# Patient Record
Sex: MALE | Race: WHITE | NOT HISPANIC OR LATINO | Employment: OTHER | URBAN - METROPOLITAN AREA
[De-identification: names, ages, dates, MRNs, and addresses within clinical notes are randomized per-mention and may not be internally consistent; named-entity substitution may affect disease eponyms.]

---

## 2017-07-12 ENCOUNTER — TELEPHONE (OUTPATIENT)
Dept: INTERNAL MEDICINE | Facility: CLINIC | Age: 57
End: 2017-07-12

## 2017-07-12 NOTE — TELEPHONE ENCOUNTER
Pt calling that today he is having a hard time waking up. Last week had a cough and not feeling well. No fever. Able to walk but tires easily. Not SOB but feels he is not getting his breath. Has many medical issues. Does construction for a living. Pt does have high blood. Pt to ER for evaluation.  Teresita Huizar RN 8:48 AM on 7/12/2017.

## 2017-10-22 ENCOUNTER — HOSPITAL ENCOUNTER (INPATIENT)
Facility: CLINIC | Age: 57
LOS: 3 days | Discharge: HOME | DRG: 369 | End: 2017-10-25
Attending: EMERGENCY MEDICINE | Admitting: SURGERY
Payer: COMMERCIAL

## 2017-10-22 ENCOUNTER — NURSE TRIAGE (OUTPATIENT)
Dept: NURSING | Facility: CLINIC | Age: 57
End: 2017-10-22

## 2017-10-22 ENCOUNTER — APPOINTMENT (OUTPATIENT)
Dept: CT IMAGING | Facility: CLINIC | Age: 57
DRG: 369 | End: 2017-10-22
Attending: EMERGENCY MEDICINE
Payer: COMMERCIAL

## 2017-10-22 DIAGNOSIS — F10.21 ALCOHOLISM IN REMISSION (H): Primary | ICD-10-CM

## 2017-10-22 DIAGNOSIS — I10 ESSENTIAL HYPERTENSION: ICD-10-CM

## 2017-10-22 DIAGNOSIS — K92.2 UPPER GI BLEED: ICD-10-CM

## 2017-10-22 DIAGNOSIS — K70.30 ALCOHOLIC CIRRHOSIS OF LIVER WITHOUT ASCITES (H): ICD-10-CM

## 2017-10-22 DIAGNOSIS — R05.9 COUGH: ICD-10-CM

## 2017-10-22 DIAGNOSIS — R09.82 POST-NASAL DRIP: ICD-10-CM

## 2017-10-22 LAB
ALBUMIN SERPL-MCNC: 2.5 G/DL (ref 3.4–5)
ALP SERPL-CCNC: 71 U/L (ref 40–150)
ALT SERPL W P-5'-P-CCNC: 127 U/L (ref 0–70)
AMMONIA PLAS-SCNC: 29 UMOL/L (ref 10–50)
ANION GAP SERPL CALCULATED.3IONS-SCNC: 9 MMOL/L (ref 3–14)
AST SERPL W P-5'-P-CCNC: 110 U/L (ref 0–45)
BASOPHILS # BLD AUTO: 0 10E9/L (ref 0–0.2)
BASOPHILS NFR BLD AUTO: 0 %
BILIRUB SERPL-MCNC: 0.7 MG/DL (ref 0.2–1.3)
BLD PROD TYP BPU: NORMAL
BLD UNIT ID BPU: 0
BLOOD PRODUCT CODE: NORMAL
BPU ID: NORMAL
BUN SERPL-MCNC: 28 MG/DL (ref 7–30)
CALCIUM SERPL-MCNC: 7.8 MG/DL (ref 8.5–10.1)
CHLORIDE SERPL-SCNC: 104 MMOL/L (ref 94–109)
CO2 SERPL-SCNC: 20 MMOL/L (ref 20–32)
CREAT SERPL-MCNC: 0.94 MG/DL (ref 0.66–1.25)
DIFFERENTIAL METHOD BLD: ABNORMAL
EOSINOPHIL # BLD AUTO: 0 10E9/L (ref 0–0.7)
EOSINOPHIL NFR BLD AUTO: 0 %
ERYTHROCYTE [DISTWIDTH] IN BLOOD BY AUTOMATED COUNT: 15.4 % (ref 10–15)
GFR SERPL CREATININE-BSD FRML MDRD: 82 ML/MIN/1.7M2
GLUCOSE SERPL-MCNC: 108 MG/DL (ref 70–99)
HCT VFR BLD AUTO: 18.6 % (ref 40–53)
HGB BLD-MCNC: 6.1 G/DL (ref 13.3–17.7)
INR PPP: 1.28 (ref 0.86–1.14)
LACTATE BLD-SCNC: 1.9 MMOL/L (ref 0.7–2)
LYMPHOCYTES # BLD AUTO: 3.8 10E9/L (ref 0.8–5.3)
LYMPHOCYTES NFR BLD AUTO: 17.4 %
MCH RBC QN AUTO: 33.9 PG (ref 26.5–33)
MCHC RBC AUTO-ENTMCNC: 32.8 G/DL (ref 31.5–36.5)
MCV RBC AUTO: 103 FL (ref 78–100)
MONOCYTES # BLD AUTO: 1.3 10E9/L (ref 0–1.3)
MONOCYTES NFR BLD AUTO: 6.1 %
NEUTROPHILS # BLD AUTO: 16.6 10E9/L (ref 1.6–8.3)
NEUTROPHILS NFR BLD AUTO: 76.5 %
PLATELET # BLD AUTO: 121 10E9/L (ref 150–450)
PLATELET # BLD EST: ABNORMAL 10*3/UL
POTASSIUM SERPL-SCNC: 4.2 MMOL/L (ref 3.4–5.3)
PROT SERPL-MCNC: 5.2 G/DL (ref 6.8–8.8)
RBC # BLD AUTO: 1.8 10E12/L (ref 4.4–5.9)
SODIUM SERPL-SCNC: 133 MMOL/L (ref 133–144)
TRANSFUSION STATUS PATIENT QL: NORMAL
TRANSFUSION STATUS PATIENT QL: NORMAL
WBC # BLD AUTO: 21.7 10E9/L (ref 4–11)

## 2017-10-22 PROCEDURE — P9016 RBC LEUKOCYTES REDUCED: HCPCS | Performed by: EMERGENCY MEDICINE

## 2017-10-22 PROCEDURE — 86901 BLOOD TYPING SEROLOGIC RH(D): CPT | Performed by: EMERGENCY MEDICINE

## 2017-10-22 PROCEDURE — 85610 PROTHROMBIN TIME: CPT | Performed by: EMERGENCY MEDICINE

## 2017-10-22 PROCEDURE — 99291 CRITICAL CARE FIRST HOUR: CPT | Mod: Z6 | Performed by: EMERGENCY MEDICINE

## 2017-10-22 PROCEDURE — 93005 ELECTROCARDIOGRAM TRACING: CPT | Performed by: EMERGENCY MEDICINE

## 2017-10-22 PROCEDURE — 96375 TX/PRO/DX INJ NEW DRUG ADDON: CPT | Performed by: EMERGENCY MEDICINE

## 2017-10-22 PROCEDURE — 70450 CT HEAD/BRAIN W/O DYE: CPT

## 2017-10-22 PROCEDURE — 82140 ASSAY OF AMMONIA: CPT | Performed by: EMERGENCY MEDICINE

## 2017-10-22 PROCEDURE — 85025 COMPLETE CBC W/AUTO DIFF WBC: CPT | Performed by: EMERGENCY MEDICINE

## 2017-10-22 PROCEDURE — 96368 THER/DIAG CONCURRENT INF: CPT | Performed by: EMERGENCY MEDICINE

## 2017-10-22 PROCEDURE — S0164 INJECTION PANTROPRAZOLE: HCPCS | Performed by: EMERGENCY MEDICINE

## 2017-10-22 PROCEDURE — 25000128 H RX IP 250 OP 636: Performed by: EMERGENCY MEDICINE

## 2017-10-22 PROCEDURE — 86850 RBC ANTIBODY SCREEN: CPT | Performed by: EMERGENCY MEDICINE

## 2017-10-22 PROCEDURE — 80053 COMPREHEN METABOLIC PANEL: CPT | Performed by: EMERGENCY MEDICINE

## 2017-10-22 PROCEDURE — 99285 EMERGENCY DEPT VISIT HI MDM: CPT | Mod: 25 | Performed by: EMERGENCY MEDICINE

## 2017-10-22 PROCEDURE — 96365 THER/PROPH/DIAG IV INF INIT: CPT | Performed by: EMERGENCY MEDICINE

## 2017-10-22 PROCEDURE — 96376 TX/PRO/DX INJ SAME DRUG ADON: CPT | Performed by: EMERGENCY MEDICINE

## 2017-10-22 PROCEDURE — 25000131 ZZH RX MED GY IP 250 OP 636 PS 637: Performed by: EMERGENCY MEDICINE

## 2017-10-22 PROCEDURE — 83605 ASSAY OF LACTIC ACID: CPT | Performed by: EMERGENCY MEDICINE

## 2017-10-22 PROCEDURE — 36430 TRANSFUSION BLD/BLD COMPNT: CPT | Performed by: EMERGENCY MEDICINE

## 2017-10-22 PROCEDURE — 25000125 ZZHC RX 250: Performed by: EMERGENCY MEDICINE

## 2017-10-22 PROCEDURE — 86900 BLOOD TYPING SEROLOGIC ABO: CPT | Performed by: EMERGENCY MEDICINE

## 2017-10-22 PROCEDURE — 86923 COMPATIBILITY TEST ELECTRIC: CPT | Performed by: EMERGENCY MEDICINE

## 2017-10-22 PROCEDURE — 12000008 ZZH R&B INTERMEDIATE UMMC

## 2017-10-22 RX ORDER — OCTREOTIDE ACETATE 50 UG/ML
50 INJECTION, SOLUTION INTRAVENOUS; SUBCUTANEOUS ONCE
Status: COMPLETED | OUTPATIENT
Start: 2017-10-22 | End: 2017-10-22

## 2017-10-22 RX ORDER — ONDANSETRON 2 MG/ML
4 INJECTION INTRAMUSCULAR; INTRAVENOUS EVERY 30 MIN PRN
Status: DISCONTINUED | OUTPATIENT
Start: 2017-10-22 | End: 2017-10-24

## 2017-10-22 RX ADMIN — ONDANSETRON 4 MG: 2 INJECTION INTRAMUSCULAR; INTRAVENOUS at 23:41

## 2017-10-22 RX ADMIN — PANTOPRAZOLE SODIUM 80 MG: 40 INJECTION, POWDER, FOR SOLUTION INTRAVENOUS at 22:45

## 2017-10-22 RX ADMIN — OCTREOTIDE ACETATE 50 MCG/HR: 200 INJECTION, SOLUTION INTRAVENOUS; SUBCUTANEOUS at 23:21

## 2017-10-22 RX ADMIN — SODIUM CHLORIDE 8 MG/HR: 9 INJECTION, SOLUTION INTRAVENOUS at 23:21

## 2017-10-22 RX ADMIN — OCTREOTIDE ACETATE 50 MCG: 50 INJECTION, SOLUTION INTRAVENOUS; SUBCUTANEOUS at 22:59

## 2017-10-22 ASSESSMENT — ENCOUNTER SYMPTOMS
BLOOD IN STOOL: 1
VOMITING: 1
MYALGIAS: 1
SHORTNESS OF BREATH: 1

## 2017-10-22 NOTE — IP AVS SNAPSHOT
[address] [address]    Phone:  [phone]                                       After Visit Summary   10/22/2017    Ten Johnson    MRN: [id number]           After Visit Summary Signature Page     I have received my discharge instructions, and my questions have been answered. I have discussed any challenges I see with this plan with the nurse or doctor.    ..........................................................................................................................................  Patient/Patient Representative Signature      ..........................................................................................................................................  Patient Representative Print Name and Relationship to Patient    ..................................................               ................................................  Date                                            Time    ..........................................................................................................................................  Reviewed by Signature/Title    ...................................................              ..............................................  Date                                                            Time

## 2017-10-22 NOTE — IP AVS SNAPSHOT
MRN:[id number]                      After Visit Summary   10/22/2017    Ten Johnson    MRN: [id number]           Thank you!     Thank you for choosing [facility] for your care. Our goal is always to provide you with excellent care. Hearing back from our patients is one way we can continue to improve our services. Please take a few minutes to complete the written survey that you may receive in the mail after you visit with us. Thank you!        Patient Information     Date Of Birth          1960        Designated Caregiver       Most Recent Value    Caregiver    Will someone help with your care after discharge? no      About your hospital stay     You were admitted on:  October 22, 2017 You last received care in the:  [address] [facility]    You were discharged on:  October 25, 2017        Reason for your hospital stay       Admitted with acute GI bleed due to Sarahi Robert tear in the esophagus.                  Who to Call     For medical emergencies, please call 911.  For non-urgent questions about your medical care, please call your primary care provider or clinic, [phone]  For questions related to your surgery, please call your surgery clinic        Attending Provider     Provider Specialty    Elvia Shaikh MD Emergency Medicine    [place], Capo Sorenson MD General Surgery    Michael Travis MD Internal Medicine    Jack Shepard MD Internal Medicine    Jak, MD Tamara Internal Medicine       Primary Care Provider Office Phone # Fax #    Cuca Celeste [phone]       When to contact your care team       Call your primary doctor if you have any of the following:  increased shortness of breath, increased swelling . Please go to the ED for further symptoms of recurrent GI bleed.                  After Care Instructions     Activity       Your activity upon discharge: activity as tolerated            Diet       Follow this diet upon discharge:  "Regular Diet Adult, 2g Sodium Restriction                  Follow-up Appointments     Adult [facility] Follow-up and recommended labs and tests       Follow up with primary care provider, Cuca Celeste, within 7 days for hospital follow- up.  The following labs/tests are recommended: CMP, CBC.      Follow up with Hepatology in 3 months.    Follow up with Cardiology as directed.     Appointments on [place] and/or [facility] (with [facility] or [facility] provider or service). Call [phone] if you haven't heard regarding these appointments within 7 days of discharge.                  Pending Results     Date and Time Order Name Status Description    10/24/2017 0005 MR Abdomen w/o & w Contrast In process     10/23/2017 0237 Blood culture Preliminary     10/23/2017 0237 Blood culture Preliminary     10/22/2017 2330 POC US ABDOMEN LIMITED In process             Statement of Approval     Ordered          10/25/17 4447  I have reviewed and agree with all the recommendations and orders detailed in this document.  EFFECTIVE NOW     Approved and electronically signed by:  Pérez Barrios PA-C             Admission Information     Date & Time Provider Department Dept. Phone    10/22/2017 Tamara Benedict MD [address] [facility] [place] [phone]      Your Vitals Were     Blood Pressure Pulse Temperature Respirations Height Weight    148/86 (BP Location: Left arm) 73 95.8  F (35.4  C) (Oral) 16 1.727 m (5' 7.99\") 78 kg (171 lb 15.3 oz)    Pulse Oximetry BMI (Body Mass Index)                94% 26.15 kg/m2          [employer] Information     [employer] lets you send messages to your doctor, view your test results, renew your prescriptions, schedule appointments and more. To sign up, go to [email or web address] . Click on \"Log in\" on the left side of the screen, which will take you to the Welcome page. Then click on \"Sign up Now\" on the right side of the page.     You will be asked to enter the access code listed below, as well as " some personal information. Please follow the directions to create your username and password.     Your access code is: -[email or web address]  Expires: 2018  5:05 PM     Your access code will  in 90 days. If you need help or a new code, please call your [facility] clinic or [phone].        Care EveryWhere ID     This is your Care EveryWhere ID. This could be used by other organizations to access your [facility] medical records  [id number]        Equal Access to Services     [facility] : Hadii adrienne garcia hadasho Soomaali, waaxda luqadaha, qaybta kaalmada adeegyada, jazmin crespo . So [facility] [phone].    ATENCIÓN: Si habla español, tiene a ha disposición servicios gratuitos de asistencia lingüística. Llame al [phone].    We comply with applicable federal civil rights laws and [place] laws. We do not discriminate on the basis of race, color, national origin, age, disability, sex, sexual orientation, or gender identity.               Review of your medicines      START taking        Dose / Directions    ciprofloxacin 500 MG tablet   Commonly known as:  CIPRO   Used for:  Alcoholic cirrhosis of liver without ascites (H)        Dose:  500 mg   Take 1 tablet (500 mg) by mouth 2 times daily   Quantity:  10 tablet   Refills:  0       pantoprazole 40 MG EC tablet   Commonly known as:  PROTONIX        Dose:  40 mg   Take 1 tablet (40 mg) by mouth 2 times daily (before meals)   Quantity:  120 tablet   Refills:  0         CONTINUE these medicines which have NOT CHANGED        Dose / Directions    fluticasone 50 MCG/ACT spray   Commonly known as:  FLONASE   Used for:  Cough, Post-nasal drip        Dose:  2 spray   Spray 2 sprays into both nostrils daily   Quantity:  16 g   Refills:  11       lisinopril 10 MG tablet   Commonly known as:  PRINIVIL/ZESTRIL   Used for:  Essential hypertension        Dose:  10 mg   Take 1 tablet (10 mg) by mouth daily   Quantity:  90 tablet   Refills:  3        loratadine 10 MG tablet   Commonly known as:  CLARITIN   Used for:  Post-nasal drip, Cough        Dose:  10 mg   Take 1 tablet (10 mg) by mouth daily   Quantity:  30 tablet   Refills:  11       MULTIVITAMINS PO        Dose:  1 tablet   Take 1 tablet by mouth daily.   Refills:  0            Where to get your medicines      These medications were sent to [facility] - [place], [place] - [address]  [address], [facility] [address]     Phone:  [phone]     ciprofloxacin 500 MG tablet    pantoprazole 40 MG EC tablet               ANTIBIOTIC INSTRUCTION     You've Been Prescribed an Antibiotic - Now What?  Your healthcare team thinks that you or your loved one might have an infection. Some infections can be treated with antibiotics, which are powerful, life-saving drugs. Like all medications, antibiotics have side effects and should only be used when necessary. There are some important things you should know about your antibiotic treatment.      Your healthcare team may run tests before you start taking an antibiotic.    Your team may take samples (e.g., from your blood, urine or other areas) to run tests to look for bacteria. These test can be important to determine if you need an antibiotic at all and, if you do, which antibiotic will work best.      Within a few days, your healthcare team might change or even stop your antibiotic.    Your team may start you on an antibiotic while they are working to find out what is making you sick.    Your team might change your antibiotic because test results show that a different antibiotic would be better to treat your infection.    In some cases, once your team has more information, they learn that you do not need an antibiotic at all. They may find out that you don't have an infection, or that the antibiotic you're taking won't work against your infection. For example, an infection caused by a virus can't be treated with antibiotics. Staying  on an antibiotic when you don't need it is more likely to be harmful than helpful.      You may experience side effects from your antibiotic.    Like all medications, antibiotics have side effects. Some of these can be serious.    Let you healthcare team know if you have any known allergies when you are admitted to the hospital.    One significant side effect of nearly all antibiotics is the risk of severe and sometimes deadly diarrhea caused by Clostridium difficile (C. Difficile). This occurs when a person takes antibiotics because some good germs are destroyed. Antibiotic use allows C. diificile to take over, putting patients at high risk for this serious infection.    As a patient or caregiver, it is important to understand your or your loved one's antibiotic treatment. It is especially important for caregivers to speak up when patients can't speak for themselves. Here are some important questions to ask your healthcare team.    What infection is this antibiotic treating and how do you know I have that infection?    What side effects might occur from this antibiotic?    How long will I need to take this antibiotic?    Is it safe to take this antibiotic with other medications or supplements (e.g., vitamins) that I am taking?     Are there any special directions I need to know about taking this antibiotic? For example, should I take it with food?    How will I be monitored to know whether my infection is responding to the antibiotic?    What tests may help to make sure the right antibiotic is prescribed for me?      Information provided by:  www.cdc.gov/getsmart  U.S. Department of Health and Human Services  Centers for disease Control and Prevention  National Center for Emerging and Zoonotic Infectious Diseases  Division of Healthcare Quality Promotion         Protect others around you: Learn how to safely use, store and throw away your medicines at www.disposemymeds.org.             Medication List: This is a  list of all your medications and when to take them. Check marks below indicate your daily home schedule. Keep this list as a reference.      Medications           Morning Afternoon Evening Bedtime As Needed    ciprofloxacin 500 MG tablet   Commonly known as:  CIPRO   Take 1 tablet (500 mg) by mouth 2 times daily                                fluticasone 50 MCG/ACT spray   Commonly known as:  FLONASE   Spray 2 sprays into both nostrils daily                                lisinopril 10 MG tablet   Commonly known as:  PRINIVIL/ZESTRIL   Take 1 tablet (10 mg) by mouth daily                                loratadine 10 MG tablet   Commonly known as:  CLARITIN   Take 1 tablet (10 mg) by mouth daily                                MULTIVITAMINS PO   Take 1 tablet by mouth daily.                                pantoprazole 40 MG EC tablet   Commonly known as:  PROTONIX   Take 1 tablet (40 mg) by mouth 2 times daily (before meals)   Last time this was given:  40 mg on 10/25/2017  3:57 PM

## 2017-10-23 ENCOUNTER — APPOINTMENT (OUTPATIENT)
Dept: ULTRASOUND IMAGING | Facility: CLINIC | Age: 57
DRG: 369 | End: 2017-10-23
Attending: SURGERY
Payer: COMMERCIAL

## 2017-10-23 ENCOUNTER — APPOINTMENT (OUTPATIENT)
Dept: GENERAL RADIOLOGY | Facility: CLINIC | Age: 57
DRG: 369 | End: 2017-10-23
Attending: SURGERY
Payer: COMMERCIAL

## 2017-10-23 ENCOUNTER — RESULTS ONLY (OUTPATIENT)
Dept: GASTROENTEROLOGY | Facility: CLINIC | Age: 57
End: 2017-10-23

## 2017-10-23 PROBLEM — K92.0 HEMATEMESIS: Status: ACTIVE | Noted: 2017-10-23

## 2017-10-23 PROBLEM — K92.2 GI BLEED: Status: ACTIVE | Noted: 2017-10-23

## 2017-10-23 LAB
ABO + RH BLD: NORMAL
ABO + RH BLD: NORMAL
ALBUMIN SERPL-MCNC: 2.3 G/DL (ref 3.4–5)
ALBUMIN UR-MCNC: NEGATIVE MG/DL
ALP SERPL-CCNC: 65 U/L (ref 40–150)
ALT SERPL W P-5'-P-CCNC: 117 U/L (ref 0–70)
ANION GAP SERPL CALCULATED.3IONS-SCNC: 8 MMOL/L (ref 3–14)
APPEARANCE UR: CLEAR
AST SERPL W P-5'-P-CCNC: 99 U/L (ref 0–45)
BILIRUB SERPL-MCNC: 0.8 MG/DL (ref 0.2–1.3)
BILIRUB UR QL STRIP: NEGATIVE
BLD GP AB SCN SERPL QL: NORMAL
BLD PROD TYP BPU: NORMAL
BLD UNIT ID BPU: 0
BLD UNIT ID BPU: 0
BLOOD BANK CMNT PATIENT-IMP: NORMAL
BLOOD PRODUCT CODE: NORMAL
BLOOD PRODUCT CODE: NORMAL
BPU ID: NORMAL
BPU ID: NORMAL
BUN SERPL-MCNC: 28 MG/DL (ref 7–30)
CALCIUM SERPL-MCNC: 7.2 MG/DL (ref 8.5–10.1)
CHLORIDE SERPL-SCNC: 108 MMOL/L (ref 94–109)
CO2 SERPL-SCNC: 23 MMOL/L (ref 20–32)
COLOR UR AUTO: YELLOW
CREAT SERPL-MCNC: 1.11 MG/DL (ref 0.66–1.25)
ERYTHROCYTE [DISTWIDTH] IN BLOOD BY AUTOMATED COUNT: 16.2 % (ref 10–15)
GFR SERPL CREATININE-BSD FRML MDRD: 68 ML/MIN/1.7M2
GLUCOSE SERPL-MCNC: 119 MG/DL (ref 70–99)
GLUCOSE UR STRIP-MCNC: NEGATIVE MG/DL
HCT VFR BLD AUTO: 21.4 % (ref 40–53)
HGB BLD-MCNC: 6.8 G/DL (ref 13.3–17.7)
HGB BLD-MCNC: 7.1 G/DL (ref 13.3–17.7)
HGB BLD-MCNC: 8.6 G/DL (ref 13.3–17.7)
HGB UR QL STRIP: NEGATIVE
HYALINE CASTS #/AREA URNS LPF: 1 /LPF (ref 0–2)
INR PPP: 1.33 (ref 0.86–1.14)
INTERPRETATION ECG - MUSE: NORMAL
KETONES UR STRIP-MCNC: NEGATIVE MG/DL
LEUKOCYTE ESTERASE UR QL STRIP: NEGATIVE
MAGNESIUM SERPL-MCNC: 1.8 MG/DL (ref 1.6–2.3)
MAGNESIUM SERPL-MCNC: 2.8 MG/DL (ref 1.6–2.3)
MCH RBC QN AUTO: 32.3 PG (ref 26.5–33)
MCHC RBC AUTO-ENTMCNC: 33.2 G/DL (ref 31.5–36.5)
MCV RBC AUTO: 97 FL (ref 78–100)
MRSA DNA SPEC QL NAA+PROBE: NEGATIVE
MUCOUS THREADS #/AREA URNS LPF: PRESENT /LPF
NITRATE UR QL: NEGATIVE
NUM BPU REQUESTED: 3
PH UR STRIP: 6.5 PH (ref 5–7)
PHOSPHATE SERPL-MCNC: 3.7 MG/DL (ref 2.5–4.5)
PLATELET # BLD AUTO: 95 10E9/L (ref 150–450)
POTASSIUM SERPL-SCNC: 4.2 MMOL/L (ref 3.4–5.3)
PROT SERPL-MCNC: 4.7 G/DL (ref 6.8–8.8)
RBC # BLD AUTO: 2.2 10E12/L (ref 4.4–5.9)
RBC #/AREA URNS AUTO: 0 /HPF (ref 0–2)
SODIUM SERPL-SCNC: 139 MMOL/L (ref 133–144)
SOURCE: ABNORMAL
SP GR UR STRIP: 1.02 (ref 1–1.03)
SPECIMEN EXP DATE BLD: NORMAL
SPECIMEN SOURCE: NORMAL
TRANSFUSION STATUS PATIENT QL: NORMAL
UPPER GI ENDOSCOPY: NORMAL
UROBILINOGEN UR STRIP-MCNC: NORMAL MG/DL (ref 0–2)
WBC # BLD AUTO: 14.8 10E9/L (ref 4–11)
WBC #/AREA URNS AUTO: 2 /HPF (ref 0–2)

## 2017-10-23 PROCEDURE — 25000128 H RX IP 250 OP 636: Performed by: EMERGENCY MEDICINE

## 2017-10-23 PROCEDURE — 84100 ASSAY OF PHOSPHORUS: CPT | Performed by: SURGERY

## 2017-10-23 PROCEDURE — 85018 HEMOGLOBIN: CPT | Performed by: PHYSICIAN ASSISTANT

## 2017-10-23 PROCEDURE — 25000125 ZZHC RX 250: Performed by: EMERGENCY MEDICINE

## 2017-10-23 PROCEDURE — 93005 ELECTROCARDIOGRAM TRACING: CPT

## 2017-10-23 PROCEDURE — 25000128 H RX IP 250 OP 636: Performed by: STUDENT IN AN ORGANIZED HEALTH CARE EDUCATION/TRAINING PROGRAM

## 2017-10-23 PROCEDURE — 40000556 ZZH STATISTIC PERIPHERAL IV START W US GUIDANCE

## 2017-10-23 PROCEDURE — 93010 ELECTROCARDIOGRAM REPORT: CPT | Performed by: INTERNAL MEDICINE

## 2017-10-23 PROCEDURE — P9016 RBC LEUKOCYTES REDUCED: HCPCS | Performed by: EMERGENCY MEDICINE

## 2017-10-23 PROCEDURE — 36415 COLL VENOUS BLD VENIPUNCTURE: CPT | Performed by: STUDENT IN AN ORGANIZED HEALTH CARE EDUCATION/TRAINING PROGRAM

## 2017-10-23 PROCEDURE — 12000008 ZZH R&B INTERMEDIATE UMMC

## 2017-10-23 PROCEDURE — 36415 COLL VENOUS BLD VENIPUNCTURE: CPT | Performed by: SURGERY

## 2017-10-23 PROCEDURE — 80053 COMPREHEN METABOLIC PANEL: CPT | Performed by: SURGERY

## 2017-10-23 PROCEDURE — 36415 COLL VENOUS BLD VENIPUNCTURE: CPT | Performed by: HOSPITALIST

## 2017-10-23 PROCEDURE — 87040 BLOOD CULTURE FOR BACTERIA: CPT | Performed by: SURGERY

## 2017-10-23 PROCEDURE — 25000125 ZZHC RX 250: Performed by: INTERNAL MEDICINE

## 2017-10-23 PROCEDURE — 25000132 ZZH RX MED GY IP 250 OP 250 PS 637: Performed by: PHYSICIAN ASSISTANT

## 2017-10-23 PROCEDURE — 87640 STAPH A DNA AMP PROBE: CPT | Performed by: SURGERY

## 2017-10-23 PROCEDURE — 87641 MR-STAPH DNA AMP PROBE: CPT | Performed by: SURGERY

## 2017-10-23 PROCEDURE — 85018 HEMOGLOBIN: CPT | Performed by: HOSPITALIST

## 2017-10-23 PROCEDURE — 83735 ASSAY OF MAGNESIUM: CPT | Performed by: SURGERY

## 2017-10-23 PROCEDURE — 25000128 H RX IP 250 OP 636

## 2017-10-23 PROCEDURE — 99232 SBSQ HOSP IP/OBS MODERATE 35: CPT | Performed by: PHYSICIAN ASSISTANT

## 2017-10-23 PROCEDURE — 83735 ASSAY OF MAGNESIUM: CPT | Performed by: STUDENT IN AN ORGANIZED HEALTH CARE EDUCATION/TRAINING PROGRAM

## 2017-10-23 PROCEDURE — 85027 COMPLETE CBC AUTOMATED: CPT | Performed by: SURGERY

## 2017-10-23 PROCEDURE — 25000125 ZZHC RX 250: Performed by: STUDENT IN AN ORGANIZED HEALTH CARE EDUCATION/TRAINING PROGRAM

## 2017-10-23 PROCEDURE — 71010 XR CHEST PORT 1 VW: CPT

## 2017-10-23 PROCEDURE — 43235 EGD DIAGNOSTIC BRUSH WASH: CPT | Performed by: INTERNAL MEDICINE

## 2017-10-23 PROCEDURE — 99291 CRITICAL CARE FIRST HOUR: CPT | Mod: GC | Performed by: SURGERY

## 2017-10-23 PROCEDURE — 81001 URINALYSIS AUTO W/SCOPE: CPT | Performed by: SURGERY

## 2017-10-23 PROCEDURE — 76700 US EXAM ABDOM COMPLETE: CPT | Mod: XS

## 2017-10-23 PROCEDURE — 0DJ08ZZ INSPECTION OF UPPER INTESTINAL TRACT, VIA NATURAL OR ARTIFICIAL OPENING ENDOSCOPIC: ICD-10-PCS | Performed by: INTERNAL MEDICINE

## 2017-10-23 PROCEDURE — 84100 ASSAY OF PHOSPHORUS: CPT | Performed by: INTERNAL MEDICINE

## 2017-10-23 PROCEDURE — S0164 INJECTION PANTROPRAZOLE: HCPCS | Performed by: EMERGENCY MEDICINE

## 2017-10-23 PROCEDURE — 25000128 H RX IP 250 OP 636: Performed by: INTERNAL MEDICINE

## 2017-10-23 PROCEDURE — 25000132 ZZH RX MED GY IP 250 OP 250 PS 637: Performed by: STUDENT IN AN ORGANIZED HEALTH CARE EDUCATION/TRAINING PROGRAM

## 2017-10-23 PROCEDURE — 85610 PROTHROMBIN TIME: CPT | Performed by: SURGERY

## 2017-10-23 RX ORDER — FENTANYL CITRATE 50 UG/ML
INJECTION, SOLUTION INTRAMUSCULAR; INTRAVENOUS
Status: COMPLETED
Start: 2017-10-23 | End: 2017-10-23

## 2017-10-23 RX ORDER — PANTOPRAZOLE SODIUM 40 MG/1
40 TABLET, DELAYED RELEASE ORAL EVERY MORNING
Status: DISCONTINUED | OUTPATIENT
Start: 2017-10-23 | End: 2017-10-23

## 2017-10-23 RX ORDER — POTASSIUM CL/LIDO/0.9 % NACL 10MEQ/0.1L
10 INTRAVENOUS SOLUTION, PIGGYBACK (ML) INTRAVENOUS
Status: DISCONTINUED | OUTPATIENT
Start: 2017-10-23 | End: 2017-10-23

## 2017-10-23 RX ORDER — CEFTRIAXONE 1 G/1
1 INJECTION, POWDER, FOR SOLUTION INTRAMUSCULAR; INTRAVENOUS EVERY 24 HOURS
Status: DISCONTINUED | OUTPATIENT
Start: 2017-10-24 | End: 2017-10-24

## 2017-10-23 RX ORDER — LANOLIN ALCOHOL/MO/W.PET/CERES
100 CREAM (GRAM) TOPICAL DAILY
Status: DISCONTINUED | OUTPATIENT
Start: 2017-10-24 | End: 2017-10-25 | Stop reason: HOSPADM

## 2017-10-23 RX ORDER — PANTOPRAZOLE SODIUM 40 MG/1
40 TABLET, DELAYED RELEASE ORAL
Status: DISCONTINUED | OUTPATIENT
Start: 2017-10-23 | End: 2017-10-25 | Stop reason: HOSPADM

## 2017-10-23 RX ORDER — PHYTONADIONE 5 MG/1
5 TABLET ORAL DAILY
Status: COMPLETED | OUTPATIENT
Start: 2017-10-24 | End: 2017-10-25

## 2017-10-23 RX ORDER — CEFTRIAXONE 1 G/1
1 INJECTION, POWDER, FOR SOLUTION INTRAMUSCULAR; INTRAVENOUS EVERY 24 HOURS
Status: DISCONTINUED | OUTPATIENT
Start: 2017-10-24 | End: 2017-10-25 | Stop reason: HOSPADM

## 2017-10-23 RX ORDER — POTASSIUM CHLORIDE 14.9 MG/ML
20 INJECTION INTRAVENOUS
Status: DISCONTINUED | OUTPATIENT
Start: 2017-10-23 | End: 2017-10-23

## 2017-10-23 RX ORDER — MAGNESIUM SULFATE HEPTAHYDRATE 40 MG/ML
4 INJECTION, SOLUTION INTRAVENOUS EVERY 4 HOURS PRN
Status: DISCONTINUED | OUTPATIENT
Start: 2017-10-23 | End: 2017-10-25 | Stop reason: HOSPADM

## 2017-10-23 RX ORDER — PHYTONADIONE 1 MG/.5ML
2 INJECTION, EMULSION INTRAMUSCULAR; INTRAVENOUS; SUBCUTANEOUS ONCE
Status: DISCONTINUED | OUTPATIENT
Start: 2017-10-23 | End: 2017-10-23 | Stop reason: CLARIF

## 2017-10-23 RX ORDER — NALOXONE HYDROCHLORIDE 0.4 MG/ML
.1-.4 INJECTION, SOLUTION INTRAMUSCULAR; INTRAVENOUS; SUBCUTANEOUS
Status: DISCONTINUED | OUTPATIENT
Start: 2017-10-23 | End: 2017-10-25 | Stop reason: HOSPADM

## 2017-10-23 RX ORDER — POTASSIUM CHLORIDE 750 MG/1
20-40 TABLET, EXTENDED RELEASE ORAL
Status: DISCONTINUED | OUTPATIENT
Start: 2017-10-23 | End: 2017-10-23

## 2017-10-23 RX ORDER — POTASSIUM CHLORIDE 1.5 G/1.58G
20-40 POWDER, FOR SOLUTION ORAL
Status: DISCONTINUED | OUTPATIENT
Start: 2017-10-23 | End: 2017-10-23

## 2017-10-23 RX ORDER — FOLIC ACID 1 MG/1
1 TABLET ORAL DAILY
Status: DISCONTINUED | OUTPATIENT
Start: 2017-10-23 | End: 2017-10-25 | Stop reason: HOSPADM

## 2017-10-23 RX ORDER — POTASSIUM CHLORIDE 7.45 MG/ML
10 INJECTION INTRAVENOUS
Status: DISCONTINUED | OUTPATIENT
Start: 2017-10-23 | End: 2017-10-23

## 2017-10-23 RX ORDER — DIPHENHYDRAMINE HYDROCHLORIDE 50 MG/ML
25 INJECTION INTRAMUSCULAR; INTRAVENOUS ONCE
Status: COMPLETED | OUTPATIENT
Start: 2017-10-23 | End: 2017-10-23

## 2017-10-23 RX ORDER — CEFTRIAXONE 1 G/1
1 INJECTION, POWDER, FOR SOLUTION INTRAMUSCULAR; INTRAVENOUS ONCE
Status: DISCONTINUED | OUTPATIENT
Start: 2017-10-23 | End: 2017-10-25 | Stop reason: HOSPADM

## 2017-10-23 RX ORDER — NALOXONE HYDROCHLORIDE 0.4 MG/ML
.1-.4 INJECTION, SOLUTION INTRAMUSCULAR; INTRAVENOUS; SUBCUTANEOUS
Status: DISCONTINUED | OUTPATIENT
Start: 2017-10-23 | End: 2017-10-24

## 2017-10-23 RX ORDER — DIPHENHYDRAMINE HYDROCHLORIDE 50 MG/ML
INJECTION INTRAMUSCULAR; INTRAVENOUS
Status: COMPLETED
Start: 2017-10-23 | End: 2017-10-23

## 2017-10-23 RX ORDER — CEFTRIAXONE 1 G/1
1 INJECTION, POWDER, FOR SOLUTION INTRAMUSCULAR; INTRAVENOUS EVERY 24 HOURS
Status: DISCONTINUED | OUTPATIENT
Start: 2017-10-24 | End: 2017-10-23

## 2017-10-23 RX ORDER — SODIUM CHLORIDE, SODIUM LACTATE, POTASSIUM CHLORIDE, CALCIUM CHLORIDE 600; 310; 30; 20 MG/100ML; MG/100ML; MG/100ML; MG/100ML
INJECTION, SOLUTION INTRAVENOUS CONTINUOUS
Status: DISCONTINUED | OUTPATIENT
Start: 2017-10-23 | End: 2017-10-23

## 2017-10-23 RX ADMIN — SODIUM CHLORIDE 8 MG/HR: 9 INJECTION, SOLUTION INTRAVENOUS at 09:41

## 2017-10-23 RX ADMIN — DIPHENHYDRAMINE HYDROCHLORIDE 25 MG: 50 INJECTION INTRAMUSCULAR; INTRAVENOUS at 09:41

## 2017-10-23 RX ADMIN — THIAMINE HCL (VITAMIN B1) 50 MG TABLET 50 MG: at 13:45

## 2017-10-23 RX ADMIN — MIDAZOLAM 3 MG: 1 INJECTION INTRAMUSCULAR; INTRAVENOUS at 09:23

## 2017-10-23 RX ADMIN — SODIUM CHLORIDE, POTASSIUM CHLORIDE, SODIUM LACTATE AND CALCIUM CHLORIDE 500 ML: 600; 310; 30; 20 INJECTION, SOLUTION INTRAVENOUS at 01:33

## 2017-10-23 RX ADMIN — ONDANSETRON 4 MG: 2 INJECTION INTRAMUSCULAR; INTRAVENOUS at 19:03

## 2017-10-23 RX ADMIN — FOLIC ACID 1 MG: 1 TABLET ORAL at 13:46

## 2017-10-23 RX ADMIN — SODIUM CHLORIDE, POTASSIUM CHLORIDE, SODIUM LACTATE AND CALCIUM CHLORIDE: 600; 310; 30; 20 INJECTION, SOLUTION INTRAVENOUS at 01:33

## 2017-10-23 RX ADMIN — PANTOPRAZOLE SODIUM 40 MG: 40 INJECTION, POWDER, FOR SOLUTION INTRAVENOUS at 11:07

## 2017-10-23 RX ADMIN — PANTOPRAZOLE SODIUM 40 MG: 40 TABLET, DELAYED RELEASE ORAL at 18:51

## 2017-10-23 RX ADMIN — FENTANYL CITRATE 150 MCG: 50 INJECTION, SOLUTION INTRAMUSCULAR; INTRAVENOUS at 09:23

## 2017-10-23 RX ADMIN — PHYTONADIONE 10 MG: 10 INJECTION, EMULSION INTRAMUSCULAR; INTRAVENOUS; SUBCUTANEOUS at 03:18

## 2017-10-23 RX ADMIN — Medication 2 G: at 09:01

## 2017-10-23 ASSESSMENT — ACTIVITIES OF DAILY LIVING (ADL)
RETIRED_EATING: 0-->INDEPENDENT
TOILETING: 0-->INDEPENDENT
RETIRED_COMMUNICATION: 0-->UNDERSTANDS/COMMUNICATES WITHOUT DIFFICULTY
DRESS: 0-->INDEPENDENT
SWALLOWING: 0-->SWALLOWS FOODS/LIQUIDS WITHOUT DIFFICULTY
NUMBER_OF_TIMES_PATIENT_HAS_FALLEN_WITHIN_LAST_SIX_MONTHS: 6
TRANSFERRING: 0-->INDEPENDENT
COGNITION: 0 - NO COGNITION ISSUES REPORTED
FALL_HISTORY_WITHIN_LAST_SIX_MONTHS: YES
BATHING: 0-->INDEPENDENT
AMBULATION: 0-->INDEPENDENT

## 2017-10-23 NOTE — CONSULTS
Referring provider: Dr. Cooper  Date of service: 10/23/17    Note for resolving consult. Please seen Consult note from 10/23/2017 at 0122 from Dr. Ramsey/Dr. Sandoval for further information.     SYDNIE Osborn, CNP

## 2017-10-23 NOTE — ED NOTES
On Thursday patient had three episodes of vomiting blood. Since has been weak, has several episodes blacking out.

## 2017-10-23 NOTE — CONSULTS
"[facility]    Hepatology New Patient Consultation     Referring provider:  No ref. provider found    Chief complaint:  Vomiting blood, black stool     HPI: Mr. Johnson is a 57 year old male with a history of previously well compensated cirrhosis secondary to alcohol and hepatitis C, followed by Raeann Valdes most recently in 2015, moderate aortic stenosis, hypertension now presents with hematemesis and melenic stool.      The patient is a poor historian.  He was in his usual state of health until 3 weeks ago when he noted onset of hematemesis, vomiting several cups of blood, upon waking from sleep.  No prior retching.  Also with melenic stool x 2 episodes per day for a few days. Called the nurse triage line who recommended the patient come in, but he did not due to \"other commitments.\"  Bleeding got better until a week ago when he again noted hematemesis, vomiting cups of blood.  Last episode of hematemesis was yesterday and he says only a few tablespoons.  Melenic stool x 1-2 episodes daily as well.  Syncope or \"blacking out\" at home multiple times.  Diffuse body cramping for which he has been taking naproxen several times per day, home dose is once daily.  No blood thinners.  Dizziness all the time.  +Fatigue.  Did not come in sooner as about to start a new job.  Patient denies jaundice, abdominal distension, lower extremity edema.  +Consusion.  + 20 lb unintentional weight loss.      Medical hx Surgical hx   Past Medical History:   Diagnosis Date     alcoholism      Allergic rhinitis      Aortic stenosis, moderate      Blood transfusion      Cancer (H)      Cirrhosis (H)      Hepatitis C      Hypertension      Marijuana abuse      OA (osteoarthritis) of knee 4/1/2013     Pain, elbow      Rotator cuff tear, right      Staphylococcus infection in conditions classified elsewhere and of unspecified site      Thrombocytopenia (H)       Past Surgical History:   Procedure Laterality Date     " ARTHROSCOPY SHOULDER ROTATOR CUFF REPAIR  7/31/2012    Procedure: ARTHROSCOPY SHOULDER ROTATOR CUFF REPAIR;  Right Shoulder Arthroscopic Rotator Cuff Repair, BicepsTenodesis,  Subacromial Decompression ;  Surgeon: Joi Castillo MD;  [facility]: [facility]     FACIAL RECONSTRUCTION SURGERY  1971     IRRIGATION AND DEBRIDEMENT UPPER EXTREMITY, COMBINED  1/3/2012    Procedure:COMBINED IRRIGATION AND DEBRIDEMENT UPPER EXTREMITY; Irrigation & Debridement Left Elbow; Surgeon:CRISTHIAN ZHOU; Location:[facility]     REPAIR TENDON TRICEPS UPPER EXTREMITY  11/8/2011    Procedure:REPAIR TENDON TRICEPS UPPER EXTREMITY; Surgeon:CRISTHIAN ZHOU; Location:[facility]     SHOULDER SURGERY  2003    left, injury, torn tendons, hematoma     TRANSPOSITION ULNAR NERVE (ELBOW)  11/8/2011    Procedure:TRANSPOSITION ULNAR NERVE (ELBOW); Final Procedure Done: Left Elbow Lateral Ulnar Collateral Repair And  Left Elbow Triceps Repair     No prior abdominal surgery       Medications  Prior to Admission medications    Medication Sig Start Date End Date Taking? Authorizing Provider   lisinopril (PRINIVIL,ZESTRIL) 10 MG tablet Take 1 tablet (10 mg) by mouth daily 5/9/16   Mili Capps MD   fluticasone (FLONASE) 50 MCG/ACT nasal spray Spray 2 sprays into both nostrils daily 5/9/16   Mili Capps MD   loratadine (CLARITIN) 10 MG tablet Take 1 tablet (10 mg) by mouth daily 5/9/16   Mili Capps MD   Multiple Vitamin (MULTIVITAMINS PO) Take 1 tablet by mouth daily.    Reported, Patient   Naproxen    Allergies  Allergies   Allergen Reactions     Zolpidem Other (See Comments)     Alcoholic.  Had reaction 3/17/13 while intoxicated which included black out, loss of awareness, paranoia.  Do not prescribe.  Dr. Celeste     Cats Other (See Comments)     rhinitis     Dogs Other (See Comments)     rhinitis     Pollen Extract Other (See Comments)     rhinits.       Family hx Social hx   Family History   Problem  "Relation Age of Onset     CANCER Mother      lymph     No history of liver disease or GI malignancy Social History   Substance Use Topics     Smoking status: Never Smoker     Smokeless tobacco: Never Used     Alcohol use Yes   Ongoing alcohol use but unable to describe how much daily use; \"some days I drink a lot, but I dont count drinks\"  No illicits  No smoking  Works as an   Single, no children       Review of systems  A 10-point review of systems was negative.    Examination  /66  Temp 98.5  F (36.9  C) (Oral)  Resp 23  SpO2 98%  There is no height or weight on file to calculate BMI.    Gen- well, NAD, A+Ox3, normal color  Eye- EOMI  ENT- dry MM  Lym- no palpable lymphadenopathy  CVS- RRR  RS- EWOB  Abd- soft, NT, ND, +BS, no r/r/g  Extr- pulses good, no NADIA  MS- hands normal- no clubbing  Neuro- A+Ox3, no asterixis  Skin- no rash or jaundice  Psych- normal mood  Rectal- brown stool in the rectal vault, with rim of dried blood around the anus    Laboratory  Lab Results   Component Value Date     10/22/2017    POTASSIUM 4.2 10/22/2017    CHLORIDE 104 10/22/2017    CO2 20 10/22/2017    BUN 28 10/22/2017    CR 0.94 10/22/2017       Lab Results   Component Value Date    BILITOTAL 0.7 10/22/2017     10/22/2017     10/22/2017    ALKPHOS 71 10/22/2017       Lab Results   Component Value Date    ALBUMIN 2.5 10/22/2017    PROTTOTAL 5.2 10/22/2017        Lab Results   Component Value Date    WBC 21.7 10/22/2017    HGB 6.1 10/22/2017     10/22/2017     10/22/2017       Lab Results   Component Value Date    INR 1.28 10/22/2017     Baseline hemoglobin from 6/2015 ~ 16    MELD-Na score: 9 at 10/22/2017 10:42 PM  MELD score: 9 at 10/22/2017 10:42 PM  Calculated from:  Serum Creatinine: 0.94 mg/dL (Rounded to 1) at 10/22/2017 10:42 PM  Serum Sodium: 133 mmol/L at 10/22/2017 10:42 PM  Total Bilirubin: 0.7 mg/dL (Rounded to 1) at 10/22/2017 10:42 PM  INR(ratio): 1.28 at " 10/22/2017 10:42 PM  Age: 57 years    Radiology  CT head:  No intracranial hemorrhage, mass effect, or midline shift. The  ventricles are proportionate to the cerebral sulci. The gray to white  matter differentiation of the cerebral hemispheres is preserved. The  basal cisterns are patent.     The visualized paranasal sinuses are clear. The mastoid air cells are  clear.    Assessment  Mr. Johnson is a 57 year old male with a history of previously well compensated cirrhosis secondary to alcohol and hepatitis C, ongoing alcohol abuse, and moderate aortic stenosis who now presents with hematemesis (last episode yesterday), melenic stool and syncope x weeks.  Mild tachycardia on presentation which improved with resuscitation and brown stool in the rectal vault.  Additionally with 10 gram hemoglobin drop from apparent baseline in 2015.  Presentation is concerning for variceal bleeding, however non-portal hypertensive cause of bleeding including PUD would also be considered given NSAID use.      Will plan to resuscitate overnight in the ICU and transfuse blood products, with tentative plan for scope in AM.  If ongoing HD significant blood loss - would certainly perform the procedure sooner as needed.      Plan  -NPO  -PPI drip + bolus  -Octreotide drip  -10 IV vitamin K  -Ceftriaxone   -Complete infectious workup including blood culture x 2, UA/UCx, Chest XRAY, diagnostic paracentesis as able  -RUQ US with doppler  -tentative plan for upper endoscopy in AM  -please page for ongoing HD significant GI blood loss  -hold further NSAIDS  -patient will need routine health screening including colonoscopy and HCC surveillance given weight loss  -inpatient chem dep counseling     Case discussed with Dr. Sandoval who is in agreement with plan of care.      Teresita Ramsey  Hepatology  [facility]

## 2017-10-23 NOTE — TELEPHONE ENCOUNTER
"Patient calling to report feeling ill the past few days. He reports multiple episodes of vomiting red blood. Last episode was Friday night. He has been having body aches/cramps and feeling weaker and weaker every day.   Reason for Disposition    Weak, dizzy or lightheaded    Additional Information    Negative: Shock suspected (e.g., cold/pale/clammy skin, too weak to stand, low BP, rapid pulse)    Negative: Difficult to awaken or acting confused  (e.g., disoriented, slurred speech)    Negative: Unable to walk, or can only walk with assistance (e.g., requires support)    Negative: Fainted    Negative: [1] Vomited blood AND [2] large amount (example: \"a cup of blood\")    Negative: Rectal bleeding (i.e., bloody stool, blood in stool)    Negative: Sounds like a life-threatening emergency to the triager    Negative: Coughing up blood  (i.e., from lungs)    Protocols used: VOMITING BLOOD-ADULT-    "

## 2017-10-23 NOTE — H&P
"                               [facility] Admission  History &Physical  Ten Johnson MRN: [id number]  Age: 57 year old, : 1960  Date of Admission:10/22/2017  Primary care provider: Cuca Celeste          Chief Complaint  Hematemesis, melena, syncope         History of Present Illness  Ten Johnson is a 57 year old male PMH untreated Hepatitis C with cirrhosis, alcohol abuse, HTN, and aortic stenosis, who presents to the ED after 4 days of hematemesis and melena and multiple syncopal episodes. Upon further interviewing, he did have episodes of the same about 3 weeks ago.      Several weeks ago the patient vomited first thing in the morning. He called a triage nurse who instructed him to come to the ED but \"something happened and I didn't make it.\" He had 1-2 black BM per day for several days. Then last week, he began to experience \"weird cramps\" in the arches of his feet and hands and forearm, where he has had reconstructive surgery complicated by MRSA infection.      Thursday- several cups of blood and Friday several tablespoons. Last emesis was yesterday. He has been very somnolent and reports only being awake 2 hours over the last 72 hours. He \"blacked out\" whenever he woke up and stood up. Didn't come to hospital earlier because he was hoping to improve and was going to start a job tomorrow.      Take 1 naroxen per day, no other NSAIDs or blood thinners.      8pm last night- last black stool     Fevers and chills, subjective.      Some abdominal pain. Feel confused. Never had bleeding secondary to liver disease. No previous confusion.     He has lost about 20 lbs of weight in the last year or so and he quit his last job as an  due to fatigue.              Past Medical History  Past Medical History:   Diagnosis Date     alcoholism      Allergic rhinitis      Aortic stenosis, moderate      Blood transfusion      Cancer (H)      Cirrhosis (H)      Hepatitis C      Hypertension     essential     " Marijuana abuse      OA (osteoarthritis) of knee 4/1/2013     Pain, elbow     LEFT     Rotator cuff tear, right      Staphylococcus infection in conditions classified elsewhere and of unspecified site      Thrombocytopenia (H)              Past Surgical History  Past Surgical History:   Procedure Laterality Date     ARTHROSCOPY SHOULDER ROTATOR CUFF REPAIR  7/31/2012    Procedure: ARTHROSCOPY SHOULDER ROTATOR CUFF REPAIR;  Right Shoulder Arthroscopic Rotator Cuff Repair, BicepsTenodesis,  Subacromial Decompression ;  Surgeon: Joi Castillo MD;  [facility]     FACIAL RECONSTRUCTION SURGERY  1971     IRRIGATION AND DEBRIDEMENT UPPER EXTREMITY, COMBINED  1/3/2012    Procedure:COMBINED IRRIGATION AND DEBRIDEMENT UPPER EXTREMITY; Irrigation & Debridement Left Elbow; Surgeon:CRISTHIAN ZHOU; [facility]     REPAIR TENDON TRICEPS UPPER EXTREMITY  11/8/2011    Procedure:REPAIR TENDON TRICEPS UPPER EXTREMITY; Surgeon:CRISTHIAN ZHOU; Location[facility]     SHOULDER SURGERY  2003    left, injury, torn tendons, hematoma     TRANSPOSITION ULNAR NERVE (ELBOW)  11/8/2011    Procedure:TRANSPOSITION ULNAR NERVE (ELBOW); Final Procedure Done: Left Elbow Lateral Ulnar Collateral Repair And  Left Elbow Triceps Repair                Social History  Social History   Substance Use Topics     Smoking status: Never Smoker     Smokeless tobacco: Never Used     Alcohol use Yes             Family History  Family History   Problem Relation Age of Onset     CANCER Mother      lymph     Family history reviewed and updated in EPIC          Allergies  Allergies   Allergen Reactions     Zolpidem Other (See Comments)     Alcoholic.  Had reaction 3/17/13 while intoxicated which included black out, loss of awareness, paranoia.  Do not prescribe.  Dr. Celeste     Cats Other (See Comments)     rhinitis     Dogs Other (See Comments)     rhinitis     Pollen Extract Other (See Comments)     rhinits.              Medications  Prescriptions Prior to Admission   Medication Sig Dispense Refill Last Dose     lisinopril (PRINIVIL,ZESTRIL) 10 MG tablet Take 1 tablet (10 mg) by mouth daily 90 tablet 3 Past Week at Unknown time     fluticasone (FLONASE) 50 MCG/ACT nasal spray Spray 2 sprays into both nostrils daily 16 g 11 Past Month at Unknown time     loratadine (CLARITIN) 10 MG tablet Take 1 tablet (10 mg) by mouth daily 30 tablet 11 10/22/2017 at Unknown     Multiple Vitamin (MULTIVITAMINS PO) Take 1 tablet by mouth daily.   10/22/2017 at Unknown             Vitals  Temp:  [97.8  F (36.6  C)-98.5  F (36.9  C)] 98.2  F (36.8  C)  Heart Rate:  [] 84  Resp:  [8-27] 16  BP: (122-155)/() 137/84  SpO2:  [95 %-100 %] 99 %        Ventilator  Resp: 16       Intake/Output            Physical Exam  General: pleasant male fatigued appearing but in no apparent distress  Head: ecchymosis of the left eye  CVR: RRR, S1,2, no m/r/g. Extremities wwp, cap refill <2 seconds.  Lungs: Good effort and air movement, no crackles, rhonchi, wheezing.  Abdominal: BS +, no tenderness, guarding or rebound with palpation.   Skin: Warm and dry, no rashes or lesions.  Extremities: No peripheral edema.  Neuro: AOx3. CN II-XII in tact. Face symmetric.   Rectal done by GI      ROUTINE ICU LABS (Last four results)    CMP  Recent Labs  Lab 10/22/17  2242      POTASSIUM 4.2   CHLORIDE 104   CO2 20   ANIONGAP 9   *   BUN 28   CR 0.94   GFRESTIMATED 82   GFRESTBLACK >90   JOSEPHINE 7.8*   PROTTOTAL 5.2*   ALBUMIN 2.5*   BILITOTAL 0.7   ALKPHOS 71   *   *       CBC  Recent Labs  Lab 10/22/17  2242   WBC 21.7*   RBC 1.80*   HGB 6.1*   HCT 18.6*   *   MCH 33.9*   MCHC 32.8   RDW 15.4*   *       INR  Recent Labs  Lab 10/22/17  2242   INR 1.28*       Arterial Blood GasNo lab results found in last 7 days.     Microbiology  Blood and urine pending           Imaging    CT head - nml           Assessment and Plan  57 year old  male with untreated hep c, EtOH abuse, cirrhosis, moderate AS, h/o MRSA infected elbow presents with 3 weeks of intermittent hematemesis and melena with a significant drop in hemoglobin.     Neurologic  # Syncope. Due to blood loss. Head CT neg on admission.     # EtOH abuse. Frequently goes prolonged periods without drinking. Less concerned about withdrawal but will monitor.     Cardiovascular  # Moderate Aortic stenosis. ECHO Feb 2015.   - Monitor resp status with fluid administration    Respiratory  No active issues    Gastrointestinal  # Upper GI bleed, likely variceal  # Hep C, untreated  # EtOH abuse  # Cirrhosis. MELD 9  - GI hepatology consult, appreciate recs  - q4hr hgb, likely can decrease frequency in AM  - PPI and octreotide gtt (s/p boluses in ED)  - IV vit K 10 x 3  - Ceftriaxone  - RUQ US with dopplers (last HCC screening in 2015)  - Chem dep pending pt stability  - Daily MELD labs  - Stop NSAIDs    Infectious Disease  No localizing source of infection but with leukocytosis will empirically treat while working up possible infectious triggers.     Antibiotics:  Ceftriaxone 10/22 - present    Cultures:  Urinalysis, blood cultures pending    Hematology  # Acute blood loss. Baseline hgb around 16. 6.1 on admission.   # Macrocytosis. Suspect due to vit deficiency  # Leukocytosis. Stress response versus infection.   # Thrombocytopenia. Chronic due to liver disease.    Endocrine  No active issues    Renal/Electolytes/FEN    FILTER. Baseline Cr: uncertain, possibly some mild JENNIFER  VOLUME STATUS Nearing euvolemia    Skin Care  No active issues    PPX:   - VTE: none  - GI: PPI gtt  FEN: NPO for procedure in AM  Consults: GI - hepatology  Tubes/Lines/Drains:  PIV x 2  CODE: Full code for procedure, would like to be DNR after  Family: Patient updated  Dispo: EGD in AM    Patient discussed with staff attending, Dr. Hilario.  Please feel free to page with questions.    Radha Cooper MD    Internal Medicine  PGY-3  Pager: [phone]        Update Day of 10/23:    S  Mr Johnson has done well since admission with no acute events. He was started on PPi gtt, Octreotide gtt, given Ceftriaxone as well as 2 total u PRBCs for Hgb 6.1 to 7.1. He's been hemodynamically stable with MAPs >65, HR in 80s, afebrile and satting well on RA. Endoscopy performed by GI this AM.    O  GEN: well appearing in no acute distress lying in bed  NEURO: AO4, conversational, no focal neuro deficits  HEENT: NCAT, PERRL, no scleral icteris  RESP: CTAB, No W/R/R  CV: RRR, significant systolic murmur consistent with aortic stenosis  ABD: Soft, NT, ND, BS+  EXT: Warm, dry, equal pulses  SKIN: No erythema    A  57 year old male with untreated hep c, EtOH abuse, cirrhosis, moderate AS, h/o MRSA infected elbow presents with 3 weeks of intermittent hematemesis and melena with a significant drop in hemoglobin in setting of likely UGI bleed    P  Neuro: Syncope. Due to blood loss. Head CT neg on admission.   Cardiac: Moderate Aortic stenosis. Consider repeat ECHO  Pulm: No active issues  GI: Upper GI bleed, likely variceal bleed;  Hep C, untreated and EtOH abuse with cirrhosis. His MELD is 9.  GI hepatology consult: PPI and octreotide drip started at admission; vit K 10 mg IV times 3, and ceftriaxone.  *Endoscopy with small Sarahi Hoffman tear, small esophageal varix, no active bleed. DC'd ppi gtt to IV BID and octreotide gtt. On ceftriaxone.  :  mild JENNIFER, trend Cr  HEME: GI bleed with acute blood loss with thrombocytopenia due to liver disease. 2U RBC's total  ENDO: No active issues.  ID: Ceftriaxone.    Hemodynamically stable on RA and stable for transfer out of unit    Sai Manuel MD  Internal Medicine, PGY1  Pager: [phone]

## 2017-10-23 NOTE — PROGRESS NOTES
[facility]    Hepatology Follow-up    CC: hematemesis    Dx: Alcohol and Hep C cirrhosis   Alcohol abuse       24 hour events: hematemesis    Subjective:  No further hematemesis since consult. No BM's, no abdominal pain, no lightheadedness. Last drink 5 days prior.  Last NSAID one day prior to admission.          Patient denies fevers, sweats or chills.    Medications  Current Facility-Administered Medications   Medication     cefTRIAXone (ROCEPHIN) 1 g vial to attach to  mL bag for ADULTS or NS 50 mL bag for PEDS     phytonadione (AQUA-MEPHYTON) 2 mg in NaCl 0.9 % 50 mL intermittent infusion     lactated ringers infusion     [START ON 10/24/2017] influenza quadrivalent (PF) vacc age 3 yrs and older (FLUZONE or Flulaval) injection 0.5 mL     [START ON 10/24/2017] cefTRIAXone (ROCEPHIN) 1 g vial to attach to  mL bag for ADULTS or NS 50 mL bag for PEDS     phytonadione (AQUA-MEPHYTON) 10 mg in NaCl 0.9 % 50 mL intermittent infusion     naloxone (NARCAN) injection 0.1-0.4 mg     Contraindications to both pharmacological and mechanical prophylaxis (must document contraindications for both in this order)     potassium chloride SA (K-DUR/KLOR-CON M) CR tablet 20-40 mEq     potassium chloride (KLOR-CON) Packet 20-40 mEq     potassium chloride 10 mEq in 100 mL sterile water intermittent infusion (premix)     potassium chloride 10 mEq in 100 mL intermittent infusion with 10 mg lidocaine     magnesium sulfate 2 g in NS intermittent infusion (PharMEDium or FV Cmpd)     magnesium sulfate 4 g in 100 mL sterile water (premade)     potassium phosphate 10 mmol in D5W 250 mL intermittent infusion     potassium phosphate 15 mmol in D5W 250 mL intermittent infusion     potassium phosphate 20 mmol in D5W 500 mL intermittent infusion     potassium phosphate 20 mmol in D5W 250 mL intermittent infusion     potassium phosphate 25 mmol in D5W 500 mL intermittent infusion     midazolam (VERSED) 1 MG/ML  injection     fentaNYL (PF) (SUBLIMAZE) 100 MCG/2ML injection     pantoprazole (PROTONIX) 40 mg IV push injection     Benzocaine (HURRICAINE/TOPEX) 20 % spray     ondansetron (ZOFRAN) injection 4 mg       Review of systems  A 10-point review of systems was negative.    Examination  /65  Temp 98.3  F (36.8  C) (Oral)  Resp 16  Wt 78 kg (171 lb 15.3 oz)  SpO2 96%  BMI 26.15 kg/m2    Intake/Output Summary (Last 24 hours) at 10/23/17 1028  Last data filed at 10/23/17 0900   Gross per 24 hour   Intake          2145.66 ml   Output              255 ml   Net          1890.66 ml       Gen- well, NAD, normal color  CVS- RRR  RS- CTA  Abd- soft, BS+, non-tender to palpation, no rebound, negative liang's, brown stool on rectal  Extr-WWP  Neuro-A+Ox3, no asterixis  Skin- no rash  Psych- normal mood  Lym- no LAD    Laboratory  Lab Results   Component Value Date     10/23/2017    POTASSIUM 4.2 10/23/2017    CHLORIDE 108 10/23/2017    CO2 23 10/23/2017    BUN 28 10/23/2017    CR 1.11 10/23/2017       Lab Results   Component Value Date    BILITOTAL 0.8 10/23/2017     10/23/2017    AST 99 10/23/2017    ALKPHOS 65 10/23/2017       Lab Results   Component Value Date    WBC 14.8 10/23/2017    HGB 7.1 10/23/2017    MCV 97 10/23/2017    PLT 95 10/23/2017       Lab Results   Component Value Date    INR 1.33 10/23/2017         Radiology  Reviewed  RUQ U/S pending.    Assessment  57 year old man with alcohol/Hep C genotype 1a cirrhosis, alcohol abuse who presented on 10/23/2017 with hematemesis.  EGD on 10/23/2017 showed inderjit evans tear as the etiology.  Small non-bleeding esophageal varix and mild portal hypertensive gastropathy were noted.  Mild antral erosions noted as well.  Patient is stable post procedure.  Recommend the below.  Call if questions.    Recommendations  - Use a proton pump inhibitor PO BID for 2 months.   - Discontinue octreotide  - Continue ceftriaxone for 7 day course  - ADAT to low sodium  diet  - trend hgb daily, transfuse < 7  - okay to transfer to the floor from GI perspective  - f/u RUQ U/S w/ doppler  - Paracentesis (diagnostic if ascites)  - hold NSAIDS  - patient will need routine health screening including colonoscopy and HCC surveillance given weight loss  - inpatient chem dep counseling   - f/u BC    Luis Mitchell MD  Hepatology  #[phone]

## 2017-10-23 NOTE — PROGRESS NOTES
Gastroenterology Endoscopy Suite Brief Operative Note    Procedure:  Upper endoscopy   Post-operative diagnosis:  inderjit evans tear   Staff Physician:  Dr. Gentry San   Fellow/Assistant(s):  Luis Mitchell MD     Specimens:  Please see final procedure note for further details.   Findings:  small inderjit evans tear, small esophageal varix, mild portalhypertensive gastropathy, mild antral erosions,   Complications:  None.   Condition:  Stable   Recommendations  Diet:  ADAT to low sodium diet  PPI:  PPI po BID  Anti-coagulants/platelets:  N/A  Octreotide:  Stop octreotide drip  Discharge Planning:   Okay to d/c to floor from GI perspective.

## 2017-10-23 NOTE — PLAN OF CARE
Problem: Patient Care Overview  Goal: Plan of Care/Patient Progress Review  Outcome: Improving  D/I: Patient admitted overnight from ED with several days of hematemesis, melena, and syncopal episodes. No episodes of of bleeding since 2000 on 10/22. All vital signs were stable throughout shift and no stool or nausea experienced. Upper GI scope performed at bedside which revealed a small MW tear, but no active bleeding. PPI and octreotide discontinued per GI. Clear liquid diet tolerated for two meals, diet advanced to 2g Na. MIV fluid d/c'd. Voided 425cc klaus urine spontaneously. Hemoglobin check at 1600 with hgb of 6.8.  A: Patient remains stable with no signs of bleeding. Will administer 1 u prbc for decreased hemoglobin.   P: Patient to transfer to . Check hemoglobin after PRBCs.

## 2017-10-23 NOTE — ED PROVIDER NOTES
History     Chief Complaint   Patient presents with     Hematemesis     HPI  Ten Johnson is a 57 year old male who with a history of hepatitis C, cirrhosis, aortic stenosis, thrombocytopenia, and HTN who presents to the Emergency Department for evaluation of hematemesis and blood in the stool. Patient reports that he has been vomiting bright red blood for the past 4 days as well as having black stools that turn red when they hit the water. He last vomited yesterday and has had 2 black/bloody stools today. He may have had bleeding as far back as 2 weeks ago. He also notes 6-8 syncopal episodes over the past 4 days. He did hit his head several times. He adds that he has had shortness of breath with ambulation as well. He has been having body aches and cramping and is using naproxen for pain. He is not on anticoagulant medications.     Past Medical History:   Diagnosis Date     alcoholism      Allergic rhinitis      Aortic stenosis, moderate      Blood transfusion      Cancer (H)      Cirrhosis (H)      Hepatitis C      Hypertension     essential     Marijuana abuse      OA (osteoarthritis) of knee 4/1/2013     Pain, elbow     LEFT     Rotator cuff tear, right      Staphylococcus infection in conditions classified elsewhere and of unspecified site      Thrombocytopenia (H)        Past Surgical History:   Procedure Laterality Date     ARTHROSCOPY SHOULDER ROTATOR CUFF REPAIR  7/31/2012    Procedure: ARTHROSCOPY SHOULDER ROTATOR CUFF REPAIR;  Right Shoulder Arthroscopic Rotator Cuff Repair, BicepsTenodesis,  Subacromial Decompression ;  Surgeon: Joi Castillo MD;  Location:  OR     FACIAL RECONSTRUCTION SURGERY  1971     IRRIGATION AND DEBRIDEMENT UPPER EXTREMITY, COMBINED  1/3/2012    Procedure:COMBINED IRRIGATION AND DEBRIDEMENT UPPER EXTREMITY; Irrigation & Debridement Left Elbow; Surgeon:CRISTHIAN ZHOU; Location: OR     REPAIR TENDON TRICEPS UPPER EXTREMITY  11/8/2011    Procedure:REPAIR  TENDON TRICEPS UPPER EXTREMITY; Surgeon:CRISTHIAN ZHOU; Location[facility]     SHOULDER SURGERY  2003    left, injury, torn tendons, hematoma     TRANSPOSITION ULNAR NERVE (ELBOW)  11/8/2011    Procedure:TRANSPOSITION ULNAR NERVE (ELBOW); Final Procedure Done: Left Elbow Lateral Ulnar Collateral Repair And  Left Elbow Triceps Repair         Family History   Problem Relation Age of Onset     CANCER Mother      lymph       Social History   Substance Use Topics     Smoking status: Never Smoker     Smokeless tobacco: Never Used     Alcohol use Yes       Current Facility-Administered Medications   Medication     cefTRIAXone (ROCEPHIN) 1 g vial to attach to  mL bag for ADULTS or NS 50 mL bag for PEDS     phytonadione (AQUA-MEPHYTON) 2 mg in NaCl 0.9 % 50 mL intermittent infusion     pantoprazole (PROTONIX) 80 mg in NaCl 0.9 % 100 mL infusion     octreotide (sandoSTATIN) 1,250 mcg in NaCl 0.9 % 250 mL     ondansetron (ZOFRAN) injection 4 mg     Current Outpatient Prescriptions   Medication     lisinopril (PRINIVIL,ZESTRIL) 10 MG tablet     fluticasone (FLONASE) 50 MCG/ACT nasal spray     loratadine (CLARITIN) 10 MG tablet     Multiple Vitamin (MULTIVITAMINS PO)        Allergies   Allergen Reactions     Zolpidem Other (See Comments)     Alcoholic.  Had reaction 3/17/13 while intoxicated which included black out, loss of awareness, paranoia.  Do not prescribe.  Dr. Celeste     Cats Other (See Comments)     rhinitis     Dogs Other (See Comments)     rhinitis     Pollen Extract Other (See Comments)     rhinits.     I have reviewed the Medications, Allergies, Past Medical and Surgical History, and Social History in the Epic system.    Review of Systems   Respiratory: Positive for shortness of breath.    Gastrointestinal: Positive for blood in stool (black) and vomiting (bloody).   Musculoskeletal: Positive for myalgias.   Skin: Positive for pallor.   Neurological: Positive for syncope.       Physical Exam   BP:  155/86  Heart Rate: 97  Temp: 97.8  F (36.6  C)  Resp: 16  SpO2: 100 %      Physical Exam  Gen:A&Ox3, no acute distress  HEENT:PERRL, ecchymosis to left supraorbital area, oropharynx clear, mucous membranes moist, TMs clear bilaterally  Neck:no bony tenderness, no JVD, trachea midline, n  Back: no CVA tenderness, no midline bony tenderness  CV:RRR without murmurs  PULM:Clear to auscultation bilaterally  Abd:soft, nontender, nondistended. Bowel sounds present and normal. No fluid wave. Reducible umbilical hernia that is non-tender.   UE:No traumatic injuries, skin normal  LE:no traumatic injuries, skin normal, no LE edema  Neuro:CN II-XII intact, strength 5/5 throughout, sensation intact, orthostatic with attempts to stand  Skin: pale, no petechiae       ED Course   10:16 PM  The patient was seen and examined by Elvia Shaikh MD in Room 18.  ED Course     Procedures   POCUS of the abdomen was done. No ascites visible.          EKG Interpretation:      Interpreted by Elvia Shaikh  Time reviewed: 22:18  Symptoms at time of EKG: syncope and GI bleeding   Rhythm: normal sinus   Rate: 86  Axis: normal  Ectopy: none  Conduction: normal  ST Segments/ T Waves: T wave inversions I, II, V4-V6  Q Waves: none  Comparison to prior: No old EKG available    Clinical Impression: abnormal EKG    Critical Care time:  was 50 minutes for this patient excluding procedures.           Labs Ordered and Resulted from Time of ED Arrival Up to the Time of Departure from the ED   CBC WITH PLATELETS DIFFERENTIAL - Abnormal; Notable for the following:        Result Value    WBC 21.7 (*)     RBC Count 1.80 (*)     Hemoglobin 6.1 (*)     Hematocrit 18.6 (*)      (*)     MCH 33.9 (*)     RDW 15.4 (*)     Platelet Count 121 (*)     Absolute Neutrophil 16.6 (*)     All other components within normal limits   COMPREHENSIVE METABOLIC PANEL - Abnormal; Notable for the following:     Glucose 108 (*)     Calcium 7.8 (*)     Albumin 2.5 (*)      Protein Total 5.2 (*)      (*)      (*)     All other components within normal limits   INR - Abnormal; Notable for the following:     INR 1.28 (*)     All other components within normal limits   AMMONIA   LACTIC ACID WHOLE BLOOD   PERIPHERAL IV CATHETER   CARDIAC CONTINUOUS MONITORING   IV ACCESS   NONE OF THE ABOVE CORE MEASURE DIAGNOSES   ABO/RH TYPE AND SCREEN   RED BLOOD CELL PREPARE ORDER UNIT   BLOOD COMPONENT   BLOOD COMPONENT            Assessments & Plan (with Medical Decision Making)   57-year-old male with a history of hepatitis C presenting with hematemesis and melena.  He arrives to the ED hemodynamically stable, but is significantly orthostatic with lightheadedness with attempts to stand, and he is unable to walk from triage to the ED.  IV access was obtained with two peripheral IVs. Laboratory testing was initiated, CBC notable for a WBC of 21.7, hemoglobin low at 6.1 from a baseline of 16 back in 2015, and platelets 121, slightly improved.  Comprehensive metabolic panel shows a glucose of 108, normal electrolytes and creatinine and signs of liver disease with , , bilirubin 0.7, alk phos 71, INR elevated at 1.28. Blood type and screen was sent. Ammonia was 29, and lactic acid 1.9.  Given the marked anemia he was started on blood transfusion with 2 units of PRBCs. Given 2 mg of IM vitamin K for an elevated INR. The case was discussed with the GI fellow on-call.  They suggested the patient be admitted to the intensive care unit as they would want to perform upper endoscopy tonight in the event of decompensation. Started on IV Protonix and octreotide, and given a dose of ceftriaxone.  Discussed with Dr. Hilario in the ICU.    This part of the document was transcribed by Morenita Dee Scribeleonora.      CT head performed due to report of multiple episodes of syncope or near syncope. Negative for skull fracture or signs of intracerebral hemorrhage.     I have reviewed the  nursing notes.    I have reviewed the findings, diagnosis, plan and need for follow up with the patient.    New Prescriptions    No medications on file       Final diagnoses:   Upper GI bleed   I, Saida Hartmann, am serving as a trained medical scribe to document services personally performed by Elvia Shaikh MD, based on the provider's statements to me.      IElvia MD, was physically present and have reviewed and verified the accuracy of this note documented by Saida Hartmann.       10/22/2017   [facility], [place], EMERGENCY DEPARTMENT    MD ROBB Garcia Katrina Anne, MD  10/23/17 0133

## 2017-10-23 NOTE — OR NURSING
EGD without interventions done at patient bedside. Floor RN administered sedation medications, monitored pt, and monitored pt after procedure.

## 2017-10-23 NOTE — PROGRESS NOTES
"CLINICAL NUTRITION SERVICES - ASSESSMENT NOTE     Nutrition Prescription    RECOMMENDATIONS FOR MDs/PROVIDERS TO ORDER:  Advance to Regular diet, as able.    Malnutrition Status:    Non-severe malnutrition in the context of chronic illness.    Recommendations already ordered by Registered Dietitian (RD):  Ensure Clear ordered for pt BID to help optimize oral intake while on restrictive diet.         REASON FOR ASSESSMENT  Ten Johnson is a 57 year old male assessed by the dietitian for Admission Nutrition Risk Screen for unintentional loss of 10# or more in the past two months    NUTRITION HISTORY  Pt endorses a 20 lbs weight loss, does not know when this started. Pt was \"passing out off and on\" at home so was not eating a lot. Pt drinks EtOH daily, \"does not count drinks.\" Pt had EGD this AM and is now drinking broth and eating a large popsicle. EtOH abuse certainly puts pt at risk for deficiencies in both macro- and micronutrients.     CURRENT NUTRITION ORDERS  Diet: Clear Liquid  Intake/Tolerance: tolerating clears so far.    LABS  Labs reviewed    MEDICATIONS  Medications reviewed- thiamin and folate ordered    ANTHROPOMETRICS  Height:   Ht Readings from Last 2 Encounters:   06/26/15 1.727 m (5' 8\")   03/02/15 1.727 m (5' 8\")     Most Recent Weight: 78 kg (171 lb 15.3 oz)    IBW: 70 kg  BMI: 26.2 kg/m^2 - Overweight BMI 25-29.9  Weight History:   Wt Readings from Last 10 Encounters:   10/23/17 78 kg (171 lb 15.3 oz)   05/09/16 89.8 kg (198 lb)   06/26/15 90.3 kg (199 lb)   03/02/15 90.5 kg (199 lb 8 oz)   02/19/15 90.7 kg (200 lb)   02/11/15 92.4 kg (203 lb 12.8 oz)   02/09/15 92.5 kg (203 lb 14.4 oz)   08/25/14 90.7 kg (200 lb)   06/10/14 91.4 kg (201 lb 6.4 oz)   04/01/13 89.8 kg (198 lb)       Weight is down 11.8 kg over the past ~1.5 years. This is a 13% weight decline in 17 months.  Dosing Weight: 78 kg (adm wt)    ASSESSED NUTRITION NEEDS  Estimated Energy Needs: 6972-2657 kcals/day (25 - 30 " kcals/kg)  Justification: Maintenance  Estimated Protein Needs: 75-95 g/day (1 - 1.2 g/kg)  Justification: Maintenance  Estimated Fluid Needs: Per provider pending fluid status    PHYSICAL FINDINGS  See malnutrition section below.  Pallor  Sarcopenia     MALNUTRITION  % Intake: </= 50% for >/= 5 days   % Weight Loss: Up to 20% in 1 year  Subcutaneous Fat Loss: None observed  Muscle Loss: Upper leg (quadricep, hamstring): moderate, Patellar region: mild and Posterior calf: moderate  Fluid Accumulation/Edema: None noted  Malnutrition Diagnosis: Non-severe malnutrition in the context of chronic illness.     NUTRITION DIAGNOSIS  Inadequate protein-energy intake related to anorexia, EtOH abuse and restrictive diet order as evidenced by pt with 13% weight decline over the past 17 months, muscle wasting noted in LE and pt is on a clear liquid diet.      INTERVENTIONS  Implementation  Nutrition Education: See education flowsheet   Collaboration with other providers- MICU rounds  Medical food supplement therapy - see above    Goals  Patient to consume % of nutritionally adequate meal trays TID, or the equivalent with supplements/snacks.     Monitoring/Evaluation  Progress toward goals will be monitored and evaluated per protocol.    Yulissa Castaneda, RD, LD  ([facility] diet[facility], pgr- [phone])

## 2017-10-23 NOTE — PROGRESS NOTES
[facility], [place]    Internal Medicine Transfer Acceptance Note - Gold Service      Assessment & Plan   eTn Johnson is a 57 year old man with a history of untreated hepatitis C w/ cirrhosis, alcohol abuse, HTN, moderate aortic stenosis, and multiple orthopedic surgeries w/ prior MRSA infection of elbow who was admitted to MICU w/ 4 days of hematemesis and melena w/ resulting syncopal episodes. Found to have new anemia - received 2 units PRBCs and underwent EGD showing Sarahi Hoffman tear as the etiology. Now transferring to floor.     #Acute Upper GI Bleed 2/2 Sarahi Hoffman Tear, Acute Blood Loss Anemia. Hgb 16.7 in 6/2015 --> 6.1 on admit --> 7.1 this AM s/p 2 units PRBCs. Recheck this evening is 6.8. EGD today w/ M-W tear as etiology along w/ small non bleeding esophageal varix and mild PHG. No further hematemesis and no BM here.   - GI following.   - Will give 1 unit PRBCs this evening and recheck hgb again after. If stable, next check can be in AM unless recurrent s/s bleeding overnight.   - D/c octreotide gtt and start PO PPI BID x 2 mo.   - Continue ceftriaxone for prophylaxis day #1/7.   - Continue vit K day #1/3 (will switch to PO).   - ADAT.   - No NSAIDs (had used PTA).     #Leukocytosis. WBC 21.7 on admit --> 14.8 this AM. Afebrile and no other SIRS criteria. CXR and UA unremarkable. Blood cx x 2 neg to date. Only small amount of ascites adjacent to liver and in RLQ; unlikely enough to perform bedside diagnostic paracentesis. Suspect leukocytosis is d/t GIB/stress response and/or alcoholic hepatitis but needs ongoing monitoring for other infectious etiologies. On prophylactic ceftriaxone w/ GIB as above.   - Trend WBC count in AM.   - Follow blood cx results.   - Reeval for possible paracentesis if evidence of increasing ascites, persistent leukocytosis, new fevers or abdominal pain.     #Cirrhosis 2/2 Hepatitis C (Untreated) & ETOH Abuse. RUQ US w/ fatty infiltration of  liver, thickened GB wall/ascites likely reactive, patent vasculature, and a focal hypoechoic lesion within the right lobe of the liver not previously seen and deemed indeterminate. Reports 20 lb unintentional weight loss over past year. Ongoing ETOH use. Chronic AST/ALT elevations - currently  AST 99 (not necessarily expected pattern w/ ETOH). Baseline INR 1-1.1; currently 1.33. AFP elevated (12-15) in the past. MELD-Na score 10.   - Liver MRI to further assess lesion.   - Check AFP.   - Chem dep consult.   - Will go prolonged periods w/o ETOH so not on MSSA protocol but low threshold to initiate. Denies daily use in days leading up to admission.   - Continue folate and thiamine (macrocytosis). Nutrition following w/ unintentional weight loss.     #HTN. Low normal BPs. On lisinopril 10 mg daily at home - continuing to hold here.     #Suspected JENNIFER. Cr 1.11 today from 0.6-0.7 baseline last year. UA bland. Likely some prerenal insult w/ GIB and poor PO intake at home. Has received ~1.5L IVF since admit and is taking PO. Appears near euvolemic.   - Trend BMP in AM.   - Continue to encourage PO fluids.   - Continue to hold ACE-I as above.     #Aortic Stenosis. Moderate by 2015 echo. Worsening fatigue PTA but no CP or dyspnea. Syncopal events at home were likely d/t GIB. O2 sats stable on RA.   - Echo to reassess aortic valve.     #Chronic Thrombocytopenia. In setting of cirrhosis. Stable.     Consulting Services: GI, Nutrition     Diet: advance to 2 g Na diet as tolerated  Fluids: N/A  DVT Prophylaxis: mechanical (pharmacologic contraindicated w/ GIB)  Code Status: full (confrmed w/ patient despite H&P stating that he would want to be DNR after the EGD - wishes to remain full code but not have heroic measures done to keep him alive if not deemed to have a favorable outcome as quality of life is most important to him)     Disposition Plan   Expected discharge: 1-2 days; recommended to prior living arrangement once  "hgb stable, tolerating diet, imaging completed (although could have prolonged course if develops ETOH w/d; will have chem dep consult)    Demetrice Cheek PA-C  Hospitalist Service  [facility] cross cover pager [phone] overnight.   Will be reassigned to a gold day team at 0800 tomorrow.     Interval History   Patient reports feeling well. He is hungry and ordering a meal tray. Denies abdominal pain or N/V. No BM since arrival. Reports leading a very busy life with  work and traveling - is active on his bike but feeling much more fatigued than usual and has lost about 20 lbs unintentionally.     A 4 point ROS was performed (including CV, Resp, GI, ) and negative unless otherwise noted in HPI.     Physical Exam   /66  Temp 98.2  F (36.8  C) (Oral)  Resp 16  Ht 1.727 m (5' 7.99\")  Wt 78 kg (171 lb 15.3 oz)  SpO2 99%  BMI 26.15 kg/m2     GENERAL: Alert and oriented x 3. Sitting up in bed, appears comfortable. Conversant.   HEENT: Anicteric sclera. Mucous membranes moist.   CV: RRR. S1, S2. III/VI CLARITZA.   RESPIRATORY: Effort normal on room air. Lungs CTAB with no wheezing, rales, rhonchi.   GI: Abdomen soft and non distended, bowel sounds present. No tenderness, rebound, guarding.   NEUROLOGICAL: No focal deficits. Moves all extremities.    EXTREMITIES: No peripheral edema. Intact bilateral pedal pulses.   SKIN: No jaundice. No rashes.     Lines/Tubes/Drains  PIVs     Data reviewed today: I reviewed all medications, new labs and imaging results over the last 24 hours.                     "

## 2017-10-23 NOTE — PROGRESS NOTES
Pt transferred to MICU from ED at 0100 this morning. Hx of cirrhosis, ETOH , HTN, and aortic stenosis. Went to ED with hematemesis and melena x4 days and syncopal episodes.     Pt is A&Ox4, moves all extremities, PERRLA. Head CT and EKG done in ED. Afebrile. 3 PIVs placed. 500mL LR bolus given. Hemodynamically stable. SBPs 120-140s, HR 70-90s. 2 units PRBCs given, HGB went from 6.1 to 7.1, MD aware. Hgb goal >7. NPO. No nausea. Void x1 via urinal, UA sent. No Bm. Skin intact except bruising around left eye from fall previous to admission. Octreotide gtt 50mcg/hr, Protonix gtt at 8mg/hr, LR at 100mL/hr. Given Rocephin and phytonadione. Blood cultures sent.      ST noted to appear more depressed at 0600, MD alerted, STAT EKG ordered. Continue to monitor labs and for s/sx bleeding, notify MICU team of any changes.

## 2017-10-24 ENCOUNTER — APPOINTMENT (OUTPATIENT)
Dept: CARDIOLOGY | Facility: CLINIC | Age: 57
DRG: 369 | End: 2017-10-24
Attending: PHYSICIAN ASSISTANT
Payer: COMMERCIAL

## 2017-10-24 LAB
AFP SERPL-MCNC: 10.6 UG/L (ref 0–8)
ALBUMIN SERPL-MCNC: 2.2 G/DL (ref 3.4–5)
ALP SERPL-CCNC: 70 U/L (ref 40–150)
ALT SERPL W P-5'-P-CCNC: 158 U/L (ref 0–70)
ANION GAP SERPL CALCULATED.3IONS-SCNC: 7 MMOL/L (ref 3–14)
AST SERPL W P-5'-P-CCNC: 116 U/L (ref 0–45)
BILIRUB SERPL-MCNC: 0.7 MG/DL (ref 0.2–1.3)
BUN SERPL-MCNC: 24 MG/DL (ref 7–30)
CALCIUM SERPL-MCNC: 7.2 MG/DL (ref 8.5–10.1)
CHLORIDE SERPL-SCNC: 108 MMOL/L (ref 94–109)
CO2 SERPL-SCNC: 23 MMOL/L (ref 20–32)
CREAT SERPL-MCNC: 1.05 MG/DL (ref 0.66–1.25)
ERYTHROCYTE [DISTWIDTH] IN BLOOD BY AUTOMATED COUNT: 17.4 % (ref 10–15)
GFR SERPL CREATININE-BSD FRML MDRD: 72 ML/MIN/1.7M2
GLUCOSE SERPL-MCNC: 87 MG/DL (ref 70–99)
HCT VFR BLD AUTO: 26 % (ref 40–53)
HGB BLD-MCNC: 8.6 G/DL (ref 13.3–17.7)
INR PPP: 1.25 (ref 0.86–1.14)
INTERPRETATION ECG - MUSE: NORMAL
MCH RBC QN AUTO: 30.7 PG (ref 26.5–33)
MCHC RBC AUTO-ENTMCNC: 33.1 G/DL (ref 31.5–36.5)
MCV RBC AUTO: 93 FL (ref 78–100)
PLATELET # BLD AUTO: 75 10E9/L (ref 150–450)
POTASSIUM SERPL-SCNC: 3.7 MMOL/L (ref 3.4–5.3)
PROT SERPL-MCNC: 4.8 G/DL (ref 6.8–8.8)
RBC # BLD AUTO: 2.8 10E12/L (ref 4.4–5.9)
SODIUM SERPL-SCNC: 138 MMOL/L (ref 133–144)
WBC # BLD AUTO: 11.1 10E9/L (ref 4–11)

## 2017-10-24 PROCEDURE — 12000001 ZZH R&B MED SURG/OB UMMC

## 2017-10-24 PROCEDURE — 80053 COMPREHEN METABOLIC PANEL: CPT | Performed by: PHYSICIAN ASSISTANT

## 2017-10-24 PROCEDURE — 93306 TTE W/DOPPLER COMPLETE: CPT | Mod: 26 | Performed by: INTERNAL MEDICINE

## 2017-10-24 PROCEDURE — 85027 COMPLETE CBC AUTOMATED: CPT | Performed by: PHYSICIAN ASSISTANT

## 2017-10-24 PROCEDURE — 25000128 H RX IP 250 OP 636: Performed by: PHYSICIAN ASSISTANT

## 2017-10-24 PROCEDURE — 99207 ZZC APP CREDIT; MD BILLING SHARED VISIT: CPT | Performed by: PHYSICIAN ASSISTANT

## 2017-10-24 PROCEDURE — 99233 SBSQ HOSP IP/OBS HIGH 50: CPT | Performed by: INTERNAL MEDICINE

## 2017-10-24 PROCEDURE — 90686 IIV4 VACC NO PRSV 0.5 ML IM: CPT | Performed by: INTERNAL MEDICINE

## 2017-10-24 PROCEDURE — 36415 COLL VENOUS BLD VENIPUNCTURE: CPT | Performed by: PHYSICIAN ASSISTANT

## 2017-10-24 PROCEDURE — 25000132 ZZH RX MED GY IP 250 OP 250 PS 637: Performed by: PHYSICIAN ASSISTANT

## 2017-10-24 PROCEDURE — 82105 ALPHA-FETOPROTEIN SERUM: CPT | Performed by: PHYSICIAN ASSISTANT

## 2017-10-24 PROCEDURE — 93306 TTE W/DOPPLER COMPLETE: CPT

## 2017-10-24 PROCEDURE — 25000132 ZZH RX MED GY IP 250 OP 250 PS 637: Performed by: STUDENT IN AN ORGANIZED HEALTH CARE EDUCATION/TRAINING PROGRAM

## 2017-10-24 PROCEDURE — HZ2ZZZZ DETOXIFICATION SERVICES FOR SUBSTANCE ABUSE TREATMENT: ICD-10-PCS | Performed by: INTERNAL MEDICINE

## 2017-10-24 PROCEDURE — 85610 PROTHROMBIN TIME: CPT | Performed by: PHYSICIAN ASSISTANT

## 2017-10-24 PROCEDURE — 25000128 H RX IP 250 OP 636: Performed by: INTERNAL MEDICINE

## 2017-10-24 PROCEDURE — 99222 1ST HOSP IP/OBS MODERATE 55: CPT | Performed by: PSYCHIATRY & NEUROLOGY

## 2017-10-24 PROCEDURE — 25000128 H RX IP 250 OP 636

## 2017-10-24 RX ORDER — LORAZEPAM 0.5 MG/1
0.5 TABLET ORAL
Status: COMPLETED | OUTPATIENT
Start: 2017-10-24 | End: 2017-10-24

## 2017-10-24 RX ORDER — LIDOCAINE 50 MG/G
OINTMENT TOPICAL EVERY 4 HOURS PRN
Status: DISCONTINUED | OUTPATIENT
Start: 2017-10-24 | End: 2017-10-25 | Stop reason: HOSPADM

## 2017-10-24 RX ORDER — SODIUM CHLORIDE 9 MG/ML
INJECTION, SOLUTION INTRAVENOUS CONTINUOUS
Status: DISCONTINUED | OUTPATIENT
Start: 2017-10-24 | End: 2017-10-25 | Stop reason: HOSPADM

## 2017-10-24 RX ORDER — LORAZEPAM 0.5 MG/1
0.5 TABLET ORAL
Status: COMPLETED | OUTPATIENT
Start: 2017-10-24 | End: 2017-10-25

## 2017-10-24 RX ORDER — SODIUM CHLORIDE 9 MG/ML
INJECTION, SOLUTION INTRAVENOUS
Status: COMPLETED
Start: 2017-10-24 | End: 2017-10-24

## 2017-10-24 RX ADMIN — SODIUM CHLORIDE 500 ML: 9 INJECTION, SOLUTION INTRAVENOUS at 17:19

## 2017-10-24 RX ADMIN — PHYTONADIONE 5 MG: 5 TABLET ORAL at 07:57

## 2017-10-24 RX ADMIN — FOLIC ACID 1 MG: 1 TABLET ORAL at 07:57

## 2017-10-24 RX ADMIN — PANTOPRAZOLE SODIUM 40 MG: 40 TABLET, DELAYED RELEASE ORAL at 07:57

## 2017-10-24 RX ADMIN — Medication 100 MG: at 07:57

## 2017-10-24 RX ADMIN — LORAZEPAM 0.5 MG: 0.5 TABLET ORAL at 22:20

## 2017-10-24 RX ADMIN — PANTOPRAZOLE SODIUM 40 MG: 40 TABLET, DELAYED RELEASE ORAL at 17:15

## 2017-10-24 RX ADMIN — INFLUENZA A VIRUS A/MICHIGAN/45/2015 X-275 (H1N1) ANTIGEN (FORMALDEHYDE INACTIVATED), INFLUENZA A VIRUS A/HONG KONG/4801/2014 X-263B (H3N2) ANTIGEN (FORMALDEHYDE INACTIVATED), INFLUENZA B VIRUS B/PHUKET/3073/2013 ANTIGEN (FORMALDEHYDE INACTIVATED), AND INFLUENZA B VIRUS B/BRISBANE/60/2008 ANTIGEN (FORMALDEHYDE INACTIVATED) 0.5 ML: 15; 15; 15; 15 INJECTION, SUSPENSION INTRAMUSCULAR at 11:07

## 2017-10-24 RX ADMIN — CEFTRIAXONE 1 G: 1 INJECTION, POWDER, FOR SOLUTION INTRAMUSCULAR; INTRAVENOUS at 02:51

## 2017-10-24 NOTE — PROGRESS NOTES
Neuro: A&Ox4.   Cardiac: VSS.   Respiratory: RA   GI/: Voiding spontaneously. No BM this shift.   Diet/appetite: Tolerating NPO diet. Denies nausea   Activity: Up with SBA- bed alarm on for safety from previous falls     Pain: . Denies   Skin: Intact, no new deficits noted.  Lines: PIV saline locked       Abd MRI and ECHO planned for today   Chem dep consult placed  Pt has been resting comfortably throughout night, will continue to monitor and follow plan of care.

## 2017-10-24 NOTE — PROGRESS NOTES
Transferred to  at 1945.  A&O x4.  VSS, PRBC infusing via left PIV. Oriented to room/unit routines.  Call light in reach.

## 2017-10-24 NOTE — PLAN OF CARE
Problem: Patient Care Overview  Goal: Plan of Care/Patient Progress Review  Outcome: Improving  VSS, afebrile.  A&O x4, following commands, using call light appropriately.  Denies abdominal pain, denies nausea.  Taking water well.  Tolerated 1u PRBC for Hgb 6.8 without s/s of transfusion reaction.  Follow-up Hgb 8.6.  PIV x2 saline locked.  Infiltrated PIV in right antecubital with hematoma present. Warm pack for comfort.  Contact precautions initiated for hx MRSA.  Continue to monitor for signs of GI bleeding.    NPO after midnight for abdominal MRI tomorrow.  Checklist completed, sent to MRI

## 2017-10-24 NOTE — CONSULTS
"PSYCHIATRIC CONSULTATION       TODAY'S DATE:  10/24/2017       IDENTIFICATION:  Mr. Ten Johnson is a 57-year-old white male who is currently hospitalized with vomiting blood and black, tarry stools.  He has a history of cirrhosis, alcohol abuse, hepatitis C, hypertension and aortic stenosis.  We are asked to evaluate his chemical dependency by Demetrice Cheek PA-C.       CHIEF COMPLAINT:  Not feeling well.      HISTORY OF PRESENT ILLNESS:  Mr. Johnson reports that he had some blood in his vomit and some black tarry stools.  He tends to minimize these symptoms.  He tells me that he does not have a Sarahi-Hoffman tear, simply a small tear in his esophagus which he thinks may have something to do with his allergies.  He does admit to drinking, starting at age 9 and he does frequently drink a beer in the morning.  He will not quantify how much he drinks.  He reports he simply does not count.  He tells me that he has no interest in stopping drinking.  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  He does not appear to believe that any of his problems are secondary to alcohol.  Unfortunately, he also had several fainting spells along with his vomiting and his hemoglobin on 10/23 was only 6.8.  He also has significant thrombocytopenia and an elevated INR.        The patient has had a 20-pound weight loss in the last few months.  I am told that his alpha fetoprotein is elevated and he did have a mass on his liver during ultrasound so he will be worked up for potential carcinoma.  On my interview, he had no interest in chemical dependency treatment and in fact no interest in cutting back.  When my treatment team went to interview him before I interviewed him, he suggested that most of their questions regarding alcohol, quantity of drinking, drinking in the morning, etc. were \"bullshit questions\" or \"stupid questions\" and he certainly is not forthcoming " "about his alcohol consumption.      I did discuss this case with his treatment team and it turns out that his current PA-C spent a good deal of time discussing alcohol use and alcohol negative sequelae with him.  Despite multiple people discussing his use of alcohol, he tends to minimize any potential symptoms and suggests that his problems are caused by other issues.  He reports that he is \"leaking histamine\" and in the morning he coughs up about a cup of histamine.  I do believe he has allergies.  The chart suggests he has allergies to zolpidem, cats, dogs and pollen extract.  He was on antihistamines prior to this hospitalization.  I am not sure what exactly he means by histamine leak.      PAST MEDICAL HISTORY:  Includes alcoholism, aortic stenosis, allergic rhinitis, blood transfusions.  There is a vague history of cancer which has not been confirmed, cirrhosis, hepatitis C, which is untreated hypertension, marijuana use, osteoarthritis, elbow pain, rotator cuff tear, staphylococcus infection and thrombocytopenia.      PAST SURGICAL HISTORY:  Includes arthroscopic shoulder repair, facial reconstruction, I&D's, triceps tendon repair, transposition of the ulnar nerve of the elbow.      FAMILY HISTORY:  The patient reports that both his parents were drinkers but \"it was a different time.\"      SOCIAL HISTORY:  The patient does not smoke cigarettes.  He has a history of marijuana use and chronic alcohol use.  He is working as an .  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      REVIEW OF SYSTEMS:  On my interview, he did report malaise and intermittent headache.  He denied problems with vision or hearing, chest pain, shortness of breath, abdominal pain, diarrhea, constipation, genitourinary symptoms or problems with muscles, skin or joints, though he did have recent hematemesis.      MENTAL STATUS EXAMINATION:  On my interview, the patient was generally pleasant.  He was " not cooperative with questions that regard quantifying his alcohol use.  Otherwise, he was generally cooperative.  His mood was good.  His affect was full and appropriate.  His speech was coherent and goal oriented.  His associations were tight.  His thought process is logical and linear.  Content of thought was without psychosis or suicidal ideation.  Recent and remote memory, concentration, fund of knowledge and use of language were within normal limits.  He is alert and oriented x3.  Insight and judgment are quite guarded.  Muscle strength and tone appear to be at baseline.  Recent vital signs include a temperature of 97.3, heart rate is 77, respiration rate of 18 with 100% oxygen saturation and a blood pressure of 148/85.      ASSESSMENT:  Alcohol use disorder.      RECOMMENDATIONS:  I do believe the patient might benefit from chemical dependency treatment but he is definitely not interested.  He should certainly receive a workup for his liver lesion.         PAT ALVARES MD             D: 10/24/2017 12:43   T: 10/24/2017 13:54   MT: [email or web address]      Name:     PHYLLSI CORREA   MRN:      [id number]        Account:       [id number]   :      1960           Consult Date:  10/24/2017      Document: [id number]       cc: Demetrice BONILLA

## 2017-10-24 NOTE — PROGRESS NOTES
[facility], [place]  Internal Medicine Progress Note      Assessment & Plan   Ten Johnson is a 57 year old man with a history of alcohol abuse, untreated Hep C cirrhosis, HTN, and aortic stenosis who was admitted to the ICU with acute anemia and upper GI bleed after presenting with hematemesis, melena, and syncopal episodes on 10/22, found to have Sarahi Hoffman tear on EGD.  He was transferred to [facility] on 10/23.    Upper GI bleed secondary to Sarahi Hoffman Tear  Patient presented with 4 days of hematemesis, melena, and several episodes of syncope. Hgb 6.1 at admission, now up to 8.6 s/p 3 units PRBCs.  EGD performed 10/23 showed Sarahi Ohffman tear as etiology for bleeding, as well as small non bleeding esophageal varix and mild portal hypertensive gastropathy. No further episodes of hematemesis or melena since admission.  Vitals and Hgb stable. GI following.  -Continue protonix 40mg BID  -Continue IV Ceftriaxone for antimicrobial prophylaxis until 10/29.  -Continue vitamin K thru 10/25.  -INR and CBC daily.  -Advance diet as tolerated.  -Avoid anticoagulation or blood thinning medications.    Acute Blood Loss Anemia  Hx: Hgb 6.1 on admission, down from baseline of 16.7 in 2015.  Likely secondary to acute blood loss as well as alcoholism.  Macrocytic with MCV of 103. Hbg 8.6 today.  -Continue to monitor CBC daily  -Transfuse if <7.  -Check B12 and folate in AM    Cirrhosis secondary to alcoholism and untreated Hep C.  Hx: Untreated Hep C, ongoing alcohol abuse, no hx or evidence of withdrawal.  Abdominal US 10/23 showing fatty infiltration of liver, thickened GB wall/ascites likely reactive, patent vasculature, and a focal hypoechoic lesion within the right lobe of the liver not previously seen.  Patient reported 20 lb unintentional weight loss over past year.    , and INR 1.25 consistent with baseline.  Albumin 2.2, and decreased from prior baseline of 3.8 in  2016.  MELD score of 11.  AFP of 10.6, with history of prior elevation. In general patient has poor insight re: ESLD. No hx HE, portal htn or ascites. Small varices noted on EGD as above; intervention not required. Overall poor insight re: liver disease.  -Liver MRI today to further evaluate for HCC.  -Hepatology consulted, awaiting recommendations  - consult for chemical dependency - patient deferred  -Monitor for signs of alcohol w/drawal, although no prior hx. Will monitor clinically  -Cont folate and thiamine supplements    Leukocytosis  Resolving, with WBC of 11.1 on 10/23, down from 21.7 on admission.  Unknown etiology, stress vs infection vs inflammation vs liver mass.  No other signs of infection as patient afebrile, no tachycardia, tachypnea and unremarkable CXR, UA, and blood cultures thus far.  No history of ascites, although a small amount noted on US. Currently on IV Ceftriaxone for SBP ppx in setting of GIB.  -Daily CBC's.  -Continue to monitor Blood cultures for growth  -If increase in ascites, or if not continuing to decrease, consider paracentesis.    Essential Hypertension  BP well controlled so far during hospitalization although most recent 148/85.  On Lisinopril 10 mg PO daily at home, but currently on hold because of suspicion for pre-renal JENNIFER.  -Continue to monitor.  If BP consistently >140/90 consider initiation of antihypertensive.    Thrombocytopenia  Platelet count 75 today, down from 121 on admission but overall within baseline range.  Likely secondary to cirrhosis. Has not received any heparin products, therefore HIT less likely.  -Continue to monitor CBC.    Moderate Aortic Stenosis  Echo in 2015 showing moderate aortic stenosis.  Patient reports fatigue and syncope, but likely secondary to anemia, rather than AS.  No chest pain or SOB.  -Repeat TTE to reassess.    Possible JENNIFER  Hx: Likely prerenal and secondary to hypovolemia and anemia.  Cr of 1.11 and BUN of 28 on admission  "increased from 0.6-0.7 in 2016.  Patient now appears euvolemic on exam s/p 3 units PRBCs and IV fluids.  He is drinking fluids and urinating. Creatinine slightly improved at 1.05 today, with BUN 24.  -Continue to monitor CMP  -Continue to encourage oral intake.  -Continue to hold Lisinopril.    Diet: NPO for Medical/Clinical Reasons Except for: Meds  Snacks/Supplements Adult: Ensure Clear; With Meals  Fluids: PO  DVT Prophylaxis: Ambulation, compression stockings.  Code Status: Full Code    Disposition Plan   Expected discharge: 2 - 3 days; recommended to prior living arrangement once Imaging and consults completed.  Labs stable.    The patient's care was discussed with the Attending Physician, Dr. Tamara SANCHEZ.    Mo Barrios PA-C  Internal Medicine [facility] Hospitalist  [facility]  Pager [phone]    _________________________________________________    Interval History   Ten Johnson is a 57 year old man with a history of alcohol abuse, untreated Hep C, cirrhosis, HTN, and aortic stenosis who was admitted to the ICU with 3-4 days of hematemesis, melena, and syncopal episodes on 10/22, found to be secondary to Sarahi Hoffman tear on EGD with anemia.  He has received 3 units PRBC so far with most recent Hgb 8.6.  He was transferred to the floor on 10/23.  RUQ ultrasound showed mass and he is scheduled for MRI today to further evaluate and Echo to re-evaluate aortic stenosis.    Patient states that he is feeling generally \"like crap\", but better than when he came in.  He denies additional episodes of hematemesis or melena.  Denies cough, urinary symptoms, headache, chills, or new rashes.  Has been drinking fluids and urinating regularly.  Patient is hungry and would like to eat, but understands that he must wait until after MRI.    A 4 point ROS was performed (including CV, Resp, GI, ) and negative unless otherwise noted in HPI.     Data reviewed today: I reviewed all medications, new labs and imaging " "results over the last 24 hours.     Physical Exam   /85 (BP Location: Left arm)  Pulse 75  Temp 97.3  F (36.3  C) (Oral)  Resp 18  Ht 1.727 m (5' 7.99\")  Wt 78 kg (171 lb 15.3 oz)  SpO2 100%  BMI 26.15 kg/m2   General Appearance: Awake, alert, NAD.  Eyes: Anicteric.  HEENT: Oropharynx moist without lesions, No lymphadenopathy, Mild JVD  Respiratory: Good respiratory effort. CTAB with no wheezes, rhonchi or rales  Cardiovascular: Regular rate with Grade 3-4/6 systolic murmur with radiation to carotids.  GI: Soft, non-distended and non-tender to palpation. No significant ascites.  No palpable masses or HSM.  : No suprapubic or flank tenderness.  Skin: Pale appearing.  No jaundice or petechiae, bruises noted sporadically which patient says are secondary to various falls.  Neurologic: A&Ox3, normal tone, moves all extremities equally with no focal deficits noted    Lines/Tubes/Drains  None      Labs    ROUTINE IP LABS   Recent Labs  Lab 10/24/17  0820 10/23/17  1027 10/23/17  0413 10/22/17  2242     --  139 133   POTASSIUM 3.7  --  4.2 4.2   CHLORIDE 108  --  108 104   CO2 23  --  23 20   ANIONGAP 7  --  8 9   GLC 87  --  119* 108*   BUN 24  --  28 28   CR 1.05  --  1.11 0.94   JOSEPHINE 7.2*  --  7.2* 7.8*   MAG  --  2.8* 1.8  --    PHOS  --   --  3.7  --    PROTTOTAL 4.8*  --  4.7* 5.2*   ALBUMIN 2.2*  --  2.3* 2.5*   BILITOTAL 0.7  --  0.8 0.7   ALKPHOS 70  --  65 71   *  --  99* 110*   *  --  117* 127*       Recent Labs  Lab 10/24/17  0820 10/23/17  2150 10/23/17  1648 10/23/17  0413 10/22/17  2242   WBC 11.1*  --   --  14.8* 21.7*   RBC 2.80*  --   --  2.20* 1.80*   HGB 8.6* 8.6* 6.8* 7.1* 6.1*   HCT 26.0*  --   --  21.4* 18.6*   MCV 93  --   --  97 103*   MCH 30.7  --   --  32.3 33.9*   MCHC 33.1  --   --  33.2 32.8   RDW 17.4*  --   --  16.2* 15.4*   PLT 75*  --   --  95* 121*       Recent Labs  Lab 10/24/17  0820 10/23/17  0413 10/22/17  2242   INR 1.25* 1.33* 1.28*       MELD-Na " score: 10 at 10/24/2017  8:20 AM  MELD score: 10 at 10/24/2017  8:20 AM  Calculated from:  Serum Creatinine: 1.05 mg/dL at 10/24/2017  8:20 AM  Serum Sodium: 138 mmol/L (Rounded to 137) at 10/24/2017  8:20 AM  Total Bilirubin: 0.7 mg/dL (Rounded to 1) at 10/24/2017  8:20 AM  INR(ratio): 1.25 at 10/24/2017  8:20 AM  Age: 57 years

## 2017-10-24 NOTE — PROGRESS NOTES
"[facility]    Hepatology Follow-up    CC: Hematemesis    Dx: Alcoholic hepatitis C cirrhosis   Moderate     24 hour events: none    Subjective: Patient tolerated diet last night. No Nausea, vomiting, abdominal pain, no BM since procedure.          Patient denies fevers, sweats or chills.    Medications  Current Facility-Administered Medications   Medication     cefTRIAXone (ROCEPHIN) 1 g vial to attach to  mL bag for ADULTS or NS 50 mL bag for PEDS     naloxone (NARCAN) injection 0.1-0.4 mg     magnesium sulfate 4 g in 100 mL sterile water (premade)     potassium phosphate 20 mmol in D5W 500 mL intermittent infusion     folic acid (FOLVITE) tablet 1 mg     cefTRIAXone (ROCEPHIN) 1 g vial to attach to  mL bag for ADULTS or NS 50 mL bag for PEDS     pantoprazole (PROTONIX) EC tablet 40 mg     thiamine tablet 100 mg     phytonadione (MEPHYTON) tablet 5 mg       Review of systems  A 10-point review of systems was negative.    Examination  /85 (BP Location: Left arm)  Pulse 75  Temp 97.3  F (36.3  C) (Oral)  Resp 18  Ht 1.727 m (5' 7.99\")  Wt 78 kg (171 lb 15.3 oz)  SpO2 100%  BMI 26.15 kg/m2    Intake/Output Summary (Last 24 hours) at 10/24/17 1117  Last data filed at 10/24/17 0650   Gross per 24 hour   Intake             1700 ml   Output             1700 ml   Net                0 ml       Gen- well, NAD,normal color  CVS- RRR, RUSB murmur  RS- CTA  Abd- soft, BS+, non-tender to palpation, no rebound  Extr- WWP  Neuro- A+Ox3, no asterixis  Skin- no rash  Psych- normal mood  Lym- no LAD    Laboratory  Lab Results   Component Value Date     10/24/2017    POTASSIUM 3.7 10/24/2017    CHLORIDE 108 10/24/2017    CO2 23 10/24/2017    BUN 24 10/24/2017    CR 1.05 10/24/2017       Lab Results   Component Value Date    BILITOTAL 0.7 10/24/2017     10/24/2017     10/24/2017    ALKPHOS 70 10/24/2017       Lab Results   Component Value Date    WBC 11.1 " 10/24/2017    HGB 8.6 10/24/2017    MCV 93 10/24/2017    PLT 75 10/24/2017       Lab Results   Component Value Date    INR 1.25 10/24/2017     MELD-Na score: 10 at 10/24/2017  8:20 AM  MELD score: 10 at 10/24/2017  8:20 AM    AFP 10.6       Radiology    RUQ U/S  Impression:   1.  A focal hypoechoic lesion within the right lobe of the liver, not  previously seen, indeterminant. Recommend MRI liver for further  evaluation.  2.  Fatty infiltration of the liver.  3.  Thickened gallbladder wall and ascites, likely reactive.  4. Patent antegrade hepatic vasculature.    Assessment  57 year old man with alcohol / hep C genotype 1a cirrhosis, alcohol abuse, who presented on 10/23/17 with hematemesis.  EGD showed inderjit evans tear.  Patient stable post procedure. Imaging notable for indeterminate liver lesion with close to normal AFP.  Follow up scheduled MRI (could be done outpatient).        Recommendations  -continue ceftriaxone 7 day course for mortality prophylaxis (can transition to ciprofloxacin therapeutic dosing on discharge)  -PPI BID for two months  -low sodium diet  -f/u MRI liver  -f/u echo  -outpatient colonoscopy (screening)  -f/u with hepatology in around 3 months or next available (likely around then)          Luis Mitchell MD  Hepatology  #[phone]

## 2017-10-24 NOTE — PLAN OF CARE
Problem: Gastrointestinal Bleeding (Adult)  Intervention: Monitor/Manage Fluid Electrolyte Balance  VSS. Pt up with SBA. Denies pain. Denies nausea. NPO for MRI this afternoon. Echocardiogram currently in progress. Flu shot given. Voids spont, not saving. No BM. PIV saline locked. Cont. POC.

## 2017-10-25 ENCOUNTER — APPOINTMENT (OUTPATIENT)
Dept: MRI IMAGING | Facility: CLINIC | Age: 57
DRG: 369 | End: 2017-10-25
Attending: PHYSICIAN ASSISTANT
Payer: COMMERCIAL

## 2017-10-25 VITALS
BODY MASS INDEX: 26.06 KG/M2 | TEMPERATURE: 95.8 F | DIASTOLIC BLOOD PRESSURE: 86 MMHG | OXYGEN SATURATION: 94 % | HEART RATE: 73 BPM | SYSTOLIC BLOOD PRESSURE: 148 MMHG | RESPIRATION RATE: 16 BRPM | WEIGHT: 171.96 LBS | HEIGHT: 68 IN

## 2017-10-25 LAB
ALBUMIN SERPL-MCNC: 2.3 G/DL (ref 3.4–5)
ALP SERPL-CCNC: 77 U/L (ref 40–150)
ALT SERPL W P-5'-P-CCNC: 153 U/L (ref 0–70)
ANION GAP SERPL CALCULATED.3IONS-SCNC: 7 MMOL/L (ref 3–14)
AST SERPL W P-5'-P-CCNC: 106 U/L (ref 0–45)
BILIRUB SERPL-MCNC: 0.9 MG/DL (ref 0.2–1.3)
BUN SERPL-MCNC: 17 MG/DL (ref 7–30)
CALCIUM SERPL-MCNC: 7.2 MG/DL (ref 8.5–10.1)
CHLORIDE SERPL-SCNC: 108 MMOL/L (ref 94–109)
CO2 SERPL-SCNC: 24 MMOL/L (ref 20–32)
CREAT SERPL-MCNC: 1.08 MG/DL (ref 0.66–1.25)
ERYTHROCYTE [DISTWIDTH] IN BLOOD BY AUTOMATED COUNT: 16.3 % (ref 10–15)
FOLATE SERPL-MCNC: 25.1 NG/ML
GFR SERPL CREATININE-BSD FRML MDRD: 70 ML/MIN/1.7M2
GLUCOSE SERPL-MCNC: 90 MG/DL (ref 70–99)
HCT VFR BLD AUTO: 26 % (ref 40–53)
HGB BLD-MCNC: 8.6 G/DL (ref 13.3–17.7)
MCH RBC QN AUTO: 30.6 PG (ref 26.5–33)
MCHC RBC AUTO-ENTMCNC: 33.1 G/DL (ref 31.5–36.5)
MCV RBC AUTO: 93 FL (ref 78–100)
PLATELET # BLD AUTO: 75 10E9/L (ref 150–450)
POTASSIUM SERPL-SCNC: 3.9 MMOL/L (ref 3.4–5.3)
PROT SERPL-MCNC: 5 G/DL (ref 6.8–8.8)
RBC # BLD AUTO: 2.81 10E12/L (ref 4.4–5.9)
SODIUM SERPL-SCNC: 139 MMOL/L (ref 133–144)
VIT B12 SERPL-MCNC: 2289 PG/ML (ref 193–986)
WBC # BLD AUTO: 8 10E9/L (ref 4–11)

## 2017-10-25 PROCEDURE — 25000128 H RX IP 250 OP 636: Performed by: PHYSICIAN ASSISTANT

## 2017-10-25 PROCEDURE — 99207 ZZC APP CREDIT; MD BILLING SHARED VISIT: CPT | Performed by: PHYSICIAN ASSISTANT

## 2017-10-25 PROCEDURE — 80053 COMPREHEN METABOLIC PANEL: CPT | Performed by: PHYSICIAN ASSISTANT

## 2017-10-25 PROCEDURE — 82746 ASSAY OF FOLIC ACID SERUM: CPT | Performed by: PHYSICIAN ASSISTANT

## 2017-10-25 PROCEDURE — 99239 HOSP IP/OBS DSCHRG MGMT >30: CPT | Performed by: INTERNAL MEDICINE

## 2017-10-25 PROCEDURE — 25000128 H RX IP 250 OP 636: Performed by: INTERNAL MEDICINE

## 2017-10-25 PROCEDURE — 25000132 ZZH RX MED GY IP 250 OP 250 PS 637: Performed by: PHYSICIAN ASSISTANT

## 2017-10-25 PROCEDURE — 82607 VITAMIN B-12: CPT | Performed by: PHYSICIAN ASSISTANT

## 2017-10-25 PROCEDURE — 85027 COMPLETE CBC AUTOMATED: CPT | Performed by: PHYSICIAN ASSISTANT

## 2017-10-25 PROCEDURE — 99232 SBSQ HOSP IP/OBS MODERATE 35: CPT | Performed by: NURSE PRACTITIONER

## 2017-10-25 PROCEDURE — 25000132 ZZH RX MED GY IP 250 OP 250 PS 637: Performed by: INTERNAL MEDICINE

## 2017-10-25 PROCEDURE — 25000132 ZZH RX MED GY IP 250 OP 250 PS 637: Performed by: STUDENT IN AN ORGANIZED HEALTH CARE EDUCATION/TRAINING PROGRAM

## 2017-10-25 PROCEDURE — 74183 MRI ABD W/O CNTR FLWD CNTR: CPT

## 2017-10-25 PROCEDURE — A9585 GADOBUTROL INJECTION: HCPCS | Performed by: INTERNAL MEDICINE

## 2017-10-25 PROCEDURE — 36415 COLL VENOUS BLD VENIPUNCTURE: CPT | Performed by: PHYSICIAN ASSISTANT

## 2017-10-25 RX ORDER — PANTOPRAZOLE SODIUM 40 MG/1
40 TABLET, DELAYED RELEASE ORAL
Qty: 120 TABLET | Refills: 0 | Status: SHIPPED | OUTPATIENT
Start: 2017-10-25 | End: 2017-12-04

## 2017-10-25 RX ORDER — FLUTICASONE PROPIONATE 50 MCG
2 SPRAY, SUSPENSION (ML) NASAL DAILY
Qty: 16 G | Refills: 11 | Status: SHIPPED | OUTPATIENT
Start: 2017-10-25 | End: 2018-11-08

## 2017-10-25 RX ORDER — GADOBUTROL 604.72 MG/ML
10 INJECTION INTRAVENOUS ONCE
Status: COMPLETED | OUTPATIENT
Start: 2017-10-25 | End: 2017-10-25

## 2017-10-25 RX ORDER — CIPROFLOXACIN 500 MG/1
500 TABLET, FILM COATED ORAL 2 TIMES DAILY
Qty: 10 TABLET | Refills: 0 | Status: SHIPPED | OUTPATIENT
Start: 2017-10-25 | End: 2017-11-04

## 2017-10-25 RX ORDER — LISINOPRIL 10 MG/1
10 TABLET ORAL DAILY
Qty: 90 TABLET | Refills: 3 | Status: SHIPPED | OUTPATIENT
Start: 2017-10-25 | End: 2018-09-28

## 2017-10-25 RX ADMIN — PANTOPRAZOLE SODIUM 40 MG: 40 TABLET, DELAYED RELEASE ORAL at 15:57

## 2017-10-25 RX ADMIN — CEFTRIAXONE 1 G: 1 INJECTION, POWDER, FOR SOLUTION INTRAMUSCULAR; INTRAVENOUS at 02:37

## 2017-10-25 RX ADMIN — PANTOPRAZOLE SODIUM 40 MG: 40 TABLET, DELAYED RELEASE ORAL at 07:52

## 2017-10-25 RX ADMIN — Medication 100 MG: at 07:52

## 2017-10-25 RX ADMIN — GADOBUTROL 10 ML: 604.72 INJECTION INTRAVENOUS at 15:24

## 2017-10-25 RX ADMIN — SODIUM CHLORIDE 40 ML: 9 INJECTION, SOLUTION INTRAVENOUS at 15:25

## 2017-10-25 RX ADMIN — OXYCODONE HYDROCHLORIDE 5 MG: 5 TABLET ORAL at 02:52

## 2017-10-25 RX ADMIN — LORAZEPAM 0.5 MG: 0.5 TABLET ORAL at 13:11

## 2017-10-25 RX ADMIN — OXYCODONE HYDROCHLORIDE 5 MG: 5 TABLET ORAL at 15:57

## 2017-10-25 RX ADMIN — FOLIC ACID 1 MG: 1 TABLET ORAL at 07:52

## 2017-10-25 RX ADMIN — PHYTONADIONE 5 MG: 5 TABLET ORAL at 07:52

## 2017-10-25 RX ADMIN — OXYCODONE HYDROCHLORIDE 5 MG: 5 TABLET ORAL at 09:09

## 2017-10-25 ASSESSMENT — PAIN DESCRIPTION - DESCRIPTORS
DESCRIPTORS: ACHING;CONSTANT
DESCRIPTORS: ACHING
DESCRIPTORS: ACHING
DESCRIPTORS: ACHING;SORE
DESCRIPTORS: ACHING

## 2017-10-25 NOTE — PLAN OF CARE
Problem: Patient Care Overview  Goal: Plan of Care/Patient Progress Review  Outcome: Adequate for Discharge Date Met:  10/25/17  Pt arrived back from MRI at 1545. AVSS, on room air. Right arm/elbow pain managed well with heating pad and PRN Oxycodone x1. Large area of bruising noted on right arm. Mild bilat UE edema. 2 PIVs SL. Regular diet, ate dinner and tolerated it well. Voiding spont, not saving. Passing flatus, last BM yesterday. UAL, steady on feet. Cardiology and hepatic teams came to speak with Pt after his MRI to discuss plans for follow-up.      Orders for discharge placed. PIVs x2 removed. Discharge instructions discussed with Pt, Pt verbalizes understanding and denies further questions. This RN escorted Pt on foot to discharge pharmacy, Pt picked up his prescriptions. Pt ambulated independently to [facility], declined escort/wheelchair transportation. Pt will take a cab home. Pt left in stable condition and with all belongings.

## 2017-10-25 NOTE — PLAN OF CARE
Problem: Patient Care Overview  Goal: Plan of Care/Patient Progress Review  Outcome: Improving  Pt alert and orientated. All vitals stable. In pleasant mood. Able to sleep comfortably between cares. Arm pain managed with newly ordered PRN oxycodone and heat pack with desired effect. IVF started @ 75 mL/hr into PIV. Voiding adequate amount of urine in urinal. No BM.Pt to be NPO at 0600 today. PRN ativan ordered to give to patient prior to MRI this morning. Continue with plan of care.

## 2017-10-25 NOTE — PLAN OF CARE
Problem: Gastrointestinal Bleeding (Adult)  Intervention: Monitor/Manage Fluid Electrolyte Balance  VSS. Pt up ad tierra. Voids spont not saving. Pain controlled with oxycodone X1. MIV stopped due to upper extremity edema. PIV saline locked. Ativan given before MRI, currently waiting for scan. Tolerated regular diet before NPO status. No BM. Possible D/C this afternoon after MRI results. Cont. POC.

## 2017-10-25 NOTE — DISCHARGE SUMMARY
[facility], [place]  Internal Medicine Discharge Summary- Gold Service    Date of Admission:  10/22/2017  Date of Discharge:  10/25/2017  Discharging Provider: Pérez Barrios PA-C  Discharge Team: Gold 3    Discharge Diagnoses   Primary Diagnoses:  1. Acute blood loss anemia d/t upper GIB from Sarahi-Hoffman tear.  2. Severe aortic stenosis.    3. Syncope, related to #1 and #2 above.  4. Diastolic dysfunction.     Secondary Diagnoses:  1. Compensated Hep C/ETOH cirrhosis.  2. Hypertension.  3. Chronic thrombocytopenia d/t ESLD.      Follow-ups Needed After Discharge   - Repeat CBC and BMP at PCP follow up in 1 week  - Cardiology to schedule follow up in TAVR clinic with repeat echo  - MRI Liver pending at time of discharge, will need follow up for results and next steps  - Hepatology follow up in 3 months; will also need screening colonoscopy    Hospital Course   Ten Johnson is a 57 year old male with history of untreated Hep C, alcohol abuse, cirrhosis, moderate AS, and HTN who was admitted on 10/22/2017 for acute upper GI bleed. The patient presented with several weeks of intermittent hematemesis and melena. In the week prior to admission he also had multiple syncopal episodes at home. He was noted to have a hgb 6.1 in the ED. Vitals stable on arrival. The following problems were addressed during his hospitalization:    # Acute blood loss anemia d/t upper GIB from Sarahi Hoffman tear: Presented with 2-3 weeks of intermittent hematemesis and melena. His Hgb was 6.1 on arrival. There was initial concern for variceal bleed given hx cirrhosis. He was admitted to ICU and started on protonix and octreotide drips. GI was consulted. Emergent EGD 10/23 showed small varices (<5 mm) without evidence of recent bleed, small Sarahi-Hoffman tear with stigmata of recent bleed, mild portal hypertensive gastropathy and non-bleeding erosions in the gastric antrum. He was transfused 3 units of PRBC in  total. His Hgb improved to 8.6 and remained stable at the time of discharge. He did not have any further evidence of active bleeding. He will continue twice daily PPI for 2 months, and will follow up with GI in the hepatology clinic in 3 months. Recommend repeat CBC at PCP follow up in 1 week.    # Syncope:  Patient reported multiple episodes of passing out in the week prior to admission. These episodes were not witnessed. Vitals were stable on arrival. EKG showed NSR. No clinical signs or symptoms of ACS. Given his GIB, syncope was initially felt to be d/t hypovolemia. Repeat echo revealed severe aortic stenosis, however it is unclear if this was substantially contributing to these episodes. He had no further episodes after fluid/blood product resuscitation.     # Severe AoS:  Previous echo in 2015 revealed moderate AoS with mean gradient of 28 mmHg, peak velocity of 3.35 m/sec, and valve area of 1.2 cm2. Repeat echo on 10/24 showed severe aortic stenosis with mean gradient of 52 mmHg, peak velocity of 4.47 m/sec, and valve area of 0.8 cm2. It is possible this could have contributed to syncopal episodes, however he does not describe any cardiac symptoms prior to his episodes of bleeding. Cardiology was consulted and will schedule follow up with patient in the TAVR clinic.     # Hep C/ETOH Cirrhosis:  Hx untreated Hep C with last HCV RNA quant [phone] in 2/2015. Patient has failed to follow up with hepatology d/t work conflicts. He also continues to drink, sometimes heavily, but not on a daily basis. He denies any issues with alcohol abuse, stating that he weighs the impact of alcohol on his liver prior to imbibing. Psych was consulted for CD consult, however patient was not interested in treatment. Overall he does not feel that his liver disease is significant as he does not have many of the classic symptoms of cirrhosis. Abd US 10/23 showed patent hepatic vasculature with antegrade flow. He does have evidence of  small varices on EGD. No hx ascites or HE. No evidence of acute decompensation during this admission. We did encourage cutting back on alcohol and compliance with follow up appointments. He was started on a 7 day course of ppx ceftriaxone d/t GIB. Hepatology was consulted. US had showed a focal liver lesion not previously seen. His AFP is mildly elevated at 10.6. MRI of the liver was recommended to further eval HCC. The MRI was obtained on 10/25 but results were pending at the time of discharge. The patient will need to follow up with PCP or Hepatology for results.     Consultations This Hospital Stay   VASCULAR ACCESS CARE ADULT IP CONSULT  VASCULAR ACCESS CARE ADULT IP CONSULT  VASCULAR ACCESS CARE ADULT IP CONSULT  GI HEPATOLOGY ADULT IP CONSULT  CHEMICAL DEPENDENCY IP CONSULT  CARDIOLOGY GENERAL ADULT IP CONSULT     Code Status   Full Code    Time Spent on this Encounter   IPérez, personally saw the patient today and spent greater than 30 minutes discharging this patient.       Pérez BONILLA-MARKELL  Internal Medicine Staff Hospitalist Service  [facility]  Pager: [phone]  ______________________________________________________________________    Physical Exam   Vital Signs: Temp: 95.8  F (35.4  C) Temp src: Oral BP: 148/86 Pulse: 73 Heart Rate: 69 Resp: 16 SpO2: 94 % O2 Device: None (Room air)    Weight: 171 lbs 15.34 oz    GENERAL:  Awake. Alert. Oriented x 3. NAD. On room air.  HEENT: No scleral icterus. Mucous membranes moist.   CV: RRR. S1, S2. No murmurs appreciated.   RESPIRATORY: Lungs CTAB with no wheezing, rales, rhonchi.   GI: Abdomen soft and non distended. Active bowel sounds. No tenderness, rebound, or guarding. No mass or HSM.    NEUROLOGICAL: No focal deficits. Moves all extremities.    EXTREMITIES: No peripheral edema. Intact bilateral pedal pulses.   SKIN: No jaundice. No rashes.      Significant Results and Procedures   ROUTINE IP LABS   Recent Labs  Lab  10/25/17  0639 10/24/17  0820 10/23/17  1027 10/23/17  0413 10/22/17  2242    138  --  139 133   POTASSIUM 3.9 3.7  --  4.2 4.2   CHLORIDE 108 108  --  108 104   CO2 24 23  --  23 20   ANIONGAP 7 7  --  8 9   GLC 90 87  --  119* 108*   BUN 17 24  --  28 28   CR 1.08 1.05  --  1.11 0.94   JOSEPHINE 7.2* 7.2*  --  7.2* 7.8*   MAG  --   --  2.8* 1.8  --    PHOS  --   --   --  3.7  --    PROTTOTAL 5.0* 4.8*  --  4.7* 5.2*   ALBUMIN 2.3* 2.2*  --  2.3* 2.5*   BILITOTAL 0.9 0.7  --  0.8 0.7   ALKPHOS 77 70  --  65 71   * 116*  --  99* 110*   * 158*  --  117* 127*       Recent Labs  Lab 10/25/17  0639 10/24/17  0820 10/23/17  2150 10/23/17  1648 10/23/17  0413 10/22/17  2242   WBC 8.0 11.1*  --   --  14.8* 21.7*   RBC 2.81* 2.80*  --   --  2.20* 1.80*   HGB 8.6* 8.6* 8.6* 6.8* 7.1* 6.1*   HCT 26.0* 26.0*  --   --  21.4* 18.6*   MCV 93 93  --   --  97 103*   MCH 30.6 30.7  --   --  32.3 33.9*   MCHC 33.1 33.1  --   --  33.2 32.8   RDW 16.3* 17.4*  --   --  16.2* 15.4*   PLT 75* 75*  --   --  95* 121*       Recent Labs  Lab 10/24/17  0820 10/23/17  0413 10/22/17  2242   INR 1.25* 1.33* 1.28*         Pending Results   These results will be followed up by PCP  Unresulted Labs Ordered in the Past 30 Days of this Admission     Date and Time Order Name Status Description    10/23/2017 0237 Blood culture Preliminary     10/23/2017 0237 Blood culture Preliminary         MRI Liver       Primary Care Physician   Cuca Celeste    Discharge Disposition   Discharged to home  Condition at discharge: Stable    Discharge Orders     Reason for your hospital stay   Admitted with acute GI bleed due to Sarahi Robert tear in the esophagus.     [facility] Follow-up and recommended labs and tests   Follow up with primary care provider, Cuca Celeste, within 7 days for hospital follow- up.  The following labs/tests are recommended: CMP, CBC.      Follow up with Hepatology in 3 months.    Follow up with Cardiology as  directed.     Appointments on [place] and/or [facility] (with [facility] or [facility] provider or service). Call [phone] if you haven't heard regarding these appointments within 7 days of discharge.     Activity   Your activity upon discharge: activity as tolerated     When to contact your care team   Call your primary doctor if you have any of the following:  increased shortness of breath, increased swelling . Please go to the ED for further symptoms of recurrent GI bleed.     Full Code     Diet   Follow this diet upon discharge: Regular Diet Adult, 2g Sodium Restriction       Discharge Medications   Current Discharge Medication List      START taking these medications    Details   pantoprazole (PROTONIX) 40 MG EC tablet Take 1 tablet (40 mg) by mouth 2 times daily (before meals)  Qty: 120 tablet, Refills: 0    Associated Diagnoses: Upper GI bleed      ciprofloxacin (CIPRO) 500 MG tablet Take 1 tablet (500 mg) by mouth 2 times daily  Qty: 10 tablet, Refills: 0    Associated Diagnoses: Alcoholic cirrhosis of liver without ascites (H); Upper GI bleed         CONTINUE these medications which have NOT CHANGED    Details   lisinopril (PRINIVIL,ZESTRIL) 10 MG tablet Take 1 tablet (10 mg) by mouth daily  Qty: 90 tablet, Refills: 3    Associated Diagnoses: Essential hypertension      fluticasone (FLONASE) 50 MCG/ACT nasal spray Spray 2 sprays into both nostrils daily  Qty: 16 g, Refills: 11    Associated Diagnoses: Cough; Post-nasal drip      loratadine (CLARITIN) 10 MG tablet Take 1 tablet (10 mg) by mouth daily  Qty: 30 tablet, Refills: 11    Associated Diagnoses: Post-nasal drip; Cough      Multiple Vitamin (MULTIVITAMINS PO) Take 1 tablet by mouth daily.           Allergies   Allergies   Allergen Reactions     Zolpidem Other (See Comments)     Alcoholic.  Had reaction 3/17/13 while intoxicated which included black out, loss of awareness, paranoia.  Do not prescribe.  Dr. Celeste     Cats Other (See Comments)      rhinitis     Dogs Other (See Comments)     rhinitis     Pollen Extract Other (See Comments)     rhinits.

## 2017-10-25 NOTE — PLAN OF CARE
VSS. Up ad tierra. Pt frustrated about NPO status and that he was still waiting for MRI to be done this evening. IVF started @ 75 mL/hr into PIV. Around 1930 pt stated he wanted to leave AMA - see note from Demetrice Cheek PA-C. Pt decided to stay, now that diet has been changed to regular. NPO at 0600 tomorrow AM. PRN dose of Ativan ordered to give prior to MRI. Voiding. No BM reported. Continue with POC.

## 2017-10-25 NOTE — PROGRESS NOTES
Brief Medicine Note    Contacted by nursing regarding patient's desire to leave AMA. Met w/ patient at bedside. He is frustrated about long duration of NPO status and lack of IV hydration today in anticipation of MRI that has been pushed back multiple times. IV fluids were ordered per his request earlier this evening but the MRI still hasn't been done. Will let patient eat and he prefers to keep the low rate IV fluids in addition (counseled on monitoring for cardiopulmonary sx w/ underlying aortic stenosis and diastolic dysfunction). He agrees to stay and have MRI done tomorrow. Only needs to be NPO 4 hours prior so will enter order for 0600 tomorrow (can hopefully eat some breakfast first), and this can be adjusted as needed.     Addendum: Contacted again by nursing that patient is having arm pain over areas of blood draws. Cannot take Tylenol d/t liver disease and NSAIDs d/t GIB. Offered heat pack and/or lidocaine cream but he declines. It seems that he is becoming more agitated and would be reasonable to trial a dose of PRN Ativan. Low threshold to initiate formal MSSA protocol for ETOH w/d.     Demetrice Cheek PA-C  Hospitalist Service  Pager [phone]

## 2017-10-25 NOTE — CONSULTS
Cardiology Inpatient Consultation  October 25, 2017    Reason for Consult:  A cardiology consult was requested by Pérez Barrios PA-C from the Internal Medicine service to provide clinical guidance regarding severe aortic stenosis.    HPI:   Ten Johnson is a 57 year old male with a past medical history significant for alcohol abuse, Hepatitis C, cirrhosis, hypertension and aortic stenosis who presented to the hospital on 10/22 with a 5 day history of hematemesis, melena, and syncopal episodes. In the ED he was found to have a Hgb of 6.1 down from a baseline of 16.7 in 2015. He was subsequently transfused with 3 units of blood and underwent EGD which showed a small Sarahi Hoffman tear, also notable for small non-bleeding esophageal varix, mild portal hypertensive gastropathy and mild antral erosions. Decision made to treat with PPI therapy. He has had no further episodes of hematemesis or melena and Hgb has stabilized at 8.6. On 10/24 an echocardiogram was obtained to assess his aortic valve. The echo demonstrated normal LV systolic function with an EF of 60-65%, grade II diastolic dysfunction and severe aortic stenosis with mean PG of 52 mmHg and Vmax of 4.47 m/s. When compared to prior echo from 2015 his aortic stenosis has progressed from moderate to severe. There is concern that his syncope could be related to severe aortic stenosis. Cardiology consulted to further evaluate.     Mr. Johnson is very active at baseline. He put 3,000 miles on his bike last year and participated in the  bike ride this past summer without difficulty. He reports mild fatigue towards the end of the summer which he attributed to warm weather, but denies recent chest pain, shortness of breath, lightheadedness or palpitations. No recent orthopnea, PND or weight gain. Prior to last weekend he had never experienced lightheadedness or syncope.     Review of Systems:    Complete review of systems was performed and negative except per  HPI.    PMH:  Past Medical History:   Diagnosis Date     alcoholism      Allergic rhinitis      Aortic stenosis, moderate      Blood transfusion      Cirrhosis (H)      Hepatitis C      Hypertension     essential     Marijuana abuse      OA (osteoarthritis) of knee 4/1/2013     Pain, elbow     LEFT     Rotator cuff tear, right      Staphylococcus infection in conditions classified elsewhere and of unspecified site      Thrombocytopenia (H)      Active Problems:  Patient Active Problem List    Diagnosis Date Noted     GI bleed 10/23/2017     Priority: Medium     Hematemesis 10/23/2017     Priority: Medium     Upper GI bleed 10/22/2017     Priority: Medium     Chronic hepatitis C without hepatic coma (H) 05/10/2016     Priority: Medium     Alcoholic cirrhosis of liver without ascites (H) 05/10/2016     Priority: Medium     Aortic stenosis, moderate      Priority: Medium     Alcoholic cirrhosis (H) 02/10/2015     Priority: Medium     OA (osteoarthritis) of knee 04/01/2013     Priority: Medium     Rotator cuff tear, right 07/30/2012     Priority: Medium     alcoholism      Priority: Medium     Pain, elbow      Priority: Medium     LEFT       Thrombocytopenia (H)      Priority: Medium     Pain in joint, shoulder region 07/23/2012     Priority: Medium     Essential hypertension 11/01/2011     Priority: Medium     Problem list name updated by automated process. Provider to review       Presbyopia 11/01/2011     Priority: Medium     Social History:  Social History   Substance Use Topics     Smoking status: Never Smoker     Smokeless tobacco: Never Used     Alcohol use Yes     Family History:  Family History   Problem Relation Age of Onset     CANCER Mother      lymph       Medications:    LORazepam  0.5 mg Oral Pre-Op/Pre-procedure x 1 dose     cefTRIAXone  1 g Intravenous Once     folic acid  1 mg Oral Daily     cefTRIAXone  1 g Intravenous Q24H     pantoprazole  40 mg Oral BID AC     vitamin  B-1  100 mg Oral Daily          NaCl 500 mL (10/24/17 1719)       Physical Exam:  Temp:  [95.6  F (35.3  C)-98  F (36.7  C)] 95.8  F (35.4  C)  Pulse:  [73-97] 73  Heart Rate:  [69] 69  Resp:  [16] 16  BP: (126-165)/(77-91) 148/86  SpO2:  [94 %-100 %] 94 %    Intake/Output Summary (Last 24 hours) at 10/25/17 1045  Last data filed at 10/25/17 0717   Gross per 24 hour   Intake              850 ml   Output             1000 ml   Net             -150 ml     GEN: pleasant, no acute distress  HEENT: no icterus  CV: RRR, normal s1/s2, 3/6 CLARITZA, no heave. JVP normal.   CHEST: clear to ausculation bilaterally, no rales or wheezing  ABD: soft, non-tender, normal active bowel sounds  EXTR: pulses intact. No clubbing, cyanosis or edema.   NEURO: alert oriented, speech fluent/appropriate, motor grossly nonfocal    Diagnostics:  All labs and imaging were reviewed, of note:  Lab Results   Component Value Date    WBC 8.0 10/25/2017     Lab Results   Component Value Date    RBC 2.81 10/25/2017     Lab Results   Component Value Date    HGB 8.6 10/25/2017     Lab Results   Component Value Date    HCT 26.0 10/25/2017     No components found for: MCT  Lab Results   Component Value Date    MCV 93 10/25/2017     Lab Results   Component Value Date    MCH 30.6 10/25/2017     Lab Results   Component Value Date    MCHC 33.1 10/25/2017     Lab Results   Component Value Date    RDW 16.3 10/25/2017     Lab Results   Component Value Date    PLT 75 10/25/2017     EKG:    Transthoracic echocardiogram:   Interpretation Summary  The mean gradient across the aortic valve is 52 mmHg.  The peak aortic velocity is 4.47 m/sec. Severe aortic stenosis is present.     Left ventricular function, chamber size, wall motion, and wall thickness are  normal.The EF is 60-65%. Grade II or moderate diastolic dysfunction. No  regional wall motion abnormalities are seen.  Right ventricular function, chamber size, wall motion, and thickness are  normal.  The inferior vena cava was normal in size  with preserved respiratory  variability.  No pericardial effusion is present.  Ascending aorta 4.0 cm.     Compared to prior study on 2/15/2015, aortic stenosis has progressed to severe  _____________________________________________________________________________  __        Left Ventricle  Left ventricular function, chamber size, wall motion, and wall thickness are  normal.The EF is 60-65%. Grade II or moderate diastolic dysfunction. No  regional wall motion abnormalities are seen.     Right Ventricle  Right ventricular function, chamber size, wall motion, and thickness are  normal.     Atria  Both atria appear normal.        Mitral Valve  Moderate mitral annular calcification is present. Trace to mild mitral  insufficiency is present.     Aortic Valve  The aortic valve is tricuspid. No aortic regurgitation is present. Severe  aortic stenosis is present. The mean gradient across the aortic valve is52  mmHg. The peak aortic velocity is 4.47 m/sec.     Tricuspid Valve  The tricuspid valve is normal. Trace tricuspid insufficiency is present. The  right ventricular systolic pressure is approximated at 41.7 mmHg plus the  right atrial pressure.     Pulmonic Valve  The pulmonic valve is normal.     Vessels  The inferior vena cava was normal in size with preserved respiratory  variability. Ascending aorta 4.0 cm.     Pericardium  No pericardial effusion is present.        Compared to Previous Study  Compared to prior study on 2/15/2015, aortic stenosis has progressed to  severe.    Echocardiogram 2/2015:  Interpretation Summary  Global and regional left ventricular function is normal with an EF of 55-60%.   Global right ventricular function is normal. Moderate aortic stenosis is   present. The mean gradient across the aortic valve is 28 mmHg. The peak   aortic velocity is 3.35 m/sec. The aortic valve area is 1.2 cm2 by the   continuity equation and the LESLYE index is 0.6 cm2/m2. Pulmonary artery   systolic pressure is  normal. The inferior vena cava  is normal. No   pericardial effusion is present.  PatientHeight: 68 in  PatientWeight: 200 lbs  SystolicPressure: 129 mmHg  DiastolicPressure: 80 mmHg  BSA 2.0 m^2        Procedure  Echocardiogram with two-dimensional, color and spectral Doppler performed.     Left Ventricle  Global and regional left ventricular function is normal with an EF of 55-60%.  Left ventricular size is normal.  Left ventricular wall thickness is normal.  No regional wall motion abnormalities are seen.     Right Ventricle  The right ventricle is normal size.  Global right ventricular function is normal.     Atria  The right atria appears normal.  Mild left atrial enlargement is present.     Mitral Valve  Mild to moderate mitral annular calcification is present.  The posterior aspect of the MV annulus is predominantly involved.  Trace mitral insufficiency is present.     Aortic Valve  Moderate aortic stenosis is present.  The mean gradient across the aortic valve is28 mmHg.  The peak aortic velocity is 3.35 m/sec.  The aortic valve area is 1.2 cm^2, by the continuity equation.  The LESLYE index is 0.6 cm2/m2.     Tricuspid Valve  The tricuspid valve is normal.  Trace to mild tricuspid insufficiency is present.  Pulmonary artery systolic pressure is normal.  The TR peak pressure gradient is 21 mmHg .     Pulmonic Valve  The pulmonic valve is normal.     Vessels  The aorta root is normal.  The inferior vena cava  is normal.     Pericardium  No pericardial effusion is present.     Compared to Previous Study  Previous study not available for comparison.    Assessment and Recommendation:  Ten Johnson is a 57 year old male with a past medical history significant for alcohol abuse, Hepatitis C, cirrhosis, hypertension and aortic stenosis who presented to the hospital on 10/22 with a 5 day history of hematemesis, melena, and syncopal episodes, found to have a Hgb of 6.1 down from a baseline of 16.7 in 2015. He was  transfused with 3 units of blood and underwent EGD which showed a small Sarahi Hoffman tear, decision made to treat with PPI therapy. Recent echo shows progression of aortic stenosis from moderate to severe. He is very active at baseline without cardiac symptoms. Syncope more likely related to significant anemia, as he denies syncope is the past. Recommend outpatient cardiology follow-up in TAVR clinic. Will arrange appointment.     I have discussed the above with Dr. Watt.     Thank you for consulting the cardiovascular services at the [facility]. Please do not hesitate to call us with any questions.     SYDNIE Foss, CNP  [facility] Cardiology Consult Team  Pager [phone] or [phone]

## 2017-10-25 NOTE — PROGRESS NOTES
Care Coordinator- Assessment Note     Admission Date/Time:  10/22/2017  Attending MD:  Tamara Benedict MD     Data  Chart reviewed, discussed with interdisciplinary team.   Patient was admitted for:   1. alcoholism    2. Upper GI bleed         RNCC Assessment  Concerns with insurance coverage for discharge needs: None and unable to meet basic needs.  Current Living Situation: Patient lives alone.  Support System: Supportive  Services Involved: none identified  Transportation: Family or Friend will provide  Barriers to Discharge: none identified    Assessment: Per Chart review: the patient doesn't appear to have any complex needs during admission or at discharge.  Patient is independent in mobility.  Plan is for the patient to return to previous situation. RNCC will continue to follow and assess if needs arise.       Plan  Anticipated Discharge Date:  10/25/17    Anticipated Discharge Plan:  Home     CTS Handoff completed: yes  [place], RN, MSN, PHN  RNCCPH: [phone]  Pager: [phone]  Covering for : Erika Vargas, Medicine RNCC

## 2017-10-26 ENCOUNTER — CARE COORDINATION (OUTPATIENT)
Dept: CARE COORDINATION | Facility: CLINIC | Age: 57
End: 2017-10-26

## 2017-10-26 ENCOUNTER — DOCUMENTATION ONLY (OUTPATIENT)
Dept: GASTROENTEROLOGY | Facility: CLINIC | Age: 57
End: 2017-10-26

## 2017-10-26 ENCOUNTER — CARE COORDINATION (OUTPATIENT)
Dept: CARDIOLOGY | Facility: CLINIC | Age: 57
End: 2017-10-26

## 2017-10-26 NOTE — PROGRESS NOTES
Please schedule patient for hepatology follow up in about 3 months time following hospitalization.  Patient is an alcoholic cirrhotic who presented with hematemesis and found to have inderjit evans tear.  Has indeterminate Li-RADS 3 lesion that needs to be followed.  Could be with any provider, saw Ms. Vitale in 2015.  Dr. Sandoval saw during the inpatient setting.      Thank you,  Luis Mitchell MD

## 2017-10-26 NOTE — PROGRESS NOTES
Patient was LM by an RN for post DC follow up so no duplicate post DC follow up call will be made            Emma Mendez        Received Ten's name as a referral to the Valve clinic for severe aortic stenosis.     Telephone call made, and message left along with my contact information.  Wanting to schedule him in the clinic, along with necessary imaging studies.      Will continue to follow up.

## 2017-10-26 NOTE — PROGRESS NOTES
Received Ten's name as a referral to the Valve clinic for severe aortic stenosis.    Telephone call made, and message left along with my contact information.  Wanting to schedule him in the clinic, along with necessary imaging studies.     Will continue to follow up.

## 2017-10-27 ENCOUNTER — CARE COORDINATION (OUTPATIENT)
Dept: CARDIOLOGY | Facility: CLINIC | Age: 57
End: 2017-10-27

## 2017-10-27 ENCOUNTER — TELEPHONE (OUTPATIENT)
Dept: CARDIOLOGY | Facility: CLINIC | Age: 57
End: 2017-10-27

## 2017-10-27 DIAGNOSIS — I25.10 CORONARY ARTERY DISEASE INVOLVING NATIVE CORONARY ARTERY, ANGINA PRESENCE UNSPECIFIED, UNSPECIFIED WHETHER NATIVE OR TRANSPLANTED HEART: ICD-10-CM

## 2017-10-27 DIAGNOSIS — I35.0 SEVERE AORTIC STENOSIS: ICD-10-CM

## 2017-10-27 DIAGNOSIS — R09.89 CAROTID BRUIT, UNSPECIFIED LATERALITY: Primary | ICD-10-CM

## 2017-10-27 RX ORDER — SODIUM CHLORIDE 9 MG/ML
INJECTION, SOLUTION INTRAVENOUS CONTINUOUS
Status: CANCELLED | OUTPATIENT
Start: 2017-10-27

## 2017-10-27 RX ORDER — LIDOCAINE 40 MG/G
CREAM TOPICAL
Status: CANCELLED | OUTPATIENT
Start: 2017-10-27

## 2017-10-27 NOTE — PROGRESS NOTES
Date: 10/27/2017    Time of Call: 11:06 AM     Diagnosis:  Severe aortic stenosis     [ TORB ] Ordering provider: Ubaldo Villavicencio MD  Order:   1.  Carotid US  2.  CMP and CBC  3.  TAVR CT  4.  Angiogram and RHC  5.  EKG     Order received by: Emma Mendez RN     Follow-up/additional notes:   Received Ten's name as a referral to the Valve clinic for evaluation for severe aortic stenosis.  Ten was called and the program was explained to him.  Answered questions that he had.  Ten requested that he be scheduled for Valve clinic on Dec. 7 due to work/travel plans.  Imaging will be ordered, appointments scheduled and new patient packet will be mailed.

## 2017-10-29 LAB
BACTERIA SPEC CULT: NO GROWTH
BACTERIA SPEC CULT: NO GROWTH
SPECIMEN SOURCE: NORMAL
SPECIMEN SOURCE: NORMAL

## 2017-11-04 ENCOUNTER — OFFICE VISIT (OUTPATIENT)
Dept: INTERNAL MEDICINE | Facility: CLINIC | Age: 57
End: 2017-11-04

## 2017-11-04 VITALS
WEIGHT: 196.2 LBS | BODY MASS INDEX: 29.84 KG/M2 | HEART RATE: 93 BPM | RESPIRATION RATE: 20 BRPM | DIASTOLIC BLOOD PRESSURE: 81 MMHG | SYSTOLIC BLOOD PRESSURE: 121 MMHG | OXYGEN SATURATION: 97 %

## 2017-11-04 DIAGNOSIS — Z01.818 PRE-OP EXAM: ICD-10-CM

## 2017-11-04 DIAGNOSIS — Z12.11 SCREEN FOR COLON CANCER: ICD-10-CM

## 2017-11-04 DIAGNOSIS — D69.6 THROMBOCYTOPENIA (H): ICD-10-CM

## 2017-11-04 DIAGNOSIS — I35.0 AORTIC STENOSIS, MODERATE: ICD-10-CM

## 2017-11-04 DIAGNOSIS — D62 ANEMIA DUE TO BLOOD LOSS, ACUTE: ICD-10-CM

## 2017-11-04 DIAGNOSIS — K92.2 UPPER GI BLEED: Primary | ICD-10-CM

## 2017-11-04 DIAGNOSIS — B18.2 CHRONIC HEPATITIS C WITHOUT HEPATIC COMA (H): ICD-10-CM

## 2017-11-04 DIAGNOSIS — I10 ESSENTIAL HYPERTENSION: ICD-10-CM

## 2017-11-04 DIAGNOSIS — K70.30 ALCOHOLIC CIRRHOSIS OF LIVER WITHOUT ASCITES (H): ICD-10-CM

## 2017-11-04 DIAGNOSIS — K92.2 UPPER GI BLEED: ICD-10-CM

## 2017-11-04 DIAGNOSIS — I35.0 SEVERE AORTIC STENOSIS: ICD-10-CM

## 2017-11-04 LAB
ALBUMIN SERPL-MCNC: 2.8 G/DL (ref 3.4–5)
ALP SERPL-CCNC: 86 U/L (ref 40–150)
ALT SERPL W P-5'-P-CCNC: 76 U/L (ref 0–70)
ANION GAP SERPL CALCULATED.3IONS-SCNC: 7 MMOL/L (ref 3–14)
AST SERPL W P-5'-P-CCNC: 63 U/L (ref 0–45)
BILIRUB SERPL-MCNC: 0.7 MG/DL (ref 0.2–1.3)
BUN SERPL-MCNC: 15 MG/DL (ref 7–30)
CALCIUM SERPL-MCNC: 8 MG/DL (ref 8.5–10.1)
CHLORIDE SERPL-SCNC: 108 MMOL/L (ref 94–109)
CO2 SERPL-SCNC: 26 MMOL/L (ref 20–32)
CREAT SERPL-MCNC: 1 MG/DL (ref 0.66–1.25)
ERYTHROCYTE [DISTWIDTH] IN BLOOD BY AUTOMATED COUNT: 16.9 % (ref 10–15)
GFR SERPL CREATININE-BSD FRML MDRD: 76 ML/MIN/1.7M2
GLUCOSE SERPL-MCNC: 87 MG/DL (ref 70–99)
HCT VFR BLD AUTO: 28.3 % (ref 40–53)
HGB BLD-MCNC: 8.6 G/DL (ref 13.3–17.7)
MCH RBC QN AUTO: 27.4 PG (ref 26.5–33)
MCHC RBC AUTO-ENTMCNC: 30.4 G/DL (ref 31.5–36.5)
MCV RBC AUTO: 90 FL (ref 78–100)
PLATELET # BLD AUTO: 94 10E9/L (ref 150–450)
POTASSIUM SERPL-SCNC: 3.7 MMOL/L (ref 3.4–5.3)
PROT SERPL-MCNC: 6.4 G/DL (ref 6.8–8.8)
RBC # BLD AUTO: 3.14 10E12/L (ref 4.4–5.9)
SODIUM SERPL-SCNC: 142 MMOL/L (ref 133–144)
WBC # BLD AUTO: 9.3 10E9/L (ref 4–11)

## 2017-11-04 ASSESSMENT — PAIN SCALES - GENERAL: PAINLEVEL: WORST PAIN (10)

## 2017-11-04 NOTE — PATIENT INSTRUCTIONS
[facility]: [phone]     [facility] Medication Refill Request Information:  * Please contact your pharmacy regarding ANY request for medication refills.  ** [facility] Prescription Fax = [phone]  * Please allow 3 business days for routine medication refills.  * Please allow 5 business days for controlled substance medication refills.     [facility] Test Result notification information:  *You will be notified with in 7-10 days of your appointment day regarding the results of your test.  If you are on MyChart you will be notified as soon as the provider has reviewed the results and signed off on them.

## 2017-11-04 NOTE — MR AVS SNAPSHOT
After Visit Summary   11/4/2017    Ten Johnson    MRN: [id number]           Patient Information     Date Of Birth          1960        Visit Information        Provider Department      11/4/2017 9:20 AM Cuca Celeste MD [facility]        Today's Diagnoses     Screen for colon cancer    -  1    Pre-op exam        Aortic stenosis, moderate        Alcoholic cirrhosis of liver without ascites (H)        Upper GI bleed        Essential hypertension        Chronic hepatitis C without hepatic coma (H)          Care Instructions    Primary Care Center: [phone]     Primary Care Center Medication Refill Request Information:  * Please contact your pharmacy regarding ANY request for medication refills.  ** [facility] Prescription Fax = [phone]  * Please allow 3 business days for routine medication refills.  * Please allow 5 business days for controlled substance medication refills.     Primary Care Center Test Result notification information:  *You will be notified with in 7-10 days of your appointment day regarding the results of your test.  If you are on MyChart you will be notified as soon as the provider has reviewed the results and signed off on them.          Follow-ups after your visit        Additional Services     GASTROENTEROLOGY ADULT REFERRAL       Needs preop evaluation between now and one week before 12/7/17  Patient is an alcoholic cirrhotic who presented with hematemesis 10/27/17 and found to have inderjit evans tear.   Has indeterminate Li-RADS 3 lesion that needs to be followed.    Could be with any provider, saw Ms. Vitale in 2015.  Dr. Sandoval saw during the inpatient setting.    Preferred Location: [facility], [facility] [phone]      Please be aware that coverage of these services is subject to the terms and limitations of your health insurance plan.  Call member services at your health plan with any benefit or coverage questions.  Any procedures must be performed  at a [facility] OR coordinated by your clinic's referral office.    Please bring the following with you to your appointment:    (1) Any X-Rays, CTs or MRIs which have been performed.  Contact the facility where they were done to arrange for  prior to your scheduled appointment.    (2) List of current medications   (3) This referral request   (4) Any documents/labs given to you for this referral                  Your next 10 appointments already scheduled     Nov 04, 2017 10:00 AM CDT   LAB with [facility] ([facility])    [address] [place] [id number] [phone]           Please do not eat 10-12 hours before your appointment if you are coming in fasting for labs on lipids, cholesterol, or glucose (sugar). This does not apply to pregnant women. Water, hot tea and black coffee (with nothing added) are okay. Do not drink other fluids, diet soda or chew gum.            Dec 07, 2017  9:00 AM CST   Lab with  LAB   [facility] ([facility])    [address] [place] [id number]   [phone]            Dec 07, 2017 10:00 AM CST   CT ANGIO TAVR with UUCT4   [facility] [place], [place] ([facility] [place])    [address] [id number] [phone]            Dec 07, 2017 11:30 AM CST   US CAROTID BILATERAL with [id number] ([facility])    [address] [place] [id number] [phone]           Please bring a list of your medicines (including vitamins, minerals and over-the-counter drugs). Also, tell your doctor about any allergies you may have. Wear comfortable clothes and leave your valuables at home.  You do not need to do anything special to prepare for your exam.  Please call the Imaging Department at your exam site with any questions.            Dec 07, 2017  12:30 PM CST   (Arrive by 12:15 PM)   New Patient Visit with  [facility]   [facility] ([facility])    [address] [facility] [id number] [phone]            Dec 07, 2017 12:30 PM CST   (Arrive by 12:15 PM)   New Patient Visit with Troy Hendricks MD   [facility] ([facility])    [address] [facility] [id number] [phone]              Future tests that were ordered for you today     Open Future Orders        Priority Expected Expires Ordered    GASTROENTEROLOGY ADULT REFERRAL ASAP  2018    CBC with platelets Routine 2017    Basic metabolic panel Routine 2017            Who to contact     Please call your clinic at [phone] to:    Ask questions about your health    Make or cancel appointments    Discuss your medicines    Learn about your test results    Speak to your doctor   If you have compliments or concerns about an experience at your clinic, or if you wish to file a complaint, please contact [facility] Patient Relations at [phone] or email us at [email or web address]         Additional Information About Your Visit        [employer] Information     [employer] is an electronic gateway that provides easy, online access to your medical records. With [employer], you can request a clinic appointment, read your test results, renew a prescription or communicate with your care team.     To sign up for [employer] visit the website at [email or web address]   You will be asked to enter the access code listed below, as well as some personal information. Please follow the directions to create your username and password.     Your access code is: -[email or web address]  Expires: 2018  5:05 PM     Your access code will  in 90 days. If you need help or a new code, please contact your [facility] or call [phone] for assistance.        Care EveryWhere ID     This is your Care EveryWhere ID. This could be used by other organizations to access your [facility] medical records  [id number]        Your Vitals Were     Pulse Respirations Pulse Oximetry BMI (Body Mass Index)          93 20 97% 29.84 kg/m2         Blood Pressure from Last 3 Encounters:   11/04/17 121/81   10/25/17 148/86   05/09/16 (!) 168/101    Weight from Last 3 Encounters:   11/04/17 89 kg (196 lb 3.2 oz)   10/23/17 78 kg (171 lb 15.3 oz)   05/09/16 89.8 kg (198 lb)               Primary Care Provider Office Phone # Fax #    Cuca Celeste [phone]       [address] [place] [address] [address]  [employer] [address]        Equal Access to Services     [facility]: Hadii aad ku hadasho Soomaali, waaxda luqadaha, qaybta kaalmada adeegyada, waxay yungin hayaan konrad crespo . So [facility] [phone].    ATENCIÓN: Si habla español, tiene a ha disposición servicios gratuitos de asistencia lingüística. Llame al [phone].    We comply with applicable federal civil rights laws and [place] laws. We do not discriminate on the basis of race, color, national origin, age, disability, sex, sexual orientation, or gender identity.            Thank you!     Thank you for choosing [facility]  for your care. Our goal is always to provide you with excellent care. Hearing back from our patients is one way we can continue to improve our services. Please take a few minutes to complete the written survey that you may receive in the mail after your visit with us. Thank you!             Your Updated Medication List - Protect others around you: Learn how to safely use, store and throw away your medicines at www.disposemymeds.org.          This list is accurate as of: 11/4/17  9:51 AM.  Always use your most recent med list.                   Brand Name Dispense Instructions for use Diagnosis     fluticasone 50 MCG/ACT spray    FLONASE    16 g    Spray 2 sprays into both nostrils daily    Cough, Post-nasal drip       lisinopril 10 MG tablet    PRINIVIL/ZESTRIL    90 tablet    Take 1 tablet (10 mg) by mouth daily    Essential hypertension       loratadine 10 MG tablet    CLARITIN    30 tablet    Take 1 tablet (10 mg) by mouth daily    Post-nasal drip, Cough       MULTIVITAMINS PO      Take 1 tablet by mouth daily.        pantoprazole 40 MG EC tablet    PROTONIX    120 tablet    Take 1 tablet (40 mg) by mouth 2 times daily (before meals)    Upper GI bleed

## 2017-11-04 NOTE — NURSING NOTE
Chief Complaint   Patient presents with     Hospital F/U     hospital follow up (10/22/17-10/25/17) anemia/acute blood loss   Zaida Ceja LPN 9:11 AM on 11/4/2017

## 2017-11-04 NOTE — PROGRESS NOTES
Ten is here for hospital follow up.  He was admitted 10/22-10/25/17 to [facility]  C/o a several week hx of intermittent hematemesis and melena.  Had several syncopal episodes at home for the week prior to admission.  Hgb was 6 in the ED.  His active diagnoses during his hospital stay included: acute blood loss anemia secondary to upper GI bleed and Sarahi-Hoffman tear, severe aortic stenosis, syncope, and diastolic dysfunction.      GI bleed was managed with IV PPI and octrotide.  EGD 10/23 showed small varices and a small MW tear with stigmata of recent bleed, mild portal hypertensive gastropathy and non bleeding erosions in the gastric antrum.  Received 3 units of PRBC's.  Hgb stabilized in 8 range.  D/C'd with BID PPI. F/U requested in GI but no appointment was scheduled.    Regarding cirrhosis, patient non compliance with GI appointments noted. MRI abdomen results discussed with patient today.    Ten continues to drink alcohol excessively.  Patient declined CD counselling which was recommended by Psych consult during hospital stay.      I'm not sure why he was given Cipro at discharge.    Regarding aortic stenosis, echo 10/24/17 showed AS with mean grad of 52, peak velocity of 4.47 and minnie area of 0.8 Cm2.    F/U arranged in TAVR clinic 12/7/17.    His last OV with me was 2/2105.    Today I reviewed details of the hospital stay with Ten.  He notes swelling present but improved in calves, forearms, ankles, wrists, abd lower back swelling.  We discussed the potential etiologies for the swelling including cirrhosis, low albumin, hx AS/HF.    He gets winded more easily, but is not overtly SOB, no orthopnea, chest pain, palpitations, fevers, abdominal pain, syncope. Is fatigued.  O/W ROS negative.    Patient Active Problem List   Diagnosis     Essential hypertension     Presbyopia     Pain in joint, shoulder region     alcoholism     Pain, elbow     Rotator cuff tear, right     Thrombocytopenia (H)     OA  (osteoarthritis) of knee     Alcoholic cirrhosis (H)     Aortic stenosis, moderate     Chronic hepatitis C without hepatic coma (H)     Alcoholic cirrhosis of liver without ascites (H)     Upper GI bleed     GI bleed     Hematemesis     Past Medical History:   Diagnosis Date     alcoholism      Allergic rhinitis      Aortic stenosis, moderate      Blood transfusion      Cirrhosis (H)      Hepatitis C      Hypertension     essential     Marijuana abuse      OA (osteoarthritis) of knee 4/1/2013     Pain, elbow     LEFT     Rotator cuff tear, right      Staphylococcus infection in conditions classified elsewhere and of unspecified site      Thrombocytopenia (H)      Past Surgical History:   Procedure Laterality Date     ARTHROSCOPY SHOULDER ROTATOR CUFF REPAIR  7/31/2012    Procedure: ARTHROSCOPY SHOULDER ROTATOR CUFF REPAIR;  Right Shoulder Arthroscopic Rotator Cuff Repair, BicepsTenodesis,  Subacromial Decompression ;  Surgeon: Joi Castillo MD;  Location[facility]     ESOPHAGOSCOPY, GASTROSCOPY, DUODENOSCOPY (EGD), COMBINED N/A 10/23/2017    Procedure: COMBINED ESOPHAGOSCOPY, GASTROSCOPY, DUODENOSCOPY (EGD);;  Surgeon: Gentry Salas MD;  [facility]     FACIAL RECONSTRUCTION SURGERY  1971     IRRIGATION AND DEBRIDEMENT UPPER EXTREMITY, COMBINED  1/3/2012    Procedure:COMBINED IRRIGATION AND DEBRIDEMENT UPPER EXTREMITY; Irrigation & Debridement Left Elbow; Surgeon:CRISTHIAN ZHOU; Location:[facility]     REPAIR TENDON TRICEPS UPPER EXTREMITY  11/8/2011    Procedure:REPAIR TENDON TRICEPS UPPER EXTREMITY; Surgeon:CRISTHIAN ZHOU; Location:[facility] OR     SHOULDER SURGERY  2003    left, injury, torn tendons, hematoma     TRANSPOSITION ULNAR NERVE (ELBOW)  11/8/2011    Procedure:TRANSPOSITION ULNAR NERVE (ELBOW); Final Procedure Done: Left Elbow Lateral Ulnar Collateral Repair And  Left Elbow Triceps Repair       Current Outpatient Prescriptions   Medication Sig Dispense Refill     pantoprazole  (PROTONIX) 40 MG EC tablet Take 1 tablet (40 mg) by mouth 2 times daily (before meals) 120 tablet 0     lisinopril (PRINIVIL/ZESTRIL) 10 MG tablet Take 1 tablet (10 mg) by mouth daily 90 tablet 3     fluticasone (FLONASE) 50 MCG/ACT spray Spray 2 sprays into both nostrils daily 16 g 11     loratadine (CLARITIN) 10 MG tablet Take 1 tablet (10 mg) by mouth daily 30 tablet 11     Multiple Vitamin (MULTIVITAMINS PO) Take 1 tablet by mouth daily.       Allergies   Allergen Reactions     Zolpidem Other (See Comments)     Alcoholic.  Had reaction 3/17/13 while intoxicated which included black out, loss of awareness, paranoia.  Do not prescribe.  Dr. Celeste     Cats Other (See Comments)     rhinitis     Dogs Other (See Comments)     rhinitis     Pollen Extract Other (See Comments)     rhinits.     /81 (BP Location: Right arm, Patient Position: Chair, Cuff Size: Adult Large)  Pulse 93  Resp 20  Wt 89 kg (196 lb 3.2 oz)  SpO2 97%  BMI 29.84 kg/m2  Physical Examination:  General:  Pleasant, alert, no acute distress, is Pale.  Eyes:  Pupils 2-3 mm, sclera white, EOM's full.    Nose:  Nasal passages clear, turbinates mildly swollen  Throat/Mouth:  No pharyngeal erythema or exudate, oral mucosa and tongue moist, no suspicious oral lesions  Neck:  Full AROM, supple, thyroid smooth, symmetric, not enlarged, no nodules  Lungs:  Clear to auscultation throughout, no wheezes, rhonchi or rales.  C/V:  Regular rate and rhythm, grade 2-3/6 systolic murmur noted, radiates towards carotids, no rubs or gallops.  No carotid bruits.  2+ bilateral LE peripheral edema around ankles/feet. No hand/arm edema, no sacral edema.  Radial and DP pulses 2+, equal and regular.  Abdomen:  Soft, distended.  Bowel sounds active.  No tenderness, I could not determine the size of liver/spleen.  No CVA tenderness or masses.  Lymph:  No cervical lymph nodes.  Neuro:  Alert and oriented, face symmetric. No tremor.    M/S:   No joint deformities  noted.  No joint swelling.  Skin:   Pale. No rashes or jaundice.  Normal moisture, good skin turgor.  Psych:  Broad range affect.  Not psychomotor slowed.  No signs of anxiety or agitation.    Component      Latest Ref Rng & Units 10/25/2017 11/4/2017   Sodium      133 - 144 mmol/L 139 142   Potassium      3.4 - 5.3 mmol/L 3.9 3.7   Chloride      94 - 109 mmol/L 108 108   Carbon Dioxide      20 - 32 mmol/L 24 26   Anion Gap      3 - 14 mmol/L 7 7   Glucose      70 - 99 mg/dL 90 87   Urea Nitrogen      7 - 30 mg/dL 17 15   Creatinine      0.66 - 1.25 mg/dL 1.08 1.00   GFR Estimate      >60 mL/min/1.7m2 70 76   GFR Estimate If Black      >60 mL/min/1.7m2 85 >90   Calcium      8.5 - 10.1 mg/dL 7.2 (L) 8.0 (L)   Bilirubin Total      0.2 - 1.3 mg/dL 0.9 0.7   Albumin      3.4 - 5.0 g/dL 2.3 (L) 2.8 (L)   Protein Total      6.8 - 8.8 g/dL 5.0 (L) 6.4 (L)   Alkaline Phosphatase      40 - 150 U/L 77 86   ALT      0 - 70 U/L 153 (H) 76 (H)   AST      0 - 45 U/L 106 (H) 63 (H)   WBC      4.0 - 11.0 10e9/L 8.0 9.3   RBC Count      4.4 - 5.9 10e12/L 2.81 (L) 3.14 (L)   Hemoglobin      13.3 - 17.7 g/dL 8.6 (L) 8.6 (L)   Hematocrit      40.0 - 53.0 % 26.0 (L) 28.3 (L)   MCV      78 - 100 fl 93 90   MCH      26.5 - 33.0 pg 30.6 27.4   MCHC      31.5 - 36.5 g/dL 33.1 30.4 (L)   RDW      10.0 - 15.0 % 16.3 (H) 16.9 (H)   Platelet Count      150 - 450 10e9/L 75 (L) 94 (L)       Ten was seen today for hospital f/u.    Diagnoses and all orders for this visit:      Upper GI bleed  -     CBC with platelets; Future    Aortic stenosis, moderate  Consult with cardiology pending 12/7/17  Patient will need to consult with GI and hematology (thrombocytopenia, anemia) preop if AVR proposed    Alcoholic cirrhosis of liver without ascites (H), chronic hep c, GI bleed  -     GASTROENTEROLOGY ADULT REFERRAL; Future    Essential hypertension  -     Basic metabolic panel; Future    Screen for colon cancer  -     GASTROENTEROLOGY ADULT REFERRAL;  Future    Thrombocytopenia, anemia.    Hematology consultation for preop management of AVR surgery.    Follow up with me after consultations.    Total time spent 40 minutes.  More than 50% of the time spent with Mr. Johnson on counseling / coordinating his care          Cuca Celeste M.D.  Internal Medicine  Primary Care Center   pager [phone]

## 2017-11-07 DIAGNOSIS — K70.30 ALCOHOLIC CIRRHOSIS OF LIVER WITHOUT ASCITES (H): ICD-10-CM

## 2017-11-07 DIAGNOSIS — B18.2 CHRONIC HEPATITIS C WITHOUT HEPATIC COMA (H): Primary | ICD-10-CM

## 2017-11-16 ENCOUNTER — PRE VISIT (OUTPATIENT)
Dept: GASTROENTEROLOGY | Facility: CLINIC | Age: 57
End: 2017-11-16

## 2017-11-17 NOTE — TELEPHONE ENCOUNTER
Was the patient contacted by phone and reminded of the upcoming visit? No    Was the patient instructed to bring a current list of all medications to the appointment or instructed to bring in all medication bottles? Yes     Was the patient instructed to arrive prior to the appointment time to have ordered labs drawn? Yes     Were the needed lab orders placed? Yes    Keke Ambriz CMA  11/16/2017 6:34 PM

## 2017-11-28 ENCOUNTER — TELEPHONE (OUTPATIENT)
Dept: GASTROENTEROLOGY | Facility: CLINIC | Age: 57
End: 2017-11-28

## 2017-11-28 NOTE — TELEPHONE ENCOUNTER
Pt says has cold, asking if should come in for 11/29 pre-op exam. Note: appt is at 0800. Please advise at [phone]. Sent to [employer]

## 2017-11-30 ENCOUNTER — TELEPHONE (OUTPATIENT)
Dept: CARDIOLOGY | Facility: CLINIC | Age: 57
End: 2017-11-30

## 2017-11-30 ENCOUNTER — CARE COORDINATION (OUTPATIENT)
Dept: CARDIOLOGY | Facility: CLINIC | Age: 57
End: 2017-11-30

## 2017-11-30 NOTE — TELEPHONE ENCOUNTER
Patient calling with questions about his upcoming appointment with Dr. Villavicencio.  Please call to discuss.  [phone].

## 2017-11-30 NOTE — PROGRESS NOTES
Returned Ten's phone call:  And left a message.  I will try calling him back tomorrow, Friday 12/01/17.

## 2017-12-01 ENCOUNTER — CARE COORDINATION (OUTPATIENT)
Dept: CARDIOLOGY | Facility: CLINIC | Age: 57
End: 2017-12-01

## 2017-12-01 NOTE — PROGRESS NOTES
Attempted a second telephone call, and was able to reach Ten.  He had questions regarding disability, and would he be on disability.  I told him that he would meet the MD's and they would have a better idea at that time about his medical condition.

## 2017-12-04 ENCOUNTER — OFFICE VISIT (OUTPATIENT)
Dept: GASTROENTEROLOGY | Facility: CLINIC | Age: 57
End: 2017-12-04
Attending: INTERNAL MEDICINE
Payer: COMMERCIAL

## 2017-12-04 VITALS
DIASTOLIC BLOOD PRESSURE: 95 MMHG | TEMPERATURE: 98 F | HEART RATE: 74 BPM | WEIGHT: 174.6 LBS | SYSTOLIC BLOOD PRESSURE: 141 MMHG | BODY MASS INDEX: 26.46 KG/M2 | HEIGHT: 68 IN | OXYGEN SATURATION: 100 %

## 2017-12-04 DIAGNOSIS — B18.2 CHRONIC HEPATITIS C WITHOUT HEPATIC COMA (H): ICD-10-CM

## 2017-12-04 DIAGNOSIS — K70.30 ALCOHOLIC CIRRHOSIS OF LIVER WITHOUT ASCITES (H): ICD-10-CM

## 2017-12-04 DIAGNOSIS — B18.2 CHRONIC HEPATITIS C WITHOUT HEPATIC COMA (H): Primary | ICD-10-CM

## 2017-12-04 PROBLEM — K92.2 UPPER GI BLEED: Status: RESOLVED | Noted: 2017-10-22 | Resolved: 2017-12-04

## 2017-12-04 PROBLEM — K92.2 GI BLEED: Status: RESOLVED | Noted: 2017-10-23 | Resolved: 2017-12-04

## 2017-12-04 PROBLEM — K92.0 HEMATEMESIS: Status: RESOLVED | Noted: 2017-10-23 | Resolved: 2017-12-04

## 2017-12-04 LAB
ALBUMIN SERPL-MCNC: 3.3 G/DL (ref 3.4–5)
ALP SERPL-CCNC: 93 U/L (ref 40–150)
ALT SERPL W P-5'-P-CCNC: 99 U/L (ref 0–70)
ANION GAP SERPL CALCULATED.3IONS-SCNC: 11 MMOL/L (ref 3–14)
AST SERPL W P-5'-P-CCNC: 115 U/L (ref 0–45)
BASOPHILS # BLD AUTO: 0.1 10E9/L (ref 0–0.2)
BASOPHILS NFR BLD AUTO: 1.2 %
BILIRUB DIRECT SERPL-MCNC: 0.6 MG/DL (ref 0–0.2)
BILIRUB SERPL-MCNC: 1.3 MG/DL (ref 0.2–1.3)
BUN SERPL-MCNC: 11 MG/DL (ref 7–30)
CALCIUM SERPL-MCNC: 9 MG/DL (ref 8.5–10.1)
CHLORIDE SERPL-SCNC: 102 MMOL/L (ref 94–109)
CO2 SERPL-SCNC: 23 MMOL/L (ref 20–32)
CREAT SERPL-MCNC: 0.96 MG/DL (ref 0.66–1.25)
DIFFERENTIAL METHOD BLD: ABNORMAL
EOSINOPHIL # BLD AUTO: 0.2 10E9/L (ref 0–0.7)
EOSINOPHIL NFR BLD AUTO: 3.7 %
ERYTHROCYTE [DISTWIDTH] IN BLOOD BY AUTOMATED COUNT: 17.9 % (ref 10–15)
GFR SERPL CREATININE-BSD FRML MDRD: 80 ML/MIN/1.7M2
GLUCOSE SERPL-MCNC: 167 MG/DL (ref 70–99)
HCT VFR BLD AUTO: 31.4 % (ref 40–53)
HGB BLD-MCNC: 9.4 G/DL (ref 13.3–17.7)
IMM GRANULOCYTES # BLD: 0 10E9/L (ref 0–0.4)
IMM GRANULOCYTES NFR BLD: 0.2 %
INR PPP: 1.27 (ref 0.86–1.14)
LYMPHOCYTES # BLD AUTO: 1.1 10E9/L (ref 0.8–5.3)
LYMPHOCYTES NFR BLD AUTO: 21.2 %
MCH RBC QN AUTO: 22.7 PG (ref 26.5–33)
MCHC RBC AUTO-ENTMCNC: 29.9 G/DL (ref 31.5–36.5)
MCV RBC AUTO: 76 FL (ref 78–100)
MONOCYTES # BLD AUTO: 1 10E9/L (ref 0–1.3)
MONOCYTES NFR BLD AUTO: 19.3 %
NEUTROPHILS # BLD AUTO: 2.8 10E9/L (ref 1.6–8.3)
NEUTROPHILS NFR BLD AUTO: 54.4 %
NRBC # BLD AUTO: 0 10*3/UL
NRBC BLD AUTO-RTO: 0 /100
PLATELET # BLD AUTO: 70 10E9/L (ref 150–450)
POTASSIUM SERPL-SCNC: 3.4 MMOL/L (ref 3.4–5.3)
PROT SERPL-MCNC: 7.4 G/DL (ref 6.8–8.8)
RBC # BLD AUTO: 4.15 10E12/L (ref 4.4–5.9)
SODIUM SERPL-SCNC: 136 MMOL/L (ref 133–144)
WBC # BLD AUTO: 5.2 10E9/L (ref 4–11)

## 2017-12-04 PROCEDURE — 85025 COMPLETE CBC W/AUTO DIFF WBC: CPT | Performed by: INTERNAL MEDICINE

## 2017-12-04 PROCEDURE — 80048 BASIC METABOLIC PNL TOTAL CA: CPT | Performed by: INTERNAL MEDICINE

## 2017-12-04 PROCEDURE — 99212 OFFICE O/P EST SF 10 MIN: CPT | Mod: ZF

## 2017-12-04 PROCEDURE — 80076 HEPATIC FUNCTION PANEL: CPT | Performed by: INTERNAL MEDICINE

## 2017-12-04 PROCEDURE — 85610 PROTHROMBIN TIME: CPT | Performed by: INTERNAL MEDICINE

## 2017-12-04 PROCEDURE — 87522 HEPATITIS C REVRS TRNSCRPJ: CPT | Performed by: INTERNAL MEDICINE

## 2017-12-04 PROCEDURE — 36415 COLL VENOUS BLD VENIPUNCTURE: CPT | Performed by: INTERNAL MEDICINE

## 2017-12-04 ASSESSMENT — PAIN SCALES - GENERAL: PAINLEVEL: NO PAIN (0)

## 2017-12-04 NOTE — MR AVS SNAPSHOT
After Visit Summary   12/4/2017    Ten Johnson    MRN: [id number]           Patient Information     Date Of Birth          1960        Visit Information        Provider Department      12/4/2017 8:20 AM Gentry Salas MD [facility] Hepatology        Today's Diagnoses     Chronic hepatitis C without hepatic coma (H)    -  1    Alcoholic cirrhosis of liver without ascites (H)           Follow-ups after your visit        Follow-up notes from your care team     Return in about 6 months (around 6/4/2018).      Your next 10 appointments already scheduled     Dec 07, 2017  9:00 AM CST   Lab with  LAB   [facility] Lab [facility])    [address] [place] [id number]   [phone]            Dec 07, 2017 10:00 AM CST   [facility] [place] [place] [facility]    [address] [facility] [address] [phone]            Dec 07, 2017 11:30 AM CST   US CAROTID BILATERAL with [email or web address]   [facility] US ([facility])    [address] [place] [id number] [phone]           Please bring a list of your medicines (including vitamins, minerals and over-the-counter drugs). Also, tell your doctor about any allergies you may have. Wear comfortable clothes and leave your valuables at home.  You do not need to do anything special to prepare for your exam.  Please call the Imaging Department at your exam site with any questions.            Dec 07, 2017 12:30 PM CST   (Arrive by 12:15 PM)   New Patient Visit with  CVC VALVE CLINIC   [facility] Heart Care ([facility])    [address] [place] [id number]   [phone]            Jun 04, 2018  8:30 AM CDT   Lab with  LAB   [facility])    [address] [place] [address]  "[place] [id number]   [phone]            2018  9:20 AM CDT   (Arrive by 9:05 AM)   Return General Liver with Gentry San MD   [facility] Hepatology ([facility])    [address] [place] [id number] [phone]              Who to contact     If you have questions or need follow up information about today's clinic visit or your schedule please contact [facility] HEPATOLOGY directly at [phone].  Normal or non-critical lab and imaging results will be communicated to you by Alt12 Appshart, letter or phone within 4 business days after the clinic has received the results. If you do not hear from us within 7 days, please contact the clinic through Alt12 Appshart or phone. If you have a critical or abnormal lab result, we will notify you by phone as soon as possible.  Submit refill requests through Helpr or call your pharmacy and they will forward the refill request to us. Please allow 3 business days for your refill to be completed.          Additional Information About Your Visit        Alt12 Appshart Information     Helpr lets you send messages to your doctor, view your test results, renew your prescriptions, schedule appointments and more. To sign up, go to [email or web address] . Click on \"Log in\" on the left side of the screen, which will take you to the Welcome page. Then click on \"Sign up Now\" on the right side of the page.     You will be asked to enter the access code listed below, as well as some personal information. Please follow the directions to create your username and password.     Your access code is: [email or web address]  Expires: 2018  4:05 PM     Your access code will  in 90 days. If you need help or a new code, please call your [place] clinic or [phone].        Care EveryWhere ID     This is your Care EveryWhere ID. This could be used by other organizations to access your [place] medical records  [id number]        Your Vitals Were     " "Pulse Temperature Height Pulse Oximetry BMI (Body Mass Index)       74 98  F (36.7  C) (Oral) 1.727 m (5' 8\") 100% 26.55 kg/m2        Blood Pressure from Last 3 Encounters:   12/04/17 (!) 141/95   11/04/17 121/81   10/25/17 148/86    Weight from Last 3 Encounters:   12/04/17 79.2 kg (174 lb 9.6 oz)   11/04/17 89 kg (196 lb 3.2 oz)   10/23/17 78 kg (171 lb 15.3 oz)              Today, you had the following     No orders found for display       Primary Care Provider Office Phone # Fax #    Cuca Celeste [phone]       [address] [place] [address]        Equal Access to Services     [facility]: Erma cuevas Sodani, waaxda luqadaha, qaybta kaalmada jacquie, jazmin crespo . [facility] [phone].    ATENCIÓN: Si habla español, tiene a ha disposición servicios gratuitos de asistencia lingüística. [facility] [phone].    We comply with applicable federal civil rights laws and [place] laws. We do not discriminate on the basis of race, color, national origin, age, disability, sex, sexual orientation, or gender identity.            Thank you!     Thank you for choosing [facility] HEPATOLOGY  for your care. Our goal is always to provide you with excellent care. Hearing back from our patients is one way we can continue to improve our services. Please take a few minutes to complete the written survey that you may receive in the mail after your visit with us. Thank you!             Your Updated Medication List - Protect others around you: Learn how to safely use, store and throw away your medicines at www.disposemymeds.org.          This list is accurate as of: 12/4/17  9:13 AM.  Always use your most recent med list.                   Brand Name Dispense Instructions for use Diagnosis    fluticasone 50 MCG/ACT spray    FLONASE    16 g    Spray 2 sprays into both nostrils daily    Cough, Post-nasal drip       lisinopril 10 MG tablet    " PRINIVIL/ZESTRIL    90 tablet    Take 1 tablet (10 mg) by mouth daily    Essential hypertension       loratadine 10 MG tablet    CLARITIN    30 tablet    Take 1 tablet (10 mg) by mouth daily    Post-nasal drip, Cough       MULTIVITAMINS PO      Take 1 tablet by mouth daily.

## 2017-12-04 NOTE — PROGRESS NOTES
[facility]    Hepatology New Patient Visit    Referring provider:  Cuca VALERI Roula      57 year old male    Chief complaint:  Hospital follow-up, cirrhosis    HPI:  Cirrhosis   - dx Jan 2012  - ETOH/HCV  - no hx HE, ascites or variceal bleed  - last EGD Oct 2017- MW tear, small EV, mild portal hypertensive gastropathy  - HCC screening- MRI liver Oct 2017- LIRADS-3 lesion    HCV  - dx?  - hx CHYNA  - GT-1a or 1b  - liver bx 2012- stage IV fibrosis, steatosis  - no prior rx    Patient comes to clinic this AM to establish care for cirrhosis and after a recent hospital admission for a GI bleed secondary to Sarahi-Hoffman tear.  Small esophageal varices and mild portal hypertensive gastropathy were also seen on EGD.  He also has a history of hepatitis C which he likely acquired several years ago via intranasal drug use.  He has never been treated for hep C.    Patient is doing fine.  He denies any signs or symptoms specific to liver disease.    No history of melena, hematemesis or hematochezia since discharge from hospital.  Patient stopped taking PPI soon after discharge from hospital as it didn't make him feel right.    Patient denies jaundice, abdominal distension, lower extremity edema, lethargy or confusion.    Patient denies fevers, sweats or chills.  Weight increased ~20lbs since discharge from hospital but has since returned to baseline.    Patient continues to drink alcohol.  He last drank alcohol at [facility] (a few cocktails).  He does not know how much alcohol he drank or how frequently he drank before admission to hospital.  He has several DUIs, last in 1997.  He has been to rehab several times, last in 2003.    Patient does not smoke.  He denies intravenous drug use.    Medical hx Surgical hx   Past Medical History:   Diagnosis Date     Alcoholic cirrhosis (H)      Allergic rhinitis      Aortic stenosis, moderate      Hepatitis C      Hypertension      Marijuana abuse      OA  (osteoarthritis) of knee 4/1/2013     Rotator cuff tear, right       Past Surgical History:   Procedure Laterality Date     ARTHROSCOPY SHOULDER ROTATOR CUFF REPAIR  7/31/2012    Procedure: ARTHROSCOPY SHOULDER ROTATOR CUFF REPAIR;  Right Shoulder Arthroscopic Rotator Cuff Repair, BicepsTenodesis,  Subacromial Decompression ;  Surgeon: Joi Castillo MD;  [facility]     ESOPHAGOSCOPY, GASTROSCOPY, DUODENOSCOPY (EGD), COMBINED N/A 10/23/2017    Procedure: COMBINED ESOPHAGOSCOPY, GASTROSCOPY, DUODENOSCOPY (EGD);;  Surgeon: Gentry Salas MD;  [facility]     FACIAL RECONSTRUCTION SURGERY  1971     IRRIGATION AND DEBRIDEMENT UPPER EXTREMITY, COMBINED  1/3/2012    Procedure:COMBINED IRRIGATION AND DEBRIDEMENT UPPER EXTREMITY; Irrigation & Debridement Left Elbow; Surgeon:CRISTHIAN ZHOU; [facility]     REPAIR TENDON TRICEPS UPPER EXTREMITY  11/8/2011    Procedure:REPAIR TENDON TRICEPS UPPER EXTREMITY; Surgeon:CRISTHIAN ZHOU; [facility]     SHOULDER SURGERY  2003    left, injury, torn tendons, hematoma     TRANSPOSITION ULNAR NERVE (ELBOW)  11/8/2011    Procedure:TRANSPOSITION ULNAR NERVE (ELBOW); Final Procedure Done: Left Elbow Lateral Ulnar Collateral Repair And  Left Elbow Triceps Repair            Medications  Prior to Admission medications    Medication Sig Start Date End Date Taking? Authorizing Provider   lisinopril (PRINIVIL/ZESTRIL) 10 MG tablet Take 1 tablet (10 mg) by mouth daily 10/25/17  Yes Tamara Benedict MD   fluticasone (FLONASE) 50 MCG/ACT spray Spray 2 sprays into both nostrils daily 10/25/17  Yes Tamara Benedict MD   loratadine (CLARITIN) 10 MG tablet Take 1 tablet (10 mg) by mouth daily 5/9/16  Yes Mili Capps MD   Multiple Vitamin (MULTIVITAMINS PO) Take 1 tablet by mouth daily.   Yes Reported, Patient       Allergies  Allergies   Allergen Reactions     Zolpidem Other (See Comments)     Alcoholic.  Had reaction 3/17/13 while intoxicated which  "included black out, loss of awareness, paranoia.  Do not prescribe.  Dr. Celeste     Cats Other (See Comments)     rhinitis     Dogs Other (See Comments)     rhinitis     Pollen Extract Other (See Comments)     rhinits.       Family hx Social hx   Family History   Problem Relation Age of Onset     CANCER Mother 62     Alcoholism Paternal Uncle      Cirrhosis No family hx of       Social History   Substance Use Topics     Smoking status: Never Smoker     Smokeless tobacco: Never Used     Alcohol use Yes     Lives alone in [place].  Not . No children.  Works as .     Review of systems  A 10-point review of systems was negative.    Examination  BP (!) 141/95  Pulse 74  Temp 98  F (36.7  C) (Oral)  Ht 1.727 m (5' 8\")  Wt 79.2 kg (174 lb 9.6 oz)  SpO2 100%  BMI 26.55 kg/m2  Body mass index is 26.55 kg/(m^2).    Gen- well, NAD, A+Ox3, normal color  Eye- EOMI  ENT- MMM, normal oropharynx, bony hypertrophy of lower teeth  Lym- no palpable lymphadenopathy  CVS- S1, S2 normal, end-systolic murmur loudest in aortic area- no radiation, no added sounds, RRR  RS- CTA  Abd- soft, non-tender, palpable liver edge and left lobe, no ascites or splenomegaly on palpation or percussion, BS+  Extr- pulses good, no NADIA  MS- bilateral Simran's nodes, mild thickening of palmar aponeurosis bilaterally  Neuro- A+Ox3, no asterixis  Skin- vitiligo of hands and neck, occasional telangiectasia, no jaundice  Psych- normal mood    Laboratory  Lab Results   Component Value Date     12/04/2017    POTASSIUM 3.4 12/04/2017    CHLORIDE 102 12/04/2017    CO2 23 12/04/2017    BUN 11 12/04/2017    CR 0.96 12/04/2017       Lab Results   Component Value Date    BILITOTAL 1.3 12/04/2017    ALT 99 12/04/2017     12/04/2017    ALKPHOS 93 12/04/2017       Lab Results   Component Value Date    ALBUMIN 3.3 12/04/2017    PROTTOTAL 7.4 12/04/2017        Lab Results   Component Value Date    WBC 5.2 12/04/2017    HGB 9.4 " 12/04/2017    MCV 76 12/04/2017    PLT 70 12/04/2017       Lab Results   Component Value Date    INR 1.27 12/04/2017         Radiology  MRI liver Oct 2017 reviewed    Assessment  57 year old male who presents to establish care for compensated HCV/alcoholic cirrhosis following recent hospital admission for GI bleed secondary to Sarahi-Hoffman tear.  MELD-Na= 10.  Last ETOH= 11/23/17.  No evidence of recurrent GI bleeding.  No evidence of hepatic encephalopathy or ascites.  Liver function is probably good enough to undergo percutaneous intervention if indicated.  Will not treat treatment-naive, GT-1 HCV until patient is at least 6 months sober from alcohol.  Up to date with HCC screening- will need LIRADS-3 lesion monitored.  Up to date with surveillance of esophageal varices.      Plan  1. Complete abstinence from alcohol  2.  Low salt diet  3.  Check HCV RNA, high-res HCV genotype at next visit  4.  Repeat MRI liver for surveillance of LIRADS-3 lesion in 6 months  5.  Follow-up in 6 months    Gentry Sandoval MD  Hepatology  [facility]

## 2017-12-04 NOTE — LETTER
12/4/2017      RE: Ten Johnson  [address]  [place] [address]       [facility]    Hepatology New Patient Visit    Referring provider:  Cuca Celeste      57 year old male    Chief complaint:  Hospital follow-up, cirrhosis    HPI:  Cirrhosis   - dx Jan 2012  - ETOH/HCV  - no hx HE, ascites or variceal bleed  - last EGD Oct 2017- MW tear, small EV, mild portal hypertensive gastropathy  - HCC screening- MRI liver Oct 2017- LIRADS-3 lesion    HCV  - dx?  - hx CHYNA  - GT-1a or 1b  - liver bx 2012- stage IV fibrosis, steatosis  - no prior rx    Patient comes to clinic this AM to establish care for cirrhosis and after a recent hospital admission for a GI bleed secondary to Sarahi-Hoffman tear.  Small esophageal varices and mild portal hypertensive gastropathy were also seen on EGD.  He also has a history of hepatitis C which he likely acquired several years ago via intranasal drug use.  He has never been treated for hep C.    Patient is doing fine.  He denies any signs or symptoms specific to liver disease.    No history of melena, hematemesis or hematochezia since discharge from hospital.  Patient stopped taking PPI soon after discharge from hospital as it didn't make him feel right.    Patient denies jaundice, abdominal distension, lower extremity edema, lethargy or confusion.    Patient denies fevers, sweats or chills.  Weight increased ~20lbs since discharge from hospital but has since returned to baseline.    Patient continues to drink alcohol.  He last drank alcohol at [facility] (a few cocktails).  He does not know how much alcohol he drank or how frequently he drank before admission to hospital.  He has several DUIs, last in 1997.  He has been to rehab several times, last in 2003.    Patient does not smoke.  He denies intravenous drug use.    Medical hx Surgical hx   Past Medical History:   Diagnosis Date     Alcoholic cirrhosis (H)      Allergic rhinitis      Aortic  stenosis, moderate      Hepatitis C      Hypertension      Marijuana abuse      OA (osteoarthritis) of knee 4/1/2013     Rotator cuff tear, right       Past Surgical History:   Procedure Laterality Date     ARTHROSCOPY SHOULDER ROTATOR CUFF REPAIR  7/31/2012    Procedure: ARTHROSCOPY SHOULDER ROTATOR CUFF REPAIR;  Right Shoulder Arthroscopic Rotator Cuff Repair, BicepsTenodesis,  Subacromial Decompression ;  Surgeon: Joi Castillo MD;  [facility]     ESOPHAGOSCOPY, GASTROSCOPY, DUODENOSCOPY (EGD), COMBINED N/A 10/23/2017    Procedure: COMBINED ESOPHAGOSCOPY, GASTROSCOPY, DUODENOSCOPY (EGD);;  Surgeon: Gentry Salas MD;  [facility]     FACIAL RECONSTRUCTION SURGERY  1971     IRRIGATION AND DEBRIDEMENT UPPER EXTREMITY, COMBINED  1/3/2012    Procedure:COMBINED IRRIGATION AND DEBRIDEMENT UPPER EXTREMITY; Irrigation & Debridement Left Elbow; Surgeon:CRISTHIAN ZHOU; [facility]     REPAIR TENDON TRICEPS UPPER EXTREMITY  11/8/2011    Procedure:REPAIR TENDON TRICEPS UPPER EXTREMITY; Surgeon:CRISTHIAN ZHOU; [facility]     SHOULDER SURGERY  2003    left, injury, torn tendons, hematoma     TRANSPOSITION ULNAR NERVE (ELBOW)  11/8/2011    Procedure:TRANSPOSITION ULNAR NERVE (ELBOW); Final Procedure Done: Left Elbow Lateral Ulnar Collateral Repair And  Left Elbow Triceps Repair            Medications  Prior to Admission medications    Medication Sig Start Date End Date Taking? Authorizing Provider   lisinopril (PRINIVIL/ZESTRIL) 10 MG tablet Take 1 tablet (10 mg) by mouth daily 10/25/17  Yes Tamara Benedict MD   fluticasone (FLONASE) 50 MCG/ACT spray Spray 2 sprays into both nostrils daily 10/25/17  Yes Tamara Benedict MD   loratadine (CLARITIN) 10 MG tablet Take 1 tablet (10 mg) by mouth daily 5/9/16  Yes Mili Capps MD   Multiple Vitamin (MULTIVITAMINS PO) Take 1 tablet by mouth daily.   Yes Reported, Patient       Allergies  Allergies   Allergen Reactions     Zolpidem  "Other (See Comments)     Alcoholic.  Had reaction 3/17/13 while intoxicated which included black out, loss of awareness, paranoia.  Do not prescribe.  Dr. Celeste     Cats Other (See Comments)     rhinitis     Dogs Other (See Comments)     rhinitis     Pollen Extract Other (See Comments)     rhinits.       Family hx Social hx   Family History   Problem Relation Age of Onset     CANCER Mother 62     Alcoholism Paternal Uncle      Cirrhosis No family hx of       Social History   Substance Use Topics     Smoking status: Never Smoker     Smokeless tobacco: Never Used     Alcohol use Yes     Lives alone in [place].  Not . No children.  Works as .     Review of systems  A 10-point review of systems was negative.    Examination  BP (!) 141/95  Pulse 74  Temp 98  F (36.7  C) (Oral)  Ht 1.727 m (5' 8\")  Wt 79.2 kg (174 lb 9.6 oz)  SpO2 100%  BMI 26.55 kg/m2  Body mass index is 26.55 kg/(m^2).    Gen- well, NAD, A+Ox3, normal color  Eye- EOMI  ENT- MMM, normal oropharynx, bony hypertrophy of lower teeth  Lym- no palpable lymphadenopathy  CVS- S1, S2 normal, end-systolic murmur loudest in aortic area- no radiation, no added sounds, RRR  RS- CTA  Abd- soft, non-tender, palpable liver edge and left lobe, no ascites or splenomegaly on palpation or percussion, BS+  Extr- pulses good, no NADIA  MS- bilateral Simran's nodes, mild thickening of palmar aponeurosis bilaterally  Neuro- A+Ox3, no asterixis  Skin- vitiligo of hands and neck, occasional telangiectasia, no jaundice  Psych- normal mood    Laboratory  Lab Results   Component Value Date     12/04/2017    POTASSIUM 3.4 12/04/2017    CHLORIDE 102 12/04/2017    CO2 23 12/04/2017    BUN 11 12/04/2017    CR 0.96 12/04/2017       Lab Results   Component Value Date    BILITOTAL 1.3 12/04/2017    ALT 99 12/04/2017     12/04/2017    ALKPHOS 93 12/04/2017       Lab Results   Component Value Date    ALBUMIN 3.3 12/04/2017    PROTTOTAL 7.4 " 12/04/2017        Lab Results   Component Value Date    WBC 5.2 12/04/2017    HGB 9.4 12/04/2017    MCV 76 12/04/2017    PLT 70 12/04/2017       Lab Results   Component Value Date    INR 1.27 12/04/2017         Radiology  MRI liver Oct 2017 reviewed    Assessment  57 year old male who presents to establish care for compensated HCV/alcoholic cirrhosis following recent hospital admission for GI bleed secondary to Sarahi-Hoffman tear.  MELD-Na= 10.  Last ETOH= 11/23/17.  No evidence of recurrent GI bleeding.  No evidence of hepatic encephalopathy or ascites.  Liver function is probably good enough to undergo percutaneous intervention if indicated.  Will not treat treatment-naive, GT-1 HCV until patient is at least 6 months sober from alcohol.  Up to date with HCC screening- will need LIRADS-3 lesion monitored.  Up to date with surveillance of esophageal varices.      Plan  1. Complete abstinence from alcohol  2.  Low salt diet  3.  Check HCV RNA, high-res HCV genotype at next visit  4.  Repeat MRI liver for surveillance of LIRADS-3 lesion in 6 months  5.  Follow-up in 6 months    Gentry Sandoval MD  Hepatology  [facility]        Gentry Sandoval MD

## 2017-12-04 NOTE — NURSING NOTE
Chief Complaint   Patient presents with     RECHECK     Cirrhosis of Liver without ascites   Pt roomed, vitals, meds, and allergies reviewed with pt. Pt ready for provider.  Manny Metcalf, CMA

## 2017-12-05 LAB
HCV RNA SERPL NAA+PROBE-ACNC: ABNORMAL [IU]/ML
HCV RNA SERPL NAA+PROBE-LOG IU: 6.2 LOG IU/ML

## 2017-12-06 ENCOUNTER — PRE VISIT (OUTPATIENT)
Dept: CARDIOLOGY | Facility: CLINIC | Age: 57
End: 2017-12-06

## 2017-12-06 NOTE — TELEPHONE ENCOUNTER
Pre visit nursing summary    HPI: Ten Johnson is a 57 year male who in clinic for evaluation for possible TAVR procedure. STS score 0.6%/8%.    ECHO: 10/24/2017  Interpretation Summary  The mean gradient across the aortic valve is 52 mmHg.  The peak aortic velocity is 4.47 m/sec. Severe aortic stenosis is present.     Left ventricular function, chamber size, wall motion, and wall thickness are  normal.The EF is 60-65%. Grade II or moderate diastolic dysfunction. No  regional wall motion abnormalities are seen.  Right ventricular function, chamber size, wall motion, and thickness are  normal.  The inferior vena cava was normal in size with preserved respiratory  variability.  No pericardial effusion is present.  Ascending aorta 4.0 cm.     Compared to prior study on 2/15/2015, aortic stenosis has progressed to severe  _____________________________________________________________________________  __        Left Ventricle  Left ventricular function, chamber size, wall motion, and wall thickness are  normal.The EF is 60-65%. Grade II or moderate diastolic dysfunction. No  regional wall motion abnormalities are seen.     Right Ventricle  Right ventricular function, chamber size, wall motion, and thickness are  normal.     Atria  Both atria appear normal.        Mitral Valve  Moderate mitral annular calcification is present. Trace to mild mitral  insufficiency is present.     Aortic Valve  The aortic valve is tricuspid. No aortic regurgitation is present. Severe  aortic stenosis is present. The mean gradient across the aortic valve is52  mmHg. The peak aortic velocity is 4.47 m/sec.     Tricuspid Valve  The tricuspid valve is normal. Trace tricuspid insufficiency is present. The  right ventricular systolic pressure is approximated at 41.7 mmHg plus the  right atrial pressure.     Pulmonic Valve  The pulmonic valve is normal.     Vessels  The inferior vena cava was normal in size with preserved respiratory  variability.  Ascending aorta 4.0 cm.     Pericardium  No pericardial effusion is present.        Compared to Previous Study  Compared to prior study on 2/15/2015, aortic stenosis has progressed to  severe.  _____________________________________________________________________________  __     MMode/2D Measurements & Calculations  IVSd: 1.0 cm  LVIDd: 4.6 cm  LVIDs: 3.0 cm  LVPWd: 1.2 cm  FS: 35.9 %  EDV(Teich): 98.5 ml  ESV(Teich): 33.9 ml  LV mass(C)d: 187.8 grams  LV mass(C)dI: 99.3 grams/m2  Ao root diam: 3.5 cm  asc Aorta Diam: 4.0 cm  LVOT diam: 2.3 cm  LVOT area: 4.0 cm2  LA Volume (BP): 70.8 ml  RWT: 0.52  TAPSE: 2.6 cm        Doppler Measurements & Calculations  MV E max joseluis: 105.3 cm/sec  MV A max joseluis: 82.4 cm/sec  MV E/A: 1.3  MV dec slope: 534.7 cm/sec2  MV dec time: 0.20 sec  Ao V2 max: 446.8 cm/sec  Ao max P.8 mmHg  Ao V2 mean: 343.2 cm/sec  Ao mean P.0 mmHg  Ao V2 VTI: 105.8 cm  LESLYE(I,D): 0.84 cm2  LESLYE(V,D): 0.89 cm2     LV V1 max PG: 3.9 mmHg  LV V1 max: 98.8 cm/sec  LV V1 VTI: 22.2 cm  SV(LVOT): 88.8 ml  SI(LVOT): 46.9 ml/m2  TV max P.7 mmHg  TR max joseluis: 323.0 cm/sec  TR max P.7 mmHg  LESLYE Index (cm2/m2): 0.44  E/E' av.3  Lateral E/e': 11.8  Med E to E': 12.8  Medial E/e': 12.8

## 2017-12-12 DIAGNOSIS — I35.0 SEVERE AORTIC STENOSIS: Primary | ICD-10-CM

## 2017-12-13 ENCOUNTER — HOSPITAL ENCOUNTER (OUTPATIENT)
Dept: ULTRASOUND IMAGING | Facility: CLINIC | Age: 57
Discharge: HOME | End: 2017-12-13
Attending: INTERNAL MEDICINE | Admitting: INTERNAL MEDICINE
Payer: COMMERCIAL

## 2017-12-13 ENCOUNTER — HOSPITAL ENCOUNTER (OUTPATIENT)
Dept: CT IMAGING | Facility: CLINIC | Age: 57
End: 2017-12-13
Attending: INTERNAL MEDICINE
Payer: COMMERCIAL

## 2017-12-13 DIAGNOSIS — R05.9 COUGH: ICD-10-CM

## 2017-12-13 DIAGNOSIS — R09.89 CAROTID BRUIT, UNSPECIFIED LATERALITY: ICD-10-CM

## 2017-12-13 DIAGNOSIS — R09.82 POST-NASAL DRIP: ICD-10-CM

## 2017-12-13 DIAGNOSIS — I35.0 SEVERE AORTIC STENOSIS: ICD-10-CM

## 2017-12-13 LAB
ALBUMIN SERPL-MCNC: 3.3 G/DL (ref 3.4–5)
ALP SERPL-CCNC: 88 U/L (ref 40–150)
ALT SERPL W P-5'-P-CCNC: 113 U/L (ref 0–70)
ANION GAP SERPL CALCULATED.3IONS-SCNC: 10 MMOL/L (ref 3–14)
AST SERPL W P-5'-P-CCNC: 115 U/L (ref 0–45)
BILIRUB SERPL-MCNC: 0.8 MG/DL (ref 0.2–1.3)
BUN SERPL-MCNC: 13 MG/DL (ref 7–30)
CALCIUM SERPL-MCNC: 8.6 MG/DL (ref 8.5–10.1)
CHLORIDE SERPL-SCNC: 106 MMOL/L (ref 94–109)
CO2 SERPL-SCNC: 21 MMOL/L (ref 20–32)
CREAT SERPL-MCNC: 0.89 MG/DL (ref 0.66–1.25)
ERYTHROCYTE [DISTWIDTH] IN BLOOD BY AUTOMATED COUNT: 19.9 % (ref 10–15)
GFR SERPL CREATININE-BSD FRML MDRD: 87 ML/MIN/1.7M2
GLUCOSE SERPL-MCNC: 86 MG/DL (ref 70–99)
HCT VFR BLD AUTO: 30.2 % (ref 40–53)
HGB BLD-MCNC: 9.1 G/DL (ref 13.3–17.7)
MCH RBC QN AUTO: 22.9 PG (ref 26.5–33)
MCHC RBC AUTO-ENTMCNC: 30.1 G/DL (ref 31.5–36.5)
MCV RBC AUTO: 76 FL (ref 78–100)
PLATELET # BLD AUTO: 55 10E9/L (ref 150–450)
POTASSIUM SERPL-SCNC: 4 MMOL/L (ref 3.4–5.3)
PROT SERPL-MCNC: 7.6 G/DL (ref 6.8–8.8)
RBC # BLD AUTO: 3.98 10E12/L (ref 4.4–5.9)
SODIUM SERPL-SCNC: 137 MMOL/L (ref 133–144)
WBC # BLD AUTO: 4.9 10E9/L (ref 4–11)

## 2017-12-13 PROCEDURE — 71275 CT ANGIOGRAPHY CHEST: CPT | Mod: 26 | Performed by: INTERNAL MEDICINE

## 2017-12-13 PROCEDURE — 74174 CTA ABD&PLVS W/CONTRAST: CPT | Mod: 26 | Performed by: INTERNAL MEDICINE

## 2017-12-13 PROCEDURE — 25000128 H RX IP 250 OP 636: Performed by: INTERNAL MEDICINE

## 2017-12-13 PROCEDURE — 74174 CTA ABD&PLVS W/CONTRAST: CPT

## 2017-12-13 PROCEDURE — 93880 EXTRACRANIAL BILAT STUDY: CPT

## 2017-12-13 RX ORDER — IOPAMIDOL 755 MG/ML
140 INJECTION, SOLUTION INTRAVASCULAR ONCE
Status: COMPLETED | OUTPATIENT
Start: 2017-12-13 | End: 2017-12-13

## 2017-12-13 RX ORDER — LORATADINE 10 MG/1
10 TABLET ORAL DAILY
Qty: 90 TABLET | Refills: 3 | Status: SHIPPED | OUTPATIENT
Start: 2017-12-13 | End: 2019-01-17

## 2017-12-13 RX ADMIN — IOPAMIDOL 140 ML: 755 INJECTION, SOLUTION INTRAVENOUS at 12:42

## 2017-12-13 NOTE — TELEPHONE ENCOUNTER
Last Written Prescription Date:  5/9/16  Last Fill Quantity: 30,   # refills: 11  Last Office Visit : 11/4/17  Future Office visit:  NONE

## 2017-12-18 ENCOUNTER — PRE VISIT (OUTPATIENT)
Dept: CARDIOLOGY | Facility: CLINIC | Age: 57
End: 2017-12-18

## 2017-12-18 ENCOUNTER — OFFICE VISIT (OUTPATIENT)
Dept: INTERNAL MEDICINE | Facility: CLINIC | Age: 57
End: 2017-12-18
Payer: COMMERCIAL

## 2017-12-18 VITALS
OXYGEN SATURATION: 99 % | WEIGHT: 177.8 LBS | HEART RATE: 85 BPM | DIASTOLIC BLOOD PRESSURE: 92 MMHG | BODY MASS INDEX: 27.03 KG/M2 | TEMPERATURE: 98 F | SYSTOLIC BLOOD PRESSURE: 135 MMHG | RESPIRATION RATE: 20 BRPM

## 2017-12-18 DIAGNOSIS — J31.0 OTHER CHRONIC RHINITIS: ICD-10-CM

## 2017-12-18 DIAGNOSIS — D64.9 ANEMIA, UNSPECIFIED TYPE: ICD-10-CM

## 2017-12-18 DIAGNOSIS — B18.2 CHRONIC HEPATITIS C WITHOUT HEPATIC COMA (H): ICD-10-CM

## 2017-12-18 DIAGNOSIS — I35.0 AORTIC VALVE STENOSIS, ETIOLOGY OF CARDIAC VALVE DISEASE UNSPECIFIED: ICD-10-CM

## 2017-12-18 DIAGNOSIS — R79.1 ELEVATED INR: ICD-10-CM

## 2017-12-18 DIAGNOSIS — R05.9 COUGH: Primary | ICD-10-CM

## 2017-12-18 DIAGNOSIS — D69.6 THROMBOCYTOPENIA (H): ICD-10-CM

## 2017-12-18 DIAGNOSIS — K70.30 ALCOHOLIC CIRRHOSIS OF LIVER WITHOUT ASCITES (H): ICD-10-CM

## 2017-12-18 RX ORDER — MONTELUKAST SODIUM 10 MG/1
10 TABLET ORAL AT BEDTIME
Qty: 90 TABLET | Refills: 1 | Status: SHIPPED | OUTPATIENT
Start: 2017-12-18 | End: 2018-09-28 | Stop reason: ALTCHOICE

## 2017-12-18 ASSESSMENT — PAIN SCALES - GENERAL: PAINLEVEL: MODERATE PAIN (5)

## 2017-12-18 NOTE — PATIENT INSTRUCTIONS
[facility]: [phone]     [facility] Medication Refill Request Information:  * Please contact your pharmacy regarding ANY request for medication refills.  ** [facility] Prescription Fax = [phone]  * Please allow 3 business days for routine medication refills.  * Please allow 5 business days for controlled substance medication refills.     [facility] Test Result notification information:  *You will be notified with in 7-10 days of your appointment day regarding the results of your test.  If you are on MyChart you will be notified as soon as the provider has reviewed the results and signed off on them.

## 2017-12-18 NOTE — TELEPHONE ENCOUNTER
TAVR PRE-VISIT    HPI:  Ten is a 57 year old male who has a past medical history of Alcoholic cirrhosis, Allergic rhinitis; Aortic stenosis, Hepatitis C; Hypertension; Marijuana abuse.    Referred to the Valve clinic by Ema Pierson for evaluation for severe aortic stenosis        STS Risk Score:  0.6%      ECHO:  10/24/2017  Ao V2 max: 446.8 cm/sec  Ao max P.8 mmHg  Ao V2 mean: 343.2 cm/sec  Ao mean P.0 mmHg  Ao V2 VTI: 105.8 cm  LESLYE(I,D): 0.84 cm2  LESLYE(V,D): 0.89 cm2    TAVR CT:   2017  1.  Severely calcified aortic valve. Aortic valve calcium score is  5332. The LVOT is not calcified. The ascending aorta is minimally  calcified, aortic arch is  minimally calcified, the descending  thoracic aorta is minimally calcified. There is no mitral annular  calcification.  2.  There is moderate LVOT calcification.   3.  The arch vessel branching pattern is normal.   4.  The suprarenal abdominal aorta is mildly calcified; infrarenal  abdominal aorta is mildly calcified  5.  Widely patent origins of celiac trunk, superior mesenteric artery  and inferior mesenteric artery.  Single bilateral renal arteries with  widely patent origins.   6.  There is no acute aortic pathology, such as dissection, intramural  hematoma, or contained rupture.        Heart cath:    Pending      Carotid U/S:  2017  Impression:   1. Right side:   Degree of stenosis: Normal. No internal carotid artery stenosis.     2. Left side:   Degree of stenosis: Normal. No internal carotid artery stenosis..      PFT's:    Pending

## 2017-12-18 NOTE — MR AVS SNAPSHOT
After Visit Summary   12/18/2017    Ten Johnson    MRN: [id number]           Patient Information     Date Of Birth          1960        Visit Information        Provider Department      12/18/2017 2:20 PM Cuca Celeste MD [facility]         Follow-ups after your visit        Your next 10 appointments already scheduled     Dec 18, 2017  2:20 PM CST   (Arrive by 2:05 PM)   Return Visit with Cuca Celeste MD   [facility] ([facility])    [address] [place] [address]  [address] [facility] [address]   [phone]            Dec 21, 2017  3:15 PM CST   (Arrive by 3:00 PM)   New Patient Visit with  [facility]   [facility] ([facility])    [address] [place] [address]  [address] [facility] [address]   [phone]            Dec 21, 2017  3:15 PM CST   (Arrive by 3:00 PM)   New Patient Visit with Kolby Peters MD   [facility] ([facility])    [address] [place] [address]  [address] [facility] [address]   [phone]            Jun 04, 2018  8:30 AM CDT   Lab with  LAB   [facility] ([facility])    [address] [place] [id number] [phone]            Jun 04, 2018  9:20 AM CDT   (Arrive by 9:05 AM)   Return General Liver with Gentry San MD   [facility] ([facility])    [address] [place] [id number] [phone]              Who to contact     Please call your clinic at [phone] to:    Ask questions about your health    Make or cancel appointments    Discuss your medicines    Learn about your test results    Speak to your doctor   If you have compliments or concerns about an experience at your clinic, or if you wish to file a complaint, please contact [facility] Patient Relations at [phone] or  email us at [email or web address]         Additional Information About Your Visit        Sirihart Information     Sirihart is an electronic gateway that provides easy, online access to your medical records. With [email or web address], you can request a clinic appointment, read your test results, renew a prescription or communicate with your care team.     To sign up for [email or web address] visit the website at [email or web address]   You will be asked to enter the access code listed below, as well as some personal information. Please follow the directions to create your username and password.     Your access code is: [email or web address]  Expires: 3/8/2018  6:30 AM     Your access code will  in 90 days. If you need help or a new code, please contact your [facility] or call [phone] for assistance.        Care EveryWhere ID     This is your Care EveryWhere ID. This could be used by other organizations to access your [facility] medical records  [id number]         Blood Pressure from Last 3 Encounters:   17 (!) 141/95   17 121/81   10/25/17 148/86    Weight from Last 3 Encounters:   17 79.2 kg (174 lb 9.6 oz)   17 89 kg (196 lb 3.2 oz)   10/23/17 78 kg (171 lb 15.3 oz)              Today, you had the following     No orders found for display       Primary Care Provider Office Phone # Fax #    Cuca Celeste [phone]       [address] [place] [address] [address] [place] [address]        Equal Access to Services     [facility]: Hadii aad ku hadasho Soomaali, waaxda luqadaha, qaybta kaalmada adeegyuki, jazmin crespo . So [facility] [phone].    ATENCIÓN: Si habla español, tiene a ha disposición servicios gratuitos de asistencia lingüística. Llame al [phone].    We comply with applicable federal civil rights laws and [place] laws. We do not discriminate on the basis of race, color, national origin, age, disability, sex, sexual  orientation, or gender identity.            Thank you!     Thank you for choosing [facility]  for your care. Our goal is always to provide you with excellent care. Hearing back from our patients is one way we can continue to improve our services. Please take a few minutes to complete the written survey that you may receive in the mail after your visit with us. Thank you!             Your Updated Medication List - Protect others around you: Learn how to safely use, store and throw away your medicines at www.disposemymeds.org.          This list is accurate as of: 12/16/17  9:06 AM.  Always use your most recent med list.                   Brand Name Dispense Instructions for use Diagnosis    fluticasone 50 MCG/ACT spray    FLONASE    16 g    Spray 2 sprays into both nostrils daily    Cough, Post-nasal drip       lisinopril 10 MG tablet    PRINIVIL/ZESTRIL    90 tablet    Take 1 tablet (10 mg) by mouth daily    Essential hypertension       loratadine 10 MG tablet    CLARITIN    90 tablet    Take 1 tablet (10 mg) by mouth daily    Post-nasal drip, Cough       MULTIVITAMINS PO      Take 1 tablet by mouth daily.

## 2017-12-18 NOTE — PROGRESS NOTES
CC:  Cough, paperwork    Cough x 2 weeks, got better, then resumed and escalating x 5 days, cough productive, fevers, chills, ribs hurt with cough, myalgias, tired/in bed, took a couple of Aleve's for relief (should not be using)    States he is scheduled for TAVR later this month.  Reviewed chart. Not entirely clear in cardiology records.  I let him know that I am concerned about his GI status, especially in light of his most recent hospitalization.  I will try to coordinate care along these lines.  States last etoh was around Thanksgiving.  No recent hematemesis, black stools, BRBPR, abdominal pain, nausea, vomiting, syncfope    He needs paperwork completed for work-related disability.  We completed these together.    Patient Active Problem List   Diagnosis     Essential hypertension     Presbyopia     alcoholism     Rotator cuff tear, right     OA (osteoarthritis) of knee     Aortic stenosis, moderate     Chronic hepatitis C without hepatic coma (H)     Alcoholic cirrhosis of liver without ascites (H)     Past Surgical History:   Procedure Laterality Date     ARTHROSCOPY SHOULDER ROTATOR CUFF REPAIR  7/31/2012    Procedure: ARTHROSCOPY SHOULDER ROTATOR CUFF REPAIR;  Right Shoulder Arthroscopic Rotator Cuff Repair, BicepsTenodesis,  Subacromial Decompression ;  Surgeon: Joi Castillo MD;  [facility]     ESOPHAGOSCOPY, GASTROSCOPY, DUODENOSCOPY (EGD), COMBINED N/A 10/23/2017    Procedure: COMBINED ESOPHAGOSCOPY, GASTROSCOPY, DUODENOSCOPY (EGD);;  Surgeon: Gentry Salas MD;  Location:  GI     FACIAL RECONSTRUCTION SURGERY  1971     IRRIGATION AND DEBRIDEMENT UPPER EXTREMITY, COMBINED  1/3/2012    Procedure:COMBINED IRRIGATION AND DEBRIDEMENT UPPER EXTREMITY; Irrigation & Debridement Left Elbow; Surgeon:CRISTHIAN ZHOU; Location:[facility]     REPAIR TENDON TRICEPS UPPER EXTREMITY  11/8/2011    Procedure:REPAIR TENDON TRICEPS UPPER EXTREMITY; Surgeon:CRISTHIAN ZHOU; Location:[facility]  OR     SHOULDER SURGERY  2003    left, injury, torn tendons, hematoma     TRANSPOSITION ULNAR NERVE (ELBOW)  11/8/2011    Procedure:TRANSPOSITION ULNAR NERVE (ELBOW); Final Procedure Done: Left Elbow Lateral Ulnar Collateral Repair And  Left Elbow Triceps Repair       Current Outpatient Prescriptions   Medication Sig Dispense Refill     amoxicillin-clavulanate (AUGMENTIN) 875-125 MG per tablet Take 1 tablet by mouth 2 times daily for 10 days 20 tablet 0     montelukast (SINGULAIR) 10 MG tablet Take 1 tablet (10 mg) by mouth At Bedtime 90 tablet 1     loratadine (CLARITIN) 10 MG tablet Take 1 tablet (10 mg) by mouth daily 90 tablet 3     lisinopril (PRINIVIL/ZESTRIL) 10 MG tablet Take 1 tablet (10 mg) by mouth daily 90 tablet 3     fluticasone (FLONASE) 50 MCG/ACT spray Spray 2 sprays into both nostrils daily 16 g 11     Multiple Vitamin (MULTIVITAMINS PO) Take 1 tablet by mouth daily.       Allergies   Allergen Reactions     Zolpidem Other (See Comments)     Alcoholic.  Had reaction 3/17/13 while intoxicated which included black out, loss of awareness, paranoia.  Do not prescribe.  Dr. Celeste     Cats Other (See Comments)     rhinitis     Dogs Other (See Comments)     rhinitis     Pollen Extract Other (See Comments)     rhinits.       BP (!) 135/92 (BP Location: Right arm, Patient Position: Sitting, Cuff Size: Adult Regular)  Pulse 85  Temp 98  F (36.7  C) (Oral)  Resp 20  Wt 80.6 kg (177 lb 12.8 oz)  SpO2 99%  BMI 27.03 kg/m2  Physical Examination:  General:  Pleasant, alert, no acute distress, pale  Eyes:  Pupils 2-3 mm, sclera white, EOM's full.    Ears:  TM's normal, EAC's patent, clear of cerumen  Nose:  Nasal passages clear, turbinates mildly swollen, right more than left  Throat/Mouth:  No pharyngeal erythema or exudate, oral mucosa and tongue moist, no suspicious oral lesions  Neck:  Full AROM, supple, thyroid smooth, symmetric, not enlarged, no nodules  Lungs:  Clear to auscultation throughout, no  wheezes, rhonchi or rales.  C/V:  Regular rate and rhythm, AS murmur grade 3, no JVD, trace pitting edema ankles  Abdomen:  Not distended.  Bowel sounds active.  No tenderness, liver edge approl 5 cm below costal margin..  No CVA tenderness or masses.  Lymph:  No cervical lymph nodes.  Neuro:  Alert and oriented, face symmetric. Mildly tremulous, positive tongue tremors   M/S:   No joint deformities noted.  No joint swelling.  Skin:   No rashes or jaundice.  Normal moisture, good skin turgor. Positive stigmata of CLD  Psych:  Broad range affect.  Not psychomotor slowed.  No signs of anxiety or agitation.    Component      Latest Ref Rng & Units 12/4/2017 12/13/2017   WBC      4.0 - 11.0 10e9/L 5.2 4.9   RBC Count      4.4 - 5.9 10e12/L 4.15 (L) 3.98 (L)   Hemoglobin      13.3 - 17.7 g/dL 9.4 (L) 9.1 (L)   Hematocrit      40.0 - 53.0 % 31.4 (L) 30.2 (L)   MCV      78 - 100 fl 76 (L) 76 (L)   MCH      26.5 - 33.0 pg 22.7 (L) 22.9 (L)   MCHC      31.5 - 36.5 g/dL 29.9 (L) 30.1 (L)   RDW      10.0 - 15.0 % 17.9 (H) 19.9 (H)   Platelet Count      150 - 450 10e9/L 70 (L) 55 (L)   Diff Method       Automated Method    % Neutrophils      % 54.4    % Lymphocytes      % 21.2    % Monocytes      % 19.3    % Eosinophils      % 3.7    % Basophils      % 1.2    % Immature Granulocytes      % 0.2    Nucleated RBCs      0 /100 0    Absolute Neutrophil      1.6 - 8.3 10e9/L 2.8    Absolute Lymphocytes      0.8 - 5.3 10e9/L 1.1    Absolute Monocytes      0.0 - 1.3 10e9/L 1.0    Absolute Eosinophils      0.0 - 0.7 10e9/L 0.2    Absolute Basophils      0.0 - 0.2 10e9/L 0.1    Abs Immature Granulocytes      0 - 0.4 10e9/L 0.0    Absolute Nucleated RBC       0.0    Sodium      133 - 144 mmol/L 136 137   Potassium      3.4 - 5.3 mmol/L 3.4 4.0   Chloride      94 - 109 mmol/L 102 106   Carbon Dioxide      20 - 32 mmol/L 23 21   Anion Gap      3 - 14 mmol/L 11 10   Glucose      70 - 99 mg/dL 167 (H) 86   Urea Nitrogen      7 - 30 mg/dL 11  13   Creatinine      0.66 - 1.25 mg/dL 0.96 0.89   GFR Estimate      >60 mL/min/1.7m2 80 87   GFR Estimate If Black      >60 mL/min/1.7m2 >90 >90   Calcium      8.5 - 10.1 mg/dL 9.0 8.6   Bilirubin Total      0.2 - 1.3 mg/dL 1.3 0.8   Albumin      3.4 - 5.0 g/dL 3.3 (L) 3.3 (L)   Protein Total      6.8 - 8.8 g/dL 7.4 7.6   Alkaline Phosphatase      40 - 150 U/L 93 88   ALT      0 - 70 U/L 99 (H) 113 (H)   AST      0 - 45 U/L 115 (H) 115 (H)   Bilirubin Direct      0.0 - 0.2 mg/dL 0.6 (H)    HCV RNA Quant IU/ml      HCVND:HCV RNA Not Detected [IU]/mL [id number] (A)    Log of HCV RNA Qt      <1.2 Log IU/mL 6.2 (H)    INR      0.86 - 1.14 1.27 (H)        Ten was seen today for forms, heart problem and uri.    Diagnoses and all orders for this visit:    Cough  -     amoxicillin-clavulanate (AUGMENTIN) 875-125 MG per tablet; Take 1 tablet by mouth 2 times daily for 10 days    Other chronic rhinitis  -     Refill montelukast (SINGULAIR) 10 MG tablet; Take 1 tablet (10 mg) by mouth At Bedtime    Anemia, unspecified type    Chronic hepatitis C without hepatic coma (H)    Alcoholic cirrhosis of liver without ascites (H)    Elevated INR    Thrombocytopenia (H)    Aortic valve stenosis    Will coordinate care with GI and CV surgery    Total time spent 40 minutes.  More than 50% of the time spent with Mr. Johnson on counseling / coordinating his care    Cuca Celeste M.D.  Internal Medicine  Primary Care Center   pager [phone]

## 2017-12-18 NOTE — NURSING NOTE
"Chief Complaint   Patient presents with     Forms     forms to fill out     Heart Problem     will be having aortic valve \" replaced\" in the future     URI     cold for about 2 weeks, coughing and coughing up yellow sputum   Zaida Ceja LPN 2:42 PM on 12/18/2017  Rooming Note  Health Maintenance   Health Maintenance Due   Topic Date Due     COLON CANCER SCREEN (SYSTEM ASSIGNED)  05/29/2008     ADVANCE DIRECTIVE PLANNING Q5 YRS  05/29/2013    All health maintenance items discussed and pended.  Zaida Ceja LPN 2:47 PM on 12/18/2017    "

## 2017-12-21 ENCOUNTER — OFFICE VISIT (OUTPATIENT)
Dept: CARDIOLOGY | Facility: CLINIC | Age: 57
End: 2017-12-21
Attending: THORACIC SURGERY (CARDIOTHORACIC VASCULAR SURGERY)
Payer: COMMERCIAL

## 2017-12-21 ENCOUNTER — OFFICE VISIT (OUTPATIENT)
Dept: CARDIOLOGY | Facility: CLINIC | Age: 57
End: 2017-12-21
Attending: INTERNAL MEDICINE
Payer: COMMERCIAL

## 2017-12-21 VITALS
OXYGEN SATURATION: 97 % | HEIGHT: 68 IN | SYSTOLIC BLOOD PRESSURE: 143 MMHG | DIASTOLIC BLOOD PRESSURE: 90 MMHG | HEART RATE: 78 BPM | WEIGHT: 170 LBS | BODY MASS INDEX: 25.76 KG/M2

## 2017-12-21 VITALS
DIASTOLIC BLOOD PRESSURE: 90 MMHG | HEART RATE: 78 BPM | WEIGHT: 170 LBS | BODY MASS INDEX: 25.76 KG/M2 | OXYGEN SATURATION: 97 % | SYSTOLIC BLOOD PRESSURE: 143 MMHG | HEIGHT: 68 IN

## 2017-12-21 DIAGNOSIS — D69.6 THROMBOCYTOPENIA (H): Primary | ICD-10-CM

## 2017-12-21 DIAGNOSIS — I35.0 NONRHEUMATIC AORTIC VALVE STENOSIS: Primary | ICD-10-CM

## 2017-12-21 DIAGNOSIS — I35.0 SEVERE AORTIC STENOSIS: ICD-10-CM

## 2017-12-21 DIAGNOSIS — I25.10 CORONARY ARTERY DISEASE INVOLVING NATIVE CORONARY ARTERY, ANGINA PRESENCE UNSPECIFIED, UNSPECIFIED WHETHER NATIVE OR TRANSPLANTED HEART: ICD-10-CM

## 2017-12-21 PROCEDURE — 93010 ELECTROCARDIOGRAM REPORT: CPT | Mod: ZP | Performed by: INTERNAL MEDICINE

## 2017-12-21 PROCEDURE — 99215 OFFICE O/P EST HI 40 MIN: CPT

## 2017-12-21 PROCEDURE — 99204 OFFICE O/P NEW MOD 45 MIN: CPT | Mod: 25 | Performed by: INTERNAL MEDICINE

## 2017-12-21 PROCEDURE — 93005 ELECTROCARDIOGRAM TRACING: CPT | Mod: ZF

## 2017-12-21 PROCEDURE — 99213 OFFICE O/P EST LOW 20 MIN: CPT | Mod: ZF

## 2017-12-21 PROCEDURE — 99214 OFFICE O/P EST MOD 30 MIN: CPT | Mod: 25,ZF

## 2017-12-21 RX ORDER — LIDOCAINE 40 MG/G
CREAM TOPICAL
Status: CANCELLED | OUTPATIENT
Start: 2017-12-21

## 2017-12-21 RX ORDER — SODIUM CHLORIDE 9 MG/ML
INJECTION, SOLUTION INTRAVENOUS CONTINUOUS
Status: CANCELLED | OUTPATIENT
Start: 2017-12-21

## 2017-12-21 RX ORDER — ASPIRIN 81 MG/1
81 TABLET ORAL DAILY
Status: CANCELLED | OUTPATIENT
Start: 2017-12-21

## 2017-12-21 ASSESSMENT — PAIN SCALES - GENERAL: PAINLEVEL: NO PAIN (0)

## 2017-12-21 NOTE — LETTER
12/21/2017      RE: Ten Johnson  [address]  [facility] [address]       Dear Colleague,    Thank you for the opportunity to participate in the care of your patient, Ten Johnson, at the [facility]. Please see a copy of my visit note below.    ASKED BY REFERRING PHYSICIAN: Dr. Celeste to see patient in consultation of surgical treatment of severe AS    CHIEF COMPLAINT: angina and dizziness    HPI: Ten is a 57 year old male who presents with Ten Johnson is a pleasant 57 year old male with severe aortic valvular stenosis referred to our clinic for evaluation and consideration for potential transcatheter aortic valve replacement (TAVR).    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  He has been feeling dizzy and had chest pain for couple of months. He was having lot of cough from his allergies in summer and as per patient it made him have nausea and vomiting. He was having hematemesis and blood in stools. He finally went to hospital in late 10/2017 and was found to have Sarahi Robert tear of Oesophagus with small varices without any active bleeding. He was transfused with 4 units of blood. He still gets dizzy daily. He says when he gets up he gets dizzy. His chest pain has got better since he was treated for anemia.     He has stopped working since his dizziness got worsened couple of months back.   He still drinks alcohol on a regular basis. However, he is reducing it. He is going through a program to help.  He has seen GI and they are following him closely.  He wants to be treated for his valve before he can go to his work. He used to work as  in construction.       PAST MEDICAL HISTORY:  Past Medical History:   Diagnosis Date     Alcoholic cirrhosis (H)      Allergic rhinitis      Aortic stenosis, moderate      Hepatitis C      Hypertension      essential     Marijuana abuse      OA (osteoarthritis) of knee 4/1/2013     Rotator cuff tear, right        PAST SURGICAL HISTORY:  Past Surgical History:   Procedure Laterality Date     ARTHROSCOPY SHOULDER ROTATOR CUFF REPAIR  7/31/2012    Procedure: ARTHROSCOPY SHOULDER ROTATOR CUFF REPAIR;  Right Shoulder Arthroscopic Rotator Cuff Repair, BicepsTenodesis,  Subacromial Decompression ;  Surgeon: Joi Castillo MD;  [facility]: [facility]     ESOPHAGOSCOPY, GASTROSCOPY, DUODENOSCOPY (EGD), COMBINED N/A 10/23/2017    Procedure: COMBINED ESOPHAGOSCOPY, GASTROSCOPY, DUODENOSCOPY (EGD);;  Surgeon: Gentry Salas MD;  Location:  GI     FACIAL RECONSTRUCTION SURGERY  1971     IRRIGATION AND DEBRIDEMENT UPPER EXTREMITY, COMBINED  1/3/2012    Procedure:COMBINED IRRIGATION AND DEBRIDEMENT UPPER EXTREMITY; Irrigation & Debridement Left Elbow; Surgeon:CRISTHIAN ZHOU; Location:[facility]     REPAIR TENDON TRICEPS UPPER EXTREMITY  11/8/2011    Procedure:REPAIR TENDON TRICEPS UPPER EXTREMITY; Surgeon:CRISTHIAN ZHOU; Location:[facility] OR     SHOULDER SURGERY  2003    left, injury, torn tendons, hematoma     TRANSPOSITION ULNAR NERVE (ELBOW)  11/8/2011    Procedure:TRANSPOSITION ULNAR NERVE (ELBOW); Final Procedure Done: Left Elbow Lateral Ulnar Collateral Repair And  Left Elbow Triceps Repair         FAMILY HISTORY:   Family History   Problem Relation Age of Onset     CANCER Mother 62     Alcoholism Paternal Uncle      Cirrhosis No family hx of        SOCIAL HISTORY:  Social History     Social History     Marital status: Single     Spouse name: N/A     Number of children: N/A     Years of education: N/A     Occupational History     Not on file.     Social History Main Topics     Smoking status: Never Smoker     Smokeless tobacco: Never Used     Alcohol use Yes     Drug use: No      Comment: occ marijuana     Sexual activity: Not Currently     Partners: Female     Other Topics Concern     Not on file  "    Social History Narrative    .  Bicycles a lot.  Excessive alcohol use.  Smokes cigars.  Occasional marijuana use.        ALLERGIES:   Allergies   Allergen Reactions     Zolpidem Other (See Comments)     Alcoholic.  Had reaction 3/17/13 while intoxicated which included black out, loss of awareness, paranoia.  Do not prescribe.  Dr. Celeste     Cats Other (See Comments)     rhinitis     Dogs Other (See Comments)     rhinitis     Pollen Extract Other (See Comments)     rhinits.       CURRENT MEDICATIONS:   Prescription Medications as of 1/22/2018             montelukast (SINGULAIR) 10 MG tablet Take 1 tablet (10 mg) by mouth At Bedtime    loratadine (CLARITIN) 10 MG tablet Take 1 tablet (10 mg) by mouth daily    lisinopril (PRINIVIL/ZESTRIL) 10 MG tablet Take 1 tablet (10 mg) by mouth daily    fluticasone (FLONASE) 50 MCG/ACT spray Spray 2 sprays into both nostrils daily    Multiple Vitamin (MULTIVITAMINS PO) Take 1 tablet by mouth daily.          REVIEW OF SYSTEMS:   Gen: denies frequent headaches, double/blurry vision, insomnia, fatigue, unexplained weight loss/gain. No previous anesthesia reactions.  CV: denies chest pain, shortness of breath, palpitations, peripheral edema.   Pulm: denies shortness of breath, asthma or wheezing  GI/: denies liver or kidney problems, blood in stool or BRBPR, difficulty urinating  Endo: denies thyroid problems or Diabetes  Heme/Onc: denies bleeding or clotting disorders, no family problems with bleeding/clotting diorders  MS: no weakness, tremors or gait instability  Neuro: denies depression, memory problems, no dysesthesias, no previous strokes, no migraines, no dysphagia  Skin: No petechiae, purpura or rash.    PHYSICAL EXAMINATION:   /90 (BP Location: Right arm, Cuff Size: Adult Regular)  Pulse 78  Ht 1.727 m (5' 8\")  Wt 77.1 kg (170 lb)  SpO2 97%  BMI 25.85 kg/m2  General: alert and oriented x 3, pleasant, no acute distress  CV: S1 S2, " there is systolic murmur; no rubs or gallops, regular rate and rhythm, no peripheral edema, no carotid or abdomenal bruits, pulses in upper and lower extremities palpable  Pulm: bilateral breath sounds, clear to auscultation, easy work of breathing  GI: (+) bowel sounds, soft non-tender and non-distended  : voiding without problems  MS: moves all extremities x 4,  5+/5+ equal strength bilaterally  Neuro: pupils equal round and reactive to light, cranial nerves, II-XII grossly intact, no gross neurologic deficits noted    LABS:  BMP RESULTS:  Lab Results   Component Value Date     12/13/2017    POTASSIUM 4.0 12/13/2017    CHLORIDE 106 12/13/2017    CO2 21 12/13/2017    ANIONGAP 10 12/13/2017    GLC 86 12/13/2017    BUN 13 12/13/2017    CR 0.89 12/13/2017    GFRESTIMATED 87 12/13/2017    GFRESTBLACK >90 12/13/2017    JOSEPHINE 8.6 12/13/2017        CBC RESULTS:  Lab Results   Component Value Date    WBC 4.9 12/13/2017    RBC 3.98 (L) 12/13/2017    HGB 9.1 (L) 12/13/2017    HCT 30.2 (L) 12/13/2017    MCV 76 (L) 12/13/2017    MCH 22.9 (L) 12/13/2017    MCHC 30.1 (L) 12/13/2017    RDW 19.9 (H) 12/13/2017    PLT 55 (L) 12/13/2017       Lab Results   Component Value Date    INR 1.27 (H) 12/04/2017     Lab Results   Component Value Date    PTT 29 02/05/2010     No results found for: UA  No results found for: A1C    PROCEDURES/IMAGING:  ECG: Sinus rhythm with LVH and nonspecific ST changes     Echocardiogram:   Aortic area of 0.84 cm2, mean gradient 52 mmHg and peak velocity of 4.47 m/sec with LVEF 60% by echocardiogram on 10/24/2017.      STS RISK SCORE: 0.6% without including his alcoholic liver cirrhosis  NYHA CLASS: II    ASSESSMENT/PLAN:     Ten is a 57 year old male who presents with symptomatic severe aortic stenosis who is a not a good surgical AVR candidate due to alcoholic liver cirrhosis and thrombocytopenia.  He is probably a reasonable candidate for transcatheter aortic valve replacement provided his  platelets and coagulopathy is under reasonable control.      His thrombocytopenia could be from his alcohol intake with bone marrow suppression. However, will need to get hematology input.    Also, extensive discussion with patient on alcohol intake and withdrawal complications were also dicussed with patient.     The pre-procedural TAVR evaluation currently requires coronary angiogram and hematology work up prior to presentation to the Heart team for discussion during our multi-disciplinary conference for consensus decision and procedural planning.     Plan Summary:  1) Severe Aortic Stenosis:  -Coronary angiography via radial approach to evaluate coronaries.  -Consult hematology for input on his platelets and coagulopathy. Also recommendations on improving his platelets and his aspirin intake for TAVR.    CC  Patient Care Team:  Cuca Celeste MD as PCP - General (Internal Medicine)  Mili Capps MD as MD (Internal Medicine)  Kieran Ulloa MD as MD (Family Practice)  Emma Mendez as Nurse Coordinator (Cardiology)  IGNACIO LEOS    Please do not hesitate to contact me if you have any questions/concerns.     Sincerely,     Kolby Peters MD

## 2017-12-21 NOTE — NURSING NOTE
Chief Complaint   Patient presents with     New Patient     EKG: dx AS; 56 yo M, w/ hx of AS; here for evaluation for possible TAVR procedure.     Vitals were taken and medications were reconciled. EKG was performed.    BHAVIK Pleitez  3:37 PM

## 2017-12-21 NOTE — NURSING NOTE
Chief Complaint   Patient presents with     New Patient     EKG: dx AS; 56 yo M, w/ hx of AS; here for evaluation for possible TAVR procedure.     Vitals were taken and medications were reconciled. EKG was performed    Brielle Acosta MA  3:34 PM

## 2017-12-21 NOTE — MR AVS SNAPSHOT
After Visit Summary   12/21/2017    Ten Johnson    MRN: [id number]           Patient Information     Date Of Birth          1960        Visit Information        Provider Department      12/21/2017 3:15 PM Kolby Peters MD [facility]        Today's Diagnoses     Nonrheumatic aortic valve stenosis    -  1       Follow-ups after your visit        Your next 10 appointments already scheduled     Feb 01, 2018  8:00 AM CST   (Arrive by 7:45 AM)   New Patient Visit with Meli Garcia MD   [facility] ([facility])    [address]  [address]  [facility] [address]   [phone]            Feb 01, 2018  9:00 AM CST   [facility] Lab Draw with [facility] DRAW   [facility] ([facility])    [address] [place] [address]  [address]  [facility] [address]   [phone]            Feb 02, 2018  9:30 AM CST   [facility] ([facility])    [address] [address]              Please do not eat 10-12 hours before your appointment if you are coming in fasting for labs on lipids, cholesterol, or glucose (sugar). This does not apply to pregnant women. Water, hot tea and black coffee (with nothing added) are okay. Do not drink other fluids, diet soda or chew gum.            Feb 02, 2018 10:00 AM CST   Procedure 1.5 hr with U2A ROOM 10   Unit 2A [facility] ([facility])    [address] [address]               Feb 02, 2018 11:30 AM CST   Cath 90 Minute with [address],  Heart Cath Lab ([facility])    [address] [address]   [phone]            Jun 04, 2018  8:30 AM CDT   Lab with  LAB   [facility] ([facility])    [address] [place] [address]  "Floor  [facility] [id number] [phone]            2018  9:20 AM CDT   (Arrive by 9:05 AM)   Return General Liver with Gentry San MD   [facility] ([facility])    [address]  [address]  [facility] [id number] [phone]              Who to contact     If you have questions or need follow up information about today's clinic visit or your schedule please contact [facility] directly at [phone].  Normal or non-critical lab and imaging results will be communicated to you by [employer] [facility], letter or phone within 4 business days after the clinic has received the results. If you do not hear from us within 7 days, please contact the clinic through [employer] or phone. If you have a critical or abnormal lab result, we will notify you by phone as soon as possible.  Submit refill requests through [employer] or call your pharmacy and they will forward the refill request to us. Please allow 3 business days for your refill to be completed.          Additional Information About Your Visit        [employer] Information     [employer] lets you send messages to your doctor, view your test results, renew your prescriptions, schedule appointments and more. To sign up, go to [email or web address] . Click on \"Log in\" on the left side of the screen, which will take you to the Welcome page. Then click on \"Sign up Now\" on the right side of the page.     You will be asked to enter the access code listed below, as well as some personal information. Please follow the directions to create your username and password.     Your access code is: [email or web address]  Expires: 3/8/2018  6:30 AM     Your access code will  in 90 days. If you need help or a new code, please call your [place] clinic or [phone].        Care EveryWhere ID     This is your Care EveryWhere ID. This could be used by other organizations to access your [place] medical records  [id number]      " "  Your Vitals Were     Pulse Height Pulse Oximetry BMI (Body Mass Index)          78 1.727 m (5' 8\") 97% 25.85 kg/m2         Blood Pressure from Last 3 Encounters:   12/21/17 143/90   12/21/17 143/90   12/18/17 (!) 135/92    Weight from Last 3 Encounters:   12/21/17 77.1 kg (170 lb)   12/21/17 77.1 kg (170 lb)   12/18/17 80.6 kg (177 lb 12.8 oz)              Today, you had the following     No orders found for display       Primary Care Provider Office Phone # Fax #    Cuca Celeste [phone]       [address] [place] [address] [address] [place] [id number]        Equal Access to Services     MANE HERRERA : Erma cuevas Sodani, waaxda luqadaha, qaybta kaalmada adeegyada, jazmin crespo . So [facility] [phone].    ATENCIÓN: Si habla español, tiene a ha disposición servicios gratuitos de asistencia lingüística. [facility] [phone].    We comply with applicable federal civil rights laws and [place] laws. We do not discriminate on the basis of race, color, national origin, age, disability, sex, sexual orientation, or gender identity.            Thank you!     Thank you for choosing [facility]  for your care. Our goal is always to provide you with excellent care. Hearing back from our patients is one way we can continue to improve our services. Please take a few minutes to complete the written survey that you may receive in the mail after your visit with us. Thank you!             Your Updated Medication List - Protect others around you: Learn how to safely use, store and throw away your medicines at www.disposemymeds.org.          This list is accurate as of: 12/21/17 11:59 PM.  Always use your most recent med list.                   Brand Name Dispense Instructions for use Diagnosis    amoxicillin-clavulanate 875-125 MG per tablet    AUGMENTIN    20 tablet    Take 1 tablet by mouth 2 times daily for 10 days    Cough       fluticasone 50 MCG/ACT spray    FLONASE "    16 g    Spray 2 sprays into both nostrils daily    Cough, Post-nasal drip       lisinopril 10 MG tablet    PRINIVIL/ZESTRIL    90 tablet    Take 1 tablet (10 mg) by mouth daily    Essential hypertension       loratadine 10 MG tablet    CLARITIN    90 tablet    Take 1 tablet (10 mg) by mouth daily    Post-nasal drip, Cough       montelukast 10 MG tablet    SINGULAIR    90 tablet    Take 1 tablet (10 mg) by mouth At Bedtime    Other chronic rhinitis       MULTIVITAMINS PO      Take 1 tablet by mouth daily.

## 2017-12-21 NOTE — PATIENT INSTRUCTIONS
You were seen today in the Cardiovascular Clinic at the [facility].      Cardiology Providers you saw during your visit:  Dr. Mcdonough and Dr. Peters    Recommendations:  TAVR    Follow-up:      1.  Hematology Referral/Consult    2.  Coronary Angiogram  You will be scheduled for a Coronary Angiogram at the [facility], [address] [place] [address], [place] [address]. You are to check in at the [facility].     Pre procedure instructions:  1. Please make arrangements to have a  as you will not be allowed to drive following the procedure.  2. Do not eat or drink after midnight the day of your procedure.  3. You may take your usual morning medications with a sip of water the morning of your procedure.  4. Take a 81 mg aspirin the day before and the day of your procedure.  5. Make arrangements to have someone stay with you the night after your procedure.      Please call me if you have questions regarding these instructions or your scheduled procedure.        Nursing questions: [phone] option 1 then chose option 3 for the triage nurse, and then ask to speak with Ira.  For emergencies call 911.    It was a pleasure to see you in clinic.  If you have any questions at all, please feel free to give me a call.      Ira Mendez RN  Structural Heart Care Coordinator   TAVR and MitraClip Programs  [facility]  Office: [phone]    Clinics and Surgery Center  [address] [place] [address]  Cardiology Clinic [address]  [place] [address]

## 2017-12-21 NOTE — LETTER
12/21/2017      RE: Ten Johnson  [address]  [facility] [address]       Dear Colleague,    Thank you for the opportunity to participate in the care of your patient, Ten Johnson, at the [facility] HEART [facility] at [facility]. Please see a copy of my visit note below.      [facility] HEART CARE  CARDIOVASCULAR DIVISION    VALVE CLINIC INITIAL CONSULTATION    CONSULTANT CARDIOLOGIST: Dr. Sharonda TSE      PERTINENT CLINICAL HISTORY:     Ten Johnson is a pleasant 57 year old male with severe aortic valvular stenosis referred to our clinic for evaluation and consideration for potential transcatheter aortic valve replacement (TAVR).    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  He has been feeling dizzy and had chest pain for couple of months. He was having lot of cough from his allergies in summer and as per patient it made him have nausea and vomiting. He was having hematemesis and blood in stools. He finally went to hospital in late 10/2017 and was found to have Sarahi Robert tear of Oesophagus with small varices without any active bleeding. He was transfused with 4 units of blood. He still gets dizzy daily. He says when he gets up he gets dizzy. His chest pain has got better since he was treated for anemia.   He has stopped working since his dizziness got worsened couple of months back.   He still drinks alcohol on a regular basis. However, he is reducing it. He is going through a program to help.  He has seen GI and they are following him closely.  He wants to be treated for his valve before he can go to his work. He used to work as  in construction.   He has lost 20 lbs of weight in the past couple of months.       PAST MEDICAL HISTORY:     Past Medical History:   Diagnosis Date     Alcoholic cirrhosis (H)      Allergic rhinitis      Aortic stenosis, moderate       Hepatitis C      Hypertension     essential     Marijuana abuse      OA (osteoarthritis) of knee 4/1/2013     Rotator cuff tear, right         PAST SURGICAL HISTORY:     Past Surgical History:   Procedure Laterality Date     ARTHROSCOPY SHOULDER ROTATOR CUFF REPAIR  7/31/2012    Procedure: ARTHROSCOPY SHOULDER ROTATOR CUFF REPAIR;  Right Shoulder Arthroscopic Rotator Cuff Repair, BicepsTenodesis,  Subacromial Decompression ;  Surgeon: Joi Castillo MD;  Location: [facility]     ESOPHAGOSCOPY, GASTROSCOPY, DUODENOSCOPY (EGD), COMBINED N/A 10/23/2017    Procedure: COMBINED ESOPHAGOSCOPY, GASTROSCOPY, DUODENOSCOPY (EGD);;  Surgeon: Gentry Salas MD;  Location: [facility]     FACIAL RECONSTRUCTION SURGERY  1971     IRRIGATION AND DEBRIDEMENT UPPER EXTREMITY, COMBINED  1/3/2012    Procedure:COMBINED IRRIGATION AND DEBRIDEMENT UPPER EXTREMITY; Irrigation & Debridement Left Elbow; Surgeon:CRISTHIAN ZHOU; Location:[facility]     REPAIR TENDON TRICEPS UPPER EXTREMITY  11/8/2011    Procedure:REPAIR TENDON TRICEPS UPPER EXTREMITY; Surgeon:CRISTHIAN ZHOU; Location:[facility]     SHOULDER SURGERY  2003    left, injury, torn tendons, hematoma     TRANSPOSITION ULNAR NERVE (ELBOW)  11/8/2011    Procedure:TRANSPOSITION ULNAR NERVE (ELBOW); Final Procedure Done: Left Elbow Lateral Ulnar Collateral Repair And  Left Elbow Triceps Repair          CURRENT MEDICATIONS:     Current Outpatient Prescriptions   Medication Sig Dispense Refill     amoxicillin-clavulanate (AUGMENTIN) 875-125 MG per tablet Take 1 tablet by mouth 2 times daily for 10 days 20 tablet 0     montelukast (SINGULAIR) 10 MG tablet Take 1 tablet (10 mg) by mouth At Bedtime 90 tablet 1     loratadine (CLARITIN) 10 MG tablet Take 1 tablet (10 mg) by mouth daily (Patient not taking: Reported on 12/21/2017) 90 tablet 3     lisinopril (PRINIVIL/ZESTRIL) 10 MG tablet Take 1 tablet (10 mg) by mouth daily 90 tablet 3     fluticasone (FLONASE) 50 MCG/ACT spray  Spray 2 sprays into both nostrils daily 16 g 11     Multiple Vitamin (MULTIVITAMINS PO) Take 1 tablet by mouth daily.          ALLERGIES:     Allergies   Allergen Reactions     Zolpidem Other (See Comments)     Alcoholic.  Had reaction 3/17/13 while intoxicated which included black out, loss of awareness, paranoia.  Do not prescribe.  Dr. Celeste     Cats Other (See Comments)     rhinitis     Dogs Other (See Comments)     rhinitis     Pollen Extract Other (See Comments)     rhinits.        FAMILY HISTORY:     Family History   Problem Relation Age of Onset     CANCER Mother 62     Alcoholism Paternal Uncle      Cirrhosis No family hx of         SOCIAL HISTORY:     Social History     Social History     Marital status: Single     Spouse name: N/A     Number of children: N/A     Years of education: N/A     Social History Main Topics     Smoking status: Never Smoker     Smokeless tobacco: Never Used     Alcohol use Yes     Drug use: No      Comment: occ marijuana     Sexual activity: Not Currently     Partners: Female     Other Topics Concern     Not on file     Social History Narrative    .  Bicycles a lot.  Excessive alcohol use.  Smokes cigars.  Occasional marijuana use.        REVIEW OF SYSTEMS:     Constitutional: No fevers or chills  Skin: No new rash or itching  Eyes: No acute change in vision  Ears/Nose/Throat: No purulent rhinorrhea, new hearing loss, or new vertigo  Respiratory: as above  Cardiovascular: See HPI  Gastrointestinal: as above  Genitourinary: No dysuria or hematuria  Musculoskeletal: No new back pain, neck pain or muscle pain  Neurologic: No new headaches, focal weakness or behavior changes  Psychiatric: No hallucinations, excessive alcohol consumption or illegal drug usage  Hematologic/Lymphatic/Immunologic: as above  Endocrine: No new cold intolerance, heat intolerance, polyphagia, polydipsia or polyuria      PHYSICAL EXAMINATION:     /90 (BP Location: Right arm,  "Patient Position: Chair, Cuff Size: Adult Regular)  Pulse 78  Ht 1.727 m (5' 8\")  Wt 77.1 kg (170 lb)  SpO2 97%  BMI 25.85 kg/m2    GENERAL: No acute distress.  HEENT: EOMI. Sclerae white, not injected. Nares clear. Pharynx without erythema or exudate.   Neck: No adenopathy. No thyromegaly. Symmetrical.   Heart: Regular rate and rhythm. Harsh crescendo decrescendo late peaking systolic murmur with radiation to carotids. Delayed carotid pulses.   Lungs: Clear to auscultation. No ronchi, wheezes, rales.   Abdomen: Soft, nontender, nondistended. Bowel sounds present.  Extremities: No clubbing, cyanosis, or edema.   Neurologic: Alert and oriented to person/place/time, normal speech and affect. No focal deficits.  Skin: No petechiae, purpura or rash.     LABORATORY DATA: reviewed     LIPID RESULTS:  Lab Results   Component Value Date    CHOL 146 05/09/2016    HDL 64 05/09/2016    LDL 61 05/09/2016    TRIG 106 05/09/2016       LIVER ENZYME RESULTS:  Lab Results   Component Value Date     (H) 12/13/2017     (H) 12/13/2017       CBC RESULTS:  Lab Results   Component Value Date    WBC 4.9 12/13/2017    RBC 3.98 (L) 12/13/2017    HGB 9.1 (L) 12/13/2017    HCT 30.2 (L) 12/13/2017    MCV 76 (L) 12/13/2017    MCH 22.9 (L) 12/13/2017    MCHC 30.1 (L) 12/13/2017    RDW 19.9 (H) 12/13/2017    PLT 55 (L) 12/13/2017       BMP RESULTS:  Lab Results   Component Value Date     12/13/2017    POTASSIUM 4.0 12/13/2017    CHLORIDE 106 12/13/2017    CO2 21 12/13/2017    ANIONGAP 10 12/13/2017    GLC 86 12/13/2017    BUN 13 12/13/2017    CR 0.89 12/13/2017    GFRESTIMATED 87 12/13/2017    GFRESTBLACK >90 12/13/2017    JOSEPHINE 8.6 12/13/2017        A1C RESULTS:  No results found for: A1C    INR RESULTS:  Lab Results   Component Value Date    INR 1.27 (H) 12/04/2017    INR 1.25 (H) 10/24/2017          PROCEDURES & FURTHER ASSESSMENTS:     ECG: Sinus rhythm with LVH and nonspecific ST changes    Echocardiogram:   Aortic area " of 0.84 cm2, mean gradient 52 mmHg and peak velocity of 4.47 m/sec with LVEF 60% by echocardiogram on 10/24/2017.      CT TAVR:  FINDINGS:     1.  Severely calcified aortic valve. Aortic valve calcium score is  5332. The LVOT is not calcified. The ascending aorta is minimally  calcified, aortic arch is  minimally calcified, the descending  thoracic aorta is minimally calcified. There is no mitral annular  calcification.  2.  There is moderate LVOT calcification.   3.  The arch vessel branching pattern is normal.   4.  The suprarenal abdominal aorta is mildly calcified; infrarenal  abdominal aorta is mildly calcified  5.  Widely patent origins of celiac trunk, superior mesenteric artery  and inferior mesenteric artery.  Single bilateral renal arteries with  widely patent origins.   6.  There is no acute aortic pathology, such as dissection, intramural  hematoma, or contained rupture.      MEASUREMENTS:   Representative dimensions of the thoracoabdominal aorta are as  follows:     1. AORTIC ANNULUS MEASUREMENTS:     1.  The average aortic annulus diameter is 25.0 mm  2.  Aortic annulus area is 4.94 cm2  3.  Aortic annulus perimeter is 81.4 mm  4.  Suggested 3-cusp coaxial angle for aortic valve is (VINITA 0 , CAU 7)  and alternate A-P coaxial angle is (LAO5 , CAU0 ), these  angles will  vary depending upon the patient's body position  5.  Aortic root angle is 46.2  6.  Left main - Annulus distance: 12.6 mm, RCA - Annulus distance:  13.2 mm     2. LVOT MEASUREMENTS:     1.  The average LVOT diameter (measured 4 mm below annular plane) is  24.0 mm  2.  LVOT area is 4.54 cm2  3.  LVOT perimeter is 82.5 mm     3. AORTA MEASUREMENTS     1.  The aortic root at the sinuses of Valsalva (left cusp, right cusp,  non cusp): 35.5 mm x  31.7 mm x 32.0 mm  2.  The sinotubular junction:  32.0 mm x 30.5 mm  3.  Sinotubular junction height: 18.2 mm x 16.3 mm  4.  Proximal ascending aorta: 39.6 mm x 37.9 mm  5.  Distal abdominal aorta  proximal to the bifurcation: 17.0 mm x 16.5  mm  6.  Innominate artery 3 cm from ostium: 11.4 mm x 11.2 mm  7.  Left common carotid artery 3 cm from ostium: 6.44 mm x 5.83 mm     4. ILIOFEMORAL MEASUREMENTS:     RIGHT:  1.  Right common iliac artery: 12.7 mm x 11.6 mm   2.  Right external iliac artery: 10.9 mm x 10.7 mm   3.  Right common femoral artery: 9.42 mm x 9.08 mm   4.  Tortuosity: Moderate   5.  Calcification: mild      LEFT:  1.  Left common iliac artery: 8.22 mm x  8.22 mm  2.  Left external iliac artery: 11.1 mm x 10.6 mm  3.  Left common femoral artery: 11.1 mm x 10.9 mm  4.  Tortuosity: mild   5.  Calcification: mild      5. SUBCLAVIAN MEASUREMENTS:     RIGHT:  1. Minimal luminal diameter: 9.98 mm x  9.59 mm  2. Tortuosity: minimal   3. Calcification: minimal      LEFT:  1. Minimal luminal diameter: 11.4 mm x  10.9 mm  2. Tortuosity: mild   3. Calcification: mild        Coronary Angiogram and Right Heart Catheterization: pending    PFTs: reviewed    Carotid Ultrasound: no significant stenosis      STS RISK SCORE: 0.6% without including his alcoholic liver cirrhosis  NYHA CLASS: II     CLINICAL IMPRESSION:     Ten Johnson is a 57 year old male with symptomatic severe aortic stenosis who is a probable good candidate for transcatheter aortic valve replacement provided his platelets and coagulopathy is under control.     His thrombocytopenia could be from his alcohol intake with bone marrow suppression. However, will need to get hematology input.  Also, extensive discussion with patient on alcohol intake and withdrawal complications were also dicussed with patient. The pre-procedural TAVR evaluation currently requires coronary angiogram and hematology work up prior to presentation to the Heart team for discussion during our multi-disciplinary conference for consensus decision and procedural planning.    Plan Summary:  1) Severe Aortic Stenosis:  -Coronary angiography via radial approach to evaluate  coronaries.  -Consult hematology for input on his platelets and coagulopathy. Also recommendations on improving his platelets and his aspirin intake for TAVR.    -Presentation of data to the Heart team to assess the optimal treatment of his severe aortic stenosis    Patient was evaluated in clinic with Dr. Ernesto Mcdonough of interventional cardiology and Dr. Peters of cardiothoracic surgery.      Chavez Graham MD  Interventional Cardiology Fellow  [phone]    CC  Patient Care Team:  Cuca Celeste MD as PCP - General (Internal Medicine)  Mili Capps MD as MD (Internal Medicine)  Kieran Ulloa MD as MD (Family Practice)  Emma Mendez as Nurse Coordinator (Cardiology)  IGNACIO LEOS    I have seen and examined the patient with the TAVR   team. I agree with the assessment and plan of the note above.I have reviewed pertinent labs.     Ernesto Mcdonough MD  Interventional Cardiology  Pager: [phone]

## 2017-12-21 NOTE — NURSING NOTE
Chief Complaint   Patient presents with     New Patient     EKG: dx AS; 58 yo M, w/ hx of AS; here for evaluation for possible TAVR procedure.

## 2017-12-21 NOTE — PROGRESS NOTES
[facility] HEART [facility]  CARDIOVASCULAR DIVISION    VALVE CLINIC INITIAL CONSULTATION    CONSULTANT CARDIOLOGIST: Dr. Sharonda TSE      PERTINENT CLINICAL HISTORY:     Ten Johnson is a pleasant 57 year old male with severe aortic valvular stenosis referred to our clinic for evaluation and consideration for potential transcatheter aortic valve replacement (TAVR).    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  He has been feeling dizzy and had chest pain for couple of months. He was having lot of cough from his allergies in summer and as per patient it made him have nausea and vomiting. He was having hematemesis and blood in stools. He finally went to hospital in late 10/2017 and was found to have Sarahi Robert tear of Oesophagus with small varices without any active bleeding. He was transfused with 4 units of blood. He still gets dizzy daily. He says when he gets up he gets dizzy. His chest pain has got better since he was treated for anemia.   He has stopped working since his dizziness got worsened couple of months back.   He still drinks alcohol on a regular basis. However, he is reducing it. He is going through a program to help.  He has seen GI and they are following him closely.  He wants to be treated for his valve before he can go to his work. He used to work as  in construction.   He has lost 20 lbs of weight in the past couple of months.       PAST MEDICAL HISTORY:     Past Medical History:   Diagnosis Date     Alcoholic cirrhosis (H)      Allergic rhinitis      Aortic stenosis, moderate      Hepatitis C      Hypertension     essential     Marijuana abuse      OA (osteoarthritis) of knee 4/1/2013     Rotator cuff tear, right         PAST SURGICAL HISTORY:     Past Surgical History:   Procedure Laterality Date     ARTHROSCOPY SHOULDER ROTATOR CUFF REPAIR  7/31/2012    Procedure: ARTHROSCOPY SHOULDER  ROTATOR CUFF REPAIR;  Right Shoulder Arthroscopic Rotator Cuff Repair, BicepsTenodesis,  Subacromial Decompression ;  Surgeon: Joi Castillo MD;  [facility]     ESOPHAGOSCOPY, GASTROSCOPY, DUODENOSCOPY (EGD), COMBINED N/A 10/23/2017    Procedure: COMBINED ESOPHAGOSCOPY, GASTROSCOPY, DUODENOSCOPY (EGD);;  Surgeon: Gentry Salas MD;  [facility]     FACIAL RECONSTRUCTION SURGERY  1971     IRRIGATION AND DEBRIDEMENT UPPER EXTREMITY, COMBINED  1/3/2012    Procedure:COMBINED IRRIGATION AND DEBRIDEMENT UPPER EXTREMITY; Irrigation & Debridement Left Elbow; Surgeon:CRISTHIAN ZHOU; Location:[facility]     REPAIR TENDON TRICEPS UPPER EXTREMITY  11/8/2011    Procedure:REPAIR TENDON TRICEPS UPPER EXTREMITY; Surgeon:CRISTHIAN ZHOU; Location:[facility]     SHOULDER SURGERY  2003    left, injury, torn tendons, hematoma     TRANSPOSITION ULNAR NERVE (ELBOW)  11/8/2011    Procedure:TRANSPOSITION ULNAR NERVE (ELBOW); Final Procedure Done: Left Elbow Lateral Ulnar Collateral Repair And  Left Elbow Triceps Repair          CURRENT MEDICATIONS:     Current Outpatient Prescriptions   Medication Sig Dispense Refill     amoxicillin-clavulanate (AUGMENTIN) 875-125 MG per tablet Take 1 tablet by mouth 2 times daily for 10 days 20 tablet 0     montelukast (SINGULAIR) 10 MG tablet Take 1 tablet (10 mg) by mouth At Bedtime 90 tablet 1     loratadine (CLARITIN) 10 MG tablet Take 1 tablet (10 mg) by mouth daily (Patient not taking: Reported on 12/21/2017) 90 tablet 3     lisinopril (PRINIVIL/ZESTRIL) 10 MG tablet Take 1 tablet (10 mg) by mouth daily 90 tablet 3     fluticasone (FLONASE) 50 MCG/ACT spray Spray 2 sprays into both nostrils daily 16 g 11     Multiple Vitamin (MULTIVITAMINS PO) Take 1 tablet by mouth daily.          ALLERGIES:     Allergies   Allergen Reactions     Zolpidem Other (See Comments)     Alcoholic.  Had reaction 3/17/13 while intoxicated which included black out, loss of awareness,  "paranoia.  Do not prescribe.  Dr. Celeste     Cats Other (See Comments)     rhinitis     Dogs Other (See Comments)     rhinitis     Pollen Extract Other (See Comments)     rhinits.        FAMILY HISTORY:     Family History   Problem Relation Age of Onset     CANCER Mother 62     Alcoholism Paternal Uncle      Cirrhosis No family hx of         SOCIAL HISTORY:     Social History     Social History     Marital status: Single     Spouse name: N/A     Number of children: N/A     Years of education: N/A     Social History Main Topics     Smoking status: Never Smoker     Smokeless tobacco: Never Used     Alcohol use Yes     Drug use: No      Comment: occ marijuana     Sexual activity: Not Currently     Partners: Female     Other Topics Concern     Not on file     Social History Narrative    .  Bicycles a lot.  Excessive alcohol use.  Smokes cigars.  Occasional marijuana use.        REVIEW OF SYSTEMS:     Constitutional: No fevers or chills  Skin: No new rash or itching  Eyes: No acute change in vision  Ears/Nose/Throat: No purulent rhinorrhea, new hearing loss, or new vertigo  Respiratory: as above  Cardiovascular: See HPI  Gastrointestinal: as above  Genitourinary: No dysuria or hematuria  Musculoskeletal: No new back pain, neck pain or muscle pain  Neurologic: No new headaches, focal weakness or behavior changes  Psychiatric: No hallucinations, excessive alcohol consumption or illegal drug usage  Hematologic/Lymphatic/Immunologic: as above  Endocrine: No new cold intolerance, heat intolerance, polyphagia, polydipsia or polyuria      PHYSICAL EXAMINATION:     /90 (BP Location: Right arm, Patient Position: Chair, Cuff Size: Adult Regular)  Pulse 78  Ht 1.727 m (5' 8\")  Wt 77.1 kg (170 lb)  SpO2 97%  BMI 25.85 kg/m2    GENERAL: No acute distress.  HEENT: EOMI. Sclerae white, not injected. Nares clear. Pharynx without erythema or exudate.   Neck: No adenopathy. No thyromegaly. Symmetrical. "   Heart: Regular rate and rhythm. Harsh crescendo decrescendo late peaking systolic murmur with radiation to carotids. Delayed carotid pulses.   Lungs: Clear to auscultation. No ronchi, wheezes, rales.   Abdomen: Soft, nontender, nondistended. Bowel sounds present.  Extremities: No clubbing, cyanosis, or edema.   Neurologic: Alert and oriented to person/place/time, normal speech and affect. No focal deficits.  Skin: No petechiae, purpura or rash.     LABORATORY DATA: reviewed     LIPID RESULTS:  Lab Results   Component Value Date    CHOL 146 05/09/2016    HDL 64 05/09/2016    LDL 61 05/09/2016    TRIG 106 05/09/2016       LIVER ENZYME RESULTS:  Lab Results   Component Value Date     (H) 12/13/2017     (H) 12/13/2017       CBC RESULTS:  Lab Results   Component Value Date    WBC 4.9 12/13/2017    RBC 3.98 (L) 12/13/2017    HGB 9.1 (L) 12/13/2017    HCT 30.2 (L) 12/13/2017    MCV 76 (L) 12/13/2017    MCH 22.9 (L) 12/13/2017    MCHC 30.1 (L) 12/13/2017    RDW 19.9 (H) 12/13/2017    PLT 55 (L) 12/13/2017       BMP RESULTS:  Lab Results   Component Value Date     12/13/2017    POTASSIUM 4.0 12/13/2017    CHLORIDE 106 12/13/2017    CO2 21 12/13/2017    ANIONGAP 10 12/13/2017    GLC 86 12/13/2017    BUN 13 12/13/2017    CR 0.89 12/13/2017    GFRESTIMATED 87 12/13/2017    GFRESTBLACK >90 12/13/2017    JOSEPHINE 8.6 12/13/2017        A1C RESULTS:  No results found for: A1C    INR RESULTS:  Lab Results   Component Value Date    INR 1.27 (H) 12/04/2017    INR 1.25 (H) 10/24/2017          PROCEDURES & FURTHER ASSESSMENTS:     ECG: Sinus rhythm with LVH and nonspecific ST changes    Echocardiogram:   Aortic area of 0.84 cm2, mean gradient 52 mmHg and peak velocity of 4.47 m/sec with LVEF 60% by echocardiogram on 10/24/2017.      CT TAVR:  FINDINGS:     1.  Severely calcified aortic valve. Aortic valve calcium score is  5332. The LVOT is not calcified. The ascending aorta is minimally  calcified, aortic arch is   minimally calcified, the descending  thoracic aorta is minimally calcified. There is no mitral annular  calcification.  2.  There is moderate LVOT calcification.   3.  The arch vessel branching pattern is normal.   4.  The suprarenal abdominal aorta is mildly calcified; infrarenal  abdominal aorta is mildly calcified  5.  Widely patent origins of celiac trunk, superior mesenteric artery  and inferior mesenteric artery.  Single bilateral renal arteries with  widely patent origins.   6.  There is no acute aortic pathology, such as dissection, intramural  hematoma, or contained rupture.      MEASUREMENTS:   Representative dimensions of the thoracoabdominal aorta are as  follows:     1. AORTIC ANNULUS MEASUREMENTS:     1.  The average aortic annulus diameter is 25.0 mm  2.  Aortic annulus area is 4.94 cm2  3.  Aortic annulus perimeter is 81.4 mm  4.  Suggested 3-cusp coaxial angle for aortic valve is (VINITA 0 , CAU 7)  and alternate A-P coaxial angle is (LAO5 , CAU0 ), these  angles will  vary depending upon the patient's body position  5.  Aortic root angle is 46.2  6.  Left main - Annulus distance: 12.6 mm, RCA - Annulus distance:  13.2 mm     2. LVOT MEASUREMENTS:     1.  The average LVOT diameter (measured 4 mm below annular plane) is  24.0 mm  2.  LVOT area is 4.54 cm2  3.  LVOT perimeter is 82.5 mm     3. AORTA MEASUREMENTS     1.  The aortic root at the sinuses of Valsalva (left cusp, right cusp,  non cusp): 35.5 mm x  31.7 mm x 32.0 mm  2.  The sinotubular junction:  32.0 mm x 30.5 mm  3.  Sinotubular junction height: 18.2 mm x 16.3 mm  4.  Proximal ascending aorta: 39.6 mm x 37.9 mm  5.  Distal abdominal aorta proximal to the bifurcation: 17.0 mm x 16.5  mm  6.  Innominate artery 3 cm from ostium: 11.4 mm x 11.2 mm  7.  Left common carotid artery 3 cm from ostium: 6.44 mm x 5.83 mm     4. ILIOFEMORAL MEASUREMENTS:     RIGHT:  1.  Right common iliac artery: 12.7 mm x 11.6 mm   2.  Right external iliac artery: 10.9  mm x 10.7 mm   3.  Right common femoral artery: 9.42 mm x 9.08 mm   4.  Tortuosity: Moderate   5.  Calcification: mild      LEFT:  1.  Left common iliac artery: 8.22 mm x  8.22 mm  2.  Left external iliac artery: 11.1 mm x 10.6 mm  3.  Left common femoral artery: 11.1 mm x 10.9 mm  4.  Tortuosity: mild   5.  Calcification: mild      5. SUBCLAVIAN MEASUREMENTS:     RIGHT:  1. Minimal luminal diameter: 9.98 mm x  9.59 mm  2. Tortuosity: minimal   3. Calcification: minimal      LEFT:  1. Minimal luminal diameter: 11.4 mm x  10.9 mm  2. Tortuosity: mild   3. Calcification: mild        Coronary Angiogram and Right Heart Catheterization: pending    PFTs: reviewed    Carotid Ultrasound: no significant stenosis      STS RISK SCORE: 0.6% without including his alcoholic liver cirrhosis  NYHA CLASS: II     CLINICAL IMPRESSION:     Ten Johnson is a 57 year old male with symptomatic severe aortic stenosis who is a probable good candidate for transcatheter aortic valve replacement provided his platelets and coagulopathy is under control.     His thrombocytopenia could be from his alcohol intake with bone marrow suppression. However, will need to get hematology input.  Also, extensive discussion with patient on alcohol intake and withdrawal complications were also dicussed with patient. The pre-procedural TAVR evaluation currently requires coronary angiogram and hematology work up prior to presentation to the Heart team for discussion during our multi-disciplinary conference for consensus decision and procedural planning.    Plan Summary:  1) Severe Aortic Stenosis:  -Coronary angiography via radial approach to evaluate coronaries.  -Consult hematology for input on his platelets and coagulopathy. Also recommendations on improving his platelets and his aspirin intake for TAVR.    -Presentation of data to the Heart team to assess the optimal treatment of his severe aortic stenosis    Patient was evaluated in clinic with   Ernesto Mcdonough of interventional cardiology and Dr. Peters of cardiothoracic surgery.      Chavez Graham MD  Interventional Cardiology Fellow  [phone]    CC  Patient Care Team:  Cuca Celeste MD as PCP - General (Internal Medicine)  Mili Capps MD as MD (Internal Medicine)  Kieran Ulloa MD as MD (Family Practice)  Emma Mendez as Nurse Coordinator (Cardiology)  IGNACIO LEOS    I have seen and examined the patient with the TAVR   team. I agree with the assessment and plan of the note above.I have reviewed pertinent labs.     Ernesto Mcdonough MD  Interventional Cardiology  Pager: [phone]

## 2017-12-21 NOTE — MR AVS SNAPSHOT
After Visit Summary   12/21/2017    Ten Johnson    MRN: [id number]           Patient Information     Date Of Birth          1960        Visit Information        Provider Department      12/21/2017 3:15 PM  CV VALVE CLINIC [facility]        Today's Diagnoses     Thrombocytopenia (H)    -  1    Severe aortic stenosis        Coronary artery disease involving native coronary artery, angina presence unspecified, unspecified whether native or transplanted heart          Care Instructions    You were seen today in the Cardiovascular Clinic at the [facility].      Cardiology Providers you saw during your visit:  Dr. Mcdonough and Dr. Peters    Recommendations:  TAVR    Follow-up:      1.  Hematology Referral/Consult    2.  Coronary Angiogram  You will be scheduled for a Coronary Angiogram at the [facility], [address] [place] [address]. You are to check in at the [facility] Waiting Area.     Pre procedure instructions:  1. Please make arrangements to have a  as you will not be allowed to drive following the procedure.  2. Do not eat or drink after midnight the day of your procedure.  3. You may take your usual morning medications with a sip of water the morning of your procedure.  4. Take a 81 mg aspirin the day before and the day of your procedure.  5. Make arrangements to have someone stay with you the night after your procedure.      Please call me if you have questions regarding these instructions or your scheduled procedure.        Nursing questions: [phone] option 1 then chose option 3 for the triage nurse, and then ask to speak with Ira.  For emergencies call 911.    It was a pleasure to see you in clinic.  If you have any questions at all, please feel free to give me a call.      Ira Mendez RN  Structural Heart Care Coordinator   TAVR and MitraClip Programs  [facility]  Office:  [phone]    [facility]  [address] [place] [address]  Cardiology Clinic [address]  [place] [address]              Follow-ups after your visit        Additional Services     ONC/HEME ADULT REFERRAL       Your provider has referred you to: [facility] [place] [phone]   [email or web address]    Please be aware that coverage of these services is subject to the terms and limitations of your health insurance plan.  Call member services at your health plan with any benefit or coverage questions.      Please bring the following with you to your appointment:    (1) Any X-Rays, CTs or MRIs which have been performed.  Contact the facility where they were done to arrange for  prior to your scheduled appointment.   (2) List of current medications  (3) This referral request   (4) Any documents/labs given to you for this referral                  Your next 10 appointments already scheduled     Jun 04, 2018  8:30 AM CDT   Lab with  LAB   [facility] ([facility])    [address] [place] [id number] [phone]            Jun 04, 2018  9:20 AM CDT   (Arrive by 9:05 AM)   Return General Liver with Gentry San MD   [facility] Hepatology ([facility])    [address] [place] [id number] [phone]              Future tests that were ordered for you today     Open Future Orders        Priority Expected Expires Ordered    Cardiac Cath: Coronary Angiography Adult Order Routine 12/28/2017 3/28/2018 12/21/2017            Who to contact     If you have questions or need follow up information about today's clinic visit or your schedule please contact [facility] HEART [facility] directly at [phone].  Normal or non-critical lab and imaging results will be communicated to you by MyChart, letter or phone  "within 4 business days after the clinic has received the results. If you do not hear from us within 7 days, please contact the clinic through [employer] or phone. If you have a critical or abnormal lab result, we will notify you by phone as soon as possible.  Submit refill requests through [employer] or call your pharmacy and they will forward the refill request to us. Please allow 3 business days for your refill to be completed.          Additional Information About Your Visit        [employer] Information     [employer] lets you send messages to your doctor, view your test results, renew your prescriptions, schedule appointments and more. To sign up, go to [email or web address] . Click on \"Log in\" on the left side of the screen, which will take you to the Welcome page. Then click on \"Sign up Now\" on the right side of the page.     You will be asked to enter the access code listed below, as well as some personal information. Please follow the directions to create your username and password.     Your access code is: [email or web address]  Expires: 3/8/2018  6:30 AM     Your access code will  in 90 days. If you need help or a new code, please call your [place] clinic or [phone].        Care EveryWhere ID     This is your Care EveryWhere ID. This could be used by other organizations to access your [place] medical records  [id number]        Your Vitals Were     Pulse Height Pulse Oximetry BMI (Body Mass Index)          78 1.727 m (5' 8\") 97% 25.85 kg/m2         Blood Pressure from Last 3 Encounters:   17 143/90   17 143/90   17 (!) 135/92    Weight from Last 3 Encounters:   17 77.1 kg (170 lb)   17 77.1 kg (170 lb)   17 80.6 kg (177 lb 12.8 oz)              We Performed the Following     EKG 12-lead, tracing only (Future)     ONC/HEME ADULT REFERRAL        Primary Care Provider Office Phone # Fax #    Cuca Celeste [phone]       [address] [place] [address] " [phone]  [facility] [address]        Equal Access to Services     [facility] : Hadii adrienne gracia faith Reyes, waaxda luqadaha, qaybta kaalmacarline leosrellcarline, jazmin tolbertsanjusherri thompson. So [facility] [phone].    ATENCIÓN: Si habla español, tiene a ha disposición servicios gratuitos de asistencia lingüística. Reneame al [phone].    We comply with applicable federal civil rights laws and [place] laws. We do not discriminate on the basis of race, color, national origin, age, disability, sex, sexual orientation, or gender identity.            Thank you!     Thank you for choosing [facility]  for your care. Our goal is always to provide you with excellent care. Hearing back from our patients is one way we can continue to improve our services. Please take a few minutes to complete the written survey that you may receive in the mail after your visit with us. Thank you!             Your Updated Medication List - Protect others around you: Learn how to safely use, store and throw away your medicines at www.disposemymeds.org.          This list is accurate as of: 12/21/17  4:33 PM.  Always use your most recent med list.                   Brand Name Dispense Instructions for use Diagnosis    amoxicillin-clavulanate 875-125 MG per tablet    AUGMENTIN    20 tablet    Take 1 tablet by mouth 2 times daily for 10 days    Cough       fluticasone 50 MCG/ACT spray    FLONASE    16 g    Spray 2 sprays into both nostrils daily    Cough, Post-nasal drip       lisinopril 10 MG tablet    PRINIVIL/ZESTRIL    90 tablet    Take 1 tablet (10 mg) by mouth daily    Essential hypertension       loratadine 10 MG tablet    CLARITIN    90 tablet    Take 1 tablet (10 mg) by mouth daily    Post-nasal drip, Cough       montelukast 10 MG tablet    SINGULAIR    90 tablet    Take 1 tablet (10 mg) by mouth At Bedtime    Other chronic rhinitis       MULTIVITAMINS PO      Take 1 tablet by mouth daily.

## 2017-12-21 NOTE — NURSING NOTE
Left Heart Catheterization: Patient given Coronary Angiogram and Angioplasty: A Patient's Guide booklet. Patient was instructed regarding left heart catheterization, including discussion of the indication, procedure, preparation, intra-procedural steps, and recovery at home. Patient demonstrated understanding of this information and agreed to call with further questions or concerns.  Med Reconcile: Reviewed and verified all current medications with the patient. The updated medication list was printed and given to the patient.  Return Appointment: Patient given instructions regarding scheduling next clinic visit. Patient demonstrated understanding of this information and agreed to call with further questions or concerns.  Patient stated he understood all health information given and agreed to call with further questions or concerns.

## 2017-12-21 NOTE — LETTER
12/21/2017      RE: Ten Johnson  [address]  [facility] [place] [address]       Dear Colleague,    Thank you for the opportunity to participate in the care of your patient, Ten Johnson, at the [facility]. Please see a copy of my visit note below.    ASKED BY REFERRING PHYSICIAN: Dr. Celeste to see patient in consultation of surgical treatment of severe AS    CHIEF COMPLAINT: angina and dizziness    HPI: Ten is a 57 year old male who presents with Ten Johnson is a pleasant 57 year old male with severe aortic valvular stenosis referred to our clinic for evaluation and consideration for potential transcatheter aortic valve replacement (TAVR).    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  He has been feeling dizzy and had chest pain for couple of months. He was having lot of cough from his allergies in summer and as per patient it made him have nausea and vomiting. He was having hematemesis and blood in stools. He finally went to hospital in late 10/2017 and was found to have Sarahi Robert tear of Oesophagus with small varices without any active bleeding. He was transfused with 4 units of blood. He still gets dizzy daily. He says when he gets up he gets dizzy. His chest pain has got better since he was treated for anemia.     He has stopped working since his dizziness got worsened couple of months back.   He still drinks alcohol on a regular basis. However, he is reducing it. He is going through a program to help.  He has seen GI and they are following him closely.  He wants to be treated for his valve before he can go to his work. He used to work as  in construction.       PAST MEDICAL HISTORY:  Past Medical History:   Diagnosis Date     Alcoholic cirrhosis (H)      Allergic rhinitis      Aortic stenosis, moderate      Hepatitis C      Hypertension      essential     Marijuana abuse      OA (osteoarthritis) of knee 4/1/2013     Rotator cuff tear, right        PAST SURGICAL HISTORY:  Past Surgical History:   Procedure Laterality Date     ARTHROSCOPY SHOULDER ROTATOR CUFF REPAIR  7/31/2012    Procedure: ARTHROSCOPY SHOULDER ROTATOR CUFF REPAIR;  Right Shoulder Arthroscopic Rotator Cuff Repair, BicepsTenodesis,  Subacromial Decompression ;  Surgeon: Joi Castillo MD;  [facility]     ESOPHAGOSCOPY, GASTROSCOPY, DUODENOSCOPY (EGD), COMBINED N/A 10/23/2017    Procedure: COMBINED ESOPHAGOSCOPY, GASTROSCOPY, DUODENOSCOPY (EGD);;  Surgeon: Gentry Salas MD;  Location:  GI     FACIAL RECONSTRUCTION SURGERY  1971     IRRIGATION AND DEBRIDEMENT UPPER EXTREMITY, COMBINED  1/3/2012    Procedure:COMBINED IRRIGATION AND DEBRIDEMENT UPPER EXTREMITY; Irrigation & Debridement Left Elbow; Surgeon:CRISTHIAN ZHOU; [facility]     REPAIR TENDON TRICEPS UPPER EXTREMITY  11/8/2011    Procedure:REPAIR TENDON TRICEPS UPPER EXTREMITY; Surgeon:CRISTHIAN ZHOU; [facility]     SHOULDER SURGERY  2003    left, injury, torn tendons, hematoma     TRANSPOSITION ULNAR NERVE (ELBOW)  11/8/2011    Procedure:TRANSPOSITION ULNAR NERVE (ELBOW); Final Procedure Done: Left Elbow Lateral Ulnar Collateral Repair And  Left Elbow Triceps Repair         FAMILY HISTORY:   Family History   Problem Relation Age of Onset     CANCER Mother 62     Alcoholism Paternal Uncle      Cirrhosis No family hx of        SOCIAL HISTORY:  Social History     Social History     Marital status: Single     Spouse name: N/A     Number of children: N/A     Years of education: N/A     Occupational History     Not on file.     Social History Main Topics     Smoking status: Never Smoker     Smokeless tobacco: Never Used     Alcohol use Yes     Drug use: No      Comment: occ marijuana     Sexual activity: Not Currently     Partners: Female     Other Topics Concern     Not on file  "    Social History Narrative    .  Bicycles a lot.  Excessive alcohol use.  Smokes cigars.  Occasional marijuana use.        ALLERGIES:   Allergies   Allergen Reactions     Zolpidem Other (See Comments)     Alcoholic.  Had reaction 3/17/13 while intoxicated which included black out, loss of awareness, paranoia.  Do not prescribe.  Dr. Celeste     Cats Other (See Comments)     rhinitis     Dogs Other (See Comments)     rhinitis     Pollen Extract Other (See Comments)     rhinits.       CURRENT MEDICATIONS:   Prescription Medications as of 1/22/2018             montelukast (SINGULAIR) 10 MG tablet Take 1 tablet (10 mg) by mouth At Bedtime    loratadine (CLARITIN) 10 MG tablet Take 1 tablet (10 mg) by mouth daily    lisinopril (PRINIVIL/ZESTRIL) 10 MG tablet Take 1 tablet (10 mg) by mouth daily    fluticasone (FLONASE) 50 MCG/ACT spray Spray 2 sprays into both nostrils daily    Multiple Vitamin (MULTIVITAMINS PO) Take 1 tablet by mouth daily.          REVIEW OF SYSTEMS:   Gen: denies frequent headaches, double/blurry vision, insomnia, fatigue, unexplained weight loss/gain. No previous anesthesia reactions.  CV: denies chest pain, shortness of breath, palpitations, peripheral edema.   Pulm: denies shortness of breath, asthma or wheezing  GI/: denies liver or kidney problems, blood in stool or BRBPR, difficulty urinating  Endo: denies thyroid problems or Diabetes  Heme/Onc: denies bleeding or clotting disorders, no family problems with bleeding/clotting diorders  MS: no weakness, tremors or gait instability  Neuro: denies depression, memory problems, no dysesthesias, no previous strokes, no migraines, no dysphagia  Skin: No petechiae, purpura or rash.    PHYSICAL EXAMINATION:   /90 (BP Location: Right arm, Cuff Size: Adult Regular)  Pulse 78  Ht 1.727 m (5' 8\")  Wt 77.1 kg (170 lb)  SpO2 97%  BMI 25.85 kg/m2  General: alert and oriented x 3, pleasant, no acute distress  CV: S1 S2, " there is systolic murmur; no rubs or gallops, regular rate and rhythm, no peripheral edema, no carotid or abdomenal bruits, pulses in upper and lower extremities palpable  Pulm: bilateral breath sounds, clear to auscultation, easy work of breathing  GI: (+) bowel sounds, soft non-tender and non-distended  : voiding without problems  MS: moves all extremities x 4,  5+/5+ equal strength bilaterally  Neuro: pupils equal round and reactive to light, cranial nerves, II-XII grossly intact, no gross neurologic deficits noted    LABS:  BMP RESULTS:  Lab Results   Component Value Date     12/13/2017    POTASSIUM 4.0 12/13/2017    CHLORIDE 106 12/13/2017    CO2 21 12/13/2017    ANIONGAP 10 12/13/2017    GLC 86 12/13/2017    BUN 13 12/13/2017    CR 0.89 12/13/2017    GFRESTIMATED 87 12/13/2017    GFRESTBLACK >90 12/13/2017    JOSEPHINE 8.6 12/13/2017        CBC RESULTS:  Lab Results   Component Value Date    WBC 4.9 12/13/2017    RBC 3.98 (L) 12/13/2017    HGB 9.1 (L) 12/13/2017    HCT 30.2 (L) 12/13/2017    MCV 76 (L) 12/13/2017    MCH 22.9 (L) 12/13/2017    MCHC 30.1 (L) 12/13/2017    RDW 19.9 (H) 12/13/2017    PLT 55 (L) 12/13/2017       Lab Results   Component Value Date    INR 1.27 (H) 12/04/2017     Lab Results   Component Value Date    PTT 29 02/05/2010     No results found for: UA  No results found for: A1C    PROCEDURES/IMAGING:  ECG: Sinus rhythm with LVH and nonspecific ST changes     Echocardiogram:   Aortic area of 0.84 cm2, mean gradient 52 mmHg and peak velocity of 4.47 m/sec with LVEF 60% by echocardiogram on 10/24/2017.      STS RISK SCORE: 0.6% without including his alcoholic liver cirrhosis  NYHA CLASS: II    ASSESSMENT/PLAN:     Ten is a 57 year old male who presents with symptomatic severe aortic stenosis who is a not a good surgical AVR candidate due to alcoholic liver cirrhosis and thrombocytopenia.  He is probably a reasonable candidate for transcatheter aortic valve replacement provided his  platelets and coagulopathy is under reasonable control.      His thrombocytopenia could be from his alcohol intake with bone marrow suppression. However, will need to get hematology input.    Also, extensive discussion with patient on alcohol intake and withdrawal complications were also dicussed with patient.     The pre-procedural TAVR evaluation currently requires coronary angiogram and hematology work up prior to presentation to the Heart team for discussion during our multi-disciplinary conference for consensus decision and procedural planning.     Plan Summary:  1) Severe Aortic Stenosis:  -Coronary angiography via radial approach to evaluate coronaries.  -Consult hematology for input on his platelets and coagulopathy. Also recommendations on improving his platelets and his aspirin intake for TAVR.    CC  Patient Care Team:  Cuca Celeste MD as PCP - General (Internal Medicine)  Mili Capps MD as MD (Internal Medicine)  Kieran Ulloa MD as MD (Family Practice)  Emma Mendez as Nurse Coordinator (Cardiology)  IGNACIO LEOS

## 2017-12-22 LAB — INTERPRETATION ECG - MUSE: NORMAL

## 2018-01-17 DIAGNOSIS — I35.0 SEVERE AORTIC STENOSIS: Primary | ICD-10-CM

## 2018-01-22 NOTE — PROGRESS NOTES
ASKED BY REFERRING PHYSICIAN: Dr. Celeste to see patient in consultation of surgical treatment of severe AS    CHIEF COMPLAINT: angina and dizziness    HPI: Ten is a 57 year old male who presents with Ten Johnson is a pleasant 57 year old male with severe aortic valvular stenosis referred to our clinic for evaluation and consideration for potential transcatheter aortic valve replacement (TAVR).    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  He has been feeling dizzy and had chest pain for couple of months. He was having lot of cough from his allergies in summer and as per patient it made him have nausea and vomiting. He was having hematemesis and blood in stools. He finally went to hospital in late 10/2017 and was found to have Sarahi Robert tear of Oesophagus with small varices without any active bleeding. He was transfused with 4 units of blood. He still gets dizzy daily. He says when he gets up he gets dizzy. His chest pain has got better since he was treated for anemia.     He has stopped working since his dizziness got worsened couple of months back.   He still drinks alcohol on a regular basis. However, he is reducing it. He is going through a program to help.  He has seen GI and they are following him closely.  He wants to be treated for his valve before he can go to his work. He used to work as  in construction.       PAST MEDICAL HISTORY:  Past Medical History:   Diagnosis Date     Alcoholic cirrhosis (H)      Allergic rhinitis      Aortic stenosis, moderate      Hepatitis C      Hypertension     essential     Marijuana abuse      OA (osteoarthritis) of knee 4/1/2013     Rotator cuff tear, right        PAST SURGICAL HISTORY:  Past Surgical History:   Procedure Laterality Date     ARTHROSCOPY SHOULDER ROTATOR CUFF REPAIR  7/31/2012    Procedure: ARTHROSCOPY SHOULDER ROTATOR CUFF REPAIR;  Right  Shoulder Arthroscopic Rotator Cuff Repair, BicepsTenodesis,  Subacromial Decompression ;  Surgeon: Joi Castillo MD;  [facility]     ESOPHAGOSCOPY, GASTROSCOPY, DUODENOSCOPY (EGD), COMBINED N/A 10/23/2017    Procedure: COMBINED ESOPHAGOSCOPY, GASTROSCOPY, DUODENOSCOPY (EGD);;  Surgeon: Gentry Salas MD;  [facility]     FACIAL RECONSTRUCTION SURGERY  1971     IRRIGATION AND DEBRIDEMENT UPPER EXTREMITY, COMBINED  1/3/2012    Procedure:COMBINED IRRIGATION AND DEBRIDEMENT UPPER EXTREMITY; Irrigation & Debridement Left Elbow; Surgeon:CRISTHIAN ZHOU; [facility]     REPAIR TENDON TRICEPS UPPER EXTREMITY  11/8/2011    Procedure:REPAIR TENDON TRICEPS UPPER EXTREMITY; Surgeon:CRISTHIAN ZHOU; [facility]     SHOULDER SURGERY  2003    left, injury, torn tendons, hematoma     TRANSPOSITION ULNAR NERVE (ELBOW)  11/8/2011    Procedure:TRANSPOSITION ULNAR NERVE (ELBOW); Final Procedure Done: Left Elbow Lateral Ulnar Collateral Repair And  Left Elbow Triceps Repair         FAMILY HISTORY:   Family History   Problem Relation Age of Onset     CANCER Mother 62     Alcoholism Paternal Uncle      Cirrhosis No family hx of        SOCIAL HISTORY:  Social History     Social History     Marital status: Single     Spouse name: N/A     Number of children: N/A     Years of education: N/A     Occupational History     Not on file.     Social History Main Topics     Smoking status: Never Smoker     Smokeless tobacco: Never Used     Alcohol use Yes     Drug use: No      Comment: occ marijuana     Sexual activity: Not Currently     Partners: Female     Other Topics Concern     Not on file     Social History Narrative    .  Bicycles a lot.  Excessive alcohol use.  Smokes cigars.  Occasional marijuana use.        ALLERGIES:   Allergies   Allergen Reactions     Zolpidem Other (See Comments)     Alcoholic.  Had reaction 3/17/13 while intoxicated which included black out, loss  "of awareness, paranoia.  Do not prescribe.  Dr. Celeste     Cats Other (See Comments)     rhinitis     Dogs Other (See Comments)     rhinitis     Pollen Extract Other (See Comments)     rhinits.       CURRENT MEDICATIONS:   Prescription Medications as of 1/22/2018             montelukast (SINGULAIR) 10 MG tablet Take 1 tablet (10 mg) by mouth At Bedtime    loratadine (CLARITIN) 10 MG tablet Take 1 tablet (10 mg) by mouth daily    lisinopril (PRINIVIL/ZESTRIL) 10 MG tablet Take 1 tablet (10 mg) by mouth daily    fluticasone (FLONASE) 50 MCG/ACT spray Spray 2 sprays into both nostrils daily    Multiple Vitamin (MULTIVITAMINS PO) Take 1 tablet by mouth daily.          REVIEW OF SYSTEMS:   Gen: denies frequent headaches, double/blurry vision, insomnia, fatigue, unexplained weight loss/gain. No previous anesthesia reactions.  CV: denies chest pain, shortness of breath, palpitations, peripheral edema.   Pulm: denies shortness of breath, asthma or wheezing  GI/: denies liver or kidney problems, blood in stool or BRBPR, difficulty urinating  Endo: denies thyroid problems or Diabetes  Heme/Onc: denies bleeding or clotting disorders, no family problems with bleeding/clotting diorders  MS: no weakness, tremors or gait instability  Neuro: denies depression, memory problems, no dysesthesias, no previous strokes, no migraines, no dysphagia  Skin: No petechiae, purpura or rash.    PHYSICAL EXAMINATION:   /90 (BP Location: Right arm, Cuff Size: Adult Regular)  Pulse 78  Ht 1.727 m (5' 8\")  Wt 77.1 kg (170 lb)  SpO2 97%  BMI 25.85 kg/m2  General: alert and oriented x 3, pleasant, no acute distress  CV: S1 S2, there is systolic murmur; no rubs or gallops, regular rate and rhythm, no peripheral edema, no carotid or abdomenal bruits, pulses in upper and lower extremities palpable  Pulm: bilateral breath sounds, clear to auscultation, easy work of breathing  GI: (+) bowel sounds, soft non-tender and non-distended  : " voiding without problems  MS: moves all extremities x 4,  5+/5+ equal strength bilaterally  Neuro: pupils equal round and reactive to light, cranial nerves, II-XII grossly intact, no gross neurologic deficits noted    LABS:  BMP RESULTS:  Lab Results   Component Value Date     12/13/2017    POTASSIUM 4.0 12/13/2017    CHLORIDE 106 12/13/2017    CO2 21 12/13/2017    ANIONGAP 10 12/13/2017    GLC 86 12/13/2017    BUN 13 12/13/2017    CR 0.89 12/13/2017    GFRESTIMATED 87 12/13/2017    GFRESTBLACK >90 12/13/2017    JOSEPHINE 8.6 12/13/2017        CBC RESULTS:  Lab Results   Component Value Date    WBC 4.9 12/13/2017    RBC 3.98 (L) 12/13/2017    HGB 9.1 (L) 12/13/2017    HCT 30.2 (L) 12/13/2017    MCV 76 (L) 12/13/2017    MCH 22.9 (L) 12/13/2017    MCHC 30.1 (L) 12/13/2017    RDW 19.9 (H) 12/13/2017    PLT 55 (L) 12/13/2017       Lab Results   Component Value Date    INR 1.27 (H) 12/04/2017     Lab Results   Component Value Date    PTT 29 02/05/2010     No results found for: UA  No results found for: A1C    PROCEDURES/IMAGING:  ECG: Sinus rhythm with LVH and nonspecific ST changes     Echocardiogram:   Aortic area of 0.84 cm2, mean gradient 52 mmHg and peak velocity of 4.47 m/sec with LVEF 60% by echocardiogram on 10/24/2017.      STS RISK SCORE: 0.6% without including his alcoholic liver cirrhosis  NYHA CLASS: II    ASSESSMENT/PLAN:     Ten is a 57 year old male who presents with symptomatic severe aortic stenosis who is a not a good surgical AVR candidate due to alcoholic liver cirrhosis and thrombocytopenia.  He is probably a reasonable candidate for transcatheter aortic valve replacement provided his platelets and coagulopathy is under reasonable control.      His thrombocytopenia could be from his alcohol intake with bone marrow suppression. However, will need to get hematology input.    Also, extensive discussion with patient on alcohol intake and withdrawal complications were also dicussed with patient.      The pre-procedural TAVR evaluation currently requires coronary angiogram and hematology work up prior to presentation to the Heart team for discussion during our multi-disciplinary conference for consensus decision and procedural planning.     Plan Summary:  1) Severe Aortic Stenosis:  -Coronary angiography via radial approach to evaluate coronaries.  -Consult hematology for input on his platelets and coagulopathy. Also recommendations on improving his platelets and his aspirin intake for TAVR.    CC  Patient Care Team:  Cuca Celeste MD as PCP - General (Internal Medicine)  Mili Capps MD as MD (Internal Medicine)  Kieran Ulloa MD as MD (Family Practice)  Emma Mendez as Nurse Coordinator (Cardiology)  IGNACIO LEOS

## 2018-01-31 NOTE — PROGRESS NOTES
Contacted pt for reminder regarding coronary angiogram and right heart cath scheduled for Friday, February 2nd. Reviewed pre-procedure instructions including need for transportation post-procedure. Pt denied further questions.

## 2018-02-01 ENCOUNTER — ONCOLOGY VISIT (OUTPATIENT)
Dept: ONCOLOGY | Facility: CLINIC | Age: 58
End: 2018-02-01
Attending: INTERNAL MEDICINE
Payer: COMMERCIAL

## 2018-02-01 VITALS
WEIGHT: 181.1 LBS | OXYGEN SATURATION: 98 % | HEART RATE: 72 BPM | BODY MASS INDEX: 27.45 KG/M2 | HEIGHT: 68 IN | RESPIRATION RATE: 16 BRPM | TEMPERATURE: 97 F | DIASTOLIC BLOOD PRESSURE: 87 MMHG | SYSTOLIC BLOOD PRESSURE: 159 MMHG

## 2018-02-01 DIAGNOSIS — I35.0 SEVERE AORTIC STENOSIS: ICD-10-CM

## 2018-02-01 DIAGNOSIS — D69.6 THROMBOCYTOPENIA (H): ICD-10-CM

## 2018-02-01 DIAGNOSIS — D64.9 ANEMIA, UNSPECIFIED TYPE: Primary | ICD-10-CM

## 2018-02-01 LAB
ANION GAP SERPL CALCULATED.3IONS-SCNC: 8 MMOL/L (ref 3–14)
BASOPHILS # BLD AUTO: 0 10E9/L (ref 0–0.2)
BASOPHILS NFR BLD AUTO: 0.6 %
BUN SERPL-MCNC: 14 MG/DL (ref 7–30)
CALCIUM SERPL-MCNC: 8.6 MG/DL (ref 8.5–10.1)
CHLORIDE SERPL-SCNC: 106 MMOL/L (ref 94–109)
CO2 SERPL-SCNC: 26 MMOL/L (ref 20–32)
CREAT SERPL-MCNC: 0.96 MG/DL (ref 0.66–1.25)
DIFFERENTIAL METHOD BLD: NORMAL
EOSINOPHIL # BLD AUTO: 0.1 10E9/L (ref 0–0.7)
EOSINOPHIL NFR BLD AUTO: 1.6 %
ERYTHROCYTE [DISTWIDTH] IN BLOOD BY AUTOMATED COUNT: 21.3 % (ref 10–15)
FERRITIN SERPL-MCNC: 48 NG/ML (ref 26–388)
GFR SERPL CREATININE-BSD FRML MDRD: 80 ML/MIN/1.7M2
GLUCOSE SERPL-MCNC: 143 MG/DL (ref 70–99)
HCT VFR BLD AUTO: 32.6 % (ref 40–53)
HGB BLD-MCNC: 10 G/DL (ref 13.3–17.7)
IMM GRANULOCYTES # BLD: 0 10E9/L (ref 0–0.4)
IMM GRANULOCYTES NFR BLD: 0.2 %
INR PPP: 1.2 (ref 0.86–1.14)
LYMPHOCYTES # BLD AUTO: 1.7 10E9/L (ref 0.8–5.3)
LYMPHOCYTES NFR BLD AUTO: 33.9 %
MCH RBC QN AUTO: 22.9 PG (ref 26.5–33)
MCHC RBC AUTO-ENTMCNC: 30.7 G/DL (ref 31.5–36.5)
MCV RBC AUTO: 75 FL (ref 78–100)
MONOCYTES # BLD AUTO: 0.7 10E9/L (ref 0–1.3)
MONOCYTES NFR BLD AUTO: 13.3 %
NEUTROPHILS # BLD AUTO: 2.6 10E9/L (ref 1.6–8.3)
NEUTROPHILS NFR BLD AUTO: 50.4 %
NRBC # BLD AUTO: 0 10*3/UL
NRBC BLD AUTO-RTO: 0 /100
PLATELET # BLD AUTO: 97 10E9/L (ref 150–450)
POTASSIUM SERPL-SCNC: 4 MMOL/L (ref 3.4–5.3)
RBC # BLD AUTO: 4.36 10E12/L (ref 4.4–5.9)
RETICS # AUTO: 71 10E9/L (ref 25–95)
RETICS/RBC NFR AUTO: 1.6 % (ref 0.5–2)
SODIUM SERPL-SCNC: 140 MMOL/L (ref 133–144)
WBC # BLD AUTO: 5.1 10E9/L (ref 4–11)

## 2018-02-01 PROCEDURE — 85048 AUTOMATED LEUKOCYTE COUNT: CPT | Performed by: INTERNAL MEDICINE

## 2018-02-01 PROCEDURE — G0463 HOSPITAL OUTPT CLINIC VISIT: HCPCS | Mod: ZF

## 2018-02-01 PROCEDURE — 99204 OFFICE O/P NEW MOD 45 MIN: CPT | Mod: ZP | Performed by: INTERNAL MEDICINE

## 2018-02-01 RX ORDER — FERROUS SULFATE 325(65) MG
325 TABLET ORAL
Qty: 100 TABLET | Refills: 0 | Status: SHIPPED | OUTPATIENT
Start: 2018-02-01 | End: 2018-06-15

## 2018-02-01 ASSESSMENT — PAIN SCALES - GENERAL: PAINLEVEL: NO PAIN (0)

## 2018-02-01 NOTE — LETTER
2/1/2018       RE: Ten Johnson  [address] [address]     Dear Colleague,    Thank you for referring your patient, Ten Johnson, to the [facility]. Please see a copy of my visit note below.    Hematology consult note    Reason for consult: Thrombocytopenia, referred by Dr. Awad    History of present illness: Mr. Elizabeth Be is a 57-year-old man with a history of hep C and cirrhosis who is referred for thrombocytopenia.  He has aortic stenosis and needs to undergo some sort of replacement procedure, probably a TA VR.  He was diagnosed with hepatitis C a few years ago.  He says he has not been treated yet because he was waiting for the new medications and insurance coverage and is hoping to start treatment in June of this year.  He also has been drinking about 6 beers a day which is contributed to the cirrhosis and he is trying to cut back.  He had serious GI bleed in October 2017 although was probably a bleeding some before that.  This required multiple transfusions.  She has not been on any iron tablets.  Throughout all of this his platelet count is waxed and wane between 50 and 120.  Since his hospitalization in October he denies any significant bleeding problems.  No epistaxis, gum bleeding, hematochezia, melena, hematuria, or easy bruising.  He says he used to have some problems with epistaxis or prolonged bleeding if he cut himself but that has improved lately.    Past medical history:  -Hepatitis C with cirrhosis  -Alcohol abuse  -Aortic stenosis, now severe  -Hypertension  -History of GI bleed with Sarahi-Hoffman tear October 2017 requiring transfusion  -Status post shoulder surgery due to trauma 2003  -Status post transposition left ulnar nerve 2011  -Seasonal allergies    Medications:  Montelukast cast 10 mg nightly  Loratadine 10 mg daily  Lisinopril 10 mg daily  Fluticasone nasal spray 2 sprays into each nostril daily as needed  Multivitamin 1 daily    Family  "history: Mother  age 62 of metastatic cancer, sounds like he was unknown primary.  Maternal grandmother had diabetes.  No other significant medical problems in the family    Social: He works as an  here at the [employer] and is very physically active.  He rides bicycle routinely.  He does not smoke and was never smoker.  Has a history of drinking beer about a sixpack a night but is trying to cut back.  He is not completely stopped.    Review of Systems:  As in history above.  He has some fatigue but it is much better than around the time he presented with a GI bleed in 2017.  He has occasional lightheadedness with exertion.  He reports that the hypopigmentation on his hands is due to an unusual injury rather than being vitiligo.  The rest of the >10 point ROS is negative.      PHYSICAL EXAMINATION:  /87 (BP Location: Left arm, Patient Position: Chair, Cuff Size: Adult Regular)  Pulse 72  Temp 97  F (36.1  C) (Tympanic)  Resp 16  Ht 1.727 m (5' 7.99\")  Wt 82.1 kg (181 lb 1.6 oz)  SpO2 98%  BMI 27.54 kg/m2    General appearance:  Patient is 56 yo man in no acute distress.     HEENT:  No pallor, icterus, or mucositis.     Lymph nodes:  No cervical, supraclavicular, axillary, or inguinal lymphadenopathy.   Lungs:  Clear to auscultation bilaterally.   Heart:  Regular rate and rhythm; no S3 S4 2/6 systolic murmer.     Abdomen:  Positive bowel sounds, soft and nontender, nondistended.  No hepatomegaly. No splenomegaly appreciated.    Extremities:  No joint swelling or tenderness.  No ankle edema.     Skin:  Irregular hypopigmentation on hands. No rash, no petechiae or ecchymoses.    Labs:  Results for PHYLLIS CORREA (MRN [id number]) as of 2018 11:57   Ref. Range 2018 07:55   Sodium Latest Ref Range: 133 - 144 mmol/L 140   Potassium Latest Ref Range: 3.4 - 5.3 mmol/L 4.0   Chloride Latest Ref Range: 94 - 109 mmol/L 106   Carbon Dioxide Latest Ref Range: 20 - 32 mmol/L 26   Urea " Nitrogen Latest Ref Range: 7 - 30 mg/dL 14   Creatinine Latest Ref Range: 0.66 - 1.25 mg/dL 0.96   GFR Estimate Latest Ref Range: >60 mL/min/1.7m2 80   GFR Estimate If Black Latest Ref Range: >60 mL/min/1.7m2 >90   Calcium Latest Ref Range: 8.5 - 10.1 mg/dL 8.6   Anion Gap Latest Ref Range: 3 - 14 mmol/L 8   Ferritin Latest Ref Range: 26 - 388 ng/mL 48   Glucose Latest Ref Range: 70 - 99 mg/dL 143 (H)   WBC Latest Ref Range: 4.0 - 11.0 10e9/L 5.1   Hemoglobin Latest Ref Range: 13.3 - 17.7 g/dL 10.0 (L)   Hematocrit Latest Ref Range: 40.0 - 53.0 % 32.6 (L)   Platelet Count Latest Ref Range: 150 - 450 10e9/L 97 (L)   RBC Count Latest Ref Range: 4.4 - 5.9 10e12/L 4.36 (L)   MCV Latest Ref Range: 78 - 100 fl 75 (L)   MCH Latest Ref Range: 26.5 - 33.0 pg 22.9 (L)   MCHC Latest Ref Range: 31.5 - 36.5 g/dL 30.7 (L)   RDW Latest Ref Range: 10.0 - 15.0 % 21.3 (H)   Diff Method Unknown Automated Method   % Neutrophils Latest Units: % 50.4   % Lymphocytes Latest Units: % 33.9   % Monocytes Latest Units: % 13.3   % Eosinophils Latest Units: % 1.6   % Basophils Latest Units: % 0.6   % Immature Granulocytes Latest Units: % 0.2   Nucleated RBCs Latest Ref Range: 0 /100 0   Absolute Neutrophil Latest Ref Range: 1.6 - 8.3 10e9/L 2.6   Absolute Lymphocytes Latest Ref Range: 0.8 - 5.3 10e9/L 1.7   Absolute Monocytes Latest Ref Range: 0.0 - 1.3 10e9/L 0.7   Absolute Eosinophils Latest Ref Range: 0.0 - 0.7 10e9/L 0.1   Absolute Basophils Latest Ref Range: 0.0 - 0.2 10e9/L 0.0   Abs Immature Granulocytes Latest Ref Range: 0 - 0.4 10e9/L 0.0   Absolute Nucleated RBC Unknown 0.0   % Retic Latest Ref Range: 0.5 - 2.0 % 1.6   Absolute Retic Latest Ref Range: 25 - 95 10e9/L 71.0   INR Latest Ref Range: 0.86 - 1.14  1.20 (H)     Abd US 10/23/17:Spleen 11 cm.    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  He has aortic stenosis and now needs a valve  replacement.  I agreed that because of his cirrhosis doing mechanical valve replacement would be problematic.  Some other procedure that does not require long-term anticoagulation would be preferred.  1.  Thrombocytopenia- the platelet count has recovered up to 90 7K which is adequate for any sort of procedure.  I suspect that the platelets were low due to low thrombopoietin since that is made by the liver and also previously suppression from alcohol.  He does not have an enlarged spleen so hypersplenism is not playing a major role.  -If needed for the procedure, can give 1 unit of platelets for platelet count less than 90k immediately prior to procedure.  -Okay to give aspirin if indicated as long as platelets are greater than 50k  -I stressed to him the importance of not drinking alcohol.    2.  Anemia- the patient had significant GI bleed in the fall and has a low mean cell volume, which previously was normal before his illness.  Even though the ferritin is in the normal range I suspect he has iron deficiency anemia.  This is unlikely to be due to something like a thalassemia since he had a normal hemoglobin and MCV in 2012.  -Ferrous sulfate 325 mg 1 tablet daily with food #100 tablets.  I suspect he will need any refills of this.  Warned him of possible stomach upset so take with food and possible constipation so may need some stool softener.    3.  Mildly elevated INR 1.2- this is related to his liver disease.  This elevation is quite mild and is not having any bleeding symptoms.  There is nothing specific that needs to be done for this and he should be okay to go ahead with procedure assuming there is not major deterioration of his liver function in between now and the procedure.    He does not need routine follow-up in hematology clinic but can be referred back as needed.  Meli Garcia MD  Hematology

## 2018-02-01 NOTE — PROGRESS NOTES
Hematology consult note    Reason for consult: Thrombocytopenia, referred by Dr. Awad    History of present illness: Mr. Elizabeth Be is a 57-year-old man with a history of hep C and cirrhosis who is referred for thrombocytopenia.  He has aortic stenosis and needs to undergo some sort of replacement procedure, probably a TA VR.  He was diagnosed with hepatitis C a few years ago.  He says he has not been treated yet because he was waiting for the new medications and insurance coverage and is hoping to start treatment in  of this year.  He also has been drinking about 6 beers a day which is contributed to the cirrhosis and he is trying to cut back.  He had serious GI bleed in 2017 although was probably a bleeding some before that.  This required multiple transfusions.  She has not been on any iron tablets.  Throughout all of this his platelet count is waxed and wane between 50 and 120.  Since his hospitalization in October he denies any significant bleeding problems.  No epistaxis, gum bleeding, hematochezia, melena, hematuria, or easy bruising.  He says he used to have some problems with epistaxis or prolonged bleeding if he cut himself but that has improved lately.    Past medical history:  -Hepatitis C with cirrhosis  -Alcohol abuse  -Aortic stenosis, now severe  -Hypertension  -History of GI bleed with Sarahi-Hoffman tear 2017 requiring transfusion  -Status post shoulder surgery due to trauma   -Status post transposition left ulnar nerve   -Seasonal allergies    Medications:  Montelukast cast 10 mg nightly  Loratadine 10 mg daily  Lisinopril 10 mg daily  Fluticasone nasal spray 2 sprays into each nostril daily as needed  Multivitamin 1 daily    Family history: Mother  age 62 of metastatic cancer, sounds like he was unknown primary.  Maternal grandmother had diabetes.  No other significant medical problems in the family    Social: He works as an  here at the [employer]  "and is very physically active.  He rides bicycle routinely.  He does not smoke and was never smoker.  Has a history of drinking beer about a sixpack a night but is trying to cut back.  He is not completely stopped.    Review of Systems:  As in history above.  He has some fatigue but it is much better than around the time he presented with a GI bleed in October 2017.  He has occasional lightheadedness with exertion.  He reports that the hypopigmentation on his hands is due to an unusual injury rather than being vitiligo.  The rest of the >10 point ROS is negative.      PHYSICAL EXAMINATION:  /87 (BP Location: Left arm, Patient Position: Chair, Cuff Size: Adult Regular)  Pulse 72  Temp 97  F (36.1  C) (Tympanic)  Resp 16  Ht 1.727 m (5' 7.99\")  Wt 82.1 kg (181 lb 1.6 oz)  SpO2 98%  BMI 27.54 kg/m2    General appearance:  Patient is 56 yo man in no acute distress.     HEENT:  No pallor, icterus, or mucositis.     Lymph nodes:  No cervical, supraclavicular, axillary, or inguinal lymphadenopathy.   Lungs:  Clear to auscultation bilaterally.   Heart:  Regular rate and rhythm; no S3 S4 2/6 systolic murmer.     Abdomen:  Positive bowel sounds, soft and nontender, nondistended.  No hepatomegaly. No splenomegaly appreciated.    Extremities:  No joint swelling or tenderness.  No ankle edema.     Skin:  Irregular hypopigmentation on hands. No rash, no petechiae or ecchymoses.    Labs:  Results for PHYLLIS CORREA (MRN [id number]) as of 2/1/2018 11:57   Ref. Range 2/1/2018 07:55   Sodium Latest Ref Range: 133 - 144 mmol/L 140   Potassium Latest Ref Range: 3.4 - 5.3 mmol/L 4.0   Chloride Latest Ref Range: 94 - 109 mmol/L 106   Carbon Dioxide Latest Ref Range: 20 - 32 mmol/L 26   Urea Nitrogen Latest Ref Range: 7 - 30 mg/dL 14   Creatinine Latest Ref Range: 0.66 - 1.25 mg/dL 0.96   GFR Estimate Latest Ref Range: >60 mL/min/1.7m2 80   GFR Estimate If Black Latest Ref Range: >60 mL/min/1.7m2 >90   Calcium Latest Ref " Range: 8.5 - 10.1 mg/dL 8.6   Anion Gap Latest Ref Range: 3 - 14 mmol/L 8   Ferritin Latest Ref Range: 26 - 388 ng/mL 48   Glucose Latest Ref Range: 70 - 99 mg/dL 143 (H)   WBC Latest Ref Range: 4.0 - 11.0 10e9/L 5.1   Hemoglobin Latest Ref Range: 13.3 - 17.7 g/dL 10.0 (L)   Hematocrit Latest Ref Range: 40.0 - 53.0 % 32.6 (L)   Platelet Count Latest Ref Range: 150 - 450 10e9/L 97 (L)   RBC Count Latest Ref Range: 4.4 - 5.9 10e12/L 4.36 (L)   MCV Latest Ref Range: 78 - 100 fl 75 (L)   MCH Latest Ref Range: 26.5 - 33.0 pg 22.9 (L)   MCHC Latest Ref Range: 31.5 - 36.5 g/dL 30.7 (L)   RDW Latest Ref Range: 10.0 - 15.0 % 21.3 (H)   Diff Method Unknown Automated Method   % Neutrophils Latest Units: % 50.4   % Lymphocytes Latest Units: % 33.9   % Monocytes Latest Units: % 13.3   % Eosinophils Latest Units: % 1.6   % Basophils Latest Units: % 0.6   % Immature Granulocytes Latest Units: % 0.2   Nucleated RBCs Latest Ref Range: 0 /100 0   Absolute Neutrophil Latest Ref Range: 1.6 - 8.3 10e9/L 2.6   Absolute Lymphocytes Latest Ref Range: 0.8 - 5.3 10e9/L 1.7   Absolute Monocytes Latest Ref Range: 0.0 - 1.3 10e9/L 0.7   Absolute Eosinophils Latest Ref Range: 0.0 - 0.7 10e9/L 0.1   Absolute Basophils Latest Ref Range: 0.0 - 0.2 10e9/L 0.0   Abs Immature Granulocytes Latest Ref Range: 0 - 0.4 10e9/L 0.0   Absolute Nucleated RBC Unknown 0.0   % Retic Latest Ref Range: 0.5 - 2.0 % 1.6   Absolute Retic Latest Ref Range: 25 - 95 10e9/L 71.0   INR Latest Ref Range: 0.86 - 1.14  1.20 (H)     Abd US 10/23/17:Spleen 11 cm.    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  He has aortic stenosis and now needs a valve replacement.  I agreed that because of his cirrhosis doing mechanical valve replacement would be problematic.  Some other procedure that does not require long-term anticoagulation would be preferred.  1.  Thrombocytopenia- the platelet count  has recovered up to 90 7K which is adequate for any sort of procedure.  I suspect that the platelets were low due to low thrombopoietin since that is made by the liver and also previously suppression from alcohol.  He does not have an enlarged spleen so hypersplenism is not playing a major role.  -If needed for the procedure, can give 1 unit of platelets for platelet count less than 90k immediately prior to procedure.  -Okay to give aspirin if indicated as long as platelets are greater than 50k  -I stressed to him the importance of not drinking alcohol.    2.  Anemia- the patient had significant GI bleed in the fall and has a low mean cell volume, which previously was normal before his illness.  Even though the ferritin is in the normal range I suspect he has iron deficiency anemia.  This is unlikely to be due to something like a thalassemia since he had a normal hemoglobin and MCV in 2012.  -Ferrous sulfate 325 mg 1 tablet daily with food #100 tablets.  I suspect he will need any refills of this.  Warned him of possible stomach upset so take with food and possible constipation so may need some stool softener.    3.  Mildly elevated INR 1.2- this is related to his liver disease.  This elevation is quite mild and is not having any bleeding symptoms.  There is nothing specific that needs to be done for this and he should be okay to go ahead with procedure assuming there is not major deterioration of his liver function in between now and the procedure.    He does not need routine follow-up in hematology clinic but can be referred back as needed.  Meli Garcia MD  Hematology

## 2018-02-01 NOTE — NURSING NOTE
"Oncology Rooming Note    February 1, 2018 8:10 AM   Ten Johnson is a 57 year old male who presents for:    Chief Complaint   Patient presents with     Oncology Clinic Visit     Thrombocytopenia      Initial Vitals: /87 (BP Location: Left arm, Patient Position: Chair, Cuff Size: Adult Regular)  Pulse 72  Temp 97  F (36.1  C) (Tympanic)  Resp 16  Ht 1.727 m (5' 7.99\")  Wt 82.1 kg (181 lb 1.6 oz)  SpO2 98%  BMI 27.54 kg/m2 Estimated body mass index is 27.54 kg/(m^2) as calculated from the following:    Height as of this encounter: 1.727 m (5' 7.99\").    Weight as of this encounter: 82.1 kg (181 lb 1.6 oz). Body surface area is 1.98 meters squared.  No Pain (0) Comment: Data Unavailable   No LMP for male patient.  Allergies reviewed: YES  Medications reviewed: YES    Medications: Medication refills not needed today.  Pharmacy name entered into [employer] [place]: [facility] [place], [place] - [phone]    Clinical concerns: no new concerns.  Pt received flu shot elsewhere. See Immunizations   Pt is unsure when he got his colonoscopy sometime in the last ten years. I did not update health maintanence     6 minutes for nursing intake (face to face time)     Geraldine Aparicio CMA                "

## 2018-02-01 NOTE — MR AVS SNAPSHOT
After Visit Summary   2/1/2018    Ten Johnson    MRN: [id number]           Patient Information     Date Of Birth          1960        Visit Information        Provider Department      2/1/2018 8:00 AM Meli Garcia MD [facility]        Today's Diagnoses     Anemia, unspecified type    -  1    Thrombocytopenia (H)           Follow-ups after your visit        Your next 10 appointments already scheduled     Feb 02, 2018  9:30 AM CST   LAB with ACUTE CARE LAB UU   [facility] [place], Lab ([facility])    [address] [address]              Please do not eat 10-12 hours before your appointment if you are coming in fasting for labs on lipids, cholesterol, or glucose (sugar). This does not apply to pregnant women. Water, hot tea and black coffee (with nothing added) are okay. Do not drink other fluids, diet soda or chew gum.            Feb 02, 2018 10:00 AM CST   Procedure 1.5 hr with U2A ROOM 10   [address] [facility] ([facility])    [address] [place] [address]               Feb 02, 2018 11:30 AM CST   Cath 90 Minute with UCVR3   [facility]    [address] [place] [address]   [phone]            Jun 04, 2018  8:30 AM CDT   Lab with UC LAB    [facility] ([facility])    [address]  1st [address]  [place] [address] [phone]            Jun 04, 2018  9:20 AM CDT   (Arrive by 9:05 AM)   Return General Liver with Gentry San MD   [facility] ([facility])    [address]  [address]  [place] [address] [phone]              Who to contact     If you have questions or need follow up information about today's clinic visit or your schedule please contact [facility] directly at [phone].  Normal or non-critical lab and imaging results will be communicated to you by MyChart, letter or phone within 4 business days after the clinic has received the results. If you do not hear from us within 7 days, please contact the clinic through [employer] or phone. If you have a critical or abnormal lab result, we will notify you by phone as soon as possible.  Submit refill requests through [employer] or call your pharmacy and they will forward the refill request to us. Please allow 3 business days for your refill to be completed.          Additional Information About Your Visit        [employer] Information     [employer] lets you send messages to your doctor, view your test results, renew your prescriptions, schedule appointments and more. To sign up, go to [email or web address] . Click on \"Log in\" on the left side of the screen, which will take you to the Welcome page. Then click on \"Sign up Now\" on the right side of the page.     You will be asked to enter the access code listed below, as well as some personal information. Please follow the directions to create your username and password.     Your access code is: [email or web address]  Expires: 3/8/2018  6:30 AM     Your access code will  in 90 days. If you need help or a new code, please call your [place] clinic or [phone].        Care EveryWhere ID     This is your Care EveryWhere ID. This could be used by other organizations to access your [place] medical records  [id number]        Your Vitals Were     Pulse Temperature Respirations Height Pulse Oximetry BMI (Body Mass Index)    72 97  F (36.1  C) (Tympanic) 16 1.727 m (5' 7.99\") 98% 27.54 kg/m2       Blood Pressure from Last 3 Encounters:   18 159/87   17 143/90   17 143/90    Weight from Last 3 Encounters:   18 82.1 kg (181 lb 1.6 oz)   17 77.1 kg (170 lb)   17 77.1 kg (170 lb)              Today, you had the following     No orders " found for display         Today's Medication Changes          These changes are accurate as of 2/1/18 12:12 PM.  If you have any questions, ask your nurse or doctor.               Start taking these medicines.        Dose/Directions    ferrous sulfate 325 (65 FE) MG tablet   Commonly known as:  IRON   Used for:  Anemia, unspecified type   Started by:  Meli Garcia MD        Dose:  325 mg   Take 1 tablet (325 mg) by mouth daily (with breakfast)   Quantity:  100 tablet   Refills:  0            Where to get your medicines      These medications were sent to [place], [place] - [address]  [address], [facility] [address]    Hours:  TRANSPLANT PHONE NUMBER [phone] Phone:  [phone]     ferrous sulfate 325 (65 FE) MG tablet                Primary Care Provider Office Phone # Fax #    Cuca Celeste [phone]       [address] [place] [address] [address]  [facility] [address]        Equal Access to Services     MANE HERRERA AH: Hadii adrienne ku hadasho Soomaali, waaxda luqadaha, qaybta kaalmada adeegyada, waxay idiin haydeepali crespo . So [facility] [phone].    ATENCIÓN: Si habla español, tiene a ha disposición servicios gratuitos de asistencia lingüística. Llame al [phone].    We comply with applicable federal civil rights laws and [place] laws. We do not discriminate on the basis of race, color, national origin, age, disability, sex, sexual orientation, or gender identity.            Thank you!     Thank you for choosing [facility]  for your care. Our goal is always to provide you with excellent care. Hearing back from our patients is one way we can continue to improve our services. Please take a few minutes to complete the written survey that you may receive in the mail after your visit with us. Thank you!             Your Updated Medication List - Protect others around you: Learn how to safely use,  store and throw away your medicines at www.disposemymeds.org.          This list is accurate as of 2/1/18 12:12 PM.  Always use your most recent med list.                   Brand Name Dispense Instructions for use Diagnosis    ferrous sulfate 325 (65 FE) MG tablet    IRON    100 tablet    Take 1 tablet (325 mg) by mouth daily (with breakfast)    Anemia, unspecified type       fluticasone 50 MCG/ACT spray    FLONASE    16 g    Spray 2 sprays into both nostrils daily    Cough, Post-nasal drip       lisinopril 10 MG tablet    PRINIVIL/ZESTRIL    90 tablet    Take 1 tablet (10 mg) by mouth daily    Essential hypertension       loratadine 10 MG tablet    CLARITIN    90 tablet    Take 1 tablet (10 mg) by mouth daily    Post-nasal drip, Cough       montelukast 10 MG tablet    SINGULAIR    90 tablet    Take 1 tablet (10 mg) by mouth At Bedtime    Other chronic rhinitis       MULTIVITAMINS PO      Take 1 tablet by mouth daily.

## 2018-02-02 ENCOUNTER — APPOINTMENT (OUTPATIENT)
Dept: MEDSURG UNIT | Facility: CLINIC | Age: 58
End: 2018-02-02
Attending: INTERNAL MEDICINE
Payer: COMMERCIAL

## 2018-02-02 ENCOUNTER — HOSPITAL ENCOUNTER (OUTPATIENT)
Facility: CLINIC | Age: 58
Discharge: HOME | End: 2018-02-02
Attending: INTERNAL MEDICINE | Admitting: INTERNAL MEDICINE
Payer: COMMERCIAL

## 2018-02-02 ENCOUNTER — APPOINTMENT (OUTPATIENT)
Dept: CARDIOLOGY | Facility: CLINIC | Age: 58
End: 2018-02-02
Attending: INTERNAL MEDICINE
Payer: COMMERCIAL

## 2018-02-02 VITALS
HEART RATE: 74 BPM | OXYGEN SATURATION: 99 % | RESPIRATION RATE: 16 BRPM | TEMPERATURE: 98 F | SYSTOLIC BLOOD PRESSURE: 143 MMHG | DIASTOLIC BLOOD PRESSURE: 97 MMHG

## 2018-02-02 DIAGNOSIS — I25.10 CORONARY ARTERY DISEASE INVOLVING NATIVE CORONARY ARTERY, ANGINA PRESENCE UNSPECIFIED, UNSPECIFIED WHETHER NATIVE OR TRANSPLANTED HEART: ICD-10-CM

## 2018-02-02 DIAGNOSIS — I35.0 SEVERE AORTIC STENOSIS: ICD-10-CM

## 2018-02-02 PROCEDURE — 25000128 H RX IP 250 OP 636: Performed by: INTERNAL MEDICINE

## 2018-02-02 PROCEDURE — 27211089 ZZH KIT ACIST INJECTOR CR3

## 2018-02-02 PROCEDURE — 27210893 ZZH CATH CR5

## 2018-02-02 PROCEDURE — 99152 MOD SED SAME PHYS/QHP 5/>YRS: CPT

## 2018-02-02 PROCEDURE — 93010 ELECTROCARDIOGRAM REPORT: CPT | Performed by: INTERNAL MEDICINE

## 2018-02-02 PROCEDURE — 27211181 ZZH BALLOON TIP PRESSURE CR5

## 2018-02-02 PROCEDURE — 25000125 ZZHC RX 250: Performed by: INTERNAL MEDICINE

## 2018-02-02 PROCEDURE — 93005 ELECTROCARDIOGRAM TRACING: CPT

## 2018-02-02 PROCEDURE — 93456 R HRT CORONARY ARTERY ANGIO: CPT | Mod: 26 | Performed by: INTERNAL MEDICINE

## 2018-02-02 PROCEDURE — 93456 R HRT CORONARY ARTERY ANGIO: CPT

## 2018-02-02 PROCEDURE — 27210843 ZZH DEVICE COMPRESSION CR12

## 2018-02-02 PROCEDURE — 27210787 ZZH MANIFOLD CR2

## 2018-02-02 PROCEDURE — 40000172 ZZH STATISTIC PROCEDURE PREP ONLY

## 2018-02-02 PROCEDURE — 27210762 ZZH DEVICE SUTURELESS SECUREMENT EA CR2

## 2018-02-02 PROCEDURE — C1894 INTRO/SHEATH, NON-LASER: HCPCS

## 2018-02-02 PROCEDURE — 27210946 ZZH KIT HC TOTES DISP CR8

## 2018-02-02 RX ORDER — POTASSIUM CHLORIDE 7.45 MG/ML
10 INJECTION INTRAVENOUS
Status: DISCONTINUED | OUTPATIENT
Start: 2018-02-02 | End: 2018-02-02 | Stop reason: HOSPADM

## 2018-02-02 RX ORDER — NITROGLYCERIN 0.4 MG/1
0.4 TABLET SUBLINGUAL EVERY 5 MIN PRN
Status: DISCONTINUED | OUTPATIENT
Start: 2018-02-02 | End: 2018-02-02 | Stop reason: HOSPADM

## 2018-02-02 RX ORDER — FLUMAZENIL 0.1 MG/ML
0.2 INJECTION, SOLUTION INTRAVENOUS
Status: DISCONTINUED | OUTPATIENT
Start: 2018-02-02 | End: 2018-02-02 | Stop reason: HOSPADM

## 2018-02-02 RX ORDER — DIPHENHYDRAMINE HYDROCHLORIDE 50 MG/ML
25-50 INJECTION INTRAMUSCULAR; INTRAVENOUS
Status: DISCONTINUED | OUTPATIENT
Start: 2018-02-02 | End: 2018-02-02 | Stop reason: HOSPADM

## 2018-02-02 RX ORDER — ATROPINE SULFATE 0.1 MG/ML
.5-1 INJECTION INTRAVENOUS
Status: DISCONTINUED | OUTPATIENT
Start: 2018-02-02 | End: 2018-02-02 | Stop reason: HOSPADM

## 2018-02-02 RX ORDER — VERAPAMIL HYDROCHLORIDE 2.5 MG/ML
1-5 INJECTION, SOLUTION INTRAVENOUS
Status: DISCONTINUED | OUTPATIENT
Start: 2018-02-02 | End: 2018-02-02 | Stop reason: HOSPADM

## 2018-02-02 RX ORDER — METHYLPREDNISOLONE SODIUM SUCCINATE 125 MG/2ML
125 INJECTION, POWDER, LYOPHILIZED, FOR SOLUTION INTRAMUSCULAR; INTRAVENOUS
Status: DISCONTINUED | OUTPATIENT
Start: 2018-02-02 | End: 2018-02-02 | Stop reason: HOSPADM

## 2018-02-02 RX ORDER — ASPIRIN 81 MG/1
81-324 TABLET, CHEWABLE ORAL
Status: DISCONTINUED | OUTPATIENT
Start: 2018-02-02 | End: 2018-02-02 | Stop reason: HOSPADM

## 2018-02-02 RX ORDER — CLOPIDOGREL BISULFATE 75 MG/1
75 TABLET ORAL
Status: DISCONTINUED | OUTPATIENT
Start: 2018-02-02 | End: 2018-02-02 | Stop reason: HOSPADM

## 2018-02-02 RX ORDER — ARGATROBAN 1 MG/ML
150 INJECTION, SOLUTION INTRAVENOUS
Status: DISCONTINUED | OUTPATIENT
Start: 2018-02-02 | End: 2018-02-02 | Stop reason: HOSPADM

## 2018-02-02 RX ORDER — PROTAMINE SULFATE 10 MG/ML
25-100 INJECTION, SOLUTION INTRAVENOUS EVERY 5 MIN PRN
Status: DISCONTINUED | OUTPATIENT
Start: 2018-02-02 | End: 2018-02-02 | Stop reason: HOSPADM

## 2018-02-02 RX ORDER — POTASSIUM CHLORIDE 29.8 MG/ML
20 INJECTION INTRAVENOUS
Status: DISCONTINUED | OUTPATIENT
Start: 2018-02-02 | End: 2018-02-02 | Stop reason: HOSPADM

## 2018-02-02 RX ORDER — NITROGLYCERIN 20 MG/100ML
.07-2 INJECTION INTRAVENOUS CONTINUOUS PRN
Status: DISCONTINUED | OUTPATIENT
Start: 2018-02-02 | End: 2018-02-02 | Stop reason: HOSPADM

## 2018-02-02 RX ORDER — NIFEDIPINE 10 MG/1
10 CAPSULE ORAL
Status: DISCONTINUED | OUTPATIENT
Start: 2018-02-02 | End: 2018-02-02 | Stop reason: HOSPADM

## 2018-02-02 RX ORDER — NICARDIPINE HYDROCHLORIDE 2.5 MG/ML
100 INJECTION INTRAVENOUS
Status: DISCONTINUED | OUTPATIENT
Start: 2018-02-02 | End: 2018-02-02 | Stop reason: HOSPADM

## 2018-02-02 RX ORDER — ASPIRIN 81 MG/1
81 TABLET ORAL DAILY
Status: DISCONTINUED | OUTPATIENT
Start: 2018-02-03 | End: 2018-02-02 | Stop reason: HOSPADM

## 2018-02-02 RX ORDER — MAGNESIUM HYDROXIDE 1200 MG/15ML
1000 LIQUID ORAL CONTINUOUS PRN
Status: DISCONTINUED | OUTPATIENT
Start: 2018-02-02 | End: 2018-02-02 | Stop reason: HOSPADM

## 2018-02-02 RX ORDER — NITROGLYCERIN 5 MG/ML
100-200 VIAL (ML) INTRAVENOUS
Status: DISCONTINUED | OUTPATIENT
Start: 2018-02-02 | End: 2018-02-02 | Stop reason: HOSPADM

## 2018-02-02 RX ORDER — DOBUTAMINE HYDROCHLORIDE 200 MG/100ML
2-20 INJECTION INTRAVENOUS CONTINUOUS PRN
Status: DISCONTINUED | OUTPATIENT
Start: 2018-02-02 | End: 2018-02-02 | Stop reason: HOSPADM

## 2018-02-02 RX ORDER — ADENOSINE 3 MG/ML
12-12000 INJECTION, SOLUTION INTRAVENOUS
Status: DISCONTINUED | OUTPATIENT
Start: 2018-02-02 | End: 2018-02-02 | Stop reason: HOSPADM

## 2018-02-02 RX ORDER — FENTANYL CITRATE 50 UG/ML
25-50 INJECTION, SOLUTION INTRAMUSCULAR; INTRAVENOUS
Status: DISCONTINUED | OUTPATIENT
Start: 2018-02-02 | End: 2018-02-02 | Stop reason: HOSPADM

## 2018-02-02 RX ORDER — PHENYLEPHRINE HCL IN 0.9% NACL 1 MG/10 ML
20-100 SYRINGE (ML) INTRAVENOUS
Status: DISCONTINUED | OUTPATIENT
Start: 2018-02-02 | End: 2018-02-02 | Stop reason: HOSPADM

## 2018-02-02 RX ORDER — PROMETHAZINE HYDROCHLORIDE 25 MG/ML
6.25-25 INJECTION, SOLUTION INTRAMUSCULAR; INTRAVENOUS EVERY 4 HOURS PRN
Status: DISCONTINUED | OUTPATIENT
Start: 2018-02-02 | End: 2018-02-02 | Stop reason: HOSPADM

## 2018-02-02 RX ORDER — NALOXONE HYDROCHLORIDE 0.4 MG/ML
0.4 INJECTION, SOLUTION INTRAMUSCULAR; INTRAVENOUS; SUBCUTANEOUS EVERY 5 MIN PRN
Status: DISCONTINUED | OUTPATIENT
Start: 2018-02-02 | End: 2018-02-02 | Stop reason: HOSPADM

## 2018-02-02 RX ORDER — ONDANSETRON 2 MG/ML
4 INJECTION INTRAMUSCULAR; INTRAVENOUS EVERY 4 HOURS PRN
Status: DISCONTINUED | OUTPATIENT
Start: 2018-02-02 | End: 2018-02-02 | Stop reason: HOSPADM

## 2018-02-02 RX ORDER — ASPIRIN 81 MG/1
81 TABLET ORAL DAILY
Status: DISCONTINUED | OUTPATIENT
Start: 2018-02-02 | End: 2018-02-02 | Stop reason: HOSPADM

## 2018-02-02 RX ORDER — DEXTROSE MONOHYDRATE 25 G/50ML
12.5-5 INJECTION, SOLUTION INTRAVENOUS EVERY 30 MIN PRN
Status: DISCONTINUED | OUTPATIENT
Start: 2018-02-02 | End: 2018-02-02 | Stop reason: HOSPADM

## 2018-02-02 RX ORDER — ARGATROBAN 1 MG/ML
350 INJECTION, SOLUTION INTRAVENOUS
Status: DISCONTINUED | OUTPATIENT
Start: 2018-02-02 | End: 2018-02-02 | Stop reason: HOSPADM

## 2018-02-02 RX ORDER — NITROGLYCERIN 5 MG/ML
100-500 VIAL (ML) INTRAVENOUS
Status: COMPLETED | OUTPATIENT
Start: 2018-02-02 | End: 2018-02-02

## 2018-02-02 RX ORDER — HYDRALAZINE HYDROCHLORIDE 20 MG/ML
10-20 INJECTION INTRAMUSCULAR; INTRAVENOUS
Status: DISCONTINUED | OUTPATIENT
Start: 2018-02-02 | End: 2018-02-02 | Stop reason: HOSPADM

## 2018-02-02 RX ORDER — SODIUM CHLORIDE 9 MG/ML
INJECTION, SOLUTION INTRAVENOUS CONTINUOUS
Status: DISCONTINUED | OUTPATIENT
Start: 2018-02-02 | End: 2018-02-02 | Stop reason: HOSPADM

## 2018-02-02 RX ORDER — LIDOCAINE 40 MG/G
CREAM TOPICAL
Status: DISCONTINUED | OUTPATIENT
Start: 2018-02-02 | End: 2018-02-02 | Stop reason: HOSPADM

## 2018-02-02 RX ORDER — LORAZEPAM 2 MG/ML
.5-2 INJECTION INTRAMUSCULAR EVERY 4 HOURS PRN
Status: DISCONTINUED | OUTPATIENT
Start: 2018-02-02 | End: 2018-02-02 | Stop reason: HOSPADM

## 2018-02-02 RX ORDER — PRASUGREL 10 MG/1
10-60 TABLET, FILM COATED ORAL
Status: DISCONTINUED | OUTPATIENT
Start: 2018-02-02 | End: 2018-02-02 | Stop reason: HOSPADM

## 2018-02-02 RX ORDER — SODIUM NITROPRUSSIDE 25 MG/ML
100-200 INJECTION INTRAVENOUS
Status: DISCONTINUED | OUTPATIENT
Start: 2018-02-02 | End: 2018-02-02 | Stop reason: HOSPADM

## 2018-02-02 RX ORDER — NALOXONE HYDROCHLORIDE 0.4 MG/ML
.1-.4 INJECTION, SOLUTION INTRAMUSCULAR; INTRAVENOUS; SUBCUTANEOUS
Status: DISCONTINUED | OUTPATIENT
Start: 2018-02-02 | End: 2018-02-02 | Stop reason: HOSPADM

## 2018-02-02 RX ORDER — METOPROLOL TARTRATE 1 MG/ML
5 INJECTION, SOLUTION INTRAVENOUS EVERY 5 MIN PRN
Status: DISCONTINUED | OUTPATIENT
Start: 2018-02-02 | End: 2018-02-02 | Stop reason: HOSPADM

## 2018-02-02 RX ORDER — LIDOCAINE HYDROCHLORIDE 10 MG/ML
30 INJECTION, SOLUTION EPIDURAL; INFILTRATION; INTRACAUDAL; PERINEURAL
Status: DISCONTINUED | OUTPATIENT
Start: 2018-02-02 | End: 2018-02-02 | Stop reason: HOSPADM

## 2018-02-02 RX ORDER — ATROPINE SULFATE 0.1 MG/ML
0.5 INJECTION INTRAVENOUS EVERY 5 MIN PRN
Status: DISCONTINUED | OUTPATIENT
Start: 2018-02-02 | End: 2018-02-02 | Stop reason: HOSPADM

## 2018-02-02 RX ORDER — NALOXONE HYDROCHLORIDE 0.4 MG/ML
.2-.4 INJECTION, SOLUTION INTRAMUSCULAR; INTRAVENOUS; SUBCUTANEOUS
Status: DISCONTINUED | OUTPATIENT
Start: 2018-02-02 | End: 2018-02-02 | Stop reason: HOSPADM

## 2018-02-02 RX ORDER — PROTAMINE SULFATE 10 MG/ML
1-5 INJECTION, SOLUTION INTRAVENOUS
Status: DISCONTINUED | OUTPATIENT
Start: 2018-02-02 | End: 2018-02-02 | Stop reason: HOSPADM

## 2018-02-02 RX ORDER — HEPARIN SODIUM 1000 [USP'U]/ML
1000-10000 INJECTION, SOLUTION INTRAVENOUS; SUBCUTANEOUS EVERY 5 MIN PRN
Status: DISCONTINUED | OUTPATIENT
Start: 2018-02-02 | End: 2018-02-02 | Stop reason: HOSPADM

## 2018-02-02 RX ORDER — IOPAMIDOL 755 MG/ML
70 INJECTION, SOLUTION INTRAVASCULAR ONCE
Status: COMPLETED | OUTPATIENT
Start: 2018-02-02 | End: 2018-02-02

## 2018-02-02 RX ORDER — EPINEPHRINE 1 MG/ML
0.3 INJECTION, SOLUTION, CONCENTRATE INTRAVENOUS
Status: DISCONTINUED | OUTPATIENT
Start: 2018-02-02 | End: 2018-02-02 | Stop reason: HOSPADM

## 2018-02-02 RX ORDER — DOPAMINE HYDROCHLORIDE 160 MG/100ML
2-20 INJECTION, SOLUTION INTRAVENOUS CONTINUOUS PRN
Status: DISCONTINUED | OUTPATIENT
Start: 2018-02-02 | End: 2018-02-02 | Stop reason: HOSPADM

## 2018-02-02 RX ORDER — FUROSEMIDE 10 MG/ML
20-100 INJECTION INTRAMUSCULAR; INTRAVENOUS
Status: DISCONTINUED | OUTPATIENT
Start: 2018-02-02 | End: 2018-02-02 | Stop reason: HOSPADM

## 2018-02-02 RX ORDER — CLOPIDOGREL BISULFATE 75 MG/1
300-600 TABLET ORAL
Status: DISCONTINUED | OUTPATIENT
Start: 2018-02-02 | End: 2018-02-02 | Stop reason: HOSPADM

## 2018-02-02 RX ORDER — ENALAPRILAT 1.25 MG/ML
1.25-2.5 INJECTION INTRAVENOUS
Status: DISCONTINUED | OUTPATIENT
Start: 2018-02-02 | End: 2018-02-02 | Stop reason: HOSPADM

## 2018-02-02 RX ORDER — ASPIRIN 325 MG
325 TABLET ORAL
Status: DISCONTINUED | OUTPATIENT
Start: 2018-02-02 | End: 2018-02-02 | Stop reason: HOSPADM

## 2018-02-02 RX ADMIN — HEPARIN SODIUM 1500 UNITS: 1000 INJECTION, SOLUTION INTRAVENOUS; SUBCUTANEOUS at 12:34

## 2018-02-02 RX ADMIN — IOPAMIDOL 70 ML: 755 INJECTION, SOLUTION INTRAVASCULAR at 13:15

## 2018-02-02 RX ADMIN — FENTANYL CITRATE 50 MCG: 50 INJECTION, SOLUTION INTRAMUSCULAR; INTRAVENOUS at 12:30

## 2018-02-02 RX ADMIN — MIDAZOLAM 1 MG: 1 INJECTION INTRAMUSCULAR; INTRAVENOUS at 12:30

## 2018-02-02 RX ADMIN — NICARDIPINE HYDROCHLORIDE 200 MCG: 2.5 INJECTION INTRAVENOUS at 12:41

## 2018-02-02 RX ADMIN — HEPARIN SODIUM 500 UNITS: 1000 INJECTION, SOLUTION INTRAVENOUS; SUBCUTANEOUS at 12:35

## 2018-02-02 RX ADMIN — NITROGLYCERIN 200 MCG: 5 INJECTION, SOLUTION INTRAVENOUS at 12:42

## 2018-02-02 RX ADMIN — HEPARIN SODIUM 5000 UNITS: 1000 INJECTION, SOLUTION INTRAVENOUS; SUBCUTANEOUS at 12:41

## 2018-02-02 RX ADMIN — SODIUM CHLORIDE: 9 INJECTION, SOLUTION INTRAVENOUS at 10:43

## 2018-02-02 NOTE — PROGRESS NOTES
TR band removed at 1555 by this writer. Right radial site is clean, dry, intact; no bleeding or hematoma noted. Radial pulse is 2+. No numbness or tingling. PIV removed. Patient is tolerating a regular diet. Patient has voided spontaneously x 2. Patient is ambulating independently.

## 2018-02-02 NOTE — IP AVS SNAPSHOT
Unit [address] Observation [address] [address]    Phone:  [phone]    Fax:  [phone]                                       After Visit Summary   2/2/2018    Ten Johnson    MRN: [id number]           After Visit Summary Signature Page     I have received my discharge instructions, and my questions have been answered. I have discussed any challenges I see with this plan with the nurse or doctor.    ..........................................................................................................................................  Patient/Patient Representative Signature      ..........................................................................................................................................  Patient Representative Print Name and Relationship to Patient    ..................................................               ................................................  Date                                            Time    ..........................................................................................................................................  Reviewed by Signature/Title    ...................................................              ..............................................  Date                                                            Time

## 2018-02-02 NOTE — PROGRESS NOTES
Pt admitted to 2A for a radial approached CORS.  PIV placed.  No labs needed.  Consent and h and p up to date.  Pt will call family friend for a ride when done with procedure.

## 2018-02-02 NOTE — DISCHARGE INSTRUCTIONS
Going Home after an Angioplasty or Stent Placement (Cardiac)  ______________________________________________    Patient Name: Ten Johnson  Date of Procedure: February 2, 2018      After you go home:    Have an adult stay with you for 24 hours.    Drink plenty of fluids.    You may eat your normal diet, unless your doctor tells you otherwise.    For 24 hours:    Relax and take it easy.    Do NOT smoke.    Do NOT make any important or legal decisions.    Do NOT drive or operate machines at home or at work.    Do NOT drink alcohol.    Remove the Band-Aid after 24 hours. If there is minor oozing, apply another Band-aid and remove it after 12 hours.    For 2 days, do NOT have sex or do any heavy exercise.    Do NOT take a bath, or use a hot tub or pool for at least 3 days. You may shower.    Care of wrist or arm site  It is normal to have soreness at the puncture site and mild tingling in your hand for up to 3 days.    For 2 days, do not use your hand or arm to support your weight (such as rising from a chair) or bend your wrist (such as lifting a garage door).    For 2 days, do not lift more than 5 pounds or exercise your arm (tennis, golf or bowling).    If you start bleeding from the site in your arm:    Sit down and press firmly on the site with your fingers for 10 minutes. Call your doctor as soon as you can.    If the bleeding stops, sit still and keep your wrist straight for 2 hours.    Medicines    If you have started taking Plavix or Effient, do not stop taking it until you talk to your heart doctor (cardiologist).    If you are on metformin (Glucophage), do not restart it until you have blood tests (within 2 to 3 days after discharge). When your doctor tells you it is safe, you may restart the metformin.    If you have stopped any other medicines, check with your nurse or provider about when to restart them.    Call 911 right away if you have bleeding that is heavy or does not stop.    Call your doctor  if:    You have a large or growing hard lump around the site.    The site is red, swollen, hot or tender.    Blood or fluid is draining from the site.    You have chills or a fever greater than 101 F (38 C).    Your leg or arm feels numb or cool.    You have hives, a rash or unusual itching.      [facility] at [place]:  [phone] (7 days a week)

## 2018-02-02 NOTE — IP AVS SNAPSHOT
MRN:[id number]                      After Visit Summary   2/2/2018    Ten Johnson    MRN: [id number]           Thank you!     Thank you for choosing [facility] for your care. Our goal is always to provide you with excellent care. Hearing back from our patients is one way we can continue to improve our services. Please take a few minutes to complete the written survey that you may receive in the mail after you visit with us. Thank you!        Patient Information     Date Of Birth          1960        About your hospital stay     You were admitted on:  February 2, 2018 You last received care in the:  Unit 6D Observation [facility]    You were discharged on:  February 2, 2018       Who to Call     For medical emergencies, please call 911.  For non-urgent questions about your medical care, please call your primary care provider or clinic, [phone]          Attending Provider     Provider Specialty    Ernesto Mcdonough MD Cardiology       Primary Care Provider Office Phone # Fax #    Cuca Celeste [phone]      Your next 10 appointments already scheduled     Jun 04, 2018  8:30 AM CDT   Lab with  LAB   [facility] ([facility])    [address]  1st Floor  [place] [address] [phone]            Jun 04, 2018  9:20 AM CDT   (Arrive by 9:05 AM)   Return General Liver with Gentry San MD   [facility] Hepatology ([facility])    [address]  [address]  [place] [address] [phone]              Further instructions from your care team         Going Home after an Angioplasty or Stent Placement (Cardiac)  ______________________________________________    Patient Name: Ten Johnson  Date of Procedure: February 2, 2018      After you go home:    Have an adult stay with you for 24 hours.    Drink plenty of fluids.    You may eat your normal diet, unless your doctor tells  you otherwise.    For 24 hours:    Relax and take it easy.    Do NOT smoke.    Do NOT make any important or legal decisions.    Do NOT drive or operate machines at home or at work.    Do NOT drink alcohol.    Remove the Band-Aid after 24 hours. If there is minor oozing, apply another Band-aid and remove it after 12 hours.    For 2 days, do NOT have sex or do any heavy exercise.    Do NOT take a bath, or use a hot tub or pool for at least 3 days. You may shower.    Care of wrist or arm site  It is normal to have soreness at the puncture site and mild tingling in your hand for up to 3 days.    For 2 days, do not use your hand or arm to support your weight (such as rising from a chair) or bend your wrist (such as lifting a garage door).    For 2 days, do not lift more than 5 pounds or exercise your arm (tennis, golf or bowling).    If you start bleeding from the site in your arm:    Sit down and press firmly on the site with your fingers for 10 minutes. Call your doctor as soon as you can.    If the bleeding stops, sit still and keep your wrist straight for 2 hours.    Medicines    If you have started taking Plavix or Effient, do not stop taking it until you talk to your heart doctor (cardiologist).    If you are on metformin (Glucophage), do not restart it until you have blood tests (within 2 to 3 days after discharge). When your doctor tells you it is safe, you may restart the metformin.    If you have stopped any other medicines, check with your nurse or provider about when to restart them.    Call 911 right away if you have bleeding that is heavy or does not stop.    Call your doctor if:    You have a large or growing hard lump around the site.    The site is red, swollen, hot or tender.    Blood or fluid is draining from the site.    You have chills or a fever greater than 101 F (38 C).    Your leg or arm feels numb or cool.    You have hives, a rash or unusual itching.      [facility]  "Heart at [place]:  [phone] (7 days a week)        Pending Results     Date and Time Order Name Status Description    2018 1008 EKG 12-lead, tracing only Preliminary             Admission Information     Date & Time Provider Department Dept. Phone    2018 Ernesto Mcdonough MD Unit 6D Observation [facility] [phone]      Your Vitals Were     Blood Pressure Pulse Temperature Respirations Pulse Oximetry       143/97 74 98  F (36.7  C) (Oral) 16 99%       MyChart Information     "[employer] lets you send messages to your doctor, view your test results, renew your prescriptions, schedule appointments and more. To sign up, go to [email or web address] . Click on \"Log in\" on the left side of the screen, which will take you to the Welcome page. Then click on \"Sign up Now\" on the right side of the page.     You will be asked to enter the access code listed below, as well as some personal information. Please follow the directions to create your username and password.     Your access code is: [email or web address]  Expires: 3/8/2018  6:30 AM     Your access code will  in 90 days. If you need help or a new code, please call your [place] clinic or [phone].        Care EveryWhere ID     This is your Care EveryWhere ID. This could be used by other organizations to access your [place] medical records  [id number]        Equal Access to Services     [facility] AH: Hadii aad ku hadasho Soomaali, waaxda luqadaha, qaybta kaalmada adeegyada, jazmin crespo . So [facility] [phone].    ATENCIÓN: Si habla español, tiene a ha disposición servicios gratuitos de asistencia lingüística. Llame al [phone].    We comply with applicable federal civil rights laws and [place] laws. We do not discriminate on the basis of race, color, national origin, age, disability, sex, sexual orientation, or gender identity.               Review of your medicines      UNREVIEWED medicines. Ask your doctor " about these medicines        Dose / Directions    ferrous sulfate 325 (65 FE) MG tablet   Commonly known as:  IRON   Used for:  Anemia, unspecified type        Dose:  325 mg   Take 1 tablet (325 mg) by mouth daily (with breakfast)   Quantity:  100 tablet   Refills:  0       fluticasone 50 MCG/ACT spray   Commonly known as:  FLONASE   Used for:  Cough, Post-nasal drip        Dose:  2 spray   Spray 2 sprays into both nostrils daily   Quantity:  16 g   Refills:  11       lisinopril 10 MG tablet   Commonly known as:  PRINIVIL/ZESTRIL   Used for:  Essential hypertension        Dose:  10 mg   Take 1 tablet (10 mg) by mouth daily   Quantity:  90 tablet   Refills:  3       loratadine 10 MG tablet   Commonly known as:  CLARITIN   Used for:  Post-nasal drip, Cough        Dose:  10 mg   Take 1 tablet (10 mg) by mouth daily   Quantity:  90 tablet   Refills:  3       montelukast 10 MG tablet   Commonly known as:  SINGULAIR   Used for:  Other chronic rhinitis        Dose:  10 mg   Take 1 tablet (10 mg) by mouth At Bedtime   Quantity:  90 tablet   Refills:  1       MULTIVITAMINS PO        Dose:  1 tablet   Take 1 tablet by mouth daily.   Refills:  0       PROTONIX PO        Dose:  40 mg   Take 40 mg by mouth 2 times daily (before meals)   Refills:  0                Protect others around you: Learn how to safely use, store and throw away your medicines at www.disposemymeds.org.             Medication List: This is a list of all your medications and when to take them. Check marks below indicate your daily home schedule. Keep this list as a reference.      Medications           Morning Afternoon Evening Bedtime As Needed    ferrous sulfate 325 (65 FE) MG tablet   Commonly known as:  IRON   Take 1 tablet (325 mg) by mouth daily (with breakfast)                                fluticasone 50 MCG/ACT spray   Commonly known as:  FLONASE   Spray 2 sprays into both nostrils daily                                lisinopril 10 MG tablet    Commonly known as:  PRINIVIL/ZESTRIL   Take 1 tablet (10 mg) by mouth daily                                loratadine 10 MG tablet   Commonly known as:  CLARITIN   Take 1 tablet (10 mg) by mouth daily                                montelukast 10 MG tablet   Commonly known as:  SINGULAIR   Take 1 tablet (10 mg) by mouth At Bedtime                                MULTIVITAMINS PO   Take 1 tablet by mouth daily.                                PROTONIX PO   Take 40 mg by mouth 2 times daily (before meals)

## 2018-02-02 NOTE — PROCEDURES
PRELIMINARY CARDIAC CATH REPORT:     PROCEDURES PERFORMED:   Right Heart Catheterization  Coronary Angiography    PHYSICIANS:  Attending Physician: Ubaldo Villavicencio MD  Interventional Cardiology Fellow: Chavez Graham MD    INDICATION:  Ten Johnson is a 57-year-old man with a history of severe aortic stenosis, hep C and cirrhosis ( non-surgical candidate based on CT surgery), thrombocytopenia who presents on an elective basis for evaluation of the coronaries and hemodynamic measurements as part of valve  Work up.    DESCRIPTION:  1. Consent obtained with discussion of risks.  All questions were answered.  2. Sterile prep and procedure.  3. Location with Sheaths:   RT Radial Arterial  6 Fr  10 cm [short]  Rt IJ  7 Fr 10 cm [short]  4. Access: Local anesthetic with lidocaine.  A standard 18 guage needle with ultrasound guidance was used to establish vascular access using a modified Seldinger technique for the IJ and 21 G needle for the right radial access.  5. Diagnostic Catheters:   5 Fr  Charlotte Amy and Grant  6. Guiding Catheters:  None  6. Estimated blood loss: < 25 ml    MEDICATIONS:  The procedure was performed under conscious sedation for 19 minutes from 12:30 to 12:49.  The patient was assessed immediately before the first sedation medication was administered.  Midazolam 1 mg and Fentanyl 50 mcg were administered.  Heart rate, BP, respiration, oxygen saturation and patient responses were monitored throughout the procedure with the assistance of the RN under my supervision.  >> IA Nicardipine and IA NTG were administered to prophylax against radial artery spasm.  >> IV UFH   U was administered to achieve anticoagulation.  >> Antiplatelet Therapy: ASA 81 mg     Procedures:    HEMODYNAMICS:  BSA  1.98 m2  1. HR 87 bpm  2. /70/86 mmHg  3. RA 13/9/8   4. RV 40/10  5. PA 40/18/25   6. PCW 18/25/20   7. PA sat 62.4%   8. PCW sat 90%  9. Hgb 9.2 g/dL   10. Indy CO 7.3 L/min   11. Indy CI 3.7 L/min   12. TD  CO 5.2 L/min   13. TD CI 2.7 L/min   14. PVR 0.7 wood units        CORONARY ANGIOGRAM:   1. Both coronary arteries arise from their respective cusps.  2. Dominance: Right  3. The LM is large caliber but very short vessel and is without angiographic evidence of disease.   4. LAD: Type 3 [LAD supplies the entire apex].. The LAD gives rise to septal perforators, medium size D1 and D2.  The LAD and its branches are without angiographic evidence of disease..    5. LCX gives rise to OM1 and OM2 vessels.  The circumflex and its branches are without angiographic evidence of disease..    6. RCA gives rise to PL branches and supplies PDA. The RCA and its branches are without angiographic evidence of disease.      Sheath Removal:  The right radial artery and the IJ  sheaths were manually removed in the cardiac catheterization laboratory.    Contrast: Isovue, 70 ml     Fluoroscopy Time: 4.1 min    COMPLICATIONS:  1. None    SUMMARY:   >> Normal right sided filling pressures.  >> High left sided filling pressures with mean wedge of 20 mmhg  >> Borderline pulmonary artery hypertension with mean of 25 mm Hg  >> Normal cardiac output, 5.2 L/min with index 2.7 L/min/m2  Normal coronary arteries    PLAN:   >> Bedrest per protocol.  >> Continued medical management and lifestyle modification for cardiovascular risk factor optimization.   >>. Discharge today per protocol    The attending interventional cardiologist was present and supervised all critical aspects the procedure.      See CVIS report for final draft.    Chavez Graham MD  Interventional Cardiology Fellow    Ubaldo Villavicencio MD   Cardiology Staff

## 2018-02-05 LAB — INTERPRETATION ECG - MUSE: NORMAL

## 2018-02-09 ENCOUNTER — CARE COORDINATION (OUTPATIENT)
Dept: CARDIOLOGY | Facility: CLINIC | Age: 58
End: 2018-02-09

## 2018-02-09 DIAGNOSIS — I35.0 SEVERE AORTIC STENOSIS: Primary | ICD-10-CM

## 2018-02-09 NOTE — PROGRESS NOTES
Ten's case was discussed at TAVR conference.    The plan will be for Ten to proceed with TAVR   Quiroz Valve  Size 26  Approach: TF    Additional testings/orders/information discussed:   It was discussed that is was not clear how much relief Ten will feel from his sxs of SOB and fatigue due to his cirrhosis, and other co-morbidites.  He will be scheduled with a 2nd surgeon and PAC visit.       Date: 2/9/2018    Time of Call: 11:25 AM     Diagnosis:  Severe aortic stenosis     [ TORB ] Ordering provider: Ubaldo Villavicencio MD  Order:   1.  PAC referral  2.  Pre-TAVR procedure scheduling order ([id number])  Needs 2nd surgeon evaluation (NOT Dr. Peters)     Order received by: Emma Mendez RN         TAVR DATE:  February 21, 1st case  Check in to the hospital 3C at 0900.    Nothing to eat after midnight; water, apple juice or 7up/Sprite is OK up to two hours prior to your scheduled procedure.  You may take your medications in the morning with a sip of water.    Take a shower, with antibacterial soap, the night before the procedure, and the morning of the procedure.      Diagnosis: Severe aortic stenosis  Plan:  1. TAVR procedure scheduled for February 21, 2018.  The hospital will call patient to tell them time of surgery.  2. Medications Instructions:  --Take an ASA 81 mg, by mouth, the night before and morning of the procedure.  --All other medication instructions are at the discretion of the PAC.      If any questions please contact:  Ira Mendez RN  Structural Heart Care Coordinator  [facility]  Office: [phone]  Pager: [phone]

## 2018-02-12 ENCOUNTER — ANESTHESIA EVENT (OUTPATIENT)
Dept: SURGERY | Facility: CLINIC | Age: 58
DRG: 267 | End: 2018-02-12
Payer: COMMERCIAL

## 2018-02-15 ENCOUNTER — APPOINTMENT (OUTPATIENT)
Dept: SURGERY | Facility: CLINIC | Age: 58
End: 2018-02-15
Payer: COMMERCIAL

## 2018-02-15 ENCOUNTER — OFFICE VISIT (OUTPATIENT)
Dept: CARDIOLOGY | Facility: CLINIC | Age: 58
End: 2018-02-15
Attending: THORACIC SURGERY (CARDIOTHORACIC VASCULAR SURGERY)
Payer: COMMERCIAL

## 2018-02-15 ENCOUNTER — ALLIED HEALTH/NURSE VISIT (OUTPATIENT)
Dept: SURGERY | Facility: CLINIC | Age: 58
End: 2018-02-15
Payer: COMMERCIAL

## 2018-02-15 ENCOUNTER — CARE COORDINATION (OUTPATIENT)
Dept: CARDIOLOGY | Facility: CLINIC | Age: 58
End: 2018-02-15

## 2018-02-15 ENCOUNTER — OFFICE VISIT (OUTPATIENT)
Dept: SURGERY | Facility: CLINIC | Age: 58
End: 2018-02-15
Payer: COMMERCIAL

## 2018-02-15 VITALS
HEART RATE: 83 BPM | BODY MASS INDEX: 26.55 KG/M2 | OXYGEN SATURATION: 99 % | SYSTOLIC BLOOD PRESSURE: 137 MMHG | DIASTOLIC BLOOD PRESSURE: 84 MMHG | WEIGHT: 175.2 LBS | HEIGHT: 68 IN

## 2018-02-15 VITALS
OXYGEN SATURATION: 99 % | BODY MASS INDEX: 26.55 KG/M2 | WEIGHT: 175.2 LBS | TEMPERATURE: 97.7 F | SYSTOLIC BLOOD PRESSURE: 149 MMHG | HEART RATE: 83 BPM | DIASTOLIC BLOOD PRESSURE: 88 MMHG | HEIGHT: 68 IN | RESPIRATION RATE: 18 BRPM

## 2018-02-15 DIAGNOSIS — I35.0 SEVERE AORTIC STENOSIS: Primary | ICD-10-CM

## 2018-02-15 DIAGNOSIS — I35.0 SEVERE AORTIC STENOSIS: ICD-10-CM

## 2018-02-15 DIAGNOSIS — I35.0 NONRHEUMATIC AORTIC VALVE STENOSIS: Primary | ICD-10-CM

## 2018-02-15 DIAGNOSIS — Z01.818 PREOP EXAMINATION: Primary | ICD-10-CM

## 2018-02-15 PROCEDURE — G0463 HOSPITAL OUTPT CLINIC VISIT: HCPCS | Mod: ZF

## 2018-02-15 PROCEDURE — 40000809 ZZH STATISTIC NO DOCUMENTATION TO SUPPORT CHARGE

## 2018-02-15 RX ORDER — LIDOCAINE 40 MG/G
CREAM TOPICAL
Status: CANCELLED | OUTPATIENT
Start: 2018-02-15

## 2018-02-15 RX ORDER — SODIUM CHLORIDE 9 MG/ML
INJECTION, SOLUTION INTRAVENOUS CONTINUOUS
Status: CANCELLED | OUTPATIENT
Start: 2018-02-15

## 2018-02-15 RX ORDER — ASPIRIN 81 MG/1
81 TABLET ORAL DAILY
Status: CANCELLED | OUTPATIENT
Start: 2018-02-15

## 2018-02-15 NOTE — NURSING NOTE
Chief Complaint   Patient presents with     New Patient     2nd TAVR     Delmar Acosta, RMA  1:25 PM

## 2018-02-15 NOTE — MR AVS SNAPSHOT
After Visit Summary   2/15/2018    Ten Johnson    MRN: [id number]           Patient Information     Date Of Birth          1960        Visit Information        Provider Department      2/15/2018 12:30 PM Rn, [facility]        Care Instructions    Preparing for Your Surgery      Name:  Ten Johnson   MRN:  [id number]   :  1960   Today's Date:  2/15/2018     Arriving for surgery:  Surgery date:  2018  Arrival time:     05:30 am.  Please come to:       [facility] [address]  [address] [place]  [address]       parking is available in front of the hospital from 5:15 am to 8:00 pm    -  Stop at the Information Desk in the lobby    -   Inform the information person that you are here for surgery. An escort to 3c will be provided. If you would not like an escort, please proceed to 3C on the 3rd floor. [phone]     What can I eat or drink?  -  You may have solid food or milk products until 8 hours prior to your surgery or 11:30 pm.  -  You may have water, apple juice or 7up/Sprite until 2 hours prior to your surgery or 05:30 am.    Which medicines can I take?  Please avoid aspirin, ibuprofen, vitamins, and supplements one week before your surgery.  -  Do NOT take these medications in the morning, the day of surgery:   Does not apply.    -  Please take these medications the day of surgery:    Pantoprazole, Claritin, Singulair as needed.    How do I prepare myself?  -  Take two showers: one the night before surgery; and one the morning of surgery.         Use Scrubcare or Hibiclens to wash from neck down.  You may use your own shampoo and conditioner. No other hair products.   -  Do NOT use lotion, powder, deodorant, or antiperspirant the day of your surgery.  -  Do NOT wear any makeup, fingernail polish or jewelry.  -  Do not bring your own medications to the hospital, except for inhalers, eye drops,  flonase.  -  Bring your ID and insurance card.    Questions or Concerns:  If you have questions or concerns, please call the  Preoperative Assessment Center, Monday-Friday 7AM-7PM:  [phone]          AFTER YOUR SURGERY  Breathing exercises   Breathing exercises help you recover faster. Take deep breaths and let the air out slowly. This will:     Help you wake up after surgery.    Help prevent complications like pneumonia.  Preventing complications will help you go home sooner.   We may give you a breathing device (incentive spirometer) to encourage you to breathe deeply.   Nausea and vomiting   You may feel sick to your stomach after surgery; if so, let your nurse know.    Pain control:  After surgery, you may have pain. Our goal is to help you manage your pain. Pain medicine will help you feel comfortable enough to do activities that will help you heal.  These activities may include breathing exercises, walking and physical therapy.   To help your health care team treat your pain we will ask: 1) If you have pain  2) where it is located 3) describe your pain in your words  Methods of pain control include medications given by mouth, vein or by nerve block for some surgeries.  We may give you a pain control pump that will:  1) Deliver the medicine through a tube placed in your vein  2) Control the amount of medicine you receive  3) Allow you to push a button to deliver a dose of pain medicine  Sequential Compression Device (SCD) or Pneumo Boots:  You may need to wear SCD S on your legs or feet. These are wraps connected to a machine that pumps in air and releases it. The repeated pumping helps prevent blood clots from forming.           Follow-ups after your visit        Your next 10 appointments already scheduled     Feb 15, 2018 12:30 PM CST   (Arrive by 12:15 PM)   PAC RN ASSESSMENT with  Pac Rn   [facility] ([facility])    [address] [place] [address]  Floor  [facility] [address]   [phone]            Feb 15, 2018 12:50 PM CST   (Arrive by 12:35 PM)   PAC Anesthesia Consult with  Pac Anesthesiologist   [facility] Pre[facility] ([facility])    [address] [place] [address]  4th Floor  [facility] [address]   [phone]            Feb 15, 2018  1:00 PM CST   LAB with  LAB   [facility] Lab ([facility])    [address] [place] [address]  [address] [facility] [address] [phone]           Please do not eat 10-12 hours before your appointment if you are coming in fasting for labs on lipids, cholesterol, or glucose (sugar). This does not apply to pregnant women. Water, hot tea and black coffee (with nothing added) are okay. Do not drink other fluids, diet soda or chew gum.            Feb 15, 2018  2:00 PM CST   (Arrive by 1:45 PM)   New Patient Visit with Mervat Merino MD   [facility] Heart Care ([facility])    [address] [place] [address]  [address]  [facility] [address] [phone]            Feb 21, 2018   Procedure with GENERIC ANESTHESIA PROVIDER   [facility] Same Day Surgery (--)    [address] [facility] [address]   [phone]            Jun 04, 2018  8:30 AM CDT   Lab with  LAB   [facility] Lab ([facility])    [address] [place] [address]  [address] [facility] [address] [phone]            Jun 04, 2018  9:20 AM CDT   (Arrive by 9:05 AM)   Return General Liver with Gentry San MD   [facility] Hepatology ([facility])    [address] [place] [address]  [address]  [facility] [id number] [phone]              Who to contact     Please call your clinic at [phone] to:    Ask questions about your health    Make or cancel appointments    Discuss your medicines    Learn about your test results    Speak to your doctor            Additional Information About Your Visit        MyChart Information      [employer] is an electronic gateway that provides easy, online access to your medical records. With [employer], you can request a clinic appointment, read your test results, renew a prescription or communicate with your care team.     To sign up for [employer] visit the website at [email or web address]   You will be asked to enter the access code listed below, as well as some personal information. Please follow the directions to create your username and password.     Your access code is: [email or web address]  Expires: 3/8/2018  6:30 AM     Your access code will  in 90 days. If you need help or a new code, please contact your [facility] or call [phone] for assistance.        Care EveryWhere ID     This is your Care EveryWhere ID. This could be used by other organizations to access your [facility] medical records  [id number]         Blood Pressure from Last 3 Encounters:   02/15/18 149/88   18 (!) 143/97   18 159/87    Weight from Last 3 Encounters:   02/15/18 79.5 kg (175 lb 3.2 oz)   18 82.1 kg (181 lb 1.6 oz)   17 77.1 kg (170 lb)              Today, you had the following     No orders found for display         Today's Medication Changes          These changes are accurate as of 2/15/18 12:11 PM.  If you have any questions, ask your nurse or doctor.               These medicines have changed or have updated prescriptions.        Dose/Directions    ferrous sulfate 325 (65 FE) MG tablet   Commonly known as:  IRON   This may have changed:  when to take this   Used for:  Anemia, unspecified type        Dose:  325 mg   Take 1 tablet (325 mg) by mouth daily (with breakfast)   Quantity:  100 tablet   Refills:  0       lisinopril 10 MG tablet   Commonly known as:  PRINIVIL/ZESTRIL   This may have changed:  when to take this   Used for:  Essential hypertension        Dose:  10 mg   Take 1 tablet (10 mg) by mouth daily   Quantity:  90 tablet   Refills:  3                 Primary Care Provider Office Phone # Fax #    Cuca Celeste [phone]       [address] [place] [address] [address]  [facility] [address]        Equal Access to Services     MANE HERRERA : Hadwade adrienne garcia faith Reyes, waaxda luqadaha, qaybta kaalmada jacquie, jazmin moffett laMiradeepali thompson. So [facility] [phone].    ATENCIÓN: Si habla español, tiene a ha disposición servicios gratuitos de asistencia lingüística. Llame al [phone].    We comply with applicable federal civil rights laws and [place] laws. We do not discriminate on the basis of race, color, national origin, age, disability, sex, sexual orientation, or gender identity.            Thank you!     Thank you for choosing [facility]  for your care. Our goal is always to provide you with excellent care. Hearing back from our patients is one way we can continue to improve our services. Please take a few minutes to complete the written survey that you may receive in the mail after your visit with us. Thank you!             Your Updated Medication List - Protect others around you: Learn how to safely use, store and throw away your medicines at www.disposemymeds.org.          This list is accurate as of 2/15/18 12:11 PM.  Always use your most recent med list.                   Brand Name Dispense Instructions for use Diagnosis    ferrous sulfate 325 (65 FE) MG tablet    IRON    100 tablet    Take 1 tablet (325 mg) by mouth daily (with breakfast)    Anemia, unspecified type       fluticasone 50 MCG/ACT spray    FLONASE    16 g    Spray 2 sprays into both nostrils daily    Cough, Post-nasal drip       lisinopril 10 MG tablet    PRINIVIL/ZESTRIL    90 tablet    Take 1 tablet (10 mg) by mouth daily    Essential hypertension       loratadine 10 MG tablet    CLARITIN    90 tablet    Take 1 tablet (10 mg) by mouth daily    Post-nasal drip, Cough       montelukast 10 MG tablet    SINGULAIR    90 tablet    Take 1  tablet (10 mg) by mouth At Bedtime    Other chronic rhinitis       MULTIVITAMINS PO      Take 1 tablet by mouth At Bedtime        PROTONIX PO      Take 40 mg by mouth 2 times daily as needed

## 2018-02-15 NOTE — LETTER
2/15/2018      RE: Ten Johnson  [address] [facility] [address]       Dear Colleague,    Thank you for the opportunity to participate in the care of your patient, Ten Johnson, at the [facility]. Please see a copy of my visit note below.    2ND SURGICAL CONSULTATION FOR TVAR SURGERY    Asked by Ema Pierson to see this patient in consultation for surgical treatment.    HPI: Patient is a 57 year old male who presents to clinic today for a 2nd surgical consultation for TVAR valve replacement    PAST MEDICAL HISTORY:  Past Medical History:   Diagnosis Date     Alcohol use disorder (H)      Alcoholic cirrhosis (H)      Ascites      Chronic hepatitis C without hepatic coma (H) 05/10/2016    Untreated as of 2/2018     Erosive gastropathy      Esophageal varices in alcoholic cirrhosis (H)      H/O upper gastrointestinal hemorrhage 09/2017     History of blood transfusion      Hypertension     essential     JANELLE (iron deficiency anemia)      Sarahi-Hoffman tear     History     Marijuana abuse      MRSA (methicillin resistant Staphylococcus aureus)      Severe aortic stenosis      Thrombocytopenia (H)        CURRENT MEDICATIONS:  Current Outpatient Prescriptions   Medication Sig Dispense Refill     Pantoprazole Sodium (PROTONIX PO) Take 40 mg by mouth 2 times daily as needed        ferrous sulfate (IRON) 325 (65 FE) MG tablet Take 1 tablet (325 mg) by mouth daily (with breakfast) (Patient taking differently: Take 325 mg by mouth At Bedtime ) 100 tablet 0     montelukast (SINGULAIR) 10 MG tablet Take 1 tablet (10 mg) by mouth At Bedtime 90 tablet 1     loratadine (CLARITIN) 10 MG tablet Take 1 tablet (10 mg) by mouth daily 90 tablet 3     lisinopril (PRINIVIL/ZESTRIL) 10 MG tablet Take 1 tablet (10 mg) by mouth daily (Patient taking differently: Take 10 mg by mouth At Bedtime ) 90 tablet 3     fluticasone (FLONASE) 50 MCG/ACT spray Spray 2 sprays into  both nostrils daily 16 g 11     Multiple Vitamin (MULTIVITAMINS PO) Take 1 tablet by mouth At Bedtime          PAST SURGICAL HISTORY:  Past Surgical History:   Procedure Laterality Date     ARTHROSCOPY SHOULDER ROTATOR CUFF REPAIR  7/31/2012    Procedure: ARTHROSCOPY SHOULDER ROTATOR CUFF REPAIR;  Right Shoulder Arthroscopic Rotator Cuff Repair, BicepsTenodesis,  Subacromial Decompression ;  Surgeon: Joi Castillo MD;  [facility]     ESOPHAGOSCOPY, GASTROSCOPY, DUODENOSCOPY (EGD), COMBINED N/A 10/23/2017    Procedure: COMBINED ESOPHAGOSCOPY, GASTROSCOPY, DUODENOSCOPY (EGD);;  Surgeon: Gentry Salas MD;  Location:  GI     FACIAL RECONSTRUCTION SURGERY  1971     IRRIGATION AND DEBRIDEMENT UPPER EXTREMITY, COMBINED  1/3/2012    Procedure:COMBINED IRRIGATION AND DEBRIDEMENT UPPER EXTREMITY; Irrigation & Debridement Left Elbow; Surgeon:CRISTHIAN ZHOU; [facility]     REPAIR TENDON TRICEPS UPPER EXTREMITY  11/8/2011    Procedure:REPAIR TENDON TRICEPS UPPER EXTREMITY; Surgeon:CRISTHIAN ZHOU; [facility]     SHOULDER SURGERY  2003    left, injury, torn tendons, hematoma     TRANSPOSITION ULNAR NERVE (ELBOW)  11/8/2011    Procedure:TRANSPOSITION ULNAR NERVE (ELBOW); Final Procedure Done: Left Elbow Lateral Ulnar Collateral Repair And  Left Elbow Triceps Repair         ALLERGIES     Allergies   Allergen Reactions     Zolpidem Other (See Comments)     Alcoholic.  Had reaction 3/17/13 while intoxicated which included black out, loss of awareness, paranoia.  Do not prescribe.  Dr. Celeste     Cats Other (See Comments)     rhinitis     Dogs Other (See Comments)     rhinitis     Pollen Extract Other (See Comments)     rhinits.       FAMILY HISTORY:  Family History   Problem Relation Age of Onset     CANCER Mother 62     Alcoholism Paternal Uncle      Cirrhosis No family hx of        SOCIAL HISTORY:  Social History     Social History     Marital status: Single     Spouse name: N/A  "    Number of children: N/A     Years of education: N/A     Social History Main Topics     Smoking status: Never Smoker     Smokeless tobacco: Never Used     Alcohol use Yes      Comment: Alcohol use disorder, still actively drinking     Drug use: Yes     Special: Marijuana      Comment: occ marijuana     Sexual activity: Not Currently     Partners: Female     Other Topics Concern     Not on file     Social History Narrative    .  Bicycles a lot.  Excessive alcohol use.  Smokes cigars.  Occasional marijuana use.       ROS:  Constitutional: No fever, chills, or sweats. No weight gain/loss.   HEENT: No visual disturbance, ear ache, epistaxis, sore throat.   Allergies/Immunologic: Negative.   Respiratory: No cough, hemoptysis.   Cardiovascular: As per HPI.   GI: No nausea, vomiting, hematemesis, melena, or hematochezia.   : No urinary frequency, dysuria, or hematuria.   Integument: No rash.   Psychiatric: No anxiety / depression.   Neuro: No speech disturbance, focal sensory or motor deficit.   Endocrinology: No polyuria / polyphagia.   Musculoskeletal: No myalgia.        /84  Pulse 83  Ht 1.727 m (5' 8\")  Wt 79.5 kg (175 lb 3.2 oz)  SpO2 99%  BMI 26.64 kg/m2  In general, the patient is a pleasant male in no apparent distress.    Heart: RRR. Normal S1, S2 splits physiologically. No murmur, rub, click, or gallop. The PMI is in the 5th ICS in the midclavicular line. There is no heave.    HEENT: NC/AT.  PERRLA.  EOMI.  Sclerae white, not injected.  Nares clear.  Pharynx without erythema or exudate.  Dentition intact.    Neck: No adenopathy.  No thyromegaly. Carotids +4/4 bilaterally without bruits.  No jugular venous distension.   Lungs: CTA.  No ronchi, wheezes, rales.  No dullness to percussion.   Abdomen: Soft, nontender, nondistended. No organomegaly.  No bruits.   Extremities: No clubbing, cyanosis, or edema.  The pulses are +4/4 at the radial, brachial, femoral, popliteal, DP, and " PT sites bilaterally.  No bruits are noted.  Neurologic: Alert and oriented to person/place/time, normal speech, gait and affect  Skin: No petechiae, purpura or rash.    Labs:  LIPID RESULTS:  Lab Results   Component Value Date    CHOL 146 05/09/2016    HDL 64 05/09/2016    LDL 61 05/09/2016    TRIG 106 05/09/2016       LIVER ENZYME RESULTS:  Lab Results   Component Value Date     (H) 12/13/2017     (H) 12/13/2017       CBC RESULTS:  Lab Results   Component Value Date    WBC 5.1 02/01/2018    RBC 4.36 (L) 02/01/2018    HGB 10.0 (L) 02/01/2018    HCT 32.6 (L) 02/01/2018    MCV 75 (L) 02/01/2018    MCH 22.9 (L) 02/01/2018    MCHC 30.7 (L) 02/01/2018    RDW 21.3 (H) 02/01/2018    PLT 97 (L) 02/01/2018       BMP RESULTS:  Lab Results   Component Value Date     02/01/2018    POTASSIUM 4.0 02/01/2018    CHLORIDE 106 02/01/2018    CO2 26 02/01/2018    ANIONGAP 8 02/01/2018     (H) 02/01/2018    BUN 14 02/01/2018    CR 0.96 02/01/2018    GFRESTIMATED 80 02/01/2018    GFRESTBLACK >90 02/01/2018    JOSEPHINE 8.6 02/01/2018        RESULTS:  Lab Results   Component Value Date    INR 1.20 (H) 02/01/2018    INR 1.27 (H) 12/04/2017     No results found for: A1C  Lab Results   Component Value Date    PTT 29 02/05/2010     No results found for: UA    Procedures:      Assessment and Plan:   Ten is a 57 year old male who presents with     Patient is a good candidate for TVAR valve replacement due to     Approximately 50 minutes spent with this case including review of the clinical history and data; discussion with the patient and his family and coordination of the care    Thank you for including me in the care of this kind patient. Do not hesitate to contact me with any questions.    Dr Mervat Merino  Cardiothoracic Surgery    CC  Patient Care Team:  Cuca Celeste MD as PCP - General (Internal Medicine)  Mili Capps MD as MD (Internal Medicine)  Kieran Ulloa MD as MD (Family  Practice)  Emma Mendez as Nurse Coordinator (Cardiology)  IGNACIO LEOS.      ASKED BY REFERRING PHYSICIAN: Dr. Sahara Leos to evaluate this patient for Aortic valve replacement.    CHIEF COMPLAINT: New Patient (2nd TAVR)    HPI: Ten is a 57 year old male who presents with severe symptomatic aortic stenosis.  His main symptom has been dyspnea on exertion. He is staying active but at a much lower level than his usual activity level.    PAST MEDICAL HISTORY:  Past Medical History:   Diagnosis Date     Alcohol use disorder (H)      Alcoholic cirrhosis (H)      Ascites      Chronic hepatitis C without hepatic coma (H) 05/10/2016    Untreated as of 2/2018     Erosive gastropathy      Esophageal varices in alcoholic cirrhosis (H)      H/O upper gastrointestinal hemorrhage 09/2017     History of blood transfusion      Hypertension     essential     JANELLE (iron deficiency anemia)      Sarahi-Hoffman tear     History     Marijuana abuse      MRSA (methicillin resistant Staphylococcus aureus)      Severe aortic stenosis      Thrombocytopenia (H)        PAST SURGICAL HISTORY:  Past Surgical History:   Procedure Laterality Date     ARTHROSCOPY SHOULDER ROTATOR CUFF REPAIR  7/31/2012    Procedure: ARTHROSCOPY SHOULDER ROTATOR CUFF REPAIR;  Right Shoulder Arthroscopic Rotator Cuff Repair, BicepsTenodesis,  Subacromial Decompression ;  Surgeon: Joi Castillo MD;  [facility]     ESOPHAGOSCOPY, GASTROSCOPY, DUODENOSCOPY (EGD), COMBINED N/A 10/23/2017    Procedure: COMBINED ESOPHAGOSCOPY, GASTROSCOPY, DUODENOSCOPY (EGD);;  Surgeon: Gentry Salas MD;  [facility]     FACIAL RECONSTRUCTION SURGERY  1971     IRRIGATION AND DEBRIDEMENT UPPER EXTREMITY, COMBINED  1/3/2012    Procedure:COMBINED IRRIGATION AND DEBRIDEMENT UPPER EXTREMITY; Irrigation & Debridement Left Elbow; Surgeon:CRISTHIAN ZHOU; Location: OR     REPAIR TENDON TRICEPS UPPER EXTREMITY  11/8/2011    Procedure:REPAIR  TENDON TRICEPS UPPER EXTREMITY; Surgeon:CRISTHIAN ZHOU; [facility]     SHOULDER SURGERY  2003    left, injury, torn tendons, hematoma     TRANSPOSITION ULNAR NERVE (ELBOW)  11/8/2011    Procedure:TRANSPOSITION ULNAR NERVE (ELBOW); Final Procedure Done: Left Elbow Lateral Ulnar Collateral Repair And  Left Elbow Triceps Repair         FAMILY HISTORY:   Family History   Problem Relation Age of Onset     CANCER Mother 62     Alcoholism Paternal Uncle      Cirrhosis No family hx of        SOCIAL HISTORY:  Social History     Social History     Marital status: Single     Spouse name: N/A     Number of children: N/A     Years of education: N/A     Occupational History     Not on file.     Social History Main Topics     Smoking status: Never Smoker     Smokeless tobacco: Never Used     Alcohol use Yes      Comment: Alcohol use disorder, still actively drinking     Drug use: Yes     Special: Marijuana      Comment: occ marijuana     Sexual activity: Not Currently     Partners: Female     Other Topics Concern     Not on file     Social History Narrative    .  Bicycles a lot.  Excessive alcohol use.  Smokes cigars.  Occasional marijuana use.        ALLERGIES:   Allergies   Allergen Reactions     Zolpidem Other (See Comments)     Alcoholic.  Had reaction 3/17/13 while intoxicated which included black out, loss of awareness, paranoia.  Do not prescribe.  Dr. Celeste     Cats Other (See Comments)     rhinitis     Dogs Other (See Comments)     rhinitis     Pollen Extract Other (See Comments)     rhinits.       CURRENT MEDICATIONS:   Prescription Medications as of 2/20/2018             Pantoprazole Sodium (PROTONIX PO) Take 40 mg by mouth 2 times daily as needed     ferrous sulfate (IRON) 325 (65 FE) MG tablet Take 1 tablet (325 mg) by mouth daily (with breakfast)    montelukast (SINGULAIR) 10 MG tablet Take 1 tablet (10 mg) by mouth At Bedtime    loratadine (CLARITIN) 10 MG tablet Take 1 tablet  "(10 mg) by mouth daily    lisinopril (PRINIVIL/ZESTRIL) 10 MG tablet Take 1 tablet (10 mg) by mouth daily    fluticasone (FLONASE) 50 MCG/ACT spray Spray 2 sprays into both nostrils daily    Multiple Vitamin (MULTIVITAMINS PO) Take 1 tablet by mouth At Bedtime           REVIEW OF SYSTEMS:   Gen: denies frequent headaches, double/blurry vision, insomnia, fatigue, unexplained weight loss/gain. No previous anesthesia reactions.  CV: denies chest pain, shortness of breath, palpitations, peripheral edema.   Pulm: denies shortness of breath, asthma or wheezing  GI/: liver cirrhosis, history of GI bleeding  Endo: denies thyroid problems or Diabetes  Heme/Onc: denies bleeding or clotting disorders, no family problems with bleeding/clotting diorders  MS: no weakness, tremors or gait instability  Neuro: denies depression, memory problems, no dysesthesias, no previous strokes, no migraines, no dysphagia  Skin: No petechiae, purpura or rash.    PHYSICAL EXAMINATION:   /84  Pulse 83  Ht 1.727 m (5' 8\")  Wt 79.5 kg (175 lb 3.2 oz)  SpO2 99%  BMI 26.64 kg/m2  General: alert and oriented x 3, pleasant, no acute distress  CV: S1 S2, Grade III/VI murmur of AS but no rubs or gallops, regular rate and rhythm, no peripheral edema, no carotid or abdominal bruits, pulses in upper and lower extremities palpable  Pulm: bilateral breath sounds, clear to auscultation, easy work of breathing  GI: (+) bowel sounds, soft non-tender and non-distended  : voiding without problems  MS: moves all extremities x 4,  5+/5+ equal strength bilaterally  Neuro: pupils equal round and reactive to light, cranial nerves, II-XII grossly intact, no gross neurologic deficits noted    LABS:  BMP RESULTS:  Lab Results   Component Value Date     02/01/2018    POTASSIUM 4.0 02/01/2018    CHLORIDE 106 02/01/2018    CO2 26 02/01/2018    ANIONGAP 8 02/01/2018     (H) 02/01/2018    BUN 14 02/01/2018    CR 0.96 02/01/2018    GFRESTIMATED 80 " 02/01/2018    GFRESTBLACK >90 02/01/2018    JOSEPHINE 8.6 02/01/2018        CBC RESULTS:  Lab Results   Component Value Date    WBC 5.1 02/01/2018    RBC 4.36 (L) 02/01/2018    HGB 10.0 (L) 02/01/2018    HCT 32.6 (L) 02/01/2018    MCV 75 (L) 02/01/2018    MCH 22.9 (L) 02/01/2018    MCHC 30.7 (L) 02/01/2018    RDW 21.3 (H) 02/01/2018    PLT 97 (L) 02/01/2018       Lab Results   Component Value Date    INR 1.20 (H) 02/01/2018     Lab Results   Component Value Date    PTT 29 02/05/2010     No results found for: UA  No results found for: A1C    PROCEDURES/IMAGING:  Chest X-Ray: Lungs are clear.  Angio: No significant CAD  Echo: Severe aortic stenosis  CT: Access via femoral arteries  MRI: Pending  Carotid US: No significant carotid disease     ASSESSMENT/PLAN:   Ten is a 57 year old gentleman with severe symptomatic aortic stenosis.  I believe he would benefit from aortic valve replacement.  Given his cirrhosis, he is not an appropriate candidate for open surgical aortic valve replacement.  This is especially true given his thrombocytopenia.  I recommend transcatheter aortic valve replacement.  He understands that the risks for this procedure include: bleeding, infection, stroke, cardiac perforation, aortic root rupture, aortic dissection, and an operative mortality of around 2 percent.  He accepts these risks and is ready to undergo TAVR next week.    1. Complete preoperative work-up  2. TAVR next week    Approximately 35 minutes spent with the patient in clinic at this visit.    CC  Patient Care Team:  Cuca Celeste MD as PCP - General (Internal Medicine)  Mili Capps MD as MD (Internal Medicine)  Kieran Ulloa MD as MD (Family Practice)  Emma Mendez as Nurse Coordinator (Cardiology)  IGNACIO LEOS

## 2018-02-15 NOTE — PATIENT INSTRUCTIONS
Preparing for Your Surgery      Name:  Ten Johnson   MRN:  [id number]   :  1960   Today's Date:  2/15/2018     Arriving for surgery:  Surgery date:  2018  Arrival time:     05:30 am.  Please come to:       [facility] [address]  [address] [place]  [address]    -   parking is available in front of the hospital from 5:15 am to 8:00 pm    -  Stop at the Information Desk in the lobby    -   Inform the information person that you are here for surgery. An escort to 3c will be provided. If you would not like an escort, please proceed to 3C on the 3rd floor. [phone]     What can I eat or drink?  -  You may have solid food or milk products until 8 hours prior to your surgery or 11:30 pm.  -  You may have water, apple juice or 7up/Sprite until 2 hours prior to your surgery or 05:30 am.    Which medicines can I take?  Please avoid aspirin, ibuprofen, vitamins, and supplements one week before your surgery.  -  Do NOT take these medications in the morning, the day of surgery:   Does not apply.    -  Please take these medications the day of surgery:    Pantoprazole, Claritin, Singulair as needed.    How do I prepare myself?  -  Take two showers: one the night before surgery; and one the morning of surgery.         Use Scrubcare or Hibiclens to wash from neck down.  You may use your own shampoo and conditioner. No other hair products.   -  Do NOT use lotion, powder, deodorant, or antiperspirant the day of your surgery.  -  Do NOT wear any makeup, fingernail polish or jewelry.  -  Do not bring your own medications to the hospital, except for inhalers, eye drops, flonase.  -  Bring your ID and insurance card.    Questions or Concerns:  If you have questions or concerns, please call the  Preoperative Assessment Center, Monday-Friday 7AM-7PM:  [phone]          AFTER YOUR SURGERY  Breathing exercises   Breathing exercises help you recover faster. Take deep  breaths and let the air out slowly. This will:     Help you wake up after surgery.    Help prevent complications like pneumonia.  Preventing complications will help you go home sooner.   We may give you a breathing device (incentive spirometer) to encourage you to breathe deeply.   Nausea and vomiting   You may feel sick to your stomach after surgery; if so, let your nurse know.    Pain control:  After surgery, you may have pain. Our goal is to help you manage your pain. Pain medicine will help you feel comfortable enough to do activities that will help you heal.  These activities may include breathing exercises, walking and physical therapy.   To help your health care team treat your pain we will ask: 1) If you have pain  2) where it is located 3) describe your pain in your words  Methods of pain control include medications given by mouth, vein or by nerve block for some surgeries.  We may give you a pain control pump that will:  1) Deliver the medicine through a tube placed in your vein  2) Control the amount of medicine you receive  3) Allow you to push a button to deliver a dose of pain medicine  Sequential Compression Device (SCD) or Pneumo Boots:  You may need to wear SCD S on your legs or feet. These are wraps connected to a machine that pumps in air and releases it. The repeated pumping helps prevent blood clots from forming.

## 2018-02-15 NOTE — PROGRESS NOTES
Date: 2/15/2018    Time of Call: 12:00 PM     Diagnosis:  Severe aortic stenosis     [ TORB ] Ordering provider: Ubaldo Villavicencio MD  Order:   1.  Pre-TAVR procedure orders  2.  Blood components  3.  TTE (for TAVR procedure)     Order received by: Emma Mendez RN     Follow-up/additional notes:   Ordered for upcoming TAVR, scheduled for 2/21/2018.

## 2018-02-15 NOTE — H&P
Pre-Operative H & P     CC:  Preoperative exam to assess for increased cardiopulmonary risk while undergoing surgery and anesthesia.    Date of Encounter: February 15, 2018   Primary Care Physician:  Cuca Celeste   Reason for Visit/Surgery:  No primary diagnosis found.      LAYLA Johnson is a 57 year old male who presents for pre-operative H & P in preparation for a Transfemoral (Quiroz) Aortic Valve Implant, Possible Cardiopulmonary Bypass Standby on 2/21/18 with Dr. Peters and TAVR team for severe aortic stenosis at the [facility].     Ten Johnson   has a past medical history of Alcohol use disorder (H); Alcoholic cirrhosis (H); Ascites; Chronic hepatitis C without hepatic coma (H) (05/10/2016); Erosive gastropathy; Esophageal varices in alcoholic cirrhosis (H); H/O upper gastrointestinal hemorrhage (09/2017); History of blood transfusion; Hypertension; JANELLE (iron deficiency anemia); Sarahi-Hoffman tear; Marijuana abuse; MRSA (methicillin resistant Staphylococcus aureus); Severe aortic stenosis; and Thrombocytopenia (H).      History is obtained from the patient and medical record including Care Everywhere.        Past Surgical History  Past Surgical History:   Procedure Laterality Date     ARTHROSCOPY SHOULDER ROTATOR CUFF REPAIR  7/31/2012    Procedure: ARTHROSCOPY SHOULDER ROTATOR CUFF REPAIR;  Right Shoulder Arthroscopic Rotator Cuff Repair, BicepsTenodesis,  Subacromial Decompression ;  Surgeon: Joi Castillo MD;  [facility]: [facility]     ESOPHAGOSCOPY, GASTROSCOPY, DUODENOSCOPY (EGD), COMBINED N/A 10/23/2017    Procedure: COMBINED ESOPHAGOSCOPY, GASTROSCOPY, DUODENOSCOPY (EGD);;  Surgeon: Gentry Salas MD;  Location:  GI     FACIAL RECONSTRUCTION SURGERY  1971     IRRIGATION AND DEBRIDEMENT UPPER EXTREMITY, COMBINED  1/3/2012    Procedure:COMBINED IRRIGATION AND DEBRIDEMENT UPPER EXTREMITY; Irrigation & Debridement Left Elbow;  Surgeon:CRISTHIAN ZHOU; Location:[facility]     REPAIR TENDON TRICEPS UPPER EXTREMITY  11/8/2011    Procedure:REPAIR TENDON TRICEPS UPPER EXTREMITY; Surgeon:CRISTHIAN ZHOU; Location:[facility] OR     SHOULDER SURGERY  2003    left, injury, torn tendons, hematoma     TRANSPOSITION ULNAR NERVE (ELBOW)  11/8/2011    Procedure:TRANSPOSITION ULNAR NERVE (ELBOW); Final Procedure Done: Left Elbow Lateral Ulnar Collateral Repair And  Left Elbow Triceps Repair         Hx of Blood transfusions/reactions: No reaction     Personal or FH with difficulty with Anesthesia:  None    Prior to Admission Medications  Current Outpatient Prescriptions   Medication Sig Dispense Refill     Pantoprazole Sodium (PROTONIX PO) Take 40 mg by mouth 2 times daily as needed        ferrous sulfate (IRON) 325 (65 FE) MG tablet Take 1 tablet (325 mg) by mouth daily (with breakfast) (Patient taking differently: Take 325 mg by mouth At Bedtime ) 100 tablet 0     montelukast (SINGULAIR) 10 MG tablet Take 1 tablet (10 mg) by mouth At Bedtime 90 tablet 1     loratadine (CLARITIN) 10 MG tablet Take 1 tablet (10 mg) by mouth daily 90 tablet 3     lisinopril (PRINIVIL/ZESTRIL) 10 MG tablet Take 1 tablet (10 mg) by mouth daily (Patient taking differently: Take 10 mg by mouth At Bedtime ) 90 tablet 3     fluticasone (FLONASE) 50 MCG/ACT spray Spray 2 sprays into both nostrils daily 16 g 11     Multiple Vitamin (MULTIVITAMINS PO) Take 1 tablet by mouth At Bedtime            Allergies  Zolpidem; Cats; Dogs; and Pollen extract     Social History  Social History     Social History     Marital status: Single     Spouse name: N/A     Number of children: N/A     Years of education: N/A     Occupational History     Not on file.     Social History Main Topics     Smoking status: Never Smoker     Smokeless tobacco: Never Used     Alcohol use Yes      Comment: Alcohol use disorder, still actively drinking     Drug use: Yes     Special: Marijuana      Comment: occ  "marijuana     Sexual activity: Not Currently     Partners: Female     Other Topics Concern     Not on file     Social History Narrative    .  Bicycles a lot.  Excessive alcohol use.  Smokes cigars.  Occasional marijuana use.          Family History  Family History   Problem Relation Age of Onset     CANCER Mother 62     Alcoholism Paternal Uncle      Cirrhosis No family hx of         ROS   The complete review of systems is negative other than noted in the HPI or here.   Constitutional: Denies  fevers/chills.    EENT: Denies difficulty opening mouth or swallowing.  Cardiovascular: Has intermittent CP and LYN;   Denies orthopnea, palpitations or syncope.  Respiratory: SOB, no  significant cough.    GI: Denies frequent heartburn, nausea/vomiting     : Denies dysuria   Musculoskeletal: Chronic right knee pain, no joint swelling.    Skin: Denies rashes, infection or wounds.    Hematologic: Anemia; Denies  blood clot history  Neurologic: Denies history of stroke, TIA, migraines, seizures, dizziness, numbness/tingling, short term memory loss  Psychiatric: Denies changes in mood or affect.      Cardiology Tests: (personally reviewed):   Review Results Below in A/P    Labs: (personally reviewed):  Lab Results   Component Value Date    WBC 5.1 02/01/2018    HGB 10.0 (L) 02/01/2018    HCT 32.6 (L) 02/01/2018    PLT 97 (L) 02/01/2018    INR 1.20 (H) 02/01/2018    PTT 29 02/05/2010     02/01/2018    POTASSIUM 4.0 02/01/2018    JOSEPHINE 8.6 02/01/2018     (H) 02/01/2018    CR 0.96 02/01/2018    BUN 14 02/01/2018    CO2 26 02/01/2018     (H) 12/13/2017     (H) 12/13/2017    BILITOTAL 0.8 12/13/2017    ALKPHOS 88 12/13/2017           Physical Exam:  No LMP for male patient.   Vital signs:  /88  Pulse 83  Temp 97.7  F (36.5  C) (Oral)  Resp 18  Ht 1.727 m (5' 8\")  Wt 79.5 kg (175 lb 3.2 oz)  SpO2 99%  BMI 26.64 kg/m2    Constitutional: Awake, alert, cooperative, no apparent " distress, and appears stated age.  Eyes: Pupils equal, round and reactive to light, sclera clear, conjunctiva normal.  HENT: Normocephalic, oral pharynx with moist mucus membranes. No goiter appreciated.   Respiratory: Clear to auscultation bilaterally, no crackles or wheezing.  Cardiovascular: Overt murmur; Regular rate and rhythm. No carotid bruits auscultated. No edema. Palpable pulses to radial  DP and PT arteries.   GI: Normal bowel sounds, soft, non-distended, non-tender, no masses palpated  Skin: Warm and dry.  No rashes at anticipated surgical site.   Musculoskeletal: Full ROM of neck. There is no redness, warmth, or swelling of the exposed joints. Gross motor strength is normal.    Neurologic: Awake, alert, oriented to name, place and time.  Gait is normal.   Neuropsychiatric: Calm, cooperative. Normal affect.     Assessment/Plan  Ten Johnson is a 57 year old male who presents for pre-operative H & P in preparation for a Transfemoral (Quiroz) Aortic Valve Implant, Possible Cardiopulmonary Bypass Standby on 2/21/18 with Dr. Peters and TAVR team for severe aortic stenosis at the [facility].    PAC referral for risk assessment and optimization for anesthesia with comorbid conditions of:    Pre-operative considerations:  1.  Cardiac:  Functional status METS >4    Risk of Major Adverse Cardiac event: 0.9%  -Severe AS, moderate diastolic dysfunction    *ECHO 10/24/17:  The mean gradient across the aortic valve is 52 mmHg.  The peak aortic velocity is 4.47 m/sec. Severe aortic stenosis is present. Left ventricular function, chamber size, wall motion, and wall thickness are normal.The EF is 60-65%. Grade II or moderate diastolic dysfunction. No regional wall motion abnormalities are seen. Right ventricular function, chamber size, wall motion, and thickness are normal.  The inferior vena cava was normal in size with preserved respiratory variability. No pericardial  effusion is present.  Ascending aorta 4.0 cm.    *CATH 2/2/18:  Normal coronary arteries.  Normal cardiac output, 5.2 L/min with index 2.7 L/min/m2.  Normal right sided filling pressures.  Borderline pulmonary artery HTN with mean 25 mm Hg.  High left sided filling pressures with mean wedge of 20 mmHG   *EKG 2/2/18:  Sinus rhythm, Left ventricular hypertrophy with repolarization abnormality   *Carotid US 12/13/17:  No bilateral internal carotid artery stenosis  -HTN controlled with Lisinopril (hold DOS)  2.  Pulm:   VIC risk:  Intermediate  3.  Heme:  -JANELLE, Hgb 10 (2/1/18), on ferrous sulfate 325 mg  -thrombocytopenia, PLT 97 (2/1/18)  3.  GI:  Risk of PONV score =2 .  If > 2, anti-emetic intervention recommended.  -Cirrhosis due to Alcohol and Hep C cirrhosis with history of esophageal varices and ascites   *MELD-NA = 9  4.  Meds:  -Antiplts/Anticoags: Per TAVR team:  take asa 81 mg the night before and morning of surgery  5.  Alcohol Use Disorder  -Reports having 10 alcoholic beverages last night  6.  ID:  MRSA treated 2012    Patient is optimized and is an acceptable candidate for the proposed procedure.  No further diagnostic evaluation is needed.      AVS given to patient regarding medication instructions,  surgery time/arrival time and NPO status.  Raeann BONILLA-C   Preoperative Assessment Center  [facility]  Phone: [phone]  Fax: [phone]

## 2018-02-15 NOTE — MR AVS SNAPSHOT
After Visit Summary   2/15/2018    Ten Johnson    MRN: [id number]           Patient Information     Date Of Birth          1960        Visit Information        Provider Department      2/15/2018 2:00 PM Mervat Merino MD [facility]        Today's Diagnoses     Nonrheumatic aortic valve stenosis    -  1       Follow-ups after your visit        Your next 10 appointments already scheduled     Feb 21, 2018   Procedure with GENERIC ANESTHESIA PROVIDER   [facility]    [address] [address] [phone]            Feb 21, 2018  7:30 AM CST   Transcath Aortic-Valve Replace with [id number]   [facility] ([facility])    [address] [address] [phone]            Jun 04, 2018  8:30 AM CDT   Lab with  LAB   [facility] ([facility])    [address]  1st Floor  [place] [id number] [phone]            Jun 04, 2018  9:20 AM CDT   (Arrive by 9:05 AM)   Return General Liver with Gentry Sna MD   [facility] Hepatology ([facility])    [address]  [address]  [place] [id number] [phone]              Future tests that were ordered for you today     Open Future Orders        Priority Expected Expires Ordered    Echocardiogram Complete Routine 2/21/2018 2/19/2019 2/19/2018            Who to contact     If you have questions or need follow up information about today's clinic visit or your schedule please contact [facility] directly at [phone].  Normal or non-critical lab and imaging results will be communicated to you by MyChart, letter or phone within 4 business days after the clinic has received the results. If you do not hear from us within 7 days, please contact the clinic through MyChart or phone. If you have a critical or abnormal lab result, we will notify you  "by phone as soon as possible.  Submit refill requests through [employer] or call your pharmacy and they will forward the refill request to us. Please allow 3 business days for your refill to be completed.          Additional Information About Your Visit        [email or web address] Information     [employer] lets you send messages to your doctor, view your test results, renew your prescriptions, schedule appointments and more. To sign up, go to [email or web address] . Click on \"Log in\" on the left side of the screen, which will take you to the Welcome page. Then click on \"Sign up Now\" on the right side of the page.     You will be asked to enter the access code listed below, as well as some personal information. Please follow the directions to create your username and password.     Your access code is: [email or web address]  Expires: 3/8/2018  6:30 AM     Your access code will  in 90 days. If you need help or a new code, please call your [place] clinic or [phone].        Care EveryWhere ID     This is your Care EveryWhere ID. This could be used by other organizations to access your [place] medical records  [id number]        Your Vitals Were     Pulse Height Pulse Oximetry BMI (Body Mass Index)          83 1.727 m (5' 8\") 99% 26.64 kg/m2         Blood Pressure from Last 3 Encounters:   02/15/18 137/84   02/15/18 149/88   18 (!) 143/97    Weight from Last 3 Encounters:   02/15/18 79.5 kg (175 lb 3.2 oz)   02/15/18 79.5 kg (175 lb 3.2 oz)   18 82.1 kg (181 lb 1.6 oz)              Today, you had the following     No orders found for display         Today's Medication Changes          These changes are accurate as of 2/15/18 11:59 PM.  If you have any questions, ask your nurse or doctor.               These medicines have changed or have updated prescriptions.        Dose/Directions    ferrous sulfate 325 (65 FE) MG tablet   Commonly known as:  IRON   This may have changed:  when to take this   Used for:  Anemia, " unspecified type        Dose:  325 mg   Take 1 tablet (325 mg) by mouth daily (with breakfast)   Quantity:  100 tablet   Refills:  0       lisinopril 10 MG tablet   Commonly known as:  PRINIVIL/ZESTRIL   This may have changed:  when to take this   Used for:  Essential hypertension        Dose:  10 mg   Take 1 tablet (10 mg) by mouth daily   Quantity:  90 tablet   Refills:  3                Primary Care Provider Office Phone # Fax #    Cuca Celeste [phone]       [address] [place] [address] [address] [place] [id number]        Equal Access to Services     [facility]: Hadii adrienne garcia hadasho Sodani, waaxda luqadaha, qaybta kaalmada jacquie, jazmin crespo . So [facility] [phone].    ATENCIÓN: Si habla español, tiene a ha disposición servicios gratuitos de asistencia lingüística. [facility] [phone].    We comply with applicable federal civil rights laws and [place] laws. We do not discriminate on the basis of race, color, national origin, age, disability, sex, sexual orientation, or gender identity.            Thank you!     Thank you for choosing [facility]  for your care. Our goal is always to provide you with excellent care. Hearing back from our patients is one way we can continue to improve our services. Please take a few minutes to complete the written survey that you may receive in the mail after your visit with us. Thank you!             Your Updated Medication List - Protect others around you: Learn how to safely use, store and throw away your medicines at www.disposemymeds.org.          This list is accurate as of 2/15/18 11:59 PM.  Always use your most recent med list.                   Brand Name Dispense Instructions for use Diagnosis    ferrous sulfate 325 (65 FE) MG tablet    IRON    100 tablet    Take 1 tablet (325 mg) by mouth daily (with breakfast)    Anemia, unspecified type       fluticasone 50 MCG/ACT spray    FLONASE    16 g    Spray 2 sprays  into both nostrils daily    Cough, Post-nasal drip       lisinopril 10 MG tablet    PRINIVIL/ZESTRIL    90 tablet    Take 1 tablet (10 mg) by mouth daily    Essential hypertension       loratadine 10 MG tablet    CLARITIN    90 tablet    Take 1 tablet (10 mg) by mouth daily    Post-nasal drip, Cough       montelukast 10 MG tablet    SINGULAIR    90 tablet    Take 1 tablet (10 mg) by mouth At Bedtime    Other chronic rhinitis       MULTIVITAMINS PO      Take 1 tablet by mouth At Bedtime        PROTONIX PO      Take 40 mg by mouth 2 times daily as needed

## 2018-02-15 NOTE — ANESTHESIA PREPROCEDURE EVALUATION
Anesthesia Evaluation     . Pt has had prior anesthetic. Type: General and MAC           ROS/MED HX    ENT/Pulmonary:     (+), recent URI resolved . .    Neurologic:  - neg neurologic ROS     Cardiovascular:     (+) hypertension----. : . . LYN, . :. valvular problems/murmurs type: AS . Previous cardiac testing Echodate:10/24/17results:date: results:ECG reviewed date:2/2/18 results:Cath date: 2/2/18 results:          METS/Exercise Tolerance:  >4 METS   Hematologic: Comments: JANELLE, Hgb 10 (2/1/18)    (+) Anemia, History of Transfusion no previous transfusion reaction -      Musculoskeletal:   (+) arthritis, , , other musculoskeletal- Chronic right knee pain      GI/Hepatic: Comment: Cirrhosis due to alcohol and Hep C  MELD-Na = 9    (+) hepatitis type C and Alcoholic, liver disease,       Renal/Genitourinary:  - ROS Renal section negative       Endo:  - neg endo ROS       Psychiatric:     (+) psychiatric history other (comment) (Alcohol use disorder, still actively drinking heavily)      Infectious Disease: Comment: MRSA treated in 2012  (+) MRSA,       Malignancy:      - no malignancy   Other:    (+) H/O Chronic Pain,                   Physical Exam  Normal systems: pulmonary    Airway   Mallampati: I  TM distance: >3 FB  Neck ROM: full    Dental   (+) caps and chipped    Cardiovascular   Rhythm and rate: regular and normal  (+) murmur       Pulmonary    breath sounds clear to auscultation               PAC Discussion and Assessment    ASA Classification: 3  Case is suitable for: [place]  Anesthetic techniques and relevant risks discussed: GA  Invasive monitoring and risk discussed:   Types:   Possibility and Risk of blood transfusion discussed:   NPO instructions given:   Additional anesthetic preparation and risks discussed:   Needs early admission to pre-op area:   Other:     PAC Resident/NP Anesthesia Assessment:  Ten Johnson is a 57 year old male who presents for pre-operative H & P in preparation for a  Transfemoral (Quiroz) Aortic Valve Implant, Possible Cardiopulmonary Bypass Standby on 2/21/18 with Dr. Peters and TAVR team for severe aortic stenosis at the [facility].    PAC referral for risk assessment and optimization for anesthesia with comorbid conditions of:    Pre-operative considerations:  1.  Cardiac:  Functional status METS >4    Risk of Major Adverse Cardiac event: 0.9%  -Severe AS, moderate diastolic dysfunction    *ECHO 10/24/17:  The mean gradient across the aortic valve is 52 mmHg.  The peak aortic velocity is 4.47 m/sec. Severe aortic stenosis is present. Left ventricular function, chamber size, wall motion, and wall thickness are normal.The EF is 60-65%. Grade II or moderate diastolic dysfunction. No regional wall motion abnormalities are seen. Right ventricular function, chamber size, wall motion, and thickness are normal.  The inferior vena cava was normal in size with preserved respiratory variability. No pericardial effusion is present.  Ascending aorta 4.0 cm.    *CATH 2/2/18:  Normal coronary arteries.  Normal cardiac output, 5.2 L/min with index 2.7 L/min/m2.  Normal right sided filling pressures.  Borderline pulmonary artery HTN with mean 25 mm Hg.  High left sided filling pressures with mean wedge of 20 mmHG   *EKG 2/2/18:  Sinus rhythm, Left ventricular hypertrophy with repolarization abnormality   *Carotid US 12/13/17:  No bilateral internal carotid artery stenosis  -HTN controlled with Lisinopril (hold DOS)  2.  Pulm:   VIC risk:  Intermediate  3.  Heme:  -JANELLE, Hgb 10 (2/1/18), on ferrous sulfate 325 mg  -thrombocytopenia, PLT 97 (2/1/18)  3.  GI:  Risk of PONV score =2 .  If > 2, anti-emetic intervention recommended.  -Cirrhosis due to Alcohol and Hep C cirrhosis with history of esophageal varices and ascites   *MELD-NA = 9  4.  Meds:  -Antiplts/Anticoags: Per TAVR team:  take asa 81 mg the night before and morning of surgery  5.  Alcohol  Use Disorder  -Reports having 10 alcoholic beverages last night  6.  ID:  MRSA treated 2012    Patient is optimized and is an acceptable candidate for the proposed procedure.  No further diagnostic evaluation is needed.    Patient discussed with Dr. Gayle Valdes MS, PA-C  02/15/18 12:42 PM        Mid-Level Provider/Resident:   Date:   Time:     Attending Anesthesiologist Anesthesia Assessment:  57 year old for TAVR in management of severe AS, with gradient of 52 mmHg, velocity of 4.47. Normal LV and RV function. Patient also has significant cirrhosis, with significant drinking history and he continues to be a daily drinker. Thrombocytopenia with platelets at 90,000, INR stable at 1.2.     Patient/case discussed with ZOFIA. No need to see patient. Patient is appropriate for the planned procedure without further work-up or medical management.      Reviewed and Signed by PAC Anesthesiologist  Anesthesiologist: lani  Date: 2/15/2018  Time:   Pass/Fail: Pass  Disposition:     PAC Pharmacist Assessment:        Pharmacist:   Date:   Time:      Anesthesia Plan      History & Physical Review  History and physical reviewed and following examination; no interval change.    ASA Status:  4 .    NPO Status:  > 8 hours    Plan for General and ETT with Intravenous induction. Maintenance will be Balanced.    PONV prophylaxis:  Ondansetron (or other 5HT-3) and Dexamethasone or Solumedrol  Additional equipment: 2nd IV and Arterial Line      Postoperative Care  Postoperative pain management:  IV analgesics and Multi-modal analgesia.      Consents  Anesthetic plan, risks, benefits and alternatives discussed with:  Patient.  Use of blood products discussed: Yes.   Use of blood products discussed with Patient.  Consented to blood products.  .          57M with severe AS, HTN, h/o EtOH abuse and HCV+ with cirrhosis and h/o Sarahi Hoffman tears.  Here for TAVR.  ASA 4.  Plan: GETA, PIVx2, clearsight vs art line for BP  monitoring.  Patient offered MAC, but refused.    Risks of anesthesia discussed, including sore throat, PONV and risk of dental or lip trauma.    Joselin Mitchell MD  Staff Anesthesiologist  Pager [phone]

## 2018-02-19 ENCOUNTER — CARE COORDINATION (OUTPATIENT)
Dept: CARDIOLOGY | Facility: CLINIC | Age: 58
End: 2018-02-19

## 2018-02-19 DIAGNOSIS — I35.0 SEVERE AORTIC STENOSIS: Primary | ICD-10-CM

## 2018-02-19 RX ORDER — LIDOCAINE 40 MG/G
CREAM TOPICAL
Status: CANCELLED | OUTPATIENT
Start: 2018-02-19

## 2018-02-19 RX ORDER — SODIUM CHLORIDE 9 MG/ML
INJECTION, SOLUTION INTRAVENOUS CONTINUOUS
Status: CANCELLED | OUTPATIENT
Start: 2018-02-19

## 2018-02-19 RX ORDER — ASPIRIN 81 MG/1
81 TABLET ORAL DAILY
Status: CANCELLED | OUTPATIENT
Start: 2018-02-19

## 2018-02-19 NOTE — PROGRESS NOTES
2ND SURGICAL CONSULTATION FOR TVAR SURGERY    Asked by Ema Pierson to see this patient in consultation for surgical treatment.    HPI: Patient is a 57 year old male who presents to clinic today for a 2nd surgical consultation for TVAR valve replacement    PAST MEDICAL HISTORY:  Past Medical History:   Diagnosis Date     Alcohol use disorder (H)      Alcoholic cirrhosis (H)      Ascites      Chronic hepatitis C without hepatic coma (H) 05/10/2016    Untreated as of 2/2018     Erosive gastropathy      Esophageal varices in alcoholic cirrhosis (H)      H/O upper gastrointestinal hemorrhage 09/2017     History of blood transfusion      Hypertension     essential     JANELLE (iron deficiency anemia)      Sarahi-Hoffman tear     History     Marijuana abuse      MRSA (methicillin resistant Staphylococcus aureus)      Severe aortic stenosis      Thrombocytopenia (H)        CURRENT MEDICATIONS:  Current Outpatient Prescriptions   Medication Sig Dispense Refill     Pantoprazole Sodium (PROTONIX PO) Take 40 mg by mouth 2 times daily as needed        ferrous sulfate (IRON) 325 (65 FE) MG tablet Take 1 tablet (325 mg) by mouth daily (with breakfast) (Patient taking differently: Take 325 mg by mouth At Bedtime ) 100 tablet 0     montelukast (SINGULAIR) 10 MG tablet Take 1 tablet (10 mg) by mouth At Bedtime 90 tablet 1     loratadine (CLARITIN) 10 MG tablet Take 1 tablet (10 mg) by mouth daily 90 tablet 3     lisinopril (PRINIVIL/ZESTRIL) 10 MG tablet Take 1 tablet (10 mg) by mouth daily (Patient taking differently: Take 10 mg by mouth At Bedtime ) 90 tablet 3     fluticasone (FLONASE) 50 MCG/ACT spray Spray 2 sprays into both nostrils daily 16 g 11     Multiple Vitamin (MULTIVITAMINS PO) Take 1 tablet by mouth At Bedtime          PAST SURGICAL HISTORY:  Past Surgical History:   Procedure Laterality Date     ARTHROSCOPY SHOULDER ROTATOR CUFF REPAIR  7/31/2012    Procedure: ARTHROSCOPY SHOULDER ROTATOR CUFF REPAIR;  Right  Shoulder Arthroscopic Rotator Cuff Repair, BicepsTenodesis,  Subacromial Decompression ;  Surgeon: Joi Castillo MD;  [facility]     ESOPHAGOSCOPY, GASTROSCOPY, DUODENOSCOPY (EGD), COMBINED N/A 10/23/2017    Procedure: COMBINED ESOPHAGOSCOPY, GASTROSCOPY, DUODENOSCOPY (EGD);;  Surgeon: Gentry Salas MD;  Location: [facility]     FACIAL RECONSTRUCTION SURGERY  1971     IRRIGATION AND DEBRIDEMENT UPPER EXTREMITY, COMBINED  1/3/2012    Procedure:COMBINED IRRIGATION AND DEBRIDEMENT UPPER EXTREMITY; Irrigation & Debridement Left Elbow; Surgeon:CRISTHIAN ZHOU; [facility]     REPAIR TENDON TRICEPS UPPER EXTREMITY  11/8/2011    Procedure:REPAIR TENDON TRICEPS UPPER EXTREMITY; Surgeon:CRISTHIAN ZHOU; Location:[facility]     SHOULDER SURGERY  2003    left, injury, torn tendons, hematoma     TRANSPOSITION ULNAR NERVE (ELBOW)  11/8/2011    Procedure:TRANSPOSITION ULNAR NERVE (ELBOW); Final Procedure Done: Left Elbow Lateral Ulnar Collateral Repair And  Left Elbow Triceps Repair         ALLERGIES     Allergies   Allergen Reactions     Zolpidem Other (See Comments)     Alcoholic.  Had reaction 3/17/13 while intoxicated which included black out, loss of awareness, paranoia.  Do not prescribe.  Dr. Celeste     Cats Other (See Comments)     rhinitis     Dogs Other (See Comments)     rhinitis     Pollen Extract Other (See Comments)     rhinits.       FAMILY HISTORY:  Family History   Problem Relation Age of Onset     CANCER Mother 62     Alcoholism Paternal Uncle      Cirrhosis No family hx of        SOCIAL HISTORY:  Social History     Social History     Marital status: Single     Spouse name: N/A     Number of children: N/A     Years of education: N/A     Social History Main Topics     Smoking status: Never Smoker     Smokeless tobacco: Never Used     Alcohol use Yes      Comment: Alcohol use disorder, still actively drinking     Drug use: Yes     Special: Marijuana      Comment: occ marijuana  "    Sexual activity: Not Currently     Partners: Female     Other Topics Concern     Not on file     Social History Narrative    .  Bicycles a lot.  Excessive alcohol use.  Smokes cigars.  Occasional marijuana use.       ROS:  Constitutional: No fever, chills, or sweats. No weight gain/loss.   HEENT: No visual disturbance, ear ache, epistaxis, sore throat.   Allergies/Immunologic: Negative.   Respiratory: No cough, hemoptysis.   Cardiovascular: As per HPI.   GI: No nausea, vomiting, hematemesis, melena, or hematochezia.   : No urinary frequency, dysuria, or hematuria.   Integument: No rash.   Psychiatric: No anxiety / depression.   Neuro: No speech disturbance, focal sensory or motor deficit.   Endocrinology: No polyuria / polyphagia.   Musculoskeletal: No myalgia.        /84  Pulse 83  Ht 1.727 m (5' 8\")  Wt 79.5 kg (175 lb 3.2 oz)  SpO2 99%  BMI 26.64 kg/m2  In general, the patient is a pleasant male in no apparent distress.    Heart: RRR. Normal S1, S2 splits physiologically. No murmur, rub, click, or gallop. The PMI is in the 5th ICS in the midclavicular line. There is no heave.    HEENT: NC/AT.  PERRLA.  EOMI.  Sclerae white, not injected.  Nares clear.  Pharynx without erythema or exudate.  Dentition intact.    Neck: No adenopathy.  No thyromegaly. Carotids +4/4 bilaterally without bruits.  No jugular venous distension.   Lungs: CTA.  No ronchi, wheezes, rales.  No dullness to percussion.   Abdomen: Soft, nontender, nondistended. No organomegaly.  No bruits.   Extremities: No clubbing, cyanosis, or edema.  The pulses are +4/4 at the radial, brachial, femoral, popliteal, DP, and PT sites bilaterally.  No bruits are noted.  Neurologic: Alert and oriented to person/place/time, normal speech, gait and affect  Skin: No petechiae, purpura or rash.    Labs:  LIPID RESULTS:  Lab Results   Component Value Date    CHOL 146 05/09/2016    HDL 64 05/09/2016    LDL 61 05/09/2016    TRIG " 106 05/09/2016       LIVER ENZYME RESULTS:  Lab Results   Component Value Date     (H) 12/13/2017     (H) 12/13/2017       CBC RESULTS:  Lab Results   Component Value Date    WBC 5.1 02/01/2018    RBC 4.36 (L) 02/01/2018    HGB 10.0 (L) 02/01/2018    HCT 32.6 (L) 02/01/2018    MCV 75 (L) 02/01/2018    MCH 22.9 (L) 02/01/2018    MCHC 30.7 (L) 02/01/2018    RDW 21.3 (H) 02/01/2018    PLT 97 (L) 02/01/2018       BMP RESULTS:  Lab Results   Component Value Date     02/01/2018    POTASSIUM 4.0 02/01/2018    CHLORIDE 106 02/01/2018    CO2 26 02/01/2018    ANIONGAP 8 02/01/2018     (H) 02/01/2018    BUN 14 02/01/2018    CR 0.96 02/01/2018    GFRESTIMATED 80 02/01/2018    GFRESTBLACK >90 02/01/2018    JOSEPHINE 8.6 02/01/2018        RESULTS:  Lab Results   Component Value Date    INR 1.20 (H) 02/01/2018    INR 1.27 (H) 12/04/2017     No results found for: A1C  Lab Results   Component Value Date    PTT 29 02/05/2010     No results found for: UA    Procedures:      Assessment and Plan:   Ten is a 57 year old male who presents with     Patient is a good candidate for TVAR valve replacement due to     Approximately 50 minutes spent with this case including review of the clinical history and data; discussion with the patient and his family and coordination of the care    Thank you for including me in the care of this kind patient. Do not hesitate to contact me with any questions.    Dr Mervat Merino  Cardiothoracic Surgery        CC  Patient Care Team:  Cuca Celeste MD as PCP - General (Internal Medicine)  Mili Capps MD as MD (Internal Medicine)  Kieran Ulloa MD as MD (Family Practice)  Emma Mendez as Nurse Coordinator (Cardiology)  IGNACIO LEOS.

## 2018-02-19 NOTE — PROGRESS NOTES
Date: 2/19/2018    Time of Call: 1:03 PM     Diagnosis:  Severe aortic stenosis     [ TORB ] Ordering provider: Ubaldo Villavicencio MD  Order:   1.  Pre-TAVR procedure orders  2.  Blood components  3.  TTE, for TAVR procedure     Order received by: Emma Mendez RN     Follow-up/additional notes:     Follow up telephone call made to Ten prior to his TAVR procedure on Wednesday, Feb. 21.  Ten stated that he was familiar with the shower prep, as well the NPO and medication instructions.

## 2018-02-20 ENCOUNTER — CARE COORDINATION (OUTPATIENT)
Dept: CARDIOLOGY | Facility: CLINIC | Age: 58
End: 2018-02-20

## 2018-02-20 PROBLEM — I35.0 NONRHEUMATIC AORTIC VALVE STENOSIS: Status: ACTIVE | Noted: 2018-02-20

## 2018-02-20 NOTE — PROGRESS NOTES
ASKED BY REFERRING PHYSICIAN: Dr. Sahara Pierson to evaluate this patient for Aortic valve replacement.    CHIEF COMPLAINT: New Patient (2nd TAVR)    HPI: Ten is a 57 year old male who presents with severe symptomatic aortic stenosis.  His main symptom has been dyspnea on exertion. He is staying active but at a much lower level than his usual activity level.    PAST MEDICAL HISTORY:  Past Medical History:   Diagnosis Date     Alcohol use disorder (H)      Alcoholic cirrhosis (H)      Ascites      Chronic hepatitis C without hepatic coma (H) 05/10/2016    Untreated as of 2/2018     Erosive gastropathy      Esophageal varices in alcoholic cirrhosis (H)      H/O upper gastrointestinal hemorrhage 09/2017     History of blood transfusion      Hypertension     essential     JANELLE (iron deficiency anemia)      Sarahi-Hoffman tear     History     Marijuana abuse      MRSA (methicillin resistant Staphylococcus aureus)      Severe aortic stenosis      Thrombocytopenia (H)        PAST SURGICAL HISTORY:  Past Surgical History:   Procedure Laterality Date     ARTHROSCOPY SHOULDER ROTATOR CUFF REPAIR  7/31/2012    Procedure: ARTHROSCOPY SHOULDER ROTATOR CUFF REPAIR;  Right Shoulder Arthroscopic Rotator Cuff Repair, BicepsTenodesis,  Subacromial Decompression ;  Surgeon: Joi Castillo MD;  [facility]     ESOPHAGOSCOPY, GASTROSCOPY, DUODENOSCOPY (EGD), COMBINED N/A 10/23/2017    Procedure: COMBINED ESOPHAGOSCOPY, GASTROSCOPY, DUODENOSCOPY (EGD);;  Surgeon: Gentry Salas MD;  [facility]     FACIAL RECONSTRUCTION SURGERY  1971     IRRIGATION AND DEBRIDEMENT UPPER EXTREMITY, COMBINED  1/3/2012    Procedure:COMBINED IRRIGATION AND DEBRIDEMENT UPPER EXTREMITY; Irrigation & Debridement Left Elbow; Surgeon:CRISTHIAN ZHOU; [facility]     REPAIR TENDON TRICEPS UPPER EXTREMITY  11/8/2011    Procedure:REPAIR TENDON TRICEPS UPPER EXTREMITY; Surgeon:CRISTHIAN ZHOU; [facility]     SHOULDER  SURGERY  2003    left, injury, torn tendons, hematoma     TRANSPOSITION ULNAR NERVE (ELBOW)  11/8/2011    Procedure:TRANSPOSITION ULNAR NERVE (ELBOW); Final Procedure Done: Left Elbow Lateral Ulnar Collateral Repair And  Left Elbow Triceps Repair         FAMILY HISTORY:   Family History   Problem Relation Age of Onset     CANCER Mother 62     Alcoholism Paternal Uncle      Cirrhosis No family hx of        SOCIAL HISTORY:  Social History     Social History     Marital status: Single     Spouse name: N/A     Number of children: N/A     Years of education: N/A     Occupational History     Not on file.     Social History Main Topics     Smoking status: Never Smoker     Smokeless tobacco: Never Used     Alcohol use Yes      Comment: Alcohol use disorder, still actively drinking     Drug use: Yes     Special: Marijuana      Comment: occ marijuana     Sexual activity: Not Currently     Partners: Female     Other Topics Concern     Not on file     Social History Narrative    .  Bicycles a lot.  Excessive alcohol use.  Smokes cigars.  Occasional marijuana use.        ALLERGIES:   Allergies   Allergen Reactions     Zolpidem Other (See Comments)     Alcoholic.  Had reaction 3/17/13 while intoxicated which included black out, loss of awareness, paranoia.  Do not prescribe.  Dr. Celeste     Cats Other (See Comments)     rhinitis     Dogs Other (See Comments)     rhinitis     Pollen Extract Other (See Comments)     rhinits.       CURRENT MEDICATIONS:   Prescription Medications as of 2/20/2018             Pantoprazole Sodium (PROTONIX PO) Take 40 mg by mouth 2 times daily as needed     ferrous sulfate (IRON) 325 (65 FE) MG tablet Take 1 tablet (325 mg) by mouth daily (with breakfast)    montelukast (SINGULAIR) 10 MG tablet Take 1 tablet (10 mg) by mouth At Bedtime    loratadine (CLARITIN) 10 MG tablet Take 1 tablet (10 mg) by mouth daily    lisinopril (PRINIVIL/ZESTRIL) 10 MG tablet Take 1 tablet (10 mg) by  "mouth daily    fluticasone (FLONASE) 50 MCG/ACT spray Spray 2 sprays into both nostrils daily    Multiple Vitamin (MULTIVITAMINS PO) Take 1 tablet by mouth At Bedtime           REVIEW OF SYSTEMS:   Gen: denies frequent headaches, double/blurry vision, insomnia, fatigue, unexplained weight loss/gain. No previous anesthesia reactions.  CV: denies chest pain, shortness of breath, palpitations, peripheral edema.   Pulm: denies shortness of breath, asthma or wheezing  GI/: liver cirrhosis, history of GI bleeding  Endo: denies thyroid problems or Diabetes  Heme/Onc: denies bleeding or clotting disorders, no family problems with bleeding/clotting diorders  MS: no weakness, tremors or gait instability  Neuro: denies depression, memory problems, no dysesthesias, no previous strokes, no migraines, no dysphagia  Skin: No petechiae, purpura or rash.    PHYSICAL EXAMINATION:   /84  Pulse 83  Ht 1.727 m (5' 8\")  Wt 79.5 kg (175 lb 3.2 oz)  SpO2 99%  BMI 26.64 kg/m2  General: alert and oriented x 3, pleasant, no acute distress  CV: S1 S2, Grade III/VI murmur of AS but no rubs or gallops, regular rate and rhythm, no peripheral edema, no carotid or abdominal bruits, pulses in upper and lower extremities palpable  Pulm: bilateral breath sounds, clear to auscultation, easy work of breathing  GI: (+) bowel sounds, soft non-tender and non-distended  : voiding without problems  MS: moves all extremities x 4,  5+/5+ equal strength bilaterally  Neuro: pupils equal round and reactive to light, cranial nerves, II-XII grossly intact, no gross neurologic deficits noted    LABS:  BMP RESULTS:  Lab Results   Component Value Date     02/01/2018    POTASSIUM 4.0 02/01/2018    CHLORIDE 106 02/01/2018    CO2 26 02/01/2018    ANIONGAP 8 02/01/2018     (H) 02/01/2018    BUN 14 02/01/2018    CR 0.96 02/01/2018    GFRESTIMATED 80 02/01/2018    GFRESTBLACK >90 02/01/2018    JOSEPHINE 8.6 02/01/2018        CBC RESULTS:  Lab Results "   Component Value Date    WBC 5.1 02/01/2018    RBC 4.36 (L) 02/01/2018    HGB 10.0 (L) 02/01/2018    HCT 32.6 (L) 02/01/2018    MCV 75 (L) 02/01/2018    MCH 22.9 (L) 02/01/2018    MCHC 30.7 (L) 02/01/2018    RDW 21.3 (H) 02/01/2018    PLT 97 (L) 02/01/2018       Lab Results   Component Value Date    INR 1.20 (H) 02/01/2018     Lab Results   Component Value Date    PTT 29 02/05/2010     No results found for: UA  No results found for: A1C    PROCEDURES/IMAGING:  Chest X-Ray: Lungs are clear.  Angio: No significant CAD  Echo: Severe aortic stenosis  CT: Access via femoral arteries  MRI: Pending  Carotid US: No significant carotid disease     ASSESSMENT/PLAN:   Ten is a 57 year old gentleman with severe symptomatic aortic stenosis.  I believe he would benefit from aortic valve replacement.  Given his cirrhosis, he is not an appropriate candidate for open surgical aortic valve replacement.  This is especially true given his thrombocytopenia.  I recommend transcatheter aortic valve replacement.  He understands that the risks for this procedure include: bleeding, infection, stroke, cardiac perforation, aortic root rupture, aortic dissection, and an operative mortality of around 2 percent.  He accepts these risks and is ready to undergo TAVR next week.    1. Complete preoperative work-up  2. TAVR next week    Approximately 35 minutes spent with the patient in clinic at this visit.    CC  Patient Care Team:  Cuca Celeste MD as PCP - General (Internal Medicine)  Mili Capps MD as MD (Internal Medicine)  Kieran Ulloa MD as MD (Family Practice)  Emma Mendez as Nurse Coordinator (Cardiology)  IGNACIO LEOS

## 2018-02-21 ENCOUNTER — HOSPITAL ENCOUNTER (INPATIENT)
Facility: CLINIC | Age: 58
LOS: 1 days | Discharge: HOME | DRG: 267 | End: 2018-02-22
Attending: INTERNAL MEDICINE | Admitting: INTERNAL MEDICINE
Payer: COMMERCIAL

## 2018-02-21 ENCOUNTER — HOSPITAL ENCOUNTER (OUTPATIENT)
Dept: CARDIOLOGY | Facility: CLINIC | Age: 58
DRG: 267 | End: 2018-02-21
Attending: INTERNAL MEDICINE | Admitting: INTERNAL MEDICINE
Payer: COMMERCIAL

## 2018-02-21 ENCOUNTER — APPOINTMENT (OUTPATIENT)
Dept: CARDIOLOGY | Facility: CLINIC | Age: 58
DRG: 267 | End: 2018-02-21
Attending: INTERNAL MEDICINE
Payer: COMMERCIAL

## 2018-02-21 ENCOUNTER — ANESTHESIA (OUTPATIENT)
Dept: SURGERY | Facility: CLINIC | Age: 58
DRG: 267 | End: 2018-02-21
Payer: COMMERCIAL

## 2018-02-21 ENCOUNTER — APPOINTMENT (OUTPATIENT)
Dept: GENERAL RADIOLOGY | Facility: CLINIC | Age: 58
DRG: 267 | End: 2018-02-21
Attending: INTERNAL MEDICINE
Payer: COMMERCIAL

## 2018-02-21 DIAGNOSIS — Z95.2 S/P TAVR (TRANSCATHETER AORTIC VALVE REPLACEMENT): Primary | ICD-10-CM

## 2018-02-21 DIAGNOSIS — I35.0 SEVERE AORTIC STENOSIS: ICD-10-CM

## 2018-02-21 LAB
ABO + RH BLD: NORMAL
ABO + RH BLD: NORMAL
ALBUMIN SERPL-MCNC: 3.2 G/DL (ref 3.4–5)
ALBUMIN SERPL-MCNC: 3.3 G/DL (ref 3.4–5)
ALP SERPL-CCNC: 104 U/L (ref 40–150)
ALP SERPL-CCNC: 81 U/L (ref 40–150)
ALT SERPL W P-5'-P-CCNC: 76 U/L (ref 0–70)
ALT SERPL W P-5'-P-CCNC: 93 U/L (ref 0–70)
ANION GAP SERPL CALCULATED.3IONS-SCNC: 7 MMOL/L (ref 3–14)
ANION GAP SERPL CALCULATED.3IONS-SCNC: 7 MMOL/L (ref 3–14)
AST SERPL W P-5'-P-CCNC: 75 U/L (ref 0–45)
AST SERPL W P-5'-P-CCNC: 88 U/L (ref 0–45)
BASE EXCESS BLDA CALC-SCNC: 2.6 MMOL/L
BILIRUB SERPL-MCNC: 0.6 MG/DL (ref 0.2–1.3)
BILIRUB SERPL-MCNC: 0.9 MG/DL (ref 0.2–1.3)
BLD GP AB SCN SERPL QL: NORMAL
BLD PROD TYP BPU: NORMAL
BLD UNIT ID BPU: 0
BLD UNIT ID BPU: 0
BLOOD BANK CMNT PATIENT-IMP: NORMAL
BLOOD BANK CMNT PATIENT-IMP: NORMAL
BLOOD PRODUCT CODE: NORMAL
BLOOD PRODUCT CODE: NORMAL
BPU ID: NORMAL
BPU ID: NORMAL
BUN SERPL-MCNC: 19 MG/DL (ref 7–30)
BUN SERPL-MCNC: 22 MG/DL (ref 7–30)
CA-I BLD-MCNC: 4.4 MG/DL (ref 4.4–5.2)
CALCIUM SERPL-MCNC: 8.1 MG/DL (ref 8.5–10.1)
CALCIUM SERPL-MCNC: 8.9 MG/DL (ref 8.5–10.1)
CHLORIDE SERPL-SCNC: 101 MMOL/L (ref 94–109)
CHLORIDE SERPL-SCNC: 103 MMOL/L (ref 94–109)
CO2 SERPL-SCNC: 26 MMOL/L (ref 20–32)
CO2 SERPL-SCNC: 28 MMOL/L (ref 20–32)
CREAT SERPL-MCNC: 0.87 MG/DL (ref 0.66–1.25)
CREAT SERPL-MCNC: 0.87 MG/DL (ref 0.66–1.25)
ERYTHROCYTE [DISTWIDTH] IN BLOOD BY AUTOMATED COUNT: 24.2 % (ref 10–15)
ERYTHROCYTE [DISTWIDTH] IN BLOOD BY AUTOMATED COUNT: 24.4 % (ref 10–15)
GFR SERPL CREATININE-BSD FRML MDRD: 89 ML/MIN/1.7M2
GFR SERPL CREATININE-BSD FRML MDRD: 90 ML/MIN/1.7M2
GLUCOSE BLD-MCNC: 177 MG/DL (ref 70–99)
GLUCOSE BLDC GLUCOMTR-MCNC: 135 MG/DL (ref 70–99)
GLUCOSE SERPL-MCNC: 100 MG/DL (ref 70–99)
GLUCOSE SERPL-MCNC: 159 MG/DL (ref 70–99)
HCO3 BLD-SCNC: 27 MMOL/L (ref 21–28)
HCT VFR BLD AUTO: 31.2 % (ref 40–53)
HCT VFR BLD AUTO: 35.3 % (ref 40–53)
HGB BLD-MCNC: 11.2 G/DL (ref 13.3–17.7)
HGB BLD-MCNC: 9.2 G/DL (ref 13.3–17.7)
HGB BLD-MCNC: 9.6 G/DL (ref 13.3–17.7)
INR PPP: 1.22 (ref 0.86–1.14)
LACTATE BLD-SCNC: 1.1 MMOL/L (ref 0.7–2)
MAGNESIUM SERPL-MCNC: 1.6 MG/DL (ref 1.6–2.3)
MCH RBC QN AUTO: 24.6 PG (ref 26.5–33)
MCH RBC QN AUTO: 24.8 PG (ref 26.5–33)
MCHC RBC AUTO-ENTMCNC: 30.8 G/DL (ref 31.5–36.5)
MCHC RBC AUTO-ENTMCNC: 31.7 G/DL (ref 31.5–36.5)
MCV RBC AUTO: 78 FL (ref 78–100)
MCV RBC AUTO: 80 FL (ref 78–100)
NUM BPU REQUESTED: 2
O2/TOTAL GAS SETTING VFR VENT: 43 %
PCO2 BLD: 41 MM HG (ref 35–45)
PH BLD: 7.43 PH (ref 7.35–7.45)
PHOSPHATE SERPL-MCNC: 3.2 MG/DL (ref 2.5–4.5)
PLATELET # BLD AUTO: 58 10E9/L (ref 150–450)
PLATELET # BLD AUTO: 82 10E9/L (ref 150–450)
PO2 BLD: 169 MM HG (ref 80–105)
POTASSIUM BLD-SCNC: 3.9 MMOL/L (ref 3.4–5.3)
POTASSIUM SERPL-SCNC: 3.4 MMOL/L (ref 3.4–5.3)
POTASSIUM SERPL-SCNC: 4.4 MMOL/L (ref 3.4–5.3)
PROT SERPL-MCNC: 6.8 G/DL (ref 6.8–8.8)
PROT SERPL-MCNC: 7.6 G/DL (ref 6.8–8.8)
RBC # BLD AUTO: 3.9 10E12/L (ref 4.4–5.9)
RBC # BLD AUTO: 4.52 10E12/L (ref 4.4–5.9)
SODIUM BLD-SCNC: 138 MMOL/L (ref 133–144)
SODIUM SERPL-SCNC: 136 MMOL/L (ref 133–144)
SODIUM SERPL-SCNC: 136 MMOL/L (ref 133–144)
SPECIMEN EXP DATE BLD: NORMAL
TRANSFUSION STATUS PATIENT QL: NORMAL
WBC # BLD AUTO: 5.7 10E9/L (ref 4–11)
WBC # BLD AUTO: 5.9 10E9/L (ref 4–11)

## 2018-02-21 PROCEDURE — 80053 COMPREHEN METABOLIC PANEL: CPT | Performed by: ANESTHESIOLOGY

## 2018-02-21 PROCEDURE — 93306 TTE W/DOPPLER COMPLETE: CPT | Mod: 26 | Performed by: INTERNAL MEDICINE

## 2018-02-21 PROCEDURE — 84295 ASSAY OF SERUM SODIUM: CPT | Performed by: ANESTHESIOLOGY

## 2018-02-21 PROCEDURE — 25000128 H RX IP 250 OP 636: Performed by: INTERNAL MEDICINE

## 2018-02-21 PROCEDURE — 40000986 XR CHEST PORT 1 VW

## 2018-02-21 PROCEDURE — 25000128 H RX IP 250 OP 636: Performed by: NURSE ANESTHETIST, CERTIFIED REGISTERED

## 2018-02-21 PROCEDURE — C1730 CATH, EP, 19 OR FEW ELECT: HCPCS

## 2018-02-21 PROCEDURE — 36000088 ZZH SURGERY LEVEL 8 EA 15 ADDTL MIN - UMMC

## 2018-02-21 PROCEDURE — C9399 UNCLASSIFIED DRUGS OR BIOLOG: HCPCS | Performed by: NURSE ANESTHETIST, CERTIFIED REGISTERED

## 2018-02-21 PROCEDURE — 85610 PROTHROMBIN TIME: CPT | Performed by: INTERNAL MEDICINE

## 2018-02-21 PROCEDURE — 37000008 ZZH ANESTHESIA TECHNICAL FEE, 1ST 30 MIN

## 2018-02-21 PROCEDURE — 82947 ASSAY GLUCOSE BLOOD QUANT: CPT | Performed by: ANESTHESIOLOGY

## 2018-02-21 PROCEDURE — 27210794 ZZH OR GENERAL SUPPLY STERILE

## 2018-02-21 PROCEDURE — 71000016 ZZH RECOVERY PHASE 1 LEVEL 3 FIRST HR

## 2018-02-21 PROCEDURE — 36000086 ZZH SURGERY LEVEL 8 1ST 30 MIN UMMC

## 2018-02-21 PROCEDURE — 25000125 ZZHC RX 250: Performed by: NURSE ANESTHETIST, CERTIFIED REGISTERED

## 2018-02-21 PROCEDURE — 85027 COMPLETE CBC AUTOMATED: CPT | Performed by: ANESTHESIOLOGY

## 2018-02-21 PROCEDURE — 93010 ELECTROCARDIOGRAM REPORT: CPT | Mod: 76 | Performed by: INTERNAL MEDICINE

## 2018-02-21 PROCEDURE — 84100 ASSAY OF PHOSPHORUS: CPT | Performed by: ANESTHESIOLOGY

## 2018-02-21 PROCEDURE — 83735 ASSAY OF MAGNESIUM: CPT | Performed by: ANESTHESIOLOGY

## 2018-02-21 PROCEDURE — 36415 COLL VENOUS BLD VENIPUNCTURE: CPT | Performed by: INTERNAL MEDICINE

## 2018-02-21 PROCEDURE — 27810169 ZZH OR IMPLANT GENERAL

## 2018-02-21 PROCEDURE — 82330 ASSAY OF CALCIUM: CPT | Performed by: ANESTHESIOLOGY

## 2018-02-21 PROCEDURE — X2A5312 CEREBRAL EMBOLIC FILTRATION, DUAL FILTER IN INNOMINATE ARTERY AND LEFT COMMON CAROTID ARTERY, PERCUTANEOUS APPROACH, NEW TECHNOLOGY GROUP 2: ICD-10-PCS | Performed by: INTERNAL MEDICINE

## 2018-02-21 PROCEDURE — C1894 INTRO/SHEATH, NON-LASER: HCPCS

## 2018-02-21 PROCEDURE — 84132 ASSAY OF SERUM POTASSIUM: CPT | Performed by: ANESTHESIOLOGY

## 2018-02-21 PROCEDURE — 85027 COMPLETE CBC AUTOMATED: CPT | Performed by: INTERNAL MEDICINE

## 2018-02-21 PROCEDURE — 40000196 ZZH STATISTIC RAPCV CVP MONITORING

## 2018-02-21 PROCEDURE — 40000170 ZZH STATISTIC PRE-PROCEDURE ASSESSMENT II

## 2018-02-21 PROCEDURE — C1769 GUIDE WIRE: HCPCS

## 2018-02-21 PROCEDURE — 93306 TTE W/DOPPLER COMPLETE: CPT

## 2018-02-21 PROCEDURE — 83605 ASSAY OF LACTIC ACID: CPT | Performed by: ANESTHESIOLOGY

## 2018-02-21 PROCEDURE — 80053 COMPREHEN METABOLIC PANEL: CPT | Performed by: INTERNAL MEDICINE

## 2018-02-21 PROCEDURE — 41000018 ZZH PER-PERFUSION 1ST 30 MIN

## 2018-02-21 PROCEDURE — 82803 BLOOD GASES ANY COMBINATION: CPT | Performed by: ANESTHESIOLOGY

## 2018-02-21 PROCEDURE — 36415 COLL VENOUS BLD VENIPUNCTURE: CPT | Performed by: ANESTHESIOLOGY

## 2018-02-21 PROCEDURE — 25000566 ZZH SEVOFLURANE, EA 15 MIN

## 2018-02-21 PROCEDURE — 40000275 ZZH STATISTIC RCP TIME EA 10 MIN

## 2018-02-21 PROCEDURE — P9016 RBC LEUKOCYTES REDUCED: HCPCS | Performed by: PHYSICIAN ASSISTANT

## 2018-02-21 PROCEDURE — 02RF38Z REPLACEMENT OF AORTIC VALVE WITH ZOOPLASTIC TISSUE, PERCUTANEOUS APPROACH: ICD-10-PCS | Performed by: INTERNAL MEDICINE

## 2018-02-21 PROCEDURE — C1760 CLOSURE DEV, VASC: HCPCS

## 2018-02-21 PROCEDURE — 37000009 ZZH ANESTHESIA TECHNICAL FEE, EACH ADDTL 15 MIN

## 2018-02-21 PROCEDURE — 33361 REPLACE AORTIC VALVE PERQ: CPT | Mod: 62 | Performed by: INTERNAL MEDICINE

## 2018-02-21 PROCEDURE — 93005 ELECTROCARDIOGRAM TRACING: CPT

## 2018-02-21 PROCEDURE — P9041 ALBUMIN (HUMAN),5%, 50ML: HCPCS | Performed by: NURSE ANESTHETIST, CERTIFIED REGISTERED

## 2018-02-21 PROCEDURE — 25000132 ZZH RX MED GY IP 250 OP 250 PS 637: Performed by: INTERNAL MEDICINE

## 2018-02-21 PROCEDURE — 00000146 ZZHCL STATISTIC GLUCOSE BY METER IP

## 2018-02-21 PROCEDURE — 20000004 ZZH R&B ICU UMMC

## 2018-02-21 DEVICE — IMPLANTABLE DEVICE: Type: IMPLANTABLE DEVICE | Site: HEART | Status: FUNCTIONAL

## 2018-02-21 RX ORDER — HYDRALAZINE HYDROCHLORIDE 20 MG/ML
2.5-5 INJECTION INTRAMUSCULAR; INTRAVENOUS EVERY 10 MIN PRN
Status: DISCONTINUED | OUTPATIENT
Start: 2018-02-21 | End: 2018-02-21 | Stop reason: HOSPADM

## 2018-02-21 RX ORDER — CEFAZOLIN SODIUM 2 G/100ML
2 INJECTION, SOLUTION INTRAVENOUS
Status: DISCONTINUED | OUTPATIENT
Start: 2018-02-21 | End: 2018-02-21 | Stop reason: HOSPADM

## 2018-02-21 RX ORDER — NALOXONE HYDROCHLORIDE 0.4 MG/ML
.1-.4 INJECTION, SOLUTION INTRAMUSCULAR; INTRAVENOUS; SUBCUTANEOUS
Status: DISCONTINUED | OUTPATIENT
Start: 2018-02-21 | End: 2018-02-21 | Stop reason: HOSPADM

## 2018-02-21 RX ORDER — ASPIRIN 81 MG/1
81 TABLET ORAL DAILY
Status: DISCONTINUED | OUTPATIENT
Start: 2018-02-21 | End: 2018-02-21 | Stop reason: HOSPADM

## 2018-02-21 RX ORDER — NITROGLYCERIN 0.4 MG/1
0.4 TABLET SUBLINGUAL EVERY 5 MIN PRN
Status: DISCONTINUED | OUTPATIENT
Start: 2018-02-21 | End: 2018-02-22 | Stop reason: HOSPADM

## 2018-02-21 RX ORDER — PROTAMINE SULFATE 10 MG/ML
INJECTION, SOLUTION INTRAVENOUS PRN
Status: DISCONTINUED | OUTPATIENT
Start: 2018-02-21 | End: 2018-02-21

## 2018-02-21 RX ORDER — LIDOCAINE HYDROCHLORIDE 20 MG/ML
INJECTION, SOLUTION INFILTRATION; PERINEURAL PRN
Status: DISCONTINUED | OUTPATIENT
Start: 2018-02-21 | End: 2018-02-21

## 2018-02-21 RX ORDER — ONDANSETRON 2 MG/ML
4 INJECTION INTRAMUSCULAR; INTRAVENOUS EVERY 6 HOURS PRN
Status: DISCONTINUED | OUTPATIENT
Start: 2018-02-21 | End: 2018-02-22 | Stop reason: HOSPADM

## 2018-02-21 RX ORDER — IOPAMIDOL 755 MG/ML
INJECTION, SOLUTION INTRAVASCULAR PRN
Status: DISCONTINUED | OUTPATIENT
Start: 2018-02-21 | End: 2018-02-22 | Stop reason: HOSPADM

## 2018-02-21 RX ORDER — HEPARIN SODIUM 1000 [USP'U]/ML
INJECTION, SOLUTION INTRAVENOUS; SUBCUTANEOUS PRN
Status: DISCONTINUED | OUTPATIENT
Start: 2018-02-21 | End: 2018-02-21

## 2018-02-21 RX ORDER — OXYCODONE HYDROCHLORIDE 5 MG/1
5 TABLET ORAL ONCE
Status: COMPLETED | OUTPATIENT
Start: 2018-02-21 | End: 2018-02-21

## 2018-02-21 RX ORDER — NALOXONE HYDROCHLORIDE 0.4 MG/ML
.1-.4 INJECTION, SOLUTION INTRAMUSCULAR; INTRAVENOUS; SUBCUTANEOUS
Status: DISCONTINUED | OUTPATIENT
Start: 2018-02-21 | End: 2018-02-21

## 2018-02-21 RX ORDER — LIDOCAINE 40 MG/G
CREAM TOPICAL
Status: DISCONTINUED | OUTPATIENT
Start: 2018-02-21 | End: 2018-02-21 | Stop reason: HOSPADM

## 2018-02-21 RX ORDER — FENTANYL CITRATE 50 UG/ML
25-50 INJECTION, SOLUTION INTRAMUSCULAR; INTRAVENOUS
Status: DISCONTINUED | OUTPATIENT
Start: 2018-02-21 | End: 2018-02-21 | Stop reason: HOSPADM

## 2018-02-21 RX ORDER — ONDANSETRON 4 MG/1
4 TABLET, ORALLY DISINTEGRATING ORAL EVERY 30 MIN PRN
Status: DISCONTINUED | OUTPATIENT
Start: 2018-02-21 | End: 2018-02-21 | Stop reason: HOSPADM

## 2018-02-21 RX ORDER — PROPOFOL 10 MG/ML
INJECTION, EMULSION INTRAVENOUS PRN
Status: DISCONTINUED | OUTPATIENT
Start: 2018-02-21 | End: 2018-02-21

## 2018-02-21 RX ORDER — SODIUM CHLORIDE, SODIUM LACTATE, POTASSIUM CHLORIDE, CALCIUM CHLORIDE 600; 310; 30; 20 MG/100ML; MG/100ML; MG/100ML; MG/100ML
INJECTION, SOLUTION INTRAVENOUS CONTINUOUS PRN
Status: DISCONTINUED | OUTPATIENT
Start: 2018-02-21 | End: 2018-02-21

## 2018-02-21 RX ORDER — ASPIRIN 300 MG/1
300 SUPPOSITORY RECTAL EVERY 6 HOURS PRN
Status: ON HOLD | COMMUNITY
End: 2018-02-22

## 2018-02-21 RX ORDER — EPHEDRINE SULFATE 50 MG/ML
INJECTION, SOLUTION INTRAMUSCULAR; INTRAVENOUS; SUBCUTANEOUS PRN
Status: DISCONTINUED | OUTPATIENT
Start: 2018-02-21 | End: 2018-02-21

## 2018-02-21 RX ORDER — ACETAMINOPHEN 325 MG/1
325-650 TABLET ORAL EVERY 4 HOURS PRN
Status: DISCONTINUED | OUTPATIENT
Start: 2018-02-21 | End: 2018-02-22 | Stop reason: HOSPADM

## 2018-02-21 RX ORDER — SODIUM CHLORIDE, SODIUM LACTATE, POTASSIUM CHLORIDE, CALCIUM CHLORIDE 600; 310; 30; 20 MG/100ML; MG/100ML; MG/100ML; MG/100ML
INJECTION, SOLUTION INTRAVENOUS CONTINUOUS
Status: DISCONTINUED | OUTPATIENT
Start: 2018-02-21 | End: 2018-02-21 | Stop reason: HOSPADM

## 2018-02-21 RX ORDER — ALBUMIN, HUMAN INJ 5% 5 %
SOLUTION INTRAVENOUS CONTINUOUS PRN
Status: DISCONTINUED | OUTPATIENT
Start: 2018-02-21 | End: 2018-02-21

## 2018-02-21 RX ORDER — FENTANYL CITRATE 50 UG/ML
INJECTION, SOLUTION INTRAMUSCULAR; INTRAVENOUS PRN
Status: DISCONTINUED | OUTPATIENT
Start: 2018-02-21 | End: 2018-02-21

## 2018-02-21 RX ORDER — ONDANSETRON 2 MG/ML
INJECTION INTRAMUSCULAR; INTRAVENOUS PRN
Status: DISCONTINUED | OUTPATIENT
Start: 2018-02-21 | End: 2018-02-21

## 2018-02-21 RX ORDER — CLOPIDOGREL BISULFATE 75 MG/1
300 TABLET ORAL ONCE
Status: COMPLETED | OUTPATIENT
Start: 2018-02-21 | End: 2018-02-21

## 2018-02-21 RX ORDER — CLOPIDOGREL BISULFATE 75 MG/1
75 TABLET ORAL DAILY
Status: DISCONTINUED | OUTPATIENT
Start: 2018-02-22 | End: 2018-02-22 | Stop reason: HOSPADM

## 2018-02-21 RX ORDER — SODIUM CHLORIDE 9 MG/ML
INJECTION, SOLUTION INTRAVENOUS CONTINUOUS
Status: DISCONTINUED | OUTPATIENT
Start: 2018-02-21 | End: 2018-02-21 | Stop reason: HOSPADM

## 2018-02-21 RX ORDER — ONDANSETRON 2 MG/ML
4 INJECTION INTRAMUSCULAR; INTRAVENOUS EVERY 30 MIN PRN
Status: DISCONTINUED | OUTPATIENT
Start: 2018-02-21 | End: 2018-02-21 | Stop reason: HOSPADM

## 2018-02-21 RX ORDER — ONDANSETRON 4 MG/1
4 TABLET, ORALLY DISINTEGRATING ORAL EVERY 6 HOURS PRN
Status: DISCONTINUED | OUTPATIENT
Start: 2018-02-21 | End: 2018-02-22 | Stop reason: HOSPADM

## 2018-02-21 RX ORDER — NALOXONE HYDROCHLORIDE 0.4 MG/ML
.1-.4 INJECTION, SOLUTION INTRAMUSCULAR; INTRAVENOUS; SUBCUTANEOUS
Status: DISCONTINUED | OUTPATIENT
Start: 2018-02-21 | End: 2018-02-22 | Stop reason: HOSPADM

## 2018-02-21 RX ORDER — CEFAZOLIN SODIUM 2 G/100ML
2 INJECTION, SOLUTION INTRAVENOUS
Status: COMPLETED | OUTPATIENT
Start: 2018-02-21 | End: 2018-02-21

## 2018-02-21 RX ORDER — FUROSEMIDE 10 MG/ML
INJECTION INTRAMUSCULAR; INTRAVENOUS PRN
Status: DISCONTINUED | OUTPATIENT
Start: 2018-02-21 | End: 2018-02-21

## 2018-02-21 RX ORDER — LIDOCAINE 40 MG/G
CREAM TOPICAL
Status: DISCONTINUED | OUTPATIENT
Start: 2018-02-21 | End: 2018-02-22 | Stop reason: HOSPADM

## 2018-02-21 RX ADMIN — NOREPINEPHRINE BITARTRATE 6.4 MCG: 1 INJECTION INTRAVENOUS at 08:01

## 2018-02-21 RX ADMIN — VASOPRESSIN 1 UNITS: 20 INJECTION, SOLUTION INTRAMUSCULAR; SUBCUTANEOUS at 08:40

## 2018-02-21 RX ADMIN — VASOPRESSIN 1 UNITS: 20 INJECTION, SOLUTION INTRAMUSCULAR; SUBCUTANEOUS at 08:13

## 2018-02-21 RX ADMIN — PHENYLEPHRINE HYDROCHLORIDE 100 MCG: 10 INJECTION, SOLUTION INTRAMUSCULAR; INTRAVENOUS; SUBCUTANEOUS at 09:30

## 2018-02-21 RX ADMIN — NOREPINEPHRINE BITARTRATE 6.4 MCG: 1 INJECTION INTRAVENOUS at 08:03

## 2018-02-21 RX ADMIN — VASOPRESSIN 1 UNITS: 20 INJECTION, SOLUTION INTRAMUSCULAR; SUBCUTANEOUS at 08:44

## 2018-02-21 RX ADMIN — ROCURONIUM BROMIDE 30 MG: 10 INJECTION INTRAVENOUS at 08:07

## 2018-02-21 RX ADMIN — PROTAMINE SULFATE 10 MG: 10 INJECTION, SOLUTION INTRAVENOUS at 10:11

## 2018-02-21 RX ADMIN — SODIUM CHLORIDE, POTASSIUM CHLORIDE, SODIUM LACTATE AND CALCIUM CHLORIDE: 600; 310; 30; 20 INJECTION, SOLUTION INTRAVENOUS at 09:10

## 2018-02-21 RX ADMIN — PHENYLEPHRINE HYDROCHLORIDE 50 MCG: 10 INJECTION, SOLUTION INTRAMUSCULAR; INTRAVENOUS; SUBCUTANEOUS at 09:25

## 2018-02-21 RX ADMIN — FUROSEMIDE 80 MG: 10 INJECTION, SOLUTION INTRAVENOUS at 10:11

## 2018-02-21 RX ADMIN — OXYCODONE HYDROCHLORIDE 5 MG: 5 TABLET ORAL at 14:00

## 2018-02-21 RX ADMIN — MIDAZOLAM 2 MG: 1 INJECTION INTRAMUSCULAR; INTRAVENOUS at 07:44

## 2018-02-21 RX ADMIN — NOREPINEPHRINE BITARTRATE 6.4 MCG: 1 INJECTION INTRAVENOUS at 09:56

## 2018-02-21 RX ADMIN — Medication 10 MG: at 09:03

## 2018-02-21 RX ADMIN — FENTANYL CITRATE 100 MCG: 50 INJECTION, SOLUTION INTRAMUSCULAR; INTRAVENOUS at 07:59

## 2018-02-21 RX ADMIN — VASOPRESSIN 1 UNITS: 20 INJECTION, SOLUTION INTRAMUSCULAR; SUBCUTANEOUS at 08:18

## 2018-02-21 RX ADMIN — SODIUM CHLORIDE: 9 INJECTION, SOLUTION INTRAVENOUS at 08:15

## 2018-02-21 RX ADMIN — ROCURONIUM BROMIDE 10 MG: 10 INJECTION INTRAVENOUS at 09:52

## 2018-02-21 RX ADMIN — VASOPRESSIN 1 UNITS/HR: 20 INJECTION, SOLUTION INTRAMUSCULAR; SUBCUTANEOUS at 08:15

## 2018-02-21 RX ADMIN — ONDANSETRON 4 MG: 2 INJECTION INTRAMUSCULAR; INTRAVENOUS at 10:18

## 2018-02-21 RX ADMIN — Medication 5 MG: at 08:57

## 2018-02-21 RX ADMIN — ROCURONIUM BROMIDE 10 MG: 10 INJECTION INTRAVENOUS at 09:08

## 2018-02-21 RX ADMIN — NOREPINEPHRINE BITARTRATE 6.4 MCG: 1 INJECTION INTRAVENOUS at 08:05

## 2018-02-21 RX ADMIN — ROCURONIUM BROMIDE 10 MG: 10 INJECTION INTRAVENOUS at 09:11

## 2018-02-21 RX ADMIN — PROPOFOL 30 MG: 10 INJECTION, EMULSION INTRAVENOUS at 08:02

## 2018-02-21 RX ADMIN — SUGAMMADEX 150 MG: 100 INJECTION, SOLUTION INTRAVENOUS at 10:36

## 2018-02-21 RX ADMIN — ALBUMIN HUMAN: 0.05 INJECTION, SOLUTION INTRAVENOUS at 08:30

## 2018-02-21 RX ADMIN — VASOPRESSIN 1 UNITS: 20 INJECTION, SOLUTION INTRAMUSCULAR; SUBCUTANEOUS at 08:51

## 2018-02-21 RX ADMIN — FENTANYL CITRATE 50 MCG: 50 INJECTION, SOLUTION INTRAMUSCULAR; INTRAVENOUS at 10:18

## 2018-02-21 RX ADMIN — PHENYLEPHRINE HYDROCHLORIDE 50 MCG: 10 INJECTION, SOLUTION INTRAMUSCULAR; INTRAVENOUS; SUBCUTANEOUS at 09:37

## 2018-02-21 RX ADMIN — PROTAMINE SULFATE 90 MG: 10 INJECTION, SOLUTION INTRAVENOUS at 10:14

## 2018-02-21 RX ADMIN — SODIUM CHLORIDE, POTASSIUM CHLORIDE, SODIUM LACTATE AND CALCIUM CHLORIDE: 600; 310; 30; 20 INJECTION, SOLUTION INTRAVENOUS at 07:44

## 2018-02-21 RX ADMIN — Medication 10 MG: at 09:42

## 2018-02-21 RX ADMIN — PROPOFOL 50 MG: 10 INJECTION, EMULSION INTRAVENOUS at 08:00

## 2018-02-21 RX ADMIN — LIDOCAINE HYDROCHLORIDE 100 MG: 20 INJECTION, SOLUTION INFILTRATION; PERINEURAL at 08:00

## 2018-02-21 RX ADMIN — HEPARIN SODIUM 12000 UNITS: 1000 INJECTION, SOLUTION INTRAVENOUS; SUBCUTANEOUS at 09:24

## 2018-02-21 RX ADMIN — Medication 5 MG: at 08:55

## 2018-02-21 RX ADMIN — ALBUMIN HUMAN: 0.05 INJECTION, SOLUTION INTRAVENOUS at 08:15

## 2018-02-21 RX ADMIN — VASOPRESSIN 0.5 UNITS: 20 INJECTION, SOLUTION INTRAMUSCULAR; SUBCUTANEOUS at 08:09

## 2018-02-21 RX ADMIN — VASOPRESSIN 1 UNITS: 20 INJECTION, SOLUTION INTRAMUSCULAR; SUBCUTANEOUS at 08:15

## 2018-02-21 RX ADMIN — HEPARIN SODIUM 2000 UNITS: 1000 INJECTION, SOLUTION INTRAVENOUS; SUBCUTANEOUS at 09:42

## 2018-02-21 RX ADMIN — ROCURONIUM BROMIDE 50 MG: 10 INJECTION INTRAVENOUS at 08:01

## 2018-02-21 RX ADMIN — VASOPRESSIN 1 UNITS: 20 INJECTION, SOLUTION INTRAMUSCULAR; SUBCUTANEOUS at 08:20

## 2018-02-21 RX ADMIN — VASOPRESSIN 1 UNITS: 20 INJECTION, SOLUTION INTRAMUSCULAR; SUBCUTANEOUS at 08:28

## 2018-02-21 RX ADMIN — FENTANYL CITRATE 50 MCG: 50 INJECTION, SOLUTION INTRAMUSCULAR; INTRAVENOUS at 10:43

## 2018-02-21 RX ADMIN — VASOPRESSIN 0.5 UNITS: 20 INJECTION, SOLUTION INTRAMUSCULAR; SUBCUTANEOUS at 08:10

## 2018-02-21 RX ADMIN — CEFAZOLIN SODIUM 2 G: 2 INJECTION, SOLUTION INTRAVENOUS at 08:40

## 2018-02-21 RX ADMIN — NOREPINEPHRINE BITARTRATE 6.4 MCG: 1 INJECTION INTRAVENOUS at 08:07

## 2018-02-21 RX ADMIN — PHENYLEPHRINE HYDROCHLORIDE 50 MCG: 10 INJECTION, SOLUTION INTRAMUSCULAR; INTRAVENOUS; SUBCUTANEOUS at 09:39

## 2018-02-21 RX ADMIN — CLOPIDOGREL 300 MG: 75 TABLET, FILM COATED ORAL at 14:01

## 2018-02-21 RX ADMIN — Medication 5 MG: at 08:59

## 2018-02-21 RX ADMIN — PHENYLEPHRINE HYDROCHLORIDE 100 MCG: 10 INJECTION, SOLUTION INTRAMUSCULAR; INTRAVENOUS; SUBCUTANEOUS at 09:35

## 2018-02-21 ASSESSMENT — ACTIVITIES OF DAILY LIVING (ADL)
COGNITION: 0 - NO COGNITION ISSUES REPORTED
FALL_HISTORY_WITHIN_LAST_SIX_MONTHS: NO
RETIRED_EATING: 0-->INDEPENDENT
SWALLOWING: 0-->SWALLOWS FOODS/LIQUIDS WITHOUT DIFFICULTY
RETIRED_COMMUNICATION: 0-->UNDERSTANDS/COMMUNICATES WITHOUT DIFFICULTY
AMBULATION: 0-->INDEPENDENT
DRESS: 0-->INDEPENDENT
TOILETING: 0-->INDEPENDENT
TRANSFERRING: 0-->INDEPENDENT
BATHING: 0-->INDEPENDENT

## 2018-02-21 ASSESSMENT — PAIN DESCRIPTION - DESCRIPTORS
DESCRIPTORS: SORE
DESCRIPTORS: SORE

## 2018-02-21 NOTE — OP NOTE
OPERATIVE DATE: 2/21/2018    PRE-OPERATIVE DIAGNOSIS:  1) Severe aortic stenosis  2) Hepatitis C  3) Alcoholic cirrhosis  4) Ascites  5) Erosive gastropathy  6) Esophageal varices  7) H/O upper GI bleed  8) Hypertension  9) Iron deficiency anemia  10) Sarahi-Hoffman tear  11) Marijuana abuse  12) H/O MRSA  13) Thrombocytopenia  14) Presbyopia  15) Right rotator cuff tear  16) Osteoarthritis of knee  17) Allergic rhinitis    POST-OPERATIVE DIAGNOSIS:  1) Severe aortic stenosis  2) Hepatitis C  3) Alcoholic cirrhosis  4) Ascites  5) Erosive gastropathy  6) Esophageal varices  7) H/O upper GI bleed  8) Hypertension  9) Iron deficiency anemia  10) Sarahi-Hoffman tear  11) Marijuana abuse  12) H/O MRSA  13) Thrombocytopenia  14) Presbyopia  15) Right rotator cuff tear  16) Osteoarthritis of knee  17) Allergic rhinitis    PROCEDURE:  1) Temporary pacemaker insertion  2) Iliofemoral artery angiography  3) Ascending aorta angiography  4) Left heart catheterization  5) Aortic valve balloon valvuloplasty with 24mm balloon  6) Successful transfemoral transcatheter aortic valve replacement with Quiroz Sapien3 26mm TAVR valve  7) Arteriotomy closure    SURGEON: Luis Baird MD    CARDIOLOGIST: Everett Dan MD; Ubaldo Villavicencio MD    ANESTHESIA: GETA    ESTIMATED BLOOD LOSS: 50 cc    INDICATIONS:  Mr. Ten Johnson is a 57 year old year old male admitted with severe aortic stenosis and multiple medical comorbidities including hepatitis C, alcoholic cirrhosis, and ascites.  We were asked to evaluate for transcatheter aortic valve replacement.  Risks and benefits of the operation were explained to the patient and their family including, but not limited to, bleeding, infection, stroke and even death.  They understood these risks and agreed to proceed electively.    OPERATIVE REPORT:  The patient was transferred to the operating room and positioned supine on the OR table.  General anesthesia was initiated by the  anesthesia team.  Endotracheal intubation and central venous access was performed by anesthesia.  The patients abdomen and bilateral lower extremities were clipped, prepped and draped in sterile fashion.  A pre-procedure time-out was performed confirming the correct patient, correct site and correct procedure.    Trans-esophageal echocardiography showed good function of the LV with EF 60%.  Intravenous heparin was administered.      Arterial access of the right and left femoral arteries and left common femoral vein was performed by interventional cardiology.  Two Perclose Proglide devices were deployed on the side used for valve deployment.    A temporary transvenous pacemaker was placed by the cardiology team.    A Lunderquist wire was advanced to support the Quiroz sheath insertion.  A pigtail catheter was advanced to the aortic root and angiogram performed to confirm optimal implant angle.  The pigtail was placed in the non-coronary cusp.    The LV was accessed using an AL-1 catheter over a straight wire.  The pigtail catheter was advanced into the LV.  The pigtail catheter was used to advance a Lunderquist Safari wire into the LV and the pigtail catheter was removed.    Aortic valve balloon valvuloplasty was performed with 24mm balloon using rapid pacing and ventilatory pause.    The 26mm Quiroz Sapein3 bioprosthetic valve was positioned across the native aortic valve and deployed using rapid pacing and ventilatory pause.    Transesophageal and transthoracic echocardiography showed trace paravalvular insufficiency.    The deployment system and wires were removed.  The femoral access was made hemostatic.    A final iliofemoral angiogram showed no extravasation or stenosis.    The patient was then transferred from the operating bed to an ICU bed and transferred to the ICU in critical, but stable, condition.    All needle, sponge and instrument counts were correct at the end of the case.    A total of 120 minutes  was spent in stand-by for this case.    Luis Baird  Cardiothoracic Surgery  Pager: [phone]

## 2018-02-21 NOTE — H&P
CARDIOLOGY-[facility] HISTORY AND PHYSICAL NOTE  Ten Johnson MRN:[id number] YOB: 1960  Date of Admission:2/21/2018    ASSESSMENT AND PLAN:   57 year old with history of severe AS who is status post TAVR performed under general anesthesia without immediate complication. He is doing quite well and is without concerning complaint of chest pain, shortness of breath, weakness/numbness, headache.     #S/P TAVR - (Quiroz Sapien3 26mm) POD #0, no immediate complication post operatively, uneventful surgical course performed under general anesthesia. LV EDP was notably 33 mmHg in the OR and patient was given 80 mg Lasix IV. He denies chest pain, SOB, pain at his groin access sites, light headedness, palpitation. NSR with normal VS at time of interview.    --CXR pending  --TTE tomorrow morning  --Bedrest per protocol   --Groin site monitoring per protocol   --Neuro checks per protocol  --Monotherapy with plavix given history of thrombocytopenia and GI bleed/erosive gastropathy in October 2017  --Plavix 300 mg loading then 75 mg qd  --Prophylactic antibiotics for all dental procedures in the future  --Pending course, potential discharge tomorrow   --Cardiac rehab    #History of Grade II Diastolic Dysfunction - LV EDP was notably 33 mmHg in the OR, given 80 mg Lasix IV. No shortness of breath, orthopnea, edema.    --I/O   --Will monitor    #History of erosive gastropathy and GI bleed - Admission 10/22-10/25/17 with hematemesis and melena and syncope with hemoglobin raf 6; was found to have inderjit-evans tear. Hemoglobin has been stable on recent bloodwork (9.1 > 10.0 > 11.2 > 9.2).     --Monitor Hgb  --Monotherapy with Plavix     #Alcohol abuse - Continues to drink, last drink was 2-3 days ago per patient.     --[facility] protocol if needed  --Monitoring for s/s withdrawal        HISTORY OF PRESENT ILLNESS:  Ten Johnson is a 57 year old male with history of severe AS and HTN who is admitted for a Transfemoral Aortic  Valve Implant (Quiroz Sapien3 26 mm). In short, Mr. Johnson has a history of AS which progressed from moderate to severe when characterized on TTE 10/24/17 with mean gradient across the AV 52 mmHg. Additionally, he had complaints of increasing LYN and decreased ability to perform exercise. As part of TAVR workup, patient had a coronary angiogram and RHC which revealed normal coronary arteries, high left sided pressures, otherwise unremarkable.     His also has history remarkable for alcohol abuse complicated by alcoholic cirrhosis, chronic hep C, erosive gastropathy and esophageal varices, admission for upper GI bleed in October 2017 with hemoglobin raf of 6, and thrombocytopenia.           PAST MEDICAL HISTORY:  Reviewed with patient on 02/21/2018   Past Medical History:   Diagnosis Date     Alcohol use disorder (H)      Alcoholic cirrhosis (H)      Ascites      Chronic hepatitis C without hepatic coma (H) 05/10/2016    Untreated as of 2/2018     Erosive gastropathy      Esophageal varices in alcoholic cirrhosis (H)      H/O upper gastrointestinal hemorrhage 09/2017     History of blood transfusion      Hypertension     essential     JANELLE (iron deficiency anemia)      Sarahi-Hoffman tear     History     Marijuana abuse      MRSA (methicillin resistant Staphylococcus aureus)      Severe aortic stenosis      Thrombocytopenia (H)        Past Surgical History:   Procedure Laterality Date     ARTHROSCOPY SHOULDER ROTATOR CUFF REPAIR  7/31/2012    Procedure: ARTHROSCOPY SHOULDER ROTATOR CUFF REPAIR;  Right Shoulder Arthroscopic Rotator Cuff Repair, BicepsTenodesis,  Subacromial Decompression ;  Surgeon: Joi Castillo MD;  Location:  OR     ESOPHAGOSCOPY, GASTROSCOPY, DUODENOSCOPY (EGD), COMBINED N/A 10/23/2017    Procedure: COMBINED ESOPHAGOSCOPY, GASTROSCOPY, DUODENOSCOPY (EGD);;  Surgeon: Gentry Salas MD;  Location:  GI     FACIAL RECONSTRUCTION SURGERY  [facility]     IRRIGATION AND DEBRIDEMENT UPPER  EXTREMITY, COMBINED  1/3/2012    Procedure:COMBINED IRRIGATION AND DEBRIDEMENT UPPER EXTREMITY; Irrigation & Debridement Left Elbow; Surgeon:CRISTHIAN ZHOU; Location:[facility] OR     REPAIR TENDON TRICEPS UPPER EXTREMITY  11/8/2011    Procedure:REPAIR TENDON TRICEPS UPPER EXTREMITY; Surgeon:CRISTHIAN ZHOU; Location:[facility] OR     SHOULDER SURGERY  2003    left, injury, torn tendons, hematoma     TRANSPOSITION ULNAR NERVE (ELBOW)  11/8/2011    Procedure:TRANSPOSITION ULNAR NERVE (ELBOW); Final Procedure Done: Left Elbow Lateral Ulnar Collateral Repair And  Left Elbow Triceps Repair          MEDICATIONS:  PTA Meds  Prior to Admission medications    Medication Sig Last Dose Taking? Auth Provider   aspirin 300 MG Suppository Place 300 mg rectally every 6 hours as needed for fever  Yes Reported, Patient   ASPIRIN PO Take 81 mg by mouth two times daily 2 doses, one yesterday one today at 0400 2/21/2018 at 0400 Yes Reported, Patient   Pantoprazole Sodium (PROTONIX PO) Take 40 mg by mouth 2 times daily as needed  2/20/2018 at 2100 Yes Reported, Patient   ferrous sulfate (IRON) 325 (65 FE) MG tablet Take 1 tablet (325 mg) by mouth daily (with breakfast)  Patient taking differently: Take 325 mg by mouth At Bedtime  2/20/2018 at 0700 Yes Meli Garcia MD   montelukast (SINGULAIR) 10 MG tablet Take 1 tablet (10 mg) by mouth At Bedtime 2/20/2018 at Unknown time Yes Cuca Celeste MD   lisinopril (PRINIVIL/ZESTRIL) 10 MG tablet Take 1 tablet (10 mg) by mouth daily  Patient taking differently: Take 10 mg by mouth At Bedtime  2/20/2018 at 1200 Yes Tamara Benedict MD   Multiple Vitamin (MULTIVITAMINS PO) Take 1 tablet by mouth At Bedtime  2/20/2018 at 0800 Yes Reported, Patient   loratadine (CLARITIN) 10 MG tablet Take 1 tablet (10 mg) by mouth daily More than a month at Unknown time  Cuca Celeste MD   fluticasone (FLONASE) 50 MCG/ACT spray Spray 2 sprays into both nostrils daily 2/20/2018  Tamara Benedict MD       Current Meds    sodium chloride (PF)  3 mL Intracatheter Q8H     [START ON 2018] clopidogrel  75 mg Oral Daily     clopidogrel  300 mg Oral Once     oxyCODONE IR  5 mg Oral Once     Infusion Meds    sodium chloride       sodium chloride       norepinephrine       remifentanil (ULTIVA) infusion       dexmedetomidine (PRECEDEX) 200mcg in 0.9% sodium chloride 50mL bag       vasopressin (PITRESSIN) infusion ADULT (40 mL) 1 Units/hr (18 0924)       ALLERGIES:    Allergies   Allergen Reactions     Zolpidem Other (See Comments)     Alcoholic.  Had reaction 3/17/13 while intoxicated which included black out, loss of awareness, paranoia.  Do not prescribe.  Dr. Celeste     Cats Other (See Comments)     rhinitis     Dogs Other (See Comments)     rhinitis     Pollen Extract Other (See Comments)     rhinits.       REVIEW OF SYSTEMS:  A 10 point review of systems was negative except as noted above.    SOCIAL HISTORY:   Social History     Social History     Marital status: Single     Spouse name: N/A     Number of children: N/A     Years of education: N/A     Occupational History     Not on file.     Social History Main Topics     Smoking status: Never Smoker     Smokeless tobacco: Never Used     Alcohol use Yes      Comment: Alcohol use disorder, still actively drinking     Drug use: Yes     Special: Marijuana      Comment: occ marijuana     Sexual activity: Not Currently     Partners: Female     Other Topics Concern     Not on file     Social History Narrative    .  Bicycles a lot.  Excessive alcohol use.  Smokes cigars.  Occasional marijuana use.         FAMILY MEDICAL HISTORY:   Family History   Problem Relation Age of Onset     CANCER Mother 62     Alcoholism Paternal Uncle      Cirrhosis No family hx of          PHYSICAL EXAM:   Temp  Av.7  F (36.5  C)  Min: 97.7  F (36.5  C)  Max: 97.7  F (36.5  C)      No Data Recorded Resp  Av  Min: 18  Max: 18  SpO2  Av %  Min: 97 %   "Max: 97 %       BP (!) 122/97  Temp 97.7  F (36.5  C) (Oral)  Resp 18  Ht 1.75 m (5' 8.9\")  Wt 82.2 kg (181 lb 3.5 oz)  SpO2 97%  BMI 26.84 kg/m2   Date 02/21/18 0700 - 02/22/18 0659   Shift 8914-5577 1688-1479 8406-5053 24 Hour Total   I  N  T  A  K  E   Colloid 250   250    Shift Total  (mL/kg) 250  (3.04)   250  (3.04)   O  U  T  P  U  T   Shift Total  (mL/kg)       Weight (kg) 82.2 82.2 82.2 82.2        GENERAL APPEARANCE: Alert and NAD  Head: NC/AT  EYES: PERRL, pupils equal  HENT: Mouth without ulcers or lesions  NECK: Supple, no goiter  Pulmonary: Lungs clear to auscultation with equal breath sounds bilaterally, no clubbing or cyanosis  CV: Regular rhythm, normal rate, no rub. 1/6 CLARITZA. Do not appreciate JVD.   GI: Soft, nontender, normal bowel sounds, no HSM   MS: No evidence of inflammation in joints, no muscle tenderness  SKIN: No rash, warm, dry  NEURO: Mentation intact and speech normal.     LABS:   CMP  Recent Labs  Lab 02/21/18  0625      POTASSIUM 4.4   CHLORIDE 103   CO2 26   ANIONGAP 7   *   BUN 22   CR 0.87   GFRESTIMATED 89   GFRESTBLACK >90   JOSEPHINE 8.9   PROTTOTAL 7.6   ALBUMIN 3.3*   BILITOTAL 0.6   ALKPHOS 104   AST 88*   ALT 93*     CBC  Recent Labs  Lab 02/21/18  0625   HGB 11.2*   WBC 5.9   RBC 4.52   HCT 35.3*   MCV 78   MCH 24.8*   MCHC 31.7   RDW 24.4*   PLT 82*     INR  Recent Labs  Lab 02/21/18  0625   INR 1.22*     ABGNo lab results found in last 7 days.     IMAGING:    CXR     Coronary Angiogram 2/2/18  Normal right sided filling pressures, high left sided filling pressures with mean wedge 20 mmHg, borderline pulmonary artery hypertension with mean of 25mmHg, normal CO 5.2 L/min, Index 2.7 L/min/m2.    Normal Coronary arteries.      CT angiogram TAVR 12/13/17  1.  Severely calcified aortic valve. Aortic valve calcium score is  5332. The LVOT is not calcified. The ascending aorta is minimally  calcified, aortic arch is  minimally calcified, the descending  thoracic " aorta is minimally calcified. There is no mitral annular  calcification.  2.  There is moderate LVOT calcification.   3.  The arch vessel branching pattern is normal.   4.  The suprarenal abdominal aorta is mildly calcified; infrarenal  abdominal aorta is mildly calcified  5.  Widely patent origins of celiac trunk, superior mesenteric artery  and inferior mesenteric artery.  Single bilateral renal arteries with  widely patent origins.   6.  There is no acute aortic pathology, such as dissection, intramural  hematoma, or contained rupture.      MEASUREMENTS:   Representative dimensions of the thoracoabdominal aorta are as  follows:     1. AORTIC ANNULUS MEASUREMENTS:     1.  The average aortic annulus diameter is 25.0 mm  2.  Aortic annulus area is 4.94 cm2  3.  Aortic annulus perimeter is 81.4 mm  4.  Suggested 3-cusp coaxial angle for aortic valve is (Cymro 0 , CAU 7)  and alternate A-P coaxial angle is (LAO5 , CAU0 ), these  angles will  vary depending upon the patient's body position  5.  Aortic root angle is 46.2  6.  Left main - Annulus distance: 12.6 mm, RCA - Annulus distance:  13.2 mm     2. LVOT MEASUREMENTS:     1.  The average LVOT diameter (measured 4 mm below annular plane) is  24.0 mm  2.  LVOT area is 4.54 cm2  3.  LVOT perimeter is 82.5 mm     3. AORTA MEASUREMENTS     1.  The aortic root at the sinuses of Valsalva (left cusp, right cusp,  non cusp): 35.5 mm x  31.7 mm x 32.0 mm  2.  The sinotubular junction:  32.0 mm x 30.5 mm  3.  Sinotubular junction height: 18.2 mm x 16.3 mm  4.  Proximal ascending aorta: 39.6 mm x 37.9 mm  5.  Distal abdominal aorta proximal to the bifurcation: 17.0 mm x 16.5  mm  6.  Innominate artery 3 cm from ostium: 11.4 mm x 11.2 mm  7.  Left common carotid artery 3 cm from ostium: 6.44 mm x 5.83 mm     4. ILIOFEMORAL MEASUREMENTS:     RIGHT:  1.  Right common iliac artery: 12.7 mm x 11.6 mm   2.  Right external iliac artery: 10.9 mm x 10.7 mm   3.  Right common femoral  artery: 9.42 mm x 9.08 mm   4.  Tortuosity: Moderate   5.  Calcification: mild      LEFT:  1.  Left common iliac artery: 8.22 mm x  8.22 mm  2.  Left external iliac artery: 11.1 mm x 10.6 mm  3.  Left common femoral artery: 11.1 mm x 10.9 mm  4.  Tortuosity: mild   5.  Calcification: mild      5. SUBCLAVIAN MEASUREMENTS:     RIGHT:  1. Minimal luminal diameter: 9.98 mm x  9.59 mm  2. Tortuosity: minimal   3. Calcification: minimal      LEFT:  1. Minimal luminal diameter: 11.4 mm x  10.9 mm  2. Tortuosity: mild   3. Calcification: mild     TTE 10/24/17  The mean gradient across the aortic valve is 52 mmHg.  The peak aortic velocity is 4.47 m/sec. Severe aortic stenosis is present.     Left ventricular function, chamber size, wall motion, and wall thickness are  normal.The EF is 60-65%. Grade II or moderate diastolic dysfunction. No  regional wall motion abnormalities are seen.  Right ventricular function, chamber size, wall motion, and thickness are  normal.  The inferior vena cava was normal in size with preserved respiratory  variability.  No pericardial effusion is present.  Ascending aorta 4.0 cm.     Compared to prior study on 2/15/2015, aortic stenosis has progressed to severe    Brian Montalvo PA-C  [facility] Cardiology   Pager [phone] or Ascom [phone]      ATTENDING NOTE:  Patient has been seen and evaluated by me on 02/22/2018. I have reviewed the H&P.  Please refer to it for additional details.  I have reviewed today's vital signs, medications, labs, and imaging results.  I have reviewed and edited, as necessary, the history, review of systems, physical examination, and assessment and plan.  I have discussed my assessment and plan with the nurse practitioner.  Ten Johnson is a 57 year old male with risk factor profile (+) HTN, (-) DM, (-) hypercholesterolemia, (-) tobacco use, (-) fam Hx premature CAD, Hx chronic alcohol use complicated by Sarahi Hoffman tear previously, severe AS (mean gradient 52 mm Hg,  LESLYE 0.8 cm2), POD#1 from 26 mm ES3.  No procedural complications.  ECG this morning with QRS 0.086.  TWI V4-V5.  He had normal coronary angiogram pre-procedurally (2/2/18).  Exam documented above. No hematoma.  Hgb 9.8.  Cr 0.86. Awaiting ECHO today.  He diuresed with IV Lasix overnight (LVEDP 33 mm Hg).  Plan on discharge today if no issues on ECHO.  Discharge on Plavix monotherapy given Hx of GI bleeding.  Follow up TAVR clinic in one month.      Koffi Scott MD     Cardiovascular Division

## 2018-02-21 NOTE — PROCEDURES
TAVR PROCEDURE REPORT:     PROCEDURES PERFORMED:   1. Temporary pacemaker insertion.  2. Iliofemoral artery angiography.  3. Ascending aorta angiography.  4. Left heart catheterization.  5. Aortic valve balloon valvuloplasty with a 20mm balloon.  6. Cerebral embolic protection with [place] device.  7. Successful transfemoral transcatheter aortic valve replacement with a 26mm Quiroz Victor M 3 valve.  8. Arteriotomy closure.    PHYSICIANS:  1. Attending Interventional Cardiology Staff: Ubaldo Villavicencio MD and Everett Dan MD  2. Attending Cardiovascular Surgery Staff: Luis Nicole MD  3. Interventional Cardiology Fellow: Maximiliano Chapin MD     INDICATION:  Mr Santoro is a 57 year old male with hx hepatitis C, recent GI bleed 2/2 Sarahi Hoffman tear, thrombocytopenia, cirrhosis and  severe symptomatic aortic stenosis who presents on an elective outpatient basis for transfemoral transcatheter aortic valve replacement with plan for an Quiroz Victor M 3 valve.    DESCRIPTION:  1. Consent obtained with discussion of risks.  All questions were answered.  2. Sterile prep and procedure per OR protocol.  3. Location: right and left common femoral arteries and left common femoral vein.  Right radial artery.  4. Access: Local anesthetic with lidocaine.  A standard (18 g) needle with ultrasound guidance was used to establish vascular access using a modified Seldinger technique. 21 g needle for radial access.  5. Sheath:  14Fr Quiroz eSheath in the RCFA, 7Fr long sheath in the LCFA,  7Fr long sheath in the LCFV  6. Catheters: 6Fr angled pigtail and 6Fr AL-1.  7. Estimated blood loss of 20 mL.  8. See below for procedure details.    MEDICATIONS:  1. Sedation per anesthesia.  2. Heparin administered to achieve a goal ACT > 300 sec per anesthesia.   3. Protamine IV.    SUMMARY:  1. Access was obtained in the right and left common femoral arteries and the left common femoral vein by the interventional cardiology team.  The  arterial site used for valve delivery was pre-closed with two Perclose Proglide devices.   2. A 5Fr PACEL temporary pacemaker was positioned in the right ventricle and tested for adequate capture.    3. Iliofemoral angiography was used to guide FA access for insertion of the valve sheath.  A [employer] wire was used to support the Quiroz eSheath insertion.  Heparin was administered for goal ACT ~300  4. The 6Fr pigtail catheter was positioned in the right-coronary cusp and angiography was used to confirm the optimal implant angle.  The pigtail was then moved to the non-coronary cusp.     5. Right radial access was obtained and short 6Fr slender sheath was placed. The [place] cerebral embolic protection device (EPD) was placed through RRA sheath and advanced over a 300cc GrandsINFUSD wire into the ascending aorta.  The proximal filter was then deployed in the innominate artery.  The device was clocked and pointed up toward the left common carotid artery and the wire was advanced up the L CCA.  The device was advanced over the wire and the distal device was deployed in the L CCA.  The pigtail was then advanced back and forth the aortic arch to make sure it was not making contact with the EPD.   6. Access into the LV was obtained using an AL-1 catheter and a straight wire.  The AL-1 catheter was used to exchange the straight wire for a J-wire and a pigtail catheter was then positioned in the left ventricle.  The LVEDP was measured with a pressure of 35 mmHg.  The pigtail catheter was used to advance an Safari wire into in the LV and the pigtail was removed.  7. Aortic valve balloon valvuloplasty was successfully completed using an Quiroz 20mm balloon during rapid pacing and resulted in no hemodynamic compromise.  The 26mm Quiroz Victor M 3 prosthetic valve was then positioned across the native aortic valve. Under rapid pacing at 160 bpm and with fluoroscopic guidance, the valve was successfully deployed with balloon  expansion using final balloon volume of nominal  at a depth of 90/10 position.   Post-deployment LVEDP was 26mmHg.  8. Subsequent transthoracic echocardiography and an ascending aortogram showed trace paravalvular insufficiency.   9. Protamine was administered.  The EPD and filters were removed from the body.  The valve sheath access arteriotomy was successfully closed with the double suture closure devices and manual pressure was applied for successful hemostasis.    10. A final iliofemoral angiogram showed no evidence of extravasation or stenosis.   11. The LCFA 7Fr arteriotomy was successfully closed with a 8Fr AngioSeal closure device.  12. The temporary pacemaker was then removed and venous sheath was removed with manual pressure applied.      NOTABLE EVENTS:  1. None    COMPLICATIONS:  1. None    SUMMARY:    1. Lifestyle-limiting severe aortic stenosis.  2. Successful transfemoral transcatheter aortic valve replacement with a 26mm Quiroz Victor M 3 valve.  3. Temporary pacemaker insertion.  4. Aortic balloon valvuloplasty with a 20mm balloon.  5. Left heart catheterization with LVEDP of 35 mmHg.  6. [place] cerebral embolic protection device.   7. Right and left common femoral arteriotomies successfully closed with closure devices.    PLAN:    1. Plavix 300 mg po today then Plavix 75 mg po daily - no 2nd antiplatelet due to hx cirrhosis and recent GI bleed.  2. Bedrest per protocol (6 hours).  3. Admit to the primary inpatient CSI team for further evaluation and management including his volume overload- did receive 80mg IV lasix x1 in OR.  4. Echocardiogram tomorrow.   5. Lifelong antibiotic prophylaxis prior to all dental procedures.      The attending interventional cardiologists, Addy Villavicencio and Sy, were present and participated in the entire procedure.    Maximiliano Chapin  Cardiology fellow  Pager [phone]      I was present for the entire procedure.     Ubaldo Villavicencio M.D.  Interventional  Cardiology  [facility]  Pager: [phone]

## 2018-02-21 NOTE — OR NURSING
Dr. Chapin paged and notified pt reporting numbness in R hand at 11:45; pulse 2+, cap refill <3; can feel me touch, warm and not discolored.     Dr. Chapin verbalized to remove 2cc air at this time and continue to monitor.

## 2018-02-21 NOTE — IP AVS SNAPSHOT
[address] [address]    Phone:  [phone]                                       After Visit Summary   2/21/2018    Ten Johnson    MRN: [id number]           After Visit Summary Signature Page     I have received my discharge instructions, and my questions have been answered. I have discussed any challenges I see with this plan with the nurse or doctor.    ..........................................................................................................................................  Patient/Patient Representative Signature      ..........................................................................................................................................  Patient Representative Print Name and Relationship to Patient    ..................................................               ................................................  Date                                            Time    ..........................................................................................................................................  Reviewed by Signature/Title    ...................................................              ..............................................  Date                                                            Time

## 2018-02-21 NOTE — OR NURSING
Dr. Chapin at bedside in PACU, okay to send pt to floor at this time.  Start to release 1cc of air every 10 minutes.

## 2018-02-21 NOTE — ANESTHESIA POSTPROCEDURE EVALUATION
Patient: Ten Johnson    Procedure(s):  Transfemoral (Quiroz) Aortic Valve Implant 26mm MARTHA 3, with Cardiopulmonary Bypass Standby  - Wound Class: I-Clean   - Wound Class: I-Clean    Diagnosis:Aortic Stenosis Severe   Diagnosis Additional Information: No value filed.    Anesthesia Type:  General, ETT    Note:  Anesthesia Post Evaluation    Patient location during evaluation: PACU  Patient participation: Able to fully participate in evaluation  Level of consciousness: awake and alert  Pain management: adequate  Airway patency: patent  Cardiovascular status: acceptable  Respiratory status: acceptable  Hydration status: acceptable  PONV: none     Anesthetic complications: None          Last vitals:  Vitals:    02/21/18 1215 02/21/18 1225 02/21/18 1300   BP: 120/67  127/64   Resp: 18     Temp: 36  C (96.8  F)  36.4  C (97.6  F)   SpO2: 100% 100% 100%         Electronically Signed By: Joselin Mitchell MD  February 21, 2018  1:13 PM

## 2018-02-21 NOTE — PROGRESS NOTES
Date: 2/21/2018    Time of Call: 10:33 AM     Diagnosis:  S/p TAVR     [ TORB ] Ordering provider: Ubaldo Villavciencio MD  Order:   Echo  Labs  EKG     Order received by: Emma Mendez RN     Follow-up/additional notes:   Ordered for 30 day s/p TAVR follow up visit

## 2018-02-21 NOTE — OR NURSING
TAVR JAMES Roth at bedside for report at 1125:   Verbalized there is 13cc in R radial TR band and to leave in place for 2 hours (13:00); then release 1cc every 5 minutes; if there is flash, return 1cc and page MD Chapin at that time.

## 2018-02-21 NOTE — PLAN OF CARE
Problem: Cardiac Disease Comorbidity  Goal: Cardiac Disease  Patient comorbidity will be monitored for signs and symptoms of Cardiac Disease.  Problems will be absent, minimized or managed by discharge/transition of care.  Outcome: Improving  Pt HR and BP stable, TAVR performed today.    Problem: Cardiac: Heart Failure (Adult)  Goal: Signs and Symptoms of Listed Potential Problems Will be Absent, Minimized or Managed (Cardiac: Heart Failure)  Signs and symptoms of listed potential problems will be absent, minimized or managed by discharge/transition of care (reference Cardiac: Heart Failure (Adult) CPG).  Outcome: Improving  Pt went to OR and had TAVR performed. Will continue to monitor.

## 2018-02-21 NOTE — IP AVS SNAPSHOT
MRN:[id number]                      After Visit Summary   2/21/2018    Ten Johnson    MRN: [id number]           Thank you!     Thank you for choosing [facility] for your care. Our goal is always to provide you with excellent care. Hearing back from our patients is one way we can continue to improve our services. Please take a few minutes to complete the written survey that you may receive in the mail after you visit with us. Thank you!        Patient Information     Date Of Birth          1960        About your hospital stay     You were admitted on:  February 21, 2018 You last received care in the:  [address] [place] [address]    You were discharged on:  February 22, 2018        Reason for your hospital stay       You had a procedure called a transcatheter aortic valve procedure, for your tight valve. You have done quite well post procedure.                  Who to Call     For medical emergencies, please call 911.  For non-urgent questions about your medical care, please call your primary care provider or clinic, [phone]  For questions related to your surgery, please call your surgery clinic        Attending Provider     Provider Specialty    Ubaldo Villavicencio MD Cardiology    [place] MD Internal Medicine - Interventional Cardiology       Primary Care Provider Office Phone # Fax #    Cuca Celeste [phone]      After Care Instructions     Activity       Your activity upon discharge: activity as tolerated            Diet       Follow this diet upon discharge: Orders Placed This Encounter      Regular Diet Adult                  Follow-up Appointments     Adult [facility] Follow-up and recommended labs and tests       Follow up with primary care provider, Cuca Celeste, within 7 days for hospital follow- up.     Please follow up with valve clinic in one month, as arranged and with cardiac rehab as discussed    Appointments on [place] and/or [place] (with [facility] or [facility] provider or service). Call [phone] if you haven't heard regarding these appointments within 7 days of discharge.                  Your next 10 appointments already scheduled     Mar 06, 2018  9:30 AM CST   Cardiac Evaluation with  Cardiac Rehab 1   [facility], [place], Cardiac Rehabilitation ([facility])    [address] 1st Floor [address]  [facility] [address] [phone]            Mar 15, 2018  2:30 PM CDT   Lab with  LAB    Health Lab ([facility])    [address]  1st Floor  [facility] [id number] [phone]            Mar 15, 2018  3:00 PM CDT   Ech Complete with [id number]    Health Echo ([facility])    [address] [place] [address]  3rd Floor  [facility] [id number] [phone]           1. Please bring or wear a comfortable two-piece outfit. 2. You may eat, drink and take your normal medicines. 3. For any questions that cannot be answered, please contact the ordering physician            Mar 15, 2018  4:00 PM CDT   (Arrive by 3:45 PM)   Return Visit with SYDNIE Hong CNP   [facility] ([facility])    [address]  [address]  [facility] [id number] [phone]            Jun 04, 2018  8:30 AM CDT   Lab with  LAB    Health Lab ([facility])    [address]  1st [facility] [id number] [phone]            Jun 04, 2018  9:20 AM CDT   (Arrive by 9:05 AM)   Return General Liver with Gentry San MD   [facility]at[facility] ([facility])    [address] [place] [address]  [address]  [facility] [id number] [phone]              Additional Services     CARDIAC REHAB REFERRAL       *This therapy referral will be filtered to a centralized scheduling office at [facility] and the patient will receive a call  "to schedule an appointment at a [place] location most convenient for them.*     If you have not heard from the scheduling office within 2 business days, please call [phone] for all locations.    Please be aware that coverage of these services is subject to the terms and limitations of your health insurance plan.  Call member services at your health plan with any benefit or coverage questions.      **Note to Provider:  If you are referring outside of [place] for the therapy appointment, please list the name of the location in the \"special instructions\" above, print the referral and give to the patient to schedule the appointment.                         Pending Results     No orders found for last 3 day(s).            Statement of Approval     Ordered          02/22/18 1410  I have reviewed and agree with all the recommendations and orders detailed in this document.  EFFECTIVE NOW     Approved and electronically signed by:  Josy Nieto APRN CNP             Admission Information     Date & Time Department Dept. Phone    2/21/2018 [address] [facility] [phone]      Your Vitals Were     Blood Pressure Temperature Respirations Height Weight Pulse Oximetry    127/75 (BP Location: Left arm) 98  F (36.7  C) (Axillary) 16 1.75 m (5' 8.9\") 82.2 kg (181 lb 3.5 oz) 97%    BMI (Body Mass Index)                   26.84 kg/m2           [employer] Information     [employer]ity lets you send messages to your doctor, view your test results, renew your prescriptions, schedule appointments and more. To sign up, go to [email or web address] . Click on \"Log in\" on the left side of the screen, which will take you to the Welcome page. Then click on \"Sign up Now\" on the right side of the page.     You will be asked to enter the access code listed below, as well as some personal information. Please follow the directions to create your username and password.     Your access code is: [email or web address]  Expires: 3/8/2018  6:30 AM     Your " access code will  in 90 days. If you need help or a new code, please call your [place] clinic or [phone].        Care EveryWhere ID     This is your Care EveryWhere ID. This could be used by other organizations to access your [place] medical records  [id number]        Equal Access to Services     MANE HERRERA AH: Hadii aad ku hadasho Soomaali, waaxda luqadaha, qaybta kaalmada adeegyada, waxay idiin haymeshaarun silvestre viviansherri thompson. So [facility] [phone].    ATENCIÓN: Si habla español, tiene a ha disposición servicios gratuitos de asistencia lingüística. Llame al [phone].    We comply with applicable federal civil rights laws and [place] laws. We do not discriminate on the basis of race, color, national origin, age, disability, sex, sexual orientation, or gender identity.               Review of your medicines      START taking        Dose / Directions    clopidogrel 75 MG tablet   Commonly known as:  PLAVIX   Used for:  S/P TAVR (transcatheter aortic valve replacement)        Dose:  75 mg   Start taking on:  2018   Take 1 tablet (75 mg) by mouth daily   Quantity:  30 tablet   Refills:  3         CONTINUE these medicines which may have CHANGED, or have new prescriptions. If we are uncertain of the size of tablets/capsules you have at home, strength may be listed as something that might have changed.        Dose / Directions    ferrous sulfate 325 (65 FE) MG tablet   Commonly known as:  IRON   This may have changed:  when to take this   Used for:  Anemia, unspecified type        Dose:  325 mg   Take 1 tablet (325 mg) by mouth daily (with breakfast)   Quantity:  100 tablet   Refills:  0       lisinopril 10 MG tablet   Commonly known as:  PRINIVIL/ZESTRIL   This may have changed:  when to take this   Used for:  Essential hypertension        Dose:  10 mg   Take 1 tablet (10 mg) by mouth daily   Quantity:  90 tablet   Refills:  3         CONTINUE these medicines which have NOT CHANGED        Dose /  Directions    fluticasone 50 MCG/ACT spray   Commonly known as:  FLONASE   Used for:  Cough, Post-nasal drip        Dose:  2 spray   Spray 2 sprays into both nostrils daily   Quantity:  16 g   Refills:  11       loratadine 10 MG tablet   Commonly known as:  CLARITIN   Used for:  Post-nasal drip, Cough        Dose:  10 mg   Take 1 tablet (10 mg) by mouth daily   Quantity:  90 tablet   Refills:  3       montelukast 10 MG tablet   Commonly known as:  SINGULAIR   Used for:  Other chronic rhinitis        Dose:  10 mg   Take 1 tablet (10 mg) by mouth At Bedtime   Quantity:  90 tablet   Refills:  1       MULTIVITAMINS PO        Dose:  1 tablet   Take 1 tablet by mouth At Bedtime   Refills:  0       PROTONIX PO        Dose:  40 mg   Take 40 mg by mouth 2 times daily as needed   Refills:  0         STOP taking     aspirin 300 MG Suppository           ASPIRIN PO                Where to get your medicines      These medications were sent to [facility] [place], [place] - [address] [address]     Phone:  [phone]     clopidogrel 75 MG tablet                Protect others around you: Learn how to safely use, store and throw away your medicines at www.disposemymeds.org.             Medication List: This is a list of all your medications and when to take them. Check marks below indicate your daily home schedule. Keep this list as a reference.      Medications           Morning Afternoon Evening Bedtime As Needed    clopidogrel 75 MG tablet   Commonly known as:  PLAVIX   Take 1 tablet (75 mg) by mouth daily   Start taking on:  2/23/2018   Last time this was given:  75 mg on 2/22/2018  9:00 AM                                ferrous sulfate 325 (65 FE) MG tablet   Commonly known as:  IRON   Take 1 tablet (325 mg) by mouth daily (with breakfast)                                fluticasone 50 MCG/ACT spray   Commonly known as:  FLONASE   Spray 2 sprays into both nostrils  daily                                lisinopril 10 MG tablet   Commonly known as:  PRINIVIL/ZESTRIL   Take 1 tablet (10 mg) by mouth daily   Last time this was given:  2.5 mg on 2/22/2018  9:00 AM                                loratadine 10 MG tablet   Commonly known as:  CLARITIN   Take 1 tablet (10 mg) by mouth daily                                montelukast 10 MG tablet   Commonly known as:  SINGULAIR   Take 1 tablet (10 mg) by mouth At Bedtime                                MULTIVITAMINS PO   Take 1 tablet by mouth At Bedtime                                PROTONIX PO   Take 40 mg by mouth 2 times daily as needed   Last time this was given:  40 mg on 2/22/2018  9:00 AM

## 2018-02-21 NOTE — ANESTHESIA CARE TRANSFER NOTE
Patient: Ten Johnson    Procedure(s):  Transfemoral (Quiroz) Aortic Valve Implant 26mm MARTHA 3, with Cardiopulmonary Bypass Standby  - Wound Class: I-Clean   - Wound Class: I-Clean    Diagnosis: Aortic Stenosis Severe   Diagnosis Additional Information: No value filed.    Anesthesia Type:   General, ETT     Note:  Airway :Nasal Cannula  Patient transferred to:PACU  Comments: Spont resp, VSS, report to RNHandoff Report: Identifed the Patient, Identified the Reponsible Provider, Reviewed the pertinent medical history, Discussed the surgical course, Reviewed Intra-OP anesthesia mangement and issues during anesthesia, Set expectations for post-procedure period and Allowed opportunity for questions and acknowledgement of understanding      Vitals: (Last set prior to Anesthesia Care Transfer)    CRNA VITALS  2/21/2018 1023 - 2/21/2018 1100      2/21/2018             Resp Rate (observed): (!)  1    Resp Rate (set): 10                Electronically Signed By: SYDNIE Gerardo CRNA  February 21, 2018  11:00 AM

## 2018-02-22 ENCOUNTER — APPOINTMENT (OUTPATIENT)
Dept: OCCUPATIONAL THERAPY | Facility: CLINIC | Age: 58
DRG: 267 | End: 2018-02-22
Attending: INTERNAL MEDICINE
Payer: COMMERCIAL

## 2018-02-22 ENCOUNTER — APPOINTMENT (OUTPATIENT)
Dept: CARDIOLOGY | Facility: CLINIC | Age: 58
DRG: 267 | End: 2018-02-22
Attending: INTERNAL MEDICINE
Payer: COMMERCIAL

## 2018-02-22 VITALS
HEIGHT: 69 IN | OXYGEN SATURATION: 97 % | SYSTOLIC BLOOD PRESSURE: 127 MMHG | RESPIRATION RATE: 16 BRPM | TEMPERATURE: 98 F | BODY MASS INDEX: 26.84 KG/M2 | DIASTOLIC BLOOD PRESSURE: 75 MMHG | WEIGHT: 181.22 LBS

## 2018-02-22 LAB
ALBUMIN SERPL-MCNC: 3.2 G/DL (ref 3.4–5)
ALP SERPL-CCNC: 87 U/L (ref 40–150)
ALT SERPL W P-5'-P-CCNC: 70 U/L (ref 0–70)
ANION GAP SERPL CALCULATED.3IONS-SCNC: 10 MMOL/L (ref 3–14)
AST SERPL W P-5'-P-CCNC: 71 U/L (ref 0–45)
BASOPHILS # BLD AUTO: 0 10E9/L (ref 0–0.2)
BASOPHILS NFR BLD AUTO: 0.3 %
BILIRUB SERPL-MCNC: 1.4 MG/DL (ref 0.2–1.3)
BUN SERPL-MCNC: 16 MG/DL (ref 7–30)
CALCIUM SERPL-MCNC: 7.9 MG/DL (ref 8.5–10.1)
CHLORIDE SERPL-SCNC: 100 MMOL/L (ref 94–109)
CO2 SERPL-SCNC: 27 MMOL/L (ref 20–32)
CREAT SERPL-MCNC: 0.81 MG/DL (ref 0.66–1.25)
DIFFERENTIAL METHOD BLD: ABNORMAL
EOSINOPHIL # BLD AUTO: 0.1 10E9/L (ref 0–0.7)
EOSINOPHIL NFR BLD AUTO: 1 %
ERYTHROCYTE [DISTWIDTH] IN BLOOD BY AUTOMATED COUNT: 24.3 % (ref 10–15)
GFR SERPL CREATININE-BSD FRML MDRD: >90 ML/MIN/1.7M2
GLUCOSE SERPL-MCNC: 111 MG/DL (ref 70–99)
HCT VFR BLD AUTO: 31.6 % (ref 40–53)
HGB BLD-MCNC: 9.8 G/DL (ref 13.3–17.7)
IMM GRANULOCYTES # BLD: 0 10E9/L (ref 0–0.4)
IMM GRANULOCYTES NFR BLD: 0.1 %
INTERPRETATION ECG - MUSE: NORMAL
LYMPHOCYTES # BLD AUTO: 1.3 10E9/L (ref 0.8–5.3)
LYMPHOCYTES NFR BLD AUTO: 19.4 %
MAGNESIUM SERPL-MCNC: 1.7 MG/DL (ref 1.6–2.3)
MCH RBC QN AUTO: 24.9 PG (ref 26.5–33)
MCHC RBC AUTO-ENTMCNC: 31 G/DL (ref 31.5–36.5)
MCV RBC AUTO: 80 FL (ref 78–100)
MONOCYTES # BLD AUTO: 1.2 10E9/L (ref 0–1.3)
MONOCYTES NFR BLD AUTO: 17.3 %
NEUTROPHILS # BLD AUTO: 4.3 10E9/L (ref 1.6–8.3)
NEUTROPHILS NFR BLD AUTO: 61.9 %
NRBC # BLD AUTO: 0 10*3/UL
NRBC BLD AUTO-RTO: 0 /100
PHOSPHATE SERPL-MCNC: 3.5 MG/DL (ref 2.5–4.5)
PLATELET # BLD AUTO: 53 10E9/L (ref 150–450)
POTASSIUM SERPL-SCNC: 3.7 MMOL/L (ref 3.4–5.3)
PROT SERPL-MCNC: 6.8 G/DL (ref 6.8–8.8)
RBC # BLD AUTO: 3.94 10E12/L (ref 4.4–5.9)
SODIUM SERPL-SCNC: 137 MMOL/L (ref 133–144)
WBC # BLD AUTO: 6.9 10E9/L (ref 4–11)

## 2018-02-22 PROCEDURE — 40000133 ZZH STATISTIC OT WARD VISIT

## 2018-02-22 PROCEDURE — HZ2ZZZZ DETOXIFICATION SERVICES FOR SUBSTANCE ABUSE TREATMENT: ICD-10-PCS | Performed by: INTERNAL MEDICINE

## 2018-02-22 PROCEDURE — 25000132 ZZH RX MED GY IP 250 OP 250 PS 637: Performed by: INTERNAL MEDICINE

## 2018-02-22 PROCEDURE — 93005 ELECTROCARDIOGRAM TRACING: CPT

## 2018-02-22 PROCEDURE — 93306 TTE W/DOPPLER COMPLETE: CPT

## 2018-02-22 PROCEDURE — 93010 ELECTROCARDIOGRAM REPORT: CPT | Performed by: INTERNAL MEDICINE

## 2018-02-22 PROCEDURE — 99223 1ST HOSP IP/OBS HIGH 75: CPT | Mod: 25 | Performed by: INTERNAL MEDICINE

## 2018-02-22 PROCEDURE — 97530 THERAPEUTIC ACTIVITIES: CPT | Mod: GO

## 2018-02-22 PROCEDURE — 83735 ASSAY OF MAGNESIUM: CPT | Performed by: INTERNAL MEDICINE

## 2018-02-22 PROCEDURE — 25000132 ZZH RX MED GY IP 250 OP 250 PS 637: Performed by: MARRIAGE & FAMILY THERAPIST

## 2018-02-22 PROCEDURE — 25000132 ZZH RX MED GY IP 250 OP 250 PS 637: Performed by: NURSE PRACTITIONER

## 2018-02-22 PROCEDURE — 97165 OT EVAL LOW COMPLEX 30 MIN: CPT | Mod: GO

## 2018-02-22 PROCEDURE — 36415 COLL VENOUS BLD VENIPUNCTURE: CPT | Performed by: INTERNAL MEDICINE

## 2018-02-22 PROCEDURE — 80053 COMPREHEN METABOLIC PANEL: CPT | Performed by: INTERNAL MEDICINE

## 2018-02-22 PROCEDURE — 85025 COMPLETE CBC W/AUTO DIFF WBC: CPT | Performed by: INTERNAL MEDICINE

## 2018-02-22 PROCEDURE — 97535 SELF CARE MNGMENT TRAINING: CPT | Mod: GO

## 2018-02-22 PROCEDURE — 93306 TTE W/DOPPLER COMPLETE: CPT | Mod: 26 | Performed by: INTERNAL MEDICINE

## 2018-02-22 PROCEDURE — 84100 ASSAY OF PHOSPHORUS: CPT | Performed by: INTERNAL MEDICINE

## 2018-02-22 RX ORDER — PANTOPRAZOLE SODIUM 40 MG/1
40 TABLET, DELAYED RELEASE ORAL EVERY MORNING
Status: DISCONTINUED | OUTPATIENT
Start: 2018-02-22 | End: 2018-02-22 | Stop reason: HOSPADM

## 2018-02-22 RX ORDER — CLOPIDOGREL BISULFATE 75 MG/1
75 TABLET ORAL DAILY
Qty: 30 TABLET | Refills: 3 | Status: SHIPPED | OUTPATIENT
Start: 2018-02-23 | End: 2019-01-15

## 2018-02-22 RX ORDER — LORAZEPAM 2 MG/ML
.5-4 INJECTION INTRAMUSCULAR SEE ADMIN INSTRUCTIONS
Status: DISCONTINUED | OUTPATIENT
Start: 2018-02-22 | End: 2018-02-22 | Stop reason: HOSPADM

## 2018-02-22 RX ORDER — LISINOPRIL 2.5 MG/1
5 TABLET ORAL DAILY
Status: DISCONTINUED | OUTPATIENT
Start: 2018-02-22 | End: 2018-02-22

## 2018-02-22 RX ORDER — LORAZEPAM 0.5 MG/1
.5-4 TABLET ORAL SEE ADMIN INSTRUCTIONS
Status: DISCONTINUED | OUTPATIENT
Start: 2018-02-22 | End: 2018-02-22 | Stop reason: HOSPADM

## 2018-02-22 RX ORDER — LISINOPRIL 2.5 MG/1
2.5 TABLET ORAL DAILY
Status: DISCONTINUED | OUTPATIENT
Start: 2018-02-22 | End: 2018-02-22 | Stop reason: HOSPADM

## 2018-02-22 RX ORDER — OXYCODONE HYDROCHLORIDE 5 MG/1
5 TABLET ORAL ONCE
Status: COMPLETED | OUTPATIENT
Start: 2018-02-22 | End: 2018-02-22

## 2018-02-22 RX ADMIN — LORAZEPAM 0.5 MG: 0.5 TABLET ORAL at 04:25

## 2018-02-22 RX ADMIN — LISINOPRIL 2.5 MG: 2.5 TABLET ORAL at 09:00

## 2018-02-22 RX ADMIN — PANTOPRAZOLE SODIUM 40 MG: 40 TABLET, DELAYED RELEASE ORAL at 09:00

## 2018-02-22 RX ADMIN — LORAZEPAM 0.5 MG: 0.5 TABLET ORAL at 09:01

## 2018-02-22 RX ADMIN — OXYCODONE HYDROCHLORIDE 5 MG: 5 TABLET ORAL at 03:11

## 2018-02-22 RX ADMIN — CLOPIDOGREL 75 MG: 75 TABLET, FILM COATED ORAL at 09:00

## 2018-02-22 ASSESSMENT — PAIN DESCRIPTION - DESCRIPTORS: DESCRIPTORS: JABBING

## 2018-02-22 NOTE — PLAN OF CARE
Problem: Patient Care Overview  Goal: Plan of Care/Patient Progress Review  Outcome: Improving  Pt arrived from PACU around 1300. Alert and oriented x4. Vital signs stable. Satting 98-99% on room air. 1x PRN dose of Oxycodone given.     Off bedrest at 1700, up ad tierra. Both groin sites intact. Removed a-line pressure dressing, site intact and covered with gauze. Mosher removed at 1730, no urine output yet. Regular diet as tolerated.     Plan to transfer or d/c tomorrow.

## 2018-02-22 NOTE — DISCHARGE SUMMARY
[address] [address]  p: [phone]    Discharge Summary: Cardiology Service    Ten Johnson MRN# [id number]   YOB: 1960 Age: 57 year old       Admission Date: 2/21/2018  Discharge Date: 02/22/18      Discharge Diagnoses:  #Severe AS, S/P TAVR  #History of Grade II Diastolic Dysfunction  #History of erosive gastropathy and GI bleed  #Alcohol abuse    Pertinent Procedures:  1. TAVR  2. Echocardiogram  3. CXR    Consults:  Nutrition, cardiac rehab    Imaging with results:  Echocardiogram   Interpretation Summary  Global and regional left ventricular function is normal with an EF of 60-65%.  Right ventricular function, chamber size, wall motion, and thickness are  normal.  S/p TAVR with a 26 mm Quiroz [employer] Victor M 3 bioprosthesis. The  bioprosthesis is well positioned. The peak velocity across the prosthesis is  1.7 m/sec. The mean gradient is 14 mmHg. The effective orifice area, effective  orifice area indexed to body surface area, and Doppler velocity index are 2.7  cm2, 1.3 cm2/m2, and 0.54, respectively (all normal values per VARC-2  criteria.). There is no patient/prosthesis mismatch. There is no perivalvular  Regurgitation.    TAVR Procedure:  SUMMARY:  1. Access was obtained in the right and left common femoral arteries and the left common femoral vein by the interventional cardiology team.  The arterial site used for valve delivery was pre-closed with two Perclose Proglide devices.   2. A 5Fr PACEL temporary pacemaker was positioned in the right ventricle and tested for adequate capture.    3. Iliofemoral angiography was used to guide [facility] access for insertion of the valve sheath.  A Lunderquist wire was used to support the Quiroz eSheath insertion.  Heparin was administered for goal ACT ~300  4. The 6Fr pigtail catheter was positioned in the right-coronary cusp and angiography was used to confirm the optimal  implant angle.  The pigtail was then moved to the non-coronary cusp.     5. Right radial access was obtained and short 6Fr slender sheath was placed. The [place] cerebral embolic protection device (EPD) was placed through RRA sheath and advanced over a 300cc Grandslam wire into the ascending aorta.  The proximal filter was then deployed in the innominate artery.  The device was clocked and pointed up toward the left common carotid artery and the wire was advanced up the L CCA.  The device was advanced over the wire and the distal device was deployed in the L CCA.  The pigtail was then advanced back and forth the aortic arch to make sure it was not making contact with the EPD.   6. Access into the LV was obtained using an AL-1 catheter and a straight wire.  The AL-1 catheter was used to exchange the straight wire for a J-wire and a pigtail catheter was then positioned in the left ventricle.  The LVEDP was measured with a pressure of 35 mmHg.  The pigtail catheter was used to advance an Safari wire into in the LV and the pigtail was removed.  7. Aortic valve balloon valvuloplasty was successfully completed using an Quiroz 20mm balloon during rapid pacing and resulted in no hemodynamic compromise.  The 26mm Quiroz Victor M 3 prosthetic valve was then positioned across the native aortic valve. Under rapid pacing at 160 bpm and with fluoroscopic guidance, the valve was successfully deployed with balloon expansion using final balloon volume of nominal  at a depth of 90/10 position.   Post-deployment LVEDP was 26mmHg.  8. Subsequent transthoracic echocardiography and an ascending aortogram showed trace paravalvular insufficiency.   9. Protamine was administered.  The EPD and filters were removed from the body.  The valve sheath access arteriotomy was successfully closed with the double suture closure devices and manual pressure was applied for successful hemostasis.    10. A final iliofemoral angiogram showed no evidence  of extravasation or stenosis.   11. The LCFA 7Fr arteriotomy was successfully closed with a 8Fr AngioSeal closure device.  12. The temporary pacemaker was then removed and venous sheath was removed with manual pressure applied.        NOTABLE EVENTS:  1. None     COMPLICATIONS:  1. None     Brief HPI:  57 year old with history of severe AS who is status post TAVR performed under general anesthesia without immediate complication. He is doing quite well and is without concerning complaint of chest pain, shortness of breath, weakness/numbness, headache.      Hospital Course by Diagnosis:    #Severe AS, S/P TAVR - (Quiroz Sapien3 26mm) POD #0, no immediate complication post operatively, uneventful surgical course performed under general anesthesia. LV EDP was notably 33 mmHg in the OR and patient was given 80 mg Lasix IV. He denies chest pain, SOB, pain at his groin access sites, light headedness, palpitation. NSR with normal VS at time of interview. POD #1 echo reveals mean gradient 14 mm Hg, no PVL, normal EF.    --Monotherapy with plavix given history of thrombocytopenia and GI bleed/erosive gastropathy in October 2017  --Prophylactic antibiotics for all dental procedures in the future  --Cardiac rehab     #History of Grade II Diastolic Dysfunction - LV EDP was notably 33 mmHg in the OR, given 80 mg Lasix IV. No shortness of breath, orthopnea, edema. CXR without e/o significant pulm edema and activity level not restricted by SOB on room air. Diresed 4.3 L yesterday.  --Will monitor as outpatient if needs further diuretic, per PCP     #History of erosive gastropathy and GI bleed - Admission 10/22-10/25/17 with hematemesis and melena and syncope with hemoglobin raf 6; was found to have inderjit-evans tear. Hemoglobin has been stable on recent bloodwork (9.1 > 10.0 > 11.2 > 9.2).   --Monotherapy with Plavix      #Alcohol abuse - Continues to drink, last drink was 2-3 days ago per patient.    -- Has done well inpatient  without any s/s of withdrawal.        Condition on discharge  Temp:  [96.8  F (36  C)-98.3  F (36.8  C)] 97.6  F (36.4  C)  Heart Rate:  [] 66  Resp:  [16-18] 18  BP: (102-140)/() 104/67  SpO2:  [94 %-100 %] 96 %  General: Alert, interactive, NAD  Eyes: sclera anicteric, EOMI  Cardiovascular: regular rate and rhythm, normal S1 and S2, no murmurs, gallops, or rubs  Resp: clear to auscultation bilaterally, no rales, wheezes, or rhonchi  GI: Soft, nontender, nondistended. +BS.  No HSM or masses, no rebound or guarding.  Extremities: No edema, no cyanosis or clubbing, dorsalis pedis and posterior tibialis pulses 2+ bilaterally. Groin sites CDI without hematoma.  Skin: Warm and dry, no jaundice or rash  Neuro: CN 2-12 intact, moves all extremities equally  Psych: Alert & oriented x 3      Medication Changes:  Per below    Discharge medications:   Current Discharge Medication List      START taking these medications    Details   clopidogrel (PLAVIX) 75 MG tablet Take 1 tablet (75 mg) by mouth daily  Qty: 30 tablet, Refills: 3    Associated Diagnoses: S/P TAVR (transcatheter aortic valve replacement)         CONTINUE these medications which have NOT CHANGED    Details   Pantoprazole Sodium (PROTONIX PO) Take 40 mg by mouth 2 times daily as needed       ferrous sulfate (IRON) 325 (65 FE) MG tablet Take 1 tablet (325 mg) by mouth daily (with breakfast)  Qty: 100 tablet, Refills: 0    Associated Diagnoses: Anemia, unspecified type      montelukast (SINGULAIR) 10 MG tablet Take 1 tablet (10 mg) by mouth At Bedtime  Qty: 90 tablet, Refills: 1    Associated Diagnoses: Other chronic rhinitis      lisinopril (PRINIVIL/ZESTRIL) 10 MG tablet Take 1 tablet (10 mg) by mouth daily  Qty: 90 tablet, Refills: 3    Associated Diagnoses: Essential hypertension      fluticasone (FLONASE) 50 MCG/ACT spray Spray 2 sprays into both nostrils daily  Qty: 16 g, Refills: 11    Associated Diagnoses: Cough; Post-nasal drip      Multiple  Vitamin (MULTIVITAMINS PO) Take 1 tablet by mouth At Bedtime       loratadine (CLARITIN) 10 MG tablet Take 1 tablet (10 mg) by mouth daily  Qty: 90 tablet, Refills: 3    Associated Diagnoses: Post-nasal drip; Cough         STOP taking these medications       aspirin 300 MG Suppository Comments:   Reason for Stopping:         ASPIRIN PO Comments:   Reason for Stopping:               Labs or imaging requiring follow-up after discharge:  Per PCP      Follow-up:  With PCP in one week  With valve clinic and cardiac rehab as discussed    Code status:  Full    SYDNIE Michelle, CNP  [facility]  Interventional Cardiology-CSI Service  Pager [phone]       Patient Care Team:  Cuca Celeste MD as PCP - General (Internal Medicine)  Mili Capps MD as MD (Internal Medicine)  Kieran Ulloa MD as MD (Family Practice)  Emma Mendez as Nurse Coordinator (Cardiology)

## 2018-02-22 NOTE — PLAN OF CARE
Problem: Patient Care Overview  Goal: Plan of Care/Patient Progress Review  OT/4C - Discharge Planner OT   Patient plan for discharge: home  Current status: SBA for bed mobility and STS from EOB. Pt tolerates standing ~1 min unsupported EOB without difficulty. Educated on TAVR folder and discussed recommendation for OP CR and post surgical precautions. Pt reports he is unlikely to follow through with OP CR referral, but open to it being placed. Pt on RA during session, HR 74 bpm, /95 while seated.  Barriers to return to prior living situation: post surgical precautions  Recommendations for discharge: home with assist PRN and OP CR (referral placed)  Rationale for recommendations: to increase activity tolerance and safety with activity s/p TAVR       Entered by: Sarita Carolina 02/22/2018 9:09 AM

## 2018-02-22 NOTE — PLAN OF CARE
Pt discharged to home. All PIV's removed. Pt read all discharge instructions and confirmed understanding with written signature.

## 2018-02-22 NOTE — CONSULTS
Social Work was consulted to assess needs of patient following TAVR placement. Chart was reviewed and discussed with interdisciplinary team. Social work needs are not identified at this time and RNCC will continue to follow. Please re-consult social work if additional needs are identified.     SANA Amado, [facility]  ICU Float   Pager: [phone]  [email or web address]     NO LETTER

## 2018-02-22 NOTE — PLAN OF CARE
Problem: Patient Care Overview  Goal: Plan of Care/Patient Progress Review  Pt post op from TAVR yesterday AM. Pt a/o x 4, neuros intact. No evidence of hematoma at groin sites, continue to be CDI, pulses present bilaterally. Pt reports generalized pain/ soreness relieved w/ oxycodone. MSSA protocol initiated, 0.5 mg ativan given, has mild agitation intermittently otherwise pleasant. Pt voiding using the urinal, last BM yesterday AM. Tolerating regular diet. EKG obtained this shift, no chest pain/ SOB. On room air. Plan to d/c or transfer. Will continue to monitor w/ POC.

## 2018-02-22 NOTE — PROGRESS NOTES
02/22/18 0839   Quick Adds   Type of Visit Initial Occupational Therapy Evaluation   Living Environment   Lives With alone   Living Arrangements house   Home Accessibility stairs to enter home   Number of Stairs to Enter Home 2   Number of Stairs Within Home 0   Transportation Available public transportation   Living Environment Comment Pt lives in a house that has been split into a few different units. Pt has 2 stairs to enter home and no stairs once inside. Pt has no concerns regarding home set up.   Self-Care   Dominant Hand right   Usual Activity Tolerance excellent   Current Activity Tolerance moderate   Regular Exercise yes   Activity/Exercise/Self-Care Comment Pt reports being extremely active at baseline due to his work and he enjoys riding his bike 3,000+ miles in the warmer months.   Functional Level Prior   Ambulation 0-->independent   Transferring 0-->independent   Toileting 0-->independent   Bathing 0-->independent   Dressing 0-->independent   Eating 0-->independent   Communication 0-->understands/communicates without difficulty   Swallowing 0-->swallows foods/liquids without difficulty   Cognition 0 - no cognition issues reported   Fall history within last six months no   Which of the above functional risks had a recent onset or change? ambulation   Prior Functional Level Comment Pt reports being IND at baseline.       Present no   General Information   Onset of Illness/Injury or Date of Surgery - Date 02/21/18   Referring Physician Maximiliano Chapin MD   Patient/Family Goals Statement Return home   Additional Occupational Profile Info/Pertinent History of Current Problem Mr Santoro is a 57 year old male with hx hepatitis C, recent GI bleed 2/2 Sarahi Hoffman tear, thrombocytopenia, cirrhosis and  severe symptomatic aortic stenosis who presents on an elective outpatient basis for transfemoral transcatheter aortic valve replacement with plan for an Quiroz Victor M 3 valve.    Precautions/Limitations fall precautions;other (see comments)  (TAVR precautions)   General Observations Pt supine in bed upon arrival, agreeable to therapy.   General Info Comments Activity: up in chair   Cognitive Status Examination   Orientation orientation to person, place and time   Level of Consciousness alert   Able to Follow Commands WNL/WFL   Cognitive Comment No cog issues reported   Visual Perception   Visual Perception No deficits were identified   Sensory Examination   Sensory Quick Adds No deficits were identified   Pain Assessment   Patient Currently in Pain No   Range of Motion (ROM)   ROM Quick Adds No deficits were identified   ROM Comment UE WFL   Strength   Strength Comments Generalized weakness post-op   Mobility   Bed Mobility Bed mobility skill: Supine to sit   Bed Mobility Skill: Supine to Sit   Level of [place]: Supine/Sit stand-by assist   Physical Assist/Nonphysical Assist: Supine/Sit supervision   Transfer Skill: Sit to Stand   Level of [place]: Sit/Stand stand-by assist   Physical Assist/Nonphysical Assist: Sit/Stand supervision   Instrumental Activities of Daily Living (IADL)   Previous Responsibilities housekeeping;laundry;shopping;medication management;finances;work   IADL Comments Pt reports eating out for majoirty of meals. Pt does not drive (uses public transportation). Pt has some time off from work (very active at work typically).   Activities of Daily Living Analysis   Impairments Contributing to Impaired Activities of Daily Living post surgical precautions   General Therapy Interventions   Planned Therapy Interventions IADL retraining;ADL retraining;ROM;strengthening;transfer training;home program guidelines;progressive activity/exercise;risk factor education   Clinical Impression   Criteria for Skilled Therapeutic Interventions Met yes, treatment indicated   OT Diagnosis CR order for s/p valve replacement   Influenced by the following impairments post surgical  "precautions, activity tolerance   Assessment of Occupational Performance 1-3 Performance Deficits   Identified Performance Deficits home mgmt   Clinical Decision Making (Complexity) Low complexity   Therapy Frequency daily   Predicted Duration of Therapy Intervention (days/wks) 1 week   Anticipated Equipment Needs at Discharge (TBD with further therapy)   Anticipated Discharge Disposition Home with Outpatient Therapy   Risks and Benefits of Treatment have been explained. Yes   Patient, Family & other staff in agreement with plan of care Yes   [facility] TM \"6 Clicks\"   2016, Trustees of [facility], under license to [facility].  All rights reserved.   6 Clicks Short Forms Daily Activity Inpatient Short Form   [facility]  \"6 Clicks\" Daily Activity Inpatient Short Form   1. Putting on and taking off regular lower body clothing? 3 - A Little   2. Bathing (including washing, rinsing, drying)? 3 - A Little   3. Toileting, which includes using toilet, bedpan or urinal? 3 - A Little   4. Putting on and taking off regular upper body clothing? 3 - A Little   5. Taking care of personal grooming such as brushing teeth? 3 - A Little   6. Eating meals? 4 - None   Daily Activity Raw Score (Score out of 24.Lower scores equate to lower levels of function) 19   Total Evaluation Time   Total Evaluation Time (Minutes) 9     "

## 2018-02-23 NOTE — PLAN OF CARE
Problem: Patient Care Overview  Goal: Plan of Care/Patient Progress Review  Occupational Therapy Discharge Summary    Reason for therapy discharge:    Discharged to home.    Progress towards therapy goal(s). See goals on Care Plan in [facility] electronic health record for goal details.  Goals partially met.  Barriers to achieving goals:   discharge from facility.    Therapy recommendation(s):    Continued therapy is recommended.  Rationale/Recommendations:  OP CR for activity tolerance and safety s/p TAVR.

## 2018-02-26 ENCOUNTER — TELEPHONE (OUTPATIENT)
Dept: CARDIOLOGY | Facility: CLINIC | Age: 58
End: 2018-02-26

## 2018-02-26 NOTE — TELEPHONE ENCOUNTER
TAVR call  Patient calling in and asking what he could take for pain relief. He has had several ortho surgeries.He was wondering if he could take Naproxen. Told him that all NSAIDS should not be used. He can only take so much tylenol because of liver disease.  He sees a PCP on Thursday and was encouraged to address this matter at that time.

## 2018-03-05 ENCOUNTER — PRE VISIT (OUTPATIENT)
Dept: CARDIOLOGY | Facility: CLINIC | Age: 58
End: 2018-03-05

## 2018-03-05 DIAGNOSIS — Z95.2 S/P TAVR (TRANSCATHETER AORTIC VALVE REPLACEMENT): Primary | ICD-10-CM

## 2018-03-15 ENCOUNTER — OFFICE VISIT (OUTPATIENT)
Dept: CARDIOLOGY | Facility: CLINIC | Age: 58
End: 2018-03-15
Attending: NURSE PRACTITIONER
Payer: COMMERCIAL

## 2018-03-15 ENCOUNTER — RADIANT APPOINTMENT (OUTPATIENT)
Dept: CARDIOLOGY | Facility: CLINIC | Age: 58
End: 2018-03-15
Payer: COMMERCIAL

## 2018-03-15 VITALS
OXYGEN SATURATION: 100 % | DIASTOLIC BLOOD PRESSURE: 84 MMHG | HEART RATE: 64 BPM | SYSTOLIC BLOOD PRESSURE: 157 MMHG | HEIGHT: 69 IN | RESPIRATION RATE: 18 BRPM

## 2018-03-15 DIAGNOSIS — I35.0 SEVERE AORTIC STENOSIS: Primary | ICD-10-CM

## 2018-03-15 DIAGNOSIS — Z95.2 S/P TAVR (TRANSCATHETER AORTIC VALVE REPLACEMENT): ICD-10-CM

## 2018-03-15 DIAGNOSIS — D69.6 THROMBOCYTOPENIA (H): ICD-10-CM

## 2018-03-15 LAB
ANION GAP SERPL CALCULATED.3IONS-SCNC: 9 MMOL/L (ref 3–14)
BUN SERPL-MCNC: 16 MG/DL (ref 7–30)
CALCIUM SERPL-MCNC: 9.2 MG/DL (ref 8.5–10.1)
CHLORIDE SERPL-SCNC: 105 MMOL/L (ref 94–109)
CO2 SERPL-SCNC: 25 MMOL/L (ref 20–32)
CREAT SERPL-MCNC: 0.83 MG/DL (ref 0.66–1.25)
ERYTHROCYTE [DISTWIDTH] IN BLOOD BY AUTOMATED COUNT: 22.2 % (ref 10–15)
GFR SERPL CREATININE-BSD FRML MDRD: >90 ML/MIN/1.7M2
GLUCOSE SERPL-MCNC: 86 MG/DL (ref 70–99)
HCT VFR BLD AUTO: 38.5 % (ref 40–53)
HGB BLD-MCNC: 12.4 G/DL (ref 13.3–17.7)
MCH RBC QN AUTO: 26.2 PG (ref 26.5–33)
MCHC RBC AUTO-ENTMCNC: 32.2 G/DL (ref 31.5–36.5)
MCV RBC AUTO: 81 FL (ref 78–100)
PLATELET # BLD AUTO: 62 10E9/L (ref 150–450)
POTASSIUM SERPL-SCNC: 3.6 MMOL/L (ref 3.4–5.3)
RBC # BLD AUTO: 4.74 10E12/L (ref 4.4–5.9)
SODIUM SERPL-SCNC: 139 MMOL/L (ref 133–144)
WBC # BLD AUTO: 5.7 10E9/L (ref 4–11)

## 2018-03-15 PROCEDURE — 80048 BASIC METABOLIC PNL TOTAL CA: CPT | Performed by: INTERNAL MEDICINE

## 2018-03-15 PROCEDURE — 36415 COLL VENOUS BLD VENIPUNCTURE: CPT | Performed by: INTERNAL MEDICINE

## 2018-03-15 PROCEDURE — G0463 HOSPITAL OUTPT CLINIC VISIT: HCPCS | Mod: ZF

## 2018-03-15 PROCEDURE — 85027 COMPLETE CBC AUTOMATED: CPT | Performed by: INTERNAL MEDICINE

## 2018-03-15 PROCEDURE — 99215 OFFICE O/P EST HI 40 MIN: CPT | Mod: ZP | Performed by: NURSE PRACTITIONER

## 2018-03-15 ASSESSMENT — PAIN SCALES - GENERAL: PAINLEVEL: NO PAIN (0)

## 2018-03-15 NOTE — NURSING NOTE
Chief Complaint   Patient presents with     RECHECK     1 month follow up on Ten and his TAVR     Reviewed medications and Vitals    Olvin Augustin CMA at 3:32 PM on 3/15/2018

## 2018-03-15 NOTE — PROGRESS NOTES
"    [facility] HEART CARE  CARDIOVASCULAR DIVISION    VALVE CLINIC RETURN VISIT    PRIMARY CARDIOLOGIST: Dr. Sharonda TSE      PERTINENT CLINICAL HISTORY & REASON FOR CONSULTATION:   Ten Johnson is a very pleasant 57 year old male with severe aortic valvular stenosis that was treated with transfemoral transcatheter aortic valve replacement (TAVR) with a Quiroz Victor M 3 # 26 mm valve on 2/21/2018 by Luis Villavicencio, Sy, and Bonnie. The patient also has a history of Hepatitis C, recent GIB 2/2 Sarahi Hoffman Tear, Thrombocytopenia, Cirrhosis, and Severe AS. His POD # 1 echocardiogram demonstrated a MG 14 mmHg and no PVL w/ normal EF. He was discharged home on monotherapy given history of thrombocytopenia and GIB. He presents to the clinic today for 1 month f/u.     He presents today with no specific concerns regarding his heart valve. He reports that energy levels have been improving since his discharge from the hospital but he continues to have some anemia as well. He has put on 10 lbs over since the time of discharge which he contributes to his improved appetite, he denies any swelling in the lower extremities, cough, congestion, or abdominal bloating. He has not had any chest pain, tightness or pressure. He states for a week following the procedure he would have some \"needle\" sort of pains throughout the body which has resolved. He has had a significant improvement in SOB, orthopnea or PND. He has not had any pre or herberth syncope. He has not had any palpitations. He has not been very active since discharge and has not started cardiac rehabilitation. However, he recalls puting about 100 miles on his bike since his TAVR procedure w/o any issues. He also continues to work in construction/electrical. He feels that balance has improved over the past couple of weeks. He remains on Plavix only and denies any issues w/ bleeding. He has no other specific cardiopulmonary concerns today. He presents to the clinic " today unaccompanied.        PAST MEDICAL HISTORY:     Past Medical History:   Diagnosis Date     Alcohol use disorder (H)      Alcoholic cirrhosis (H)      Ascites      Chronic hepatitis C without hepatic coma (H) 05/10/2016    Untreated as of 2/2018     Erosive gastropathy      Esophageal varices in alcoholic cirrhosis (H)      H/O upper gastrointestinal hemorrhage 09/2017     History of blood transfusion      Hypertension     essential     JANELLE (iron deficiency anemia)      Sarahi-Hoffman tear     History     Marijuana abuse      MRSA (methicillin resistant Staphylococcus aureus)      Severe aortic stenosis      Thrombocytopenia (H)         PAST SURGICAL HISTORY:     Past Surgical History:   Procedure Laterality Date     ARTHROSCOPY SHOULDER ROTATOR CUFF REPAIR  7/31/2012    Procedure: ARTHROSCOPY SHOULDER ROTATOR CUFF REPAIR;  Right Shoulder Arthroscopic Rotator Cuff Repair, BicepsTenodesis,  Subacromial Decompression ;  Surgeon: Joi Castillo MD;  Location: [facility]     ESOPHAGOSCOPY, GASTROSCOPY, DUODENOSCOPY (EGD), COMBINED N/A 10/23/2017    Procedure: COMBINED ESOPHAGOSCOPY, GASTROSCOPY, DUODENOSCOPY (EGD);;  Surgeon: Gentry Salas MD;  [facility]: [facility]     FACIAL RECONSTRUCTION SURGERY  1971     HEART CATH FEMORAL CANNULIZATION WITH OPEN STANDBY REPAIR AORTIC VALVE N/A 2/21/2018    Procedure: HEART CATH FEMORAL CANNULIZATION WITH OPEN STANDBY REPAIR AORTIC VALVE;;  Surgeon: Luis Baird MD;  [facility]     IRRIGATION AND DEBRIDEMENT UPPER EXTREMITY, COMBINED  1/3/2012    Procedure:COMBINED IRRIGATION AND DEBRIDEMENT UPPER EXTREMITY; Irrigation & Debridement Left Elbow; Surgeon:CRISTHIAN ZHOU; Location:[facility]     REPAIR TENDON TRICEPS UPPER EXTREMITY  11/8/2011    Procedure:REPAIR TENDON TRICEPS UPPER EXTREMITY; Surgeon:CRISTHIAN ZHOU; Location:[facility]     SHOULDER SURGERY  2003    left, injury, torn tendons, hematoma     TRANSCATHETER AORTIC VALVE IMPLANT  ANESTHESIA N/A 2/21/2018    Procedure: TRANSCATHETER AORTIC VALVE IMPLANT ANESTHESIA;  Transfemoral (Quiroz) Aortic Valve Implant 26mm MARTHA 3, with Cardiopulmonary Bypass Standby, transthoracic echocardiogram;  Surgeon: GENERIC ANESTHESIA PROVIDER;  Location: [facility]     TRANSPOSITION ULNAR NERVE (ELBOW)  11/8/2011    Procedure:TRANSPOSITION ULNAR NERVE (ELBOW); Final Procedure Done: Left Elbow Lateral Ulnar Collateral Repair And  Left Elbow Triceps Repair          CURRENT MEDICATIONS:     Current Outpatient Prescriptions   Medication Sig Dispense Refill     clopidogrel (PLAVIX) 75 MG tablet Take 1 tablet (75 mg) by mouth daily 30 tablet 3     Pantoprazole Sodium (PROTONIX PO) Take 40 mg by mouth 2 times daily as needed        ferrous sulfate (IRON) 325 (65 FE) MG tablet Take 1 tablet (325 mg) by mouth daily (with breakfast) (Patient taking differently: Take 325 mg by mouth At Bedtime ) 100 tablet 0     montelukast (SINGULAIR) 10 MG tablet Take 1 tablet (10 mg) by mouth At Bedtime 90 tablet 1     loratadine (CLARITIN) 10 MG tablet Take 1 tablet (10 mg) by mouth daily 90 tablet 3     lisinopril (PRINIVIL/ZESTRIL) 10 MG tablet Take 1 tablet (10 mg) by mouth daily (Patient taking differently: Take 10 mg by mouth At Bedtime ) 90 tablet 3     fluticasone (FLONASE) 50 MCG/ACT spray Spray 2 sprays into both nostrils daily 16 g 11     Multiple Vitamin (MULTIVITAMINS PO) Take 1 tablet by mouth At Bedtime           ALLERGIES:     Allergies   Allergen Reactions     Zolpidem Other (See Comments)     Alcoholic.  Had reaction 3/17/13 while intoxicated which included black out, loss of awareness, paranoia.  Do not prescribe.  Dr. Celeste     Cats Other (See Comments)     rhinitis     Dogs Other (See Comments)     rhinitis     Pollen Extract Other (See Comments)     rhinits.        FAMILY HISTORY:     Family History   Problem Relation Age of Onset     CANCER Mother 62     Alcoholism Paternal Uncle      Cirrhosis No family hx of   "       SOCIAL HISTORY:     Social History     Social History     Marital status: Single     Spouse name: N/A     Number of children: N/A     Years of education: N/A     Social History Main Topics     Smoking status: Never Smoker     Smokeless tobacco: Never Used     Alcohol use Yes      Comment: Alcohol use disorder, still actively drinking     Drug use: Yes     Special: Marijuana      Comment: occ marijuana     Sexual activity: Not Currently     Partners: Female     Other Topics Concern     Not on file     Social History Narrative    .  Bicycles a lot.  Excessive alcohol use.  Smokes cigars.  Occasional marijuana use.        REVIEW OF SYSTEMS:   Constitutional: No fevers or chills - was fighting off virus last week per his report  Skin: No new rash but itching associated w/ hair re-growth since the shaving from surgery  Eyes: No acute change in vision  Ears/Nose/Throat: No purulent rhinorrhea, new hearing loss, or new vertigo  Respiratory: No cough or hemoptysis  Cardiovascular: See HPI  Gastrointestinal: No change in appetite, vomiting, hematemesis or diarrhea  Genitourinary: No dysuria or hematuria  Musculoskeletal: No new back pain, neck pain or muscle pain  Neurologic: No new headaches, focal weakness or behavior changes  Psychiatric: No hallucinations, excessive alcohol consumption or illegal drug usage  Hematologic/Lymphatic/Immunologic: No bleeding, chills, fever, night sweats or weight loss  Endocrine: No new cold intolerance, heat intolerance, polyphagia, polydipsia or polyuria      PHYSICAL EXAMINATION:     /84  Pulse 64  Resp 18  Ht 1.753 m (5' 9\")  SpO2 100%    GENERAL: No acute distress.  HEENT: EOMI. Sclerae white, not injected. Nares clear. Pharynx without erythema or exudate.   Neck: No adenopathy. No thyromegaly. No jugular venous distension.   Heart: Regular rate and rhythm. Soft systolic 2/6 murmur noted at RUSB.  Lungs: Clear to auscultation. No ronchi, wheezes, " rales.   Abdomen: Soft, nontender, nondistended. Bowel sounds present.  Extremities: No clubbing, cyanosis, or edema.   Neurologic: Alert and oriented to person/place/time, normal speech and affect. No focal deficits.  Skin: No petechiae, purpura or rash.     LABORATORY DATA:     LIPID RESULTS:  Lab Results   Component Value Date    CHOL 146 05/09/2016    HDL 64 05/09/2016    LDL 61 05/09/2016    TRIG 106 05/09/2016       LIVER ENZYME RESULTS:  Lab Results   Component Value Date    AST 71 (H) 02/22/2018    ALT 70 02/22/2018       CBC RESULTS:  Lab Results   Component Value Date    WBC 5.7 03/15/2018    RBC 4.74 03/15/2018    HGB 12.4 (L) 03/15/2018    HCT 38.5 (L) 03/15/2018    MCV 81 03/15/2018    MCH 26.2 (L) 03/15/2018    MCHC 32.2 03/15/2018    RDW 22.2 (H) 03/15/2018    PLT 62 (L) 03/15/2018       BMP RESULTS:  Lab Results   Component Value Date     03/15/2018    POTASSIUM 3.6 03/15/2018    CHLORIDE 105 03/15/2018    CO2 25 03/15/2018    ANIONGAP 9 03/15/2018    GLC 86 03/15/2018    BUN 16 03/15/2018    CR 0.83 03/15/2018    GFRESTIMATED >90 03/15/2018    GFRESTBLACK >90 03/15/2018    JOSEPHINE 9.2 03/15/2018        A1C RESULTS:  No results found for: A1C    INR RESULTS:  Lab Results   Component Value Date    INR 1.22 (H) 02/21/2018    INR 1.20 (H) 02/01/2018          PROCEDURES & FURTHER ASSESSMENTS:   Echocardiogram dated 03/15/18: Final read pending w/ preliminary MG 16 mmHg.     NYHA Class: I      CLINICAL IMPRESSION:   Ten Johnson is a 57 year old male with severe aortic stenosis treated with transfemoral TAVR with a Quiroz Victor M 3 # 26 mm valve on 2/21/2018 by Sy Joseph and Bonnie. The patient has been doing very well since discharge and has had significant improvement in energy as well as SOB. Today the echocardiogram is without any significant change w/ MG 16 mmHg (previously 14 mmHg). He continues to tolerate Plavix therapy alone w/o any bleeding issues.    Plan is to have the  patient follow-up with the valve clinic in 6 months to reassess the gradient at that time (slightly elevated 14 mmHg at time of discharge and noted to be 16 mmHg in clinic today). He and can follow with his primary cardiologist for management of his chronic cardiovascular issues in the meantime as needed.     Plan Summary:  1) Continue Plavix.  2) Lifelong antibiotic prophylaxis prior to all dental procedures.    40 minutes spent in direct care, >50% in counseling.    SYDNIE Solorio, CNP  [facility]  Interventional/Structural Cardiology-CSI Service                                                                                                                                                                   CC  Patient Care Team:  Cuca Celeste MD as PCP - General (Internal Medicine)  Mili Capps MD as MD (Internal Medicine)  Kieran Ulloa MD as MD (Family Practice)  Emma Mendez as Nurse Coordinator (Cardiology)  IGNACIO LEOS

## 2018-03-15 NOTE — MR AVS SNAPSHOT
After Visit Summary   3/15/2018    Ten Johnson    MRN: [id number]           Patient Information     Date Of Birth          1960        Visit Information        Provider Department      3/15/2018 4:00 PM Camille Pacheco, SYDNIE [facility]        Today's Diagnoses     Severe aortic stenosis    -  1    S/P TAVR (transcatheter aortic valve replacement)        Thrombocytopenia (H)          Care Instructions    Patient Instructions:    It was a pleasure to see you in the cardiology clinic today.    If you have any questions you can reach our nurse triage line at [phone].  Press Option #1 for the [facility], then press Option #3 for nursing or Option #1 for scheduling. We also encourage the use of Materialise to communicate with your HealthCare Provider    Note new medications: None.  Stop the following medications: None.    The results from today include: Your EKG, Echocardiogram and Labs are within normal limits and show that your valve is working well. Please continue to take Plavix daily.    Prevention of Infective (Bacterial) Endocarditis:  You are at increased risk for developing adverse outcomes from infective endocarditis (IE), also known as bacterial endocarditis (BE) because of the new device in your heart. The guidelines for prevention of IE are to give patients antibiotics prior to any dental procedures that involve manipulation of gingival tissue or the periapical region of teeth, or perforation of the oral mucosa:     It is recommended to take Amoxicillin 2 gm by mouth as a single dose 30 to 60 minutes before procedure. OR if allergic to Penicillin or Ampicillin: Cephalexin 2 gm by mouth, or  Clindamycin 600 mg by mouth, or  Azithromycin or Clarithromycin 500 mg PO     Adapted from Prevention of Infective Endocarditis: Guidelines From the American Heart Association, By the Committee on Rheumatic Fever, Endocarditis, and Kawasaki Disease.  Circulation, 2007; 116: 0974-9858. Accessible at http://circ.ahajournals.org/cgi/reprint/CIRCULATIONAHA.[email or web address].      Please follow up with Valve Clinic in 6 months with a repeat Echocardiogram at that time.      Sincerely,    LAN Morales                  Follow-ups after your visit        Additional Services     Follow-Up with TAVR Clinic                 Your next 10 appointments already scheduled     Mar 20, 2018  1:00 PM CDT   Cardiac Evaluation with  Cardiac Rehab 1   [facility], [place], Cardiac Rehabilitation ([facility])    [address]st [address]  [facility] [address]   [phone]            May 22, 2018  8:30 AM CDT   Lab with  LAB    Health Lab ([facility])    [address] [place] [address]  1st Floor  [facility] [id number] [phone]            May 22, 2018  9:30 AM CDT   (Arrive by 9:15 AM)   Return General Liver with Gentry San MD   [facility] Hepatology ([facility])    [address]  [address]  [facility] [id number] [phone]            Sep 20, 2018  1:00 PM CDT   Ech Complete with UCECHCR2   [facility] Echo ([facility])    [address]  3rd Floor  [facility] [id number] [phone]           1. Please bring or wear a comfortable two-piece outfit. 2. You may eat, drink and take your normal medicines. 3. For any questions that cannot be answered, please contact the ordering physician            Sep 20, 2018  2:00 PM CDT   (Arrive by 1:45 PM)   Return Visit with SYDNIE Hong [facility] Heart Care ([facility])    [address] [place] [address]  [address]  [facility] [id number] [phone]              Future tests that were ordered for you today     Open Future Orders        Priority Expected Expires Ordered    Echocardiogram Routine 9/1/2018 11/30/2018 3/15/2018     "Follow-Up with TAVR Clinic Routine 2018 2018 3/15/2018            Who to contact     If you have questions or need follow up information about today's clinic visit or your schedule please contact [facility] directly at [phone].  Normal or non-critical lab and imaging results will be communicated to you by MyChart, letter or phone within 4 business days after the clinic has received the results. If you do not hear from us within 7 days, please contact the clinic through MyChart or phone. If you have a critical or abnormal lab result, we will notify you by phone as soon as possible.  Submit refill requests through [employer] or call your pharmacy and they will forward the refill request to us. Please allow 3 business days for your refill to be completed.          Additional Information About Your Visit        MyChart Information     [employer] lets you send messages to your doctor, view your test results, renew your prescriptions, schedule appointments and more. To sign up, go to [email or web address] . Click on \"Log in\" on the left side of the screen, which will take you to the Welcome page. Then click on \"Sign up Now\" on the right side of the page.     You will be asked to enter the access code listed below, as well as some personal information. Please follow the directions to create your username and password.     Your access code is: [email or web address]  Expires: 2018  4:22 PM     Your access code will  in 90 days. If you need help or a new code, please call your [facility] clinic or [phone].        Care EveryWhere ID     This is your Care EveryWhere ID. This could be used by other organizations to access your [facility] medical records  [id number]        Your Vitals Were     Pulse Respirations Height Pulse Oximetry          64 18 1.753 m (5' 9\") 100%         Blood Pressure from Last 3 Encounters:   03/15/18 157/84   18 127/75   02/15/18 137/84    Weight from Last 3 Encounters: "   02/21/18 82.2 kg (181 lb 3.5 oz)   02/15/18 79.5 kg (175 lb 3.2 oz)   02/15/18 79.5 kg (175 lb 3.2 oz)              We Performed the Following     EKG 12-lead, tracing only          Today's Medication Changes          These changes are accurate as of 3/15/18  4:26 PM.  If you have any questions, ask your nurse or doctor.               These medicines have changed or have updated prescriptions.        Dose/Directions    ferrous sulfate 325 (65 FE) MG tablet   Commonly known as:  IRON   This may have changed:  when to take this   Used for:  Anemia, unspecified type        Dose:  325 mg   Take 1 tablet (325 mg) by mouth daily (with breakfast)   Quantity:  100 tablet   Refills:  0       lisinopril 10 MG tablet   Commonly known as:  PRINIVIL/ZESTRIL   This may have changed:  when to take this   Used for:  Essential hypertension        Dose:  10 mg   Take 1 tablet (10 mg) by mouth daily   Quantity:  90 tablet   Refills:  3                Primary Care Provider Office Phone # Fax #    Cuca Celeste [phone]       [address] [place] [address] [address] [place] [id number]        Equal Access to Services     [facility]: Hadii adrienne Reyes, wachanceda makayla, qaybta kaalmada jacquie, jazmin crespo . So [facility] [phone].    ATENCIÓN: Si habla español, tiene a ha disposición servicios gratuitos de asistencia lingüística. Llame al [phone].    We comply with applicable federal civil rights laws and [place] laws. We do not discriminate on the basis of race, color, national origin, age, disability, sex, sexual orientation, or gender identity.            Thank you!     Thank you for choosing [facility]  for your care. Our goal is always to provide you with excellent care. Hearing back from our patients is one way we can continue to improve our services. Please take a few minutes to complete the written survey that you may receive in the mail after your visit with  us. Thank you!             Your Updated Medication List - Protect others around you: Learn how to safely use, store and throw away your medicines at www.disposemymeds.org.          This list is accurate as of 3/15/18  4:26 PM.  Always use your most recent med list.                   Brand Name Dispense Instructions for use Diagnosis    clopidogrel 75 MG tablet    PLAVIX    30 tablet    Take 1 tablet (75 mg) by mouth daily    S/P TAVR (transcatheter aortic valve replacement)       ferrous sulfate 325 (65 FE) MG tablet    IRON    100 tablet    Take 1 tablet (325 mg) by mouth daily (with breakfast)    Anemia, unspecified type       fluticasone 50 MCG/ACT spray    FLONASE    16 g    Spray 2 sprays into both nostrils daily    Cough, Post-nasal drip       lisinopril 10 MG tablet    PRINIVIL/ZESTRIL    90 tablet    Take 1 tablet (10 mg) by mouth daily    Essential hypertension       loratadine 10 MG tablet    CLARITIN    90 tablet    Take 1 tablet (10 mg) by mouth daily    Post-nasal drip, Cough       montelukast 10 MG tablet    SINGULAIR    90 tablet    Take 1 tablet (10 mg) by mouth At Bedtime    Other chronic rhinitis       MULTIVITAMINS PO      Take 1 tablet by mouth At Bedtime        PROTONIX PO      Take 40 mg by mouth 2 times daily as needed

## 2018-03-15 NOTE — LETTER
"3/15/2018      RE: Ten Johnson  [address]  [facility] [address]       Dear Colleague,    Thank you for the opportunity to participate in the care of your patient, Ten Johnson, at the [facility]. Please see a copy of my visit note below.        [facility] HEART CARE  CARDIOVASCULAR DIVISION    VALVE CLINIC RETURN VISIT    PRIMARY CARDIOLOGIST: Dr. Sharonda TSE      PERTINENT CLINICAL HISTORY & REASON FOR CONSULTATION:   Ten Johnson is a very pleasant 57 year old male with severe aortic valvular stenosis that was treated with transfemoral transcatheter aortic valve replacement (TAVR) with a Quiroz Victor M 3 # 26 mm valve on 2/21/2018 by Luis Villavicencio, Sy, and Bonnie. The patient also has a history of Hepatitis C, recent GIB 2/2 Sarahi Hoffman Tear, Thrombocytopenia, Cirrhosis, and Severe AS. His POD # 1 echocardiogram demonstrated a MG 14 mmHg and no PVL w/ normal EF. He was discharged home on monotherapy given history of thrombocytopenia and GIB. He presents to the clinic today for 1 month f/u.     He presents today with no specific concerns regarding his heart valve. He reports that energy levels have been improving since his discharge from the hospital but he continues to have some anemia as well. He has put on 10 lbs over since the time of discharge which he contributes to his improved appetite, he denies any swelling in the lower extremities, cough, congestion, or abdominal bloating. He has not had any chest pain, tightness or pressure. He states for a week following the procedure he would have some \"needle\" sort of pains throughout the body which has resolved. He has had a significant improvement in SOB, orthopnea or PND. He has not had any pre or herberth syncope. He has not had any palpitations. He has not been very active since discharge and has not started cardiac rehabilitation. However, he recalls puting about 100 miles on his " bike since his TAVR procedure w/o any issues. He also continues to work in construction/electrical. He feels that balance has improved over the past couple of weeks. He remains on Plavix only and denies any issues w/ bleeding. He has no other specific cardiopulmonary concerns today. He presents to the clinic today unaccompanied.        PAST MEDICAL HISTORY:     Past Medical History:   Diagnosis Date     Alcohol use disorder (H)      Alcoholic cirrhosis (H)      Ascites      Chronic hepatitis C without hepatic coma (H) 05/10/2016    Untreated as of 2/2018     Erosive gastropathy      Esophageal varices in alcoholic cirrhosis (H)      H/O upper gastrointestinal hemorrhage 09/2017     History of blood transfusion      Hypertension     essential     JANELLE (iron deficiency anemia)      Sarahi-Hoffman tear     History     Marijuana abuse      MRSA (methicillin resistant Staphylococcus aureus)      Severe aortic stenosis      Thrombocytopenia (H)         PAST SURGICAL HISTORY:     Past Surgical History:   Procedure Laterality Date     ARTHROSCOPY SHOULDER ROTATOR CUFF REPAIR  7/31/2012    Procedure: ARTHROSCOPY SHOULDER ROTATOR CUFF REPAIR;  Right Shoulder Arthroscopic Rotator Cuff Repair, BicepsTenodesis,  Subacromial Decompression ;  Surgeon: Joi Castillo MD;  Location:  OR     ESOPHAGOSCOPY, GASTROSCOPY, DUODENOSCOPY (EGD), COMBINED N/A 10/23/2017    Procedure: COMBINED ESOPHAGOSCOPY, GASTROSCOPY, DUODENOSCOPY (EGD);;  Surgeon: Gentry Salas MD;  Location:  GI     FACIAL RECONSTRUCTION SURGERY  1971     HEART CATH FEMORAL CANNULIZATION WITH OPEN STANDBY REPAIR AORTIC VALVE N/A 2/21/2018    Procedure: HEART CATH FEMORAL CANNULIZATION WITH OPEN STANDBY REPAIR AORTIC VALVE;;  Surgeon: Luis Baird MD;  Location:  OR     IRRIGATION AND DEBRIDEMENT UPPER EXTREMITY, COMBINED  1/3/2012    Procedure:COMBINED IRRIGATION AND DEBRIDEMENT UPPER EXTREMITY; Irrigation & Debridement Left Elbow;  Surgeon:CRISTHIAN ZHOU; Location:[facility] OR     REPAIR TENDON TRICEPS UPPER EXTREMITY  11/8/2011    Procedure:REPAIR TENDON TRICEPS UPPER EXTREMITY; Surgeon:CRISTHIAN ZHOU; Location:[facility] OR     SHOULDER SURGERY  2003    left, injury, torn tendons, hematoma     TRANSCATHETER AORTIC VALVE IMPLANT ANESTHESIA N/A 2/21/2018    Procedure: TRANSCATHETER AORTIC VALVE IMPLANT ANESTHESIA;  Transfemoral (Quiroz) Aortic Valve Implant 26mm MARTHA 3, with Cardiopulmonary Bypass Standby, transthoracic echocardiogram;  Surgeon: GENERIC ANESTHESIA PROVIDER;  Location: [facility] OR     TRANSPOSITION ULNAR NERVE (ELBOW)  11/8/2011    Procedure:TRANSPOSITION ULNAR NERVE (ELBOW); Final Procedure Done: Left Elbow Lateral Ulnar Collateral Repair And  Left Elbow Triceps Repair          CURRENT MEDICATIONS:     Current Outpatient Prescriptions   Medication Sig Dispense Refill     clopidogrel (PLAVIX) 75 MG tablet Take 1 tablet (75 mg) by mouth daily 30 tablet 3     Pantoprazole Sodium (PROTONIX PO) Take 40 mg by mouth 2 times daily as needed        ferrous sulfate (IRON) 325 (65 FE) MG tablet Take 1 tablet (325 mg) by mouth daily (with breakfast) (Patient taking differently: Take 325 mg by mouth At Bedtime ) 100 tablet 0     montelukast (SINGULAIR) 10 MG tablet Take 1 tablet (10 mg) by mouth At Bedtime 90 tablet 1     loratadine (CLARITIN) 10 MG tablet Take 1 tablet (10 mg) by mouth daily 90 tablet 3     lisinopril (PRINIVIL/ZESTRIL) 10 MG tablet Take 1 tablet (10 mg) by mouth daily (Patient taking differently: Take 10 mg by mouth At Bedtime ) 90 tablet 3     fluticasone (FLONASE) 50 MCG/ACT spray Spray 2 sprays into both nostrils daily 16 g 11     Multiple Vitamin (MULTIVITAMINS PO) Take 1 tablet by mouth At Bedtime           ALLERGIES:     Allergies   Allergen Reactions     Zolpidem Other (See Comments)     Alcoholic.  Had reaction 3/17/13 while intoxicated which included black out, loss of awareness, paranoia.  Do not prescribe.   "Dr. Celeste     Cats Other (See Comments)     rhinitis     Dogs Other (See Comments)     rhinitis     Pollen Extract Other (See Comments)     rhinits.        FAMILY HISTORY:     Family History   Problem Relation Age of Onset     CANCER Mother 62     Alcoholism Paternal Uncle      Cirrhosis No family hx of         SOCIAL HISTORY:     Social History     Social History     Marital status: Single     Spouse name: N/A     Number of children: N/A     Years of education: N/A     Social History Main Topics     Smoking status: Never Smoker     Smokeless tobacco: Never Used     Alcohol use Yes      Comment: Alcohol use disorder, still actively drinking     Drug use: Yes     Special: Marijuana      Comment: occ marijuana     Sexual activity: Not Currently     Partners: Female     Other Topics Concern     Not on file     Social History Narrative    .  Bicycles a lot.  Excessive alcohol use.  Smokes cigars.  Occasional marijuana use.        REVIEW OF SYSTEMS:   Constitutional: No fevers or chills - was fighting off virus last week per his report  Skin: No new rash but itching associated w/ hair re-growth since the shaving from surgery  Eyes: No acute change in vision  Ears/Nose/Throat: No purulent rhinorrhea, new hearing loss, or new vertigo  Respiratory: No cough or hemoptysis  Cardiovascular: See HPI  Gastrointestinal: No change in appetite, vomiting, hematemesis or diarrhea  Genitourinary: No dysuria or hematuria  Musculoskeletal: No new back pain, neck pain or muscle pain  Neurologic: No new headaches, focal weakness or behavior changes  Psychiatric: No hallucinations, excessive alcohol consumption or illegal drug usage  Hematologic/Lymphatic/Immunologic: No bleeding, chills, fever, night sweats or weight loss  Endocrine: No new cold intolerance, heat intolerance, polyphagia, polydipsia or polyuria      PHYSICAL EXAMINATION:     /84  Pulse 64  Resp 18  Ht 1.753 m (5' 9\")  SpO2 " 100%    GENERAL: No acute distress.  HEENT: EOMI. Sclerae white, not injected. Nares clear. Pharynx without erythema or exudate.   Neck: No adenopathy. No thyromegaly. No jugular venous distension.   Heart: Regular rate and rhythm. Soft systolic 2/6 murmur noted at RUSB.  Lungs: Clear to auscultation. No ronchi, wheezes, rales.   Abdomen: Soft, nontender, nondistended. Bowel sounds present.  Extremities: No clubbing, cyanosis, or edema.   Neurologic: Alert and oriented to person/place/time, normal speech and affect. No focal deficits.  Skin: No petechiae, purpura or rash.     LABORATORY DATA:     LIPID RESULTS:  Lab Results   Component Value Date    CHOL 146 05/09/2016    HDL 64 05/09/2016    LDL 61 05/09/2016    TRIG 106 05/09/2016       LIVER ENZYME RESULTS:  Lab Results   Component Value Date    AST 71 (H) 02/22/2018    ALT 70 02/22/2018       CBC RESULTS:  Lab Results   Component Value Date    WBC 5.7 03/15/2018    RBC 4.74 03/15/2018    HGB 12.4 (L) 03/15/2018    HCT 38.5 (L) 03/15/2018    MCV 81 03/15/2018    MCH 26.2 (L) 03/15/2018    MCHC 32.2 03/15/2018    RDW 22.2 (H) 03/15/2018    PLT 62 (L) 03/15/2018       BMP RESULTS:  Lab Results   Component Value Date     03/15/2018    POTASSIUM 3.6 03/15/2018    CHLORIDE 105 03/15/2018    CO2 25 03/15/2018    ANIONGAP 9 03/15/2018    GLC 86 03/15/2018    BUN 16 03/15/2018    CR 0.83 03/15/2018    GFRESTIMATED >90 03/15/2018    GFRESTBLACK >90 03/15/2018    JOSEPHINE 9.2 03/15/2018        A1C RESULTS:  No results found for: A1C    INR RESULTS:  Lab Results   Component Value Date    INR 1.22 (H) 02/21/2018    INR 1.20 (H) 02/01/2018          PROCEDURES & FURTHER ASSESSMENTS:   Echocardiogram dated 03/15/18: Final read pending w/ preliminary MG 16 mmHg.     NYHA Class: I      CLINICAL IMPRESSION:   Ten Johnson is a 57 year old male with severe aortic stenosis treated with transfemoral TAVR with a Quiroz Victor M 3 # 26 mm valve on 2/21/2018 by Sy Joseph  and Bonnie. The patient has been doing very well since discharge and has had significant improvement in energy as well as SOB. Today the echocardiogram is without any significant change w/ MG 16 mmHg (previously 14 mmHg). He continues to tolerate Plavix therapy alone w/o any bleeding issues.    Plan is to have the patient follow-up with the valve clinic in 6 months to reassess the gradient at that time (slightly elevated 14 mmHg at time of discharge and noted to be 16 mmHg in clinic today). He and can follow with his primary cardiologist for management of his chronic cardiovascular issues in the meantime as needed.     Plan Summary:  1) Continue Plavix.  2) Lifelong antibiotic prophylaxis prior to all dental procedures.    40 minutes spent in direct care, >50% in counseling.    SYDNIE Solorio, CNP  [facility]  Interventional/Structural Cardiology-CSI Service                                                                                                                                                                 CC  Patient Care Team:  Cuca Celeste MD as PCP - General (Internal Medicine)  Mili Capps MD as MD (Internal Medicine)  Kieran Ulloa MD as MD (Family Practice)  Emma Mendez as Nurse Coordinator (Cardiology)  IGNACIO LEOS

## 2018-03-15 NOTE — PATIENT INSTRUCTIONS
Patient Instructions:    It was a pleasure to see you in the cardiology clinic today.    If you have any questions you can reach our nurse triage line at [phone].  Press Option #1 for the [facility], then press Option #3 for nursing or Option #1 for scheduling. We also encourage the use of [employer] to communicate with your HealthCare Provider    Note new medications: None.  Stop the following medications: None.    The results from today include: Your EKG, Echocardiogram and Labs are within normal limits and show that your valve is working well. Please continue to take Plavix daily.    Prevention of Infective (Bacterial) Endocarditis:  You are at increased risk for developing adverse outcomes from infective endocarditis (IE), also known as bacterial endocarditis (BE) because of the new device in your heart. The guidelines for prevention of IE are to give patients antibiotics prior to any dental procedures that involve manipulation of gingival tissue or the periapical region of teeth, or perforation of the oral mucosa:     It is recommended to take Amoxicillin 2 gm by mouth as a single dose 30 to 60 minutes before procedure. OR if allergic to Penicillin or Ampicillin: Cephalexin 2 gm by mouth, or  Clindamycin 600 mg by mouth, or  Azithromycin or Clarithromycin 500 mg PO     Adapted from Prevention of Infective Endocarditis: Guidelines From the American Heart Association, By the Committee on Rheumatic Fever, Endocarditis, and Kawasaki Disease. Circulation, 2007; 116: 1361-5705. Accessible at http://circ.ahajournals.org/cgi/reprint/CIRCULATIONAHA.[email or web address].      Please follow up with Valve Clinic in 6 months with a repeat Echocardiogram at that time.      Sincerely,    LAN Morales

## 2018-03-25 ENCOUNTER — HOSPITAL ENCOUNTER (EMERGENCY)
Facility: CLINIC | Age: 58
Discharge: HOME | End: 2018-03-25
Attending: FAMILY MEDICINE | Admitting: FAMILY MEDICINE
Payer: COMMERCIAL

## 2018-03-25 ENCOUNTER — APPOINTMENT (OUTPATIENT)
Dept: GENERAL RADIOLOGY | Facility: CLINIC | Age: 58
End: 2018-03-25
Attending: FAMILY MEDICINE
Payer: COMMERCIAL

## 2018-03-25 VITALS
HEIGHT: 69 IN | SYSTOLIC BLOOD PRESSURE: 148 MMHG | HEART RATE: 64 BPM | DIASTOLIC BLOOD PRESSURE: 91 MMHG | RESPIRATION RATE: 16 BRPM | OXYGEN SATURATION: 97 % | TEMPERATURE: 97.8 F

## 2018-03-25 DIAGNOSIS — M70.21 OLECRANON BURSITIS, RIGHT ELBOW: ICD-10-CM

## 2018-03-25 LAB
ALBUMIN SERPL-MCNC: 3 G/DL (ref 3.4–5)
ALP SERPL-CCNC: 108 U/L (ref 40–150)
ALT SERPL W P-5'-P-CCNC: 97 U/L (ref 0–70)
ANION GAP SERPL CALCULATED.3IONS-SCNC: 7 MMOL/L (ref 3–14)
APTT PPP: 24 SEC (ref 22–37)
AST SERPL W P-5'-P-CCNC: 99 U/L (ref 0–45)
BASOPHILS # BLD AUTO: 0 10E9/L (ref 0–0.2)
BASOPHILS NFR BLD AUTO: 0.5 %
BILIRUB SERPL-MCNC: 0.9 MG/DL (ref 0.2–1.3)
BUN SERPL-MCNC: 17 MG/DL (ref 7–30)
CALCIUM SERPL-MCNC: 8.2 MG/DL (ref 8.5–10.1)
CHLORIDE SERPL-SCNC: 109 MMOL/L (ref 94–109)
CO2 SERPL-SCNC: 23 MMOL/L (ref 20–32)
CREAT SERPL-MCNC: 0.86 MG/DL (ref 0.66–1.25)
CRP SERPL-MCNC: <2.9 MG/L (ref 0–8)
DIFFERENTIAL METHOD BLD: ABNORMAL
EOSINOPHIL # BLD AUTO: 0.1 10E9/L (ref 0–0.7)
EOSINOPHIL NFR BLD AUTO: 2.2 %
ERYTHROCYTE [DISTWIDTH] IN BLOOD BY AUTOMATED COUNT: 21.8 % (ref 10–15)
GFR SERPL CREATININE-BSD FRML MDRD: >90 ML/MIN/1.7M2
GLUCOSE SERPL-MCNC: 98 MG/DL (ref 70–99)
HCT VFR BLD AUTO: 37.7 % (ref 40–53)
HGB BLD-MCNC: 12.1 G/DL (ref 13.3–17.7)
IMM GRANULOCYTES # BLD: 0 10E9/L (ref 0–0.4)
IMM GRANULOCYTES NFR BLD: 0.3 %
INR PPP: 1.12 (ref 0.86–1.14)
LYMPHOCYTES # BLD AUTO: 1.5 10E9/L (ref 0.8–5.3)
LYMPHOCYTES NFR BLD AUTO: 23 %
MCH RBC QN AUTO: 27.5 PG (ref 26.5–33)
MCHC RBC AUTO-ENTMCNC: 32.1 G/DL (ref 31.5–36.5)
MCV RBC AUTO: 86 FL (ref 78–100)
MONOCYTES # BLD AUTO: 1.1 10E9/L (ref 0–1.3)
MONOCYTES NFR BLD AUTO: 17.4 %
NEUTROPHILS # BLD AUTO: 3.6 10E9/L (ref 1.6–8.3)
NEUTROPHILS NFR BLD AUTO: 56.6 %
NRBC # BLD AUTO: 0 10*3/UL
NRBC BLD AUTO-RTO: 0 /100
PLATELET # BLD AUTO: 43 10E9/L (ref 150–450)
POTASSIUM SERPL-SCNC: 4.1 MMOL/L (ref 3.4–5.3)
PROT SERPL-MCNC: 7 G/DL (ref 6.8–8.8)
RBC # BLD AUTO: 4.4 10E12/L (ref 4.4–5.9)
SODIUM SERPL-SCNC: 139 MMOL/L (ref 133–144)
WBC # BLD AUTO: 6.3 10E9/L (ref 4–11)

## 2018-03-25 PROCEDURE — 99284 EMERGENCY DEPT VISIT MOD MDM: CPT | Mod: 25 | Performed by: FAMILY MEDICINE

## 2018-03-25 PROCEDURE — 86140 C-REACTIVE PROTEIN: CPT | Performed by: FAMILY MEDICINE

## 2018-03-25 PROCEDURE — 87040 BLOOD CULTURE FOR BACTERIA: CPT | Performed by: FAMILY MEDICINE

## 2018-03-25 PROCEDURE — 29105 APPLICATION LONG ARM SPLINT: CPT | Mod: RT | Performed by: FAMILY MEDICINE

## 2018-03-25 PROCEDURE — 25000128 H RX IP 250 OP 636

## 2018-03-25 PROCEDURE — 85025 COMPLETE CBC W/AUTO DIFF WBC: CPT | Performed by: FAMILY MEDICINE

## 2018-03-25 PROCEDURE — 96365 THER/PROPH/DIAG IV INF INIT: CPT | Performed by: FAMILY MEDICINE

## 2018-03-25 PROCEDURE — 73070 X-RAY EXAM OF ELBOW: CPT | Mod: RT

## 2018-03-25 PROCEDURE — 85610 PROTHROMBIN TIME: CPT | Performed by: FAMILY MEDICINE

## 2018-03-25 PROCEDURE — 80053 COMPREHEN METABOLIC PANEL: CPT | Performed by: FAMILY MEDICINE

## 2018-03-25 PROCEDURE — 25000132 ZZH RX MED GY IP 250 OP 250 PS 637: Performed by: FAMILY MEDICINE

## 2018-03-25 PROCEDURE — 99285 EMERGENCY DEPT VISIT HI MDM: CPT | Mod: 25 | Performed by: FAMILY MEDICINE

## 2018-03-25 PROCEDURE — 96366 THER/PROPH/DIAG IV INF ADDON: CPT | Performed by: FAMILY MEDICINE

## 2018-03-25 PROCEDURE — 85730 THROMBOPLASTIN TIME PARTIAL: CPT | Performed by: FAMILY MEDICINE

## 2018-03-25 RX ORDER — DOXYCYCLINE 100 MG/1
100 CAPSULE ORAL 2 TIMES DAILY
Qty: 28 CAPSULE | Refills: 0 | Status: SHIPPED | OUTPATIENT
Start: 2018-03-25 | End: 2018-06-15

## 2018-03-25 RX ORDER — OXYCODONE HYDROCHLORIDE 5 MG/1
5 TABLET ORAL ONCE
Status: COMPLETED | OUTPATIENT
Start: 2018-03-25 | End: 2018-03-25

## 2018-03-25 RX ORDER — LIDOCAINE 40 MG/G
CREAM TOPICAL
Status: DISCONTINUED | OUTPATIENT
Start: 2018-03-25 | End: 2018-03-25 | Stop reason: HOSPADM

## 2018-03-25 RX ORDER — OXYCODONE HYDROCHLORIDE 5 MG/1
5 TABLET ORAL EVERY 8 HOURS PRN
Qty: 10 TABLET | Refills: 0 | Status: SHIPPED | OUTPATIENT
Start: 2018-03-25 | End: 2018-09-28

## 2018-03-25 RX ADMIN — VANCOMYCIN HYDROCHLORIDE 1500 MG: 10 INJECTION, POWDER, LYOPHILIZED, FOR SOLUTION INTRAVENOUS at 13:51

## 2018-03-25 RX ADMIN — OXYCODONE HYDROCHLORIDE 5 MG: 5 TABLET ORAL at 13:13

## 2018-03-25 ASSESSMENT — ENCOUNTER SYMPTOMS
WEAKNESS: 0
BRUISES/BLEEDS EASILY: 0
EYE REDNESS: 0
DYSPHORIC MOOD: 0
VOMITING: 0
DIFFICULTY URINATING: 0
NAUSEA: 0
NECK PAIN: 0
ABDOMINAL PAIN: 0
COLOR CHANGE: 1
ARTHRALGIAS: 1
HEADACHES: 0
FACIAL SWELLING: 0
CONFUSION: 0
NECK STIFFNESS: 0
WOUND: 0
FEVER: 0
SHORTNESS OF BREATH: 0
DECREASED CONCENTRATION: 0
JOINT SWELLING: 1
ACTIVITY CHANGE: 1

## 2018-03-25 NOTE — ED PROVIDER NOTES
[place] [facility] DE[facility]MENT ([facility])  3/25/18 ED 7 10:35 AM   History     Chief Complaint   Patient presents with     Elbow Pain     The history is provided by the patient and medical records.     Ten Johnson is a 57 year old male who presents with right elbow pain, redness, and swelling.  He has a history of hepatitis C, alcoholic liver cirrhosis with Sarahi-Hoffman tears, multiple orthopedic surgeries, recent TAVR, and prior history of left elbow bursa MRSA infection with complicated course and recovery.  Patient states that he fell and landed on his right elbow a few days ago and noticed some swelling as if it had some fluid on the joint. Yesterday he woke up and noticed his elbow was more painful and more swollen. Last night he noticed his right arm curling up and more and more flexed against his will.  He was able to straighten his elbow out but afterwards noticed a pins and needles sensation throughout his entire right arm.  He continued to feel there are burning needles going through it constantly.  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  He notes prior history of right arm injury with right rotator cuff test tear, right subacromial bursitis, right long head biceps disease requiring significant orthopedic repair.  He notes prior history of left elbow bursa MRSA infection in 2011 and is nervous because his right elbow is now similarly swollen and painful.  Given his recent TAVR he wants to be careful for any possible infections.     I have reviewed the Medications, Allergies, Past Medical and Surgical History, and Social History in the [facility] system.  Past Medical History:   Diagnosis Date     Alcohol use disorder (H)      Alcoholic cirrhosis (H)      Ascites      Chronic hepatitis C without hepatic coma (H) 05/10/2016    Untreated as of 2/2018     Erosive gastropathy      Esophageal varices  in alcoholic cirrhosis (H)      H/O upper gastrointestinal hemorrhage 09/2017     History of blood transfusion      Hypertension     essential     JANELLE (iron deficiency anemia)      Sarahi-Hoffman tear     History     Marijuana abuse      MRSA (methicillin resistant Staphylococcus aureus)      Severe aortic stenosis      Thrombocytopenia (H)        Past Surgical History:   Procedure Laterality Date     ARTHROSCOPY SHOULDER ROTATOR CUFF REPAIR  7/31/2012    Procedure: ARTHROSCOPY SHOULDER ROTATOR CUFF REPAIR;  Right Shoulder Arthroscopic Rotator Cuff Repair, BicepsTenodesis,  Subacromial Decompression ;  Surgeon: Joi Castillo MD;  [facility]     ESOPHAGOSCOPY, GASTROSCOPY, DUODENOSCOPY (EGD), COMBINED N/A 10/23/2017    Procedure: COMBINED ESOPHAGOSCOPY, GASTROSCOPY, DUODENOSCOPY (EGD);;  Surgeon: Gentry Salas MD;  Location: [facility]     FACIAL RECONSTRUCTION SURGERY  1971     HEART CATH FEMORAL CANNULIZATION WITH OPEN STANDBY REPAIR AORTIC VALVE N/A 2/21/2018    Procedure: HEART CATH FEMORAL CANNULIZATION WITH OPEN STANDBY REPAIR AORTIC VALVE;;  Surgeon: Luis Baird MD;  Location: [facility] OR     IRRIGATION AND DEBRIDEMENT UPPER EXTREMITY, COMBINED  1/3/2012    Procedure:COMBINED IRRIGATION AND DEBRIDEMENT UPPER EXTREMITY; Irrigation & Debridement Left Elbow; Surgeon:CRISTHIAN ZHOU; Location:[facility] OR     REPAIR TENDON TRICEPS UPPER EXTREMITY  11/8/2011    Procedure:REPAIR TENDON TRICEPS UPPER EXTREMITY; Surgeon:CRISTHIAN ZHOU; Location:[facility] OR     SHOULDER SURGERY  2003    left, injury, torn tendons, hematoma     TRANSCATHETER AORTIC VALVE IMPLANT ANESTHESIA N/A 2/21/2018    Procedure: TRANSCATHETER AORTIC VALVE IMPLANT ANESTHESIA;  Transfemoral (Quiroz) Aortic Valve Implant 26mm MARTHA 3, with Cardiopulmonary Bypass Standby, transthoracic echocardiogram;  Surgeon: GENERIC ANESTHESIA PROVIDER;  Location: [facility] OR     TRANSPOSITION ULNAR NERVE (ELBOW)  11/8/2011     "Procedure:TRANSPOSITION ULNAR NERVE (ELBOW); Final Procedure Done: Left Elbow Lateral Ulnar Collateral Repair And  Left Elbow Triceps Repair         Family History   Problem Relation Age of Onset     CANCER Mother 62     Alcoholism Paternal Uncle      Cirrhosis No family hx of        Social History   Substance Use Topics     Smoking status: Never Smoker     Smokeless tobacco: Never Used     Alcohol use Yes      Comment: Alcohol use disorder, still actively drinking      Review of Systems   Constitutional: Positive for activity change. Negative for fever.        Limited range of motion of right elbow because of pain   HENT: Negative for congestion and facial swelling.    Eyes: Negative for redness.   Respiratory: Negative for shortness of breath.    Cardiovascular: Negative for chest pain.   Gastrointestinal: Negative for abdominal pain, nausea and vomiting.   Genitourinary: Negative for difficulty urinating.   Musculoskeletal: Positive for arthralgias and joint swelling. Negative for neck pain and neck stiffness.        Right elbow pain, swelling, redness, with paresthesias   Skin: Positive for color change. Negative for wound.        reddness over elbow     Neurological: Negative for weakness and headaches.   Hematological: Does not bruise/bleed easily.   Psychiatric/Behavioral: Negative for confusion, decreased concentration and dysphoric mood.   All other systems reviewed and are negative.      Physical Exam   BP: (!) 155/102  Pulse: 64  Heart Rate: 64  Temp: 97.5  F (36.4  C)  Resp: 16  Height: 175.3 cm (5' 9\")  SpO2: 100 %      Physical Exam   Constitutional: He is oriented to person, place, and time. He appears well-developed and well-nourished. He appears distressed.   Patient nontoxic   HENT:   Head: Normocephalic and atraumatic.   Eyes: EOM are normal. Pupils are equal, round, and reactive to light. No scleral icterus.   Neck: Normal range of motion. Neck supple.   Cardiovascular: Regular rhythm.  "   Pulmonary/Chest: No stridor. No respiratory distress.   Abdominal: There is no guarding.   Musculoskeletal: He exhibits edema and tenderness.   Right elbow 2.5 cm area of olecranon swelling with erythema and tenderness. Ant joint normal. Distal cms intact. Small scab noted over elbow.   Neurological: He is alert and oriented to person, place, and time. He has normal reflexes. No cranial nerve deficit. Coordination normal.   Skin: Skin is warm and dry. No rash noted. He is not diaphoretic. There is erythema. No pallor.   Psychiatric: He has a normal mood and affect. His behavior is normal. Judgment and thought content normal.   Nursing note and vitals reviewed.      ED Course     ED Course     Procedures        In the ER patient had IV established.  Labs are drawn x-rays done of the right elbow revealed no acute fracture etc.  Some consultations identified.  Auditory testing including blood culture were sent.  White count 6.3 hemoglobin 12.1 CRP is negative INR is 1.12 chemistries otherwise normal liver function tests reveal AST is 99 and ALT is 97.    Discussed case with orthopedics also feel at this point seems to be more of an olecranon bursitis whether be infectious or not patient has history of MRSA elected to treat the patient with a dose of vancomycin IV.  Patient also received 1 oxycodone 5 mg in the ER feeling better.  I did place an Ortho-Glass posterior splint with window over the elbow.  Prior that I did brenda the area of erythema.  Patient is point feels fine we rechecked his temperature and he still afebrile.  Patient feels better comfortable going home discussed at length watching for signs of increasing infection close follow-up in the next 1-2 days for recheck.  Patient will start doxycycline.  I gave him a limited number of oxycodone also for pain control.  Return if any worsening symptoms all he does agree with this plan and will be very vigilant is comfortably discharged this point feeling much  better.      Critical Care time:  none             Labs Ordered and Resulted from Time of ED Arrival Up to the Time of Departure from the ED   CBC WITH PLATELETS DIFFERENTIAL - Abnormal; Notable for the following:        Result Value    Hemoglobin 12.1 (*)     Hematocrit 37.7 (*)     RDW 21.8 (*)     Platelet Count 43 (*)     All other components within normal limits   COMPREHENSIVE METABOLIC PANEL - Abnormal; Notable for the following:     Calcium 8.2 (*)     Albumin 3.0 (*)     ALT 97 (*)     AST 99 (*)     All other components within normal limits   CRP INFLAMMATION   INR   PARTIAL THROMBOPLASTIN TIME   PULSE OXIMETRY NURSING   PERIPHERAL IV CATHETER   BLOOD CULTURE     Results for orders placed or performed during the hospital encounter of 03/25/18   Elbow XR, 2 views, right    Narrative    2 views right elbow radiographs 3/25/2018 11:37 AM    History: trauma with swelling;     Additional History from EMR: History of elbow fracture, approximately  4 years ago.    Comparison: 6/18/2014 right elbow radiograph.    Findings:    AP and lateral views of the right elbow were obtained. Suboptimal  lateral radiograph.    No acute fracture, dislocation or joint effusion about the right  elbow. Small fragmented bony spur/enthesophyte about the posterior  olecranon at the triceps insertion. Punctate  calcification/ossification about the medial and lateral epicondyles  compatible with old trauma and/or inflammation. Small bony excrescence  off the lateral condyle, as well. Irregular calcific or bone density  between the medial margin of the capitellum and medial radial head  concerning for an intra-articular body. Small osteophytes about the  medial joint line. There is increased soft tissue prominence  posteriorly, including compared to prior. No soft tissue gas or  radiopaque foreign body.      Impression    IMPRESSION:  1. No acute osseous abnormality.  2. Mild osteoarthrosis about the right elbow including  possible  calcified or ossified intra-articular body about the medial aspect of  the lateral compartment.  3. Soft tissue prominence posteriorly representing either bursitis  and/or hematoma.    I have personally reviewed the examination and initial interpretation  and I agree with the findings.    TAYLA ALBARRAN MD   CBC with platelets differential   Result Value Ref Range    WBC 6.3 4.0 - 11.0 10e9/L    RBC Count 4.40 4.4 - 5.9 10e12/L    Hemoglobin 12.1 (L) 13.3 - 17.7 g/dL    Hematocrit 37.7 (L) 40.0 - 53.0 %    MCV 86 78 - 100 fl    MCH 27.5 26.5 - 33.0 pg    MCHC 32.1 31.5 - 36.5 g/dL    RDW 21.8 (H) 10.0 - 15.0 %    Platelet Count 43 (LL) 150 - 450 10e9/L    Diff Method Automated Method     % Neutrophils 56.6 %    % Lymphocytes 23.0 %    % Monocytes 17.4 %    % Eosinophils 2.2 %    % Basophils 0.5 %    % Immature Granulocytes 0.3 %    Nucleated RBCs 0 0 /100    Absolute Neutrophil 3.6 1.6 - 8.3 10e9/L    Absolute Lymphocytes 1.5 0.8 - 5.3 10e9/L    Absolute Monocytes 1.1 0.0 - 1.3 10e9/L    Absolute Eosinophils 0.1 0.0 - 0.7 10e9/L    Absolute Basophils 0.0 0.0 - 0.2 10e9/L    Abs Immature Granulocytes 0.0 0 - 0.4 10e9/L    Absolute Nucleated RBC 0.0    CRP inflammation   Result Value Ref Range    CRP Inflammation <2.9 0.0 - 8.0 mg/L   INR   Result Value Ref Range    INR 1.12 0.86 - 1.14   Partial thromboplastin time   Result Value Ref Range    PTT 24 22 - 37 sec   Comprehensive metabolic panel   Result Value Ref Range    Sodium 139 133 - 144 mmol/L    Potassium 4.1 3.4 - 5.3 mmol/L    Chloride 109 94 - 109 mmol/L    Carbon Dioxide 23 20 - 32 mmol/L    Anion Gap 7 3 - 14 mmol/L    Glucose 98 70 - 99 mg/dL    Urea Nitrogen 17 7 - 30 mg/dL    Creatinine 0.86 0.66 - 1.25 mg/dL    GFR Estimate >90 >60 mL/min/1.7m2    GFR Estimate If Black >90 >60 mL/min/1.7m2    Calcium 8.2 (L) 8.5 - 10.1 mg/dL    Bilirubin Total 0.9 0.2 - 1.3 mg/dL    Albumin 3.0 (L) 3.4 - 5.0 g/dL    Protein Total 7.0 6.8 - 8.8 g/dL     Alkaline Phosphatase 108 40 - 150 U/L    ALT 97 (H) 0 - 70 U/L    AST 99 (H) 0 - 45 U/L         Consults  Orthopedics: Called (03/25/18 5258)    Assessments & Plan (with Medical Decision Making)  57-year-old male history of liver cirrhosis also 4 weeks ago had a TAVR.  Patient been doing well he fell the last day or so hit his right elbow.  There is a very minimal scrape with this now there is 20 cm of swelling over the olecranon bursa.  No lymphangitic spread otherwise anterior portion of the elbow was negative with full range of motion.  Distal CMS otherwise without deficit.  Patient had x-rays done revealing some chronic changes labs are all normal including a negative CRP and white count. Post splint placed with cut in the middle of that for the elbow to be exposed Ace wrap applied.  Areas of erythema were demarcated area. I discussed with cardiology also felt at this point with recent procedure no other indication except for as were doing.  Temperature normal ×2 in the ER.  Discussed with orthopedics also history of MRSA patient given a dose of vancomycin IV placed on doxycycline patient a posterior splint with a window. Patient to follow up in 1-2 days for recheck. Return if any fever or worse symptoms. Patient agrees and wants to go home.  Differential includes traumatic versus reactive versus infectious.       I have reviewed the nursing notes.    I have reviewed the findings, diagnosis, plan and need for follow up with the patient.    Discharge Medication List as of 3/25/2018  3:39 PM      START taking these medications    Details   doxycycline monohydrate 100 MG capsule Take 1 capsule (100 mg) by mouth 2 times daily, Disp-28 capsule, R-0, Local Print      oxyCODONE IR (ROXICODONE) 5 MG tablet Take 1 tablet (5 mg) by mouth every 8 hours as needed for pain, Disp-10 tablet, R-0, Local Print             Final diagnoses:   Olecranon bursitis, right elbow     Radha BROWN, am serving as a trained medical  scribe to document services personally performed by Declan Little MD based on the provider's statements to me on March 25, 2018.  This document has been checked and approved by the attending provider.    I, Declan Little MD, was physically present and have reviewed and verified the accuracy of this note documented by Radha Hernandez, medical scribe.     3/25/2018   [facility], [place], EMERGENCY DEPARTMENT    This note was created at least in part by the use of dragon voice dictation system. Inadvertent typographical errors may still exist.  Declan Little MD.         Declan Little MD  03/25/18 [id number]

## 2018-03-25 NOTE — PHARMACY-VANCOMYCIN DOSING SERVICE
Pharmacy Vancomycin Initial Note  Date of Service 2018  Patient's  1960  57 year old, male    Indication: Skin and Soft Tissue Infection and , suspected MRSA    Current estimated CrCl = Estimated Creatinine Clearance: 104 mL/min (based on Cr of 0.86).    Creatinine for last 3 days  3/25/2018: 11:08 AM Creatinine 0.86 mg/dL    Recent Vancomycin Level(s) for last 3 days  No results found for requested labs within last 72 hours.      Vancomycin IV Administrations (past 72 hours)      No vancomycin orders with administrations in past 72 hours.                Nephrotoxins and other renal medications (Future)    Start     Dose/Rate Route Frequency Ordered Stop    18 1308  vancomycin (VANCOCIN) 1,500 mg in sodium chloride 0.9 % 250 mL intermittent infusion      1,500 mg  over 90 Minutes Intravenous EVERY 12 HOURS 18 1307            Contrast Orders - past 72 hours     None                Plan:  1.  Start vancomycin  1500 mg IV q12h.   2.  Goal Trough Level: 15-20 mg/L   3.  Pharmacy will check trough levels as appropriate in 1-3 Days.    4. Serum creatinine levels will be ordered daily for the first week of therapy and at least twice weekly for subsequent weeks.    5. Johnson method utilized to dose vancomycin therapy: Method 2    Citlali Calvo, ZeeshanD

## 2018-03-25 NOTE — ED NOTES
Pt presents with pain/swelling to right elbow, denies recent trauma but states he has had issues with right elbow since fracture 4 years ago.

## 2018-03-25 NOTE — DISCHARGE INSTRUCTIONS
Home.  Your xray and labs look good.  No sign of fever.  Discussed with orthopedics who recommend initial immobilization and elevation and antibiotics.  You had a dose of vancomycin IV in the ER.  Take doxycycline 100mg twice a day starting tonight.  Oxycodone for pain if needed.  Return if increase swelling, redness, fever or pain or concerns.  Elevate arm.  Recheck with MD in the next 1-2 days.      Bursitis of the Elbow (Olecranon)  Your elbow joint contains a small fluid-filled sac called a bursa. The bursa helps the muscles and tendons move smoothly over the bone. It also cushions and protects your elbow. Bursitis is when the bursa is inflamed or swollen. This is most often due to overuse of or injury to the elbow. Symptoms include swelling and pain. If the elbow is red and feels warm to the touch, the bursa itself may be infected.  In most cases, elbow bursitis resolves with medicine and self-care at home. It may take several weeks for the bursa to heal and the swelling to go away. In some cases, your healthcare provider may drain excess fluid from the bursa. Or, he or she may inject medicine directly into the bursa to help relieve symptoms. In severe cases, you may need surgery to remove the bursa may. If there is concern that the bursa is infected, your healthcare provider may prescribe antibiotics to treat the infection.    Home care  Your healthcare provider may prescribe medicine to help relieve pain and swelling. This may be an over-the-counter pain reliever or prescription pain medicine. Take all medicines as directed. To help treat or prevent infection, your provider may prescribe antibiotics. If these are prescribed, take them as directed until they are gone.  The following are general care guidelines:    Apply an ice pack or bag of frozen peas wrapped in a thin towel to your elbow for 15 to 20 minutes at a time. Do this 3 to 4 times a day until pain and swelling improve.    Keep your elbow raised  above the level of your heart whenever possible. This helps reduce swelling. When sitting or lying down, place your arm on a pillow that rests on your chest or on a pillow at your side.    Use an elastic wrap around the elbow joint to compress the area while it is healing. Make the wrap snug but not tight to the point of causing pain.    Rest your elbow to give it time to heal. You may need to wear an elbow pad to help protect and limit the movement of your elbow. During and after healing, avoid leaning on your elbows.  Follow-up care  Follow up with your healthcare provider, or as advised. If you have been referred to a specialist, make that appointment promptly.  When to seek medical advice  Call your healthcare provider right away if any of these occur:    Fever of 100.4 F (38 C) or higher, or as advised    Chills    Increased pain, swelling, warmth, redness, or drainage from the joint    Trouble moving the elbow joint    Numbness or tingling in the hand    Severe pain or swelling in forearm or hand    Loss of pink color and slow return of color after squeezing fingertip or hand  Date Last Reviewed: 6/1/2016 2000-2017 [employer]. [address] [place] [address], [place] [address]. All rights reserved. This information is not intended as a substitute for professional medical care. Always follow your healthcare professional's instructions.

## 2018-03-25 NOTE — ED AVS SNAPSHOT
[facility], [place], Emergency Department    [address] [place] [id number]    Phone:  [phone]                                       Ten Johnson   MRN: [id number]    Department:  [facility], Emergency Department   Date of Visit:  3/25/2018           After Visit Summary Signature Page     I have received my discharge instructions, and my questions have been answered. I have discussed any challenges I see with this plan with the nurse or doctor.    ..........................................................................................................................................  Patient/Patient Representative Signature      ..........................................................................................................................................  Patient Representative Print Name and Relationship to Patient    ..................................................               ................................................  Date                                            Time    ..........................................................................................................................................  Reviewed by Signature/Title    ...................................................              ..............................................  Date                                                            Time

## 2018-03-25 NOTE — ED AVS SNAPSHOT
[facility], Emergency Department    [address] [facility] [address]    Phone:  [phone]                                       Ten Johnson   MRN: [id number]    Department:  [facility] [place], Emergency Department   Date of Visit:  3/25/2018           Patient Information     Date Of Birth          1960        Your diagnoses for this visit were:     Olecranon bursitis, right elbow        You were seen by Declan Little MD.      Follow-up Information     Follow up with Cuca Celeste MD. Schedule an appointment as soon as possible for a visit in 1 day.    Specialty:  Internal Medicine    Contact information:    [address] [facility] [address]  [facility] [address] [phone]          Discharge Instructions       Home.  Your xray and labs look good.  No sign of fever.  Discussed with orthopedics who recommend initial immobilization and elevation and antibiotics.  You had a dose of vancomycin IV in the ER.  Take doxycycline 100mg twice a day starting tonight.  Oxycodone for pain if needed.  Return if increase swelling, redness, fever or pain or concerns.  Elevate arm.  Recheck with MD in the next 1-2 days.      Bursitis of the Elbow (Olecranon)  Your elbow joint contains a small fluid-filled sac called a bursa. The bursa helps the muscles and tendons move smoothly over the bone. It also cushions and protects your elbow. Bursitis is when the bursa is inflamed or swollen. This is most often due to overuse of or injury to the elbow. Symptoms include swelling and pain. If the elbow is red and feels warm to the touch, the bursa itself may be infected.  In most cases, elbow bursitis resolves with medicine and self-care at home. It may take several weeks for the bursa to heal and the swelling to go away. In some cases, your healthcare provider may drain excess fluid from the bursa. Or, he or she may inject medicine directly into the bursa to help relieve symptoms. In severe cases, you may need surgery to  remove the bursa may. If there is concern that the bursa is infected, your healthcare provider may prescribe antibiotics to treat the infection.    Home care  Your healthcare provider may prescribe medicine to help relieve pain and swelling. This may be an over-the-counter pain reliever or prescription pain medicine. Take all medicines as directed. To help treat or prevent infection, your provider may prescribe antibiotics. If these are prescribed, take them as directed until they are gone.  The following are general care guidelines:    Apply an ice pack or bag of frozen peas wrapped in a thin towel to your elbow for 15 to 20 minutes at a time. Do this 3 to 4 times a day until pain and swelling improve.    Keep your elbow raised above the level of your heart whenever possible. This helps reduce swelling. When sitting or lying down, place your arm on a pillow that rests on your chest or on a pillow at your side.    Use an elastic wrap around the elbow joint to compress the area while it is healing. Make the wrap snug but not tight to the point of causing pain.    Rest your elbow to give it time to heal. You may need to wear an elbow pad to help protect and limit the movement of your elbow. During and after healing, avoid leaning on your elbows.  Follow-up care  Follow up with your healthcare provider, or as advised. If you have been referred to a specialist, make that appointment promptly.  When to seek medical advice  Call your healthcare provider right away if any of these occur:    Fever of 100.4 F (38 C) or higher, or as advised    Chills    Increased pain, swelling, warmth, redness, or drainage from the joint    Trouble moving the elbow joint    Numbness or tingling in the hand    Severe pain or swelling in forearm or hand    Loss of pink color and slow return of color after squeezing fingertip or hand  Date Last Reviewed: 6/1/2016 2000-2017 [employer]. [address] [facility], [place]  [address]. All rights reserved. This information is not intended as a substitute for professional medical care. Always follow your healthcare professional's instructions.          Your next 10 appointments already scheduled     Mar 29, 2018  2:30 PM CDT   Cardiac Evaluation with  Card[facility] Rehab [facility]   [facility]    [address] 1st [address]  [facility] [address] [phone]            May 22, 2018  8:30 AM CDT   Lab with [facility]    Health Lab ([facility])    [address] [place] [address]  1st Floor  [facility] [id number] [phone]            May 22, 2018  9:30 AM CDT   (Arrive by 9:15 AM)   Return General Liver with Gentry San MD   [facility] Hepatology ([facility])    [address] [place] [address]  [address]  [facility] [id number] [phone]            Sep 20, 2018  1:00 PM CDT   Ech Complete with [email or web address]    Health Echo ([facility])    [address] [place] [address]  3rd Floor  [facility] [id number] [phone]           1. Please bring or wear a comfortable two-piece outfit. 2. You may eat, drink and take your normal medicines. 3. For any questions that cannot be answered, please contact the ordering physician            Sep 20, 2018  2:00 PM CDT   (Arrive by 1:45 PM)   TAVR Return with SYDNIE Hong CNP   [facility] Heart Care ([facility])    [address] [place] [address]  [address]  [facility] [id number] [phone]              24 Hour Appointment Hotline       To make an appointment at any [facility], call [phone] ([phone]). If you don't have a family doctor or clinic, we will help you find one. [facility] are conveniently located to serve the needs of you and your family.             Review of your medicines      START taking        Dose / Directions Last dose  taken    doxycycline monohydrate 100 MG capsule   Dose:  100 mg   Quantity:  28 capsule        Take 1 capsule (100 mg) by mouth 2 times daily   Refills:  0        oxyCODONE IR 5 MG tablet   Commonly known as:  ROXICODONE   Dose:  5 mg   Quantity:  10 tablet        Take 1 tablet (5 mg) by mouth every 8 hours as needed for pain   Refills:  0          CONTINUE these medicines which may have CHANGED, or have new prescriptions. If we are uncertain of the size of tablets/capsules you have at home, strength may be listed as something that might have changed.        Dose / Directions Last dose taken    ferrous sulfate 325 (65 FE) MG tablet   Commonly known as:  IRON   Dose:  325 mg   What changed:  when to take this   Quantity:  100 tablet        Take 1 tablet (325 mg) by mouth daily (with breakfast)   Refills:  0        lisinopril 10 MG tablet   Commonly known as:  PRINIVIL/ZESTRIL   Dose:  10 mg   What changed:  when to take this   Quantity:  90 tablet        Take 1 tablet (10 mg) by mouth daily   Refills:  3          Our records show that you are taking the medicines listed below. If these are incorrect, please call your family doctor or clinic.        Dose / Directions Last dose taken    clopidogrel 75 MG tablet   Commonly known as:  PLAVIX   Dose:  75 mg   Quantity:  30 tablet        Take 1 tablet (75 mg) by mouth daily   Refills:  3        fluticasone 50 MCG/ACT spray   Commonly known as:  FLONASE   Dose:  2 spray   Quantity:  16 g        Spray 2 sprays into both nostrils daily   Refills:  11        loratadine 10 MG tablet   Commonly known as:  CLARITIN   Dose:  10 mg   Quantity:  90 tablet        Take 1 tablet (10 mg) by mouth daily   Refills:  3        montelukast 10 MG tablet   Commonly known as:  SINGULAIR   Dose:  10 mg   Quantity:  90 tablet        Take 1 tablet (10 mg) by mouth At Bedtime   Refills:  1        MULTIVITAMINS PO   Dose:  1 tablet        Take 1 tablet by mouth At Bedtime   Refills:  0         PROTONIX PO   Dose:  40 mg        Take 40 mg by mouth 2 times daily as needed   Refills:  0                Prescriptions were sent or printed at these locations (2 Prescriptions)                   Other Prescriptions                Printed at Department/Unit printer (2 of 2)         doxycycline monohydrate 100 MG capsule               oxyCODONE IR (ROXICODONE) 5 MG tablet                Procedures and tests performed during your visit     Blood culture    CBC with platelets differential    CRP inflammation    Comprehensive metabolic panel    Elbow XR, 2 views, right    INR    Partial thromboplastin time    Peripheral IV catheter    Pulse oximetry nursing      Orders Needing Specimen Collection     None      Pending Results     Date and Time Order Name Status Description    3/25/2018 1043 Elbow XR, 2 views, right Preliminary     3/25/2018 1043 Blood culture In process             Pending Culture Results     Date and Time Order Name Status Description    3/25/2018 1043 Blood culture In process             Pending Results Instructions     If you had any lab results that were not finalized at the time of your Discharge, you can call the ED Lab Result RN at [phone]. You will be contacted by this team for any positive Lab results or changes in treatment. The nurses are available 7 days a week from 10A to 6:30P.  You can leave a message 24 hours per day and they will return your call.        Thank you for choosing [facility]       Thank you for choosing [facility] for your care. Our goal is always to provide you with excellent care. Hearing back from our patients is one way we can continue to improve our services. Please take a few minutes to complete the written survey that you may receive in the mail after you visit with us. Thank you!        "[employer]"hart Information     [employer] lets you send messages to your doctor, view your test results, renew your prescriptions, schedule appointments and more. To sign up, go to  [email or web address] . Click on \"Log in\" on the left side of the screen, which will take you to the Welcome page. Then click on \"Sign up Now\" on the right side of the page.     You will be asked to enter the access code listed below, as well as some personal information. Please follow the directions to create your username and password.     Your access code is: [email or web address]  Expires: 2018  4:22 PM     Your access code will  in 90 days. If you need help or a new code, please call your [place] clinic or [phone].        Care EveryWhere ID     This is your Care EveryWhere ID. This could be used by other organizations to access your [facility] medical records  [id number]        Equal Access to Services     MANE HERRERA : Erma Reyes, kam benavides, ashley dhillon, jazmin crespo . So [facility] [phone].    ATENCIÓN: Si habla español, tiene a ha disposición servicios gratuitos de asistencia lingüística. Llame al [phone].    We comply with applicable federal civil rights laws and [place] laws. We do not discriminate on the basis of race, color, national origin, age, disability, sex, sexual orientation, or gender identity.            After Visit Summary       This is your record. Keep this with you and show to your community pharmacist(s) and doctor(s) at your next visit.                  "

## 2018-03-27 ENCOUNTER — TELEPHONE (OUTPATIENT)
Dept: INTERNAL MEDICINE | Facility: CLINIC | Age: 58
End: 2018-03-27

## 2018-03-27 NOTE — TELEPHONE ENCOUNTER
----- Message from Rivera Lin sent at 3/27/2018  7:32 AM CDT -----  Regarding: Call pt- ER f/u appt  Contact: [phone]  Pt was in the ER this weekend and needs a ER f/u appt this week with Dr. Celeste. Please f/u with the pt at [phone]. Thank you.    Left message for pt to call back.  Teresita Huizar RN 9:23 AM on 3/27/2018.

## 2018-03-27 NOTE — TELEPHONE ENCOUNTER
----- Message from Yadira Kendrick sent at 3/27/2018  9:31 AM CDT -----  Regarding: Returning your call  Contact: [phone]  Please call him back       Pt seen in the ER on 03/24/2018 for elbow infection.   Consulted with Dr Celeste -pt to be seen in ortho-walk in.  Pt informed to be seen in ortho walk in clinic.  Teresita Huizar RN 11:42 AM on 3/27/2018.

## 2018-03-28 ENCOUNTER — OFFICE VISIT (OUTPATIENT)
Dept: ORTHOPEDICS | Facility: CLINIC | Age: 58
End: 2018-03-28
Payer: COMMERCIAL

## 2018-03-28 VITALS — HEART RATE: 64 BPM | BODY MASS INDEX: 26.81 KG/M2 | HEIGHT: 69 IN | WEIGHT: 181 LBS

## 2018-03-28 DIAGNOSIS — M70.21 OLECRANON BURSITIS OF RIGHT ELBOW: Primary | ICD-10-CM

## 2018-03-28 NOTE — LETTER
3/28/2018   RE: Ten Johnson  [address]  [facility] [address]     Dear Colleague,    Thank you for referring your patient, Ten Johnson, to the [facility] SPORTS AND ORTHOPAEDIC WALK IN CLINIC at [facility]. Please see a copy of my visit note below.      SPORTS & ORTHOPEDIC WALK-IN VISIT 3/28/2018    Primary Care Physician:   Seen in ED 3/25/18 for redness and swelling in right elbow. He states he may have bumped few days before that. He woke up on 3/25/18 and noticed his elbow was more swollen and painful. ED dx as olecranon bursits. XR taken in ED.  Hx of L elbow MRSA infection.  Reason for visit:     What part of your body is injured / painful?  right elbow    What caused the injury /pain? Bumped elbow    How long ago did your injury occur or pain begin? several days ago    What are your most bothersome symptoms? Pain and Swelling    How would you characterize your symptom?  dull    What makes your symptoms better? Rest    What makes your symptoms worse? Movement    Have you been previously seen for this problem? Yes, [facility] ED 3/25/18    Medical History:    Any recent changes to your medical history? No    Any new medication prescribed since last visit? No    Have you had surgery on this body part before? No    Social History:    Occupation: None    Handedness: Right    Exercise: NA         PMH:  Past Medical History:   Diagnosis Date     Alcohol use disorder (H)      Alcoholic cirrhosis (H)      Ascites      Chronic hepatitis C without hepatic coma (H) 05/10/2016    Untreated as of 2/2018     Erosive gastropathy      Esophageal varices in alcoholic cirrhosis (H)      H/O upper gastrointestinal hemorrhage 09/2017     History of blood transfusion      Hypertension     essential     JANELLE (iron deficiency anemia)      Sarahi-Hoffman tear     History     Marijuana abuse      MRSA (methicillin resistant Staphylococcus aureus)      Severe aortic stenosis       Thrombocytopenia (H)    severe aortic valvular stenosis that was treated with transfemoral transcatheter aortic valve replacement (TAVR) with a Quiroz Victor M 3 # 26 mm valve on 2/21/2018.  On Plavix.    Active problem list:  Patient Active Problem List   Diagnosis     Presbyopia     Rotator cuff tear, right     OA (osteoarthritis) of knee     Rhinitis, allergic     Nonrheumatic aortic valve stenosis     Aortic stenosis, severe       FH:  Family History   Problem Relation Age of Onset     CANCER Mother 62     Alcoholism Paternal Uncle      Cirrhosis No family hx of        SH:  Social History     Social History     Marital status: Single     Spouse name: N/A     Number of children: N/A     Years of education: N/A     Occupational History     Not on file.     Social History Main Topics     Smoking status: Never Smoker     Smokeless tobacco: Never Used     Alcohol use Yes      Comment: Alcohol use disorder, still actively drinking     Drug use: No      Comment: denies     Sexual activity: Not Currently     Partners: Female     Other Topics Concern     Not on file     Social History Narrative    .  Bicycles a lot.  Excessive alcohol use.  Smokes cigars.  Occasional marijuana use.       MEDS:  See EMR, reviewed  ALL:  See EMR, reviewed    REVIEW OF SYSTEMS:  CONSTITUTIONAL:NEGATIVE for fever, chills, change in weight  INTEGUMENTARY/SKIN: NEGATIVE for worrisome rashes, moles or lesions  EYES: NEGATIVE for vision changes or irritation  ENT/MOUTH: NEGATIVE for ear, mouth and throat problems  RESP:NEGATIVE for significant cough or SOB  BREAST: NEGATIVE for masses, tenderness or discharge  CV: NEGATIVE for chest pain, palpitations or peripheral edema  GI: NEGATIVE for nausea, abdominal pain, heartburn, or change in bowel habits  :NEGATIVE for frequency, dysuria, or hematuria  :NEGATIVE for frequency, dysuria, or hematuria  NEURO: NEGATIVE for weakness, dizziness or paresthesias  ENDOCRINE: NEGATIVE  "for temperature intolerance, skin/hair changes  HEME/ALLERGY/IMMUNE: NEGATIVE for bleeding problems  PSYCHIATRIC: NEGATIVE for changes in mood or affect      3/28/2018  7:09 AM - Interface, Flexilab Results   Component Results   Component   Specimen Description   Blood Left Arm   Culture Micro   No growth after 3 days     CRP Inflammation <2.9       SUBJECTIVE:  This 57-year-old male was treated in the emergency room over the weekend with IV vancomycin for red, swollen right olecranon bursitis.  Today he feels that the swelling and irritation are \"80% better.\"  The outline traced on his elbow by the emergency room shows that the swelling has decreased, and the area now has only minimal tenderness.  He feels otherwise well with no fever, chills or sweats.  He is taking oral doxycycline and was given a 2 week course.  He is not sure how the issue happened.  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      OBJECTIVE:  He is within a few degrees of full elbow extension, which he says is a great improvement.  There is a small amount of patulous fluid in the bursa sac with a very minimal amount of redness that is nontender.  There is no surrounding cellulitis, no lymphangitic streaks.  Skin is otherwise intact.  He is appropriate in conversation and affect.      ASSESSMENT:  Olecranon bursitis, improving.        PLAN:  I informed him that his blood cultures are showing no growth.  His CRP was normal.  I encouraged him to continue the antibiotic for its full 2 week course.  In his line of work, I suggested he consider a soft elbow pad to protect himself from striking objects at work with his elbows.  He agrees to avoid leaning on the elbow.  He understands the signs of infection, such as spreading redness, systemic symptoms of malaise or fever, worsening skin changes and knows the importance of following up emergently in an emergency room if this were to occur.  He feels " confident looking for improvements over the next week.  He declines the need for Primary Care followup.     Again, thank you for allowing me to participate in the care of your patient.      Sincerely,    Kieran Ulloa MD

## 2018-03-28 NOTE — PROGRESS NOTES
SPORTS & ORTHOPEDIC WALK-IN VISIT 3/28/2018    Primary Care Physician:   Seen in ED 3/25/18 for redness and swelling in right elbow. He states he may have bumped few days before that. He woke up on 3/25/18 and noticed his elbow was more swollen and painful. ED dx as olecranon bursits. XR taken in ED.  Hx of L elbow MRSA infection.  Reason for visit:     What part of your body is injured / painful?  right elbow    What caused the injury /pain? Bumped elbow    How long ago did your injury occur or pain begin? several days ago    What are your most bothersome symptoms? Pain and Swelling    How would you characterize your symptom?  dull    What makes your symptoms better? Rest    What makes your symptoms worse? Movement    Have you been previously seen for this problem? Yes, [facility] ED 3/25/18    Medical History:    Any recent changes to your medical history? No    Any new medication prescribed since last visit? No    Have you had surgery on this body part before? No    Social History:    Occupation: None    Handedness: Right    Exercise: NA           PMH:  Past Medical History:   Diagnosis Date     Alcohol use disorder (H)      Alcoholic cirrhosis (H)      Ascites      Chronic hepatitis C without hepatic coma (H) 05/10/2016    Untreated as of 2/2018     Erosive gastropathy      Esophageal varices in alcoholic cirrhosis (H)      H/O upper gastrointestinal hemorrhage 09/2017     History of blood transfusion      Hypertension     essential     JANELLE (iron deficiency anemia)      Sarahi-Hoffman tear     History     Marijuana abuse      MRSA (methicillin resistant Staphylococcus aureus)      Severe aortic stenosis      Thrombocytopenia (H)    severe aortic valvular stenosis that was treated with transfemoral transcatheter aortic valve replacement (TAVR) with a Quiroz Victor M 3 # 26 mm valve on 2/21/2018.  On Plavix.    Active problem list:  Patient Active Problem List   Diagnosis     Presbyopia     Rotator cuff  tear, right     OA (osteoarthritis) of knee     Rhinitis, allergic     Nonrheumatic aortic valve stenosis     Aortic stenosis, severe       FH:  Family History   Problem Relation Age of Onset     CANCER Mother 62     Alcoholism Paternal Uncle      Cirrhosis No family hx of        SH:  Social History     Social History     Marital status: Single     Spouse name: N/A     Number of children: N/A     Years of education: N/A     Occupational History     Not on file.     Social History Main Topics     Smoking status: Never Smoker     Smokeless tobacco: Never Used     Alcohol use Yes      Comment: Alcohol use disorder, still actively drinking     Drug use: No      Comment: denies     Sexual activity: Not Currently     Partners: Female     Other Topics Concern     Not on file     Social History Narrative    .  Bicycles a lot.  Excessive alcohol use.  Smokes cigars.  Occasional marijuana use.       MEDS:  See EMR, reviewed  ALL:  See EMR, reviewed    REVIEW OF SYSTEMS:  CONSTITUTIONAL:NEGATIVE for fever, chills, change in weight  INTEGUMENTARY/SKIN: NEGATIVE for worrisome rashes, moles or lesions  EYES: NEGATIVE for vision changes or irritation  ENT/MOUTH: NEGATIVE for ear, mouth and throat problems  RESP:NEGATIVE for significant cough or SOB  BREAST: NEGATIVE for masses, tenderness or discharge  CV: NEGATIVE for chest pain, palpitations or peripheral edema  GI: NEGATIVE for nausea, abdominal pain, heartburn, or change in bowel habits  :NEGATIVE for frequency, dysuria, or hematuria  :NEGATIVE for frequency, dysuria, or hematuria  NEURO: NEGATIVE for weakness, dizziness or paresthesias  ENDOCRINE: NEGATIVE for temperature intolerance, skin/hair changes  HEME/ALLERGY/IMMUNE: NEGATIVE for bleeding problems  PSYCHIATRIC: NEGATIVE for changes in mood or affect      3/28/2018  7:09 AM - Interface, Flexilab Results   Component Results   Component   Specimen Description   Blood Left Arm   Culture Micro   No  "growth after 3 days     CRP Inflammation <2.9           SUBJECTIVE:  This 57-year-old male was treated in the emergency room over the weekend with IV vancomycin for red, swollen right olecranon bursitis.  Today he feels that the swelling and irritation are \"80% better.\"  The outline traced on his elbow by the emergency room shows that the swelling has decreased, and the area now has only minimal tenderness.  He feels otherwise well with no fever, chills or sweats.  He is taking oral doxycycline and was given a 2 week course.  He is not sure how the issue happened.  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      OBJECTIVE:  He is within a few degrees of full elbow extension, which he says is a great improvement.  There is a small amount of patulous fluid in the bursa sac with a very minimal amount of redness that is nontender.  There is no surrounding cellulitis, no lymphangitic streaks.  Skin is otherwise intact.  He is appropriate in conversation and affect.      ASSESSMENT:  Olecranon bursitis, improving.        PLAN:  I informed him that his blood cultures are showing no growth.  His CRP was normal.  I encouraged him to continue the antibiotic for its full 2 week course.  In his line of work, I suggested he consider a soft elbow pad to protect himself from striking objects at work with his elbows.  He agrees to avoid leaning on the elbow.  He understands the signs of infection, such as spreading redness, systemic symptoms of malaise or fever, worsening skin changes and knows the importance of following up emergently in an emergency room if this were to occur.  He feels confident looking for improvements over the next week.  He declines the need for Primary Care followup.                   "

## 2018-03-28 NOTE — MR AVS SNAPSHOT
After Visit Summary   3/28/2018    Ten Johnson    MRN: [id number]           Patient Information     Date Of Birth          1960        Visit Information        Provider Department      3/28/2018 2:20 PM Kieran Ulloa MD [facility] Sports and Orthopaedic Walk [facility]        Today's Diagnoses     Olecranon bursitis of right elbow    -  1       Follow-ups after your visit        Your next 10 appointments already scheduled     Apr 11, 2018 10:30 AM CDT   Cardiac Evaluation with  Cardiac Rehab 1   [facility] [place], Cardiac Rehabilitation ([facility])    [address] 1st Floor [address]  [facility] [address]   [phone]            May 22, 2018  8:30 AM CDT   Lab with  LAB    Health Lab ([facility])    [address] [place] [address]  1st Floor  [facility] [id number] [phone]            May 22, 2018  9:30 AM CDT   (Arrive by 9:15 AM)   Return General Liver with Gentry San MD   [facility] Hepatology ([facility])    [address] [place] [address]  [address]  [facility] [id number] [phone]            Sep 20, 2018  1:00 PM CDT   Ech Complete with UCECHCR2    Health Echo ([facility])    [address] [place] [address]  3rd Floor  [facility] [id number] [phone]           1. Please bring or wear a comfortable two-piece outfit. 2. You may eat, drink and take your normal medicines. 3. For any questions that cannot be answered, please contact the ordering physician            Sep 20, 2018  2:00 PM CDT   (Arrive by 1:45 PM)   TAVR Return with SYDNIE Hong CNP   [facility] Heart Care ([facility])    [address] [place] [address]  [address]  [facility] [id number] [phone]              Who to contact     Please call your clinic at [phone] to:    Ask questions about your health    Make or cancel  "appointments    Discuss your medicines    Learn about your test results    Speak to your doctor            Additional Information About Your Visit        MyChart Information     [employer] is an electronic gateway that provides easy, online access to your medical records. With Passado, you can request a clinic appointment, read your test results, renew a prescription or communicate with your care team.     To sign up for Passado visit the website at [email or web address]   You will be asked to enter the access code listed below, as well as some personal information. Please follow the directions to create your username and password.     Your access code is: [email or web address]  Expires: 2018  4:22 PM     Your access code will  in 90 days. If you need help or a new code, please contact your [facility] or call [phone] for assistance.        Care EveryWhere ID     This is your Care EveryWhere ID. This could be used by other organizations to access your [facility] medical records  [id number]        Your Vitals Were     Pulse Height BMI (Body Mass Index)             64 5' 9\" (1.753 m) 26.73 kg/m2          Blood Pressure from Last 3 Encounters:   18 (!) 148/91   03/15/18 157/84   18 127/75    Weight from Last 3 Encounters:   18 181 lb (82.1 kg)   18 181 lb 3.5 oz (82.2 kg)   02/15/18 175 lb 3.2 oz (79.5 kg)              Today, you had the following     No orders found for display         Today's Medication Changes          These changes are accurate as of 3/28/18 11:59 PM.  If you have any questions, ask your nurse or doctor.               These medicines have changed or have updated prescriptions.        Dose/Directions    ferrous sulfate 325 (65 FE) MG tablet   Commonly known as:  IRON   This may have changed:  when to take this   Used for:  Anemia, unspecified type        Dose:  325 mg   Take 1 tablet (325 mg) by mouth daily (with breakfast)   Quantity:  " 100 tablet   Refills:  0       lisinopril 10 MG tablet   Commonly known as:  PRINIVIL/ZESTRIL   This may have changed:  when to take this   Used for:  Essential hypertension        Dose:  10 mg   Take 1 tablet (10 mg) by mouth daily   Quantity:  90 tablet   Refills:  3                Primary Care Provider Office Phone # Fax #    Cuca TRAMMELL MD [place] [phone]       [address] [place] [address] 741  [facility] [address]        Equal Access to Services     MANE HERRERA AH: Hadii aad ku hadasho Soomaali, waaxda luqadaha, qaybta kaalmada adeegyada, waxay idiin hayaan adeeg kharash la'deepali . So [facility] [phone].    ATENCIÓN: Si habla español, tiene a ha disposición servicios gratuitos de asistencia lingüística. [facility] [phone].    We comply with applicable federal civil rights laws and [place] laws. We do not discriminate on the basis of race, color, national origin, age, disability, sex, sexual orientation, or gender identity.            Thank you!     Thank you for choosing [facility] SPORTS AND ORTHOPAEDIC WALK IN CLINIC  for your care. Our goal is always to provide you with excellent care. Hearing back from our patients is one way we can continue to improve our services. Please take a few minutes to complete the written survey that you may receive in the mail after your visit with us. Thank you!             Your Updated Medication List - Protect others around you: Learn how to safely use, store and throw away your medicines at www.disposemymeds.org.          This list is accurate as of 3/28/18 11:59 PM.  Always use your most recent med list.                   Brand Name Dispense Instructions for use Diagnosis    clopidogrel 75 MG tablet    PLAVIX    30 tablet    Take 1 tablet (75 mg) by mouth daily    S/P TAVR (transcatheter aortic valve replacement)       doxycycline monohydrate 100 MG capsule     28 capsule    Take 1 capsule (100 mg) by mouth 2 times daily        ferrous sulfate 325 (65 FE) MG tablet     IRON    100 tablet    Take 1 tablet (325 mg) by mouth daily (with breakfast)    Anemia, unspecified type       fluticasone 50 MCG/ACT spray    FLONASE    16 g    Spray 2 sprays into both nostrils daily    Cough, Post-nasal drip       lisinopril 10 MG tablet    PRINIVIL/ZESTRIL    90 tablet    Take 1 tablet (10 mg) by mouth daily    Essential hypertension       loratadine 10 MG tablet    CLARITIN    90 tablet    Take 1 tablet (10 mg) by mouth daily    Post-nasal drip, Cough       montelukast 10 MG tablet    SINGULAIR    90 tablet    Take 1 tablet (10 mg) by mouth At Bedtime    Other chronic rhinitis       MULTIVITAMINS PO      Take 1 tablet by mouth At Bedtime        oxyCODONE IR 5 MG tablet    ROXICODONE    10 tablet    Take 1 tablet (5 mg) by mouth every 8 hours as needed for pain        PROTONIX PO      Take 40 mg by mouth 2 times daily as needed

## 2018-03-31 DIAGNOSIS — K31.89 EROSIVE GASTROPATHY: Primary | ICD-10-CM

## 2018-03-31 LAB
BACTERIA SPEC CULT: NO GROWTH
SPECIMEN SOURCE: NORMAL

## 2018-03-31 NOTE — TELEPHONE ENCOUNTER
pantoprazole (PROTONIX) EC tablet 40 mg   Last Written Prescription Date:  unknown  ownast Fill Quantity: unknown,   # refills: unknown  Last Office Visit :  12/18/17  Future Office visit: none    Routing because: historical. End date 2/22/18 @ discharge

## 2018-04-02 RX ORDER — PANTOPRAZOLE SODIUM 40 MG/1
40 TABLET, DELAYED RELEASE ORAL
Qty: 180 TABLET | Refills: 1 | Status: SHIPPED | OUTPATIENT
Start: 2018-04-02 | End: 2019-01-17

## 2018-04-09 ENCOUNTER — MEDICAL CORRESPONDENCE (OUTPATIENT)
Dept: HEALTH INFORMATION MANAGEMENT | Facility: CLINIC | Age: 58
End: 2018-04-09

## 2018-04-26 DIAGNOSIS — B18.2 CHRONIC HEPATITIS C WITHOUT HEPATIC COMA (H): ICD-10-CM

## 2018-04-26 DIAGNOSIS — K70.30 ALCOHOLIC CIRRHOSIS OF LIVER WITHOUT ASCITES (H): Primary | ICD-10-CM

## 2018-06-13 ENCOUNTER — RADIANT APPOINTMENT (OUTPATIENT)
Dept: GENERAL RADIOLOGY | Facility: CLINIC | Age: 58
End: 2018-06-13
Attending: FAMILY MEDICINE
Payer: COMMERCIAL

## 2018-06-13 ENCOUNTER — OFFICE VISIT (OUTPATIENT)
Dept: ORTHOPEDICS | Facility: CLINIC | Age: 58
End: 2018-06-13
Payer: COMMERCIAL

## 2018-06-13 ENCOUNTER — OFFICE VISIT (OUTPATIENT)
Dept: INTERNAL MEDICINE | Facility: CLINIC | Age: 58
End: 2018-06-13
Payer: COMMERCIAL

## 2018-06-13 VITALS
RESPIRATION RATE: 16 BRPM | OXYGEN SATURATION: 97 % | BODY MASS INDEX: 26.11 KG/M2 | WEIGHT: 176.3 LBS | TEMPERATURE: 97.7 F | HEIGHT: 69 IN | DIASTOLIC BLOOD PRESSURE: 95 MMHG | HEART RATE: 67 BPM | SYSTOLIC BLOOD PRESSURE: 169 MMHG

## 2018-06-13 VITALS
WEIGHT: 176 LBS | DIASTOLIC BLOOD PRESSURE: 95 MMHG | SYSTOLIC BLOOD PRESSURE: 169 MMHG | BODY MASS INDEX: 26.07 KG/M2 | HEIGHT: 69 IN

## 2018-06-13 DIAGNOSIS — D64.9 ANEMIA, UNSPECIFIED TYPE: ICD-10-CM

## 2018-06-13 DIAGNOSIS — M25.522 LEFT ELBOW PAIN: Primary | ICD-10-CM

## 2018-06-13 DIAGNOSIS — M77.02 MEDIAL EPICONDYLITIS, LEFT ELBOW: ICD-10-CM

## 2018-06-13 DIAGNOSIS — M25.522 LEFT ELBOW PAIN: ICD-10-CM

## 2018-06-13 DIAGNOSIS — B18.2 CHRONIC HEPATITIS C WITHOUT HEPATIC COMA (H): Primary | ICD-10-CM

## 2018-06-13 ASSESSMENT — PAIN SCALES - GENERAL: PAINLEVEL: SEVERE PAIN (6)

## 2018-06-13 NOTE — MR AVS SNAPSHOT
After Visit Summary   6/13/2018    Ten Johnson    MRN: [id number]           Patient Information     Date Of Birth          1960        Visit Information        Provider Department      6/13/2018 12:30 PM Mili Capps MD [facility]        Today's Diagnoses     Chronic hepatitis C without hepatic coma (H)    -  1       Follow-ups after your visit        Your next 10 appointments already scheduled     Jun 18, 2018 11:00 AM CDT   Lab with  LAB    Health Lab ([facility])    [address] [place] [address]  1st Floor  [facility] [id number] [phone]            Jun 18, 2018 12:00 PM CDT   (Arrive by 11:45 AM)   Return General Liver with Murphy Enciso PA-C   [facility] Hepatology ([facility])    [address] [place] [address]  [address]  [facility] [id number] [phone]            Jun 29, 2018 10:15 AM CDT   (Arrive by 10:00 AM)   Return Visit with Cuca Celeste MD   [facility] ([facility])    [address] [place] [address]  4th Floor  [facility] [id number] [phone]            Sep 20, 2018  1:00 PM CDT   Ech Complete with UCECHCR2    Health Echo ([facility])    [address] [place] [address]  3rd Floor  [facility] [phone] [phone]           1. Please bring or wear a comfortable two-piece outfit. 2. You may eat, drink and take your normal medicines. 3. For any questions that cannot be answered, please contact the ordering physician            Sep 20, 2018  2:00 PM CDT   (Arrive by 1:45 PM)   TAVR Return with SYDNIE Hong CNP   [facility] Heart Care ([facility])    [address] [place] [address]  [address]  [facility] [id number] [phone]              Who to contact     Please call your clinic at [phone] to:    Ask questions about your health    Make or cancel appointments    Discuss your medicines    Learn about  "your test results    Speak to your doctor            Additional Information About Your Visit        Draftsterhart Information     [employer] is an electronic gateway that provides easy, online access to your medical records. With [employer], you can request a clinic appointment, read your test results, renew a prescription or communicate with your care team.     To sign up for [employer] visit the website at [email or web address]   You will be asked to enter the access code listed below, as well as some personal information. Please follow the directions to create your username and password.     Your access code is: [email or web address]  Expires: 2018  4:22 PM     Your access code will  in 90 days. If you need help or a new code, please contact your [facility] or call [phone] for assistance.        Care EveryWhere ID     This is your Care EveryWhere ID. This could be used by other organizations to access your [facility] medical records  [id number]        Your Vitals Were     Pulse Temperature Respirations Height Pulse Oximetry BMI (Body Mass Index)    67 97.7  F (36.5  C) (Oral) 16 1.753 m (5' 9\") 97% 26.03 kg/m2       Blood Pressure from Last 3 Encounters:   18 (!) 169/95   18 (!) 148/91   03/15/18 157/84    Weight from Last 3 Encounters:   18 80 kg (176 lb 4.8 oz)   18 82.1 kg (181 lb)   18 82.2 kg (181 lb 3.5 oz)              Today, you had the following     No orders found for display         Today's Medication Changes          These changes are accurate as of 18 12:47 PM.  If you have any questions, ask your nurse or doctor.               These medicines have changed or have updated prescriptions.        Dose/Directions    ferrous sulfate 325 (65 Fe) MG tablet   Commonly known as:  IRON   This may have changed:  when to take this   Used for:  Anemia, unspecified type        Dose:  325 mg   Take 1 tablet (325 mg) by mouth daily (with breakfast) "   Quantity:  100 tablet   Refills:  0       lisinopril 10 MG tablet   Commonly known as:  PRINIVIL/ZESTRIL   This may have changed:  when to take this   Used for:  Essential hypertension        Dose:  10 mg   Take 1 tablet (10 mg) by mouth daily   Quantity:  90 tablet   Refills:  3                Primary Care Provider Office Phone # Fax #    Cuca Celeste [phone]       [address] [place] [address]  [facility] [address]        Equal Access to Services     MANE HERRERA AH: Hadii aad ku hadasho Soomaali, waaxda luqadaha, qaybta kaalmada adeegyada, waxay idiin hayaan adeeg kharash laharesh . So [facility] [phone].    ATENCIÓN: Si habla español, tiene a ha disposición servicios gratuitos de asistencia lingüística. [facility] [phone].    We comply with applicable federal civil rights laws and [place] laws. We do not discriminate on the basis of race, color, national origin, age, disability, sex, sexual orientation, or gender identity.            Thank you!     Thank you for choosing [facility]  for your care. Our goal is always to provide you with excellent care. Hearing back from our patients is one way we can continue to improve our services. Please take a few minutes to complete the written survey that you may receive in the mail after your visit with us. Thank you!             Your Updated Medication List - Protect others around you: Learn how to safely use, store and throw away your medicines at www.disposemymeds.org.          This list is accurate as of 6/13/18 12:47 PM.  Always use your most recent med list.                   Brand Name Dispense Instructions for use Diagnosis    clopidogrel 75 MG tablet    PLAVIX    30 tablet    Take 1 tablet (75 mg) by mouth daily    S/P TAVR (transcatheter aortic valve replacement)       doxycycline monohydrate 100 MG capsule     28 capsule    Take 1 capsule (100 mg) by mouth 2 times daily        ferrous sulfate 325 (65 Fe) MG tablet    IRON     100 tablet    Take 1 tablet (325 mg) by mouth daily (with breakfast)    Anemia, unspecified type       fluticasone 50 MCG/ACT spray    FLONASE    16 g    Spray 2 sprays into both nostrils daily    Cough, Post-nasal drip       lisinopril 10 MG tablet    PRINIVIL/ZESTRIL    90 tablet    Take 1 tablet (10 mg) by mouth daily    Essential hypertension       loratadine 10 MG tablet    CLARITIN    90 tablet    Take 1 tablet (10 mg) by mouth daily    Post-nasal drip, Cough       montelukast 10 MG tablet    SINGULAIR    90 tablet    Take 1 tablet (10 mg) by mouth At Bedtime    Other chronic rhinitis       MULTIVITAMINS PO      Take 1 tablet by mouth At Bedtime        oxyCODONE IR 5 MG tablet    ROXICODONE    10 tablet    Take 1 tablet (5 mg) by mouth every 8 hours as needed for pain        pantoprazole 40 MG EC tablet    PROTONIX    180 tablet    Take 1 tablet (40 mg) by mouth 2 times daily (before meals)    Erosive gastropathy

## 2018-06-13 NOTE — PROGRESS NOTES
"Chief Complaint   Ten Johnson is a 58 year old male presents for   Chief Complaint   Patient presents with     Forms      SUBJECTIVE:  He reports he has been on disability for multiple issues.  Hx of bleeding ulcers requiring ICU admission.  Also aortic valve stenosis requiring TAVR.  Also diagnosis of HCV and cirrhosis.      Ongoing L elbow issues after ?septic joint.      +weight loss since Drifting.  He reports most of this is muscle due to atrophy.      He is requesting forms relating to his disability be filled out.    Medications and allergies were reviewed by me today.     Social History   History   Smoking Status     Never Smoker   Smokeless Tobacco     Never Used      PHQ-2 ( 1999 [employer]) 9/26/2012 7/30/2012   Q1: Little interest or pleasure in doing things 1 0   Q2: Feeling down, depressed or hopeless 0 0   PHQ-2 Score 1 0     PHQ-9 SCORE 12/14/2011   Total Score 7       Past Medical History   Patient Active Problem List   Diagnosis     Presbyopia     Rotator cuff tear, right     OA (osteoarthritis) of knee     Rhinitis, allergic     Nonrheumatic aortic valve stenosis     Aortic stenosis, severe       Review of Systems   5 point ROS completed and negative except noted above, including Gen, CV, Resp, GI, MS    Physical Exam   BP (!) 169/95  Pulse 67  Temp 97.7  F (36.5  C) (Oral)  Resp 16  Ht 1.753 m (5' 9\")  Wt 80 kg (176 lb 4.8 oz)  SpO2 97%  BMI 26.03 kg/m2  Gen: no distress, comfortable, pleasant   Eyes: anicteric, normal extra-ocular movements   Skin: no concerning lesions, no jaundice   Psychological: appropriate mood     Assessment & Plan      Chronic hepatitis C without hepatic coma (H)  Discussed with Dr. Celeste.  He will schedule an appt to see her as it is more appropriate for her to complete these disability forms.      RTC: next week    Mili Capps MD  "

## 2018-06-13 NOTE — LETTER
6/13/2018       RE: Ten Johnson  [address]  [facility] [address]     Dear Colleague,    Thank you for referring your patient, Ten Johnson, to the [facility] SPORTS AND ORTHOPAEDIC WALK IN CLINIC at [facility]. Please see a copy of my visit note below.          SPORTS & ORTHOPEDIC WALK-IN VISIT 6/13/2018    Primary Care Physician: Dr. Celeste    History of reconstructive surgery in left elbow in 2011. Past 3 weeks started having pain again. Most pain medial elbow. Feels sharp. Difficulty lifting things. Getting worse.     Reason for visit:     What part of your body is injured / painful?  left elbow    What caused the injury /pain? Unsure    How long ago did your injury occur or pain begin? several weeks ago    What are your most bothersome symptoms? Pain, numbness and tingling at times and Other: lump    How would you characterize your symptom?  sharp    What makes your symptoms better? Other: pillow    What makes your symptoms worse? Other: lifting, biking, movement    Have you been previously seen for this problem? No    Medical History:    Any recent changes to your medical history? No    Any new medication prescribed since last visit? No    Have you had surgery on this body part before? Yes Date: 2011, Surgical Procedure: ligament reconstruction    Social History:    Occupation: None    Handedness: Right    Exercise: None    Review of Systems:    Do you have fever, chills, weight loss? Yes, 45 lbs lost since christmas    Do you have any vision problems? No    Do you have any chest pain or edema? No    Do you have any shortness of breath or wheezing? Yes, sometimes    Do you have stomach problems? No    Do you have any numbness or focal weakness? Yes, arm    Do you have diabetes? No    Do you have problems with bleeding or clotting? Yes, Recently got a TAVR    Do you have an rashes or other skin lesions? No           CHIEF COMPLAINT:  Consult (Left elbow)        HISTORY OF PRESENT ILLNESS  Mr. Johnson is a pleasant 58 year old year old male who presents to clinic today with left elbow pain.  Ten explains that his left elbow pain began about 3 weeks ago.  He notes a history of UCL reconstruction in 2011, therefore he was concerned about internal derrangement at this joint.  Patient notes pain is worse with using forearm and wrist, sharp.  Lifting things, performing duties at work as an  - twisting.  Improved with rest.  Also problematic biking, he bikes 100 miles/week and now can only perform 60/week. Patient does note rare numbness but does not travel down the arm or into hand.  Treatment includes activity modification.    Additional history:  Left elbow lateral ulnar collateral ligament repair and reconstruction and tricep repair 2011    MEDICAL HISTORY  Patient Active Problem List   Diagnosis     Presbyopia     Rotator cuff tear, right     OA (osteoarthritis) of knee     Rhinitis, allergic     Nonrheumatic aortic valve stenosis     Aortic stenosis, severe       Current Outpatient Prescriptions   Medication Sig Dispense Refill     clopidogrel (PLAVIX) 75 MG tablet Take 1 tablet (75 mg) by mouth daily 30 tablet 3     doxycycline monohydrate 100 MG capsule Take 1 capsule (100 mg) by mouth 2 times daily (Patient not taking: Reported on 6/13/2018) 28 capsule 0     ferrous sulfate (IRON) 325 (65 FE) MG tablet Take 1 tablet (325 mg) by mouth daily (with breakfast) (Patient taking differently: Take 325 mg by mouth At Bedtime ) 100 tablet 0     fluticasone (FLONASE) 50 MCG/ACT spray Spray 2 sprays into both nostrils daily 16 g 11     lisinopril (PRINIVIL/ZESTRIL) 10 MG tablet Take 1 tablet (10 mg) by mouth daily (Patient taking differently: Take 10 mg by mouth At Bedtime ) 90 tablet 3     loratadine (CLARITIN) 10 MG tablet Take 1 tablet (10 mg) by mouth daily 90 tablet 3     montelukast (SINGULAIR) 10 MG tablet Take 1 tablet (10 mg) by mouth At Bedtime 90 tablet 1  "    Multiple Vitamin (MULTIVITAMINS PO) Take 1 tablet by mouth At Bedtime        oxyCODONE IR (ROXICODONE) 5 MG tablet Take 1 tablet (5 mg) by mouth every 8 hours as needed for pain 10 tablet 0     pantoprazole (PROTONIX) 40 MG EC tablet Take 1 tablet (40 mg) by mouth 2 times daily (before meals) 180 tablet 1       Allergies   Allergen Reactions     Zolpidem Other (See Comments)     Alcoholic.  Had reaction 3/17/13 while intoxicated which included black out, loss of awareness, paranoia.  Do not prescribe.  Dr. Celeste     Cats Other (See Comments)     rhinitis     Dogs Other (See Comments)     rhinitis     Pollen Extract Other (See Comments)     rhinits.       Family History   Problem Relation Age of Onset     CANCER Mother 62     Alcoholism Paternal Uncle      Cirrhosis No family hx of        Additional medical/Social/Surgical histories reviewed in EPIC and updated as appropriate.     REVIEW OF SYSTEMS (6/13/2018)  CONSTITUTIONAL: Denies fever and weight loss  EYES: Denies acute vision changes  ENT: Denies hearing changes or difficulty swallowing  CARDIAC: Denies chest pain or edema  RESPIRATORY: Denies dyspnea, cough or wheeze  GASTROINTESTINAL: Denies abdominal pain, nausea, vomiting  MUSCULOSKELETAL: See HPI  SKIN: Denies any recent rash or lesion  NEUROLOGICAL: Denies numbness or focal weakness  PSYCHIATRIC: No history of psychiatric symptoms or problems   ENDOCRINE: Diagnosis of diabetes:No  HEMATOLOGY: Denies episodes of easy bleeding      PHYSICAL EXAM  BP (!) 169/95  Ht 1.753 m (5' 9\")  Wt 79.8 kg (176 lb)  BMI 25.99 kg/m2    General  - normal appearance, in no obvious distress  CV  - normal radial pulse  Pulm  - normal respiratory pattern, non-labored  Musculoskeletal - Left elbow  - inspection: normal joint alignment, no obvious deformity, no swelling  - palpation: TTP at medial epicondyle and proximal flexor tendons.  - ROM:  160 deg flexion   0 deg extension   90 deg pronation   90 deg supination  - " strength: 5/5  strength, 5/5 flexion, extension, pronation, supination.  Pain with resisted pronation.  - special tests:  (-) varus  (-) valgus  (-) Tinel's  Neuro  - no sensory or motor deficit, grossly normal coordination, normal muscle tone  Skin  - no ecchymosis, erythema, warmth, or induration, no obvious rash  Psych  - interactive, appropriate, normal mood and affect    IMAGING : XR left elbow Final results and radiologist's interpretation, available in the [facility] health record. Images were reviewed with the patient/family members in the office today. My personal interpretation of the performed imaging is no acute abonrmalities. Joint space is preserved.      ASSESSMENT & PLAN  Mr. Johnson is a 58 year old year old male with past medical history of left elbow UCL reconstruction in 2011 presents to clinic today with acute pain of medial elbow over the past 3 weeks.  After discussion and examination it is rather clear that this is more likely an overuse injury and inflammation at medial epicondyle.    Diagnosis:   Medial epicondylitis of left elbow    -Activity modification discussed  -Ice, analgesics  -May try forearm strap  -Home exercise program provided for forearm stretching and strengthening  -Follow up 4 weeks    It was a pleasure seeing Ten today.    Harley Cedeno DO, CAM  Primary Care Sports Medicine

## 2018-06-13 NOTE — PROGRESS NOTES
SPORTS & ORTHOPEDIC WALK-IN VISIT 6/13/2018    Primary Care Physician: Dr. Celeste    History of reconstructive surgery in left elbow in 2011. Past 3 weeks started having pain again. Most pain medial elbow. Feels sharp. Difficulty lifting things. Getting worse.     Reason for visit:     What part of your body is injured / painful?  left elbow    What caused the injury /pain? Unsure    How long ago did your injury occur or pain begin? several weeks ago    What are your most bothersome symptoms? Pain, numbness and tingling at times and Other: lump    How would you characterize your symptom?  sharp    What makes your symptoms better? Other: pillow    What makes your symptoms worse? Other: lifting, biking, movement    Have you been previously seen for this problem? No    Medical History:    Any recent changes to your medical history? No    Any new medication prescribed since last visit? No    Have you had surgery on this body part before? Yes Date: 2011, Surgical Procedure: ligament reconstruction    Social History:    Occupation: None    Handedness: Right    Exercise: None    Review of Systems:    Do you have fever, chills, weight loss? Yes, 45 lbs lost since christmas    Do you have any vision problems? No    Do you have any chest pain or edema? No    Do you have any shortness of breath or wheezing? Yes, sometimes    Do you have stomach problems? No    Do you have any numbness or focal weakness? Yes, arm    Do you have diabetes? No    Do you have problems with bleeding or clotting? Yes, Recently got a TAVR    Do you have an rashes or other skin lesions? No

## 2018-06-13 NOTE — MR AVS SNAPSHOT
After Visit Summary   6/13/2018    Ten Johnson    MRN: [id number]           Patient Information     Date Of Birth          1960        Visit Information        Provider Department      6/13/2018 1:00 PM Harley Cedeno DO [facility] Sports and Orthopaedic Walk In Clinic        Today's Diagnoses     Left elbow pain    -  1      Care Instructions              Follow-ups after your visit        Your next 10 appointments already scheduled     Jun 18, 2018 11:00 AM CDT   Lab with  LAB    Health Lab ([facility])    [address] [place] [address]  1st Floor  [facility] [id number] [phone]            Jun 18, 2018 12:00 PM CDT   (Arrive by 11:45 AM)   Return General Liver with Murphy Enciso PA-C   [facility] Hepatology ([facility])    [address] [place] [address]  [address]  [facility] [id number] [phone]            Jun 29, 2018 10:15 AM CDT   (Arrive by 10:00 AM)   Return Visit with Cuca Celeste MD   [facility] Primary Care Clinic ([facility])    [address] [place] [address]  4th Floor  [facility] [id number] [phone]            Sep 20, 2018  1:00 PM CDT   Ech Complete with UCECHCR2    Health Echo ([facility])    [address] [place] [address]  3rd Floor  [facility] [id number] [phone]           1. Please bring or wear a comfortable two-piece outfit. 2. You may eat, drink and take your normal medicines. 3. For any questions that cannot be answered, please contact the ordering physician            Sep 20, 2018  2:00 PM CDT   (Arrive by 1:45 PM)   TAVR Return with SYDNIE Hong CNP   [facility] Heart Care ([facility])    [address] [place] [address]  [address]  [facility] [id number] [phone]              Who to contact     Please call your clinic at [phone] to:    Ask questions about your health    Make or cancel appointments    Discuss your  "medicines    Learn about your test results    Speak to your doctor            Additional Information About Your Visit        MyChart Information     My [employer] is an electronic gateway that provides easy, online access to your medical records. With My [employer], you can request a clinic appointment, read your test results, renew a prescription or communicate with your care team.     To sign up for My [employer] visit the website at [email or web address]   You will be asked to enter the access code listed below, as well as some personal information. Please follow the directions to create your username and password.     Your access code is: [email or web address]  Expires: 2018  4:22 PM     Your access code will  in 90 days. If you need help or a new code, please contact your [facility] or call [phone] for assistance.        Care EveryWhere ID     This is your Care EveryWhere ID. This could be used by other organizations to access your [place] medical records  [id number]        Your Vitals Were     Height BMI (Body Mass Index)                1.753 m (5' 9\") 25.99 kg/m2           Blood Pressure from Last 3 Encounters:   18 (!) 169/95   18 (!) 169/95   18 (!) 148/91    Weight from Last 3 Encounters:   18 79.8 kg (176 lb)   18 80 kg (176 lb 4.8 oz)   18 82.1 kg (181 lb)                 Today's Medication Changes          These changes are accurate as of 18  1:31 PM.  If you have any questions, ask your nurse or doctor.               These medicines have changed or have updated prescriptions.        Dose/Directions    ferrous sulfate 325 (65 Fe) MG tablet   Commonly known as:  IRON   This may have changed:  when to take this   Used for:  Anemia, unspecified type        Dose:  325 mg   Take 1 tablet (325 mg) by mouth daily (with breakfast)   Quantity:  100 tablet   Refills:  0       lisinopril 10 MG tablet   Commonly known as:  PRINIVIL/ZESTRIL   This " may have changed:  when to take this   Used for:  Essential hypertension        Dose:  10 mg   Take 1 tablet (10 mg) by mouth daily   Quantity:  90 tablet   Refills:  3                Primary Care Provider Office Phone # Fax #    Cuca Celeste [phone]       [address] [place] [address]  [facility] [address]        Equal Access to Services     FRANCISCO JAVIERTOBIAS JAVIER : Hadii aad ku hadasho Soomaali, waaxda luqadaha, qaybta kaalmada adeegyada, waxdevi gutierrez hayaan adecampbell tolbertsanjusherri laharesh . So [facility] [phone].    ATENCIÓN: Si habla español, tiene a ha disposición servicios gratuitos de asistencia lingüística. Llame al [phone].    We comply with applicable federal civil rights laws and [place] laws. We do not discriminate on the basis of race, color, national origin, age, disability, sex, sexual orientation, or gender identity.            Thank you!     Thank you for choosing [facility] SPORTS AND ORTHOPAEDIC WALK IN CLINIC  for your care. Our goal is always to provide you with excellent care. Hearing back from our patients is one way we can continue to improve our services. Please take a few minutes to complete the written survey that you may receive in the mail after your visit with us. Thank you!             Your Updated Medication List - Protect others around you: Learn how to safely use, store and throw away your medicines at www.disposemymeds.org.          This list is accurate as of 6/13/18  1:31 PM.  Always use your most recent med list.                   Brand Name Dispense Instructions for use Diagnosis    clopidogrel 75 MG tablet    PLAVIX    30 tablet    Take 1 tablet (75 mg) by mouth daily    S/P TAVR (transcatheter aortic valve replacement)       doxycycline monohydrate 100 MG capsule     28 capsule    Take 1 capsule (100 mg) by mouth 2 times daily        ferrous sulfate 325 (65 Fe) MG tablet    IRON    100 tablet    Take 1 tablet (325 mg) by mouth daily (with breakfast)    Anemia, unspecified  type       fluticasone 50 MCG/ACT spray    FLONASE    16 g    Spray 2 sprays into both nostrils daily    Cough, Post-nasal drip       lisinopril 10 MG tablet    PRINIVIL/ZESTRIL    90 tablet    Take 1 tablet (10 mg) by mouth daily    Essential hypertension       loratadine 10 MG tablet    CLARITIN    90 tablet    Take 1 tablet (10 mg) by mouth daily    Post-nasal drip, Cough       montelukast 10 MG tablet    SINGULAIR    90 tablet    Take 1 tablet (10 mg) by mouth At Bedtime    Other chronic rhinitis       MULTIVITAMINS PO      Take 1 tablet by mouth At Bedtime        oxyCODONE IR 5 MG tablet    ROXICODONE    10 tablet    Take 1 tablet (5 mg) by mouth every 8 hours as needed for pain        pantoprazole 40 MG EC tablet    PROTONIX    180 tablet    Take 1 tablet (40 mg) by mouth 2 times daily (before meals)    Erosive gastropathy

## 2018-06-13 NOTE — PROGRESS NOTES
CHIEF COMPLAINT:  Consult (Left elbow)       HISTORY OF PRESENT ILLNESS  Mr. Johnson is a pleasant 58 year old year old male who presents to clinic today with left elbow pain.  Ten explains that his left elbow pain began about 3 weeks ago.  He notes a history of UCL reconstruction in 2011, therefore he was concerned about internal derrangement at this joint.  Patient notes pain is worse with using forearm and wrist, sharp.  Lifting things, performing duties at work as an  - twisting.  Improved with rest.  Also problematic biking, he bikes 100 miles/week and now can only perform 60/week. Patient does note rare numbness but does not travel down the arm or into hand.  Treatment includes activity modification.    Additional history:  Left elbow lateral ulnar collateral ligament repair and reconstruction and tricep repair 2011    MEDICAL HISTORY  Patient Active Problem List   Diagnosis     Presbyopia     Rotator cuff tear, right     OA (osteoarthritis) of knee     Rhinitis, allergic     Nonrheumatic aortic valve stenosis     Aortic stenosis, severe       Current Outpatient Prescriptions   Medication Sig Dispense Refill     clopidogrel (PLAVIX) 75 MG tablet Take 1 tablet (75 mg) by mouth daily 30 tablet 3     doxycycline monohydrate 100 MG capsule Take 1 capsule (100 mg) by mouth 2 times daily (Patient not taking: Reported on 6/13/2018) 28 capsule 0     ferrous sulfate (IRON) 325 (65 FE) MG tablet Take 1 tablet (325 mg) by mouth daily (with breakfast) (Patient taking differently: Take 325 mg by mouth At Bedtime ) 100 tablet 0     fluticasone (FLONASE) 50 MCG/ACT spray Spray 2 sprays into both nostrils daily 16 g 11     lisinopril (PRINIVIL/ZESTRIL) 10 MG tablet Take 1 tablet (10 mg) by mouth daily (Patient taking differently: Take 10 mg by mouth At Bedtime ) 90 tablet 3     loratadine (CLARITIN) 10 MG tablet Take 1 tablet (10 mg) by mouth daily 90 tablet 3     montelukast (SINGULAIR) 10 MG tablet Take 1 tablet  "(10 mg) by mouth At Bedtime 90 tablet 1     Multiple Vitamin (MULTIVITAMINS PO) Take 1 tablet by mouth At Bedtime        oxyCODONE IR (ROXICODONE) 5 MG tablet Take 1 tablet (5 mg) by mouth every 8 hours as needed for pain 10 tablet 0     pantoprazole (PROTONIX) 40 MG EC tablet Take 1 tablet (40 mg) by mouth 2 times daily (before meals) 180 tablet 1       Allergies   Allergen Reactions     Zolpidem Other (See Comments)     Alcoholic.  Had reaction 3/17/13 while intoxicated which included black out, loss of awareness, paranoia.  Do not prescribe.  Dr. Celeste     Cats Other (See Comments)     rhinitis     Dogs Other (See Comments)     rhinitis     Pollen Extract Other (See Comments)     rhinits.       Family History   Problem Relation Age of Onset     CANCER Mother 62     Alcoholism Paternal Uncle      Cirrhosis No family hx of        Additional medical/Social/Surgical histories reviewed in [facility] and updated as appropriate.     REVIEW OF SYSTEMS (6/13/2018)  CONSTITUTIONAL: Denies fever and weight loss  EYES: Denies acute vision changes  ENT: Denies hearing changes or difficulty swallowing  CARDIAC: Denies chest pain or edema  RESPIRATORY: Denies dyspnea, cough or wheeze  GASTROINTESTINAL: Denies abdominal pain, nausea, vomiting  MUSCULOSKELETAL: See HPI  SKIN: Denies any recent rash or lesion  NEUROLOGICAL: Denies numbness or focal weakness  PSYCHIATRIC: No history of psychiatric symptoms or problems   ENDOCRINE: Diagnosis of diabetes:No  HEMATOLOGY: Denies episodes of easy bleeding      PHYSICAL EXAM  BP (!) 169/95  Ht 1.753 m (5' 9\")  Wt 79.8 kg (176 lb)  BMI 25.99 kg/m2    General  - normal appearance, in no obvious distress  CV  - normal radial pulse  Pulm  - normal respiratory pattern, non-labored  Musculoskeletal - Left elbow  - inspection: normal joint alignment, no obvious deformity, no swelling  - palpation: TTP at medial epicondyle and proximal flexor tendons.  - ROM:  160 deg flexion   0 deg " extension   90 deg pronation   90 deg supination  - strength: 5/5  strength, 5/5 flexion, extension, pronation, supination.  Pain with resisted pronation.  - special tests:  (-) varus  (-) valgus  (-) Tinel's  Neuro  - no sensory or motor deficit, grossly normal coordination, normal muscle tone  Skin  - no ecchymosis, erythema, warmth, or induration, no obvious rash  Psych  - interactive, appropriate, normal mood and affect    IMAGING : XR left elbow Final results and radiologist's interpretation, available in the [facility] health record. Images were reviewed with the patient/family members in the office today. My personal interpretation of the performed imaging is no acute abonrmalities. Joint space is preserved.      ASSESSMENT & PLAN  Mr. Johnson is a 58 year old year old male with past medical history of left elbow UCL reconstruction in 2011 presents to clinic today with acute pain of medial elbow over the past 3 weeks.  After discussion and examination it is rather clear that this is more likely an overuse injury and inflammation at medial epicondyle.    Diagnosis:   Medial epicondylitis of left elbow    -Activity modification discussed  -Ice, analgesics  -May try forearm strap  -Home exercise program provided for forearm stretching and strengthening  -Follow up 4 weeks    It was a pleasure seeing Ten today.    Harley Cedeno DO, CAQSM  Primary Care Sports Medicine

## 2018-06-15 RX ORDER — FERROUS SULFATE 325(65) MG
325 TABLET ORAL AT BEDTIME
COMMUNITY
Start: 2018-06-15 | End: 2019-01-17

## 2018-06-29 ENCOUNTER — OFFICE VISIT (OUTPATIENT)
Dept: INTERNAL MEDICINE | Facility: CLINIC | Age: 58
End: 2018-06-29
Payer: COMMERCIAL

## 2018-06-29 VITALS
BODY MASS INDEX: 26.23 KG/M2 | SYSTOLIC BLOOD PRESSURE: 151 MMHG | HEART RATE: 91 BPM | RESPIRATION RATE: 16 BRPM | DIASTOLIC BLOOD PRESSURE: 78 MMHG | WEIGHT: 177.6 LBS

## 2018-06-29 DIAGNOSIS — F19.11 HISTORY OF DRUG ABUSE (H): ICD-10-CM

## 2018-06-29 DIAGNOSIS — G89.29 CHRONIC RIGHT SHOULDER PAIN: ICD-10-CM

## 2018-06-29 DIAGNOSIS — J30.9 CHRONIC ALLERGIC RHINITIS, UNSPECIFIED SEASONALITY, UNSPECIFIED TRIGGER: ICD-10-CM

## 2018-06-29 DIAGNOSIS — M70.20 OLECRANON BURSITIS, UNSPECIFIED LATERALITY: ICD-10-CM

## 2018-06-29 DIAGNOSIS — I51.89 DIASTOLIC DYSFUNCTION: ICD-10-CM

## 2018-06-29 DIAGNOSIS — Z95.3 S/P TAVR (TRANSCATHETER AORTIC VALVE REPLACEMENT), BIOPROSTHETIC: ICD-10-CM

## 2018-06-29 DIAGNOSIS — Z79.01 ANTICOAGULANT LONG-TERM USE: ICD-10-CM

## 2018-06-29 DIAGNOSIS — M25.511 CHRONIC RIGHT SHOULDER PAIN: ICD-10-CM

## 2018-06-29 DIAGNOSIS — K76.6 PORTAL HYPERTENSION (H): ICD-10-CM

## 2018-06-29 DIAGNOSIS — D64.9 CHRONIC ANEMIA: ICD-10-CM

## 2018-06-29 ASSESSMENT — PAIN SCALES - GENERAL: PAINLEVEL: NO PAIN (0)

## 2018-06-29 NOTE — PATIENT INSTRUCTIONS
Primary Care Center Phone Number [phone]  Primary Care Center Medication Refill Request Information:  * Please contact your pharmacy regarding ANY request for medication refills.  ** [facility] Prescription Fax = [phone]  * Please allow 3 business days for routine medication refills.  * Please allow 5 business days for controlled substance medication refills.     Primary Care Center Test Result notification information:  *You will be notified with in 7-10 days of your appointment day regarding the results of your test.  If you are on MyChart you will be notified as soon as the provider has reviewed the results and signed off on them.

## 2018-06-29 NOTE — MR AVS SNAPSHOT
After Visit Summary   6/29/2018    Ten Johnson    MRN: [id number]           Patient Information     Date Of Birth          1960        Visit Information        Provider Department      6/29/2018 10:15 AM Cuca Celeste MD [facility] Primary Care Clinic        Today's Diagnoses     Olecranon bursitis, unspecified laterality        S/p TAVR (transcatheter aortic valve replacement), bioprosthetic        Anticoagulant long-term use        Chronic right shoulder pain        Chronic anemia        Diastolic dysfunction        Chronic allergic rhinitis, unspecified seasonality, unspecified trigger        Portal hypertension (H)        History of drug abuse          Care Instructions    Primary Care Center Phone Number [phone]  Primary Care Center Medication Refill Request Information:  * Please contact your pharmacy regarding ANY request for medication refills.  ** [facility] Prescription Fax = [phone]  * Please allow 3 business days for routine medication refills.  * Please allow 5 business days for controlled substance medication refills.     Primary Care Center Test Result notification information:  *You will be notified with in 7-10 days of your appointment day regarding the results of your test.  If you are on MyChart you will be notified as soon as the provider has reviewed the results and signed off on them.                  Follow-ups after your visit        Follow-up notes from your care team     Return for due for GI follow up with Dr. Sandoval.      Your next 10 appointments already scheduled     Sep 20, 2018  1:00 PM CDT   Ech Complete with UCECHCR2   [facility] Echo ([facility])    [address] [place] [id number] [phone]           1. Please bring or wear a comfortable two-piece outfit. 2. You may eat, drink and take your normal medicines. 3. For any questions that cannot be answered, please contact the ordering physician             Sep 20, 2018  2:00 PM CDT   (Arrive by 1:45 PM)   TAVR Return with SYDNIE Hong [facility] ([facility])    [address] [facility]  [address] [place] [address] [phone]              Who to contact     Please call your clinic at [phone] to:    Ask questions about your health    Make or cancel appointments    Discuss your medicines    Learn about your test results    Speak to your doctor            Additional Information About Your Visit        TwentyFour[employer]hart Information     [employer] is an electronic gateway that provides easy, online access to your medical records. With [employer], you can request a clinic appointment, read your test results, renew a prescription or communicate with your care team.     To sign up for [employer] visit the website at [email or web address]   You will be asked to enter the access code listed below, as well as some personal information. Please follow the directions to create your username and password.     Your access code is: [email or web address]  Expires: 2018  6:30 AM     Your access code will  in 90 days. If you need help or a new code, please contact your [facility] or call [phone] for assistance.        Care EveryWhere ID     This is your Care EveryWhere ID. This could be used by other organizations to access your [place] medical records  [id number]        Your Vitals Were     Pulse Respirations BMI (Body Mass Index)             91 16 26.23 kg/m2          Blood Pressure from Last 3 Encounters:   18 151/78   18 (!) 169/95   18 (!) 169/95    Weight from Last 3 Encounters:   18 80.6 kg (177 lb 9.6 oz)   18 79.8 kg (176 lb)   18 80 kg (176 lb 4.8 oz)              Today, you had the following     No orders found for display         Today's Medication Changes          These changes are accurate as of 18 10:53 AM.  If you have any questions, ask your  nurse or doctor.               These medicines have changed or have updated prescriptions.        Dose/Directions    lisinopril 10 MG tablet   Commonly known as:  PRINIVIL/ZESTRIL   This may have changed:  when to take this   Used for:  Essential hypertension        Dose:  10 mg   Take 1 tablet (10 mg) by mouth daily   Quantity:  90 tablet   Refills:  3                Primary Care Provider Office Phone # Fax #    Cuca Celeste [phone]       [address] [place] [address] [address] [place] [address]        Equal Access to Services     MANE HERRERA AH: Hadii aad ku hadasho Soomaali, waaxda luqadaha, qaybta kaalmada adeegyada, waxay idiin haymeshan konrad crespo . So [facility] [phone].    ATENCIÓN: Si habla español, tiene a ha disposición servicios gratuitos de asistencia lingüística. LlThe [facility] [phone].    We comply with applicable federal civil rights laws and [place] laws. We do not discriminate on the basis of race, color, national origin, age, disability, sex, sexual orientation, or gender identity.            Thank you!     Thank you for choosing [facility] PRIMARY CARE CLINIC  for your care. Our goal is always to provide you with excellent care. Hearing back from our patients is one way we can continue to improve our services. Please take a few minutes to complete the written survey that you may receive in the mail after your visit with us. Thank you!             Your Updated Medication List - Protect others around you: Learn how to safely use, store and throw away your medicines at www.disposemymeds.org.          This list is accurate as of 6/29/18 10:53 AM.  Always use your most recent med list.                   Brand Name Dispense Instructions for use Diagnosis    clopidogrel 75 MG tablet    PLAVIX    30 tablet    Take 1 tablet (75 mg) by mouth daily    S/P TAVR (transcatheter aortic valve replacement)       ferrous sulfate 325 (65 Fe) MG tablet    IRON     Take 1 tablet (325 mg) by mouth  At Bedtime    Anemia, unspecified type       fluticasone 50 MCG/ACT spray    FLONASE    16 g    Spray 2 sprays into both nostrils daily    Cough, Post-nasal drip       lisinopril 10 MG tablet    PRINIVIL/ZESTRIL    90 tablet    Take 1 tablet (10 mg) by mouth daily    Essential hypertension       loratadine 10 MG tablet    CLARITIN    90 tablet    Take 1 tablet (10 mg) by mouth daily    Post-nasal drip, Cough       montelukast 10 MG tablet    SINGULAIR    90 tablet    Take 1 tablet (10 mg) by mouth At Bedtime    Other chronic rhinitis       MULTIVITAMINS PO      Take 1 tablet by mouth At Bedtime        oxyCODONE IR 5 MG tablet    ROXICODONE    10 tablet    Take 1 tablet (5 mg) by mouth every 8 hours as needed for pain        pantoprazole 40 MG EC tablet    PROTONIX    180 tablet    Take 1 tablet (40 mg) by mouth 2 times daily (before meals)    Erosive gastropathy

## 2018-06-29 NOTE — PROGRESS NOTES
[facility]  Primary Care Center   Cuca Celeste MD  06/29/2018      Chief Complaint:   Forms       History of Present Illness:   Ten Johnson is an anticoagulated 58 year old male on Plavix with a history of aortic valve stenosis with s/p aortic valve replacement (his main dx for workability considerations) who presents to complete forms for the benefits office at his [employer].    It took an extended period of time to complete his workability form.  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    The patient is here today to complete a union work ability form. The patient wants to go back to work but states he does not have the full capability to go back to work. He reports e.g. that he is unable to lift over 25 lbs over his head.   The patient reports he has not been able to work since October 2017.   Previous workability forms were reviewed, as was his medical hx since I saw him last year.  He is due for a f/u in GI for his hepatitis C/alcohol-related cirrhosis.     Review of Systems:   Pertinent items are noted in HPI, remainder of complete ROS is negative.      Active Medications:      clopidogrel (PLAVIX) 75 MG tablet, Take 1 tablet (75 mg) by mouth daily, Disp: 30 tablet, Rfl: 3     ferrous sulfate (IRON) 325 (65 Fe) MG tablet, Take 1 tablet (325 mg) by mouth At Bedtime, Disp: , Rfl:      fluticasone (FLONASE) 50 MCG/ACT spray, Spray 2 sprays into both nostrils daily, Disp: 16 g, Rfl: 11     lisinopril (PRINIVIL/ZESTRIL) 10 MG tablet, Take 1 tablet (10 mg) by mouth daily (Patient taking differently: Take 10 mg by mouth At Bedtime ), Disp: 90 tablet, Rfl: 3     loratadine (CLARITIN) 10 MG tablet, Take 1 tablet (10 mg) by mouth daily, Disp: 90 tablet, Rfl: 3     montelukast (SINGULAIR) 10 MG tablet, Take 1 tablet (10 mg) by mouth At Bedtime, Disp: 90 tablet, Rfl: 1     Multiple Vitamin (MULTIVITAMINS PO), Take 1 tablet by mouth At Bedtime , Disp: , Rfl:      oxyCODONE IR (ROXICODONE) 5  MG tablet, Take 1 tablet (5 mg) by mouth every 8 hours as needed for pain, Disp: 10 tablet, Rfl: 0     pantoprazole (PROTONIX) 40 MG EC tablet, Take 1 tablet (40 mg) by mouth 2 times daily (before meals), Disp: 180 tablet, Rfl: 1      Allergies:   Zolpidem; Cats; Dogs; and Pollen extract      Past Medical History:  Alcohol use disorder (H)  Esophageal varices in alcoholic cirrhosis (H)  Anticoagulant long-term use, on Plavix  Ascites  Chronic allergic rhinitis  Chronic iron deficiency anemia  Chronic hepatitis C without hepatic coma (H)  Diastolic dysfunction  Erosive gastropathy  H/O upper gastrointestinal hemorrhage  History of blood transfusion  History of drug abuse, intranasal  Portal hypertension   Sarahi-Hoffman tear  Marijuana abuse  methicillin resistant Staphylococcus aureus  Olecranon bursitis  Right shoulder pain, hx rotator cuff repair  S/p transcatheter aortic valve replacement, bioprosthetic  Severe aortic stenosis  Thrombocytopenia (H)    Past Surgical History:  Arthroscopy shoulder rotator cuff repair, right  esophagogastroduodenoscopy  Facial reconstruction surgery  Heart cath femoral cannulization with open standby repair aortic valve  Irrigation and debridement upper extremity, combined  Repair tendon triceps upper extremity  Shoulder surgery, left  Transcatheter aortic valve implant anesthesia  Transposition ulnar nerve (elbow)    Family History:   Mother- Cancer  Paternal Uncle- Alcoholism      Social History:   The patient is not , a nonsmoker, and does consume alcohol. Currently has an alcohol consumption disorder.      Physical Exam:   /78  Pulse 91  Resp 16  Wt 80.6 kg (177 lb 9.6 oz)  BMI 26.23 kg/m2      BP Readings from Last 6 Encounters:   06/29/18 151/78   06/13/18 (!) 169/95   06/13/18 (!) 169/95   03/25/18 (!) 148/91   03/15/18 157/84   02/22/18 127/75     Physical Examination:  General:  Pleasant, alert, no acute distress  Psych:  Broad range affect.  Not psychomotor  slowed.  No signs of anxiety or agitation.     Answers for HPI/ROS submitted by the patient on 6/29/2018   PHQ-2 Score: 0    Assessment and Plan:  S/p TAVR (transcatheter aortic valve replacement), bioprosthetic  See scanned form for work ability.     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 Sandoval.   Abstinence from alcohol is imperative for both his cirrhosis and other medical conditions.    Elevated blood pressures  I instructed him to monitor his blood pressure outside the clinic and advise me if the readings are consistently above 130/80     Ten was seen today for forms.    Diagnoses and all orders for this visit:    Olecranon bursitis, unspecified laterality    S/p TAVR (transcatheter aortic valve replacement), bioprosthetic    Anticoagulant long-term use    Chronic right shoulder pain    Chronic anemia    Diastolic dysfunction    Chronic allergic rhinitis, unspecified seasonality, unspecified trigger    Portal hypertension (H)    History of drug abuse         Total time spent 40 minutes.  More than 50% of the time spent with Mr. Johnson on counseling / coordinating his care        Scribe Disclosure:   I, Stacy Baker, am serving as a scribe to document services personally performed by Cuca Celeste MD at this visit, based upon the provider's statements to me. All documentation has been reviewed by the aforementioned provider prior to being entered into the official medical record.     Portions of this medical record were completed by a scribe. UPON MY REVIEW AND AUTHENTICATION BY ELECTRONIC SIGNATURE, this confirms (a) I performed the applicable clinical services, and (b) the record is accurate.     Cuca Celeste M.D.  Internal Medicine  Primary Care Center   pager [phone]

## 2018-06-29 NOTE — NURSING NOTE
Chief Complaint   Patient presents with     Forms     pt is here for forms       Cinthya Carrera CMA at 10:15 AM on 6/29/2018

## 2018-08-05 ENCOUNTER — NURSE TRIAGE (OUTPATIENT)
Dept: NURSING | Facility: CLINIC | Age: 58
End: 2018-08-05
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
## 2018-08-30 ENCOUNTER — NURSE TRIAGE (OUTPATIENT)
Dept: NURSING | Facility: CLINIC | Age: 58
End: 2018-08-30

## 2018-08-30 NOTE — TELEPHONE ENCOUNTER
"Ten has been spitting blood out of his mouth every morning for the past 3-4 days. He has a history of bleeding esophageal varices. He also has history of alcoholic cirrhosis. He takes Plavix since he had a transaortic valve replacement in February of 2018. He uses Flonase for allergies. He can't tell the source of the blood. With his health history I instructed he be seen in an ER now.  He didn't tell me until near the end of our conversation that [place] and he said logistically he can't get to an ER until Sunday, 9/2/2018 at the earliest. He understands that by not being seen in the very near future he could be putting his life at risk.      Reason for Disposition    Unclear to triager if the patient is coughing up blood or vomiting blood    Additional Information    Negative: [1] Major bleeding (e.g., actively dripping or spurting) AND [2] can't be stopped    Negative: Amputation    Negative: Shock suspected (e.g., cold/pale/clammy skin, too weak to stand, low BP, rapid pulse)    Negative: [1] Knife wound (or other possibly deep cut) AND [2] to chest, abdomen, back, neck, or head    Negative: [1] Cutter (self-mutilator) AND [2] suicidal or out-of-control    Negative: Sounds like a life-threatening emergency to the triager    Negative: Shock suspected (e.g., cold/pale/clammy skin, too weak to stand, low BP, rapid pulse)    Negative: Difficult to awaken or acting confused  (e.g., disoriented, slurred speech)    Negative: Unable to walk, or can only walk with assistance (e.g., requires support)    Negative: Fainted    Negative: [1] Vomited blood AND [2] large amount (example: \"a cup of blood\")    Negative: Rectal bleeding (i.e., bloody stool, blood in stool)    Negative: Sounds like a life-threatening emergency to the triager    Coughing up blood  (i.e., from lungs)    Protocols used: COUGHING UP BLOOD-ADULT-AH, CUTS AND LACERATIONS-ADULT-AH, VOMITING BLOOD-ADULT-AH  Shu CASTANEDA RN " [facility]

## 2018-09-12 ENCOUNTER — APPOINTMENT (OUTPATIENT)
Dept: CT IMAGING | Facility: CLINIC | Age: 58
End: 2018-09-12
Attending: EMERGENCY MEDICINE
Payer: COMMERCIAL

## 2018-09-12 ENCOUNTER — HOSPITAL ENCOUNTER (EMERGENCY)
Facility: CLINIC | Age: 58
Discharge: HOME | End: 2018-09-12
Attending: EMERGENCY MEDICINE | Admitting: EMERGENCY MEDICINE
Payer: COMMERCIAL

## 2018-09-12 VITALS
BODY MASS INDEX: 26.81 KG/M2 | WEIGHT: 181 LBS | DIASTOLIC BLOOD PRESSURE: 104 MMHG | SYSTOLIC BLOOD PRESSURE: 139 MMHG | RESPIRATION RATE: 18 BRPM | TEMPERATURE: 98.7 F | HEIGHT: 69 IN | OXYGEN SATURATION: 99 % | HEART RATE: 103 BPM

## 2018-09-12 DIAGNOSIS — S09.90XA CLOSED HEAD INJURY, INITIAL ENCOUNTER: ICD-10-CM

## 2018-09-12 DIAGNOSIS — S01.81XA FACIAL LACERATION, INITIAL ENCOUNTER: ICD-10-CM
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
## 2018-09-12 PROCEDURE — 70450 CT HEAD/BRAIN W/O DYE: CPT

## 2018-09-12 PROCEDURE — 25000128 H RX IP 250 OP 636: Performed by: EMERGENCY MEDICINE

## 2018-09-12 PROCEDURE — 12011 RPR F/E/E/N/L/M 2.5 CM/<: CPT | Mod: Z6 | Performed by: EMERGENCY MEDICINE

## 2018-09-12 PROCEDURE — 96372 THER/PROPH/DIAG INJ SC/IM: CPT | Performed by: EMERGENCY MEDICINE

## 2018-09-12 PROCEDURE — 99284 EMERGENCY DEPT VISIT MOD MDM: CPT | Mod: 25 | Performed by: EMERGENCY MEDICINE

## 2018-09-12 PROCEDURE — 72125 CT NECK SPINE W/O DYE: CPT

## 2018-09-12 PROCEDURE — 12011 RPR F/E/E/N/L/M 2.5 CM/<: CPT | Performed by: EMERGENCY MEDICINE

## 2018-09-12 RX ORDER — TETRACAINE HYDROCHLORIDE 5 MG/ML
1-2 SOLUTION OPHTHALMIC ONCE
Status: DISCONTINUED | OUTPATIENT
Start: 2018-09-12 | End: 2018-09-12 | Stop reason: HOSPADM

## 2018-09-12 RX ORDER — OLANZAPINE 10 MG/2ML
10 INJECTION, POWDER, FOR SOLUTION INTRAMUSCULAR ONCE
Status: COMPLETED | OUTPATIENT
Start: 2018-09-12 | End: 2018-09-12

## 2018-09-12 RX ADMIN — OLANZAPINE 10 MG: 10 INJECTION, POWDER, LYOPHILIZED, FOR SOLUTION INTRAMUSCULAR at 02:02

## 2018-09-12 RX ADMIN — OLANZAPINE 10 MG: 10 INJECTION, POWDER, LYOPHILIZED, FOR SOLUTION INTRAMUSCULAR at 03:14

## 2018-09-12 ASSESSMENT — VISUAL ACUITY
OS: 20/25
OD: 20/25
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
## 2018-09-12 NOTE — ED AVS SNAPSHOT
[facility], [place], Emergency Department    [address] [place] [id number]    Phone:  [phone]                                       Ten Johnson   MRN: [id number]    Department:  [facility], Emergency Department   Date of Visit:  9/12/2018           After Visit Summary Signature Page     I have received my discharge instructions, and my questions have been answered. I have discussed any challenges I see with this plan with the nurse or doctor.    ..........................................................................................................................................  Patient/Patient Representative Signature      ..........................................................................................................................................  Patient Representative Print Name and Relationship to Patient    ..................................................               ................................................  Date                                   Time    ..........................................................................................................................................  Reviewed by Signature/Title    ...................................................              ..............................................  Date                                               Time          22EPIC Rev 08/18

## 2018-09-12 NOTE — ED NOTES
Initial Assessment: VSS. Pt is very tired and does not open eyes during assessment interactions. Pt does open eyes when directly requested. Pt quickly falls back to sleep. Denied SOD. Denied chest/shoulder/arm pain or discomfort.
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
## 2018-09-12 NOTE — DISCHARGE INSTRUCTIONS
Please avoid drinking to the point of severe intoxication.  He should never operate machinery or ride a bicycle when intoxicated.    Return to the emergency department for any worsening headache, visual changes, confusion, back pain, abdominal pain, fevers or chills, burning with urination, nausea or vomiting, weakness or any other concerns as given or discussed.    If you wish to receive detox, you can call to reserve a bed at [phone].  If you feel you are withdrawing from alcohol, come to the emergency department immediately.     Please make an appointment to follow up with Your Primary Care Provider in 3 days even if entirely better.  You can call to discuss the appropriate follow up timing with your doctor.             Alcohol Abuse  Alcoholic drinks are harmful when you have too many of them. Drinking that disrupts your life is called alcohol abuse. Alcohol abuse can hurt your relationships with others. You may lose friends, a spouse, or even your job. You may be abusing alcohol if any of the following are true for you:    Duties at home or with  suffer because of drinking.    Duties at work or in school suffer because of drinking.    You have missed work or school because of drinking.    You use alcohol while driving or operating machinery.    You have legal problems such as arrests due to drinking.    You keep drinking even though it causes serious problems in your life.  Alcohol abuse can cause health problems. Too much alcohol can cause disease of the liver and pancreas. It can damage your brain and your heart. It raises your risk of cancer. It can lead to sexual problems and make it hard to conceive children. Alcohol abuse in pregnancy can hurt the baby. Alcohol affects how you think and respond. You are more likely to die in an accident or fire if you have been drinking.  Home Care    Admit you have a problem with alcohol.    Ask for help from your healthcare provider as well as trusted  family members or close friends.    Get help from people trained in dealing with alcohol abuse. This may be individual counseling or group therapy, or it may be a supervised alcohol treatment program.    Join a self-help group for alcohol abuse such as Alcoholics Anonymous (AA).    Avoid people who abuse alcohol or tempt you to drink.  Follow Up  as advised by the doctor or our staff. Contact these groups to get help:    Alcoholics Anonymous (AA): Go to www.aa.org or check the phone book for meetings near you.    National Alcohol and Substance Abuse Information Center (NASAIC): 799.200.5132 www.addictioncareINCOM Storage.[employer]    National [place] on Alcoholism and Drug Dependence (NCADD): 235-VDQ-LLOQ (505-3309) www.ncadd.org    Al-Anon: 193-7HV-KDRK (262-0394) www.al-anon.org  Get Prompt Medical Attention  if you have:    Confusion    Hallucinations (seeing, hearing, or feeling things that aren t there)    Extreme drowsiness or inability to awaken    Increasing upper abdominal pain    Repeated vomiting, vomiting blood, or black or tarry stools    Severe shakiness or fast heart rate    Seizure (convulsion)    2939-8409 [employer]. [address] [place] [address], [place] [address]. All rights reserved. This information is not intended as a substitute for professional medical care. Always follow your healthcare professional's instructions.

## 2018-09-12 NOTE — ED NOTES
SIGN-OUT:  - Assumed care of this patient from Dr. Huynh  - Pending at shift change: reassessment and repeat eye exam   - Tentative plan: repeat eye exam showed normal vision with normal visual acuity. No complaint of eye pain or vision changes. Normal appearing sclarea, EOMI and pain free. Neurologic exam in tact. Requesting DC.     REASSESSMENT:  - No acute issues or interventions necessary while still in the emergency department.    DISPOSITION:  - Patient carried on with their original disposition plan.           Bob Hoover MD  09/13/18 [id number]

## 2018-09-12 NOTE — ED NOTES
Pt was threatening elopement, pt is currently intoxicated, MD verbally wanted a security watch on the pt. Pt also willingly took zyprexa to help him sleep

## 2018-09-28 ENCOUNTER — OFFICE VISIT (OUTPATIENT)
Dept: CARDIOLOGY | Facility: CLINIC | Age: 58
End: 2018-09-28
Attending: NURSE PRACTITIONER
Payer: COMMERCIAL

## 2018-09-28 ENCOUNTER — RADIANT APPOINTMENT (OUTPATIENT)
Dept: CARDIOLOGY | Facility: CLINIC | Age: 58
End: 2018-09-28
Payer: COMMERCIAL

## 2018-09-28 VITALS
BODY MASS INDEX: 26.17 KG/M2 | HEART RATE: 63 BPM | WEIGHT: 176.7 LBS | HEIGHT: 69 IN | OXYGEN SATURATION: 98 % | DIASTOLIC BLOOD PRESSURE: 86 MMHG | SYSTOLIC BLOOD PRESSURE: 142 MMHG

## 2018-09-28 DIAGNOSIS — Z95.2 S/P TAVR (TRANSCATHETER AORTIC VALVE REPLACEMENT): Primary | ICD-10-CM

## 2018-09-28 DIAGNOSIS — I35.0 SEVERE AORTIC STENOSIS: ICD-10-CM

## 2018-09-28 DIAGNOSIS — I10 ESSENTIAL HYPERTENSION: ICD-10-CM

## 2018-09-28 PROCEDURE — G0463 HOSPITAL OUTPT CLINIC VISIT: HCPCS | Mod: ZF

## 2018-09-28 PROCEDURE — 99215 OFFICE O/P EST HI 40 MIN: CPT | Mod: ZP | Performed by: NURSE PRACTITIONER

## 2018-09-28 RX ORDER — LISINOPRIL 20 MG/1
20 TABLET ORAL AT BEDTIME
Qty: 90 TABLET | Refills: 3 | Status: SHIPPED | OUTPATIENT
Start: 2018-09-28 | End: 2019-01-17

## 2018-09-28 ASSESSMENT — PAIN SCALES - GENERAL: PAINLEVEL: NO PAIN (0)

## 2018-09-28 NOTE — PROGRESS NOTES
[facility] HEART CARE  CARDIOVASCULAR DIVISION    VALVE CLINIC RETURN VISIT        PERTINENT CLINICAL HISTORY & REASON FOR CONSULTATION:     Ten oJhnson is a very pleasant 58 year old male with severe aortic valvular stenosis that was treated with transfemoral transcatheter aortic valve replacement (TAVR) with a Quiroz Victor M 3 # 26 mm valve on 2/21/2018 by Luis Villavicencio, Sy, and Bonnie. The patient also has a history of Hepatitis C, recent GIB 2/2 Sarahi Hoffman Tear, thrombocytopenia, cirrhosis. His POD # 1 echocardiogram demonstrated a MG 14 mmHg and no PVL w/ normal EF. He was discharged home on monotherapy given history of thrombocytopenia and GIB. His 1 month follow-up echo showed well seated bioprosthetic AV with mean PG of 15 mmHg and trace valvular AI. He presents to the clinic today for 6 month f/u.      He presents today with no specific cardiovascular concerns. Since his valve replacement he reports feeling well. He has noticed significant improvement in his anemia. He continues to feel weak and tired from time to time but this has overall improved. He is working part-time and is back to riding his bike  miles a week. He denies exertional chest pain or shortness of breath. No orthopnea, PND, leg swelling or weight gain. He denies lightheadedness, palpitations or syncope. He is not currently on anti-platelet therapy, as he discontinued his Plavix shortly after his TAVR because it made him feel crummy. He denies recent GI bleeding. He has no other specific cardiopulmonary concerns today.     PAST MEDICAL HISTORY:     Past Medical History:   Diagnosis Date     Alcohol use disorder (H)      Alcoholic cirrhosis (H)      Anticoagulant long-term use     plavix     Ascites      Chronic allergic rhinitis      Chronic anemia      Chronic hepatitis C without hepatic coma (H) 05/10/2016    Untreated as of 2/2018     Diastolic dysfunction      Erosive gastropathy      Esophageal varices in  alcoholic cirrhosis (H)      H/O upper gastrointestinal hemorrhage 09/2017     History of blood transfusion      History of drug abuse     intranasal     Hypertension     essential     JANELLE (iron deficiency anemia)      Sarahi-Hoffman tear     History     Marijuana abuse      MRSA (methicillin resistant Staphylococcus aureus)      Olecranon bursitis      Portal hypertension (H)      Right shoulder pain     history of rotator cuff repair     S/p TAVR (transcatheter aortic valve replacement), bioprosthetic      Severe aortic stenosis      Thrombocytopenia (H)         PAST SURGICAL HISTORY:     Past Surgical History:   Procedure Laterality Date     ARTHROSCOPY SHOULDER ROTATOR CUFF REPAIR  7/31/2012    Procedure: ARTHROSCOPY SHOULDER ROTATOR CUFF REPAIR;  Right Shoulder Arthroscopic Rotator Cuff Repair, BicepsTenodesis,  Subacromial Decompression ;  Surgeon: Joi Castillo MD;  [facility]     ESOPHAGOSCOPY, GASTROSCOPY, DUODENOSCOPY (EGD), COMBINED N/A 10/23/2017    Procedure: COMBINED ESOPHAGOSCOPY, GASTROSCOPY, DUODENOSCOPY (EGD);;  Surgeon: Gentry Salas MD;  [facility]     FACIAL RECONSTRUCTION SURGERY  1971     HEART CATH FEMORAL CANNULIZATION WITH OPEN STANDBY REPAIR AORTIC VALVE N/A 2/21/2018    Procedure: HEART CATH FEMORAL CANNULIZATION WITH OPEN STANDBY REPAIR AORTIC VALVE;;  Surgeon: Luis Baird MD;  [facility]     IRRIGATION AND DEBRIDEMENT UPPER EXTREMITY, COMBINED  1/3/2012    Procedure:COMBINED IRRIGATION AND DEBRIDEMENT UPPER EXTREMITY; Irrigation & Debridement Left Elbow; Surgeon:CRISTHIAN ZHOU; [facility]     REPAIR TENDON TRICEPS UPPER EXTREMITY  11/8/2011    Procedure:REPAIR TENDON TRICEPS UPPER EXTREMITY; Surgeon:CRISTHIAN ZHOU; [facility]     SHOULDER SURGERY  2003    left, injury, torn tendons, hematoma     TRANSCATHETER AORTIC VALVE IMPLANT ANESTHESIA N/A 2/21/2018    Procedure: TRANSCATHETER AORTIC VALVE IMPLANT ANESTHESIA;   Transfemoral (Quiroz) Aortic Valve Implant 26mm MARTHA 3, with Cardiopulmonary Bypass Standby, transthoracic echocardiogram;  Surgeon: GENERIC ANESTHESIA PROVIDER;  [facility]     TRANSPOSITION ULNAR NERVE (ELBOW)  11/8/2011    Procedure:TRANSPOSITION ULNAR NERVE (ELBOW); Final Procedure Done: Left Elbow Lateral Ulnar Collateral Repair And  Left Elbow Triceps Repair          CURRENT MEDICATIONS:     Current Outpatient Prescriptions   Medication Sig Dispense Refill     ferrous sulfate (IRON) 325 (65 Fe) MG tablet Take 1 tablet (325 mg) by mouth At Bedtime       fluticasone (FLONASE) 50 MCG/ACT spray Spray 2 sprays into both nostrils daily 16 g 11     lisinopril (PRINIVIL/ZESTRIL) 10 MG tablet Take 1 tablet (10 mg) by mouth daily (Patient taking differently: Take 10 mg by mouth At Bedtime ) 90 tablet 3     montelukast (SINGULAIR) 10 MG tablet Take 1 tablet (10 mg) by mouth At Bedtime 90 tablet 1     Multiple Vitamin (MULTIVITAMINS PO) Take 1 tablet by mouth At Bedtime        pantoprazole (PROTONIX) 40 MG EC tablet Take 1 tablet (40 mg) by mouth 2 times daily (before meals) 180 tablet 1     clopidogrel (PLAVIX) 75 MG tablet Take 1 tablet (75 mg) by mouth daily 30 tablet 3     loratadine (CLARITIN) 10 MG tablet Take 1 tablet (10 mg) by mouth daily (Patient not taking: Reported on 9/28/2018) 90 tablet 3     oxyCODONE IR (ROXICODONE) 5 MG tablet Take 1 tablet (5 mg) by mouth every 8 hours as needed for pain (Patient not taking: Reported on 6/29/2018) 10 tablet 0        ALLERGIES:     Allergies   Allergen Reactions     Zolpidem Other (See Comments)     Alcoholic.  Had reaction 3/17/13 while intoxicated which included black out, loss of awareness, paranoia.  Do not prescribe.  Dr. Celeste     Cats Other (See Comments)     rhinitis     Dogs Other (See Comments)     rhinitis     Pollen Extract Other (See Comments)     rhinits.        FAMILY HISTORY:     Family History   Problem Relation Age of Onset     Cancer Mother  "62     Alcoholism Paternal Uncle      Cirrhosis No family hx of         SOCIAL HISTORY:     Social History     Social History     Marital status: Single     Spouse name: N/A     Number of children: N/A     Years of education: N/A     Social History Main Topics     Smoking status: Never Smoker     Smokeless tobacco: Never Used     Alcohol use Yes      Comment: Alcohol use disorder, still actively drinking     Drug use: No      Comment: denies     Sexual activity: Not Currently     Partners: Female     Other Topics Concern     None     Social History Narrative    .  Bicycles a lot.  Excessive alcohol use.  Smokes cigars.  Occasional marijuana use.        REVIEW OF SYSTEMS:     Constitutional: No fevers or chills  Skin: No new rash or itching  Eyes: No acute change in vision  Ears/Nose/Throat: No purulent rhinorrhea, new hearing loss, or new vertigo  Respiratory: No cough or hemoptysis  Cardiovascular: See HPI  Gastrointestinal: No change in appetite, vomiting, hematemesis or diarrhea  Genitourinary: No dysuria or hematuria  Musculoskeletal: No new back pain, neck pain or muscle pain  Neurologic: No new headaches, focal weakness or behavior changes  Psychiatric: No hallucinations, excessive alcohol consumption or illegal drug usage  Hematologic/Lymphatic/Immunologic: No bleeding, chills, fever, night sweats or weight loss  Endocrine: No new cold intolerance, heat intolerance, polyphagia, polydipsia or polyuria      PHYSICAL EXAMINATION:     /86 (BP Location: Left arm, Cuff Size: Adult Regular)  Pulse 63  Ht 1.753 m (5' 9\")  Wt 80.2 kg (176 lb 11.2 oz)  SpO2 98%  BMI 26.09 kg/m2    GENERAL: No acute distress.  HEENT: EOMI. Sclerae white, not injected. Nares clear. Pharynx without erythema or exudate.   Neck: No adenopathy. No thyromegaly. No jugular venous distension.   Heart: Regular rate and rhythm. 2/6 systolic murmur  Lungs: Clear to auscultation. No ronchi, wheezes, rales. "   Abdomen: Soft, nontender, nondistended. Bowel sounds present.  Extremities: No clubbing, cyanosis, or edema.   Neurologic: Alert and oriented to person/place/time, normal speech and affect. No focal deficits.  Skin: No petechiae, purpura or rash.     LABORATORY DATA:     LIPID RESULTS:  Lab Results   Component Value Date    CHOL 146 05/09/2016    HDL 64 05/09/2016    LDL 61 05/09/2016    TRIG 106 05/09/2016       LIVER ENZYME RESULTS:  Lab Results   Component Value Date    AST 99 (H) 03/25/2018    ALT 97 (H) 03/25/2018       CBC RESULTS:  Lab Results   Component Value Date    WBC 6.3 03/25/2018    RBC 4.40 03/25/2018    HGB 12.1 (L) 03/25/2018    HCT 37.7 (L) 03/25/2018    MCV 86 03/25/2018    MCH 27.5 03/25/2018    MCHC 32.1 03/25/2018    RDW 21.8 (H) 03/25/2018    PLT 43 (LL) 03/25/2018       BMP RESULTS:  Lab Results   Component Value Date     03/25/2018    POTASSIUM 4.1 03/25/2018    CHLORIDE 109 03/25/2018    CO2 23 03/25/2018    ANIONGAP 7 03/25/2018    GLC 98 03/25/2018    BUN 17 03/25/2018    CR 0.86 03/25/2018    GFRESTIMATED >90 03/25/2018    GFRESTBLACK >90 03/25/2018    JOSEPHINE 8.2 (L) 03/25/2018         PROCEDURES & FURTHER ASSESSMENTS:     Echocardiogram today:    Interpretation Summary  History of TAVR with 26 mm Quiroz Victor M 3 bioprosthesis.  Global and regional left ventricular function is hyperkinetic with an EF of  65-70%.  Doppler interrogation of the aortic valve is normal.  The mean gradient is 14 mmHg.  No aortic regurgitation is present.  No significant change compared to 3/15/18    NYHA Class: 1     CLINICAL IMPRESSION:     Ten Johnson is a very pleasant 58 year old male with severe aortic valvular stenosis that was treated with transfemoral transcatheter aortic valve replacement (TAVR) with a Quiroz Victor M 3 # 26 mm valve on 2/21/2018 by Luis Villavicencio, Sy, and Bonnie. His POD # 1 echocardiogram demonstrated a MG 14 mmHg and no PVL w/ normal EF. He was discharged home on  monotherapy given history of thrombocytopenia and GIB. His 1 month follow-up echo showed well seated bioprosthetic AV with mean PG of 15 mmHg and trace valvular AI. He presents to the clinic today for 6 month f/u.     The patient reports feeling well and denies cardiovascular symptoms. His echo today shows normal interrogation of the aortic valve with mean PG of 14 mmHg with no aortic regurgitation. The patient is currently not on antiplatelet therapy, as he discontinued his Plavix shortly after his TAVR because it made him feel unwell. We discussed the importance of continuing antiplatelet therapy without interruption given the risk of valve thrombosis. He has agreed to start ASA 81 mg daily. We will also increase lisinopril to 20 mg daily for better blood pressure control. Plan is to follow-up with valve clinic in 6 months but he can follow with his primary cardiologist or [facility] for management of his chronic cardiovascular issues in the meantime as needed.    Plan Summary:  1) Start aspirin 81 mg daily lifelong  2) Increase lisinopril to 20 mg daily  3) Lifelong antibiotic prophylaxis prior to all dental procedures.  4) Follow-up with valve clinic in 6 months with labs, ECG and echo prior to appt    SYDNIE Foss, CNP  [facility] Cardiology Team       CC  Patient Care Team:  Cuca Celeste MD as PCP - General (Internal Medicine)  Mili Capps MD as MD (Internal Medicine)  Kieran Ulloa MD as MD (Family Practice)  Emma Mendez as Nurse Coordinator (Cardiology)  Eliazar Singh MD as Resident (Student in organized health care education/training program)  IGNACIO LOES

## 2018-09-28 NOTE — PATIENT INSTRUCTIONS
You were seen today in the Cardiovascular Clinic at the [facility].    Cardiology provider you saw during your visit Ema Pierson NP.    1. Start ASA 81 mg daily.  2. Increase lisinopril to 20 mg daily.  3. Repeat labs on Oct 11th.   4. I will contact you will echo results.  5. Please make a follow-up appointment in 6 months with labs and echo prior to the appt.     Questions and [phone].   First press #1 for the University and then press #3 for Medical Questions to reach the Cardiology triage nurse.     On Call Cardiologist for after hours or on weekends: [phone], press option #4 and ask to speak to the on-call Cardiologist.

## 2018-09-28 NOTE — NURSING NOTE
Chief Complaint   Patient presents with     Follow Up For     57 y/o, s/p TAVR, for follow up increased valve gradient     Vitals were taken and medications were reconciled.     BHAVIK Mcrae  12:33 PM

## 2018-09-28 NOTE — LETTER
9/28/2018      RE: Ten Johnson  [address]  [facility] [address]       Dear Colleague,    Thank you for the opportunity to participate in the care of your patient, Ten Johnson, at the [facility]. Please see a copy of my visit note below.        [facility] HEART CARE  CARDIOVASCULAR DIVISION    VALVE CLINIC RETURN VISIT        PERTINENT CLINICAL HISTORY & REASON FOR CONSULTATION:     Ten Johnson is a very pleasant 58 year old male with severe aortic valvular stenosis that was treated with transfemoral transcatheter aortic valve replacement (TAVR) with a Quiroz Victor M 3 # 26 mm valve on 2/21/2018 by Luis Villavicencio, Sy, and Bonnie. The patient also has a history of Hepatitis C, recent GIB 2/2 Sarahi Hoffman Tear, thrombocytopenia, cirrhosis. His POD # 1 echocardiogram demonstrated a MG 14 mmHg and no PVL w/ normal EF. He was discharged home on monotherapy given history of thrombocytopenia and GIB. His 1 month follow-up echo showed well seated bioprosthetic AV with mean PG of 15 mmHg and trace valvular AI. He presents to the clinic today for 6 month f/u.      He presents today with no specific cardiovascular concerns. Since his valve replacement he reports feeling well. He has noticed significant improvement in his anemia. He continues to feel weak and tired from time to time but this has overall improved. He is working part-time and is back to riding his bike  miles a week. He denies exertional chest pain or shortness of breath. No orthopnea, PND, leg swelling or weight gain. He denies lightheadedness, palpitations or syncope. He is not currently on anti-platelet therapy, as he discontinued his Plavix shortly after his TAVR because it made him feel crummy. He denies recent GI bleeding. He has no other specific cardiopulmonary concerns today.     PAST MEDICAL HISTORY:     Past Medical History:   Diagnosis Date     Alcohol use  disorder (H)      Alcoholic cirrhosis (H)      Anticoagulant long-term use     plavix     Ascites      Chronic allergic rhinitis      Chronic anemia      Chronic hepatitis C without hepatic coma (H) 05/10/2016    Untreated as of 2/2018     Diastolic dysfunction      Erosive gastropathy      Esophageal varices in alcoholic cirrhosis (H)      H/O upper gastrointestinal hemorrhage 09/2017     History of blood transfusion      History of drug abuse     intranasal     Hypertension     essential     JANELLE (iron deficiency anemia)      Sarahi-Hoffman tear     History     Marijuana abuse      MRSA (methicillin resistant Staphylococcus aureus)      Olecranon bursitis      Portal hypertension (H)      Right shoulder pain     history of rotator cuff repair     S/p TAVR (transcatheter aortic valve replacement), bioprosthetic      Severe aortic stenosis      Thrombocytopenia (H)         PAST SURGICAL HISTORY:     Past Surgical History:   Procedure Laterality Date     ARTHROSCOPY SHOULDER ROTATOR CUFF REPAIR  7/31/2012    Procedure: ARTHROSCOPY SHOULDER ROTATOR CUFF REPAIR;  Right Shoulder Arthroscopic Rotator Cuff Repair, BicepsTenodesis,  Subacromial Decompression ;  Surgeon: Joi Castillo MD;  [facility]     ESOPHAGOSCOPY, GASTROSCOPY, DUODENOSCOPY (EGD), COMBINED N/A 10/23/2017    Procedure: COMBINED ESOPHAGOSCOPY, GASTROSCOPY, DUODENOSCOPY (EGD);;  Surgeon: Gentry Salas MD;  [facility]     FACIAL RECONSTRUCTION SURGERY  1971     HEART CATH FEMORAL CANNULIZATION WITH OPEN STANDBY REPAIR AORTIC VALVE N/A 2/21/2018    Procedure: HEART CATH FEMORAL CANNULIZATION WITH OPEN STANDBY REPAIR AORTIC VALVE;;  Surgeon: Luis Baird MD;  [facility]     IRRIGATION AND DEBRIDEMENT UPPER EXTREMITY, COMBINED  1/3/2012    Procedure:COMBINED IRRIGATION AND DEBRIDEMENT UPPER EXTREMITY; Irrigation & Debridement Left Elbow; Surgeon:CRISTHIAN ZHOU; [facility]     REPAIR TENDON TRICEPS UPPER  EXTREMITY  11/8/2011    Procedure:REPAIR TENDON TRICEPS UPPER EXTREMITY; Surgeon:CRISTHIAN ZHOU; [facility]     SHOULDER SURGERY  2003    left, injury, torn tendons, hematoma     TRANSCATHETER AORTIC VALVE IMPLANT ANESTHESIA N/A 2/21/2018    Procedure: TRANSCATHETER AORTIC VALVE IMPLANT ANESTHESIA;  Transfemoral (Quiroz) Aortic Valve Implant 26mm MARTHA 3, with Cardiopulmonary Bypass Standby, transthoracic echocardiogram;  Surgeon: GENERIC ANESTHESIA PROVIDER;  [facility]     TRANSPOSITION ULNAR NERVE (ELBOW)  11/8/2011    Procedure:TRANSPOSITION ULNAR NERVE (ELBOW); Final Procedure Done: Left Elbow Lateral Ulnar Collateral Repair And  Left Elbow Triceps Repair          CURRENT MEDICATIONS:     Current Outpatient Prescriptions   Medication Sig Dispense Refill     ferrous sulfate (IRON) 325 (65 Fe) MG tablet Take 1 tablet (325 mg) by mouth At Bedtime       fluticasone (FLONASE) 50 MCG/ACT spray Spray 2 sprays into both nostrils daily 16 g 11     lisinopril (PRINIVIL/ZESTRIL) 10 MG tablet Take 1 tablet (10 mg) by mouth daily (Patient taking differently: Take 10 mg by mouth At Bedtime ) 90 tablet 3     montelukast (SINGULAIR) 10 MG tablet Take 1 tablet (10 mg) by mouth At Bedtime 90 tablet 1     Multiple Vitamin (MULTIVITAMINS PO) Take 1 tablet by mouth At Bedtime        pantoprazole (PROTONIX) 40 MG EC tablet Take 1 tablet (40 mg) by mouth 2 times daily (before meals) 180 tablet 1     clopidogrel (PLAVIX) 75 MG tablet Take 1 tablet (75 mg) by mouth daily 30 tablet 3     loratadine (CLARITIN) 10 MG tablet Take 1 tablet (10 mg) by mouth daily (Patient not taking: Reported on 9/28/2018) 90 tablet 3     oxyCODONE IR (ROXICODONE) 5 MG tablet Take 1 tablet (5 mg) by mouth every 8 hours as needed for pain (Patient not taking: Reported on 6/29/2018) 10 tablet 0        ALLERGIES:     Allergies   Allergen Reactions     Zolpidem Other (See Comments)     Alcoholic.  Had reaction 3/17/13 while intoxicated which  "included black out, loss of awareness, paranoia.  Do not prescribe.  Dr. Celeste     Cats Other (See Comments)     rhinitis     Dogs Other (See Comments)     rhinitis     Pollen Extract Other (See Comments)     rhinits.        FAMILY HISTORY:     Family History   Problem Relation Age of Onset     Cancer Mother 62     Alcoholism Paternal Uncle      Cirrhosis No family hx of         SOCIAL HISTORY:     Social History     Social History     Marital status: Single     Spouse name: N/A     Number of children: N/A     Years of education: N/A     Social History Main Topics     Smoking status: Never Smoker     Smokeless tobacco: Never Used     Alcohol use Yes      Comment: Alcohol use disorder, still actively drinking     Drug use: No      Comment: denies     Sexual activity: Not Currently     Partners: Female     Other Topics Concern     None     Social History Narrative    .  Bicycles a lot.  Excessive alcohol use.  Smokes cigars.  Occasional marijuana use.        REVIEW OF SYSTEMS:     Constitutional: No fevers or chills  Skin: No new rash or itching  Eyes: No acute change in vision  Ears/Nose/Throat: No purulent rhinorrhea, new hearing loss, or new vertigo  Respiratory: No cough or hemoptysis  Cardiovascular: See HPI  Gastrointestinal: No change in appetite, vomiting, hematemesis or diarrhea  Genitourinary: No dysuria or hematuria  Musculoskeletal: No new back pain, neck pain or muscle pain  Neurologic: No new headaches, focal weakness or behavior changes  Psychiatric: No hallucinations, excessive alcohol consumption or illegal drug usage  Hematologic/Lymphatic/Immunologic: No bleeding, chills, fever, night sweats or weight loss  Endocrine: No new cold intolerance, heat intolerance, polyphagia, polydipsia or polyuria      PHYSICAL EXAMINATION:     /86 (BP Location: Left arm, Cuff Size: Adult Regular)  Pulse 63  Ht 1.753 m (5' 9\")  Wt 80.2 kg (176 lb 11.2 oz)  SpO2 98%  BMI 26.09 " kg/m2    GENERAL: No acute distress.  HEENT: EOMI. Sclerae white, not injected. Nares clear. Pharynx without erythema or exudate.   Neck: No adenopathy. No thyromegaly. No jugular venous distension.   Heart: Regular rate and rhythm. 2/6 systolic murmur  Lungs: Clear to auscultation. No ronchi, wheezes, rales.   Abdomen: Soft, nontender, nondistended. Bowel sounds present.  Extremities: No clubbing, cyanosis, or edema.   Neurologic: Alert and oriented to person/place/time, normal speech and affect. No focal deficits.  Skin: No petechiae, purpura or rash.     LABORATORY DATA:     LIPID RESULTS:  Lab Results   Component Value Date    CHOL 146 05/09/2016    HDL 64 05/09/2016    LDL 61 05/09/2016    TRIG 106 05/09/2016       LIVER ENZYME RESULTS:  Lab Results   Component Value Date    AST 99 (H) 03/25/2018    ALT 97 (H) 03/25/2018       CBC RESULTS:  Lab Results   Component Value Date    WBC 6.3 03/25/2018    RBC 4.40 03/25/2018    HGB 12.1 (L) 03/25/2018    HCT 37.7 (L) 03/25/2018    MCV 86 03/25/2018    MCH 27.5 03/25/2018    MCHC 32.1 03/25/2018    RDW 21.8 (H) 03/25/2018    PLT 43 (LL) 03/25/2018       BMP RESULTS:  Lab Results   Component Value Date     03/25/2018    POTASSIUM 4.1 03/25/2018    CHLORIDE 109 03/25/2018    CO2 23 03/25/2018    ANIONGAP 7 03/25/2018    GLC 98 03/25/2018    BUN 17 03/25/2018    CR 0.86 03/25/2018    GFRESTIMATED >90 03/25/2018    GFRESTBLACK >90 03/25/2018    JOSEPHINE 8.2 (L) 03/25/2018         PROCEDURES & FURTHER ASSESSMENTS:     Echocardiogram today:    Interpretation Summary  History of TAVR with 26 mm Quiroz Victor M 3 bioprosthesis.  Global and regional left ventricular function is hyperkinetic with an EF of  65-70%.  Doppler interrogation of the aortic valve is normal.  The mean gradient is 14 mmHg.  No aortic regurgitation is present.  No significant change compared to 3/15/18    NYHA Class: 1     CLINICAL IMPRESSION:     Ten Johnson is a very pleasant 58 year old male with  severe aortic valvular stenosis that was treated with transfemoral transcatheter aortic valve replacement (TAVR) with a Quiroz Victor M 3 # 26 mm valve on 2/21/2018 by Luis Villavicencio, Sy, and Bonnie. His POD # 1 echocardiogram demonstrated a MG 14 mmHg and no PVL w/ normal EF. He was discharged home on monotherapy given history of thrombocytopenia and GIB. His 1 month follow-up echo showed well seated bioprosthetic AV with mean PG of 15 mmHg and trace valvular AI. He presents to the clinic today for 6 month f/u.     The patient reports feeling well and denies cardiovascular symptoms. His echo today shows normal interrogation of the aortic valve with mean PG of 14 mmHg with no aortic regurgitation. The patient is currently not on antiplatelet therapy, as he discontinued his Plavix shortly after his TAVR because it made him feel unwell. We discussed the importance of continuing antiplatelet therapy without interruption given the risk of valve thrombosis. He has agreed to start ASA 81 mg daily. We will also increase lisinopril to 20 mg daily for better blood pressure control. Plan is to follow-up with valve clinic in 6 months but he can follow with his primary cardiologist or [facility] for management of his chronic cardiovascular issues in the meantime as needed.    Plan Summary:  1) Start aspirin 81 mg daily lifelong  2) Increase lisinopril to 20 mg daily  3) Lifelong antibiotic prophylaxis prior to all dental procedures.  4) Follow-up with valve clinic in 6 months with labs, ECG and echo prior to appt    SYDNIE Foss, CNP  [facility] Cardiology Team       CC  Patient Care Team:  Cuca Celeste MD as PCP - General (Internal Medicine)  Mili Capps MD as MD (Internal Medicine)  Kieran Ulloa MD as MD (Family Practice)  Emma Mendez as Nurse Coordinator (Cardiology)  Eliazar Singh MD as Resident (Student in organized health care education/training program)  IGNACIO LEOS  ABDIRASHID

## 2018-09-28 NOTE — MR AVS SNAPSHOT
After Visit Summary   2018    Ten Johnson    MRN: [id number]           Patient Information     Date Of Birth          1960        Visit Information        Provider Department      2018 12:30 PM Ema Pierson NP [facility]        Today's Diagnoses     S/P TAVR (transcatheter aortic valve replacement)    -  1    Essential hypertension          Care Instructions    You were seen today in the Cardiovascular Clinic at the [facility].    Cardiology provider you saw during your visit Ema Pierson NP.    1. Start ASA 81 mg daily.  2. Increase lisinopril to 20 mg daily.  3. Repeat labs on Oct 11th.   4. I will contact you will echo results.  5. Please make a follow-up appointment in 6 months with labs and echo prior to the appt.     Questions and [phone].   First press #1 for the University and then press #3 for Medical Questions to reach the Cardiology triage nurse.     On Call Cardiologist for after hours or on weekends: [phone], press option #4 and ask to speak to the on-call Cardiologist.             Follow-ups after your visit        Additional Services     Follow-Up with Cardiac Advanced Practice Provider       MANDY on 10/1/18    Echo and labs prior to appt in 2019                  Follow-up notes from your care team     Return in about 6 months (around 3/28/2019).      Your next 10 appointments already scheduled     Oct 11, 2018  7:40 AM CDT   (Arrive by 7:25 AM)   Return Visit with Cuca Celeste MD   [facility] ([facility]    [address]  4th Floor  [facility] [id number] [phone]            2019  9:45 AM CST   Lab with  LAB   [facility] ([facility])    [address] [place] [address]  1st Floor  [facility] [id number] [phone]            2019 10:00 AM CST   Ech Complete with [id number]   [facility] ([facility] [place])    [address] [facility] [address]  3rd Floor  [facility] [id number] [phone]           1.  Please bring or wear a comfortable two-piece outfit. 2.  You may eat, drink and take your normal medicines. 3.  For any questions that cannot be answered, please contact the ordering physician 4.  Please do not wear perfumes or scented lotions on the day of your exam.            Feb 22, 2019 11:00 AM CST   (Arrive by 10:45 AM)   Return Visit with Ema Pierson NP   [facility] ([facility] [place])    [address]  [address]  [facility] [id number] [phone]              Future tests that were ordered for you today     Open Future Orders        Priority Expected Expires Ordered    Basic metabolic panel Routine 10/11/2018 9/28/2019 9/28/2018    Follow-Up with Cardiac Advanced Practice Provider Routine 2/27/2019 9/28/2019 9/28/2018    Basic metabolic panel Routine 2/27/2019 9/28/2019 9/28/2018    CBC with platelets Routine 2/27/2019 9/28/2019 9/28/2018    Echocardiogram Routine 2/27/2019 9/28/2019 9/28/2018            Who to contact     If you have questions or need follow up information about today's clinic visit or your schedule please contact [facility] directly at [phone].  Normal or non-critical lab and imaging results will be communicated to you by Beeminderhart, letter or phone within 4 business days after the clinic has received the results. If you do not hear from us within 7 days, please contact the clinic through Neovasct or phone. If you have a critical or abnormal lab result, we will notify you by phone as soon as possible.  Submit refill requests through [email or web address] or call your pharmacy and they will forward the refill request to us. Please allow 3 business days for your refill to be completed.          Additional Information About Your Visit        [email or web address] Information     [email or web address] lets you send messages to your doctor, view your test results,  "renew your prescriptions, schedule appointments and more. To sign up, go to [email or web address] . Click on \"Log in\" on the left side of the screen, which will take you to the Welcome page. Then click on \"Sign up Now\" on the right side of the page.     You will be asked to enter the access code listed below, as well as some personal information. Please follow the directions to create your username and password.     Your access code is: [email or web address]  Expires: 2018  1:40 PM     Your access code will  in 90 days. If you need help or a new code, please call your [place] clinic or [phone].        Care EveryWhere ID     This is your Care EveryWhere ID. This could be used by other organizations to access your [place] medical records  [id number]        Your Vitals Were     Pulse Height Pulse Oximetry BMI (Body Mass Index)          63 1.753 m (5' 9\") 98% 26.09 kg/m2         Blood Pressure from Last 3 Encounters:   18 142/86   18 (!) 139/104   18 151/78    Weight from Last 3 Encounters:   18 80.2 kg (176 lb 11.2 oz)   18 82.1 kg (181 lb)   18 80.6 kg (177 lb 9.6 oz)                 Today's Medication Changes          These changes are accurate as of 18  1:35 PM.  If you have any questions, ask your nurse or doctor.               Start taking these medicines.        Dose/Directions    aspirin 81 MG EC tablet   Used for:  S/P TAVR (transcatheter aortic valve replacement)   Started by:  Ema Pierson NP        Dose:  81 mg   Take 1 tablet (81 mg) by mouth daily   Quantity:  90 tablet   Refills:  3         These medicines have changed or have updated prescriptions.        Dose/Directions    lisinopril 20 MG tablet   Commonly known as:  PRINIVIL/ZESTRIL   This may have changed:    - medication strength  - how much to take  - when to take this   Used for:  Essential hypertension   Changed by:  Ema Pierson NP        Dose:  20 mg   Take 1 tablet (20 mg) " by mouth At Bedtime   Quantity:  90 tablet   Refills:  3         Stop taking these medicines if you haven't already. Please contact your care team if you have questions.     montelukast 10 MG tablet   Commonly known as:  SINGULAIR   Stopped by:  Ema Pierson NP                Where to get your medicines      These medications were sent to [facility] [place], [place] - [address]  [address], [facility] [address]    Hours:  TRANSPLANT PHONE NUMBER [phone] Phone:  [phone]     aspirin 81 MG EC tablet    lisinopril 20 MG tablet                Primary Care Provider Office Phone # Fax #    Cuca Celeste [phone]       [address] [place] [address] [address]  [facility] [address]        Equal Access to Services     MANE HRERERA AH: Hadii adrienne garcia hadasho Soomaali, waaxda luqadaha, qaybta kaalmada adeegyada, waxay brenda haydeepali thompson. So [facility] [phone].    ATENCIÓN: Si habla español, tiene a ha disposición servicios gratuitos de asistencia lingüística. Jennifer al [phone].    We comply with applicable federal civil rights laws and [place] laws. We do not discriminate on the basis of race, color, national origin, age, disability, sex, sexual orientation, or gender identity.            Thank you!     Thank you for choosing [facility]  for your care. Our goal is always to provide you with excellent care. Hearing back from our patients is one way we can continue to improve our services. Please take a few minutes to complete the written survey that you may receive in the mail after your visit with us. Thank you!             Your Updated Medication List - Protect others around you: Learn how to safely use, store and throw away your medicines at www.disposemymeds.org.          This list is accurate as of 9/28/18  1:35 PM.  Always use your most recent med list.                   Brand Name Dispense Instructions for use  Diagnosis    aspirin 81 MG EC tablet     90 tablet    Take 1 tablet (81 mg) by mouth daily    S/P TAVR (transcatheter aortic valve replacement)       clopidogrel 75 MG tablet    PLAVIX    30 tablet    Take 1 tablet (75 mg) by mouth daily    S/P TAVR (transcatheter aortic valve replacement)       ferrous sulfate 325 (65 Fe) MG tablet    IRON     Take 1 tablet (325 mg) by mouth At Bedtime    Anemia, unspecified type       fluticasone 50 MCG/ACT spray    FLONASE    16 g    Spray 2 sprays into both nostrils daily    Cough, Post-nasal drip       lisinopril 20 MG tablet    PRINIVIL/ZESTRIL    90 tablet    Take 1 tablet (20 mg) by mouth At Bedtime    Essential hypertension       loratadine 10 MG tablet    CLARITIN    90 tablet    Take 1 tablet (10 mg) by mouth daily    Post-nasal drip, Cough       MULTIVITAMINS PO      Take 1 tablet by mouth At Bedtime        pantoprazole 40 MG EC tablet    PROTONIX    180 tablet    Take 1 tablet (40 mg) by mouth 2 times daily (before meals)    Erosive gastropathy

## 2018-10-09 ENCOUNTER — PATIENT OUTREACH (OUTPATIENT)
Dept: CARE COORDINATION | Facility: CLINIC | Age: 58
End: 2018-10-09

## 2018-10-11 ENCOUNTER — RADIANT APPOINTMENT (OUTPATIENT)
Dept: GENERAL RADIOLOGY | Facility: CLINIC | Age: 58
End: 2018-10-11
Attending: NURSE PRACTITIONER
Payer: COMMERCIAL

## 2018-10-11 ENCOUNTER — OFFICE VISIT (OUTPATIENT)
Dept: INTERNAL MEDICINE | Facility: CLINIC | Age: 58
End: 2018-10-11
Payer: COMMERCIAL

## 2018-10-11 ENCOUNTER — OFFICE VISIT (OUTPATIENT)
Dept: ORTHOPEDICS | Facility: CLINIC | Age: 58
End: 2018-10-11
Payer: COMMERCIAL

## 2018-10-11 ENCOUNTER — RADIANT APPOINTMENT (OUTPATIENT)
Dept: GENERAL RADIOLOGY | Facility: CLINIC | Age: 58
End: 2018-10-11
Payer: COMMERCIAL

## 2018-10-11 VITALS — BODY MASS INDEX: 26.51 KG/M2 | HEIGHT: 69 IN | WEIGHT: 179 LBS

## 2018-10-11 VITALS — HEART RATE: 82 BPM | BODY MASS INDEX: 26.51 KG/M2 | HEIGHT: 69 IN | WEIGHT: 179 LBS

## 2018-10-11 VITALS
BODY MASS INDEX: 26.55 KG/M2 | DIASTOLIC BLOOD PRESSURE: 94 MMHG | HEART RATE: 82 BPM | RESPIRATION RATE: 20 BRPM | WEIGHT: 179.8 LBS | SYSTOLIC BLOOD PRESSURE: 161 MMHG

## 2018-10-11 DIAGNOSIS — M25.561 RIGHT KNEE PAIN: ICD-10-CM

## 2018-10-11 DIAGNOSIS — M17.11 PRIMARY OSTEOARTHRITIS OF RIGHT KNEE: Primary | ICD-10-CM

## 2018-10-11 DIAGNOSIS — B18.2 CHRONIC HEPATITIS C WITHOUT HEPATIC COMA (H): ICD-10-CM

## 2018-10-11 DIAGNOSIS — I10 BENIGN ESSENTIAL HYPERTENSION: ICD-10-CM

## 2018-10-11 DIAGNOSIS — Z23 NEED FOR PROPHYLACTIC VACCINATION AND INOCULATION AGAINST INFLUENZA: ICD-10-CM

## 2018-10-11 DIAGNOSIS — M17.31 POST-TRAUMATIC OSTEOARTHRITIS OF RIGHT KNEE: Primary | ICD-10-CM

## 2018-10-11 DIAGNOSIS — M25.561 CHRONIC PAIN OF RIGHT KNEE: ICD-10-CM

## 2018-10-11 DIAGNOSIS — K70.30 ALCOHOLIC CIRRHOSIS OF LIVER WITHOUT ASCITES (H): Primary | ICD-10-CM

## 2018-10-11 DIAGNOSIS — G89.29 CHRONIC PAIN OF RIGHT KNEE: ICD-10-CM

## 2018-10-11 DIAGNOSIS — I10 ESSENTIAL HYPERTENSION: ICD-10-CM

## 2018-10-11 PROBLEM — F19.11 HISTORY OF DRUG ABUSE IN REMISSION (H): Status: ACTIVE | Noted: 2018-10-11

## 2018-10-11 LAB
ANION GAP SERPL CALCULATED.3IONS-SCNC: 7 MMOL/L (ref 3–14)
BUN SERPL-MCNC: 22 MG/DL (ref 7–30)
CALCIUM SERPL-MCNC: 9 MG/DL (ref 8.5–10.1)
CHLORIDE SERPL-SCNC: 107 MMOL/L (ref 94–109)
CO2 SERPL-SCNC: 24 MMOL/L (ref 20–32)
CREAT SERPL-MCNC: 0.82 MG/DL (ref 0.66–1.25)
GFR SERPL CREATININE-BSD FRML MDRD: >90 ML/MIN/1.7M2
GLUCOSE SERPL-MCNC: 100 MG/DL (ref 70–99)
POTASSIUM SERPL-SCNC: 4.2 MMOL/L (ref 3.4–5.3)
SODIUM SERPL-SCNC: 138 MMOL/L (ref 133–144)

## 2018-10-11 ASSESSMENT — ENCOUNTER SYMPTOMS
WEIGHT GAIN: 0
STIFFNESS: 1
MUSCLE WEAKNESS: 1
MUSCLE CRAMPS: 1
HALLUCINATIONS: 0
DECREASED APPETITE: 0
WEIGHT LOSS: 0
POLYPHAGIA: 0
POLYDIPSIA: 0
INCREASED ENERGY: 0
ALTERED TEMPERATURE REGULATION: 0
CHILLS: 0
JOINT SWELLING: 0
FATIGUE: 1
MYALGIAS: 1
BACK PAIN: 1
NIGHT SWEATS: 0
FEVER: 0
NECK PAIN: 1
ARTHRALGIAS: 1

## 2018-10-11 ASSESSMENT — PAIN SCALES - GENERAL: PAINLEVEL: MILD PAIN (3)

## 2018-10-11 NOTE — PATIENT INSTRUCTIONS
Primary Care Center Medication Refill Request Information:  * Please contact your pharmacy regarding ANY request for medication refills.  ** [facility] Prescription Fax = [phone]  * Please allow 3 business days for routine medication refills.  * Please allow 5 business days for controlled substance medication refills.     Primary Care Center Test Result notification information:  *You will be notified with in 7-10 days of your appointment day regarding the results of your test.  If you are on MyChart you will be notified as soon as the provider has reviewed the results and signed off on them.    Primary Care Center: [phone]       U Ortho [phone] (4th Floor [facility])  Sports Medicine [phone] (5th Floor [facility])  Gastroenterology [phone] (4th Floor [facility])

## 2018-10-11 NOTE — NURSING NOTE
[facility]  [address] [place] [id number]  Dept: [phone]  ______________________________________________________________________________    Patient: Ten Johnson   : 1960   MRN: [id number]   2018    INVASIVE PROCEDURE SAFETY CHECKLIST    Date: 10/11/2018   Procedure:Right knee injection  Patient Name: Ten Johnson  MRN: [id number]  YOB: 1960    Action: Complete sections as appropriate. Any discrepancy results in a HARD COPY until resolved.     PRE PROCEDURE:  Patient ID verified with 2 identifiers (name and  or MRN): Yes  Procedure and site verified with patient/designee (when able): Yes  Accurate consent documentation in medical record: Yes  H&P (or appropriate assessment) documented in medical record: Yes  H&P must be up to 20 days prior to procedure and updates within 24 hours of procedure as applicable: Yes  Relevant diagnostic and radiology test results appropriately labeled and displayed as applicable: Yes  Procedure site(s) marked with provider initials: Yes    TIMEOUT:  Time-Out performed immediately prior to starting procedure, including verbal and active participation of all team members addressing the following:Yes  * Correct patient identify  * Confirmed that the correct side and site are marked  * An accurate procedure consent form  * Agreement on the procedure to be done  * Correct patient position  * Relevant images and results are properly labeled and appropriately displayed  * The need to administer antibiotics or fluids for irrigation purposes during the procedure as applicable   * Safety precautions based on patient history or medication use    DURING PROCEDURE: Verification of correct person, site, and procedures any time the responsibility for care of the patient is transferred to another member of the care team.     The following medication was given:     MEDICATION:  Kenalog 40 mg  ROUTE: IA  SITE: Right  knee  DOSE: 1 mL  LOT #: [id number]  : [employer]  EXPIRATION DATE: 04/01/20  NDC#: [address]-8   Was there drug waste? No  Multi-dose vial: No    MEDICATION:  Lidocaine without epinephrine  ROUTE: IA  SITE: Right knee  DOSE: 2 mL  LOT #: [id number]  : [employer]  EXPIRATION DATE: 05/01/22  NDC#: 20694-857-98   Was there drug waste? Yes  Amount of drug waste (mL): 18.  Reason for waste:  Single patient  Multi-dose vial: Yes      Sánchez Philippe, ATC  October 11, 2018

## 2018-10-11 NOTE — NURSING NOTE
"Reason For Visit:   Chief Complaint   Patient presents with     Right Knee - Pain       Ht 1.753 m (5' 9\")  Wt 81.2 kg (179 lb)  BMI 26.43 kg/m2    Pain Assessment  Patient Currently in Pain: Yes  0-10 Pain Scale:  (Chonic)  Primary Pain Location: Knee  Pain Orientation: Right  Pain Descriptors: Aching, Sharp, Shooting, Sore  Aggravating Factors: Movement    Sánchez Philippe ATC  "
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
## 2018-10-11 NOTE — PROGRESS NOTES
Rooming Note  Health Maintenance   Health Maintenance Due   Topic Date Due     COLON CANCER SCREEN (SYSTEM ASSIGNED)  05/29/2008     ADVANCE DIRECTIVE PLANNING Q5 YRS  05/29/2013     INFLUENZA VACCINE (1) 09/01/2018    The following HM topics were discussed, but NOT ordered:   -  Colonoscopy  The orders were not placed because: other, pt is aware, received letter will schedule   Blood Pressure   BP Readings from Last 1 Encounters:   10/11/18 163/85    Single BP recheck started, 7:48 AM (4 minutes)

## 2018-10-11 NOTE — MR AVS SNAPSHOT
After Visit Summary   10/11/2018    Ten Johnson    MRN: [id number]           Patient Information     Date Of Birth          1960        Visit Information        Provider Department      10/11/2018 10:45 AM Vidya Hastings APRN [facility]        Today's Diagnoses     Post-traumatic osteoarthritis of right knee    -  1       Follow-ups after your visit        Follow-up notes from your care team     Inform patient at next visit Return in about 3 weeks (around 11/1/2018).      Your next 10 appointments already scheduled     Oct 23, 2018  7:45 PM CDT   (Arrive by 7:30 PM)   MR KNEE RIGHT W/O CONTRAST with [facility] [facility] ([facility])    [address] [address] [phone]           How do I prepare for my exam? (Food and drink instructions) **If you will be receiving sedation or general anesthesia, please see special notes below.**  How do I prepare for my exam? (Other instructions) Take your medicines as usual, unless your doctor tells you not to. Please remove any body piercings and hair extensions before you arrive. Follow your doctor s orders. If you do not, we may have to postpone your exam. You may or may not receive IV contrast for this exam pending the discretion of the Radiologist.  You do not need to do anything special to prepare. **If you will be receiving sedation or general anesthesia, please see special notes below.**  What should I wear:  The MRI machine uses a strong magnet. Please wear clothes without metal (snaps, zippers). A sweatsuit works well, or we may give you a hospital gown.  How long does the exam take: Most tests take 30 to 60 minutes.  HOWEVER, IF YOUR DOCTOR PRESCRIBES ANESTHESIA please plan on spending four to five hours in the recovery room.  What should I bring: Bring a list of your current medicines to your exam (including vitamins, minerals and over-the-counter  drugs). Also bring the results of similar scans you may have had.  Do I need a : **If you will be receiving sedation or general anesthesia, please see special notes below.**  What should I do after the exam? No Restrictions, You may resume normal activities.  What is this test: MRI (magnetic resonance imaging) uses a strong magnet and radio waves to look inside the body. An MRA (magnetic resonance angiogram) does the same thing, but it lets us look at your blood vessels. A computer turns the radio waves into pictures showing cross sections of the body, much like slices of bread. This helps us see any problems more clearly.  Who should I call with questions: Please call the Imaging Department at your exam site with any questions. Directions, parking instructions, and other information is available on our website, [email or web address].  How do I prepare if I m having sedation or anesthesia? **IMPORTANT** THE INSTRUCTIONS BELOW ARE ONLY FOR THOSE PATIENTS WHO HAVE BEEN TOLD THEY WILL RECEIVE SEDATION OR GENERAL ANESTHESIA DURING THEIR MRI PROCEDURE:  IF YOU WILL RECEIVE SEDATION (take medicine to help you relax during your exam): You must get the medicine from your doctor before you arrive. Bring the medicine to the exam. Do not take it at home. Arrive one hour early. Bring someone who can take you home after the test. Your medicine will make you sleepy. After the exam, you may not drive, take a bus or take a taxi by yourself. No eating 8 hours before your exam. You may have clear liquids up until 4 hours before your exam. (Clear liquids include water, clear tea, black coffee and fruit juice without pulp.)  IF YOU WILL RECEIVE ANESTHESIA (be asleep for your exam): Arrive 1 1/2 hours early. Bring someone who can take you home after the test. You may not drive, take a bus or take a taxi by yourself. No eating 8 hours before your exam. You may have clear liquids up until 4 hours before your exam. (Clear liquids  include water, clear tea, black coffee and fruit juice without pulp.) You will spend four to five hours in the recovery room.            Oct 27, 2018  9:20 AM CDT   (Arrive by 9:05 AM)   Return Visit with Cuca Celeste MD   [facility]    [address] [place] [address]  [address] [facility] [address]   [phone]            Nov 05, 2018  9:00 AM CST   (Arrive by 8:45 AM)   RETURN KNEE with Olvin Joe MD   [facility]    [address] [place] [address] [phone]            Feb 22, 2019  9:45 AM CST   Lab with UC LAB   [facility])    [address] [place] [address] [phone]            Feb 22, 2019 10:00 AM CST   Ech Complete with [id number]   [facility] ([facility])    [address] [place] [address] [phone]           1.  Please bring or wear a comfortable two-piece outfit. 2.  You may eat, drink and take your normal medicines. 3.  For any questions that cannot be answered, please contact the ordering physician 4.  Please do not wear perfumes or scented lotions on the day of your exam.            Feb 22, 2019 11:00 AM CST   (Arrive by 10:45 AM)   Return Visit with Ema Pierson NP   [facility]    [address] [place] [address]  [address]  [employer] [address] [phone]              Who to contact     Please call your clinic at [phone] to:    Ask questions about your health    Make or cancel appointments    Discuss your medicines    Learn about your test results    Speak to your doctor            Additional Information About Your Visit        [email or web address] Information     [email or web address] is an electronic gateway that provides easy, online access to your medical records. With [email or web address], you can request a  "clinic appointment, read your test results, renew a prescription or communicate with your care team.     To sign up for [email or web address] visit the website at [email or web address]   You will be asked to enter the access code listed below, as well as some personal information. Please follow the directions to create your username and password.     Your access code is: [email or web address]  Expires: 2018  1:40 PM     Your access code will  in 90 days. If you need help or a new code, please contact your [facility] or call [phone] for assistance.        Care EveryWhere ID     This is your Care EveryWhere ID. This could be used by other organizations to access your [facility] medical records  [id number]        Your Vitals Were     Height BMI (Body Mass Index)                1.753 m (5' 9\") 26.43 kg/m2           Blood Pressure from Last 3 Encounters:   10/11/18 (!) 161/94   18 142/86   18 (!) 139/104    Weight from Last 3 Encounters:   10/11/18 81.2 kg (179 lb)   10/11/18 81.2 kg (179 lb)   10/11/18 81.6 kg (179 lb 12.8 oz)                 Today's Medication Changes          These changes are accurate as of 10/11/18 11:59 PM.  If you have any questions, ask your nurse or doctor.               Start taking these medicines.        Dose/Directions    diclofenac 1 % Gel topical gel   Commonly known as:  VOLTAREN   Started by:  Tom Dickerson MD        Apply 4 grams to knees four times daily using enclosed dosing card.   Quantity:  100 g   Refills:  1            Where to get your medicines      These medications were sent to [facility] [place], [place] - [address] [facility] [address] [place] [address] [facility] [address]    Hours:  TRANSPLANT PHONE NUMBER [phone] Phone:  [phone]     diclofenac 1 % Gel topical gel                Primary Care Provider Office Phone # Fax #    Cuca Celeste [phone]       " [address] [facility] [address]  [facility] [address]        Equal Access to Services     MANE HERRERA : Hadii aad ku hadelinfelicity Mariferali, wachanceda luqadaha, qamollyta kafarrahda konradcashcarline, waxay idiin haymeshaarun tolbertsanjusherri thompson. So [facility] [phone].    ATENCIÓN: Si habla español, tiene a ha disposición servicios gratuitos de asistencia lingüística. Llame al [phone].    We comply with applicable federal civil rights laws and [place] laws. We do not discriminate on the basis of race, color, national origin, age, disability, sex, sexual orientation, or gender identity.            Thank you!     Thank you for choosing [facility]  for your care. Our goal is always to provide you with excellent care. Hearing back from our patients is one way we can continue to improve our services. Please take a few minutes to complete the written survey that you may receive in the mail after your visit with us. Thank you!             Your Updated Medication List - Protect others around you: Learn how to safely use, store and throw away your medicines at www.disposemymeds.org.          This list is accurate as of 10/11/18 11:59 PM.  Always use your most recent med list.                   Brand Name Dispense Instructions for use Diagnosis    aspirin 81 MG EC tablet     90 tablet    Take 1 tablet (81 mg) by mouth daily    S/P TAVR (transcatheter aortic valve replacement)       clopidogrel 75 MG tablet    PLAVIX    30 tablet    Take 1 tablet (75 mg) by mouth daily    S/P TAVR (transcatheter aortic valve replacement)       diclofenac 1 % Gel topical gel    VOLTAREN    100 g    Apply 4 grams to knees four times daily using enclosed dosing card.        ferrous sulfate 325 (65 Fe) MG tablet    IRON     Take 1 tablet (325 mg) by mouth At Bedtime    Anemia, unspecified type       fluticasone 50 MCG/ACT spray    FLONASE    16 g    Spray 2 sprays into both nostrils daily    Cough, Post-nasal drip       lisinopril 20 MG tablet     PRINIVIL/ZESTRIL    90 tablet    Take 1 tablet (20 mg) by mouth At Bedtime    Essential hypertension       loratadine 10 MG tablet    CLARITIN    90 tablet    Take 1 tablet (10 mg) by mouth daily    Post-nasal drip, Cough       MULTIVITAMINS PO      Take 1 tablet by mouth At Bedtime        pantoprazole 40 MG EC tablet    PROTONIX    180 tablet    Take 1 tablet (40 mg) by mouth 2 times daily (before meals)    Erosive gastropathy
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
## 2018-10-11 NOTE — LETTER
"10/11/2018       RE: Ten Johnson  [address]  [facility] [address]     Dear Colleague,    Thank you for referring your patient, Ten Johnson, to the [facility]. Please see a copy of my visit note below.    CC: medial right knee pain    HPI:  Navi is seen today as a new patient for chief complaint of right medial knee pain. He reports he's had knee pain \"since he was 12 years old\". He localizes the pain as medial. Reports that the knee \"feels unstable and I just don't trust it\". Has night pain. Has tried tylenol and advil \"without much relief anymore\". Had an arthroscope/MRI done \"in like 2013\" and was told then that \"he would eventually need a knee replacement\". He now states the pain \"is unbearable and he rates it a 10 out of 10\". Reports daily swelling 'that just doesn't seem to ever go away\". Has tried ice and heat without relief. He works as an  and \"feels like he can't work anymore because of the    PMH: Reviewed in chart 10/11/2018 and are positive for a TAVR/aoritic replacement so he takes plavix daily. In chart notes, there is concern for alcoholism as well.     ROS: Reviewed on patient tablet today 10/11/2018 with all systems negative except for those listed below    PE:  Pleasant and cooperative male alert and oriented x3. Very appropriate today with normal cognition. Right knee exam reveals motion of -. Mild joint effusion noted without erythema. Significant tenderness to palpate medial joint line. Nontender to lateral joint. Patella tracks with slight lateral subluxation but no crepitus and is not painful. Skin intact without rashes, bruises, or erythema. Negative lachmans exam. Minimal pseudolaxity in valgus/varus stress at 0, 45, and 90 degrees. Strength is symmetrical to contralateral side at 5/5.     Xrays reviewed and taken today including a stress view show no collapse to the lateral joint with end stage " bone on bone grade IV subchondral sclerosis advanced to his medial compartment. Lateral compartment itself is preserved. Lateral distal femoral osteophyte noted.    Dx:  1. Alcohol abuse  2. Hepatitis C  3. End stage grade IV bone on bone advanced medial compartment right knee osteoarthritis    Plan:  1. Limited conversation held in regards to his alcohol consumption at this time as he is clearly not interested at this time on interventions to stop based on office note from primary MD Dr. Celeste.  2.Discussed methods of pain management including nsaids (which he cannot do secondary to GI bleed in the past), injections, bracing, and activity modification with strengthening exercises. He has never had cortisone to his knee joint. I offered him this today. He accepted.  3. Will obtain an MRI of his right knee to measure articular cartilage of the lateral compartment as well as the patellafemoral joint and the integrity of his ACL for preop plannin uni vs TKA.  4. He will follow up with Dr. Joe after the MRI is complete.    Total time spent was 30 minutes with greater than 50% spent in face to face consultation and collaboration of care.    Procedure Note    Pre Procedure dx: right knee osteoarthritis    Post Procedure dx: right knee osteoarthritis    Procedure: Informed consent obtained. Using sterile technique, the anterior lateral aspect of the right knee was prepped with chlorahexadine x2. 1cc of 40 mg of kenalog and 2 ml of lidocaine was injected into the lateral joint space without difficulty. Will follow up as discussed above.     Again, thank you for allowing me to participate in the care of your patient.      Sincerely,    SYDNIE Stout CNP
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
## 2018-10-11 NOTE — PROGRESS NOTES
[facility]  Orthopedics  Tom Dickerson MD  10/11/2018     Name: Ten Johnson  MRN: [id number]  Age: 58 year old  : 1960  Referring provider: Cuca Celeste     Chief Complaint: Right knee pain    History of Present Illness:   Ten Johnson is a 58 year old male who presents today for evaluation of right knee pain without any inciting event or injury. He notes that his right knee pain is longstanding. The patient had previously evaluated by Dr. Burnett on 13 for right knee pain, at which time he was diagnosed with right knee osteoarthritis and was given a course of Aleve for a 2 week scheduled course to decrease his knee symptoms. Today, he reports experiencing sharp and aching pain in his right knee. Pain is exacerbated with walking, bending, and squatting. He also notes that he has noticed some atrophy on the right side, especially since he had a TAVR. He notes that he has had several knee injuries in the past since the age of 12 and hasn't been able to run for almost 12 years. He does note riding a bicycle frequently. Of note, he has not received any corticosteroid injections or surgical procedures on his right knee. He wanted to discuss possible options to help with his right knee pain, including total knee arthroplasty, especially now since he has the opportunity to attend to his right knee.     Review of Systems:   A 10-point review of systems was obtained and is negative except for as noted in the HPI.     Medications:      aspirin 81 MG EC tablet, Take 1 tablet (81 mg) by mouth daily, Disp: 90 tablet, Rfl: 3     clopidogrel (PLAVIX) 75 MG tablet, Take 1 tablet (75 mg) by mouth daily, Disp: 30 tablet, Rfl: 3     ferrous sulfate (IRON) 325 (65 Fe) MG tablet, Take 1 tablet (325 mg) by mouth At Bedtime, Disp: , Rfl:      fluticasone (FLONASE) 50 MCG/ACT spray, Spray 2 sprays into both nostrils daily, Disp: 16 g, Rfl: 11     lisinopril (PRINIVIL/ZESTRIL) 20 MG tablet, Take 1 tablet (20 mg) by  "mouth At Bedtime, Disp: 90 tablet, Rfl: 3     loratadine (CLARITIN) 10 MG tablet, Take 1 tablet (10 mg) by mouth daily, Disp: 90 tablet, Rfl: 3     Multiple Vitamin (MULTIVITAMINS PO), Take 1 tablet by mouth At Bedtime , Disp: , Rfl:      pantoprazole (PROTONIX) 40 MG EC tablet, Take 1 tablet (40 mg) by mouth 2 times daily (before meals), Disp: 180 tablet, Rfl: 1    Allergies:  Zolpidem  Cats  Dogs  Pollen Extract    Past Medical History:  Alcohol use disorder  Alcoholic cirrhosis  Anticoagulant long-term use  Ascites  Chronic allergic rhinitis  Chronic anemia  Chronic hepatitis C without hepatic coma (H)  Diastolic dysfunction  Erosive gastropathy  Esophageal varices in alcoholic cirrhosis  H/O upper gastrointestinal hemmorhage  History of blood transfusion  History of drug abuse  Hypertension  JANELLE  Sarahi-Hoffman tear  Marijuana abuse  MRS  Olecranon bursitis  Portal hypertension  Right shoulder pain  S/P TAVR  Severe aortic stenosis  Thrombocytopenia    Past Surgical History:  Arthroscopy Shoulder Rotator Cuff Repair  Esophagoscopy, Gastroscopy, Duodenoscopy, Combined  Facial Reconstruction Surgery  Heart Cath Femoral Cannulization with Open Standby Repair Aortic Valve  Irrigation and Debridement Upper Extremity, Combined  Repair Tendon Triceps Upper Extremity  Shoulder Surgery  Transcatheter Aortic Valve Implant Anesthesia  Transposition Ulnar Nerve (Elbow)     Social History:  Works as . He admits tobacco use and admits to alcohol use.     Family History:  Positive: Cancer (mother), Alcoholism (paternal uncle)    Physical Examination:  Pulse 82, height 1.753 m (5' 9\"), weight 81.2 kg (179 lb).  General: Patient is alert, No acute distress, pleasant and conversational.  Gait: Nonantalgic. Normal heel toe gait.  Skin: Intact without erythema or ecchymosis.   Right Knee: No effusion or soft tissue swelling. Range of motion 0-135 degrees without restriction or reported pain. Some crepitus with range of " motion testing. No medial or lateral facet joint tenderness. No posterior medial or posterior lateral joint line tenderness. Negative bounce test. Negative forced flexion test. Negative Wilmer's. No ligamentous laxity or pain with valgus or varus stress. Negative Lachman's, anterior drawer, and posterior drawer. Full Isometric quad strength, extensor mechanism in place. Neurovascularly intact in the lower extremity. Hip and ankle with full active range of motion and are non-tender. Patient is able to perform two legged squat without difficulty.    Imaging:   Radiographs of the right knee - 3 views (10/11/2018)  1. Severe medial compartment predominant osteoarthrosis.  2. Likely multiple intra-articular bodies.    Radiographs of the right knee - 1/2 view (10/11/2018)  Severe right knee medial compartment joint space loss.    I have independently reviewed the above imaging studies; the results were discussed with the patient.     Assessment:   58 year old male with h/o TAVR, cirrhosis, and ongoing EtOH abuse with osteoarthritis of the right knee, severe particularily in the medial compartment.     Plan:   I prescribed him Diclofenac for his right knee pain.   Referral to surgeon was also provided at the patients request.   Also discussed nonsurgical options including PT, bracing, and steroid injections.     Tom Dickerson MD      Scribe Disclosure:   I, Jonathan Lester, am serving as a scribe to document services personally performed by Tom Dickerson MD at this visit, based upon the provider's statements to me. All documentation has been reviewed by the aforementioned provider prior to being entered into the official medical record.
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
## 2018-10-11 NOTE — MR AVS SNAPSHOT
After Visit Summary   10/11/2018    Ten Johnson    MRN: [id number]           Patient Information     Date Of Birth          1960        Visit Information        Provider Department      10/11/2018 9:50 AM Tom Dickerson MD [facility] Sports and Orthopaedic Walk In [facility]        Today's Diagnoses     Primary osteoarthritis of right knee    -  1       Follow-ups after your visit        Your next 10 appointments already scheduled     Oct 23, 2018  7:45 PM CDT   (Arrive by 7:30 PM)   MR KNEE RIGHT W/O CONTRAST with UCMR1   [facility] [place] [facility] ([facility])    [address] [place] [id number] [phone]           How do I prepare for my exam? (Food and drink instructions) **If you will be receiving sedation or general anesthesia, please see special notes below.**  How do I prepare for my exam? (Other instructions) Take your medicines as usual, unless your doctor tells you not to. Please remove any body piercings and hair extensions before you arrive. Follow your doctor s orders. If you do not, we may have to postpone your exam. You may or may not receive IV contrast for this exam pending the discretion of the Radiologist.  You do not need to do anything special to prepare. **If you will be receiving sedation or general anesthesia, please see special notes below.**  What should I wear:  The MRI machine uses a strong magnet. Please wear clothes without metal (snaps, zippers). A sweatsuit works well, or we may give you a hospital gown.  How long does the exam take: Most tests take 30 to 60 minutes.  HOWEVER, IF YOUR DOCTOR PRESCRIBES ANESTHESIA please plan on spending four to five hours in the recovery room.  What should I bring: Bring a list of your current medicines to your exam (including vitamins, minerals and over-the-counter drugs). Also bring the results of similar scans you may have had.  Do I need a : **If you will be  receiving sedation or general anesthesia, please see special notes below.**  What should I do after the exam? No Restrictions, You may resume normal activities.  What is this test: MRI (magnetic resonance imaging) uses a strong magnet and radio waves to look inside the body. An MRA (magnetic resonance angiogram) does the same thing, but it lets us look at your blood vessels. A computer turns the radio waves into pictures showing cross sections of the body, much like slices of bread. This helps us see any problems more clearly.  Who should I call with questions: Please call the Imaging Department at your exam site with any questions. Directions, parking instructions, and other information is available on our website, [email or web address].  How do I prepare if I m having sedation or anesthesia? **IMPORTANT** THE INSTRUCTIONS BELOW ARE ONLY FOR THOSE PATIENTS WHO HAVE BEEN TOLD THEY WILL RECEIVE SEDATION OR GENERAL ANESTHESIA DURING THEIR MRI PROCEDURE:  IF YOU WILL RECEIVE SEDATION (take medicine to help you relax during your exam): You must get the medicine from your doctor before you arrive. Bring the medicine to the exam. Do not take it at home. Arrive one hour early. Bring someone who can take you home after the test. Your medicine will make you sleepy. After the exam, you may not drive, take a bus or take a taxi by yourself. No eating 8 hours before your exam. You may have clear liquids up until 4 hours before your exam. (Clear liquids include water, clear tea, black coffee and fruit juice without pulp.)  IF YOU WILL RECEIVE ANESTHESIA (be asleep for your exam): Arrive 1 1/2 hours early. Bring someone who can take you home after the test. You may not drive, take a bus or take a taxi by yourself. No eating 8 hours before your exam. You may have clear liquids up until 4 hours before your exam. (Clear liquids include water, clear tea, black coffee and fruit juice without pulp.) You will spend four to five hours in  the recovery room.            Nov 05, 2018  9:00 AM CST   (Arrive by 8:45 AM)   RETURN KNEE with Olvin Joe MD   [facility] ([facility])    [address]  4th Floor  [facility] [id number] [phone]            Feb 22, 2019  9:45 AM CST   Lab with  LAB    Health Lab ([facility])    [address]  1st Floor  [facility] [id number] [phone]            Feb 22, 2019 10:00 AM CST   Ech Complete with UCECHCR4    Health Echo ([facility])    [address]  3rd Floor  [facility] [id number] [phone]           1.  Please bring or wear a comfortable two-piece outfit. 2.  You may eat, drink and take your normal medicines. 3.  For any questions that cannot be answered, please contact the ordering physician 4.  Please do not wear perfumes or scented lotions on the day of your exam.            Feb 22, 2019 11:00 AM CST   (Arrive by 10:45 AM)   Return Visit with Ema Pierson NP   [facility] ([facility])    [address] [place] [address]  [address] [place] [id number] [phone]              Who to contact     Please call your clinic at [phone] to:    Ask questions about your health    Make or cancel appointments    Discuss your medicines    Learn about your test results    Speak to your doctor            Additional Information About Your Visit        MyChart Information     [employer] is an electronic gateway that provides easy, online access to your medical records. With EpicTopic, you can request a clinic appointment, read your test results, renew a prescription or communicate with your care team.     To sign up for [employer] visit the website at [email or web address]   You will be asked to enter the access code listed below, as well as some personal information. Please follow the directions to create your username and password.     Your  "access code is: [email or web address]  Expires: 2018  1:40 PM     Your access code will  in 90 days. If you need help or a new code, please contact your [facility] or call [phone] for assistance.        Care EveryWhere ID     This is your Care EveryWhere ID. This could be used by other organizations to access your [place] medical records  [id number]        Your Vitals Were     Pulse Height BMI (Body Mass Index)             82 1.753 m (5' 9\") 26.43 kg/m2          Blood Pressure from Last 3 Encounters:   10/11/18 (!) 161/94   18 142/86   18 (!) 139/104    Weight from Last 3 Encounters:   10/11/18 81.2 kg (179 lb)   10/11/18 81.2 kg (179 lb)   10/11/18 81.6 kg (179 lb 12.8 oz)              Today, you had the following     No orders found for display         Today's Medication Changes          These changes are accurate as of 10/11/18 11:59 PM.  If you have any questions, ask your nurse or doctor.               Start taking these medicines.        Dose/Directions    diclofenac 1 % Gel topical gel   Commonly known as:  VOLTAREN   Started by:  Tom Dickerson MD        Apply 4 grams to knees four times daily using enclosed dosing card.   Quantity:  100 g   Refills:  1            Where to get your medicines      These medications were sent to [facility] [address]   [address] [place] [id number]    Hours:  TRANSPLANT PHONE NUMBER [phone] Phone:  [phone]     diclofenac 1 % Gel topical gel                Primary Care Provider Office Phone # Fax #    Cuca Celeste [phone]       [address] [place] [address] [address] [place] [address]        Equal Access to Services     MANE HERRERA AH: Erma Reyes, kam benavides, qayuri kajazmin huerta. So [facility] [phone].    ATENCIÓN: Si kasie solorio " disposición servicios gratuitos de asistencia lingüística. Jennifer norman [phone].    We comply with applicable federal civil rights laws and [place] laws. We do not discriminate on the basis of race, color, national origin, age, disability, sex, sexual orientation, or gender identity.            Thank you!     Thank you for choosing [facility] SPORTS AND ORTHOPAEDIC WALK IN CLINIC  for your care. Our goal is always to provide you with excellent care. Hearing back from our patients is one way we can continue to improve our services. Please take a few minutes to complete the written survey that you may receive in the mail after your visit with us. Thank you!             Your Updated Medication List - Protect others around you: Learn how to safely use, store and throw away your medicines at www.disposemymeds.org.          This list is accurate as of 10/11/18 11:59 PM.  Always use your most recent med list.                   Brand Name Dispense Instructions for use Diagnosis    aspirin 81 MG EC tablet     90 tablet    Take 1 tablet (81 mg) by mouth daily    S/P TAVR (transcatheter aortic valve replacement)       clopidogrel 75 MG tablet    PLAVIX    30 tablet    Take 1 tablet (75 mg) by mouth daily    S/P TAVR (transcatheter aortic valve replacement)       diclofenac 1 % Gel topical gel    VOLTAREN    100 g    Apply 4 grams to knees four times daily using enclosed dosing card.        ferrous sulfate 325 (65 Fe) MG tablet    IRON     Take 1 tablet (325 mg) by mouth At Bedtime    Anemia, unspecified type       fluticasone 50 MCG/ACT spray    FLONASE    16 g    Spray 2 sprays into both nostrils daily    Cough, Post-nasal drip       lisinopril 20 MG tablet    PRINIVIL/ZESTRIL    90 tablet    Take 1 tablet (20 mg) by mouth At Bedtime    Essential hypertension       loratadine 10 MG tablet    CLARITIN    90 tablet    Take 1 tablet (10 mg) by mouth daily    Post-nasal drip, Cough       MULTIVITAMINS PO      Take 1 tablet by  mouth At Bedtime        pantoprazole 40 MG EC tablet    PROTONIX    180 tablet    Take 1 tablet (40 mg) by mouth 2 times daily (before meals)    Erosive gastropathy

## 2018-10-11 NOTE — PROGRESS NOTES
"CC: medial right knee pain    HPI:  Navi is seen today as a new patient for chief complaint of right medial knee pain. He reports he's had knee pain \"since he was 12 years old\". He localizes the pain as medial. Reports that the knee \"feels unstable and I just don't trust it\". Has night pain. Has tried tylenol and advil \"without much relief anymore\". Had an arthroscope/MRI done \"in like 2013\" and was told then that \"he would eventually need a knee replacement\". He now states the pain \"is unbearable and he rates it a 10 out of 10\". Reports daily swelling 'that just doesn't seem to ever go away\". Has tried ice and heat without relief. He works as an  and \"feels like he can't work anymore because of the    PMH: Reviewed in chart 10/11/2018 and are positive for a TAVR/aoritic replacement so he takes plavix daily. In chart notes, there is concern for alcoholism as well.     ROS: Reviewed on patient tablet today 10/11/2018 with all systems negative except for those listed below    PE:  Pleasant and cooperative male alert and oriented x3. Very appropriate today with normal cognition. Right knee exam reveals motion of -. Mild joint effusion noted without erythema. Significant tenderness to palpate medial joint line. Nontender to lateral joint. Patella tracks with slight lateral subluxation but no crepitus and is not painful. Skin intact without rashes, bruises, or erythema. Negative lachmans exam. Minimal pseudolaxity in valgus/varus stress at 0, 45, and 90 degrees. Strength is symmetrical to contralateral side at 5/5.     Xrays reviewed and taken today including a stress view show no collapse to the lateral joint with end stage bone on bone grade IV subchondral sclerosis advanced to his medial compartment. Lateral compartment itself is preserved. Lateral distal femoral osteophyte noted.    Dx:  1. Alcohol abuse  2. Hepatitis C  3. End stage grade IV bone on bone advanced medial compartment right knee " osteoarthritis    Plan:  1. Limited conversation held in regards to his alcohol consumption at this time as he is clearly not interested at this time on interventions to stop based on office note from primary MD Dr. Celeste.  2.Discussed methods of pain management including nsaids (which he cannot do secondary to GI bleed in the past), injections, bracing, and activity modification with strengthening exercises. He has never had cortisone to his knee joint. I offered him this today. He accepted.  3. Will obtain an MRI of his right knee to measure articular cartilage of the lateral compartment as well as the patellafemoral joint and the integrity of his ACL for preop plannin uni vs TKA.  4. He will follow up with Dr. Joe after the MRI is complete.    Total time spent was 30 minutes with greater than 50% spent in face to face consultation and collaboration of care.    Procedure Note    Pre Procedure dx: right knee osteoarthritis    Post Procedure dx: right knee osteoarthritis    Procedure: Informed consent obtained. Using sterile technique, the anterior lateral aspect of the right knee was prepped with chlorahexadine x2. 1cc of 40 mg of kenalog and 2 ml of lidocaine was injected into the lateral joint space without difficulty. Will follow up as discussed above.   Answers for HPI/ROS submitted by the patient on 10/11/2018   General Symptoms: Yes  Skin Symptoms: No  HENT Symptoms: No  EYE SYMPTOMS: No  HEART SYMPTOMS: No  LUNG SYMPTOMS: No  INTESTINAL SYMPTOMS: No  URINARY SYMPTOMS: No  REPRODUCTIVE SYMPTOMS: No  SKELETAL SYMPTOMS: Yes  BLOOD SYMPTOMS: No  NERVOUS SYSTEM SYMPTOMS: No  MENTAL HEALTH SYMPTOMS: No  Fever: No  Loss of appetite: No  Weight loss: No  Weight gain: No  Fatigue: Yes  Night sweats: No  Chills: No  Increased stress: No  Excessive hunger: No  Excessive thirst: No  Feeling hot or cold when others believe the temperature is normal: No  Loss of height: No  Post-operative complications: No  Surgical  site pain: No  Hallucinations: No  Change in or Loss of Energy: No  Hyperactivity: No  Confusion: No  Back pain: Yes  Muscle aches: Yes  Neck pain: Yes  Swollen joints: No  Joint pain: Yes  Bone pain: Yes  Muscle cramps: Yes  Muscle weakness: Yes  Joint stiffness: Yes  Bone fracture: No

## 2018-10-18 ENCOUNTER — TELEPHONE (OUTPATIENT)
Dept: INTERNAL MEDICINE | Facility: CLINIC | Age: 58
End: 2018-10-18

## 2018-10-18 NOTE — TELEPHONE ENCOUNTER
[facility] Call Center    Phone Message    May a detailed message be left on voicemail: yes    Reason for Call: Other: Per Pt Needs some form filled out by Dr. Celeste. he wanted to know if he could just drop the fom off at clinic. he ask that a nurse contacts him to discuss if theres a faster way to get paper work over to the doctor. The pt does have an appt scheduled if that is the fastest way to get forms filled out     Action Taken: Message routed to:  [facility] ([facility]): RENETTA

## 2018-10-23 ENCOUNTER — RADIANT APPOINTMENT (OUTPATIENT)
Dept: MRI IMAGING | Facility: CLINIC | Age: 58
End: 2018-10-23
Attending: NURSE PRACTITIONER
Payer: COMMERCIAL

## 2018-10-23 DIAGNOSIS — M17.31 POST-TRAUMATIC OSTEOARTHRITIS OF RIGHT KNEE: ICD-10-CM

## 2018-10-27 ENCOUNTER — OFFICE VISIT (OUTPATIENT)
Dept: INTERNAL MEDICINE | Facility: CLINIC | Age: 58
End: 2018-10-27
Payer: COMMERCIAL

## 2018-10-27 VITALS
SYSTOLIC BLOOD PRESSURE: 149 MMHG | WEIGHT: 181.8 LBS | HEART RATE: 64 BPM | DIASTOLIC BLOOD PRESSURE: 83 MMHG | RESPIRATION RATE: 20 BRPM | BODY MASS INDEX: 26.85 KG/M2

## 2018-10-27 DIAGNOSIS — M25.561 CHRONIC PAIN OF RIGHT KNEE: Primary | ICD-10-CM

## 2018-10-27 DIAGNOSIS — Z95.3 S/P TAVR (TRANSCATHETER AORTIC VALVE REPLACEMENT), BIOPROSTHETIC: ICD-10-CM

## 2018-10-27 DIAGNOSIS — G89.29 CHRONIC PAIN OF RIGHT KNEE: Primary | ICD-10-CM

## 2018-10-27 ASSESSMENT — PAIN SCALES - GENERAL: PAINLEVEL: MODERATE PAIN (4)

## 2018-10-27 NOTE — PATIENT INSTRUCTIONS
[facility] [phone]        Please schedule your Pre-Op Exam within 30 days prior to the surgery day.

## 2018-10-27 NOTE — PROGRESS NOTES
CC:  Forms    S:  Ten presents today in order to go over disability forms that he needs.  This took 40 minutes (required exact dates of initial evaluations, dates of disability, diagnoses, specialists' evaluations, when I have seen him for his problems, etc.).  Two forms had to do with his current disability due to right knee pain (OA and other soft tissue damage, see MRI below).  He has an appointment pending with ortho Dr. Joe in a week and a half.  Recent steroid injection helped somewhat.  Otherwise he is not able to perform necessary physical functions as an .  The other form related to time off due to his TAVR in Feb of this year.  His workplace is also requesting medical records pertaining to his heart and knee.  Ten will be arranging for this.  I clarified with him whether he wants records that mention his alcohol abuse included in the ones he shares with his workplace. He is fine with this.  I offered to write a separate letter if he changes his mind.    Patient Active Problem List   Diagnosis     Presbyopia     Rotator cuff tear, right     OA (osteoarthritis) of knee     Rhinitis, allergic     Nonrheumatic aortic valve stenosis     Aortic stenosis, severe     Olecranon bursitis     S/p TAVR (transcatheter aortic valve replacement), bioprosthetic     Anticoagulant long-term use     Right shoulder pain     Chronic anemia     Diastolic dysfunction     Chronic allergic rhinitis     Portal hypertension (H)     History of drug abuse in remission     Past Surgical History:   Procedure Laterality Date     ARTHROSCOPY SHOULDER ROTATOR CUFF REPAIR  7/31/2012    Procedure: ARTHROSCOPY SHOULDER ROTATOR CUFF REPAIR;  Right Shoulder Arthroscopic Rotator Cuff Repair, BicepsTenodesis,  Subacromial Decompression ;  Surgeon: Joi Castillo MD;  [facility]     ESOPHAGOSCOPY, GASTROSCOPY, DUODENOSCOPY (EGD), COMBINED N/A 10/23/2017    Procedure: COMBINED ESOPHAGOSCOPY, GASTROSCOPY,  DUODENOSCOPY (EGD);;  Surgeon: Gentry Salas MD;  Location: [facility]     FACIAL RECONSTRUCTION SURGERY  1971     HEART CATH FEMORAL CANNULIZATION WITH OPEN STANDBY REPAIR AORTIC VALVE N/A 2/21/2018    Procedure: HEART CATH FEMORAL CANNULIZATION WITH OPEN STANDBY REPAIR AORTIC VALVE;;  Surgeon: Luis Baird MD;  [facility]     IRRIGATION AND DEBRIDEMENT UPPER EXTREMITY, COMBINED  1/3/2012    Procedure:COMBINED IRRIGATION AND DEBRIDEMENT UPPER EXTREMITY; Irrigation & Debridement Left Elbow; Surgeon:CRISTHIAN ZHOU; Location:[facility]     REPAIR TENDON TRICEPS UPPER EXTREMITY  11/8/2011    Procedure:REPAIR TENDON TRICEPS UPPER EXTREMITY; Surgeon:CRISTHIAN ZHOU; Location:[facility]     SHOULDER SURGERY  2003    left, injury, torn tendons, hematoma     TRANSCATHETER AORTIC VALVE IMPLANT ANESTHESIA N/A 2/21/2018    Procedure: TRANSCATHETER AORTIC VALVE IMPLANT ANESTHESIA;  Transfemoral (Quiroz) Aortic Valve Implant 26mm MARTHA 3, with Cardiopulmonary Bypass Standby, transthoracic echocardiogram;  Surgeon: GENERIC ANESTHESIA PROVIDER;  Location: [facility]     TRANSPOSITION ULNAR NERVE (ELBOW)  11/8/2011    Procedure:TRANSPOSITION ULNAR NERVE (ELBOW); Final Procedure Done: Left Elbow Lateral Ulnar Collateral Repair And  Left Elbow Triceps Repair       Current Outpatient Prescriptions   Medication Sig Dispense Refill     aspirin 81 MG EC tablet Take 1 tablet (81 mg) by mouth daily 90 tablet 3     diclofenac (VOLTAREN) 1 % GEL topical gel Apply 4 grams to knees four times daily using enclosed dosing card. 100 g 1     ferrous sulfate (IRON) 325 (65 Fe) MG tablet Take 1 tablet (325 mg) by mouth At Bedtime       fluticasone (FLONASE) 50 MCG/ACT spray Spray 2 sprays into both nostrils daily 16 g 11     lisinopril (PRINIVIL/ZESTRIL) 20 MG tablet Take 1 tablet (20 mg) by mouth At Bedtime 90 tablet 3     loratadine (CLARITIN) 10 MG tablet Take 1 tablet (10 mg) by mouth daily 90 tablet 3     Multiple  Vitamin (MULTIVITAMINS PO) Take 1 tablet by mouth At Bedtime        pantoprazole (PROTONIX) 40 MG EC tablet Take 1 tablet (40 mg) by mouth 2 times daily (before meals) 180 tablet 1     clopidogrel (PLAVIX) 75 MG tablet Take 1 tablet (75 mg) by mouth daily 30 tablet 3     Allergies   Allergen Reactions     Zolpidem Other (See Comments)     Alcoholic.  Had reaction 3/17/13 while intoxicated which included black out, loss of awareness, paranoia.  Do not prescribe.  Dr. Celeste     Cats Other (See Comments)     rhinitis     Dogs Other (See Comments)     rhinitis     Pollen Extract Other (See Comments)     rhinits.     /83 (BP Location: Right arm, Patient Position: Sitting, Cuff Size: Adult Regular)  Pulse 64  Resp 20  Wt 82.5 kg (181 lb 12.8 oz)  BMI 26.85 kg/m2  Gen:  In good spirits  BP Readings from Last 6 Encounters:   10/27/18 149/83   10/11/18 (!) 161/94   09/28/18 142/86   09/12/18 (!) 139/104   06/29/18 151/78   06/13/18 (!) 169/95     10/23/18 MRI right knee:  IMPRESSION:  1. Small right knee joint effusion. Multiple joint bodies posterior  knee.  2. Complex tearing of the right knee medial meniscus.  3. Small radial tear involving the junction of the anterior horn and  body of the right knee lateral meniscus.  4. Marked osteoarthrosis in the right knee medial femorotibial joint  compartment with full-thickness cartilage loss and subchondral edema.  5. Focal area of high-grade cartilage fissuring along the lateral  tibial plateau as well as cartilage thinning along the medial  trochlear surface.  6. Likely chronic full-thickness tear of the anterior cruciate  ligament.  7. The posterior cruciate ligament, and medial and lateral supporting  structures are intact.     11/5/18 Appt with Dr. Heath Caal was seen today for forms.    Diagnoses and all orders for this visit:    Chronic pain of right knee    S/p TAVR (transcatheter aortic valve replacement), bioprosthetic    Elevated blood  pressure  Being addressed by cardiology.  Increased lisinopril from 10 to 20 mg daily earlier this month.    See HPI    RTC for preop    Total time spent 40 minutes.  More than 50% of the time spent with Mr. Johnson on counseling / coordinating his care        Cuca Cleeste M.D.  Internal Medicine  Primary Care Center   pager [phone]

## 2018-10-27 NOTE — NURSING NOTE
Chief Complaint   Patient presents with     Forms     Patient is here for disability forms filled out.        Robinson Lin CMA ([facility]) at 9:11 AM on 10/27/2018

## 2018-10-27 NOTE — MR AVS SNAPSHOT
After Visit Summary   10/27/2018    Ten Johnson    MRN: [id number]           Patient Information     Date Of Birth          1960        Visit Information        Provider Department      10/27/2018 9:20 AM Cuca Celeste MD [facility]        Care Instructions    Primary Care Center [phone]        Please schedule your Pre-Op Exam within 30 days prior to the surgery day.            Follow-ups after your visit        Follow-up notes from your care team     Return for will need preop in the future once surgery is schedule.      Your next 10 appointments already scheduled     Nov 05, 2018  9:00 AM CST   (Arrive by 8:45 AM)   RETURN KNEE with Olvin Joe MD   [facility] ([facility])    [address] [place] [address]  4th Floor  [facility] [id number] [phone]            Feb 22, 2019  9:45 AM CST   Lab with  LAB   [facility] Lab ([facility])    [address] [place] [address]  1st Floor  [facility] [id number] [phone]            Feb 22, 2019 10:00 AM CST   Ech Complete with ECHCR4   [facility] Echo ([facility])    [address] [place] [address]  3rd Floor  [facility] [id number] [phone]           1.  Please bring or wear a comfortable two-piece outfit. 2.  You may eat, drink and take your normal medicines. 3.  For any questions that cannot be answered, please contact the ordering physician 4.  Please do not wear perfumes or scented lotions on the day of your exam.            Feb 22, 2019 11:00 AM CST   (Arrive by 10:45 AM)   Return Visit with Ema Pierson NP   [facility] Heart Care ([facility])    [address] [place] [address]  [address]  [facility] [id number] [phone]              Who to contact     Please call your clinic at [phone] to:    Ask questions about your health    Make or cancel appointments    Discuss your medicines    Learn about your  test results    Speak to your doctor            Additional Information About Your Visit        [employer] Information     "[employer] gives you secure access to your electronic health record. If you see a primary care provider, you can also send messages to your care team and make appointments. If you have questions, please call your primary care clinic.  If you do not have a primary care provider, please call [phone] and they will assist you.      "[employer] is an electronic gateway that provides easy, online access to your medical records. With "[employer] you can request a clinic appointment, read your test results, renew a prescription or communicate with your care team.     To access your existing account, please contact your [facility] or call [phone] for assistance.        Care EveryWhere ID     This is your Care EveryWhere ID. This could be used by other organizations to access your [facility] medical records  [id number]        Your Vitals Were     Pulse Respirations BMI (Body Mass Index)             64 20 26.85 kg/m2          Blood Pressure from Last 3 Encounters:   10/27/18 149/83   10/11/18 (!) 161/94   09/28/18 142/86    Weight from Last 3 Encounters:   10/27/18 82.5 kg (181 lb 12.8 oz)   10/11/18 81.2 kg (179 lb)   10/11/18 81.2 kg (179 lb)              Today, you had the following     No orders found for display       Primary Care Provider Office Phone # Fax #    Cuca Celeste [phone]       [address] [place] [address]  [facility] [address]        Equal Access to Services     MANE HERRERA : Hadii aad ku hadasho Soomaali, waaxda luqadaha, qaybta kaalmada adeegyada, jazmin crespo . So [facility] [phone].    ATENCIÓN: Si habla español, tiene a ha disposición servicios gratuitos de asistencia lingüística. Llame al [phone].    We comply with applicable federal civil rights laws and [place] laws. We do not discriminate on the  basis of race, color, national origin, age, disability, sex, sexual orientation, or gender identity.            Thank you!     Thank you for choosing [facility]  for your care. Our goal is always to provide you with excellent care. Hearing back from our patients is one way we can continue to improve our services. Please take a few minutes to complete the written survey that you may receive in the mail after your visit with us. Thank you!             Your Updated Medication List - Protect others around you: Learn how to safely use, store and throw away your medicines at www.disposemymeds.org.          This list is accurate as of 10/27/18 10:07 AM.  Always use your most recent med list.                   Brand Name Dispense Instructions for use Diagnosis    aspirin 81 MG EC tablet     90 tablet    Take 1 tablet (81 mg) by mouth daily    S/P TAVR (transcatheter aortic valve replacement)       clopidogrel 75 MG tablet    PLAVIX    30 tablet    Take 1 tablet (75 mg) by mouth daily    S/P TAVR (transcatheter aortic valve replacement)       diclofenac 1 % Gel topical gel    VOLTAREN    100 g    Apply 4 grams to knees four times daily using enclosed dosing card.        ferrous sulfate 325 (65 Fe) MG tablet    IRON     Take 1 tablet (325 mg) by mouth At Bedtime    Anemia, unspecified type       fluticasone 50 MCG/ACT spray    FLONASE    16 g    Spray 2 sprays into both nostrils daily    Cough, Post-nasal drip       lisinopril 20 MG tablet    PRINIVIL/ZESTRIL    90 tablet    Take 1 tablet (20 mg) by mouth At Bedtime    Essential hypertension       loratadine 10 MG tablet    CLARITIN    90 tablet    Take 1 tablet (10 mg) by mouth daily    Post-nasal drip, Cough       MULTIVITAMINS PO      Take 1 tablet by mouth At Bedtime        pantoprazole 40 MG EC tablet    PROTONIX    180 tablet    Take 1 tablet (40 mg) by mouth 2 times daily (before meals)    Erosive gastropathy

## 2018-11-05 ENCOUNTER — OFFICE VISIT (OUTPATIENT)
Dept: ORTHOPEDICS | Facility: CLINIC | Age: 58
End: 2018-11-05
Payer: COMMERCIAL

## 2018-11-05 VITALS — HEIGHT: 69 IN | WEIGHT: 181 LBS | BODY MASS INDEX: 26.81 KG/M2

## 2018-11-05 DIAGNOSIS — M17.31 POST-TRAUMATIC OSTEOARTHRITIS OF RIGHT KNEE: Primary | ICD-10-CM

## 2018-11-05 ASSESSMENT — ENCOUNTER SYMPTOMS
LIGHT-HEADEDNESS: 0
HOARSE VOICE: 0
NECK PAIN: 0
TASTE DISTURBANCE: 0
DYSPNEA ON EXERTION: 1
WHEEZING: 0
EXERCISE INTOLERANCE: 0
SMELL DISTURBANCE: 1
SYNCOPE: 0
ARTHRALGIAS: 1
PALPITATIONS: 0
MYALGIAS: 1
TROUBLE SWALLOWING: 0
SPUTUM PRODUCTION: 1
COUGH DISTURBING SLEEP: 1
MUSCLE CRAMPS: 1
BACK PAIN: 1
SLEEP DISTURBANCES DUE TO BREATHING: 0
POSTURAL DYSPNEA: 0
SINUS CONGESTION: 1
SNORES LOUDLY: 1
STIFFNESS: 0
SINUS PAIN: 1
JOINT SWELLING: 0
COUGH: 1
NECK MASS: 0
HEMOPTYSIS: 0
HYPERTENSION: 1
LEG PAIN: 1
ORTHOPNEA: 0
SHORTNESS OF BREATH: 1
MUSCLE WEAKNESS: 0
HYPOTENSION: 0
SORE THROAT: 0

## 2018-11-05 NOTE — MR AVS SNAPSHOT
After Visit Summary   11/5/2018    Ten Johnson    MRN: [id number]           Patient Information     Date Of Birth          1960        Visit Information        Provider Department      11/5/2018 9:00 AM Olvin Joe MD [facility]        Today's Diagnoses     Post-traumatic osteoarthritis of right knee    -  1       Follow-ups after your visit        Your next 10 appointments already scheduled     Feb 22, 2019  9:45 AM CST   Lab with  LAB   [facility] ([facility])    [address]  1st Floor  [facility] [address] [phone]            Feb 22, 2019 10:00 AM CST   Ech Complete with UCECHCR4   [facility] Echo ([facility])    [address]  3rd [facility] [id number] [phone]           1.  Please bring or wear a comfortable two-piece outfit. 2.  You may eat, drink and take your normal medicines. 3.  For any questions that cannot be answered, please contact the ordering physician 4.  Please do not wear perfumes or scented lotions on the day of your exam.            Feb 22, 2019 11:00 AM CST   (Arrive by 10:45 AM)   Return Visit with Ema Pierson NP   [facility] ([facility])    [address] [place] [address]  [address] [place] [address] [phone]              Who to contact     Please call your clinic at [phone] to:    Ask questions about your health    Make or cancel appointments    Discuss your medicines    Learn about your test results    Speak to your doctor            Additional Information About Your Visit        [employer] Information     [employer] gives you secure access to your electronic health record. If you see a primary care provider, you can also send messages to your care team and make appointments. If you have questions, please call your primary care clinic.  If you do not have a primary care provider, please call [phone] and  "they will assist you.      [employer] is an electronic gateway that provides easy, online access to your medical records. With [employer], you can request a clinic appointment, read your test results, renew a prescription or communicate with your care team.     To access your existing account, please contact your [facility] or call [phone] for assistance.        Care EveryWhere ID     This is your Care EveryWhere ID. This could be used by other organizations to access your [facility] medical records  [id number]        Your Vitals Were     Height BMI (Body Mass Index)                1.753 m (5' 9\") 26.73 kg/m2           Blood Pressure from Last 3 Encounters:   10/27/18 149/83   10/11/18 (!) 161/94   09/28/18 142/86    Weight from Last 3 Encounters:   11/05/18 82.1 kg (181 lb)   10/27/18 82.5 kg (181 lb 12.8 oz)   10/11/18 81.2 kg (179 lb)              Today, you had the following     No orders found for display       Primary Care Provider Office Phone # Fax #    Cuca Celeste [phone]       [address] [place] [address]  [facility] [address]        Equal Access to Services     MANE HERRERA : Hadii adrienne peraltao Sodani, waaxda luqadaha, qaybta kaalmada adeegyada, jazmin thompson. So [facility] [phone].    ATENCIÓN: Si habla español, tiene a ha disposición servicios gratuitos de asistencia lingüística. Llame al [phone].    We comply with applicable federal civil rights laws and [place] laws. We do not discriminate on the basis of race, color, national origin, age, disability, sex, sexual orientation, or gender identity.            Thank you!     Thank you for choosing [facility]  for your care. Our goal is always to provide you with excellent care. Hearing back from our patients is one way we can continue to improve our services. Please take a few minutes to complete the written survey that you may receive in the mail " after your visit with us. Thank you!             Your Updated Medication List - Protect others around you: Learn how to safely use, store and throw away your medicines at www.disposemymeds.org.          This list is accurate as of 11/5/18  9:53 AM.  Always use your most recent med list.                   Brand Name Dispense Instructions for use Diagnosis    aspirin 81 MG EC tablet     90 tablet    Take 1 tablet (81 mg) by mouth daily    S/P TAVR (transcatheter aortic valve replacement)       clopidogrel 75 MG tablet    PLAVIX    30 tablet    Take 1 tablet (75 mg) by mouth daily    S/P TAVR (transcatheter aortic valve replacement)       diclofenac 1 % Gel topical gel    VOLTAREN    100 g    Apply 4 grams to knees four times daily using enclosed dosing card.        ferrous sulfate 325 (65 Fe) MG tablet    IRON     Take 1 tablet (325 mg) by mouth At Bedtime    Anemia, unspecified type       fluticasone 50 MCG/ACT spray    FLONASE    16 g    Spray 2 sprays into both nostrils daily    Cough, Post-nasal drip       lisinopril 20 MG tablet    PRINIVIL/ZESTRIL    90 tablet    Take 1 tablet (20 mg) by mouth At Bedtime    Essential hypertension       loratadine 10 MG tablet    CLARITIN    90 tablet    Take 1 tablet (10 mg) by mouth daily    Post-nasal drip, Cough       MULTIVITAMINS PO      Take 1 tablet by mouth At Bedtime        pantoprazole 40 MG EC tablet    PROTONIX    180 tablet    Take 1 tablet (40 mg) by mouth 2 times daily (before meals)    Erosive gastropathy

## 2018-11-05 NOTE — LETTER
11/5/2018     RE: Ten Johnson  [address] [facility] [address]     Dear Colleague,    Thank you for referring your patient, Ten Johnson, to the [facility]. Please see a copy of my visit note below.      HISTORY  Ten returns today with regard to his right knee pain. He was reviewed 4 weeks ago and has the diagnosis of varus pattern bone-on-bone right knee arthritis.     He had a corticosteroid injection last time and had 10 days of total releif, but is now back at his baseline pain. He has not worked due to the pain.   He is evasive about his recent alcohol consumption, stating it is variable.     REVIEW OF SYSTEMS / PAST HISTORY  As previous  A history of MRSA infection in his arm.     EXAMINATION  The patient presents alert and oriented with normal affect.  Circulatory status of the limb is normal. Neurological examination of the affected limb reveals no abnormalities.     He has a varus thrust to his gait as well as a 12 degree fixed flexion deformity. He is tender on his medial joint line and his tib fib proximal joint, interestingly.       IMAGING   Radiographs demonstrate end stage varus pattern arthritis. His MRI demonstrates full thickness medial compartmental change.     ASSESSMENT  Ten has symptomatic varus pattern osteoarthritis which has exhausted non operative management. The surgical option likely to provide him with pain relief is total knee arthroplasty. In addition he has changes in his proximaly tibiofibular joint.,   The nature and risks of this procedure including infection, stiffness and VTE has been discussed with him today. The recovery and post operative expectations have also been discussed.   We have also discussed the issues that increased ETOH consumption have with regard to knee arthroplasty.     The patient has been seen and examined by Dr KARLEE Joe who is in agreement with the above plan.     -   Prashanth Vang (Orthopaedic Fellow)   Answers for HPI/ROS submitted by the patient on 11/5/2018   General Symptoms: No  Skin Symptoms: No  HENT Symptoms: Yes  EYE SYMPTOMS: No  HEART SYMPTOMS: Yes  LUNG SYMPTOMS: Yes  INTESTINAL SYMPTOMS: No  URINARY SYMPTOMS: No  REPRODUCTIVE SYMPTOMS: No  SKELETAL SYMPTOMS: Yes  BLOOD SYMPTOMS: No  NERVOUS SYSTEM SYMPTOMS: No  MENTAL HEALTH SYMPTOMS: No  Ear pain: No  Ear discharge: No  Hearing loss: No  Tinnitus: No  Nosebleeds: No  Congestion: Yes  Sinus pain: Yes  Trouble swallowing: No   Voice hoarseness: No  Mouth sores: No  Sore throat: No  Tooth pain: No  Gum tenderness: No  Bleeding gums: No  Change in taste: No  Change in sense of smell: Yes  Dry mouth: No  Hearing aid used: No  Neck lump: No  Cough: Yes  Sputum or phlegm: Yes  Coughing up blood: No  Difficulty breating or shortness of breath: Yes  Snoring: Yes  Wheezing: No  Difficulty breathing on exertion: Yes  Nighttime Cough: Yes  Difficulty breathing when lying flat: No  Chest pain or pressure: No  Fast or irregular heartbeat: No  Pain in legs with walking: Yes  Trouble breathing while lying down: No  Fingers or toes appear blue: No  High blood pressure: Yes  Low blood pressure: No  Fainting: No  Murmurs: No  Pacemaker: No  Varicose veins: No  Edema or swelling: No  Wake up at night with shortness of breath: No  Light-headedness: No  Exercise intolerance: No  Back pain: Yes  Muscle aches: Yes  Neck pain: No  Swollen joints: No  Joint pain: Yes  Bone pain: No  Muscle cramps: Yes  Muscle weakness: No  Joint stiffness: No  Bone fracture: No    I agree with the fellow physician and the plan outlined, and saw the patient.  Olvin Joe    I agree with the fellow physician and the plan outlined, and saw the patient.  Olvin Joe    Again, thank you for allowing me to participate in the care of your patient.      Sincerely,    Olvin Joe MD

## 2018-11-05 NOTE — PROGRESS NOTES
HISTORY  Ten returns today with regard to his right knee pain. He was reviewed 4 weeks ago and has the diagnosis of varus pattern bone-on-bone right knee arthritis.     He had a corticosteroid injection last time and had 10 days of total releif, but is now back at his baseline pain. He has not worked due to the pain.   He is evasive about his recent alcohol consumption, stating it is variable.     REVIEW OF SYSTEMS / PAST HISTORY  As previous  A history of MRSA infection in his arm.     EXAMINATION  The patient presents alert and oriented with normal affect.  Circulatory status of the limb is normal. Neurological examination of the affected limb reveals no abnormalities.     He has a varus thrust to his gait as well as a 12 degree fixed flexion deformity. He is tender on his medial joint line and his tib fib proximal joint, interestingly.       IMAGING   Radiographs demonstrate end stage varus pattern arthritis. His MRI demonstrates full thickness medial compartmental change.     ASSESSMENT  Ten has symptomatic varus pattern osteoarthritis which has exhausted non operative management. The surgical option likely to provide him with pain relief is total knee arthroplasty. In addition he has changes in his proximaly tibiofibular joint.,   The nature and risks of this procedure including infection, stiffness and VTE has been discussed with him today. The recovery and post operative expectations have also been discussed.   We have also discussed the issues that increased ETOH consumption have with regard to knee arthroplasty.     The patient has been seen and examined by Dr KARLEE Joe who is in agreement with the above plan.     - Dr. Prashanth Vang (Orthopaedic Fellow)   Answers for HPI/ROS submitted by the patient on 11/5/2018   General Symptoms: No  Skin Symptoms: No  HENT Symptoms: Yes  EYE SYMPTOMS: No  HEART SYMPTOMS: Yes  LUNG SYMPTOMS: Yes  INTESTINAL SYMPTOMS: No  URINARY SYMPTOMS: No  REPRODUCTIVE  SYMPTOMS: No  SKELETAL SYMPTOMS: Yes  BLOOD SYMPTOMS: No  NERVOUS SYSTEM SYMPTOMS: No  MENTAL HEALTH SYMPTOMS: No  Ear pain: No  Ear discharge: No  Hearing loss: No  Tinnitus: No  Nosebleeds: No  Congestion: Yes  Sinus pain: Yes  Trouble swallowing: No   Voice hoarseness: No  Mouth sores: No  Sore throat: No  Tooth pain: No  Gum tenderness: No  Bleeding gums: No  Change in taste: No  Change in sense of smell: Yes  Dry mouth: No  Hearing aid used: No  Neck lump: No  Cough: Yes  Sputum or phlegm: Yes  Coughing up blood: No  Difficulty breating or shortness of breath: Yes  Snoring: Yes  Wheezing: No  Difficulty breathing on exertion: Yes  Nighttime Cough: Yes  Difficulty breathing when lying flat: No  Chest pain or pressure: No  Fast or irregular heartbeat: No  Pain in legs with walking: Yes  Trouble breathing while lying down: No  Fingers or toes appear blue: No  High blood pressure: Yes  Low blood pressure: No  Fainting: No  Murmurs: No  Pacemaker: No  Varicose veins: No  Edema or swelling: No  Wake up at night with shortness of breath: No  Light-headedness: No  Exercise intolerance: No  Back pain: Yes  Muscle aches: Yes  Neck pain: No  Swollen joints: No  Joint pain: Yes  Bone pain: No  Muscle cramps: Yes  Muscle weakness: No  Joint stiffness: No  Bone fracture: No    I agree with the fellow physician and the plan outlined, and saw the patient.  Olvin Joe    I agree with the fellow physician and the plan outlined, and saw the patient.  Olvin Joe

## 2018-11-08 DIAGNOSIS — R05.9 COUGH: ICD-10-CM

## 2018-11-08 DIAGNOSIS — R09.82 POST-NASAL DRIP: ICD-10-CM

## 2018-11-09 RX ORDER — FLUTICASONE PROPIONATE 50 MCG
2 SPRAY, SUSPENSION (ML) NASAL DAILY
Qty: 16 G | Refills: 11 | Status: SHIPPED | OUTPATIENT
Start: 2018-11-09 | End: 2019-01-17

## 2018-11-13 DIAGNOSIS — M17.11 PRIMARY OSTEOARTHRITIS OF RIGHT KNEE: Primary | ICD-10-CM

## 2018-11-15 ENCOUNTER — TELEPHONE (OUTPATIENT)
Dept: ORTHOPEDICS | Facility: CLINIC | Age: 58
End: 2018-11-15

## 2018-11-15 NOTE — TELEPHONE ENCOUNTER
Ten was phoned by RN regarding need for three negative MRSA swabs before his total knee replacement.  Pt states he can come to clinic on Mondays, and will start 11/26/18, stopping by clinic to ask for JAMES Mccormack.  We will review surgery packet at that visit.  Ksenia Pugh RN

## 2018-11-15 NOTE — TELEPHONE ENCOUNTER
Patient is scheduled for surgery with Dr. Joe    Spoke or left message with: Patient    Date of Surgery: 2/7/19    Location: [place]    Post ops: 2 weeks & 6 weeks    Pre-op with surgeon (if applicable): Complete    H&P: Scheduled with PAC 1/8/19    Additional imaging/appointments: N/A    Surgery packet: Mailed to patient's home     Additional comments: N/A

## 2018-11-16 NOTE — TELEPHONE ENCOUNTER
Patient stopped by clinic indicating form for Loss of Time application Attending Physician's Statement question #9 needs clarification.  Spoke also to Maru at [facility] to [facility] (phone # [phone]) regarding form.  Maru stated she will need a physician's written statement (on another sheet of paper)  in addition to the form, to clarify question #9 to indicate patient's continuous disability from preforming his job since 8/28/2018. Will inform provider. Freddy Diaz LPN at 2:03 PM on 11/16/2018

## 2018-11-16 NOTE — TELEPHONE ENCOUNTER
[facility]    Phone Message    May a detailed message be left on voicemail: yes    Reason for Call: Other: Pt is having issues with his disability forms. He stated his insurance will stop if it isn't fixed by this afternoon. Please give him a call back to discuss.      Action Taken: Message routed to:  [facility] ([facility] RENETTA

## 2018-11-19 DIAGNOSIS — M17.11 PRIMARY OSTEOARTHRITIS OF RIGHT KNEE: ICD-10-CM

## 2018-11-19 DIAGNOSIS — Z86.14 HX OF METHICILLIN RESISTANT STAPHYLOCOCCUS AUREUS: ICD-10-CM

## 2018-11-19 NOTE — LETTER
Ten Johnson  [address] [address]  : 1960  MRN:  [id number]      2018      To Whom It May Concern:    Your office has informed me that you need a letter from me restating exactly what is already present on the Loss of Time Application form that I signed on 10/27/18.  Illness is right knee pain/arthritis.  Knee replacement surgery is pending, date unknown at this time.  Consulted me first about this condition 13.  Most recent treatment 10/11/18.  Frequency of treatments approximately every 2-4 weeks.  Not pregnancy.  Unable to work due to disability 10/28/18.  Disabled from performing job from 10/28/18.  Able to return to work approximately 19.  Condition is not arising out of or in the course of employment.            Cuca Celeste M.D.  Internal Medicine  [facility]

## 2018-11-26 ENCOUNTER — TELEPHONE (OUTPATIENT)
Dept: ORTHOPEDICS | Facility: CLINIC | Age: 58
End: 2018-11-26

## 2018-11-26 ENCOUNTER — NURSE TRIAGE (OUTPATIENT)
Dept: NURSING | Facility: CLINIC | Age: 58
End: 2018-11-26

## 2018-11-26 DIAGNOSIS — M17.11 PRIMARY OSTEOARTHRITIS OF RIGHT KNEE: Primary | ICD-10-CM

## 2018-11-26 DIAGNOSIS — Z86.14 HX OF METHICILLIN RESISTANT STAPHYLOCOCCUS AUREUS: ICD-10-CM

## 2018-11-26 LAB
MRSA DNA SPEC QL NAA+PROBE: NEGATIVE
SPECIMEN SOURCE: NORMAL

## 2018-11-26 NOTE — TELEPHONE ENCOUNTER
Pt has upcoming total knee replacement surgery with Dr Joe.  Pt has history of MRSA infection, left elbow and Dr Joe ordered pt to have three consecutive negative MRSA nasal swabs.  Pt stopped by clinic for the swab which was sent to the lab.  Pt plans to stop by clinic next week for the second swab.  Ksenia Pugh RN

## 2018-11-26 NOTE — TELEPHONE ENCOUNTER
Caller states he just got a [employer]  Message and cannot access it   Review of EMR reveals no specific  message but;   Exam Information   Exam Date Exam Time Accession # Results    11/26/18  9:50 AM [id number]    Component Results   Component Value Flag Ref Range Units Status Collected Lab   Specimen Description Nares    Final 11/26/2018  9:50 AM 1740   Methicillin Resist/Sens S. aureus PCR Negative  NEG^Negative  Final 11/26/2018  9:50 AM 51   Comment:   MRSA Negative: SA Negative  MRSA and Staphylococcus aureus target DNA not   detected, presumed negative for MRSA and SA colonization or the number of   bacteria present may be below the limit of detection for the assay. FDA   approved assay performed using [employer] GeneXpert(R) real-time PCR.      Lab and Collection   Methicillin Resist/Sens S. aureus PCR (Order #[id number]) on 11/26/2018 - Lab and Collection Information   Result History   Methicillin Resist/Sens S. aureus PCR (Order #[id number]) on 11/26/2018 - Order Result History Report   Methicillin Resist/Sens S. aureus PCR [[id number]]   Electronically signed by: Olvin Joe MD on 11/26/18 1032 Status: Completed   Mode: Ordering in Standing Order - MD Sign mode Communicated by: Ksenia Pugh RN   Ordering user: Ksenia Pugh RN 11/26/18 0942 Ordering provider: Olvin Joe MD   Ordered during: Orders Only on 11/26/2018           Order Providers   Authorizing Provider Encounter Provider   Olvin Joe MD Berryman, Sally H, MD   Patient Release Status:   This result is viewable by the patient in [employer].          Reviewed By List   Olvin Joe MD on 11/26/2018  1:53 PM     Negative result of MRSA test reported to patient   Understands and will comply with further testing in anticipation of knee replacement   Lucía Samuel RN  FNA    Additional Information    [1] Follow-up call to recent contact AND [2] information only call, no triage required    Protocols used: INFORMATION ONLY  CALL-ADULT-AH

## 2018-12-14 ENCOUNTER — APPOINTMENT (OUTPATIENT)
Dept: CT IMAGING | Facility: CLINIC | Age: 58
End: 2018-12-14
Attending: EMERGENCY MEDICINE
Payer: COMMERCIAL

## 2018-12-14 ENCOUNTER — HOSPITAL ENCOUNTER (EMERGENCY)
Facility: CLINIC | Age: 58
Discharge: HOME | End: 2018-12-14
Attending: EMERGENCY MEDICINE | Admitting: EMERGENCY MEDICINE
Payer: COMMERCIAL

## 2018-12-14 VITALS
OXYGEN SATURATION: 99 % | HEART RATE: 118 BPM | HEIGHT: 69 IN | WEIGHT: 180 LBS | DIASTOLIC BLOOD PRESSURE: 106 MMHG | TEMPERATURE: 97.9 F | RESPIRATION RATE: 16 BRPM | SYSTOLIC BLOOD PRESSURE: 166 MMHG | BODY MASS INDEX: 26.66 KG/M2

## 2018-12-14 DIAGNOSIS — S00.03XA HEMATOMA OF SCALP, INITIAL ENCOUNTER: ICD-10-CM

## 2018-12-14 PROCEDURE — 70450 CT HEAD/BRAIN W/O DYE: CPT

## 2018-12-14 PROCEDURE — 99284 EMERGENCY DEPT VISIT MOD MDM: CPT | Mod: Z6 | Performed by: EMERGENCY MEDICINE

## 2018-12-14 PROCEDURE — 99284 EMERGENCY DEPT VISIT MOD MDM: CPT | Mod: 25 | Performed by: EMERGENCY MEDICINE

## 2018-12-14 ASSESSMENT — ENCOUNTER SYMPTOMS
DIFFICULTY URINATING: 0
ARTHRALGIAS: 0
FEVER: 0
SHORTNESS OF BREATH: 0
WOUND: 0
COLOR CHANGE: 0
EYE REDNESS: 0
HEADACHES: 1
NUMBNESS: 0
CONFUSION: 0
WEAKNESS: 0
NECK STIFFNESS: 0
VOMITING: 0
NAUSEA: 0
ABDOMINAL PAIN: 0

## 2018-12-14 ASSESSMENT — MIFFLIN-ST. JEOR: SCORE: 1616.85

## 2018-12-14 NOTE — ED AVS SNAPSHOT
[facility], [place], Emergency Department  [address] [place] [id number]  Phone:  [phone]                                    Ten Johnson   MRN: [id number]    Department:  [facility], Emergency Department   Date of Visit:  12/14/2018           After Visit Summary Signature Page    I have received my discharge instructions, and my questions have been answered. I have discussed any challenges I see with this plan with the nurse or doctor.    ..........................................................................................................................................  Patient/Patient Representative Signature      ..........................................................................................................................................  Patient Representative Print Name and Relationship to Patient    ..................................................               ................................................  Date                                   Time    ..........................................................................................................................................  Reviewed by Signature/Title    ...................................................              ..............................................  Date                                               Time          22EPIC Rev 08/18

## 2018-12-15 NOTE — DISCHARGE INSTRUCTIONS
Thank you for coming to the [facility] Emergency Department.     Apply ice to the swollen area.     Return to the ER if the hematoma is getting bigger, becomes painful, a wound opens up, or you develop fever >100.4F.

## 2018-12-15 NOTE — ED TRIAGE NOTES
"Patient arrived to triage ambulatory, without anyone accompanying him, from home. Patient states that he was at home and a shelf fell on top of his head and patient developed a lump on the left side of his forehead that he states grew in size within minutes after the shelf falling on his head. Patietn states that he decided to come in and get it checked out after talking with some of his friends about what had happened. Patient states that he did \"drink a sizeable amount of [place]\". Elevated SBP and HR in triage, otherwise stable. Patient was not the best historian when reviewing his medications.   "

## 2018-12-15 NOTE — ED NOTES
Pt states he was in his bed and a decorative beer kev fell from a shelf above his bed and hit his head. Pt denies LOC.

## 2018-12-15 NOTE — ED PROVIDER NOTES
History     Chief Complaint   Patient presents with     Head Injury     HPI  Ten Johnson is a 58 year old male with a history of chronic hepatitis C, alcoholic cirrhosis, portal hypertension, and TAVR (not anticoagulated) who presents to the ED for evaluation after being struck in the head. The patient was engaged in sexual intercourse with his partner when, due to the motion of the bed, a full 64 oz growler of beer fell onto his head, striking his left superolateral head. Within a short time period afterwards, the patient developed significant swelling and bruising, prompting him to come here. No LOC or neck pain. No nausea or vomiting. The growler did not shatter, and he does not have an open wound. No visual changes, racoon-eyes, or drainage from the ear.     PAST MEDICAL HISTORY:   Past Medical History:   Diagnosis Date     Alcohol use disorder      Alcoholic cirrhosis (H)      Anticoagulant long-term use     plavix     Ascites      Chronic allergic rhinitis      Chronic anemia      Chronic hepatitis C without hepatic coma (H) 05/10/2016    Untreated as of 2/2018     Diastolic dysfunction      Erosive gastropathy      Esophageal varices in alcoholic cirrhosis (H)      H/O upper gastrointestinal hemorrhage 09/2017     History of blood transfusion      History of drug abuse     intranasal     Hypertension     essential     JANELLE (iron deficiency anemia)      Sarahi-Hoffman tear     History     Marijuana abuse      MRSA (methicillin resistant Staphylococcus aureus)      Olecranon bursitis      Portal hypertension (H)      Right shoulder pain     history of rotator cuff repair     S/p TAVR (transcatheter aortic valve replacement), bioprosthetic      Severe aortic stenosis      Thrombocytopenia (H)        PAST SURGICAL HISTORY:   Past Surgical History:   Procedure Laterality Date     ARTHROSCOPY SHOULDER ROTATOR CUFF REPAIR  7/31/2012    Procedure: ARTHROSCOPY SHOULDER ROTATOR CUFF REPAIR;  Right Shoulder  Arthroscopic Rotator Cuff Repair, BicepsTenodesis,  Subacromial Decompression ;  Surgeon: Joi Castillo MD;  [facility]     ESOPHAGOSCOPY, GASTROSCOPY, DUODENOSCOPY (EGD), COMBINED N/A 10/23/2017    Procedure: COMBINED ESOPHAGOSCOPY, GASTROSCOPY, DUODENOSCOPY (EGD);;  Surgeon: Gentry Salas MD;  Location: [facility]     FACIAL RECONSTRUCTION SURGERY  1971     HEART CATH FEMORAL CANNULIZATION WITH OPEN STANDBY REPAIR AORTIC VALVE N/A 2/21/2018    Procedure: HEART CATH FEMORAL CANNULIZATION WITH OPEN STANDBY REPAIR AORTIC VALVE;;  Surgeon: Luis Baird MD;  Location: [facility] OR     IRRIGATION AND DEBRIDEMENT UPPER EXTREMITY, COMBINED  1/3/2012    Procedure:COMBINED IRRIGATION AND DEBRIDEMENT UPPER EXTREMITY; Irrigation & Debridement Left Elbow; Surgeon:CRISTHIAN ZHOU; Location:[facility] OR     REPAIR TENDON TRICEPS UPPER EXTREMITY  11/8/2011    Procedure:REPAIR TENDON TRICEPS UPPER EXTREMITY; Surgeon:CRISTHIAN ZHOU; Location:[facility] OR     SHOULDER SURGERY  2003    left, injury, torn tendons, hematoma     TRANSCATHETER AORTIC VALVE IMPLANT ANESTHESIA N/A 2/21/2018    Procedure: TRANSCATHETER AORTIC VALVE IMPLANT ANESTHESIA;  Transfemoral (Quiroz) Aortic Valve Implant 26mm MARTHA 3, with Cardiopulmonary Bypass Standby, transthoracic echocardiogram;  Surgeon: GENERIC ANESTHESIA PROVIDER;  Location: [facility] OR     TRANSPOSITION ULNAR NERVE (ELBOW)  11/8/2011    Procedure:TRANSPOSITION ULNAR NERVE (ELBOW); Final Procedure Done: Left Elbow Lateral Ulnar Collateral Repair And  Left Elbow Triceps Repair         FAMILY HISTORY:   Family History   Problem Relation Age of Onset     Cancer Mother 62     Alcoholism Paternal Uncle      Cirrhosis No family hx of        SOCIAL HISTORY:   Social History     Tobacco Use     Smoking status: Never Smoker     Smokeless tobacco: Never Used   Substance Use Topics     Alcohol use: Yes     Comment: Alcohol use disorder, still actively drinking         Allergies  "  Allergen Reactions     Zolpidem Other (See Comments)     Alcoholic.  Had reaction 3/17/13 while intoxicated which included black out, loss of awareness, paranoia.  Do not prescribe.  Dr. Celeste     Cats Other (See Comments)     rhinitis     Dogs Other (See Comments)     rhinitis     Pollen Extract Other (See Comments)     rhinits.          I have reviewed the Medications, Allergies, Past Medical and Surgical History, and Social History in the Epic system.    Review of Systems   Constitutional: Negative for fever.   HENT: Negative for congestion and ear discharge.    Eyes: Negative for redness and visual disturbance.   Respiratory: Negative for shortness of breath.    Cardiovascular: Negative for chest pain.   Gastrointestinal: Negative for abdominal pain, nausea and vomiting.   Genitourinary: Negative for difficulty urinating.   Musculoskeletal: Negative for arthralgias and neck stiffness.   Skin: Negative for color change and wound.   Neurological: Positive for headaches. Negative for weakness and numbness.   Psychiatric/Behavioral: Negative for behavioral problems and confusion.       Physical Exam   BP: (!) 168/108  Pulse: 118  Heart Rate: 108  Temp: 98.4  F (36.9  C)  Resp: 18  Height: 175.3 cm (5' 9\")  Weight: 81.6 kg (180 lb)  SpO2: 97 %      Physical Exam  Gen:A&Ox3, no acute distress  HEENT:PERRL, no facial tenderness or wounds, large hematoma to the left frontal scalp that is no longer expanding, oropharynx clear, mucous membranes moist, TMs clear bilaterally, no skull base fracture signs. Past nasal fracture without current facial bone tenderness.   Neck:no bony tenderness or step offs, no JVD, trachea midline, full ROM without neck pain  Back: no CVA tenderness, no midline bony tenderness  CV:RRR with systolic murmur  PULM:Clear to auscultation bilaterally  Abd:soft, nontender, nondistended. Bowel sounds present and normal  UE:No traumatic injuries, skin normal  LE:no traumatic injuries, skin normal, " no LE edema  Neuro:CN II-XII intact, strength 5/5 of flexion and extension of the toes, ankles, knees, hips, hands, wrists, elbows and shoulders.  Sensation intact to touch throughout. Coordination normal on finger to nose testing. Reflexes 3/6 and symmetric throughout. Gait stable.   Skin: no rashes or ecchymoses    ED Course        Procedures             Critical Care time:  none      Assessments & Plan (with Medical Decision Making)   59 yo M with a hx of TAVR and liver disease with chronic thrombocytopenia presenting with a head injury. Struck on the top of the head by a large beer growler that fell off a shelf.   Has as large superficial hematoma that expanded rapidly, but is now no longer expanding.   Pt is not anticoagulated other than 81mg ASA but has a hx of chronic thrombocytopenia.   CT head without skull fracture or intracranial injury. Old nasal bone fracture.   Home care reviewed. Supportive care with ice.     I have reviewed the nursing notes.    I have reviewed the findings, diagnosis, plan and need for follow up with the patient.       Medication List      There are no discharge medications for this visit.         Final diagnoses:   Hematoma of scalp, initial encounter   I, Kieran Cody, am serving as a trained medical scribe to document services personally performed by Elvia Shaikh MD, based on the provider's statements to me.      IElvia MD, was physically present and have reviewed and verified the accuracy of this note documented by Kieran Cody.       12/14/2018   [facility], EMERGENCY DEPARTMENT    MD ROBB Garcia Katrina Anne, MD  12/15/18 0229

## 2019-01-15 ENCOUNTER — OFFICE VISIT (OUTPATIENT)
Dept: SURGERY | Facility: CLINIC | Age: 59
End: 2019-01-15
Payer: COMMERCIAL

## 2019-01-15 ENCOUNTER — ANESTHESIA EVENT (OUTPATIENT)
Dept: SURGERY | Facility: CLINIC | Age: 59
End: 2019-01-15

## 2019-01-15 VITALS
SYSTOLIC BLOOD PRESSURE: 171 MMHG | DIASTOLIC BLOOD PRESSURE: 95 MMHG | TEMPERATURE: 97.5 F | HEART RATE: 80 BPM | WEIGHT: 182.5 LBS | BODY MASS INDEX: 27.03 KG/M2 | HEIGHT: 69 IN | OXYGEN SATURATION: 97 %

## 2019-01-15 DIAGNOSIS — Z01.818 PREOP EXAMINATION: Primary | ICD-10-CM

## 2019-01-15 DIAGNOSIS — Z22.322 MRSA (METHICILLIN RESISTANT STAPH AUREUS) CULTURE POSITIVE: ICD-10-CM

## 2019-01-15 DIAGNOSIS — M17.11 PRIMARY OSTEOARTHRITIS OF RIGHT KNEE: ICD-10-CM

## 2019-01-15 DIAGNOSIS — Z01.818 PREOP EXAMINATION: ICD-10-CM

## 2019-01-15 LAB
ANION GAP SERPL CALCULATED.3IONS-SCNC: 7 MMOL/L (ref 3–14)
BUN SERPL-MCNC: 20 MG/DL (ref 7–30)
CALCIUM SERPL-MCNC: 9.2 MG/DL (ref 8.5–10.1)
CHLORIDE SERPL-SCNC: 104 MMOL/L (ref 94–109)
CO2 SERPL-SCNC: 25 MMOL/L (ref 20–32)
CREAT SERPL-MCNC: 0.81 MG/DL (ref 0.66–1.25)
ERYTHROCYTE [DISTWIDTH] IN BLOOD BY AUTOMATED COUNT: 13.8 % (ref 10–15)
GFR SERPL CREATININE-BSD FRML MDRD: >90 ML/MIN/{1.73_M2}
GLUCOSE SERPL-MCNC: 94 MG/DL (ref 70–99)
HCT VFR BLD AUTO: 41.3 % (ref 40–53)
HGB BLD-MCNC: 15 G/DL (ref 13.3–17.7)
INR PPP: 1.14 (ref 0.86–1.14)
MCH RBC QN AUTO: 35 PG (ref 26.5–33)
MCHC RBC AUTO-ENTMCNC: 36.3 G/DL (ref 31.5–36.5)
MCV RBC AUTO: 96 FL (ref 78–100)
MRSA DNA SPEC QL NAA+PROBE: POSITIVE
PLATELET # BLD AUTO: 57 10E9/L (ref 150–450)
POTASSIUM SERPL-SCNC: 4.1 MMOL/L (ref 3.4–5.3)
RBC # BLD AUTO: 4.29 10E12/L (ref 4.4–5.9)
SODIUM SERPL-SCNC: 136 MMOL/L (ref 133–144)
SPECIMEN SOURCE: ABNORMAL
WBC # BLD AUTO: 6.9 10E9/L (ref 4–11)

## 2019-01-15 RX ORDER — CELECOXIB 200 MG/1
200 CAPSULE ORAL ONCE
Status: CANCELLED | OUTPATIENT
Start: 2019-01-15 | End: 2019-01-15

## 2019-01-15 RX ORDER — ACETAMINOPHEN 325 MG/1
975 TABLET ORAL ONCE
Status: CANCELLED | OUTPATIENT
Start: 2019-01-15 | End: 2019-01-15

## 2019-01-15 RX ORDER — GABAPENTIN 300 MG/1
300 CAPSULE ORAL ONCE
Status: CANCELLED | OUTPATIENT
Start: 2019-01-15 | End: 2019-01-15

## 2019-01-15 ASSESSMENT — LIFESTYLE VARIABLES: TOBACCO_USE: 0

## 2019-01-15 ASSESSMENT — MIFFLIN-ST. JEOR: SCORE: 1628.19

## 2019-01-15 NOTE — ANESTHESIA PREPROCEDURE EVALUATION
Anesthesia Pre-Procedure Evaluation    Patient: Ten Johnson   MRN:     [id number] Gender:   male   Age:    58 year old :      1960        Preoperative Diagnosis: * No surgery found *        Past Medical History:   Diagnosis Date     Alcohol use disorder      Alcoholic cirrhosis (H)      Anticoagulant long-term use     plavix     Ascites      Chronic allergic rhinitis      Chronic anemia      Chronic hepatitis C without hepatic coma (H) 05/10/2016    Untreated as of 2018     Diastolic dysfunction      Erosive gastropathy      Esophageal varices in alcoholic cirrhosis (H)      H/O upper gastrointestinal hemorrhage 2017     History of blood transfusion      History of drug abuse     intranasal     Hypertension     essential     JANELLE (iron deficiency anemia)      Sarahi-Hoffman tear     History     Marijuana abuse      MRSA (methicillin resistant Staphylococcus aureus)      Olecranon bursitis      Portal hypertension (H)      Right shoulder pain     history of rotator cuff repair     S/p TAVR (transcatheter aortic valve replacement), bioprosthetic      Severe aortic stenosis      Thrombocytopenia (H)       Past Surgical History:   Procedure Laterality Date     ARTHROSCOPY SHOULDER ROTATOR CUFF REPAIR  2012    Procedure: ARTHROSCOPY SHOULDER ROTATOR CUFF REPAIR;  Right Shoulder Arthroscopic Rotator Cuff Repair, BicepsTenodesis,  Subacromial Decompression ;  Surgeon: Joi Castillo MD;  [facility]     ESOPHAGOSCOPY, GASTROSCOPY, DUODENOSCOPY (EGD), COMBINED N/A 10/23/2017    Procedure: COMBINED ESOPHAGOSCOPY, GASTROSCOPY, DUODENOSCOPY (EGD);;  Surgeon: Gentry Salas MD;  [facility]     FACIAL RECONSTRUCTION SURGERY  1971     HEART CATH FEMORAL CANNULIZATION WITH OPEN STANDBY REPAIR AORTIC VALVE N/A 2018    Procedure: HEART CATH FEMORAL CANNULIZATION WITH OPEN STANDBY REPAIR AORTIC VALVE;;  Surgeon: Luis Baird MD;  [facility]     IRRIGATION AND  DEBRIDEMENT UPPER EXTREMITY, COMBINED  1/3/2012    Procedure:COMBINED IRRIGATION AND DEBRIDEMENT UPPER EXTREMITY; Irrigation & Debridement Left Elbow; Surgeon:CRISTHIAN ZHOU; Location:[facility]     REPAIR TENDON TRICEPS UPPER EXTREMITY  11/8/2011    Procedure:REPAIR TENDON TRICEPS UPPER EXTREMITY; Surgeon:CRISTHIAN ZHOU; Location[facility]     SHOULDER SURGERY  2003    left, injury, torn tendons, hematoma     TRANSCATHETER AORTIC VALVE IMPLANT ANESTHESIA N/A 2/21/2018    Procedure: TRANSCATHETER AORTIC VALVE IMPLANT ANESTHESIA;  Transfemoral (Quiroz) Aortic Valve Implant 26mm VICTOR M 3, with Cardiopulmonary Bypass Standby, transthoracic echocardiogram;  Surgeon: GENERIC ANESTHESIA PROVIDER;  [facility]     TRANSPOSITION ULNAR NERVE (ELBOW)  11/8/2011    Procedure:TRANSPOSITION ULNAR NERVE (ELBOW); Final Procedure Done: Left Elbow Lateral Ulnar Collateral Repair And  Left Elbow Triceps Repair            Anesthesia Evaluation     . Pt has had prior anesthetic. Type: General and MAC    No history of anesthetic complications          ROS/MED HX    ENT/Pulmonary:     (+)allergic rhinitis, , . .   (-) tobacco use   Neurologic:       Cardiovascular: Comment: S/p TAVR    (+) hypertension----. Taking blood thinners : . . . :. . Previous cardiac testing Echodate:9/2018results:Interpretation Summary  History of TAVR with 26 mm Quiroz Victor M 3 bioprosthesis.  Global and regional left ventricular function is hyperkinetic with an EF of  65-70%.  Doppler interrogation of the aortic valve is normal.  The mean gradient is 14 mmHg.  No aortic regurgitation is present.  No significant change compared to 3/15/18Stress Testdate:2/2018 results:SUMMARY:   >> Normal right sided filling pressures.  >> High left sided filling pressures with mean wedge of 20 mmhg  >> Borderline pulmonary artery hypertension with mean of 25 mm Hg  >> Normal cardiac output, 5.2 L/min with index 2.7 L/min/m2  Normal coronary arteries   date:  results: date: results:          METS/Exercise Tolerance:  >4 METS   Hematologic:     (+) Anemia, History of Transfusion no previous transfusion reaction Other Hematologic Disorder-thrombocytopenia      Musculoskeletal:   (+) arthritis, , , other musculoskeletal- right knee pain.  limited ROM in bilateral shoulders from previous injuries and surgeries.       GI/Hepatic:     (+) GERD Asymptomatic on medication, hepatitis type C, liver disease, Other GI/Hepatic alcoholic cirrhosis, esophageal varices, portal htn      Renal/Genitourinary:  - ROS Renal section negative       Endo:  - neg endo ROS       Psychiatric:     (+) psychiatric history other (comment) (alcohol dependence)      Infectious Disease:   (+) MRSA,       Malignancy:      - no malignancy   Other:    (+) H/O Chronic Pain,                       PHYSICAL EXAM:   Mental Status/Neuro: A/A/O   Airway: Facies: Feasible  Mallampati: II  Mouth/Opening: Full  TM distance: > 6 cm  Neck ROM: Full   Respiratory: Auscultation: CTAB     Resp. Rate: Normal     Resp. Effort: Normal      CV: Rhythm: Regular  Rate: Age appropriate  Heart: Normal Sounds   Comments: BLE vascular skin changes     Dental:  Dental Comments: 1 cap (unknown location), multiple broken/chipped teeth                Lab Results   Component Value Date    WBC 6.3 03/25/2018    HGB 12.1 (L) 03/25/2018    HCT 37.7 (L) 03/25/2018    PLT 43 (LL) 03/25/2018    CRP <2.9 03/25/2018    SED 3 05/30/2012     10/11/2018    POTASSIUM 4.2 10/11/2018    CHLORIDE 107 10/11/2018    CO2 24 10/11/2018    BUN 22 10/11/2018    CR 0.82 10/11/2018     (H) 10/11/2018    JOSEPHINE 9.0 10/11/2018    PHOS 3.5 02/22/2018    MAG 1.7 02/22/2018    ALBUMIN 3.0 (L) 03/25/2018    PROTTOTAL 7.0 03/25/2018    ALT 97 (H) 03/25/2018    AST 99 (H) 03/25/2018    ALKPHOS 108 03/25/2018    BILITOTAL 0.9 03/25/2018    MORENITA 29 10/22/2017    PTT 24 03/25/2018    INR 1.12 03/25/2018       Preop Vitals  BP Readings from Last 3 Encounters:  "  12/14/18 (!) 166/106   10/27/18 149/83   10/11/18 (!) 161/94    Pulse Readings from Last 3 Encounters:   12/14/18 118   10/27/18 64   10/11/18 82      Resp Readings from Last 3 Encounters:   12/14/18 16   10/27/18 20   10/11/18 20    SpO2 Readings from Last 3 Encounters:   12/14/18 99%   09/28/18 98%   09/12/18 99%      Temp Readings from Last 1 Encounters:   12/14/18 97.9  F (36.6  C) (Oral)    Ht Readings from Last 1 Encounters:   12/14/18 1.753 m (5' 9\")      Wt Readings from Last 1 Encounters:   12/14/18 81.6 kg (180 lb)    Estimated body mass index is 26.58 kg/m  as calculated from the following:    Height as of 12/14/18: 1.753 m (5' 9\").    Weight as of 12/14/18: 81.6 kg (180 lb).     LDA:  Left Groin Interventional Procedure Access (Active)   Number of days: 328       Right Groin Interventional Procedure Access (Active)   Number of days: 328       Right Radial Interventional Procedure Access (Active)   Number of days: 347       Right Jugular Interventional Procedure Access (Active)   Number of days: 347            JZG FV AN PLAN NO PONV RULE         PAC Discussion and Assessment    ASA Classification: 3  Case is suitable for: [place]  Anesthetic techniques and relevant risks discussed: GA  Invasive monitoring and risk discussed:   Types:   Possibility and Risk of blood transfusion discussed:   NPO instructions given:   Additional anesthetic preparation and risks discussed:   Needs early admission to pre-op area:   Other:     PAC Resident/NP Anesthesia Assessment:  Ten Johnson is a 58 year old male scheduled for an ARTHROPLASTY KNEE RIGHT on 2/7/2019 by Dr. Joe in treatment of right knee osteoarthritis.  PAC referral for risk assessment and optimization for anesthesia with comorbid conditions of: hypertension, s/p TAVR, allergic rhinitis, thrombocytopenia, iron deficiency anemia, alcoholic cirrhosis, GERD, esophageal varices, chronic hepatitis C, portal hypertension, MRSA and alcohol dependence. " "    Pre-operative considerations:  1.  Cardiac:  Functional status- METS >4.  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  He also reports that he can walk several blocks with no complication.  He had a TAVR on 2/21/2018 and reports doing well post-op.  He stopped his plavix shortly afterwards due to it making him feel \"crummy.\"  His cardiology team is aware and switched him to aspirin instead.  Instructed him to call cardiology to get preop instructions with his aspirin for the upcoming surgery.  Hypertension is managed with lisinopril; hold DOS.  Intermediate risk surgery with 0.4% risk of major adverse cardiac event.   2.  Pulm:  Airway feasible.  VIC risk: intermediate.    3.  GI:  Risk of PONV score = 2.  If > 2, anti-emetic intervention recommended.  He has alcoholic cirrhosis, chronic hepatitis C, esophageal varices (note as mild with last EGD), portal hypertension and a history of bleeding from a inderjit evans tear.  He follows with hepatology here, but hasn't been seen in about a year.  He has never initiated hepatitis C treatment.  He continues to drink alcohol.    4. ID:  He has a history of MRSA.  Recent MRSA swab was negative.  Repeat swab done today.  Patient instructed to get 3rd MRSA swab prior to surgery.  Contact precautions if indicated.  5. Heme: He has iron deficiency anemia that is well controlled currently.  Hgb today is WNL at 15.0.  He has chronic thrombocytopenia.  His platelet count today is up from previous at 57,000.   Please see Dr. Ospina's recommendations below.   6. Psych: He continues to drink alcohol, but isn't clear about how much or how often.  He reports that he can skip drinking for a day or more and not have any symptoms of withdrawals.  Patient encouraged to abstain from alcohol if able between now and the DOS in preparation for the surgery.  Discussed high " risk of withdrawals if alcohol consumption continues up until surgery date.  Pt verbalized his understanding.    7. Anesthesia:  Anesthesia choice is currently listed as spinal.  The patient reports that he would prefer to do the surgery with GA.  He reports he has discussed this with the surgeon, but I have recommended that he call back to ortho to review again to confirm anesthesia plan.        VTE risk: 1.8%    **Addendum (1/17/2019):  MRSA swab came back positive.  Called patient to notify and given treatment instructions for Bactroban and surgical scrub.  Patient verbalizes his understanding.  Bactroban order sent.  Staff message sent to Dr. Joe and Ksenia Pugh RN to notify and ask that they discuss with patient if they feel any modification to the surgery schedule needs to be made.**        Patient is optimized and is acceptable candidate for the proposed procedure.  No further diagnostic evaluation is needed.     Patient discussed with Dr. Ospina.     **For further details of assessment, testing, and physical exam please see H and P completed on same date.          Raven Ceja DNP, RN, APRN      Reviewed and Signed by PAC Mid-Level Provider/Resident  Mid-Level Provider/Resident: Raven Ceja DNP, RN, APRN  Date: 1/15/2019  Time: 1807    Attending Anesthesiologist Anesthesia Assessment:  58 year old for total arthroplasty saad ac in management of osteoarthritis. Patient has failry severe ETOH related cirrhosis with varices, ascites, hep C, portal hypertension. Thrombocytopenia with last platelets count 43K and INR 1.1. Will recheck today.    Patient did have TAVR 2/2018 and tolerated that well, but his platelet count then was 83,000. Would suggest doing TEG am DOS and then transfusing based on thsose results.     Patient/case discussed with ZOFIA/resident; agree with above assessment. No need to see patient. Patient is appropriate for the planned procedure without further work-up or medical  management.        Anesthesiologist:   Date:   Time:   Pass/Fail:   Disposition:     PAC Pharmacist Assessment:        Pharmacist:   Date:   Time:        SYDNIE Arango CNP

## 2019-01-15 NOTE — PATIENT INSTRUCTIONS
Preparing for Your Surgery      Name:  Ten Johnson   MRN:  [id number]   :  1960   Today's Date:  1/15/2019     Arriving for surgery:  Surgery date:  19  Arrival time:  08:30 am  Please come to:     [facility] [address]  [address] [place]  [address]    - parking is available in front of [facility] from 5:15AM to 8:00PM. If you prefer, park your car in the Green Lot.    -Proceed to the 3rd floor, check in at the Adult Surgery Waiting Lounge. [phone]    If an escort is needed stop at the Information Desk in the lobby. Inform the information person that you are here for surgery. An escort to the Adult Surgery Waiting Lounge will be provided.        What can I eat or drink?  -  You may have solid food or milk products until 8 hours prior to your surgery.  -  You may have water, apple juice or 7up/Sprite until 2 hours prior to your surgery.    Which medicines can I take?  Stop Aspirin (pt will clarify with cardiac service), vitamins and supplements one week prior to surgery.  Hold Ibuprofen and Naproxen for 24 hours prior to surgery.       -  Please take these medications the day of surgery:  Tylenol if needed.    How do I prepare myself?  -  Take two showers: one the night before surgery; and one the morning of surgery.         Use Scrubcare or Hibiclens to wash from neck down.  You may use your own     shampoo and conditioner. No other hair products.   -  Do NOT use lotion, powder, deodorant, or antiperspirant the day of your surgery.  -  Do NOT wear any jewelry.  - Do not bring your own medications to the hospital, except for inhalers and eye   drops.  -  Bring your ID and insurance card.    Questions or Concerns:  -If you have questions or concerns regarding the day of surgery, please call [phone].     -If you are scheduled at the [facility] please call [phone].    -For questions after surgery please call your  surgeons office.           Enhanced Recovery After Surgery     This is a team effort, including you, to get you back on your feet, eating and drinking normally and out of the hospital as quickly as possible.  The goals are: 1) NO INFECTIONS and   2) RETURN TO NORMAL DIET    How can we achieve these goals?  1) STAY ACTIVE: Walk every day before your surgery; try to increase the amount every day.  Walk after surgery as much as you can-the nurses will help you.  Walking speeds healing and gets you home quicker, you heal better at home and have less risk of infection.     2) INCENTIVE SPIROMETER: Practice your incentive spirometer 4 times per day with 5-10 repetitions each time.  Using the incentive spirometer can strengthen your muscles between your ribs and help you have a strong cough after surgery.  A more effective cough can help prevent problems with your lungs.    3) STAY HYDRATED: Drink clear liquids up until 2 hours before your surgery. We would like you to purchase a drink such as Gatorade or Ensure Clear (not the milkshake type).  Drink this before bedtime and on the way into the hospital, drink between 8-12 ounces or until you feel hydrated.  Keeping well hydrated leads to your veins being plump, you wake up faster, and you are less likely to be nauseated. Start drinking water as soon as you can after surgery and advance to clear liquids and food as tolerated.  IV fluids contain salt, drinking fluids will minimize the amount of IV fluids you need and decrease the amount of salt you get.    The most common reason for the patient to be readmitted is dehydration. Staying hydrated after you go home from the hospital is very important.  Ensure or Ensure Clear are good options to keep you hydrated.     4) PAIN MANAGEMENT: If we minimize the amount of opioids and narcotics, and use regional blocks (which numb the area where your surgery is) along with oral pain medications; you will have less side effects of nausea  and constipation. Narcotics can slow down your bowels and cause you to stay in the hospital longer.     Our goal is to keep you comfortable; eating and drinking normally and back home safely.

## 2019-01-16 NOTE — H&P
Pre-Operative H & P     CC:  Preoperative exam to assess for increased cardiopulmonary risk while undergoing surgery and anesthesia.    Date of Encounter: 1/15/2019  Primary Care Physician:  Cuca Celeste  Associated diagnosis:  Right knee OA    HPI  Ten Johnson is a 58 year old male who presents for pre-operative H & P in preparation for an ARTHROPLASTY KNEE RIGHT with Dr. Joe on 2/7/2019 at [facility].     Ten Johnson is a 58 year old male with hypertension, s/p TAVR, allergic rhinitis, thrombocytopenia, iron deficiency anemia, alcoholic cirrhosis, GERD, esophageal varices, chronic hepatitis C, portal hypertension, MRSA and alcohol dependence that has right knee osteoarthritis.  He feels he has had knee pain since his teenage years.  He has had previous imaging and a previous arthroscopy, but was told he would likely need a knee replacement in the future.  He has tried ice, head and a steroid injection for the pain.  The steroid injection fully relieved his pain, but not for a long period of time.   He works in plumbing and electric and feels that the knee pain has impaired his ability to work.  He has consulted with Dr. Joe and the above listed procedure has been recommended for treatment.      History is obtained from the patient and the medical record.     Past Medical History  Past Medical History:   Diagnosis Date     Alcohol use disorder      Alcoholic cirrhosis (H)      Anticoagulant long-term use     plavix     Ascites      Chronic allergic rhinitis      Chronic anemia      Chronic hepatitis C without hepatic coma (H) 05/10/2016    Untreated as of 2/2018     Diastolic dysfunction      Erosive gastropathy      Esophageal varices in alcoholic cirrhosis (H)      H/O upper gastrointestinal hemorrhage 09/2017     History of blood transfusion      History of drug abuse     intranasal     Hypertension     essential     JANELLE (iron deficiency anemia)       Sarahi-Hoffman tear     History     Marijuana abuse      MRSA (methicillin resistant Staphylococcus aureus)      Olecranon bursitis      Portal hypertension (H)      Right shoulder pain     history of rotator cuff repair     S/p TAVR (transcatheter aortic valve replacement), bioprosthetic      Severe aortic stenosis      Thrombocytopenia (H)        Past Surgical History  Past Surgical History:   Procedure Laterality Date     ARTHROSCOPY SHOULDER ROTATOR CUFF REPAIR  7/31/2012    Procedure: ARTHROSCOPY SHOULDER ROTATOR CUFF REPAIR;  Right Shoulder Arthroscopic Rotator Cuff Repair, BicepsTenodesis,  Subacromial Decompression ;  Surgeon: Joi Castillo MD;  [facility]     ESOPHAGOSCOPY, GASTROSCOPY, DUODENOSCOPY (EGD), COMBINED N/A 10/23/2017    Procedure: COMBINED ESOPHAGOSCOPY, GASTROSCOPY, DUODENOSCOPY (EGD);;  Surgeon: Gentry Salas MD;  Location: [facility]     FACIAL RECONSTRUCTION SURGERY  1971     HEART CATH FEMORAL CANNULIZATION WITH OPEN STANDBY REPAIR AORTIC VALVE N/A 2/21/2018    Procedure: HEART CATH FEMORAL CANNULIZATION WITH OPEN STANDBY REPAIR AORTIC VALVE;;  Surgeon: Luis Baird MD;  Location: [facility]     IRRIGATION AND DEBRIDEMENT UPPER EXTREMITY, COMBINED  1/3/2012    Procedure:COMBINED IRRIGATION AND DEBRIDEMENT UPPER EXTREMITY; Irrigation & Debridement Left Elbow; Surgeon:CRISTHIAN ZHOU; Location:[facility]     REPAIR TENDON TRICEPS UPPER EXTREMITY  11/8/2011    Procedure:REPAIR TENDON TRICEPS UPPER EXTREMITY; Surgeon:CRISTHIAN ZHOU; Location:[facility]     SHOULDER SURGERY  2003    left, injury, torn tendons, hematoma     TRANSCATHETER AORTIC VALVE IMPLANT ANESTHESIA N/A 2/21/2018    Procedure: TRANSCATHETER AORTIC VALVE IMPLANT ANESTHESIA;  Transfemoral (Quiroz) Aortic Valve Implant 26mm MARTHA 3, with Cardiopulmonary Bypass Standby, transthoracic echocardiogram;  Surgeon: GENERIC ANESTHESIA PROVIDER;  Location: [facility]     TRANSPOSITION ULNAR NERVE (ELBOW)   11/8/2011    Procedure:TRANSPOSITION ULNAR NERVE (ELBOW); Final Procedure Done: Left Elbow Lateral Ulnar Collateral Repair And  Left Elbow Triceps Repair         Hx of Blood transfusions/reactions: yes, no reactions     Hx of abnormal bleeding or anti-platelet use: aspirin      Steroid use in the last year: none    Personal or FH with difficulty with Anesthesia:  none    Prior to Admission Medications  Current Outpatient Medications   Medication Sig Dispense Refill     aspirin 81 MG EC tablet Take 1 tablet (81 mg) by mouth daily (Patient taking differently: Take 81 mg by mouth every evening ) 90 tablet 3     diclofenac (VOLTAREN) 1 % GEL topical gel Apply 4 grams to knees four times daily using enclosed dosing card. 100 g 1     ferrous sulfate (IRON) 325 (65 Fe) MG tablet Take 1 tablet (325 mg) by mouth At Bedtime       fluticasone (FLONASE) 50 MCG/ACT spray Spray 2 sprays into both nostrils daily 16 g 11     lisinopril (PRINIVIL/ZESTRIL) 20 MG tablet Take 1 tablet (20 mg) by mouth At Bedtime 90 tablet 3     loratadine (CLARITIN) 10 MG tablet Take 1 tablet (10 mg) by mouth daily 90 tablet 3     Multiple Vitamin (MULTIVITAMINS PO) Take 1 tablet by mouth At Bedtime        pantoprazole (PROTONIX) 40 MG EC tablet Take 1 tablet (40 mg) by mouth 2 times daily (before meals) 180 tablet 1       Allergies  Allergies   Allergen Reactions     Zolpidem Other (See Comments)     Alcoholic.  Had reaction 3/17/13 while intoxicated which included black out, loss of awareness, paranoia.  Do not prescribe.  Dr. Celeste     Cats Other (See Comments)     rhinitis     Dogs Other (See Comments)     rhinitis     Pollen Extract Other (See Comments)     rhinits.       Social History  Social History     Socioeconomic History     Marital status: Single     Spouse name: Not on file     Number of children: 0     Years of education: Not on file     Highest education level: Not on file   Social Needs     Financial resource strain: Not on file      Food insecurity - worry: Not on file     Food insecurity - inability: Not on file     Transportation needs - medical: Not on file     Transportation needs - non-medical: Not on file   Occupational History     Occupation: unemployed   Tobacco Use     Smoking status: Never Smoker     Smokeless tobacco: Never Used   Substance and Sexual Activity     Alcohol use: Yes     Comment: Alcohol use disorder, still actively drinking     Drug use: No     Comment: denies     Sexual activity: Not Currently     Partners: Female   Other Topics Concern     Parent/sibling w/ CABG, MI or angioplasty before 65F 55M? Not Asked   Social History Narrative    .  Bicycles a lot.  Excessive alcohol use.  Smokes cigars.  Occasional marijuana use.       Family History  Family History   Problem Relation Age of Onset     Cancer Mother 62     Alcoholism Paternal Uncle      No Known Problems Father      No Known Problems Brother      Diabetes Maternal Grandmother      Myocardial Infarction Maternal Grandfather      No Known Problems Paternal Grandmother      Unknown/Adopted Paternal Grandfather      Cirrhosis No family hx of                ROS/MED HX  The complete review of systems is negative other than noted in the HPI or here.   ENT/Pulmonary:     (+)allergic rhinitis, , . .   (-) tobacco use   Neurologic:       Cardiovascular: Comment: S/p TAVR    (+) hypertension----. Taking blood thinners : .   METS/Exercise Tolerance:  >4 METS   Hematologic:     (+) Anemia, History of Transfusion no previous transfusion reaction Other Hematologic Disorder-thrombocytopenia      Musculoskeletal:   (+) arthritis, , , other musculoskeletal- right knee pain.  limited ROM in bilateral shoulders from previous injuries and surgeries.       GI/Hepatic:     (+) GERD Asymptomatic on medication, hepatitis type C, liver disease, Other GI/Hepatic alcoholic cirrhosis, esophageal varices, portal htn      Renal/Genitourinary:  - ROS Renal section  "negative       Endo:  - neg endo ROS       Psychiatric:     (+) psychiatric history other (comment) (alcohol dependence)      Infectious Disease:   (+) MRSA,       Malignancy:      - no malignancy   Other:    (+) H/O Chronic Pain,             Temp: 97.5  F (36.4  C) Temp src: Oral BP: (!) 171/95 Pulse: 80     SpO2: 97 %         182 lbs 8 oz  5' 9\"   Body mass index is 26.95 kg/m .       Physical Exam  Constitutional: Awake, alert, cooperative, no apparent distress, and appears stated age.  Eyes: Pupils equal, round and reactive to light, extra ocular muscles intact, sclera clear, conjunctiva normal.  HENT: Normocephalic, oral pharynx with moist mucus membranes. Dentition - 1 cap (unknown location), multiple broken/chipped teeth. No goiter appreciated.   Respiratory: Clear to auscultation bilaterally, no crackles or wheezing.  Cardiovascular: Regular rate and rhythm, normal S1 and S2, and no murmur noted.  Carotids +2, no bruits. No edema. BLE vascular skin changes. Palpable pulses to radial  DP and PT arteries.   GI: Normal bowel sounds, soft, non-distended, non-tender, no masses palpated, no hepatosplenomegaly.    Lymph/Hematologic: No cervical lymphadenopathy and no supraclavicular lymphadenopathy.  Genitourinary:  deferred  Skin: Warm and dry.  No rashes at anticipated surgical site.   Musculoskeletal: Full ROM of neck. There is no redness, warmth, or swelling of the exposed joints. Gross motor strength is normal.    Neurologic: Awake, alert, oriented to name, place and time. Cranial nerves II-XII are grossly intact. Gait is normal.   Neuropsychiatric: Calm, cooperative. Normal affect.     Labs: (personally reviewed)   Component      Latest Ref Rng & Units 1/15/2019   Sodium      133 - 144 mmol/L 136   Potassium      3.4 - 5.3 mmol/L 4.1   Chloride      94 - 109 mmol/L 104   Carbon Dioxide      20 - 32 mmol/L 25   Anion Gap      3 - 14 mmol/L 7   Glucose      70 - 99 mg/dL 94   Urea Nitrogen      7 - 30 mg/dL 20 "   Creatinine      0.66 - 1.25 mg/dL 0.81   GFR Estimate      >60 mL/min/1.73:m2 >90   GFR Estimate If Black      >60 mL/min/1.73:m2 >90   Calcium      8.5 - 10.1 mg/dL 9.2   WBC      4.0 - 11.0 10e9/L 6.9   RBC Count      4.4 - 5.9 10e12/L 4.29 (L)   Hemoglobin      13.3 - 17.7 g/dL 15.0   Hematocrit      40.0 - 53.0 % 41.3   MCV      78 - 100 fl 96   MCH      26.5 - 33.0 pg 35.0 (H)   MCHC      31.5 - 36.5 g/dL 36.3   RDW      10.0 - 15.0 % 13.8   Platelet Count      150 - 450 10e9/L 57 (L)   INR      0.86 - 1.14 1.14     Echocardiogram   9/2018  Interpretation Summary  History of TAVR with 26 mm Quiroz Victor M 3 bioprosthesis.  Global and regional left ventricular function is hyperkinetic with an EF of  65-70%.  Doppler interrogation of the aortic valve is normal.  The mean gradient is 14 mmHg.  No aortic regurgitation is present.  No significant change compared to 3/15/18    Cardiac Cath  2/2018  SUMMARY:   >> Normal right sided filling pressures.  >> High left sided filling pressures with mean wedge of 20 mmhg  >> Borderline pulmonary artery hypertension with mean of 25 mm Hg  >> Normal cardiac output, 5.2 L/min with index 2.7 L/min/m2  Normal coronary arteries          ASSESSMENT and PLAN  Ten Johnson is a 58 year old male scheduled for an ARTHROPLASTY KNEE RIGHT on 2/7/2019 by Dr. Joe in treatment of right knee osteoarthritis.  PAC referral for risk assessment and optimization for anesthesia with comorbid conditions of: hypertension, s/p TAVR, allergic rhinitis, thrombocytopenia, iron deficiency anemia, alcoholic cirrhosis, GERD, esophageal varices, chronic hepatitis C, portal hypertension, MRSA and alcohol dependence.     Pre-operative considerations:  1.  Cardiac:  Functional status- METS >4.  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  He also reports that he can walk several blocks  "with no complication.  He had a TAVR on 2/21/2018 and reports doing well post-op.  He stopped his plavix shortly afterwards due to it making him feel \"crummy.\"  His cardiology team is aware and switched him to aspirin instead.  Instructed him to call cardiology to get preop instructions with his aspirin for the upcoming surgery.  Hypertension is managed with lisinopril; hold DOS.  Intermediate risk surgery with 0.4% risk of major adverse cardiac event.   2.  Pulm:  Airway feasible.  VIC risk: intermediate.    3.  GI:  Risk of PONV score = 2.  If > 2, anti-emetic intervention recommended.  He has alcoholic cirrhosis, chronic hepatitis C, esophageal varices (note as mild with last EGD), portal hypertension and a history of bleeding from a inderjit evans tear.  He follows with hepatology here, but hasn't been seen in about a year.  He has never initiated hepatitis C treatment.  He continues to drink alcohol.    4. ID:  He has a history of MRSA.  Recent MRSA swab was negative.  Repeat swab done today.  Patient instructed to get 3rd MRSA swab prior to surgery.  Contact precautions if indicated.  5. Heme: He has iron deficiency anemia that is well controlled currently.  Hgb today is WNL at 15.0.  He has chronic thrombocytopenia.  His platelet count today is up from previous at 57,000.   Please see Dr. Ospina's recommendations below.   6. Psych: He continues to drink alcohol, but isn't clear about how much or how often.  He reports that he can skip drinking for a day or more and not have any symptoms of withdrawals.  Patient encouraged to abstain from alcohol if able between now and the DOS in preparation for the surgery.  Discussed high risk of withdrawals if alcohol consumption continues up until surgery date.  Pt verbalized his understanding.    7. Anesthesia:  Anesthesia choice is currently listed as spinal.  The patient reports that he would prefer to do the surgery with GA.  He reports he has discussed this with the " surgeon, but I have recommended that he call back to ortho to review again to confirm anesthesia plan.        VTE risk: 1.8%    **Addendum (1/17/2019):  MRSA swab came back positive.  Called patient to notify and given treatment instructions for Bactroban and surgical scrub.  Patient verbalizes his understanding.  Bactroban order sent.  Staff message sent to Dr. Joe and Ksenia Pugh, RN to notify and ask that they discuss with patient if they feel any modification to the surgery schedule needs to be made.**        Patient is optimized and is acceptable candidate for the proposed procedure.  No further diagnostic evaluation is needed.     Patient discussed with Dr. Ospina.               Raven Ceja DNP, RN, APRN  Preoperative Assessment Center  [facility]  Phone: [phone]  Fax: [phone]

## 2019-01-17 ENCOUNTER — MYC MEDICAL ADVICE (OUTPATIENT)
Dept: SURGERY | Facility: CLINIC | Age: 59
End: 2019-01-17

## 2019-01-17 ENCOUNTER — MYC MEDICAL ADVICE (OUTPATIENT)
Dept: INTERNAL MEDICINE | Facility: CLINIC | Age: 59
End: 2019-01-17

## 2019-01-17 ENCOUNTER — MYC REFILL (OUTPATIENT)
Dept: CARDIOLOGY | Facility: CLINIC | Age: 59
End: 2019-01-17

## 2019-01-17 ENCOUNTER — MYC REFILL (OUTPATIENT)
Dept: ORTHOPEDICS | Facility: CLINIC | Age: 59
End: 2019-01-17

## 2019-01-17 DIAGNOSIS — M17.11 PRIMARY OSTEOARTHRITIS OF RIGHT KNEE: Primary | ICD-10-CM

## 2019-01-17 DIAGNOSIS — R09.82 POST-NASAL DRIP: ICD-10-CM

## 2019-01-17 DIAGNOSIS — Z95.2 S/P TAVR (TRANSCATHETER AORTIC VALVE REPLACEMENT): ICD-10-CM

## 2019-01-17 DIAGNOSIS — I10 ESSENTIAL HYPERTENSION: ICD-10-CM

## 2019-01-17 DIAGNOSIS — R05.9 COUGH: ICD-10-CM

## 2019-01-17 RX ORDER — LISINOPRIL 20 MG/1
20 TABLET ORAL AT BEDTIME
Qty: 90 TABLET | Refills: 3 | Status: ON HOLD | OUTPATIENT
Start: 2019-01-17 | End: 2019-04-11

## 2019-01-17 RX ORDER — LORATADINE 10 MG/1
TABLET ORAL
Qty: 90 TABLET | Refills: 3 | OUTPATIENT
Start: 2019-01-17

## 2019-01-17 RX ORDER — MUPIROCIN 20 MG/G
1 OINTMENT TOPICAL 2 TIMES DAILY
Qty: 10 G | Refills: 0 | Status: ON HOLD | OUTPATIENT
Start: 2019-01-17 | End: 2019-02-09

## 2019-01-17 RX ORDER — MUPIROCIN 20 MG/G
OINTMENT TOPICAL 2 TIMES DAILY
Status: DISCONTINUED | OUTPATIENT
Start: 2019-01-17 | End: 2019-01-17

## 2019-01-17 NOTE — TELEPHONE ENCOUNTER
INR was ordered by [facility].  Message sent to ordering provider to address.    MAYANK FARMER at 9:31 AM on 1/17/2019.

## 2019-01-22 ENCOUNTER — ALLIED HEALTH/NURSE VISIT (OUTPATIENT)
Dept: ORTHOPEDICS | Facility: CLINIC | Age: 59
End: 2019-01-22
Payer: COMMERCIAL

## 2019-01-22 ENCOUNTER — TELEPHONE (OUTPATIENT)
Dept: ORTHOPEDICS | Facility: CLINIC | Age: 59
End: 2019-01-22

## 2019-01-22 DIAGNOSIS — M54.16 LUMBAR RADICULOPATHY: ICD-10-CM

## 2019-01-22 DIAGNOSIS — M54.16 LUMBAR RADICULOPATHY: Primary | ICD-10-CM

## 2019-01-22 LAB
MRSA DNA SPEC QL NAA+PROBE: NEGATIVE
SPECIMEN SOURCE: NORMAL

## 2019-01-22 NOTE — TELEPHONE ENCOUNTER
Ten stopped by clinic today for a MRSA nose swab for testing. He has undergone 5 days of showers and nose Bactroban ointment.    Specimen was sent to lab and we will contact pt when results are available.  Ksenia

## 2019-01-23 ENCOUNTER — TELEPHONE (OUTPATIENT)
Dept: INTERNAL MEDICINE | Facility: CLINIC | Age: 59
End: 2019-01-23

## 2019-01-23 ENCOUNTER — MYC MEDICAL ADVICE (OUTPATIENT)
Dept: INTERNAL MEDICINE | Facility: CLINIC | Age: 59
End: 2019-01-23

## 2019-01-23 NOTE — TELEPHONE ENCOUNTER
[facility] Call Center    Phone Message    May a detailed message be left on voicemail: yes    Reason for Call: Other: patient would like a call from care team to go over scheduling sooner with Dr mack to get paper work filed out please contact patient asap hes been dealing with this for awhilke now and dosent understand why he cant see Dr magana sooner      Action Taken: Message routed to:  [facility] ([facility]): p[facility]

## 2019-01-23 NOTE — TELEPHONE ENCOUNTER
"Received call from patient stating time/day offered (1/30 at 7:10) will not work for him as the form needed to be done by 1/29. I informed pt that this was the only time MD has an opening. Pt got verbally upset and states \"this is ridiculous and I need to figure this out, the form was dropped off a week ago and needs to get done. If the form is not done before 1/29 then all of the stuff done up to this point is nothing, the surgery wont be done\" pt ended call and states \"I will file suit against all the departments involved\".  Pt needs visit for form per forms /MD. Cinthya Carrera CMA at 1:59 PM on 1/23/2019      "

## 2019-01-23 NOTE — TELEPHONE ENCOUNTER
[facility] Call Center    Phone Message    May a detailed message be left on voicemail: yes    Reason for Call: Other: Pt called and is requesting if he can't be seen on or before 1/29 to have completed forms submitted. Per Pt, he didn't think he needed to be seen to have the forms completed. Please call or sending him message back through gopogo to let him know.     Action Taken: Message routed to:  [facility] ([facility]

## 2019-01-24 ENCOUNTER — TELEPHONE (OUTPATIENT)
Dept: ORTHOPEDICS | Facility: CLINIC | Age: 59
End: 2019-01-24

## 2019-01-24 NOTE — TELEPHONE ENCOUNTER
[facility] Call Center    Phone Message    May a detailed message be left on voicemail: yes    Reason for Call: Other: Patient returned Olga's call.  Please try him  back today.      Action Taken: Message routed to:  [facility] ([facility]      January 24, 2019  3:00PM    I spoke with patient regarding concerns. I explained the work I have done and that we will see him next week for completion of the form. Patient was agreeable to this plain.    Krysta Landrum RN

## 2019-01-24 NOTE — TELEPHONE ENCOUNTER
Message was left for patient to call clinic, as there has been cancellations today, 1/24/2019 that the patient could be scheduled in. Message stated for patient to call clinic to verify appointment. Maylin Woo LPN 1/24/2019 9:37 AM    1/24/19    I have spoken with patient and he will keep his appointment on 1/30 with us for completion of his form.    Krysta Landrum RN

## 2019-01-24 NOTE — TELEPHONE ENCOUNTER
January 24, 2019  11:06 AM    Left message for patient to return my call regarding patient concern submitted.   I spoke with [facility] yesterday and explained the situation. Patient needs to have a visit to have forms completed and we do not have openings until 1/30.  I faxed letter to Health Care plan and they verbally authorized no penalty if letter was sent.    Krysta Landrum RN

## 2019-01-25 RX ORDER — GABAPENTIN 300 MG/1
300 CAPSULE ORAL
Status: CANCELLED | OUTPATIENT
Start: 2019-01-25

## 2019-01-25 RX ORDER — ACETAMINOPHEN 325 MG/1
975 TABLET ORAL ONCE
Status: CANCELLED | OUTPATIENT
Start: 2019-01-25 | End: 2019-01-25

## 2019-01-30 ENCOUNTER — OFFICE VISIT (OUTPATIENT)
Dept: INTERNAL MEDICINE | Facility: CLINIC | Age: 59
End: 2019-01-30
Payer: COMMERCIAL

## 2019-01-30 VITALS
WEIGHT: 180 LBS | DIASTOLIC BLOOD PRESSURE: 73 MMHG | HEART RATE: 75 BPM | SYSTOLIC BLOOD PRESSURE: 118 MMHG | OXYGEN SATURATION: 96 % | BODY MASS INDEX: 26.58 KG/M2

## 2019-01-30 DIAGNOSIS — Z23 NEED FOR PROPHYLACTIC VACCINATION AND INOCULATION AGAINST INFLUENZA: Primary | ICD-10-CM

## 2019-01-30 DIAGNOSIS — M25.561 CHRONIC PAIN OF RIGHT KNEE: ICD-10-CM

## 2019-01-30 DIAGNOSIS — M17.11 OSTEOARTHRITIS OF RIGHT KNEE: Primary | ICD-10-CM

## 2019-01-30 DIAGNOSIS — G89.29 CHRONIC PAIN OF RIGHT KNEE: ICD-10-CM

## 2019-01-30 LAB
MRSA DNA SPEC QL NAA+PROBE: NEGATIVE
SPECIMEN SOURCE: NORMAL

## 2019-01-30 ASSESSMENT — PAIN SCALES - GENERAL: PAINLEVEL: SEVERE PAIN (7)

## 2019-01-30 NOTE — NURSING NOTE
Chief Complaint   Patient presents with     Forms     here for forms to be filled,      Pre-Op Exam     needs to be cleared for surgery, pt says we already have the forms, flu shot, needs MRSA nasal swab. Does report he has stopped his medications before surgery       Edwin Morley, EMT 7:03 AM on 1/30/2019.

## 2019-01-30 NOTE — NURSING NOTE
Ten Johnson      1.  Has the patient received the information for the influenza vaccine? YES    2.  Does the patient have any of the following contraindications?     Allergy to eggs? No     Allergic reaction to previous influenza vaccines? No     Any other problems to previous influenza vaccines? No     Paralyzed by Guillain-[place] syndrome? No     Currently pregnant? NO     Current moderate or severe illness? No     Allergy to contact lens solution? No    3.  The vaccine has been administered in the usual fashion and the patient was instructed to wait 20 minutes before leaving the building in the event of an allergic reaction: YES    Recorded by Edwin Morley
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
## 2019-01-30 NOTE — PATIENT INSTRUCTIONS
[facility] Medication Refill Request Information:  * Please contact your pharmacy regarding ANY request for medication refills.  ** [facility] Prescription Fax = [phone]  * Please allow 3 business days for routine medication refills.  * Please allow 5 business days for controlled substance medication refills.     [facility] Test Result notification information:  *You will be notified with in 7-10 days of your appointment day regarding the results of your test.  If you are on MyChart you will be notified as soon as the provider has reviewed the results and signed off on them.    [facility]: [phone]
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
## 2019-01-31 ENCOUNTER — TELEPHONE (OUTPATIENT)
Dept: INTERNAL MEDICINE | Facility: CLINIC | Age: 59
End: 2019-01-31

## 2019-01-31 ENCOUNTER — TELEPHONE (OUTPATIENT)
Dept: ORTHOPEDICS | Facility: CLINIC | Age: 59
End: 2019-01-31

## 2019-01-31 NOTE — TELEPHONE ENCOUNTER
[facility] Call Center    Phone Message    May a detailed message be left on voicemail: yes    Reason for Call: Other: Calling about Loss of Time form for disability. Looking for line 10 estimated return to work date needs to verify date at 2018 date was used.      Action Taken: Message routed to:  [facility] RENETTA

## 2019-01-31 NOTE — TELEPHONE ENCOUNTER
Ten underwent a MRSA nasal swab screen yesterday.  Results are negative.  He will have a repeat swab done next Monday 2/4/19, prior to surgery on Thursday 2/7/19 to verify it continues to be negative.    Pt had a positive screen on 1/15/19, he underwent the nasal antibiotic ointment and showers.  Pt's last two nasal swabs since treatment have been negative.    Ksenia Pugh RN

## 2019-02-04 ENCOUNTER — TELEPHONE (OUTPATIENT)
Dept: ORTHOPEDICS | Facility: CLINIC | Age: 59
End: 2019-02-04

## 2019-02-04 NOTE — TELEPHONE ENCOUNTER
Called patient. No answer. Left voicemail for patient and stated that I re-scheduled his appointment for tomorrow, 2-5-2019 at 9:30am.

## 2019-02-04 NOTE — TELEPHONE ENCOUNTER
[facility] Call Center    Phone Message    May a detailed message be left on voicemail: yes    Reason for Call: Other: Pt missed his nurse appt today for MRSA nose swab screening.Pt needs to be rescheduled for a time tomorrow 2/5/19. Please call pt back to set up appt.     Action Taken: Message routed to:  [facility] ([facility] ortho

## 2019-02-05 ENCOUNTER — ALLIED HEALTH/NURSE VISIT (OUTPATIENT)
Dept: ORTHOPEDICS | Facility: CLINIC | Age: 59
End: 2019-02-05
Payer: COMMERCIAL

## 2019-02-05 DIAGNOSIS — M17.11 PRIMARY OSTEOARTHRITIS OF RIGHT KNEE: Primary | ICD-10-CM

## 2019-02-05 LAB
MRSA DNA SPEC QL NAA+PROBE: NEGATIVE
SPECIMEN SOURCE: NORMAL

## 2019-02-05 ASSESSMENT — ENCOUNTER SYMPTOMS
MUSCLE CRAMPS: 0
NECK PAIN: 0
MUSCLE WEAKNESS: 0
ARTHRALGIAS: 1
BACK PAIN: 1
JOINT SWELLING: 0
STIFFNESS: 1
MYALGIAS: 1

## 2019-02-05 NOTE — PROGRESS NOTES
Ten is here for MRSA screen nose swab.  His previous two swabs have been negative after treatment and prior positive swab.  Ksenia Pugh RN

## 2019-02-06 ENCOUNTER — ANESTHESIA EVENT (OUTPATIENT)
Dept: SURGERY | Facility: CLINIC | Age: 59
End: 2019-02-06
Payer: COMMERCIAL

## 2019-02-07 ENCOUNTER — HOSPITAL ENCOUNTER (INPATIENT)
Facility: CLINIC | Age: 59
LOS: 2 days | Discharge: HOME HEALTH | End: 2019-02-09
Attending: ORTHOPAEDIC SURGERY | Admitting: ORTHOPAEDIC SURGERY
Payer: COMMERCIAL

## 2019-02-07 ENCOUNTER — ANESTHESIA (OUTPATIENT)
Dept: SURGERY | Facility: CLINIC | Age: 59
End: 2019-02-07
Payer: COMMERCIAL

## 2019-02-07 ENCOUNTER — APPOINTMENT (OUTPATIENT)
Dept: GENERAL RADIOLOGY | Facility: CLINIC | Age: 59
End: 2019-02-07
Attending: STUDENT IN AN ORGANIZED HEALTH CARE EDUCATION/TRAINING PROGRAM
Payer: COMMERCIAL

## 2019-02-07 DIAGNOSIS — F19.11 HISTORY OF DRUG ABUSE IN REMISSION (H): ICD-10-CM

## 2019-02-07 DIAGNOSIS — H52.4 PRESBYOPIA: ICD-10-CM

## 2019-02-07 DIAGNOSIS — I51.89 DIASTOLIC DYSFUNCTION: ICD-10-CM

## 2019-02-07 DIAGNOSIS — I35.0 NONRHEUMATIC AORTIC VALVE STENOSIS: ICD-10-CM

## 2019-02-07 DIAGNOSIS — K76.6 PORTAL HYPERTENSION (H): ICD-10-CM

## 2019-02-07 DIAGNOSIS — Z96.651 S/P TOTAL KNEE ARTHROPLASTY, RIGHT: Primary | ICD-10-CM

## 2019-02-07 DIAGNOSIS — J30.9 CHRONIC ALLERGIC RHINITIS: ICD-10-CM

## 2019-02-07 DIAGNOSIS — Z95.3 S/P TAVR (TRANSCATHETER AORTIC VALVE REPLACEMENT), BIOPROSTHETIC: ICD-10-CM

## 2019-02-07 DIAGNOSIS — D64.9 CHRONIC ANEMIA: ICD-10-CM

## 2019-02-07 DIAGNOSIS — M17.11 PRIMARY OSTEOARTHRITIS OF RIGHT KNEE: ICD-10-CM

## 2019-02-07 DIAGNOSIS — Z79.01 ANTICOAGULANT LONG-TERM USE: ICD-10-CM

## 2019-02-07 DIAGNOSIS — I35.0 AORTIC STENOSIS, SEVERE: ICD-10-CM

## 2019-02-07 LAB
ABO + RH BLD: NORMAL
ABO + RH BLD: NORMAL
ALBUMIN UR-MCNC: NEGATIVE MG/DL
APPEARANCE UR: CLEAR
BILIRUB UR QL STRIP: NEGATIVE
BLD GP AB SCN SERPL QL: NORMAL
BLD PROD TYP BPU: NORMAL
BLD PROD TYP BPU: NORMAL
BLD UNIT ID BPU: 0
BLOOD BANK CMNT PATIENT-IMP: NORMAL
BLOOD PRODUCT CODE: NORMAL
BPU ID: NORMAL
COLOR UR AUTO: YELLOW
CREAT SERPL-MCNC: 0.78 MG/DL (ref 0.66–1.25)
ERYTHROCYTE [DISTWIDTH] IN BLOOD BY AUTOMATED COUNT: 13.6 % (ref 10–15)
GFR SERPL CREATININE-BSD FRML MDRD: >90 ML/MIN/{1.73_M2}
GLUCOSE SERPL-MCNC: 102 MG/DL (ref 70–99)
GLUCOSE UR STRIP-MCNC: NEGATIVE MG/DL
HCT VFR BLD AUTO: 41.7 % (ref 40–53)
HGB BLD-MCNC: 14.8 G/DL (ref 13.3–17.7)
HGB UR QL STRIP: NEGATIVE
KETONES UR STRIP-MCNC: NEGATIVE MG/DL
LEUKOCYTE ESTERASE UR QL STRIP: NEGATIVE
MCH RBC QN AUTO: 34.7 PG (ref 26.5–33)
MCHC RBC AUTO-ENTMCNC: 35.5 G/DL (ref 31.5–36.5)
MCV RBC AUTO: 98 FL (ref 78–100)
MUCOUS THREADS #/AREA URNS LPF: PRESENT /LPF
NITRATE UR QL: NEGATIVE
NUM BPU REQUESTED: 1
PH UR STRIP: 6.5 PH (ref 5–7)
PLATELET # BLD AUTO: 44 10E9/L (ref 150–450)
PLATELET # BLD AUTO: 60 10E9/L (ref 150–450)
POTASSIUM SERPL-SCNC: 3.7 MMOL/L (ref 3.4–5.3)
RBC # BLD AUTO: 4.26 10E12/L (ref 4.4–5.9)
RBC #/AREA URNS AUTO: 0 /HPF (ref 0–2)
SOURCE: ABNORMAL
SP GR UR STRIP: 1.01 (ref 1–1.03)
SPECIMEN EXP DATE BLD: NORMAL
SQUAMOUS #/AREA URNS AUTO: <1 /HPF (ref 0–1)
TRANSFUSION STATUS PATIENT QL: NORMAL
TRANSFUSION STATUS PATIENT QL: NORMAL
UROBILINOGEN UR STRIP-MCNC: 4 MG/DL (ref 0–2)
WBC # BLD AUTO: 5.8 10E9/L (ref 4–11)
WBC #/AREA URNS AUTO: <1 /HPF (ref 0–5)

## 2019-02-07 PROCEDURE — 40000986 XR KNEE PORT RT 1/2 VW: Mod: RT

## 2019-02-07 PROCEDURE — 25000566 ZZH SEVOFLURANE, EA 15 MIN: Performed by: ORTHOPAEDIC SURGERY

## 2019-02-07 PROCEDURE — 37000008 ZZH ANESTHESIA TECHNICAL FEE, 1ST 30 MIN: Performed by: ORTHOPAEDIC SURGERY

## 2019-02-07 PROCEDURE — 84132 ASSAY OF SERUM POTASSIUM: CPT | Performed by: ANESTHESIOLOGY

## 2019-02-07 PROCEDURE — 36000076 ZZH SURGERY LEVEL 6 EA 15 ADDTL MIN - UMMC: Performed by: ORTHOPAEDIC SURGERY

## 2019-02-07 PROCEDURE — 27210794 ZZH OR GENERAL SUPPLY STERILE: Performed by: ORTHOPAEDIC SURGERY

## 2019-02-07 PROCEDURE — 25000128 H RX IP 250 OP 636: Performed by: NURSE ANESTHETIST, CERTIFIED REGISTERED

## 2019-02-07 PROCEDURE — 25000128 H RX IP 250 OP 636: Performed by: STUDENT IN AN ORGANIZED HEALTH CARE EDUCATION/TRAINING PROGRAM

## 2019-02-07 PROCEDURE — 0SRC0J9 REPLACEMENT OF RIGHT KNEE JOINT WITH SYNTHETIC SUBSTITUTE, CEMENTED, OPEN APPROACH: ICD-10-PCS | Performed by: ORTHOPAEDIC SURGERY

## 2019-02-07 PROCEDURE — 25000128 H RX IP 250 OP 636: Performed by: ORTHOPAEDIC SURGERY

## 2019-02-07 PROCEDURE — 25800025 ZZH RX 258: Performed by: ORTHOPAEDIC SURGERY

## 2019-02-07 PROCEDURE — 93010 ELECTROCARDIOGRAM REPORT: CPT | Performed by: INTERNAL MEDICINE

## 2019-02-07 PROCEDURE — 25000125 ZZHC RX 250: Performed by: NURSE ANESTHETIST, CERTIFIED REGISTERED

## 2019-02-07 PROCEDURE — C9290 INJ, BUPIVACAINE LIPOSOME: HCPCS | Performed by: STUDENT IN AN ORGANIZED HEALTH CARE EDUCATION/TRAINING PROGRAM

## 2019-02-07 PROCEDURE — 81001 URINALYSIS AUTO W/SCOPE: CPT | Performed by: ANESTHESIOLOGY

## 2019-02-07 PROCEDURE — 25000125 ZZHC RX 250: Performed by: ORTHOPAEDIC SURGERY

## 2019-02-07 PROCEDURE — C1776 JOINT DEVICE (IMPLANTABLE): HCPCS | Performed by: ORTHOPAEDIC SURGERY

## 2019-02-07 PROCEDURE — 25000132 ZZH RX MED GY IP 250 OP 250 PS 637: Performed by: INTERNAL MEDICINE

## 2019-02-07 PROCEDURE — 27810169 ZZH OR IMPLANT GENERAL: Performed by: ORTHOPAEDIC SURGERY

## 2019-02-07 PROCEDURE — 25000128 H RX IP 250 OP 636: Performed by: ANESTHESIOLOGY

## 2019-02-07 PROCEDURE — 82947 ASSAY GLUCOSE BLOOD QUANT: CPT | Performed by: ANESTHESIOLOGY

## 2019-02-07 PROCEDURE — 40000065 ZZH STATISTIC EKG NON-CHARGEABLE

## 2019-02-07 PROCEDURE — 36415 COLL VENOUS BLD VENIPUNCTURE: CPT | Performed by: INTERNAL MEDICINE

## 2019-02-07 PROCEDURE — 25000132 ZZH RX MED GY IP 250 OP 250 PS 637: Performed by: STUDENT IN AN ORGANIZED HEALTH CARE EDUCATION/TRAINING PROGRAM

## 2019-02-07 PROCEDURE — 99232 SBSQ HOSP IP/OBS MODERATE 35: CPT | Performed by: INTERNAL MEDICINE

## 2019-02-07 PROCEDURE — 37000009 ZZH ANESTHESIA TECHNICAL FEE, EACH ADDTL 15 MIN: Performed by: ORTHOPAEDIC SURGERY

## 2019-02-07 PROCEDURE — P9037 PLATE PHERES LEUKOREDU IRRAD: HCPCS | Performed by: INTERNAL MEDICINE

## 2019-02-07 PROCEDURE — 71000016 ZZH RECOVERY PHASE 1 LEVEL 3 FIRST HR: Performed by: ORTHOPAEDIC SURGERY

## 2019-02-07 PROCEDURE — 36000074 ZZH SURGERY LEVEL 6 1ST 30 MIN - UMMC: Performed by: ORTHOPAEDIC SURGERY

## 2019-02-07 PROCEDURE — 12000001 ZZH R&B MED SURG/OB UMMC

## 2019-02-07 PROCEDURE — 85027 COMPLETE CBC AUTOMATED: CPT | Performed by: INTERNAL MEDICINE

## 2019-02-07 PROCEDURE — 36415 COLL VENOUS BLD VENIPUNCTURE: CPT | Performed by: ANESTHESIOLOGY

## 2019-02-07 PROCEDURE — 25000132 ZZH RX MED GY IP 250 OP 250 PS 637: Performed by: NURSE PRACTITIONER

## 2019-02-07 PROCEDURE — 85049 AUTOMATED PLATELET COUNT: CPT | Performed by: INTERNAL MEDICINE

## 2019-02-07 PROCEDURE — 82565 ASSAY OF CREATININE: CPT | Performed by: ANESTHESIOLOGY

## 2019-02-07 PROCEDURE — 40000170 ZZH STATISTIC PRE-PROCEDURE ASSESSMENT II: Performed by: ORTHOPAEDIC SURGERY

## 2019-02-07 DEVICE — BONE CEMENT SIMPLEX W/TOBRAMYCIN 6197-9-001: Type: IMPLANTABLE DEVICE | Site: KNEE | Status: FUNCTIONAL

## 2019-02-07 DEVICE — IMP COMP FEM STRK TRIATHLN PS RT 6 5515-F-602: Type: IMPLANTABLE DEVICE | Site: KNEE | Status: FUNCTIONAL

## 2019-02-07 DEVICE — IMP BASEPLATE TIBIAL STRK TRI SZ 6 5521-B-600: Type: IMPLANTABLE DEVICE | Site: KNEE | Status: FUNCTIONAL

## 2019-02-07 DEVICE — IMP INSERT TIBIAL HOWM TRI SIZE 6 09MM 5532-G-609
Type: IMPLANTABLE DEVICE | Site: KNEE | Status: NON-FUNCTIONAL
Removed: 2019-03-04

## 2019-02-07 DEVICE — IMP COMP PATELLA HOWM TRI 35 10MM 5551-L-350: Type: IMPLANTABLE DEVICE | Site: KNEE | Status: FUNCTIONAL

## 2019-02-07 RX ORDER — LISINOPRIL 20 MG/1
20 TABLET ORAL AT BEDTIME
Status: DISCONTINUED | OUTPATIENT
Start: 2019-02-07 | End: 2019-02-07

## 2019-02-07 RX ORDER — ACETAMINOPHEN 500 MG
1000 TABLET ORAL ONCE
Status: DISCONTINUED | OUTPATIENT
Start: 2019-02-07 | End: 2019-02-07 | Stop reason: HOSPADM

## 2019-02-07 RX ORDER — HYDROXYZINE HYDROCHLORIDE 25 MG/1
25 TABLET, FILM COATED ORAL EVERY 6 HOURS PRN
Status: DISCONTINUED | OUTPATIENT
Start: 2019-02-07 | End: 2019-02-09 | Stop reason: HOSPADM

## 2019-02-07 RX ORDER — ONDANSETRON 2 MG/ML
4 INJECTION INTRAMUSCULAR; INTRAVENOUS EVERY 30 MIN PRN
Status: DISCONTINUED | OUTPATIENT
Start: 2019-02-07 | End: 2019-02-07 | Stop reason: HOSPADM

## 2019-02-07 RX ORDER — KETOROLAC TROMETHAMINE 30 MG/ML
INJECTION, SOLUTION INTRAMUSCULAR; INTRAVENOUS PRN
Status: DISCONTINUED | OUTPATIENT
Start: 2019-02-07 | End: 2019-02-07

## 2019-02-07 RX ORDER — ONDANSETRON 2 MG/ML
4 INJECTION INTRAMUSCULAR; INTRAVENOUS EVERY 6 HOURS PRN
Status: DISCONTINUED | OUTPATIENT
Start: 2019-02-07 | End: 2019-02-09 | Stop reason: HOSPADM

## 2019-02-07 RX ORDER — CELECOXIB 200 MG/1
200 CAPSULE ORAL ONCE
Status: COMPLETED | OUTPATIENT
Start: 2019-02-07 | End: 2019-02-07

## 2019-02-07 RX ORDER — ACETAMINOPHEN 325 MG/1
650 TABLET ORAL EVERY 4 HOURS PRN
Status: DISCONTINUED | OUTPATIENT
Start: 2019-02-10 | End: 2019-02-09 | Stop reason: HOSPADM

## 2019-02-07 RX ORDER — PROCHLORPERAZINE MALEATE 10 MG
10 TABLET ORAL EVERY 6 HOURS PRN
Status: DISCONTINUED | OUTPATIENT
Start: 2019-02-07 | End: 2019-02-09 | Stop reason: HOSPADM

## 2019-02-07 RX ORDER — FENTANYL CITRATE 50 UG/ML
25-50 INJECTION, SOLUTION INTRAMUSCULAR; INTRAVENOUS
Status: DISCONTINUED | OUTPATIENT
Start: 2019-02-07 | End: 2019-02-07 | Stop reason: HOSPADM

## 2019-02-07 RX ORDER — SODIUM CHLORIDE, SODIUM LACTATE, POTASSIUM CHLORIDE, CALCIUM CHLORIDE 600; 310; 30; 20 MG/100ML; MG/100ML; MG/100ML; MG/100ML
INJECTION, SOLUTION INTRAVENOUS CONTINUOUS
Status: DISCONTINUED | OUTPATIENT
Start: 2019-02-07 | End: 2019-02-09 | Stop reason: HOSPADM

## 2019-02-07 RX ORDER — NALOXONE HYDROCHLORIDE 0.4 MG/ML
.1-.4 INJECTION, SOLUTION INTRAMUSCULAR; INTRAVENOUS; SUBCUTANEOUS
Status: DISCONTINUED | OUTPATIENT
Start: 2019-02-07 | End: 2019-02-09 | Stop reason: HOSPADM

## 2019-02-07 RX ORDER — PHENYLEPHRINE HCL IN 0.9% NACL 1 MG/10 ML
SYRINGE (ML) INTRAVENOUS PRN
Status: DISCONTINUED | OUTPATIENT
Start: 2019-02-07 | End: 2019-02-07

## 2019-02-07 RX ORDER — GABAPENTIN 100 MG/1
300 CAPSULE ORAL ONCE
Status: COMPLETED | OUTPATIENT
Start: 2019-02-07 | End: 2019-02-07

## 2019-02-07 RX ORDER — ACETAMINOPHEN 325 MG/1
975 TABLET ORAL ONCE
Status: DISCONTINUED | OUTPATIENT
Start: 2019-02-07 | End: 2019-02-07 | Stop reason: HOSPADM

## 2019-02-07 RX ORDER — NALOXONE HYDROCHLORIDE 0.4 MG/ML
.1-.4 INJECTION, SOLUTION INTRAMUSCULAR; INTRAVENOUS; SUBCUTANEOUS
Status: DISCONTINUED | OUTPATIENT
Start: 2019-02-07 | End: 2019-02-07 | Stop reason: HOSPADM

## 2019-02-07 RX ORDER — GABAPENTIN 100 MG/1
300 CAPSULE ORAL ONCE
Status: DISCONTINUED | OUTPATIENT
Start: 2019-02-07 | End: 2019-02-07 | Stop reason: HOSPADM

## 2019-02-07 RX ORDER — LANOLIN ALCOHOL/MO/W.PET/CERES
100 CREAM (GRAM) TOPICAL DAILY
Status: COMPLETED | OUTPATIENT
Start: 2019-02-07 | End: 2019-02-09

## 2019-02-07 RX ORDER — CEFAZOLIN SODIUM 1 G/50ML
15 SOLUTION INTRAVENOUS
Status: DISCONTINUED | OUTPATIENT
Start: 2019-02-07 | End: 2019-02-07 | Stop reason: HOSPADM

## 2019-02-07 RX ORDER — LIDOCAINE HYDROCHLORIDE 20 MG/ML
INJECTION, SOLUTION INFILTRATION; PERINEURAL PRN
Status: DISCONTINUED | OUTPATIENT
Start: 2019-02-07 | End: 2019-02-07

## 2019-02-07 RX ORDER — DEXAMETHASONE SODIUM PHOSPHATE 4 MG/ML
INJECTION, SOLUTION INTRA-ARTICULAR; INTRALESIONAL; INTRAMUSCULAR; INTRAVENOUS; SOFT TISSUE PRN
Status: DISCONTINUED | OUTPATIENT
Start: 2019-02-07 | End: 2019-02-07

## 2019-02-07 RX ORDER — ONDANSETRON 2 MG/ML
INJECTION INTRAMUSCULAR; INTRAVENOUS PRN
Status: DISCONTINUED | OUTPATIENT
Start: 2019-02-07 | End: 2019-02-07

## 2019-02-07 RX ORDER — BUPIVACAINE HYDROCHLORIDE AND EPINEPHRINE 5; 5 MG/ML; UG/ML
INJECTION, SOLUTION PERINEURAL PRN
Status: DISCONTINUED | OUTPATIENT
Start: 2019-02-07 | End: 2019-02-07 | Stop reason: HOSPADM

## 2019-02-07 RX ORDER — PROPOFOL 10 MG/ML
INJECTION, EMULSION INTRAVENOUS PRN
Status: DISCONTINUED | OUTPATIENT
Start: 2019-02-07 | End: 2019-02-07

## 2019-02-07 RX ORDER — ONDANSETRON 4 MG/1
4 TABLET, ORALLY DISINTEGRATING ORAL EVERY 30 MIN PRN
Status: DISCONTINUED | OUTPATIENT
Start: 2019-02-07 | End: 2019-02-07 | Stop reason: HOSPADM

## 2019-02-07 RX ORDER — ACETAMINOPHEN 325 MG/1
975 TABLET ORAL EVERY 8 HOURS
Status: DISCONTINUED | OUTPATIENT
Start: 2019-02-07 | End: 2019-02-09 | Stop reason: HOSPADM

## 2019-02-07 RX ORDER — AMOXICILLIN 250 MG
1 CAPSULE ORAL 2 TIMES DAILY
Status: DISCONTINUED | OUTPATIENT
Start: 2019-02-07 | End: 2019-02-09 | Stop reason: HOSPADM

## 2019-02-07 RX ORDER — LORATADINE 10 MG/1
10 TABLET ORAL DAILY PRN
Status: DISCONTINUED | OUTPATIENT
Start: 2019-02-07 | End: 2019-02-09 | Stop reason: HOSPADM

## 2019-02-07 RX ORDER — CEFAZOLIN SODIUM 2 G/100ML
2 INJECTION, SOLUTION INTRAVENOUS EVERY 8 HOURS
Status: COMPLETED | OUTPATIENT
Start: 2019-02-07 | End: 2019-02-08

## 2019-02-07 RX ORDER — FLUTICASONE PROPIONATE 50 MCG
2 SPRAY, SUSPENSION (ML) NASAL DAILY
Status: DISCONTINUED | OUTPATIENT
Start: 2019-02-08 | End: 2019-02-09 | Stop reason: HOSPADM

## 2019-02-07 RX ORDER — FOLIC ACID 1 MG/1
1 TABLET ORAL DAILY
Status: DISCONTINUED | OUTPATIENT
Start: 2019-02-07 | End: 2019-02-09 | Stop reason: HOSPADM

## 2019-02-07 RX ORDER — SODIUM CHLORIDE 9 MG/ML
INJECTION, SOLUTION INTRAVENOUS
Status: DISPENSED
Start: 2019-02-07 | End: 2019-02-08

## 2019-02-07 RX ORDER — LORAZEPAM 0.5 MG/1
1-4 TABLET ORAL EVERY 30 MIN PRN
Status: DISCONTINUED | OUTPATIENT
Start: 2019-02-07 | End: 2019-02-07

## 2019-02-07 RX ORDER — BISACODYL 10 MG
10 SUPPOSITORY, RECTAL RECTAL DAILY
Status: DISCONTINUED | OUTPATIENT
Start: 2019-02-08 | End: 2019-02-09 | Stop reason: HOSPADM

## 2019-02-07 RX ORDER — FLUMAZENIL 0.1 MG/ML
0.2 INJECTION, SOLUTION INTRAVENOUS
Status: DISCONTINUED | OUTPATIENT
Start: 2019-02-07 | End: 2019-02-07 | Stop reason: HOSPADM

## 2019-02-07 RX ORDER — SODIUM CHLORIDE, SODIUM LACTATE, POTASSIUM CHLORIDE, CALCIUM CHLORIDE 600; 310; 30; 20 MG/100ML; MG/100ML; MG/100ML; MG/100ML
INJECTION, SOLUTION INTRAVENOUS CONTINUOUS
Status: DISCONTINUED | OUTPATIENT
Start: 2019-02-07 | End: 2019-02-07 | Stop reason: HOSPADM

## 2019-02-07 RX ORDER — METOCLOPRAMIDE 10 MG/1
10 TABLET ORAL EVERY 6 HOURS PRN
Status: DISCONTINUED | OUTPATIENT
Start: 2019-02-07 | End: 2019-02-09 | Stop reason: HOSPADM

## 2019-02-07 RX ORDER — ONDANSETRON 4 MG/1
4 TABLET, ORALLY DISINTEGRATING ORAL EVERY 6 HOURS PRN
Status: DISCONTINUED | OUTPATIENT
Start: 2019-02-07 | End: 2019-02-09 | Stop reason: HOSPADM

## 2019-02-07 RX ORDER — LIDOCAINE 40 MG/G
CREAM TOPICAL
Status: DISCONTINUED | OUTPATIENT
Start: 2019-02-07 | End: 2019-02-09 | Stop reason: HOSPADM

## 2019-02-07 RX ORDER — LORATADINE 10 MG/1
10 TABLET ORAL DAILY
Status: DISCONTINUED | OUTPATIENT
Start: 2019-02-07 | End: 2019-02-07

## 2019-02-07 RX ORDER — METOCLOPRAMIDE HYDROCHLORIDE 5 MG/ML
10 INJECTION INTRAMUSCULAR; INTRAVENOUS EVERY 6 HOURS PRN
Status: DISCONTINUED | OUTPATIENT
Start: 2019-02-07 | End: 2019-02-09 | Stop reason: HOSPADM

## 2019-02-07 RX ORDER — MAGNESIUM HYDROXIDE 1200 MG/15ML
LIQUID ORAL PRN
Status: DISCONTINUED | OUTPATIENT
Start: 2019-02-07 | End: 2019-02-07 | Stop reason: HOSPADM

## 2019-02-07 RX ORDER — NALOXONE HYDROCHLORIDE 0.4 MG/ML
.1-.4 INJECTION, SOLUTION INTRAMUSCULAR; INTRAVENOUS; SUBCUTANEOUS
Status: DISCONTINUED | OUTPATIENT
Start: 2019-02-07 | End: 2019-02-07

## 2019-02-07 RX ORDER — FENTANYL CITRATE 50 UG/ML
INJECTION, SOLUTION INTRAMUSCULAR; INTRAVENOUS PRN
Status: DISCONTINUED | OUTPATIENT
Start: 2019-02-07 | End: 2019-02-07

## 2019-02-07 RX ORDER — MULTIVITAMIN,THERAPEUTIC
TABLET ORAL AT BEDTIME
Status: DISCONTINUED | OUTPATIENT
Start: 2019-02-07 | End: 2019-02-07

## 2019-02-07 RX ORDER — OXYCODONE HYDROCHLORIDE 5 MG/1
5-10 TABLET ORAL
Status: DISCONTINUED | OUTPATIENT
Start: 2019-02-07 | End: 2019-02-09 | Stop reason: HOSPADM

## 2019-02-07 RX ORDER — AMOXICILLIN 250 MG
2 CAPSULE ORAL 2 TIMES DAILY
Status: DISCONTINUED | OUTPATIENT
Start: 2019-02-07 | End: 2019-02-09 | Stop reason: HOSPADM

## 2019-02-07 RX ORDER — BUPIVACAINE HYDROCHLORIDE 2.5 MG/ML
INJECTION, SOLUTION EPIDURAL; INFILTRATION; INTRACAUDAL PRN
Status: DISCONTINUED | OUTPATIENT
Start: 2019-02-07 | End: 2019-02-07

## 2019-02-07 RX ORDER — CEFAZOLIN SODIUM 1 G/50ML
15 SOLUTION INTRAVENOUS SEE ADMIN INSTRUCTIONS
Status: DISCONTINUED | OUTPATIENT
Start: 2019-02-07 | End: 2019-02-07 | Stop reason: HOSPADM

## 2019-02-07 RX ORDER — FERROUS SULFATE 325(65) MG
325 TABLET ORAL AT BEDTIME
Status: DISCONTINUED | OUTPATIENT
Start: 2019-02-07 | End: 2019-02-09 | Stop reason: HOSPADM

## 2019-02-07 RX ORDER — HYDROMORPHONE HYDROCHLORIDE 1 MG/ML
.3-.5 INJECTION, SOLUTION INTRAMUSCULAR; INTRAVENOUS; SUBCUTANEOUS EVERY 5 MIN PRN
Status: DISCONTINUED | OUTPATIENT
Start: 2019-02-07 | End: 2019-02-07 | Stop reason: HOSPADM

## 2019-02-07 RX ORDER — MULTIPLE VITAMINS W/ MINERALS TAB 9MG-400MCG
1 TAB ORAL DAILY
Status: DISCONTINUED | OUTPATIENT
Start: 2019-02-07 | End: 2019-02-09 | Stop reason: HOSPADM

## 2019-02-07 RX ORDER — PANTOPRAZOLE SODIUM 40 MG/1
40 TABLET, DELAYED RELEASE ORAL
Status: DISCONTINUED | OUTPATIENT
Start: 2019-02-07 | End: 2019-02-09 | Stop reason: HOSPADM

## 2019-02-07 RX ADMIN — SODIUM CHLORIDE, POTASSIUM CHLORIDE, SODIUM LACTATE AND CALCIUM CHLORIDE: 600; 310; 30; 20 INJECTION, SOLUTION INTRAVENOUS at 13:01

## 2019-02-07 RX ADMIN — Medication 100 MCG: at 11:03

## 2019-02-07 RX ADMIN — PANTOPRAZOLE SODIUM 40 MG: 40 TABLET, DELAYED RELEASE ORAL at 16:54

## 2019-02-07 RX ADMIN — FENTANYL CITRATE 75 MCG: 50 INJECTION, SOLUTION INTRAMUSCULAR; INTRAVENOUS at 11:19

## 2019-02-07 RX ADMIN — PROPOFOL 160 MG: 10 INJECTION, EMULSION INTRAVENOUS at 10:47

## 2019-02-07 RX ADMIN — FOLIC ACID 1 MG: 1 TABLET ORAL at 16:54

## 2019-02-07 RX ADMIN — KETOROLAC TROMETHAMINE 15 MG: 30 INJECTION, SOLUTION INTRAMUSCULAR at 12:57

## 2019-02-07 RX ADMIN — MIDAZOLAM HYDROCHLORIDE 1 MG: 1 INJECTION, SOLUTION INTRAMUSCULAR; INTRAVENOUS at 09:47

## 2019-02-07 RX ADMIN — CEFAZOLIN SODIUM 2 G: 2 INJECTION, SOLUTION INTRAVENOUS at 17:04

## 2019-02-07 RX ADMIN — ONDANSETRON 4 MG: 2 INJECTION INTRAMUSCULAR; INTRAVENOUS at 12:57

## 2019-02-07 RX ADMIN — OXYCODONE HYDROCHLORIDE 5 MG: 5 TABLET ORAL at 21:25

## 2019-02-07 RX ADMIN — BUPIVACAINE 10 ML: 13.3 INJECTION, SUSPENSION, LIPOSOMAL INFILTRATION at 09:51

## 2019-02-07 RX ADMIN — FENTANYL CITRATE 25 MCG: 50 INJECTION, SOLUTION INTRAMUSCULAR; INTRAVENOUS at 13:59

## 2019-02-07 RX ADMIN — Medication 100 MCG: at 10:48

## 2019-02-07 RX ADMIN — FERROUS SULFATE TAB 325 MG (65 MG ELEMENTAL FE) 325 MG: 325 (65 FE) TAB at 21:25

## 2019-02-07 RX ADMIN — SODIUM CHLORIDE, POTASSIUM CHLORIDE, SODIUM LACTATE AND CALCIUM CHLORIDE: 600; 310; 30; 20 INJECTION, SOLUTION INTRAVENOUS at 10:36

## 2019-02-07 RX ADMIN — MULTIPLE VITAMINS W/ MINERALS TAB 1 TABLET: TAB at 16:54

## 2019-02-07 RX ADMIN — CELECOXIB 200 MG: 200 CAPSULE ORAL at 08:58

## 2019-02-07 RX ADMIN — FAMOTIDINE 20 MG: 10 INJECTION, SOLUTION INTRAVENOUS at 12:50

## 2019-02-07 RX ADMIN — VANCOMYCIN HYDROCHLORIDE 1250 MG: 10 INJECTION, POWDER, LYOPHILIZED, FOR SOLUTION INTRAVENOUS at 10:56

## 2019-02-07 RX ADMIN — Medication 100 MG: at 16:54

## 2019-02-07 RX ADMIN — FENTANYL CITRATE 25 MCG: 50 INJECTION, SOLUTION INTRAMUSCULAR; INTRAVENOUS at 10:47

## 2019-02-07 RX ADMIN — ASPIRIN 325 MG: 325 TABLET, DELAYED RELEASE ORAL at 16:54

## 2019-02-07 RX ADMIN — Medication 100 MCG: at 11:12

## 2019-02-07 RX ADMIN — FENTANYL CITRATE 50 MCG: 50 INJECTION, SOLUTION INTRAMUSCULAR; INTRAVENOUS at 14:13

## 2019-02-07 RX ADMIN — ACETAMINOPHEN 975 MG: 325 TABLET, FILM COATED ORAL at 16:54

## 2019-02-07 RX ADMIN — SODIUM CHLORIDE 1 G: 9 INJECTION, SOLUTION INTRAVENOUS at 11:00

## 2019-02-07 RX ADMIN — FENTANYL CITRATE 25 MCG: 50 INJECTION, SOLUTION INTRAMUSCULAR; INTRAVENOUS at 12:52

## 2019-02-07 RX ADMIN — LIDOCAINE HYDROCHLORIDE 100 MG: 20 INJECTION, SOLUTION INFILTRATION; PERINEURAL at 10:47

## 2019-02-07 RX ADMIN — GABAPENTIN 300 MG: 300 CAPSULE ORAL at 08:58

## 2019-02-07 RX ADMIN — BUPIVACAINE HYDROCHLORIDE 10 ML: 2.5 INJECTION, SOLUTION EPIDURAL; INFILTRATION; INTRACAUDAL at 09:51

## 2019-02-07 RX ADMIN — FENTANYL CITRATE 25 MCG: 50 INJECTION, SOLUTION INTRAMUSCULAR; INTRAVENOUS at 13:27

## 2019-02-07 RX ADMIN — FENTANYL CITRATE 50 MCG: 50 INJECTION, SOLUTION INTRAMUSCULAR; INTRAVENOUS at 11:40

## 2019-02-07 RX ADMIN — FENTANYL CITRATE 50 MCG: 50 INJECTION INTRAMUSCULAR; INTRAVENOUS at 09:48

## 2019-02-07 RX ADMIN — DEXAMETHASONE SODIUM PHOSPHATE 8 MG: 4 INJECTION, SOLUTION INTRAMUSCULAR; INTRAVENOUS at 11:01

## 2019-02-07 ASSESSMENT — MIFFLIN-ST. JEOR: SCORE: 1620.63

## 2019-02-07 ASSESSMENT — ACTIVITIES OF DAILY LIVING (ADL)
ADLS_ACUITY_SCORE: 10
ADLS_ACUITY_SCORE: 12
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
## 2019-02-07 NOTE — PROGRESS NOTES
MRSA discontinuation was requested for patient. Per policy, there must be at least 6 months since the last MRSA positive result. Patient's last positive was on 1/15/19. Please retest >6 months. If any questions, please contact Infection Prevention [phone].

## 2019-02-07 NOTE — ANESTHESIA PREPROCEDURE EVALUATION
Anesthesia Pre-Procedure Evaluation    Patient: Ten Johnson   MRN:     [id number] Gender:   male   Age:    58 year old :      1960        Preoperative Diagnosis: Osteoarthritis   Procedure(s):  ARTHROPLASTY KNEE RIGHT     Past Medical History:   Diagnosis Date     Alcohol use disorder      Alcoholic cirrhosis (H)      Anticoagulant long-term use     plavix     Ascites      Chronic allergic rhinitis      Chronic anemia      Chronic hepatitis C without hepatic coma (H) 05/10/2016    Untreated as of 2018     Diastolic dysfunction      Erosive gastropathy      Esophageal varices in alcoholic cirrhosis (H)      H/O upper gastrointestinal hemorrhage 2017     History of blood transfusion      History of drug abuse     intranasal     Hypertension     essential     JANELLE (iron deficiency anemia)      Sarahi-Hoffman tear     History     Marijuana abuse      MRSA (methicillin resistant Staphylococcus aureus)      Olecranon bursitis      Portal hypertension (H)      Right shoulder pain     history of rotator cuff repair     S/p TAVR (transcatheter aortic valve replacement), bioprosthetic      Severe aortic stenosis      Thrombocytopenia (H)       Past Surgical History:   Procedure Laterality Date     ARTHROSCOPY SHOULDER ROTATOR CUFF REPAIR  2012    Procedure: ARTHROSCOPY SHOULDER ROTATOR CUFF REPAIR;  Right Shoulder Arthroscopic Rotator Cuff Repair, BicepsTenodesis,  Subacromial Decompression ;  Surgeon: Joi Castillo MD;  [facility]: [facility]     ESOPHAGOSCOPY, GASTROSCOPY, DUODENOSCOPY (EGD), COMBINED N/A 10/23/2017    Procedure: COMBINED ESOPHAGOSCOPY, GASTROSCOPY, DUODENOSCOPY (EGD);;  Surgeon: Gentry Salas MD;  Location:  GI     FACIAL RECONSTRUCTION SURGERY  1971     HEART CATH FEMORAL CANNULIZATION WITH OPEN STANDBY REPAIR AORTIC VALVE N/A 2018    Procedure: HEART CATH FEMORAL CANNULIZATION WITH OPEN STANDBY REPAIR AORTIC VALVE;;  Surgeon: Luis Baird MD;  Location:  [facility]     IRRIGATION AND DEBRIDEMENT UPPER EXTREMITY, COMBINED  1/3/2012    Procedure:COMBINED IRRIGATION AND DEBRIDEMENT UPPER EXTREMITY; Irrigation & Debridement Left Elbow; Surgeon:CRISTHIAN ZHOU; Location:[facility]     REPAIR TENDON TRICEPS UPPER EXTREMITY  11/8/2011    Procedure:REPAIR TENDON TRICEPS UPPER EXTREMITY; Surgeon:CRISTHIAN ZHOU; Location:[facility]     SHOULDER SURGERY  2003    left, injury, torn tendons, hematoma     TRANSCATHETER AORTIC VALVE IMPLANT ANESTHESIA N/A 2/21/2018    Procedure: TRANSCATHETER AORTIC VALVE IMPLANT ANESTHESIA;  Transfemoral (Quiroz) Aortic Valve Implant 26mm MARTHA 3, with Cardiopulmonary Bypass Standby, transthoracic echocardiogram;  Surgeon: GENERIC ANESTHESIA PROVIDER;  Location: [facility]     TRANSPOSITION ULNAR NERVE (ELBOW)  11/8/2011    Procedure:TRANSPOSITION ULNAR NERVE (ELBOW); Final Procedure Done: Left Elbow Lateral Ulnar Collateral Repair And  Left Elbow Triceps Repair            Anesthesia Evaluation     .             ROS/MED HX    ENT/Pulmonary:       Neurologic:       Cardiovascular: Comment: Hx of Aortic stenosis: s/p TAVR with good result.  Echo showed hyperdynamic, no Aortic stenosis.    (+) hypertension----. : . . . :. .       METS/Exercise Tolerance:     Hematologic: Comments: Thrombocytopenia: Plt 52,000        Musculoskeletal:         GI/Hepatic: Comment: Hx of esophageal varices    Hx of inderjit evans tear    (+) hepatitis type C, liver disease,       Renal/Genitourinary:         Endo:         Psychiatric:         Infectious Disease:         Malignancy:         Other: Comment: Hx of Etoh Abuse    Hx of drug abuse in remission                        PHYSICAL EXAM:   Mental Status/Neuro: A/A/O   Airway: Facies: Feasible  Mallampati: I  Mouth/Opening: Full  TM distance: > 6 cm  Neck ROM: Full   Respiratory: Auscultation: CTAB     Resp. Rate: Normal     Resp. Effort: Normal      CV: Rhythm: Regular  Rate: Age appropriate  Heart: Normal Sounds  "  Comments:      Dental: Normal                  Lab Results   Component Value Date    WBC 6.9 01/15/2019    HGB 15.0 01/15/2019    HCT 41.3 01/15/2019    PLT 57 (L) 01/15/2019    CRP <2.9 03/25/2018    SED 3 05/30/2012     01/15/2019    POTASSIUM 4.1 01/15/2019    CHLORIDE 104 01/15/2019    CO2 25 01/15/2019    BUN 20 01/15/2019    CR 0.81 01/15/2019    GLC 94 01/15/2019    JOSEPHINE 9.2 01/15/2019    PHOS 3.5 02/22/2018    MAG 1.7 02/22/2018    ALBUMIN 3.0 (L) 03/25/2018    PROTTOTAL 7.0 03/25/2018    ALT 97 (H) 03/25/2018    AST 99 (H) 03/25/2018    ALKPHOS 108 03/25/2018    BILITOTAL 0.9 03/25/2018    MORENITA 29 10/22/2017    PTT 24 03/25/2018    INR 1.14 01/15/2019       Preop Vitals  BP Readings from Last 3 Encounters:   02/07/19 (!) 158/128   01/30/19 118/73   01/15/19 (!) 171/95    Pulse Readings from Last 3 Encounters:   02/07/19 69   01/30/19 75   01/15/19 80      Resp Readings from Last 3 Encounters:   02/07/19 16   12/14/18 16   10/27/18 20    SpO2 Readings from Last 3 Encounters:   02/07/19 96%   01/30/19 96%   01/15/19 97%      Temp Readings from Last 1 Encounters:   02/07/19 36.5  C (97.7  F) (Oral)    Ht Readings from Last 1 Encounters:   02/07/19 1.753 m (5' 9.02\")      Wt Readings from Last 1 Encounters:   02/07/19 82 kg (180 lb 12.4 oz)    Estimated body mass index is 26.68 kg/m  as calculated from the following:    Height as of this encounter: 1.753 m (5' 9.02\").    Weight as of this encounter: 82 kg (180 lb 12.4 oz).     LDA:  Left Groin Interventional Procedure Access (Active)   Number of days: 351       Right Groin Interventional Procedure Access (Active)   Number of days: 351       Right Radial Interventional Procedure Access (Active)   Number of days: 370       Right Jugular Interventional Procedure Access (Active)   Number of days: 370            Assessment:   ASA SCORE: 3    NPO Status: > 6 hours since completed Solid Foods   Documentation: H&P complete; Preop Testing complete; Consents " complete   Proceeding: Proceed without further delay  Tobacco Use:  NO Active use of Tobacco/UNKNOWN Tobacco use status     Plan:   Anes. Type:  General   Pre-Induction: Midazolam IV; Acetaminophen PO   Induction:  IV (Standard)   Airway: LMA   Access/Monitoring: PIV   Maintenance: Balanced   Emergence: Procedure Site   Logistics: Same Day Surgery     Postop Pain/Sedation Strategy:  Standard-Options: Opioids PRN     PONV Management:  Adult Risk Factors:, Non-Smoker, Postop Opioids  Prevention: Ondansetron; Dexamethasone     CONSENT: Direct conversation   Plan and risks discussed with: Patient   Blood Products: Consent Deferred (Minimal Blood Loss)                         Manny Greenwood MD

## 2019-02-07 NOTE — ANESTHESIA CARE TRANSFER NOTE
Patient: Ten Johnson    Procedure(s):  ARTHROPLASTY KNEE RIGHT    Diagnosis: Osteoarthritis  Diagnosis Additional Information: No value filed.    Anesthesia Type:   No value filed.     Note:  Airway :Face Mask  Patient transferred to:PACU  Comments: T 36.6, RR 12, clear.  Handoff Report: Identifed the Patient, Identified the Reponsible Provider, Reviewed the pertinent medical history, Discussed the surgical course, Reviewed Intra-OP anesthesia mangement and issues during anesthesia, Set expectations for post-procedure period and Allowed opportunity for questions and acknowledgement of understanding      Vitals: (Last set prior to Anesthesia Care Transfer)    CRNA VITALS  2/7/2019 1249 - 2/7/2019 1327      2/7/2019             Pulse:  87    SpO2:  96 %    Resp Rate (observed):  1  (Abnormal)                 Electronically Signed By: SYDNIE Christian CRNA  February 7, 2019  1:27 PM

## 2019-02-07 NOTE — ANESTHESIA POSTPROCEDURE EVALUATION
Anesthesia POST Procedure Evaluation    Patient: Ten Johnson   MRN:     [id number] Gender:   male   Age:    58 year old :      1960        Preoperative Diagnosis: Osteoarthritis   Procedure(s):  ARTHROPLASTY KNEE RIGHT   Postop Comments: No value filed.       Anesthesia Type:  General    Reportable Event: NO     PAIN: Uncomplicated   Sign Out status: Comfortable, Well controlled pain     PONV: No PONV   Sign Out status:  No Nausea or Vomiting     Neuro/Psych: Uneventful perioperative course   Sign Out Status: Preoperative baseline; Age appropriate mentation     Airway/Resp.: Uneventful perioperative course   Sign Out Status: Non labored breathing, age appropriate RR; Resp. Status within EXPECTED Parameters     CV: Uneventful perioperative course   Sign Out status: Appropriate BP and perfusion indices; Appropriate HR/Rhythm     Disposition:   Sign Out in:  PACU  Disposition:  Phase II; Home  Recovery Course: Uneventful  Follow-Up: Not required           Last Anesthesia Record Vitals:  CRNA VITALS  2019 1249 - 2019 1349      2019             Pulse:  87    SpO2:  96 %    Resp Rate (observed):  1  (Abnormal)           Last PACU/Preop Vitals:  Vitals:    19 1322 19 1330 19 1400   BP: (!) 162/121 (!) 159/91 (!) 160/97   Pulse: 92 93 92   Resp: 14 9 25   Temp: 36.6  C (97.9  F)     SpO2: 99% 100% 96%         Electronically Signed By: Sandro Carrillo DO, 2019, 2:07 PM

## 2019-02-07 NOTE — OR NURSING
PACU to Inpatient Nursing Handoff    Patient Ten Johnson is a 58 year old male who speaks English.   Procedure Procedure(s):  ARTHROPLASTY KNEE RIGHT   Surgeon(s) Primary: Olvin Joe MD  Assisting: Rahul Echavarria PA-C  Fellow - Assisting: Prashanth Vang MD     Allergies   Allergen Reactions     Zolpidem Other (See Comments)     Alcoholic.  Had reaction 3/17/13 while intoxicated which included black out, loss of awareness, paranoia.  Do not prescribe.  Dr. Celeste     Cats Other (See Comments)     rhinitis     Dogs Other (See Comments)     rhinitis     Pollen Extract Other (See Comments)     rhinits.       Isolation  Contact     Past Medical History   has a past medical history of Alcohol use disorder, Alcoholic cirrhosis (H), Anticoagulant long-term use, Ascites, Chronic allergic rhinitis, Chronic anemia, Chronic hepatitis C without hepatic coma (H) (05/10/2016), Diastolic dysfunction, Erosive gastropathy, Esophageal varices in alcoholic cirrhosis (H), H/O upper gastrointestinal hemorrhage (09/2017), History of blood transfusion, History of drug abuse, Hypertension, JANELLE (iron deficiency anemia), Sarahi-Hoffman tear, Marijuana abuse, MRSA (methicillin resistant Staphylococcus aureus), Olecranon bursitis, Portal hypertension (H), Right shoulder pain, S/p TAVR (transcatheter aortic valve replacement), bioprosthetic, Severe aortic stenosis, and Thrombocytopenia (H). He also has no past medical history of Malignant hyperthermia or PONV (postoperative nausea and vomiting).    Anesthesia General   Dermatome Level     Preop Meds celecoxib (Celebrex) - time given: 0900  gabapentin (Neurontin) - time given: 0900   Nerve block Adductor canal.  Location:right. Med:Exparel (liposomal bupivacaine). Time given: 0950   Intraop Meds dexamethasone (Decadron)  famotidine (Pepcid): last given at 1250  fentanyl (Sublimaze): 200 mcg total  hydromorphone (Dilaudid): 0 mg total  ketorolac (Toradol): last given  at 1250  ondansetron (Zofran): last given at 1230   Local Meds Yes - Local Cocktail (morphine, ropivacaine, epinephrine, Toradol)   Antibiotics vancomycin (Vancocin) - last given at 1056     Pain Patient Currently in Pain: yes  Comfort: tolerable with discomfort  Pain Control: partially effective   PACU meds  fentanyl (Sublimaze): 75 mcg (total dose) last given at 1410    PCA / epidural No   Capnography     Telemetry ECG Rhythm: Normal sinus rhythm   Inpatient Telemetry Monitor Ordered? No        Labs Glucose Lab Results   Component Value Date     02/07/2019       Hgb Lab Results   Component Value Date    HGB 15.0 01/15/2019       INR Lab Results   Component Value Date    INR 1.14 01/15/2019      PACU Imaging Completed     Wound/Incision Incision/Surgical Site 02/21/18 Bilateral Groin (Active)   Number of days: 351       Incision/Surgical Site 02/21/18 Right Wrist (Active)   Number of days: 351       Incision/Surgical Site 02/07/19 Right Knee (Active)   Incision Assessment UTV 2/7/2019  1:22 PM   Closure ORION 2/7/2019  1:22 PM   Incision Drainage Amount None 2/7/2019  1:22 PM   Dressing Intervention Clean, dry, intact 2/7/2019  1:22 PM   Number of days: 0      CMS        Equipment ice pack and continuous passive motion machine (CPM)   Other LDA Right Groin Interventional Procedure Access (Active)   Number of days: 351        IV Access Peripheral IV 02/07/19 Right;Dorsal Hand (Active)   Site Assessment WDL 2/7/2019  1:22 PM   Line Status Infusing;Checked every 1 hour 2/7/2019  1:22 PM   Phlebitis Scale 0-->no symptoms 2/7/2019  1:22 PM   Infiltration Scale 0 2/7/2019  1:22 PM   Extravasation? No 2/7/2019  1:22 PM   Number of days: 0       Left Groin Interventional Procedure Access (Active)   Number of days: 351       Right Groin Interventional Procedure Access (Active)   Number of days: 351       Right Radial Interventional Procedure Access (Active)   Number of days: 370       Right Jugular Interventional  Procedure Access (Active)   Number of days: 370      Blood Products Not applicable  mL   Intake/Output Date 02/07/19 0700 - 02/08/19 0659   Shift 1439-6093 4700-5183 0777-0771 24 Hour Total   INTAKE   I.V. 1100   1100   Shift Total(mL/kg) 1100(13.41)   1100(13.41)   OUTPUT   Blood 150   150   Shift Total(mL/kg) 150(1.83)   150(1.83)   Weight (kg) 82 82 82 82      Drains / [place] Closed/Suction Drain Right Knee Accordion 10 Italian (Active)   Site Description WDL 2/7/2019  1:22 PM   Dressing Status Normal: Clean, Dry & Intact 2/7/2019  1:22 PM   Drainage Appearance Serosanguenous 2/7/2019  1:22 PM   Status To bulb suction 2/7/2019  1:22 PM   Number of days: 0       Right Groin Interventional Procedure Access (Active)   Number of days: 351      Time of void PreOp Void Prior to Procedure: 0851 (02/07/19 0850)    PostOp      Diapered? No   Bladder Scan     PO    tolerating sips     Vitals    B/P: (!) 160/97  T: 97.7  F (36.5  C)    Temp src: Oral  P:  Pulse: 92 (02/07/19 1400)    Heart Rate: 91 (02/07/19 1400)     R: 25  O2:  SpO2: 96 %    O2 Device: None (Room air) (02/07/19 1400)    Oxygen Delivery: 6 LPM (02/07/19 1322)         Family/support present none   Patient belongings     Patient transported on bed   DC meds/scripts (obs/outpt) Not applicable   Inpatient Pain Meds Released? Yes       Special needs/considerations None   Tasks needing completion None       Becky Eaton, JAMES  ASCOM [id number]

## 2019-02-07 NOTE — PLAN OF CARE
VS: VSS, A&0 X 4, pt denies nausea, CP or SOB.   O2: Room air on CAPNO o2 sat upper 90's.   Output: Voided x one per urinal adequate amount.   Last BM: 02/06/19   Activity: Dangle to use urinal x one.    Skin: Intact except surgical incision.    Pain: Denies pain or discomfort.    CMS: CMS intact except some numbness on the toe from block.   Dressing: CDI.    Diet: Regular tolerating with out N/V.    LDA: PIV infusing.    Equipment: PCD, CAPNO, IV pole.    Plan: TBD.    Additional Info: Pt arrived on 8A room 830 about 15:30 pm via bed, pt oriented to room and call light. Received call from lab with critical result of platelet of 44 writer notified moonlighter and he is going to order platelet transfusion to be given for the pt

## 2019-02-07 NOTE — BRIEF OP NOTE
[facility], [place]    Brief Operative Note    Pre-operative diagnosis: Osteoarthritis  Post-operative diagnosis * No post-op diagnosis entered *  Procedure: Procedure(s):  ARTHROPLASTY KNEE RIGHT  Surgeon: Surgeon(s) and Role:     * Olvin Joe MD - Primary     * Rahul Echavarria PA-C - Assisting     * Prashanth Vang MD - Fellow - Assisting  Anesthesia: General   Estimated blood loss: 200 ml  Drains:  hemovac  Specimens: * No specimens in log *  Findings:   None.  Complications: None.  Implants: Materials Involved:  Metalic  Grafts:  None.    Styker Triathlon TKA  Femur 6 PS, Tibia 6 UBP, Poly 6 9 mm PS  35 mm patella    POST OP  WBAT  Drain out as per protocol

## 2019-02-07 NOTE — ANESTHESIA PROCEDURE NOTES
Peripheral Nerve Block Procedure Note    Staff:     Anesthesiologist:  Sandro Carrillo DO    Resident/CRNA:  Mario Olivera MD    Block performed by resident/CRNA in the presence of a teaching physician    Location: Pre-op  Procedure Start/Stop TImes:      2/7/2019 9:46 AM     2/7/2019 9:52 AM    patient identified, IV checked, site marked, risks and benefits discussed, informed consent, monitors and equipment checked, pre-op evaluation, at physician/surgeon's request and post-op pain management      Correct Patient: Yes      Correct Position: Yes      Correct Site: Yes      Correct Procedure: Yes      Correct Laterality:  Yes    Site Marked:  Yes  Procedure details:     Procedure:  Adductor canal    ASA:  3    Laterality:  Right    Position:  Supine    Sterile Prep: chloraprep, mask and sterile gloves      Needle:  Insulated    Needle gauge:  21    Needle length (mm):  100    Ultrasound: Yes      Ultrasound used to identify targeted nerve, plexus, or vascular structure and placed a needle adjacent to it      Permanent Image entered into patiient's record      Abnormal pain on injection: No      Blood Aspirated: No      Paresthesias:  No    Bleeding at site: No      Bolus via:  Needle    Infusion Method:  Single Shot    Blood aspirated via catheter: No      Complications:  None  Assessment/Narrative:      Right adductor canal nerve block with 10 mL 0.25% bupivacaine and 10 mL liposomal bupivacaine.

## 2019-02-08 LAB
ANISOCYTOSIS BLD QL SMEAR: SLIGHT
BASOPHILS # BLD AUTO: 0 10E9/L (ref 0–0.2)
BASOPHILS NFR BLD AUTO: 0 %
BLD PROD TYP BPU: NORMAL
BLD UNIT ID BPU: 0
BLD UNIT ID BPU: 0
BLOOD PRODUCT CODE: NORMAL
BLOOD PRODUCT CODE: NORMAL
BPU ID: NORMAL
BPU ID: NORMAL
DIFFERENTIAL METHOD BLD: ABNORMAL
EOSINOPHIL # BLD AUTO: 0 10E9/L (ref 0–0.7)
EOSINOPHIL NFR BLD AUTO: 0 %
ERYTHROCYTE [DISTWIDTH] IN BLOOD BY AUTOMATED COUNT: 13.4 % (ref 10–15)
HCT VFR BLD AUTO: 35.4 % (ref 40–53)
HGB BLD-MCNC: 12.8 G/DL (ref 13.3–17.7)
INR PPP: 1.17 (ref 0.86–1.14)
INTERPRETATION ECG - MUSE: NORMAL
LYMPHOCYTES # BLD AUTO: 0.8 10E9/L (ref 0.8–5.3)
LYMPHOCYTES NFR BLD AUTO: 5.2 %
MACROCYTES BLD QL SMEAR: PRESENT
MCH RBC QN AUTO: 34.8 PG (ref 26.5–33)
MCHC RBC AUTO-ENTMCNC: 36.2 G/DL (ref 31.5–36.5)
MCV RBC AUTO: 96 FL (ref 78–100)
MONOCYTES # BLD AUTO: 1.3 10E9/L (ref 0–1.3)
MONOCYTES NFR BLD AUTO: 8.6 %
NEUTROPHILS # BLD AUTO: 13 10E9/L (ref 1.6–8.3)
NEUTROPHILS NFR BLD AUTO: 86.2 %
NUM BPU REQUESTED: 1
PLATELET # BLD AUTO: 41 10E9/L (ref 150–450)
PLATELET # BLD EST: ABNORMAL 10*3/UL
RBC # BLD AUTO: 3.68 10E12/L (ref 4.4–5.9)
TRANSFUSION STATUS PATIENT QL: NORMAL
WBC # BLD AUTO: 15.1 10E9/L (ref 4–11)

## 2019-02-08 PROCEDURE — 36416 COLLJ CAPILLARY BLOOD SPEC: CPT | Performed by: INTERNAL MEDICINE

## 2019-02-08 PROCEDURE — 12000001 ZZH R&B MED SURG/OB UMMC

## 2019-02-08 PROCEDURE — 25000128 H RX IP 250 OP 636

## 2019-02-08 PROCEDURE — 85027 COMPLETE CBC AUTOMATED: CPT | Performed by: INTERNAL MEDICINE

## 2019-02-08 PROCEDURE — 99232 SBSQ HOSP IP/OBS MODERATE 35: CPT | Performed by: INTERNAL MEDICINE

## 2019-02-08 PROCEDURE — P9037 PLATE PHERES LEUKOREDU IRRAD: HCPCS | Performed by: INTERNAL MEDICINE

## 2019-02-08 PROCEDURE — 36415 COLL VENOUS BLD VENIPUNCTURE: CPT | Performed by: INTERNAL MEDICINE

## 2019-02-08 PROCEDURE — 25000128 H RX IP 250 OP 636: Performed by: STUDENT IN AN ORGANIZED HEALTH CARE EDUCATION/TRAINING PROGRAM

## 2019-02-08 PROCEDURE — 85025 COMPLETE CBC W/AUTO DIFF WBC: CPT | Performed by: INTERNAL MEDICINE

## 2019-02-08 PROCEDURE — 25000132 ZZH RX MED GY IP 250 OP 250 PS 637: Performed by: STUDENT IN AN ORGANIZED HEALTH CARE EDUCATION/TRAINING PROGRAM

## 2019-02-08 PROCEDURE — P9073 PLATELETS PHERESIS PATH REDU: HCPCS | Performed by: INTERNAL MEDICINE

## 2019-02-08 PROCEDURE — 85610 PROTHROMBIN TIME: CPT | Performed by: INTERNAL MEDICINE

## 2019-02-08 PROCEDURE — 99207 ZZC CDG-MDM COMPONENT: MEETS LOW - DOWN CODED: CPT | Performed by: INTERNAL MEDICINE

## 2019-02-08 PROCEDURE — 25000132 ZZH RX MED GY IP 250 OP 250 PS 637: Performed by: INTERNAL MEDICINE

## 2019-02-08 RX ORDER — SODIUM CHLORIDE 9 MG/ML
INJECTION, SOLUTION INTRAVENOUS
Status: COMPLETED
Start: 2019-02-08 | End: 2019-02-08

## 2019-02-08 RX ORDER — SODIUM CHLORIDE 9 MG/ML
INJECTION, SOLUTION INTRAVENOUS
Status: DISPENSED
Start: 2019-02-08 | End: 2019-02-08

## 2019-02-08 RX ORDER — PHYTONADIONE 5 MG/1
5 TABLET ORAL ONCE
Status: COMPLETED | OUTPATIENT
Start: 2019-02-08 | End: 2019-02-08

## 2019-02-08 RX ADMIN — SODIUM CHLORIDE, POTASSIUM CHLORIDE, SODIUM LACTATE AND CALCIUM CHLORIDE: 600; 310; 30; 20 INJECTION, SOLUTION INTRAVENOUS at 00:53

## 2019-02-08 RX ADMIN — Medication 100 MG: at 08:19

## 2019-02-08 RX ADMIN — PANTOPRAZOLE SODIUM 40 MG: 40 TABLET, DELAYED RELEASE ORAL at 15:52

## 2019-02-08 RX ADMIN — FOLIC ACID 1 MG: 1 TABLET ORAL at 08:19

## 2019-02-08 RX ADMIN — OXYCODONE HYDROCHLORIDE 10 MG: 5 TABLET ORAL at 00:30

## 2019-02-08 RX ADMIN — ACETAMINOPHEN 975 MG: 325 TABLET, FILM COATED ORAL at 23:44

## 2019-02-08 RX ADMIN — FERROUS SULFATE TAB 325 MG (65 MG ELEMENTAL FE) 325 MG: 325 (65 FE) TAB at 21:34

## 2019-02-08 RX ADMIN — PHYTONADIONE 5 MG: 5 TABLET ORAL at 13:15

## 2019-02-08 RX ADMIN — SODIUM CHLORIDE, POTASSIUM CHLORIDE, SODIUM LACTATE AND CALCIUM CHLORIDE: 600; 310; 30; 20 INJECTION, SOLUTION INTRAVENOUS at 20:37

## 2019-02-08 RX ADMIN — MULTIPLE VITAMINS W/ MINERALS TAB 1 TABLET: TAB at 08:19

## 2019-02-08 RX ADMIN — OXYCODONE HYDROCHLORIDE 10 MG: 5 TABLET ORAL at 21:34

## 2019-02-08 RX ADMIN — OXYCODONE HYDROCHLORIDE 10 MG: 5 TABLET ORAL at 04:00

## 2019-02-08 RX ADMIN — OXYCODONE HYDROCHLORIDE 10 MG: 5 TABLET ORAL at 08:32

## 2019-02-08 RX ADMIN — OXYCODONE HYDROCHLORIDE 10 MG: 5 TABLET ORAL at 18:00

## 2019-02-08 RX ADMIN — SODIUM CHLORIDE 500 ML: 9 INJECTION, SOLUTION INTRAVENOUS at 15:53

## 2019-02-08 RX ADMIN — PANTOPRAZOLE SODIUM 40 MG: 40 TABLET, DELAYED RELEASE ORAL at 08:19

## 2019-02-08 RX ADMIN — ACETAMINOPHEN 975 MG: 325 TABLET, FILM COATED ORAL at 15:52

## 2019-02-08 RX ADMIN — CEFAZOLIN SODIUM 2 G: 2 INJECTION, SOLUTION INTRAVENOUS at 00:10

## 2019-02-08 RX ADMIN — OXYCODONE HYDROCHLORIDE 10 MG: 5 TABLET ORAL at 11:33

## 2019-02-08 RX ADMIN — ACETAMINOPHEN 975 MG: 325 TABLET, FILM COATED ORAL at 08:16

## 2019-02-08 RX ADMIN — FLUTICASONE PROPIONATE 2 SPRAY: 50 SPRAY, METERED NASAL at 08:20

## 2019-02-08 RX ADMIN — ACETAMINOPHEN 975 MG: 325 TABLET, FILM COATED ORAL at 00:11

## 2019-02-08 ASSESSMENT — ACTIVITIES OF DAILY LIVING (ADL)
ADLS_ACUITY_SCORE: 12

## 2019-02-08 NOTE — PROGRESS NOTES
Orthopaedic Surgery Progress Note:       Subjective:    Patient reports doing well. Pain well controlled on current regimen. Denies new onset tingling/numbness in operative extremity. Denies fever/chills/SOB/nause/vomiting.     Platelets 44 yest PM, then Tx, then 60. Pending this AM. Thank you Medicine for input    Objective:   Temp:  [95.5  F (35.3  C)-98  F (36.7  C)] 98  F (36.7  C)  Pulse:  [] 66  Heart Rate:  [56-96] 71  Resp:  [8-25] 15  BP: (117-173)/() 163/85  SpO2:  [93 %-100 %] 95 %    Intake/Output Summary (Last 24 hours) at 2/8/2019 0636  Last data filed at 2/8/2019 0618  Gross per 24 hour   Intake 1347 ml   Output 2160 ml   Net -813 ml       Gen: NAD. Resting comfortably in bed  Resp: Breathing comfortably on RA  LE:  Dressing/ACE is c/d/i.   Drain is draining bloody/serous fluid. 440 ml to 0000, 330 ml since  Circulatory: DP Pulse Intact, Foot Perfused   Neurologic: intact motor, sensation returning      Labs:  Recent Labs   Lab 02/08/19  0536 02/07/19  2230 02/07/19  1706   WBC 15.1*  --  5.8   HGB 12.8*  --  14.8   PLT PENDING 60* 44*        Assessment & Plan:   58 year old male now s/p R TKA on 2/7/2019 with Dr. Joe    Activity: WBAT BLE. ROM as tolerated.   Drain: Will d/c once output less then 50cc/shift. Continue to document output per shift    Antibiotics: Ancef x 24 hours post op for surgical prophylaxis=  Dressings: to be changed today  . Change every 2  - 3 days as required (Adaptec/4x4/ACE Wrap).  Change to Tubigrip Day 7 post op   Diet: ADAT  Pain: PO/IV meds. Transition to PO meds only as patient tolerates  DVT ppx: Aspirin 325 mg D , 4 weeks  Imaging: Post op films reviewed and are satisfactory. No further imaging need at this time.  Labs:   PT/OT: Mobility, ROM, gait training, ADLs  Consults: Appreciate medicine co-management.  Dispo: Pending pain control and PT/OT recs. Expect patient to d/c to home with family on POD#3  Follow up: 2+ 6 weeks post op w/ Olvin Joe,  MD Dr Prashanth Vang 02/08/2019  Orthopedic Fellow  Pager: [phone]

## 2019-02-08 NOTE — PLAN OF CARE
VS: Stable.   O2: RA, capno on.   Output: Voiding adequately using urinal at bedside.   Last BM: 2/7/19.   Activity: Ax1 with walker, WBAT, WAR.   Skin: Incision, drain.   Pain: Pain well managed with oxycodone, scheduled tylenol, and ice.   CMS: Numbness RLE.   Dressing: CDI.   Diet: Regular.   LDA: PIV left hand infusing LR at 50 ml/hr. Hemovac with significant bloody/red output, total of 930 mls out on gustavo shift and night shift, bag changed x2, marked, moonlighter aware.   Equipment: Walker, capno, IV pole, IS, PCD, CPM off overnight, compression stocking LLE, urinal, pillows, call light within reach.   Plan: Continue to monitor.   Additional Info: Platelets 44 at 1706 on 2/7/19, given platelets, recheck 60 at 2230 on 2/7/19, recheck this am.

## 2019-02-08 NOTE — OP NOTE
PREOPERATIVE DIAGNOSIS:     1.  Osteoarthritis, right knee      POSTOPERATIVE DIAGNOSIS:     1.  Osteoarthritis, right knee     PROCEDURES PERFORMED:     1.  Primary right total knee arthroplasty (Sugar Triathlon PS)     SURGEON:  Olvin Joe MD , BETZY Vang MD      FIRST ASSISTANT:   CHAD June     INDICATION FOR SURGERY:  Ten has advanced osteoarthritis of the right knee. Risks, goals and options were discussed and he wished to proceed.      DESCRIPTION OF PROCEDURE:  Under general anesthesia (due to the patient's baseline coagulopathy) in supine position, the right knee was prepped and draped in the usual sterile fashion.  Pause for the cause occurred and all present were in agreement.  The limb was exsanguinated and the torniquet inflated to 300 mmHg.       We made an anterior midline incision. We then used a median parapatellar arthrotomy and found profound osteoarthritis .  The patella was resected using calipers to measure, and a freehand technique to resect - 28 mm was resected to 16 mm. It was sized and prepared for a 35mm button. We then established computer navigation with the  Jobzle system.  This demonstrated 6* of varus in extension, as well as moderate varus instability throughout range. There was no fixed flexion.  We then went about performing the distal femoral resection followed by the proximal tibial resection, followed by the 4-in-1 cutting block femur and sized the femur for a 6 and the tibia for a 6.  We opted for a PS femoral prosthesis and polyethylene liner due to the patient's poor PCL integrity, and ACL deficiency.      We then trialled the components and demonstrated appropriate alignment, balance and stability, with no further soft tissue releases required. Kinematic curves were markedly improved, with neutral extensoin alignment and only 3* of varus in flexion. Gaps were balanced. .  We prepared the tibia for a 6 UBP .     We prepared the surfaces with pulse lavage and  then dried them.  We used a double batch of tobramycin-impregnated cement.  We cemented each of the components (Femur 6 PS, Tibia 6 , Poly 9, Patella 35) with compression of the cement in the and held all surfaces in extension until polymerization was complete.  We removed all excess cement.       We irrigated copiously and closed in layers over a deep Hemovac drain.   We ended the case with closure with stratofix, 2/0 vicryl, and 3/0 monocryl,  and compression wrap and the patient returned to recovery in good condition.          - Dr. Prashanth Vang (Orthopaedic Fellow)

## 2019-02-08 NOTE — PROGRESS NOTES
"[facility], [place]   Internal Medicine Daily Note           Interval History/Events     Overnight events reviewed  Received one unit platelet transfusion  Worried about bleeding   States he is not ready for PT today  No nausea, vomiting, tremors,lightheadedness or dizziness.        Review of Systems        4 point ROS including Respiratory, CV, GI and , other than that noted above is negative      Medications   I have reviewed current medications  in the \"current medication\" section of Epic.  Relevant changes include:     Physical Exam   General:       Vital signs:    Blood pressure 146/82, pulse 79, temperature 97  F (36.1  C), temperature source Oral, resp. rate 16, height 1.753 m (5' 9.02\"), weight 82 kg (180 lb 12.4 oz), SpO2 98 %.  Estimated body mass index is 26.68 kg/m  as calculated from the following:    Height as of this encounter: 1.753 m (5' 9.02\").    Weight as of this encounter: 82 kg (180 lb 12.4 oz).      Intake/Output Summary (Last 24 hours) at 2/8/2019 1503  Last data filed at 2/8/2019 1400  Gross per 24 hour   Intake --   Output 2360 ml   Net -2360 ml        HEENT: No icterus, no pallor  Cardiovascular: S1, S2 normal.   Respiratory: B/L CTA  Abdomen: Soft, NT, BS+  Neurology: Alert, awake, and oriented.   Extremities: Left knee dressing in place  Bloody drainage on Drain bag       Laboratory and Imaging Studies     I have reviewed  laboratory and imaging studies in the Epic. Pertinent findings are as below:    BMP  Recent Labs   Lab 02/07/19  0850   POTASSIUM 3.7   CR 0.78   *     CBC  Recent Labs   Lab 02/08/19  0536 02/07/19  2230 02/07/19  1706   WBC 15.1*  --  5.8   RBC 3.68*  --  4.26*   HGB 12.8*  --  14.8   HCT 35.4*  --  41.7   MCV 96  --  98   MCH 34.8*  --  34.7*   MCHC 36.2  --  35.5   RDW 13.4  --  13.6   PLT 41* 60* 44*     INR  Recent Labs   Lab 02/08/19  1348   INR 1.17*     LFTsNo lab results found in last 7 days.   PANCNo lab results found in " "last 7 days.        Impression/Plan            58 year old year old male with hx of Alcohol dependance, Hepatitis C,  Cirrhosis c/b Ascites, coagulation disorder, GI bleed, Varices, Anemia, Thrombocytopenia, severe aortic stenosis s/p TAVR, HTN, GERD is being admitted s/p TKA on 02/07/2019     # S/P  TKA on 02/07/2019 :  Management primarily per Ortho team.  - Wound cares, Dressings, Surgical pain management, DVT Prophylaxis  per primary team.   - Monitor anemia, hemodynamics, Input/Output  - Encourage Incentive spirometry  - Laxatives for constipation prophylaxis     # Anemia: Most likely due to Hemodilution, blood loss, and post surgical inflammation.   Hg 12/8 gm/dl   - Transfuse if Hg < 7 gm/dl      # Hx of cirrhosis with decompensation: D/t Alcohol, and Hepatitis C. He follows with hepatology, but hasn't been seen in about a year.  He has never had hepatitis C treatment.   - Monitor CMP in AM  - Follow up with Hepatology as outpatient     # Hx of  GI bleed, Varices: 2/2 liver disease  # Hx of GERD  PTA on Pantoprazole  - Continue     # Hx of HTN: PTA on Lisinopril  - Hold for now     # Hx of  Thrombocytopenia: d/t liver disease  Platelet level 57 K on 01/15.   Received Platelet transfusion on 02/07  Platelet level 41 on 02/08 AM. Received one unit Platelet transfusion  Discussed with Ortho team on 02/08. Plan to give another unit Platelet,and recheck Platelet level on 02/09/2019 AM  - One unit Platelet transfusion  - CBC in AM  - Vitamin K 5 mg PO X 1 dose  - Transfuse if Platelet level less than 50 K  - Hold Aspirin if platelet level less than 50 K    # Mildly elevated INR: ? 2/2 liver disease  - Vitamin K 5 mg PO        #  Hx of TAVR for Aortic stenosis: PTA on Aspirin  - Restart Aspirin as soon as possible post op.      # Hx of Alcohol use: Per pre operative evaluation note \" He continues to drink alcohol, but isn't clear about how much or how often.  He reports that he can skip drinking for a day or more " "and not have any symptoms of withdrawals.  Patient encouraged to abstain from alcohol if able between now and the DOS in preparation for the surgery.  Discussed high risk of withdrawals if alcohol\"  Patient reports his last drink was two weeks ago. Advised him to be on Alcohol withdrawal protocol. Patient did not want at this time. States, he will let us know if he withdraws.   - Monitor closely for withdrawal symptoms.   - Folic acid, Thiamine  - High risk for delirium due to alcohol use, and Cirrhosis.      # Hx of Allergies: PTA on Fluticasone, Loratidine PRN  - Continue                       Pt's care was discussed with bedside RN, patient and  during Care Team Rounds.               Nicholas Eduardo MD  Hospitalist ( Internal medicine)  Pager: [phone]     "

## 2019-02-08 NOTE — PLAN OF CARE
"OT: Cx. Pt refusing to mobilize this date 2/2 to low platelets and stated he has the \"bill of rights to deny therapy\". Willing to work with therapies tomorrow if platelet values increase. Will reschedule per POC.   "

## 2019-02-08 NOTE — PLAN OF CARE
VS: Blood pressure are elevated, pt is asking if he should go back on his blood pressure medication? Call place to Dr. Eduardo and he said he would re-evaluate in the morning because he is receiving platelets due to a low level and he is putting out a good amount of blood out of his hemovac   O2: Room air, lungs are clear   Output: Using the urinal to void, putting out good amounts of urine   Last BM: 2/7/2019   Activity: Up with the assist of one person and the use of his walker   Skin: Intact except for his surgical incision and drain site   Pain: Being controlled with oxycodone 10 mg as needed for pain   CMS: Intact, his block has worn off   Dressing: Ace wrap intact   Diet: Regular, tolerating it well   LDA: PIV in right hand   Equipment: Walker, PCD,    Plan: Receiving two units of Platelets today and redraw in the morning   Additional Info: hemovac putting a good amount of blood, MD aware

## 2019-02-08 NOTE — PLAN OF CARE
"PT: PT consult orders received, subjective information gathered. Further eval deferred d/t pt then receiving platelets and declining any exercises or mobility until he \"figure this out.\" Will attempt eval this PM if appropriate.    PM: Platelet counts the same, pt continues to express concern and anxiety over levels. Notified RN to hold eval until 02/09 AM.  "

## 2019-02-08 NOTE — PROGRESS NOTES
Patient Platelet count 44 (latest cbc)  Per Dr Eduardo, giving 1 dose of platelet transfusion, as s/p Ortho surgery.   Hold aspirin for now. Defer timing to resume to the primary team.   Check abo rh type and screen  Follow-up platelet 2200 after transfusion  Cbc in am.   Monitor for s.s of bleeding or bruising.     Amari RAMIREZ.     Sixto Sharpe MD  House Physician  Pager: [phone]    2/7/2019

## 2019-02-09 ENCOUNTER — APPOINTMENT (OUTPATIENT)
Dept: PHYSICAL THERAPY | Facility: CLINIC | Age: 59
End: 2019-02-09
Attending: ORTHOPAEDIC SURGERY
Payer: COMMERCIAL

## 2019-02-09 VITALS
WEIGHT: 180.78 LBS | TEMPERATURE: 96.4 F | SYSTOLIC BLOOD PRESSURE: 143 MMHG | RESPIRATION RATE: 16 BRPM | HEART RATE: 79 BPM | BODY MASS INDEX: 26.78 KG/M2 | HEIGHT: 69 IN | OXYGEN SATURATION: 98 % | DIASTOLIC BLOOD PRESSURE: 77 MMHG

## 2019-02-09 LAB
BASOPHILS # BLD AUTO: 0 10E9/L (ref 0–0.2)
BASOPHILS NFR BLD AUTO: 0.2 %
DIFFERENTIAL METHOD BLD: ABNORMAL
EOSINOPHIL # BLD AUTO: 0.1 10E9/L (ref 0–0.7)
EOSINOPHIL NFR BLD AUTO: 0.6 %
ERYTHROCYTE [DISTWIDTH] IN BLOOD BY AUTOMATED COUNT: 13.8 % (ref 10–15)
HCT VFR BLD AUTO: 35.8 % (ref 40–53)
HGB BLD-MCNC: 12.5 G/DL (ref 13.3–17.7)
IMM GRANULOCYTES # BLD: 0 10E9/L (ref 0–0.4)
IMM GRANULOCYTES NFR BLD: 0.3 %
INR PPP: 1.09 (ref 0.86–1.14)
LYMPHOCYTES # BLD AUTO: 2.5 10E9/L (ref 0.8–5.3)
LYMPHOCYTES NFR BLD AUTO: 23.1 %
MCH RBC QN AUTO: 34.6 PG (ref 26.5–33)
MCHC RBC AUTO-ENTMCNC: 34.9 G/DL (ref 31.5–36.5)
MCV RBC AUTO: 99 FL (ref 78–100)
MONOCYTES # BLD AUTO: 1.9 10E9/L (ref 0–1.3)
MONOCYTES NFR BLD AUTO: 17.1 %
NEUTROPHILS # BLD AUTO: 6.4 10E9/L (ref 1.6–8.3)
NEUTROPHILS NFR BLD AUTO: 58.7 %
NRBC # BLD AUTO: 0 10*3/UL
NRBC BLD AUTO-RTO: 0 /100
PLATELET # BLD AUTO: 74 10E9/L (ref 150–450)
RBC # BLD AUTO: 3.61 10E12/L (ref 4.4–5.9)
WBC # BLD AUTO: 11 10E9/L (ref 4–11)

## 2019-02-09 PROCEDURE — 85025 COMPLETE CBC W/AUTO DIFF WBC: CPT | Performed by: INTERNAL MEDICINE

## 2019-02-09 PROCEDURE — 40000894 ZZH STATISTIC OT IP EVAL DEFER

## 2019-02-09 PROCEDURE — 25000132 ZZH RX MED GY IP 250 OP 250 PS 637: Performed by: INTERNAL MEDICINE

## 2019-02-09 PROCEDURE — 97110 THERAPEUTIC EXERCISES: CPT | Mod: GP

## 2019-02-09 PROCEDURE — 97116 GAIT TRAINING THERAPY: CPT | Mod: GP

## 2019-02-09 PROCEDURE — 36415 COLL VENOUS BLD VENIPUNCTURE: CPT | Performed by: INTERNAL MEDICINE

## 2019-02-09 PROCEDURE — 25000132 ZZH RX MED GY IP 250 OP 250 PS 637: Performed by: STUDENT IN AN ORGANIZED HEALTH CARE EDUCATION/TRAINING PROGRAM

## 2019-02-09 PROCEDURE — 97161 PT EVAL LOW COMPLEX 20 MIN: CPT | Mod: GP

## 2019-02-09 PROCEDURE — 97530 THERAPEUTIC ACTIVITIES: CPT | Mod: GP

## 2019-02-09 PROCEDURE — 40000193 ZZH STATISTIC PT WARD VISIT

## 2019-02-09 PROCEDURE — 85610 PROTHROMBIN TIME: CPT | Performed by: INTERNAL MEDICINE

## 2019-02-09 PROCEDURE — 99232 SBSQ HOSP IP/OBS MODERATE 35: CPT | Performed by: INTERNAL MEDICINE

## 2019-02-09 RX ORDER — CELECOXIB 100 MG/1
100 CAPSULE ORAL 2 TIMES DAILY
Qty: 28 CAPSULE | Refills: 0 | Status: ON HOLD | OUTPATIENT
Start: 2019-02-09 | End: 2019-03-07

## 2019-02-09 RX ORDER — ASPIRIN 325 MG
325 TABLET, DELAYED RELEASE (ENTERIC COATED) ORAL DAILY
Qty: 30 TABLET | Refills: 0 | Status: ON HOLD | OUTPATIENT
Start: 2019-02-09 | End: 2019-03-07

## 2019-02-09 RX ORDER — OXYCODONE HYDROCHLORIDE 5 MG/1
5 TABLET ORAL EVERY 6 HOURS PRN
Qty: 40 TABLET | Refills: 0 | Status: SHIPPED | OUTPATIENT
Start: 2019-02-09 | End: 2019-02-11

## 2019-02-09 RX ADMIN — FLUTICASONE PROPIONATE 2 SPRAY: 50 SPRAY, METERED NASAL at 08:47

## 2019-02-09 RX ADMIN — MULTIPLE VITAMINS W/ MINERALS TAB 1 TABLET: TAB at 08:48

## 2019-02-09 RX ADMIN — PANTOPRAZOLE SODIUM 40 MG: 40 TABLET, DELAYED RELEASE ORAL at 06:31

## 2019-02-09 RX ADMIN — FOLIC ACID 1 MG: 1 TABLET ORAL at 08:48

## 2019-02-09 RX ADMIN — OXYCODONE HYDROCHLORIDE 10 MG: 5 TABLET ORAL at 09:29

## 2019-02-09 RX ADMIN — OXYCODONE HYDROCHLORIDE 10 MG: 5 TABLET ORAL at 00:18

## 2019-02-09 RX ADMIN — ACETAMINOPHEN 975 MG: 325 TABLET, FILM COATED ORAL at 08:48

## 2019-02-09 RX ADMIN — OXYCODONE HYDROCHLORIDE 10 MG: 5 TABLET ORAL at 06:31

## 2019-02-09 RX ADMIN — Medication 100 MG: at 08:48

## 2019-02-09 RX ADMIN — OXYCODONE HYDROCHLORIDE 10 MG: 5 TABLET ORAL at 03:21

## 2019-02-09 RX ADMIN — OXYCODONE HYDROCHLORIDE 10 MG: 5 TABLET ORAL at 12:53

## 2019-02-09 ASSESSMENT — ACTIVITIES OF DAILY LIVING (ADL)
ADLS_ACUITY_SCORE: 12
AMBULATION: 0-->INDEPENDENT
DRESS: 0-->INDEPENDENT
FALL_HISTORY_WITHIN_LAST_SIX_MONTHS: NO
TRANSFERRING: 0-->INDEPENDENT
ADLS_ACUITY_SCORE: 12
BATHING: 0-->INDEPENDENT
TOILETING: 0-->INDEPENDENT
SWALLOWING: 0-->SWALLOWS FOODS/LIQUIDS WITHOUT DIFFICULTY
ADLS_ACUITY_SCORE: 12
WHICH_OF_THE_ABOVE_FUNCTIONAL_RISKS_HAD_A_RECENT_ONSET_OR_CHANGE?: AMBULATION;TRANSFERRING
RETIRED_COMMUNICATION: 0-->UNDERSTANDS/COMMUNICATES WITHOUT DIFFICULTY
COGNITION: 0 - NO COGNITION ISSUES REPORTED
RETIRED_EATING: 0-->INDEPENDENT
ADLS_ACUITY_SCORE: 12

## 2019-02-09 ASSESSMENT — COLUMBIA-SUICIDE SEVERITY RATING SCALE - C-SSRS
1. IN THE PAST MONTH, HAVE YOU WISHED YOU WERE DEAD OR WISHED YOU COULD GO TO SLEEP AND NOT WAKE UP?: NO
2. HAVE YOU ACTUALLY HAD ANY THOUGHTS OF KILLING YOURSELF IN THE PAST MONTH?: NO

## 2019-02-09 NOTE — PLAN OF CARE
Occupational therapy services not needed at this time. Patient demonstrated toilet transfer and bed mobility IND. Patient is completing basic self-cares, bed mobility and transfers IND. Patient reported no OT concerns at this time and is safe to go home.

## 2019-02-09 NOTE — PLAN OF CARE
VS: stable   O2: Room air, lungs are clear   Output: Voiding in good amounts   Last BM: 2/7/2019   Activity: Up with his cane    Skin: Intact except for his surgical incision   Pain: Oxycodone every three hours   CMS: intact   Dressing: CDI   Diet: Regular, tolerating it well   LDA: Removed his PIV   Equipment: cane   Plan: Go home with home PT   Additional Info: Discharge instructions were given in verbal and written form he verbalized an understanding. Escorted out via wheelchair to private car in stable condition

## 2019-02-09 NOTE — PLAN OF CARE
Discharge Planner PT   Patient plan for discharge: Home   Current status: PT evaluation completed and treatment initiated.  Pt demonstrating IND with bed mobility and transfers, ambulated 400' with use of walker and SBA.  Pt also completed 3 steps x 2 with SBA and step to pattern.  Will see pt once more this afternoon to provide with walker or cane for use at home.   Barriers to return to prior living situation: Need for increased assist with ambulation and stairs  Recommendations for discharge: Home with home or OP PT  Rationale for recommendations: After PM session       Entered by: Geraldine Austin 02/09/2019 10:13 AM

## 2019-02-09 NOTE — PLAN OF CARE
PT: Pt ambulated Olivier with use of SEC, also completed stairs with 1 rail and SEC, issued cane for use at home.  At this time pt has met all in-patient PT goals, safe to discharge home.    Physical Therapy Discharge Summary    Reason for therapy discharge:    All goals and outcomes met, no further needs identified.    Progress towards therapy goal(s). See goals on Care Plan in [facility] electronic health record for goal details.  Goals met    Therapy recommendation(s):    Continue home exercise program.

## 2019-02-09 NOTE — PLAN OF CARE
VS: VSS   O2: RA   Output: Voiding without difficulty   Last BM: 2/7/19   Activity: Up with assist x 1 and walker   Skin: Intact except incision.   Pain: Oxycodone q3hrs   CMS: Intact   Dressing: CDI   Diet: Regular   LDA: PIV in right hand infusing, hemovac with decreased output.    Equipment: Walker, PCD.    Plan: TBD   Additional Info:

## 2019-02-09 NOTE — PROGRESS NOTES
"Orthopedic Surgery Progress Note    Subjective / Interval Events:   Patient awake in bed; doing much better this morning; HV drain output decreased. Feels that he is ready to go home.    Objective:   Vital Signs Temp (24hrs), Av.8  F (36  C), Min:96.2  F (35.7  C), Max:98.2  F (36.8  C)    /77 (BP Location: Left arm)   Pulse 79   Temp 98.2  F (36.8  C) (Oral)   Resp 16   Ht 1.753 m (5' 9.02\")   Wt 82 kg (180 lb 12.4 oz)   SpO2 98%   BMI 26.68 kg/m      Date 19 0700 - 02/10/19 0659   Shift 5806-5372 4611-2969 6546-2685 24 Hour Total   INTAKE   Shift Total(mL/kg)       OUTPUT   Drains 5   5   Shift Total(mL/kg) 5(0.06)   5(0.06)   Weight (kg) 82 82 82 82     Physical Examination   General: no acute distress  CV: palpable pulses  Resp: Nonlabored breathing  RLE: dressing changed; HV removed (10cc and 5cc output)    Incision clean and dry; steri-strips in place    Mild post-op ecchymoses    +TA/GSC/EHL/FHL; SILT DP/SP/Rosalio/Saph    Palpable dorsalis pedis pulse    Labs (Last 24 hour):   Lab Results   Component Value Date    WBC 11.0 2019    HGB 12.5 2019    HCT 35.8 2019    MCV 99 2019    PLT 74 2019    RDW 13.8 2019     Lab Results   Component Value Date    BUN 20 01/15/2019     01/15/2019    CO2 25 01/15/2019    ANIONGAP 7 01/15/2019     All cultures:  No results for input(s): CULT in the last 168 hours.  Assessment / Plan:   This is a 58 year old male s/p right TKA on 2019 by Dr. Joe    Pain control; continue to wean medications as tolerated  CV: Monitor HR/BP  Resp: Encourage IS  Heme/ID: Monitor Hgb 12.5 this morning     Platelet: 74 today  GI/: diet as tolerated  PT/OT    WBAT    CPM  DVT ppx: mechanical, aspirin  Wound: dressing changed this morning and HV removed    Plan for discharge home today    Dc Laws MD  Orthopaedic Surgery, Adult Reconstruction Fellow  Pager [phone]  "

## 2019-02-09 NOTE — PLAN OF CARE
VS: Temp: 96.2  F (35.7  C) Temp src: Oral BP: 170/85 Pulse: 79 Heart Rate: 62 Resp: 18 SpO2: 98 % O2 Device: None (Room air)   Blood pressure elevated, Dr. Eduardo paged earlier and will re-evaluate in morning. See previous note.   O2: None, room air   Output: Voiding adequately in urinal and bathroom   Last BM: 2/7/19   Activity: Up with assist x 1 and walker   Skin: Intact except incision.   Pain: Tolerable with prn oxycodone 10 mg.    CMS: Intact   Dressing: CDI   Diet: Regular   LDA: PIV in right hand infusing, hemovac output 60 mLs.    Equipment: Walker, PCD.    Plan: Redraw platelet and hemoglobin in morning.    Additional Info:

## 2019-02-09 NOTE — PROGRESS NOTES
"[facility], [place]   Internal Medicine Daily Note           Interval History/Events     Overnight events reviewed  Reports doing much better this AM  Tolerating diet  Pain controlled  No nausea, vomiting  No lightheadedness or dizziness  Drainage bag removed.        Review of Systems        4 point ROS including Respiratory, CV, GI and , other than that noted above is negative      Medications   I have reviewed current medications  in the \"current medication\" section of Epic.  Relevant changes include:     Physical Exam   General:       Vital signs:    Blood pressure 143/77, pulse 79, temperature 96.4  F (35.8  C), temperature source Oral, resp. rate 16, height 1.753 m (5' 9.02\"), weight 82 kg (180 lb 12.4 oz), SpO2 98 %.  Estimated body mass index is 26.68 kg/m  as calculated from the following:    Height as of this encounter: 1.753 m (5' 9.02\").    Weight as of this encounter: 82 kg (180 lb 12.4 oz).      Intake/Output Summary (Last 24 hours) at 2/8/2019 1503  Last data filed at 2/8/2019 1400  Gross per 24 hour   Intake --   Output 2360 ml   Net -2360 ml        HEENT: No icterus, no pallor  Cardiovascular: S1, S2 normal.   Respiratory: B/L CTA  Abdomen: Soft, NT, BS+  Neurology: Alert, awake, and oriented.   Extremities: Left knee dressing in place       Laboratory and Imaging Studies     I have reviewed  laboratory and imaging studies in the Epic. Pertinent findings are as below:    BMP  Recent Labs   Lab 02/07/19  0850   POTASSIUM 3.7   CR 0.78   *     CBC  Recent Labs   Lab 02/09/19  0613 02/08/19  0536 02/07/19  2230 02/07/19  1706   WBC 11.0 15.1*  --  5.8   RBC 3.61* 3.68*  --  4.26*   HGB 12.5* 12.8*  --  14.8   HCT 35.8* 35.4*  --  41.7   MCV 99 96  --  98   MCH 34.6* 34.8*  --  34.7*   MCHC 34.9 36.2  --  35.5   RDW 13.8 13.4  --  13.6   PLT 74* 41* 60* 44*     INR  Recent Labs   Lab 02/09/19  0613 02/08/19  1348   INR 1.09 1.17*     LFTsNo lab results found in last " "7 days.   PANCNo lab results found in last 7 days.        Impression/Plan            58 year old year old male with hx of Alcohol dependance, Hepatitis C,  Cirrhosis c/b Ascites, coagulation disorder, GI bleed, Varices, Anemia, Thrombocytopenia, severe aortic stenosis s/p TAVR, HTN, GERD is being admitted s/p TKA on 02/07/2019     # S/P  TKA on 02/07/2019 :  Management primarily per Ortho team.  - Wound cares, Dressings, Surgical pain management, DVT Prophylaxis  per primary team.   - Monitor anemia, hemodynamics, Input/Output  - Encourage Incentive spirometry  - Laxatives for constipation prophylaxis     # Anemia: Most likely due to Hemodilution, blood loss, and post surgical inflammation.   Hg 12.5  gm/dl   - Transfuse if Hg < 7 gm/dl      # Hx of cirrhosis with decompensation: D/t Alcohol, and Hepatitis C. He follows with hepatology, but hasn't been seen in about a year.  He has never had hepatitis C treatment.   - Monitor CMP in AM  - Follow up with Hepatology as outpatient     # Hx of  GI bleed, Varices: 2/2 liver disease  # Hx of GERD  PTA on Pantoprazole  - Continue     # Hx of HTN: PTA on Lisinopril  - Hold for now     # Hx of  Thrombocytopenia: d/t liver disease  Platelet level 57 K on 01/15.   Received Platelet transfusion on 02/07  Platelet level 41 on 02/08 AM. Received 2 unit platelet on 02/08  Platelet level 74 K on 02/09  Also received Vitamin K 5 mg PO X 1 dose.   - Transfuse if Platelet level less than 50 K  - Hold Aspirin if platelet level less than 50 K    # Mildly elevated INR: ? 2/2 liver disease  - Vitamin K 5 mg PO        #  Hx of TAVR for Aortic stenosis: PTA on Aspirin  - Restart Aspirin as soon as possible post op.      # Hx of Alcohol use: Per pre operative evaluation note \" He continues to drink alcohol, but isn't clear about how much or how often.  He reports that he can skip drinking for a day or more and not have any symptoms of withdrawals.  Patient encouraged to abstain from alcohol " "if able between now and the DOS in preparation for the surgery.  Discussed high risk of withdrawals if alcohol\"  Patient reports his last drink was two weeks ago. Advised him to be on Alcohol withdrawal protocol. Patient did not want at this time. States, he will let us know if he withdraws.   No signs of withdrawal.   - Monitor closely for withdrawal symptoms.   - Folic acid, Thiamine  - High risk for delirium due to alcohol use, and Cirrhosis.      # Hx of Allergies: PTA on Fluticasone, Loratidine PRN  - Continue                       Pt's care was discussed with bedside RN, patient and  during Care Team Rounds.               Nicholas Eduardo MD  Hospitalist ( Internal medicine)  Pager: [phone]     "

## 2019-02-09 NOTE — PROGRESS NOTES
Care Coordinator - Discharge Planning    Admission Date/Time:  2/7/2019  Attending MD:  Olvin Joe MD     Data  Date of initial CC assessment:  2/11/19  Chart reviewed, discussed with interdisciplinary team.   Patient was admitted for:   1. S/P total knee arthroplasty, right    2. Presbyopia    3. Primary osteoarthritis of right knee    4. Nonrheumatic aortic valve stenosis    5. Aortic stenosis, severe    6. S/p TAVR (transcatheter aortic valve replacement), bioprosthetic    7. Anticoagulant long-term use    8. Chronic anemia    9. Diastolic dysfunction    10. Chronic allergic rhinitis    11. Portal hypertension (H)    12. History of drug abuse in remission         Assessment   Concerns with insurance coverage for discharge needs: None.  Current Living Situation: Patient lives alone.  Support System: Supportive  Services Involved: No services prior to admit  Transportation at Discharge: Car  Transportation to Medical Appointments:    - Not applicable  Barriers to Discharge: No barriers noted that would impact discharge to home      Coordination of Care and Referrals: Provided patient/family with options for Home Care.  Discussed discharge planning with patient, would like to do home PT to start as he would have difficulty getting to OP PT. Does not have preference with home care providers used. Patient states he has ride and is ready to leave. No other significant concerns at this time.    Referral sent to [facility]      Plan  Anticipated Discharge Date:  2/9/19  Anticipated Discharge Plan: home with [facility] PT    CTS Handoff completed:  YES    Verenice PERLAN RN CCM  RN Care Coordinator [address] [place] [id number]  [email or web address]  [email or web address]  Office: [phone]  Pager: [phone]    To contact Weekend RNCC, dial [phone] and enter job code [phone] at prompt. This pager can not be contacted by text page or outside line.

## 2019-02-09 NOTE — PROGRESS NOTES
02/08/19 0847   Quick Adds   Type of Visit Initial PT Evaluation       Present no   Language english   Living Environment   Lives With alone   Living Arrangements house   Home Accessibility stairs to enter home   Number of Stairs, Main Entrance four   Stair Railings, Main Entrance none   Transportation Anticipated family or friend will provide   Living Environment Comment Pt: pt reports traveling 1/3 of year. step over tub.   Self-Care   Usual Activity Tolerance good   Current Activity Tolerance good   Regular Exercise Yes   Activity/Exercise Type biking;other (see comments)  (work)   Exercise Amount/Frequency 3-5 times/wk   Functional Level Prior   Ambulation 0-->independent   Transferring 0-->independent   Toileting 0-->independent   Bathing 0-->independent   Communication 0-->understands/communicates without difficulty   Swallowing 0-->swallows foods/liquids without difficulty   Cognition 0 - no cognition issues reported   Fall history within last six months no   Which of the above functional risks had a recent onset or change? ambulation;transferring   Prior Functional Level Comment Independent. Works as a  so a lot of mobility, walking up and down ladders   General Information   Onset of Illness/Injury or Date of Surgery - Date 02/07/19   Referring Physician Prashanth Vang MD   Patient/Family Goals Statement Pt: to get back to activity, be able to work   Pertinent History of Current Problem (include personal factors and/or comorbidities that impact the POC) 58 year old male now s/p R TKA on 2/7/2019 with Dr. Joe   Precautions/Limitations no known precautions/limitations   Weight-Bearing Status - LUE full weight-bearing   Weight-Bearing Status - RUE full weight-bearing   Weight-Bearing Status - LLE full weight-bearing   Weight-Bearing Status - RLE full weight-bearing   General Observations On RA, IV fluids, no drains   General Info Comments Activity: ambulate  with assist   Cognitive Status Examination   Orientation orientation to person, place and time   Level of Consciousness alert   Follows Commands and Answers Questions 100% of the time   Personal Safety and Judgment intact   Memory intact   Pain Assessment   Patient Currently in Pain No   Integumentary/Edema   Integumentary/Edema Comments R LE ace wrapped   Posture    Posture Not impaired   Range of Motion (ROM)   ROM Comment R knee ROM: 0-110   Strength   Strength Comments L LE 5/5, R LE 4/5   Bed Mobility   Bed Mobility Comments IND   Transfer Skills   Transfer Comments IND   Gait   Gait Comments SBA with use of walke for 400', reciprocal pattern, increased UE use on walker   Balance   Balance Comments Impaired due to R LE weakness   Sensory Examination   Sensory Perception no deficits were identified   Coordination   Coordination no deficits were identified   Muscle Tone   Muscle Tone no deficits were identified   General Therapy Interventions   Planned Therapy Interventions gait training;balance training;strengthening;ROM;transfer training;risk factor education;home program guidelines;progressive activity/exercise   Clinical Impression   Criteria for Skilled Therapeutic Intervention yes, treatment indicated   PT Diagnosis Impaired functional mobility   Influenced by the following impairments Decreased R LE strength and ROM, decreased actiivty tolerance and balance   Functional limitations due to impairments Inability to complete functional mobility at baseline level of functioning   Clinical Presentation Stable/Uncomplicated   Clinical Presentation Rationale Medically stable, on RA   Clinical Decision Making (Complexity) Low complexity   Therapy Frequency` 2 times/day   Predicted Duration of Therapy Intervention (days/wks) 2 days   Anticipated Equipment Needs at Discharge front wheeled walker;standard cane   Anticipated Discharge Disposition Home with Home Therapy;Home with Outpatient Therapy   Risk & Benefits of  therapy have been explained Yes   Patient, Family & other staff in agreement with plan of care Yes   Clinical Impression Comments Pt would benefit from IP PT to increase strength and IND with mbility to ensure safety with d/c home.    Total Evaluation Time   Total Evaluation Time (Minutes) 10

## 2019-02-10 ENCOUNTER — PATIENT OUTREACH (OUTPATIENT)
Dept: CARE COORDINATION | Facility: CLINIC | Age: 59
End: 2019-02-10

## 2019-02-10 NOTE — DISCHARGE SUMMARY
ORTHOPAEDIC DISCHARGE SUMMARY     Date of Admission: 2/7/2019  Date of Discharge: 2/9/2019  2:14 PM  Disposition: Home  Staff Physician: Dr. Olvin Joe  Primary Care Provider: Cuca Celeste    DISCHARGE DIAGNOSIS:  Osteoarthritis    PROCEDURES: Procedure(s):  ARTHROPLASTY KNEE RIGHT on 2/7/2019    BRIEF HISTORY:  Ten Johnson 58 year old male with degenerative changes in his right knee.  Ten Johnson has been followed as an outpatient with attempts at non-operative management.    HOSPITAL COURSE:    On 2/7/2019 Ten Johnson underwent right total knee arthroplasty.  Surgery was uncomplicated.  Ten Johnson did well post-operatively and worked with physical and occupational therapy. The Internal Medicine physicians were consulted post operatively to aid in management of medical comorbidities.  Patient has low platelet count at baseline; this contributed to higher HV drain output during the first post-operative day.  He was treated with platelet transfusion and the drain output decreased.  The HV output had sufficiently decreased and was removed on POD#2.  The patient was tolerating a regular diet without GI distress/nausea or vomiting and was voiding spontaneously. All PT/OT goals for safe discharge were met. Pain medications were weaned to the point where post-operative pain was controlled on oral medications. Stool softeners have been used while taking pain medications to help prevent constipation. Ten Johnson was deemed medically safe to discharge.     Antibiotics:  Given periop and 24 hours postop.  DVT prophylaxis:  Aspirin daily for 4 weeks  PT Progress:  Has met PT/OT goals for safe mobility.   Pain Meds:  Weaned off all IV pain meds by discharge.  Inpatient Events: No significant events or complications.    Discharge orders and instructions as below.    FOLLOWUP:    Vidya Hastings in clinic 2 weeks post-op  Dr. Joe in clinic 6 weeks post-op    [facility] [place] ([address] [place] [address]  [place] [address]). Call [phone] to schedule a follow-up appointment at this location with your provider.     Future Appointments   Date Time Provider Department Center   2/21/2019 10:30 AM Vidya Hastings APRN CNP [facility]   2/22/2019  9:45 AM  LAB [facility]   2/22/2019 10:00 AM [facility]   2/22/2019 11:00 AM Ema Pierson NP [facility]   3/25/2019 10:30 AM Olvin Joe MD [facility]       PLANNED DISCHARGE ORDERS:           Discharge Medication List as of 2/9/2019  1:07 PM      START taking these medications    Details   celecoxib (CELEBREX) 100 MG capsule Take 1 capsule (100 mg) by mouth 2 times daily, Disp-28 capsule, R-0, E-Prescribe      order for DME Equipment being ordered: Cane ()  Treatment Diagnosis: Gait InstabilityDisp-1 each, R-0, Local Print      oxyCODONE (ROXICODONE) 5 MG tablet Take 1 tablet (5 mg) by mouth every 6 hours as needed for pain, Disp-40 tablet, R-0, Local Print         CONTINUE these medications which have CHANGED    Details   aspirin (ASA) 325 MG EC tablet Take 1 tablet (325 mg) by mouth daily, Disp-30 tablet, R-0, E-Prescribe         CONTINUE these medications which have NOT CHANGED    Details   diclofenac (VOLTAREN) 1 % topical gel Apply 4 grams to knees four times daily using enclosed dosing card.Disp-100 g, [id number]      ferrous sulfate (FEROSUL) 325 (65 Fe) MG tablet Take 1 tablet (325 mg) by mouth At Bedtime, Disp-90 tablet, R-2, E-Prescribe      fluticasone (FLONASE) 50 MCG/ACT nasal spray Spray 2 sprays into both nostrils daily, Disp-3 Bottle, R-3, E-Prescribe1 btl=16 gm      lisinopril (PRINIVIL/ZESTRIL) 20 MG tablet Take 1 tablet (20 mg) by mouth At Bedtime, Disp-90 tablet, R-3, E-Prescribe      loratadine (CLARITIN) 10 MG tablet Take 1 tablet (10 mg) by mouth daily, Disp-90 tablet, R-2, E-Prescribe      Multiple Vitamin (MULTIVITAMINS PO) Take 1 tablet by mouth At Bedtime , Historical      pantoprazole  (PROTONIX) 40 MG EC tablet Take 1 tablet (40 mg) by mouth 2 times daily (before meals), Disp-180 tablet, R-2, E-Prescribe         STOP taking these medications       mupirocin (BACTROBAN) 2 % external ointment Comments:   Reason for Stopping:                 Discharge Procedure Orders   Home Care PT Referral for Hospital Discharge   Referral Type: Home Health Therapies & Aides   Number of Visits Requested: 1     Reason for your hospital stay   Order Comments: Admitted for right total knee arthroplasty on a background of end-stage bone-on-bone osteoarthritis of the right knee     Adult [facility] Follow-up and recommended labs and tests   Order Comments: You will follow up with Dr [facility] at 2 weeks (Vidya Hastings) and 6 weeks (Dr Joe)    [facility] [place] ([address] [place] [address]). Call [phone] to schedule a follow-up appointment at this location with your provider.     Wound care and dressings   Order Comments: Instructions to care for your wound at home:   -Your dressing  Should be changed every 2-3 days as required.  - After 7 days, the ACE wrap may be removed and a tubigrip used on the limb.  -There are white steri-strips in place that will fall off on their own about 7-10 days after your dressing is removed (or 10-14 days post-operatively).   -Your stitches will dissolve on their own and do not need to be removed.    -You may shower once your dressing is off; let water run over the incision and dab dry -Do NOT submerge in pool or tub for at least 2 weeks.   -After the dressing is off, you may leave it open to the air.   -Please contact us if there is any increasing drainage, swelling, pain, or redness stemming from your incision; this may be a sign of infection and would need to be looked at immediately.     Activity     Order Specific Question Answer Comments   Is discharge order? Yes      Diet   Order Comments: Follow this diet upon discharge: Orders Placed This  Encounter      Advance Diet as Tolerated: Regular Diet Adult     Order Specific Question Answer Comments   Is discharge order? Yes      Assign Questionnaire Series to Patient       Dc Laws MD  Orthopaedic Surgery, Adult Reconstruction Fellow  Pager [phone]

## 2019-02-11 ENCOUNTER — TELEPHONE (OUTPATIENT)
Dept: ORTHOPEDICS | Facility: CLINIC | Age: 59
End: 2019-02-11

## 2019-02-11 ENCOUNTER — MYC MEDICAL ADVICE (OUTPATIENT)
Dept: SURGERY | Facility: CLINIC | Age: 59
End: 2019-02-11

## 2019-02-11 DIAGNOSIS — Z96.651 S/P TOTAL KNEE ARTHROPLASTY, RIGHT: ICD-10-CM

## 2019-02-11 RX ORDER — OXYCODONE HYDROCHLORIDE 5 MG/1
5 TABLET ORAL EVERY 6 HOURS PRN
Qty: 40 TABLET | Refills: 0 | Status: SHIPPED | OUTPATIENT
Start: 2019-02-11 | End: 2019-02-13

## 2019-02-11 NOTE — TELEPHONE ENCOUNTER
[facility] Call Center    Phone Message    May a detailed message be left on voicemail: yes    Reason for Call: Order(s): Home Care Orders: Other: HC orders for nursing need to be signed or call back Bulle with verbal orders.    Action Taken: Message routed to:  [facility] ortho

## 2019-02-11 NOTE — TELEPHONE ENCOUNTER
Ten was phoned back by RN and we reviewed wound care.  Pt states the incision is just about healed over, very little drainage.    Pt states the mechanics of his knee works very well.  He has been ambulating while taking it easy.  We discussed home care P.T.  Pt asked that we call Alexandre at benefits [phone].  Alexandre asked that the home care referral be faxed to [phone].  Referral was faxed as requested.      Ten asked for refill of his oxycodone which he will run out in 2 days, he will  the Rx himself.  Rx was taken to the locked box on first floor.  Ksenia Pugh RN

## 2019-02-12 ENCOUNTER — TELEPHONE (OUTPATIENT)
Dept: INTERNAL MEDICINE | Facility: CLINIC | Age: 59
End: 2019-02-12

## 2019-02-12 ENCOUNTER — CARE COORDINATION (OUTPATIENT)
Dept: INTERNAL MEDICINE | Facility: CLINIC | Age: 59
End: 2019-02-12

## 2019-02-12 NOTE — TELEPHONE ENCOUNTER
[facility] Call Center    Phone Message    May a detailed message be left on voicemail: yes    Reason for Call: Order(s): Home Care Orders: Other: .pt wants start of care 2/14/19 not today or tomorrow.    CALL BACK REQUESTED!    Action Taken: Message routed to:  [facility] PCC     Verbal order given and documented. Maylin Woo LPN 2/12/2019 10:48 AM

## 2019-02-12 NOTE — TELEPHONE ENCOUNTER
Verbal orders given to Gertrudis from [facility], per Dr. Celeste, for pt wants start of care 2/14/19 not today or tomorrow.Maylin Woo LPN 2/12/2019 10:49 AM

## 2019-02-13 DIAGNOSIS — Z96.651 S/P TOTAL KNEE ARTHROPLASTY, RIGHT: ICD-10-CM

## 2019-02-13 RX ORDER — OXYCODONE HYDROCHLORIDE 5 MG/1
TABLET ORAL
Qty: 40 TABLET | Refills: 0 | Status: SHIPPED | OUTPATIENT
Start: 2019-02-13 | End: 2019-02-19

## 2019-02-13 NOTE — TELEPHONE ENCOUNTER
Vidya Hastings NP contacted in regards to increasing pain medication.  She authorized Oxycodone 5 mg 1-2 every 4-6 hours prn pain.  #40.  I will place in locked box .  Surgery done 2/7/19.

## 2019-02-14 ENCOUNTER — TELEPHONE (OUTPATIENT)
Dept: INTERNAL MEDICINE | Facility: CLINIC | Age: 59
End: 2019-02-14

## 2019-02-14 DIAGNOSIS — Z96.651 S/P TOTAL KNEE ARTHROPLASTY, RIGHT: Primary | ICD-10-CM

## 2019-02-14 NOTE — TELEPHONE ENCOUNTER
[facility] Call Center    Phone Message    May a detailed message be left on voicemail: yes    Reason for Call: Order(s): Home Care Orders: Skilled Nursing:  Start of care 2/18 for Skilled nursing eval      Action Taken: Message routed to:  [facility]): TAHIR JACKSON

## 2019-02-18 ENCOUNTER — DOCUMENTATION ONLY (OUTPATIENT)
Dept: CARE COORDINATION | Facility: CLINIC | Age: 59
End: 2019-02-18

## 2019-02-18 NOTE — PROGRESS NOTES
[facility] now requests orders and shares plan of care/discharge summaries for some patients through [employer].  Please REPLY TO THIS MESSAGE OR ROUTE BACK TO THE AUTHOR in order to give authorization for orders when needed.  This is considered a verbal order, you will still receive a faxed copy of orders for signature.  Thank you for your assistance in improving collaboration for our patients.    FYI: Patient not admitted to home care. Patient is tolerating pain, no s/s of infection and verbalizes ready for outpatient therapy.    Camille Yeager RN [facility]  [phone]  [email or web address]      Above noted.  Dr. Celeste

## 2019-02-19 DIAGNOSIS — Z96.651 S/P TOTAL KNEE ARTHROPLASTY, RIGHT: ICD-10-CM

## 2019-02-19 RX ORDER — OXYCODONE HYDROCHLORIDE 5 MG/1
TABLET ORAL
Qty: 10 TABLET | Refills: 0 | Status: SHIPPED | OUTPATIENT
Start: 2019-02-19 | End: 2019-02-21 | Stop reason: DRUGHIGH

## 2019-02-21 ENCOUNTER — ANCILLARY PROCEDURE (OUTPATIENT)
Dept: GENERAL RADIOLOGY | Facility: CLINIC | Age: 59
End: 2019-02-21
Attending: NURSE PRACTITIONER
Payer: COMMERCIAL

## 2019-02-21 ENCOUNTER — OFFICE VISIT (OUTPATIENT)
Dept: ORTHOPEDICS | Facility: CLINIC | Age: 59
End: 2019-02-21
Payer: COMMERCIAL

## 2019-02-21 DIAGNOSIS — Z96.651 S/P TOTAL KNEE ARTHROPLASTY, RIGHT: ICD-10-CM

## 2019-02-21 DIAGNOSIS — Z96.651 S/P TOTAL KNEE ARTHROPLASTY, RIGHT: Primary | ICD-10-CM

## 2019-02-21 RX ORDER — OXYCODONE HYDROCHLORIDE 5 MG/1
TABLET ORAL
Qty: 60 TABLET | Refills: 0 | Status: ON HOLD | OUTPATIENT
Start: 2019-02-21 | End: 2019-03-07

## 2019-02-21 NOTE — PROGRESS NOTES
"Navi is seen 2 weeks post op of his R TKA.  DOS: 2/7/2019  He states he feels now his pain \"is resolving\". Is managing with an oxycodone 2-3 times a day but \"really bad at night\". He is taking celebrex and an aspirin daily. Walking in the office here without a limp. Using a cane \"when outside\". Doing therapy daily. No nausea or SOB or CP. No calf pain. Incision is healed. Sutures removed from navigation sites. Alignment is neutral. NO focal areas of tenderness with small joint effusion. Quad strength 5-/5 and able to SLR independently.    Xrays taken today show good position of tibial and femoral components with ideal position and alignment.    Dx:  R TKA    Plan:  1. Continue rehab/PT  2. Strengthening and stretching  3. Refill oxycodone until next office visit and taper to only needing narcotics at night over next few weeks.  "

## 2019-02-21 NOTE — LETTER
"2/21/2019       RE: Ten Johnson  [address]  [place] [place] [address]     Dear Colleague,    Thank you for referring your patient, Ten Johnson, to the [facility] at [facility]. Please see a copy of my visit note below.    Navi is seen 2 weeks post op of his R TKA.  DOS: 2/7/2019  He states he feels now his pain \"is resolving\". Is managing with an oxycodone 2-3 times a day but \"really bad at night\". He is taking celebrex and an aspirin daily. Walking in the office here without a limp. Using a cane \"when outside\". Doing therapy daily. No nausea or SOB or CP. No calf pain. Incision is healed. Sutures removed from navigation sites. Alignment is neutral. NO focal areas of tenderness with small joint effusion. Quad strength 5-/5 and able to SLR independently.    Xrays taken today show good position of tibial and femoral components with ideal position and alignment.    Dx:  R TKA    Plan:  1. Continue rehab/PT  2. Strengthening and stretching  3. Refill oxycodone until next office visit and taper to only needing narcotics at night over next few weeks.    Again, thank you for allowing me to participate in the care of your patient.      Sincerely,    SDYNIE Stout CNP      "

## 2019-02-25 ENCOUNTER — TELEPHONE (OUTPATIENT)
Dept: ORTHOPEDICS | Facility: CLINIC | Age: 59
End: 2019-02-25

## 2019-02-25 DIAGNOSIS — Z96.651 S/P TOTAL KNEE ARTHROPLASTY, RIGHT: Primary | ICD-10-CM

## 2019-02-25 RX ORDER — HYDROXYZINE PAMOATE 25 MG/1
25 CAPSULE ORAL 3 TIMES DAILY PRN
Qty: 60 CAPSULE | Refills: 0 | Status: ON HOLD | OUTPATIENT
Start: 2019-02-25 | End: 2019-03-18

## 2019-02-25 NOTE — TELEPHONE ENCOUNTER
Pt was phoned back by RN.  Pt states he has a half inch area along his incision at the top of his knee cap that opened, is ozzing a little since the fall last Friday.  Pt denies redness around the wound.   Pt states when it happened, he called parametics, they wrapped the wound and asked pt to call our clinic.  Pt states his knee still works well, he believes the mechanics are fine, but there is lots of swelling.  Pt states it takes two busses, a train ride, and lots of walking to get to clinic.  He does need a refill for his pain medication and agreed to come to clinic this Thursday to have his knee checked and discuss pain management.  Appt has been made.   Until then, we discussed elevation, ice, rest.  Pt also agreed to send us a picture of the open wound.  Ksenia Pugh RN

## 2019-02-26 ENCOUNTER — TELEPHONE (OUTPATIENT)
Dept: ORTHOPEDICS | Facility: CLINIC | Age: 59
End: 2019-02-26

## 2019-02-26 NOTE — TELEPHONE ENCOUNTER
Patient has been my charting about Fall from last Friday where he fell directly onto his knee.  He has had a moderate amount of drainage and pain.  He is scheduled to see Vidya on Thursday.  Today he is very sore and unable to bear weight.  I suggested he go to the ER for evaluation.  He wants to wait it out.

## 2019-02-27 ENCOUNTER — HOSPITAL ENCOUNTER (EMERGENCY)
Facility: CLINIC | Age: 59
Discharge: HOME | End: 2019-02-28
Attending: EMERGENCY MEDICINE | Admitting: EMERGENCY MEDICINE
Payer: COMMERCIAL

## 2019-02-27 ENCOUNTER — APPOINTMENT (OUTPATIENT)
Dept: GENERAL RADIOLOGY | Facility: CLINIC | Age: 59
End: 2019-02-27
Attending: EMERGENCY MEDICINE
Payer: COMMERCIAL

## 2019-02-27 ENCOUNTER — APPOINTMENT (OUTPATIENT)
Dept: ULTRASOUND IMAGING | Facility: CLINIC | Age: 59
End: 2019-02-27
Attending: EMERGENCY MEDICINE
Payer: COMMERCIAL

## 2019-02-27 DIAGNOSIS — M25.561 ACUTE PAIN OF RIGHT KNEE: ICD-10-CM

## 2019-02-27 DIAGNOSIS — S80.01XA CONTUSION OF RIGHT KNEE, INITIAL ENCOUNTER: ICD-10-CM

## 2019-02-27 LAB
ANION GAP SERPL CALCULATED.3IONS-SCNC: 12 MMOL/L (ref 3–14)
BASOPHILS # BLD AUTO: 0 10E9/L (ref 0–0.2)
BASOPHILS NFR BLD AUTO: 0.2 %
BUN SERPL-MCNC: 14 MG/DL (ref 7–30)
CALCIUM SERPL-MCNC: 9 MG/DL (ref 8.5–10.1)
CHLORIDE SERPL-SCNC: 101 MMOL/L (ref 94–109)
CO2 SERPL-SCNC: 25 MMOL/L (ref 20–32)
CREAT SERPL-MCNC: 0.88 MG/DL (ref 0.66–1.25)
DIFFERENTIAL METHOD BLD: ABNORMAL
EOSINOPHIL # BLD AUTO: 0 10E9/L (ref 0–0.7)
EOSINOPHIL NFR BLD AUTO: 0 %
ERYTHROCYTE [DISTWIDTH] IN BLOOD BY AUTOMATED COUNT: 13.2 % (ref 10–15)
GFR SERPL CREATININE-BSD FRML MDRD: >90 ML/MIN/{1.73_M2}
GLUCOSE SERPL-MCNC: 82 MG/DL (ref 70–99)
HCT VFR BLD AUTO: 39.6 % (ref 40–53)
HGB BLD-MCNC: 13.7 G/DL (ref 13.3–17.7)
IMM GRANULOCYTES # BLD: 0 10E9/L (ref 0–0.4)
IMM GRANULOCYTES NFR BLD: 0.2 %
LYMPHOCYTES # BLD AUTO: 2.4 10E9/L (ref 0.8–5.3)
LYMPHOCYTES NFR BLD AUTO: 17.9 %
MCH RBC QN AUTO: 34.2 PG (ref 26.5–33)
MCHC RBC AUTO-ENTMCNC: 34.6 G/DL (ref 31.5–36.5)
MCV RBC AUTO: 99 FL (ref 78–100)
MONOCYTES # BLD AUTO: 2.3 10E9/L (ref 0–1.3)
MONOCYTES NFR BLD AUTO: 17.1 %
NEUTROPHILS # BLD AUTO: 8.5 10E9/L (ref 1.6–8.3)
NEUTROPHILS NFR BLD AUTO: 64.6 %
NRBC # BLD AUTO: 0 10*3/UL
NRBC BLD AUTO-RTO: 0 /100
PLATELET # BLD AUTO: 98 10E9/L (ref 150–450)
POTASSIUM SERPL-SCNC: 3.6 MMOL/L (ref 3.4–5.3)
RBC # BLD AUTO: 4.01 10E12/L (ref 4.4–5.9)
SODIUM SERPL-SCNC: 138 MMOL/L (ref 133–144)
WBC # BLD AUTO: 13.2 10E9/L (ref 4–11)

## 2019-02-27 PROCEDURE — 93971 EXTREMITY STUDY: CPT | Mod: RT

## 2019-02-27 PROCEDURE — 99285 EMERGENCY DEPT VISIT HI MDM: CPT | Mod: Z6 | Performed by: EMERGENCY MEDICINE

## 2019-02-27 PROCEDURE — 96375 TX/PRO/DX INJ NEW DRUG ADDON: CPT | Performed by: EMERGENCY MEDICINE

## 2019-02-27 PROCEDURE — 87040 BLOOD CULTURE FOR BACTERIA: CPT | Performed by: EMERGENCY MEDICINE

## 2019-02-27 PROCEDURE — 25800030 ZZH RX IP 258 OP 636: Performed by: EMERGENCY MEDICINE

## 2019-02-27 PROCEDURE — 96374 THER/PROPH/DIAG INJ IV PUSH: CPT | Performed by: EMERGENCY MEDICINE

## 2019-02-27 PROCEDURE — 80048 BASIC METABOLIC PNL TOTAL CA: CPT | Performed by: EMERGENCY MEDICINE

## 2019-02-27 PROCEDURE — 85025 COMPLETE CBC W/AUTO DIFF WBC: CPT | Performed by: EMERGENCY MEDICINE

## 2019-02-27 PROCEDURE — 96361 HYDRATE IV INFUSION ADD-ON: CPT | Performed by: EMERGENCY MEDICINE

## 2019-02-27 PROCEDURE — 25000128 H RX IP 250 OP 636: Performed by: EMERGENCY MEDICINE

## 2019-02-27 PROCEDURE — 73562 X-RAY EXAM OF KNEE 3: CPT | Mod: RT

## 2019-02-27 PROCEDURE — 99285 EMERGENCY DEPT VISIT HI MDM: CPT | Mod: 25 | Performed by: EMERGENCY MEDICINE

## 2019-02-27 RX ORDER — SODIUM CHLORIDE 9 MG/ML
1000 INJECTION, SOLUTION INTRAVENOUS CONTINUOUS
Status: DISCONTINUED | OUTPATIENT
Start: 2019-02-27 | End: 2019-02-28 | Stop reason: HOSPADM

## 2019-02-27 RX ORDER — ONDANSETRON 2 MG/ML
4 INJECTION INTRAMUSCULAR; INTRAVENOUS ONCE
Status: COMPLETED | OUTPATIENT
Start: 2019-02-27 | End: 2019-02-27

## 2019-02-27 RX ADMIN — SODIUM CHLORIDE 1000 ML: 9 INJECTION, SOLUTION INTRAVENOUS at 20:05

## 2019-02-27 RX ADMIN — ONDANSETRON 4 MG: 2 INJECTION INTRAMUSCULAR; INTRAVENOUS at 20:04

## 2019-02-27 RX ADMIN — HYDROMORPHONE HYDROCHLORIDE 1 MG: 1 INJECTION, SOLUTION INTRAMUSCULAR; INTRAVENOUS; SUBCUTANEOUS at 20:04

## 2019-02-27 RX ADMIN — SODIUM CHLORIDE 1000 ML: 9 INJECTION, SOLUTION INTRAVENOUS at 21:05

## 2019-02-27 RX ADMIN — SODIUM CHLORIDE 1000 ML: 9 INJECTION, SOLUTION INTRAVENOUS at 23:57

## 2019-02-27 ASSESSMENT — ENCOUNTER SYMPTOMS: WOUND: 1

## 2019-02-27 NOTE — ED AVS SNAPSHOT
[facility], [place], Emergency Department  [address]  [place] [address]  Phone:  [phone]  Fax:  [phone]                                    Ten Johnson   MRN: [id number]    Department:  [facility], Emergency Department   Date of Visit:  2/27/2019           After Visit Summary Signature Page    I have received my discharge instructions, and my questions have been answered. I have discussed any challenges I see with this plan with the nurse or doctor.    ..........................................................................................................................................  Patient/Patient Representative Signature      ..........................................................................................................................................  Patient Representative Print Name and Relationship to Patient    ..................................................               ................................................  Date                                   Time    ..........................................................................................................................................  Reviewed by Signature/Title    ...................................................              ..............................................  Date                                               Time          22EPIC Rev 08/18

## 2019-02-27 NOTE — ED PROVIDER NOTES
History     Chief Complaint   Patient presents with     Knee Pain     pt had knee replacement  2weeks ago, slipped on ice and fell and hurt knee. Having increasing pain with drainage to knee.     HPI  Ten Johnson is a 58 year old male with a history of chronic hepatitis C, alcoholic cirrhosis, portal hypertension, and TAVR (not currently anticoagulated) who underwent right total knee arthroplasty on 2/7/19. The procedure was uncomplicated and patient states his wound was healing well, until Friday, 5 days ago, when he slipped and fell on ice, injuring his right knee and hip. Since then, patient reports opening of his incision, growth of the incision, and increased bleeding. He states he's been nauseated and losing sleep as well due to pain.    Past Medical History:   Diagnosis Date     Alcohol use disorder      Alcoholic cirrhosis (H)      Anticoagulant long-term use     plavix     Ascites      Chronic allergic rhinitis      Chronic anemia      Chronic hepatitis C without hepatic coma (H) 05/10/2016    Untreated as of 2/2018     Diastolic dysfunction      Erosive gastropathy      Esophageal varices in alcoholic cirrhosis (H)      H/O upper gastrointestinal hemorrhage 09/2017     History of blood transfusion      History of drug abuse     intranasal     Hypertension     essential     JANELLE (iron deficiency anemia)      Sarahi-Hoffman tear     History     Marijuana abuse      MRSA (methicillin resistant Staphylococcus aureus)      Olecranon bursitis      Portal hypertension (H)      Right shoulder pain     history of rotator cuff repair     S/p TAVR (transcatheter aortic valve replacement), bioprosthetic      Severe aortic stenosis      Thrombocytopenia (H)        Past Surgical History:   Procedure Laterality Date     ARTHROSCOPY SHOULDER ROTATOR CUFF REPAIR  7/31/2012    Procedure: ARTHROSCOPY SHOULDER ROTATOR CUFF REPAIR;  Right Shoulder Arthroscopic Rotator Cuff Repair, BicepsTenodesis,  Subacromial Decompression  ;  Surgeon: Joi Csatillo MD;  [facility]     ESOPHAGOSCOPY, GASTROSCOPY, DUODENOSCOPY (EGD), COMBINED N/A 10/23/2017    Procedure: COMBINED ESOPHAGOSCOPY, GASTROSCOPY, DUODENOSCOPY (EGD);;  Surgeon: Gentry Salas MD;  [facility]: [facility]     FACIAL RECONSTRUCTION SURGERY  1971     HEART CATH FEMORAL CANNULIZATION WITH OPEN STANDBY REPAIR AORTIC VALVE N/A 2/21/2018    Procedure: HEART CATH FEMORAL CANNULIZATION WITH OPEN STANDBY REPAIR AORTIC VALVE;;  Surgeon: Luis Baird MD;  [facility]     IRRIGATION AND DEBRIDEMENT UPPER EXTREMITY, COMBINED  1/3/2012    Procedure:COMBINED IRRIGATION AND DEBRIDEMENT UPPER EXTREMITY; Irrigation & Debridement Left Elbow; Surgeon:CRISTHIAN ZHOU; Location:[facility] OR     OPTICAL TRACKING SYSTEM ARTHROPLASTY KNEE Right 2/7/2019    Procedure: ARTHROPLASTY KNEE RIGHT;  Surgeon: Olvin Joe MD;  Location: [facility]     REPAIR TENDON TRICEPS UPPER EXTREMITY  11/8/2011    Procedure:REPAIR TENDON TRICEPS UPPER EXTREMITY; Surgeon:CRISTHIAN ZHOU; Location:[facility]     SHOULDER SURGERY  2003    left, injury, torn tendons, hematoma     TRANSCATHETER AORTIC VALVE IMPLANT ANESTHESIA N/A 2/21/2018    Procedure: TRANSCATHETER AORTIC VALVE IMPLANT ANESTHESIA;  Transfemoral (Quiroz) Aortic Valve Implant 26mm MARTHA 3, with Cardiopulmonary Bypass Standby, transthoracic echocardiogram;  Surgeon: GENERIC ANESTHESIA PROVIDER;  Location: [facility]     TRANSPOSITION ULNAR NERVE (ELBOW)  11/8/2011    Procedure:TRANSPOSITION ULNAR NERVE (ELBOW); Final Procedure Done: Left Elbow Lateral Ulnar Collateral Repair And  Left Elbow Triceps Repair         Family History   Problem Relation Age of Onset     Cancer Mother 62     Alcoholism Paternal Uncle      No Known Problems Father      No Known Problems Brother      Diabetes Maternal Grandmother      Myocardial Infarction Maternal Grandfather      No Known Problems Paternal Grandmother      Unknown/Adopted Paternal  Grandfather      Cirrhosis No family hx of        Social History     Tobacco Use     Smoking status: Never Smoker     Smokeless tobacco: Never Used   Substance Use Topics     Alcohol use: Yes     Comment: Alcohol use disorder, still actively drinking       Current Facility-Administered Medications   Medication     ceFAZolin (ANCEF) 1 g vial to attach to  ml bag for ADULT or 50 ml bag for PEDS     sodium chloride 0.9% infusion     vancomycin (VANCOCIN) 1,250 mg in sodium chloride 0.9 % 250 mL intermittent infusion     Current Outpatient Medications   Medication     aspirin (ASA) 325 MG EC tablet     celecoxib (CELEBREX) 100 MG capsule     ferrous sulfate (FEROSUL) 325 (65 Fe) MG tablet     fluticasone (FLONASE) 50 MCG/ACT nasal spray     lisinopril (PRINIVIL/ZESTRIL) 20 MG tablet     Multiple Vitamin (MULTIVITAMINS PO)     oxyCODONE (ROXICODONE) 5 MG tablet     pantoprazole (PROTONIX) 40 MG EC tablet     diclofenac (VOLTAREN) 1 % topical gel     hydrOXYzine (VISTARIL) 25 MG capsule     loratadine (CLARITIN) 10 MG tablet     order for DME        Allergies   Allergen Reactions     Zolpidem Other (See Comments)     Alcoholic.  Had reaction 3/17/13 while intoxicated which included black out, loss of awareness, paranoia.  Do not prescribe.  Dr. Celeste     Cats Other (See Comments)     rhinitis     Dogs Other (See Comments)     rhinitis     Pollen Extract Other (See Comments)     rhinits.     I have reviewed the Medications, Allergies, Past Medical and Surgical History, and Social History in the Epic system.    Review of Systems   Musculoskeletal:        Positive for right knee pain   Skin: Positive for wound (incision wound on right knee).   All other systems reviewed and are negative.      Physical Exam   BP: 142/89  Pulse: 92  Heart Rate: 99  Temp: 98.3  F (36.8  C)  Weight: (unable to assess, pt can not stand)  SpO2: 100 %      Physical Exam   Constitutional: He is oriented to person, place, and time.   Non-toxic appearance. He does not have a sickly appearance. He does not appear ill. No distress.   HENT:   Head: Normocephalic and atraumatic.   Eyes: Conjunctivae and EOM are normal. Pupils are equal, round, and reactive to light.   Neck: Normal range of motion. Neck supple.   Cardiovascular: Normal rate and regular rhythm. Exam reveals no gallop and no friction rub.   No murmur heard.  Pulmonary/Chest: Effort normal and breath sounds normal. No respiratory distress.   Abdominal: Soft. There is no tenderness.   Musculoskeletal: He exhibits no edema.        Right knee: He exhibits erythema (mild, no evidence of erythema). Tenderness found.   6 cm, linear cut along the dorsal aspect of his right knee; clean, dry, intact   Neurological: He is alert and oriented to person, place, and time. No cranial nerve deficit.   Skin: Skin is warm.   Psychiatric: He has a normal mood and affect. His behavior is normal. Judgment and thought content normal.   Nursing note and vitals reviewed.      ED Course   Based on hx and Px I plan to eval for acute on chronic pathology. Pt will be screened and tx PRN     Procedures  None       Critical Care time:  none             Labs Ordered and Resulted from Time of ED Arrival Up to the Time of Departure from the ED   CBC WITH PLATELETS DIFFERENTIAL - Abnormal; Notable for the following components:       Result Value    WBC 13.2 (*)     RBC Count 4.01 (*)     Hematocrit 39.6 (*)     MCH 34.2 (*)     Platelet Count 98 (*)     Absolute Neutrophil 8.5 (*)     Absolute Monocytes 2.3 (*)     All other components within normal limits   BASIC METABOLIC PANEL   ERYTHROCYTE SEDIMENTATION RATE AUTO   CRP INFLAMMATION   PERIPHERAL IV CATHETER   BLOOD CULTURE            Assessments & Plan (with Medical Decision Making)   59 y/o male with hep C and TAVR who underwent a total right knee on 02-07-19 comes in for evaluation of knee pain. Pt fell 5 days ago and been having pain and had mild amounth of  bleeding from surgical wound. On px there is no significant pathology and he is neurovascular intact. I plan to eval and to get imaging to rule out any other acute pathology.    Pt has a reassuring examination., Based on work up and on results I plan to d.c with follow up.    I consulted ortho (Dr. Paz Serra ) on call and they said there that there was no need for IV antibiotics, and that since this patient had follow-up with orthopedist tomorrow morning and since he carried his workup seem to be reassuring that he was okay to discharge him to with home with follow-up.  Furthermore she did not want antibiotics to be given and she wanted this patient to get a CRP and a C-reactive protein but did not need to hold the patient for this he just wanted for a follow-up in for a baseline.  Based on the exam and based on how well the patient looks I agree with her and I will discharge this patient to home.  Will be discharged and will follow up with his clinic tomorrow morning as already planned    I have reviewed the nursing notes.    I have reviewed the findings, diagnosis, plan and need for follow up with the patient.       Medication List      There are no discharge medications for this visit.         Final diagnoses:   Acute pain of right knee   Contusion of right knee, initial encounter   I, Caleb Sharma, am serving as a trained medical scribe to document services personally performed by David Kilpatrick MD, based on the provider's statements to me.   David BROWN MD, was physically present and have reviewed and verified the accuracy of this note documented by Caleb Sharma.    2/27/2019   [facility], [place], EMERGENCY DEPARTMENT     David Kilpatrick MD  02/28/19 0044

## 2019-02-28 ENCOUNTER — OFFICE VISIT (OUTPATIENT)
Dept: ORTHOPEDICS | Facility: CLINIC | Age: 59
End: 2019-02-28
Payer: COMMERCIAL

## 2019-02-28 VITALS
TEMPERATURE: 98.8 F | DIASTOLIC BLOOD PRESSURE: 94 MMHG | HEART RATE: 76 BPM | RESPIRATION RATE: 16 BRPM | SYSTOLIC BLOOD PRESSURE: 158 MMHG | OXYGEN SATURATION: 100 %

## 2019-02-28 DIAGNOSIS — Z96.651 STATUS POST TOTAL RIGHT KNEE REPLACEMENT: ICD-10-CM

## 2019-02-28 DIAGNOSIS — T81.31XA POSTOPERATIVE WOUND DEHISCENCE, INITIAL ENCOUNTER: Primary | ICD-10-CM

## 2019-02-28 LAB
CRP SERPL-MCNC: 10.6 MG/L (ref 0–8)
ERYTHROCYTE [SEDIMENTATION RATE] IN BLOOD BY WESTERGREN METHOD: 9 MM/H (ref 0–20)

## 2019-02-28 PROCEDURE — 96376 TX/PRO/DX INJ SAME DRUG ADON: CPT | Performed by: EMERGENCY MEDICINE

## 2019-02-28 PROCEDURE — 85652 RBC SED RATE AUTOMATED: CPT | Performed by: EMERGENCY MEDICINE

## 2019-02-28 PROCEDURE — 25000128 H RX IP 250 OP 636: Performed by: EMERGENCY MEDICINE

## 2019-02-28 PROCEDURE — 25000131 ZZH RX MED GY IP 250 OP 636 PS 637: Performed by: EMERGENCY MEDICINE

## 2019-02-28 PROCEDURE — 86140 C-REACTIVE PROTEIN: CPT | Performed by: EMERGENCY MEDICINE

## 2019-02-28 PROCEDURE — 25000132 ZZH RX MED GY IP 250 OP 250 PS 637: Performed by: EMERGENCY MEDICINE

## 2019-02-28 RX ORDER — CEFAZOLIN SODIUM 1 G/3ML
1 INJECTION, POWDER, FOR SOLUTION INTRAMUSCULAR; INTRAVENOUS ONCE
Status: DISCONTINUED | OUTPATIENT
Start: 2019-02-28 | End: 2019-02-28 | Stop reason: HOSPADM

## 2019-02-28 RX ORDER — CEFAZOLIN SODIUM 1 G/50ML
1250 SOLUTION INTRAVENOUS ONCE
Status: DISCONTINUED | OUTPATIENT
Start: 2019-02-28 | End: 2019-02-28 | Stop reason: HOSPADM

## 2019-02-28 RX ORDER — ONDANSETRON 4 MG/1
4 TABLET, ORALLY DISINTEGRATING ORAL ONCE
Status: COMPLETED | OUTPATIENT
Start: 2019-02-28 | End: 2019-02-28

## 2019-02-28 RX ORDER — OXYCODONE HYDROCHLORIDE 5 MG/1
5 TABLET ORAL ONCE
Status: COMPLETED | OUTPATIENT
Start: 2019-02-28 | End: 2019-02-28

## 2019-02-28 RX ORDER — ONDANSETRON 2 MG/ML
4 INJECTION INTRAMUSCULAR; INTRAVENOUS ONCE
Status: COMPLETED | OUTPATIENT
Start: 2019-02-28 | End: 2019-02-28

## 2019-02-28 RX ORDER — VANCOMYCIN HYDROCHLORIDE 1 G/200ML
1000 INJECTION, SOLUTION INTRAVENOUS ONCE
Status: DISCONTINUED | OUTPATIENT
Start: 2019-02-28 | End: 2019-02-28

## 2019-02-28 RX ORDER — OXYCODONE HYDROCHLORIDE 5 MG/1
5 TABLET ORAL EVERY 6 HOURS PRN
Qty: 40 TABLET | Refills: 0 | Status: ON HOLD | OUTPATIENT
Start: 2019-02-28 | End: 2019-03-07

## 2019-02-28 RX ADMIN — ONDANSETRON 4 MG: 4 TABLET, ORALLY DISINTEGRATING ORAL at 06:55

## 2019-02-28 RX ADMIN — HYDROMORPHONE HYDROCHLORIDE 1 MG: 1 INJECTION, SOLUTION INTRAMUSCULAR; INTRAVENOUS; SUBCUTANEOUS at 00:32

## 2019-02-28 RX ADMIN — ONDANSETRON 4 MG: 2 INJECTION INTRAMUSCULAR; INTRAVENOUS at 00:32

## 2019-02-28 RX ADMIN — OXYCODONE HYDROCHLORIDE 5 MG: 5 TABLET ORAL at 06:54

## 2019-02-28 NOTE — DISCHARGE INSTRUCTIONS
Please keep the knee clean and dry. Make sure to follow up with your tomorrow morning. Please make sure to also keep the leg elevated as much as possible. If fevers, nausea, vomit, or if ANY other concerns develop, please return to ER ASAP

## 2019-02-28 NOTE — PROGRESS NOTES
"Navi is seen as an add-on to my clinic today for \"follow up after a fall\". I saw Navi on 2/21/2019 for his 2 week follow up of his R TKA done on 2/7/2019. and he was doing fantastic and progressing well. Unfortunately, he states he \"took a bad fall after he left the office and landed directly on his left knee\". He states he was unable to stand up on his own and \"it bled everywhere\".  He continued to manage the bleeding and was helped \"by a stranger get back to his home\". For the next 3-4 days \"he used old tshirts and napkins to stop the bleeding\". He communicated with us through [employer] with our office 2 days ago with continued \"wound problems\" where he was told to go to the ER right away. He states he \"did not have a ride until yesterday\" where he was able to come for an evaluation. Was given ancef and vancomycin. Labs and xrays were obtained and he is here today for \"a follow up\".  He states he has \"not had any oxycodone since Tuesday\". He is not taking any antibiotics or \"anythin for the pain\". The last 2 nights \"he has had intense chills and sweating and really nauseated\". He also states \"he has been working hard on his motion so that he doesn't lose what he has gained\". He is mildly disoriented to time and appears to struggle when trying to obtain a history and timeline of the last weeks events.    Examination today shows a saturated compression dressing of serous fluid. No purulence or blood. The distal and proximal end of the incision are healed with little inflammation. There is a small area mid-incision measuring 8 mm with well approximated edges that appears to be superficial. No focal areas of tenderness without purulence. Effusion noted to the joint itself but again not excessively tender. Patella is palpated and appears anatomic. Is able to hold a SLR against resistance. No calf pain. No distal swelling noted to foot/ankle. + CMS. Cannot extract drainage today with palpating to the knee.     Labs " "reviewed from ER visit  CRP: 10.6  ESR: 9  WBC: 13.2    Blood cultures obtained: pending without growth after 4 hours.    Xrays reviewed from ER visit and agree with rad read as below:                                                                     IMPRESSION: Possible air in the joint or subcutaneous tissues above  the patella best seen on lateral view. This is nonspecific. No  definite fracture or dislocation.    Dx:  1. Fall with superficial wound dehisence s/p R TKA     Plan:  1. Multiple issues were discussed today with Ten. I believe he was having some withdrawal from the opioids and has also been using alcohol. He states \"he hasn't done either one for the last 4 days\".  At this point, his knee does not appear infected and there is no active drainage that I can see after palpating the surrounding tissue. I cleaned the incision multiple times with a Betadine swab x4. Applied stere strips to the middle of the incision where again no active fluid was seen. Sterile ABD pads were placed along with an ace wrap x2 for compression. He was given an immobilizer and stressed the important of NO motion and to keep the immobilizer on at all times. He will ice and elevated. He was also instructed that is drainage is noted through the dressing today he needs to go to ER NPO for surgical washout and repeat wound closure. This does not appear to be infected. He was given 40 oxycodone and instructed he can take 6 daily as needed. We will see him Monday for a wound check.  "

## 2019-02-28 NOTE — ED NOTES
Late entry;  Dr Kilpatrick discussed the plan of care with the pt. MD informed pt that he will be sent home, that antibiotics are not needed and that pt needs to follow up with his Ortho appointment as sched. Pt agreed to the plan and had no questions afterwards.  When writer brought pt's discharge instruction, pt was upset stating that he wants to be admitted, that he can't go home bec he can't move around well in his place bec of the pain on his knee, that no one has checked on his surgical wound, surgical dressing was not changed since he was discharged from the hospital. Per pt there was suppose to be home RN checking on him but it never materialized.   MD was updated on pt's request to be admitted. MD went in and talked with the pt.   Per MD he has been in pt's room. Pt's PIV should be removed, no antibiotics should be given, pt will be staying overnight in the ER and be discharged in AM.  New dressing applied on pt's L knee.

## 2019-02-28 NOTE — LETTER
"  RE: Ten Johnson  [address] [facility] [address]     Dear Colleague,    Thank you for referring your patient, Ten Johnson, to [facility]. Please see a copy of my visit note below.    Navi is seen as an add-on to my clinic today for \"follow up after a fall\". I saw Navi on 2/21/2019 for his 2 week follow up of his R TKA done on 2/7/2019. and he was doing fantastic and progressing well. Unfortunately, he states he \"took a bad fall after he left the office and landed directly on his left knee\". He states he was unable to stand up on his own and \"it bled everywhere\".  He continued to manage the bleeding and was helped \"by a stranger get back to his home\". For the next 3-4 days \"he used old tshirts and napkins to stop the bleeding\". He communicated with us through [employer] with our office 2 days ago with continued \"wound problems\" where he was told to go to the ER right away. He states he \"did not have a ride until yesterday\" where he was able to come for an evaluation. Was given ancef and vancomycin. Labs and xrays were obtained and he is here today for \"a follow up\".  He states he has \"not had any oxycodone since Tuesday\". He is not taking any antibiotics or \"anythin for the pain\". The last 2 nights \"he has had intense chills and sweating and really nauseated\". He also states \"he has been working hard on his motion so that he doesn't lose what he has gained\". He is mildly disoriented to time and appears to struggle when trying to obtain a history and timeline of the last weeks events.    Examination today shows a saturated compression dressing of serous fluid. No purulence or blood. The distal and proximal end of the incision are healed with little inflammation. There is a small area mid-incision measuring 8 mm with well approximated edges that appears to be superficial. No focal areas of tenderness without purulence. Effusion noted to " "the joint itself but again not excessively tender. Patella is palpated and appears anatomic. Is able to hold a SLR against resistance. No calf pain. No distal swelling noted to foot/ankle. + CMS. Cannot extract drainage today with palpating to the knee.     Labs reviewed from ER visit  CRP: 10.6  ESR: 9  WBC: 13.2    Blood cultures obtained: pending without growth after 4 hours.    Xrays reviewed from ER visit and agree with rad read as below:                                                                     IMPRESSION: Possible air in the joint or subcutaneous tissues above  the patella best seen on lateral view. This is nonspecific. No  definite fracture or dislocation.    Dx:  1. Fall with superficial wound dehisence s/p R TKA     Plan:  1. Multiple issues were discussed today with Ten. I believe he was having some withdrawal from the opioids and has also been using alcohol. He states \"he hasn't done either one for the last 4 days\".  At this point, his knee does not appear infected and there is no active drainage that I can see after palpating the surrounding tissue. I cleaned the incision multiple times with a Betadine swab x4. Applied stere strips to the middle of the incision where again no active fluid was seen. Sterile ABD pads were placed along with an ace wrap x2 for compression. He was given an immobilizer and stressed the important of NO motion and to keep the immobilizer on at all times. He will ice and elevated. He was also instructed that is drainage is noted through the dressing today he needs to go to ER NPO for surgical washout and repeat wound closure. This does not appear to be infected. He was given 40 oxycodone and instructed he can take 6 daily as needed. We will see him Monday for a wound check.    Again, thank you for allowing me to participate in the care of your patient.      Sincerely,    Vidya Hastings, SYDNIE CNP      "

## 2019-03-04 ENCOUNTER — HOSPITAL ENCOUNTER (INPATIENT)
Facility: CLINIC | Age: 59
LOS: 5 days | Discharge: NURSING, REHAB OR LONG-TERM CARE | End: 2019-03-09
Attending: ORTHOPAEDIC SURGERY | Admitting: ORTHOPAEDIC SURGERY
Payer: COMMERCIAL

## 2019-03-04 ENCOUNTER — TRANSFERRED RECORDS (OUTPATIENT)
Dept: HEALTH INFORMATION MANAGEMENT | Facility: CLINIC | Age: 59
End: 2019-03-04

## 2019-03-04 ENCOUNTER — ANESTHESIA (OUTPATIENT)
Dept: SURGERY | Facility: CLINIC | Age: 59
End: 2019-03-04
Payer: COMMERCIAL

## 2019-03-04 ENCOUNTER — ANESTHESIA EVENT (OUTPATIENT)
Dept: SURGERY | Facility: CLINIC | Age: 59
End: 2019-03-04
Payer: COMMERCIAL

## 2019-03-04 ENCOUNTER — OFFICE VISIT (OUTPATIENT)
Dept: ORTHOPEDICS | Facility: CLINIC | Age: 59
End: 2019-03-04
Payer: COMMERCIAL

## 2019-03-04 ENCOUNTER — DOCUMENTATION ONLY (OUTPATIENT)
Dept: ORTHOPEDICS | Facility: CLINIC | Age: 59
End: 2019-03-04

## 2019-03-04 DIAGNOSIS — Z96.651 STATUS POST TOTAL RIGHT KNEE REPLACEMENT: ICD-10-CM

## 2019-03-04 DIAGNOSIS — Z96.659 INFECTION OF PROSTHETIC KNEE JOINT, INITIAL ENCOUNTER (H): Primary | ICD-10-CM

## 2019-03-04 DIAGNOSIS — T14.8XXA DRAINAGE FROM WOUND: Primary | ICD-10-CM

## 2019-03-04 DIAGNOSIS — T84.59XA INFECTION OF PROSTHETIC KNEE JOINT, INITIAL ENCOUNTER (H): Primary | ICD-10-CM

## 2019-03-04 LAB
ERYTHROCYTE [DISTWIDTH] IN BLOOD BY AUTOMATED COUNT: 12.7 % (ref 10–15)
GRAM STN SPEC: NORMAL
HCT VFR BLD AUTO: 35 % (ref 40–53)
HGB BLD-MCNC: 12.3 G/DL (ref 13.3–17.7)
MCH RBC QN AUTO: 33.7 PG (ref 26.5–33)
MCHC RBC AUTO-ENTMCNC: 35.1 G/DL (ref 31.5–36.5)
MCV RBC AUTO: 96 FL (ref 78–100)
PLATELET # BLD AUTO: 72 10E9/L (ref 150–450)
RBC # BLD AUTO: 3.65 10E12/L (ref 4.4–5.9)
SPECIMEN SOURCE: NORMAL
WBC # BLD AUTO: 8.7 10E9/L (ref 4–11)

## 2019-03-04 PROCEDURE — 25000132 ZZH RX MED GY IP 250 OP 250 PS 637: Performed by: STUDENT IN AN ORGANIZED HEALTH CARE EDUCATION/TRAINING PROGRAM

## 2019-03-04 PROCEDURE — 85027 COMPLETE CBC AUTOMATED: CPT | Performed by: ORTHOPAEDIC SURGERY

## 2019-03-04 PROCEDURE — 87075 CULTR BACTERIA EXCEPT BLOOD: CPT | Performed by: ORTHOPAEDIC SURGERY

## 2019-03-04 PROCEDURE — 87070 CULTURE OTHR SPECIMN AEROBIC: CPT | Performed by: ORTHOPAEDIC SURGERY

## 2019-03-04 PROCEDURE — 71000014 ZZH RECOVERY PHASE 1 LEVEL 2 FIRST HR: Performed by: ORTHOPAEDIC SURGERY

## 2019-03-04 PROCEDURE — 87077 CULTURE AEROBIC IDENTIFY: CPT | Performed by: ORTHOPAEDIC SURGERY

## 2019-03-04 PROCEDURE — 36000059 ZZH SURGERY LEVEL 3 EA 15 ADDTL MIN UMMC: Performed by: ORTHOPAEDIC SURGERY

## 2019-03-04 PROCEDURE — 25000125 ZZHC RX 250: Performed by: STUDENT IN AN ORGANIZED HEALTH CARE EDUCATION/TRAINING PROGRAM

## 2019-03-04 PROCEDURE — 87186 SC STD MICRODIL/AGAR DIL: CPT | Performed by: ORTHOPAEDIC SURGERY

## 2019-03-04 PROCEDURE — 0SPC09Z REMOVAL OF LINER FROM RIGHT KNEE JOINT, OPEN APPROACH: ICD-10-PCS | Performed by: ORTHOPAEDIC SURGERY

## 2019-03-04 PROCEDURE — 25800030 ZZH RX IP 258 OP 636: Performed by: NURSE ANESTHETIST, CERTIFIED REGISTERED

## 2019-03-04 PROCEDURE — 25000128 H RX IP 250 OP 636: Performed by: STUDENT IN AN ORGANIZED HEALTH CARE EDUCATION/TRAINING PROGRAM

## 2019-03-04 PROCEDURE — 87205 SMEAR GRAM STAIN: CPT | Performed by: ORTHOPAEDIC SURGERY

## 2019-03-04 PROCEDURE — 25000128 H RX IP 250 OP 636: Performed by: NURSE ANESTHETIST, CERTIFIED REGISTERED

## 2019-03-04 PROCEDURE — 25800030 ZZH RX IP 258 OP 636: Performed by: STUDENT IN AN ORGANIZED HEALTH CARE EDUCATION/TRAINING PROGRAM

## 2019-03-04 PROCEDURE — 25000128 H RX IP 250 OP 636: Performed by: ANESTHESIOLOGY

## 2019-03-04 PROCEDURE — C9290 INJ, BUPIVACAINE LIPOSOME: HCPCS | Performed by: STUDENT IN AN ORGANIZED HEALTH CARE EDUCATION/TRAINING PROGRAM

## 2019-03-04 PROCEDURE — 0SUV09Z SUPPLEMENT RIGHT KNEE JOINT, TIBIAL SURFACE WITH LINER, OPEN APPROACH: ICD-10-PCS | Performed by: ORTHOPAEDIC SURGERY

## 2019-03-04 PROCEDURE — 25000125 ZZHC RX 250: Performed by: NURSE ANESTHETIST, CERTIFIED REGISTERED

## 2019-03-04 PROCEDURE — 25000125 ZZHC RX 250: Performed by: ORTHOPAEDIC SURGERY

## 2019-03-04 PROCEDURE — 71000015 ZZH RECOVERY PHASE 1 LEVEL 2 EA ADDTL HR: Performed by: ORTHOPAEDIC SURGERY

## 2019-03-04 PROCEDURE — 37000009 ZZH ANESTHESIA TECHNICAL FEE, EACH ADDTL 15 MIN: Performed by: ORTHOPAEDIC SURGERY

## 2019-03-04 PROCEDURE — 27210794 ZZH OR GENERAL SUPPLY STERILE: Performed by: ORTHOPAEDIC SURGERY

## 2019-03-04 PROCEDURE — 25000566 ZZH SEVOFLURANE, EA 15 MIN: Performed by: ORTHOPAEDIC SURGERY

## 2019-03-04 PROCEDURE — 36000057 ZZH SURGERY LEVEL 3 1ST 30 MIN - UMMC: Performed by: ORTHOPAEDIC SURGERY

## 2019-03-04 PROCEDURE — 87176 TISSUE HOMOGENIZATION CULTR: CPT | Performed by: ORTHOPAEDIC SURGERY

## 2019-03-04 PROCEDURE — 0MBN0ZZ EXCISION OF RIGHT KNEE BURSA AND LIGAMENT, OPEN APPROACH: ICD-10-PCS | Performed by: ORTHOPAEDIC SURGERY

## 2019-03-04 PROCEDURE — 27110028 ZZH OR GENERAL SUPPLY NON-STERILE: Performed by: ORTHOPAEDIC SURGERY

## 2019-03-04 PROCEDURE — 37000008 ZZH ANESTHESIA TECHNICAL FEE, 1ST 30 MIN: Performed by: ORTHOPAEDIC SURGERY

## 2019-03-04 PROCEDURE — C1776 JOINT DEVICE (IMPLANTABLE): HCPCS | Performed by: ORTHOPAEDIC SURGERY

## 2019-03-04 PROCEDURE — 25800025 ZZH RX 258: Performed by: ORTHOPAEDIC SURGERY

## 2019-03-04 PROCEDURE — 25000128 H RX IP 250 OP 636: Performed by: ORTHOPAEDIC SURGERY

## 2019-03-04 PROCEDURE — 12000001 ZZH R&B MED SURG/OB UMMC

## 2019-03-04 PROCEDURE — 40000171 ZZH STATISTIC PRE-PROCEDURE ASSESSMENT III: Performed by: ORTHOPAEDIC SURGERY

## 2019-03-04 DEVICE — IMP INSERT TIBIAL HOWM TRI SIZE 6 09MM 5532-G-609: Type: IMPLANTABLE DEVICE | Site: KNEE | Status: FUNCTIONAL

## 2019-03-04 RX ORDER — HYDROMORPHONE HYDROCHLORIDE 1 MG/ML
.3-.5 INJECTION, SOLUTION INTRAMUSCULAR; INTRAVENOUS; SUBCUTANEOUS EVERY 10 MIN PRN
Status: DISCONTINUED | OUTPATIENT
Start: 2019-03-04 | End: 2019-03-04 | Stop reason: HOSPADM

## 2019-03-04 RX ORDER — NALOXONE HYDROCHLORIDE 0.4 MG/ML
.1-.4 INJECTION, SOLUTION INTRAMUSCULAR; INTRAVENOUS; SUBCUTANEOUS
Status: DISCONTINUED | OUTPATIENT
Start: 2019-03-04 | End: 2019-03-09 | Stop reason: HOSPADM

## 2019-03-04 RX ORDER — SODIUM CHLORIDE, SODIUM LACTATE, POTASSIUM CHLORIDE, CALCIUM CHLORIDE 600; 310; 30; 20 MG/100ML; MG/100ML; MG/100ML; MG/100ML
INJECTION, SOLUTION INTRAVENOUS CONTINUOUS
Status: DISCONTINUED | OUTPATIENT
Start: 2019-03-04 | End: 2019-03-04 | Stop reason: HOSPADM

## 2019-03-04 RX ORDER — SODIUM CHLORIDE, SODIUM LACTATE, POTASSIUM CHLORIDE, CALCIUM CHLORIDE 600; 310; 30; 20 MG/100ML; MG/100ML; MG/100ML; MG/100ML
INJECTION, SOLUTION INTRAVENOUS CONTINUOUS
Status: DISCONTINUED | OUTPATIENT
Start: 2019-03-04 | End: 2019-03-05

## 2019-03-04 RX ORDER — BUPIVACAINE HYDROCHLORIDE 2.5 MG/ML
INJECTION, SOLUTION EPIDURAL; INFILTRATION; INTRACAUDAL PRN
Status: DISCONTINUED | OUTPATIENT
Start: 2019-03-04 | End: 2019-03-04

## 2019-03-04 RX ORDER — ACETAMINOPHEN 325 MG/1
975 TABLET ORAL ONCE
Status: COMPLETED | OUTPATIENT
Start: 2019-03-04 | End: 2019-03-04

## 2019-03-04 RX ORDER — PROPOFOL 10 MG/ML
INJECTION, EMULSION INTRAVENOUS PRN
Status: DISCONTINUED | OUTPATIENT
Start: 2019-03-04 | End: 2019-03-04

## 2019-03-04 RX ORDER — FENTANYL CITRATE 50 UG/ML
INJECTION, SOLUTION INTRAMUSCULAR; INTRAVENOUS PRN
Status: DISCONTINUED | OUTPATIENT
Start: 2019-03-04 | End: 2019-03-04

## 2019-03-04 RX ORDER — NALOXONE HYDROCHLORIDE 0.4 MG/ML
.1-.4 INJECTION, SOLUTION INTRAMUSCULAR; INTRAVENOUS; SUBCUTANEOUS
Status: CANCELLED | OUTPATIENT
Start: 2019-03-04

## 2019-03-04 RX ORDER — ONDANSETRON 4 MG/1
4 TABLET, ORALLY DISINTEGRATING ORAL EVERY 30 MIN PRN
Status: DISCONTINUED | OUTPATIENT
Start: 2019-03-04 | End: 2019-03-04 | Stop reason: HOSPADM

## 2019-03-04 RX ORDER — FLUMAZENIL 0.1 MG/ML
0.2 INJECTION, SOLUTION INTRAVENOUS
Status: CANCELLED | OUTPATIENT
Start: 2019-03-04

## 2019-03-04 RX ORDER — LABETALOL 20 MG/4 ML (5 MG/ML) INTRAVENOUS SYRINGE
10 ONCE
Status: COMPLETED | OUTPATIENT
Start: 2019-03-04 | End: 2019-03-04

## 2019-03-04 RX ORDER — NALOXONE HYDROCHLORIDE 0.4 MG/ML
.1-.4 INJECTION, SOLUTION INTRAMUSCULAR; INTRAVENOUS; SUBCUTANEOUS
Status: DISCONTINUED | OUTPATIENT
Start: 2019-03-04 | End: 2019-03-04 | Stop reason: HOSPADM

## 2019-03-04 RX ORDER — ACETAMINOPHEN 325 MG/1
975 TABLET ORAL ONCE
Status: DISCONTINUED | OUTPATIENT
Start: 2019-03-04 | End: 2019-03-04 | Stop reason: HOSPADM

## 2019-03-04 RX ORDER — HYDROXYZINE HYDROCHLORIDE 25 MG/1
25 TABLET, FILM COATED ORAL 3 TIMES DAILY PRN
Status: DISCONTINUED | OUTPATIENT
Start: 2019-03-04 | End: 2019-03-09 | Stop reason: HOSPADM

## 2019-03-04 RX ORDER — CELECOXIB 200 MG/1
200 CAPSULE ORAL ONCE
Status: DISCONTINUED | OUTPATIENT
Start: 2019-03-04 | End: 2019-03-04 | Stop reason: HOSPADM

## 2019-03-04 RX ORDER — DEXAMETHASONE SODIUM PHOSPHATE 4 MG/ML
INJECTION, SOLUTION INTRA-ARTICULAR; INTRALESIONAL; INTRAMUSCULAR; INTRAVENOUS; SOFT TISSUE PRN
Status: DISCONTINUED | OUTPATIENT
Start: 2019-03-04 | End: 2019-03-04

## 2019-03-04 RX ORDER — OXYCODONE HYDROCHLORIDE 5 MG/1
5 TABLET ORAL EVERY 4 HOURS PRN
Status: DISCONTINUED | OUTPATIENT
Start: 2019-03-04 | End: 2019-03-05

## 2019-03-04 RX ORDER — FLUTICASONE PROPIONATE 50 MCG
2 SPRAY, SUSPENSION (ML) NASAL DAILY
Status: DISCONTINUED | OUTPATIENT
Start: 2019-03-04 | End: 2019-03-09 | Stop reason: HOSPADM

## 2019-03-04 RX ORDER — LISINOPRIL 20 MG/1
20 TABLET ORAL AT BEDTIME
Status: DISCONTINUED | OUTPATIENT
Start: 2019-03-04 | End: 2019-03-09 | Stop reason: HOSPADM

## 2019-03-04 RX ORDER — FENTANYL CITRATE 50 UG/ML
25-50 INJECTION, SOLUTION INTRAMUSCULAR; INTRAVENOUS
Status: DISCONTINUED | OUTPATIENT
Start: 2019-03-04 | End: 2019-03-04 | Stop reason: HOSPADM

## 2019-03-04 RX ORDER — OXYCODONE HYDROCHLORIDE 5 MG/1
5-10 TABLET ORAL
Status: DISCONTINUED | OUTPATIENT
Start: 2019-03-04 | End: 2019-03-05

## 2019-03-04 RX ORDER — FERROUS SULFATE 325(65) MG
325 TABLET ORAL AT BEDTIME
Status: DISCONTINUED | OUTPATIENT
Start: 2019-03-04 | End: 2019-03-09 | Stop reason: HOSPADM

## 2019-03-04 RX ORDER — KETOROLAC TROMETHAMINE 30 MG/ML
INJECTION, SOLUTION INTRAMUSCULAR; INTRAVENOUS PRN
Status: DISCONTINUED | OUTPATIENT
Start: 2019-03-04 | End: 2019-03-04

## 2019-03-04 RX ORDER — LIDOCAINE 40 MG/G
CREAM TOPICAL
Status: DISCONTINUED | OUTPATIENT
Start: 2019-03-04 | End: 2019-03-09 | Stop reason: HOSPADM

## 2019-03-04 RX ORDER — KETAMINE HYDROCHLORIDE 10 MG/ML
INJECTION, SOLUTION INTRAMUSCULAR; INTRAVENOUS PRN
Status: DISCONTINUED | OUTPATIENT
Start: 2019-03-04 | End: 2019-03-04

## 2019-03-04 RX ORDER — LIDOCAINE HYDROCHLORIDE 20 MG/ML
INJECTION, SOLUTION INFILTRATION; PERINEURAL PRN
Status: DISCONTINUED | OUTPATIENT
Start: 2019-03-04 | End: 2019-03-04

## 2019-03-04 RX ORDER — DIPHENHYDRAMINE HYDROCHLORIDE 50 MG/ML
25 INJECTION INTRAMUSCULAR; INTRAVENOUS EVERY 6 HOURS PRN
Status: DISCONTINUED | OUTPATIENT
Start: 2019-03-04 | End: 2019-03-05

## 2019-03-04 RX ORDER — ACETAMINOPHEN 325 MG/1
650 TABLET ORAL EVERY 4 HOURS PRN
Status: DISCONTINUED | OUTPATIENT
Start: 2019-03-07 | End: 2019-03-09 | Stop reason: HOSPADM

## 2019-03-04 RX ORDER — PROCHLORPERAZINE MALEATE 10 MG
10 TABLET ORAL EVERY 6 HOURS PRN
Status: DISCONTINUED | OUTPATIENT
Start: 2019-03-04 | End: 2019-03-09 | Stop reason: HOSPADM

## 2019-03-04 RX ORDER — LORATADINE 10 MG/1
10 TABLET ORAL DAILY
Status: DISCONTINUED | OUTPATIENT
Start: 2019-03-04 | End: 2019-03-09 | Stop reason: HOSPADM

## 2019-03-04 RX ORDER — GABAPENTIN 100 MG/1
300 CAPSULE ORAL
Status: DISCONTINUED | OUTPATIENT
Start: 2019-03-04 | End: 2019-03-04 | Stop reason: HOSPADM

## 2019-03-04 RX ORDER — ONDANSETRON 4 MG/1
4 TABLET, ORALLY DISINTEGRATING ORAL EVERY 6 HOURS PRN
Status: DISCONTINUED | OUTPATIENT
Start: 2019-03-04 | End: 2019-03-09 | Stop reason: HOSPADM

## 2019-03-04 RX ORDER — ONDANSETRON 2 MG/ML
INJECTION INTRAMUSCULAR; INTRAVENOUS PRN
Status: DISCONTINUED | OUTPATIENT
Start: 2019-03-04 | End: 2019-03-04

## 2019-03-04 RX ORDER — THIAMINE HCL 50 MG
50 TABLET ORAL DAILY
Status: DISCONTINUED | OUTPATIENT
Start: 2019-03-05 | End: 2019-03-09 | Stop reason: HOSPADM

## 2019-03-04 RX ORDER — DOCUSATE SODIUM 100 MG/1
100 CAPSULE, LIQUID FILLED ORAL 2 TIMES DAILY
Status: DISCONTINUED | OUTPATIENT
Start: 2019-03-04 | End: 2019-03-09 | Stop reason: HOSPADM

## 2019-03-04 RX ORDER — DIPHENHYDRAMINE HCL 25 MG
25 CAPSULE ORAL EVERY 6 HOURS PRN
Status: DISCONTINUED | OUTPATIENT
Start: 2019-03-04 | End: 2019-03-05

## 2019-03-04 RX ORDER — ONDANSETRON 2 MG/ML
4 INJECTION INTRAMUSCULAR; INTRAVENOUS EVERY 6 HOURS PRN
Status: DISCONTINUED | OUTPATIENT
Start: 2019-03-04 | End: 2019-03-09 | Stop reason: HOSPADM

## 2019-03-04 RX ORDER — FENTANYL CITRATE 50 UG/ML
25-50 INJECTION, SOLUTION INTRAMUSCULAR; INTRAVENOUS
Status: CANCELLED | OUTPATIENT
Start: 2019-03-04

## 2019-03-04 RX ORDER — GABAPENTIN 300 MG/1
300 CAPSULE ORAL ONCE
Status: COMPLETED | OUTPATIENT
Start: 2019-03-04 | End: 2019-03-04

## 2019-03-04 RX ORDER — SODIUM CHLORIDE, SODIUM LACTATE, POTASSIUM CHLORIDE, CALCIUM CHLORIDE 600; 310; 30; 20 MG/100ML; MG/100ML; MG/100ML; MG/100ML
INJECTION, SOLUTION INTRAVENOUS CONTINUOUS PRN
Status: DISCONTINUED | OUTPATIENT
Start: 2019-03-04 | End: 2019-03-04

## 2019-03-04 RX ORDER — ONDANSETRON 2 MG/ML
4 INJECTION INTRAMUSCULAR; INTRAVENOUS EVERY 30 MIN PRN
Status: DISCONTINUED | OUTPATIENT
Start: 2019-03-04 | End: 2019-03-04 | Stop reason: HOSPADM

## 2019-03-04 RX ORDER — MEPERIDINE HYDROCHLORIDE 25 MG/ML
12.5 INJECTION INTRAMUSCULAR; INTRAVENOUS; SUBCUTANEOUS
Status: DISCONTINUED | OUTPATIENT
Start: 2019-03-04 | End: 2019-03-04 | Stop reason: HOSPADM

## 2019-03-04 RX ORDER — MULTIVITAMIN,THERAPEUTIC
1 TABLET ORAL AT BEDTIME
Status: DISCONTINUED | OUTPATIENT
Start: 2019-03-04 | End: 2019-03-09 | Stop reason: HOSPADM

## 2019-03-04 RX ORDER — ACETAMINOPHEN 325 MG/1
975 TABLET ORAL EVERY 8 HOURS
Status: COMPLETED | OUTPATIENT
Start: 2019-03-04 | End: 2019-03-07

## 2019-03-04 RX ORDER — CELECOXIB 200 MG/1
200 CAPSULE ORAL 2 TIMES DAILY
Status: DISCONTINUED | OUTPATIENT
Start: 2019-03-04 | End: 2019-03-04 | Stop reason: HOSPADM

## 2019-03-04 RX ORDER — DIAZEPAM 5 MG
5 TABLET ORAL EVERY 6 HOURS PRN
Status: DISCONTINUED | OUTPATIENT
Start: 2019-03-04 | End: 2019-03-09 | Stop reason: HOSPADM

## 2019-03-04 RX ADMIN — KETOROLAC TROMETHAMINE 15 MG: 30 INJECTION, SOLUTION INTRAMUSCULAR at 19:00

## 2019-03-04 RX ADMIN — GABAPENTIN 300 MG: 300 CAPSULE ORAL at 16:41

## 2019-03-04 RX ADMIN — SODIUM CHLORIDE, POTASSIUM CHLORIDE, SODIUM LACTATE AND CALCIUM CHLORIDE: 600; 310; 30; 20 INJECTION, SOLUTION INTRAVENOUS at 17:16

## 2019-03-04 RX ADMIN — ONDANSETRON 4 MG: 2 INJECTION INTRAMUSCULAR; INTRAVENOUS at 17:25

## 2019-03-04 RX ADMIN — ASPIRIN 325 MG: 325 TABLET, DELAYED RELEASE ORAL at 22:38

## 2019-03-04 RX ADMIN — BUPIVACAINE 10 ML: 13.3 INJECTION, SUSPENSION, LIPOSOMAL INFILTRATION at 20:40

## 2019-03-04 RX ADMIN — FENTANYL CITRATE 50 MCG: 50 INJECTION, SOLUTION INTRAMUSCULAR; INTRAVENOUS at 18:55

## 2019-03-04 RX ADMIN — FERROUS SULFATE TAB 325 MG (65 MG ELEMENTAL FE) 325 MG: 325 (65 FE) TAB at 22:38

## 2019-03-04 RX ADMIN — OXYCODONE HYDROCHLORIDE 10 MG: 5 TABLET ORAL at 21:05

## 2019-03-04 RX ADMIN — FENTANYL CITRATE 100 MCG: 50 INJECTION, SOLUTION INTRAMUSCULAR; INTRAVENOUS at 17:25

## 2019-03-04 RX ADMIN — HYDROMORPHONE HYDROCHLORIDE 0.5 MG: 1 INJECTION, SOLUTION INTRAMUSCULAR; INTRAVENOUS; SUBCUTANEOUS at 19:53

## 2019-03-04 RX ADMIN — Medication 30 MG: at 17:59

## 2019-03-04 RX ADMIN — MIDAZOLAM HYDROCHLORIDE 1 MG: 1 INJECTION, SOLUTION INTRAMUSCULAR; INTRAVENOUS at 19:59

## 2019-03-04 RX ADMIN — VANCOMYCIN HYDROCHLORIDE 1500 MG: 10 INJECTION, POWDER, LYOPHILIZED, FOR SOLUTION INTRAVENOUS at 18:05

## 2019-03-04 RX ADMIN — Medication 20 MG: at 17:54

## 2019-03-04 RX ADMIN — FENTANYL CITRATE 50 MCG: 50 INJECTION, SOLUTION INTRAMUSCULAR; INTRAVENOUS at 18:59

## 2019-03-04 RX ADMIN — LORATADINE 10 MG: 10 TABLET ORAL at 22:39

## 2019-03-04 RX ADMIN — MIDAZOLAM 2 MG: 1 INJECTION INTRAMUSCULAR; INTRAVENOUS at 17:16

## 2019-03-04 RX ADMIN — ACETAMINOPHEN 975 MG: 325 TABLET, FILM COATED ORAL at 22:38

## 2019-03-04 RX ADMIN — BUPIVACAINE HYDROCHLORIDE 10 ML: 2.5 INJECTION, SOLUTION EPIDURAL; INFILTRATION; INTRACAUDAL at 20:40

## 2019-03-04 RX ADMIN — HYDROMORPHONE HYDROCHLORIDE 0.25 MG: 1 INJECTION, SOLUTION INTRAMUSCULAR; INTRAVENOUS; SUBCUTANEOUS at 18:06

## 2019-03-04 RX ADMIN — HYDROMORPHONE HYDROCHLORIDE 0.25 MG: 1 INJECTION, SOLUTION INTRAMUSCULAR; INTRAVENOUS; SUBCUTANEOUS at 18:00

## 2019-03-04 RX ADMIN — LABETALOL 20 MG/4 ML (5 MG/ML) INTRAVENOUS SYRINGE 10 MG: at 19:50

## 2019-03-04 RX ADMIN — LISINOPRIL 20 MG: 20 TABLET ORAL at 22:37

## 2019-03-04 RX ADMIN — FENTANYL CITRATE 50 MCG: 50 INJECTION, SOLUTION INTRAMUSCULAR; INTRAVENOUS at 17:53

## 2019-03-04 RX ADMIN — ACETAMINOPHEN 975 MG: 325 TABLET, FILM COATED ORAL at 16:41

## 2019-03-04 RX ADMIN — THERA TABS 1 TABLET: TAB at 22:38

## 2019-03-04 RX ADMIN — FENTANYL CITRATE 50 MCG: 50 INJECTION, SOLUTION INTRAMUSCULAR; INTRAVENOUS at 19:34

## 2019-03-04 RX ADMIN — FENTANYL CITRATE 50 MCG: 50 INJECTION, SOLUTION INTRAMUSCULAR; INTRAVENOUS at 19:13

## 2019-03-04 RX ADMIN — Medication 5 MG: at 19:34

## 2019-03-04 RX ADMIN — FENTANYL CITRATE 50 MCG: 50 INJECTION, SOLUTION INTRAMUSCULAR; INTRAVENOUS at 19:50

## 2019-03-04 RX ADMIN — LIDOCAINE HYDROCHLORIDE 80 MG: 20 INJECTION, SOLUTION INFILTRATION; PERINEURAL at 17:25

## 2019-03-04 RX ADMIN — SODIUM CHLORIDE 1 G: 9 INJECTION, SOLUTION INTRAVENOUS at 17:40

## 2019-03-04 RX ADMIN — DEXAMETHASONE SODIUM PHOSPHATE 4 MG: 4 INJECTION, SOLUTION INTRAMUSCULAR; INTRAVENOUS at 17:25

## 2019-03-04 RX ADMIN — CELECOXIB 200 MG: 200 CAPSULE ORAL at 16:44

## 2019-03-04 RX ADMIN — HYDROXYZINE HYDROCHLORIDE 25 MG: 25 TABLET ORAL at 21:05

## 2019-03-04 RX ADMIN — FENTANYL CITRATE 50 MCG: 50 INJECTION, SOLUTION INTRAMUSCULAR; INTRAVENOUS at 19:16

## 2019-03-04 RX ADMIN — FENTANYL CITRATE 50 MCG: 50 INJECTION, SOLUTION INTRAMUSCULAR; INTRAVENOUS at 17:59

## 2019-03-04 RX ADMIN — HYDROMORPHONE HYDROCHLORIDE 0.5 MG: 1 INJECTION, SOLUTION INTRAMUSCULAR; INTRAVENOUS; SUBCUTANEOUS at 20:15

## 2019-03-04 RX ADMIN — PROPOFOL 200 MG: 10 INJECTION, EMULSION INTRAVENOUS at 17:25

## 2019-03-04 ASSESSMENT — ACTIVITIES OF DAILY LIVING (ADL): ADLS_ACUITY_SCORE: 12

## 2019-03-04 NOTE — NURSING NOTE
S-(situation): infected right total knee    B-(background): Right total knee replacement 2/7/19, pt fell on his right knee on 2/22/19    A-(assessment): profuse klaus colored drainage from patella area of pt's right knee incision, area red and swollen, painful.  Temp 98.6, /90.  Pt last ate at 7PM last night, only clear liquids since.    R-(recommendations): Admit to hospital with plan for irrigation and debridement, poly exchange today after clinic. Pt is NPO, will transport once there is an open room at hospital.    Ksenia Pugh RN

## 2019-03-04 NOTE — PROGRESS NOTES
Patient is scheduled for surgery with Dr. Joe    Spoke or left message with: Ksenia Pugh RNCC     Date of Surgery: 3/4/19    Location: [place]    Pre-op with surgeon (if applicable): Completed today    H&P: Patient will be admitted from the clinic    Additional imaging/appointments: N/A    Surgery packet: Received in clinic     Additional comments: NPO status: no solid food since 7pm on 3/3/19, has only had water in the AM on 3/4/19.

## 2019-03-04 NOTE — ANESTHESIA PREPROCEDURE EVALUATION
Anesthesia Pre-Procedure Evaluation    Patient: Ten Johnson   MRN:     [id number] Gender:   male   Age:    58 year old :      1960        Preoperative Diagnosis: wound breakdown right knee   Procedure(s):  EXCHANGE POLY COMPONENT ARTHROPLASTY KNEE Right     Past Medical History:   Diagnosis Date     Alcohol use disorder      Alcoholic cirrhosis (H)      Anticoagulant long-term use     plavix     Ascites      Chronic allergic rhinitis      Chronic anemia      Chronic hepatitis C without hepatic coma (H) 05/10/2016    Untreated as of 2018     Diastolic dysfunction      Erosive gastropathy      Esophageal varices in alcoholic cirrhosis (H)      H/O upper gastrointestinal hemorrhage 2017     History of blood transfusion      History of drug abuse     intranasal     Hypertension     essential     JANELLE (iron deficiency anemia)      Sarahi-Hoffman tear     History     Marijuana abuse      MRSA (methicillin resistant Staphylococcus aureus)      Olecranon bursitis      Portal hypertension (H)      Right shoulder pain     history of rotator cuff repair     S/p TAVR (transcatheter aortic valve replacement), bioprosthetic      Severe aortic stenosis      Thrombocytopenia (H)       Past Surgical History:   Procedure Laterality Date     ARTHROSCOPY SHOULDER ROTATOR CUFF REPAIR  2012    Procedure: ARTHROSCOPY SHOULDER ROTATOR CUFF REPAIR;  Right Shoulder Arthroscopic Rotator Cuff Repair, BicepsTenodesis,  Subacromial Decompression ;  Surgeon: Joi Castillo MD;  [facility]     ESOPHAGOSCOPY, GASTROSCOPY, DUODENOSCOPY (EGD), COMBINED N/A 10/23/2017    Procedure: COMBINED ESOPHAGOSCOPY, GASTROSCOPY, DUODENOSCOPY (EGD);;  Surgeon: Gentry Salas MD;  Location:  GI     FACIAL RECONSTRUCTION SURGERY       HEART CATH FEMORAL CANNULIZATION WITH OPEN STANDBY REPAIR AORTIC VALVE N/A 2018    Procedure: HEART CATH FEMORAL CANNULIZATION WITH OPEN STANDBY REPAIR AORTIC VALVE;;  Surgeon:  Luis Baird MD;  [facility]     IRRIGATION AND DEBRIDEMENT UPPER EXTREMITY, COMBINED  1/3/2012    Procedure:COMBINED IRRIGATION AND DEBRIDEMENT UPPER EXTREMITY; Irrigation & Debridement Left Elbow; Surgeon:CRISTHIAN ZHOU; [facility]     OPTICAL TRACKING SYSTEM ARTHROPLASTY KNEE Right 2/7/2019    Procedure: ARTHROPLASTY KNEE RIGHT;  Surgeon: Olvin Joe MD;  [facility]     REPAIR TENDON TRICEPS UPPER EXTREMITY  11/8/2011    Procedure:REPAIR TENDON TRICEPS UPPER EXTREMITY; Surgeon:CRISTHIAN ZHOU; [facility]     SHOULDER SURGERY  2003    left, injury, torn tendons, hematoma     TRANSCATHETER AORTIC VALVE IMPLANT ANESTHESIA N/A 2/21/2018    Procedure: TRANSCATHETER AORTIC VALVE IMPLANT ANESTHESIA;  Transfemoral (Quiroz) Aortic Valve Implant 26mm MARTHA 3, with Cardiopulmonary Bypass Standby, transthoracic echocardiogram;  Surgeon: GENERIC ANESTHESIA PROVIDER;  [facility]     TRANSPOSITION ULNAR NERVE (ELBOW)  11/8/2011    Procedure:TRANSPOSITION ULNAR NERVE (ELBOW); Final Procedure Done: Left Elbow Lateral Ulnar Collateral Repair And  Left Elbow Triceps Repair                     PHYSICAL EXAM:   Mental Status/Neuro: A/A/O   Airway: Facies: Feasible  Mallampati: I  Mouth/Opening: Full  TM distance: > 6 cm  Neck ROM: Full   Respiratory: Auscultation: CTAB     Resp. Rate: Normal     Resp. Effort: Normal      CV: Rhythm: Regular  Rate: Age appropriate  Heart: Normal Sounds   Comments:      Dental: Normal                  Lab Results   Component Value Date    WBC 13.2 (H) 02/27/2019    HGB 13.7 02/27/2019    HCT 39.6 (L) 02/27/2019    PLT 98 (L) 02/27/2019    CRP 10.6 (H) 02/28/2019    SED 9 02/28/2019     02/27/2019    POTASSIUM 3.6 02/27/2019    CHLORIDE 101 02/27/2019    CO2 25 02/27/2019    BUN 14 02/27/2019    CR 0.88 02/27/2019    GLC 82 02/27/2019    JOSEPHINE 9.0 02/27/2019    PHOS 3.5 02/22/2018    MAG 1.7 02/22/2018    ALBUMIN 3.0 (L) 03/25/2018     "PROTTOTAL 7.0 03/25/2018    ALT 97 (H) 03/25/2018    AST 99 (H) 03/25/2018    ALKPHOS 108 03/25/2018    BILITOTAL 0.9 03/25/2018    MORENITA 29 10/22/2017    PTT 24 03/25/2018    INR 1.09 02/09/2019       Preop Vitals  BP Readings from Last 3 Encounters:   03/04/19 160/89   02/28/19 (!) 158/94   02/09/19 143/77    Pulse Readings from Last 3 Encounters:   03/04/19 78   02/28/19 76   02/08/19 79      Resp Readings from Last 3 Encounters:   03/04/19 16   02/28/19 16   02/09/19 16    SpO2 Readings from Last 3 Encounters:   03/04/19 98%   02/28/19 100%   02/09/19 98%      Temp Readings from Last 1 Encounters:   03/04/19 35.7  C (96.3  F) (Oral)    Ht Readings from Last 1 Encounters:   02/07/19 1.753 m (5' 9.02\")      Wt Readings from Last 1 Encounters:   02/07/19 82 kg (180 lb 12.4 oz)    Estimated body mass index is 26.68 kg/m  as calculated from the following:    Height as of 2/7/19: 1.753 m (5' 9.02\").    Weight as of 2/7/19: 82 kg (180 lb 12.4 oz).     LDA:  Peripheral IV 02/07/19 Right;Dorsal Hand (Active)   Number of days: 25       Left Groin Interventional Procedure Access (Active)   Number of days: 376       Right Groin Interventional Procedure Access (Active)   Number of days: 376       Right Radial Interventional Procedure Access (Active)   Number of days: 395       Right Jugular Interventional Procedure Access (Active)   Number of days: 395       Closed/Suction Drain Right Knee Accordion 10 Yi (Active)   Number of days: 25            Assessment:   ASA SCORE: 3    NPO Status: > 2 hours since completed Clear Liquids; > 6 hours since completed Solid Foods   Documentation: H&P complete; Preop Testing complete; Consents complete   Proceeding: Proceed without further delay  Tobacco Use:  NO Active use of Tobacco/UNKNOWN Tobacco use status     Plan:   Anes. Type:  General   Pre-Induction: Midazolam IV; Acetaminophen PO   Induction:  IV (Standard)   Airway: LMA   Access/Monitoring: PIV   Maintenance: Balanced "   Emergence: Procedure Site   Logistics: Same Day Surgery     Postop Pain/Sedation Strategy:  Standard-Options: Opioids PRN; LA by Surgeon; IV Ketorolac     PONV Management:  Adult Risk Factors:, Non-Smoker, Postop Opioids  Prevention: Dexamethasone; Ondansetron     CONSENT: Direct conversation   Plan and risks discussed with: Patient                        ANESTHESIA PREOP EVALUATION    PROCEDURE: Procedure(s):  EXCHANGE POLY COMPONENT ARTHROPLASTY KNEE Right    HPI: Ten Johnson is a 58 year old male who presents for above procedure 2/2 infection.    PAST MEDICAL HISTORY:    Past Medical History:   Diagnosis Date     Alcohol use disorder      Alcoholic cirrhosis (H)      Anticoagulant long-term use     plavix     Ascites      Chronic allergic rhinitis      Chronic anemia      Chronic hepatitis C without hepatic coma (H) 05/10/2016    Untreated as of 2/2018     Diastolic dysfunction      Erosive gastropathy      Esophageal varices in alcoholic cirrhosis (H)      H/O upper gastrointestinal hemorrhage 09/2017     History of blood transfusion      History of drug abuse     intranasal     Hypertension     essential     JANELLE (iron deficiency anemia)      Sarahi-Hoffman tear     History     Marijuana abuse      MRSA (methicillin resistant Staphylococcus aureus)      Olecranon bursitis      Portal hypertension (H)      Right shoulder pain     history of rotator cuff repair     S/p TAVR (transcatheter aortic valve replacement), bioprosthetic      Severe aortic stenosis      Thrombocytopenia (H)        PAST SURGICAL HISTORY:    Past Surgical History:   Procedure Laterality Date     ARTHROSCOPY SHOULDER ROTATOR CUFF REPAIR  7/31/2012    Procedure: ARTHROSCOPY SHOULDER ROTATOR CUFF REPAIR;  Right Shoulder Arthroscopic Rotator Cuff Repair, BicepsTenodesis,  Subacromial Decompression ;  Surgeon: Joi Castillo MD;  [facility]     ESOPHAGOSCOPY, GASTROSCOPY, DUODENOSCOPY (EGD), COMBINED N/A 10/23/2017    Procedure:  COMBINED ESOPHAGOSCOPY, GASTROSCOPY, DUODENOSCOPY (EGD);;  Surgeon: Gentry Salas MD;  [facility]     FACIAL RECONSTRUCTION SURGERY  1971     HEART CATH FEMORAL CANNULIZATION WITH OPEN STANDBY REPAIR AORTIC VALVE N/A 2/21/2018    Procedure: HEART CATH FEMORAL CANNULIZATION WITH OPEN STANDBY REPAIR AORTIC VALVE;;  Surgeon: Luis Baird MD;  [facility]     IRRIGATION AND DEBRIDEMENT UPPER EXTREMITY, COMBINED  1/3/2012    Procedure:COMBINED IRRIGATION AND DEBRIDEMENT UPPER EXTREMITY; Irrigation & Debridement Left Elbow; Surgeon:CRISTHIAN ZHOU; [facility]     OPTICAL TRACKING SYSTEM ARTHROPLASTY KNEE Right 2/7/2019    Procedure: ARTHROPLASTY KNEE RIGHT;  Surgeon: Olvin Joe MD;  [facility]     REPAIR TENDON TRICEPS UPPER EXTREMITY  11/8/2011    Procedure:REPAIR TENDON TRICEPS UPPER EXTREMITY; Surgeon:CRISTHIAN ZHOU; [facility]     SHOULDER SURGERY  2003    left, injury, torn tendons, hematoma     TRANSCATHETER AORTIC VALVE IMPLANT ANESTHESIA N/A 2/21/2018    Procedure: TRANSCATHETER AORTIC VALVE IMPLANT ANESTHESIA;  Transfemoral (Quiroz) Aortic Valve Implant 26mm MARTHA 3, with Cardiopulmonary Bypass Standby, transthoracic echocardiogram;  Surgeon: GENERIC ANESTHESIA PROVIDER;  [facility]     TRANSPOSITION ULNAR NERVE (ELBOW)  11/8/2011    Procedure:TRANSPOSITION ULNAR NERVE (ELBOW); Final Procedure Done: Left Elbow Lateral Ulnar Collateral Repair And  Left Elbow Triceps Repair         PAST ANESTHESIA HISTORY:     No personal or family h/o anesthesia problems    SOCIAL HISTORY:       Social History     Tobacco Use     Smoking status: Never Smoker     Smokeless tobacco: Never Used   Substance Use Topics     Alcohol use: Yes     Comment: Alcohol use disorder, still actively drinking       ALLERGIES:     Allergies   Allergen Reactions     Zolpidem Other (See Comments)     Alcoholic.  Had reaction 3/17/13 while intoxicated which included black out,  loss of awareness, paranoia.  Do not prescribe.  Dr. Celeste     Cats Other (See Comments)     rhinitis     Dogs Other (See Comments)     rhinitis     Pollen Extract Other (See Comments)     rhinits.       MEDICATIONS:     Medications Prior to Admission   Medication Sig Dispense Refill Last Dose     aspirin (ASA) 325 MG EC tablet Take 1 tablet (325 mg) by mouth daily 30 tablet 0 Past Week at Unknown time     celecoxib (CELEBREX) 100 MG capsule Take 1 capsule (100 mg) by mouth 2 times daily 28 capsule 0 Past Week at Unknown time     diclofenac (VOLTAREN) 1 % topical gel Apply 4 grams to knees four times daily using enclosed dosing card. 100 g 1 More than a month at Unknown time     ferrous sulfate (FEROSUL) 325 (65 Fe) MG tablet Take 1 tablet (325 mg) by mouth At Bedtime 90 tablet 2 2/26/2019 at Unknown time     fluticasone (FLONASE) 50 MCG/ACT nasal spray Spray 2 sprays into both nostrils daily 3 Bottle 3 Past Month at Unknown time     hydrOXYzine (VISTARIL) 25 MG capsule Take 1 capsule (25 mg) by mouth 3 times daily as needed (muscle relaxant) 60 capsule 0      lisinopril (PRINIVIL/ZESTRIL) 20 MG tablet Take 1 tablet (20 mg) by mouth At Bedtime 90 tablet 3 2/27/2019 at Unknown time     loratadine (CLARITIN) 10 MG tablet Take 1 tablet (10 mg) by mouth daily 90 tablet 2 Unknown at Unknown time     Multiple Vitamin (MULTIVITAMINS PO) Take 1 tablet by mouth At Bedtime    2/26/2019 at Unknown time     order for DME Equipment being ordered: Cane ()  Treatment Diagnosis: Gait Instability 1 each 0      oxyCODONE (ROXICODONE) 5 MG tablet Take 1 tablet (5 mg) by mouth every 6 hours as needed for severe pain 40 tablet 0      oxyCODONE (ROXICODONE) 5 MG tablet 1-2 tabs every 6 hours as needed for pain. 60 tablet 0 2/27/2019 at Unknown time     pantoprazole (PROTONIX) 40 MG EC tablet Take 1 tablet (40 mg) by mouth 2 times daily (before meals) 180 tablet 2 2/26/2019 at Unknown time       No current outpatient medications  on file.       No current [facility]-ordered facility-administered medications on file.      No current [facility]-ordered outpatient medications on file.       PHYSICAL EXAM:    Vitals: T 96.3, P 78, /89, R 16, SpO2 98%, Weight   Wt Readings from Last 2 Encounters:   02/07/19 82 kg (180 lb 12.4 oz)   01/30/19 81.6 kg (180 lb)       See doc flowsheet    NPO STATUS: see doc flowsheet    LABS:    BMP:  Recent Labs   Lab Test 02/27/19 2010      POTASSIUM 3.6   CHLORIDE 101   CO2 25   BUN 14   CR 0.88   GLC 82   JOSEPHINE 9.0       LFTs:   Recent Labs   Lab Test 03/25/18  1108   PROTTOTAL 7.0   ALBUMIN 3.0*   BILITOTAL 0.9   ALKPHOS 108   AST 99*   ALT 97*       CBC:   Recent Labs   Lab Test 02/27/19 2010   WBC 13.2*   RBC 4.01*   HGB 13.7   HCT 39.6*   MCV 99   MCH 34.2*   MCHC 34.6   RDW 13.2   PLT 98*       Coags:  Recent Labs   Lab Test 02/09/19  0613  03/25/18  1108   INR 1.09   < > 1.12   PTT  --   --  24    < > = values in this interval not displayed.       Imaging:  No orders to display       Bradly Back MD  Anesthesiology Staff  Pager [phone]    3/4/2019  4:02 PM        Bradly Back MD

## 2019-03-04 NOTE — PROGRESS NOTES
"Navi is seen in follow up after our consult on 2/28/2019 for wound dehiscence s/p a fall after his TKA.  He states he \"has been wearing his immobilizer all the time except for showers and gentle bending\". He is afebrile today. There is continued copious amounts of serous fluid on his bandages today. This requires a direct admission to the hospital for an urgent I&D with polyethelene exchange. No preop antibiotics please. He was told to be NPO.     I, Dr. Olvin Joe, agree with above plan of care and examination.  "

## 2019-03-04 NOTE — LETTER
"3/4/2019       RE: Ten Johnson  [address]  [facility] [address]     Dear Colleague,    Thank you for referring your patient, Ten Johnson, to the [facility] at [facility]. Please see a copy of my visit note below.    Navi is seen in follow up after our consult on 2/28/2019 for wound dehiscence s/p a fall after his TKA.  He states he \"has been wearing his immobilizer all the time except for showers and gentle bending\". He is afebrile today. There is continued copious amounts of serous fluid on his bandages today. This requires a direct admission to the hospital for an urgent I&D with polyethelene exchange. No preop antibiotics please. He was told to be NPO.     I, Dr. Olvin Joe, agree with above plan of care and examination.    Again, thank you for allowing me to participate in the care of your patient.      Sincerely,    Olvin Joe MD      "

## 2019-03-05 ENCOUNTER — APPOINTMENT (OUTPATIENT)
Dept: OCCUPATIONAL THERAPY | Facility: CLINIC | Age: 59
End: 2019-03-05
Attending: STUDENT IN AN ORGANIZED HEALTH CARE EDUCATION/TRAINING PROGRAM
Payer: COMMERCIAL

## 2019-03-05 ENCOUNTER — APPOINTMENT (OUTPATIENT)
Dept: PHYSICAL THERAPY | Facility: CLINIC | Age: 59
End: 2019-03-05
Attending: STUDENT IN AN ORGANIZED HEALTH CARE EDUCATION/TRAINING PROGRAM
Payer: COMMERCIAL

## 2019-03-05 LAB
ALBUMIN SERPL-MCNC: 2.7 G/DL (ref 3.4–5)
ALP SERPL-CCNC: 91 U/L (ref 40–150)
ALT SERPL W P-5'-P-CCNC: 84 U/L (ref 0–70)
ANION GAP SERPL CALCULATED.3IONS-SCNC: 13 MMOL/L (ref 3–14)
AST SERPL W P-5'-P-CCNC: 65 U/L (ref 0–45)
BACTERIA SPEC CULT: NO GROWTH
BILIRUB SERPL-MCNC: 0.9 MG/DL (ref 0.2–1.3)
BUN SERPL-MCNC: 17 MG/DL (ref 7–30)
CALCIUM SERPL-MCNC: 8.6 MG/DL (ref 8.5–10.1)
CHLORIDE SERPL-SCNC: 107 MMOL/L (ref 94–109)
CO2 SERPL-SCNC: 20 MMOL/L (ref 20–32)
CREAT SERPL-MCNC: 0.77 MG/DL (ref 0.66–1.25)
ERYTHROCYTE [DISTWIDTH] IN BLOOD BY AUTOMATED COUNT: 12.8 % (ref 10–15)
GFR SERPL CREATININE-BSD FRML MDRD: >90 ML/MIN/{1.73_M2}
GLUCOSE SERPL-MCNC: 121 MG/DL (ref 70–99)
GLUCOSE SERPL-MCNC: 124 MG/DL (ref 70–99)
HCT VFR BLD AUTO: 33.6 % (ref 40–53)
HGB BLD-MCNC: 11.7 G/DL (ref 13.3–17.7)
HGB BLD-MCNC: 11.8 G/DL (ref 13.3–17.7)
HGB BLD-MCNC: NORMAL G/DL (ref 13.3–17.7)
Lab: NORMAL
MAGNESIUM SERPL-MCNC: 2.1 MG/DL (ref 1.6–2.3)
MCH RBC QN AUTO: 33.8 PG (ref 26.5–33)
MCHC RBC AUTO-ENTMCNC: 35.1 G/DL (ref 31.5–36.5)
MCV RBC AUTO: 96 FL (ref 78–100)
PLATELET # BLD AUTO: 84 10E9/L (ref 150–450)
POTASSIUM SERPL-SCNC: 4.7 MMOL/L (ref 3.4–5.3)
PROT SERPL-MCNC: 6.7 G/DL (ref 6.8–8.8)
RBC # BLD AUTO: 3.49 10E12/L (ref 4.4–5.9)
SODIUM SERPL-SCNC: 140 MMOL/L (ref 133–144)
SPECIMEN SOURCE: NORMAL
WBC # BLD AUTO: 12.5 10E9/L (ref 4–11)

## 2019-03-05 PROCEDURE — 36573 INSJ PICC RS&I 5 YR+: CPT

## 2019-03-05 PROCEDURE — 82947 ASSAY GLUCOSE BLOOD QUANT: CPT | Performed by: STUDENT IN AN ORGANIZED HEALTH CARE EDUCATION/TRAINING PROGRAM

## 2019-03-05 PROCEDURE — 25000132 ZZH RX MED GY IP 250 OP 250 PS 637: Performed by: INTERNAL MEDICINE

## 2019-03-05 PROCEDURE — 97165 OT EVAL LOW COMPLEX 30 MIN: CPT | Mod: GO

## 2019-03-05 PROCEDURE — 25000128 H RX IP 250 OP 636: Performed by: ORTHOPAEDIC SURGERY

## 2019-03-05 PROCEDURE — 97535 SELF CARE MNGMENT TRAINING: CPT | Mod: GO

## 2019-03-05 PROCEDURE — 99207 ZZC CDG-MDM COMPONENT: MEETS LOW - DOWN CODED: CPT | Performed by: INTERNAL MEDICINE

## 2019-03-05 PROCEDURE — 85027 COMPLETE CBC AUTOMATED: CPT | Performed by: INTERNAL MEDICINE

## 2019-03-05 PROCEDURE — 25800030 ZZH RX IP 258 OP 636

## 2019-03-05 PROCEDURE — 12000001 ZZH R&B MED SURG/OB UMMC

## 2019-03-05 PROCEDURE — 80053 COMPREHEN METABOLIC PANEL: CPT | Performed by: INTERNAL MEDICINE

## 2019-03-05 PROCEDURE — 25000132 ZZH RX MED GY IP 250 OP 250 PS 637: Performed by: STUDENT IN AN ORGANIZED HEALTH CARE EDUCATION/TRAINING PROGRAM

## 2019-03-05 PROCEDURE — 97116 GAIT TRAINING THERAPY: CPT | Mod: GP

## 2019-03-05 PROCEDURE — 99232 SBSQ HOSP IP/OBS MODERATE 35: CPT | Performed by: INTERNAL MEDICINE

## 2019-03-05 PROCEDURE — 97530 THERAPEUTIC ACTIVITIES: CPT | Mod: GP

## 2019-03-05 PROCEDURE — 90714 TD VACC NO PRESV 7 YRS+ IM: CPT | Performed by: INTERNAL MEDICINE

## 2019-03-05 PROCEDURE — 25800030 ZZH RX IP 258 OP 636: Performed by: STUDENT IN AN ORGANIZED HEALTH CARE EDUCATION/TRAINING PROGRAM

## 2019-03-05 PROCEDURE — 83735 ASSAY OF MAGNESIUM: CPT | Performed by: INTERNAL MEDICINE

## 2019-03-05 PROCEDURE — 25000128 H RX IP 250 OP 636: Performed by: NURSE PRACTITIONER

## 2019-03-05 PROCEDURE — 25800030 ZZH RX IP 258 OP 636: Performed by: ORTHOPAEDIC SURGERY

## 2019-03-05 PROCEDURE — 25000128 H RX IP 250 OP 636: Performed by: STUDENT IN AN ORGANIZED HEALTH CARE EDUCATION/TRAINING PROGRAM

## 2019-03-05 PROCEDURE — 25000125 ZZHC RX 250: Performed by: NURSE PRACTITIONER

## 2019-03-05 PROCEDURE — 25000128 H RX IP 250 OP 636: Performed by: INTERNAL MEDICINE

## 2019-03-05 PROCEDURE — 97161 PT EVAL LOW COMPLEX 20 MIN: CPT | Mod: GP

## 2019-03-05 RX ORDER — HEPARIN SODIUM,PORCINE 10 UNIT/ML
2-5 VIAL (ML) INTRAVENOUS
Status: COMPLETED | OUTPATIENT
Start: 2019-03-05 | End: 2019-03-08

## 2019-03-05 RX ORDER — HYDROMORPHONE HYDROCHLORIDE 2 MG/1
2-4 TABLET ORAL
Status: DISCONTINUED | OUTPATIENT
Start: 2019-03-05 | End: 2019-03-09 | Stop reason: HOSPADM

## 2019-03-05 RX ORDER — PIPERACILLIN SODIUM, TAZOBACTAM SODIUM 3; .375 G/15ML; G/15ML
3.38 INJECTION, POWDER, LYOPHILIZED, FOR SOLUTION INTRAVENOUS EVERY 6 HOURS
Status: DISCONTINUED | OUTPATIENT
Start: 2019-03-05 | End: 2019-03-07

## 2019-03-05 RX ORDER — LIDOCAINE 40 MG/G
CREAM TOPICAL
Status: DISCONTINUED | OUTPATIENT
Start: 2019-03-05 | End: 2019-03-09 | Stop reason: HOSPADM

## 2019-03-05 RX ORDER — SODIUM CHLORIDE 9 MG/ML
INJECTION, SOLUTION INTRAVENOUS
Status: COMPLETED
Start: 2019-03-05 | End: 2019-03-05

## 2019-03-05 RX ADMIN — CLOSTRIDIUM TETANI TOXOID ANTIGEN (FORMALDEHYDE INACTIVATED) AND CORYNEBACTERIUM DIPHTHERIAE TOXOID ANTIGEN (FORMALDEHYDE INACTIVATED) 0.5 ML: 5; 2 INJECTION, SUSPENSION INTRAMUSCULAR at 15:57

## 2019-03-05 RX ADMIN — HYDROMORPHONE HYDROCHLORIDE 4 MG: 2 TABLET ORAL at 17:59

## 2019-03-05 RX ADMIN — ASPIRIN 325 MG: 325 TABLET, DELAYED RELEASE ORAL at 07:39

## 2019-03-05 RX ADMIN — OXYCODONE HYDROCHLORIDE 10 MG: 5 TABLET ORAL at 10:04

## 2019-03-05 RX ADMIN — THIAMINE HCL (VITAMIN B1) 50 MG TABLET 50 MG: at 07:39

## 2019-03-05 RX ADMIN — VANCOMYCIN HYDROCHLORIDE 1500 MG: 10 INJECTION, POWDER, LYOPHILIZED, FOR SOLUTION INTRAVENOUS at 06:22

## 2019-03-05 RX ADMIN — Medication 1 ML: at 14:15

## 2019-03-05 RX ADMIN — OXYCODONE HYDROCHLORIDE 10 MG: 5 TABLET ORAL at 03:39

## 2019-03-05 RX ADMIN — LORATADINE 10 MG: 10 TABLET ORAL at 07:39

## 2019-03-05 RX ADMIN — FERROUS SULFATE TAB 325 MG (65 MG ELEMENTAL FE) 325 MG: 325 (65 FE) TAB at 21:20

## 2019-03-05 RX ADMIN — VANCOMYCIN HYDROCHLORIDE 1500 MG: 10 INJECTION, POWDER, LYOPHILIZED, FOR SOLUTION INTRAVENOUS at 18:00

## 2019-03-05 RX ADMIN — OXYCODONE HYDROCHLORIDE 10 MG: 5 TABLET ORAL at 00:23

## 2019-03-05 RX ADMIN — HYDROMORPHONE HYDROCHLORIDE 4 MG: 2 TABLET ORAL at 21:19

## 2019-03-05 RX ADMIN — DOCUSATE SODIUM 100 MG: 100 CAPSULE, LIQUID FILLED ORAL at 21:19

## 2019-03-05 RX ADMIN — ACETAMINOPHEN 975 MG: 325 TABLET, FILM COATED ORAL at 06:22

## 2019-03-05 RX ADMIN — HYDROMORPHONE HYDROCHLORIDE 4 MG: 2 TABLET ORAL at 13:30

## 2019-03-05 RX ADMIN — LISINOPRIL 20 MG: 20 TABLET ORAL at 21:20

## 2019-03-05 RX ADMIN — PIPERACILLIN AND TAZOBACTAM 3.38 G: 3; .375 INJECTION, POWDER, FOR SOLUTION INTRAVENOUS at 15:54

## 2019-03-05 RX ADMIN — OXYCODONE HYDROCHLORIDE 10 MG: 5 TABLET ORAL at 06:57

## 2019-03-05 RX ADMIN — PIPERACILLIN AND TAZOBACTAM 3.38 G: 3; .375 INJECTION, POWDER, FOR SOLUTION INTRAVENOUS at 10:47

## 2019-03-05 RX ADMIN — SODIUM CHLORIDE, POTASSIUM CHLORIDE, SODIUM LACTATE AND CALCIUM CHLORIDE: 600; 310; 30; 20 INJECTION, SOLUTION INTRAVENOUS at 10:22

## 2019-03-05 RX ADMIN — ACETAMINOPHEN 975 MG: 325 TABLET, FILM COATED ORAL at 15:54

## 2019-03-05 RX ADMIN — FLUTICASONE PROPIONATE 2 SPRAY: 50 SPRAY, METERED NASAL at 07:40

## 2019-03-05 RX ADMIN — SODIUM CHLORIDE 500 ML: 9 INJECTION, SOLUTION INTRAVENOUS at 15:55

## 2019-03-05 RX ADMIN — PIPERACILLIN AND TAZOBACTAM 3.38 G: 3; .375 INJECTION, POWDER, FOR SOLUTION INTRAVENOUS at 21:19

## 2019-03-05 RX ADMIN — THERA TABS 1 TABLET: TAB at 21:20

## 2019-03-05 RX ADMIN — DOCUSATE SODIUM 100 MG: 100 CAPSULE, LIQUID FILLED ORAL at 07:39

## 2019-03-05 ASSESSMENT — ACTIVITIES OF DAILY LIVING (ADL)
ADLS_ACUITY_SCORE: 12
PREVIOUS_RESPONSIBILITIES: MEAL PREP;HOUSEKEEPING;LAUNDRY;SHOPPING;YARDWORK;MEDICATION MANAGEMENT;FINANCES;WORK
ADLS_ACUITY_SCORE: 12
ADLS_ACUITY_SCORE: 12
ADLS_ACUITY_SCORE: 14

## 2019-03-05 NOTE — OR NURSING
PACU to Inpatient Nursing Handoff    Patient Ten Johnson is a 58 year old male who speaks English.   Procedure Procedure(s):  REVISION RIGHT TOTAL KNEE POLY COMPONENT EXCHANGE   Surgeon(s) Primary: Olvin Joe MD  Fellow - Assisting: Prashanth Vang MD     Allergies   Allergen Reactions     Zolpidem Other (See Comments)     Alcoholic.  Had reaction 3/17/13 while intoxicated which included black out, loss of awareness, paranoia.  Do not prescribe.  Dr. Celeste     Cats Other (See Comments)     rhinitis     Dogs Other (See Comments)     rhinitis     Pollen Extract Other (See Comments)     rhinits.       Isolation  [unfilled]     Past Medical History   has a past medical history of Alcohol use disorder, Alcoholic cirrhosis (H), Anticoagulant long-term use, Ascites, Chronic allergic rhinitis, Chronic anemia, Chronic hepatitis C without hepatic coma (H) (05/10/2016), Diastolic dysfunction, Erosive gastropathy, Esophageal varices in alcoholic cirrhosis (H), H/O upper gastrointestinal hemorrhage (09/2017), History of blood transfusion, History of drug abuse, Hypertension, JANELLE (iron deficiency anemia), Sarahi-Hoffman tear, Marijuana abuse, MRSA (methicillin resistant Staphylococcus aureus), Olecranon bursitis, Portal hypertension (H), Right shoulder pain, S/p TAVR (transcatheter aortic valve replacement), bioprosthetic, Severe aortic stenosis, and Thrombocytopenia (H). He also has no past medical history of Malignant hyperthermia or PONV (postoperative nausea and vomiting).    Anesthesia General   Dermatome Level     Preop Meds acetaminophen (Tylenol) - time given: 1641  celecoxib (Celebrex) - time given: 1641  gabapentin (Neurontin) - time given: 1641   Nerve block Adductor canal.  Location:right. Med:bupivacaine and Exparel (liposomal bupivacaine). Time given: 2045   Intraop Meds dexamethasone (Decadron)  fentanyl (Sublimaze): 300 mcg total  hydromorphone (Dilaudid): .5 mg total  ketorolac (Toradol):  last given at 1900  ondansetron (Zofran): last given at 1725  Ketamine 50 mg   Local Meds No   Antibiotics vancomycin (Vancocin) - last given at 1805     Pain Patient Currently in Pain: yes  Comfort: intolerable  Pain Control: inadequate pain control   PACU meds  fentanyl (Sublimaze): 100 mcg (total dose) last given at 1950   hydromorphone (Dilaudid): 1 mg (total dose) last given at 2015   hydroxizine (Vistaril/Atarax): 2105 mg (total dose) last given at 25mg   oxycodone (Roxicodone): 10 mg (total dose) last given at 2105   Valium 5 mg po 1934   PCA / epidural No   Capnography     Telemetry ECG Rhythm: Normal sinus rhythm   Inpatient Telemetry Monitor Ordered? No        Labs Glucose Lab Results   Component Value Date    GLC 82 02/27/2019       Hgb Lab Results   Component Value Date    HGB 12.3 03/04/2019       INR Lab Results   Component Value Date    INR 1.09 02/09/2019      PACU Imaging Not applicable     Wound/Incision Incision/Surgical Site 02/21/18 Bilateral Groin (Active)   Number of days: 376       Incision/Surgical Site 02/21/18 Right Wrist (Active)   Number of days: 376       Incision/Surgical Site 02/07/19 Right Knee (Active)   Incision Assessment WDL 3/4/2019  7:17 PM   Hazel-Incision Assessment UTV 2/9/2019  8:30 AM   Closure ORION 3/4/2019  7:17 PM   Incision Drainage Amount None 3/4/2019  7:17 PM   Drainage Description Serosanguinous 2/28/2019  4:00 AM   Incision Care Ice applied 2/9/2019  8:30 AM   Dressing Intervention Clean, dry, intact 3/4/2019  7:17 PM   Number of days: 25      CMS        Equipment ice pack and knee immobilizer   Other LDA Right Groin Interventional Procedure Access (Active)   Number of days: 376        IV Access Peripheral IV 02/07/19 Right;Dorsal Hand (Active)   Site Assessment WDL 2/9/2019  1:00 AM   Line Status Infusing 2/9/2019  1:00 AM   Phlebitis Scale 0-->no symptoms 2/9/2019  1:00 AM   Infiltration Scale 0 2/9/2019  1:00 AM   Extravasation? No 2/8/2019  7:23 PM   Number of  days: 25       Peripheral IV 03/04/19 Left Upper forearm (Active)   Site Assessment [facility] 3/4/2019  7:15 PM   Line Status Infusing;Checked every 1 hour 3/4/2019  7:15 PM   Phlebitis Scale 0-->no symptoms 3/4/2019  7:15 PM   Infiltration Scale 0 3/4/2019  7:15 PM   Number of days: 0       Left Groin Interventional Procedure Access (Active)   Number of days: 376       Right Groin Interventional Procedure Access (Active)   Number of days: 376       Right Radial Interventional Procedure Access (Active)   Number of days: 395       Right Jugular Interventional Procedure Access (Active)   Number of days: 395      Blood Products Not applicable  mL   Intake/Output Date 03/04/19 0700 - 03/05/19 0659   Shift 2125-1981 4011-5516 3178-9741 24 Hour Total   INTAKE   I.V.  900  900   Shift Total  900  900   OUTPUT   Drains  10  10   Blood  200  200   Shift Total  210  210   Weight (kg)          Drains / Mosher Closed/Suction Drain Right Knee Accordion 10 Indonesian (Active)   Site Description UTV 2/9/2019  8:30 AM   Dressing Status Normal: Clean, Dry & Intact 2/9/2019  8:30 AM   Drainage Appearance Bloody/Bright Red 2/9/2019  8:30 AM   Status Other (Comment) 2/7/2019  9:30 PM   Output (ml) 5 ml 2/9/2019  7:00 AM   Number of days: 25       Closed/Suction Drain 1 Right;Lateral Knee Accordion 10 Indonesian (Active)   Site Description [facility] 3/4/2019  7:15 PM   Dressing Status Normal: Clean, Dry & Intact 3/4/2019  7:15 PM   Drainage Appearance Bloody/Bright Red 3/4/2019  7:15 PM   Output (ml) 10 ml 3/4/2019  7:15 PM   Number of days: 0       Right Groin Interventional Procedure Access (Active)   Number of days: 376      Time of void PreOp Void Prior to Procedure: 1500 (03/04/19 1605)    PostOp      Diapered? No   Bladder Scan     PO    tolerating sips and crackers     Vitals    B/P: (!) 159/121  T: 97.3  F (36.3  C)    Temp src: Axillary  P:  Pulse: 71 (03/04/19 2030)    Heart Rate: 73 (03/04/19 2030)     R: 20  O2:  SpO2: 100 %    O2 Device:  None (Room air) (03/04/19 2030)    Oxygen Delivery: 8 LPM (03/04/19 1930)         Family/support present None   Patient belongings     Patient transported on cart   DC meds/scripts (obs/outpt) Not applicable   Inpatient Pain Meds Released? Yes       Special needs/considerations MRSA Contact precautions   Tasks needing completion None       Maylin Alejandra RN  ASCOM [id number]

## 2019-03-05 NOTE — CONSULTS
[facility] Consult    Reason for consult:postoperative management         Assessment and Recommendation:   57 yo man POD 0 right knee revision.    Postoperative:   -pain control with oxycodone  - bowel regimen  -ADAT  - ASA for ppx per ortho  - LR at 75 mL/hr    HTN:  - continue home labetalol, lisinopril    Medicine will continue to follow  Billie Asif MD PhD           History of Present Illness:   This patient is a 57 yo man s/p right knee revision today. He had an infection and wound dehiscence previously. He has had several other ortho procedures as well. He was feeling great when I saw him and wanted to know when physical therapy would be coming to his home and what he could have for breakfast. Denies nausea. Pain well controlled.            Past Medical History:     Past Medical History:   Diagnosis Date     Alcohol use disorder      Alcoholic cirrhosis (H)      Anticoagulant long-term use     plavix     Ascites      Chronic allergic rhinitis      Chronic anemia      Chronic hepatitis C without hepatic coma (H) 05/10/2016    Untreated as of 2/2018     Diastolic dysfunction      Erosive gastropathy      Esophageal varices in alcoholic cirrhosis (H)      H/O upper gastrointestinal hemorrhage 09/2017     History of blood transfusion      History of drug abuse     intranasal     Hypertension     essential     JANELLE (iron deficiency anemia)      Sarahi-Hoffman tear     History     Marijuana abuse      MRSA (methicillin resistant Staphylococcus aureus)      Olecranon bursitis      Portal hypertension (H)      Right shoulder pain     history of rotator cuff repair     S/p TAVR (transcatheter aortic valve replacement), bioprosthetic      Severe aortic stenosis      Thrombocytopenia (H)              Past Surgical History:      Past Surgical History:   Procedure Laterality Date     ARTHROSCOPY SHOULDER ROTATOR CUFF REPAIR  7/31/2012    Procedure: ARTHROSCOPY SHOULDER ROTATOR CUFF REPAIR;  Right Shoulder  Arthroscopic Rotator Cuff Repair, BicepsTenodesis,  Subacromial Decompression ;  Surgeon: Joi Castillo MD;  [facility]     ESOPHAGOSCOPY, GASTROSCOPY, DUODENOSCOPY (EGD), COMBINED N/A 10/23/2017    Procedure: COMBINED ESOPHAGOSCOPY, GASTROSCOPY, DUODENOSCOPY (EGD);;  Surgeon: Gentry Salas MD;  Location: [facility]     FACIAL RECONSTRUCTION SURGERY  1971     HEART CATH FEMORAL CANNULIZATION WITH OPEN STANDBY REPAIR AORTIC VALVE N/A 2/21/2018    Procedure: HEART CATH FEMORAL CANNULIZATION WITH OPEN STANDBY REPAIR AORTIC VALVE;;  Surgeon: Luis Baird MD;  [facility]     IRRIGATION AND DEBRIDEMENT UPPER EXTREMITY, COMBINED  1/3/2012    Procedure:COMBINED IRRIGATION AND DEBRIDEMENT UPPER EXTREMITY; Irrigation & Debridement Left Elbow; Surgeon:CRISTHIAN ZHOU; Location:[facility]     OPTICAL TRACKING SYSTEM ARTHROPLASTY KNEE Right 2/7/2019    Procedure: ARTHROPLASTY KNEE RIGHT;  Surgeon: Olvin Joe MD;  [facility]: [facility]     REPAIR TENDON TRICEPS UPPER EXTREMITY  11/8/2011    Procedure:REPAIR TENDON TRICEPS UPPER EXTREMITY; Surgeon:CRISTHIAN ZHOU; Location:[facility] OR     SHOULDER SURGERY  2003    left, injury, torn tendons, hematoma     TRANSCATHETER AORTIC VALVE IMPLANT ANESTHESIA N/A 2/21/2018    Procedure: TRANSCATHETER AORTIC VALVE IMPLANT ANESTHESIA;  Transfemoral (Quiroz) Aortic Valve Implant 26mm MARTHA 3, with Cardiopulmonary Bypass Standby, transthoracic echocardiogram;  Surgeon: GENERIC ANESTHESIA PROVIDER;  Location: [facility]     TRANSPOSITION ULNAR NERVE (ELBOW)  11/8/2011    Procedure:TRANSPOSITION ULNAR NERVE (ELBOW); Final Procedure Done: Left Elbow Lateral Ulnar Collateral Repair And  Left Elbow Triceps Repair               Social History:     Social History     Tobacco Use     Smoking status: Never Smoker     Smokeless tobacco: Never Used   Substance Use Topics     Alcohol use: Yes     Comment: Alcohol use disorder, still actively drinking              Family History:     Family History   Problem Relation Age of Onset     Cancer Mother 62     Alcoholism Paternal Uncle      No Known Problems Father      No Known Problems Brother      Diabetes Maternal Grandmother      Myocardial Infarction Maternal Grandfather      No Known Problems Paternal Grandmother      Unknown/Adopted Paternal Grandfather      Cirrhosis No family hx of      Family history reviewed          Medications:     Medications Prior to Admission   Medication Sig Dispense Refill Last Dose     aspirin (ASA) 325 MG EC tablet Take 1 tablet (325 mg) by mouth daily 30 tablet 0 Past Week at Unknown time     celecoxib (CELEBREX) 100 MG capsule Take 1 capsule (100 mg) by mouth 2 times daily 28 capsule 0 Past Week at Unknown time     hydrOXYzine (VISTARIL) 25 MG capsule Take 1 capsule (25 mg) by mouth 3 times daily as needed (muscle relaxant) 60 capsule 0 Past Week at Unknown time     oxyCODONE (ROXICODONE) 5 MG tablet 1-2 tabs every 6 hours as needed for pain. 60 tablet 0 3/4/2019 at Unknown time     diclofenac (VOLTAREN) 1 % topical gel Apply 4 grams to knees four times daily using enclosed dosing card. 100 g 1 More than a month at Unknown time     ferrous sulfate (FEROSUL) 325 (65 Fe) MG tablet Take 1 tablet (325 mg) by mouth At Bedtime 90 tablet 2 2/26/2019 at Unknown time     fluticasone (FLONASE) 50 MCG/ACT nasal spray Spray 2 sprays into both nostrils daily 3 Bottle 3 Past Month at Unknown time     lisinopril (PRINIVIL/ZESTRIL) 20 MG tablet Take 1 tablet (20 mg) by mouth At Bedtime 90 tablet 3 2/27/2019 at Unknown time     loratadine (CLARITIN) 10 MG tablet Take 1 tablet (10 mg) by mouth daily 90 tablet 2 Unknown at Unknown time     Multiple Vitamin (MULTIVITAMINS PO) Take 1 tablet by mouth At Bedtime    2/26/2019 at Unknown time     order for DME Equipment being ordered: Cane ()  Treatment Diagnosis: Gait Instability 1 each 0      oxyCODONE (ROXICODONE) 5 MG tablet Take 1 tablet (5 mg) by mouth  every 6 hours as needed for severe pain 40 tablet 0      pantoprazole (PROTONIX) 40 MG EC tablet Take 1 tablet (40 mg) by mouth 2 times daily (before meals) 180 tablet 2 2/26/2019 at Unknown time             Review of Systems:   The Review of Systems is negative other than noted in the HPI           Physical Exam:   Vitals were reviewed  Temp: 95.2  F (35.1  C) Temp src: Oral BP: 146/78 Pulse: 78 Heart Rate: 78 Resp: 11 SpO2: 96 % O2 Device: None (Room air) Oxygen Delivery: 8 LPM  General: NAD  HEENT: no scleral icterus, MMM  Pulm: CTAB  CV: RRR, no m/r/g  Abd: soft, nontender  Extremities: feet warm and well perfused  Neuro: alert, conversant  Psych: appropriate mood and affect           Data:     Results for orders placed or performed during the hospital encounter of 03/04/19 (from the past 24 hour(s))   CBC with platelets   Result Value Ref Range    WBC 8.7 4.0 - 11.0 10e9/L    RBC Count 3.65 (L) 4.4 - 5.9 10e12/L    Hemoglobin 12.3 (L) 13.3 - 17.7 g/dL    Hematocrit 35.0 (L) 40.0 - 53.0 %    MCV 96 78 - 100 fl    MCH 33.7 (H) 26.5 - 33.0 pg    MCHC 35.1 31.5 - 36.5 g/dL    RDW 12.7 10.0 - 15.0 %    Platelet Count 72 (L) 150 - 450 10e9/L   Anaerobic bacterial culture   Result Value Ref Range    Specimen Description Right Knee Fluid Aspirate SPECIMEN 1     Special Requests Received in anaerobic tubes.     Culture Micro PENDING    Fluid Culture Aerobic Bacterial   Result Value Ref Range    Specimen Description Right Knee Fluid Aspirate SPECIMEN 1     Culture Micro PENDING    Gram stain   Result Value Ref Range    Specimen Description Right Knee Fluid Aspirate SPECIMEN 1     Gram Stain No organisms seen     Gram Stain Moderate  PMNs seen       Gram Stain       Gram stain and culture performed on concentrated specimen   Tissue Culture Aerobic Bacterial   Result Value Ref Range    Specimen Description Right Knee Tissue Posterior Joint SPECIMEN 4     Culture Micro PENDING    Anaerobic bacterial culture   Result Value  Ref Range    Specimen Description Right Knee Fluid PREPATELLAR BURSA SWAB SPECIMEN 5     Special Requests       This specimen was received on a swab. Results may not be optimal. For maximum sensitivity   of detection, submit tissue, fluid, or needle aspirate.  Received in anaerobic tubes.      Culture Micro PENDING    Fluid Culture Aerobic Bacterial   Result Value Ref Range    Specimen Description Right Knee Fluid PREPATELLAR BURSA SWAB SPECIMEN 5     Special Requests       This specimen was received on a swab. Results may not be optimal. For maximum sensitivity   of detection, submit tissue, fluid, or needle aspirate.  Received in anaerobic tubes.      Culture Micro PENDING          Billie Asif MD

## 2019-03-05 NOTE — PLAN OF CARE
Discharge Planner PT   Patient plan for discharge: home with HH PT  Current status: PT eval complete, pt moving well overall. Alla bed mobility (does not need overhead trapeze anymore), Alla sit<>stands to AD, ambulates with SEC and supervision with gait cues, navigates x3 stairs with single rail + SEC with supervision. Discussed pt's current level of function near where it was prior to admission, now just needing higher level mobility training for new KI, pt in agreement with this. Encouraged to amb regularly in halls to maintain activity while admitted.   Barriers to return to prior living situation: medical needs  Recommendations for discharge: return home with HH PT (initial plan following discharge 2/8/19)  Rationale for recommendations: Pt currently below baseline level of function and would benefit from ongoing therapy to address the above deficits in order to progress towards PLOF and promote IND mobility.       Entered by: Caryn Carroll 03/05/2019 12:07 PM

## 2019-03-05 NOTE — OR NURSING
Pt came out of OR experiencing extreme pain. CRNA gave 100 mg of fentanyl while in pacu. Blood pressures were 182/104 and 179/109.  Pt more calm when in pacu. Blood pressures continues to be 195/99 and 192/109. Dr. Back aware. Gave RN verbal orders for 10 mg labetalol  For high blood pressure. Rn administered. Will continue to administer pain medication as needed.

## 2019-03-05 NOTE — PLAN OF CARE
VS: Temp: 96.6  F (35.9  C) Temp src: Oral BP: 128/76 Pulse: 91 Heart Rate: 64 Resp: 22 SpO2: 94 % O2 Device: None (Room air)      O2: None, room air   Output: Voided 250 mls in urinal, PVR 16   Last BM: 3-4-19 prior to surgery   Activity: Put up to side of the bed   Skin: Intact except incision   Pain: Tolerable with oxycodone 5-10 mg   CMS: Intact, pt states he has baseline numbness and tingling throughout body   Dressing: CDI   Diet: Regular   LDA: PIV patent and infusing LR 75 mLs/hr, hemovac.    Equipment: IV pump and pole, capnography, PCDs, walker   Plan: Continue to monitor.    Additional Info:

## 2019-03-05 NOTE — PLAN OF CARE
VS: Stable    O2: Room air, lungs are clear   Output: He is using the urinal to void, voiding in good amounts   Last BM: 3/4/2019   Activity: Up at the bedside to void, up with knee immobilizer on    Skin: Intact except for his surgical incision   Pain: Requests to have pain meds every 3 hours. He was on oxycodone and changed to dilaudid for better pain relief   CMS: Denies any numbness or tingling   Dressing: CDI   Diet: Regular, tolerating it well   LDA: PICC placed today, all new tubing,    Equipment: Knee immobilizer   Plan: To have abx therapy for six weeks   Additional Info:

## 2019-03-05 NOTE — PROGRESS NOTES
03/05/19 1037   Quick Adds   Type of Visit Initial PT Evaluation   Living Environment   Lives With alone   Living Arrangements house   Home Accessibility stairs to enter home   Number of Stairs, Main Entrance five   Stair Railings, Main Entrance railing on left side (ascending)   Transportation Anticipated family or friend will provide   Living Environment Comment lives alone in very small apartment   Self-Care   Usual Activity Tolerance good   Current Activity Tolerance good   Regular Exercise Yes   Activity/Exercise Type biking   Equipment Currently Used at Home cane, straight   Activity/Exercise/Self-Care Comment prior to recent admission pt was discharged home with SEC from intial R TKA, was Olivier with all activities. did report difficulty getting around outside as pt does not own car with all the recent snow/ice, but was moving inside without trouble using SEC and no AD.   Functional Level Prior   Ambulation 1-->assistive equipment   Transferring 1-->assistive equipment   Toileting 0-->independent   Bathing 0-->independent   Communication 0-->understands/communicates without difficulty   Swallowing 0-->swallows foods/liquids without difficulty   Cognition 0 - no cognition issues reported   Fall history within last six months yes   Number of times patient has fallen within last six months 1   Which of the above functional risks had a recent onset or change? ambulation;transferring   Prior Functional Level Comment was Olivier with SEC for all mobility from initial R TKA 2/7/19.   General Information   Onset of Illness/Injury or Date of Surgery - Date 03/04/19   Referring Physician Olvin Joe MD   Patient/Family Goals Statement none stated   Pertinent History of Current Problem (include personal factors and/or comorbidities that impact the POC) 58 year old male initial R TKA 2/7/19, now s/p I + D , and Debridement and Liner Exchange on 3/4/2019 with Dr. Joe following fall   Precautions/Limitations no known  precautions/limitations   Weight-Bearing Status - LUE full weight-bearing   Weight-Bearing Status - RUE full weight-bearing   Weight-Bearing Status - LLE weight-bearing as tolerated   Weight-Bearing Status - RLE weight-bearing as tolerated   General Observations on RA, CAPNO, IV, hemovac, KI   General Info Comments KI on at all times, no ROM until wound healed   Cognitive Status Examination   Orientation orientation to person, place and time   Level of Consciousness alert   Follows Commands and Answers Questions 100% of the time   Personal Safety and Judgment intact   Memory intact   Pain Assessment   Patient Currently in Pain Yes, see Vital Sign flowsheet   Integumentary/Edema   Integumentary/Edema Comments consistent with R TKA revision   Posture    Posture Not impaired   Range of Motion (ROM)   ROM Comment R knee NT 2/2 to restrictions, all other extremeties WFL   Strength   Strength Comments R LE NT 2/2 to restrictions, all other extremeties strong WFL per functional mobility   Bed Mobility   Bed Mobility Comments IND   Transfer Skills   Transfer Comments Olivier to SEC   Gait   Gait Comments ambulates in room with FWW Olivier with KI donned, quickly requests to return to SEC as pt moving well. Supervision with SEC as pt mildly impulsive with mobility/turning in room though no signs of LOB noted, he is steady overall though antalgic.   Balance   Balance Comments Able to stand without UE support for toiletting   Sensory Examination   Sensory Perception Comments baseline numbness/tingling though LT intact   General Therapy Interventions   Planned Therapy Interventions ROM;strengthening;home program guidelines;progressive activity/exercise;gait training;neuromuscular re-education;balance training;bed mobility training   Clinical Impression   Criteria for Skilled Therapeutic Intervention yes, treatment indicated   PT Diagnosis impaired functional mobility   Influenced by the following impairments ROM (KI in ext),  "strength, balance, pain   Functional limitations due to impairments stairs, gait, transfers   Clinical Presentation Stable/Uncomplicated   Clinical Presentation Rationale PMH, clinician impression, medically stable   Clinical Decision Making (Complexity) Low complexity   Therapy Frequency` 5 times/week   Predicted Duration of Therapy Intervention (days/wks) 3-5 days   Anticipated Equipment Needs at Discharge (none)   Anticipated Discharge Disposition Home with Home Therapy   Risk & Benefits of therapy have been explained Yes   Patient, Family & other staff in agreement with plan of care Yes   Clinical Impression Comments eval complete, tx initiated. Pt appears near level of function he was discharged at from initial TKA 2/7/19 though now in need of high level mobility training with knee immobilizer as pt lives alone. Also with possible extended admission 2/2 to infection.   [facility] AM-PAC  \"6 Clicks\" V.2 Basic Mobility Inpatient Short Form   1. Turning from your back to your side while in a flat bed without using bedrails? 4 - None   2. Moving from lying on your back to sitting on the side of a flat bed without using bedrails? 4 - None   3. Moving to and from a bed to a chair (including a wheelchair)? 4 - None   4. Standing up from a chair using your arms (e.g., wheelchair, or bedside chair)? 4 - None   5. To walk in hospital room? 4 - None   6. Climbing 3-5 steps with a railing? 4 - None   Basic Mobility Raw Score (Score out of 24.Lower scores equate to lower levels of function) 24   Total Evaluation Time   Total Evaluation Time (Minutes) 15     "

## 2019-03-05 NOTE — PLAN OF CARE
Discharge Planner OT   Patient plan for discharge: home  Current status: OT evaluation completed session limited as pt OOR during scheduled time and busy with alternate providers during check back.  Pt with new KI, will benefit from OT while inpatient to address LB dressing, bathing, and tub transfers with new knee ROM restriction.    Barriers to return to prior living situation: medical status, KI, home set-up, lives alone  Recommendations for discharge: defer until OOB evaluation  Rationale for recommendations: see above       Entered by: Cassie Ortega 03/05/2019 4:12 PM

## 2019-03-05 NOTE — PROCEDURES
Procedure explained.  Questions answered.  Consent obtained.  #4 single lumen Bard Solo Power PICC placed in right basilic vein on first attempt.  Per 3CG technology, tip terminates in SVC/RA junction.  Report phoned to 8A charge Monika RAMIREZ; PICC ok for immediate use.  Flush orders entered into Epic.

## 2019-03-05 NOTE — ANESTHESIA POSTPROCEDURE EVALUATION
Anesthesia POST Procedure Evaluation    Patient: Ten Johnson   MRN:     [id number] Gender:   male   Age:    58 year old :      1960        Preoperative Diagnosis: wound breakdown right knee   Procedure(s):  REVISION RIGHT TOTAL KNEE POLY COMPONENT EXCHANGE   Postop Comments: No value filed.       Anesthesia Type:  General    Reportable Event: NO     PAIN: Complex/Difficult     Events: Difficult postop Pain Control; Exacerbation of chronic pain     Interventions: Rescue block; Opioids; Multimodal   Sign Out status: Comfortable, Well controlled pain     PONV: No PONV   Sign Out status:  No Nausea or Vomiting     Neuro/Psych: Uneventful perioperative course   Sign Out Status: Preoperative baseline; Age appropriate mentation     Airway/Resp.: Uneventful perioperative course   Sign Out Status: Non labored breathing, age appropriate RR; Resp. Status within EXPECTED Parameters     CV: Uneventful perioperative course   Sign Out status: Appropriate BP and perfusion indices; Appropriate HR/Rhythm     Disposition:   Sign Out in:  PACU  Disposition:  Floor  Recovery Course: Uneventful  Follow-Up: Not required     Comments/Narrative:  Rescue adductor canal block performed 2/2 poor pain control.            Last Anesthesia Record Vitals:  CRNA VITALS  3/4/2019 1840 - 3/4/2019 1940      3/4/2019             Resp Rate (observed):  8          Last PACU/Preop Vitals:  Vitals:    19   BP: (!) 142/103 (!) 159/107 (!) 159/121   Pulse: 75 69 71   Resp: (!) 7 14 20   Temp:      SpO2: 99% 99% 100%         Electronically Signed By: Bradly Back MD, 2019, 9:02 PM

## 2019-03-05 NOTE — CONSULTS
"INFECTIOUS DISEASE CONSULTATION    NAME: Ten Johnson  MRN:  [id number]  AGE:  58 year old            :  1960    PRIMARY CARE PROVIDER:  Cuca Celeste  Consult Requester:  Olvin Joe MD     Date of Admission:   3/4/2019  Date of Visit:  2019    REASON FOR CONSULT: infection R TKA    IMPRESSION AND SUGGESTIONS:      1. Infection of right TKA: 1 day post-op from incision and drainage and liner exchange in the setting of wound dehiscence. The clinic note from 2019 stated that     For the next 3-4 days \"he used old tshirts and napkins to stop the bleeding\"    The patient confirmed to me that the way in which he cared for the wound was essentially the \"opposite\" of sterile. He subsequently developed erythema and \"profuse\" klaus-colored drainage. In terms of the potential infectious agents, we should cover skin iam as well as Gram-negative rods and anaerobes given that he had an open wound that was self-treated as above. In addition, the patient has a history of methicillin-resistant Staphylococcus aureus (MRSA).     Antibiotics: 6 weeks intravenously. The patient will require placement of a PICC line.    IV vancomycin for skin iam and methicillin-resistant Staphylococcus aureus (MRSA)      IV piperacillin-tazobactam (Zosyn) for Gram-negative dominic and anaerobic coverage    2. Tetanus immunization: the records from the state don't have documentation of a tetanus immunization since  and the patient does not recall when he last received one, though it has certainly been greater than 10 years.    I have ordered a Td.    3. Hepatic disease: coagulation in the setting of long-term, broad spectrum antibiotics    Coagulation: in addition to other clinical coagulation considerations, as the patient will receive long-term broad spectrum antibiotics, the antibiotics are likely to kill some of the vitamin K-producing bacterial ima in the colon. This may result in a yet more notable clinical " "state of vitamin K deficiency from the lack of absorption of vitamin K from the GI tract (in addition to hepatic factors). He may benefit from additional laboratory monitoring (of INR and/or PT time).    4. Poor dentition: the patient stated that he hasn't seen a dentist in approximately 15-20 years (or more). Dental care will be important if he undergoes additional operations or other reasons for intubation as well as for his general health.    5. Tongue with midline grey area: if this is present on repeat examinations here, I would suggest that he be evaluated by oral surgery. In terms of risk factors for oral cancer, he has a heavy alcohol history but has not been a user of tobacco.    6. He has a bioprosthetic aortic heart valve.    HISTORY OF PRESENT ILLNESS:      I have been asked to formally consult on Mr. Johnson, a 58-year-old man with a history of multiple complications of cirrhotic liver (due to alcohol abuse and hepatitis C virus infection) and a history of infection due to methicillin-resistant Staphylococcus aureus (MRSA) for an infected right knee TKA. The patient underwent a right TKA on 2/7/2019. On 2/22/2019 the patient fell, reportedly on ice. There was bleeding and the clinic note from 2/28/2019 stated that the patient reported that     For the next 3-4 days \"he used old tshirts and napkins to stop the bleeding\"    On 2/27/2019 he went to the ED with a wound dehiscence and a 6 cm linear cut on the dorsal surface of the right knee was noted. In the ED, there was no purulence. It is not clear from the ED note if erythema was present. The ED note on 2/27/2019 by Dr. Kilpatrick states:    Right knee: He exhibits erythema (mild, no evidence of erythema). Tenderness found.    On 2/28/2019, at an office visit SYDNIE Hill assessed the patient:   \"...his knee does not appear infected and there is no active drainage that I can see after palpating the surrounding tissue. I cleaned the incision multiple " "times with a Betadine swab x4. Applied stere strips to the middle of the incision where again no active fluid was seen. Sterile ABD pads were placed along with an ace wrap x2 for compression.\"     On 3/4/2019, Ms. Hastings noted \"...there is continued copious amounts of serous fluid on his bandages today. This requires a direct admission to the hospital for an urgent I&D with polyethelene exchange. No preop antibiotics please. He was told to be NPO.\"    On 3/4/2018 the patient went to the OR and underwent incision and drainage and liner exchange in the setting of wound dehiscence. Multiple cultures were obtained in the OR that thus far have no growth. The patient was placed on IV vancomycin. Today, IV piperacillin-tazobactam (Zosyn) was added.    The patient notes that he had chills and sweats at home, but did not have a thermometer to determine his temperature.     PAST MEDICAL HISTORY:  Past Medical History:   Diagnosis Date     Alcohol use disorder      Alcoholic cirrhosis (H)      Anticoagulant long-term use     plavix     Ascites      Chronic allergic rhinitis      Chronic anemia      Chronic hepatitis C without hepatic coma (H) 05/10/2016    Untreated as of 2/2018     Diastolic dysfunction      Erosive gastropathy      Esophageal varices in alcoholic cirrhosis (H)      H/O upper gastrointestinal hemorrhage 09/2017     History of blood transfusion      History of drug abuse     intranasal     Hypertension     essential     JANELLE (iron deficiency anemia)      Sarahi-Hoffman tear     History     Marijuana abuse      MRSA (methicillin resistant Staphylococcus aureus)      Olecranon bursitis      Portal hypertension (H)      Right shoulder pain     history of rotator cuff repair     S/p TAVR (transcatheter aortic valve replacement), bioprosthetic      Severe aortic stenosis      Thrombocytopenia (H)        PAST SURGICAL HISTORY:  Past Surgical History:   Procedure Laterality Date     ARTHROSCOPY SHOULDER ROTATOR CUFF " REPAIR  7/31/2012    Procedure: ARTHROSCOPY SHOULDER ROTATOR CUFF REPAIR;  Right Shoulder Arthroscopic Rotator Cuff Repair, BicepsTenodesis,  Subacromial Decompression ;  Surgeon: Joi Castillo MD;  [facility]     ESOPHAGOSCOPY, GASTROSCOPY, DUODENOSCOPY (EGD), COMBINED N/A 10/23/2017    Procedure: COMBINED ESOPHAGOSCOPY, GASTROSCOPY, DUODENOSCOPY (EGD);;  Surgeon: Gentry Salas MD;  Location: [facility]     EXCHANGE POLY COMPONENT ARTHROPLASTY KNEE Right 3/4/2019    Procedure: REVISION RIGHT TOTAL KNEE POLY COMPONENT EXCHANGE;  Surgeon: Olvin Joe MD;  Location: [facility]     FACIAL RECONSTRUCTION SURGERY  1971     HEART CATH FEMORAL CANNULIZATION WITH OPEN STANDBY REPAIR AORTIC VALVE N/A 2/21/2018    Procedure: HEART CATH FEMORAL CANNULIZATION WITH OPEN STANDBY REPAIR AORTIC VALVE;;  Surgeon: Luis Baird MD;  [facility]: [facility]     IRRIGATION AND DEBRIDEMENT UPPER EXTREMITY, COMBINED  1/3/2012    Procedure:COMBINED IRRIGATION AND DEBRIDEMENT UPPER EXTREMITY; Irrigation & Debridement Left Elbow; Surgeon:CRISTHIAN ZHOU; Location:[facility]     OPTICAL TRACKING SYSTEM ARTHROPLASTY KNEE Right 2/7/2019    Procedure: ARTHROPLASTY KNEE RIGHT;  Surgeon: Olvin Joe MD;  [facility]     REPAIR TENDON TRICEPS UPPER EXTREMITY  11/8/2011    Procedure:REPAIR TENDON TRICEPS UPPER EXTREMITY; Surgeon:CRISTHIAN ZOHU; Location:[facility]     SHOULDER SURGERY  2003    left, injury, torn tendons, hematoma     TRANSCATHETER AORTIC VALVE IMPLANT ANESTHESIA N/A 2/21/2018    Procedure: TRANSCATHETER AORTIC VALVE IMPLANT ANESTHESIA;  Transfemoral (Quiroz) Aortic Valve Implant 26mm MARTHA 3, with Cardiopulmonary Bypass Standby, transthoracic echocardiogram;  Surgeon: GENERIC ANESTHESIA PROVIDER;  Location: [facility]     TRANSPOSITION ULNAR NERVE (ELBOW)  11/8/2011    Procedure:TRANSPOSITION ULNAR NERVE (ELBOW); Final Procedure Done: Left Elbow Lateral Ulnar Collateral Repair And  Left Elbow  "Triceps Repair         ALLERGIES:  Zolpidem; Cats; Dogs; and Pollen extract    CURRENT MEDICATIONS:  Current Facility-Administered Medications   Medication     [START ON 3/7/2019] acetaminophen (TYLENOL) tablet 650 mg     acetaminophen (TYLENOL) tablet 975 mg     aspirin (ASA) EC tablet 325 mg     benzocaine-menthol (CEPACOL) 15-3.6 MG lozenge 1 lozenge     bupivacaine liposome (EXPAREL) LONG ACTING injection was administered into the infiltration site to produce postsurgical analgesia. Duration of action is up to 72 hours, and other \"amaris\" medications should not be given for 96 hours with the exception of the lidocaine 5% patch (LIDODERM) and the lidocaine 10mg in potassium infusions. This entry is for INFORMATION ONLY.     diazepam (VALIUM) tablet 5 mg     diphenhydrAMINE (BENADRYL) capsule 25 mg    Or     diphenhydrAMINE (BENADRYL) injection 25 mg     docusate sodium (COLACE) capsule 100 mg     ferrous sulfate (FEROSUL) tablet 325 mg     fluticasone (FLONASE) 50 MCG/ACT spray 2 spray     heparin lock flush 10 UNIT/ML injection 2-5 mL     hydrOXYzine (ATARAX) tablet 25 mg     lactated ringers infusion     lidocaine (LMX4) cream     lidocaine (LMX4) cream     lidocaine 1 % 0.1-1 mL     lidocaine 1 % 0.5-5 mL     lisinopril (PRINIVIL/ZESTRIL) tablet 20 mg     loratadine (CLARITIN) tablet 10 mg     multivitamin, therapeutic (THERA-VIT) tablet 1 tablet     naloxone (NARCAN) injection 0.1-0.4 mg     ondansetron (ZOFRAN-ODT) ODT tab 4 mg    Or     ondansetron (ZOFRAN) injection 4 mg     oxyCODONE (ROXICODONE) tablet 5 mg     oxyCODONE (ROXICODONE) tablet 5-10 mg     piperacillin-tazobactam (ZOSYN) 3.375 g vial to attach to  mL bag     prochlorperazine (COMPAZINE) injection 10 mg    Or     prochlorperazine (COMPAZINE) tablet 10 mg     sodium chloride (PF) 0.9% PF flush 3 mL     sodium chloride (PF) 0.9% PF flush 3 mL     sodium chloride (PF) 0.9% PF flush 5-50 mL     Td (tetanus & diphtheria toxoids) -  adult " formulation - for ages 7 years and older     vancomycin (VANCOCIN) 1,500 mg in sodium chloride 0.9 % 250 mL intermittent infusion     vitamin B1 (THIAMINE) tablet 50 mg       FAMILY HISTORY:  Family History   Problem Relation Age of Onset     Cancer Mother 62     Alcoholism Paternal Uncle      No Known Problems Father      No Known Problems Brother      Diabetes Maternal Grandmother      Myocardial Infarction Maternal Grandfather      No Known Problems Paternal Grandmother      Unknown/Adopted Paternal Grandfather      Cirrhosis No family hx of        SOCIAL HISTORY:  Social History     Socioeconomic History     Marital status: Single     Spouse name: Not on file     Number of children: 0     Years of education: Not on file     Highest education level: Not on file   Occupational History     Occupation: unemployed   Social Needs     Financial resource strain: Not on file     Food insecurity:     Worry: Not on file     Inability: Not on file     Transportation needs:     Medical: Not on file     Non-medical: Not on file   Tobacco Use     Smoking status: Never Smoker     Smokeless tobacco: Never Used   Substance and Sexual Activity     Alcohol use: Yes     Comment: Alcohol use disorder, still actively drinking     Drug use: No     Comment: denies     Sexual activity: Not Currently     Partners: Female   Lifestyle     Physical activity:     Days per week: Not on file     Minutes per session: Not on file     Stress: Not on file   Relationships     Social connections:     Talks on phone: Not on file     Gets together: Not on file     Attends Christian service: Not on file     Active member of club or organization: Not on file     Attends meetings of clubs or organizations: Not on file     Relationship status: Not on file     Intimate partner violence:     Fear of current or ex partner: Not on file     Emotionally abused: Not on file     Physically abused: Not on file     Forced sexual activity: Not on file   Other Topics  "Concern     Parent/sibling w/ CABG, MI or angioplasty before 65F 55M? Not Asked   Social History Narrative    .  Bicycles a lot.  Excessive alcohol use.  Smokes cigars.  Occasional marijuana use.       REVIEW OF SYSTEMS:  Positive for chills and sweats prior to hospital admission. Positive for nausea prior to admission. No chest pain or shortness of breath. Positive for \"liver problems.\" No abdominal pain. Positive for a slow worsening of his vision of the course of many months that has occurred in both eyes. No changes in hearing. No back pain. Positive for right knee pain that is currently well controlled. No loss of sensation on his toes according to the patient. A 12-point ROS is otherwise negative.      LABORATORY RESULTS:  Inflammatory Markers    Recent Labs   Lab Test 02/28/19  0046 03/25/18  1108 05/30/12  1410 01/03/12  1615 11/29/11  0354 11/01/11  1636   SED 9  --  3 7 6 7   CRP 10.6* <2.9  --  <5.0 <5.0  --        Hematology Studies    Recent Labs   Lab Test 03/05/19  0752 03/04/19  1730 02/27/19 2010 02/09/19  0613 02/08/19  0536  02/07/19  1706 01/15/19  1731 03/25/18  1108  02/22/18  0421  02/01/18  0755   WBC  --  8.7 13.2* 11.0 15.1*  --  5.8 6.9 6.3   < > 6.9   < > 5.1   ANEU  --   --  8.5* 6.4 13.0*  --   --   --  3.6  --  4.3  --  2.6   AEOS  --   --  0.0 0.1 0.0  --   --   --  0.1  --  0.1  --  0.1   HGB Unsatisfactory specimen - clotted 12.3* 13.7 12.5* 12.8*  --  14.8 15.0 12.1*   < > 9.8*   < > 10.0*   MCV  --  96 99 99 96  --  98 96 86   < > 80   < > 75*   PLT  --  72* 98* 74* 41*   < > 44* 57* 43*   < > 53*   < > 97*    < > = values in this interval not displayed.       Metabolic Studies     Recent Labs   Lab Test 02/27/19 2010 02/07/19  0850 01/15/19  1731 10/11/18  0932 03/25/18  1108 03/15/18  1415     --  136 138 139 139   POTASSIUM 3.6 3.7 4.1 4.2 4.1 3.6   CHLORIDE 101  --  104 107 109 105   CO2 25  --  25 24 23 25   BUN 14  --  20 22 17 16   CR 0.88 0.78 " 0.81 0.82 0.86 0.83   GFRESTIMATED >90 >90 >90 >90 >90 >90       Hepatic Studies    Recent Labs   Lab Test 03/25/18  1108 02/22/18  0421 02/21/18  1129 02/21/18  0625 12/13/17  1122 12/04/17  0754   BILITOTAL 0.9 1.4* 0.9 0.6 0.8 1.3   ALKPHOS 108 87 81 104 88 93   ALBUMIN 3.0* 3.2* 3.2* 3.3* 3.3* 3.3*   AST 99* 71* 75* 88* 115* 115*   ALT 97* 70 76* 93* 113* 99*         Microbiology:  Culture Micro   Date Value Ref Range Status   03/04/2019 Culture negative monitoring continues  Preliminary   03/04/2019 PENDING  Preliminary   03/04/2019 Culture negative monitoring continues  Preliminary   03/04/2019 PENDING  Preliminary   03/04/2019 Culture negative monitoring continues  Preliminary   03/04/2019 Culture negative monitoring continues  Preliminary   03/04/2019 Culture negative monitoring continues  Preliminary   03/04/2019 PENDING  Preliminary   02/27/2019 No growth  Final   03/25/2018 No growth  Final   10/23/2017 No growth  Final   10/23/2017 No growth  Final   03/28/2012   Final    Canceled, Test credited Specimen not received  Specimen not collected per Sarah @ [facility] 3/29/12 1235 LMP   03/28/2012   Final    Canceled, Test credited Specimen not received  Specimen not collected per [facility] 3/29/12 12:35 LMP   02/08/2012 No growth  Final   01/03/2012 No anaerobes isolated  Final   01/03/2012 Culture negative after 4 weeks  Final   01/03/2012   Final    Light growth Staphylococcus aureus  Critical Value, preliminary result only, called to and read back by Dr. Garcia   1/4/11 jf 1128 am  Susceptibility testing done on previous specimen   01/03/2012 No anaerobes isolated  Final   01/03/2012 Culture negative after 4 weeks  Final   01/03/2012   Final    Light growth Staphylococcus aureus  Critical Value called to and read back by Dr Garcia @ 11:30 1/4/12, EA   11/29/2011 No growth  Final   11/08/2011 No anaerobes isolated  Final   11/08/2011   Final    Incorrectly ordered by  PCU/Clinic  Canceled, Test credited   2011   Final    Light growth Staphylococcus aureus  Critical Value, preliminary result only, called to and read back by Dr Petty   (5857) 11 @ 1150, JR   2011 No growth  Final       Urine Studies    Recent Labs   Lab Test 19  0830 10/23/17  0512 14  1645   LEUKEST Negative Negative Negative   WBCU <1 2  --        Vancomycin Levels    Recent Labs   Lab Test 11  0114   VANCOMYCIN 6.1     Hepatitis B Testing   Recent Labs   Lab Test 11  0854   HBSAB 0.0   HEPBANG Negative     Hepatitis C Testing     Hepatitis C Antibody   Date Value Ref Range Status   2011 (A) NEG Final    Positive   High sample/cutoff ratio, confirmatory testing available.       Vitals:    19 0150 19 0450 19 0550 19 0803   BP: 129/52 128/76  146/70   BP Location:  Right arm  Right arm   Pulse:  58 91    Resp:    Temp:  96.6  F (35.9  C)  96.4  F (35.8  C)   TempSrc:  Oral  Oral   SpO2: 95% 95% 94% 96%     Temp (high/low/average during past 24 hours): Temp (24hrs), Av.6  F (35.9  C), Min:95.2  F (35.1  C), Max:97.3  F (36.3  C)      PHYSICAL EXAMINATION:  Vital signs as above  General: Very pleasant and talkative man who is lying in bed and has a surgical dressing with a compression bandage from the OR on the right lower extremity from the thigh proximally to the calf distally.   Lymph nodes exam: No submental, cervical, supraclavicular, axillary, or inguinal nodes were palpable.   HEENT: NCAT. EOMI. No conjunctival hemorrhage. No scleral icterus. EOMI. Vision and hearing are grossly intact. Poor dentition. Tongue with midline grey area.  Neck: Supple  Back: No costovertebral angle tenderness or spinal tenderness.   Lungs: Clear to auscultation  Cardiac: RRR S1S2 without murmur. Examination of the heart is limited as a result of using the yellow stethoscope in the patient's room as he is on contact isolation.    Abdomen: Normal  bowel sounds, soft, non-tender.  Extremities: Surgical dressing with compression bandage from the OR on the right lower extremity from the thigh proximally to the calf distally. No pedal edema.  Neuro: AOX3. CN II-XII intact (I did not test gag or corneal reflex). Able to move all four extremities. Engaging and talkative. Able to sit up for the lung examination despite the presence of the surgical dressing (and compression bandage)    Electronically signed by Josué Joyner M.D.  3/5/2019 12:10 PM  --

## 2019-03-05 NOTE — PROGRESS NOTES
03/05/19 1614   Quick Adds   Type of Visit Initial Occupational Therapy Evaluation   Living Environment   Lives With alone   Living Arrangements house   Home Accessibility stairs to enter home   Transportation Anticipated family or friend will provide   Living Environment Comment lives alone in very small apartment.  Pt has tub/shower with high ledge   Self-Care   Usual Activity Tolerance good   Current Activity Tolerance good   Regular Exercise Yes   Activity/Exercise Type biking   Equipment Currently Used at Home cane, straight   Activity/Exercise/Self-Care Comment prior to recent admission pt was discharged home with SEC from intial R TKA, was [place] with all activities. did report difficulty getting around outside as pt does not own car with all the recent snow/ice, but was moving inside without trouble using SEC and no AD.   Functional Level   Ambulation 1-->assistive equipment   Transferring 1-->assistive equipment   Toileting 0-->independent   Bathing 0-->independent   Dressing 0-->independent   Eating 0-->independent   Communication 0-->understands/communicates without difficulty   Swallowing 0-->swallows foods/liquids without difficulty   Cognition 0 - no cognition issues reported   Fall history within last six months yes   Number of times patient has fallen within last six months 1   Which of the above functional risks had a recent onset or change? ambulation;transferring;bathing;dressing   Prior Functional Level Comment pt fell on ice coming into appointment   General Information   Onset of Illness/Injury or Date of Surgery - Date 03/04/19   Referring Physician Olvin Joe MD   Patient/Family Goals Statement go home, get back to work   Additional Occupational Profile Info/Pertinent History of Current Problem 58 year old male initial R TKA 2/7/19, now s/p I + D , and Debridement and Liner Exchange on 3/4/2019 with Dr. Joe following fall   Precautions/Limitations fall precautions;other (see  comments)  (continuous KI no R knee ROM)   Weight-Bearing Status - LUE full weight-bearing   Weight-Bearing Status - RUE full weight-bearing   Weight-Bearing Status - LLE weight-bearing as tolerated   Weight-Bearing Status - RLE weight-bearing as tolerated   Cognitive Status Examination   Orientation orientation to person, place and time   Level of Consciousness alert   Cognitive Comment OT: Pt having some difficulty describing timeline of events, tangential with speech   Visual Perception   Visual Perception Comments No concerns noted   Pain Assessment   Patient Currently in Pain Yes, see Vital Sign flowsheet   Integumentary/Edema   Integumentary/Edema Comments post surgical edema   Range of Motion (ROM)   ROM Comment Mild impairements at end range in B shoulders from prior shoulder surgeries   Strength   Strength Comments Not tested, appears WFL in BUE   Lower Body Dressing   Level of [place]: Dress Lower Body maximum assist (25% patients effort)   Instrumental Activities of Daily Living (IADL)   Previous Responsibilities meal prep;housekeeping;laundry;shopping;yardwork;medication management;finances;work   IADL Comments Pt is an .    Activities of Daily Living Analysis   Impairments Contributing to Impaired Activities of Daily Living balance impaired;cognition impaired;pain;post surgical precautions   General Therapy Interventions   Planned Therapy Interventions ADL retraining;IADL retraining;transfer training   Clinical Impression   Criteria for Skilled Therapeutic Interventions Met yes, treatment indicated   OT Diagnosis decreased I with ADL   Influenced by the following impairments KI, no knee ROM, pain, limited activity tolerance   Assessment of Occupational Performance 1-3 Performance Deficits   Identified Performance Deficits dressing, bathing, transfers   Clinical Decision Making (Complexity) Low complexity   Therapy Frequency daily   Predicted Duration of Therapy Intervention (days/wks) 3  "days   Anticipated Equipment Needs at Discharge dressing equipment;bathing equipment   Anticipated Discharge Disposition Home with Home Therapy   Risks and Benefits of Treatment have been explained. Yes   Patient, Family & other staff in agreement with plan of care Yes   Clinical Impression Comments pt presents with decreased I with ADL 2/2 new KI impacting reach to feet and ADL participation, limited activity tolerance.  pt would benefit from skilled OT to address and maximize safety and I with ADL   [facility] TM \"6 Clicks\"   2016, Trustees of [facility], under license to [employer].  All rights reserved.   6 Clicks Short Forms Daily Activity Inpatient Short Form   [facility] AM-PAC  \"6 Clicks\" Daily Activity Inpatient Short Form   1. Putting on and taking off regular lower body clothing? 3 - A Little   2. Bathing (including washing, rinsing, drying)? 3 - A Little   3. Toileting, which includes using toilet, bedpan or urinal? 4 - None   4. Putting on and taking off regular upper body clothing? 4 - None   5. Taking care of personal grooming such as brushing teeth? 4 - None   6. Eating meals? 4 - None   Daily Activity Raw Score (Score out of 24.Lower scores equate to lower levels of function) 22   Total Evaluation Time   Total Evaluation Time (Minutes) 10     "

## 2019-03-05 NOTE — PLAN OF CARE
Lab called with results of right knee culture, results were given to Dr. Dowd Gram Positive Cocci in clusters

## 2019-03-05 NOTE — PROGRESS NOTES
Orthopaedic Surgery Progress Note:       Subjective:    Patient reports doing well. Pain well controlled on current regimen. Denies new onset tingling/numbness in operative extremity. Denies fever/chills/SOB/nause/vomiting.     Objective:   Temp:  [95.2  F (35.1  C)-97.3  F (36.3  C)] 96.6  F (35.9  C)  Pulse:  [58-91] 91  Heart Rate:  [64-78] 64  Resp:  [7-22] 22  BP: (117-195)/() 128/76  SpO2:  [93 %-100 %] 94 %    Intake/Output Summary (Last 24 hours) at 3/5/2019 0751  Last data filed at 3/5/2019 0657  Gross per 24 hour   Intake 1801 ml   Output 860 ml   Net 941 ml       Gen: NAD. Resting comfortably in bed  Resp: Breathing comfortably on RA  LE:  Dressing/ACE is c/d/i.   Drain is draining bloody/serous fluid.40 ml to 0000,70 ml since  Circulatory: DP Pulse Intact, Foot Perfused   Neurologic: intact      Labs:  Recent Labs   Lab 03/04/19  1730 02/28/19  0046 02/27/19 2010   WBC 8.7  --  13.2*   HGB 12.3*  --  13.7   PLT 72*  --  98*   CRP  --  10.6*  --         Assessment & Plan:   58 year old male now s/p I + D , and Debridement and Liner Exchange  on 3/4/2019 with Dr. Joe    ID involvement today  PICC line     Activity: WBAT BLE in KI on right. No range until wound healed.   Drain: continue to document.   Antibiotics: Vanc given MRSA history, and then as per ID once cultures back  Dressings: To be changed tomorrow   Diet: ADAT  Pain: PO/IV meds. Transition to PO meds only as patient tolerates  DVT ppx: Aspirin 325 mg D , 4 weeks  Imaging: Post op films reviewed and are satisfactory. No further imaging need at this time.  Labs:   PT/OT: Mobility, ROM, gait training, ADLs  Consults: Appreciate medicine co-management.  Dispo: Pending pain control and PT/OT recs. Remain inpatient for now  Follow up: TBD    Dr Prashanth Vang 03/05/2019  Orthopedic Fellow  Pager: [phone]

## 2019-03-05 NOTE — PLAN OF CARE
VS: Patient arrived on unit at 2150. VS and CAPNO stable. Denies SOB, chest pain, dizziness, lightheadedness, numbness, tingling, nausea, and vomiting.    O2: >90% RA. Lung sounds clear bilaterally. IS encouraged.    Output: Has not voided yet.    Last BM: Bowel sounds active, not passing gas yet, last BM 3/4 prior to surgery.    Activity: WBAT on RLE, knee immobilizer at all times, sat at EOB this shift.   Up for meals? Up for meals.    Skin: Intact with exception to incision.    Pain: Has oxycodone 5-10 mg Q3H.    CMS: Intact.   Dressing: CDI.    Diet: Regular diet and tolerating well.    LDA: PIV patent and infusing LR 75 ml/hr.    Equipment: IV pole, CAPNO, PCD, walker, cane, personal belongings at bedside.    Plan: TBD.    Additional Info: Patient demonstrates ability to use call light appropriately. Will continue to monitor.

## 2019-03-05 NOTE — PROGRESS NOTES
[facility] Internal Medicine Progress Note            Interval History:   Record reviewed including IM consult.  Seen with RN.  S/P Resection of prepatellar bursa.   Open debridement, right total knee arthroplasty. Exchange polyethylene right knee.  Complete synovectomy.  Indication right acute infection, right total knee arthroplasty, with wound dehiscence 3/4.  Dr. Joe. GPC in clusters.  On zosyn and Vanco.   Fair pain control with oxycodone.  On 6 to 10 5 mg tabs daily pre op.  Ambulated into castro earlier with therapy - LH, shaky, nausea, cramping due to pain upon return.  Oxycodone dosed 60 min before therapy.   No CP, SOB, cough, reflux, abd pain or distention.  Passing flatus.  Last BM AM 3/4.  Voiding Ok.           Medications:   All medications reviewed today          Physical Exam:   Blood pressure 146/70, pulse 91, temperature 96.4  F (35.8  C), temperature source Oral, resp. rate 19, SpO2 96 %.    Intake/Output Summary (Last 24 hours) at 3/5/2019 1434  Last data filed at 3/5/2019 0657  Gross per 24 hour   Intake 1801 ml   Output 860 ml   Net 941 ml       General:  Alert.  Appropriate.  No distress.  Off O2.     Heent:      Neck:    Skin:    Chest:  clear    Cardiac:  Reg without gallop, murmur.  No JVD.     Abdomen:  Non distended, soft, non-tender.  BS normal.     Extremities:  Perfused.  No edema, calf, posterior thigh tenderness to suggest DVT.     Neuro:            Data:     Results for orders placed or performed during the hospital encounter of 03/04/19 (from the past 24 hour(s))   CBC with platelets   Result Value Ref Range    WBC 8.7 4.0 - 11.0 10e9/L    RBC Count 3.65 (L) 4.4 - 5.9 10e12/L    Hemoglobin 12.3 (L) 13.3 - 17.7 g/dL    Hematocrit 35.0 (L) 40.0 - 53.0 %    MCV 96 78 - 100 fl    MCH 33.7 (H) 26.5 - 33.0 pg    MCHC 35.1 31.5 - 36.5 g/dL    RDW 12.7 10.0 - 15.0 %    Platelet Count 72 (L) 150 - 450 10e9/L   Anaerobic bacterial culture   Result Value Ref Range    Specimen  Description Right Knee Fluid Aspirate SPECIMEN 1     Special Requests Received in anaerobic tubes.     Culture Micro Culture negative monitoring continues    Fluid Culture Aerobic Bacterial   Result Value Ref Range    Specimen Description Right Knee Fluid Aspirate SPECIMEN 1     Culture Micro Moderate growth  Gram positive cocci in clusters   (A)     Culture Micro       Critical Value/Significant Value, preliminary result only, called to and read back by  Nina Walker RN at 1305 3.5.19.DK     Gram stain   Result Value Ref Range    Specimen Description Right Knee Fluid Aspirate SPECIMEN 1     Gram Stain No organisms seen     Gram Stain Moderate  PMNs seen       Gram Stain       Gram stain and culture performed on concentrated specimen   Anaerobic bacterial culture   Result Value Ref Range    Specimen Description Right Knee Tissue SYNOVIUM SPECIMEN 2     Special Requests Received in anaerobic tubes.     Culture Micro Culture negative monitoring continues    Anaerobic bacterial culture   Result Value Ref Range    Specimen Description Right Knee Tissue Femoral NOTCH SPECIMEN 3     Special Requests Received in anaerobic tubes.     Culture Micro Culture negative monitoring continues    Anaerobic bacterial culture   Result Value Ref Range    Specimen Description Right Knee Tissue Posterior Joint SPECIMEN 4     Special Requests Received in anaerobic tubes.     Culture Micro Culture negative monitoring continues    Tissue Culture Aerobic Bacterial   Result Value Ref Range    Specimen Description Right Knee Tissue Posterior Joint SPECIMEN 4     Culture Micro PENDING    Anaerobic bacterial culture   Result Value Ref Range    Specimen Description Right Knee Fluid PREPATELLAR BURSA SWAB SPECIMEN 5     Special Requests       This specimen was received on a swab. Results may not be optimal. For maximum sensitivity   of detection, submit tissue, fluid, or needle aspirate.  Received in anaerobic tubes.      Culture Micro Culture  "negative monitoring continues    Fluid Culture Aerobic Bacterial   Result Value Ref Range    Specimen Description Right Knee Fluid PREPATELLAR BURSA SWAB SPECIMEN 5     Special Requests       This specimen was received on a swab. Results may not be optimal. For maximum sensitivity   of detection, submit tissue, fluid, or needle aspirate.  Received in anaerobic tubes.      Culture Micro PENDING    Infectious Disease [place] Adult IP Consult: Patient to be seen: Routine within 24 hrs; Call back #: [phone]; infection r tka; Consultant may enter orders: Yes; Requesting provider? Attending physician    Narrative    Josué Joyner MD     3/5/2019 12:15 PM  INFECTIOUS DISEASE CONSULTATION    NAME: Ten Johnson  MRN:  [id number]  AGE:  58 year old            :  1960    PRIMARY CARE PROVIDER:  Cuca Celeste  Consult Requester:  Olvin Joe MD     Date of Admission:   3/4/2019  Date of Visit:  2019    REASON FOR CONSULT: infection R TKA    IMPRESSION AND SUGGESTIONS:      1. Infection of right TKA: 1 day post-op from incision and   drainage and liner exchange in the setting of wound dehiscence.   The clinic note from 2019 stated that     For the next 3-4 days \"he used old tshirts and napkins to stop   the bleeding\"    The patient confirmed to me that the way in which he cared for   the wound was essentially the \"opposite\" of sterile. He   subsequently developed erythema and \"profuse\" klaus-colored   drainage. In terms of the potential infectious agents, we should   cover skin iam as well as Gram-negative rods and anaerobes   given that he had an open wound that was self-treated as above.   In addition, the patient has a history of methicillin-resistant   Staphylococcus aureus (MRSA).     Antibiotics: 6 weeks intravenously. The patient will require   placement of a PICC line.    IV vancomycin for skin iam and methicillin-resistant   Staphylococcus aureus (MRSA)      IV " piperacillin-tazobactam (Zosyn) for Gram-negative dominic and   anaerobic coverage    2. Tetanus immunization: the records from the state don't have   documentation of a tetanus immunization since 1965 and the   patient does not recall when he last received one, though it has   certainly been greater than 10 years.    I have ordered a Td.    3. Hepatic disease: coagulation in the setting of long-term,   broad spectrum antibiotics    Coagulation: in addition to other clinical coagulation   considerations, as the patient will receive long-term broad   spectrum antibiotics, the antibiotics are likely to kill some of   the vitamin K-producing bacterial iam in the colon. This may   result in a yet more notable clinical state of vitamin K   deficiency from the lack of absorption of vitamin K from the GI   tract (in addition to hepatic factors). He may benefit from   additional laboratory monitoring (of INR and/or PT time).    4. Poor dentition: the patient stated that he hasn't seen a   dentist in approximately 15-20 years (or more). Dental care will   be important if he undergoes additional operations or other   reasons for intubation as well as for his general health.    5. Tongue with midline grey area: if this is present on repeat   examinations here, I would suggest that he be evaluated by oral   surgery. In terms of risk factors for oral cancer, he has a heavy   alcohol history but has not been a user of tobacco.    6. He has a bioprosthetic aortic heart valve.    HISTORY OF PRESENT ILLNESS:      I have been asked to formally consult on Mr. Johnson, a 58-year-old   man with a history of multiple complications of cirrhotic liver   (due to alcohol abuse and hepatitis C virus infection) and a   history of infection due to methicillin-resistant Staphylococcus   aureus (MRSA) for an infected right knee TKA. The patient   underwent a right TKA on 2/7/2019. On 2/22/2019 the patient fell,   reportedly on ice. There was bleeding  "and the clinic note from   2/28/2019 stated that the patient reported that     For the next 3-4 days \"he used old tshirts and napkins to stop   the bleeding\"    On 2/27/2019 he went to the ED with a wound dehiscence and a 6 cm   linear cut on the dorsal surface of the right knee was noted. In   the ED, there was no purulence. It is not clear from the ED note   if erythema was present. The ED note on 2/27/2019 by Dr. Kilpatrick   states:    Right knee: He exhibits erythema (mild, no evidence of erythema).   Tenderness found.    On 2/28/2019, at an office visit SYDNIE Hill assessed the   patient:   \"...his knee does not appear infected and there is no   active drainage that I can see after palpating the surrounding   tissue. I cleaned the incision multiple times with a Betadine   swab x4. Applied stere strips to the middle of the incision where   again no active fluid was seen. Sterile ABD pads were placed   along with an ace wrap x2 for compression.\"     On 3/4/2019, Ms. Hastings noted \"...there is continued copious   amounts of serous fluid on his bandages today. This requires a   direct admission to the hospital for an urgent I&D with   polyethelene exchange. No preop antibiotics please. He was told   to be NPO.\"    On 3/4/2018 the patient went to the OR and underwent incision and   drainage and liner exchange in the setting of wound dehiscence.   Multiple cultures were obtained in the OR that thus far have no   growth. The patient was placed on IV vancomycin. Today, IV   piperacillin-tazobactam (Zosyn) was added.    The patient notes that he had chills and sweats at home, but did   not have a thermometer to determine his temperature.     PAST MEDICAL HISTORY:  Past Medical History:   Diagnosis Date     Alcohol use disorder      Alcoholic cirrhosis (H)      Anticoagulant long-term use     plavix     Ascites      Chronic allergic rhinitis      Chronic anemia      Chronic hepatitis C without hepatic coma (H) " 05/10/2016    Untreated as of 2/2018     Diastolic dysfunction      Erosive gastropathy      Esophageal varices in alcoholic cirrhosis (H)      H/O upper gastrointestinal hemorrhage 09/2017     History of blood transfusion      History of drug abuse     intranasal     Hypertension     essential     JANELLE (iron deficiency anemia)      Sarahi-Hoffman tear     History     Marijuana abuse      MRSA (methicillin resistant Staphylococcus aureus)      Olecranon bursitis      Portal hypertension (H)      Right shoulder pain     history of rotator cuff repair     S/p TAVR (transcatheter aortic valve replacement),   bioprosthetic      Severe aortic stenosis      Thrombocytopenia (H)        PAST SURGICAL HISTORY:  Past Surgical History:   Procedure Laterality Date     ARTHROSCOPY SHOULDER ROTATOR CUFF REPAIR  7/31/2012    Procedure: ARTHROSCOPY SHOULDER ROTATOR CUFF REPAIR;  Right   Shoulder Arthroscopic Rotator Cuff Repair, BicepsTenodesis,  Subacromial Decompression ;  Surgeon: Joi Castillo MD;    [facility]     ESOPHAGOSCOPY, GASTROSCOPY, DUODENOSCOPY (EGD), COMBINED N/A   10/23/2017    Procedure: COMBINED ESOPHAGOSCOPY, GASTROSCOPY, DUODENOSCOPY   (EGD);;  Surgeon: Gentry Salas MD;  [facility]: [facility]     EXCHANGE POLY COMPONENT ARTHROPLASTY KNEE Right 3/4/2019    Procedure: REVISION RIGHT TOTAL KNEE POLY COMPONENT EXCHANGE;    Surgeon: Olvin Joe MD;  Location: [facility]     FACIAL RECONSTRUCTION SURGERY  1971     HEART CATH FEMORAL CANNULIZATION WITH OPEN STANDBY REPAIR   AORTIC VALVE N/A 2/21/2018    Procedure: HEART CATH FEMORAL CANNULIZATION WITH OPEN STANDBY   REPAIR AORTIC VALVE;;  Surgeon: Luis Baird MD;    [facility]     IRRIGATION AND DEBRIDEMENT UPPER EXTREMITY, COMBINED  1/3/2012    Procedure:COMBINED IRRIGATION AND DEBRIDEMENT UPPER EXTREMITY;   Irrigation & Debridement Left Elbow; Surgeon:CRISTHIAN ZHOU; Location:[facility]     OPTICAL TRACKING SYSTEM  "ARTHROPLASTY KNEE Right 2/7/2019    Procedure: ARTHROPLASTY KNEE RIGHT;  Surgeon: Olvin Joe MD;  [facility]     REPAIR TENDON TRICEPS UPPER EXTREMITY  11/8/2011    Procedure:REPAIR TENDON TRICEPS UPPER EXTREMITY; Surgeon:CRISTHIAN ZHOU; [facility]     SHOULDER SURGERY  2003    left, injury, torn tendons, hematoma     TRANSCATHETER AORTIC VALVE IMPLANT ANESTHESIA N/A 2/21/2018    Procedure: TRANSCATHETER AORTIC VALVE IMPLANT ANESTHESIA;    Transfemoral (Quiroz) Aortic Valve Implant 26mm MARTHA 3, with   Cardiopulmonary Bypass Standby, transthoracic echocardiogram;    Surgeon: GENERIC ANESTHESIA PROVIDER;  Location: [facility]     TRANSPOSITION ULNAR NERVE (ELBOW)  11/8/2011    Procedure:TRANSPOSITION ULNAR NERVE (ELBOW); Final Procedure   Done: Left Elbow Lateral Ulnar Collateral Repair And  Left Elbow   Triceps Repair         ALLERGIES:  Zolpidem; Cats; Dogs; and Pollen extract    CURRENT MEDICATIONS:  Current Facility-Administered Medications   Medication     [START ON 3/7/2019] acetaminophen (TYLENOL) tablet 650 mg     acetaminophen (TYLENOL) tablet 975 mg     aspirin (ASA) EC tablet 325 mg     benzocaine-menthol (CEPACOL) 15-3.6 MG lozenge 1 lozenge     bupivacaine liposome (EXPAREL) LONG ACTING injection was   administered into the infiltration site to produce postsurgical   analgesia. Duration of action is up to 72 hours, and other   \"amaris\" medications should not be given for 96 hours with the   exception of the lidocaine 5% patch (LIDODERM) and the lidocaine   10mg in potassium infusions. This entry is for INFORMATION ONLY.     diazepam (VALIUM) tablet 5 mg     diphenhydrAMINE (BENADRYL) capsule 25 mg    Or     diphenhydrAMINE (BENADRYL) injection 25 mg     docusate sodium (COLACE) capsule 100 mg     ferrous sulfate (FEROSUL) tablet 325 mg     fluticasone (FLONASE) 50 MCG/ACT spray 2 spray     heparin lock flush 10 UNIT/ML injection 2-5 mL     hydrOXYzine (ATARAX) tablet 25 mg     " lactated ringers infusion     lidocaine (LMX4) cream     lidocaine (LMX4) cream     lidocaine 1 % 0.1-1 mL     lidocaine 1 % 0.5-5 mL     lisinopril (PRINIVIL/ZESTRIL) tablet 20 mg     loratadine (CLARITIN) tablet 10 mg     multivitamin, therapeutic (THERA-VIT) tablet 1 tablet     naloxone (NARCAN) injection 0.1-0.4 mg     ondansetron (ZOFRAN-ODT) ODT tab 4 mg    Or     ondansetron (ZOFRAN) injection 4 mg     oxyCODONE (ROXICODONE) tablet 5 mg     oxyCODONE (ROXICODONE) tablet 5-10 mg     piperacillin-tazobactam (ZOSYN) 3.375 g vial to attach to NS   100 mL bag     prochlorperazine (COMPAZINE) injection 10 mg    Or     prochlorperazine (COMPAZINE) tablet 10 mg     sodium chloride (PF) 0.9% PF flush 3 mL     sodium chloride (PF) 0.9% PF flush 3 mL     sodium chloride (PF) 0.9% PF flush 5-50 mL     Td (tetanus & diphtheria toxoids) -  adult formulation - for   ages 7 years and older     vancomycin (VANCOCIN) 1,500 mg in sodium chloride 0.9 % 250 mL   intermittent infusion     vitamin B1 (THIAMINE) tablet 50 mg       FAMILY HISTORY:  Family History   Problem Relation Age of Onset     Cancer Mother 62     Alcoholism Paternal Uncle      No Known Problems Father      No Known Problems Brother      Diabetes Maternal Grandmother      Myocardial Infarction Maternal Grandfather      No Known Problems Paternal Grandmother      Unknown/Adopted Paternal Grandfather      Cirrhosis No family hx of        SOCIAL HISTORY:  Social History     Socioeconomic History     Marital status: Single     Spouse name: Not on file     Number of children: 0     Years of education: Not on file     Highest education level: Not on file   Occupational History     Occupation: unemployed   Social Needs     Financial resource strain: Not on file     Food insecurity:     Worry: Not on file     Inability: Not on file     Transportation needs:     Medical: Not on file     Non-medical: Not on file   Tobacco Use     Smoking status: Never Smoker     Smokeless  "tobacco: Never Used   Substance and Sexual Activity     Alcohol use: Yes     Comment: Alcohol use disorder, still actively drinking     Drug use: No     Comment: denies     Sexual activity: Not Currently     Partners: Female   Lifestyle     Physical activity:     Days per week: Not on file     Minutes per session: Not on file     Stress: Not on file   Relationships     Social connections:     Talks on phone: Not on file     Gets together: Not on file     Attends Bahai service: Not on file     Active member of club or organization: Not on file     Attends meetings of clubs or organizations: Not on file     Relationship status: Not on file     Intimate partner violence:     Fear of current or ex partner: Not on file     Emotionally abused: Not on file     Physically abused: Not on file     Forced sexual activity: Not on file   Other Topics Concern     Parent/sibling w/ CABG, MI or angioplasty before 65F 55M? Not   Asked   Social History Narrative    .  Bicycles a lot.  Excessive alcohol use.    Smokes cigars.  Occasional marijuana use.       REVIEW OF SYSTEMS:  Positive for chills and sweats prior to hospital admission.   Positive for nausea prior to admission. No chest pain or   shortness of breath. Positive for \"liver problems.\" No abdominal   pain. Positive for a slow worsening of his vision of the course   of many months that has occurred in both eyes. No changes in   hearing. No back pain. Positive for right knee pain that is   currently well controlled. No loss of sensation on his toes   according to the patient. A 12-point ROS is otherwise negative.      LABORATORY RESULTS:  Inflammatory Markers    Recent Labs   Lab Test 02/28/19  0046 03/25/18  1108 05/30/12  1410 01/03/12  1615 11/29/11  0354 11/01/11  1636   SED 9  --  3 7 6 7   CRP 10.6* <2.9  --  <5.0 <5.0  --        Hematology Studies    Recent Labs   Lab Test 03/05/19  0752 03/04/19  1730 02/27/19 2010 02/09/19  0613 " 02/08/19  0536  02/07/19  1706 01/15/19  1731 03/25/18  1108  02/22/18  0421  02/01/18  0755   WBC  --  8.7 13.2* 11.0 15.1*  --  5.8 6.9 6.3   < > 6.9   < >   5.1   ANEU  --   --  8.5* 6.4 13.0*  --   --   --  3.6  --  4.3  --    2.6   AEOS  --   --  0.0 0.1 0.0  --   --   --  0.1  --  0.1  --  0.1   HGB Unsatisfactory specimen - clotted 12.3* 13.7 12.5* 12.8*  --    14.8 15.0 12.1*   < > 9.8*   < > 10.0*   MCV  --  96 99 99 96  --  98 96 86   < > 80   < > 75*   PLT  --  72* 98* 74* 41*   < > 44* 57* 43*   < > 53*   < > 97*    < > = values in this interval not displayed.       Metabolic Studies     Recent Labs   Lab Test 02/27/19 2010 02/07/19  0850 01/15/19  1731 10/11/18  0932 03/25/18  1108 03/15/18  1415     --  136 138 139 139   POTASSIUM 3.6 3.7 4.1 4.2 4.1 3.6   CHLORIDE 101  --  104 107 109 105   CO2 25  --  25 24 23 25   BUN 14  --  20 22 17 16   CR 0.88 0.78 0.81 0.82 0.86 0.83   GFRESTIMATED >90 >90 >90 >90 >90 >90       Hepatic Studies    Recent Labs   Lab Test 03/25/18  1108 02/22/18  0421 02/21/18  1129 02/21/18  0625 12/13/17  1122 12/04/17  0754   BILITOTAL 0.9 1.4* 0.9 0.6 0.8 1.3   ALKPHOS 108 87 81 104 88 93   ALBUMIN 3.0* 3.2* 3.2* 3.3* 3.3* 3.3*   AST 99* 71* 75* 88* 115* 115*   ALT 97* 70 76* 93* 113* 99*         Microbiology:  Culture Micro   Date Value Ref Range Status   03/04/2019 Culture negative monitoring continues  Preliminary   03/04/2019 PENDING  Preliminary   03/04/2019 Culture negative monitoring continues  Preliminary   03/04/2019 PENDING  Preliminary   03/04/2019 Culture negative monitoring continues  Preliminary   03/04/2019 Culture negative monitoring continues  Preliminary   03/04/2019 Culture negative monitoring continues  Preliminary   03/04/2019 PENDING  Preliminary   02/27/2019 No growth  Final   03/25/2018 No growth  Final   10/23/2017 No growth  Final   10/23/2017 No growth  Final   03/28/2012   Final    Canceled, Test credited Specimen not received  Specimen not  collected per Sarah @ [facility] 3/29/12 1235   LMP   03/28/2012   Final    Canceled, Test credited Specimen not received  Specimen not collected per Sarah @ [facility] 3/29/12 12:35 LMP     02/08/2012 No growth  Final   01/03/2012 No anaerobes isolated  Final   01/03/2012 Culture negative after 4 weeks  Final   01/03/2012   Final    Light growth Staphylococcus aureus  Critical Value, preliminary result only, called to and read back   by Dr. Garcia   1/4/11 jf 1128 am  Susceptibility testing done on previous specimen   01/03/2012 No anaerobes isolated  Final   01/03/2012 Culture negative after 4 weeks  Final   01/03/2012   Final    Light growth Staphylococcus aureus  Critical Value called to and read back by Dr Garcia @ 11:30   1/4/12, EA   11/29/2011 No growth  Final   11/08/2011 No anaerobes isolated  Final   11/08/2011   Final    Incorrectly ordered by PCU/Clinic  Canceled, Test credited   11/08/2011   Final    Light growth Staphylococcus aureus  Critical Value, preliminary result only, called to and read back   by Dr Petty   ([id number]) 11/9/11 @ 1150, JR   11/02/2011 No growth  Final       Urine Studies    Recent Labs   Lab Test 02/07/19  0830 10/23/17  0512 08/25/14  1645   LEUKEST Negative Negative Negative   WBCU <1 2  --        Vancomycin Levels    Recent Labs   Lab Test 11/11/11  0114   VANCOMYCIN 6.1     Hepatitis B Testing   Recent Labs   Lab Test 11/03/11  0854   HBSAB 0.0   HEPBANG Negative     Hepatitis C Testing     Hepatitis C Antibody   Date Value Ref Range Status   11/03/2011 (A) NEG Final    Positive   High sample/cutoff ratio, confirmatory testing available.       Vitals:    03/05/19 0150 03/05/19 0450 03/05/19 0550 03/05/19 0803   BP: 129/52 128/76  146/70   BP Location:  Right arm  Right arm   Pulse:  58 91    Resp: 16 13 22 19   Temp:  96.6  F (35.9  C)  96.4  F (35.8  C)   TempSrc:  Oral  Oral   SpO2: 95% 95% 94% 96%     Temp (high/low/average during past 24 hours): Temp (24hrs),    Av.6  F (35.9  C), Min:95.2  F (35.1  C), Max:97.3  F (36.3    C)      PHYSICAL EXAMINATION:  Vital signs as above  General: Very pleasant and talkative man who is lying in bed and   has a surgical dressing with a compression bandage from the OR on   the right lower extremity from the thigh proximally to the calf   distally.   Lymph nodes exam: No submental, cervical, supraclavicular,   axillary, or inguinal nodes were palpable.   HEENT: NCAT. EOMI. No conjunctival hemorrhage. No scleral   icterus. EOMI. Vision and hearing are grossly intact. Poor   dentition. Tongue with midline grey area.  Neck: Supple  Back: No costovertebral angle tenderness or spinal tenderness.   Lungs: Clear to auscultation  Cardiac: RRR S1S2 without murmur. Examination of the heart is   limited as a result of using the yellow stethoscope in the   patient's room as he is on contact isolation.    Abdomen: Normal bowel sounds, soft, non-tender.  Extremities: Surgical dressing with compression bandage from the   OR on the right lower extremity from the thigh proximally to the   calf distally. No pedal edema.  Neuro: AOX3. CN II-XII intact (I did not test gag or corneal   reflex). Able to move all four extremities. Engaging and   talkative. Able to sit up for the lung examination despite the   presence of the surgical dressing (and compression bandage)    Electronically signed by Josué Joyner M.D.  3/5/2019 12:10   PM  --   Internal Medicine Adult IP Consult for [facility]: Patient to be seen: Routine within 24 hrs; Call back #: [phone]; post op; Consultant may enter orders: Yes; Requesting provider? Attending physician    Billie Desir MD     3/5/2019 12:32 AM  [place] [facility] Consult    Reason for consult:postoperative management         Assessment and Recommendation:   57 yo man POD 0 right knee revision.    Postoperative:   -pain control with oxycodone  - bowel regimen  -ADAT  - ASA for ppx per  ortho  - LR at 75 mL/hr    HTN:  - continue home labetalol, lisinopril    Medicine will continue to follow  Billie Asif MD PhD           History of Present Illness:   This patient is a 59 yo man s/p right knee revision today. He had   an infection and wound dehiscence previously. He has had several   other ortho procedures as well. He was feeling great when I saw   him and wanted to know when physical therapy would be coming to   his home and what he could have for breakfast. Denies nausea.   Pain well controlled.            Past Medical History:     Past Medical History:   Diagnosis Date     Alcohol use disorder      Alcoholic cirrhosis (H)      Anticoagulant long-term use     plavix     Ascites      Chronic allergic rhinitis      Chronic anemia      Chronic hepatitis C without hepatic coma (H) 05/10/2016    Untreated as of 2/2018     Diastolic dysfunction      Erosive gastropathy      Esophageal varices in alcoholic cirrhosis (H)      H/O upper gastrointestinal hemorrhage 09/2017     History of blood transfusion      History of drug abuse     intranasal     Hypertension     essential     JANELLE (iron deficiency anemia)      Sarahi-Hoffman tear     History     Marijuana abuse      MRSA (methicillin resistant Staphylococcus aureus)      Olecranon bursitis      Portal hypertension (H)      Right shoulder pain     history of rotator cuff repair     S/p TAVR (transcatheter aortic valve replacement),   bioprosthetic      Severe aortic stenosis      Thrombocytopenia (H)              Past Surgical History:      Past Surgical History:   Procedure Laterality Date     ARTHROSCOPY SHOULDER ROTATOR CUFF REPAIR  7/31/2012    Procedure: ARTHROSCOPY SHOULDER ROTATOR CUFF REPAIR;  Right   Shoulder Arthroscopic Rotator Cuff Repair, BicepsTenodesis,  Subacromial Decompression ;  Surgeon: Joi Castillo MD;    [facility]     ESOPHAGOSCOPY, GASTROSCOPY, DUODENOSCOPY (EGD), COMBINED N/A   10/23/2017    Procedure: COMBINED  ESOPHAGOSCOPY, GASTROSCOPY, DUODENOSCOPY   (EGD);;  Surgeon: Gentry Salas MD;  Location: [facility]     FACIAL RECONSTRUCTION SURGERY  1971     HEART CATH FEMORAL CANNULIZATION WITH OPEN STANDBY REPAIR   AORTIC VALVE N/A 2/21/2018    Procedure: HEART CATH FEMORAL CANNULIZATION WITH OPEN STANDBY   REPAIR AORTIC VALVE;;  Surgeon: Luis Baird MD;    [facility]     IRRIGATION AND DEBRIDEMENT UPPER EXTREMITY, COMBINED  1/3/2012    Procedure:COMBINED IRRIGATION AND DEBRIDEMENT UPPER EXTREMITY;   Irrigation & Debridement Left Elbow; Surgeon:CRISTHIAN ZHOU; Location:[facility]     OPTICAL TRACKING SYSTEM ARTHROPLASTY KNEE Right 2/7/2019    Procedure: ARTHROPLASTY KNEE RIGHT;  Surgeon: Olvin Joe MD;  [facility]     REPAIR TENDON TRICEPS UPPER EXTREMITY  11/8/2011    Procedure:REPAIR TENDON TRICEPS UPPER EXTREMITY; Surgeon:CRISTHIAN ZHOU; [facility]     SHOULDER SURGERY  2003    left, injury, torn tendons, hematoma     TRANSCATHETER AORTIC VALVE IMPLANT ANESTHESIA N/A 2/21/2018    Procedure: TRANSCATHETER AORTIC VALVE IMPLANT ANESTHESIA;    Transfemoral (Quiroz) Aortic Valve Implant 26mm MARTHA 3, with   Cardiopulmonary Bypass Standby, transthoracic echocardiogram;    Surgeon: GENERIC ANESTHESIA PROVIDER;  Location: [facility]     TRANSPOSITION ULNAR NERVE (ELBOW)  11/8/2011    Procedure:TRANSPOSITION ULNAR NERVE (ELBOW); Final Procedure   Done: Left Elbow Lateral Ulnar Collateral Repair And  Left Elbow   Triceps Repair               Social History:     Social History     Tobacco Use     Smoking status: Never Smoker     Smokeless tobacco: Never Used   Substance Use Topics     Alcohol use: Yes     Comment: Alcohol use disorder, still actively drinking             Family History:     Family History   Problem Relation Age of Onset     Cancer Mother 62     Alcoholism Paternal Uncle      No Known Problems Father      No Known Problems Brother      Diabetes Maternal  Grandmother      Myocardial Infarction Maternal Grandfather      No Known Problems Paternal Grandmother      Unknown/Adopted Paternal Grandfather      Cirrhosis No family hx of      Family history reviewed          Medications:     Medications Prior to Admission   Medication Sig Dispense Refill Last Dose     aspirin (ASA) 325 MG EC tablet Take 1 tablet (325 mg) by mouth   daily 30 tablet 0 Past Week at Unknown time     celecoxib (CELEBREX) 100 MG capsule Take 1 capsule (100 mg) by   mouth 2 times daily 28 capsule 0 Past Week at Unknown time     hydrOXYzine (VISTARIL) 25 MG capsule Take 1 capsule (25 mg) by   mouth 3 times daily as needed (muscle relaxant) 60 capsule 0 Past   Week at Unknown time     oxyCODONE (ROXICODONE) 5 MG tablet 1-2 tabs every 6 hours as   needed for pain. 60 tablet 0 3/4/2019 at Unknown time     diclofenac (VOLTAREN) 1 % topical gel Apply 4 grams to knees   four times daily using enclosed dosing card. 100 g 1 More than a   month at Unknown time     ferrous sulfate (FEROSUL) 325 (65 Fe) MG tablet Take 1 tablet   (325 mg) by mouth At Bedtime 90 tablet 2 2/26/2019 at Unknown   time     fluticasone (FLONASE) 50 MCG/ACT nasal spray Spray 2 sprays   into both nostrils daily 3 Bottle 3 Past Month at Unknown time     lisinopril (PRINIVIL/ZESTRIL) 20 MG tablet Take 1 tablet (20   mg) by mouth At Bedtime 90 tablet 3 2/27/2019 at Unknown time     loratadine (CLARITIN) 10 MG tablet Take 1 tablet (10 mg) by   mouth daily 90 tablet 2 Unknown at Unknown time     Multiple Vitamin (MULTIVITAMINS PO) Take 1 tablet by mouth At   Bedtime    2/26/2019 at Unknown time     order for DME Equipment being ordered: Cane ()  Treatment Diagnosis: Gait Instability 1 each 0      oxyCODONE (ROXICODONE) 5 MG tablet Take 1 tablet (5 mg) by   mouth every 6 hours as needed for severe pain 40 tablet 0      pantoprazole (PROTONIX) 40 MG EC tablet Take 1 tablet (40 mg)   by mouth 2 times daily (before meals) 180 tablet 2  2/26/2019 at   Unknown time             Review of Systems:   The Review of Systems is negative other than noted in the HPI           Physical Exam:   Vitals were reviewed  Temp: 95.2  F (35.1  C) Temp src: Oral BP: 146/78 Pulse: 78 Heart   Rate: 78 Resp: 11 SpO2: 96 % O2 Device: None (Room air) Oxygen   Delivery: 8 LPM  General: NAD  HEENT: no scleral icterus, MMM  Pulm: CTAB  CV: RRR, no m/r/g  Abd: soft, nontender  Extremities: feet warm and well perfused  Neuro: alert, conversant  Psych: appropriate mood and affect           Data:     Results for orders placed or performed during the hospital   encounter of 03/04/19 (from the past 24 hour(s))   CBC with platelets   Result Value Ref Range    WBC 8.7 4.0 - 11.0 10e9/L    RBC Count 3.65 (L) 4.4 - 5.9 10e12/L    Hemoglobin 12.3 (L) 13.3 - 17.7 g/dL    Hematocrit 35.0 (L) 40.0 - 53.0 %    MCV 96 78 - 100 fl    MCH 33.7 (H) 26.5 - 33.0 pg    MCHC 35.1 31.5 - 36.5 g/dL    RDW 12.7 10.0 - 15.0 %    Platelet Count 72 (L) 150 - 450 10e9/L   Anaerobic bacterial culture   Result Value Ref Range    Specimen Description Right Knee Fluid Aspirate SPECIMEN 1     Special Requests Received in anaerobic tubes.     Culture Micro PENDING    Fluid Culture Aerobic Bacterial   Result Value Ref Range    Specimen Description Right Knee Fluid Aspirate SPECIMEN 1     Culture Micro PENDING    Gram stain   Result Value Ref Range    Specimen Description Right Knee Fluid Aspirate SPECIMEN 1     Gram Stain No organisms seen     Gram Stain Moderate  PMNs seen       Gram Stain       Gram stain and culture performed on concentrated specimen   Tissue Culture Aerobic Bacterial   Result Value Ref Range    Specimen Description Right Knee Tissue Posterior Joint SPECIMEN   4     Culture Micro PENDING    Anaerobic bacterial culture   Result Value Ref Range    Specimen Description Right Knee Fluid PREPATELLAR BURSA SWAB   SPECIMEN 5     Special Requests       This specimen was received on a swab. Results  may not be   optimal. For maximum sensitivity   of detection, submit tissue, fluid, or needle aspirate.  Received in anaerobic tubes.      Culture Micro PENDING    Fluid Culture Aerobic Bacterial   Result Value Ref Range    Specimen Description Right Knee Fluid PREPATELLAR BURSA SWAB   SPECIMEN 5     Special Requests       This specimen was received on a swab. Results may not be   optimal. For maximum sensitivity   of detection, submit tissue, fluid, or needle aspirate.  Received in anaerobic tubes.      Culture Micro PENDING          Billie Asif MD     Hemoglobin   Result Value Ref Range    Hemoglobin Unsatisfactory specimen - clotted 13.3 - 17.7 g/dL   Glucose   Result Value Ref Range    Glucose 121 (H) 70 - 99 mg/dL   Hemoglobin   Result Value Ref Range    Hemoglobin 11.7 (L) 13.3 - 17.7 g/dL     Last Comprehensive Metabolic Panel:  Sodium   Date Value Ref Range Status   02/27/2019 138 133 - 144 mmol/L Final     Potassium   Date Value Ref Range Status   02/27/2019 3.6 3.4 - 5.3 mmol/L Final     Chloride   Date Value Ref Range Status   02/27/2019 101 94 - 109 mmol/L Final     Carbon Dioxide   Date Value Ref Range Status   02/27/2019 25 20 - 32 mmol/L Final     Anion Gap   Date Value Ref Range Status   02/27/2019 12 3 - 14 mmol/L Final     Glucose   Date Value Ref Range Status   03/05/2019 121 (H) 70 - 99 mg/dL Final     Urea Nitrogen   Date Value Ref Range Status   02/27/2019 14 7 - 30 mg/dL Final     Creatinine   Date Value Ref Range Status   02/27/2019 0.88 0.66 - 1.25 mg/dL Final     GFR Estimate   Date Value Ref Range Status   02/27/2019 >90 >60 mL/min/[1.73_m2] Final     Comment:     Non  GFR Calc  Starting 12/18/2018, serum creatinine based estimated GFR (eGFR) will be   calculated using the Chronic Kidney Disease Epidemiology Collaboration   (CKD-EPI) equation.       Calcium   Date Value Ref Range Status   02/27/2019 9.0 8.5 - 10.1 mg/dL Final                Assessment and Plan:   1)   S/P Resection of prepatellar bursa.   Open debridement, right total knee arthroplasty. Exchange polyethylene right knee.  Complete synovectomy.  Indication right acute infection, right total knee arthroplasty, with wound dehiscence 3/4.  Dr. Joe. GPC in clusters.  On zosyn and Vanco.  Fair pain control.  Possible vasovagal episode this AM.  Possible opiate tolerance. Believes better relief with dilaudid.  2)  Acute blood loss anemia, mild.  3)  HTN. Borderline potentially aggravated by pain.   4)  Alcohol use disorder.  5)  Alcoholic cirrhosis - no clinical suggestion of ascites or encephalopathy.  Tolerating opiates.   6)  Chronic hepatitis C.   7)  S/P TAVR for severe aortic stenosis.  8)  H/o thrombocytopenia.     PLAN:  1)  Review meds, orders.  2)  Change po oxycodone to dilaudid 2-4 mg q3h prn.  3)  SL IV.  4)  Monitor BP with mobilization.  5)  IS, bowel meds, ASA, mobilize as tolerated.  6)  Check CMP, Mg. - parameters for lisinopril.  7)  F/u culture data.  ABs per ID. PICC 8)  Trend labs.  Monitor clinically.   Disposition Plan   Expected discharge in 2-3 days to transitional care unit once AB plan in place and adequate pain control. .     Entered: Sean Shankar 03/05/2019, 2:34 PM              Attestation:  I have reviewed today's vital signs, notes, medications, labs and imaging.     Sean Shankar MD

## 2019-03-05 NOTE — OP NOTE
Procedure Date: 03/04/2019      PREOPERATIVE DIAGNOSES:  Acute infection, right total knee arthroplasty, with wound dehiscence.      POSTOPERATIVE DIAGNOSES:  Acute infection, right total knee arthroplasty, with wound dehiscence.      PROCEDURES PERFORMED:   1.  Resection of prepatellar bursa.   2.  Open debridement, right total knee arthroplasty.   3.  Exchange polyethylene right knee.   4.  Complete synovectomy, right knee.      SURGEON:  Olvin Joe MD      FIRST ASSISTANT:  Prashanth Vang MD      INDICATIONS FOR SURGERY:  Ten underwent a right total knee arthroplasty 3-1/2 weeks ago, falling 2 weeks ago with open drainage from his wound since.  His right knee became increasingly painful with increasing amounts of drainage over the last several days.  He was seen in clinic today with gross serosanguineous drainage from his knee and evidence of contamination of the joint.      DESCRIPTION OF PROCEDURE:  Under general anesthesia in the supine position, the limb was exsanguinated by elevation and thoroughly prepped with alcohol and Betadine.  We aspirated the lateral aspect of the knee remote from the wound which had dehisced with purulent drainage.  We found serosanguineous fluid within the joint.  We made a small incision over marked swelling in the prepatellar bursa, where we found serosanguineous fluid, and sent this for culture.  We resected the entire lining of the bursa.  We made a median parapatellar arthrotomy through the previous incision ellipsing the entire incision on the medial and lateral sides.  We then noted that the capsulotomy and extensor repair completely disrupted and the entire area was filled with bloody and gelatinous material.  We sent 4 specimens from within the joint, one from the suprapatellar pouch, one from deep within the notch, the third in the posterior aspect and the forth from the prepatellar bursa.  We painstakingly debrided all surfaces with a Olea elevator, removing all  the nonviable tissue.  We then did a complete synovectomy of the suprapatellar pouch and as best we could the posterior aspect and the medial and lateral gutters after we had removed the polyethylene. A polyethylene insert was then reinserted after complete resection and debridement of the joint was completed. We pulse lavaged the joint with 3 liters of normal saline.  We then pulse lavaged the joint with 3 liters of triple antibiotic.  We then soaked the joint for a total of 20 minutes in ophthalmic Betadine.  We drained the joint and we again cleansed the joint thoroughly and closed in layers over a Hemovac drain with interrupted 0 PDS, 2-0 PDS and skin clips.  We applied a compression bandage and returned the patient to recovery in good condition.      POSTOPERATIVE PLAN:  We intend to have Internal Medicine consultation, prophylax for alcoholism with thiamine, multivitamins and Ativan or Librium as chosen.  We intend to get an Infectious Disease consultation and we have begun vancomycin antibiotics at the present time with intention to use a PICC line for long-term antibiotics.  If the wound and the joint respond nicely to this debridement, no further surgical intervention will be planned.         KARLEY JOHNSON MD             D: 2019   T: 2019   MT: DANIEL      Name:     PHYLLIS CORREA   MRN:      [id number]        Account:        [id number]   :      1960           Procedure Date: 2019      Document: [id number]

## 2019-03-05 NOTE — ANESTHESIA PROCEDURE NOTES
Peripheral Nerve Block Procedure Note  Date/Time: 3/4/2019 8:40 PM    Staff:     Anesthesiologist:  Bradly Back MD    Resident/CRNA:  Nereida Olsen MD    Block performed by resident/CRNA in the presence of a teaching physician    Location: PACU  Procedure Start/Stop TImes:      3/4/2019 8:30 PM     3/4/2019 8:40 PM    patient identified, IV checked, site marked, risks and benefits discussed, informed consent, monitors and equipment checked, pre-op evaluation, at physician/surgeon's request and post-op pain management      Correct Patient: Yes      Correct Position: Yes      Correct Site: Yes      Correct Procedure: Yes      Correct Laterality:  Yes    Site Marked:  Yes  Procedure details:     Procedure:  Adductor canal    ASA:  3    Diagnosis:  Post op analgesia     Laterality:  Right    Position:  Supine    Sterile Prep: chloraprep, mask and sterile gloves      Needle:  Insulated    Needle gauge:  21    Needle length (inches):  4    Ultrasound: Yes      Ultrasound used to identify targeted nerve, plexus, or vascular structure and placed a needle adjacent to it      Permanent Image entered into patiient's record      Abnormal pain on injection: No      Blood Aspirated: No      Paresthesias:  No    Bleeding at site: No      Bolus via:  Needle    Infusion Method:  Single Shot    Complications:  None  Assessment/Narrative:     Injection made incrementally with aspirations every (mL):  5

## 2019-03-05 NOTE — PHARMACY-VANCOMYCIN DOSING SERVICE
Pharmacy Vancomycin Initial Note  Date of Service 2019  Patient's  1960  58 year old, male    Indication: Bone and Joint Infection    Current estimated CrCl = Estimated Creatinine Clearance: 103.5 mL/min (based on SCr of 0.88 mg/dL).    Creatinine for last 3 days  No results found for requested labs within last 72 hours.    Recent Vancomycin Level(s) for last 3 days  No results found for requested labs within last 72 hours.      Vancomycin IV Administrations (past 72 hours)                   vancomycin (VANCOCIN) 1,500 mg in sodium chloride 0.9 % 250 mL intermittent infusion (mg) 1,500 mg New Bag 19 0622    vancomycin (VANCOCIN) 1,500 mg in sodium chloride 0.9 % 250 mL intermittent infusion (mg) 1,500 mg New Bag 19 1805                Nephrotoxins and other renal medications (From now, onward)    Start     Dose/Rate Route Frequency Ordered Stop    19 1800  vancomycin (VANCOCIN) 1,500 mg in sodium chloride 0.9 % 250 mL intermittent infusion      1,500 mg  over 90 Minutes Intravenous EVERY 12 HOURS 19 1031      19 1000  piperacillin-tazobactam (ZOSYN) 3.375 g vial to attach to  mL bag      3.375 g  over 30 Minutes Intravenous EVERY 6 HOURS 19 0958      19 2300  lisinopril (PRINIVIL/ZESTRIL) tablet 20 mg      20 mg Oral AT BEDTIME 19 1929            Contrast Orders - past 72 hours (72h ago, onward)    None                Plan:  1.  Start vancomycin  1500 mg IV q12h.   2.  Goal Trough Level: 15-20 mg/L   3.  Pharmacy will check trough levels as appropriate in 1-3 Days.    4. Serum creatinine levels will be ordered daily for the first week of therapy and at least twice weekly for subsequent weeks.      Emily Kim

## 2019-03-05 NOTE — PROGRESS NOTES
Care Coordinator Progress Note    Admission Date/Time:  3/4/2019  Attending MD:  Olvin Joe MD    Data  Chart reviewed, discussed with interdisciplinary team.   Patient was admitted for: Data Unavailable.    Concerns with insurance coverage for discharge needs: no insurance coverage.  Current Living Situation: Patient lives alone.  Support System: limited suppport sytem but does have some friends that can can help with limited needs  Services Involved: No services in home prior to admit  Transportation at Discharge: TBD  Transportation to Medical Appointments:   - Not applicable  Barriers to Discharge: Medical Clearance    Coordination of Care and Referrals: Provided patient/family with options for Home Infusion.        Assessment  This RNCC met with patient at bedside to discuss discharge planning and introduce my role as RNCC. Patient states that he lives in an old mansion divided into 5 apartments and he is very isolated, he has a friend/neighbor who comes over to help him bath if necessary. Patient states he is self insured through  [employer] provided this RNCC with copy of his [employer] insurance card information.   Requested that inpatient registration enter information, was informed that this insurance had terminated on 3/1/19, patient was notified. Patient will contact his insurance company.    Patient to require IV ABX at discharge    ADDENDUM:-verified with Alexandre at [employer] that this patient has coverage for Home IV ABX and therapy  Per [facility]: Pt does have active coverage with 3rd party, [employer] through [employer]. Pt has a ded $100, then 85% up to max OOP $1500. Ded $100 ($48 met so far), then 85% up to OOP $1500 ($48 met so far). Once ded and OOP are met, pt will have 100% coverage.      Patient is insured through [employer]  [facility] # [id number]  ID# [id number]  Customer Service Verification of Benefits. [phone]         Plan  Anticipated Discharge Date:  TBD  Anticipated Discharge Plan:   TBD    Verenice PERLAN RN CCM  RN Care Coordinator 10A-covering [address]A  [address] [address]  [email or web address]  Office: [phone]  Pager: [phone]    To contact Weekend RNCC, dial [phone] and enter job code [phone] at prompt. This pager can not be contacted by text page or outside line.

## 2019-03-05 NOTE — OR NURSING
Dr. Back aware that last blood pressure was 142/103. Okay with that, no hydralazine needed at this point.

## 2019-03-05 NOTE — OR NURSING
Dr. Back and Dr. Olsen doing adductor canal block on right leg due to increased pain with exparel. Priscila Valle RN at beside to assist.  Pt tolerated well. Received versed and dilaudid prior to procedure.  Per dr. back will reevaluate in 30 minutes if doesn't work.

## 2019-03-05 NOTE — ANESTHESIA CARE TRANSFER NOTE
Patient: Ten Johnson    Procedure(s):  REVISION RIGHT TOTAL KNEE POLY COMPONENT EXCHANGE    Diagnosis: wound breakdown right knee  Diagnosis Additional Information: No value filed.    Anesthesia Type:   No value filed.     Note:  Airway :Face Mask  Patient transferred to:PACU  Comments: Transported to PACU with FM O2.  VSS.  Report given.  Patient comfortable.Handoff Report: Identifed the Patient, Identified the Reponsible Provider, Reviewed the pertinent medical history, Discussed the surgical course, Reviewed Intra-OP anesthesia mangement and issues during anesthesia, Set expectations for post-procedure period and Allowed opportunity for questions and acknowledgement of understanding      Vitals: (Last set prior to Anesthesia Care Transfer)    CRNA VITALS  3/4/2019 1840 - 3/4/2019 1918      3/4/2019             Resp Rate (observed):  8                Electronically Signed By: SYDNIE López CRNA  March 4, 2019  7:18 PM

## 2019-03-06 ENCOUNTER — APPOINTMENT (OUTPATIENT)
Dept: OCCUPATIONAL THERAPY | Facility: CLINIC | Age: 59
End: 2019-03-06
Attending: ORTHOPAEDIC SURGERY
Payer: COMMERCIAL

## 2019-03-06 ENCOUNTER — HOME INFUSION (PRE-WILLOW HOME INFUSION) (OUTPATIENT)
Dept: PHARMACY | Facility: CLINIC | Age: 59
End: 2019-03-06

## 2019-03-06 ENCOUNTER — APPOINTMENT (OUTPATIENT)
Dept: PHYSICAL THERAPY | Facility: CLINIC | Age: 59
End: 2019-03-06
Attending: ORTHOPAEDIC SURGERY
Payer: COMMERCIAL

## 2019-03-06 LAB
ANION GAP SERPL CALCULATED.3IONS-SCNC: 7 MMOL/L (ref 3–14)
BACTERIA SPEC CULT: ABNORMAL
BUN SERPL-MCNC: 17 MG/DL (ref 7–30)
CALCIUM SERPL-MCNC: 7.9 MG/DL (ref 8.5–10.1)
CHLORIDE SERPL-SCNC: 105 MMOL/L (ref 94–109)
CO2 SERPL-SCNC: 26 MMOL/L (ref 20–32)
CREAT SERPL-MCNC: 0.89 MG/DL (ref 0.66–1.25)
GFR SERPL CREATININE-BSD FRML MDRD: >90 ML/MIN/{1.73_M2}
GLUCOSE SERPL-MCNC: 109 MG/DL (ref 70–99)
HGB BLD-MCNC: 11.4 G/DL (ref 13.3–17.7)
PLATELET # BLD AUTO: 79 10E9/L (ref 150–450)
POTASSIUM SERPL-SCNC: 4.3 MMOL/L (ref 3.4–5.3)
SODIUM SERPL-SCNC: 138 MMOL/L (ref 133–144)
SPECIMEN SOURCE: ABNORMAL
SPECIMEN SOURCE: ABNORMAL
VANCOMYCIN SERPL-MCNC: 9.4 MG/L

## 2019-03-06 PROCEDURE — 25000132 ZZH RX MED GY IP 250 OP 250 PS 637: Performed by: STUDENT IN AN ORGANIZED HEALTH CARE EDUCATION/TRAINING PROGRAM

## 2019-03-06 PROCEDURE — 25000132 ZZH RX MED GY IP 250 OP 250 PS 637: Performed by: INTERNAL MEDICINE

## 2019-03-06 PROCEDURE — 12000001 ZZH R&B MED SURG/OB UMMC

## 2019-03-06 PROCEDURE — 80048 BASIC METABOLIC PNL TOTAL CA: CPT | Performed by: INTERNAL MEDICINE

## 2019-03-06 PROCEDURE — 25000128 H RX IP 250 OP 636: Performed by: ORTHOPAEDIC SURGERY

## 2019-03-06 PROCEDURE — 36592 COLLECT BLOOD FROM PICC: CPT | Performed by: ORTHOPAEDIC SURGERY

## 2019-03-06 PROCEDURE — 80202 ASSAY OF VANCOMYCIN: CPT | Performed by: ORTHOPAEDIC SURGERY

## 2019-03-06 PROCEDURE — 97116 GAIT TRAINING THERAPY: CPT | Mod: GP | Performed by: PHYSICAL THERAPIST

## 2019-03-06 PROCEDURE — 99232 SBSQ HOSP IP/OBS MODERATE 35: CPT | Performed by: INTERNAL MEDICINE

## 2019-03-06 PROCEDURE — 97535 SELF CARE MNGMENT TRAINING: CPT | Mod: GO | Performed by: OCCUPATIONAL THERAPIST

## 2019-03-06 PROCEDURE — 85049 AUTOMATED PLATELET COUNT: CPT | Performed by: INTERNAL MEDICINE

## 2019-03-06 PROCEDURE — 25800030 ZZH RX IP 258 OP 636: Performed by: ORTHOPAEDIC SURGERY

## 2019-03-06 PROCEDURE — 25000128 H RX IP 250 OP 636: Performed by: NURSE PRACTITIONER

## 2019-03-06 PROCEDURE — 85018 HEMOGLOBIN: CPT | Performed by: INTERNAL MEDICINE

## 2019-03-06 RX ADMIN — HYDROMORPHONE HYDROCHLORIDE 4 MG: 2 TABLET ORAL at 22:13

## 2019-03-06 RX ADMIN — ASPIRIN 325 MG: 325 TABLET, DELAYED RELEASE ORAL at 08:10

## 2019-03-06 RX ADMIN — HYDROMORPHONE HYDROCHLORIDE 4 MG: 2 TABLET ORAL at 19:07

## 2019-03-06 RX ADMIN — FERROUS SULFATE TAB 325 MG (65 MG ELEMENTAL FE) 325 MG: 325 (65 FE) TAB at 22:13

## 2019-03-06 RX ADMIN — THIAMINE HCL (VITAMIN B1) 50 MG TABLET 50 MG: at 08:10

## 2019-03-06 RX ADMIN — HYDROMORPHONE HYDROCHLORIDE 4 MG: 2 TABLET ORAL at 12:48

## 2019-03-06 RX ADMIN — PIPERACILLIN AND TAZOBACTAM 3.38 G: 3; .375 INJECTION, POWDER, FOR SOLUTION INTRAVENOUS at 09:21

## 2019-03-06 RX ADMIN — HYDROMORPHONE HYDROCHLORIDE 4 MG: 2 TABLET ORAL at 15:57

## 2019-03-06 RX ADMIN — ACETAMINOPHEN 975 MG: 325 TABLET, FILM COATED ORAL at 08:10

## 2019-03-06 RX ADMIN — FLUTICASONE PROPIONATE 2 SPRAY: 50 SPRAY, METERED NASAL at 08:10

## 2019-03-06 RX ADMIN — HYDROMORPHONE HYDROCHLORIDE 4 MG: 2 TABLET ORAL at 03:32

## 2019-03-06 RX ADMIN — DOCUSATE SODIUM 100 MG: 100 CAPSULE, LIQUID FILLED ORAL at 08:10

## 2019-03-06 RX ADMIN — THERA TABS 1 TABLET: TAB at 22:13

## 2019-03-06 RX ADMIN — LISINOPRIL 20 MG: 20 TABLET ORAL at 22:17

## 2019-03-06 RX ADMIN — HYDROMORPHONE HYDROCHLORIDE 4 MG: 2 TABLET ORAL at 09:21

## 2019-03-06 RX ADMIN — HYDROMORPHONE HYDROCHLORIDE 4 MG: 2 TABLET ORAL at 06:29

## 2019-03-06 RX ADMIN — PIPERACILLIN AND TAZOBACTAM 3.38 G: 3; .375 INJECTION, POWDER, FOR SOLUTION INTRAVENOUS at 22:15

## 2019-03-06 RX ADMIN — VANCOMYCIN HYDROCHLORIDE 1500 MG: 10 INJECTION, POWDER, LYOPHILIZED, FOR SOLUTION INTRAVENOUS at 06:29

## 2019-03-06 RX ADMIN — DOCUSATE SODIUM 100 MG: 100 CAPSULE, LIQUID FILLED ORAL at 19:07

## 2019-03-06 RX ADMIN — VANCOMYCIN HYDROCHLORIDE 1750 MG: 10 INJECTION, POWDER, LYOPHILIZED, FOR SOLUTION INTRAVENOUS at 19:07

## 2019-03-06 RX ADMIN — PIPERACILLIN AND TAZOBACTAM 3.38 G: 3; .375 INJECTION, POWDER, FOR SOLUTION INTRAVENOUS at 03:32

## 2019-03-06 RX ADMIN — ACETAMINOPHEN 975 MG: 325 TABLET, FILM COATED ORAL at 15:58

## 2019-03-06 RX ADMIN — ACETAMINOPHEN 975 MG: 325 TABLET, FILM COATED ORAL at 00:19

## 2019-03-06 RX ADMIN — PIPERACILLIN AND TAZOBACTAM 3.38 G: 3; .375 INJECTION, POWDER, FOR SOLUTION INTRAVENOUS at 15:58

## 2019-03-06 RX ADMIN — HYDROMORPHONE HYDROCHLORIDE 4 MG: 2 TABLET ORAL at 00:19

## 2019-03-06 ASSESSMENT — ACTIVITIES OF DAILY LIVING (ADL)
ADLS_ACUITY_SCORE: 14

## 2019-03-06 NOTE — PLAN OF CARE
VS: VSS   O2: RA   Output: Voiding without difficulty   Last BM: 3/4/2019   Activity: Up at the bedside to void, up with knee immobilizer on    Skin: Intact except for his surgical incision   Pain: Oral Dilaudid q3hrs   CMS: Denies any numbness or tingling   Dressing: CDI   Diet: Regular, tolerating it well   LDA: PICC infusing antibiotics    Equipment: Knee immobilizer, PIV pole   Plan: To have abx therapy for six weeks   Additional Info:

## 2019-03-06 NOTE — PLAN OF CARE
Discharge Planner OT   Patient plan for discharge: Home alone  Current status: Patient met all OT goals, able to complete basic ADLs with use of SEC.  Patient declines any AE (RTS or shower chair).  Patient impulsive throughout session but did not have any LOB.  Barriers to return to prior living situation: None  Recommendations for discharge: Home  Rationale for recommendations: No further inpatient or home OT needs    Occupational Therapy Discharge Summary    Reason for therapy discharge:    Discharged to home.    Progress towards therapy goal(s). See goals on Care Plan in [facility] record for goal details.  Goals met    Therapy recommendation(s):    No further therapy is recommended.           Entered by: Annie Villa 03/06/2019 12:42 PM

## 2019-03-06 NOTE — PROGRESS NOTES
[facility] Internal Medicine Progress Note            Interval History:   Record reviewed.    Seen with RN.  S/P Resection of prepatellar bursa.   Open debridement, right total knee arthroplasty. Exchange polyethylene right knee.  Complete synovectomy.  Indication right acute infection, right total knee arthroplasty, with wound dehiscence 3/4.  Dr. Joe. Staph epidermidis and LFGNR on culture.   On zosyn and Vanco.  Improved pain control with transition to po dilaudid.   Ambulated in castro without dizziness.   No CP, SOB, cough, nausea, reflux, abd pain or distention.  Passing flatus.  Last BM AM 3/4.  Voiding Ok.  PICC placed.           Medications:   All medications reviewed today          Physical Exam:   Blood pressure 145/79, pulse 91, temperature 96  F (35.6  C), temperature source Oral, resp. rate 16, SpO2 100 %.    Intake/Output Summary (Last 24 hours) at 3/5/2019 1434  Last data filed at 3/5/2019 0657  Gross per 24 hour   Intake 1801 ml   Output 860 ml   Net 941 ml       General:  Alert.  Appropriate.  No distress.  Off O2.     Heent:      Neck:    Skin:    Chest:  clear    Cardiac:  Reg without gallop, murmur.  No JVD.     Abdomen:  Non distended, soft, non-tender.  BS normal.     Extremities:  Perfused.  No edema, calf, posterior thigh tenderness to suggest DVT.     Neuro:            Data:     Results for orders placed or performed during the hospital encounter of 03/04/19 (from the past 24 hour(s))   Vancomycin level   Result Value Ref Range    Vancomycin Level 9.4 mg/L   Hemoglobin   Result Value Ref Range    Hemoglobin 11.4 (L) 13.3 - 17.7 g/dL   Basic metabolic panel   Result Value Ref Range    Sodium 138 133 - 144 mmol/L    Potassium 4.3 3.4 - 5.3 mmol/L    Chloride 105 94 - 109 mmol/L    Carbon Dioxide 26 20 - 32 mmol/L    Anion Gap 7 3 - 14 mmol/L    Glucose 109 (H) 70 - 99 mg/dL    Urea Nitrogen 17 7 - 30 mg/dL    Creatinine 0.89 0.66 - 1.25 mg/dL    GFR Estimate >90 >60 mL/min/[1.73_m2]     GFR Estimate If Black >90 >60 mL/min/[1.73_m2]    Calcium 7.9 (L) 8.5 - 10.1 mg/dL     Last Comprehensive Metabolic Panel:  Sodium   Date Value Ref Range Status   03/06/2019 138 133 - 144 mmol/L Final     Potassium   Date Value Ref Range Status   03/06/2019 4.3 3.4 - 5.3 mmol/L Final     Chloride   Date Value Ref Range Status   03/06/2019 105 94 - 109 mmol/L Final     Carbon Dioxide   Date Value Ref Range Status   03/06/2019 26 20 - 32 mmol/L Final     Anion Gap   Date Value Ref Range Status   03/06/2019 7 3 - 14 mmol/L Final     Glucose   Date Value Ref Range Status   03/06/2019 109 (H) 70 - 99 mg/dL Final     Urea Nitrogen   Date Value Ref Range Status   03/06/2019 17 7 - 30 mg/dL Final     Creatinine   Date Value Ref Range Status   03/06/2019 0.89 0.66 - 1.25 mg/dL Final     GFR Estimate   Date Value Ref Range Status   03/06/2019 >90 >60 mL/min/[1.73_m2] Final     Comment:     Non  GFR Calc  Starting 12/18/2018, serum creatinine based estimated GFR (eGFR) will be   calculated using the Chronic Kidney Disease Epidemiology Collaboration   (CKD-EPI) equation.       Calcium   Date Value Ref Range Status   03/06/2019 7.9 (L) 8.5 - 10.1 mg/dL Final                Assessment and Plan:   1)  S/P Resection of prepatellar bursa.   Open debridement, right total knee arthroplasty. Exchange polyethylene right knee.  Complete synovectomy.  Indication right acute infection, right total knee arthroplasty, with wound dehiscence 3/4.  Dr. Joe.  Staph epidermidis and LFGNR on culture.   On zosyn and Vanco.  Improved pain control on dilaudid.   2)  Acute blood loss anemia, mild.  3)  HTN. Borderline - monitor for now.   4)  Alcohol use disorder.  5)  Alcoholic cirrhosis - no clinical suggestion of ascites or encephalopathy.  Tolerating opiates.   6)  Chronic hepatitis C.   7)  S/P TAVR for severe aortic stenosis.  8)  H/o thrombocytopenia 2nd to #5 (possible splenic sequestration).     PLAN:  1)  Same  meds, orders.   2)  Continue po dilaudid 2-4 mg q3h prn.  3)  Monitor BP   4)  Trend labs. Add f/u platelet count.  5)  F/u culture data.  ABs per ID.  6)  IS, bowel meds, mobilize, ASA.  7)  Monitor clinically.   Disposition Plan   Expected discharge in 1-2 days to transitional care unit once AB plan in place.     Entered: Sean Shankar 03/06/2019, 4:56 PM              Attestation:  I have reviewed today's vital signs, notes, medications, labs and imaging.     Sean Shankar MD

## 2019-03-06 NOTE — PHARMACY-VANCOMYCIN DOSING SERVICE
Pharmacy Vancomycin Note  Date of Service 2019  Patient's  1960   58 year old, male    Indication: Bone and Joint Infection   History of MRSA   Culture from R knee tissue: gram positive cocci and gram neg rods   R knee fluid: Staphylococcus epidermidis   Per ID, pt to receive IV vanco and Zosyn for 6 weeks   Goal Trough Level: 15-20 mg/L  Day of Therapy: Started 3/4 pre-op, received 3 doses   Current Vancomycin regimen:  1500 mg IV q12h    Current estimated CrCl = Estimated Creatinine Clearance: 102.4 mL/min (based on SCr of 0.89 mg/dL).    Creatinine for last 3 days  3/5/2019:  7:52 AM Creatinine 0.77 mg/dL  3/6/2019:  6:22 AM Creatinine 0.89 mg/dL    Recent Vancomycin Levels (past 3 days)  3/6/2019:  6:22 AM Vancomycin Level 9.4 mg/L    Vancomycin IV Administrations (past 72 hours)                   vancomycin (VANCOCIN) 1,500 mg in sodium chloride 0.9 % 250 mL intermittent infusion (mg) 1,500 mg New Bag 19 0629     1,500 mg New Bag 19 1800    vancomycin (VANCOCIN) 1,500 mg in sodium chloride 0.9 % 250 mL intermittent infusion (mg) 1,500 mg New Bag 19 0622    vancomycin (VANCOCIN) 1,500 mg in sodium chloride 0.9 % 250 mL intermittent infusion (mg) 1,500 mg New Bag 19 1805                Nephrotoxins and other renal medications (From now, onward)    Start     Dose/Rate Route Frequency Ordered Stop    19 1800  vancomycin (VANCOCIN) 1,500 mg in sodium chloride 0.9 % 250 mL intermittent infusion      1,500 mg  over 90 Minutes Intravenous EVERY 12 HOURS 19 1031      19 1000  piperacillin-tazobactam (ZOSYN) 3.375 g vial to attach to  mL bag      3.375 g  over 30 Minutes Intravenous EVERY 6 HOURS 19 0958      19 2300  lisinopril (PRINIVIL/ZESTRIL) tablet 20 mg      20 mg Oral AT BEDTIME 19 192               Contrast Orders - past 72 hours (72h ago, onward)    None          Interpretation of levels and current regimen:  Trough level is   Subtherapeutic    Has serum creatinine changed > 50% in last 72 hours: No    Urine output:  unable to determine    Renal Function: Stable    Plan:  1.  Change from 1500 mg q12 to   1750 mg q12h (21.3 mg/kg). Increased dose to 1750 mg instead of changing the frequency to q8h due to: pt's age, he is not quite at steady state after 3 doses, and to avoid nephrotoxicity since he is currently receiving Zosyn and vanco together   2.  Pharmacy will check trough levels as appropriate in 1-3 Days.    3. Serum creatinine levels will be ordered daily for the first week of therapy and at least twice weekly for subsequent weeks.      Mervat Edwards

## 2019-03-06 NOTE — PROGRESS NOTES
Orthopaedic Surgery Progress Note:       Subjective:    Patient reports doing well. Pain well controlled on current regimen. Denies new onset tingling/numbness in operative extremity. Denies fever/chills/SOB/nause/vomiting.     Objective:   Temp:  [96.1  F (35.6  C)-96.4  F (35.8  C)] 96.1  F (35.6  C)  Heart Rate:  [65-76] 71  Resp:  [16-19] 16  BP: (124-172)/(70-86) 124/86  SpO2:  [96 %-100 %] 97 %    Intake/Output Summary (Last 24 hours) at 3/5/2019 0751  Last data filed at 3/5/2019 0657  Gross per 24 hour   Intake 1801 ml   Output 860 ml   Net 941 ml       Gen: NAD. Resting comfortably in bed  Resp: Breathing comfortably on RA  LE:  Dressing/ACE is c/d/i.   Drain is draining bloody/serous fluid. 80 ml yesterday, 10 since last night   Circulatory: DP Pulse Intact, Foot Perfused   Neurologic: intact      Labs:  Recent Labs   Lab 03/06/19  0622 03/05/19  1034 03/05/19  0752 03/04/19  1730 02/28/19  0046 02/27/19 2010   WBC  --   --  12.5* 8.7  --  13.2*   HGB 11.4* 11.7* 11.8*  Unsatisfactory specimen - clotted 12.3*  --  13.7   PLT  --   --  84* 72*  --  98*   CRP  --   --   --   --  10.6*  --      Cultures: g+ cocci-      Assessment & Plan:   58 year old male now s/p I + D , and Debridement and Liner Exchange  on 3/4/2019 with Dr. Joe    ID involvement today  PICC line     Activity: WBAT BLE in KI on right. No range until wound healed.   Drain: continue to document  Antibiotics: Vanc given MRSA history, and then as per ID once cultures back  Dressings: To be changed  Diet: ADAT  Pain: PO/IV meds. Transition to PO meds only as patient tolerates  DVT ppx: Aspirin 325 mg D , 4 weeks  Imaging: Post op films reviewed and are satisfactory. No further imaging need at this time.  Labs:   PT/OT: Mobility, ROM, gait training, ADLs  Consults: Appreciate medicine co-management.  Dispo: Pending pain control and PT/OT recs. Remain inpatient for now  Follow up: MEHREEN French MD   PGY-4 Orthopaedic Surgery    [phone]

## 2019-03-06 NOTE — PROGRESS NOTES
Home Infusion  Ten is expected to dc in the next few days and will be going home on IV antibiotics.  Ten has done home IV therapy before.  Met with patient at bedside and provided him with information about [facility] services.  Explained to patient that I liaison will provide teaching prior to discharge once final antibiotic plan determined and that a home nurse will provide additional teaching needed for self-administration.  Informed him about plan for SNV, and 24/7 availability of I staff while on IV therapy.     Patient verbalized understanding of all information given.   He is willing and able to learn and manage home IV therapy.  Questions answered.  Will continue to follow until dc and update pt once final orders are determined.    JAMES Bhardwaj  [facility] Nurse Liaison   [email or web address]  Cell: [phone] M, W-F  [facility] Main: [phone]

## 2019-03-06 NOTE — PLAN OF CARE
Discharge Planner PT   Patient plan for discharge: home with out-pt PT  Current status: Pt transfers independently.  KI is on at all times.  Pt up walking in room with cane.  Demonstrated gait x 150' in hallway. Tends to move the cane back and forth between his hands.  Encourage cane in opposite hand for consistency.  Overall stable. Pt states he is doing much better than a couple days ago.  Confident regarding his abilities. He declines any form of home PT.  Expressed interest in out-pt PT at when he has weekly MD appts at [facility] which seems reasonable.   Barriers to return to prior living situation: medical issues, antibiotic plan  Recommendations for discharge: home with out-pt PT  Rationale for recommendations: in-pt goals met    Physical Therapy Discharge Summary    Reason for therapy discharge:    All goals and outcomes met, no further needs identified.    Progress towards therapy goal(s). See goals on Care Plan in [facility] electronic health record for goal details.  Goals met    Therapy recommendation(s):    Continued therapy is recommended.  Rationale/Recommendations:  see above.  Continue home exercise program.           Entered by: Hiral Bowden 03/06/2019 5:19 PM

## 2019-03-06 NOTE — PLAN OF CARE
VS:     Pt A/O X 4. Afebrile. VSS. /74 (BP Location: Right arm)   Pulse 91   Temp 95.8  F (35.4  C) (Axillary)   Resp 17   SpO2 99% . Lungs- clear and equal bilaterally. IS encouraged. Denies nausea, shortness of breath, and chest pain.     Output:     Bowels- active in all four quadrants. Voiding adequately in the urinal/bathroom. LBM 3/4.      Activity:     Pt up SBA with cane. Participating in therapies. Ambulates in room.      Skin: Intact except for incision to right knee.     Pain:     Has pain in the right knee and given dilaudid 4mg Q 3h, ICE applied, and is tolerating well.      CMS:     CMS and Neuro's are intact. Denies numbness and tingling in all extremities. Pedial pulses +2. Cap refill less than 3 seconds to BLEs. Strong dorsiflexion/plantarflexion bilat.       Dressing:     Right knee incisional dressing is CDI.      Diet:     Pt is on a Regular diet and appetite was good this shift.       LDA:     PICC is patent and saline locked in right arm. No output from hemovac drain.      Equipment:     Knee immobilizer, cane, walker.      Plan:     TBD.

## 2019-03-07 LAB
BACTERIA SPEC CULT: ABNORMAL
CREAT SERPL-MCNC: 0.75 MG/DL (ref 0.66–1.25)
GFR SERPL CREATININE-BSD FRML MDRD: >90 ML/MIN/{1.73_M2}
SPECIMEN SOURCE: ABNORMAL

## 2019-03-07 PROCEDURE — 12000001 ZZH R&B MED SURG/OB UMMC

## 2019-03-07 PROCEDURE — 25000132 ZZH RX MED GY IP 250 OP 250 PS 637: Performed by: INTERNAL MEDICINE

## 2019-03-07 PROCEDURE — 25000128 H RX IP 250 OP 636: Performed by: NURSE PRACTITIONER

## 2019-03-07 PROCEDURE — 25000132 ZZH RX MED GY IP 250 OP 250 PS 637: Performed by: STUDENT IN AN ORGANIZED HEALTH CARE EDUCATION/TRAINING PROGRAM

## 2019-03-07 PROCEDURE — 99232 SBSQ HOSP IP/OBS MODERATE 35: CPT | Performed by: INTERNAL MEDICINE

## 2019-03-07 PROCEDURE — 25000128 H RX IP 250 OP 636: Performed by: ORTHOPAEDIC SURGERY

## 2019-03-07 PROCEDURE — 82565 ASSAY OF CREATININE: CPT | Performed by: ORTHOPAEDIC SURGERY

## 2019-03-07 PROCEDURE — 36592 COLLECT BLOOD FROM PICC: CPT | Performed by: ORTHOPAEDIC SURGERY

## 2019-03-07 PROCEDURE — 25800030 ZZH RX IP 258 OP 636: Performed by: ORTHOPAEDIC SURGERY

## 2019-03-07 RX ORDER — ERTAPENEM 1 G/1
1 INJECTION, POWDER, LYOPHILIZED, FOR SOLUTION INTRAMUSCULAR; INTRAVENOUS
COMMUNITY
Start: 2019-03-08 | End: 2019-03-08

## 2019-03-07 RX ORDER — DOCUSATE SODIUM 100 MG/1
100 CAPSULE, LIQUID FILLED ORAL 2 TIMES DAILY
Qty: 30 CAPSULE | Refills: 0 | Status: SHIPPED | OUTPATIENT
Start: 2019-03-07 | End: 2019-03-08

## 2019-03-07 RX ORDER — ERTAPENEM 1 G/1
1 INJECTION, POWDER, LYOPHILIZED, FOR SOLUTION INTRAMUSCULAR; INTRAVENOUS EVERY 24 HOURS
Status: DISCONTINUED | OUTPATIENT
Start: 2019-03-07 | End: 2019-03-09 | Stop reason: HOSPADM

## 2019-03-07 RX ORDER — HYDROMORPHONE HYDROCHLORIDE 2 MG/1
2-4 TABLET ORAL EVERY 4 HOURS PRN
Qty: 40 TABLET | Refills: 0 | Status: SHIPPED | OUTPATIENT
Start: 2019-03-07 | End: 2019-03-08

## 2019-03-07 RX ORDER — ACETAMINOPHEN 325 MG/1
650 TABLET ORAL EVERY 6 HOURS PRN
Qty: 100 TABLET | Refills: 0 | Status: SHIPPED | OUTPATIENT
Start: 2019-03-07 | End: 2019-03-08

## 2019-03-07 RX ORDER — ASPIRIN 325 MG
325 TABLET, DELAYED RELEASE (ENTERIC COATED) ORAL DAILY
Qty: 28 TABLET | Refills: 0 | Status: SHIPPED | OUTPATIENT
Start: 2019-03-08 | End: 2019-03-08

## 2019-03-07 RX ADMIN — THIAMINE HCL (VITAMIN B1) 50 MG TABLET 50 MG: at 08:09

## 2019-03-07 RX ADMIN — HYDROMORPHONE HYDROCHLORIDE 4 MG: 2 TABLET ORAL at 14:32

## 2019-03-07 RX ADMIN — PIPERACILLIN AND TAZOBACTAM 3.38 G: 3; .375 INJECTION, POWDER, FOR SOLUTION INTRAVENOUS at 04:20

## 2019-03-07 RX ADMIN — ERTAPENEM SODIUM 1 G: 1 INJECTION, POWDER, LYOPHILIZED, FOR SOLUTION INTRAMUSCULAR; INTRAVENOUS at 09:15

## 2019-03-07 RX ADMIN — THERA TABS 1 TABLET: TAB at 21:13

## 2019-03-07 RX ADMIN — HYDROMORPHONE HYDROCHLORIDE 4 MG: 2 TABLET ORAL at 21:13

## 2019-03-07 RX ADMIN — HYDROMORPHONE HYDROCHLORIDE 4 MG: 2 TABLET ORAL at 04:20

## 2019-03-07 RX ADMIN — HYDROMORPHONE HYDROCHLORIDE 4 MG: 2 TABLET ORAL at 08:10

## 2019-03-07 RX ADMIN — FERROUS SULFATE TAB 325 MG (65 MG ELEMENTAL FE) 325 MG: 325 (65 FE) TAB at 21:13

## 2019-03-07 RX ADMIN — VANCOMYCIN HYDROCHLORIDE 1750 MG: 10 INJECTION, POWDER, LYOPHILIZED, FOR SOLUTION INTRAVENOUS at 17:47

## 2019-03-07 RX ADMIN — HYDROMORPHONE HYDROCHLORIDE 4 MG: 2 TABLET ORAL at 11:20

## 2019-03-07 RX ADMIN — ACETAMINOPHEN 975 MG: 325 TABLET, FILM COATED ORAL at 15:45

## 2019-03-07 RX ADMIN — FLUTICASONE PROPIONATE 2 SPRAY: 50 SPRAY, METERED NASAL at 08:10

## 2019-03-07 RX ADMIN — LISINOPRIL 20 MG: 20 TABLET ORAL at 21:13

## 2019-03-07 RX ADMIN — VANCOMYCIN HYDROCHLORIDE 1750 MG: 10 INJECTION, POWDER, LYOPHILIZED, FOR SOLUTION INTRAVENOUS at 06:04

## 2019-03-07 RX ADMIN — ACETAMINOPHEN 975 MG: 325 TABLET, FILM COATED ORAL at 08:09

## 2019-03-07 RX ADMIN — HYDROMORPHONE HYDROCHLORIDE 4 MG: 2 TABLET ORAL at 01:17

## 2019-03-07 RX ADMIN — HYDROMORPHONE HYDROCHLORIDE 4 MG: 2 TABLET ORAL at 17:47

## 2019-03-07 RX ADMIN — ACETAMINOPHEN 975 MG: 325 TABLET, FILM COATED ORAL at 01:17

## 2019-03-07 RX ADMIN — ASPIRIN 325 MG: 325 TABLET, DELAYED RELEASE ORAL at 08:09

## 2019-03-07 ASSESSMENT — ACTIVITIES OF DAILY LIVING (ADL)
ADLS_ACUITY_SCORE: 12
ADLS_ACUITY_SCORE: 14
ADLS_ACUITY_SCORE: 14
ADLS_ACUITY_SCORE: 12
ADLS_ACUITY_SCORE: 12
ADLS_ACUITY_SCORE: 14

## 2019-03-07 NOTE — PLAN OF CARE
VS: A&Ox4. VSS. Denies SOB, chest pain, dizziness, lightheadedness, numbness, tingling, nausea, and vomiting.    O2: >90% RA. Lung sounds clear bilaterally. IS encouraged.    Output: Voiding spontaneously without difficulty in bathroom/urinal.    Last BM: Bowel sounds active, passing gas, and last BM 3/7 (loose).    Activity: SBA with walker/cane, WBAT on RLE, knee immobilizer on at all times.    Up for meals? Up for meals.    Skin: Intact with exception to incision.    Pain: Right leg pain. Has dilaudid 2-4 mg Q3H. Ice applied.    CMS: Intact.   Dressing: CDI.    Diet: Regular diet and tolerating well.    LDA: PICC patent and saline locked between abx.    Equipment: IV pole, walker, cane, PCD, personal belongings at bedside.    Plan: [facility]   Additional Info: Patient demonstrates ability to use call light appropriately. Will continue to monitor.

## 2019-03-07 NOTE — PROGRESS NOTES
[facility] Internal Medicine Progress Note            Interval History:   Record reviewed.    Seen with RN.  S/P Resection of prepatellar bursa.   Open debridement, right total knee arthroplasty. Exchange polyethylene right knee. Complete synovectomy.  Indication right acute infection, right total knee arthroplasty, with wound dehiscence 3/4.  Dr. Joe. Staph epidermidis and E. Coli on culture.   On zosyn and Vanco.  Improved pain control with transition to po dilaudid.   Ambulated in castro without dizziness.   No CP, SOB, cough, nausea, reflux, abd pain or distention.  Passing flatus.  Last BM AM LN.  Voiding Ok.  PICC placed.           Medications:   All medications reviewed today          Physical Exam:   Blood pressure 139/82, pulse 91, temperature 96.1  F (35.6  C), temperature source Oral, resp. rate 16, SpO2 97 %.    Intake/Output Summary (Last 24 hours) at 3/5/2019 1434  Last data filed at 3/5/2019 0657  Gross per 24 hour   Intake 1801 ml   Output 860 ml   Net 941 ml       General:  Alert.  Appropriate.  No distress.  Off O2.     Heent:      Neck:    Skin:    Chest:  clear    Cardiac:  Reg without gallop, murmur.  No JVD.     Abdomen:  Non distended, soft, non-tender.  BS normal.     Extremities:  Perfused.  No edema, calf, posterior thigh tenderness to suggest DVT.     Neuro:            Data:     Results for orders placed or performed during the hospital encounter of 03/04/19 (from the past 24 hour(s))   Creatinine   Result Value Ref Range    Creatinine 0.75 0.66 - 1.25 mg/dL    GFR Estimate >90 >60 mL/min/[1.73_m2]    GFR Estimate If Black >90 >60 mL/min/[1.73_m2]     Last Comprehensive Metabolic Panel:  Sodium   Date Value Ref Range Status   03/06/2019 138 133 - 144 mmol/L Final     Potassium   Date Value Ref Range Status   03/06/2019 4.3 3.4 - 5.3 mmol/L Final     Chloride   Date Value Ref Range Status   03/06/2019 105 94 - 109 mmol/L Final     Carbon Dioxide   Date Value Ref Range Status    03/06/2019 26 20 - 32 mmol/L Final     Anion Gap   Date Value Ref Range Status   03/06/2019 7 3 - 14 mmol/L Final     Glucose   Date Value Ref Range Status   03/06/2019 109 (H) 70 - 99 mg/dL Final     Urea Nitrogen   Date Value Ref Range Status   03/06/2019 17 7 - 30 mg/dL Final     Creatinine   Date Value Ref Range Status   03/07/2019 0.75 0.66 - 1.25 mg/dL Final     GFR Estimate   Date Value Ref Range Status   03/07/2019 >90 >60 mL/min/[1.73_m2] Final     Comment:     Non  GFR Calc  Starting 12/18/2018, serum creatinine based estimated GFR (eGFR) will be   calculated using the Chronic Kidney Disease Epidemiology Collaboration   (CKD-EPI) equation.       Calcium   Date Value Ref Range Status   03/06/2019 7.9 (L) 8.5 - 10.1 mg/dL Final     Hemoglobin   Date Value Ref Range Status   03/06/2019 11.4 (L) 13.3 - 17.7 g/dL Final   03/05/2019 11.7 (L) 13.3 - 17.7 g/dL Final                Assessment and Plan:   1)  S/P Resection of prepatellar bursa.   Open debridement, right total knee arthroplasty. Exchange polyethylene right knee.  Complete synovectomy.  Indication right acute infection, right total knee arthroplasty, with wound dehiscence 3/4.  Dr. Joe.  Staph epidermidis and E. Coli on culture.   On zosyn and Vanco.  Improved pain control on dilaudid.   2)  Acute blood loss anemia, mild.  3)  HTN. Borderline - monitor for now.   4)  Alcohol use disorder.  5)  Alcoholic cirrhosis - no clinical suggestion of ascites or encephalopathy.  Tolerating opiates.   6)  Chronic hepatitis C.   7)  S/P TAVR for severe aortic stenosis.  8)  H/o thrombocytopenia 2nd to #5 (possible splenic sequestration).     PLAN:  1)  Same  meds, orders.  2)  Continue po dilaudid 2-4 mg q3h prn.  3)  Monitor BP   4)  Trend labs.   5)  F/u culture data.  ABs per ID.  6)  IS, bowel meds, mobilize, ASA.  7)  Monitor clinically.   Disposition Plan   Expected discharge in 1-2 days to transitional care unit once bed available.       Entered: Sean Shankar 03/07/2019, 4:57 PM              Attestation:  I have reviewed today's vital signs, notes, medications, labs and imaging.     Sean Shankar MD

## 2019-03-07 NOTE — PLAN OF CARE
VS: Stable.   O2: On RA, sats >90%.   Output: Voiding without difficulty to the urinal.   Last BM: LBM 3/6 and passing gas.   Activity: Up with SBA, using walker, WBAT in RLE.   Skin: Intact except right knee incision.   Pain: Pain well managed with prn Po dilaudid.   CMS: Denies N/T.   Dressing: Right knee ACE dressing CDI. Has knee immobilizer on.   Diet: Tolerating regular diet. No N/V.   LDA: PICC SL between abx in right upper arm.   Equipment: Walker, IV pole, PCDs.   Plan: TBD   Additional Info:

## 2019-03-07 NOTE — PLAN OF CARE
VS: VSS.   O2: RA >90%   Output: Voiding spontaneously in urinal without difficulty.    Last BM: 3/6   Activity: Up to bedside when using urinal. Walker/GB   Skin: Intact except R knee incision.   Pain: Managed with oral dilaudid q3hr    CMS: Intact    Dressing: ACE dressing. CDI. Knee immobilizer    Diet: Regular.   LDA: PICC line. Hemovac removed.    Equipment: IV pole, urinal, PCDs.   Plan: TBD   Additional Info:

## 2019-03-07 NOTE — PROGRESS NOTES
Orthopaedic Surgery Progress Note:       Subjective:    Patient reports doing well. Pain well controlled on current regimen. Denies new onset tingling/numbness in operative extremity. Denies fever/chills/SOB/nause/vomiting.     Objective:   Temp:  [95.6  F (35.3  C)-96.2  F (35.7  C)] 95.6  F (35.3  C)  Heart Rate:  [60-76] 68  Resp:  [16-18] 16  BP: (122-148)/(71-89) 122/71  SpO2:  [100 %] 100 %    Intake/Output Summary (Last 24 hours) at 3/5/2019 0751  Last data filed at 3/5/2019 0657  Gross per 24 hour   Intake 1801 ml   Output 860 ml   Net 941 ml       Gen: NAD. Resting comfortably in bed  Resp: Breathing comfortably on RA  LE:  Dressing changed. Minimal drainage from incision.   Drain was removed yesteraday. Drain site with minimal drainage. -  Circulatory: DP Pulse Intact, Foot Perfused   Neurologic: intact      Labs:  Recent Labs   Lab 03/06/19  0622 03/05/19  1034 03/05/19  0752 03/04/19  1730   WBC  --   --  12.5* 8.7   HGB 11.4* 11.7* 11.8*  Unsatisfactory specimen - clotted 12.3*   PLT 79*  --  84* 72*     Cultures: staph epi and e coli      Assessment & Plan:   58 year old male now s/p I + D , and Debridement and Liner Exchange  on 3/4/2019 with Dr. Joe    PICC line in place.     Activity: WBAT BLE in KI on right. No range until wound healed.   Drain: continue to document  Antibiotics: Vanc given MRSA history, and then as per ID once cultures back  Dressings: To be changed  Diet: ADAT  Pain: PO meds only as patient tolerates  DVT ppx: Aspirin 325 mg D , 4 weeks  Labs:   PT/OT: Mobility, ROM, gait training, ADLs  Consults: Appreciate medicine co-management.  Dispo: Pending pain control and PT/OT recs.  Pending final ID plan- pt declined TCU- plan for home with home infusion  Follow up: MEHREEN French MD   PGY-4 Orthopaedic Surgery   [phone]

## 2019-03-07 NOTE — CONSULTS
"Social Work: Assessment with Discharge Plan    Patient Name:  Ten Johnson  :  1960  Age:  58 year old  MRN:  [id number]  Risk/Complexity Score:  Filed Complexity Screen Score: 10  Completed assessment with:  Pt, Anni Bergeron NP    Presenting Information   Reason for Referral:  Discharge plan  Date of Intake:  2019  Referral Source:  Physician  Decision Maker:  pt  Alternate Decision Maker:  Per policy, pt's TRAVIS Celeste  Health Care Directive:  Declined completing  Living Situation:  House  Previous Functional Status:  Independent  Patient and family understanding of hospitalization:  \" I came for a 30 minute nurse appointment at the clinic, I ended up having surgery and am still here:\"  Cultural/Language/Spiritual Considerations:  Retired   Adjustment to Illness:  Accepting of need for 1 more week IV ABX and wound care    Physical Health  Reason for Admission:    1. Infection of prosthetic knee joint, initial encounter (H)      Services Needed/Recommended:  TCU    Mental Health/Chemical Dependency  Diagnosis:  Alcohol use disorder   Support/Services in Place:  None noted  Services Needed/Recommended:  At this time, none currently    Support System  Significant relationship at present time:  Pt identifies having friends involved and they are supportive  Family of origin is available for support:  yes  Other support available:  friends  Gaps in support system:  None noted  Patient is caregiver to:  None     Provider Information   Primary Care Physician:  Cuca Celeste   [phone]   Clinic:  [address] [place] [address] 741 / [facility] [address]      :      Financial   Income Source:  pension  Financial Concerns:  None noted  Insurance:    Payor/Plan Subscriber Name Rel Member # Group #   BCBS - BCBS COMPREHEN* TEN JOHNSON  [id number]       [id number]       Discharge Plan   Patient and family discharge goal:  TCU with eventual return home w/home " care  Provided education on discharge plan:  YES  Patient agreeable to discharge plan:  YES  A list of Medicare Certified Facilities was provided to the patient and/or family to encourage patient choice. Patient's choices for facility are:  [facility]  Will NH provide Skilled rehabilitation or complex medical:  YES  General information regarding anticipated insurance coverage and possible out of pocket cost was discussed. Patient and patient's family are aware patient may incur the cost of transportation to the facility, pending insurance payment: YES  Barriers to discharge:  Bed availability, medical need for TCU    Discharge Recommendations   Anticipated Disposition:  Facility:  [facility]  Transportation Needs:  Other:  to be determined      Additional comments   Writer introduced role and reason for visit. Pt is agreeable to having short term rehab, but for a few days. Writer made referral to [facility] and spoke ariel/Manny. They will assess, but if pt has home infusion and home care benefits, pt may not have additional skilled need for TCU. Await assessment response.    [facility] con't to follow    Addendum:    Pt meets admission criteria for TCU. He is about 3 or 4 on wait list. [facility] con't to follow

## 2019-03-07 NOTE — PROGRESS NOTES
This is a recent snapshot of the patient's [facility] medical record.  For current drug dose and complete information and questions, call [phone] or In Basket pool, [facility] ([id number])  CSN Number:  [id number]

## 2019-03-08 LAB
BUN SERPL-MCNC: 14 MG/DL (ref 7–30)
CALCIUM SERPL-MCNC: 8.7 MG/DL (ref 8.5–10.1)
CHLORIDE SERPL-SCNC: 102 MMOL/L (ref 94–109)
CO2 SERPL-SCNC: 24 MMOL/L (ref 20–32)
CREAT SERPL-MCNC: 0.78 MG/DL (ref 0.66–1.25)
GFR SERPL CREATININE-BSD FRML MDRD: >90 ML/MIN/{1.73_M2}
GLUCOSE SERPL-MCNC: 93 MG/DL (ref 70–99)
MAGNESIUM SERPL-MCNC: 2 MG/DL (ref 1.6–2.3)
POTASSIUM SERPL-SCNC: 4.2 MMOL/L (ref 3.4–5.3)
SODIUM SERPL-SCNC: 137 MMOL/L (ref 133–144)
VANCOMYCIN SERPL-MCNC: 13.1 MG/L

## 2019-03-08 PROCEDURE — 80048 BASIC METABOLIC PNL TOTAL CA: CPT | Performed by: ORTHOPAEDIC SURGERY

## 2019-03-08 PROCEDURE — 25000128 H RX IP 250 OP 636: Performed by: NURSE PRACTITIONER

## 2019-03-08 PROCEDURE — 83735 ASSAY OF MAGNESIUM: CPT | Performed by: ORTHOPAEDIC SURGERY

## 2019-03-08 PROCEDURE — 25800030 ZZH RX IP 258 OP 636: Performed by: ORTHOPAEDIC SURGERY

## 2019-03-08 PROCEDURE — 25000132 ZZH RX MED GY IP 250 OP 250 PS 637: Performed by: INTERNAL MEDICINE

## 2019-03-08 PROCEDURE — 99232 SBSQ HOSP IP/OBS MODERATE 35: CPT | Performed by: INTERNAL MEDICINE

## 2019-03-08 PROCEDURE — 80202 ASSAY OF VANCOMYCIN: CPT | Performed by: ORTHOPAEDIC SURGERY

## 2019-03-08 PROCEDURE — 25000132 ZZH RX MED GY IP 250 OP 250 PS 637: Performed by: STUDENT IN AN ORGANIZED HEALTH CARE EDUCATION/TRAINING PROGRAM

## 2019-03-08 PROCEDURE — 12000001 ZZH R&B MED SURG/OB UMMC

## 2019-03-08 PROCEDURE — 25000128 H RX IP 250 OP 636: Performed by: ORTHOPAEDIC SURGERY

## 2019-03-08 PROCEDURE — 36415 COLL VENOUS BLD VENIPUNCTURE: CPT | Performed by: ORTHOPAEDIC SURGERY

## 2019-03-08 RX ORDER — ACETAMINOPHEN 325 MG/1
650 TABLET ORAL EVERY 6 HOURS PRN
Qty: 100 TABLET | Refills: 0 | DISCHARGE
Start: 2019-03-08 | End: 2019-05-20

## 2019-03-08 RX ORDER — ASPIRIN 325 MG
325 TABLET, DELAYED RELEASE (ENTERIC COATED) ORAL DAILY
Qty: 28 TABLET | Refills: 0 | Status: ON HOLD | DISCHARGE
Start: 2019-03-08 | End: 2019-03-18

## 2019-03-08 RX ORDER — LACTOBACILLUS RHAMNOSUS GG 10B CELL
1 CAPSULE ORAL 2 TIMES DAILY
Status: DISCONTINUED | OUTPATIENT
Start: 2019-03-08 | End: 2019-03-09 | Stop reason: HOSPADM

## 2019-03-08 RX ORDER — ERTAPENEM 1 G/1
1 INJECTION, POWDER, LYOPHILIZED, FOR SOLUTION INTRAMUSCULAR; INTRAVENOUS EVERY 24 HOURS
Status: ON HOLD | DISCHARGE
Start: 2019-03-04 | End: 2019-03-18

## 2019-03-08 RX ORDER — SODIUM CHLORIDE 9 MG/ML
INJECTION, SOLUTION INTRAVENOUS
Status: DISPENSED
Start: 2019-03-08 | End: 2019-03-09

## 2019-03-08 RX ORDER — HYDROMORPHONE HYDROCHLORIDE 2 MG/1
2-4 TABLET ORAL EVERY 4 HOURS PRN
Qty: 40 TABLET | Refills: 0 | Status: ON HOLD | DISCHARGE
Start: 2019-03-08 | End: 2019-03-18

## 2019-03-08 RX ORDER — DOCUSATE SODIUM 100 MG/1
100 CAPSULE, LIQUID FILLED ORAL 2 TIMES DAILY
Qty: 30 CAPSULE | Refills: 0 | Status: ON HOLD | DISCHARGE
Start: 2019-03-08 | End: 2019-03-18

## 2019-03-08 RX ADMIN — FERROUS SULFATE TAB 325 MG (65 MG ELEMENTAL FE) 325 MG: 325 (65 FE) TAB at 22:45

## 2019-03-08 RX ADMIN — HEPARIN, PORCINE (PF) 10 UNIT/ML INTRAVENOUS SYRINGE 5 ML: at 05:04

## 2019-03-08 RX ADMIN — HYDROMORPHONE HYDROCHLORIDE 4 MG: 2 TABLET ORAL at 06:37

## 2019-03-08 RX ADMIN — HYDROMORPHONE HYDROCHLORIDE 4 MG: 2 TABLET ORAL at 03:36

## 2019-03-08 RX ADMIN — VANCOMYCIN HYDROCHLORIDE 1750 MG: 10 INJECTION, POWDER, LYOPHILIZED, FOR SOLUTION INTRAVENOUS at 18:24

## 2019-03-08 RX ADMIN — HYDROMORPHONE HYDROCHLORIDE 4 MG: 2 TABLET ORAL at 19:56

## 2019-03-08 RX ADMIN — THERA TABS 1 TABLET: TAB at 22:45

## 2019-03-08 RX ADMIN — LISINOPRIL 20 MG: 20 TABLET ORAL at 22:45

## 2019-03-08 RX ADMIN — HYDROMORPHONE HYDROCHLORIDE 4 MG: 2 TABLET ORAL at 00:25

## 2019-03-08 RX ADMIN — HYDROMORPHONE HYDROCHLORIDE 4 MG: 2 TABLET ORAL at 13:29

## 2019-03-08 RX ADMIN — ASPIRIN 325 MG: 325 TABLET, DELAYED RELEASE ORAL at 08:43

## 2019-03-08 RX ADMIN — Medication 1 CAPSULE: at 19:56

## 2019-03-08 RX ADMIN — ERTAPENEM SODIUM 1 G: 1 INJECTION, POWDER, LYOPHILIZED, FOR SOLUTION INTRAMUSCULAR; INTRAVENOUS at 08:47

## 2019-03-08 RX ADMIN — FLUTICASONE PROPIONATE 2 SPRAY: 50 SPRAY, METERED NASAL at 08:43

## 2019-03-08 RX ADMIN — HYDROMORPHONE HYDROCHLORIDE 4 MG: 2 TABLET ORAL at 10:24

## 2019-03-08 RX ADMIN — THIAMINE HCL (VITAMIN B1) 50 MG TABLET 50 MG: at 08:43

## 2019-03-08 RX ADMIN — HYDROMORPHONE HYDROCHLORIDE 4 MG: 2 TABLET ORAL at 16:40

## 2019-03-08 RX ADMIN — HYDROMORPHONE HYDROCHLORIDE 4 MG: 2 TABLET ORAL at 23:02

## 2019-03-08 RX ADMIN — VANCOMYCIN HYDROCHLORIDE 1750 MG: 10 INJECTION, POWDER, LYOPHILIZED, FOR SOLUTION INTRAVENOUS at 06:38

## 2019-03-08 ASSESSMENT — ACTIVITIES OF DAILY LIVING (ADL)
ADLS_ACUITY_SCORE: 12

## 2019-03-08 NOTE — PLAN OF CARE
VS: VSS   O2: Room air   Output: Voiding without difficulty in bathroom/urinal   Last BM: 3/7/19 loose BM per report   Activity: UP with SBA with walker/cane, knee Immobilizer on at all times   Skin: Incision to RLE   Pain: Dilaudid 4 mg q4h, pt likes his pain medication on time   CMS: Intact   Dressing: CDI   Diet: Regular   LDA: PICC SL between abx   Plan: [facility] when bed available   Additional Info:

## 2019-03-08 NOTE — PROGRESS NOTES
[facility] Internal Medicine Progress Note            Interval History:   Record reviewed.  Seen with RN.  S/P Resection of prepatellar bursa.   Open debridement, right total knee arthroplasty. Exchange polyethylene right knee. Complete synovectomy.  Indication right acute infection, right total knee arthroplasty, with wound dehiscence 3/4.  Dr. Joe. Staph epidermidis and E. Coli on culture.   On zosyn and Vanco.  Nausea, diaphoresis this Am upon awakening.  Since resolved. Pain control remains adequate with transition to po dilaudid.   Ambulated in castro without dizziness.   No CP, SOB, cough,  reflux, abd pain or distention.  Passing flatus. Diarrheal stool.    Voiding Ok.  PICC in place.           Medications:   All medications reviewed today          Physical Exam:   Blood pressure 148/72, pulse 91, temperature 96.4  F (35.8  C), temperature source Oral, resp. rate 16, SpO2 97 %.    Intake/Output Summary (Last 24 hours) at 3/5/2019 1434  Last data filed at 3/5/2019 0657  Gross per 24 hour   Intake 1801 ml   Output 860 ml   Net 941 ml       General:  Alert.  Appropriate.  No distress.  Off O2.  -140.     Heent:      Neck:    Skin:    Chest:  clear    Cardiac:  Reg without gallop, murmur.  No JVD.     Abdomen:  Non distended, soft, non-tender.  BS normal.     Extremities:  Perfused.  No edema, calf, posterior thigh tenderness to suggest DVT.     Neuro:            Data:     Results for orders placed or performed during the hospital encounter of 03/04/19 (from the past 24 hour(s))   Vancomycin level   Result Value Ref Range    Vancomycin Level 13.1 mg/L   Creatinine   Result Value Ref Range    Creatinine 0.78 0.66 - 1.25 mg/dL    GFR Estimate >90 >60 mL/min/[1.73_m2]    GFR Estimate If Black >90 >60 mL/min/[1.73_m2]     Last Comprehensive Metabolic Panel:  Sodium   Date Value Ref Range Status   03/06/2019 138 133 - 144 mmol/L Final     Potassium   Date Value Ref Range Status   03/06/2019 4.3 3.4 - 5.3  mmol/L Final     Chloride   Date Value Ref Range Status   03/06/2019 105 94 - 109 mmol/L Final     Carbon Dioxide   Date Value Ref Range Status   03/06/2019 26 20 - 32 mmol/L Final     Anion Gap   Date Value Ref Range Status   03/06/2019 7 3 - 14 mmol/L Final     Glucose   Date Value Ref Range Status   03/06/2019 109 (H) 70 - 99 mg/dL Final     Urea Nitrogen   Date Value Ref Range Status   03/06/2019 17 7 - 30 mg/dL Final     Creatinine   Date Value Ref Range Status   03/08/2019 0.78 0.66 - 1.25 mg/dL Final     GFR Estimate   Date Value Ref Range Status   03/08/2019 >90 >60 mL/min/[1.73_m2] Final     Comment:     Non  GFR Calc  Starting 12/18/2018, serum creatinine based estimated GFR (eGFR) will be   calculated using the Chronic Kidney Disease Epidemiology Collaboration   (CKD-EPI) equation.       Calcium   Date Value Ref Range Status   03/06/2019 7.9 (L) 8.5 - 10.1 mg/dL Final     Hemoglobin   Date Value Ref Range Status   03/06/2019 11.4 (L) 13.3 - 17.7 g/dL Final   03/05/2019 11.7 (L) 13.3 - 17.7 g/dL Final                Assessment and Plan:   1)  S/P Resection of prepatellar bursa.   Open debridement, right total knee arthroplasty. Exchange polyethylene right knee.  Complete synovectomy.  Indication right acute infection, right total knee arthroplasty, with wound dehiscence 3/4.  Dr. Joe.  Staph epidermidis and E. Coli on culture.   On zosyn and Vanco.  Improved pain control on dilaudid.   2)  Acute blood loss anemia, mild.  Adequate.   3)  HTN. Labile.  Adequate for now.  4)  Alcohol use disorder.  5)  Alcoholic cirrhosis - no clinical suggestion of ascites or encephalopathy.  Tolerating opiates.   6)  Chronic hepatitis C.   7)  S/P TAVR for severe aortic stenosis.  8)  H/o thrombocytopenia 2nd to #5 (possible splenic sequestration).  Adequate.   9)  Diarrhea potentially related to ABs and laxatives.  No lingering nausea, abd pain.     PLAN:  1)  Same  meds, orders.  2)  Continue po dilaudid  2-4 mg q3h prn.  3)  Add lactobacillus.  Hold laxatives prn.  C. Diff if diarrhea persists.  4)  Trend labs.  Monitor clinically.    Disposition Plan   Expected discharge  To  transitional care unit once bed available.      Entered: Sean Shankar 03/08/2019, 3:15 PM              Attestation:  I have reviewed today's vital signs, notes, medications, labs and imaging.     Sean Shankar MD

## 2019-03-08 NOTE — PLAN OF CARE
"Acupuncture Clinical Internship Intake and Treatment Documentation   [facility]    Date:  3/8/2019  Patient Name:  Ten Johnson   YOB: 1960     Repeat Patient:  no  Has patient had acupoint/acupressure treatment before:  yes    Signed consent placed in the medical record:  yes  Patient/Family verbalizes understanding of risks and benefits:  yes  Required information provided to patient:  yes    Diagnosis:  Prosthetic joint infection, initial encounter (H) [T84.50XA]    Patient condition and treatment: Patient sitting in bed, head raised for acupuncture treatment  Reason for Intervention Today/Chief Complaint:  Right leg pain    Isolation:  Yes. Details: MRSA  Type:  Contact    PRE-SCORE:  moderate    Other Western medical information:      Medications    Current Facility-Administered Medications:      acetaminophen (TYLENOL) tablet 650 mg, 650 mg, Oral, Q4H PRN, Prashanth Vang MD     aspirin (ASA) EC tablet 325 mg, 325 mg, Oral, Daily, Prashanth Vang MD, 325 mg at 03/08/19 0843     benzocaine-menthol (CEPACOL) 15-3.6 MG lozenge 1 lozenge, 1 lozenge, Buccal, Q1H PRN, Prashanth Vang MD     bupivacaine liposome (EXPAREL) LONG ACTING injection was administered into the infiltration site to produce postsurgical analgesia. Duration of action is up to 72 hours, and other \"amaris\" medications should not be given for 96 hours with the exception of the lidocaine 5% patch (LIDODERM) and the lidocaine 10mg in potassium infusions. This entry is for INFORMATION ONLY., , Does not apply, Continuous PRN, Bradly Back MD     diazepam (VALIUM) tablet 5 mg, 5 mg, Oral, Q6H PRN, Prashanth Vang MD, 5 mg at 03/04/19 1934     docusate sodium (COLACE) capsule 100 mg, 100 mg, Oral, BID, Prashanth Vang MD, 100 mg at 03/06/19 1907     ertapenem (INVanz) 1 g vial to attach to  mL bag, 1 g, Intravenous, Q24H, Anni Bergeron APRN CNP, " Last Rate: 200 mL/hr at 03/08/19 0847, 1 g at 03/08/19 0847     ferrous sulfate (FEROSUL) tablet 325 mg, 325 mg, Oral, At Bedtime, Prashanth Vang MD, 325 mg at 03/07/19 2113     fluticasone (FLONASE) 50 MCG/ACT spray 2 spray, 2 spray, Both Nostrils, Daily, Prashanth Vang MD, 2 spray at 03/08/19 0843     HYDROmorphone (DILAUDID) tablet 2-4 mg, 2-4 mg, Oral, Q3H PRN, Sean Shankar MD, 4 mg at 03/08/19 1329     hydrOXYzine (ATARAX) tablet 25 mg, 25 mg, Oral, TID PRN, Prashanth Vang MD, 25 mg at 03/04/19 2105     lidocaine (LMX4) cream, , Topical, Once PRN, Anni Bergeron APRN CNP     lidocaine (LMX4) cream, , Topical, Q1H PRN, Prashanth Vang MD     lidocaine 1 % 0.1-1 mL, 0.1-1 mL, Other, Q1H PRN, Prashanth Vang MD     lisinopril (PRINIVIL/ZESTRIL) tablet 20 mg, 20 mg, Oral, At Bedtime, Sean Shankar MD, 20 mg at 03/07/19 2113     loratadine (CLARITIN) tablet 10 mg, 10 mg, Oral, Daily, Prashanth Vang MD, 10 mg at 03/05/19 0739     multivitamin, therapeutic (THERA-VIT) tablet 1 tablet, 1 tablet, Oral, At Bedtime, Prashanth Vang MD, 1 tablet at 03/07/19 2113     naloxone (NARCAN) injection 0.1-0.4 mg, 0.1-0.4 mg, Intravenous, Q2 Min PRN, Prashanth Vang MD     ondansetron (ZOFRAN-ODT) ODT tab 4 mg, 4 mg, Oral, Q6H PRN **OR** ondansetron (ZOFRAN) injection 4 mg, 4 mg, Intravenous, Q6H PRN, Prashanth Vang MD     prochlorperazine (COMPAZINE) injection 10 mg, 10 mg, Intravenous, Q6H PRN **OR** prochlorperazine (COMPAZINE) tablet 10 mg, 10 mg, Oral, Q6H PRN, Prashanth Vang MD     sodium chloride (PF) 0.9% PF flush 10 mL, 10 mL, Intracatheter, Q7 Days, Olvin Joe MD     sodium chloride (PF) 0.9% PF flush 10-20 mL, 10-20 mL, Intracatheter, q1 min prn, Olvin Joe MD, 20 mL at 03/08/19 0511     sodium chloride (PF) 0.9% PF flush 3 mL, 3 mL, Intracatheter, q1 min prn, Prashanth Vang  MD [place], 500 mL at 03/05/19 1555     sodium chloride (PF) 0.9% PF flush 3 mL, 3 mL, Intracatheter, Q8H, Prashanth Vang MD, 3 mL at 03/07/19 1524     vancomycin (VANCOCIN) 1,750 mg in sodium chloride 0.9 % 250 mL intermittent infusion, 1,750 mg (central catheter), Intravenous, Q12H, Olvin Joe MD, 1,750 mg at 03/08/19 0638     vitamin B1 (THIAMINE) tablet 50 mg, 50 mg, Oral, Daily, Prashanth Vang MD, 50 mg at 03/08/19 0843      Pre-Treatment Assessment  Chief Complaint/ Reason for Intervention Today:  Right leg pain  Chief Complaint Pre-Score:  moderate   Describe:  Pain is wandering from the groin and hip down to the plantar surface of the foot. Pain can be burning, sharp, dull or achy  Pre Session Pain:  Moderate  Pre Session Anxiety:  None  Pre Session Nausea:  None    10 Traditional Chinese Medicine Assessment Questions  - Cold/ Heat:  Runs warm  - Sweat:  Waking up sweaty with the medications  - Headaches/Body aches:  Headache today. His whole head hurts and is hot. He never gets headaches  - Chest/Abdomen:  Past history of heart valve replacement. No complaints currently  - Digestion:  Loss of appetite today. Generally feels unwell.  - Bowel Movement/Urination:  Urgent diarrhea due to the medications.   - Hearing/Vision:  Floaters, can get lightheaded and see stars if he stands too quickly, some hearing loss  - Sleep (prior to hospital):  Has had some good deep sleep the last 36 hours  - Energy:  low  - Emotions:  good  - Ob Gyn:  No complaints  - Miscellaneous:  NA    Traditional Chinese Medicine Assessment  - TONGUE:  Pale, yellow greasy coat with some spots of no coat. Puffy, scalloped edges, quivering  - PULSE:  Right: tight, wiry, thin in chi  Left: thin, wiry  - OBSERVATIONS:  Patient is very engaged and chatty.    Traditional Chinese Medicine Diagnosis  - BRANCH:  Pain due to blood stasis, from trauma  - ROOT:  Kidney essence deficiency, Liver Qi stagnation      Traditional Chinese Medicine Treatment  - ACUPUNCTURE:  Du 20 Left: LI-10, St 36, GB 34, Connie 3  - NEEDLE COUNT: In: 17   Out: 17    Time In: 25 min  - OTHER:  Auricular: Ellis men, Cingulate gyrus, Thalamus, Omega 2, Point zero, Stomach     Magnet informed consent signed and given:  no    Post Treatment Assessment  Chief complaint post score:  better  Post Session Observation:  Patient stated he feels much better and really enjoyed the treatment.  Patient/Family Education:  Instructed patient on how to do acupressure on the sensitive area on his forearm below the elbow,  to help with the digestive issues.  Verbal information provided:  yes  Written information provided:  no  All questions answered at time of treatment:  yes    Treatment/Procedure(s) performed by: Angella Yeh    Date: 3/8/2019     I attest that this acupuncture treatment was done under my supervision and this note is complete and true.     Supervising acupuncturist:  Hema Bah LAc [facility] Acupuncture license #: [id number]  P: [phone]

## 2019-03-08 NOTE — PLAN OF CARE
VS: A&Ox4. VSS.     O2: >90% RA. Lung sounds clear bilaterally. IS encouraged.    Output: Voiding adequate amount without difficulty in bathroom/urinal.    Last BM: Bowel sounds active, passing gas, and last BM 3/7 (loose).    Activity: SBA with walker/cane, WBAT on RLE, knee immobilizer on at all times.    Skin: Intact with exception to incision.    Pain: Right leg pain. Has dilaudid 2-4 mg Q3H. Ice applied.    CMS: Intact.   Dressing: CDI.    Diet: Regular diet and tolerating well.    LDA: PICC patent and saline locked between abx.    Equipment: IV pole, walker, cane, PCD, personal belongings at bedside.    Plan: [facility]   Additional Info: Patient demonstrates ability to use call light appropriately. Will continue to monitor.

## 2019-03-08 NOTE — PLAN OF CARE
VS:     Pt A/O X 4. Afebrile. VSS. /72 (BP Location: Left arm)   Pulse 91   Temp 96.4  F (35.8  C) (Oral)   Resp 16   SpO2 97%. Lungs- clear and equal bilaterally. IS encouraged. Denies nausea, shortness of breath, and chest pain.     Output:     Bowels- active in all four quadrants. Voids spontaneously without difficulty in the bathroom/urinal. C/o 2 loose BMs this shift. Dr. Shankar ordered probiotic.       Activity:     Pt up SBA with cane.     Skin: Intact except for incision to right knee.     Pain:     Has pain in the right knee and given dilaudid 4mg Q 3h, ICE applied, and is tolerating well.      CMS:     CMS and Neuro's are intact. Baseline numbness present to RLE. Pedial pulses +2. Strong dorsiflexion/plantarflexion bilat.       Dressing:     Right knee incisional dressing is CDI with Knee immobilizer.      Diet:     Pt is on a Regular diet and appetite was good this shift.       LDA:     PICC is patent and saline locked to left arm.      Equipment:     Walker, cane, knee immobilizer.      Plan:     [facility]CU when bed available.

## 2019-03-08 NOTE — PHARMACY-VANCOMYCIN DOSING SERVICE
Pharmacy Vancomycin Note  Date of Service 2019  Patient's  1960   58 year old, male    Indication: Bone and Joint Infection  Goal Trough Level: 15-20 mg/L   Cultures: Staph epidermidis (vanco ELISEO=2) and E. coli (Zosyn ELISEO <4)  History MRSA   Per ID, pt to receive IV vanco and Ertapenem for 6 weeks   Day of Therapy: Started 3/4  Current Vancomycin regimen:  1750 mg IV q12h    Current estimated CrCl = Estimated Creatinine Clearance: 116.8 mL/min (based on SCr of 0.78 mg/dL).    Creatinine for last 3 days  3/6/2019:  6:22 AM Creatinine 0.89 mg/dL  3/7/2019:  7:35 AM Creatinine 0.75 mg/dL  3/8/2019:  5:14 AM Creatinine 0.78 mg/dL    Recent Vancomycin Levels (past 3 days)  3/6/2019:  6:22 AM Vancomycin Level 9.4 mg/L  3/8/2019:  5:14 AM Vancomycin Level 13.1 mg/L    Vancomycin IV Administrations (past 72 hours)                   vancomycin (VANCOCIN) 1,750 mg in sodium chloride 0.9 % 250 mL intermittent infusion (mg) 1,750 mg New Bag 19 0638     1,750 mg New Bag 19 1747     1,750 mg New Bag  0604     1,750 mg New Bag 19 1907    vancomycin (VANCOCIN) 1,500 mg in sodium chloride 0.9 % 250 mL intermittent infusion (mg) 1,500 mg New Bag 19 0629     1,500 mg New Bag 19 1800                Nephrotoxins and other renal medications (From now, onward)    Start     Dose/Rate Route Frequency Ordered Stop    19 1800  vancomycin (VANCOCIN) 1,750 mg in sodium chloride 0.9 % 250 mL intermittent infusion      1,750 mg (central catheter)  over 60 Minutes Intravenous EVERY 12 HOURS 19 0916      19 2300  lisinopril (PRINIVIL/ZESTRIL) tablet 20 mg      20 mg Oral AT BEDTIME 19 192               Contrast Orders - past 72 hours (72h ago, onward)    None          Interpretation of levels and current regimen:  Trough level is  Subtherapeutic    Has serum creatinine changed > 50% in last 72 hours: No    Urine output:  unable to determine    Renal Function:  Stable    Plan:  1.  Continue Current Dose 1750 q12h (21.3 mg/kg). Expected a trough of 11 today but it came back at 13.1. Due to pt's age, potential for accumulation, and high mg/kg, the dose was kept the same.   2.  Pharmacy will check trough levels as appropriate in 1-3 Days.    3. Serum creatinine levels will be ordered daily for the first week of therapy and at least twice weekly for subsequent weeks.      Mervat Edwards        .

## 2019-03-09 ENCOUNTER — HOSPITAL ENCOUNTER (INPATIENT)
Facility: SKILLED NURSING FACILITY | Age: 59
LOS: 10 days | Discharge: HOME HEALTH | End: 2019-03-19
Attending: INTERNAL MEDICINE | Admitting: INTERNAL MEDICINE
Payer: COMMERCIAL

## 2019-03-09 VITALS
RESPIRATION RATE: 16 BRPM | TEMPERATURE: 96.4 F | SYSTOLIC BLOOD PRESSURE: 129 MMHG | OXYGEN SATURATION: 99 % | HEART RATE: 91 BPM | DIASTOLIC BLOOD PRESSURE: 72 MMHG

## 2019-03-09 DIAGNOSIS — Z96.651 S/P TOTAL KNEE ARTHROPLASTY, RIGHT: ICD-10-CM

## 2019-03-09 DIAGNOSIS — Z96.659 INFECTION OF TOTAL KNEE REPLACEMENT, SUBSEQUENT ENCOUNTER: Primary | ICD-10-CM

## 2019-03-09 DIAGNOSIS — Z96.659 INFECTION OF PROSTHETIC KNEE JOINT, INITIAL ENCOUNTER (H): ICD-10-CM

## 2019-03-09 DIAGNOSIS — T84.59XD INFECTION OF TOTAL KNEE REPLACEMENT, SUBSEQUENT ENCOUNTER: Primary | ICD-10-CM

## 2019-03-09 DIAGNOSIS — T84.59XA INFECTION OF PROSTHETIC KNEE JOINT, INITIAL ENCOUNTER (H): ICD-10-CM

## 2019-03-09 PROBLEM — M24.576: Status: ACTIVE | Noted: 2019-03-09

## 2019-03-09 LAB
BACTERIA SPEC CULT: NO GROWTH
CREAT SERPL-MCNC: 0.77 MG/DL (ref 0.66–1.25)
GFR SERPL CREATININE-BSD FRML MDRD: >90 ML/MIN/{1.73_M2}
Lab: NORMAL
SPECIMEN SOURCE: NORMAL

## 2019-03-09 PROCEDURE — 36592 COLLECT BLOOD FROM PICC: CPT | Performed by: ORTHOPAEDIC SURGERY

## 2019-03-09 PROCEDURE — 25000128 H RX IP 250 OP 636: Performed by: INTERNAL MEDICINE

## 2019-03-09 PROCEDURE — 82565 ASSAY OF CREATININE: CPT | Performed by: ORTHOPAEDIC SURGERY

## 2019-03-09 PROCEDURE — 25000132 ZZH RX MED GY IP 250 OP 250 PS 637: Performed by: NURSE PRACTITIONER

## 2019-03-09 PROCEDURE — 25800030 ZZH RX IP 258 OP 636: Performed by: ORTHOPAEDIC SURGERY

## 2019-03-09 PROCEDURE — 25000128 H RX IP 250 OP 636: Performed by: ORTHOPAEDIC SURGERY

## 2019-03-09 PROCEDURE — 25000128 H RX IP 250 OP 636: Performed by: NURSE PRACTITIONER

## 2019-03-09 PROCEDURE — 25800030 ZZH RX IP 258 OP 636

## 2019-03-09 PROCEDURE — 12000022 ZZH R&B SNF

## 2019-03-09 PROCEDURE — 25800030 ZZH RX IP 258 OP 636: Performed by: INTERNAL MEDICINE

## 2019-03-09 PROCEDURE — 25000132 ZZH RX MED GY IP 250 OP 250 PS 637: Performed by: INTERNAL MEDICINE

## 2019-03-09 PROCEDURE — 25000132 ZZH RX MED GY IP 250 OP 250 PS 637: Performed by: STUDENT IN AN ORGANIZED HEALTH CARE EDUCATION/TRAINING PROGRAM

## 2019-03-09 PROCEDURE — 99232 SBSQ HOSP IP/OBS MODERATE 35: CPT | Performed by: INTERNAL MEDICINE

## 2019-03-09 RX ORDER — THIAMINE HCL 50 MG
50 TABLET ORAL DAILY
Status: DISCONTINUED | OUTPATIENT
Start: 2019-03-10 | End: 2019-03-19 | Stop reason: HOSPADM

## 2019-03-09 RX ORDER — LACTOBACILLUS RHAMNOSUS GG 10B CELL
1 CAPSULE ORAL 2 TIMES DAILY
Status: DISCONTINUED | OUTPATIENT
Start: 2019-03-09 | End: 2019-03-19 | Stop reason: HOSPADM

## 2019-03-09 RX ORDER — SODIUM CHLORIDE 9 MG/ML
INJECTION, SOLUTION INTRAVENOUS
Status: COMPLETED
Start: 2019-03-09 | End: 2019-03-09

## 2019-03-09 RX ORDER — PANTOPRAZOLE SODIUM 40 MG/1
40 TABLET, DELAYED RELEASE ORAL
Status: DISCONTINUED | OUTPATIENT
Start: 2019-03-09 | End: 2019-03-10

## 2019-03-09 RX ORDER — LISINOPRIL 20 MG/1
20 TABLET ORAL AT BEDTIME
Status: DISCONTINUED | OUTPATIENT
Start: 2019-03-09 | End: 2019-03-19 | Stop reason: HOSPADM

## 2019-03-09 RX ORDER — ACETAMINOPHEN 325 MG/1
650 TABLET ORAL EVERY 6 HOURS PRN
Status: DISCONTINUED | OUTPATIENT
Start: 2019-03-09 | End: 2019-03-12

## 2019-03-09 RX ORDER — LIDOCAINE 40 MG/G
CREAM TOPICAL
Status: DISCONTINUED | OUTPATIENT
Start: 2019-03-09 | End: 2019-03-19 | Stop reason: HOSPADM

## 2019-03-09 RX ORDER — MULTIPLE VITAMINS W/ MINERALS TAB 9MG-400MCG
1 TAB ORAL AT BEDTIME
Status: DISCONTINUED | OUTPATIENT
Start: 2019-03-09 | End: 2019-03-19 | Stop reason: HOSPADM

## 2019-03-09 RX ORDER — FLUTICASONE PROPIONATE 50 MCG
2 SPRAY, SUSPENSION (ML) NASAL DAILY
Status: DISCONTINUED | OUTPATIENT
Start: 2019-03-10 | End: 2019-03-19 | Stop reason: HOSPADM

## 2019-03-09 RX ORDER — FERROUS SULFATE 325(65) MG
325 TABLET ORAL AT BEDTIME
Status: DISCONTINUED | OUTPATIENT
Start: 2019-03-09 | End: 2019-03-19 | Stop reason: HOSPADM

## 2019-03-09 RX ORDER — DOCUSATE SODIUM 100 MG/1
100 CAPSULE, LIQUID FILLED ORAL 2 TIMES DAILY
Status: DISCONTINUED | OUTPATIENT
Start: 2019-03-09 | End: 2019-03-10

## 2019-03-09 RX ORDER — ONDANSETRON 4 MG/1
4 TABLET, ORALLY DISINTEGRATING ORAL EVERY 6 HOURS PRN
Status: DISCONTINUED | OUTPATIENT
Start: 2019-03-09 | End: 2019-03-19 | Stop reason: HOSPADM

## 2019-03-09 RX ORDER — CALCIUM CARBONATE 500 MG/1
1000 TABLET, CHEWABLE ORAL 4 TIMES DAILY PRN
Status: DISCONTINUED | OUTPATIENT
Start: 2019-03-09 | End: 2019-03-19 | Stop reason: HOSPADM

## 2019-03-09 RX ORDER — LACTOBACILLUS RHAMNOSUS GG 10B CELL
1 CAPSULE ORAL 2 TIMES DAILY
Qty: 180 CAPSULE | Status: ON HOLD | DISCHARGE
Start: 2019-03-09 | End: 2019-03-18

## 2019-03-09 RX ORDER — HYDROMORPHONE HYDROCHLORIDE 2 MG/1
2-4 TABLET ORAL EVERY 4 HOURS PRN
Status: DISCONTINUED | OUTPATIENT
Start: 2019-03-09 | End: 2019-03-15

## 2019-03-09 RX ORDER — ERTAPENEM 1 G/1
1 INJECTION, POWDER, LYOPHILIZED, FOR SOLUTION INTRAMUSCULAR; INTRAVENOUS EVERY 24 HOURS
Status: DISCONTINUED | OUTPATIENT
Start: 2019-03-10 | End: 2019-03-18

## 2019-03-09 RX ORDER — HYDROXYZINE HYDROCHLORIDE 25 MG/1
25 TABLET, FILM COATED ORAL 3 TIMES DAILY PRN
Status: DISCONTINUED | OUTPATIENT
Start: 2019-03-09 | End: 2019-03-19 | Stop reason: HOSPADM

## 2019-03-09 RX ORDER — NALOXONE HYDROCHLORIDE 0.4 MG/ML
.1-.4 INJECTION, SOLUTION INTRAMUSCULAR; INTRAVENOUS; SUBCUTANEOUS
Status: DISCONTINUED | OUTPATIENT
Start: 2019-03-09 | End: 2019-03-19 | Stop reason: HOSPADM

## 2019-03-09 RX ORDER — ONDANSETRON 2 MG/ML
4 INJECTION INTRAMUSCULAR; INTRAVENOUS EVERY 6 HOURS PRN
Status: DISCONTINUED | OUTPATIENT
Start: 2019-03-09 | End: 2019-03-19 | Stop reason: HOSPADM

## 2019-03-09 RX ORDER — LORATADINE 10 MG/1
10 TABLET ORAL DAILY
Status: DISCONTINUED | OUTPATIENT
Start: 2019-03-10 | End: 2019-03-10

## 2019-03-09 RX ADMIN — VANCOMYCIN HYDROCHLORIDE 1750 MG: 10 INJECTION, POWDER, LYOPHILIZED, FOR SOLUTION INTRAVENOUS at 18:01

## 2019-03-09 RX ADMIN — HYDROMORPHONE HYDROCHLORIDE 4 MG: 2 TABLET ORAL at 12:24

## 2019-03-09 RX ADMIN — HYDROMORPHONE HYDROCHLORIDE 4 MG: 2 TABLET ORAL at 15:49

## 2019-03-09 RX ADMIN — PANTOPRAZOLE SODIUM 40 MG: 40 TABLET, DELAYED RELEASE ORAL at 15:50

## 2019-03-09 RX ADMIN — HYDROMORPHONE HYDROCHLORIDE 4 MG: 2 TABLET ORAL at 05:48

## 2019-03-09 RX ADMIN — VANCOMYCIN HYDROCHLORIDE 1750 MG: 10 INJECTION, POWDER, LYOPHILIZED, FOR SOLUTION INTRAVENOUS at 05:48

## 2019-03-09 RX ADMIN — Medication 1 CAPSULE: at 21:24

## 2019-03-09 RX ADMIN — HYDROMORPHONE HYDROCHLORIDE 4 MG: 2 TABLET ORAL at 02:28

## 2019-03-09 RX ADMIN — LISINOPRIL 20 MG: 20 TABLET ORAL at 21:24

## 2019-03-09 RX ADMIN — THIAMINE HCL (VITAMIN B1) 50 MG TABLET 50 MG: at 08:37

## 2019-03-09 RX ADMIN — HYDROMORPHONE HYDROCHLORIDE 4 MG: 2 TABLET ORAL at 22:57

## 2019-03-09 RX ADMIN — ASPIRIN 325 MG: 325 TABLET, DELAYED RELEASE ORAL at 08:37

## 2019-03-09 RX ADMIN — Medication 1 CAPSULE: at 08:37

## 2019-03-09 RX ADMIN — LORATADINE 10 MG: 10 TABLET ORAL at 08:37

## 2019-03-09 RX ADMIN — FERROUS SULFATE TAB 325 MG (65 MG ELEMENTAL FE) 325 MG: 325 (65 FE) TAB at 21:24

## 2019-03-09 RX ADMIN — FLUTICASONE PROPIONATE 2 SPRAY: 50 SPRAY, METERED NASAL at 08:46

## 2019-03-09 RX ADMIN — HYDROMORPHONE HYDROCHLORIDE 4 MG: 2 TABLET ORAL at 08:48

## 2019-03-09 RX ADMIN — MULTIPLE VITAMINS W/ MINERALS TAB 1 TABLET: TAB at 21:24

## 2019-03-09 RX ADMIN — HYDROMORPHONE HYDROCHLORIDE 4 MG: 2 TABLET ORAL at 18:55

## 2019-03-09 RX ADMIN — SODIUM CHLORIDE: 9 INJECTION, SOLUTION INTRAVENOUS at 18:04

## 2019-03-09 RX ADMIN — ERTAPENEM SODIUM 1 G: 1 INJECTION, POWDER, LYOPHILIZED, FOR SOLUTION INTRAMUSCULAR; INTRAVENOUS at 08:37

## 2019-03-09 ASSESSMENT — ACTIVITIES OF DAILY LIVING (ADL)
AMBULATION: 1-->ASSISTIVE EQUIPMENT
COGNITION: 0 - NO COGNITION ISSUES REPORTED
WHICH_OF_THE_ABOVE_FUNCTIONAL_RISKS_HAD_A_RECENT_ONSET_OR_CHANGE?: AMBULATION
NUMBER_OF_TIMES_PATIENT_HAS_FALLEN_WITHIN_LAST_SIX_MONTHS: 1
TRANSFERRING: 1-->ASSISTIVE EQUIPMENT
RETIRED_EATING: 0-->INDEPENDENT
ADLS_ACUITY_SCORE: 12
ADLS_ACUITY_SCORE: 12
RETIRED_COMMUNICATION: 0-->UNDERSTANDS/COMMUNICATES WITHOUT DIFFICULTY
BATHING: 0-->INDEPENDENT
TOILETING: 0-->INDEPENDENT
SWALLOWING: 0-->SWALLOWS FOODS/LIQUIDS WITHOUT DIFFICULTY
ADLS_ACUITY_SCORE: 12
DRESS: 0-->INDEPENDENT
ADLS_ACUITY_SCORE: 12
FALL_HISTORY_WITHIN_LAST_SIX_MONTHS: YES

## 2019-03-09 ASSESSMENT — MIFFLIN-ST. JEOR: SCORE: 1555.17

## 2019-03-09 NOTE — PLAN OF CARE
TCU Patient Goals  TCU Plan of Care  3/9/2019 1446 by Poly Tuttle, RN  Flowsheets  Taken 3/9/2019 1446   Bowel   continent  Bladder  continent  Bath days  Mon;Fri  Teaching Goals  wound care;falls  Skin Integrity  activity promotion;repositioning  Antibiotics  yes

## 2019-03-09 NOTE — PROGRESS NOTES
[facility] Internal Medicine Progress Note            Interval History:   Record reviewed.  Seen with RN.  S/P Resection of prepatellar bursa.   Open debridement, right total knee arthroplasty. Exchange polyethylene right knee. Complete synovectomy.  Indication right acute infection, right total knee arthroplasty, with wound dehiscence 3/4.  Dr. Joe. Staph epidermidis and E. Coli on culture.   Transitioned to ertapenem 3/7 and vanco.  Accepted to [facility].  Feeling better today.  Resolution of nausea and vague malaise 3/8. . Pain control remains adequate with transition to po dilaudid.   Ambulated in room without dizziness.   No CP, SOB, cough,  reflux, abd pain or distention.  Passing flatus. Diarrheal stool 3/8 - no BM since.   Voiding Ok.  PICC in place.           Medications:   All medications reviewed today          Physical Exam:   Blood pressure 129/72, pulse 91, temperature 96.4  F (35.8  C), temperature source Oral, resp. rate 16, SpO2 99 %.    Intake/Output Summary (Last 24 hours) at 3/5/2019 1434  Last data filed at 3/5/2019 0657  Gross per 24 hour   Intake 1801 ml   Output 860 ml   Net 941 ml       General:  Alert.  Appropriate.  No distress.  Off O2.  -140.     Heent:      Neck:    Skin:    Chest:  clear    Cardiac:  Reg without gallop, murmur.  No JVD.     Abdomen:  Non distended, soft, non-tender.  BS normal.     Extremities:  Perfused.  No edema, calf, posterior thigh tenderness to suggest DVT.     Neuro:            Data:     Results for orders placed or performed during the hospital encounter of 03/04/19 (from the past 24 hour(s))   Creatinine   Result Value Ref Range    Creatinine 0.77 0.66 - 1.25 mg/dL    GFR Estimate >90 >60 mL/min/[1.73_m2]    GFR Estimate If Black >90 >60 mL/min/[1.73_m2]     Last Comprehensive Metabolic Panel:  Sodium   Date Value Ref Range Status   03/08/2019 137 133 - 144 mmol/L Final     Potassium   Date Value Ref Range Status   03/08/2019 4.2 3.4 - 5.3 mmol/L  Final     Chloride   Date Value Ref Range Status   03/08/2019 102 94 - 109 mmol/L Final     Carbon Dioxide   Date Value Ref Range Status   03/08/2019 24 20 - 32 mmol/L Final     Anion Gap   Date Value Ref Range Status   03/06/2019 7 3 - 14 mmol/L Final     Glucose   Date Value Ref Range Status   03/08/2019 93 70 - 99 mg/dL Final     Urea Nitrogen   Date Value Ref Range Status   03/08/2019 14 7 - 30 mg/dL Final     Creatinine   Date Value Ref Range Status   03/09/2019 0.77 0.66 - 1.25 mg/dL Final     GFR Estimate   Date Value Ref Range Status   03/09/2019 >90 >60 mL/min/[1.73_m2] Final     Comment:     Non  GFR Calc  Starting 12/18/2018, serum creatinine based estimated GFR (eGFR) will be   calculated using the Chronic Kidney Disease Epidemiology Collaboration   (CKD-EPI) equation.       Calcium   Date Value Ref Range Status   03/08/2019 8.7 8.5 - 10.1 mg/dL Final     Hemoglobin   Date Value Ref Range Status   03/06/2019 11.4 (L) 13.3 - 17.7 g/dL Final   03/05/2019 11.7 (L) 13.3 - 17.7 g/dL Final                Assessment and Plan:   1)  S/P Resection of prepatellar bursa.   Open debridement, right total knee arthroplasty. Exchange polyethylene right knee.  Complete synovectomy.  Indication right acute infection, right total knee arthroplasty, with wound dehiscence 3/4.  Dr. Joe.  Staph epidermidis and E. Coli on culture.   On ertapenem and vanco.   Improved pain control on dilaudid.   2)  Acute blood loss anemia, mild.  Adequate.   3)  HTN. Labile.  Adequate for now.  4)  Alcohol use disorder.  5)  Alcoholic cirrhosis - no clinical suggestion of ascites or encephalopathy.  Tolerating opiates.   6)  Chronic hepatitis C.   7)  S/P TAVR for severe aortic stenosis.  8)  H/o thrombocytopenia 2nd to #5 (possible splenic sequestration).  Adequate.   9)  Diarrhea potentially related to ABs and laxatives.  No lingering nausea, abd pain.   Resolved.     PLAN:  1)  Clinically stable for discharge to  TCU.   2)  Meds, orders reviewed.      Disposition Plan   Expected discharge to  transitional care unit today.      Entered: Sean Shankar 03/09/2019, 12:49 PM              Attestation:  I have reviewed today's vital signs, notes, medications, labs and imaging.     Sean Shankar MD

## 2019-03-09 NOTE — PLAN OF CARE
VS: VSS   O2: On RA   Output: Voiding in bathroom/ urinal without difficulty .   Last BM: 3/8   Activity: Up ad tierra. Walks to bathroom with cane    Skin: Intact except for incision.    Pain: Managed with 4 mg oral dilaudid every 3 hours.   CMS: Intact    Dressing: CDI. Knee immobilizer on   Diet: Regular    LDA: PICC SL    Equipment: Walker, cane, IV pole, immobilizer    Plan: TCU once chapo is available.    Additional Info:

## 2019-03-09 NOTE — PROGRESS NOTES
Orthopaedic Surgery Progress Note:       Subjective:    Patient reports doing well. Pain well controlled on current regimen. Denies new onset tingling/numbness in operative extremity. Denies fever/chills/SOB/nause/vomiting.     Objective:   Temp:  [96.1  F (35.6  C)-96.6  F (35.9  C)] 96.4  F (35.8  C)  Heart Rate:  [62-71] 62  Resp:  [16-18] 16  BP: (129-148)/(72-75) 129/72  SpO2:  [97 %-100 %] 99 %    Intake/Output Summary (Last 24 hours) at 3/5/2019 0751  Last data filed at 3/5/2019 0657  Gross per 24 hour   Intake 1801 ml   Output 860 ml   Net 941 ml       Gen: NAD. Resting comfortably in bed  Resp: Breathing comfortably on RA  LE:  Dressing changed. Minimal drainage from incision.   Drain was removed yesteraday. Drain site with minimal drainage. -  Circulatory: DP Pulse Intact, Foot Perfused   Neurologic: intact      Labs:  Recent Labs   Lab 03/06/19  0622 03/05/19  1034 03/05/19  0752 03/04/19  1730   WBC  --   --  12.5* 8.7   HGB 11.4* 11.7* 11.8*  Unsatisfactory specimen - clotted 12.3*   PLT 79*  --  84* 72*     Cultures: staph epi and e coli      Assessment & Plan:   58 year old male now s/p I + D , and Debridement and Liner Exchange  on 3/4/2019 with Dr. Joe    PICC line in place.     Activity: WBAT BLE in KI on right. No range until wound healed.   Drain: continue to document  Antibiotics: Vanc given MRSA history, and then as per ID once cultures back  Dressings: changed   Diet: ADAT  Pain: PO meds only as patient tolerates  DVT ppx: Aspirin 325 mg D , 4 weeks  Labs:   PT/OT: Mobility, ROM, gait training, ADLs  Consults: Appreciate medicine co-management.  Dispo: Pending pain control and PT/OT recs.  Pending final ID plan- pt declined TCU- plan for home with home infusion  Follow up: TBD    Discharge - to TCU when bed available       Mynor French MD   PGY-4 Orthopaedic Surgery   [phone]

## 2019-03-09 NOTE — PLAN OF CARE
VS: Temp: 96.1  F (35.6  C) Temp src: Oral BP: 143/73   Heart Rate: 69 Resp: 16 SpO2: 100 % O2 Device: None (Room air)       O2: None, room air   Output: Pt voiding adequately in bathroom and urinal.   Last BM: 3/8/19   Activity: Pt up with SBA and cane.   Skin: Intact except for incision.    Pain: Tolerable with PRN 4 mg dilaudid q3h. ICE applied.    CMS: Intact, baseline numbness in RLE    Dressing: CDI, knee immobilizer on.    Diet: Regular   LDA: PICC patent and saline locked in left arm.   Equipment: Walker, cane, knee immobilizer IV pump and pole.    Plan: [facility] when there is a bed available.    Additional Info:

## 2019-03-09 NOTE — PROGRESS NOTES
Social Work Services Discharge Note      Patient Name:  Ten Johnson     Anticipated Discharge Date:  3/9/19     Discharge Disposition: [facility]    Following MD:  TCU hospitalist     Pre-Admission Screening (PAS) online form has been completed.  The Level of Care (LOC) is:  Determined  Confirmation Code is:  [id number]    Patient/caregiver informed of referral to [facility] Line for Pre-Admission Screening for skilled nursing facility (SNF) placement and to expect a phone call post discharge from SNF.     Additional Services/Equipment Arranged:  none     Patient / Family response to discharge plan:  agreeable     Persons notified of above discharge plan:  Pt., nursing, MD's    Staff Discharge Instructions:  Please fax discharge orders and signed hard scripts for any controlled substances.  Please print a packet and send with patient.       Medicare Notice of Rights provided to the patient/family:  Pt. Not Medicare

## 2019-03-09 NOTE — PLAN OF CARE
Pt transferred to the [facility] today at 1pm on wheel chair with personal belonging, accompanied with family. Report is given to the TCU nurse, pain medication is given. Pt has no further question at the time of transfer and no unmet needs were identified.

## 2019-03-09 NOTE — PLAN OF CARE
VS: VSS   O2: >90%, On RA   Output: Voiding in bathroom/ urinal without difficulty .   Last BM: 3/8   Activity: Up ad tierra. Walks to bathroom with cane    Skin: Intact except for incision.    Pain: Managed with 4 mg oral dilaudid every 3 hours.   CMS: Intact    Dressing: CDI. Knee immobilizer on   Diet: Regular    LDA: PICC SL    Equipment: Walker, cane, IV pole, immobilizer    Plan: TCU 3/9 at 1pm   Additional Info:

## 2019-03-09 NOTE — PROGRESS NOTES
"Patient is a 58 year old male  admitted to room 415 via wheelchair  .  Patient is alert and oriented X 3. See Epic for VS and assessment.  Patient is able to transfer assist of 1 using cane. Patient was settled into their room, shown call light, tv, mealtimes etc. Oriented to unit. Will continue monitoring pain level and VS. Notifying MD with any concerns.  Follow MD orders for cares and medications.    Level of Schooling:college  Ethnicity:  Marital Status:never   Dentures: No  Hearing Aid: No  Smoker:  No  Glasses: Yes  Occupation:     Falls 0-1 mo: 1 2-6 mo: 0  Stairs prior function: Independent  Prior device use: Other none   Advanced Care Directive Referral to Social Work?Yes    Pt declines TB testing, is unsure if has ever had one and requesting blood test, sticky note left for MD to update. Low platelets, see lab results. Pt wishes nurses to ask him if he needs his pain meds every 3 hours, states he has been needing them every 3 hours.     Skin assessment not complete at this time, PM nurse updated to complete skin assessment.  No pressure ulcers reported or skin problems besides incision/wound right knee. Nurse Report states dressing right knee changed this morning. Ace and immobilizer currently covering dressing. Pt alert and oriented x 4, pleasant and makes needs known. Has had one meal today, \"I had a big breakfast\", continue to assess and monitor.   "

## 2019-03-10 PROCEDURE — 25000132 ZZH RX MED GY IP 250 OP 250 PS 637: Performed by: NURSE PRACTITIONER

## 2019-03-10 PROCEDURE — 86480 TB TEST CELL IMMUN MEASURE: CPT | Performed by: NURSE PRACTITIONER

## 2019-03-10 PROCEDURE — 36592 COLLECT BLOOD FROM PICC: CPT | Performed by: NURSE PRACTITIONER

## 2019-03-10 PROCEDURE — 99309 SBSQ NF CARE MODERATE MDM 30: CPT | Performed by: INTERNAL MEDICINE

## 2019-03-10 PROCEDURE — 25800030 ZZH RX IP 258 OP 636: Performed by: INTERNAL MEDICINE

## 2019-03-10 PROCEDURE — 25000128 H RX IP 250 OP 636: Performed by: INTERNAL MEDICINE

## 2019-03-10 PROCEDURE — 25000128 H RX IP 250 OP 636: Performed by: NURSE PRACTITIONER

## 2019-03-10 PROCEDURE — 12000022 ZZH R&B SNF

## 2019-03-10 PROCEDURE — 99207 ZZC CDG-HISTORY COMP: MEETS EXP. PROBLEM FOCUSED - DOWN CODED LACK OF HPI: CPT | Performed by: INTERNAL MEDICINE

## 2019-03-10 RX ORDER — DOCUSATE SODIUM 100 MG/1
100 CAPSULE, LIQUID FILLED ORAL 2 TIMES DAILY PRN
Status: DISCONTINUED | OUTPATIENT
Start: 2019-03-10 | End: 2019-03-19 | Stop reason: HOSPADM

## 2019-03-10 RX ADMIN — Medication 1 CAPSULE: at 21:17

## 2019-03-10 RX ADMIN — HYDROMORPHONE HYDROCHLORIDE 4 MG: 2 TABLET ORAL at 08:24

## 2019-03-10 RX ADMIN — PANTOPRAZOLE SODIUM 40 MG: 40 TABLET, DELAYED RELEASE ORAL at 07:09

## 2019-03-10 RX ADMIN — HYDROMORPHONE HYDROCHLORIDE 4 MG: 2 TABLET ORAL at 21:17

## 2019-03-10 RX ADMIN — VANCOMYCIN HYDROCHLORIDE 1750 MG: 10 INJECTION, POWDER, LYOPHILIZED, FOR SOLUTION INTRAVENOUS at 05:50

## 2019-03-10 RX ADMIN — THIAMINE HCL (VITAMIN B1) 50 MG TABLET 50 MG: at 08:24

## 2019-03-10 RX ADMIN — HYDROMORPHONE HYDROCHLORIDE 4 MG: 2 TABLET ORAL at 04:11

## 2019-03-10 RX ADMIN — Medication 1 CAPSULE: at 08:24

## 2019-03-10 RX ADMIN — VANCOMYCIN HYDROCHLORIDE 1750 MG: 10 INJECTION, POWDER, LYOPHILIZED, FOR SOLUTION INTRAVENOUS at 18:01

## 2019-03-10 RX ADMIN — MULTIPLE VITAMINS W/ MINERALS TAB 1 TABLET: TAB at 21:17

## 2019-03-10 RX ADMIN — HYDROMORPHONE HYDROCHLORIDE 4 MG: 2 TABLET ORAL at 12:46

## 2019-03-10 RX ADMIN — FLUTICASONE PROPIONATE 2 SPRAY: 50 SPRAY, METERED NASAL at 08:24

## 2019-03-10 RX ADMIN — HYDROMORPHONE HYDROCHLORIDE 4 MG: 2 TABLET ORAL at 17:08

## 2019-03-10 RX ADMIN — LISINOPRIL 20 MG: 20 TABLET ORAL at 21:17

## 2019-03-10 RX ADMIN — ERTAPENEM SODIUM 1 G: 1 INJECTION, POWDER, LYOPHILIZED, FOR SOLUTION INTRAMUSCULAR; INTRAVENOUS at 09:40

## 2019-03-10 RX ADMIN — ASPIRIN 325 MG: 325 TABLET, DELAYED RELEASE ORAL at 08:24

## 2019-03-10 RX ADMIN — FERROUS SULFATE TAB 325 MG (65 MG ELEMENTAL FE) 325 MG: 325 (65 FE) TAB at 21:17

## 2019-03-10 NOTE — PLAN OF CARE
The patient is alert and oriented x 3. VSS. Medicated with dilaudid x 2 for right knee pain and expressed some pain control. Appetite is good. Right knee warm, slightly swollen with incision and staples CDI. No dehiscence noted. Tubigrip and immobilizer on. Ambulates with a cane. PICC single lumen on right upper arm is intact and patent. Tolerating iv antibiotic infusion without difficulty. Continue to monitor for comfort.

## 2019-03-10 NOTE — PROGRESS NOTES
[facility]  Internal Medicine Extended Progress Note    Date of Admission: 3/9/2019  Hospital Discharge Team: Orthopedic surgery          Assessment and Plan:   Ten Johnson is a 58 year old male with a medical history of untreated hepatitis C, alcohol abuse, cirrhosis, aortic stenosis s/p TAVR (2/21/18), hx Sarahi-evans tear (2017), grade II diastolic dysfunction, allergic rhinitis, HTN and R knee osteoarthritis s/p TKA (2/7/19) who was admitted to [facility] on 3/4 with infection of right TKA now s/p I+D, and liner exchange on 3/4/2019.  Transferred to TCU for ongoing therapy and IV antibiotics.      #Infection of previous R TKA s/p I&D and liner exchange (3/4/2019)  Tolerated procedure well, pain well controlled on oral Hydromorphone.  Cultures currently growing staph epidermidis and E coli.  Evaluated by ID on 3/5 who recommended IV antibiotics (IV Vanco and IV Ertapenem) x 6 weeks.    - PT/OT   - WBAT bilateral LE, R knee in immobilizer   - Monitor incision for s/s of infection   - Pain control: PRN Tylenol and PRN Hydromorphone   - Antibiotics: IV Vancomycin and IV Ertapenem x 6 weeks, PICC line in place  - DVT ppx:  mg daily per orthopedics   - Incentive spirometry   - Laxatives for constipation ppx    #Hx of cirrhosis with decompensation   #Hx untreated hepatitis C   #Hx of alcohol use   Due to prior alcohol use and hepatitis C.  Follows with hepatology as OP but has not been seen in over a year.  Most recent LFTs on 3/5 with stable elevation: ALT 84 (97), AST 65 (95), T bili 0.9 (0.9), AP 91 (108).  No current concerns.   - CMP q M in TCU   - Follow up as OP with hepatology   - Continue to encourage abstinence from alcohol   - Continue PTA thiamine and MVI     #Severe aortic stenosis s/p TAVR (2/21/18)  #Grade II diastolic dysfunciton  VSS, no acute concerns.  Most recent TTE (9/28) w/ hyperkinetic LVEF 65-70%, no aortic regurgitation.   - Continue  mg daily   -  Follow up as OP     #Chronic thrombocytopenia   Likely 2/2 liver disease.  Baseline appears to be 60-70s, currently 79 without s/s of bleeding on exam   - Follow CBC q M     #Noninfectious diarrhea   Noted in hospital.  Afebrile w/o elevation in WBC lowering suspicion for infection.  Likely 2/2 antibiotics. Started on probiotic with improvement.   - Continue Lactobacillus     #Acute on chronic normocytic anemia  Likely multifactorial including recent surgery, infection, alcohol use.  BL Hgb appears to be ~11-12, most recently 11.4 on 3/6. No current s/s of bleeding on exam   - Continue PTA ferrous sulfate   - CBC qM     #Allergic rhinitis   - Continue Flonase     CODE: Full Code   Diet: Regular   DVT:  mg daily   Indication for Psychotropic Medications: No diagnosis   Pneumococcal Vaccination Status: UTD, PPSV23 2011 and PCV 13 2015  Disposition: Pending dates from therapy    Vanna Merrill, MPH, [facility]  Hospitalist Service  Pager [phone]         Chief Complaint:     S/p R TKA with infection now s/p I+D and liner exchange on 3/4/2019 with orthopedics.          HPI:   Ten Johnson is a 58 year old male with a medical history of untreated hepatitis C, alcohol abuse, cirrhosis, aortic stenosis s/p TAVR (2/21/18), hx Sarahi-evans tear (2017), grade II diastolic dysfunction, allergic rhinitis, HTN and R knee osteoarthritis s/p TKA (2/7/19) who was admitted to [facility] on 3/4 with infection of right TKA now s/p I+D, and liner exchange on 3/4/2019.  Uncomplicated post-operative course in hospital.     Transferred to TCU for ongoing therapy and IV antibiotics.      Currently, no fevers, chills, nausea, vomiting, diarrhea, chest pain, dysuria, melena, hematemesis, shortness of breath.  Pain well controlled.          Review of Systems:   A 10 point ROS was performed and negative unless otherwise noted in HPI.          Past Medical History:     I have reviewed this patient's medical history and  updated it with pertinent information if needed.   Past Medical History:   Diagnosis Date     Alcohol use disorder      Alcoholic cirrhosis (H)      Anticoagulant long-term use     plavix     Ascites      Chronic allergic rhinitis      Chronic anemia      Chronic hepatitis C without hepatic coma (H) 05/10/2016    Untreated as of 2/2018     Diastolic dysfunction      Erosive gastropathy      Esophageal varices in alcoholic cirrhosis (H)      H/O upper gastrointestinal hemorrhage 09/2017     History of blood transfusion      History of drug abuse     intranasal     Hypertension     essential     JANELLE (iron deficiency anemia)      Sarahi-Hoffman tear     History     Marijuana abuse      MRSA (methicillin resistant Staphylococcus aureus)      Olecranon bursitis      Portal hypertension (H)      Right shoulder pain     history of rotator cuff repair     S/p TAVR (transcatheter aortic valve replacement), bioprosthetic      Severe aortic stenosis      Thrombocytopenia (H)             Past Surgical History:     I have reviewed this patient's surgical history and updated it with pertinent information if needed.  Past Surgical History:   Procedure Laterality Date     ARTHROSCOPY SHOULDER ROTATOR CUFF REPAIR  7/31/2012    Procedure: ARTHROSCOPY SHOULDER ROTATOR CUFF REPAIR;  Right Shoulder Arthroscopic Rotator Cuff Repair, BicepsTenodesis,  Subacromial Decompression ;  Surgeon: Joi Castillo MD;  [facility]     ESOPHAGOSCOPY, GASTROSCOPY, DUODENOSCOPY (EGD), COMBINED N/A 10/23/2017    Procedure: COMBINED ESOPHAGOSCOPY, GASTROSCOPY, DUODENOSCOPY (EGD);;  Surgeon: Gentry Salas MD;  [facility]: [facility]     EXCHANGE POLY COMPONENT ARTHROPLASTY KNEE Right 3/4/2019    Procedure: REVISION RIGHT TOTAL KNEE POLY COMPONENT EXCHANGE;  Surgeon: Olvin Joe MD;  Location: [facility]     FACIAL RECONSTRUCTION SURGERY  1971     HEART CATH FEMORAL CANNULIZATION WITH OPEN STANDBY REPAIR AORTIC VALVE N/A 2/21/2018     Procedure: HEART CATH FEMORAL CANNULIZATION WITH OPEN STANDBY REPAIR AORTIC VALVE;;  Surgeon: Luis Baird MD;  [facility]     IRRIGATION AND DEBRIDEMENT UPPER EXTREMITY, COMBINED  1/3/2012    Procedure:COMBINED IRRIGATION AND DEBRIDEMENT UPPER EXTREMITY; Irrigation & Debridement Left Elbow; Surgeon:CRISTHIAN ZHOU; Location:[facility] OR     OPTICAL TRACKING SYSTEM ARTHROPLASTY KNEE Right 2/7/2019    Procedure: ARTHROPLASTY KNEE RIGHT;  Surgeon: Olvin Joe MD;  [facility]: [facility]     REPAIR TENDON TRICEPS UPPER EXTREMITY  11/8/2011    Procedure:REPAIR TENDON TRICEPS UPPER EXTREMITY; Surgeon:CRISTHIAN ZHOU; Location:[facility]     SHOULDER SURGERY  2003    left, injury, torn tendons, hematoma     TRANSCATHETER AORTIC VALVE IMPLANT ANESTHESIA N/A 2/21/2018    Procedure: TRANSCATHETER AORTIC VALVE IMPLANT ANESTHESIA;  Transfemoral (Quiroz) Aortic Valve Implant 26mm MARTHA 3, with Cardiopulmonary Bypass Standby, transthoracic echocardiogram;  Surgeon: GENERIC ANESTHESIA PROVIDER;  Location: [facility]     TRANSPOSITION ULNAR NERVE (ELBOW)  11/8/2011    Procedure:TRANSPOSITION ULNAR NERVE (ELBOW); Final Procedure Done: Left Elbow Lateral Ulnar Collateral Repair And  Left Elbow Triceps Repair              Social History:     Social History     Tobacco Use     Smoking status: Never Smoker     Smokeless tobacco: Never Used   Substance Use Topics     Alcohol use: Yes     Comment: Alcohol use disorder, still actively drinking     Drug use: No     Comment: denies            Family History:     I have reviewed this patient's family history and updated it with pertinent information if needed.   Family History   Problem Relation Age of Onset     Cancer Mother 62     Alcoholism Paternal Uncle      No Known Problems Father      No Known Problems Brother      Diabetes Maternal Grandmother      Myocardial Infarction Maternal Grandfather      No Known Problems Paternal Grandmother      Unknown/Adopted Paternal  Grandfather      Cirrhosis No family hx of             Allergies:      Allergies   Allergen Reactions     Zolpidem Other (See Comments)     Alcoholic.  Had reaction 3/17/13 while intoxicated which included black out, loss of awareness, paranoia.  Do not prescribe.  Dr. Celeste     Cats Other (See Comments)     rhinitis     Dogs Other (See Comments)     rhinitis     Pollen Extract Other (See Comments)     rhinits.            Medications:     Prior to Admission Medications   Prescriptions Last Dose Informant Patient Reported? Taking?   HYDROmorphone (DILAUDID) 2 MG tablet   No No   Sig: Take 1-2 tablets (2-4 mg) by mouth every 4 hours as needed for moderate to severe pain   Multiple Vitamin (MULTIVITAMINS PO)  Self Yes No   Sig: Take 1 tablet by mouth At Bedtime    Thiamine HCl (VITAMIN B1) 50 MG TABS   No No   Sig: Take 1 tablet (50 mg) by mouth daily   acetaminophen (TYLENOL) 325 MG tablet   No No   Sig: Take 2 tablets (650 mg) by mouth every 6 hours as needed for mild pain   aspirin (ASA) 325 MG EC tablet   No No   Sig: Take 1 tablet (325 mg) by mouth daily   docusate sodium (COLACE) 100 MG capsule   No No   Sig: Take 1 capsule (100 mg) by mouth 2 times daily   ertapenem (INVANZ) 1 GM vial   No No   Sig: Inject 1 g into the vein every 24 hours   ferrous sulfate (FEROSUL) 325 (65 Fe) MG tablet   No No   Sig: Take 1 tablet (325 mg) by mouth At Bedtime   fluticasone (FLONASE) 50 MCG/ACT nasal spray   No No   Sig: Spray 2 sprays into both nostrils daily   hydrOXYzine (VISTARIL) 25 MG capsule   No No   Sig: Take 1 capsule (25 mg) by mouth 3 times daily as needed (muscle relaxant)   lactobacillus rhamnosus, GG, (CULTURELL) capsule   No No   Sig: Take 1 capsule by mouth 2 times daily   lisinopril (PRINIVIL/ZESTRIL) 20 MG tablet   No No   Sig: Take 1 tablet (20 mg) by mouth At Bedtime   loratadine (CLARITIN) 10 MG tablet   No No   Sig: Take 1 tablet (10 mg) by mouth daily   order for DME   No No   Sig: Equipment being  "ordered: Cane ()  Treatment Diagnosis: Gait Instability   pantoprazole (PROTONIX) 40 MG EC tablet   No No   Sig: Take 1 tablet (40 mg) by mouth 2 times daily (before meals)   sodium chloride 0.9 % SOLN 250 mL with vancomycin 100 mg/mL SOLR 1,750 mg   No No   Sig: Inject 1,750 mg into the vein every 12 hours      Facility-Administered Medications: None             Physical Exam:   Blood pressure 144/69, pulse 79, temperature 98  F (36.7  C), temperature source Oral, resp. rate 18, height 1.753 m (5' 9\"), weight 75.5 kg (166 lb 6.4 oz), SpO2 97 %.  GENERAL: Alert and oriented x 3. Siting up in bed NAD.  Pleasant and interactive.   HEENT: Anicteric sclera. PERRL. Mucous membranes moist and without lesions.   CV: RRR. S1, S2. No murmurs appreciated.   RESPIRATORY: Effort normal on room air. Lungs CTAB with no wheezing, rales, rhonchi.   GI: Abdomen soft and non distended, bowel sounds present. No tenderness, rebound, guarding.   NEUROLOGICAL: No focal deficits. Moves all extremities.   EXTREMITIES: No peripheral edema. Intact bilateral pedal pulses. R knee incision staples with some surrounding erythema but improved from prior (reviewed patient's photographs).  Mild edema over incision.  No purulence or drainage. RUE PICC line, CDI.    SKIN: No jaundice. No rashes.     Lines/Tubes/Drains: RUE PICC   "

## 2019-03-10 NOTE — PHARMACY-MEDICATION REGIMEN REVIEW
Pharmacy Medication Regimen Review  Ten Johnson is a 58 year old male who is currently in the Transitional Care Unit.    Assessment: All medications have an appropriate indications, durations and no unnecessary use was found    Plan:   1. Patient on aspirin - monitor for s/s of bleeding.  2. Patient on hydromorphone - monitor for excessive sedation. Narcan is ordered.  3. Patient on vancomycin - pharmacy will monitor renal function and make necessary dose adjustments based on vancomycin levels.  Attending provider will be sent this note for review.  If there are any emergent issues noted above, pharmacist will contact provider directly by phone.      Pharmacy will periodically review the resident's medication regimen for any PRN medications not administered in > 72 hours and discontinue them. The pharmacist will discuss gradual dose reductions of psychopharmacologic medications with interdisciplinary team on a regular basis.    Please contact pharmacy if the above does not answer specific medication questions/concerns.    Background:  A pharmacist has reviewed all medications and pertinent medical history today.  Medications were reviewed for appropriate use and any irregularities found are listed with recommendations.      Current Facility-Administered Medications:      [START ON 3/12/2019] - Skin Test Reading -, , Does not apply, Q21 Days, Vanna Merrill NP     acetaminophen (TYLENOL) tablet 650 mg, 650 mg, Oral, Q6H PRN, Vanna Merrill NP     aspirin (ASA) EC tablet 325 mg, 325 mg, Oral, Daily, Vanna Merrill NP, 325 mg at 03/10/19 0824     benzocaine-menthol (CEPACOL) 15-3.6 MG lozenge 1 lozenge, 1 lozenge, Buccal, Q2H PRN, Vanna Merrill NP     calcium carbonate (TUMS) chewable tablet 1,000 mg, 1,000 mg, Oral, 4x Daily PRN, Vanna Merrill NP     docusate sodium (COLACE) capsule 100 mg, 100 mg, Oral, BID PRN, Vanna Merrill NP     ertapenem (INVanz) 1 g  vial to attach to  mL bag, 1 g, Intravenous, Q24H, Vanna Merrill NP, 1 g at 03/10/19 0940     eucerin cream, , Topical, Q1H PRN, Vanna Merrill NP     ferrous sulfate (FEROSUL) tablet 325 mg, 325 mg, Oral, At Bedtime, Vanna Merrill NP, 325 mg at 03/09/19 2124     fluticasone (FLONASE) 50 MCG/ACT spray 2 spray, 2 spray, Both Nostrils, Daily, Vanna Merrill NP, 2 spray at 03/10/19 0824     HYDROmorphone (DILAUDID) tablet 2-4 mg, 2-4 mg, Oral, Q4H PRN, Vanna Merrill NP, 4 mg at 03/10/19 0824     hydrOXYzine (ATARAX) tablet 25 mg, 25 mg, Oral, TID PRN, Vanna Merrill NP     lactobacillus rhamnosus (GG) (CULTURELL) capsule 1 capsule, 1 capsule, Oral, BID, Vanna Merrill NP, 1 capsule at 03/10/19 0824     lidocaine (LMX4) cream, , Topical, Q1H PRN, Vanna Merrill NP     lidocaine 1 % 0.1-1 mL, 0.1-1 mL, Other, Q1H PRN, Vanna Merrill NP     lisinopril (PRINIVIL/ZESTRIL) tablet 20 mg, 20 mg, Oral, At Bedtime, Vanna Merrill NP, 20 mg at 03/09/19 2124     medication instructions, , Does not apply, Continuous PRN, Vnana Merrill NP     multivitamin w/minerals (THERA-VIT-M) tablet 1 tablet, 1 tablet, Oral, At Bedtime, Vanna Merrill NP, 1 tablet at 03/09/19 2124     naloxone (NARCAN) injection 0.1-0.4 mg, 0.1-0.4 mg, Intravenous, Q2 Min PRN, Nicholas Eduardo MD     ondansetron (ZOFRAN-ODT) ODT tab 4 mg, 4 mg, Oral, Q6H PRN **OR** ondansetron (ZOFRAN) injection 4 mg, 4 mg, Intravenous, Q6H PRN, Vanna Merrill NP     sodium chloride (PF) 0.9% PF flush 10 mL, 10 mL, Intracatheter, Q7 Days, Vanna Merrill NP     sodium chloride (PF) 0.9% PF flush 10-20 mL, 10-20 mL, Intracatheter, q1 min prn, Vanna Merrill NP     tuberculin injection 5 Units, 5 Units, Intradermal, Q21 Days, Vanna Merrill NP     vancomycin (VANCOCIN) 1,750 mg in sodium chloride 0.9 % 250 mL intermittent infusion,  1,750 mg (central catheter), Intravenous, Q12H, Nicholas Eduardo MD, 1,750 mg at 03/10/19 0550     vitamin B1 (THIAMINE) tablet 50 mg, 50 mg, Oral, Daily, Vanna Merrill NP, 50 mg at 03/10/19 0824  No current outpatient prescriptions on file.   PMH:   S/p TKA  S/p acute infection  S/p wound dehiscense  HTN    Rena Kuhn, Veto, BCPS March 10, 2019

## 2019-03-10 NOTE — H&P
[facility]  HISTORY AND PHYSICAL   Chief Complaint       History of Present Illness       Ten Johnson is a 58 year old male with a medical history of untreated hepatitis C, alcohol abuse, cirrhosis, aortic stenosis s/p TAVR (2/21/18), hx Sarahi-hoffman tear (2017), grade II diastolic dysfunction, allergic rhinitis, HTN and R knee osteoarthritis s/p TKA (2/7/19) is being admitted to  TCU for ongoing rehabilitation after his hospitalization at [facility] on 3/4 with infection of right TKA now s/p I+D, and liner exchange on 3/4/2019.    Currently reports doing well. Pain mostly controlled. Worsens with movement or activity. No nausea, vomiting. Had BM yesterday. No cough. Tolerating diet  No chest pain, shortness of breath. No fever, chills.            Past Medical History     Past Medical History:   Diagnosis Date     Alcohol use disorder      Alcoholic cirrhosis (H)      Anticoagulant long-term use     plavix     Ascites      Chronic allergic rhinitis      Chronic anemia      Chronic hepatitis C without hepatic coma (H) 05/10/2016    Untreated as of 2/2018     Diastolic dysfunction      Erosive gastropathy      Esophageal varices in alcoholic cirrhosis (H)      H/O upper gastrointestinal hemorrhage 09/2017     History of blood transfusion      History of drug abuse     intranasal     Hypertension     essential     JANELLE (iron deficiency anemia)      Sarahi-Hoffman tear     History     Marijuana abuse      MRSA (methicillin resistant Staphylococcus aureus)      Olecranon bursitis      Portal hypertension (H)      Right shoulder pain     history of rotator cuff repair     S/p TAVR (transcatheter aortic valve replacement), bioprosthetic      Severe aortic stenosis      Thrombocytopenia (H)          Past Surgical History     Past Surgical History:   Procedure Laterality Date     ARTHROSCOPY SHOULDER ROTATOR CUFF REPAIR  7/31/2012    Procedure: ARTHROSCOPY SHOULDER ROTATOR CUFF REPAIR;   Right Shoulder Arthroscopic Rotator Cuff Repair, BicepsTenodesis,  Subacromial Decompression ;  Surgeon: Joi Castillo MD;  [facility]: [facility]     ESOPHAGOSCOPY, GASTROSCOPY, DUODENOSCOPY (EGD), COMBINED N/A 10/23/2017    Procedure: COMBINED ESOPHAGOSCOPY, GASTROSCOPY, DUODENOSCOPY (EGD);;  Surgeon: Gentry Salas MD;  Location: [facility]     EXCHANGE POLY COMPONENT ARTHROPLASTY KNEE Right 3/4/2019    Procedure: REVISION RIGHT TOTAL KNEE POLY COMPONENT EXCHANGE;  Surgeon: Olvin Joe MD;  Location: [facility]     FACIAL RECONSTRUCTION SURGERY  1971     HEART CATH FEMORAL CANNULIZATION WITH OPEN STANDBY REPAIR AORTIC VALVE N/A 2/21/2018    Procedure: HEART CATH FEMORAL CANNULIZATION WITH OPEN STANDBY REPAIR AORTIC VALVE;;  Surgeon: Luis Baird MD;  [facility]: [facility]     IRRIGATION AND DEBRIDEMENT UPPER EXTREMITY, COMBINED  1/3/2012    Procedure:COMBINED IRRIGATION AND DEBRIDEMENT UPPER EXTREMITY; Irrigation & Debridement Left Elbow; Surgeon:CRISTHIAN ZHOU; Location:[facility] OR     OPTICAL TRACKING SYSTEM ARTHROPLASTY KNEE Right 2/7/2019    Procedure: ARTHROPLASTY KNEE RIGHT;  Surgeon: Olvin Joe MD;  Location: [facility]     REPAIR TENDON TRICEPS UPPER EXTREMITY  11/8/2011    Procedure:REPAIR TENDON TRICEPS UPPER EXTREMITY; Surgeon:CRISTHIAN ZHOU; Location:[facility]     SHOULDER SURGERY  2003    left, injury, torn tendons, hematoma     TRANSCATHETER AORTIC VALVE IMPLANT ANESTHESIA N/A 2/21/2018    Procedure: TRANSCATHETER AORTIC VALVE IMPLANT ANESTHESIA;  Transfemoral (Quiroz) Aortic Valve Implant 26mm MARTHA 3, with Cardiopulmonary Bypass Standby, transthoracic echocardiogram;  Surgeon: GENERIC ANESTHESIA PROVIDER;  Location: [facility]     TRANSPOSITION ULNAR NERVE (ELBOW)  11/8/2011    Procedure:TRANSPOSITION ULNAR NERVE (ELBOW); Final Procedure Done: Left Elbow Lateral Ulnar Collateral Repair And  Left Elbow Triceps Repair          Social History     Social History     Socioeconomic  History     Marital status: Single     Spouse name: Not on file     Number of children: 0     Years of education: Not on file     Highest education level: Not on file   Occupational History     Occupation: unemployed   Social Needs     Financial resource strain: Not on file     Food insecurity:     Worry: Not on file     Inability: Not on file     Transportation needs:     Medical: Not on file     Non-medical: Not on file   Tobacco Use     Smoking status: Never Smoker     Smokeless tobacco: Never Used   Substance and Sexual Activity     Alcohol use: Yes     Comment: Alcohol use disorder, still actively drinking     Drug use: No     Comment: denies     Sexual activity: Not Currently     Partners: Female   Lifestyle     Physical activity:     Days per week: Not on file     Minutes per session: Not on file     Stress: Not on file   Relationships     Social connections:     Talks on phone: Not on file     Gets together: Not on file     Attends [employer] service: Not on file     Active member of club or organization: Not on file     Attends meetings of clubs or organizations: Not on file     Relationship status: Not on file     Intimate partner violence:     Fear of current or ex partner: Not on file     Emotionally abused: Not on file     Physically abused: Not on file     Forced sexual activity: Not on file   Other Topics Concern     Parent/sibling w/ CABG, MI or angioplasty before 65F 55M? Not Asked   Social History Narrative    .  Bicycles a lot.  Excessive alcohol use.  Smokes cigars.  Occasional marijuana use.            Family History     Family History   Problem Relation Age of Onset     Cancer Mother 62     Alcoholism Paternal Uncle      No Known Problems Father      No Known Problems Brother      Diabetes Maternal Grandmother      Myocardial Infarction Maternal Grandfather      No Known Problems Paternal Grandmother      Unknown/Adopted Paternal Grandfather      Cirrhosis No family hx of   "       Medications     Reviewed and medication reconciliation done.  Current Facility-Administered Medications   Medication     [START ON 3/12/2019] - Skin Test Reading -     acetaminophen (TYLENOL) tablet 650 mg     aspirin (ASA) EC tablet 325 mg     benzocaine-menthol (CEPACOL) 15-3.6 MG lozenge 1 lozenge     calcium carbonate (TUMS) chewable tablet 1,000 mg     docusate sodium (COLACE) capsule 100 mg     ertapenem (INVanz) 1 g vial to attach to  mL bag     eucerin cream     ferrous sulfate (FEROSUL) tablet 325 mg     fluticasone (FLONASE) 50 MCG/ACT spray 2 spray     HYDROmorphone (DILAUDID) tablet 2-4 mg     hydrOXYzine (ATARAX) tablet 25 mg     lactobacillus rhamnosus (GG) (CULTURELL) capsule 1 capsule     lidocaine (LMX4) cream     lidocaine 1 % 0.1-1 mL     lisinopril (PRINIVIL/ZESTRIL) tablet 20 mg     medication instructions     multivitamin w/minerals (THERA-VIT-M) tablet 1 tablet     naloxone (NARCAN) injection 0.1-0.4 mg     ondansetron (ZOFRAN-ODT) ODT tab 4 mg    Or     ondansetron (ZOFRAN) injection 4 mg     sodium chloride (PF) 0.9% PF flush 10 mL     sodium chloride (PF) 0.9% PF flush 10-20 mL     tuberculin injection 5 Units     vancomycin (VANCOCIN) 1,750 mg in sodium chloride 0.9 % 250 mL intermittent infusion     vitamin B1 (THIAMINE) tablet 50 mg          Review of Systems     10 point  review of systems negative, except for what is mentioned above      Physical Exam     General:   Vital signs:    Blood pressure 167/67, pulse 86, temperature 96.7  F (35.9  C), temperature source Oral, resp. rate 18, height 1.753 m (5' 9\"), weight 75.5 kg (166 lb 6.4 oz), SpO2 97 %.  Estimated body mass index is 24.57 kg/m  as calculated from the following:    Height as of this encounter: 1.753 m (5' 9\").    Weight as of this encounter: 75.5 kg (166 lb 6.4 oz).      Intake/Output Summary (Last 24 hours) at 3/10/2019 1051  Last data filed at 3/10/2019 0933  Gross per 24 hour   Intake --   Output 525 ml "   Net -525 ml      HEENT: No icterus, no pallor  Cardiovascular: S1, S2 normal.   Respiratory: B/L CTA  Abdomen: Soft, NT, BS+  Neurology: Alert, awake, and oriented.   Extremities: Left knee suture in place with mild edema.               Laboratory/Imaging Studies     I have reviewed  laboratory and imaging studies in the Epic. Pertinent findings are as below    CMP  Recent Labs   Lab 03/09/19  0542 03/08/19  0514 03/07/19  0735 03/06/19  0622 03/05/19  0752   NA  --  137  --  138 140   POTASSIUM  --  4.2  --  4.3 4.7   CHLORIDE  --  102  --  105 107   CO2  --  24  --  26 20   ANIONGAP  --   --   --  7 13   GLC  --  93  --  109* 124*  121*   BUN  --  14  --  17 17   CR 0.77 0.78 0.75 0.89 0.77   GFRESTIMATED >90 >90 >90 >90 >90   GFRESTBLACK >90 >90 >90 >90 >90   JOSEPHINE  --  8.7  --  7.9* 8.6   MAG  --  2.0  --   --  2.1   PROTTOTAL  --   --   --   --  6.7*   ALBUMIN  --   --   --   --  2.7*   BILITOTAL  --   --   --   --  0.9   ALKPHOS  --   --   --   --  91   AST  --   --   --   --  65*   ALT  --   --   --   --  84*     CBC  Recent Labs   Lab 03/06/19  0622 03/05/19  1034 03/05/19  0752 03/04/19  1730   WBC  --   --  12.5* 8.7   RBC  --   --  3.49* 3.65*   HGB 11.4* 11.7* 11.8*  Unsatisfactory specimen - clotted 12.3*   HCT  --   --  33.6* 35.0*   MCV  --   --  96 96   MCH  --   --  33.8* 33.7*   MCHC  --   --  35.1 35.1   RDW  --   --  12.8 12.7   PLT 79*  --  84* 72*     INRNo lab results found in last 7 days.      Impression/Plan       Ten Johnson is a 58 year old male with a medical history of untreated hepatitis C, alcohol abuse, cirrhosis, aortic stenosis s/p TAVR (2/21/18), hx Sarahi-evans tear (2017), grade II diastolic dysfunction, allergic rhinitis, HTN and R knee osteoarthritis s/p TKA (2/7/19) is being admitted to  TCU for ongoing rehabilitation after his hospitalization at [facility] on 3/4 with infection of right TKA now s/p I+D, and liner exchange on 3/4/2019.     #Infection of previous R  TKA s/p I&D and liner exchange (3/4/2019)  Tolerated procedure well, pain well controlled on oral Hydromorphone.  Cultures currently growing staph epidermidis and E coli.  Evaluated by ID on 3/5 who recommended IV antibiotics (IV Vanco and IV Ertapenem) x 6 weeks.    - PT/OT   - WBAT bilateral LE, R knee in immobilizer   - Monitor incision for s/s of infection   - Pain control: PRN Tylenol and PRN Hydromorphone   - Antibiotics: IV Vancomycin and IV Ertapenem x 6 weeks, PICC line in place  - DVT ppx:  mg daily per orthopedics   - Incentive spirometry   - Laxatives for constipation ppx     #Hx of cirrhosis with decompensation   #Hx untreated hepatitis C   #Hx of alcohol use   Due to prior alcohol use and hepatitis C.  Follows with hepatology as OP but has not been seen in over a year.  Most recent LFTs on 3/5 with stable elevation: ALT 84 (97), AST 65 (95), T bili 0.9 (0.9), AP 91 (108).  No current concerns.   - CMP q M in TCU   - Follow up as OP with hepatology   - Continue to encourage abstinence from alcohol   - Continue PTA thiamine and MVI      #Severe aortic stenosis s/p TAVR (2/21/18)  #Grade II diastolic dysfunciton  VSS, no acute concerns.  Most recent TTE (9/28) w/ hyperkinetic LVEF 65-70%, no aortic regurgitation.   - Continue  mg daily   - Follow up as OP      #Chronic thrombocytopenia   Likely 2/2 liver disease.  Baseline appears to be 60-70s, currently 79 without s/s of bleeding on exam   - Follow CBC q M      #Noninfectious diarrhea   Noted in hospital.  Afebrile w/o elevation in WBC lowering suspicion for infection.  Likely 2/2 antibiotics. Started on probiotic with improvement.   - Continue Lactobacillus      #Acute on chronic normocytic anemia  Likely multifactorial including recent surgery, infection, alcohol use.  BL Hgb appears to be ~11-12, most recently 11.4 on 3/6. No current s/s of bleeding on exam   - Continue PTA ferrous sulfate   - CBC qM      #Allergic rhinitis   - Continue  Flonase      CODE: Full Code.  Discussed on admission.   Diet: Regular   DVT:  mg daily   Indication for Psychotropic Medications: No diagnosis   Pneumococcal Vaccination Status: UTD, PPSV23 2011 and PCV 13 2015  Disposition: Pending dates from therapy

## 2019-03-10 NOTE — PHARMACY-TCU REVIEW OF H&P
I have reviewed this patient's TCU admission History & Physical for medication related changes/recommendations identified by the admitting provider.  I am confirming that there are no recommendations requiring changes to medication orders  indicated at this time based on the provider recommendations in the H&P.  Zeeshan ShookD, BCPS March 10, 2019

## 2019-03-10 NOTE — PROGRESS NOTES
RN: Alert and oriented. /67 at start of shift, 112/57 upon recheck this afternoon. Voiding spontaneously without difficulty using urinal. Dilaudid 4mg given x2 this shift for c/o incisional pain with good effect reported. Pt declined to use his knee immobilizer when up walking in room, despite writer's encouragement. Pt ambulated with cane and minimal to toe-touch weight bearing on RLE. Pt declined TB skin test this morning, new order noted for lab. Pt also declined Colace and Virginie NP aware. Will continue with POC.

## 2019-03-10 NOTE — PLAN OF CARE
Pt alert and oriented. Able to make needs known. No SOB noted or reported. PRN dilaudid q4h administered x1 for right knee incisional pain, reported decrease in pain. Tubigrip on, took off immobilizer in the middle of the night due to discomfort.Utilizes urinal at bedside, staff empties. Ambulated to the bathroom x1. IV abx infused via R arm PICC line without difficulties. Call light within reach. Continue with POC.

## 2019-03-11 LAB
ALBUMIN SERPL-MCNC: 2.5 G/DL (ref 3.4–5)
ALP SERPL-CCNC: 104 U/L (ref 40–150)
ALT SERPL W P-5'-P-CCNC: 93 U/L (ref 0–70)
ANION GAP SERPL CALCULATED.3IONS-SCNC: 7 MMOL/L (ref 3–14)
AST SERPL W P-5'-P-CCNC: 101 U/L (ref 0–45)
BACTERIA SPEC CULT: ABNORMAL
BACTERIA SPEC CULT: ABNORMAL
BACTERIA SPEC CULT: NORMAL
BACTERIA SPEC CULT: NORMAL
BILIRUB SERPL-MCNC: 0.6 MG/DL (ref 0.2–1.3)
BUN SERPL-MCNC: 14 MG/DL (ref 7–30)
CALCIUM SERPL-MCNC: 8.3 MG/DL (ref 8.5–10.1)
CHLORIDE SERPL-SCNC: 107 MMOL/L (ref 94–109)
CO2 SERPL-SCNC: 25 MMOL/L (ref 20–32)
CREAT SERPL-MCNC: 0.7 MG/DL (ref 0.66–1.25)
ERYTHROCYTE [DISTWIDTH] IN BLOOD BY AUTOMATED COUNT: 13.1 % (ref 10–15)
GFR SERPL CREATININE-BSD FRML MDRD: >90 ML/MIN/{1.73_M2}
GLUCOSE SERPL-MCNC: 118 MG/DL (ref 70–99)
HCT VFR BLD AUTO: 36.7 % (ref 40–53)
HGB BLD-MCNC: 12.3 G/DL (ref 13.3–17.7)
Lab: ABNORMAL
Lab: NORMAL
Lab: NORMAL
MCH RBC QN AUTO: 32.3 PG (ref 26.5–33)
MCHC RBC AUTO-ENTMCNC: 33.5 G/DL (ref 31.5–36.5)
MCV RBC AUTO: 96 FL (ref 78–100)
PLATELET # BLD AUTO: 99 10E9/L (ref 150–450)
POTASSIUM SERPL-SCNC: 4.2 MMOL/L (ref 3.4–5.3)
PROT SERPL-MCNC: 6.3 G/DL (ref 6.8–8.8)
RBC # BLD AUTO: 3.81 10E12/L (ref 4.4–5.9)
SODIUM SERPL-SCNC: 139 MMOL/L (ref 133–144)
SPECIMEN SOURCE: ABNORMAL
SPECIMEN SOURCE: NORMAL
SPECIMEN SOURCE: NORMAL
VANCOMYCIN SERPL-MCNC: 14.2 MG/L
WBC # BLD AUTO: 8.7 10E9/L (ref 4–11)

## 2019-03-11 PROCEDURE — 25000132 ZZH RX MED GY IP 250 OP 250 PS 637: Performed by: NURSE PRACTITIONER

## 2019-03-11 PROCEDURE — 25000128 H RX IP 250 OP 636: Performed by: INTERNAL MEDICINE

## 2019-03-11 PROCEDURE — 25000128 H RX IP 250 OP 636: Performed by: NURSE PRACTITIONER

## 2019-03-11 PROCEDURE — 80202 ASSAY OF VANCOMYCIN: CPT | Performed by: INTERNAL MEDICINE

## 2019-03-11 PROCEDURE — 85027 COMPLETE CBC AUTOMATED: CPT | Performed by: NURSE PRACTITIONER

## 2019-03-11 PROCEDURE — 36592 COLLECT BLOOD FROM PICC: CPT | Performed by: INTERNAL MEDICINE

## 2019-03-11 PROCEDURE — 25800030 ZZH RX IP 258 OP 636

## 2019-03-11 PROCEDURE — 36415 COLL VENOUS BLD VENIPUNCTURE: CPT | Performed by: NURSE PRACTITIONER

## 2019-03-11 PROCEDURE — 25800030 ZZH RX IP 258 OP 636: Performed by: INTERNAL MEDICINE

## 2019-03-11 PROCEDURE — 80053 COMPREHEN METABOLIC PANEL: CPT | Performed by: NURSE PRACTITIONER

## 2019-03-11 PROCEDURE — 97161 PT EVAL LOW COMPLEX 20 MIN: CPT | Mod: GP | Performed by: PHYSICAL THERAPIST

## 2019-03-11 PROCEDURE — 97530 THERAPEUTIC ACTIVITIES: CPT | Mod: GP | Performed by: PHYSICAL THERAPIST

## 2019-03-11 PROCEDURE — 12000022 ZZH R&B SNF

## 2019-03-11 RX ORDER — SODIUM CHLORIDE 9 MG/ML
INJECTION, SOLUTION INTRAVENOUS
Status: COMPLETED
Start: 2019-03-11 | End: 2019-03-11

## 2019-03-11 RX ADMIN — ERTAPENEM SODIUM 1 G: 1 INJECTION, POWDER, LYOPHILIZED, FOR SOLUTION INTRAMUSCULAR; INTRAVENOUS at 08:25

## 2019-03-11 RX ADMIN — HYDROMORPHONE HYDROCHLORIDE 4 MG: 2 TABLET ORAL at 09:17

## 2019-03-11 RX ADMIN — LISINOPRIL 20 MG: 20 TABLET ORAL at 21:33

## 2019-03-11 RX ADMIN — Medication 1 CAPSULE: at 21:33

## 2019-03-11 RX ADMIN — HYDROMORPHONE HYDROCHLORIDE 4 MG: 2 TABLET ORAL at 17:30

## 2019-03-11 RX ADMIN — VANCOMYCIN HYDROCHLORIDE 1750 MG: 10 INJECTION, POWDER, LYOPHILIZED, FOR SOLUTION INTRAVENOUS at 05:20

## 2019-03-11 RX ADMIN — MULTIPLE VITAMINS W/ MINERALS TAB 1 TABLET: TAB at 21:33

## 2019-03-11 RX ADMIN — Medication 1 CAPSULE: at 08:23

## 2019-03-11 RX ADMIN — ACETAMINOPHEN 650 MG: 325 TABLET, FILM COATED ORAL at 18:36

## 2019-03-11 RX ADMIN — HYDROMORPHONE HYDROCHLORIDE 4 MG: 2 TABLET ORAL at 13:22

## 2019-03-11 RX ADMIN — SODIUM CHLORIDE 500 ML: 9 INJECTION, SOLUTION INTRAVENOUS at 18:37

## 2019-03-11 RX ADMIN — HYDROMORPHONE HYDROCHLORIDE 4 MG: 2 TABLET ORAL at 21:34

## 2019-03-11 RX ADMIN — FERROUS SULFATE TAB 325 MG (65 MG ELEMENTAL FE) 325 MG: 325 (65 FE) TAB at 21:33

## 2019-03-11 RX ADMIN — HYDROMORPHONE HYDROCHLORIDE 4 MG: 2 TABLET ORAL at 05:20

## 2019-03-11 RX ADMIN — THIAMINE HCL (VITAMIN B1) 50 MG TABLET 50 MG: at 08:23

## 2019-03-11 RX ADMIN — HYDROMORPHONE HYDROCHLORIDE 4 MG: 2 TABLET ORAL at 01:05

## 2019-03-11 RX ADMIN — FLUTICASONE PROPIONATE 2 SPRAY: 50 SPRAY, METERED NASAL at 08:23

## 2019-03-11 RX ADMIN — VANCOMYCIN HYDROCHLORIDE 1750 MG: 10 INJECTION, POWDER, LYOPHILIZED, FOR SOLUTION INTRAVENOUS at 18:35

## 2019-03-11 RX ADMIN — ASPIRIN 325 MG: 325 TABLET, DELAYED RELEASE ORAL at 08:23

## 2019-03-11 NOTE — PLAN OF CARE
No new complaints tonight. Continue to request for dilaudid every 4 hours with pain relief. Knee incision and staples CDI with Tubigrip. Declining immobilizer. Voiding without difficulty. Continue to monitor for comfort.

## 2019-03-11 NOTE — PLAN OF CARE
Patient is alert x 4 and he directs his cares. VSS. Patient likes his pain medications every 4 hours. Last given Dilaudid 4 mg at 1326. Patient using an ice pack under his immobilizer on RLE. Skin intact under the immobilizer. Patient had a BM this am and he is voiding with any problems.

## 2019-03-11 NOTE — PLAN OF CARE
Patient alert and oriented x 4. Dressing to R knee intact with tubi  on. Patient opened the dressing stated wanted to open to air for a little  bit. Incisions intact with staples with some redness noted but patient stated it's getting better. Patient transfer with SBA, uses walker for ambulation. PICC intact and patent. IV antibiotic infused without difficulty. Will continue with current plan of care.

## 2019-03-11 NOTE — PLAN OF CARE
PT: Patient is a 58 year old male admitted to TCU s/p direct admit from clinic to hospital on 3/4/19; underwent R knee I&D by Dr. Joe and placed in R KI at all times WBAT. Patient currently demonstrates increased pain with all activity (baseline 3/10 increases to 7/10) which impacts functional mobility independence. Patient requires supervision for safety for gait with SEC at this time and would benefit from skilled PT for functional upgrading and strength training in order to return home at mod I level. Recommend OP PT due to patient reporting not home bound status    LOS: 5 days (PT discharge 3/15/19)

## 2019-03-11 NOTE — PROGRESS NOTES
" 03/11/19 0900   Quick Adds   Quick Adds Certification   Type of Visit Initial PT Evaluation   Living Environment   Lives With alone   Living Arrangements house   Home Accessibility stairs to enter home   Number of Stairs, Main Entrance five   Stair Railings, Main Entrance railing on left side (ascending)   Transportation Anticipated family or friend will provide;public transportation   Living Environment Comment patient lives in small apartment   Self-Care   Usual Activity Tolerance good   Current Activity Tolerance good   Regular Exercise Yes   Activity/Exercise Type biking   Exercise Amount/Frequency daily   Equipment Currently Used at Home cane, straight   Activity/Exercise/Self-Care Comment Patient reports prior to R TKA was biking up to 10 miles/day; primary mode of transportation   Functional Level Prior   Ambulation 1-->assistive equipment   Transferring 1-->assistive equipment   Toileting 0-->independent   Bathing 0-->independent   Communication 0-->understands/communicates without difficulty   Swallowing 0-->swallows foods/liquids without difficulty   Cognition 0 - no cognition issues reported   Fall history within last six months yes   Number of times patient has fallen within last six months 1   Which of the above functional risks had a recent onset or change? ambulation;fall history   Prior Functional Level Comment Patient reports previously independent with SEC after R TKA. Fell on ice when walking into apartment   General Information   Onset of Illness/Injury or Date of Surgery - Date 03/04/19   Referring Physician Dr. Josué Archer   Patient/Family Goals Statement \"To get back to walking without pain\"   Pertinent History of Current Problem (include personal factors and/or comorbidities that impact the POC) Patient is a 58 year old male who underwent initial R TKA on 2/7/19, fell at home now is hospitalized having I&D with linear exchange on 3/4/19 by Dr. Joe   Precautions/Limitations other (see " comments)  (no ROM to R knee)   Weight-Bearing Status - LUE full weight-bearing   Weight-Bearing Status - RUE full weight-bearing   Weight-Bearing Status - LLE weight-bearing as tolerated   Weight-Bearing Status - RLE weight-bearing as tolerated   Heart Disease Risk Factors High blood pressure;Lack of physical activity;Medical history   General Observations Patient supine in bed when KI doffed   General Info Comments R KI on at all times, no ROM until wound is healed. IV antibiotics (undetermined length)   Cognitive Status Examination   Orientation orientation to person, place and time   Level of Consciousness alert   Follows Commands and Answers Questions 100% of the time;able to follow multistep instructions   Personal Safety and Judgment intact   Memory intact   Pain Assessment   Patient Currently in Pain Yes, see Vital Sign flowsheet   Integumentary/Edema   Integumentary/Edema Comments Incision with staples to R knee, some redness noted   Posture    Posture Not impaired   Range of Motion (ROM)   ROM Comment Unable to assess R knee ROM due to restrictions; BLE WFL. Limited B shoulder flexion/abduction ROM due to previous shoulder surgeries   Strength   Strength Comments supine position   MMT: Hip, Rehab Eval   Hip Flexion - Left Side (4/5) good, left   Hip Extension - Left Side (3/5) fair, left   Hip ABduction - Left Side (3/5) fair, left   Hip Flexion - Right Side (3/5) fair, right   Hip Extension - Right Side (3/5) fair, right   Hip ABduction - Right Side (3/5) fair, right   MMT: Knee, Rehab Eval   Knee Flexion - Left Side (4/5) good, left   Knee Extension - Left Side (4/5) good, left   Knee Flexion - Right Side other (see comments)  (NT)   Knee Extension - Right Side other (see comments)  (NT)   MMT: Ankle, Rehab Eval   Ankle Dorsiflexion - Left Side 5/5) normal,left   Ankle Plantarflexion - Left Side (3/5) fair, left   Ankle Dorsiflexion - Right Side 5/5) normal,left   Ankle Plantarflexion - Right Side (3/5)  fair, left   Bed Mobility   Bed Mobility Comments Mod I with R KI   Transfer Skills   Transfer Comments Mod I with SEC and R KI   Gait   Gait Comments Patient ambulated with SEC x 75 feet at supervision level; antalgic gait pattern and slow agustin demonstrated. Patient prefers to use SEC on R for pain relief   Balance   Balance Comments Intact sitting and standing balance   Sensory Examination   Sensory Perception no deficits were identified   Coordination   Coordination Comments Intact BUE ANA and finger to nose. Unable to assess BLE due to KI   Muscle Tone   Muscle Tone no deficits were identified   Modality Interventions   Planned Modality Interventions Thermotherapy: Hydrocollator Packs;Cryotherapy   Planned Modality Interventions Comments As needed for pain management   General Therapy Interventions   Planned Therapy Interventions gait training;joint mobilization;manual therapy;neuromuscular re-education;orthotic fitting/training;ROM;strengthening;stretching;risk factor education;home program guidelines;progressive activity/exercise   Intervention Comments see clinical impression   Clinical Impression   Criteria for Skilled Therapeutic Intervention yes, treatment indicated   PT Diagnosis BLE strength deficits   Influenced by the following impairments see clinical impression   Functional limitations due to impairments see clinical impression   Clinical Presentation Evolving/Changing   Clinical Presentation Rationale current medical status, PMH, pain   Clinical Decision Making (Complexity) Low complexity   Therapy Frequency` other (see comments)  (6x/week)   Predicted Duration of Therapy Intervention (days/wks) 5 days   Anticipated Discharge Disposition Home with Outpatient Therapy   Risk & Benefits of therapy have been explained Yes   Patient, Family & other staff in agreement with plan of care Yes   Clinical Impression Comments Patient is a 58 year old male admitted to TCU s/p direct admit from clinic to  [facility] on 3/4/19; underwent R knee I&D by Dr. Joe and placed in R KI at all times WBAT. Patient currently demonstrates increased pain with all activity (baseline 3/10 increases to 7/10) which impacts functional mobility independence. Patient requires supervision for safety for gait with SEC at this time and would benefit from skilled PT for functional upgrading and strength training in order to return home at mod I level. Recommend OP PT due to patient reporting not home bound status   Therapy Certification   Start of care date 03/11/19   Certification date from 03/11/19   Certification date to 04/10/19   Medical Diagnosis R knee I&D   Certification I certify the need for these services furnished under this plan of treatment and while under my care.  (Physician co-signature of this document indicates review and certification of the therapy plan).    Total Evaluation Time   Total Evaluation Time (Minutes) 15

## 2019-03-11 NOTE — PROVIDER NOTIFICATION
Social Work: Initial Assessment with Discharge Plan    Patient Name: Ten Johnson  : 1960  Age: 58 year old  MRN: [id number]  Completed assessment with: patient  Admitted to TCU: 3/9/19    Presenting Information   Date of SW assessment: 2019  Health Care Directive: Patient considering completing  Primary Health Care Agent: default to next of kin  Secondary Health Care Agent: NA  Living Situation: resides alone in an apartment  Previous Functional Status: previously independent of cares  DME available: see therapy notes  Patient and family understanding of hospitalization: Yes  Cultural/Language/Spiritual Considerations: English speaking  Abuse concerns: Denied  BIMS: Pt scored 15 on BIMS indicating cognitively intact  PHQ-9: Pt scored 04 on PHQ-9 indicating minimal depression  PAS: confirmation number- ?    Physical Health  Reason for admission: Infection of R TKA    Provider Information   Primary Care Physician:Cuca Celeste MD  : none    Mental Health:   Diagnosis: denied  Current Support/Services: none  Previous Services: none  Services Needed/Recommended: none    Substance Use:  Diagnosis: history of alcohol abuse, denied concern or additional support  Current Support/Services: none  Previous Services: none  Services Needed/Recommended: none    Support System  Marital Status: single  Family support: Pt has a brother and father that both reside in [place]  Other support available: none  Gaps in support system: none    Community Resources  Current in home services: none  Previous services: none    Financial/Employment/Education  Employment Status:   Income Source: wages  Education: college  Financial Concerns:  Denied concerns.  Insurance: BCBS      Discharge Plan   Patient and family discharge goal: Goal to return home with continued family support.  Provided Education on discharge plan: YES  Patient agreeable to discharge plan:  YES  A list of Medicare Certified  Facilities was provided to the patient and/or family to encourage patient choice. Based on location and rating, patient would like referrals made to: Yes  General information regarding anticipated insurance coverage and possible out of pocket cost was discussed. Patient and patient's family are aware patient may incur the cost of transportation to the facility, pending insurance payment: YES  Barriers to discharge: Medically stable and progress with therapies    Discharge Recommendations   Disposition: Goal to return home as independent as possible.  Transportation Needs: friends will provide  Name of Transportation Company and Phone: NA    Additional comments   Pt is a 58 yr old male admitted for infection following a Right TKA. Pt resides alone. Pt has limited support upon discharge and will need to be independent of all cares. Goal to return home. Team working towards a safe discharge plan.       Writer introduced self and explained role.   provided patient with printout of orders and initial care plan goals which were given on 3/11 for patient for review.  Writer explained this is an overview and available to answer questions as able.  If further questions, then writer can direct patient to the nurse, provider, therapy staff or interdisciplinary team member as needed.  Patient didn't have any questions at this time.  Copies of orders and care plan placed in chart.  Writer also told patient that the unit  would meet in the coming days to introduce herself and complete more assessments.           03/11/19 1204   Living Arrangements   Lives With alone   Living Arrangements house   Able to Return to Prior Arrangements yes   Home Safety   Patient Feels Safe Living in Home? yes   Discharge Planning   Patient/Family Anticipates Transition to home with family

## 2019-03-12 LAB
GAMMA INTERFERON BACKGROUND BLD IA-ACNC: 0.04 IU/ML
M TB IFN-G BLD-IMP: NEGATIVE
M TB IFN-G CD4+ BCKGRND COR BLD-ACNC: >10 IU/ML
MITOGEN IGNF BCKGRD COR BLD-ACNC: 0 IU/ML
MITOGEN IGNF BCKGRD COR BLD-ACNC: 0 IU/ML

## 2019-03-12 PROCEDURE — 25000132 ZZH RX MED GY IP 250 OP 250 PS 637: Performed by: NURSE PRACTITIONER

## 2019-03-12 PROCEDURE — 12000022 ZZH R&B SNF

## 2019-03-12 PROCEDURE — 97110 THERAPEUTIC EXERCISES: CPT | Mod: GP | Performed by: PHYSICAL THERAPIST

## 2019-03-12 PROCEDURE — 25800030 ZZH RX IP 258 OP 636: Performed by: INTERNAL MEDICINE

## 2019-03-12 PROCEDURE — 25000128 H RX IP 250 OP 636: Performed by: NURSE PRACTITIONER

## 2019-03-12 PROCEDURE — 25000128 H RX IP 250 OP 636: Performed by: INTERNAL MEDICINE

## 2019-03-12 RX ORDER — SODIUM CHLORIDE 9 MG/ML
INJECTION, SOLUTION INTRAVENOUS
Status: DISPENSED
Start: 2019-03-12 | End: 2019-03-13

## 2019-03-12 RX ADMIN — MULTIPLE VITAMINS W/ MINERALS TAB 1 TABLET: TAB at 21:38

## 2019-03-12 RX ADMIN — HYDROMORPHONE HYDROCHLORIDE 4 MG: 2 TABLET ORAL at 17:44

## 2019-03-12 RX ADMIN — FERROUS SULFATE TAB 325 MG (65 MG ELEMENTAL FE) 325 MG: 325 (65 FE) TAB at 21:39

## 2019-03-12 RX ADMIN — Medication 1 CAPSULE: at 21:38

## 2019-03-12 RX ADMIN — LISINOPRIL 20 MG: 20 TABLET ORAL at 21:38

## 2019-03-12 RX ADMIN — HYDROMORPHONE HYDROCHLORIDE 4 MG: 2 TABLET ORAL at 13:22

## 2019-03-12 RX ADMIN — HYDROMORPHONE HYDROCHLORIDE 4 MG: 2 TABLET ORAL at 21:39

## 2019-03-12 RX ADMIN — FLUTICASONE PROPIONATE 2 SPRAY: 50 SPRAY, METERED NASAL at 08:36

## 2019-03-12 RX ADMIN — HYDROMORPHONE HYDROCHLORIDE 4 MG: 2 TABLET ORAL at 05:27

## 2019-03-12 RX ADMIN — HYDROMORPHONE HYDROCHLORIDE 4 MG: 2 TABLET ORAL at 01:32

## 2019-03-12 RX ADMIN — ERTAPENEM SODIUM 1 G: 1 INJECTION, POWDER, LYOPHILIZED, FOR SOLUTION INTRAMUSCULAR; INTRAVENOUS at 08:37

## 2019-03-12 RX ADMIN — ACETAMINOPHEN 650 MG: 325 TABLET, FILM COATED ORAL at 08:55

## 2019-03-12 RX ADMIN — ASPIRIN 325 MG: 325 TABLET, DELAYED RELEASE ORAL at 08:38

## 2019-03-12 RX ADMIN — THIAMINE HCL (VITAMIN B1) 50 MG TABLET 50 MG: at 08:37

## 2019-03-12 RX ADMIN — HYDROMORPHONE HYDROCHLORIDE 4 MG: 2 TABLET ORAL at 09:21

## 2019-03-12 RX ADMIN — VANCOMYCIN HYDROCHLORIDE 1750 MG: 10 INJECTION, POWDER, LYOPHILIZED, FOR SOLUTION INTRAVENOUS at 05:28

## 2019-03-12 RX ADMIN — VANCOMYCIN HYDROCHLORIDE 1750 MG: 10 INJECTION, POWDER, LYOPHILIZED, FOR SOLUTION INTRAVENOUS at 17:43

## 2019-03-12 RX ADMIN — Medication 1 CAPSULE: at 08:38

## 2019-03-12 NOTE — PLAN OF CARE
"Patient is alert x 4 and he directs all his cares. VSS. Patient wanted all his medications and then he stated \"I just want to sleep since I don't have therapy until later.\" Patient given Diladid 4 mg po every 4 hours for right knee pain.   Patient declined to let me complete a skin assessment and to open his immobilizer on his RLE. Patient stated \" I have my leg just how I like it and I don't want you to move anything.\"  Patient eats his food from his trays extended over the shift. He eats at little at a time. IV antibiotic ran in via his PICC right arm with out any problems.  "

## 2019-03-12 NOTE — PROGRESS NOTES
Orthopaedic Progress Note    8 days post R Knee PJI I+D+Liner Exchange    Pt agreed to time in rehab prior to discharge home. Going well. Donning and doffing KI, in supervision with PT.     EX  Wound looks satisfactory. No ooze.  Neuro intact distally  Vascular intact distally  Clips in situ    Labs  Noted Staph Epi and E Coli, with Vanc/Erta as cover as per ID    Assessment  Satisfactory progress. Continue KI. Not for clip removal yet.   If discussing discharge, please contact myself or Dr Joe prior.     Thank you for ongoing care    - Dr. Prashanth Vang (Orthopaedic Fellow)      I, Dr. Olvin Joe, agree with above plan of care.  Pager [phone]

## 2019-03-12 NOTE — PROGRESS NOTES
03/12/19 1400   General Information   Patient Profile Review See Profile for full history and prior level of function   Daily Contact with Relatives or Friends Phone call;Visit   Observations sitting up in bed/ pleasant   Community Involvement   Community Involvement Currently employed  ()   Drives Yes   Spiritual Practice Not connected/interested   Sees [facility] ? No   Outings Dinner;Shopping;Travel;Movies;Concerts;Theater   Music   Music Preferences R&B   Brought Own Equipment Yes   Media   Computer Other (comments)  (uses phone here)   TV / Movies TV   Sports / Physical Activities   Outdoor Activities Hunting;Fishing   Sports/Exercise Bike   Sports Fan Football  ([employer] alumni)   Impression   Open to Socializing with Others Independent   Barriers to Leisure No barriers / independent   Discharge Needs / Referrals None   Patient, family and / or staff in agreement with Plan of Care Yes   Treatment Plan   Independent Activities INd with activity    Type of Intervention Independent with activity   One to One Therapeutic Intervention Volunteer   Equipment and Supplies While on Unit None needed   Assessment REC- assessment completed no needs at this time per his report IDN in room

## 2019-03-12 NOTE — PLAN OF CARE
Patient alert and oriented x 4. Complained of R knee pain. Medicated with dilaudid x 2 and effective. PICC intact and patent-IV antibiotic infused without difficulty. Patient uses urinal, voided clear yellow urine, no urinary discomfort noted. Patient open the dressing x 1 this shift. Instructed patient not to reapplied the old dressing. Foam hand , new dressing packages and gloves provided and placed within the patient's reach. Patient's ortho came this morning and per patient's, no new concern. Will continue with current plan of care.

## 2019-03-12 NOTE — PLAN OF CARE
PT: issued BUE strengthening HEP; cueing and facilitation to decrease compensatory RUE movements. Patient reports improving R knee. Continue POC

## 2019-03-12 NOTE — PHARMACY-VANCOMYCIN DOSING SERVICE
Pharmacy Vancomycin Note  Date of Service 2019  Patient's  1960   58 year old, male    Indication: Bone and Joint Infection  Goal Trough Level: 15-20 mg/L  Day of Therapy: Since 3/4/19  Current Vancomycin regimen:  1750 mg IV q12h    Current estimated CrCl = Estimated Creatinine Clearance: 119.8 mL/min (based on SCr of 0.7 mg/dL).    Creatinine for last 3 days  3/9/2019:  5:42 AM Creatinine 0.77 mg/dL  3/11/2019:  7:11 AM Creatinine 0.70 mg/dL    Recent Vancomycin Levels (past 3 days)  3/11/2019:  5:44 PM Vancomycin Level 14.2 mg/L    Vancomycin IV Administrations (past 72 hours)                   vancomycin (VANCOCIN) 1,750 mg in sodium chloride 0.9 % 250 mL intermittent infusion (mg) 1,750 mg New Bag 19 1835     1,750 mg New Bag  0520     1,750 mg New Bag 03/10/19 1801     1,750 mg New Bag  0550     1,750 mg New Bag 19 1801                Nephrotoxins and other renal medications (From now, onward)    Start     Dose/Rate Route Frequency Ordered Stop    19 2100  lisinopril (PRINIVIL/ZESTRIL) tablet 20 mg      20 mg Oral AT BEDTIME 19 1425      19 1800  vancomycin (VANCOCIN) 1,750 mg in sodium chloride 0.9 % 250 mL intermittent infusion      1,750 mg (central catheter)  over 60 Minutes Intravenous EVERY 12 HOURS 19 1506               Contrast Orders - past 72 hours (72h ago, onward)    None          Interpretation of levels and current regimen:  Trough level is  Subtherapeutic    Has serum creatinine changed > 50% in last 72 hours: No    Urine output:  unable to determine    Renal Function: Stable    Plan:  1.  Continue Current Dose. Trough level is slightly subtherapeutic, patient is already on 23 mg/kg, would rather not shorten frequency 2/2 patient's age.   2.  Pharmacy will check trough levels as appropriate in 1-3 Days.    3. Serum creatinine levels will be ordered daily for the first week of therapy and at least twice weekly for subsequent weeks.      Delphine  Latrell, PharmD, BCPS          .

## 2019-03-12 NOTE — PLAN OF CARE
Remains on IV Vanco q 12 hrs. R Knee surgical incision CDI with staples, ISHA. PRN dilaudid given x 2 for R knee pain management. Shower given this this shift. Call in reach.

## 2019-03-12 NOTE — DISCHARGE SUMMARY
ORTHOPAEDIC DISCHARGE SUMMARY     Date of Admission: 3/4/2019  Date of Discharge: 3/9/2019  1:29 PM  Disposition: Rehab  Staff Physician: No att. providers found  Primary Care Provider: Cuca Celeste    DISCHARGE DIAGNOSIS:  wound breakdown right knee    PROCEDURES: Procedure(s):  REVISION RIGHT TOTAL KNEE POLY COMPONENT EXCHANGE on 3/4/2019    BRIEF HISTORY:  Ten was admitted from the orthopedic clinic following a fall and a discharge from his right recently operated total knee arthroplasty.  The discharge is continued for 5 days and then addressed by the patient.  He proceeded to surgery at which point complete disruption of the arthrotomy, and purulent material throughout the knee joint was noted.  He underwent incision, drainage, synovectomy, and polyethylene liner exchange.    HOSPITAL COURSE:    Ten Johnson has done well post-operatively. Medicine and Infection diseases was consulted post operatively to aid in management of medical comorbidities.  The patient grew staph epidermidis as well as E. coli from his deep operative cultures, and was placed on ertapenem and vancomycin, having a PICC line placed while an inpatient. . The patient using a knee immobilizer until such time as the wound is healed.  The patient received routine nursing cares and is medically stable. Vital signs are stable. The patient is tolerating a regular diet without GI distress/nausea or vomiting. Voiding spontaneously. All PT/OT goals have been met for safe mobility,  Pain is now controlled on oral medications which will be available on discharge. Stool softeners have been used while taking pain medications to help prevent constipation.     After discussion between Dr. Briscoe the patient the patient agreed with.  In rehabilitation, so as to allow ongoing nursing care as well as wound supervision.    Antibiotics:  Vancomycin and ertapenem for 6-week course, is monitored by infectious diseases.    DVT prophylaxis:  Aspirin to  continue for 4 weeks postoperatively  PT Progress: Has met PT/OT goals for safe mobility.   Pain Meds:  Weaned off all IV pain meds by discharge to TCU  Inpatient Events: No significant events or complications.    Discharge orders and instructions as below.    FOLLOWUP:    Follow up with Dr. Joe at 2 weeks postoperatively, we will continue to review as an inpatient while he is in TCU.  Future Appointments   Date Time Provider Department Center   3/12/2019  2:00 PM Trudy Murphy, PT [facility]   3/13/2019  1:15 PM Hiral Knott, PT [facility]   3/14/2019  1:45 PM Hiral Knott, PT [facility]   3/25/2019 10:30 AM Olvin Joe MD [facility] ([address] [place] [id number]). Call [phone] to schedule a follow-up appointment at this location with your provider if you have not heard confirmation of your appointment in the next 3-5 business days    PLANNED DISCHARGE ORDERS:           Discharge Medication List as of 3/9/2019  1:30 PM      START taking these medications    Details   lactobacillus rhamnosus, GG, (CULTURELL) capsule Take 1 capsule by mouth 2 times daily, Disp-180 capsule, Transitional      Thiamine HCl (VITAMIN B1) 50 MG TABS Take 1 tablet (50 mg) by mouth daily, Disp-30 tablet, R-0, E-Prescribe         CONTINUE these medications which have CHANGED    Details   acetaminophen (TYLENOL) 325 MG tablet Take 2 tablets (650 mg) by mouth every 6 hours as needed for mild pain, Disp-100 tablet, R-0, Transitional      aspirin (ASA) 325 MG EC tablet Take 1 tablet (325 mg) by mouth daily, Disp-28 tablet, R-0, Transitional      docusate sodium (COLACE) 100 MG capsule Take 1 capsule (100 mg) by mouth 2 times daily, Disp-30 capsule, R-0, Transitional      ertapenem (INVANZ) 1 GM vial Inject 1 g into the vein every 24 hours, Transitional      HYDROmorphone (DILAUDID) 2 MG tablet Take 1-2 tablets (2-4 mg) by mouth every 4 hours  as needed for moderate to severe pain, Disp-40 tablet, R-0, Transitional      sodium chloride 0.9 % SOLN 250 mL with vancomycin 100 mg/mL SOLR 1,750 mg Inject 1,750 mg into the vein every 12 hours, Transitional         CONTINUE these medications which have NOT CHANGED    Details   ferrous sulfate (FEROSUL) 325 (65 Fe) MG tablet Take 1 tablet (325 mg) by mouth At Bedtime, Disp-90 tablet, R-2, E-Prescribe      fluticasone (FLONASE) 50 MCG/ACT nasal spray Spray 2 sprays into both nostrils daily, Disp-3 Bottle, R-3, E-Prescribe1 btl=16 gm      hydrOXYzine (VISTARIL) 25 MG capsule Take 1 capsule (25 mg) by mouth 3 times daily as needed (muscle relaxant), Disp-60 capsule, R-0, E-Prescribe      lisinopril (PRINIVIL/ZESTRIL) 20 MG tablet Take 1 tablet (20 mg) by mouth At Bedtime, Disp-90 tablet, R-3, E-Prescribe      loratadine (CLARITIN) 10 MG tablet Take 1 tablet (10 mg) by mouth daily, Disp-90 tablet, R-2, E-Prescribe      Multiple Vitamin (MULTIVITAMINS PO) Take 1 tablet by mouth At Bedtime , Historical      order for DME Equipment being ordered: Cane ()  Treatment Diagnosis: Gait InstabilityDisp-1 each, R-0, Local Print      pantoprazole (PROTONIX) 40 MG EC tablet Take 1 tablet (40 mg) by mouth 2 times daily (before meals), Disp-180 tablet, R-2, E-Prescribe         STOP taking these medications       celecoxib (CELEBREX) 100 MG capsule Comments:   Reason for Stopping:         diclofenac (VOLTAREN) 1 % topical gel Comments:   Reason for Stopping:         oxyCODONE (ROXICODONE) 5 MG tablet Comments:   Reason for Stopping:         oxyCODONE (ROXICODONE) 5 MG tablet Comments:   Reason for Stopping:                 Discharge Procedure Orders   Home infusion referral   Number of Visits Requested: 1     Reason for your hospital stay   Order Comments: You had knee surgery     Activity   Order Comments: Your activity upon discharge: as tolerated. Do the exercises as instructed by therapy during your hospital stay     Order  Specific Question Answer Comments   Is discharge order? Yes      When to contact your care team   Order Comments: CALL YOUR PHYSICIAN IF:  1.  Your pain begins to worsen and is unable to be controlled with your medications.  2.  Excessive redness or drainage of cloudy or bloody material from the wounds (Clear red tinted fluid and some mild drainage should be expected). Drainage of any kind 5 days after surgery should be reported to the doctor.  3.  You have a temperature elevation greater than 101.5    4.  You have pain, swelling or redness in your calf. You have numbness or weakness in your leg or foot.    During regular business hours call the [facility] at [phone] and ask for the triage nurse.  After hours or weekends call the hospital  at [phone] and ask for the ortho resident on call.     Wound care and dressings   Order Comments: Instructions to care for your wound at home: Change your dressing daily.  After 5 days. you may shower with your wound un covered as long as it is clean and dry. Do not scrub your wound. Pat the wound dry and replace your dressing. Keep a dressing on until your two week follow up. . Cover the wound to shower if it is not clean and dry. Call the clinic if you notice any drainage.  If you have staples on your incision, then keep you wound covered for showers until removed at the two week post op visit.     Discharge Instructions   Order Comments: Continue wearing your knee immobolizer while up until you are told not to.     Follow Up and recommended labs and tests   Order Comments: Follow up as planned with Dr Joe in March as scheduled. Call [phone] if you have any questions or concerns.     General info for SNF   Order Comments: Length of Stay Estimate: Short Term Care: Estimated # of Days <30  Condition at Discharge: Improving  Level of care:skilled   Rehabilitation Potential: Good  Admission H&P remains valid and up-to-date: Yes  Recent Chemotherapy: N/A  Use Nursing  Home Standing Orders: Yes     Mantoux instructions   Order Comments: Give two-step Mantoux (PPD) Per Facility Policy Yes     Activity - Up with assistive device     Order Specific Question Answer Comments   Is discharge order? Yes      Weight bearing status   Order Comments: Weight bearing as tolerated, knee immobilizer on at all times.     Additional Discharge Instructions   Order Comments: Knee immobilizer on at all times. May remove for skin checks per unit routine.     Physical Therapy Adult Consult   Order Comments: Evaluate and treat as clinically indicated.    Reason:  S/p I&D R knee tentative wound.     Occupational Therapy Adult Consult   Order Comments: Evaluate and treat as clinically indicated.    Reason:  S/p I&D R knee, tentative wound.     Advance Diet as Tolerated   Order Comments: Follow this diet upon discharge: regular adult diet     Order Specific Question Answer Comments   Is discharge order? Yes        Dr Prashanth Vang 03/12/2019  Orthopedic Fellow  Pager: [phone]

## 2019-03-13 PROCEDURE — 97110 THERAPEUTIC EXERCISES: CPT | Mod: GP | Performed by: PHYSICAL THERAPIST

## 2019-03-13 PROCEDURE — 97116 GAIT TRAINING THERAPY: CPT | Mod: GP | Performed by: PHYSICAL THERAPIST

## 2019-03-13 PROCEDURE — 25000128 H RX IP 250 OP 636: Performed by: NURSE PRACTITIONER

## 2019-03-13 PROCEDURE — 25800030 ZZH RX IP 258 OP 636: Performed by: INTERNAL MEDICINE

## 2019-03-13 PROCEDURE — 25000128 H RX IP 250 OP 636: Performed by: INTERNAL MEDICINE

## 2019-03-13 PROCEDURE — 25000132 ZZH RX MED GY IP 250 OP 250 PS 637: Performed by: NURSE PRACTITIONER

## 2019-03-13 PROCEDURE — 12000022 ZZH R&B SNF

## 2019-03-13 RX ORDER — SODIUM CHLORIDE 9 MG/ML
INJECTION, SOLUTION INTRAVENOUS
Status: DISPENSED
Start: 2019-03-13 | End: 2019-03-14

## 2019-03-13 RX ADMIN — VANCOMYCIN HYDROCHLORIDE 1750 MG: 10 INJECTION, POWDER, LYOPHILIZED, FOR SOLUTION INTRAVENOUS at 17:48

## 2019-03-13 RX ADMIN — MULTIPLE VITAMINS W/ MINERALS TAB 1 TABLET: TAB at 21:50

## 2019-03-13 RX ADMIN — VANCOMYCIN HYDROCHLORIDE 1750 MG: 10 INJECTION, POWDER, LYOPHILIZED, FOR SOLUTION INTRAVENOUS at 05:42

## 2019-03-13 RX ADMIN — HYDROMORPHONE HYDROCHLORIDE 4 MG: 2 TABLET ORAL at 09:28

## 2019-03-13 RX ADMIN — HYDROMORPHONE HYDROCHLORIDE 4 MG: 2 TABLET ORAL at 12:35

## 2019-03-13 RX ADMIN — Medication 1 CAPSULE: at 21:50

## 2019-03-13 RX ADMIN — ERTAPENEM SODIUM 1 G: 1 INJECTION, POWDER, LYOPHILIZED, FOR SOLUTION INTRAMUSCULAR; INTRAVENOUS at 08:36

## 2019-03-13 RX ADMIN — HYDROMORPHONE HYDROCHLORIDE 4 MG: 2 TABLET ORAL at 05:34

## 2019-03-13 RX ADMIN — THIAMINE HCL (VITAMIN B1) 50 MG TABLET 50 MG: at 08:37

## 2019-03-13 RX ADMIN — HYDROMORPHONE HYDROCHLORIDE 4 MG: 2 TABLET ORAL at 17:48

## 2019-03-13 RX ADMIN — Medication 1 CAPSULE: at 08:38

## 2019-03-13 RX ADMIN — HYDROMORPHONE HYDROCHLORIDE 4 MG: 2 TABLET ORAL at 01:40

## 2019-03-13 RX ADMIN — HYDROMORPHONE HYDROCHLORIDE 4 MG: 2 TABLET ORAL at 21:50

## 2019-03-13 RX ADMIN — ASPIRIN 325 MG: 325 TABLET, DELAYED RELEASE ORAL at 08:38

## 2019-03-13 RX ADMIN — FERROUS SULFATE TAB 325 MG (65 MG ELEMENTAL FE) 325 MG: 325 (65 FE) TAB at 21:50

## 2019-03-13 RX ADMIN — LISINOPRIL 20 MG: 20 TABLET ORAL at 21:50

## 2019-03-13 RX ADMIN — FLUTICASONE PROPIONATE 2 SPRAY: 50 SPRAY, METERED NASAL at 08:36

## 2019-03-13 NOTE — PLAN OF CARE
Patient is alert x 4 and he directs all his cares. VSS. IV antibiotic ran in this am via picc without any problems.  Patient using ice and prn dilaudid for right knee pain. Patient did have a BM this am. Patient eats his meals slowly and spreads his food through out the day. So food tray still at bedside per patient's preference.

## 2019-03-13 NOTE — PLAN OF CARE
Alert and oriented, VSS. PICC patent, antibiotics infused. R leg incision is approximated, no drainage, covered with dry gauze and tube sock. Requests pain medication Q4H for R leg pain.

## 2019-03-13 NOTE — PLAN OF CARE
RN: Pt requested to receive Dilaudid 4 mg tab q 4H RTC to comfortably managed R knee pain. Incision with round swelling and erythema iin the mid incision or right at the knee area, staples intact. Utilizing ice pack as well.  CMS+. Using knee immobilizer when OOB. Using urinal and staff empties. Also up independently to the BR with walker/ cane. Very appreciative and believes that he had made good progress since moving in the unit. Refused for staff to remove dinner tray in the room claiming he usually munch on his food throughout the night. IV Vanco infused via R PICC with no problem. Appear to be sleeping/resting. Continue with plan of care.

## 2019-03-13 NOTE — PLAN OF CARE
PT: Pt stating R knee a little more sore on medial aspect of knee, but doing OK with amb with sec on R side about 150 ft , slow gait, KI on RLE.  Stairs, slow but able with cane and 1 rail, cga.  Pt is needing cues for positioning UEs at times for performing UE hep for UEs.

## 2019-03-14 LAB
ALBUMIN SERPL-MCNC: 2.6 G/DL (ref 3.4–5)
ALP SERPL-CCNC: 108 U/L (ref 40–150)
ALT SERPL W P-5'-P-CCNC: 95 U/L (ref 0–70)
AST SERPL W P-5'-P-CCNC: 96 U/L (ref 0–45)
BILIRUB DIRECT SERPL-MCNC: 0.3 MG/DL (ref 0–0.2)
BILIRUB SERPL-MCNC: 0.8 MG/DL (ref 0.2–1.3)
CREAT SERPL-MCNC: 0.77 MG/DL (ref 0.66–1.25)
GFR SERPL CREATININE-BSD FRML MDRD: >90 ML/MIN/{1.73_M2}
PROT SERPL-MCNC: 6.4 G/DL (ref 6.8–8.8)
VANCOMYCIN SERPL-MCNC: 14.9 MG/L

## 2019-03-14 PROCEDURE — 80076 HEPATIC FUNCTION PANEL: CPT | Performed by: INTERNAL MEDICINE

## 2019-03-14 PROCEDURE — 12000022 ZZH R&B SNF

## 2019-03-14 PROCEDURE — 36592 COLLECT BLOOD FROM PICC: CPT | Performed by: INTERNAL MEDICINE

## 2019-03-14 PROCEDURE — 82565 ASSAY OF CREATININE: CPT | Performed by: INTERNAL MEDICINE

## 2019-03-14 PROCEDURE — 25000128 H RX IP 250 OP 636: Performed by: NURSE PRACTITIONER

## 2019-03-14 PROCEDURE — 25800030 ZZH RX IP 258 OP 636

## 2019-03-14 PROCEDURE — 25000132 ZZH RX MED GY IP 250 OP 250 PS 637: Performed by: NURSE PRACTITIONER

## 2019-03-14 PROCEDURE — 97110 THERAPEUTIC EXERCISES: CPT | Mod: GP | Performed by: PHYSICAL THERAPIST

## 2019-03-14 PROCEDURE — 25800030 ZZH RX IP 258 OP 636: Performed by: INTERNAL MEDICINE

## 2019-03-14 PROCEDURE — 25000128 H RX IP 250 OP 636: Performed by: INTERNAL MEDICINE

## 2019-03-14 PROCEDURE — 80202 ASSAY OF VANCOMYCIN: CPT | Performed by: INTERNAL MEDICINE

## 2019-03-14 RX ORDER — SODIUM CHLORIDE 9 MG/ML
INJECTION, SOLUTION INTRAVENOUS
Status: COMPLETED
Start: 2019-03-14 | End: 2019-03-14

## 2019-03-14 RX ADMIN — ASPIRIN 325 MG: 325 TABLET, DELAYED RELEASE ORAL at 08:54

## 2019-03-14 RX ADMIN — SODIUM CHLORIDE 500 ML: 9 INJECTION, SOLUTION INTRAVENOUS at 18:20

## 2019-03-14 RX ADMIN — LISINOPRIL 20 MG: 20 TABLET ORAL at 21:26

## 2019-03-14 RX ADMIN — FERROUS SULFATE TAB 325 MG (65 MG ELEMENTAL FE) 325 MG: 325 (65 FE) TAB at 21:26

## 2019-03-14 RX ADMIN — ERTAPENEM SODIUM 1 G: 1 INJECTION, POWDER, LYOPHILIZED, FOR SOLUTION INTRAMUSCULAR; INTRAVENOUS at 08:54

## 2019-03-14 RX ADMIN — HYDROMORPHONE HYDROCHLORIDE 4 MG: 2 TABLET ORAL at 18:22

## 2019-03-14 RX ADMIN — HYDROMORPHONE HYDROCHLORIDE 4 MG: 2 TABLET ORAL at 22:43

## 2019-03-14 RX ADMIN — MULTIPLE VITAMINS W/ MINERALS TAB 1 TABLET: TAB at 21:26

## 2019-03-14 RX ADMIN — Medication 1 CAPSULE: at 21:26

## 2019-03-14 RX ADMIN — VANCOMYCIN HYDROCHLORIDE 1750 MG: 10 INJECTION, POWDER, LYOPHILIZED, FOR SOLUTION INTRAVENOUS at 05:53

## 2019-03-14 RX ADMIN — FLUTICASONE PROPIONATE 2 SPRAY: 50 SPRAY, METERED NASAL at 08:54

## 2019-03-14 RX ADMIN — HYDROMORPHONE HYDROCHLORIDE 4 MG: 2 TABLET ORAL at 10:17

## 2019-03-14 RX ADMIN — HYDROMORPHONE HYDROCHLORIDE 4 MG: 2 TABLET ORAL at 14:23

## 2019-03-14 RX ADMIN — HYDROMORPHONE HYDROCHLORIDE 4 MG: 2 TABLET ORAL at 01:47

## 2019-03-14 RX ADMIN — THIAMINE HCL (VITAMIN B1) 50 MG TABLET 50 MG: at 08:54

## 2019-03-14 RX ADMIN — HYDROMORPHONE HYDROCHLORIDE 4 MG: 2 TABLET ORAL at 05:50

## 2019-03-14 RX ADMIN — VANCOMYCIN HYDROCHLORIDE 1750 MG: 10 INJECTION, POWDER, LYOPHILIZED, FOR SOLUTION INTRAVENOUS at 18:20

## 2019-03-14 RX ADMIN — Medication 1 CAPSULE: at 08:54

## 2019-03-14 NOTE — PLAN OF CARE
PT: Pt appears on track for discontinue from PT tomorrow.  Pt will continue OP PT at [facility] at [facility] when he can advance to ROM R knee.  PT is able to amb in his room and castro with a cane Olivier, with R KI. Pt is able to manage his KI on his own.

## 2019-03-14 NOTE — PLAN OF CARE
Patient is alert and oriented  x 4. Able to make needs known. Pain comfortably manageable with PRN dilaudid q 4 hrs.  SBA using cane.  Continent of bowel & bladder. Denies any cough, chest pain, SOB, lightheadedness & dizzines. Denies any chills of fever.  PICC intact & patent, NS flushed after IV ABX. R knee incision CDI with staples, ISHA. Calls appropriately for help. Call within reach.

## 2019-03-14 NOTE — PHARMACY-VANCOMYCIN DOSING SERVICE
Pharmacy Vancomycin Note  Date of Service 2019  Patient's  1960   58 year old, male    Indication: Bone and Joint Infection  Goal Trough Level: 15-20 mg/L  Day of Therapy: since 3/4/19  Current Vancomycin regimen:  1750 mg IV q12h    Current estimated CrCl = Estimated Creatinine Clearance: 108.9 mL/min (based on SCr of 0.77 mg/dL).    Creatinine for last 3 days  3/14/2019:  5:25 AM Creatinine 0.77 mg/dL    Recent Vancomycin Levels (past 3 days)  3/14/2019:  5:02 PM Vancomycin Level 14.9 mg/L    Vancomycin IV Administrations (past 72 hours)                   vancomycin (VANCOCIN) 1,750 mg in sodium chloride 0.9 % 250 mL intermittent infusion (mg) 1,750 mg New Bag 19 1820     1,750 mg New Bag  0553     1,750 mg New Bag 19 1748     1,750 mg New Bag  0542     1,750 mg New Bag 19 1743     1,750 mg New Bag  0528                Nephrotoxins and other renal medications (From now, onward)    Start     Dose/Rate Route Frequency Ordered Stop    19 2100  lisinopril (PRINIVIL/ZESTRIL) tablet 20 mg      20 mg Oral AT BEDTIME 19 1425      19 1800  vancomycin (VANCOCIN) 1,750 mg in sodium chloride 0.9 % 250 mL intermittent infusion      1,750 mg (central catheter)  over 60 Minutes Intravenous EVERY 12 HOURS 19 1506               Contrast Orders - past 72 hours (72h ago, onward)    None          Interpretation of levels and current regimen:  Trough level is slightly Subtherapeutic     Has serum creatinine changed > 50% in last 72 hours: No    Urine output:  good urine output    Renal Function: Stable    Plan:  1.  Continue Current Dose - patient's vanco level is likely to continue to creep upwards as he accumulates (would accumulate on q8h dosing) and patient is on maximum mg/kg dose.   2.  Pharmacy will check trough levels as appropriate in 1-3 Days.    3. Serum creatinine levels will be ordered daily for the first week of therapy and at least twice weekly for subsequent  weeks.      Renaldo Bourgeois, PharmD

## 2019-03-14 NOTE — PLAN OF CARE
RN: Pt requested to receive Dilaudid 4 mg tab q 4H RTC to comfortably managed R knee pain. CMS+. Using knee immobilizer when OOB. Using urinal and staff empties. Also up independently to the BR with  cane. Very appreciative and believes that he had made good progress since moving in the unit. IV Vanco infused via R PICC with no problem. Appear to be sleeping/resting. Continue with plan of care.

## 2019-03-14 NOTE — PLAN OF CARE
Pt prefers to have pain medication every 4 hours when they are due. Dilaudid provided x 2. Right knee incision is approximated with staples, no drainage noted. New dry gauze applied to the site. Pt is able to ambulate with the immobilizer on when OOB. Hopeful to return to work once his generalized health and the surgical incision are improved.

## 2019-03-14 NOTE — PROGRESS NOTES
Writer was asked to talk to the pt. Pt is wanting to know when we think as a team he could go home. Pt indicated that we round tomorrow morning, and then we can look to see when he can go home.. Pt agreeable and stated that he was with hospitals in the past for his antibiotics and has no concerns with managing his IV's at home. Writer did sent a resumption referral to hospitals with a tentative discharge of early next week. SW will continue to follow and assist as needed.    KIMMY Carmona  [facility]   P: [phone]  Pgr: [phone]

## 2019-03-15 PROCEDURE — 25000132 ZZH RX MED GY IP 250 OP 250 PS 637: Performed by: NURSE PRACTITIONER

## 2019-03-15 PROCEDURE — 99207 ZZC CDG-CODE CATEGORY CHANGED: CPT | Performed by: PHYSICIAN ASSISTANT

## 2019-03-15 PROCEDURE — 25800030 ZZH RX IP 258 OP 636

## 2019-03-15 PROCEDURE — 25000128 H RX IP 250 OP 636: Performed by: INTERNAL MEDICINE

## 2019-03-15 PROCEDURE — 25000128 H RX IP 250 OP 636: Performed by: NURSE PRACTITIONER

## 2019-03-15 PROCEDURE — 12000022 ZZH R&B SNF

## 2019-03-15 PROCEDURE — 25000132 ZZH RX MED GY IP 250 OP 250 PS 637: Performed by: PHYSICIAN ASSISTANT

## 2019-03-15 PROCEDURE — 99309 SBSQ NF CARE MODERATE MDM 30: CPT | Performed by: PHYSICIAN ASSISTANT

## 2019-03-15 PROCEDURE — 25800030 ZZH RX IP 258 OP 636: Performed by: INTERNAL MEDICINE

## 2019-03-15 PROCEDURE — 97110 THERAPEUTIC EXERCISES: CPT | Mod: GP | Performed by: PHYSICAL THERAPIST

## 2019-03-15 RX ORDER — SODIUM CHLORIDE 9 MG/ML
INJECTION, SOLUTION INTRAVENOUS
Status: COMPLETED
Start: 2019-03-15 | End: 2019-03-15

## 2019-03-15 RX ORDER — HYDROMORPHONE HYDROCHLORIDE 2 MG/1
4-6 TABLET ORAL EVERY 4 HOURS PRN
Status: DISCONTINUED | OUTPATIENT
Start: 2019-03-15 | End: 2019-03-19 | Stop reason: HOSPADM

## 2019-03-15 RX ADMIN — HYDROMORPHONE HYDROCHLORIDE 6 MG: 2 TABLET ORAL at 22:21

## 2019-03-15 RX ADMIN — FERROUS SULFATE TAB 325 MG (65 MG ELEMENTAL FE) 325 MG: 325 (65 FE) TAB at 20:22

## 2019-03-15 RX ADMIN — ERTAPENEM SODIUM 1 G: 1 INJECTION, POWDER, LYOPHILIZED, FOR SOLUTION INTRAMUSCULAR; INTRAVENOUS at 08:27

## 2019-03-15 RX ADMIN — LISINOPRIL 20 MG: 20 TABLET ORAL at 20:22

## 2019-03-15 RX ADMIN — VANCOMYCIN HYDROCHLORIDE 1750 MG: 10 INJECTION, POWDER, LYOPHILIZED, FOR SOLUTION INTRAVENOUS at 18:12

## 2019-03-15 RX ADMIN — FLUTICASONE PROPIONATE 2 SPRAY: 50 SPRAY, METERED NASAL at 08:27

## 2019-03-15 RX ADMIN — Medication 1 CAPSULE: at 08:27

## 2019-03-15 RX ADMIN — Medication 1 CAPSULE: at 20:22

## 2019-03-15 RX ADMIN — HYDROMORPHONE HYDROCHLORIDE 4 MG: 2 TABLET ORAL at 11:21

## 2019-03-15 RX ADMIN — HYDROXYZINE HYDROCHLORIDE 25 MG: 25 TABLET ORAL at 22:21

## 2019-03-15 RX ADMIN — HYDROMORPHONE HYDROCHLORIDE 6 MG: 2 TABLET ORAL at 18:10

## 2019-03-15 RX ADMIN — HYDROMORPHONE HYDROCHLORIDE 4 MG: 2 TABLET ORAL at 07:19

## 2019-03-15 RX ADMIN — SODIUM CHLORIDE 500 ML: 9 INJECTION, SOLUTION INTRAVENOUS at 18:38

## 2019-03-15 RX ADMIN — HYDROMORPHONE HYDROCHLORIDE 4 MG: 2 TABLET ORAL at 15:22

## 2019-03-15 RX ADMIN — MULTIPLE VITAMINS W/ MINERALS TAB 1 TABLET: TAB at 20:22

## 2019-03-15 RX ADMIN — THIAMINE HCL (VITAMIN B1) 50 MG TABLET 50 MG: at 08:27

## 2019-03-15 RX ADMIN — ASPIRIN 325 MG: 325 TABLET, DELAYED RELEASE ORAL at 08:27

## 2019-03-15 RX ADMIN — VANCOMYCIN HYDROCHLORIDE 1750 MG: 10 INJECTION, POWDER, LYOPHILIZED, FOR SOLUTION INTRAVENOUS at 06:04

## 2019-03-15 RX ADMIN — HYDROMORPHONE HYDROCHLORIDE 4 MG: 2 TABLET ORAL at 02:52

## 2019-03-15 ASSESSMENT — MIFFLIN-ST. JEOR: SCORE: 1564.24

## 2019-03-15 NOTE — PROGRESS NOTES
CLINICAL NUTRITION SERVICES - ASSESSMENT NOTE     Nutrition Prescription    RECOMMENDATIONS FOR MDs/PROVIDERS TO ORDER:  None today    Malnutrition Status:    Unable to determine due to question accuracy of most recent wt.    Recommendations already ordered by Registered Dietitian (RD):  Weight recheck on 3/16    Future/Additional Recommendations:  Monitor po intakes and wt trends. Consider need to add snack/supplement. Adjust flavors pending trends and pt preferences.       REASON FOR ASSESSMENT  Ten Johnson is a/an 58 year old male assessed by the dietitian for LOS    Admitted for ongoing rehab after hospitalization at [facility] on 3/4 with infection of right TKA now s/p I+D, and liner exchange on 3/4. Patient with a past medical history significant for untreated hepatitis C, alcohol abuse, cirrhosis, aortic stenosis s/p TAVR, HTN.    NUTRITION HISTORY  Ten reports no food allergies or intolerances. Regular diet at home. Reports appetite PTA was good, denies reduced intakes. Usual intakes of 3 meals/day (2 heavier meals, 1-2 lighter meals) + snacks. 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 Pt really enjoys to hunt and fish and eats a lot of what he hunts.   Takes iron, MVI, and Tums at home.    CURRENT NUTRITION ORDERS  Diet: Regular  Intake/Tolerance: Per RN flowsheet, pt eating % of meals (primarily occurrences of 100%). On average, pt is ordering 2775 kcal and 108 g protein daily per [employer]. Patient reports usually eating all or nearly all of food ordered. Friends also brought in jerky for him. Pt noted to eat meals slowly and spread his food throughout the day. Likes to keep food at bedside. Very familiar with the menu here - likes hot turkey sandwich, potatoes, fruit, hard boiled eggs. No problems with ordering.  He states that he had a couple days of diarrhea but no issues with nausea, vomiting, constipation, or diarrhea recently per his  "report.    LABS  Calcium 8.3 (L)    MEDICATIONS  Thiamine  Thera-Vit-M  Ferrous sulfate    ANTHROPOMETRICS  Height: 175.3 cm (5' 9\")  Most Recent Weight: 75.5 kg (166 lb 6.4 oz)    IBW: 72.7 kg (104% IBW)  BMI: Normal BMI  Weight History: Per pt, UBW of 195-210 lb over the past 15-20 years, but he states that over the past year his wt has been ~170-180 lb. Per review of wt readings below, wt loss of 14 lb (7.8%) over the past ~1 month, which is significant. However, question accuracy of wt readings given patient stated he has not had decline in po intakes. Reordered wt check.  Wt Readings from Last 10 Encounters:   03/09/19 75.5 kg (166 lb 6.4 oz)   02/07/19 82 kg (180 lb 12.4 oz)   01/30/19 81.6 kg (180 lb)   01/15/19 82.8 kg (182 lb 8 oz)   12/14/18 81.6 kg (180 lb)   11/05/18 82.1 kg (181 lb)   10/27/18 82.5 kg (181 lb 12.8 oz)   10/11/18 81.2 kg (179 lb)   10/11/18 81.2 kg (179 lb)   10/11/18 81.6 kg (179 lb 12.8 oz)     Dosing Weight: 76 kg (actual)    ASSESSED NUTRITION NEEDS  Estimated Energy Needs: 7638-6823+ kcals/day (25 - 30+ kcals/kg)  Justification: Maintenance  Estimated Protein Needs: 75-90 grams protein/day (1.0 - 1.2 grams of pro/kg)  Justification: Post-op  Estimated Fluid Needs: 1 mL/kcal, or per provider   Justification: Maintenance    PHYSICAL FINDINGS  See malnutrition section below.   Patient stated that he has noticed a lot more weakness in his arms and legs. Used to bike a lot of miles.    MALNUTRITION  % Intake: No decreased intake noted  % Weight Loss: Unable to assess  Subcutaneous Fat Loss: None observed  Muscle Loss: Upper arm (bicep, tricep): Mild and Posterior calf: Mild  Fluid Accumulation/Edema: None noted  Malnutrition Diagnosis: Unable to determine due to question accuracy of most recent wt.    NUTRITION DIAGNOSIS  Predicted inadequate nutrient intake (protein-energy) related to variable appetite as evidenced by reliance on po intakes to meet estimated needs with good current " intakes but potential for decline.      INTERVENTIONS  Implementation  - Nutrition Education: Encouraged continuation of meals TID. Pt stated he has not had any problems ordering or with the menu. Encouraged pt to increase Calcium intakes, but he stated that he already gets enough via milk, cheese, Tums, and MVI. Made pt aware of low lab value (though could be not dietary related).    - Weight recheck    Goals  Patient to consume % of nutritionally adequate meal trays TID, or the equivalent with supplements/snacks.     Monitoring/Evaluation  Progress toward goals will be monitored and evaluated per protocol.    Jaqueline Vasquez RD, LD  Unit pgr: [phone]

## 2019-03-15 NOTE — PLAN OF CARE
Physical Therapy Discharge Summary    Reason for therapy discharge:    All goals and outcomes met, no further needs identified.    Progress towards therapy goal(s). See [facility] on [facility] for goal details.  Goals met    Therapy recommendation(s):    Pt is Olivier with a cane, wbat RLE, no ROM to R knee.  Pt is I with B UE and B LE HEP to maintain and improve strength for improved function.  Pt has had multiple orthopedic surgeries on knee and B shoulders, elbow, so modifies exercises as needed prevent soreness.  Pt owns a cane. Pt will follow up with OP PT once he can initiate ROM R knee per ortho MD.

## 2019-03-15 NOTE — PLAN OF CARE
Pt continues to request for pain medication every 4 hours. Reported that the Dilaudid is no longer effective as it has been in the past. Requested to have other pain relief medications added to Dilaudid to promote adequate relief. MARKELL BONILLA was updated about pt's request. PICC line is intact and patent on the right ac. Up to the bathroom independently in the room.

## 2019-03-15 NOTE — PLAN OF CARE
Pt is alert and oriented. Complained of right leg pain where metal dominic on knee stabilizer pushes on his skin. Ice pack and 4mg of PRN dilaudid given. Re-assessed pain and Pt was asleep. PICC in right arm flushed easily. Blood return noted. Currently infusing IV Vanco. Pt uses call light appropriately. Continent of bladder and bowel. LBM 3/13. Slept through most of the night. No new concerns. Will continue to monitor.

## 2019-03-15 NOTE — PLAN OF CARE
Pt alert and oriented. VSS. Able to make needs known. No signs of SOB noted or reported. RLE pain managed with PRN dilaudid q4h. Utilizes ice packs to the area as well. Right knee staples intact, no drainage,tubigrip on and immobilizer. Continent of bowel and bladder. IV abx infused via R arm PICC line without difficulties. SBA w/ cane. Call light within reach. Continue to monitor.

## 2019-03-15 NOTE — PROGRESS NOTES
[facility]  Transitional Care Unit Progress Note  Ten Johnson  [id number]  March 15, 2019         Assessment & Plan:   Ten Johnson is a 58 year old male with a medical history of untreated hepatitis C, alcohol abuse, cirrhosis, aortic stenosis s/p TAVR (2/21/18), hx Sarahi-evans tear (2017), grade II diastolic dysfunction, allergic rhinitis, HTN and R knee osteoarthritis s/p TKA (2/7/19) who was admitted to [facility] on 3/4 with infection of right TKA now s/p I+D, and liner exchange on 3/4/2019.  Transferred to TCU for ongoing therapy and IV antibiotics.       Infection of previous R TKA s/p I&D and liner exchange (3/4/2019): Cultures grew staph epidermidis and E coli.  Evaluated by ID on 3/5 who recommended IV antibiotics (IV Vanco and IV Ertapenem) x 6 weeks. Incision healing well. Pain, however, suboptimally controlled at this time.    - Antibiotics: IV Vancomycin and IV Ertapenem x 6 weeks; however, after d/w pharmacy today, past anaerobic cultures no growth so could discontinue ertapenem and switch to ceftriaxone. Will d/w ID tomorrow.   - Consult PLC to re-learn home IV abx  - Pain control: Prn Tylenol and hydromorphone, but increase the latter from 2-4 mg to 4-6 mg q4hrs prn.    - WBAT bilateral LE, R knee in KI   - DVT ppx: PTA already on Dr. Joe's post-op AC plan of  mg daily   - Follow up in ID clinic with Dr. Hare as scheduled on 3/27.   - Follow up in Orthopedic clinic as schedule with Dr. Joe on 3/25.      Liver cirrhosis 2/2 untx hep C and etoh abuse:  Follows with hepatology as OP but has not been seen in over a year.  Most recent LFTs on 3/14 with stable elevation: ALT 95 (93), AST 96 (101), T bili and alk phos remain WNL.  No current concerns.   - CMP q M   - Follow up as OP with hepatology   - Continue PTA thiamine and MVI      HTN  Severe aortic stenosis s/p TAVR (2/21/18): PTA on lisinopril 20 mg daily and  mg daily. BPs at goal and pt without  "any cardiovascular complaints.   - Continue PTA lisinopril and ASA     Chronic thrombocytopenia: Likely 2/2 liver disease. Baseline appears to be 60-70s, currently 99 without s/s of bleeding on exam   - Follow CBC qM     Acute on chronic normocytic anemia: Likely multifactorial including recent surgery, infection, alcohol use.  BL Hgb appears to be ~11-12, most recently 12.3 on 3/11. No current s/s of bleeding on exam   - Continue PTA ferrous sulfate   - CBC qM      Noninfectious diarrhea: Noted in hospital.  Afebrile w/o elevation in WBC lowering suspicion for infection.  Likely 2/2 antibiotics. Started on probiotic with improvement.   - Continue Lactobacillus      Allergic rhinitis: Sxs well controlled. Continue PTA Flonase nasal spray       CODE: Full Code  Lines: RUE PICC   Diet: Regular   DVT: PTA  mg daily   Indication for Psychotropic Medications: No diagnosis   Pneumococcal Vaccination Status: UTD, PPSV23 2011 and PCV 13 2015      Esteban Sarah PA-C  Internal Medicine Hospitalist   [facility]  [phone]          Consults:   PT/OT / Pharmacy to dose vancomycin         Discharge Planning:   Finished therapy on 3/15 but remains on TCU with skilled need of IV abx. Pt hopes to discharge next week as soon as insurance issues clarified, he re-learns how to do home IV abx infusion, and is cleared to go home by his Orthopedic team (of note, pt is very willing to stay at TCU daily until all aforementioned issues taken care of).         Interval History:   No acute events overnight. Feels R knee pain stable but not optimally controlled. Denies fever, chills, chest pain, SOB and other acute physical concerns at this time.          Physical Exam:   Vitals were reviewed  Blood pressure 134/82, pulse 70, temperature 96.8  F (36  C), temperature source Axillary, resp. rate 16, height 1.753 m (5' 9\"), weight 75.5 kg (166 lb 6.4 oz), SpO2 97 %.  GEN: In NAD  HEENT: NCAT; PERRL; sclerae " non-icteric; MMM  LUNGS: CTAB  CV: RRR  ABD: +BSs; SNTND  EXT: No LLE edema; RLE in KI of which I did not remove; PICC RUE without e/o infection  SKIN: No acute rashes noted on exposed areas.  NEURO: AAOx3; CNs grossly intact; No acute focal deficits noted.        ROUTINE LABS:  CMP  Recent Labs   Lab 03/14/19  0525 03/11/19  0711 03/09/19  0542   NA  --  139  --    POTASSIUM  --  4.2  --    CHLORIDE  --  107  --    CO2  --  25  --    ANIONGAP  --  7  --    GLC  --  118*  --    BUN  --  14  --    CR 0.77 0.70 0.77   GFRESTIMATED >90 >90 >90   GFRESTBLACK >90 >90 >90   JOSEPHINE  --  8.3*  --    PROTTOTAL 6.4* 6.3*  --    ALBUMIN 2.6* 2.5*  --    BILITOTAL 0.8 0.6  --    ALKPHOS 108 104  --    AST 96* 101*  --    ALT 95* 93*  --      CBC  Recent Labs   Lab 03/11/19  0711   WBC 8.7   RBC 3.81*   HGB 12.3*   HCT 36.7*   MCV 96   MCH 32.3   MCHC 33.5   RDW 13.1   PLT 99*

## 2019-03-16 PROCEDURE — 25000132 ZZH RX MED GY IP 250 OP 250 PS 637: Performed by: NURSE PRACTITIONER

## 2019-03-16 PROCEDURE — 25000128 H RX IP 250 OP 636: Performed by: NURSE PRACTITIONER

## 2019-03-16 PROCEDURE — 25800030 ZZH RX IP 258 OP 636: Performed by: INTERNAL MEDICINE

## 2019-03-16 PROCEDURE — 25800030 ZZH RX IP 258 OP 636

## 2019-03-16 PROCEDURE — 25000132 ZZH RX MED GY IP 250 OP 250 PS 637: Performed by: PHYSICIAN ASSISTANT

## 2019-03-16 PROCEDURE — 25000128 H RX IP 250 OP 636: Performed by: INTERNAL MEDICINE

## 2019-03-16 PROCEDURE — 12000022 ZZH R&B SNF

## 2019-03-16 RX ORDER — SODIUM CHLORIDE 9 MG/ML
INJECTION, SOLUTION INTRAVENOUS
Status: COMPLETED
Start: 2019-03-16 | End: 2019-03-16

## 2019-03-16 RX ADMIN — ASPIRIN 325 MG: 325 TABLET, DELAYED RELEASE ORAL at 08:36

## 2019-03-16 RX ADMIN — VANCOMYCIN HYDROCHLORIDE 1750 MG: 10 INJECTION, POWDER, LYOPHILIZED, FOR SOLUTION INTRAVENOUS at 18:23

## 2019-03-16 RX ADMIN — HYDROMORPHONE HYDROCHLORIDE 6 MG: 2 TABLET ORAL at 18:23

## 2019-03-16 RX ADMIN — Medication 1 CAPSULE: at 21:24

## 2019-03-16 RX ADMIN — MULTIPLE VITAMINS W/ MINERALS TAB 1 TABLET: TAB at 21:24

## 2019-03-16 RX ADMIN — HYDROXYZINE HYDROCHLORIDE 25 MG: 25 TABLET ORAL at 22:34

## 2019-03-16 RX ADMIN — HYDROMORPHONE HYDROCHLORIDE 6 MG: 2 TABLET ORAL at 14:26

## 2019-03-16 RX ADMIN — ERTAPENEM SODIUM 1 G: 1 INJECTION, POWDER, LYOPHILIZED, FOR SOLUTION INTRAMUSCULAR; INTRAVENOUS at 08:36

## 2019-03-16 RX ADMIN — FLUTICASONE PROPIONATE 2 SPRAY: 50 SPRAY, METERED NASAL at 08:35

## 2019-03-16 RX ADMIN — HYDROMORPHONE HYDROCHLORIDE 6 MG: 2 TABLET ORAL at 06:00

## 2019-03-16 RX ADMIN — VANCOMYCIN HYDROCHLORIDE 1750 MG: 10 INJECTION, POWDER, LYOPHILIZED, FOR SOLUTION INTRAVENOUS at 06:01

## 2019-03-16 RX ADMIN — Medication 1 CAPSULE: at 08:36

## 2019-03-16 RX ADMIN — SODIUM CHLORIDE 500 ML: 9 INJECTION, SOLUTION INTRAVENOUS at 18:23

## 2019-03-16 RX ADMIN — HYDROMORPHONE HYDROCHLORIDE 6 MG: 2 TABLET ORAL at 22:34

## 2019-03-16 RX ADMIN — HYDROMORPHONE HYDROCHLORIDE 6 MG: 2 TABLET ORAL at 02:25

## 2019-03-16 RX ADMIN — HYDROMORPHONE HYDROCHLORIDE 6 MG: 2 TABLET ORAL at 10:25

## 2019-03-16 RX ADMIN — LISINOPRIL 20 MG: 20 TABLET ORAL at 21:24

## 2019-03-16 RX ADMIN — FERROUS SULFATE TAB 325 MG (65 MG ELEMENTAL FE) 325 MG: 325 (65 FE) TAB at 21:24

## 2019-03-16 RX ADMIN — THIAMINE HCL (VITAMIN B1) 50 MG TABLET 50 MG: at 08:35

## 2019-03-16 NOTE — PLAN OF CARE
Patient is alert and oriented x 4. Complained of pressure burning pain. Medicated with dilaudid x 2 this shift and effective. Dressing to R knee with tubi  on CDI. PICC intact- IV antibiotic infusing @ this time without difficulty. No respiratory distress noted. Patient's anticipated discharge on Monday. Will continue with current plan of care.

## 2019-03-16 NOTE — PLAN OF CARE
Pt remains independent in the room. Continues to take Dilaudid 6 mg every 4 hours. Single lumen PICC on the right ac is intact is intact and patent, IV antibiotics infused without any difficulty. Pt is planning to discharge home on Monday. Right knee incision with staples is approximated, no drainage noted. New dry gauze and tubi  applied on the incision.

## 2019-03-16 NOTE — PLAN OF CARE
Patient A&Ox4. VSS. Good appetite. Finished 100% of supper. LBM today. Independent with toiletting and cares in room. New order to increase dilaudid to 4-6mg q4 hrs PRN. Patient received ice packs and PRN dilaudid 6mg x2 this shift and had moderate relief from c/o pain to R knee. PRN hydroxyzine given at HS per request. Staples intact on incision on R knee. Site mildly swollen with slight erythema. Site covered with ABD pad secured by tubi- brace. Patient was encouraged to elevated LEs while in bed. PICC to RUE patent with adequate blood return. IV abx infused without difficulties. Patient was pleasant and cooperative with cares. Able to make needs known.

## 2019-03-17 LAB — VANCOMYCIN SERPL-MCNC: 16 MG/L

## 2019-03-17 PROCEDURE — 25000132 ZZH RX MED GY IP 250 OP 250 PS 637: Performed by: NURSE PRACTITIONER

## 2019-03-17 PROCEDURE — 80202 ASSAY OF VANCOMYCIN: CPT | Performed by: INTERNAL MEDICINE

## 2019-03-17 PROCEDURE — 12000022 ZZH R&B SNF

## 2019-03-17 PROCEDURE — 25000128 H RX IP 250 OP 636: Performed by: INTERNAL MEDICINE

## 2019-03-17 PROCEDURE — 36592 COLLECT BLOOD FROM PICC: CPT | Performed by: INTERNAL MEDICINE

## 2019-03-17 PROCEDURE — 25800030 ZZH RX IP 258 OP 636: Performed by: INTERNAL MEDICINE

## 2019-03-17 PROCEDURE — 25000132 ZZH RX MED GY IP 250 OP 250 PS 637: Performed by: PHYSICIAN ASSISTANT

## 2019-03-17 PROCEDURE — 25000128 H RX IP 250 OP 636: Performed by: NURSE PRACTITIONER

## 2019-03-17 PROCEDURE — 25800030 ZZH RX IP 258 OP 636

## 2019-03-17 RX ORDER — SODIUM CHLORIDE 9 MG/ML
INJECTION, SOLUTION INTRAVENOUS
Status: COMPLETED
Start: 2019-03-17 | End: 2019-03-17

## 2019-03-17 RX ADMIN — LISINOPRIL 20 MG: 20 TABLET ORAL at 20:26

## 2019-03-17 RX ADMIN — HYDROMORPHONE HYDROCHLORIDE 6 MG: 2 TABLET ORAL at 18:30

## 2019-03-17 RX ADMIN — VANCOMYCIN HYDROCHLORIDE 1750 MG: 10 INJECTION, POWDER, LYOPHILIZED, FOR SOLUTION INTRAVENOUS at 18:23

## 2019-03-17 RX ADMIN — VANCOMYCIN HYDROCHLORIDE 1750 MG: 10 INJECTION, POWDER, LYOPHILIZED, FOR SOLUTION INTRAVENOUS at 06:28

## 2019-03-17 RX ADMIN — FERROUS SULFATE TAB 325 MG (65 MG ELEMENTAL FE) 325 MG: 325 (65 FE) TAB at 20:26

## 2019-03-17 RX ADMIN — THIAMINE HCL (VITAMIN B1) 50 MG TABLET 50 MG: at 08:11

## 2019-03-17 RX ADMIN — Medication 1 CAPSULE: at 08:11

## 2019-03-17 RX ADMIN — HYDROMORPHONE HYDROCHLORIDE 6 MG: 2 TABLET ORAL at 22:23

## 2019-03-17 RX ADMIN — HYDROMORPHONE HYDROCHLORIDE 6 MG: 2 TABLET ORAL at 10:30

## 2019-03-17 RX ADMIN — SODIUM CHLORIDE 500 ML: 9 INJECTION, SOLUTION INTRAVENOUS at 18:23

## 2019-03-17 RX ADMIN — Medication 1 CAPSULE: at 20:26

## 2019-03-17 RX ADMIN — ASPIRIN 325 MG: 325 TABLET, DELAYED RELEASE ORAL at 08:11

## 2019-03-17 RX ADMIN — HYDROMORPHONE HYDROCHLORIDE 6 MG: 2 TABLET ORAL at 14:35

## 2019-03-17 RX ADMIN — HYDROMORPHONE HYDROCHLORIDE 6 MG: 2 TABLET ORAL at 06:28

## 2019-03-17 RX ADMIN — HYDROXYZINE HYDROCHLORIDE 25 MG: 25 TABLET ORAL at 20:26

## 2019-03-17 RX ADMIN — HYDROMORPHONE HYDROCHLORIDE 6 MG: 2 TABLET ORAL at 02:42

## 2019-03-17 RX ADMIN — MULTIPLE VITAMINS W/ MINERALS TAB 1 TABLET: TAB at 20:26

## 2019-03-17 RX ADMIN — FLUTICASONE PROPIONATE 2 SPRAY: 50 SPRAY, METERED NASAL at 08:11

## 2019-03-17 RX ADMIN — ERTAPENEM SODIUM 1 G: 1 INJECTION, POWDER, LYOPHILIZED, FOR SOLUTION INTRAMUSCULAR; INTRAVENOUS at 08:11

## 2019-03-17 NOTE — PLAN OF CARE
Patient is alert and oriented x 4. Complained of R knee pain. Medicated with dilaudid @ 0240 and it was effective. Immobilizer on the R knee. Vancomycin level needs to be drawn @ 0500.  aware of the time. No respiratory distress noted. Will continue with current plan of care.

## 2019-03-17 NOTE — PLAN OF CARE
Pt remains independent in the room. Plans in place to discharge home this week. Will continue with IV infusion at home. Pt needs a review of IV infusion from [facility] before discharge. Continue to request for Dilaudid 6 mg every 4 hours. Single lumen PICC on the right ac is intact and patent. Dressing changed to the site.

## 2019-03-17 NOTE — PHARMACY-VANCOMYCIN DOSING SERVICE
Pharmacy Vancomycin Note  Date of Service 2019  Patient's  1960   58 year old, male    Indication: Bone and Joint Infection (MRSE)  Goal Trough Level: 15-20 mg/L  Day of Therapy: 14  Current Vancomycin regimen:  1750 mg IV q12h    Current estimated CrCl = Estimated Creatinine Clearance: 110.2 mL/min (based on SCr of 0.77 mg/dL).    Creatinine for last 3 days  No results found for requested labs within last 72 hours.    Recent Vancomycin Levels (past 3 days)  3/14/2019:  5:02 PM Vancomycin Level 14.9 mg/L  3/17/2019:  5:22 AM Vancomycin Level 16.0 mg/L (11 hour trough)    Vancomycin IV Administrations (past 72 hours)                   vancomycin (VANCOCIN) 1,750 mg in sodium chloride 0.9 % 250 mL intermittent infusion (mg) 1,750 mg New Bag 19 0628     1,750 mg New Bag 19 1823     1,750 mg New Bag  0601     1,750 mg New Bag 03/15/19 1812     1,750 mg New Bag  0604     1,750 mg New Bag 19 1820                Nephrotoxins and other renal medications (From now, onward)    Start     Dose/Rate Route Frequency Ordered Stop    19 2100  lisinopril (PRINIVIL/ZESTRIL) tablet 20 mg      20 mg Oral AT BEDTIME 19 1425      19 1800  vancomycin (VANCOCIN) 1,750 mg in sodium chloride 0.9 % 250 mL intermittent infusion      1,750 mg (central catheter)  over 60 Minutes Intravenous EVERY 12 HOURS 19 1506               Contrast Orders - past 72 hours (72h ago, onward)    None          Interpretation of levels and current regimen:  Trough level is  Therapeutic    Has serum creatinine changed > 50% in last 72 hours: No    Urine output:  unable to determine    Renal Function: Stable    Plan:  1.  Continue Current Dose  2.  Pharmacy will check trough levels as appropriate in 5-7 Days.    3. Serum creatinine levels will be ordered daily for the first week of therapy and at least twice weekly for subsequent weeks.      Judith Chong        .

## 2019-03-17 NOTE — PLAN OF CARE
Patient A&Ox4. VSS. Good appetite. Finished 100% of supper. LBM today. Independent with toiletting and cares in room. Patient received ice packs and PRN dilaudid 6mg x2 with moderate relief from c/o pain to R knee. PRN hydroxyzine given at HS per request. Staples intact on incision on R knee. Site mildly swollen and warm with no drainage; covered with ABD pad secured by tubi- and brace. PICC to RUE patent; IV abx infused without difficulties. Patient was pleasant and cooperative with cares. Able to make needs known. Continue with current POC.

## 2019-03-17 NOTE — PROGRESS NOTES
D/I:  Per Hospitalist (ML), patient wanted to discharge on Friday, but due to deductible/copay of $600 approximately, patient decided to stay at  TCU.  Patient told hospitalist that he now requests to discharge home on Monday.  IV antibiotics until 4/15 per hospitalist; Dr. Funez must be consulted about discharge plans as well on Monday.  A/P:  Unit SW/Care Coordinator and hospitalist team discuss patient's case on Monday.

## 2019-03-18 VITALS
RESPIRATION RATE: 16 BRPM | HEART RATE: 72 BPM | HEIGHT: 69 IN | SYSTOLIC BLOOD PRESSURE: 135 MMHG | TEMPERATURE: 95.5 F | DIASTOLIC BLOOD PRESSURE: 82 MMHG | OXYGEN SATURATION: 96 % | WEIGHT: 168.4 LBS | BODY MASS INDEX: 24.94 KG/M2

## 2019-03-18 LAB
ALBUMIN SERPL-MCNC: 2.6 G/DL (ref 3.4–5)
ALP SERPL-CCNC: 105 U/L (ref 40–150)
ALT SERPL W P-5'-P-CCNC: 110 U/L (ref 0–70)
ANION GAP SERPL CALCULATED.3IONS-SCNC: 6 MMOL/L (ref 3–14)
AST SERPL W P-5'-P-CCNC: 112 U/L (ref 0–45)
BACTERIA SPEC CULT: NORMAL
BACTERIA SPEC CULT: NORMAL
BILIRUB SERPL-MCNC: 0.5 MG/DL (ref 0.2–1.3)
BUN SERPL-MCNC: 18 MG/DL (ref 7–30)
CALCIUM SERPL-MCNC: 7.9 MG/DL (ref 8.5–10.1)
CHLORIDE SERPL-SCNC: 106 MMOL/L (ref 94–109)
CO2 SERPL-SCNC: 27 MMOL/L (ref 20–32)
CREAT SERPL-MCNC: 0.79 MG/DL (ref 0.66–1.25)
ERYTHROCYTE [DISTWIDTH] IN BLOOD BY AUTOMATED COUNT: 13.2 % (ref 10–15)
GFR SERPL CREATININE-BSD FRML MDRD: >90 ML/MIN/{1.73_M2}
GLUCOSE SERPL-MCNC: 118 MG/DL (ref 70–99)
HCT VFR BLD AUTO: 34.3 % (ref 40–53)
HGB BLD-MCNC: 11.7 G/DL (ref 13.3–17.7)
Lab: NORMAL
Lab: NORMAL
MCH RBC QN AUTO: 32.6 PG (ref 26.5–33)
MCHC RBC AUTO-ENTMCNC: 34.1 G/DL (ref 31.5–36.5)
MCV RBC AUTO: 96 FL (ref 78–100)
PLATELET # BLD AUTO: 76 10E9/L (ref 150–450)
POTASSIUM SERPL-SCNC: 3.9 MMOL/L (ref 3.4–5.3)
PROT SERPL-MCNC: 6.3 G/DL (ref 6.8–8.8)
RBC # BLD AUTO: 3.59 10E12/L (ref 4.4–5.9)
SODIUM SERPL-SCNC: 139 MMOL/L (ref 133–144)
SPECIMEN SOURCE: NORMAL
SPECIMEN SOURCE: NORMAL
WBC # BLD AUTO: 7.4 10E9/L (ref 4–11)

## 2019-03-18 PROCEDURE — 25000132 ZZH RX MED GY IP 250 OP 250 PS 637: Performed by: NURSE PRACTITIONER

## 2019-03-18 PROCEDURE — 85027 COMPLETE CBC AUTOMATED: CPT | Performed by: NURSE PRACTITIONER

## 2019-03-18 PROCEDURE — 25000128 H RX IP 250 OP 636: Performed by: NURSE PRACTITIONER

## 2019-03-18 PROCEDURE — 99207 ZZC CDG-CODE CATEGORY CHANGED: CPT | Performed by: PHYSICIAN ASSISTANT

## 2019-03-18 PROCEDURE — 36592 COLLECT BLOOD FROM PICC: CPT | Performed by: NURSE PRACTITIONER

## 2019-03-18 PROCEDURE — 80053 COMPREHEN METABOLIC PANEL: CPT | Performed by: NURSE PRACTITIONER

## 2019-03-18 PROCEDURE — 25000128 H RX IP 250 OP 636: Performed by: INTERNAL MEDICINE

## 2019-03-18 PROCEDURE — 25800030 ZZH RX IP 258 OP 636: Performed by: INTERNAL MEDICINE

## 2019-03-18 PROCEDURE — 12000022 ZZH R&B SNF

## 2019-03-18 PROCEDURE — 25000132 ZZH RX MED GY IP 250 OP 250 PS 637: Performed by: PHYSICIAN ASSISTANT

## 2019-03-18 PROCEDURE — 99316 NF DSCHRG MGMT 30 MIN+: CPT | Performed by: PHYSICIAN ASSISTANT

## 2019-03-18 PROCEDURE — 25800030 ZZH RX IP 258 OP 636

## 2019-03-18 RX ORDER — SODIUM CHLORIDE 9 MG/ML
INJECTION, SOLUTION INTRAVENOUS
Status: COMPLETED
Start: 2019-03-18 | End: 2019-03-18

## 2019-03-18 RX ORDER — CEFTRIAXONE 1 G/1
1 INJECTION, POWDER, FOR SOLUTION INTRAMUSCULAR; INTRAVENOUS EVERY 24 HOURS
Qty: 240 ML | Refills: 0 | Status: ON HOLD | OUTPATIENT
Start: 2019-03-19 | End: 2019-04-11

## 2019-03-18 RX ORDER — NALOXONE HYDROCHLORIDE 0.4 MG/ML
.1-.4 INJECTION, SOLUTION INTRAMUSCULAR; INTRAVENOUS; SUBCUTANEOUS
Status: CANCELLED | OUTPATIENT
Start: 2019-03-18

## 2019-03-18 RX ORDER — HYDROMORPHONE HYDROCHLORIDE 4 MG/1
4-6 TABLET ORAL EVERY 4 HOURS PRN
Qty: 54 TABLET | Refills: 0 | Status: SHIPPED | OUTPATIENT
Start: 2019-03-18 | End: 2019-03-27

## 2019-03-18 RX ORDER — ASPIRIN 325 MG
325 TABLET, DELAYED RELEASE (ENTERIC COATED) ORAL DAILY
Qty: 6 TABLET | Refills: 0 | Status: SHIPPED | OUTPATIENT
Start: 2019-03-19 | End: 2019-03-27

## 2019-03-18 RX ORDER — CEFTRIAXONE 1 G/1
1 INJECTION, POWDER, FOR SOLUTION INTRAMUSCULAR; INTRAVENOUS EVERY 24 HOURS
Status: DISCONTINUED | OUTPATIENT
Start: 2019-03-18 | End: 2019-03-19 | Stop reason: HOSPADM

## 2019-03-18 RX ORDER — LACTOBACILLUS RHAMNOSUS GG 10B CELL
1 CAPSULE ORAL 2 TIMES DAILY
Qty: 68 CAPSULE | Refills: 0 | Status: SHIPPED | OUTPATIENT
Start: 2019-03-18 | End: 2019-05-23

## 2019-03-18 RX ADMIN — Medication 1 CAPSULE: at 20:15

## 2019-03-18 RX ADMIN — HYDROMORPHONE HYDROCHLORIDE 6 MG: 2 TABLET ORAL at 22:55

## 2019-03-18 RX ADMIN — HYDROMORPHONE HYDROCHLORIDE 6 MG: 2 TABLET ORAL at 19:08

## 2019-03-18 RX ADMIN — ERTAPENEM SODIUM 1 G: 1 INJECTION, POWDER, LYOPHILIZED, FOR SOLUTION INTRAMUSCULAR; INTRAVENOUS at 08:16

## 2019-03-18 RX ADMIN — HYDROXYZINE HYDROCHLORIDE 25 MG: 25 TABLET ORAL at 19:08

## 2019-03-18 RX ADMIN — HYDROXYZINE HYDROCHLORIDE 25 MG: 25 TABLET ORAL at 22:54

## 2019-03-18 RX ADMIN — VANCOMYCIN HYDROCHLORIDE 1750 MG: 10 INJECTION, POWDER, LYOPHILIZED, FOR SOLUTION INTRAVENOUS at 19:10

## 2019-03-18 RX ADMIN — HYDROXYZINE HYDROCHLORIDE 25 MG: 25 TABLET ORAL at 13:58

## 2019-03-18 RX ADMIN — FERROUS SULFATE TAB 325 MG (65 MG ELEMENTAL FE) 325 MG: 325 (65 FE) TAB at 20:15

## 2019-03-18 RX ADMIN — LISINOPRIL 20 MG: 20 TABLET ORAL at 20:15

## 2019-03-18 RX ADMIN — THIAMINE HCL (VITAMIN B1) 50 MG TABLET 50 MG: at 08:17

## 2019-03-18 RX ADMIN — MULTIPLE VITAMINS W/ MINERALS TAB 1 TABLET: TAB at 20:15

## 2019-03-18 RX ADMIN — HYDROMORPHONE HYDROCHLORIDE 6 MG: 2 TABLET ORAL at 11:10

## 2019-03-18 RX ADMIN — SODIUM CHLORIDE 500 ML: 9 INJECTION, SOLUTION INTRAVENOUS at 19:09

## 2019-03-18 RX ADMIN — HYDROMORPHONE HYDROCHLORIDE 6 MG: 2 TABLET ORAL at 02:38

## 2019-03-18 RX ADMIN — FLUTICASONE PROPIONATE 2 SPRAY: 50 SPRAY, METERED NASAL at 08:16

## 2019-03-18 RX ADMIN — ASPIRIN 325 MG: 325 TABLET, DELAYED RELEASE ORAL at 08:17

## 2019-03-18 RX ADMIN — VANCOMYCIN HYDROCHLORIDE 1750 MG: 10 INJECTION, POWDER, LYOPHILIZED, FOR SOLUTION INTRAVENOUS at 05:25

## 2019-03-18 RX ADMIN — Medication 1 CAPSULE: at 08:17

## 2019-03-18 RX ADMIN — HYDROMORPHONE HYDROCHLORIDE 6 MG: 2 TABLET ORAL at 06:55

## 2019-03-18 RX ADMIN — HYDROMORPHONE HYDROCHLORIDE 6 MG: 2 TABLET ORAL at 14:58

## 2019-03-18 NOTE — DISCHARGE SUMMARY
Transitional Care Unit  Discharge Summary    Ten Johnson MRN# [id number]   Age: 58 year old YOB: 1960     Date of Admission:  3/9/2019  Date of Discharge:  3/19/2019  Admitting Physician:  Nicholas Eduardo MD  Discharging Provider:  Mili Saxena PA-C  Primary Care Provider:             Cuca Celeste         Outpatient To Do:   Follow up with Dr. Joe of Orthopedics 3/25/2019  Follow up with Dr. Hare of Infectious disease 3/27/2019          Reason for Admission:   Briefly, patient is a 58 year old male with a history of untreated Hepatitis C, alcohol abuse, cirrhosis, aortic stenosis s/p TAVR (2/21/18), hx of Maollory-evans tear (2017), grade II diastolic dysfunction, allergic rhinitis, HTN, and R knee OA s/p TKA (2/7/19) who was admitted to [facility] 3/4 for R TKA now s/p I+D and liner exchange 3/4/19. Transferred to TCU for ongoing therapy and IV antibiotics. For additional details, please see the detailed H&P dated 3/10/19.          Discharge Diagnoses:   R TKA infection s/p I+D and liner exchange 3/4/19  Liver Cirrhosis 2/2 untreated Hep C and alcohol abuse  HTN  Severe aortic stenosis s/p TAVR (2/21/18)  Chronic Thrombocytopenia  Acute on chronic normocytic anemia  Noninfectious Diarrhea  Allergic Rhinitis           Significant Imaging:   Lab Results   Component Value Date    WBC 7.4 03/18/2019    WBC 8.7 03/11/2019    WBC 12.5 (H) 03/05/2019    HGB 11.7 (L) 03/18/2019    HGB 12.3 (L) 03/11/2019    HGB 11.4 (L) 03/06/2019    HCT 34.3 (L) 03/18/2019    HCT 36.7 (L) 03/11/2019    HCT 33.6 (L) 03/05/2019    PLT 76 (L) 03/18/2019    PLT 99 (L) 03/11/2019    PLT 79 (L) 03/06/2019     03/18/2019     03/11/2019     03/08/2019    POTASSIUM 3.9 03/18/2019    POTASSIUM 4.2 03/11/2019    POTASSIUM 4.2 03/08/2019    CHLORIDE 106 03/18/2019    CHLORIDE 107 03/11/2019    CHLORIDE 102 03/08/2019    CO2 27 03/18/2019    CO2 25 03/11/2019    CO2 24 03/08/2019    BUN 18 03/18/2019     BUN 14 03/11/2019    BUN 14 03/08/2019    CR 0.79 03/18/2019    CR 0.77 03/14/2019    CR 0.70 03/11/2019     (H) 03/18/2019     (H) 03/11/2019    GLC 93 03/08/2019    SED 9 02/28/2019    SED 3 05/30/2012    SED 7 01/03/2012    TROPI  06/26/2015     <0.015  The 99th percentile for upper reference range is 0.045 ug/L.  Troponin values in   the range of 0.045 - 0.120 ug/L may be associated with risks of adverse   clinical events.      TROPI  06/26/2015     Unsatisfactory specimen - hemolyzed   Specimen markedly hemolyzed, potassium may be falsely elevated  NOTIFIED RUPAL BRADSHAW ER [id number] AT 1925       TROPI  06/26/2015     Unsatisfactory specimen - hemolyzed  MEHREEN GOLDSMITH ON ER ON 06/26/15 BY BG       (H) 03/18/2019    AST 96 (H) 03/14/2019     (H) 03/11/2019     (H) 03/18/2019    ALT 95 (H) 03/14/2019    ALT 93 (H) 03/11/2019    ALKPHOS 105 03/18/2019    ALKPHOS 108 03/14/2019    ALKPHOS 104 03/11/2019    BILITOTAL 0.5 03/18/2019    BILITOTAL 0.8 03/14/2019    BILITOTAL 0.6 03/11/2019    MORENITA 29 10/22/2017    INR 1.09 02/09/2019    INR 1.17 (H) 02/08/2019    INR 1.14 01/15/2019        Results for orders placed or performed during the hospital encounter of 03/04/19   POC US Guidance Needle Placement    Impression    Adductor canal block              Procedures:   No procedures performed during this admission         Pending Results:   Unresulted Labs Ordered in the Past 30 Days of this Admission     Date and Time Order Name Status Description    2/8/2019 0716 Platelets prepare order unit In process                 Consultations:   PT/OT  Pharmacy to dose Vancomycin         Rehab Course:     Infection of previous R TKA s/p I&D and liner exchange (3/4/2019). Cultures grew staph epidermidis and E coli.  Evaluated by ID on 3/5 who recommended IV antibiotics (IV Vanco and IV Ertapenem) x 6 weeks. Ertapenem changed to Ceftriaxone prior to discharge according to culture  "sensitivities.  Recieved [facility] training for home abx prior to discharge  - Antibiotics: IV Vancomycin and IV Ceftrixone x 6 for a total of 6 weeks. F/u with ID in clinic scheduled for next week (3/27)  - Dilaudid 4-6 mg PO q4H prn x 6 days written to bridge pain management until patient can f/u w/ orthopedics in clinic next week (3/25)  - DVT ppx: PTA already on Dr. Joe's post-op AC plan of  mg daily, continued on discharge      Liver cirrhosis 2/2 untx hep C and etoh abuse. Follows with hepatology as OP but has not been seen in over a year.  Most recent LFTs on 3/18 with stable elevation: , , T bili and alk phos remain WNL. No current concerns.   - Follow up as OP with hepatology   - Continued PTA thiamine and MVI      HTN  Severe aortic stenosis s/p TAVR (2/21/18). PTA on lisinopril 20 mg daily and  mg daily. BPs at goal and pt without any cardiovascular complaints.   - Continued PTA lisinopril and ASA    Chronic thrombocytopenia. Likely 2/2 liver disease. Baseline appears to be 60-70s, stable without s/s of bleeding.     Acute on chronic normocytic anemia. Likely multifactorial including recent surgery, infection, alcohol use.  BL Hgb appears to be ~11-12, most recently 11.7 on 3/18.   - Continued PTA ferrous sulfate      Noninfectious diarrhea: Noted in hospital. Likely 2/2 antibiotics as patient afebrile and without WBC elevation. Started on probiotic with improvement.   - Continued Lactobacillus, prescription written to continue while on antibiotics     Allergic rhinitis. Continue PTA Flonase nasal spray             Physical Exam:   Blood pressure 137/83, pulse 77, temperature 95.3  F (35.2  C), temperature source Oral, resp. rate 16, height 1.753 m (5' 9\"), weight 76.4 kg (168 lb 6.4 oz), SpO2 95 %.    Constitutional: healthy, alert, no distress and cooperative  HEENT: head normocephalic, atraumatic, PERRL non icteric sclera   Cardiovascular: negative, PMI normal. No lifts, heaves, " or thrills. RRR. No murmurs, clicks gallops or rub  Respiratory: Lungs CTAB, now wheezing or crackles  Psychiatric: mentation appears normal and affect normal/bright  Abdomen: Abdomen soft, non-tender. BS normal.   SKIN: no suspicious lesions or rashes  JOINT/EXTREMITIES: extremities normal- no gross deformities noted and normal muscle tone, no peripheral edema, RLE in brace, PICC RUE w/o evidence of infection         Discharge Medications:     Current Discharge Medication List      START taking these medications    Details   cefTRIAXone (ROCEPHIN) 1 GM vial Inject 1 g into the vein every 24 hours for 24 days Through 4/12 or as otherwise directed by ID  Qty: 240 mL, Refills: 0    Associated Diagnoses: Infection of total knee replacement, subsequent encounter         CONTINUE these medications which have CHANGED    Details   aspirin (ASA) 325 MG EC tablet Take 1 tablet (325 mg) by mouth daily for 6 days  Qty: 6 tablet, Refills: 0    Associated Diagnoses: S/P total knee arthroplasty, right      HYDROmorphone (DILAUDID) 4 MG tablet Take 1-1.5 tablets (4-6 mg) by mouth every 4 hours as needed for moderate to severe pain  Qty: 54 tablet, Refills: 0    Associated Diagnoses: S/P total knee arthroplasty, right      lactobacillus rhamnosus, GG, (CULTURELL) capsule Take 1 capsule by mouth 2 times daily  Qty: 68 capsule, Refills: 0    Associated Diagnoses: Infection of total knee replacement, subsequent encounter      sodium chloride 0.9 % SOLN 250 mL with vancomycin 100 mg/mL SOLR 1,750 mg Inject 1,750 mg into the vein every 12 hours  Refills: 0         CONTINUE these medications which have NOT CHANGED    Details   acetaminophen (TYLENOL) 325 MG tablet Take 2 tablets (650 mg) by mouth every 6 hours as needed for mild pain  Qty: 100 tablet, Refills: 0    Associated Diagnoses: Infection of prosthetic knee joint, initial encounter (H)      ferrous sulfate (FEROSUL) 325 (65 Fe) MG tablet Take 1 tablet (325 mg) by mouth At  Bedtime  Qty: 90 tablet, Refills: 2    Associated Diagnoses: Anemia, unspecified type      fluticasone (FLONASE) 50 MCG/ACT nasal spray Spray 2 sprays into both nostrils daily  Qty: 3 Bottle, Refills: 3    Comments: 1 btl=16 gm  Associated Diagnoses: Cough; Post-nasal drip      lisinopril (PRINIVIL/ZESTRIL) 20 MG tablet Take 1 tablet (20 mg) by mouth At Bedtime  Qty: 90 tablet, Refills: 3    Associated Diagnoses: Essential hypertension      loratadine (CLARITIN) 10 MG tablet Take 1 tablet (10 mg) by mouth daily  Qty: 90 tablet, Refills: 2    Associated Diagnoses: Post-nasal drip; Cough      Multiple Vitamin (MULTIVITAMINS PO) Take 1 tablet by mouth At Bedtime       order for DME Equipment being ordered: Cane ()  Treatment Diagnosis: Gait Instability  Qty: 1 each, Refills: 0    Associated Diagnoses: S/P total knee arthroplasty, right      pantoprazole (PROTONIX) 40 MG EC tablet Take 1 tablet (40 mg) by mouth 2 times daily (before meals)  Qty: 180 tablet, Refills: 2    Associated Diagnoses: Erosive gastropathy      Thiamine HCl (VITAMIN B1) 50 MG TABS Take 1 tablet (50 mg) by mouth daily  Qty: 30 tablet, Refills: 0    Associated Diagnoses: Infection of prosthetic knee joint, initial encounter (H)         STOP taking these medications       docusate sodium (COLACE) 100 MG capsule Comments:   Reason for Stopping:         ertapenem (INVANZ) 1 GM vial Comments:   Reason for Stopping:         hydrOXYzine (VISTARIL) 25 MG capsule Comments:   Reason for Stopping:                    Discharge Disposition:   Discharged to home      Code status on discharge: Full Code          Discharge Instructions:     Discharge Procedure Orders   Home infusion referral   Number of Visits Requested: 1        Patient seen and examined on 3/18/2019 in anticipation of discharge on 3/19/19. Time spent with patient and in coordination of discharge plan was >30 min. Discharge summary forwarded to PCP, Cuca Celeste  LIZA  Hospitalist Service  Pager: [phone]

## 2019-03-18 NOTE — PLAN OF CARE
Patient is alert and oriented x 4. Patient complained of R knee ain. Medicated with dilaudid and effective. Patient stated that when he was in the BR, he noticed a  small spot in his face like a pinpoint in size. He was washing his face and it started bleeding. Pressure was applied by patient for about 30 minutes, bleeding didn't stop. Pressure dressing applied. Patient stated he will let me know if the blood will soaked through the dressing. Will continue to monitor patient's status.

## 2019-03-18 NOTE — PROGRESS NOTES
Chelsey TSE. Pressure dressing for another 20 minutes and applied ice pack per order. Patient dressing to L cheek bone checked @ 0550, no bleeding noted. New dressing applied. Will continue with current plan of care.

## 2019-03-18 NOTE — CONSULTS
03/18/19 1119 Poly Granados, RN       3/18/19 Patient alone to PLC PICC/IV med appointment.Patient has done PICC/IV medications before at home.Patient(pt)correctly returned all PICC/IV med skills using [facility] supplies on PLC model,able to answer teach back,and verbalized understanding of content presented.Home care to follow.Please continue to  reinforce information.Also see education flow sheet.All written material given and reviewed today.PICC troubleshooting also covered.

## 2019-03-18 NOTE — PLAN OF CARE
Patient A&Ox4. VSS. Good appetite. Finished 100% of supper. LBM today. Independent with toiletting and cares in room. Patient received ice packs and PRN dilaudid 6mg x2 with moderate relief from c/o pain to R knee. PRN hydroxyzine given at HS per request for itching. Staples intact on incision on R knee. Site mildly swollen and warm with no drainage; covered with ABD pad and secured by tubi  and brace. PICC to RUE patent; dressing CDI. IV abx infused without difficulties. Patient was pleasant and cooperative with cares. Able to make needs known. Continue with current POC.

## 2019-03-18 NOTE — PROGRESS NOTES
Met with patient to update the discharge planning discussion. He will discharge tomorrow 3/19/2019 after 11am if he remains medically stable, friend or neighbor to transport. He went to [facility] today for IV teaching review. He has been on home infusion in the past. Patient states he might want to go to [facility] for labs and line care after initial home RN visit. [facility] has referral for home IV antibiotics, updated [facility] RN Liaison on patient status and discharge plan.

## 2019-03-18 NOTE — PLAN OF CARE
RN: Using prn pain meds every 4 hours and effective control RLE knee pain. Pt independent dressing change Right knee incision.  ABd pads without tape and using tubigrip sleeve loosely to hold in place and brace over. PICC line in place and pt states will be going home with IV abx, no discharge orders at this time.

## 2019-03-19 ENCOUNTER — HOME INFUSION (PRE-WILLOW HOME INFUSION) (OUTPATIENT)
Dept: PHARMACY | Facility: CLINIC | Age: 59
End: 2019-03-19

## 2019-03-19 PROCEDURE — 25000132 ZZH RX MED GY IP 250 OP 250 PS 637: Performed by: PHYSICIAN ASSISTANT

## 2019-03-19 PROCEDURE — 25800030 ZZH RX IP 258 OP 636: Performed by: INTERNAL MEDICINE

## 2019-03-19 PROCEDURE — 25000132 ZZH RX MED GY IP 250 OP 250 PS 637: Performed by: NURSE PRACTITIONER

## 2019-03-19 PROCEDURE — 25000128 H RX IP 250 OP 636: Performed by: PHYSICIAN ASSISTANT

## 2019-03-19 PROCEDURE — 25000128 H RX IP 250 OP 636: Performed by: INTERNAL MEDICINE

## 2019-03-19 RX ADMIN — CEFTRIAXONE SODIUM 1 G: 1 INJECTION, POWDER, FOR SOLUTION INTRAMUSCULAR; INTRAVENOUS at 09:42

## 2019-03-19 RX ADMIN — HYDROXYZINE HYDROCHLORIDE 25 MG: 25 TABLET ORAL at 11:03

## 2019-03-19 RX ADMIN — HYDROMORPHONE HYDROCHLORIDE 6 MG: 2 TABLET ORAL at 02:59

## 2019-03-19 RX ADMIN — VANCOMYCIN HYDROCHLORIDE 1750 MG: 10 INJECTION, POWDER, LYOPHILIZED, FOR SOLUTION INTRAVENOUS at 08:27

## 2019-03-19 RX ADMIN — HYDROMORPHONE HYDROCHLORIDE 6 MG: 2 TABLET ORAL at 07:05

## 2019-03-19 RX ADMIN — Medication 1 CAPSULE: at 09:44

## 2019-03-19 RX ADMIN — HYDROMORPHONE HYDROCHLORIDE 6 MG: 2 TABLET ORAL at 11:03

## 2019-03-19 RX ADMIN — FLUTICASONE PROPIONATE 2 SPRAY: 50 SPRAY, METERED NASAL at 09:43

## 2019-03-19 RX ADMIN — ASPIRIN 325 MG: 325 TABLET, DELAYED RELEASE ORAL at 09:44

## 2019-03-19 RX ADMIN — THIAMINE HCL (VITAMIN B1) 50 MG TABLET 50 MG: at 09:44

## 2019-03-19 NOTE — PLAN OF CARE
Pt alert and oriented x4. VSS. Pt complained of pain in right knee. PRN dilauded given x2 during shift. Pt stated relief from pain after medication given. Pt is independent in room. Last BM 3/18 per patient. Incision site on Right knee looks good. No drainage noted. Staples intact. Pt is expected to discharge around 1100 on 3/19. Will continue with POC.

## 2019-03-19 NOTE — PROGRESS NOTES
[facility]  NURSE LIAISON NOTE  Met with pt at bedside. Pt is expected to DC today. Educated on [facility] role in Pt DC to home. He  States he is comfortable doing home infusion and is able to infusion independently. Pt has done several antibiotic infusions in the past. He agrees to contact [facility] with any further questions or concerns  HP  Mock Teach, syringe-air removal by pt. He went through all steps of HP ABX administration, flushing and proper sequence of ABX step for administration. He has good understanding, and agrees to contact [facility] for any questions, clarification or further education needs.    IVP  He  went through all steps of IVP ABX administration, flushing and proper sequence of IVP ABX step for administration. He has good understanding, and agrees to contact [facility] for any questions, clarification or further education needs.    DELIVERY MODE:  VANCO q12 hrs, dosing 8a/8P                                ROCEPHIN q24 hrs  Dosing 10am  NEXT DOSE DUE: 8pm tonight-Vanco, Rocephin Weds 10am  PICC: SL valved   CLC DUE: Sunday 3.24  EXTENSION:  Added and flushed with 10ml NS  FLUSHING:  SAS  SNV: Not required today for education today.  DELIVERY: today 4-6 PM    Karina Arambula, [facility]-Nurse Liaison  [email or web address]  My Cell:  [phone]  M-F  [facility] OFFICE  24/7hrs  [phone]

## 2019-03-19 NOTE — PLAN OF CARE
"RN: Using prn pain meds every 4 hours for right knee pain and effective.  Home infusion here and meeting with pt. Discharge instructions reviewed with pt and pt in agreement and signed.  Pt to discharge with PICC line, has been flushed and dressing changed Sunday per unit policy. Pt will deliver home IV abx himself and has been educated and feels confident \"I have done this before\".   Right knee staples intact, abd covering and tubigrip and brace intact. CSM BLE WDL. Belongings, watch, electronics, wallet and money with pt. Discharge medications given to pt. Pt to follow up with appointments following discharge.   "

## 2019-03-19 NOTE — PLAN OF CARE
Patient A&Ox4. VSS. Good appetite. Finished 100% of supper. LBM today. Independent with toiletting and cares in room. PRN dilaudid 6mg and hydroxyzine given x2 with moderate relief from c/o pain to R knee. Staples intact on incision on R knee. PICC to RUE patent; dressing CDI. IV abx infused at 2000 without difficulties. Small, red spot on L cheek started bleeding again around 2240. Pressure and ice applied. No further bleeding noted. Patient was encouraged to ask questions regarding discharge and stated he was confident and ready. Continue with current POC.

## 2019-03-20 ENCOUNTER — HOME INFUSION (PRE-WILLOW HOME INFUSION) (OUTPATIENT)
Dept: PHARMACY | Facility: CLINIC | Age: 59
End: 2019-03-20

## 2019-03-20 NOTE — UTILIZATION REVIEW
Pain Interview    1. Have you had pain or hurting at any time in the last 5 days?  Yes  2. How much of the time have you experienced pain or hurting over the last 5 days? Frequently  3. Over the past 5 days, has pain made it hard for you to sleep at night?  Yes  4. Over the past 5 days, have you limited your day-to-day activities because of pain?Yes  5. Rate pain intensity:  Verbal: Moderate

## 2019-03-20 NOTE — PROGRESS NOTES
This is a recent snapshot of the patient's [facility] medical record.  For current drug dose and complete information and questions, call [phone] or In [facility] pool, fv home infusion ([id number])  CSN Number:  [id number]

## 2019-03-21 ENCOUNTER — HOME INFUSION (PRE-WILLOW HOME INFUSION) (OUTPATIENT)
Dept: PHARMACY | Facility: CLINIC | Age: 59
End: 2019-03-21

## 2019-03-21 ENCOUNTER — TELEPHONE (OUTPATIENT)
Dept: INFECTIOUS DISEASES | Facility: CLINIC | Age: 59
End: 2019-03-21

## 2019-03-21 DIAGNOSIS — Z96.659 INFECTION OF PROSTHETIC KNEE JOINT, SEQUELA: Primary | ICD-10-CM

## 2019-03-21 DIAGNOSIS — T84.59XS INFECTION OF PROSTHETIC KNEE JOINT, SEQUELA: Primary | ICD-10-CM

## 2019-03-21 DIAGNOSIS — Z96.651 S/P TOTAL KNEE ARTHROPLASTY, RIGHT: Primary | ICD-10-CM

## 2019-03-21 LAB
AST SERPL W P-5'-P-CCNC: 112 U/L (ref 0–45)
BASOPHILS # BLD AUTO: 0 10E9/L (ref 0–0.2)
BASOPHILS NFR BLD AUTO: 0.4 %
BUN SERPL-MCNC: 17 MG/DL (ref 7–30)
CREAT SERPL-MCNC: 0.79 MG/DL (ref 0.66–1.25)
CRP SERPL-MCNC: <2.9 MG/L (ref 0–8)
DIFFERENTIAL METHOD BLD: ABNORMAL
EOSINOPHIL # BLD AUTO: 0.2 10E9/L (ref 0–0.7)
EOSINOPHIL NFR BLD AUTO: 2.6 %
ERYTHROCYTE [DISTWIDTH] IN BLOOD BY AUTOMATED COUNT: 13.5 % (ref 10–15)
ERYTHROCYTE [SEDIMENTATION RATE] IN BLOOD BY WESTERGREN METHOD: 11 MM/H (ref 0–20)
GFR SERPL CREATININE-BSD FRML MDRD: >90 ML/MIN/{1.73_M2}
HCT VFR BLD AUTO: 36.8 % (ref 40–53)
HGB BLD-MCNC: 12.5 G/DL (ref 13.3–17.7)
IMM GRANULOCYTES # BLD: 0 10E9/L (ref 0–0.4)
IMM GRANULOCYTES NFR BLD: 0 %
LYMPHOCYTES # BLD AUTO: 2.4 10E9/L (ref 0.8–5.3)
LYMPHOCYTES NFR BLD AUTO: 32.3 %
MCH RBC QN AUTO: 32.4 PG (ref 26.5–33)
MCHC RBC AUTO-ENTMCNC: 34 G/DL (ref 31.5–36.5)
MCV RBC AUTO: 95 FL (ref 78–100)
MONOCYTES # BLD AUTO: 1.2 10E9/L (ref 0–1.3)
MONOCYTES NFR BLD AUTO: 16.2 %
NEUTROPHILS # BLD AUTO: 3.6 10E9/L (ref 1.6–8.3)
NEUTROPHILS NFR BLD AUTO: 48.5 %
NRBC # BLD AUTO: 0 10*3/UL
NRBC BLD AUTO-RTO: 0 /100
PLATELET # BLD AUTO: 79 10E9/L (ref 150–450)
RBC # BLD AUTO: 3.86 10E12/L (ref 4.4–5.9)
VANCOMYCIN SERPL-MCNC: 15.3 MG/L
WBC # BLD AUTO: 7.3 10E9/L (ref 4–11)

## 2019-03-21 PROCEDURE — 84520 ASSAY OF UREA NITROGEN: CPT | Performed by: INTERNAL MEDICINE

## 2019-03-21 PROCEDURE — 85652 RBC SED RATE AUTOMATED: CPT | Performed by: INTERNAL MEDICINE

## 2019-03-21 PROCEDURE — 86140 C-REACTIVE PROTEIN: CPT | Performed by: INTERNAL MEDICINE

## 2019-03-21 PROCEDURE — 84450 TRANSFERASE (AST) (SGOT): CPT | Performed by: INTERNAL MEDICINE

## 2019-03-21 PROCEDURE — 85025 COMPLETE CBC W/AUTO DIFF WBC: CPT | Performed by: INTERNAL MEDICINE

## 2019-03-21 PROCEDURE — 82565 ASSAY OF CREATININE: CPT | Performed by: INTERNAL MEDICINE

## 2019-03-21 PROCEDURE — 80202 ASSAY OF VANCOMYCIN: CPT | Performed by: INTERNAL MEDICINE

## 2019-03-21 NOTE — TELEPHONE ENCOUNTER
[facility] Call Center    Phone Message    May a detailed message be left on voicemail: yes    Reason for Call: Other: Kirstin from  home infusions spoke with Dr. Hare on 20MAR19 about getting the PT set up with infusions. Kirstin is just following up to see if there is any progress. Please reach out to her at [phone].     Action Taken: Message routed to:  [facility] ([facility]): ID

## 2019-03-21 NOTE — TELEPHONE ENCOUNTER
Per Dr Hare via staff note pt needs to have weekly labs drawn while on IV Antibiotics CMP, CBC with diff/plat, ESR, CRP and vanco trough. Pt requesting to come into [facility] building to 2nd floor lab to have labs drawn once per week and to have PICC dressing changed once per week. Pt given phone number to 2nd floor lab [phone]. Standing ab orders put into pt's chart for this.  Vy Baker RN

## 2019-03-21 NOTE — PROGRESS NOTES
This is a recent snapshot of the patient's [facility] medical record.  For current drug dose and complete information and questions, call [phone] or In Basket pool, fv home infusion ([id number])  CSN Number:  [id number]

## 2019-03-22 NOTE — PROGRESS NOTES
This is a recent snapshot of the patient's [facility] medical record.  For current drug dose and complete information and questions, call [phone] or In Basket pool, fv home infusion ([id number])  CSN Number:  [id number]

## 2019-03-25 ENCOUNTER — OFFICE VISIT (OUTPATIENT)
Dept: ORTHOPEDICS | Facility: CLINIC | Age: 59
End: 2019-03-25
Payer: COMMERCIAL

## 2019-03-25 ENCOUNTER — ANCILLARY PROCEDURE (OUTPATIENT)
Dept: GENERAL RADIOLOGY | Facility: CLINIC | Age: 59
End: 2019-03-25
Attending: ORTHOPAEDIC SURGERY
Payer: COMMERCIAL

## 2019-03-25 DIAGNOSIS — Z96.659 INFECTION OF PROSTHETIC KNEE JOINT, INITIAL ENCOUNTER (H): Primary | ICD-10-CM

## 2019-03-25 DIAGNOSIS — T84.59XA INFECTION OF PROSTHETIC KNEE JOINT, INITIAL ENCOUNTER (H): Primary | ICD-10-CM

## 2019-03-25 DIAGNOSIS — Z96.651 S/P TOTAL KNEE ARTHROPLASTY, RIGHT: ICD-10-CM

## 2019-03-25 RX ORDER — HYDROMORPHONE HYDROCHLORIDE 2 MG/1
2-4 TABLET ORAL EVERY 4 HOURS PRN
Qty: 40 TABLET | Refills: 0 | Status: ON HOLD | OUTPATIENT
Start: 2019-03-25 | End: 2019-04-11

## 2019-03-25 ASSESSMENT — ENCOUNTER SYMPTOMS
SNORES LOUDLY: 1
NECK MASS: 0
DEPRESSION: 0
LOSS OF CONSCIOUSNESS: 0
POSTURAL DYSPNEA: 0
INSOMNIA: 1
JOINT SWELLING: 1
SINUS CONGESTION: 1
DIZZINESS: 0
DECREASED CONCENTRATION: 0
HEMOPTYSIS: 0
SYNCOPE: 0
TASTE DISTURBANCE: 1
VOMITING: 1
EYE IRRITATION: 0
DIARRHEA: 1
BLOATING: 0
SHORTNESS OF BREATH: 1
POOR WOUND HEALING: 0
HYPOTENSION: 0
NUMBNESS: 1
TROUBLE SWALLOWING: 0
HYPERTENSION: 1
NIGHT SWEATS: 1
FATIGUE: 1
SINUS PAIN: 1
MEMORY LOSS: 0
POLYDIPSIA: 0
BRUISES/BLEEDS EASILY: 1
SPUTUM PRODUCTION: 1
DYSPNEA ON EXERTION: 0
COUGH: 1
PARALYSIS: 0
MUSCLE WEAKNESS: 1
MYALGIAS: 1
EYE REDNESS: 0
ALTERED TEMPERATURE REGULATION: 1
DOUBLE VISION: 0
HEADACHES: 1
JAUNDICE: 0
ORTHOPNEA: 0
POLYPHAGIA: 0
RECTAL PAIN: 0
DYSURIA: 0
ARTHRALGIAS: 1
BOWEL INCONTINENCE: 0
SMELL DISTURBANCE: 1
EYE PAIN: 0
TINGLING: 1
TREMORS: 1
WEIGHT LOSS: 1
MUSCLE CRAMPS: 1
DIFFICULTY URINATING: 0
HEMATURIA: 0
INCREASED ENERGY: 1
BACK PAIN: 1
NECK PAIN: 1
HEARTBURN: 0
FLANK PAIN: 0
EYE WATERING: 0
HOARSE VOICE: 0
STIFFNESS: 1
EXERCISE INTOLERANCE: 1
HALLUCINATIONS: 0
SPEECH CHANGE: 0
PANIC: 1
LEG PAIN: 1
FEVER: 1
PALPITATIONS: 0
LIGHT-HEADEDNESS: 1
SORE THROAT: 0
DISTURBANCES IN COORDINATION: 0
NAIL CHANGES: 0
SLEEP DISTURBANCES DUE TO BREATHING: 1
CHILLS: 1
WEAKNESS: 1
COUGH DISTURBING SLEEP: 0
WHEEZING: 0
CONSTIPATION: 0
SKIN CHANGES: 0
NAUSEA: 1
WEIGHT GAIN: 0
BLOOD IN STOOL: 0
SEIZURES: 0
ABDOMINAL PAIN: 0
SWOLLEN GLANDS: 0
DECREASED APPETITE: 1
NERVOUS/ANXIOUS: 1

## 2019-03-25 NOTE — LETTER
3/25/2019       RE: Phyllis Johnson  [address]  [place] [facility] [address]     Dear Colleague,    Thank you for referring your patient, Phyllis Johnson, to the [facility]. Please see a copy of my visit note below.    Service Date: 2019      Phyllis is now 3-weeks status post open debridement of right total knee arthroplasty with polyethylene exchange for a postoperative infection after a significant fall.  He has been on intravenous antibiotics and has left the hospital now and home getting infusions.  He reports less and less discomfort.      PHYSICAL EXAMINATION:  Examination reveals that he is alert and oriented x3.  His affect is normal.  His wound is benign without signs of infection.  There is mild to moderate swelling about the leg, but nothing of significance.  AP, lateral x-ray of the knee and full length x-ray shows ideal position of the arthroplasty components.      ASSESSMENT:  Well controlled acute right knee infection following total knee arthroplasty.      TREATMENT:  Continue intravenous antibiotics and recheck in 3 weeks.  We will remove half the staples today.  The remaining half the staples can be removed next week.      KARLEY JOHNSON MD       D: 2019   T: 2019   MT: MELISSA      Name:     PHYLLIS JOHNSON   MRN:      [id number]        Account:      [id number]   :      1960           Service Date: 2019      Document: [id number]

## 2019-03-26 NOTE — PROGRESS NOTES
Service Date: 2019      Phyllis is now 3-weeks status post open debridement of right total knee arthroplasty with polyethylene exchange for a postoperative infection after a significant fall.  He has been on intravenous antibiotics and has left the hospital now and home getting infusions.  He reports less and less discomfort.      PHYSICAL EXAMINATION:  Examination reveals that he is alert and oriented x3.  His affect is normal.  His wound is benign without signs of infection.  There is mild to moderate swelling about the leg, but nothing of significance.  AP, lateral x-ray of the knee and full length x-ray shows ideal position of the arthroplasty components.      ASSESSMENT:  Well controlled acute right knee infection following total knee arthroplasty.      TREATMENT:  Continue intravenous antibiotics and recheck in 3 weeks.  We will remove half the staples today.  The remaining half the staples can be removed next week.         KARLEY JOHNSON MD             D: 2019   T: 2019   MT: MELISSA      Name:     PHYLLIS CORREA   MRN:      [id number]        Account:      [id number]   :      1960           Service Date: 2019      Document: [id number]

## 2019-03-27 ENCOUNTER — OFFICE VISIT (OUTPATIENT)
Dept: CT IMAGING | Facility: CLINIC | Age: 59
End: 2019-03-27
Attending: INTERNAL MEDICINE
Payer: COMMERCIAL

## 2019-03-27 ENCOUNTER — HOME INFUSION (PRE-WILLOW HOME INFUSION) (OUTPATIENT)
Dept: PHARMACY | Facility: CLINIC | Age: 59
End: 2019-03-27

## 2019-03-27 VITALS
DIASTOLIC BLOOD PRESSURE: 90 MMHG | HEART RATE: 71 BPM | OXYGEN SATURATION: 99 % | TEMPERATURE: 98 F | SYSTOLIC BLOOD PRESSURE: 151 MMHG

## 2019-03-27 DIAGNOSIS — E51.9 THIAMINE DEFICIENCY: ICD-10-CM

## 2019-03-27 DIAGNOSIS — T84.53XD INFECTION ASSOCIATED WITH INTERNAL RIGHT KNEE PROSTHESIS, SUBSEQUENT ENCOUNTER: Primary | ICD-10-CM

## 2019-03-27 LAB
BACTERIA SPEC CULT: ABNORMAL
SPECIMEN SOURCE: ABNORMAL

## 2019-03-27 PROCEDURE — G0463 HOSPITAL OUTPT CLINIC VISIT: HCPCS | Mod: ZF

## 2019-03-27 RX ORDER — SULFAMETHOXAZOLE/TRIMETHOPRIM 800-160 MG
1 TABLET ORAL 2 TIMES DAILY
Qty: 60 TABLET | Refills: 3 | Status: SHIPPED | OUTPATIENT
Start: 2019-03-27 | End: 2019-06-05

## 2019-03-27 RX ORDER — THIAMINE HCL 50 MG
50 TABLET ORAL DAILY
Qty: 30 TABLET | Refills: 3 | Status: SHIPPED | OUTPATIENT
Start: 2019-03-27 | End: 2019-05-23

## 2019-03-27 RX ORDER — ASPIRIN 81 MG/1
TABLET, COATED ORAL
Status: ON HOLD | COMMUNITY
Start: 2018-11-05 | End: 2019-04-11

## 2019-03-27 ASSESSMENT — PAIN SCALES - GENERAL: PAINLEVEL: EXTREME PAIN (8)

## 2019-03-27 NOTE — NURSING NOTE
Visit Reason: Post hospital    Evaluation / Assessment: Patient states pain level 8/10 in lower ight buttocks, right upper leg and groin region.    Labs: NA    Procedure ordered? NA    Dressing Change: NA    Vaccine ordered / administered: NA    Other: NA

## 2019-03-27 NOTE — PATIENT INSTRUCTIONS
It was nice to see you today!    Here is what we discussed:    Your IV antibiotics will stop on 4/15. On 4/16 you should start bactrim, 1 tab by mouth twice daily.  You should follow-up with me in 3 months (sooner if necessary)

## 2019-03-28 LAB
AST SERPL W P-5'-P-CCNC: 110 U/L (ref 0–45)
BASOPHILS # BLD AUTO: 0 10E9/L (ref 0–0.2)
BASOPHILS NFR BLD AUTO: 0.5 %
BUN SERPL-MCNC: 14 MG/DL (ref 7–30)
CREAT SERPL-MCNC: 1.01 MG/DL (ref 0.66–1.25)
CRP SERPL-MCNC: <2.9 MG/L (ref 0–8)
DIFFERENTIAL METHOD BLD: ABNORMAL
EOSINOPHIL # BLD AUTO: 0.3 10E9/L (ref 0–0.7)
EOSINOPHIL NFR BLD AUTO: 3.7 %
ERYTHROCYTE [DISTWIDTH] IN BLOOD BY AUTOMATED COUNT: 13.9 % (ref 10–15)
ERYTHROCYTE [SEDIMENTATION RATE] IN BLOOD BY WESTERGREN METHOD: 9 MM/H (ref 0–20)
GFR SERPL CREATININE-BSD FRML MDRD: 80 ML/MIN/{1.73_M2}
HCT VFR BLD AUTO: 35.7 % (ref 40–53)
HGB BLD-MCNC: 12.3 G/DL (ref 13.3–17.7)
IMM GRANULOCYTES # BLD: 0 10E9/L (ref 0–0.4)
IMM GRANULOCYTES NFR BLD: 0.1 %
LYMPHOCYTES # BLD AUTO: 2.8 10E9/L (ref 0.8–5.3)
LYMPHOCYTES NFR BLD AUTO: 34.2 %
MCH RBC QN AUTO: 32.5 PG (ref 26.5–33)
MCHC RBC AUTO-ENTMCNC: 34.5 G/DL (ref 31.5–36.5)
MCV RBC AUTO: 94 FL (ref 78–100)
MONOCYTES # BLD AUTO: 1.2 10E9/L (ref 0–1.3)
MONOCYTES NFR BLD AUTO: 14.4 %
NEUTROPHILS # BLD AUTO: 3.8 10E9/L (ref 1.6–8.3)
NEUTROPHILS NFR BLD AUTO: 47.1 %
NRBC # BLD AUTO: 0 10*3/UL
NRBC BLD AUTO-RTO: 0 /100
PLATELET # BLD AUTO: 79 10E9/L (ref 150–450)
RBC # BLD AUTO: 3.78 10E12/L (ref 4.4–5.9)
VANCOMYCIN SERPL-MCNC: 19.8 MG/L
WBC # BLD AUTO: 8.1 10E9/L (ref 4–11)

## 2019-03-28 PROCEDURE — 80202 ASSAY OF VANCOMYCIN: CPT | Performed by: INTERNAL MEDICINE

## 2019-03-28 PROCEDURE — 86140 C-REACTIVE PROTEIN: CPT | Performed by: INTERNAL MEDICINE

## 2019-03-28 PROCEDURE — 85652 RBC SED RATE AUTOMATED: CPT | Performed by: INTERNAL MEDICINE

## 2019-03-28 PROCEDURE — 84450 TRANSFERASE (AST) (SGOT): CPT | Performed by: INTERNAL MEDICINE

## 2019-03-28 PROCEDURE — 84520 ASSAY OF UREA NITROGEN: CPT | Performed by: INTERNAL MEDICINE

## 2019-03-28 PROCEDURE — 82565 ASSAY OF CREATININE: CPT | Performed by: INTERNAL MEDICINE

## 2019-03-28 PROCEDURE — 85025 COMPLETE CBC W/AUTO DIFF WBC: CPT | Performed by: INTERNAL MEDICINE

## 2019-03-28 NOTE — PROGRESS NOTES
This is a recent snapshot of the patient's [facility] medical record.  For current drug dose and complete information and questions, call [phone] or In [facility] pool, fv home infusion ([id number])  CSN Number:  [id number]

## 2019-03-29 ENCOUNTER — HOME INFUSION (PRE-WILLOW HOME INFUSION) (OUTPATIENT)
Dept: PHARMACY | Facility: CLINIC | Age: 59
End: 2019-03-29

## 2019-04-01 ENCOUNTER — TELEPHONE (OUTPATIENT)
Dept: ORTHOPEDICS | Facility: CLINIC | Age: 59
End: 2019-04-01

## 2019-04-01 ENCOUNTER — APPOINTMENT (OUTPATIENT)
Dept: LAB | Facility: CLINIC | Age: 59
End: 2019-04-01
Attending: INTERNAL MEDICINE
Payer: COMMERCIAL

## 2019-04-01 NOTE — TELEPHONE ENCOUNTER
"This encounter has been worked by Dc FAGAN ATC.     \"Called patient and Left VM inquiring about setting up a visit with ifeanyi today or Thursday when she is in clinic. If he calls back please schedule him today before 3:00 pm or any open slot on thursdays clinic \"..      No further action needed at this time.  "

## 2019-04-01 NOTE — TELEPHONE ENCOUNTER
Called patient and Left VM inquiring about setting up a visit with ifeanyi today or Thursday when she is in clinic. If he calls back please schedule him today before 3:00 pm or any open slot on thursdays clinic

## 2019-04-01 NOTE — PROGRESS NOTES
This is a recent snapshot of the patient's [facility] medical record.  For current drug dose and complete information and questions, call [phone] or In Basket pool, fv home infusion ([id number])  CSN Number:  [id number]

## 2019-04-01 NOTE — TELEPHONE ENCOUNTER
[facility] Call Center    Phone Message    May a detailed message be left on voicemail: yes    Reason for Call: Other: Pt requests call back to go over meds and also to see when he should have stitches removed - Pt of Dr Joe - please return his call - Thanks     Action Taken: Message routed to:  [facility] ([facility]): Orthopaedic Clinic

## 2019-04-02 ENCOUNTER — MYC MEDICAL ADVICE (OUTPATIENT)
Dept: ORTHOPEDICS | Facility: CLINIC | Age: 59
End: 2019-04-02

## 2019-04-03 ENCOUNTER — DOCUMENTATION ONLY (OUTPATIENT)
Dept: CARE COORDINATION | Facility: CLINIC | Age: 59
End: 2019-04-03

## 2019-04-04 ENCOUNTER — MYC MEDICAL ADVICE (OUTPATIENT)
Dept: ORTHOPEDICS | Facility: CLINIC | Age: 59
End: 2019-04-04

## 2019-04-04 ENCOUNTER — HOME INFUSION (PRE-WILLOW HOME INFUSION) (OUTPATIENT)
Dept: PHARMACY | Facility: CLINIC | Age: 59
End: 2019-04-04

## 2019-04-05 ENCOUNTER — HOME INFUSION (PRE-WILLOW HOME INFUSION) (OUTPATIENT)
Dept: PHARMACY | Facility: CLINIC | Age: 59
End: 2019-04-05

## 2019-04-05 NOTE — PROGRESS NOTES
This is a recent snapshot of the patient's [facility] medical record.  For current drug dose and complete information and questions, call [phone] or In Basket pool, fv home infusion ([id number])  CSN Number:  [id number]

## 2019-04-08 ENCOUNTER — TELEPHONE (OUTPATIENT)
Dept: INFECTIOUS DISEASES | Facility: CLINIC | Age: 59
End: 2019-04-08

## 2019-04-08 ENCOUNTER — HOSPITAL ENCOUNTER (EMERGENCY)
Facility: CLINIC | Age: 59
End: 2019-04-08
Payer: COMMERCIAL

## 2019-04-08 ENCOUNTER — HOME INFUSION (PRE-WILLOW HOME INFUSION) (OUTPATIENT)
Dept: PHARMACY | Facility: CLINIC | Age: 59
End: 2019-04-08

## 2019-04-08 DIAGNOSIS — Z96.651 STATUS POST TOTAL RIGHT KNEE REPLACEMENT: Primary | ICD-10-CM

## 2019-04-08 LAB
AST SERPL W P-5'-P-CCNC: 79 U/L (ref 0–45)
BASOPHILS # BLD AUTO: 0 10E9/L (ref 0–0.2)
BASOPHILS NFR BLD AUTO: 0.4 %
BUN SERPL-MCNC: 40 MG/DL (ref 7–30)
CREAT SERPL-MCNC: NORMAL MG/DL (ref 0.66–1.25)
CRP SERPL-MCNC: <2.9 MG/L (ref 0–8)
DIFFERENTIAL METHOD BLD: ABNORMAL
EOSINOPHIL # BLD AUTO: 0.2 10E9/L (ref 0–0.7)
EOSINOPHIL NFR BLD AUTO: 2.1 %
ERYTHROCYTE [DISTWIDTH] IN BLOOD BY AUTOMATED COUNT: 13.5 % (ref 10–15)
ERYTHROCYTE [SEDIMENTATION RATE] IN BLOOD BY WESTERGREN METHOD: 10 MM/H (ref 0–20)
GFR SERPL CREATININE-BSD FRML MDRD: NORMAL ML/MIN/{1.73_M2}
HCT VFR BLD AUTO: 39.4 % (ref 40–53)
HGB BLD-MCNC: 13.8 G/DL (ref 13.3–17.7)
IMM GRANULOCYTES # BLD: 0 10E9/L (ref 0–0.4)
IMM GRANULOCYTES NFR BLD: 0.1 %
LYMPHOCYTES # BLD AUTO: 1.7 10E9/L (ref 0.8–5.3)
LYMPHOCYTES NFR BLD AUTO: 22.1 %
MCH RBC QN AUTO: 31.7 PG (ref 26.5–33)
MCHC RBC AUTO-ENTMCNC: 35 G/DL (ref 31.5–36.5)
MCV RBC AUTO: 91 FL (ref 78–100)
MONOCYTES # BLD AUTO: 1.4 10E9/L (ref 0–1.3)
MONOCYTES NFR BLD AUTO: 17.9 %
NEUTROPHILS # BLD AUTO: 4.4 10E9/L (ref 1.6–8.3)
NEUTROPHILS NFR BLD AUTO: 57.4 %
NRBC # BLD AUTO: 0 10*3/UL
NRBC BLD AUTO-RTO: 0 /100
PLATELET # BLD AUTO: 60 10E9/L (ref 150–450)
RBC # BLD AUTO: 4.35 10E12/L (ref 4.4–5.9)
VANCOMYCIN SERPL-MCNC: 60.5 MG/L
WBC # BLD AUTO: 7.7 10E9/L (ref 4–11)

## 2019-04-08 PROCEDURE — 86140 C-REACTIVE PROTEIN: CPT | Performed by: INTERNAL MEDICINE

## 2019-04-08 PROCEDURE — 84520 ASSAY OF UREA NITROGEN: CPT | Performed by: INTERNAL MEDICINE

## 2019-04-08 PROCEDURE — 85652 RBC SED RATE AUTOMATED: CPT | Performed by: INTERNAL MEDICINE

## 2019-04-08 PROCEDURE — 80048 BASIC METABOLIC PNL TOTAL CA: CPT | Performed by: INTERNAL MEDICINE

## 2019-04-08 PROCEDURE — 80202 ASSAY OF VANCOMYCIN: CPT | Performed by: INTERNAL MEDICINE

## 2019-04-08 PROCEDURE — 85025 COMPLETE CBC W/AUTO DIFF WBC: CPT | Performed by: INTERNAL MEDICINE

## 2019-04-08 PROCEDURE — 84450 TRANSFERASE (AST) (SGOT): CPT | Performed by: INTERNAL MEDICINE

## 2019-04-08 RX ORDER — HYDROCODONE BITARTRATE AND ACETAMINOPHEN 5; 325 MG/1; MG/1
1 TABLET ORAL EVERY 6 HOURS PRN
Qty: 40 TABLET | Refills: 0 | Status: ON HOLD | OUTPATIENT
Start: 2019-04-08 | End: 2019-04-11

## 2019-04-08 NOTE — TELEPHONE ENCOUNTER
"I spoke with Ten about his elevated vanco level. He reports he felt poorly last week when he reduced his opiates - by report he had been taking as much as 6mg dilaudid q3-4 hrs, and is now taking 5mg (of dialudid? Or oxycodone?) per day. Denies diarrhea, denies vomiting. States he has actually felt improved over the last day.    I counseled him that his labs show kidney failure. I directed him to the ED. He said \"I'm not sure I can do that today.\" He cited transportation issues and items that he had \"put off from last week when I wasn't feeling good.\" I reiterated the significance of acute kidney failure and the risk for suddetn cardiac death. He stated he will \"do what he can.\"    I relayed to [facility] [place]. Also requested full BMP. As above, he should be directed to the ED for evaluation of acute kidney failure. I anticipate STOPPING vanco, potentially truncating his course, though at this rate he may still have detectable levels until his anticipated stop date of 4/15.    Johanna Hare MD   of Medicine, Division of Infectious Diseases  pgr [phone]    "

## 2019-04-08 NOTE — TELEPHONE ENCOUNTER
[facility] pharmacist called w/ critical Vanco and creat values.  States they already told pt to hold vanco and they will redraw labs tomorrow.  They would like Dr Hare to call and discuss a plan with them.  Call place to Ananya who will call pharmacist.

## 2019-04-08 NOTE — PROGRESS NOTES
This is a recent snapshot of the patient's [facility] medical record.  For current drug dose and complete information and questions, call [phone] or In Basket pool, fv home infusion ([id number])  CSN Number:  [id number]

## 2019-04-09 ENCOUNTER — HOSPITAL ENCOUNTER (INPATIENT)
Facility: CLINIC | Age: 59
LOS: 2 days | Discharge: HOME HEALTH | DRG: 683 | End: 2019-04-11
Attending: EMERGENCY MEDICINE | Admitting: INTERNAL MEDICINE
Payer: COMMERCIAL

## 2019-04-09 ENCOUNTER — MYC MEDICAL ADVICE (OUTPATIENT)
Dept: INFECTIOUS DISEASES | Facility: CLINIC | Age: 59
End: 2019-04-09

## 2019-04-09 ENCOUNTER — APPOINTMENT (OUTPATIENT)
Dept: GENERAL RADIOLOGY | Facility: CLINIC | Age: 59
DRG: 683 | End: 2019-04-09
Payer: COMMERCIAL

## 2019-04-09 ENCOUNTER — APPOINTMENT (OUTPATIENT)
Dept: ULTRASOUND IMAGING | Facility: CLINIC | Age: 59
DRG: 683 | End: 2019-04-09
Attending: EMERGENCY MEDICINE
Payer: COMMERCIAL

## 2019-04-09 ENCOUNTER — DOCUMENTATION ONLY (OUTPATIENT)
Dept: INFECTIOUS DISEASES | Facility: CLINIC | Age: 59
End: 2019-04-09

## 2019-04-09 ENCOUNTER — HOME INFUSION (PRE-WILLOW HOME INFUSION) (OUTPATIENT)
Dept: PHARMACY | Facility: CLINIC | Age: 59
End: 2019-04-09

## 2019-04-09 DIAGNOSIS — Z96.659 INFECTION OF TOTAL KNEE REPLACEMENT, SUBSEQUENT ENCOUNTER: ICD-10-CM

## 2019-04-09 DIAGNOSIS — Z79.899 POLYPHARMACY: ICD-10-CM

## 2019-04-09 DIAGNOSIS — T84.59XD INFECTION OF TOTAL KNEE REPLACEMENT, SUBSEQUENT ENCOUNTER: ICD-10-CM

## 2019-04-09 DIAGNOSIS — Z96.659 INFECTION OF PROSTHETIC KNEE JOINT, INITIAL ENCOUNTER (H): ICD-10-CM

## 2019-04-09 DIAGNOSIS — N17.9 ACUTE KIDNEY INJURY (H): ICD-10-CM

## 2019-04-09 DIAGNOSIS — T84.59XA INFECTION OF PROSTHETIC KNEE JOINT, INITIAL ENCOUNTER (H): ICD-10-CM

## 2019-04-09 LAB
ALBUMIN SERPL-MCNC: 3.3 G/DL (ref 3.4–5)
ALBUMIN UR-MCNC: NEGATIVE MG/DL
ALP SERPL-CCNC: 115 U/L (ref 40–150)
ALT SERPL W P-5'-P-CCNC: 90 U/L (ref 0–70)
ANION GAP SERPL CALCULATED.3IONS-SCNC: 10 MMOL/L (ref 3–14)
ANION GAP SERPL CALCULATED.3IONS-SCNC: 8 MMOL/L (ref 3–14)
APPEARANCE UR: CLEAR
AST SERPL W P-5'-P-CCNC: 100 U/L (ref 0–45)
BASOPHILS # BLD AUTO: 0.1 10E9/L (ref 0–0.2)
BASOPHILS NFR BLD AUTO: 0.8 %
BILIRUB SERPL-MCNC: 0.6 MG/DL (ref 0.2–1.3)
BILIRUB UR QL STRIP: NEGATIVE
BUN SERPL-MCNC: 37 MG/DL (ref 7–30)
BUN SERPL-MCNC: 41 MG/DL (ref 7–30)
CALCIUM SERPL-MCNC: 8.4 MG/DL (ref 8.5–10.1)
CALCIUM SERPL-MCNC: 8.6 MG/DL (ref 8.5–10.1)
CHLORIDE SERPL-SCNC: 109 MMOL/L (ref 94–109)
CHLORIDE SERPL-SCNC: 111 MMOL/L (ref 94–109)
CO2 SERPL-SCNC: 22 MMOL/L (ref 20–32)
CO2 SERPL-SCNC: 23 MMOL/L (ref 20–32)
COLOR UR AUTO: ABNORMAL
CREAT BLD-MCNC: 4 MG/DL (ref 0.66–1.25)
CREAT SERPL-MCNC: 3.63 MG/DL (ref 0.66–1.25)
CREAT SERPL-MCNC: 3.64 MG/DL (ref 0.66–1.25)
CREAT SERPL-MCNC: 3.7 MG/DL (ref 0.66–1.25)
DIFFERENTIAL METHOD BLD: ABNORMAL
EOSINOPHIL # BLD AUTO: 0.1 10E9/L (ref 0–0.7)
EOSINOPHIL NFR BLD AUTO: 0.9 %
ERYTHROCYTE [DISTWIDTH] IN BLOOD BY AUTOMATED COUNT: 14.1 % (ref 10–15)
GFR SERPL CREATININE-BSD FRML MDRD: 15 ML/MIN/{1.73_M2}
GFR SERPL CREATININE-BSD FRML MDRD: 17 ML/MIN/{1.73_M2}
GLUCOSE SERPL-MCNC: 84 MG/DL (ref 70–99)
GLUCOSE SERPL-MCNC: 87 MG/DL (ref 70–99)
GLUCOSE UR STRIP-MCNC: NEGATIVE MG/DL
HCT VFR BLD AUTO: 45.2 % (ref 40–53)
HGB BLD-MCNC: 14.8 G/DL (ref 13.3–17.7)
HGB UR QL STRIP: NEGATIVE
IMM GRANULOCYTES # BLD: 0 10E9/L (ref 0–0.4)
IMM GRANULOCYTES NFR BLD: 0.3 %
KETONES UR STRIP-MCNC: NEGATIVE MG/DL
LEUKOCYTE ESTERASE UR QL STRIP: NEGATIVE
LYMPHOCYTES # BLD AUTO: 1.8 10E9/L (ref 0.8–5.3)
LYMPHOCYTES NFR BLD AUTO: 18 %
MCH RBC QN AUTO: 31.8 PG (ref 26.5–33)
MCHC RBC AUTO-ENTMCNC: 32.7 G/DL (ref 31.5–36.5)
MCV RBC AUTO: 97 FL (ref 78–100)
MONOCYTES # BLD AUTO: 1.8 10E9/L (ref 0–1.3)
MONOCYTES NFR BLD AUTO: 18.8 %
MUCOUS THREADS #/AREA URNS LPF: PRESENT /LPF
NEUTROPHILS # BLD AUTO: 6 10E9/L (ref 1.6–8.3)
NEUTROPHILS NFR BLD AUTO: 61.2 %
NITRATE UR QL: NEGATIVE
NRBC # BLD AUTO: 0 10*3/UL
NRBC BLD AUTO-RTO: 0 /100
PH UR STRIP: 5.5 PH (ref 5–7)
PLATELET # BLD AUTO: 60 10E9/L (ref 150–450)
POTASSIUM SERPL-SCNC: 4.3 MMOL/L (ref 3.4–5.3)
POTASSIUM SERPL-SCNC: 4.3 MMOL/L (ref 3.4–5.3)
PROT SERPL-MCNC: 7.5 G/DL (ref 6.8–8.8)
RBC # BLD AUTO: 4.66 10E12/L (ref 4.4–5.9)
RBC #/AREA URNS AUTO: 0 /HPF (ref 0–2)
SODIUM SERPL-SCNC: 140 MMOL/L (ref 133–144)
SODIUM SERPL-SCNC: 143 MMOL/L (ref 133–144)
SOURCE: ABNORMAL
SP GR UR STRIP: 1.01 (ref 1–1.03)
UROBILINOGEN UR STRIP-MCNC: NORMAL MG/DL (ref 0–2)
VANCOMYCIN SERPL-MCNC: 52.4 MG/L
WBC # BLD AUTO: 9.8 10E9/L (ref 4–11)
WBC #/AREA URNS AUTO: <1 /HPF (ref 0–5)

## 2019-04-09 PROCEDURE — 96360 HYDRATION IV INFUSION INIT: CPT | Performed by: EMERGENCY MEDICINE

## 2019-04-09 PROCEDURE — 96361 HYDRATE IV INFUSION ADD-ON: CPT | Performed by: EMERGENCY MEDICINE

## 2019-04-09 PROCEDURE — 25000132 ZZH RX MED GY IP 250 OP 250 PS 637: Performed by: EMERGENCY MEDICINE

## 2019-04-09 PROCEDURE — 82565 ASSAY OF CREATININE: CPT

## 2019-04-09 PROCEDURE — 80202 ASSAY OF VANCOMYCIN: CPT | Performed by: INTERNAL MEDICINE

## 2019-04-09 PROCEDURE — 99223 1ST HOSP IP/OBS HIGH 75: CPT | Mod: AI | Performed by: INTERNAL MEDICINE

## 2019-04-09 PROCEDURE — 99285 EMERGENCY DEPT VISIT HI MDM: CPT | Mod: 25 | Performed by: EMERGENCY MEDICINE

## 2019-04-09 PROCEDURE — 81001 URINALYSIS AUTO W/SCOPE: CPT | Performed by: EMERGENCY MEDICINE

## 2019-04-09 PROCEDURE — 93975 VASCULAR STUDY: CPT

## 2019-04-09 PROCEDURE — 12000001 ZZH R&B MED SURG/OB UMMC

## 2019-04-09 PROCEDURE — 85025 COMPLETE CBC W/AUTO DIFF WBC: CPT

## 2019-04-09 PROCEDURE — 82565 ASSAY OF CREATININE: CPT | Performed by: INTERNAL MEDICINE

## 2019-04-09 PROCEDURE — 80053 COMPREHEN METABOLIC PANEL: CPT

## 2019-04-09 PROCEDURE — 25000128 H RX IP 250 OP 636: Performed by: EMERGENCY MEDICINE

## 2019-04-09 PROCEDURE — 40000986 XR CHEST PORT 1 VW

## 2019-04-09 RX ORDER — FERROUS SULFATE 325(65) MG
325 TABLET ORAL AT BEDTIME
Status: DISCONTINUED | OUTPATIENT
Start: 2019-04-09 | End: 2019-04-11 | Stop reason: HOSPADM

## 2019-04-09 RX ORDER — NALOXONE HYDROCHLORIDE 0.4 MG/ML
.1-.4 INJECTION, SOLUTION INTRAMUSCULAR; INTRAVENOUS; SUBCUTANEOUS
Status: DISCONTINUED | OUTPATIENT
Start: 2019-04-09 | End: 2019-04-11 | Stop reason: HOSPADM

## 2019-04-09 RX ORDER — LACTOBACILLUS RHAMNOSUS GG 10B CELL
1 CAPSULE ORAL 2 TIMES DAILY
Status: DISCONTINUED | OUTPATIENT
Start: 2019-04-09 | End: 2019-04-11 | Stop reason: HOSPADM

## 2019-04-09 RX ORDER — MULTIPLE VITAMINS W/ MINERALS TAB 9MG-400MCG
1 TAB ORAL AT BEDTIME
Status: DISCONTINUED | OUTPATIENT
Start: 2019-04-09 | End: 2019-04-11 | Stop reason: HOSPADM

## 2019-04-09 RX ORDER — LORATADINE 10 MG/1
10 TABLET ORAL DAILY
Status: DISCONTINUED | OUTPATIENT
Start: 2019-04-10 | End: 2019-04-11 | Stop reason: HOSPADM

## 2019-04-09 RX ORDER — CEFTRIAXONE 1 G/1
1 INJECTION, POWDER, FOR SOLUTION INTRAMUSCULAR; INTRAVENOUS EVERY 24 HOURS
Status: DISCONTINUED | OUTPATIENT
Start: 2019-04-09 | End: 2019-04-11 | Stop reason: HOSPADM

## 2019-04-09 RX ORDER — ACETAMINOPHEN 325 MG/1
650 TABLET ORAL EVERY 6 HOURS PRN
Status: DISCONTINUED | OUTPATIENT
Start: 2019-04-09 | End: 2019-04-11 | Stop reason: HOSPADM

## 2019-04-09 RX ORDER — HYDROMORPHONE HYDROCHLORIDE 2 MG/1
4 TABLET ORAL ONCE
Status: COMPLETED | OUTPATIENT
Start: 2019-04-09 | End: 2019-04-09

## 2019-04-09 RX ORDER — FLUTICASONE PROPIONATE 50 MCG
2 SPRAY, SUSPENSION (ML) NASAL DAILY
Status: DISCONTINUED | OUTPATIENT
Start: 2019-04-10 | End: 2019-04-11 | Stop reason: HOSPADM

## 2019-04-09 RX ADMIN — SODIUM CHLORIDE 1000 ML: 900 INJECTION, SOLUTION INTRAVENOUS at 16:39

## 2019-04-09 RX ADMIN — HYDROMORPHONE HYDROCHLORIDE 4 MG: 2 TABLET ORAL at 21:24

## 2019-04-09 ASSESSMENT — ENCOUNTER SYMPTOMS
FREQUENCY: 0
NAUSEA: 0
DIFFICULTY URINATING: 0
FEVER: 0
VOMITING: 0
APPETITE CHANGE: 0
HEMATURIA: 0
DIARRHEA: 0
BACK PAIN: 0

## 2019-04-09 ASSESSMENT — MIFFLIN-ST. JEOR: SCORE: 1561.52

## 2019-04-09 NOTE — LETTER
Transition Communication Hand-off for Care Transitions to Next Level of Care Provider    Name: Ten Johnson  : 1960  MRN #: [id number]  Primary Care Provider: Cuca Celeste     Primary Clinic: [address] [facility] [address]  [facility] [address]     Reason for Hospitalization:  Acute kidney injury (H) [N17.9]  Admit Date/Time: 2019  8:16 PM  Discharge Date: 2019  Payor Source: Payor: [facility] / Plan: [facility] COMPREHENSIVE CARE SERV/BLUE LINK / Product Type: PPO /     Readmission Assessment Measure (ANA) Risk Score/category: 11/Medium         Reason for Communication Hand-off Referral: Multiple providers/specialties    Discharge Plan:  Follow up with primary care provider, Cuca Celeste, within 7 days for hospital follow- up. The following labs/tests are recommended: BMP.  Please see attached AVS.      Follow up with orthopedic surgery in 1-2 weeks.      Concern for non-adherence with plan of care:   No  Follow-up specialty is recommended: Yes    Follow-up plan:  No future appointments.    Any outstanding tests or procedures:        Referrals     Future Labs/Procedures    Home infusion referral     Comments:    Your provider has referred you to: [facility]: [facility] [place] [phone]   [email or web address]    Local Address (if different from home address): N/A    Anticipated Length of Therapy: As ordered by provider.     Home Infusion Pharmacist to adjust therapy based on labs and clinical assessments: Yes    Labs:  May draw labs from Venous Catheter: Yes  Home Infusion Pharmacist to order labs based on therapy type and clinical assessments: Yes  Call/Fax Lab Results to: Dr Amanda [phone]        [facility]: [phone]    Agency Staff to assess nursing needs for Infusion Therapy.    Access Device Management:  IV Access Type: PICC  Flush with Heparin and Normal Saline IVP PRN and routine site care (per agency protocol) to maintain  access device? Yes            Cee Hodge, RNCC, BSN    [facility]    Medicine Group  [address] [place] [address]    [email or web address]  [email or web address]    Office: [phone] Pager: [phone]  To contact weekend RNCC, dial [phone] and enter pager number [phone] at prompt. This pager can not be contacted by text page or outside line.          AVS/Discharge Summary is the source of truth; this is a helpful guide for improved communication of patient story

## 2019-04-09 NOTE — ED TRIAGE NOTES
Pt presents ambulatory to triage with concern for JENNIFER. Cr elevated per home health nurse. Cr 4.0 in triage, NS bolus started.

## 2019-04-09 NOTE — PROGRESS NOTES
"Ten is being treated for r TKA c/b fall with dehiscence requiring repair, isolation of Ox-R CONS and Ecoli. He is on vanco and ceftriaxone. Cr continues to be elevated. I believe that HIs true Cr value from yesterday was 3.67, and NOT 2.51, which is the posted \"corrected\" value. I have requested that lab re-run this specimen.    Regardless, he has persistent (or worsening) JENNIFER with poor vanco clearance. Etiology unclear, but per my previously documentation may be related to volume depletion in the context of GI symptoms that the patient attributed to possible opiate withdrawal (my paraphrase). Yesterday he refused to go the to ED. Today he is amenable to going to the ED.    I would favor stopping vanco at this point, continueing ceftriaxone. His anticipated stop date of abx was 4/15, and it is likely that he will have detectable vanco until that date. Continue ceftriaxone until 4/15.    I alerted ED on the [place] of his impending arrival.    Johanna Hare MD   of Medicine, Division of Infectious Diseases  [facility] [phone]        "

## 2019-04-09 NOTE — PROGRESS NOTES
This is a recent snapshot of the patient's [facility] medical record.  For current drug dose and complete information and questions, call [phone] or In Basket pool, fv home infusion ([id number])  CSN Number:  [id number]

## 2019-04-10 ENCOUNTER — HOME INFUSION (PRE-WILLOW HOME INFUSION) (OUTPATIENT)
Dept: PHARMACY | Facility: CLINIC | Age: 59
End: 2019-04-10

## 2019-04-10 PROBLEM — K74.60 CIRRHOSIS (H): Status: ACTIVE | Noted: 2017-01-01

## 2019-04-10 LAB
ALBUMIN SERPL-MCNC: 2.8 G/DL (ref 3.4–5)
ALP SERPL-CCNC: 98 U/L (ref 40–150)
ALT SERPL W P-5'-P-CCNC: 72 U/L (ref 0–70)
ANION GAP SERPL CALCULATED.3IONS-SCNC: 12 MMOL/L (ref 3–14)
AST SERPL W P-5'-P-CCNC: 78 U/L (ref 0–45)
BILIRUB SERPL-MCNC: 0.5 MG/DL (ref 0.2–1.3)
BUN SERPL-MCNC: 39 MG/DL (ref 7–30)
CALCIUM SERPL-MCNC: 8.2 MG/DL (ref 8.5–10.1)
CHLORIDE SERPL-SCNC: 108 MMOL/L (ref 94–109)
CO2 SERPL-SCNC: 22 MMOL/L (ref 20–32)
CREAT SERPL-MCNC: 3.6 MG/DL (ref 0.66–1.25)
ERYTHROCYTE [DISTWIDTH] IN BLOOD BY AUTOMATED COUNT: 13.9 % (ref 10–15)
GFR SERPL CREATININE-BSD FRML MDRD: 17 ML/MIN/{1.73_M2}
GLUCOSE SERPL-MCNC: 88 MG/DL (ref 70–99)
HCT VFR BLD AUTO: 38.3 % (ref 40–53)
HGB BLD-MCNC: 12.9 G/DL (ref 13.3–17.7)
INR PPP: 1.25 (ref 0.86–1.14)
MCH RBC QN AUTO: 32.7 PG (ref 26.5–33)
MCHC RBC AUTO-ENTMCNC: 33.7 G/DL (ref 31.5–36.5)
MCV RBC AUTO: 97 FL (ref 78–100)
PLATELET # BLD AUTO: 51 10E9/L (ref 150–450)
POTASSIUM SERPL-SCNC: 4.3 MMOL/L (ref 3.4–5.3)
PROT SERPL-MCNC: 6.6 G/DL (ref 6.8–8.8)
RBC # BLD AUTO: 3.95 10E12/L (ref 4.4–5.9)
SODIUM SERPL-SCNC: 141 MMOL/L (ref 133–144)
WBC # BLD AUTO: 9.7 10E9/L (ref 4–11)

## 2019-04-10 PROCEDURE — 25000128 H RX IP 250 OP 636: Performed by: STUDENT IN AN ORGANIZED HEALTH CARE EDUCATION/TRAINING PROGRAM

## 2019-04-10 PROCEDURE — 12000001 ZZH R&B MED SURG/OB UMMC

## 2019-04-10 PROCEDURE — 25000132 ZZH RX MED GY IP 250 OP 250 PS 637: Performed by: STUDENT IN AN ORGANIZED HEALTH CARE EDUCATION/TRAINING PROGRAM

## 2019-04-10 PROCEDURE — 85027 COMPLETE CBC AUTOMATED: CPT | Performed by: STUDENT IN AN ORGANIZED HEALTH CARE EDUCATION/TRAINING PROGRAM

## 2019-04-10 PROCEDURE — 85610 PROTHROMBIN TIME: CPT | Performed by: STUDENT IN AN ORGANIZED HEALTH CARE EDUCATION/TRAINING PROGRAM

## 2019-04-10 PROCEDURE — 80053 COMPREHEN METABOLIC PANEL: CPT | Performed by: STUDENT IN AN ORGANIZED HEALTH CARE EDUCATION/TRAINING PROGRAM

## 2019-04-10 PROCEDURE — 36592 COLLECT BLOOD FROM PICC: CPT | Performed by: STUDENT IN AN ORGANIZED HEALTH CARE EDUCATION/TRAINING PROGRAM

## 2019-04-10 PROCEDURE — 99233 SBSQ HOSP IP/OBS HIGH 50: CPT | Mod: GC | Performed by: INTERNAL MEDICINE

## 2019-04-10 RX ORDER — HYDROMORPHONE HYDROCHLORIDE 2 MG/1
2 TABLET ORAL EVERY 4 HOURS PRN
Status: DISCONTINUED | OUTPATIENT
Start: 2019-04-10 | End: 2019-04-11 | Stop reason: HOSPADM

## 2019-04-10 RX ADMIN — LORATADINE 10 MG: 10 TABLET ORAL at 08:09

## 2019-04-10 RX ADMIN — FERROUS SULFATE TAB 325 MG (65 MG ELEMENTAL FE) 325 MG: 325 (65 FE) TAB at 21:59

## 2019-04-10 RX ADMIN — Medication 1 CAPSULE: at 20:14

## 2019-04-10 RX ADMIN — MULTIPLE VITAMINS W/ MINERALS TAB 1 TABLET: TAB at 00:18

## 2019-04-10 RX ADMIN — HYDROMORPHONE HYDROCHLORIDE 2 MG: 2 TABLET ORAL at 06:40

## 2019-04-10 RX ADMIN — MULTIPLE VITAMINS W/ MINERALS TAB 1 TABLET: TAB at 21:59

## 2019-04-10 RX ADMIN — Medication 1 CAPSULE: at 08:09

## 2019-04-10 RX ADMIN — FERROUS SULFATE TAB 325 MG (65 MG ELEMENTAL FE) 325 MG: 325 (65 FE) TAB at 00:18

## 2019-04-10 RX ADMIN — HYDROMORPHONE HYDROCHLORIDE 2 MG: 2 TABLET ORAL at 20:14

## 2019-04-10 RX ADMIN — Medication 1 CAPSULE: at 00:18

## 2019-04-10 RX ADMIN — HYDROMORPHONE HYDROCHLORIDE 2 MG: 2 TABLET ORAL at 16:17

## 2019-04-10 RX ADMIN — HYDROMORPHONE HYDROCHLORIDE 2 MG: 2 TABLET ORAL at 01:56

## 2019-04-10 RX ADMIN — CEFTRIAXONE SODIUM 1 G: 1 INJECTION, POWDER, FOR SOLUTION INTRAMUSCULAR; INTRAVENOUS at 01:03

## 2019-04-10 RX ADMIN — THIAMINE HCL (VITAMIN B1) 50 MG TABLET 50 MG: at 08:09

## 2019-04-10 RX ADMIN — FLUTICASONE PROPIONATE 2 SPRAY: 50 SPRAY, METERED NASAL at 08:09

## 2019-04-10 RX ADMIN — HYDROMORPHONE HYDROCHLORIDE 2 MG: 2 TABLET ORAL at 12:04

## 2019-04-10 ASSESSMENT — ACTIVITIES OF DAILY LIVING (ADL)
ADLS_ACUITY_SCORE: 14
ADLS_ACUITY_SCORE: 10
ADLS_ACUITY_SCORE: 14

## 2019-04-10 NOTE — PLAN OF CARE
A&Ox4.  Calls appropriately.  Up ad tierra.  C/o pain in R knee, staples intact & no drainage, redness & bruising around incision.  PRN PO dilaudid given w/ relief.  R single lumen PICC verified, ready to use.  First dose daily IV Rocephin given.  Tolerating regular diet, ate 50% of dinner.  VSS on RA, high BP's upon arrival to floor now resolved.  Generalized redness on face.  Last vanco level 52.4 & Cr. 4.0. Voiding without difficulty, pt reminded to save urine to record.  LBM 4/9 per pt report. Plan for Ortho Surgery consult this AM.  Continue to monitor and follow POC.

## 2019-04-10 NOTE — CONSULTS
"Nephrology Initial Consult  April 10, 2019      Ten Johnson MRN:[id number] YOB: 1960  Date of Admission:4/9/2019  Primary care provider: Cuca Celeste  Requesting physician: Dhara Valera MD    ASSESSMENT AND RECOMMENDATIONS:   JENNIFER-Baseline normal renal function with Cr of 0.9, up to 4.0 on check by home health nurse in setting of receiving Vancomycin for TKA with infection.  Holding vanco, course was almost complete (to go through 4/15), also was on lisinopril which is appropriately being held.  Cr stable at 3.6 this am after being as high as 4.0, no symptoms or electrolyte issues. Anticipate recovery and no long term effects but will follow closely in the early course.     -No indication for HD, agree with holding lisinopril and vanco.      Electrolytes-K 4.3, bicarb 22, no issues.      Volume-Still with \"good\" UOP per his report, wt is nearly identical to ~1 month ago on discharge, no volume overload on exam.      TKA-Knee now without signs of infection.  Will be transitioned to bactrim which will increase Cr without decrease in GFR, anticipate small Cr rise.     Seen and discussed with Dr Golden    Recommendations were communicated to primary team via note      Tom Maravilla  Clinical Nurse Specialist  [phone]    REASON FOR CONSULT: Requested to evaluate 58 yom for management of JENNIFER post TKA.      HISTORY OF PRESENT ILLNESS:  Ten Johnson is a 58 year old male with hx of ETOH liver issues (stable, also chronic Hep C), aortic stenosis s/p TAVR, Sarahi-Hoffman tear and OA s/p TKA with complication of infection and fall post-op.  Was getting home vanco when level and Cr were checked and were ~60 and 4.0 respectively.  Lisinopril and Vanco appropriately held, Cr stable at 3.8 this am, Nephrology consulted for management of JENNIFER and possible intervention should he need HD.      PAST MEDICAL HISTORY:  Reviewed with patient on 04/10/2019, no changes to PMH.   Past Medical History: "   Diagnosis Date     Alcohol use disorder      Alcoholic cirrhosis (H)      Anticoagulant long-term use     plavix     Ascites      Chronic allergic rhinitis      Chronic anemia      Chronic hepatitis C without hepatic coma (H) 05/10/2016    Untreated as of 2/2018     Cirrhosis (H) 2017    MRI finding     Diastolic dysfunction      Erosive gastropathy      Esophageal varices in alcoholic cirrhosis (H)      H/O upper gastrointestinal hemorrhage 09/2017     History of blood transfusion      History of drug abuse     intranasal     Hypertension     essential     JANELLE (iron deficiency anemia)      Sarahi-Hoffman tear     History     Marijuana abuse      MRSA (methicillin resistant Staphylococcus aureus)      Olecranon bursitis      Portal hypertension (H)      Right shoulder pain     history of rotator cuff repair     S/p TAVR (transcatheter aortic valve replacement), bioprosthetic      Severe aortic stenosis      Thrombocytopenia (H)        Past Surgical History:   Procedure Laterality Date     ARTHROSCOPY SHOULDER ROTATOR CUFF REPAIR  7/31/2012    Procedure: ARTHROSCOPY SHOULDER ROTATOR CUFF REPAIR;  Right Shoulder Arthroscopic Rotator Cuff Repair, BicepsTenodesis,  Subacromial Decompression ;  Surgeon: Joi Castillo MD;  [facility]: [facility]     ESOPHAGOSCOPY, GASTROSCOPY, DUODENOSCOPY (EGD), COMBINED N/A 10/23/2017    Procedure: COMBINED ESOPHAGOSCOPY, GASTROSCOPY, DUODENOSCOPY (EGD);;  Surgeon: Gentry Salas MD;  [facility]     EXCHANGE POLY COMPONENT ARTHROPLASTY KNEE Right 3/4/2019    Procedure: REVISION RIGHT TOTAL KNEE POLY COMPONENT EXCHANGE;  Surgeon: Olvin Joe MD;  Location: [facility] OR     FACIAL RECONSTRUCTION SURGERY  1971     HEART CATH FEMORAL CANNULIZATION WITH OPEN STANDBY REPAIR AORTIC VALVE N/A 2/21/2018    Procedure: HEART CATH FEMORAL CANNULIZATION WITH OPEN STANDBY REPAIR AORTIC VALVE;;  Surgeon: Luis Baird MD;  [facility]     IRRIGATION AND DEBRIDEMENT  UPPER EXTREMITY, COMBINED  1/3/2012    Procedure:COMBINED IRRIGATION AND DEBRIDEMENT UPPER EXTREMITY; Irrigation & Debridement Left Elbow; Surgeon:CRISTHIAN ZHOU; Location:[facility] OR     OPTICAL TRACKING SYSTEM ARTHROPLASTY KNEE Right 2/7/2019    Procedure: ARTHROPLASTY KNEE RIGHT;  Surgeon: Olvin Joe MD;  [facility]: [facility]     REPAIR TENDON TRICEPS UPPER EXTREMITY  11/8/2011    Procedure:REPAIR TENDON TRICEPS UPPER EXTREMITY; Surgeon:CRISTHIAN ZHOU; Location:[facility] OR     SHOULDER SURGERY  2003    left, injury, torn tendons, hematoma     TRANSCATHETER AORTIC VALVE IMPLANT ANESTHESIA N/A 2/21/2018    Procedure: TRANSCATHETER AORTIC VALVE IMPLANT ANESTHESIA;  Transfemoral (Quiroz) Aortic Valve Implant 26mm MARTHA 3, with Cardiopulmonary Bypass Standby, transthoracic echocardiogram;  Surgeon: GENERIC ANESTHESIA PROVIDER;  Location: [facility]     TRANSPOSITION ULNAR NERVE (ELBOW)  11/8/2011    Procedure:TRANSPOSITION ULNAR NERVE (ELBOW); Final Procedure Done: Left Elbow Lateral Ulnar Collateral Repair And  Left Elbow Triceps Repair          MEDICATIONS:  PTA Meds  Prior to Admission medications    Medication Sig Last Dose Taking? Auth Provider   acetaminophen (TYLENOL) 325 MG tablet Take 2 tablets (650 mg) by mouth every 6 hours as needed for mild pain  Patient not taking: Reported on 3/27/2019   Anni Bergeron APRN CNP   ASPIRIN LOW DOSE 81 MG EC tablet    Reported, Patient   cefTRIAXone (ROCEPHIN) 1 GM vial Inject 1 g into the vein every 24 hours for 24 days Through 4/12 or as otherwise directed by Mili Mantilla PA-C   diclofenac (VOLTAREN) 1 % topical gel as needed   Reported, Patient   ferrous sulfate (FEROSUL) 325 (65 Fe) MG tablet Take 1 tablet (325 mg) by mouth At Bedtime   Cuca Celeste MD   fluticasone (FLONASE) 50 MCG/ACT nasal spray Spray 2 sprays into both nostrils daily   Cuca Celeste MD   HYDROcodone-acetaminophen (NORCO) 5-325 MG tablet Take 1 tablet by mouth every 6  hours as needed for severe pain   Vidya Hastings, APRN CNP   lactobacillus rhamnosus, GG, (CULTURELL) capsule Take 1 capsule by mouth 2 times daily   Mili Saxena PA-C   lisinopril (PRINIVIL/ZESTRIL) 20 MG tablet Take 1 tablet (20 mg) by mouth At Bedtime   Ema Pierson NP   loratadine (CLARITIN) 10 MG tablet Take 1 tablet (10 mg) by mouth daily   Cuca Celeste MD   Multiple Vitamin (MULTIVITAMINS PO) Take 1 tablet by mouth At Bedtime    Reported, Patient   order for DME Equipment being ordered: Cane ()  Treatment Diagnosis: Gait Instability   Olvin Joe MD   pantoprazole (PROTONIX) 40 MG EC tablet Take 1 tablet (40 mg) by mouth 2 times daily (before meals)   Cuca Celeste MD   sodium chloride 0.9 % SOLN 250 mL with vancomycin 100 mg/mL SOLR 1,750 mg Inject 1,750 mg into the vein every 12 hours   Mili Saxena PA-C   sulfamethoxazole-trimethoprim (BACTRIM DS/SEPTRA DS) 800-160 MG tablet Take 1 tablet by mouth 2 times daily   Johanna Hare MD   vitamin B1 (THIAMINE) 50 MG tablet Take 1 tablet (50 mg) by mouth daily   Johanna Hare MD      Current Meds    cefTRIAXone  1 g Intravenous Q24H     ferrous sulfate  325 mg Oral At Bedtime     fluticasone  2 spray Both Nostrils Daily     lactobacillus rhamnosus (GG)  1 capsule Oral BID     loratadine  10 mg Oral Daily     multivitamin w/minerals  1 tablet Oral At Bedtime     vitamin B1  50 mg Oral Daily     Infusion Meds      ALLERGIES:    Allergies   Allergen Reactions     Zolpidem Other (See Comments)     Alcoholic.  Had reaction 3/17/13 while intoxicated which included black out, loss of awareness, paranoia.  Do not prescribe.  Dr. Celeste     Cats Other (See Comments)     rhinitis     Dogs Other (See Comments)     rhinitis     Pollen Extract Other (See Comments)     rhinits.       REVIEW OF SYSTEMS:  A 10 point review of systems was negative except as noted above.    SOCIAL HISTORY:   Social History     Socioeconomic  History     Marital status: Single     Spouse name: Not on file     Number of children: 0     Years of education: Not on file     Highest education level: Not on file   Occupational History     Occupation: unemployed   Social Needs     Financial resource strain: Not on file     Food insecurity:     Worry: Not on file     Inability: Not on file     Transportation needs:     Medical: Not on file     Non-medical: Not on file   Tobacco Use     Smoking status: Never Smoker     Smokeless tobacco: Never Used   Substance and Sexual Activity     Alcohol use: Yes     Comment: Alcohol use disorder, still actively drinking     Drug use: No     Comment: denies     Sexual activity: Not Currently     Partners: Female   Lifestyle     Physical activity:     Days per week: Not on file     Minutes per session: Not on file     Stress: Not on file   Relationships     Social connections:     Talks on phone: Not on file     Gets together: Not on file     Attends [employer] service: Not on file     Active member of club or organization: Not on file     Attends meetings of clubs or organizations: Not on file     Relationship status: Not on file     Intimate partner violence:     Fear of current or ex partner: Not on file     Emotionally abused: Not on file     Physically abused: Not on file     Forced sexual activity: Not on file   Other Topics Concern     Parent/sibling w/ CABG, MI or angioplasty before 65F 55M? Not Asked   Social History Narrative    .  Bicycles a lot.  Excessive alcohol use.  Smokes cigars.  Occasional marijuana use.     Reviewed with patient, no changes to social hx, ETOH use and occasional tobacco/cannabis.     FAMILY MEDICAL HISTORY:   Family History   Problem Relation Age of Onset     Cancer Mother 62     Alcoholism Paternal Uncle      No Known Problems Father      No Known Problems Brother      Diabetes Maternal Grandmother      Myocardial Infarction Maternal Grandfather      No Known Problems  "Paternal Grandmother      Unknown/Adopted Paternal Grandfather      Cirrhosis No family hx of      Reviewed with patient, no hx of renal failure in family.     PHYSICAL EXAM:   Temp  Av  F (36.1  C)  Min: 96.2  F (35.7  C)  Max: 97.6  F (36.4  C)      Pulse  Av  Min: 59  Max: 70 Resp  Av.5  Min: 14  Max: 18  SpO2  Av.8 %  Min: 97 %  Max: 100 %       /81   Pulse 70   Temp 96.8  F (36  C) (Oral)   Resp 14   Ht 1.753 m (5' 9\")   Wt 76.1 kg (167 lb 12.8 oz)   SpO2 97%   BMI 24.78 kg/m     Date 04/10/19 07 - 19 0659   Shift 9887-8270 9993-6616 0635-4801 24 Hour Total   INTAKE   P.O. 836 360  1196   Shift Total(mL/kg) 836(10.98) 360(4.73)  1196(15.71)   OUTPUT   Urine 800 175  975   Shift Total(mL/kg) 800(10.51) 175(2.3)  975(12.81)   Weight (kg) 76.11 76.11 76.11 76.11      Admit Weight: 76.1 kg (167 lb 12.8 oz)     GENERAL APPEARANCE: alert and no distress  EYES: No scleral icterus  NECK:  No JVD  Pulmonary: lungs clear to auscultation with equal breath sounds bilaterally, no clubbing or cyanosis  CV: Regular rhythm, normal rate, no rub   - JVP not elevated   - Edema none  GI: soft, nontender, normal bowel sounds  MS: no evidence of inflammation in joints, no muscle tenderness  : No Mosher  SKIN: no rash, warm, dry  NEURO: mentation intact and speech normal    LABS:   CMP  Recent Labs   Lab 04/10/19  0637 19  1628 19  1627 19  0815 19  0815     --  140  --  143   POTASSIUM 4.3  --  4.3  --  4.3   CHLORIDE 108  --  109  --  111*   CO2 22  --  23  --  22   ANIONGAP 12  --  8  --  10   GLC 88  --  84  --  87   BUN 39*  --  37*  --  41*  40*   CR 3.60*  --  3.64* 3.70* 3.63*  Canceled, Test credited   GFRESTIMATED 17* 15* 17* 17* 17*  Canceled, Test credited   GFRESTBLACK 20* 19* 20* 19* 19*  Canceled, Test credited   JOSEPHINE 8.2*  --  8.6  --  8.4*   PROTTOTAL 6.6*  --  7.5  --   --    ALBUMIN 2.8*  --  3.3*  --   --    BILITOTAL 0.5  --  0.6  --   --  "   ALKPHOS 98  --  115  --   --    AST 78*  --  100*  --  79*   ALT 72*  --  90*  --   --      CBC  Recent Labs   Lab 04/10/19  0637 04/09/19  1627 04/08/19  0815   HGB 12.9* 14.8 13.8   WBC 9.7 9.8 7.7   RBC 3.95* 4.66 4.35*   HCT 38.3* 45.2 39.4*   MCV 97 97 91   MCH 32.7 31.8 31.7   MCHC 33.7 32.7 35.0   RDW 13.9 14.1 13.5   PLT 51* 60* 60*     INR  Recent Labs   Lab 04/10/19  0637   INR 1.25*     ABGNo lab results found in last 7 days.   URINE STUDIES  Recent Labs   Lab Test 04/09/19  2108 02/07/19  0830 10/23/17  0512 08/25/14  1645   COLOR Light Yellow Yellow Yellow Light Yellow   APPEARANCE Clear Clear Clear Clear   URINEGLC Negative Negative Negative Negative   URINEBILI Negative Negative Negative Negative   URINEKETONE Negative Negative Negative Negative   SG 1.010 1.015 1.018 1.003   UBLD Negative Negative Negative Negative   URINEPH 5.5 6.5 6.5 5.0   PROTEIN Negative Negative Negative Negative   NITRITE Negative Negative Negative Negative   LEUKEST Negative Negative Negative Negative   RBCU 0 0 0  --    WBCU <1 <1 2  --      No lab results found.  PTH  No lab results found.  IRON STUDIES  Recent Labs   Lab Test 02/01/18  0755   CARIDAD 48       Attestation:  This patient has been seen, examined and evaluated by me, Gertrudis Golden as a shared visit with the NP/PA above.    In brief:  Ten Johnson is a 58 year old male S/P TKA who was admitted for a rising Cr.  Pt had been on IV vanco with high levels noted on blood draw. Pt has nonoliguric JENNIFER with no systemic complaints.    Physical exam: SBP initially high.  Otherwise Nl exam   Vitals along with Ins/outs reviewed.  Cr 3.6 down from 4.      Assessment:  1.  JENNIFER with bland UA: most likely vanco toxicity.  Infection-assoc GN would have a more active sediment.  Pt has compensated cirrhosis without ascites.  Would check another vanco level to see how rapidly it is being cleared  2.  Hypertension:  BP is higher now that lisinopril is on hold.  Would start  amlodipine if needed.  Would avoid thiazide (hypokalemia) and BB (rebound hypertension) since pt has hx EtOH abuse  Recommend:  1.  Agree with stopping vanco and lisinopril  2.  Check vanco level in a.m.  3.  Monitor BP for now, rec amlodipine if meds are needed    I have reviewed today's vitals and labs.    Gertrudis Golden MD

## 2019-04-10 NOTE — PLAN OF CARE
Vitals: [BP: 159/81] [HR: 70] [Temp: 96.8 ] [Resp: 14] [Sp02: 97]    Time: 1804-3035     Reason for admission: JENNIFER   Activity: Independent Up independently, Patient knows limits  Pain: Right leg & Knee pain, Burning and pressure. 2mg oral Hydromorphone given twice this shift q4.  Neuro: AO x4  Cardiac: WDL  Respiratory: Seasonal Allergies  GI/: JENNIFER from Vanco, voiding regularly, urine is clear.   Diet: Regular Diet Strict I/O  Lines: Right PICC  Skin: Right Knee staples removed by Ortho today 4/10/19.  Labs: Vanco level of 60.5 Yesterday 4/9/19 and Creatinine of 4.0. Creatine today was 3.6     New this shift: Knee per patient report was better and no longer red and swollen from sitting in the ED last night. Seen by Ortho who took out staples around 12:30 and applied steri strips. Pt has been voiding into the urnal to keep track of output and writing down intake on a piece of paper. This system seems to be working well. Gave 2mg Hydromorphone twice today for pain.       Plan: Continue with Plan of Care, Watch kidney labs today and tomorrow 4/10-4/11, and team will decide in morning to discharge or not.

## 2019-04-10 NOTE — PROGRESS NOTES
Pt arrived to floor ~ 2300 from [facility].  Admitted for high Cr., JENNIFER, and R knee infection.  Belongings in room with pt & pt settled in.  CXR orderd to verify R single lumen PICC placement.

## 2019-04-10 NOTE — ED NOTES
Bed: IN02  Expected date: 4/9/19  Expected time: 1:23 PM  Means of arrival: Car  Comments:  Ten [facility] cirrhosis, recent total knee with wound infection coag neg staph and e coli, sending for JENNIFER and vanc toxic level. Cont ceftriaxone. Would not replace vanc.       Jenniffer Menchaca MD  04/09/19 2038

## 2019-04-10 NOTE — CONSULTS
[facility]  ORTHOPAEDIC SURGERY HISTORY AND PHYSICAL    CC: Follow-up right knee    HISTORY OF PRESENT ILLNESS:   The orthopaedic surgery service was consulted by Dhara Sanford MD for evaluation and treatment recommendations of knee.    Ten Johnson is a 58 year old male past medical history including alcoholic cirrhosis, chronic hepatitis C, hypertension, and aortic valve replacement.  He underwent a right total knee arthroplasty on 2/7/2019 with Dr. Joe.  Unfortunately he had a fall postoperatively and a wound dehiscence.  He ultimately underwent irrigation and debridement and polyethylene exchange of his right knee on 3/4/2019.  His intraoperative cultures grew methicillin-resistant staph epidermidis, and E. coli.  He has been receiving IV ceftriaxone and vancomycin.  He was noted to have elevated vancomycin level, and ultimately diagnosed with acute kidney injury thought to be due to vancomycin nephrotoxicity.  He was admitted to the medicine service on 4/9/2019.  The vancomycin has been stopped, and he continues on IV ceftriaxone.      The patient reports that his right knee overall is doing well.  It does feel stable.  He notes it still swells after activity.  He has been able to increase his activity, and is no longer walking with an assistive device.  He thinks his knee overall is getting better over time.  He denies any incisional complications, fevers, chills, or drainage from the incision.  He still has some staples in place.  The knee does swell, but it does improve when he elevates.  He overall is encouraged with the progress with regards to his right knee.    Denies numbness, tingling, or weakness to the affected extremities.  Denies fevers, chills, nausea, vomiting, diarrhea, constipation, chest pain, shortness of breath.    PAST MEDICAL HISTORY:   Past Medical History:   Diagnosis Date     Alcohol use disorder      Alcoholic cirrhosis (H)      Anticoagulant long-term use      plavix     Ascites      Chronic allergic rhinitis      Chronic anemia      Chronic hepatitis C without hepatic coma (H) 05/10/2016    Untreated as of 2/2018     Diastolic dysfunction      Erosive gastropathy      Esophageal varices in alcoholic cirrhosis (H)      H/O upper gastrointestinal hemorrhage 09/2017     History of blood transfusion      History of drug abuse     intranasal     Hypertension     essential     JANELLE (iron deficiency anemia)      Sarahi-Hoffman tear     History     Marijuana abuse      MRSA (methicillin resistant Staphylococcus aureus)      Olecranon bursitis      Portal hypertension (H)      Right shoulder pain     history of rotator cuff repair     S/p TAVR (transcatheter aortic valve replacement), bioprosthetic      Severe aortic stenosis      Thrombocytopenia (H)        PAST SURGICAL HISTORY:    Past Surgical History:   Procedure Laterality Date     ARTHROSCOPY SHOULDER ROTATOR CUFF REPAIR  7/31/2012    Procedure: ARTHROSCOPY SHOULDER ROTATOR CUFF REPAIR;  Right Shoulder Arthroscopic Rotator Cuff Repair, BicepsTenodesis,  Subacromial Decompression ;  Surgeon: Joi Castillo MD;  [facility]     ESOPHAGOSCOPY, GASTROSCOPY, DUODENOSCOPY (EGD), COMBINED N/A 10/23/2017    Procedure: COMBINED ESOPHAGOSCOPY, GASTROSCOPY, DUODENOSCOPY (EGD);;  Surgeon: Gentry Salas MD;  Location: [facility]     EXCHANGE POLY COMPONENT ARTHROPLASTY KNEE Right 3/4/2019    Procedure: REVISION RIGHT TOTAL KNEE POLY COMPONENT EXCHANGE;  Surgeon: Olvin Joe MD;  Location: [facility]     FACIAL RECONSTRUCTION SURGERY  1971     HEART CATH FEMORAL CANNULIZATION WITH OPEN STANDBY REPAIR AORTIC VALVE N/A 2/21/2018    Procedure: HEART CATH FEMORAL CANNULIZATION WITH OPEN STANDBY REPAIR AORTIC VALVE;;  Surgeon: Luis Baird MD;  Location: [facility]     IRRIGATION AND DEBRIDEMENT UPPER EXTREMITY, COMBINED  1/3/2012    Procedure:COMBINED IRRIGATION AND DEBRIDEMENT UPPER EXTREMITY; Irrigation &  Debridement Left Elbow; Surgeon:CRISTHIAN ZHOU; Location:[facility]     OPTICAL TRACKING SYSTEM ARTHROPLASTY KNEE Right 2/7/2019    Procedure: ARTHROPLASTY KNEE RIGHT;  Surgeon: Olvin Joe MD;  [facility]: [facility]     REPAIR TENDON TRICEPS UPPER EXTREMITY  11/8/2011    Procedure:REPAIR TENDON TRICEPS UPPER EXTREMITY; Surgeon:CRISTHIAN ZHOU; Location:[facility]     SHOULDER SURGERY  2003    left, injury, torn tendons, hematoma     TRANSCATHETER AORTIC VALVE IMPLANT ANESTHESIA N/A 2/21/2018    Procedure: TRANSCATHETER AORTIC VALVE IMPLANT ANESTHESIA;  Transfemoral (Quiroz) Aortic Valve Implant 26mm MARTHA 3, with Cardiopulmonary Bypass Standby, transthoracic echocardiogram;  Surgeon: GENERIC ANESTHESIA PROVIDER;  Location: [facility]     TRANSPOSITION ULNAR NERVE (ELBOW)  11/8/2011    Procedure:TRANSPOSITION ULNAR NERVE (ELBOW); Final Procedure Done: Left Elbow Lateral Ulnar Collateral Repair And  Left Elbow Triceps Repair         MEDICATIONS:   Prior to Admission medications    Medication Sig Last Dose Taking? Auth Provider   acetaminophen (TYLENOL) 325 MG tablet Take 2 tablets (650 mg) by mouth every 6 hours as needed for mild pain  Patient not taking: Reported on 3/27/2019   Anni Bergeron APRN CNP   ASPIRIN LOW DOSE 81 MG EC tablet    Reported, Patient   cefTRIAXone (ROCEPHIN) 1 GM vial Inject 1 g into the vein every 24 hours for 24 days Through 4/12 or as otherwise directed by Mili Mantilla PA-C   diclofenac (VOLTAREN) 1 % topical gel as needed   Reported, Patient   ferrous sulfate (FEROSUL) 325 (65 Fe) MG tablet Take 1 tablet (325 mg) by mouth At Bedtime   Cuca Celeste MD   fluticasone (FLONASE) 50 MCG/ACT nasal spray Spray 2 sprays into both nostrils daily   Cuca Celeste MD   HYDROcodone-acetaminophen (NORCO) 5-325 MG tablet Take 1 tablet by mouth every 6 hours as needed for severe pain   Vidya Hastings APRN CNP   lactobacillus rhamnosus, GG, (CULTURELL) capsule Take 1  capsule by mouth 2 times daily   Mili Saxena PA-C   lisinopril (PRINIVIL/ZESTRIL) 20 MG tablet Take 1 tablet (20 mg) by mouth At Bedtime   Ema Pierson NP   loratadine (CLARITIN) 10 MG tablet Take 1 tablet (10 mg) by mouth daily   Cuca Celeste MD   Multiple Vitamin (MULTIVITAMINS PO) Take 1 tablet by mouth At Bedtime    Reported, Patient   order for DME Equipment being ordered: Cane ()  Treatment Diagnosis: Gait Instability   Olvin Joe MD   pantoprazole (PROTONIX) 40 MG EC tablet Take 1 tablet (40 mg) by mouth 2 times daily (before meals)   Cuca Celeste MD   sodium chloride 0.9 % SOLN 250 mL with vancomycin 100 mg/mL SOLR 1,750 mg Inject 1,750 mg into the vein every 12 hours   Mili Saxena PA-C   sulfamethoxazole-trimethoprim (BACTRIM DS/SEPTRA DS) 800-160 MG tablet Take 1 tablet by mouth 2 times daily   Johanna Hare MD   vitamin B1 (THIAMINE) 50 MG tablet Take 1 tablet (50 mg) by mouth daily   Johanna Hare MD       ALLERGIES:   Zolpidem; Cats; Dogs; and Pollen extract    SOCIAL HISTORY:   Social History     Socioeconomic History     Marital status: Single     Spouse name: Not on file     Number of children: 0     Years of education: Not on file     Highest education level: Not on file   Occupational History     Occupation: unemployed   Social Needs     Financial resource strain: Not on file     Food insecurity:     Worry: Not on file     Inability: Not on file     Transportation needs:     Medical: Not on file     Non-medical: Not on file   Tobacco Use     Smoking status: Never Smoker     Smokeless tobacco: Never Used   Substance and Sexual Activity     Alcohol use: Yes     Comment: Alcohol use disorder, still actively drinking     Drug use: No     Comment: denies     Sexual activity: Not Currently     Partners: Female   Lifestyle     Physical activity:     Days per week: Not on file     Minutes per session: Not on file     Stress: Not on file    Relationships     Social connections:     Talks on phone: Not on file     Gets together: Not on file     Attends Buddhist service: Not on file     Active member of club or organization: Not on file     Attends meetings of clubs or organizations: Not on file     Relationship status: Not on file     Intimate partner violence:     Fear of current or ex partner: Not on file     Emotionally abused: Not on file     Physically abused: Not on file     Forced sexual activity: Not on file   Other Topics Concern     Parent/sibling w/ CABG, MI or angioplasty before 65F 55M? Not Asked   Social History Narrative    .  Bicycles a lot.  Excessive alcohol use.  Smokes cigars.  Occasional marijuana use.       FAMILY HISTORY:  Family History   Problem Relation Age of Onset     Cancer Mother 62     Alcoholism Paternal Uncle      No Known Problems Father      No Known Problems Brother      Diabetes Maternal Grandmother      Myocardial Infarction Maternal Grandfather      No Known Problems Paternal Grandmother      Unknown/Adopted Paternal Grandfather      Cirrhosis No family hx of          REVIEW OF SYSTEMS:   Positive for above in the HPI. Otherwise a 10-point reviews of systems was negative except as noted above in the HPI.     PHYSICAL EXAM:   Vitals:    04/09/19 2306 04/10/19 0600 04/10/19 0814 04/10/19 0817   BP: 159/74 146/77 163/85 159/81   Pulse:  63 70    Resp:  18 14    Temp:  97.3  F (36.3  C) 96.8  F (36  C)    TempSrc:  Oral Oral    SpO2:  98% 97%    Weight:       Height:         General: Awake, alert, appropriate, following commands, NAD.  Neuro: CN II-XII grossly intact.   Skin: No rashes,  skin color normal.  HEENT: Normal.   Lungs: Breathing comfortably and nonlabored, no wheezes or stridor noted.  Heart/Cardiovascular: Regular pulse, no peripheral cyanosis.  Right Lower Extremity: Focused examination reveals a healing incision over the anterior right knee.  There are staples still in place.   There is no wound dehiscence, and no drainage.  No significant erythema about the knee.  There is a small knee effusion.  He is nontender to palpation about the knee.  Range of motion is full extension to 95 degrees of flexion.  He has mild pain at the extreme of flexion, but no pain with gentle midrange range of motion.  He is stable to varus and valgus stress.  Distally neurovascularly intact 5/5 TA/GSC/EHL.  Sensation intact light touch at the DP/SP/tibial/saphenous/sural nerve distributions.  Palpable DP pulse, foot warm and well-perfused.    LABS:  Hemoglobin   Date Value Ref Range Status   04/10/2019 12.9 (L) 13.3 - 17.7 g/dL Final   04/09/2019 14.8 13.3 - 17.7 g/dL Final     WBC   Date Value Ref Range Status   04/10/2019 9.7 4.0 - 11.0 10e9/L Final     Platelet Count   Date Value Ref Range Status   04/10/2019 51 (L) 150 - 450 10e9/L Final     INR   Date Value Ref Range Status   04/10/2019 1.25 (H) 0.86 - 1.14 Final     Creatinine   Date Value Ref Range Status   04/10/2019 3.60 (H) 0.66 - 1.25 mg/dL Final     Glucose   Date Value Ref Range Status   04/10/2019 88 70 - 99 mg/dL Final       CRP on 4/8: < 2.9  ESR on 4/8: 10  WBC 9.8    IMAGING:  No new imaging studies obtained    IMPRESSION:   Ten Johnson is a 58 year old male with history of alcoholic cirrhosis who had right TKA on 2/7/2019 complicated by a fall and postoperative wound dehiscence status post I&D and polyethylene exchange on 3/4/2019 with .  Intraoperative cultures grew MRSE, and E. coli.  He is now 5.5 weeks out.  Readmitted to the medicine service for vancomycin associated nephrotoxicity and JENNIFER.  IV vancomycin has been discontinued, and the patient continues on ceftriaxone.    RECOMMENDATIONS:   - Plan for OR: No orthopedic surgery or procedure planned.  Given the patient's improvement, and his normal inflammatory markers, we feel there is no need for aspiration at this time.  - Antibiotics/Tetanus: Continues on IV ceftriaxone.  Okay  from orthopedic standpoint to discontinue vancomycin.  Further antibiotic plan per infectious disease, previous plan was to begin oral Bactrim on 4/15/2019.  - X-rays/Imaging: None needed currently  - Activity: Weightbearing as tolerated and range of motion as tolerated right lower extremity  -Staples were removed at the bedside  - Follow-up: 1-2 weeks with  -orthopedic clinic will contact the patient to facilitate follow-up.  - Disposition: per primary    Assessment and Plan discussed with Dr. Vang, Orthopaedic Surgery fellow, and will be discussed with staff Dr. Joe.     Seven Barreto MD 04/10/19  Orthopaedic Surgery Resident, PGY-4      For questions about this patient, please contact me at my pager.

## 2019-04-10 NOTE — H&P
[facility], [place]    History and Physical - [facility] Night Service   Patient Name: Ten Johnson   Date of Admission:  4/9/2019    Assessment & Plan   Ten Johnson is a 58 year old male with a PMH significant for a recent right TKA (2/2019) that was complicated by joint infection with e coli and staph epi requiring I&D and liner exchange in early 03/2019 requiring long term IV vanc and ceftriaxone who was admitted on 4/9/2019 with an nonoliguric JENNIFER and supratherapeutic vancomycin level.    # nonoliguric JENNIFER  # supratherapeutic vancomycin  - Cr 3.64 on admission (baseline Cr 0.7-0.9).  Most concerned for intrinsic etiology from vancomycin toxicity.  Renal ultrasound negative for obstruction.  Patient denies n/v, diarrhea, or inadequate PO intake and no hyaline casts on UA making dehydration a less likely etiology..  No leukocytosis or hypotension, no suspicion for sepsis as an etiology at this time.   - Currently is making urine. no significant electrolytes abnormalities on admission. No indications for urgent/emergent dialysis.  - Plan  -- trend BMP  -- strict I/Os  -- Bladder scans; straight intermittent catheterization for PVR > 200 cc with sx, >500cc with or without sx.   -- avoid nephrotoxins, holding PTA vancomycin, lisinopril, aspirin, and protonix  -- nephrology consulted, appreciate recs      # R TKA infection s/p I&D and liner exchange (3/04/19)  - patient on home infusion of vancomycin and ceftriaxone with anticipated stop date of 4/15/19. Per Dr. Hare note on 4/09, should stop vanc as it is likely that he will have detectable vanco until that date. Continue ceftriaxone until 4/15  - low suspicion for for active infection at this time given unremarkable CRP and no leukocytosis. However knee is slightly warm to touch and patient reports swelling in the past that is currently improved, which could be expected given recent TKA. However given recent infection, will touch  base with orthopedics to evaluate.  - Plan  -- hold vancomycin  -- continue ceftriaxone   -- consider ID consult  -- resume PTA pain medication regimen        CHRONIC MEDICAL PROBLEMS    # HTN  # Aortic Stenosis s/p TAVR  - hold PTA lisinopril and ASA at this time due to JENNIFER    # chronic thrombocytopenia: trend CBC  # (?) cirrhosis   # Hepatitis C (untreated  Elevated LFTs on admission however stable compared to baseline  - follow up with hepatology as outpatient      # chronic thrombocytopenia  Likely due to liver disease, baseline of 60-70s, admission PLT of 60 with no signs of bleeding  - trend CBC      Diet: regular diet  Fluids: none   DVT Prophylaxis:  Padua 2 Pneumatic Compression Devices  Mosher Catheter: not present  Code Status: FULL code    Disposition Plan   Expected discharge: 2 - 3 days, recommended to prior living arrangement once renal function improved.  Entered: Emiliano Julian MD  04/09/2019, 10:48 PM       The patient's care was discussed with the Attending Physician, Dr. Wetzel.    Emiliano Julian MD  Internal Medicine, PGY-1  [facility], [place]  Pager: [phone]  Please see sticky note for cross cover information  ______________________________________________________________________    Chief Complaint   supratherapeutic vancomycin  JENNIFER    History is obtained from the patient    History of Present Illness   Ten Johnson is a 58 year old male with a PMH of \ who was admitted on 4/9/2019 with a PMH of     Patient had a right TKA in 02/2019 that was complicated by an infection with E. coli and staph epi requiring I&D and liner exchange in 03/2019.  Patient has been receiving home infusions of IV vancomycin and ceftriaxone since that time. Patient reports he was told yesterday that his vancomycin level was elevated.  At another recheck today, it was again elevated and he was advised to come to the emergency room.  He then took public transportation  "and was able to get to the emergency room without issue.  His only reported symptom is right knee pain.  He says the pain is worse after he has been sitting for long time.  He reports he ran out of his pain medications last week.  He reports feeling \"crappy\" the past week since he has been off his pain medications.  Reports his knee has been a little swollen and warm however says it is currently better than it was.  Over the weekend felt like he made a conscious effort to hydrate and felt well. He denies fevers, chills, lightheadedness, chest pain, shortness of breath, orthopnea, abdominal pain, nausea, vomiting, diarrhea, constipation, dysuria, hematuria, decreasing urine volume, melena, hematochezia, NSAID use, new medications or vitamins/supplements.      ED Course  - Vitals: T 97.6F, HR 69, /92, RR 16, SPO2 99% on room air  - Notable Labs: CR 3.64, PLT 60, ALT 90, , Vanco level 52.4, UA was unremarkable  - Imaging/Studies: Renal ultrasound with no hydronephrosis  - Interventions: 1 L normal saline, 4 mg p.o. Dilaudid.    Review of Systems    The 10 point Review of Systems is negative other than noted in the HPI.    Past Medical History    Past Medical History:   Diagnosis Date     Alcohol use disorder      Alcoholic cirrhosis (H)      Anticoagulant long-term use     plavix     Ascites      Chronic allergic rhinitis      Chronic anemia      Chronic hepatitis C without hepatic coma (H) 05/10/2016    Untreated as of 2/2018     Diastolic dysfunction      Erosive gastropathy      Esophageal varices in alcoholic cirrhosis (H)      H/O upper gastrointestinal hemorrhage 09/2017     History of blood transfusion      History of drug abuse     intranasal     Hypertension     essential     JANELLE (iron deficiency anemia)      Sarahi-Hoffman tear     History     Marijuana abuse      MRSA (methicillin resistant Staphylococcus aureus)      Olecranon bursitis      Portal hypertension (H)      Right shoulder pain     " history of rotator cuff repair     S/p TAVR (transcatheter aortic valve replacement), bioprosthetic      Severe aortic stenosis      Thrombocytopenia (H)        Past Surgical History   Past Surgical History:   Procedure Laterality Date     ARTHROSCOPY SHOULDER ROTATOR CUFF REPAIR  7/31/2012    Procedure: ARTHROSCOPY SHOULDER ROTATOR CUFF REPAIR;  Right Shoulder Arthroscopic Rotator Cuff Repair, BicepsTenodesis,  Subacromial Decompression ;  Surgeon: Joi Castillo MD;  [facility]     ESOPHAGOSCOPY, GASTROSCOPY, DUODENOSCOPY (EGD), COMBINED N/A 10/23/2017    Procedure: COMBINED ESOPHAGOSCOPY, GASTROSCOPY, DUODENOSCOPY (EGD);;  Surgeon: Gentry Salas MD;  [facility]: [facility]     EXCHANGE POLY COMPONENT ARTHROPLASTY KNEE Right 3/4/2019    Procedure: REVISION RIGHT TOTAL KNEE POLY COMPONENT EXCHANGE;  Surgeon: Olvin Joe MD;  Location: [facility]     FACIAL RECONSTRUCTION SURGERY  1971     HEART CATH FEMORAL CANNULIZATION WITH OPEN STANDBY REPAIR AORTIC VALVE N/A 2/21/2018    Procedure: HEART CATH FEMORAL CANNULIZATION WITH OPEN STANDBY REPAIR AORTIC VALVE;;  Surgeon: Luis Baird MD;  [facility]     IRRIGATION AND DEBRIDEMENT UPPER EXTREMITY, COMBINED  1/3/2012    Procedure:COMBINED IRRIGATION AND DEBRIDEMENT UPPER EXTREMITY; Irrigation & Debridement Left Elbow; Surgeon:CRISTHIAN ZHOU; Location:[facility] OR     OPTICAL TRACKING SYSTEM ARTHROPLASTY KNEE Right 2/7/2019    Procedure: ARTHROPLASTY KNEE RIGHT;  Surgeon: Olvin Joe MD;  [facility]: [facility]     REPAIR TENDON TRICEPS UPPER EXTREMITY  11/8/2011    Procedure:REPAIR TENDON TRICEPS UPPER EXTREMITY; Surgeon:CRISTHIAN ZHOU; Location:[facility]     SHOULDER SURGERY  2003    left, injury, torn tendons, hematoma     TRANSCATHETER AORTIC VALVE IMPLANT ANESTHESIA N/A 2/21/2018    Procedure: TRANSCATHETER AORTIC VALVE IMPLANT ANESTHESIA;  Transfemoral (Quiroz) Aortic Valve Implant 26mm MARTHA 3, with Cardiopulmonary Bypass  Serge, transthoracic echocardiogram;  Surgeon: GENERIC ANESTHESIA PROVIDER;  Location: [facility]     TRANSPOSITION ULNAR NERVE (ELBOW)  11/8/2011    Procedure:TRANSPOSITION ULNAR NERVE (ELBOW); Final Procedure Done: Left Elbow Lateral Ulnar Collateral Repair And  Left Elbow Triceps Repair         Social History     Tobacco: denies  Alcohol: denies drinking in the past 3-4 months  Drugs: denies hx of IV drug use  Occupation: industrial electrician     Family History   Family History   Problem Relation Age of Onset     Cancer Mother 62     Alcoholism Paternal Uncle      No Known Problems Father      No Known Problems Brother      Diabetes Maternal Grandmother      Myocardial Infarction Maternal Grandfather      No Known Problems Paternal Grandmother      Unknown/Adopted Paternal Grandfather      Cirrhosis No family hx of        Prior to Admission Medications   Prior to Admission Medications   Prescriptions Last Dose Informant Patient Reported? Taking?   ASPIRIN LOW DOSE 81 MG EC tablet   Yes No   HYDROcodone-acetaminophen (NORCO) 5-325 MG tablet   No No   Sig: Take 1 tablet by mouth every 6 hours as needed for severe pain   HYDROmorphone (DILAUDID) 2 MG tablet   No No   Sig: Take 1-2 tablets (2-4 mg) by mouth every 4 hours as needed for pain   Multiple Vitamin (MULTIVITAMINS PO)  Self Yes No   Sig: Take 1 tablet by mouth At Bedtime    acetaminophen (TYLENOL) 325 MG tablet   No No   Sig: Take 2 tablets (650 mg) by mouth every 6 hours as needed for mild pain   Patient not taking: Reported on 3/27/2019   cefTRIAXone (ROCEPHIN) 1 GM vial   No No   Sig: Inject 1 g into the vein every 24 hours for 24 days Through 4/12 or as otherwise directed by ID   diclofenac (VOLTAREN) 1 % topical gel   Yes No   Sig: as needed   ferrous sulfate (FEROSUL) 325 (65 Fe) MG tablet   No No   Sig: Take 1 tablet (325 mg) by mouth At Bedtime   fluticasone (FLONASE) 50 MCG/ACT nasal spray   No No   Sig: Spray 2 sprays into both nostrils daily    lactobacillus rhamnosus, GG, (CULTURELL) capsule   No No   Sig: Take 1 capsule by mouth 2 times daily   lisinopril (PRINIVIL/ZESTRIL) 20 MG tablet   No No   Sig: Take 1 tablet (20 mg) by mouth At Bedtime   loratadine (CLARITIN) 10 MG tablet   No No   Sig: Take 1 tablet (10 mg) by mouth daily   order for DME   No No   Sig: Equipment being ordered: Cane ()  Treatment Diagnosis: Gait Instability   pantoprazole (PROTONIX) 40 MG EC tablet   No No   Sig: Take 1 tablet (40 mg) by mouth 2 times daily (before meals)   sodium chloride 0.9 % SOLN 250 mL with vancomycin 100 mg/mL SOLR 1,750 mg   Yes No   Sig: Inject 1,750 mg into the vein every 12 hours   sulfamethoxazole-trimethoprim (BACTRIM DS/SEPTRA DS) 800-160 MG tablet   No No   Sig: Take 1 tablet by mouth 2 times daily   vitamin B1 (THIAMINE) 50 MG tablet   No No   Sig: Take 1 tablet (50 mg) by mouth daily      Facility-Administered Medications: None       Allergies      Allergies   Allergen Reactions     Zolpidem Other (See Comments)     Alcoholic.  Had reaction 3/17/13 while intoxicated which included black out, loss of awareness, paranoia.  Do not prescribe.  Dr. Celeste     Cats Other (See Comments)     rhinitis     Dogs Other (See Comments)     rhinitis     Pollen Extract Other (See Comments)     rhinits.       Physical Exam   Vital Signs: Temp: 97.6  F (36.4  C) Temp src: Oral BP: (!) 160/102 Pulse: 59   Resp: 16 SpO2: 100 % O2 Device: None (Room air)    Weight: 0 lbs 0 oz      GENERAL: Alert, interactive, NAD  HEENT: AT/NC, sclera anicteric, EOMI, facial flushing  RESP: clear to auscultation bilaterally, no crackles or wheezes  CARDIAC: regular rate and rhythm, normal S1 and S2, no murmur appreciated  ABDOMEN: Soft, nontender, nondistended. +BS,   EXTREMITIES: No LE edema, right knee with staples in place, no drainage, warm to touch  SKIN: Warm and dry, no jaundice or rash  NEURO: CN II-XII intact, 5/5 upper and lower extremity strength bilaterally,  sensation intact      Data     CMP  Recent Labs   Lab 04/09/19  1628 04/09/19  1627 04/09/19  0815 04/08/19  0815   NA  --  140  --  143   POTASSIUM  --  4.3  --  4.3   CHLORIDE  --  109  --  111*   CO2  --  23  --  22   ANIONGAP  --  8  --  10   GLC  --  84  --  87   BUN  --  37*  --  41*  40*   CR  --  3.64* 3.70* 3.63*  Canceled, Test credited   GFRESTIMATED 15* 17* 17* 17*  Canceled, Test credited   GFRESTBLACK 19* 20* 19* 19*  Canceled, Test credited   JOSEPHINE  --  8.6  --  8.4*   PROTTOTAL  --  7.5  --   --    ALBUMIN  --  3.3*  --   --    BILITOTAL  --  0.6  --   --    ALKPHOS  --  115  --   --    AST  --  100*  --  79*   ALT  --  90*  --   --      CBC  Recent Labs   Lab 04/09/19  1627 04/08/19  0815   WBC 9.8 7.7   RBC 4.66 4.35*   HGB 14.8 13.8   HCT 45.2 39.4*   MCV 97 91   MCH 31.8 31.7   MCHC 32.7 35.0   RDW 14.1 13.5   PLT 60* 60*     Inflammatory Markers    Recent Labs   Lab Test 04/08/19  0815 03/28/19  0810 03/21/19  0810 02/28/19  0046 03/25/18  1108 05/30/12  1410 01/03/12  1615 11/29/11  0354 11/01/11  1636   SED 10 9 11 9  --  3 7 6 7   CRP <2.9 <2.9 <2.9 10.6* <2.9  --  <5.0 <5.0  --        Urine Studies      Recent Labs   Lab Test 04/09/19  2108 02/07/19  0830 10/23/17  0512 08/25/14  1645   LEUKEST Negative Negative Negative Negative   WBCU <1 <1 2  --          Imaging:  Recent Results (from the past 24 hour(s))   US Renal Complete w Duplex Complete    Narrative    Exam: Duplex Doppler ultrasound of the kidneys and bilateral renal  arteries dated 4/9/2019 10:25 PM    Comparison Study: CT 12/13/2017    Clinical Information: Acute renal failure, evaluate for obstruction or  vascular abnormality    Technique: B-mode (grayscale) and duplex Doppler evaluation of the  abdominal aorta and renal arteries performed. Velocity measurements  obtained with angle correction at or less than 60 degrees.     Findings:    The right kidney measures 4.0 cm. The left kidney measures 3.2 cm.  Cortical thickness and  echogenicity is normal. No evidence of stones,  masses or hydronephrosis.    Peak systolic velocities in the abdominal aorta are:     69 cm/sec in the suprarenal aorta.   121 cm/sec in the infrarenal proximal aorta.    Right renal artery:    66 cm/sec at the origin, 88 cm/sec in the mid artery, and 113 cm/sec  at the hilum. Resistive indices in the arcuate arteries vary between  0.64-0.75.    Left renal artery:    67 cm/sec at the origin, 54 cm/sec in the mid artery, and 127 cm/sec  at the hilum. Resistive indices in the arcuate arteries vary between  0.67-0.71.      Impression    Impression:    1. No hydronephrosis.  2. Unremarkable Doppler evaluation of the kidneys.

## 2019-04-10 NOTE — PROGRESS NOTES
This is a recent snapshot of the patient's [facility] medical record.  For current drug dose and complete information and questions, call [phone] or In Basket pool, [facility]v home infusion ([id number])  CSN Number:  [id number]

## 2019-04-10 NOTE — PROGRESS NOTES
Resident/Fellow Attestation   I, Bradly Perez, was present with the medical student who participated in the service and in the documentation of the note.  I have verified the history and personally performed the physical exam and medical decision making.  I agree with the assessment and plan of care as documented in the note.      Ten Johnson is a 58 year old male with history of HCV infection, cirrhosis, aortic stenosis, and recent right TKA with post op course complicated by joint infection with staph epi and e coli s/p washout and receiving IV antibiotics with vancomycin and ceftriaxone.    Admitted with JENNIFER and supratherapeutic vancomycin level. No evidence of hypo/hypervolemia to speak to pre-renal etiology, ultrasound without evidence of obstruction, most likely intrinsic renal injury. No findings consistent with MAHA or glomerulonephritis, most likely etiology is vancomycin induced injury; other medications could also be contributing (lisinopril, aspirin, PPI). Overall labs stable, no acute indication for dialysis. Will monitor urine output and BMP, stopped vancomycin. Suspect clinical status will remain stable and will be able to discharge soon when labs proven to be stable.    Bradly Perez MD  PGY3  Date of Service (when I saw the patient): 04/10/19    [facility], [place]    Progress Note - [place] 3 Service        Date of Admission:  4/9/2019    Assessment & Plan   Ten Johnson is a 58 year old male admitted on 4/9/2019. He has a history of untreated Hepatitis C, cirrhosis with chronic thrombocytopenia, aortic stenosis s/p TAVR, HTN, recent right TKA (2/2019) complicated by joint infection with staph epi and e coli requiring OR washout and long term IV vanc and ceftriaxone started 3/2019 and is admitted for nonoliguric JENNIFER and supra therapeutic vancomycin level.    #Nonoliguric JENNIFER  #Supra therapeutic vancomycin  Cr 3.64 on admission up from baseline Cr of  0.7-0.9 (3.6 on recheck). Max vanc level 60 on 4/8. UA largely unremarkable making GN unlikely. Low concern that this is a prerenal JENNIFER. No n/v/d present, pt feels he has had adequate oral intake, and UA without hyaline casts making hypovolemia 2/2 dehydration less likely. No s/s of infection, leukocytosis, or hypotension making sepsis unlikely. Pt also does not clinically appear to be hypervolemic-no edema, SOB, cardiac abnormalities. No evidence of obstruction on renal ultrasound. Largest concern for intrinsic etiology 2/2 nephrotoxic vancomycin.  -Recheck CMP in am  -Strict I/Os  -Holding vancomycin, lisinopril, aspirin, pantoprazole  -Neph consulted, appreciate recs    #Right TKA infection   Pt has been receiving home infusion of ceftriaxone and vancomycin, planned to complete on 4/15. Per Dr. Hare note (Infectious disease) and Ortho okay to stop vanc early while continuing ceftriaxone till the 15th. Low concern for active infection at this time given normal white count, negative CRP, and improved swelling/warmth/redness of joint. Pt was intended to start Bactrim after completion of vanc, will considering consulting ID regarding this.  -Holding vanc  -Continue ceftriaxone  -Ortho consult, appreciate recs  -Consider ID consult   -Acetaminophen and Hydromorphone PRN for pain     Chronic Medical Problems  #HTN  #Aortic Stenosis s/p TAVR  Holding lisinopril and ASA due to JENNIFER.    #Compensated cirrhosis  #Untreated Hep C  #Chronic thrombocytopenia   LFTs stable on admission. Baseline Plts 60-70 likely 2/2 liver disease, no signs of bleeding so will continue to monitor.   -Hepatology f/u as outpatient.     Diet: Combination Diet Regular Diet Adult    Fluids: PO intake adequate  Lines: PICC  DVT Prophylaxis: Pneumatic Compression Devices  Mosher Catheter: not present  Code Status: Full Code      Disposition Plan   Expected discharge: 1-2 days, recommended to prior living arrangement once kidney function is stable and  antibiotic plan is established. .  Entered: Evita Bower 04/10/2019, 12:09 PM       The patient's care was discussed with the Attending Physician, Dr. Valera.    Evita Bower  Medical Student  [facility], [place]  Pager: [phone]  Please see sticky note for cross cover information  ______________________________________________________________________    Interval History   No acute events since admission.   Pt denies dysuria, hematuria, decreased urinary output.  No abdominal pain, n/v/d.  No CP, SOB.  Pt has not had fever/chills.  Minimal pain in R knee with improved swelling, redness, warmth.    Data reviewed today: I reviewed all medications, new labs and imaging results over the last 24 hours.     Physical Exam   Vital Signs: Temp: 96.8  F (36  C) Temp src: Oral BP: 159/81 Pulse: 70   Resp: 14 SpO2: 97 % O2 Device: None (Room air)    Weight: 167 lbs 12.8 oz  Constitutional: awake, alert, cooperative, no apparent distress, and appears stated age  Respiratory: No increased work of breathing, good air exchange, clear to auscultation bilaterally, no crackles or wheezing  Cardiovascular: Normal apical impulse, regular rate and rhythm, normal S1 and S2, no S3 or S4, and no murmur noted  GI: normal bowel sounds, non-distended, non-tender and ascites absent  Musculoskeletal: Right knee with mild swelling. Redness limited to incision. Slightly warmer than left knee.     Data   Recent Labs   Lab 04/10/19  0637 04/09/19  1627 04/09/19  0815 04/08/19  0815   WBC 9.7 9.8  --  7.7   HGB 12.9* 14.8  --  13.8   MCV 97 97  --  91   PLT 51* 60*  --  60*   INR 1.25*  --   --   --     140  --  143   POTASSIUM 4.3 4.3  --  4.3   CHLORIDE 108 109  --  111*   CO2 22 23  --  22   BUN 39* 37*  --  41*  40*   CR 3.60* 3.64* 3.70* 3.63*  Canceled, Test credited   ANIONGAP 12 8  --  10   JOSEPHINE 8.2* 8.6  --  8.4*   GLC 88 84  --  87   ALBUMIN 2.8* 3.3*  --   --    PROTTOTAL 6.6* 7.5   --   --    BILITOTAL 0.5 0.6  --   --    ALKPHOS 98 115  --   --    ALT 72* 90*  --   --    AST 78* 100*  --  79*     Recent Results (from the past 24 hour(s))   US Renal Complete w Duplex Complete    Narrative    Exam: Duplex Doppler ultrasound of the kidneys and bilateral renal  arteries dated 4/9/2019 10:25 PM    Comparison Study: CT 12/13/2017    Clinical Information: Acute renal failure, evaluate for obstruction or  vascular abnormality    Technique: B-mode (grayscale) and duplex Doppler evaluation of the  abdominal aorta and renal arteries performed. Velocity measurements  obtained with angle correction at or less than 60 degrees.     Findings:    The right kidney measures 4.0 cm. The left kidney measures 3.2 cm.  Cortical thickness and echogenicity is normal. No evidence of stones,  masses or hydronephrosis.    Peak systolic velocities in the abdominal aorta are:     69 cm/sec in the suprarenal aorta.   121 cm/sec in the infrarenal proximal aorta.    Right renal artery:    66 cm/sec at the origin, 88 cm/sec in the mid artery, and 113 cm/sec  at the hilum. Resistive indices in the arcuate arteries vary between  0.64-0.75.    Left renal artery:    67 cm/sec at the origin, 54 cm/sec in the mid artery, and 127 cm/sec  at the hilum. Resistive indices in the arcuate arteries vary between  0.67-0.71.      Impression    Impression:    1. No hydronephrosis.  2. Unremarkable Doppler evaluation of the kidneys.     I have personally reviewed the examination and initial interpretation  and I agree with the findings.    ARIADNA FAYE MD   XR Chest Port 1 View    Narrative    XR CHEST PORT 1 VW 4/9/2019 11:30 PM    Comparison: 2/21/2018    History: confirm PICC placement    Findings: Single AP view of the chest. Right upper extremity PICC tip  projects over the mid SVC.    Cardiac silhouette and pulmonary vasculature are within normal limits.  No pleural effusion or pneumothorax. No acute airspace disease.       Impression    Impression:   1. Right upper extremity PICC tip projects over the mid SVC.  2. Underlying lungs are clear and free of acute disease.    I have personally reviewed the examination and initial interpretation  and I agree with the findings.    ARIADNA FAYE MD     Medications       cefTRIAXone  1 g Intravenous Q24H     ferrous sulfate  325 mg Oral At Bedtime     fluticasone  2 spray Both Nostrils Daily     lactobacillus rhamnosus (GG)  1 capsule Oral BID     loratadine  10 mg Oral Daily     multivitamin w/minerals  1 tablet Oral At Bedtime     vitamin B1  50 mg Oral Daily

## 2019-04-10 NOTE — ED PROVIDER NOTES
History     Chief Complaint   Patient presents with     Abnormal Labs     HPI  Ten Johnson is a 58 year old male who presents to the Emergency Department after being instructed to come here by his home health due to elevated creatinine level. Per chart review, his creatinine level this morning was 3.7.    The patient has a history of untreated Hepatitis C, alcohol abuse, cirrhosis, aortic stenosis s/p TAVR (2/21/18), history of Maollory-evans tear (2017), grade II diastolic dysfunction, allergic rhinitis, HTN, and a R knee OA s/p TKA (2/7/19) which was complicated by a fall a couple of days later and infection of the joint. He was recently hospitalized for over two weeks after an OR washout and getting IV antibiotics. He continues to receive IV vancomycin and IV ceftriaxone and states he started vancomycin around March 11th. The patient denies history of kidney issues. He denies dysuria, hematuria, or difficulty urinating. He denies back pain or fever. He states he is eating and drinking normally. He denies dehydration, nausea, vomiting, diarrhea. He denies any cardiovascular problems. He is on aspirin but is otherwise not anticoagulated. He also continues to complain of right leg pain. He also notes that Bactrim is on his medication list he states he has not started this yet and the plan was for him to start that after he was finished with the vancomycin.    I have reviewed the Medications, Allergies, Past Medical and Surgical History, and Social History in the [facility] system.  Past Medical History:   Diagnosis Date     Alcohol use disorder      Alcoholic cirrhosis (H)      Anticoagulant long-term use     plavix     Ascites      Chronic allergic rhinitis      Chronic anemia      Chronic hepatitis C without hepatic coma (H) 05/10/2016    Untreated as of 2/2018     Diastolic dysfunction      Erosive gastropathy      Esophageal varices in alcoholic cirrhosis (H)      H/O upper gastrointestinal hemorrhage 09/2017      History of blood transfusion      History of drug abuse     intranasal     Hypertension     essential     JANELLE (iron deficiency anemia)      Sarahi-Hoffman tear     History     Marijuana abuse      MRSA (methicillin resistant Staphylococcus aureus)      Olecranon bursitis      Portal hypertension (H)      Right shoulder pain     history of rotator cuff repair     S/p TAVR (transcatheter aortic valve replacement), bioprosthetic      Severe aortic stenosis      Thrombocytopenia (H)        Past Surgical History:   Procedure Laterality Date     ARTHROSCOPY SHOULDER ROTATOR CUFF REPAIR  7/31/2012    Procedure: ARTHROSCOPY SHOULDER ROTATOR CUFF REPAIR;  Right Shoulder Arthroscopic Rotator Cuff Repair, BicepsTenodesis,  Subacromial Decompression ;  Surgeon: Joi Castillo MD;  [facility]     ESOPHAGOSCOPY, GASTROSCOPY, DUODENOSCOPY (EGD), COMBINED N/A 10/23/2017    Procedure: COMBINED ESOPHAGOSCOPY, GASTROSCOPY, DUODENOSCOPY (EGD);;  Surgeon: Gentry Salas MD;  [facility]     EXCHANGE POLY COMPONENT ARTHROPLASTY KNEE Right 3/4/2019    Procedure: REVISION RIGHT TOTAL KNEE POLY COMPONENT EXCHANGE;  Surgeon: Olvin Joe MD;  Location: [facility]     FACIAL RECONSTRUCTION SURGERY  1971     HEART CATH FEMORAL CANNULIZATION WITH OPEN STANDBY REPAIR AORTIC VALVE N/A 2/21/2018    Procedure: HEART CATH FEMORAL CANNULIZATION WITH OPEN STANDBY REPAIR AORTIC VALVE;;  Surgeon: Luis Baird MD;  [facility]     IRRIGATION AND DEBRIDEMENT UPPER EXTREMITY, COMBINED  1/3/2012    Procedure:COMBINED IRRIGATION AND DEBRIDEMENT UPPER EXTREMITY; Irrigation & Debridement Left Elbow; Surgeon:CRISTHIAN ZHOU; Location:[facility]     OPTICAL TRACKING SYSTEM ARTHROPLASTY KNEE Right 2/7/2019    Procedure: ARTHROPLASTY KNEE RIGHT;  Surgeon: Olvin Joe MD;  [facility]     REPAIR TENDON TRICEPS UPPER EXTREMITY  11/8/2011    Procedure:REPAIR TENDON TRICEPS UPPER EXTREMITY; Surgeon:WINNIE  CRISTHIAN ONTIVEROS; Location:[facility]     SHOULDER SURGERY  2003    left, injury, torn tendons, hematoma     TRANSCATHETER AORTIC VALVE IMPLANT ANESTHESIA N/A 2/21/2018    Procedure: TRANSCATHETER AORTIC VALVE IMPLANT ANESTHESIA;  Transfemoral (Quiroz) Aortic Valve Implant 26mm MARTHA 3, with Cardiopulmonary Bypass Standby, transthoracic echocardiogram;  Surgeon: GENERIC ANESTHESIA PROVIDER;  Location: [facility]     TRANSPOSITION ULNAR NERVE (ELBOW)  11/8/2011    Procedure:TRANSPOSITION ULNAR NERVE (ELBOW); Final Procedure Done: Left Elbow Lateral Ulnar Collateral Repair And  Left Elbow Triceps Repair         Family History   Problem Relation Age of Onset     Cancer Mother 62     Alcoholism Paternal Uncle      No Known Problems Father      No Known Problems Brother      Diabetes Maternal Grandmother      Myocardial Infarction Maternal Grandfather      No Known Problems Paternal Grandmother      Unknown/Adopted Paternal Grandfather      Cirrhosis No family hx of        Social History     Tobacco Use     Smoking status: Never Smoker     Smokeless tobacco: Never Used   Substance Use Topics     Alcohol use: Yes     Comment: Alcohol use disorder, still actively drinking       Current Facility-Administered Medications   Medication     acetaminophen (TYLENOL) tablet 650 mg     cefTRIAXone (ROCEPHIN) 1 g vial to attach to  mL bag for ADULTS or NS 50 mL bag for PEDS     ferrous sulfate (FEROSUL) tablet 325 mg     fluticasone (FLONASE) 50 MCG/ACT spray 2 spray     lactobacillus rhamnosus (GG) (CULTURELL) capsule 1 capsule     loratadine (CLARITIN) tablet 10 mg     melatonin tablet 1 mg     multivitamin w/minerals (THERA-VIT-M) tablet 1 tablet     naloxone (NARCAN) injection 0.1-0.4 mg     thiamine tablet 50 mg        Allergies   Allergen Reactions     Zolpidem Other (See Comments)     Alcoholic.  Had reaction 3/17/13 while intoxicated which included black out, loss of awareness, paranoia.  Do not prescribe.  Dr. Celeste      "Cats Other (See Comments)     rhinitis     Dogs Other (See Comments)     rhinitis     Pollen Extract Other (See Comments)     rhinits.       Review of Systems   Constitutional: Negative for appetite change and fever.   Cardiovascular: Negative for chest pain.   Gastrointestinal: Negative for diarrhea, nausea and vomiting.   Genitourinary: Negative for difficulty urinating, frequency, hematuria and urgency.   Musculoskeletal: Negative for back pain.        +right leg pain   All other systems reviewed and are negative.      Physical Exam   BP: (!) 158/92  Pulse: 69  Temp: 97.6  F (36.4  C)  Resp: 16  Height: 175.3 cm (5' 9\")  Weight: 76.1 kg (167 lb 12.8 oz)  SpO2: 99 %      Physical Exam   Constitutional: He is oriented to person, place, and time. He appears well-developed and well-nourished.   Alert, cooperative, NAD   HENT:   Head: Normocephalic.   Mouth/Throat: Oropharynx is clear and moist.   Eyes: Pupils are equal, round, and reactive to light.   Cardiovascular: Normal rate, regular rhythm and normal heart sounds. Exam reveals no gallop.   No murmur heard.  Pulmonary/Chest: Effort normal and breath sounds normal. No respiratory distress. He has no wheezes. He has no rales.   Abdominal: Soft. Bowel sounds are normal. He exhibits no distension. There is no tenderness. There is no rebound and no guarding.   Neurological: He is alert and oriented to person, place, and time.   Skin: Skin is warm and dry.   Psychiatric: He has a normal mood and affect.   Nursing note and vitals reviewed.      ED Course        Procedures             Critical Care time:  none             Results for orders placed or performed during the hospital encounter of 04/09/19   US Renal Complete w Duplex Complete    Narrative    Exam: Duplex Doppler ultrasound of the kidneys and bilateral renal  arteries dated 4/9/2019 10:25 PM    Comparison Study: CT 12/13/2017    Clinical Information: Acute renal failure, evaluate for obstruction or  vascular " abnormality    Technique: B-mode (grayscale) and duplex Doppler evaluation of the  abdominal aorta and renal arteries performed. Velocity measurements  obtained with angle correction at or less than 60 degrees.     Findings:    The right kidney measures 4.0 cm. The left kidney measures 3.2 cm.  Cortical thickness and echogenicity is normal. No evidence of stones,  masses or hydronephrosis.    Peak systolic velocities in the abdominal aorta are:     69 cm/sec in the suprarenal aorta.   121 cm/sec in the infrarenal proximal aorta.    Right renal artery:    66 cm/sec at the origin, 88 cm/sec in the mid artery, and 113 cm/sec  at the hilum. Resistive indices in the arcuate arteries vary between  0.64-0.75.    Left renal artery:    67 cm/sec at the origin, 54 cm/sec in the mid artery, and 127 cm/sec  at the hilum. Resistive indices in the arcuate arteries vary between  0.67-0.71.      Impression    Impression:    1. No hydronephrosis.  2. Unremarkable Doppler evaluation of the kidneys.    XR Chest Port 1 View    Narrative    XR CHEST PORT 1 VW 4/9/2019 11:30 PM    Comparison: 2/21/2018    History: confirm PICC placement    Findings: Single AP view of the chest. Right upper extremity PICC tip  projects over the mid SVC.    Cardiac silhouette and pulmonary vasculature are within normal limits.  No pleural effusion or pneumothorax. No acute airspace disease.      Impression    Impression: Right upper extremity PICC tip projects over the mid SVC.   Creatinine POCT   Result Value Ref Range    Creatinine 4.0 (H) 0.66 - 1.25 mg/dL    GFR Estimate 15 (L) >60 mL/min/[1.73_m2]    GFR Estimate If Black 19 (L) >60 mL/min/[1.73_m2]   CBC with platelets differential   Result Value Ref Range    WBC 9.8 4.0 - 11.0 10e9/L    RBC Count 4.66 4.4 - 5.9 10e12/L    Hemoglobin 14.8 13.3 - 17.7 g/dL    Hematocrit 45.2 40.0 - 53.0 %    MCV 97 78 - 100 fl    MCH 31.8 26.5 - 33.0 pg    MCHC 32.7 31.5 - 36.5 g/dL    RDW 14.1 10.0 - 15.0 %     Platelet Count 60 (L) 150 - 450 10e9/L    Diff Method Automated Method     % Neutrophils 61.2 %    % Lymphocytes 18.0 %    % Monocytes 18.8 %    % Eosinophils 0.9 %    % Basophils 0.8 %    % Immature Granulocytes 0.3 %    Nucleated RBCs 0 0 /100    Absolute Neutrophil 6.0 1.6 - 8.3 10e9/L    Absolute Lymphocytes 1.8 0.8 - 5.3 10e9/L    Absolute Monocytes 1.8 (H) 0.0 - 1.3 10e9/L    Absolute Eosinophils 0.1 0.0 - 0.7 10e9/L    Absolute Basophils 0.1 0.0 - 0.2 10e9/L    Abs Immature Granulocytes 0.0 0 - 0.4 10e9/L    Absolute Nucleated RBC 0.0    Comprehensive metabolic panel   Result Value Ref Range    Sodium 140 133 - 144 mmol/L    Potassium 4.3 3.4 - 5.3 mmol/L    Chloride 109 94 - 109 mmol/L    Carbon Dioxide 23 20 - 32 mmol/L    Anion Gap 8 3 - 14 mmol/L    Glucose 84 70 - 99 mg/dL    Urea Nitrogen 37 (H) 7 - 30 mg/dL    Creatinine 3.64 (H) 0.66 - 1.25 mg/dL    GFR Estimate 17 (L) >60 mL/min/[1.73_m2]    GFR Estimate If Black 20 (L) >60 mL/min/[1.73_m2]    Calcium 8.6 8.5 - 10.1 mg/dL    Bilirubin Total 0.6 0.2 - 1.3 mg/dL    Albumin 3.3 (L) 3.4 - 5.0 g/dL    Protein Total 7.5 6.8 - 8.8 g/dL    Alkaline Phosphatase 115 40 - 150 U/L    ALT 90 (H) 0 - 70 U/L     (H) 0 - 45 U/L   UA with Microscopic reflex to Culture   Result Value Ref Range    Color Urine Light Yellow     Appearance Urine Clear     Glucose Urine Negative NEG^Negative mg/dL    Bilirubin Urine Negative NEG^Negative    Ketones Urine Negative NEG^Negative mg/dL    Specific Gravity Urine 1.010 1.003 - 1.035    Blood Urine Negative NEG^Negative    pH Urine 5.5 5.0 - 7.0 pH    Protein Albumin Urine Negative NEG^Negative mg/dL    Urobilinogen mg/dL Normal 0.0 - 2.0 mg/dL    Nitrite Urine Negative NEG^Negative    Leukocyte Esterase Urine Negative NEG^Negative    Source Midstream Urine     WBC Urine <1 0 - 5 /HPF    RBC Urine 0 0 - 2 /HPF    Mucous Urine Present (A) NEG^Negative /LPF              Assessments & Plan (with Medical Decision Making)    This is a 58-year-old male who presents to the Emergency Department after being instructed to come here by his home health due to elevated creatinine level. Patient had a right total knee arthroplasty in February of this year which was complicated by a fall a couple of days later and infection of the joint. He spent over two weeks in the hospital at [place] [facility] after OR washout and getting IV antibiotics. He continues to receive IV vancomycin as well as IV ceftriaxone. His vancomycin levels have been increasing  which is what prompted his home health to instruct him to come to the Emergency Department today. He denies any change in urination. No flank pain. He denies any reason to be dehydrated. He denies any vomiting or diarrhea.    Reviewing the chart, he had what appears to be a creatinine level from this morning which was 3.7. as recently as march 28th his creatinine was normal at 1.01. We did repeat some labs here in the Emergency Department. White count today is 9.8 and hemoglobin is 14.8, platelet count is 60,000. This is fairly consistent with his baseline as he does have thrombocytopenia normally. We repeated comprehensive metabolic panel today and this demonstrates normal electrolytes, creatinine is 3.64. I will add on a UA and a retroperitoneal ultrasound. I suspect that he is experiencing nephrotoxic side effects from the vancomycin. Although Bactrim is on his medication list he states he has not started this yet and the plan was for him to start that after he was finished with the vancomycin. At this point we will need to admit the patient to the hospital for further evaluation.      This part of the medical record was transcribed by Kiki Corcoran, Medical Scribe, from a dictation done by Dr. Renee.     I have reviewed the nursing notes.    I have reviewed the findings, diagnosis, plan and need for follow up with the patient.       Medication List      ASK your doctor about these medications     HYDROmorphone 2 MG tablet  Commonly known as:  DILAUDID  2-4 mg, Oral, EVERY 4 HOURS PRN  Ask about: Should I take this medication?            Final diagnoses:   Acute kidney injury (H)   IKiki, am serving as a trained medical scribe to document services personally performed by Brissa Renee MD, based on the provider's statements to me.   Brissa BROWN MD, was physically present and have reviewed and verified the accuracy of this note documented by Kiki Corcoran.      4/9/2019   [facility], [place], EMERGENCY DEPARTMENT     Brissa Renee MD  04/10/19 0022

## 2019-04-10 NOTE — ED NOTES
[facility], [place]   ED Nurse to Floor Handoff     Ten Johnson is a 58 year old male who speaks English and lives alone,  in a home  They arrived in the ED by car from home    ED Chief Complaint: Abnormal Labs    ED Dx;   Final diagnoses:   Acute kidney injury (H)         Needed?: No    Allergies:   Allergies   Allergen Reactions     Zolpidem Other (See Comments)     Alcoholic.  Had reaction 3/17/13 while intoxicated which included black out, loss of awareness, paranoia.  Do not prescribe.  Dr. Celeste     Cats Other (See Comments)     rhinitis     Dogs Other (See Comments)     rhinitis     Pollen Extract Other (See Comments)     rhinits.   .  Past Medical Hx:   Past Medical History:   Diagnosis Date     Alcohol use disorder      Alcoholic cirrhosis (H)      Anticoagulant long-term use     plavix     Ascites      Chronic allergic rhinitis      Chronic anemia      Chronic hepatitis C without hepatic coma (H) 05/10/2016    Untreated as of 2/2018     Diastolic dysfunction      Erosive gastropathy      Esophageal varices in alcoholic cirrhosis (H)      H/O upper gastrointestinal hemorrhage 09/2017     History of blood transfusion      History of drug abuse     intranasal     Hypertension     essential     JANELLE (iron deficiency anemia)      Sarahi-Hoffman tear     History     Marijuana abuse      MRSA (methicillin resistant Staphylococcus aureus)      Olecranon bursitis      Portal hypertension (H)      Right shoulder pain     history of rotator cuff repair     S/p TAVR (transcatheter aortic valve replacement), bioprosthetic      Severe aortic stenosis      Thrombocytopenia (H)       Baseline Mental status: WDL  Current Mental Status changes: at basesline    Infection present or suspected this encounter: no and yes other MRSA  Sepsis suspected: No  Isolation type: Contact     Activity level - Baseline/Home:  Independent  Activity Level - Current:   Independent    Bariatric  equipment needed?: No    In the ED these meds were given:   Medications   0.9% sodium chloride BOLUS (0 mLs Intravenous Stopped 4/9/19 2033)   HYDROmorphone (DILAUDID) tablet 4 mg (4 mg Oral Given 4/9/19 2124)       Drips running?  No    Home pump  No    Current LDAs  PICC Single Lumen 03/05/19 Right Basilic (Active)   Number of days: 35       Left Groin Interventional Procedure Access (Active)   Number of days: 412       Right Groin Interventional Procedure Access (Active)   Number of days: 412       Right Radial Interventional Procedure Access (Active)   Number of days: 431       Right Jugular Interventional Procedure Access (Active)   Number of days: 431       Incision/Surgical Site 02/21/18 Bilateral Groin (Active)   Number of days: 412       Incision/Surgical Site 02/21/18 Right Wrist (Active)   Number of days: 412       Incision/Surgical Site 02/07/19 Right Knee (Active)   Number of days: 61       Labs results:   Labs Ordered and Resulted from Time of ED Arrival Up to the Time of Departure from the ED   CREATININE POCT - Abnormal; Notable for the following components:       Result Value    Creatinine 4.0 (*)     GFR Estimate 15 (*)     GFR Estimate If Black 19 (*)     All other components within normal limits   CBC WITH PLATELETS DIFFERENTIAL - Abnormal; Notable for the following components:    Platelet Count 60 (*)     Absolute Monocytes 1.8 (*)     All other components within normal limits   COMPREHENSIVE METABOLIC PANEL - Abnormal; Notable for the following components:    Urea Nitrogen 37 (*)     Creatinine 3.64 (*)     GFR Estimate 17 (*)     GFR Estimate If Black 20 (*)     Albumin 3.3 (*)     ALT 90 (*)      (*)     All other components within normal limits   ROUTINE UA WITH MICROSCOPIC REFLEX TO CULTURE - Abnormal; Notable for the following components:    Mucous Urine Present (*)     All other components within normal limits   ISTAT CREATININE NURSING POCT       Imaging Studies: No results found  "for this or any previous visit (from the past 24 hour(s)).    Recent vital signs:   BP (!) 188/99   Pulse 59   Temp 97.6  F (36.4  C) (Oral)   Resp 16   Ht 1.753 m (5' 9\")   SpO2 99%   BMI 24.87 kg/m      Cardiac Rhythm: Normal Sinus  Pt needs tele? No  Skin/wound Issues: None    Code Status: Full Code    Pain control: fair    Nausea control: fair    Abnormal labs/tests/findings requiring intervention: NA    Family present during ED course? No   Family Comments/Social Situation comments: NA    Tasks needing completion: XENIA Camacho, RN  ascom-- [phone] [facility]  [phone] [facility]      "

## 2019-04-11 ENCOUNTER — HOME INFUSION (PRE-WILLOW HOME INFUSION) (OUTPATIENT)
Dept: PHARMACY | Facility: CLINIC | Age: 59
End: 2019-04-11

## 2019-04-11 ENCOUNTER — PATIENT OUTREACH (OUTPATIENT)
Dept: CARE COORDINATION | Facility: CLINIC | Age: 59
End: 2019-04-11

## 2019-04-11 VITALS
SYSTOLIC BLOOD PRESSURE: 140 MMHG | RESPIRATION RATE: 16 BRPM | WEIGHT: 167.8 LBS | BODY MASS INDEX: 24.85 KG/M2 | HEART RATE: 96 BPM | TEMPERATURE: 97.3 F | DIASTOLIC BLOOD PRESSURE: 82 MMHG | HEIGHT: 69 IN | OXYGEN SATURATION: 97 %

## 2019-04-11 LAB
ANION GAP SERPL CALCULATED.3IONS-SCNC: 8 MMOL/L (ref 3–14)
BUN SERPL-MCNC: 34 MG/DL (ref 7–30)
CALCIUM SERPL-MCNC: 8.4 MG/DL (ref 8.5–10.1)
CHLORIDE SERPL-SCNC: 105 MMOL/L (ref 94–109)
CO2 SERPL-SCNC: 25 MMOL/L (ref 20–32)
CREAT SERPL-MCNC: 3.35 MG/DL (ref 0.66–1.25)
ERYTHROCYTE [DISTWIDTH] IN BLOOD BY AUTOMATED COUNT: 13.6 % (ref 10–15)
GFR SERPL CREATININE-BSD FRML MDRD: 19 ML/MIN/{1.73_M2}
GLUCOSE SERPL-MCNC: 97 MG/DL (ref 70–99)
HCT VFR BLD AUTO: 39.2 % (ref 40–53)
HGB BLD-MCNC: 13.2 G/DL (ref 13.3–17.7)
MCH RBC QN AUTO: 32 PG (ref 26.5–33)
MCHC RBC AUTO-ENTMCNC: 33.7 G/DL (ref 31.5–36.5)
MCV RBC AUTO: 95 FL (ref 78–100)
PLATELET # BLD AUTO: 51 10E9/L (ref 150–450)
POTASSIUM SERPL-SCNC: 4.1 MMOL/L (ref 3.4–5.3)
RBC # BLD AUTO: 4.13 10E12/L (ref 4.4–5.9)
SODIUM SERPL-SCNC: 138 MMOL/L (ref 133–144)
VANCOMYCIN SERPL-MCNC: 26.5 MG/L
WBC # BLD AUTO: 8.2 10E9/L (ref 4–11)

## 2019-04-11 PROCEDURE — 25000128 H RX IP 250 OP 636: Performed by: STUDENT IN AN ORGANIZED HEALTH CARE EDUCATION/TRAINING PROGRAM

## 2019-04-11 PROCEDURE — 25000132 ZZH RX MED GY IP 250 OP 250 PS 637: Performed by: STUDENT IN AN ORGANIZED HEALTH CARE EDUCATION/TRAINING PROGRAM

## 2019-04-11 PROCEDURE — 80202 ASSAY OF VANCOMYCIN: CPT | Performed by: STUDENT IN AN ORGANIZED HEALTH CARE EDUCATION/TRAINING PROGRAM

## 2019-04-11 PROCEDURE — 99239 HOSP IP/OBS DSCHRG MGMT >30: CPT | Mod: GC | Performed by: INTERNAL MEDICINE

## 2019-04-11 PROCEDURE — 80048 BASIC METABOLIC PNL TOTAL CA: CPT | Performed by: STUDENT IN AN ORGANIZED HEALTH CARE EDUCATION/TRAINING PROGRAM

## 2019-04-11 PROCEDURE — 36592 COLLECT BLOOD FROM PICC: CPT | Performed by: STUDENT IN AN ORGANIZED HEALTH CARE EDUCATION/TRAINING PROGRAM

## 2019-04-11 PROCEDURE — 85027 COMPLETE CBC AUTOMATED: CPT | Performed by: STUDENT IN AN ORGANIZED HEALTH CARE EDUCATION/TRAINING PROGRAM

## 2019-04-11 RX ORDER — CEFTRIAXONE 1 G/1
1 INJECTION, POWDER, FOR SOLUTION INTRAMUSCULAR; INTRAVENOUS EVERY 24 HOURS
Qty: 240 ML | Refills: 0 | Status: SHIPPED | OUTPATIENT
Start: 2019-04-11 | End: 2019-04-11

## 2019-04-11 RX ORDER — CEFTRIAXONE 1 G/1
1 INJECTION, POWDER, FOR SOLUTION INTRAMUSCULAR; INTRAVENOUS EVERY 24 HOURS
Qty: 240 ML | Refills: 0 | Status: SHIPPED | OUTPATIENT
Start: 2019-04-11 | End: 2019-04-23

## 2019-04-11 RX ORDER — HYDROMORPHONE HYDROCHLORIDE 2 MG/1
2 TABLET ORAL EVERY 6 HOURS PRN
Qty: 10 TABLET | Refills: 0 | Status: SHIPPED | OUTPATIENT
Start: 2019-04-11 | End: 2019-05-07

## 2019-04-11 RX ADMIN — HYDROMORPHONE HYDROCHLORIDE 2 MG: 2 TABLET ORAL at 13:17

## 2019-04-11 RX ADMIN — HYDROMORPHONE HYDROCHLORIDE 2 MG: 2 TABLET ORAL at 08:40

## 2019-04-11 RX ADMIN — HYDROMORPHONE HYDROCHLORIDE 2 MG: 2 TABLET ORAL at 04:30

## 2019-04-11 RX ADMIN — THIAMINE HCL (VITAMIN B1) 50 MG TABLET 50 MG: at 08:08

## 2019-04-11 RX ADMIN — FLUTICASONE PROPIONATE 2 SPRAY: 50 SPRAY, METERED NASAL at 10:54

## 2019-04-11 RX ADMIN — HYDROMORPHONE HYDROCHLORIDE 2 MG: 2 TABLET ORAL at 00:18

## 2019-04-11 RX ADMIN — CEFTRIAXONE SODIUM 1 G: 1 INJECTION, POWDER, FOR SOLUTION INTRAMUSCULAR; INTRAVENOUS at 00:17

## 2019-04-11 RX ADMIN — Medication 1 CAPSULE: at 08:08

## 2019-04-11 ASSESSMENT — ACTIVITIES OF DAILY LIVING (ADL)
ADLS_ACUITY_SCORE: 14

## 2019-04-11 NOTE — PROGRESS NOTES
"Nephrology Progress Note  04/11/2019       Ten Johnson is a 58 year old male with hx of ETOH liver issues (stable, also chronic Hep C), aortic stenosis s/p TAVR, Sarahi-Hoffman tear and OA s/p TKA with complication of infection and fall post-op.  Was getting home vanco when level and Cr were checked and were ~60 and 4.0 respectively.  Lisinopril and Vanco appropriately held, Cr stable at 3.8 this am, Nephrology consulted for management of JENNIFER and possible intervention should he need HD.      Interval History  Mr Johnson's Cr is a bit improved today at 3.3 from 4 yesterday, adequate UOP and no issues with chemistries.  Agree with discharge with outpt follow-up, has labs planned for 4/15 by HHN, from renal standpoint would hold on restarting Lisinopril for now, could switch to amlodipine.  If PPI could be changed it is a known AIN culprit and also can alter absorption of other meds/electrolytes.  Will send FYI note to his primary provider.       ASSESSMENT AND RECOMMENDATIONS:   JENNIFER-Baseline normal renal function with Cr of 0.9, up to 4.0 on check by home health nurse in setting of receiving Vancomycin for TKA with infection.  Holding vanco, course was almost complete (to go through 4/15), also was on lisinopril which is appropriately being held.  Cr stable at 3.3 this am after being as high as 4.0 yesterday, no symptoms or electrolyte issues. Anticipate recovery and no long term effects, discharging today with outpt follow-up.                   -No indication for HD, agree with holding lisinopril and vanco.       Electrolytes-K 4.1, bicarb 25, no issues.       Volume-Still with \"good\" UOP per his report, wt is nearly identical to ~1 month ago on discharge, no volume overload on exam.       TKA-Knee now without signs of infection.  Will be transitioned to bactrim which will increase Cr without decrease in GFR, anticipate small Cr rise.      Seen and discussed with Dr Golden     Recommendations were communicated to " "primary team via note     Tom Maravilla  Clinical Nurse Specialist  [phone]    Review of Systems:   I reviewed the following systems:  Gen: No fevers or chills  CV: No CP at rest  Resp: No SOB at rest  GI: No N/V      Physical Exam:   I/O last 3 completed shifts:  In: 1675 [P.O.:1675]  Out: 1325 [Urine:1325]   /82 (BP Location: Left arm)   Pulse 96   Temp 97.3  F (36.3  C) (Oral)   Resp 16   Ht 1.753 m (5' 9\")   Wt 76.1 kg (167 lb 12.8 oz)   SpO2 97%   BMI 24.78 kg/m          GENERAL APPEARANCE: alert and no distress  EYES: No scleral icterus  NECK:  No JVD  Pulmonary: lungs clear to auscultation with equal breath sounds bilaterally, no clubbing or cyanosis  CV: Regular rhythm, normal rate, no rub   - JVP not elevated   - Edema none  GI: soft, nontender, normal bowel sounds  MS: no evidence of inflammation in joints, no muscle tenderness  : No Mosher  SKIN: no rash, warm, dry  NEURO: mentation intact and speech normal           Labs:   All labs reviewed by me  Electrolytes/Renal -   Recent Labs   Lab Test 04/11/19  0715 04/10/19  0637 04/09/19  1627  03/08/19  0514  03/05/19  0752  02/22/18  0421 02/21/18  1129  10/23/17  0413    141 140   < > 137   < > 140   < > 137 136   < > 139   POTASSIUM 4.1 4.3 4.3   < > 4.2   < > 4.7   < > 3.7 3.4   < > 4.2   CHLORIDE 105 108 109   < > 102   < > 107   < > 100 101   < > 108   CO2 25 22 23   < > 24   < > 20   < > 27 28   < > 23   BUN 34* 39* 37*   < > 14   < > 17   < > 16 19   < > 28   CR 3.35* 3.60* 3.64*   < > 0.78   < > 0.77   < > 0.81 0.87   < > 1.11   GLC 97 88 84   < > 93   < > 124*  121*   < > 111* 159*   < > 119*   JOSEPHINE 8.4* 8.2* 8.6   < > 8.7   < > 8.6   < > 7.9* 8.1*   < > 7.2*   MAG  --   --   --   --  2.0  --  2.1  --  1.7 1.6   < > 1.8   PHOS  --   --   --   --   --   --   --   --  3.5 3.2  --  3.7    < > = values in this interval not displayed.       CBC -   Recent Labs   Lab Test 04/11/19  0715 04/10/19  0637 04/09/19  1627   WBC 8.2 9.7 " 9.8   HGB 13.2* 12.9* 14.8   PLT 51* 51* 60*       LFTs -   Recent Labs   Lab Test 04/10/19  0637 04/09/19  1627 04/08/19  0815  03/18/19  0648   ALKPHOS 98 115  --   --  105   BILITOTAL 0.5 0.6  --   --  0.5   ALT 72* 90*  --   --  110*   AST 78* 100* 79*   < > 112*   PROTTOTAL 6.6* 7.5  --   --  6.3*   ALBUMIN 2.8* 3.3*  --   --  2.6*    < > = values in this interval not displayed.       Iron Panel -   Recent Labs   Lab Test 02/01/18  0755   CARIDAD 48           Current Medications:    cefTRIAXone  1 g Intravenous Q24H     ferrous sulfate  325 mg Oral At Bedtime     fluticasone  2 spray Both Nostrils Daily     lactobacillus rhamnosus (GG)  1 capsule Oral BID     loratadine  10 mg Oral Daily     multivitamin w/minerals  1 tablet Oral At Bedtime     vitamin B1  50 mg Oral Daily       I have seen and examined this patient with the ZOFIA.  This note reflects our joint assessment and plan.     Gertrudis Golden MD

## 2019-04-11 NOTE — PROGRESS NOTES
Care Coordinator Progress Note    Admission Date/Time:  4/9/2019  Attending MD:  Dhara Valera MD    Data  Chart reviewed, discussed with interdisciplinary team.   Patient was admitted for:    Acute kidney injury (H)  Polypharmacy  Infection of total knee replacement, subsequent encounter.    Concerns with insurance coverage for discharge needs: None.  Current Living Situation: Patient lives alone.  Support System: Supportive and Involved  Services Involved: Home Infusion  Transportation at Discharge: Family or friend will provide  Transportation to Medical Appointments:   - Not applicable  Barriers to Discharge: None, patient is medically ready for discharge.     Coordination of Care and Referrals: Provided patient/family with options for Home Infusion.        Assessment  Patient is a 58yr old male with a history of untreated Hepatitis C, cirrhosis with chronic thrombocytopenia, aortic stenosis s/p TAVR, HTN, recent R TKA (2/2019) complicated by join infection with staph epi and e coli requiring OR washout and long term IV vanc and ceftriaxone started 3/2019 who was admitted for nonoliguric JENNIFER and supra therapeutic vancomycin level. Writer introduced self and role RNCC. Patient confirmed he lived locally, independently. Patient is open to [facility], orders have been placed to continue IV abx through 4/15 as previously ordered. Family/friend will provide transportation at time of discharge. No additional needs anticipated.      Plan  Anticipated Discharge Date: 4/11/2019  Anticipated Discharge Plan:  Home w/home infusion.     Cee Hodge, JAMESCC, BSN    [facility]  [address] [place] [id number]    [email or web address]    Office: [phone] Pager: [phone]  To contact weekend RNCC, dial [phone] and enter pager number [phone] at prompt. This pager can not be contacted by text page or outside line.

## 2019-04-11 NOTE — DISCHARGE SUMMARY
[facility], [place]  Discharge Summary - Medicine & Pediatrics       Date of Admission:  4/9/2019  Date of Discharge:  4/11/2019  Discharging Provider: Dr. Valera  Discharge Service: Baron 3    Discharge Diagnoses   1. Non oliguric acute kidney injury secondary to vancomycin toxicity  2. Supratherapeutic vancomycin level  3. Recent right total knee arthroplasty with post operative infection    Follow-ups Needed After Discharge   Follow-up Appointments     Adult [facility] Follow-up and recommended labs and tests      Follow up with primary care provider, Cuca Celeste, within 7 days   for hospital follow- up.  The following labs/tests are recommended: BMP.      Appointments on [place] and/or [facility] (with [facility] or [facility]   provider or service). Call [phone] if you haven't heard regarding   these appointments within 7 days of discharge.    Follow up with orthopedic surgery in 1-2 weeks.             Unresulted Labs Ordered in the Past 30 Days of this Admission     Date and Time Order Name Status Description    2/8/2019 0716 Platelets prepare order unit In process       These results will be followed up by N/A    Discharge Disposition   Discharged to home  Condition at discharge: Stable    Hospital Course   Ten Johnosn was admitted on 4/9/2019 for nonoliguric acute kidney injury secondary to vancomycin toxicity.  The following problems were addressed during his hospitalization:    #Nonoliguric JENNIFER  #Supra therapeutic vancomycin  Cr 3.64 on admission up from baseline Cr of 0.7-0.9. UA largely unremarkable making GN unlikely. Low concern that this is a prerenal JENNIFER given lack of hypo/hypervolemia, lack of hyaline casts. No s/s of infection, leukocytosis, or hypotension making sepsis unlikely. No evidence of obstruction on renal ultrasound. Etiology most consistent with intrinsic renal injury due to vancomycin which was supratherapeutic at presentation. No acute  indications for dialysis, continued to make urine, creatinine monitored for 24 hours with improvement, suspect renal function will continue to improve to baseline.   -no further treatment with vancomycin  -continue to hold lisinopril and pantoprazole until repeat creatinine shows continued improvement  -OK to continue with baby aspirin  -recheck creatinine at next home care visit  -note that creatinine will likely increase after starting bactrim but this does not correlate with reduced GFR     #Right TKA infection   Pt has been receiving home infusion of ceftriaxone and vancomycin, planned to complete on 4/15. Per Dr. Hare note (Infectious disease) and Ortho okay to stop vanc early while continuing ceftriaxone till the 15th. Low concern for active infection at this time given normal white count, negative CRP, and improved swelling/warmth/redness of joint. Pt will start Bactrim after completion of IV antibiotics which may raise creatinine.  -Vancomycin discontinued  -Continue ceftriaxone  -Acetaminophen and Hydromorphone PRN for pain - will send with 10 tablets of dilaudid for pain  -Outpt ortho follow up in 1-2 weeks     Chronic Medical Problems  #HTN  #Aortic Stenosis s/p TAVR  As above, will hold lisinopril until creatinine has improved further. OK to resume aspirin which he is taking for blood thinning post TAVR.     #Compensated cirrhosis  #Untreated Hep C  #Chronic thrombocytopenia   LFTs stable on admission. Baseline Plts 60-70 likely 2/2 liver disease, no signs of bleeding so will continue to monitor.   -Hepatology f/u as outpatient - patient planning to schedule    Consultations This Hospital Stay   ORTHOPAEDIC SURGERY ADULT/PEDS IP CONSULT  NEPHROLOGY GENERAL ADULT IP CONSULT    Code Status   Full Code     The patient was discussed with Dr. Valera.    MD Baron Herrera 3 Service  [facility] [place]  Pager:  [phone]  ______________________________________________________________________    Physical Exam   Vital Signs: Temp: 97.3  F (36.3  C) Temp src: Oral BP: 140/82 Pulse: 96   Resp: 16 SpO2: 97 % O2 Device: None (Room air)    Weight: 167 lbs 12.8 oz  General: pleasant, comfortable, NAD  HEENT: sclera anicteric, no JVD  Lungs: clear bilaterally  CV: normal rate, regular rhythm, no m/r/g  Abd: soft, non distended, non tender  Ext: similar right knee edema without redness or warmth  Skin: no rashes, bruising, jaundice  Neuro: alert and oriented, fluent speech, no apparent focal deficits      Primary Care Physician   Cuca Celeste    Discharge Orders      Home infusion referral      Reason for your hospital stay    You were admitted to the hospital due to a kidney injury that was due to your vancomycin antibiotic. Your kidney function improved while in the hospital.     Adult [facility] Follow-up and recommended labs and tests    Follow up with primary care provider, Cuca Celeste, within 7 days for hospital follow- up.  The following labs/tests are recommended: BMP.      Appointments on [place] and/or [facility] (with [facility] or [facility] provider or service). Call [phone] if you haven't heard regarding these appointments within 7 days of discharge.    Follow up with orthopedic surgery in 1-2 weeks.     Activity    Your activity upon discharge: activity as tolerated     When to contact your care team    Contact your care team if you notice changes with urination including decreased amount of urine or decreased frequency, blood in the urine or dark colored urine, pain with urination. Additionally, if you develop shortness of breath, fevers, or chills.     IV access    **Ordering Provider MUST call/page Care Coordinator/ to discuss arranging this service**    You are going home with the following vascular access device: PICC.     Discharge Instructions    Continue to not take your lisinopril, baby  aspirin, and pantoprazole (stomach acid medication) until your kidney function has returned to normal. Once you have completed your ceftriaxone antibiotic you will be starting the oral antibiotic bactrim. Do not drive or operate heavy machinery when using dilaudid.     Full Code     Diet    Follow this diet upon discharge: Orders Placed This Encounter      Combination Diet Regular Diet Adult     Significant Results and Procedures   Creatinine: 3.6 -> 4.0 -> 3.35  Vancomycin: 60 -> 52 -> 26  Renal Ultrasound:  Impression:  1. No hydronephrosis.  2. Unremarkable Doppler evaluation of the kidneys.    Discharge Medications   Current Discharge Medication List      CONTINUE these medications which have CHANGED    Details   cefTRIAXone (ROCEPHIN) 1 GM vial Inject 1 g into the vein every 24 hours Through 4/15 or as otherwise directed by ID  Qty: 240 mL, Refills: 0    Associated Diagnoses: Infection of total knee replacement, subsequent encounter      HYDROmorphone (DILAUDID) 2 MG tablet Take 1 tablet (2 mg) by mouth every 6 hours as needed for pain  Qty: 10 tablet, Refills: 0    Associated Diagnoses: Infection of prosthetic knee joint, initial encounter (H)         CONTINUE these medications which have NOT CHANGED    Details   acetaminophen (TYLENOL) 325 MG tablet Take 2 tablets (650 mg) by mouth every 6 hours as needed for mild pain  Qty: 100 tablet, Refills: 0    Associated Diagnoses: Infection of prosthetic knee joint, initial encounter (H)      diclofenac (VOLTAREN) 1 % topical gel as needed      ferrous sulfate (FEROSUL) 325 (65 Fe) MG tablet Take 1 tablet (325 mg) by mouth At Bedtime  Qty: 90 tablet, Refills: 2    Associated Diagnoses: Anemia, unspecified type      fluticasone (FLONASE) 50 MCG/ACT nasal spray Spray 2 sprays into both nostrils daily  Qty: 3 Bottle, Refills: 3    Comments: 1 btl=16 gm  Associated Diagnoses: Cough; Post-nasal drip      lactobacillus rhamnosus, GG, (CULTURELL) capsule Take 1 capsule by  mouth 2 times daily  Qty: 68 capsule, Refills: 0    Associated Diagnoses: Infection of total knee replacement, subsequent encounter      loratadine (CLARITIN) 10 MG tablet Take 1 tablet (10 mg) by mouth daily  Qty: 90 tablet, Refills: 2    Associated Diagnoses: Post-nasal drip; Cough      Multiple Vitamin (MULTIVITAMINS PO) Take 1 tablet by mouth At Bedtime       order for DME Equipment being ordered: Cane ()  Treatment Diagnosis: Gait Instability  Qty: 1 each, Refills: 0    Associated Diagnoses: S/P total knee arthroplasty, right      sulfamethoxazole-trimethoprim (BACTRIM DS/SEPTRA DS) 800-160 MG tablet Take 1 tablet by mouth 2 times daily  Qty: 60 tablet, Refills: 3    Associated Diagnoses: Infection associated with internal right knee prosthesis, subsequent encounter      vitamin B1 (THIAMINE) 50 MG tablet Take 1 tablet (50 mg) by mouth daily  Qty: 30 tablet, Refills: 3    Associated Diagnoses: Thiamine deficiency         STOP taking these medications       ASPIRIN LOW DOSE 81 MG EC tablet Comments:   Reason for Stopping:         HYDROcodone-acetaminophen (NORCO) 5-325 MG tablet Comments:   Reason for Stopping:         lisinopril (PRINIVIL/ZESTRIL) 20 MG tablet Comments:   Reason for Stopping:         pantoprazole (PROTONIX) 40 MG EC tablet Comments:   Reason for Stopping:         sodium chloride 0.9 % SOLN 250 mL with vancomycin 100 mg/mL SOLR 1,750 mg Comments:   Reason for Stopping:             Allergies   Allergies   Allergen Reactions     Zolpidem Other (See Comments)     Alcoholic.  Had reaction 3/17/13 while intoxicated which included black out, loss of awareness, paranoia.  Do not prescribe.  Dr. Celeste     Cats Other (See Comments)     rhinitis     Dogs Other (See Comments)     rhinitis     Pollen Extract Other (See Comments)     rhinits.

## 2019-04-11 NOTE — PLAN OF CARE
Time: 1900-0730     Reason for admission: JENNIFER, elevated vanco level  Vitals: VSS on RA ex elevated BP  Activity: Up independently in room  Pain: C/o R knee and leg pain, aching. PRN dilaudid with relief.   Neuro:  A&Ox4, calls appropriately   Cardiac: WNL   Respiratory: WNL  GI/: Strict I/O. Patient voiding in urinal and writing down intake. No BM this shift.   Diet:  Regular diet. Strict I/O  Lines: R PICC saline locked between Rocephin doses.   Skin/Wounds: R knee incision with adhesive strips intact. No drainage.   Labs: Crt 3.6, Hgb 12.9, Plt 51     Plan: Nephrology and Ortho following. Discharge home once abx plan established and kidney function improves      Continue to monitor and follow POC

## 2019-04-11 NOTE — PLAN OF CARE
"/82 (BP Location: Left arm)   Pulse 96   Temp 97.3  F (36.3  C) (Oral)   Resp 16   Ht 1.753 m (5' 9\")   Wt 76.1 kg (167 lb 12.8 oz)   SpO2 97%   BMI 24.78 kg/m       Pt in stable condition and ready for discharge. PICC in place for home antibiotic infusions. Pt discharging home. Discharge instructions and education reviewed with patient. Pt verbalizes understanding of all instructions. All belongings packed and sent with pt. Transportation arranged and pt left the floor at 1630.          "

## 2019-04-12 ENCOUNTER — TELEPHONE (OUTPATIENT)
Dept: ORTHOPEDICS | Facility: CLINIC | Age: 59
End: 2019-04-12

## 2019-04-12 ENCOUNTER — HOME INFUSION (PRE-WILLOW HOME INFUSION) (OUTPATIENT)
Dept: PHARMACY | Facility: CLINIC | Age: 59
End: 2019-04-12

## 2019-04-12 ENCOUNTER — MYC MEDICAL ADVICE (OUTPATIENT)
Dept: ORTHOPEDICS | Facility: CLINIC | Age: 59
End: 2019-04-12

## 2019-04-12 ENCOUNTER — MYC MEDICAL ADVICE (OUTPATIENT)
Dept: INTERNAL MEDICINE | Facility: CLINIC | Age: 59
End: 2019-04-12

## 2019-04-12 ENCOUNTER — TELEPHONE (OUTPATIENT)
Dept: INTERNAL MEDICINE | Facility: CLINIC | Age: 59
End: 2019-04-12

## 2019-04-12 DIAGNOSIS — T84.59XD INFECTION OF TOTAL KNEE REPLACEMENT, SUBSEQUENT ENCOUNTER: Primary | ICD-10-CM

## 2019-04-12 DIAGNOSIS — Z96.659 INFECTION OF TOTAL KNEE REPLACEMENT, SUBSEQUENT ENCOUNTER: Primary | ICD-10-CM

## 2019-04-12 RX ORDER — OXYCODONE HYDROCHLORIDE 5 MG/1
5 TABLET ORAL EVERY 6 HOURS PRN
Qty: 40 TABLET | Refills: 0 | Status: SHIPPED | OUTPATIENT
Start: 2019-04-12 | End: 2019-05-07

## 2019-04-12 NOTE — TELEPHONE ENCOUNTER
Rx for oxycodone was left in the [facility] locked box. Pt is s/p Revision RTK poly component exchange on 3/4/19.

## 2019-04-12 NOTE — PROGRESS NOTES
This is a recent snapshot of the patient's [facility] medical record.  For current drug dose and complete information and questions, call [phone] or In Basket pool, fv home infusion ([id number])  CSN Number:  [id number]

## 2019-04-12 NOTE — TELEPHONE ENCOUNTER
[facility] Call Center    Phone Message    May a detailed message be left on voicemail: yes    Reason for Call: Other: Patient needs to schedule for a hospital follow up. He states he can only see Dr. Celeste due to the complexity of his health issues. Preferred time frames are after 9:30AM and on. Please call to discuss options, he was supposed to follow up within 1 Week      Action Taken: Message routed to:  [facility]): Primary

## 2019-04-12 NOTE — TELEPHONE ENCOUNTER
Made patient's follow up appointment as requested and sent him a [employer] message with the time and date, he mainly communicates through [employer].

## 2019-04-12 NOTE — PROGRESS NOTES
Patient was contacted by an RN for post DC follow up so no duplicate post DC follow up call will be made     Contact made through [employer]

## 2019-04-15 ENCOUNTER — MYC MEDICAL ADVICE (OUTPATIENT)
Dept: INFECTIOUS DISEASES | Facility: CLINIC | Age: 59
End: 2019-04-15

## 2019-04-15 ENCOUNTER — HOME INFUSION (PRE-WILLOW HOME INFUSION) (OUTPATIENT)
Dept: PHARMACY | Facility: CLINIC | Age: 59
End: 2019-04-15

## 2019-04-15 LAB
AST SERPL W P-5'-P-CCNC: 96 U/L (ref 0–45)
BASOPHILS # BLD AUTO: 0 10E9/L (ref 0–0.2)
BASOPHILS NFR BLD AUTO: 0.4 %
BUN SERPL-MCNC: 36 MG/DL (ref 7–30)
CREAT SERPL-MCNC: 2.94 MG/DL (ref 0.66–1.25)
CRP SERPL-MCNC: <2.9 MG/L (ref 0–8)
DIFFERENTIAL METHOD BLD: ABNORMAL
EOSINOPHIL # BLD AUTO: 0.1 10E9/L (ref 0–0.7)
EOSINOPHIL NFR BLD AUTO: 0.9 %
ERYTHROCYTE [DISTWIDTH] IN BLOOD BY AUTOMATED COUNT: 13.7 % (ref 10–15)
ERYTHROCYTE [SEDIMENTATION RATE] IN BLOOD BY WESTERGREN METHOD: 17 MM/H (ref 0–20)
GFR SERPL CREATININE-BSD FRML MDRD: 22 ML/MIN/{1.73_M2}
HCT VFR BLD AUTO: 38.8 % (ref 40–53)
HGB BLD-MCNC: 13.5 G/DL (ref 13.3–17.7)
IMM GRANULOCYTES # BLD: 0 10E9/L (ref 0–0.4)
IMM GRANULOCYTES NFR BLD: 0.2 %
LYMPHOCYTES # BLD AUTO: 2.7 10E9/L (ref 0.8–5.3)
LYMPHOCYTES NFR BLD AUTO: 26.5 %
MCH RBC QN AUTO: 31.4 PG (ref 26.5–33)
MCHC RBC AUTO-ENTMCNC: 34.8 G/DL (ref 31.5–36.5)
MCV RBC AUTO: 90 FL (ref 78–100)
MONOCYTES # BLD AUTO: 1.9 10E9/L (ref 0–1.3)
MONOCYTES NFR BLD AUTO: 18.2 %
NEUTROPHILS # BLD AUTO: 5.5 10E9/L (ref 1.6–8.3)
NEUTROPHILS NFR BLD AUTO: 53.8 %
NRBC # BLD AUTO: 0 10*3/UL
NRBC BLD AUTO-RTO: 0 /100
PLATELET # BLD AUTO: 84 10E9/L (ref 150–450)
RBC # BLD AUTO: 4.3 10E12/L (ref 4.4–5.9)
WBC # BLD AUTO: 10.3 10E9/L (ref 4–11)

## 2019-04-15 PROCEDURE — 85025 COMPLETE CBC W/AUTO DIFF WBC: CPT | Performed by: INTERNAL MEDICINE

## 2019-04-15 PROCEDURE — 86140 C-REACTIVE PROTEIN: CPT | Performed by: INTERNAL MEDICINE

## 2019-04-15 PROCEDURE — 84450 TRANSFERASE (AST) (SGOT): CPT | Performed by: INTERNAL MEDICINE

## 2019-04-15 PROCEDURE — 82565 ASSAY OF CREATININE: CPT | Performed by: INTERNAL MEDICINE

## 2019-04-15 PROCEDURE — 85652 RBC SED RATE AUTOMATED: CPT | Performed by: INTERNAL MEDICINE

## 2019-04-15 PROCEDURE — 84520 ASSAY OF UREA NITROGEN: CPT | Performed by: INTERNAL MEDICINE

## 2019-04-15 NOTE — PROGRESS NOTES
This is a recent snapshot of the patient's [facility] medical record.  For current drug dose and complete information and questions, call [phone] or In Basket pool, fv home infusion ([id number])  CSN Number:  [id number]

## 2019-04-15 NOTE — TELEPHONE ENCOUNTER
I called Ten, who is being treated for L TKA PJI due to CONS (Ox-R) and E coli s/p 1-stage revision and treatment with vaco and ceftriaxone that was complicated by JENNIFER.    He has competed ceftriaxone. He has retained hardware so he is a candidate for suppression. I would like to continue with bactrim, reduced to 1DS qday given his renal function. Will cautiously proceed with this plan, acknowledging risk for further JENNIFER with bactrim. No other  alternatives for suppression are equally efficacious. I would like to continue weekly BMP, and instructed him these should begin on Fri 4/19. He is agreeable.    Though PICC line is an infection risk, I agreed to allow it to remain in on a temporary basis, especially in light of the need for frequent lab monitoring.    Johanna Hare MD   of Medicine, Division of Infectious Diseases  [facility] [phone]

## 2019-04-16 ENCOUNTER — TELEPHONE (OUTPATIENT)
Dept: INFECTIOUS DISEASES | Facility: CLINIC | Age: 59
End: 2019-04-16

## 2019-04-16 ENCOUNTER — HOME INFUSION (PRE-WILLOW HOME INFUSION) (OUTPATIENT)
Dept: PHARMACY | Facility: CLINIC | Age: 59
End: 2019-04-16

## 2019-04-16 NOTE — TELEPHONE ENCOUNTER
[facility] Call Center    Phone Message    May a detailed message be left on voicemail: yes    Reason for Call: Order(s): Other:   Reason for requested: Pt wants to leave picc line in for 1 more week. Please call Xenia to give her verbal okay on order to do so.   Date needed: Today if possible.   Provider name: Dr. Hare      Action Taken: Message routed to:  [facility] ID

## 2019-04-16 NOTE — PROGRESS NOTES
This is a recent snapshot of the patient's [facility] medical record.  For current drug dose and complete information and questions, call [phone] or In Basket pool, fv home infusion ([id number])  CSN Number:  [id number]

## 2019-04-16 NOTE — TELEPHONE ENCOUNTER
Scheduling called patient today and he declined to schedule follow up with another provider. He has follow up with PCP in May. It does appear PCP has sooner availability, though. I will send message to clinic coordinator for clarification, determine if patient declined current PCP availabilities.     Yovana Lindsey RN    ------------------------    Clinic coordinator clarified that patient does have first available appointment with PCP that works with his schedule and is an appropriate length of time for evaluation.    Yovana Lindsey RN  4/16/2019 2:27 PM

## 2019-04-17 NOTE — PROGRESS NOTES
This is a recent snapshot of the patient's [facility] medical record.  For current drug dose and complete information and questions, call [phone] or In Basket pool, fv home infusion ([id number])  CSN Number:  [id number]

## 2019-04-18 NOTE — TELEPHONE ENCOUNTER
See message below with Dr. Steinberg's. Writer called [facility] to let them know that patient can leave PICC line in for 1 more week (until 04/25/2019). Also let them now that patient should get a BMP today and next Tuesday.    Obdulia Eric RN  -------------------------------------------------------------------------      Johanna Hare MD Danielson, Megan, RN 36 minutes ago (8:26 AM)      I'm okay with another week, so plan to remove next Thurs 4/25. I would like him to get a BMP today (or tomorrow) and also next Tuesday.     Routing comment       Joselin Santos RN Galdys, Alison Lee, MD 15 hours ago (5:12 PM)      Hi Dr. Hare, How long should I tell her it can be left in? Thanks!   Joselin    Routing comment       Kandi Page routed conversation to [facility] 2 days ago      [facility]  [phone]  Kandi Page 2 days ago      Pt wants to leave picc line in for 1 more week     Incoming call       Kandi Page 2 days ago         [facility]     Phone Message     May a detailed message be left on voicemail: yes     Reason for Call: Order(s): Other:   Reason for requested: Pt wants to leave picc line in for 1 more week. Please call Xenia to give her verbal okay on order to do so.   Date needed: Today if possible.   Provider name: Dr. Hare        Action Taken: Message routed to:  [facility] ([facility] [place] [facility]): ID            Documentation

## 2019-04-19 ENCOUNTER — HOME INFUSION (PRE-WILLOW HOME INFUSION) (OUTPATIENT)
Dept: PHARMACY | Facility: CLINIC | Age: 59
End: 2019-04-19

## 2019-04-19 ENCOUNTER — MYC MEDICAL ADVICE (OUTPATIENT)
Dept: INTERNAL MEDICINE | Facility: CLINIC | Age: 59
End: 2019-04-19

## 2019-04-19 DIAGNOSIS — M25.561 RIGHT KNEE PAIN: ICD-10-CM

## 2019-04-19 DIAGNOSIS — Z98.890 S/P ARTHROSCOPY OF RIGHT KNEE: Primary | ICD-10-CM

## 2019-04-19 LAB
ANION GAP SERPL CALCULATED.3IONS-SCNC: 8 MMOL/L (ref 3–14)
BUN SERPL-MCNC: 40 MG/DL (ref 7–30)
CALCIUM SERPL-MCNC: 8.5 MG/DL (ref 8.5–10.1)
CHLORIDE SERPL-SCNC: 104 MMOL/L (ref 94–109)
CO2 SERPL-SCNC: 26 MMOL/L (ref 20–32)
CREAT SERPL-MCNC: 2.91 MG/DL (ref 0.66–1.25)
GFR SERPL CREATININE-BSD FRML MDRD: 22 ML/MIN/{1.73_M2}
GLUCOSE SERPL-MCNC: 92 MG/DL (ref 70–99)
POTASSIUM SERPL-SCNC: 4.4 MMOL/L (ref 3.4–5.3)
SODIUM SERPL-SCNC: 138 MMOL/L (ref 133–144)

## 2019-04-19 PROCEDURE — 80048 BASIC METABOLIC PNL TOTAL CA: CPT | Performed by: INTERNAL MEDICINE

## 2019-04-22 ENCOUNTER — ANCILLARY PROCEDURE (OUTPATIENT)
Dept: GENERAL RADIOLOGY | Facility: CLINIC | Age: 59
End: 2019-04-22
Attending: ORTHOPAEDIC SURGERY
Payer: COMMERCIAL

## 2019-04-22 ENCOUNTER — OFFICE VISIT (OUTPATIENT)
Dept: ORTHOPEDICS | Facility: CLINIC | Age: 59
End: 2019-04-22
Payer: COMMERCIAL

## 2019-04-22 DIAGNOSIS — T84.59XD INFECTION OF TOTAL KNEE REPLACEMENT, SUBSEQUENT ENCOUNTER: Primary | ICD-10-CM

## 2019-04-22 DIAGNOSIS — T84.53XD INFECTION OF TOTAL RIGHT KNEE REPLACEMENT, SUBSEQUENT ENCOUNTER: Primary | ICD-10-CM

## 2019-04-22 DIAGNOSIS — M25.561 RIGHT KNEE PAIN: ICD-10-CM

## 2019-04-22 DIAGNOSIS — Z96.659 INFECTION OF TOTAL KNEE REPLACEMENT, SUBSEQUENT ENCOUNTER: Primary | ICD-10-CM

## 2019-04-22 NOTE — PROGRESS NOTES
Navi is seen now s/p right total knee infection with a washout and poly exchange on 3/4/2019 s/p his original R TKA done on 2/7/2019. He is being managed by ID for IV antibiotics from a positive knee fluid culture of escherichia coli with light staphylococcus epidermidis. He is on IV rocephin now because he experienced acute renal failure with his creatine now steady at 2.9 when compared to .78 preop that presumably caused from vancomycin. He is also taking Bactrim BID.     His inflammatory markers are improving    CRP on 2/28 was 10.6 and 4/15/ is 2.9  ESR has been within normal limits    He is pleasant and cooperative upon examination today. His incision is healed/intact with a mild effusion without focal areas of tenderness. Quad strength 5/5 and symmetrical. No calf pain. Alignment is near neutral. Walking with a cane today.      Xrays were taken today including a full length that show minimal leg length difference with a well positioned tibial and femoral arthroplasty without lucency or osteolysis noted.      Dx:  1. Resolution from R TKA infection s/p debridement and polyethylene exchange    Plan:  1. We made him a follow up appointment for ID and renal. We appreciate collaboration and coordination of care.  2. He will follow up in 2-3 weeks.     I, Dr. Olvin Joe, agree with above plan of care and examined the patient.

## 2019-04-22 NOTE — LETTER
4/22/2019       RE: Ten Johnson  [address]  [place] [address]     Dear Colleague,    Thank you for referring your patient, Ten Johnson, to the [facility]. Please see a copy of my visit note below.    Navi is seen now s/p right total knee infection with a washout and poly exchange on 3/4/2019 s/p his original R TKA done on 2/7/2019. He is being managed by ID for IV antibiotics from a positive knee fluid culture of escherichia coli with light staphylococcus epidermidis. He is on IV rocephin now because he experienced acute renal failure with his creatine now steady at 2.9 when compared to .78 preop that presumably caused from vancomycin. He is also taking Bactrim BID.     His inflammatory markers are improving    CRP on 2/28 was 10.6 and 4/15/ is 2.9  ESR has been within normal limits    He is pleasant and cooperative upon examination today. His incision is healed/intact with a mild effusion without focal areas of tenderness. Quad strength 5/5 and symmetrical. No calf pain. Alignment is near neutral. Walking with a cane today.      Xrays were taken today including a full length that show minimal leg length difference with a well positioned tibial and femoral arthroplasty without lucency or osteolysis noted.      Dx:  1. Resolution from R TKA infection s/p debridement and polyethylene exchange    Plan:  1. We made him a follow up appointment for ID and renal. We appreciate collaboration and coordination of care.  2. He will follow up in 2-3 weeks.     I, Dr. Olvin Joe, agree with above plan of care and examined the patient.    Again, thank you for allowing me to participate in the care of your patient.      Sincerely,    Olvin Joe MD

## 2019-04-22 NOTE — NURSING NOTE
Reason For Visit:   Chief Complaint   Patient presents with     RECHECK     Follow up revision right TKA poly component exchange DOS: 3/4/19       There were no vitals taken for this visit.    Pain Assessment  Patient Currently in Pain: Yes  0-10 Pain Scale: 8(worse with standing )  Primary Pain Location: Knee    Emily Jones ATC

## 2019-04-22 NOTE — NURSING NOTE
"Pt left \"Loss of Time Application\" for Dr Joe to fill out.  It will be scanned into the system, and copy faxed to [phone], and copy mailed to pt's home.  Ksenia Pugh RN  "

## 2019-04-23 ENCOUNTER — ALLIED HEALTH/NURSE VISIT (OUTPATIENT)
Dept: ONCOLOGY | Facility: CLINIC | Age: 59
End: 2019-04-23
Attending: NURSE PRACTITIONER
Payer: COMMERCIAL

## 2019-04-23 ENCOUNTER — OFFICE VISIT (OUTPATIENT)
Dept: INTERNAL MEDICINE | Facility: CLINIC | Age: 59
End: 2019-04-23
Payer: COMMERCIAL

## 2019-04-23 VITALS
SYSTOLIC BLOOD PRESSURE: 161 MMHG | DIASTOLIC BLOOD PRESSURE: 104 MMHG | HEART RATE: 73 BPM | BODY MASS INDEX: 24.93 KG/M2 | WEIGHT: 168.8 LBS

## 2019-04-23 DIAGNOSIS — D69.6 TEMPORARY LOW PLATELET COUNT (H): ICD-10-CM

## 2019-04-23 DIAGNOSIS — M79.606 LEG PAIN: Primary | ICD-10-CM

## 2019-04-23 DIAGNOSIS — K76.6 PORTAL HYPERTENSION (H): ICD-10-CM

## 2019-04-23 DIAGNOSIS — D64.9 ANEMIA, UNSPECIFIED TYPE: Primary | ICD-10-CM

## 2019-04-23 DIAGNOSIS — R79.89 ELEVATED SERUM CREATININE: Primary | ICD-10-CM

## 2019-04-23 DIAGNOSIS — Z96.659 INFECTION OF PROSTHETIC KNEE JOINT, SEQUELA: ICD-10-CM

## 2019-04-23 DIAGNOSIS — T84.59XS INFECTION OF PROSTHETIC KNEE JOINT, SEQUELA: ICD-10-CM

## 2019-04-23 LAB
ALBUMIN SERPL-MCNC: 3.2 G/DL (ref 3.4–5)
ALP SERPL-CCNC: 113 U/L (ref 40–150)
ALT SERPL W P-5'-P-CCNC: 94 U/L (ref 0–70)
ANION GAP SERPL CALCULATED.3IONS-SCNC: 6 MMOL/L (ref 3–14)
AST SERPL W P-5'-P-CCNC: 112 U/L (ref 0–45)
BASOPHILS # BLD AUTO: 0 10E9/L (ref 0–0.2)
BASOPHILS NFR BLD AUTO: 0.5 %
BILIRUB SERPL-MCNC: 0.6 MG/DL (ref 0.2–1.3)
BUN SERPL-MCNC: 28 MG/DL (ref 7–30)
CALCIUM SERPL-MCNC: 8.8 MG/DL (ref 8.5–10.1)
CHLORIDE SERPL-SCNC: 107 MMOL/L (ref 94–109)
CO2 SERPL-SCNC: 26 MMOL/L (ref 20–32)
CREAT SERPL-MCNC: 2.19 MG/DL (ref 0.66–1.25)
CRP SERPL-MCNC: <2.9 MG/L (ref 0–8)
DIFFERENTIAL METHOD BLD: ABNORMAL
EOSINOPHIL # BLD AUTO: 0.1 10E9/L (ref 0–0.7)
EOSINOPHIL NFR BLD AUTO: 1.9 %
ERYTHROCYTE [DISTWIDTH] IN BLOOD BY AUTOMATED COUNT: 13.4 % (ref 10–15)
ERYTHROCYTE [SEDIMENTATION RATE] IN BLOOD BY WESTERGREN METHOD: 16 MM/H (ref 0–20)
GFR SERPL CREATININE-BSD FRML MDRD: 31 ML/MIN/{1.73_M2}
GLUCOSE SERPL-MCNC: 100 MG/DL (ref 70–99)
HCT VFR BLD AUTO: 37.3 % (ref 40–53)
HGB BLD-MCNC: 13.2 G/DL (ref 13.3–17.7)
IMM GRANULOCYTES # BLD: 0 10E9/L (ref 0–0.4)
IMM GRANULOCYTES NFR BLD: 0.3 %
LYMPHOCYTES # BLD AUTO: 1.6 10E9/L (ref 0.8–5.3)
LYMPHOCYTES NFR BLD AUTO: 21.4 %
MCH RBC QN AUTO: 31.3 PG (ref 26.5–33)
MCHC RBC AUTO-ENTMCNC: 35.4 G/DL (ref 31.5–36.5)
MCV RBC AUTO: 88 FL (ref 78–100)
MONOCYTES # BLD AUTO: 1.1 10E9/L (ref 0–1.3)
MONOCYTES NFR BLD AUTO: 14.7 %
NEUTROPHILS # BLD AUTO: 4.5 10E9/L (ref 1.6–8.3)
NEUTROPHILS NFR BLD AUTO: 61.2 %
NRBC # BLD AUTO: 0 10*3/UL
NRBC BLD AUTO-RTO: 0 /100
PLATELET # BLD AUTO: 75 10E9/L (ref 150–450)
POTASSIUM SERPL-SCNC: 4.3 MMOL/L (ref 3.4–5.3)
PROT SERPL-MCNC: 7.2 G/DL (ref 6.8–8.8)
RBC # BLD AUTO: 4.22 10E12/L (ref 4.4–5.9)
SODIUM SERPL-SCNC: 139 MMOL/L (ref 133–144)
WBC # BLD AUTO: 7.4 10E9/L (ref 4–11)

## 2019-04-23 PROCEDURE — 36592 COLLECT BLOOD FROM PICC: CPT

## 2019-04-23 PROCEDURE — 85025 COMPLETE CBC W/AUTO DIFF WBC: CPT | Performed by: INTERNAL MEDICINE

## 2019-04-23 PROCEDURE — 86140 C-REACTIVE PROTEIN: CPT | Performed by: INTERNAL MEDICINE

## 2019-04-23 PROCEDURE — 80053 COMPREHEN METABOLIC PANEL: CPT | Performed by: INTERNAL MEDICINE

## 2019-04-23 PROCEDURE — 85652 RBC SED RATE AUTOMATED: CPT | Performed by: INTERNAL MEDICINE

## 2019-04-23 RX ORDER — METOPROLOL SUCCINATE 50 MG/1
50 TABLET, EXTENDED RELEASE ORAL DAILY
Qty: 30 TABLET | Refills: 3 | Status: SHIPPED | OUTPATIENT
Start: 2019-04-23 | End: 2019-06-05

## 2019-04-23 RX ORDER — TRAMADOL HYDROCHLORIDE 50 MG/1
50 TABLET ORAL EVERY 6 HOURS PRN
Qty: 40 TABLET | Refills: 0
Start: 2019-04-23 | End: 2019-05-07

## 2019-04-23 ASSESSMENT — PAIN SCALES - GENERAL: PAINLEVEL: EXTREME PAIN (8)

## 2019-04-23 NOTE — PATIENT INSTRUCTIONS
[facility] Medication Refill Request Information:  * Please contact your pharmacy regarding ANY request for medication refills.  ** [facility] Prescription Fax = [phone]  * Please allow 3 business days for routine medication refills.  * Please allow 5 business days for controlled substance medication refills.     [facility] Test Result notification information:  *You will be notified with in 7-10 days of your appointment day regarding the results of your test.  If you are on MyChart you will be notified as soon as the provider has reviewed the results and signed off on them.    [facility]: [phone]

## 2019-04-23 NOTE — PROGRESS NOTES
"[facility]   Octavia Kimbrough, SYDNIE CNP  04/23/2019      Chief Complaint:   Kidney Problem and Hospital F/U     History of Present Illness:   Ten Johnson is a 58 year old male with a history of alcoholic cirrhosis, JENNIFER, ascites, and esophageal varices who presents for evaluation of kidney function.    ED Follow-Up  Patient seen in the ED on 4/9 for acute kidney injury secondary to vancomycin toxicity. He had been receiving vancomycin and ceftriaxone for a right TKA infection. After consulting with Orthopedics and Infectious Disease, patient's vancomycin was stopped while continuing ceftriaxone. Patient's labs revealed elevated level of creatinine of 3.64, as well as an unremarkable UA. There was no evidence of obstruction on renal ultrasound (see below). Patient's   labs were monitored, and he was discharged on 4/11 with a prescription for Bactrim to be started post-IV antibioticcompletion. Patient was instructed to follow-up with orthopedics in 1-2 weeks.      Patient is currently taking Bactrim once a day, as instructed by Dr. Hare in Infectious Disease. His last home infusion of ceftriaxone was on 4/15, but he was told to not remove his PICC line. Patient's platelet count is low today at 75. Additionally, his creatinine is elevated at 2.19, with his ALT and AST elevated at 94 and 112, respectively. His blood pressure is also elevated at 161/54. Patient reports his baseline is usually ~140/78.     Patient notes that he has lost 30-40 pounds of muscle since having his knee replaced. He is the \"weakest he's ever been\" and is \"sleeping 20 hours a day for no reason.\"     Other concerns discussed:  1. Medications - Patient uses Flonase in the winter for his allergies, and Claritin PRN during pollen season.      Review of Systems:   Pertinent items are noted in HPI, remainder of complete ROS is negative.      Active Medications:      metoprolol succinate ER (TOPROL-XL) 50 MG 24 hr tablet, Take " 1 tablet (50 mg) by mouth daily, Disp: 30 tablet, Rfl: 3     acetaminophen (TYLENOL) 325 MG tablet, Take 2 tablets (650 mg) by mouth every 6 hours as needed for mild pain (Patient not taking: Reported on 3/27/2019), Disp: 100 tablet, Rfl: 0     diclofenac (VOLTAREN) 1 % topical gel, as needed, Disp: , Rfl:      ferrous sulfate (FEROSUL) 325 (65 Fe) MG tablet, Take 1 tablet (325 mg) by mouth At Bedtime (Patient not taking: Reported on 4/22/2019), Disp: 90 tablet, Rfl: 2     fluticasone (FLONASE) 50 MCG/ACT nasal spray, Spray 2 sprays into both nostrils daily (Patient not taking: Reported on 4/22/2019), Disp: 3 Bottle, Rfl: 3     HYDROmorphone (DILAUDID) 2 MG tablet, Take 1 tablet (2 mg) by mouth every 6 hours as needed for pain (Patient not taking: Reported on 4/22/2019), Disp: 10 tablet, Rfl: 0     Multiple Vitamin (MULTIVITAMINS PO), Take 1 tablet by mouth At Bedtime , Disp: , Rfl:      oxyCODONE (ROXICODONE) 5 MG tablet, Take 1 tablet (5 mg) by mouth every 6 hours as needed for severe pain (max 4/day), Disp: 40 tablet, Rfl: 0     sulfamethoxazole-trimethoprim (BACTRIM DS/SEPTRA DS) 800-160 MG tablet, Take 1 tablet by mouth 2 times daily (Patient not taking: Reported on 4/22/2019), Disp: 60 tablet, Rfl: 3     traMADol (ULTRAM) 50 MG tablet, Take 1 tablet (50 mg) by mouth every 6 hours as needed for severe pain, Disp: 40 tablet, Rfl: 0     vitamin B1 (THIAMINE) 50 MG tablet, Take 1 tablet (50 mg) by mouth daily, Disp: 30 tablet, Rfl: 3      Allergies:   Zolpidem; Cats; Dogs; and Pollen extract      Past Medical History:  Alcohol use disorder  Alcoholic cirrhosis   Ascites   Chronic allergic rhinitis   Chronic anemia   Chronic hepatitis C  Diastolic dysfunction   Erosive gastropathy   Esophageal varices in alcoholic cirrhosis   Upper gastrointestinal hemorrhage   Hypertension   Iron deficiency anemia   Sarahi-Hoffman tear   Marijuana abuse   MRSA   Olecranon bursitis   Severe aortic stenosis  Thrombocytopenia    Presbyopia   Rotator cuff tear, right   Osteoarthritis of knee   JENNIFER     Past Surgical History:  Arthroscopy shoulder rotator cuff repair, 7/31/12  Esophagoscopy, gastroscopy, duodenoscopy, combined, 10/23/17  Exchange coco component arthroplasty knee, right, 3/4/19  Facial reconstruction surgery, 1971   Heart cath femoral cannulization with open standby repair aortic valve, 2/21/18  Irrigation and debridement upper extremity, combined, 1/3/12  Optical tracking system arthroplasty knee, right, 2/7/19  Repair tendon triceps upper extremity, 11/8/11  Shoulder surgery, 2003  Transcatheter aortic valve implant anesthesia, 2/21/18  Transposition ulnar nerve, 11/8/11    Family History:   Mother - cancer   Paternal uncle - alcoholism   Maternal grandmother - diabetes   Maternal grandfather - diabetes mellitus      Immunizations:  HEPA, 3/8/01  Influenza (IIV3) PF, 10/28/11, 12/18/12, 2/9/15  Influenza Vaccine IM 3 yrs+ 4 valent IIV4, 10/24/17, 1/30/19  Pneumo Conj 13-V (2010&after), 2/9/15  Pneumococcal 23 valent, 9/20/11  TD (Adulte, 7+), 3/8/01, 9/1/11, 3/5/19  Twinrix A/B, 5/31/12, 2/9/15     Social History:   The patient was alone.  Smoking Status: Never smoker    Smokeless Tobacco: Never used   Alcohol Use: Yes, alcohol use disorder   Employment status:  in the past     Physical Exam:   BP (!) 161/104 (BP Location: Left arm, Patient Position: Sitting, Cuff Size: Adult Regular)   Pulse 73   Wt 76.6 kg (168 lb 12.8 oz)   BMI 24.93 kg/m       Wt Readings from Last 1 Encounters:   04/09/19 76.1 kg (167 lb 12.8 oz)     Constitutional: no distress, comfortable, pleasant   Eyes: anicteric   Cardiovascular: regular rate and rhythm, normal S1 and S2, no murmur.  Respiratory: clear to auscultation, no wheezes or crackles, normal breath sounds   Gastrointestinal: positive bowel sounds, nontender, no hepatosplenomegaly, no masses   Musculoskeletal: full range of motion, no edema   Skin: no concerning  lesions, no jaundice, temp normal   Neurological: normal speech, no tremor. A and O x 3, good historian.  Psychological: appropriate mood, good eye contact, normal affect       Recent Labs    Labs from visit on 4/23  Component      Latest Ref Rng & Units 4/23/2019   WBC      4.0 - 11.0 10e9/L 7.4   RBC Count      4.4 - 5.9 10e12/L 4.22 (L)   Hemoglobin      13.3 - 17.7 g/dL 13.2 (L)   Hematocrit      40.0 - 53.0 % 37.3 (L)   MCV      78 - 100 fl 88   MCH      26.5 - 33.0 pg 31.3   MCHC      31.5 - 36.5 g/dL 35.4   RDW      10.0 - 15.0 % 13.4   Platelet Count      150 - 450 10e9/L 75 (L)   Diff Method       Automated Method   % Neutrophils      % 61.2   % Lymphocytes      % 21.4   % Monocytes      % 14.7   % Eosinophils      % 1.9   % Basophils      % 0.5   % Immature Granulocytes      % 0.3   Nucleated RBCs      0 /100 0   Absolute Neutrophil      1.6 - 8.3 10e9/L 4.5   Absolute Lymphocytes      0.8 - 5.3 10e9/L 1.6   Absolute Monocytes      0.0 - 1.3 10e9/L 1.1   Absolute Eosinophils      0.0 - 0.7 10e9/L 0.1   Absolute Basophils      0.0 - 0.2 10e9/L 0.0   Abs Immature Granulocytes      0 - 0.4 10e9/L 0.0   Absolute Nucleated RBC       0.0   Sodium      133 - 144 mmol/L 139   Potassium      3.4 - 5.3 mmol/L 4.3   Chloride      94 - 109 mmol/L 107   Carbon Dioxide      20 - 32 mmol/L 26   Anion Gap      3 - 14 mmol/L 6   Glucose      70 - 99 mg/dL 100 (H)   Urea Nitrogen      7 - 30 mg/dL 28   Creatinine      0.66 - 1.25 mg/dL 2.19 (H)   GFR Estimate      >60 mL/min/1.73:m2 31 (L)   GFR Estimate If Black      >60 mL/min/1.73:m2 36 (L)   Calcium      8.5 - 10.1 mg/dL 8.8   Bilirubin Total      0.2 - 1.3 mg/dL 0.6   Albumin      3.4 - 5.0 g/dL 3.2 (L)   Protein Total      6.8 - 8.8 g/dL 7.2   Alkaline Phosphatase      40 - 150 U/L 113   ALT      0 - 70 U/L 94 (H)   AST      0 - 45 U/L 112 (H)   Sed Rate      0 - 20 mm/h 16   CRP Inflammation      0.0 - 8.0 mg/L <2.9     From ED admission on 4/9  Component      Latest  Ref Rng & Units 4/9/2019 4/9/2019 4/9/2019           8:15 AM  4:27 PM  4:28 PM   WBC      4.0 - 11.0 10e9/L  9.8    RBC Count      4.4 - 5.9 10e12/L  4.66    Hemoglobin      13.3 - 17.7 g/dL  14.8    Hematocrit      40.0 - 53.0 %  45.2    MCV      78 - 100 fl  97    MCH      26.5 - 33.0 pg  31.8    MCHC      31.5 - 36.5 g/dL  32.7    RDW      10.0 - 15.0 %  14.1    Platelet Count      150 - 450 10e9/L  60 (L)    Diff Method        Automated Method    % Neutrophils      %  61.2    % Lymphocytes      %  18.0    % Monocytes      %  18.8    % Eosinophils      %  0.9    % Basophils      %  0.8    % Immature Granulocytes      %  0.3    Nucleated RBCs      0 /100  0    Absolute Neutrophil      1.6 - 8.3 10e9/L  6.0    Absolute Lymphocytes      0.8 - 5.3 10e9/L  1.8    Absolute Monocytes      0.0 - 1.3 10e9/L  1.8 (H)    Absolute Eosinophils      0.0 - 0.7 10e9/L  0.1    Absolute Basophils      0.0 - 0.2 10e9/L  0.1    Abs Immature Granulocytes      0 - 0.4 10e9/L  0.0    Absolute Nucleated RBC        0.0    Sodium      133 - 144 mmol/L  140    Potassium      3.4 - 5.3 mmol/L  4.3    Chloride      94 - 109 mmol/L  109    Carbon Dioxide      20 - 32 mmol/L  23    Anion Gap      3 - 14 mmol/L  8    Glucose      70 - 99 mg/dL  84    Urea Nitrogen      7 - 30 mg/dL  37 (H)    Creatinine      0.66 - 1.25 mg/dL 3.70 (H) 3.64 (H) 4.0 (H)   GFR Estimate      >60 mL/min/1.73:m2 17 (L) 17 (L) 15 (L)   GFR Estimate If Black      >60 mL/min/1.73:m2 19 (L) 20 (L) 19 (L)   Calcium      8.5 - 10.1 mg/dL  8.6    Bilirubin Total      0.2 - 1.3 mg/dL  0.6    Albumin      3.4 - 5.0 g/dL  3.3 (L)    Protein Total      6.8 - 8.8 g/dL  7.5    Alkaline Phosphatase      40 - 150 U/L  115    ALT      0 - 70 U/L  90 (H)    AST      0 - 45 U/L  100 (H)    Color Urine            Appearance Urine            Glucose Urine      NEG:Negative mg/dL      Bilirubin Urine      NEG:Negative      Ketones Urine      NEG:Negative mg/dL      Specific Gravity  Urine      1.003 - 1.035      Blood Urine      NEG:Negative      pH Urine      5.0 - 7.0 pH      Protein Albumin Urine      NEG:Negative mg/dL      Urobilinogen mg/dL      0.0 - 2.0 mg/dL      Nitrite Urine      NEG:Negative      Leukocyte Esterase Urine      NEG:Negative      Source            WBC Urine      0 - 5 /HPF      RBC Urine      0 - 2 /HPF      Mucous Urine      NEG:Negative /LPF      Vancomycin Level      mg/L 52.4 (HH)       Component      Latest Ref Rng & Units 4/9/2019           9:08 PM   WBC      4.0 - 11.0 10e9/L    RBC Count      4.4 - 5.9 10e12/L    Hemoglobin      13.3 - 17.7 g/dL    Hematocrit      40.0 - 53.0 %    MCV      78 - 100 fl    MCH      26.5 - 33.0 pg    MCHC      31.5 - 36.5 g/dL    RDW      10.0 - 15.0 %    Platelet Count      150 - 450 10e9/L    Diff Method          % Neutrophils      %    % Lymphocytes      %    % Monocytes      %    % Eosinophils      %    % Basophils      %    % Immature Granulocytes      %    Nucleated RBCs      0 /100    Absolute Neutrophil      1.6 - 8.3 10e9/L    Absolute Lymphocytes      0.8 - 5.3 10e9/L    Absolute Monocytes      0.0 - 1.3 10e9/L    Absolute Eosinophils      0.0 - 0.7 10e9/L    Absolute Basophils      0.0 - 0.2 10e9/L    Abs Immature Granulocytes      0 - 0.4 10e9/L    Absolute Nucleated RBC          Sodium      133 - 144 mmol/L    Potassium      3.4 - 5.3 mmol/L    Chloride      94 - 109 mmol/L    Carbon Dioxide      20 - 32 mmol/L    Anion Gap      3 - 14 mmol/L    Glucose      70 - 99 mg/dL    Urea Nitrogen      7 - 30 mg/dL    Creatinine      0.66 - 1.25 mg/dL    GFR Estimate      >60 mL/min/1.73:m2    GFR Estimate If Black      >60 mL/min/1.73:m2    Calcium      8.5 - 10.1 mg/dL    Bilirubin Total      0.2 - 1.3 mg/dL    Albumin      3.4 - 5.0 g/dL    Protein Total      6.8 - 8.8 g/dL    Alkaline Phosphatase      40 - 150 U/L    ALT      0 - 70 U/L    AST      0 - 45 U/L    Color Urine       Light Yellow   Appearance Urine       Clear    Glucose Urine      NEG:Negative mg/dL Negative   Bilirubin Urine      NEG:Negative Negative   Ketones Urine      NEG:Negative mg/dL Negative   Specific Gravity Urine      1.003 - 1.035 1.010   Blood Urine      NEG:Negative Negative   pH Urine      5.0 - 7.0 pH 5.5   Protein Albumin Urine      NEG:Negative mg/dL Negative   Urobilinogen mg/dL      0.0 - 2.0 mg/dL Normal   Nitrite Urine      NEG:Negative Negative   Leukocyte Esterase Urine      NEG:Negative Negative   Source       Midstream Urine   WBC Urine      0 - 5 /HPF <1   RBC Urine      0 - 2 /HPF 0   Mucous Urine      NEG:Negative /LPF Present (A)   Vancomycin Level      mg/L      Recent Imaging   From renal ultrasound on 4/9   Impression:     1. No hydronephrosis.  2. Unremarkable Doppler evaluation of the kidneys.      I have personally reviewed the examination and initial interpretation  and I agree with the findings.     ARIADNA FAYE MD    Assessment and Plan:  Elevated serum creatinine  Patient was admitted to the ED on 4/9 for JENNIFER secondary to vancomycin toxicity. His creatinine levels continue to be elevated, so a referral was made to schedule a consult with a nephrologist. Labs ordered to be done prior to his future appointment. Of note, patient cannot arrive to the clinic for appointments before 10am.   - NEPHROLOGY ADULT REFERRAL  - Comprehensive metabolic panel    Portal hypertension (H)  Patient has high blood pressure and stopped taking lisinopril due to JENNIFER. Prescribed metoprolol succinate to control BP instead. Patient can  today at the clinic's pharmacy.   - metoprolol succinate ER (TOPROL-XL) 50 MG 24 hr tablet  Dispense: 30 tablet; Refill: 3    Temporary low platelet count (H)  Labs ordered to be done prior to his future appointment.  - CBC with platelets    Follow-up: Return in about 1 month (around 5/23/2019).    Total time spent 25 minutes.  More than 50% of the time spent with Mr. Johnson on counseling / coordinating his  care.    Octavia OTOOLE CNP      Scribe Disclosure:  I, Britany Mccarty, am serving as a scribe to document services personally performed by SYDNIE Wright CNP at this visit, based upon the provider's statements to me. All documentation has been reviewed by the aforementioned provider prior to being entered into the official medical record.     Portions of this medical record were completed by a scribe. UPON MY REVIEW AND AUTHENTICATION BY ELECTRONIC SIGNATURE, this confirms (a) I performed the applicable clinical services, and (b) the record is accurate.

## 2019-04-23 NOTE — NURSING NOTE
Chief Complaint   Patient presents with     Kidney Problem     pt here to discuss kidney problem, would like referral     Hospital F/U     pt here following a hospital visit       Viviana Foss CMA at 3:53 PM on 4/23/2019.

## 2019-04-23 NOTE — NURSING NOTE
Chief Complaint   Patient presents with     Blood Draw     Labs drawn via PICC by RN in lab. Line flushed with saline. Lab appt only.      Donald Mena RN

## 2019-04-24 NOTE — PROGRESS NOTES
This is a recent snapshot of the patient's [facility] medical record.  For current drug dose and complete information and questions, call [phone] or In Basket pool, fv home infusion ([id number])  CSN Number:  [id number]

## 2019-04-29 ENCOUNTER — HOME INFUSION (PRE-WILLOW HOME INFUSION) (OUTPATIENT)
Dept: PHARMACY | Facility: CLINIC | Age: 59
End: 2019-04-29

## 2019-04-29 ENCOUNTER — MEDICAL CORRESPONDENCE (OUTPATIENT)
Dept: PHARMACY | Facility: CLINIC | Age: 59
End: 2019-04-29

## 2019-04-29 LAB
ANION GAP SERPL CALCULATED.3IONS-SCNC: 10 MMOL/L (ref 3–14)
BUN SERPL-MCNC: 30 MG/DL (ref 7–30)
CALCIUM SERPL-MCNC: 8.8 MG/DL (ref 8.5–10.1)
CHLORIDE SERPL-SCNC: 106 MMOL/L (ref 94–109)
CO2 SERPL-SCNC: 25 MMOL/L (ref 20–32)
CREAT SERPL-MCNC: 1.73 MG/DL (ref 0.66–1.25)
GFR SERPL CREATININE-BSD FRML MDRD: 42 ML/MIN/{1.73_M2}
GLUCOSE SERPL-MCNC: 80 MG/DL (ref 70–99)
POTASSIUM SERPL-SCNC: 4 MMOL/L (ref 3.4–5.3)
SODIUM SERPL-SCNC: 141 MMOL/L (ref 133–144)

## 2019-04-29 PROCEDURE — 80048 BASIC METABOLIC PNL TOTAL CA: CPT | Performed by: INTERNAL MEDICINE

## 2019-04-30 NOTE — PROGRESS NOTES
This is a recent snapshot of the patient's [facility] medical record.  For current drug dose and complete information and questions, call [phone] or In Basket pool, fv home infusion ([id number])  CSN Number:  [id number]

## 2019-05-01 ENCOUNTER — APPOINTMENT (OUTPATIENT)
Dept: LAB | Facility: CLINIC | Age: 59
End: 2019-05-01
Attending: INTERNAL MEDICINE
Payer: COMMERCIAL

## 2019-05-07 ENCOUNTER — MYC MEDICAL ADVICE (OUTPATIENT)
Dept: INFECTIOUS DISEASES | Facility: CLINIC | Age: 59
End: 2019-05-07

## 2019-05-07 ENCOUNTER — OFFICE VISIT (OUTPATIENT)
Dept: NEPHROLOGY | Facility: CLINIC | Age: 59
End: 2019-05-07
Payer: COMMERCIAL

## 2019-05-07 VITALS
TEMPERATURE: 97.7 F | SYSTOLIC BLOOD PRESSURE: 176 MMHG | OXYGEN SATURATION: 100 % | HEART RATE: 74 BPM | WEIGHT: 165.6 LBS | DIASTOLIC BLOOD PRESSURE: 100 MMHG | HEIGHT: 69 IN | BODY MASS INDEX: 24.53 KG/M2

## 2019-05-07 DIAGNOSIS — N17.9 ACUTE KIDNEY INJURY (H): Primary | ICD-10-CM

## 2019-05-07 DIAGNOSIS — I10 ESSENTIAL HYPERTENSION: ICD-10-CM

## 2019-05-07 LAB
ALBUMIN SERPL-MCNC: 3.1 G/DL (ref 3.4–5)
ANION GAP SERPL CALCULATED.3IONS-SCNC: 7 MMOL/L (ref 3–14)
BUN SERPL-MCNC: 20 MG/DL (ref 7–30)
CALCIUM SERPL-MCNC: 8.7 MG/DL (ref 8.5–10.1)
CHLORIDE SERPL-SCNC: 108 MMOL/L (ref 94–109)
CO2 SERPL-SCNC: 22 MMOL/L (ref 20–32)
CREAT SERPL-MCNC: 1.14 MG/DL (ref 0.66–1.25)
FERRITIN SERPL-MCNC: 293 NG/ML (ref 26–388)
GFR SERPL CREATININE-BSD FRML MDRD: 69 ML/MIN/{1.73_M2}
GLUCOSE SERPL-MCNC: 123 MG/DL (ref 70–99)
HGB BLD-MCNC: 13.2 G/DL (ref 13.3–17.7)
IRON SATN MFR SERPL: 46 % (ref 15–46)
IRON SERPL-MCNC: 154 UG/DL (ref 35–180)
PHOSPHATE SERPL-MCNC: 2.7 MG/DL (ref 2.5–4.5)
POTASSIUM SERPL-SCNC: 3.8 MMOL/L (ref 3.4–5.3)
PTH-INTACT SERPL-MCNC: 48 PG/ML (ref 18–80)
SODIUM SERPL-SCNC: 138 MMOL/L (ref 133–144)
TIBC SERPL-MCNC: 336 UG/DL (ref 240–430)

## 2019-05-07 PROCEDURE — 82728 ASSAY OF FERRITIN: CPT

## 2019-05-07 PROCEDURE — 80069 RENAL FUNCTION PANEL: CPT

## 2019-05-07 PROCEDURE — 83970 ASSAY OF PARATHORMONE: CPT

## 2019-05-07 PROCEDURE — 83550 IRON BINDING TEST: CPT

## 2019-05-07 PROCEDURE — G0463 HOSPITAL OUTPT CLINIC VISIT: HCPCS | Mod: ZF

## 2019-05-07 PROCEDURE — 36591 DRAW BLOOD OFF VENOUS DEVICE: CPT

## 2019-05-07 PROCEDURE — 83540 ASSAY OF IRON: CPT

## 2019-05-07 PROCEDURE — 82306 VITAMIN D 25 HYDROXY: CPT

## 2019-05-07 PROCEDURE — 85018 HEMOGLOBIN: CPT

## 2019-05-07 RX ORDER — LORATADINE 10 MG/1
10 TABLET ORAL DAILY
COMMUNITY
End: 2019-09-19

## 2019-05-07 ASSESSMENT — MIFFLIN-ST. JEOR: SCORE: 1551.54

## 2019-05-07 ASSESSMENT — PAIN SCALES - GENERAL: PAINLEVEL: NO PAIN (0)

## 2019-05-07 NOTE — LETTER
5/7/2019      RE: Ten Johnson  [address]  [facility] [address]       Nephrology Clinic Visit 5/7/19    Assessment and Plan:    1. JENNIFER - Improved but not resolved. Creat 1.1, baseline 0.7. No voiding concerns. Remains on Bactrim for suppression of his knee infection given presence of hardware.    - Etiology of his JENNIFER Vancomycin toxicity.    - Continue to monitor given that renal function has not returned to baseline and he remains on Bactrim   - Recommended avoiding NSAIDs   - Would not restart Lisinopril until kidney function returns to baseline   - Continue to work on blood pressure management.     2. HTN - Uncontrolled. Clinic blood pressures 161-176/. HR 74. No home readings. Just started Toprol XL 50 mg every day by PCP on 4/23/19 but taking at night and missing doses. Previously on Lisinopril 20 mg every day. No edema. Weight 75.1 kg.    - Recommend home blood pressure readings   - Take Toprol in the morning to provide more consistent administration   - Will have nursing contact him at home for blood pressure readings.     3. Right TKA infection - Followed by Dr Hare in ID. Remains on Bactrim for suppression.     4. Electrolytes - No acute concerns. K 3.8, Na 138    5. Disposition - RTC one month for f/u    Assessment and plan was discussed with patient and he voiced his understanding and agreement.    Reason for Visit:  Post hospital JENNIFER f/u    HPI:  Mr Johnson is a 57 yo male with ETOH cirrhosis, Hep C, HTN, Aortic stenosis s/p TAVR, h/o inderjit evans tear, OA s/p right TKA c/b infection/fall being treated with Vanco/Rocephin with recent hospital admission 4/9-4/11/19 for JENNIFER 2/2 Vanco toxicity with supratherapeutic level to 60.5. Baseline creat 0.7, peak creat 4.0. Vancomycin was discontinued. He completed course of Rocephin on 4/15/19. He remains on Bactrim for suppression.     ROS:   Patient reports general improvement. He rode his bike to his appt today. He continues to work on  increasing his physical activity and hoping to return to work soon. His appetite is improving. No further edema. No voiding issues. Denies CP or dyspnea. Does have serious seasonal allergies.     Chronic Health Problems:    Alcohol use disorder  Alcoholic cirrhosis   Ascites   Chronic allergic rhinitis   Chronic anemia   Chronic hepatitis C  Diastolic dysfunction   Erosive gastropathy   Esophageal varices in alcoholic cirrhosis   Upper gastrointestinal hemorrhage   Hypertension   Iron deficiency anemia   Sarahi-Hoffman tear   Marijuana abuse   MRSA   Olecranon bursitis   Severe aortic stenosis  Thrombocytopenia   Presbyopia   Rotator cuff tear, right   Osteoarthritis of knee   JENNIFER    Social Hx:   Single, employed in construction/. NS  Previous documentation reports active etoh use. I did not discuss today    Family Hx:   Family History   Problem Relation Age of Onset     Cancer Mother 62     Alcoholism Paternal Uncle      No Known Problems Father      No Known Problems Brother      Diabetes Maternal Grandmother      Myocardial Infarction Maternal Grandfather      No Known Problems Paternal Grandmother      Unknown/Adopted Paternal Grandfather      Cirrhosis No family hx of      Allergies:  Allergies   Allergen Reactions     Zolpidem Other (See Comments)     Alcoholic.  Had reaction 3/17/13 while intoxicated which included black out, loss of awareness, paranoia.  Do not prescribe.  Dr. Celeste     Cats Other (See Comments)     rhinitis     Dogs Other (See Comments)     rhinitis     Pollen Extract Other (See Comments)     rhinits.       Medications:  Current Outpatient Medications   Medication Sig     fluticasone (FLONASE) 50 MCG/ACT nasal spray Spray 2 sprays into both nostrils daily     loratadine (CLARITIN) 10 MG tablet Take 10 mg by mouth daily     metoprolol succinate ER (TOPROL-XL) 50 MG 24 hr tablet Take 1 tablet (50 mg) by mouth daily     Multiple Vitamin (MULTIVITAMINS PO) Take 1 tablet by mouth At  "Bedtime      sulfamethoxazole-trimethoprim (BACTRIM DS/SEPTRA DS) 800-160 MG tablet Take 1 tablet by mouth 2 times daily (Patient taking differently: Take 1 tablet by mouth daily )     acetaminophen (TYLENOL) 325 MG tablet Take 2 tablets (650 mg) by mouth every 6 hours as needed for mild pain (Patient not taking: Reported on 3/27/2019)     diclofenac (VOLTAREN) 1 % topical gel as needed     No current facility-administered medications for this visit.       Vitals:  BP (!) 176/100   Pulse 74   Temp 97.7  F (36.5  C) (Oral)   Ht 1.753 m (5' 9\")   Wt 75.1 kg (165 lb 9.6 oz)   SpO2 100%   BMI 24.45 kg/m       Exam:  GEN: Pleasant male in NAD.   CARDIAC: RRR  LUNGS: CTA  ABDOMEN: Soft, NT  EXT: No edema. Right knee incision well healed  NEURO: Alert, oriented    LABS:   CMP  Recent Labs   Lab Test 05/07/19  1249 04/29/19  0805 04/23/19  1538 04/19/19  0813    141 139 138   POTASSIUM 3.8 4.0 4.3 4.4   CHLORIDE 108 106 107 104   CO2 22 25 26 26   ANIONGAP 7 10 6 8   * 80 100* 92   BUN 20 30 28 40*   CR 1.14 1.73* 2.19* 2.91*   GFRESTIMATED 69 42* 31* 22*   GFRESTBLACK 80 48* 36* 26*   JOSEPHINE 8.7 8.8 8.8 8.5     Recent Labs   Lab Test 04/23/19  1538 04/15/19  0852 04/10/19  0637 04/09/19  1627  03/18/19  0648   BILITOTAL 0.6  --  0.5 0.6  --  0.5   ALKPHOS 113  --  98 115  --  105   ALT 94*  --  72* 90*  --  110*   * 96* 78* 100*   < > 112*    < > = values in this interval not displayed.     CBC  Recent Labs   Lab Test 05/07/19  1249 04/23/19  1538 04/15/19  0852 04/11/19  0715 04/10/19  0637   HGB 13.2* 13.2* 13.5 13.2* 12.9*   WBC  --  7.4 10.3 8.2 9.7   RBC  --  4.22* 4.30* 4.13* 3.95*   HCT  --  37.3* 38.8* 39.2* 38.3*   MCV  --  88 90 95 97   MCH  --  31.3 31.4 32.0 32.7   MCHC  --  35.4 34.8 33.7 33.7   RDW  --  13.4 13.7 13.6 13.9   PLT  --  75* 84* 51* 51*     URINE STUDIES  Recent Labs   Lab Test 04/09/19  2108 02/07/19  0830 10/23/17  0512 08/25/14  1645   COLOR Light Yellow Yellow Yellow " Light Yellow   APPEARANCE Clear Clear Clear Clear   URINEGLC Negative Negative Negative Negative   URINEBILI Negative Negative Negative Negative   URINEKETONE Negative Negative Negative Negative   SG 1.010 1.015 1.018 1.003   UBLD Negative Negative Negative Negative   URINEPH 5.5 6.5 6.5 5.0   PROTEIN Negative Negative Negative Negative   NITRITE Negative Negative Negative Negative   LEUKEST Negative Negative Negative Negative   RBCU 0 0 0  --    WBCU <1 <1 2  --      No lab results found.  PTH  Recent Labs   Lab Test 05/07/19  1249   PTHI 48     IRON STUDIES  Recent Labs   Lab Test 05/07/19  1249 02/01/18  0755   IRON 154  --      --    IRONSAT 46  --    CARIDAD 293 48       Jessica Sotelo, NP

## 2019-05-07 NOTE — NURSING NOTE
Chief Complaint   Patient presents with     Blood Draw     labs drawn from picc by rn.  vs taken     Labs drawn from picc by rn.  Good blood return noted.  Line flushed with NS.  Pt tolerated well.    Anh Buchanan RN

## 2019-05-07 NOTE — NURSING NOTE
"Chief Complaint   Patient presents with     Hospital F/U     JENNIFER     BP (!) 176/100   Pulse 74   Temp 97.7  F (36.5  C) (Oral)   Ht 1.753 m (5' 9\")   Wt 75.1 kg (165 lb 9.6 oz)   SpO2 100%   BMI 24.45 kg/m    Amisha Jean-Baptiste, ALBERT    "

## 2019-05-08 LAB — DEPRECATED CALCIDIOL+CALCIFEROL SERPL-MC: 31 UG/L (ref 20–75)

## 2019-05-08 NOTE — PROGRESS NOTES
Nephrology Clinic Visit 5/7/19    Assessment and Plan:    1. JENNIFER - Improved but not resolved. Creat 1.1, baseline 0.7. No voiding concerns. Remains on Bactrim for suppression of his knee infection given presence of hardware.    - Etiology of his JENNIFER Vancomycin toxicity.    - Continue to monitor given that renal function has not returned to baseline and he remains on Bactrim   - Recommended avoiding NSAIDs   - Would not restart Lisinopril until kidney function returns to baseline   - Continue to work on blood pressure management.     2. HTN - Uncontrolled. Clinic blood pressures 161-176/. HR 74. No home readings. Just started Toprol XL 50 mg every day by PCP on 4/23/19 but taking at night and missing doses. Previously on Lisinopril 20 mg every day. No edema. Weight 75.1 kg.    - Recommend home blood pressure readings   - Take Toprol in the morning to provide more consistent administration   - Will have nursing contact him at home for blood pressure readings.     3. Right TKA infection - Followed by Dr Hare in ID. Remains on Bactrim for suppression.     4. Electrolytes - No acute concerns. K 3.8, Na 138    5. Disposition - RTC one month for f/u    Assessment and plan was discussed with patient and he voiced his understanding and agreement.    Reason for Visit:  Post hospital JENNIFER f/u    HPI:  Mr Johnosn is a 59 yo male with ETOH cirrhosis, Hep C, HTN, Aortic stenosis s/p TAVR, h/o inderjit evans tear, OA s/p right TKA c/b infection/fall being treated with Vanco/Rocephin with recent hospital admission 4/9-4/11/19 for JENNIFER 2/2 Vanco toxicity with supratherapeutic level to 60.5. Baseline creat 0.7, peak creat 4.0. Vancomycin was discontinued. He completed course of Rocephin on 4/15/19. He remains on Bactrim for suppression.     ROS:   Patient reports general improvement. He rode his bike to his appt today. He continues to work on increasing his physical activity and hoping to return to work soon. His appetite is  improving. No further edema. No voiding issues. Denies CP or dyspnea. Does have serious seasonal allergies.     Chronic Health Problems:    Alcohol use disorder  Alcoholic cirrhosis   Ascites   Chronic allergic rhinitis   Chronic anemia   Chronic hepatitis C  Diastolic dysfunction   Erosive gastropathy   Esophageal varices in alcoholic cirrhosis   Upper gastrointestinal hemorrhage   Hypertension   Iron deficiency anemia   Sarahi-Hoffman tear   Marijuana abuse   MRSA   Olecranon bursitis   Severe aortic stenosis  Thrombocytopenia   Presbyopia   Rotator cuff tear, right   Osteoarthritis of knee   JENNIFER    Social Hx:   Single, employed in construction/. NS  Previous documentation reports active etoh use. I did not discuss today    Family Hx:   Family History   Problem Relation Age of Onset     Cancer Mother 62     Alcoholism Paternal Uncle      No Known Problems Father      No Known Problems Brother      Diabetes Maternal Grandmother      Myocardial Infarction Maternal Grandfather      No Known Problems Paternal Grandmother      Unknown/Adopted Paternal Grandfather      Cirrhosis No family hx of      Allergies:  Allergies   Allergen Reactions     Zolpidem Other (See Comments)     Alcoholic.  Had reaction 3/17/13 while intoxicated which included black out, loss of awareness, paranoia.  Do not prescribe.  Dr. Celeste     Cats Other (See Comments)     rhinitis     Dogs Other (See Comments)     rhinitis     Pollen Extract Other (See Comments)     rhinits.       Medications:  Current Outpatient Medications   Medication Sig     fluticasone (FLONASE) 50 MCG/ACT nasal spray Spray 2 sprays into both nostrils daily     loratadine (CLARITIN) 10 MG tablet Take 10 mg by mouth daily     metoprolol succinate ER (TOPROL-XL) 50 MG 24 hr tablet Take 1 tablet (50 mg) by mouth daily     Multiple Vitamin (MULTIVITAMINS PO) Take 1 tablet by mouth At Bedtime      sulfamethoxazole-trimethoprim (BACTRIM DS/SEPTRA DS) 800-160 MG tablet  "Take 1 tablet by mouth 2 times daily (Patient taking differently: Take 1 tablet by mouth daily )     acetaminophen (TYLENOL) 325 MG tablet Take 2 tablets (650 mg) by mouth every 6 hours as needed for mild pain (Patient not taking: Reported on 3/27/2019)     diclofenac (VOLTAREN) 1 % topical gel as needed     No current facility-administered medications for this visit.       Vitals:  BP (!) 176/100   Pulse 74   Temp 97.7  F (36.5  C) (Oral)   Ht 1.753 m (5' 9\")   Wt 75.1 kg (165 lb 9.6 oz)   SpO2 100%   BMI 24.45 kg/m      Exam:  GEN: Pleasant male in NAD.   CARDIAC: RRR  LUNGS: CTA  ABDOMEN: Soft, NT  EXT: No edema. Right knee incision well healed  NEURO: Alert, oriented    LABS:   CMP  Recent Labs   Lab Test 05/07/19  1249 04/29/19  0805 04/23/19  1538 04/19/19  0813    141 139 138   POTASSIUM 3.8 4.0 4.3 4.4   CHLORIDE 108 106 107 104   CO2 22 25 26 26   ANIONGAP 7 10 6 8   * 80 100* 92   BUN 20 30 28 40*   CR 1.14 1.73* 2.19* 2.91*   GFRESTIMATED 69 42* 31* 22*   GFRESTBLACK 80 48* 36* 26*   JOSEPHINE 8.7 8.8 8.8 8.5     Recent Labs   Lab Test 04/23/19  1538 04/15/19  0852 04/10/19  0637 04/09/19  1627  03/18/19  0648   BILITOTAL 0.6  --  0.5 0.6  --  0.5   ALKPHOS 113  --  98 115  --  105   ALT 94*  --  72* 90*  --  110*   * 96* 78* 100*   < > 112*    < > = values in this interval not displayed.     CBC  Recent Labs   Lab Test 05/07/19  1249 04/23/19  1538 04/15/19  0852 04/11/19  0715 04/10/19  0637   HGB 13.2* 13.2* 13.5 13.2* 12.9*   WBC  --  7.4 10.3 8.2 9.7   RBC  --  4.22* 4.30* 4.13* 3.95*   HCT  --  37.3* 38.8* 39.2* 38.3*   MCV  --  88 90 95 97   MCH  --  31.3 31.4 32.0 32.7   MCHC  --  35.4 34.8 33.7 33.7   RDW  --  13.4 13.7 13.6 13.9   PLT  --  75* 84* 51* 51*     URINE STUDIES  Recent Labs   Lab Test 04/09/19  2108 02/07/19  0830 10/23/17  0512 08/25/14  1645   COLOR Light Yellow Yellow Yellow Light Yellow   APPEARANCE Clear Clear Clear Clear   URINEGLC Negative Negative Negative " Negative   URINEBILI Negative Negative Negative Negative   URINEKETONE Negative Negative Negative Negative   SG 1.010 1.015 1.018 1.003   UBLD Negative Negative Negative Negative   URINEPH 5.5 6.5 6.5 5.0   PROTEIN Negative Negative Negative Negative   NITRITE Negative Negative Negative Negative   LEUKEST Negative Negative Negative Negative   RBCU 0 0 0  --    WBCU <1 <1 2  --      No lab results found.  PTH  Recent Labs   Lab Test 05/07/19  1249   PTHI 48     IRON STUDIES  Recent Labs   Lab Test 05/07/19  1249 02/01/18  0755   IRON 154  --      --    IRONSAT 46  --    CARIDAD 293 48       Jessica Sotelo, NP

## 2019-05-20 ENCOUNTER — OFFICE VISIT (OUTPATIENT)
Dept: ORTHOPEDICS | Facility: CLINIC | Age: 59
End: 2019-05-20
Payer: COMMERCIAL

## 2019-05-20 ENCOUNTER — OFFICE VISIT (OUTPATIENT)
Dept: GASTROENTEROLOGY | Facility: CLINIC | Age: 59
End: 2019-05-20
Attending: PHYSICIAN ASSISTANT
Payer: COMMERCIAL

## 2019-05-20 VITALS
WEIGHT: 167.4 LBS | SYSTOLIC BLOOD PRESSURE: 168 MMHG | HEART RATE: 59 BPM | HEIGHT: 69 IN | TEMPERATURE: 97.5 F | DIASTOLIC BLOOD PRESSURE: 92 MMHG | BODY MASS INDEX: 24.79 KG/M2 | OXYGEN SATURATION: 100 % | RESPIRATION RATE: 18 BRPM

## 2019-05-20 DIAGNOSIS — K70.30 ALCOHOLIC CIRRHOSIS OF LIVER WITHOUT ASCITES (H): Primary | ICD-10-CM

## 2019-05-20 DIAGNOSIS — T84.59XD INFECTION OF PROSTHETIC KNEE JOINT, SUBSEQUENT ENCOUNTER: ICD-10-CM

## 2019-05-20 DIAGNOSIS — Z96.659 INFECTION OF PROSTHETIC KNEE JOINT, SUBSEQUENT ENCOUNTER: ICD-10-CM

## 2019-05-20 DIAGNOSIS — B18.2 CHRONIC HEPATITIS C WITHOUT HEPATIC COMA (H): ICD-10-CM

## 2019-05-20 DIAGNOSIS — Z96.659 INFECTION OF PROSTHETIC KNEE JOINT, SEQUELA: ICD-10-CM

## 2019-05-20 DIAGNOSIS — B18.2 CHRONIC HEPATITIS C WITHOUT HEPATIC COMA (H): Primary | ICD-10-CM

## 2019-05-20 DIAGNOSIS — T84.59XS INFECTION OF PROSTHETIC KNEE JOINT, SEQUELA: ICD-10-CM

## 2019-05-20 DIAGNOSIS — Z96.651 S/P TOTAL KNEE ARTHROPLASTY, RIGHT: Primary | ICD-10-CM

## 2019-05-20 DIAGNOSIS — K70.30 ALCOHOLIC CIRRHOSIS OF LIVER WITHOUT ASCITES (H): ICD-10-CM

## 2019-05-20 LAB
ALBUMIN SERPL-MCNC: 3.1 G/DL (ref 3.4–5)
ALP SERPL-CCNC: 104 U/L (ref 40–150)
ALT SERPL W P-5'-P-CCNC: 102 U/L (ref 0–70)
ANION GAP SERPL CALCULATED.3IONS-SCNC: 6 MMOL/L (ref 3–14)
AST SERPL W P-5'-P-CCNC: 90 U/L (ref 0–45)
BASOPHILS # BLD AUTO: 0 10E9/L (ref 0–0.2)
BASOPHILS NFR BLD AUTO: 0.6 %
BILIRUB DIRECT SERPL-MCNC: 0.3 MG/DL (ref 0–0.2)
BILIRUB SERPL-MCNC: 0.6 MG/DL (ref 0.2–1.3)
BUN SERPL-MCNC: 15 MG/DL (ref 7–30)
CALCIUM SERPL-MCNC: 8.7 MG/DL (ref 8.5–10.1)
CHLORIDE SERPL-SCNC: 111 MMOL/L (ref 94–109)
CO2 SERPL-SCNC: 23 MMOL/L (ref 20–32)
CREAT SERPL-MCNC: 0.97 MG/DL (ref 0.66–1.25)
CRP SERPL-MCNC: <2.9 MG/L (ref 0–8)
DIFFERENTIAL METHOD BLD: ABNORMAL
EOSINOPHIL # BLD AUTO: 0.1 10E9/L (ref 0–0.7)
EOSINOPHIL NFR BLD AUTO: 1.5 %
ERYTHROCYTE [DISTWIDTH] IN BLOOD BY AUTOMATED COUNT: 15.7 % (ref 10–15)
ERYTHROCYTE [SEDIMENTATION RATE] IN BLOOD BY WESTERGREN METHOD: 11 MM/H (ref 0–20)
GFR SERPL CREATININE-BSD FRML MDRD: 84 ML/MIN/{1.73_M2}
GLUCOSE SERPL-MCNC: 82 MG/DL (ref 70–99)
HCT VFR BLD AUTO: 36 % (ref 40–53)
HGB BLD-MCNC: 12.7 G/DL (ref 13.3–17.7)
IMM GRANULOCYTES # BLD: 0 10E9/L (ref 0–0.4)
IMM GRANULOCYTES NFR BLD: 0.3 %
INR PPP: 1.22 (ref 0.86–1.14)
LYMPHOCYTES # BLD AUTO: 1.3 10E9/L (ref 0.8–5.3)
LYMPHOCYTES NFR BLD AUTO: 20.6 %
MCH RBC QN AUTO: 31.1 PG (ref 26.5–33)
MCHC RBC AUTO-ENTMCNC: 35.3 G/DL (ref 31.5–36.5)
MCV RBC AUTO: 88 FL (ref 78–100)
MONOCYTES # BLD AUTO: 0.7 10E9/L (ref 0–1.3)
MONOCYTES NFR BLD AUTO: 11.1 %
NEUTROPHILS # BLD AUTO: 4.1 10E9/L (ref 1.6–8.3)
NEUTROPHILS NFR BLD AUTO: 65.9 %
NRBC # BLD AUTO: 0 10*3/UL
NRBC BLD AUTO-RTO: 0 /100
PLATELET # BLD AUTO: 69 10E9/L (ref 150–450)
POTASSIUM SERPL-SCNC: 4 MMOL/L (ref 3.4–5.3)
PROT SERPL-MCNC: 6.8 G/DL (ref 6.8–8.8)
RBC # BLD AUTO: 4.08 10E12/L (ref 4.4–5.9)
SODIUM SERPL-SCNC: 140 MMOL/L (ref 133–144)
WBC # BLD AUTO: 6.2 10E9/L (ref 4–11)

## 2019-05-20 PROCEDURE — 87522 HEPATITIS C REVRS TRNSCRPJ: CPT | Performed by: PHYSICIAN ASSISTANT

## 2019-05-20 PROCEDURE — 87389 HIV-1 AG W/HIV-1&-2 AB AG IA: CPT | Performed by: PHYSICIAN ASSISTANT

## 2019-05-20 PROCEDURE — 87902 NFCT AGT GNTYP ALYS HEP C: CPT | Performed by: PHYSICIAN ASSISTANT

## 2019-05-20 PROCEDURE — 80076 HEPATIC FUNCTION PANEL: CPT | Performed by: PHYSICIAN ASSISTANT

## 2019-05-20 PROCEDURE — 86140 C-REACTIVE PROTEIN: CPT | Performed by: INTERNAL MEDICINE

## 2019-05-20 PROCEDURE — 80048 BASIC METABOLIC PNL TOTAL CA: CPT | Performed by: PHYSICIAN ASSISTANT

## 2019-05-20 PROCEDURE — G0463 HOSPITAL OUTPT CLINIC VISIT: HCPCS | Mod: ZF

## 2019-05-20 PROCEDURE — 85610 PROTHROMBIN TIME: CPT | Performed by: PHYSICIAN ASSISTANT

## 2019-05-20 PROCEDURE — 85652 RBC SED RATE AUTOMATED: CPT | Performed by: INTERNAL MEDICINE

## 2019-05-20 PROCEDURE — 86704 HEP B CORE ANTIBODY TOTAL: CPT | Performed by: PHYSICIAN ASSISTANT

## 2019-05-20 PROCEDURE — 85025 COMPLETE CBC W/AUTO DIFF WBC: CPT | Performed by: PHYSICIAN ASSISTANT

## 2019-05-20 RX ORDER — MONTELUKAST SODIUM 10 MG/1
10 TABLET ORAL
COMMUNITY
End: 2019-06-05

## 2019-05-20 ASSESSMENT — ENCOUNTER SYMPTOMS
COUGH: 1
HALLUCINATIONS: 0
SMELL DISTURBANCE: 0
SWOLLEN GLANDS: 0
SORE THROAT: 0
EYE IRRITATION: 1
FATIGUE: 1
NIGHT SWEATS: 1
POSTURAL DYSPNEA: 0
STIFFNESS: 1
NECK MASS: 0
WEIGHT LOSS: 1
SLEEP DISTURBANCES DUE TO BREATHING: 0
ARTHRALGIAS: 1
FEVER: 0
MUSCLE WEAKNESS: 1
SINUS PAIN: 1
EYE PAIN: 0
LEG PAIN: 1
DECREASED APPETITE: 0
DOUBLE VISION: 0
EYE REDNESS: 1
EXERCISE INTOLERANCE: 0
PALPITATIONS: 0
MYALGIAS: 1
WEIGHT GAIN: 0
BRUISES/BLEEDS EASILY: 0
HYPERTENSION: 1
COUGH DISTURBING SLEEP: 0
INCREASED ENERGY: 1
SINUS CONGESTION: 1
HYPOTENSION: 0
JOINT SWELLING: 1
BACK PAIN: 0
LIGHT-HEADEDNESS: 0
SHORTNESS OF BREATH: 1
POLYDIPSIA: 0
DYSPNEA ON EXERTION: 1
HOARSE VOICE: 1
ALTERED TEMPERATURE REGULATION: 0
ORTHOPNEA: 0
TROUBLE SWALLOWING: 0
HEMOPTYSIS: 0
MUSCLE CRAMPS: 1
SYNCOPE: 0
SPUTUM PRODUCTION: 0
EYE WATERING: 1
SNORES LOUDLY: 1
WHEEZING: 1
TASTE DISTURBANCE: 0
NECK PAIN: 0

## 2019-05-20 ASSESSMENT — MIFFLIN-ST. JEOR: SCORE: 1559.7

## 2019-05-20 ASSESSMENT — PAIN SCALES - GENERAL: PAINLEVEL: NO PAIN (0)

## 2019-05-20 NOTE — LETTER
5/20/2019      RE: Ten Johnson  [address]  [facility] [address]       Hepatology Follow-up Clinic note  Ten Johnson   Date of Birth 1960  Date of Service 5/20/2019    Reason for follow-up: Hep C/ETOH cirrhosis          Assessment/plan:   Ten Johnson is a 58 year old male with Hep C/ETOH cirrhosis. Hep C, gt 1a, treatment naive. Cirrhosis is well compensated at this time with no overt ascites, signs of fluid overload or overt hepatic encephalopathy. We discussed the need for sobriety from ETOH. Patient has been lost to follow for the past few years. History of LIRADs 3 lesion, overdue for Abdominal MRI. He will be due for variceal screening at the end of 2019. We discussed the importance of absolute sobriety moving forwards. We will plan on treating Hepatitis C after he has HCC screening. We discussed the treatment regimen and medication side effects. Patient would be a good candidate for Hepatitis C treatment to prevent worsening fibrosis, decompensation of liver disease and prevent extrahepatic manifestations of the disease.     - Due for variceal screening end of 2019  - History of LIRADs-3 lesion: MRI Abdomen at next convenience  - Will plan to send prescription for Hepatitis C pending genotype and fibrosis  - Follow-up in clinic in 6 months or sooner as needed    Murphy Enciso PA-C   [facility] Hepatology clinic    -----------------------------------------------------       HPI:   Ten Johnson is a 58 year old male presenting for follow-up.     Cirrhosis   - dx Jan 2012  - ETOH/HCV  - no hx HE, ascites or variceal bleed  - last EGD Oct 2017- MW tear, small EV, mild portal hypertensive gastropathy  - HCC screening- MRI liver Oct 2017- LIRADS-3 lesion     HCV  - dx?  - hx CHYNA  - GT-1a or 1b  - liver bx 2012- stage IV fibrosis, steatosis  - no prior rx    Patient was last seen by Dr. Sandoval on 12/4/2017. Patient had a right TKA done on 2/7/2019 complicated by infection  requiring washout and polyexchange on 3/4/2019. The was started on IV antibody and then had JENNIFER and Cr peaked at 3.6 now 0.97. He is now taking Bactrim once daily.     His weight has remained stable. He had a good appetite. He has regular bowel movements. He has swelling in his right knee, but otherwise no pitting edema.     Patient denies jaundice, abdominal distension or confusion.  Patient also denies melena, hematochezia or hematemesis. Patient denies  fevers, sweats or chills.    He was previously a , currently on disability. He states he last drank a beer last week. He states he has cut down drinking significantly and has not drank regularly in the past three to four months.     Medical hx Surgical hx   Past Medical History:   Diagnosis Date     Alcohol use disorder      Alcoholic cirrhosis (H)      Anticoagulant long-term use      Ascites      Chronic allergic rhinitis      Chronic anemia      Chronic hepatitis C without hepatic coma (H) 05/10/2016     Cirrhosis (H) 2017     Diastolic dysfunction      Erosive gastropathy      Esophageal varices in alcoholic cirrhosis (H)      H/O upper gastrointestinal hemorrhage 09/2017     History of blood transfusion      History of drug abuse      Hypertension      JANELLE (iron deficiency anemia)      Sarahi-Hoffman tear      Marijuana abuse      MRSA (methicillin resistant Staphylococcus aureus)      Olecranon bursitis      Portal hypertension (H)      Right shoulder pain      S/p TAVR (transcatheter aortic valve replacement), bioprosthetic      Severe aortic stenosis      Thrombocytopenia (H)     Past Surgical History:   Procedure Laterality Date     ARTHROSCOPY SHOULDER ROTATOR CUFF REPAIR  7/31/2012    Procedure: ARTHROSCOPY SHOULDER ROTATOR CUFF REPAIR;  Right Shoulder Arthroscopic Rotator Cuff Repair, BicepsTenodesis,  Subacromial Decompression ;  Surgeon: Joi Castillo MD;  [facility]     ESOPHAGOSCOPY, GASTROSCOPY, DUODENOSCOPY  (EGD), COMBINED N/A 10/23/2017    Procedure: COMBINED ESOPHAGOSCOPY, GASTROSCOPY, DUODENOSCOPY (EGD);;  Surgeon: Gentry Salas MD;  [facility]: [facility]     EXCHANGE POLY COMPONENT ARTHROPLASTY KNEE Right 3/4/2019    Procedure: REVISION RIGHT TOTAL KNEE POLY COMPONENT EXCHANGE;  Surgeon: Olvin Joe MD;  Location: [facility]     FACIAL RECONSTRUCTION SURGERY  1971     HEART CATH FEMORAL CANNULIZATION WITH OPEN STANDBY REPAIR AORTIC VALVE N/A 2/21/2018    Procedure: HEART CATH FEMORAL CANNULIZATION WITH OPEN STANDBY REPAIR AORTIC VALVE;;  Surgeon: Luis Baird MD;  [facility]     IRRIGATION AND DEBRIDEMENT UPPER EXTREMITY, COMBINED  1/3/2012    Procedure:COMBINED IRRIGATION AND DEBRIDEMENT UPPER EXTREMITY; Irrigation & Debridement Left Elbow; Surgeon:CRISTHIAN ZHOU; Location:[facility]     OPTICAL TRACKING SYSTEM ARTHROPLASTY KNEE Right 2/7/2019    Procedure: ARTHROPLASTY KNEE RIGHT;  Surgeon: Olvin Joe MD;  [facility]     REPAIR TENDON TRICEPS UPPER EXTREMITY  11/8/2011    Procedure:REPAIR TENDON TRICEPS UPPER EXTREMITY; Surgeon:CRISTHIAN ZHOU; Location[facility]     SHOULDER SURGERY  2003    left, injury, torn tendons, hematoma     TRANSCATHETER AORTIC VALVE IMPLANT ANESTHESIA N/A 2/21/2018    Procedure: TRANSCATHETER AORTIC VALVE IMPLANT ANESTHESIA;  Transfemoral (Quiroz) Aortic Valve Implant 26mm MARTHA 3, with Cardiopulmonary Bypass Standby, transthoracic echocardiogram;  Surgeon: GENERIC ANESTHESIA PROVIDER;  Location: [facility]     TRANSPOSITION ULNAR NERVE (ELBOW)  11/8/2011    Procedure:TRANSPOSITION ULNAR NERVE (ELBOW); Final Procedure Done: Left Elbow Lateral Ulnar Collateral Repair And  Left Elbow Triceps Repair                   Medications:     Current Outpatient Medications   Medication     fluticasone (FLONASE) 50 MCG/ACT nasal spray     lactobacillus rhamnosus, GG, (CULTURELL) capsule     loratadine (CLARITIN) 10 MG tablet     metoprolol succinate ER  "(TOPROL-XL) 50 MG 24 hr tablet     montelukast (SINGULAIR) 10 MG tablet     Multiple Vitamin (MULTIVITAMINS PO)     sulfamethoxazole-trimethoprim (BACTRIM DS/SEPTRA DS) 800-160 MG tablet     vitamin B1 (THIAMINE) 50 MG tablet     No current facility-administered medications for this visit.             Allergies:     Allergies   Allergen Reactions     Zolpidem Other (See Comments)     Alcoholic.  Had reaction 3/17/13 while intoxicated which included black out, loss of awareness, paranoia.  Do not prescribe.  Dr. Cleeste     Cats Other (See Comments)     rhinitis     Dogs Other (See Comments)     rhinitis     Pollen Extract Other (See Comments)     rhinits.            Review of Systems:   10 points ROS was obtained and highlighted in the HPI, otherwise negative.          Physical Exam:   VS:  BP (!) 168/92 (BP Location: Left arm, Patient Position: Sitting, Cuff Size: Adult Regular)   Pulse 59   Temp 97.5  F (36.4  C) (Oral)   Resp 18   Ht 1.753 m (5' 9\")   Wt 75.9 kg (167 lb 6.4 oz)   SpO2 100%   BMI 24.72 kg/m       Gen: A&Ox3, NAD, well developed  HEENT: non-icteric CV: RRR, no overt murmurs  Lung: CTA Bilatererally, no wheezing or crackles.   Lym- no palpable lymphadenopathy  Abd: soft, NT, ND, hepatomegaly. No overt ascites.   Ext: no edema, intact pulses.   Skin: No rash, no palmar erythema, telangiectasias or jaundice, vitiligo   Neuro: grossly intact, no asterixis   Psych: appropriate mood and affects           Data:   Reviewed in person and significant for:    Lab Results   Component Value Date     05/07/2019      Lab Results   Component Value Date    POTASSIUM 3.8 05/07/2019     Lab Results   Component Value Date    CHLORIDE 108 05/07/2019     Lab Results   Component Value Date    CO2 22 05/07/2019     Lab Results   Component Value Date    BUN 20 05/07/2019     Lab Results   Component Value Date    CR 1.14 05/07/2019       Lab Results   Component Value Date    WBC 7.4 04/23/2019     Lab Results "   Component Value Date    HGB 13.2 05/07/2019     Lab Results   Component Value Date    HCT 37.3 04/23/2019     Lab Results   Component Value Date    MCV 88 04/23/2019     Lab Results   Component Value Date    PLT 75 04/23/2019       Lab Results   Component Value Date     04/23/2019     Lab Results   Component Value Date    ALT 94 04/23/2019     Lab Results   Component Value Date    BILICONJ 0.0 12/18/2012      Lab Results   Component Value Date    BILITOTAL 0.6 04/23/2019       Lab Results   Component Value Date    ALBUMIN 3.1 05/07/2019     Lab Results   Component Value Date    PROTTOTAL 7.2 04/23/2019      Lab Results   Component Value Date    ALKPHOS 113 04/23/2019       Lab Results   Component Value Date    INR 1.25 04/10/2019       Murphy Enciso PA-C

## 2019-05-20 NOTE — NURSING NOTE
Reason For Visit:   Chief Complaint   Patient presents with     RECHECK     Revision Right Total Knee Poly Component Exchange  DOS: 3/4/19       There were no vitals taken for this visit.    Pain Assessment  Patient Currently in Pain: Yes(cramping at night)  0-10 Pain Scale: 10 with the varindermping    Emily Jones ATC

## 2019-05-20 NOTE — PROGRESS NOTES
Chief Complaint   Patient presents with     Labs Only     picc, caps and dressing change, saline flushed

## 2019-05-20 NOTE — PROGRESS NOTES
Navi is seen today 8 weeks post op of his right TKA with a wound dehisence leading to a joint infection requiring a poly exchange on 3/4/2019. He is being managed by ID on IV antibiotics for postive culture intraoperatively of escherichia coli and staphylococcus epidermidis. His wound is healed. ROM:0-122. Quad strength 5/5 and symmetrical to contralateral side. No calf pain. Alignment is ideal.    Dx:  1. Resolved R TKA joint infection    Plan:  1. Continue abx per ID  2. Follow up in 4-6 months or sooner for 2v right knee xray on arrival.    I, Dr. Olvin oJe, agree with above plan of care and examined the patient.

## 2019-05-20 NOTE — LETTER
5/20/2019       RE: Ten Johnson  [address]  [facility] [place] [address]     Dear Colleague,    Thank you for referring your patient, Ten Johnson, to the [facility] at [facility]. Please see a copy of my visit note below.    Navi is seen today 8 weeks post op of his right TKA with a wound dehisence leading to a joint infection requiring a poly exchange on 3/4/2019. He is being managed by ID on IV antibiotics for postive culture intraoperatively of escherichia coli and staphylococcus epidermidis. His wound is healed. ROM:0-122. Quad strength 5/5 and symmetrical to contralateral side. No calf pain. Alignment is ideal.    Dx:  1. Resolved R TKA joint infection    Plan:  1. Continue abx per ID  2. Follow up in 4-6 months or sooner for 2v right knee xray on arrival.    I, Dr. Olvin Joe, agree with above plan of care and examined the patient.

## 2019-05-20 NOTE — LETTER
5/20/2019       RE: Ten Johnson  [address]  [facility] [address]     Dear Colleague,    Thank you for referring your patient, Ten Johnson, to the [facility] at [facility]. Please see a copy of my visit note below.    Hepatology Follow-up Clinic note  Ten Johnson   Date of Birth 1960  Date of Service 5/20/2019    Reason for follow-up: Hep C/ETOH cirrhosis          Assessment/plan:   Ten Johnson is a 58 year old male with Hep C/ETOH cirrhosis. Hep C, gt 1a, treatment naive. Cirrhosis is well compensated at this time with no overt ascites, signs of fluid overload or overt hepatic encephalopathy. We discussed the need for sobriety from ETOH. Patient has been lost to follow for the past few years. History of LIRADs 3 lesion, overdue for Abdominal MRI. He will be due for variceal screening at the end of 2019. We discussed the importance of absolute sobriety moving forwards. We will plan on treating Hepatitis C after he has HCC screening. We discussed the treatment regimen and medication side effects. Patient would be a good candidate for Hepatitis C treatment to prevent worsening fibrosis, decompensation of liver disease and prevent extrahepatic manifestations of the disease.     - Due for variceal screening end of 2019  - History of LIRADs-3 lesion: MRI Abdomen at next convenience  - Will plan to send prescription for Hepatitis C pending genotype and fibrosis  - Follow-up in clinic in 6 months or sooner as needed    Murphy Enciso PA-C   [facility] Hepatology clinic    -----------------------------------------------------       HPI:   Ten Johnson is a 58 year old male presenting for follow-up.     Cirrhosis   - dx Jan 2012  - ETOH/HCV  - no hx HE, ascites or variceal bleed  - last EGD Oct 2017- MW tear, small EV, mild portal hypertensive gastropathy  - HCC screening- MRI liver Oct 2017- LIRADS-3 lesion     HCV  - dx?  - hx CHYNA  -  GT-1a or 1b  - liver bx 2012- stage IV fibrosis, steatosis  - no prior rx    Patient was last seen by Dr. Sandoval on 12/4/2017. Patient had a right TKA done on 2/7/2019 complicated by infection requiring washout and polyexchange on 3/4/2019. The was started on IV antibody and then had JENNIFER and Cr peaked at 3.6 now 0.97. He is now taking Bactrim once daily.     His weight has remained stable. He had a good appetite. He has regular bowel movements. He has swelling in his right knee, but otherwise no pitting edema.     Patient denies jaundice, abdominal distension or confusion.  Patient also denies melena, hematochezia or hematemesis. Patient denies  fevers, sweats or chills.    He was previously a , currently on disability. He states he last drank a beer last week. He states he has cut down drinking significantly and has not drank regularly in the past three to four months.     Medical hx Surgical hx   Past Medical History:   Diagnosis Date     Alcohol use disorder      Alcoholic cirrhosis (H)      Anticoagulant long-term use      Ascites      Chronic allergic rhinitis      Chronic anemia      Chronic hepatitis C without hepatic coma (H) 05/10/2016     Cirrhosis (H) 2017     Diastolic dysfunction      Erosive gastropathy      Esophageal varices in alcoholic cirrhosis (H)      H/O upper gastrointestinal hemorrhage 09/2017     History of blood transfusion      History of drug abuse      Hypertension      JANELLE (iron deficiency anemia)      Sarahi-Hoffman tear      Marijuana abuse      MRSA (methicillin resistant Staphylococcus aureus)      Olecranon bursitis      Portal hypertension (H)      Right shoulder pain      S/p TAVR (transcatheter aortic valve replacement), bioprosthetic      Severe aortic stenosis      Thrombocytopenia (H)     Past Surgical History:   Procedure Laterality Date     ARTHROSCOPY SHOULDER ROTATOR CUFF REPAIR  7/31/2012    Procedure: ARTHROSCOPY SHOULDER ROTATOR CUFF REPAIR;  Right  Shoulder Arthroscopic Rotator Cuff Repair, BicepsTenodesis,  Subacromial Decompression ;  Surgeon: Joi Castillo MD;  [facility]     ESOPHAGOSCOPY, GASTROSCOPY, DUODENOSCOPY (EGD), COMBINED N/A 10/23/2017    Procedure: COMBINED ESOPHAGOSCOPY, GASTROSCOPY, DUODENOSCOPY (EGD);;  Surgeon: Gentry Salas MD;  [facility]     EXCHANGE POLY COMPONENT ARTHROPLASTY KNEE Right 3/4/2019    Procedure: REVISION RIGHT TOTAL KNEE POLY COMPONENT EXCHANGE;  Surgeon: Olvin Joe MD;  [facility]     FACIAL RECONSTRUCTION SURGERY  1971     HEART CATH FEMORAL CANNULIZATION WITH OPEN STANDBY REPAIR AORTIC VALVE N/A 2/21/2018    Procedure: HEART CATH FEMORAL CANNULIZATION WITH OPEN STANDBY REPAIR AORTIC VALVE;;  Surgeon: Luis Baird MD;  [facility]     IRRIGATION AND DEBRIDEMENT UPPER EXTREMITY, COMBINED  1/3/2012    Procedure:COMBINED IRRIGATION AND DEBRIDEMENT UPPER EXTREMITY; Irrigation & Debridement Left Elbow; Surgeon:CRISTHIAN ZHOU; [facility]     OPTICAL TRACKING SYSTEM ARTHROPLASTY KNEE Right 2/7/2019    Procedure: ARTHROPLASTY KNEE RIGHT;  Surgeon: Olvin Joe MD;  [facility]     REPAIR TENDON TRICEPS UPPER EXTREMITY  11/8/2011    Procedure:REPAIR TENDON TRICEPS UPPER EXTREMITY; Surgeon:CRISTHIAN ZHOU; [facility]     SHOULDER SURGERY  2003    left, injury, torn tendons, hematoma     TRANSCATHETER AORTIC VALVE IMPLANT ANESTHESIA N/A 2/21/2018    Procedure: TRANSCATHETER AORTIC VALVE IMPLANT ANESTHESIA;  Transfemoral (Quiroz) Aortic Valve Implant 26mm MARTHA 3, with Cardiopulmonary Bypass Standby, transthoracic echocardiogram;  Surgeon: GENERIC ANESTHESIA PROVIDER;  [facility]     TRANSPOSITION ULNAR NERVE (ELBOW)  11/8/2011    Procedure:TRANSPOSITION ULNAR NERVE (ELBOW); Final Procedure Done: Left Elbow Lateral Ulnar Collateral Repair And  Left Elbow Triceps Repair                   Medications:     Current Outpatient Medications  "  Medication     fluticasone (FLONASE) 50 MCG/ACT nasal spray     lactobacillus rhamnosus, GG, (CULTURELL) capsule     loratadine (CLARITIN) 10 MG tablet     metoprolol succinate ER (TOPROL-XL) 50 MG 24 hr tablet     montelukast (SINGULAIR) 10 MG tablet     Multiple Vitamin (MULTIVITAMINS PO)     sulfamethoxazole-trimethoprim (BACTRIM DS/SEPTRA DS) 800-160 MG tablet     vitamin B1 (THIAMINE) 50 MG tablet     No current facility-administered medications for this visit.             Allergies:     Allergies   Allergen Reactions     Zolpidem Other (See Comments)     Alcoholic.  Had reaction 3/17/13 while intoxicated which included black out, loss of awareness, paranoia.  Do not prescribe.  Dr. Celeste     Cats Other (See Comments)     rhinitis     Dogs Other (See Comments)     rhinitis     Pollen Extract Other (See Comments)     rhinits.            Review of Systems:   10 points ROS was obtained and highlighted in the HPI, otherwise negative.          Physical Exam:   VS:  BP (!) 168/92 (BP Location: Left arm, Patient Position: Sitting, Cuff Size: Adult Regular)   Pulse 59   Temp 97.5  F (36.4  C) (Oral)   Resp 18   Ht 1.753 m (5' 9\")   Wt 75.9 kg (167 lb 6.4 oz)   SpO2 100%   BMI 24.72 kg/m       Gen: A&Ox3, NAD, well developed  HEENT: non-icteric CV: RRR, no overt murmurs  Lung: CTA Bilatererally, no wheezing or crackles.   Lym- no palpable lymphadenopathy  Abd: soft, NT, ND, hepatomegaly. No overt ascites.   Ext: no edema, intact pulses.   Skin: No rash, no palmar erythema, telangiectasias or jaundice, vitiligo   Neuro: grossly intact, no asterixis   Psych: appropriate mood and affects           Data:   Reviewed in person and significant for:    Lab Results   Component Value Date     05/07/2019      Lab Results   Component Value Date    POTASSIUM 3.8 05/07/2019     Lab Results   Component Value Date    CHLORIDE 108 05/07/2019     Lab Results   Component Value Date    CO2 22 05/07/2019     Lab Results "   Component Value Date    BUN 20 05/07/2019     Lab Results   Component Value Date    CR 1.14 05/07/2019       Lab Results   Component Value Date    WBC 7.4 04/23/2019     Lab Results   Component Value Date    HGB 13.2 05/07/2019     Lab Results   Component Value Date    HCT 37.3 04/23/2019     Lab Results   Component Value Date    MCV 88 04/23/2019     Lab Results   Component Value Date    PLT 75 04/23/2019       Lab Results   Component Value Date     04/23/2019     Lab Results   Component Value Date    ALT 94 04/23/2019     Lab Results   Component Value Date    BILICONJ 0.0 12/18/2012      Lab Results   Component Value Date    BILITOTAL 0.6 04/23/2019       Lab Results   Component Value Date    ALBUMIN 3.1 05/07/2019     Lab Results   Component Value Date    PROTTOTAL 7.2 04/23/2019      Lab Results   Component Value Date    ALKPHOS 113 04/23/2019       Lab Results   Component Value Date    INR 1.25 04/10/2019       Again, thank you for allowing me to participate in the care of your patient.      Sincerely,    Murphy Enciso PA-C

## 2019-05-20 NOTE — PROGRESS NOTES
Hepatology Follow-up Clinic note  Ten Johnson   Date of Birth 1960  Date of Service 5/20/2019    Reason for follow-up: Hep C/ETOH cirrhosis          Assessment/plan:   Ten Johnson is a 58 year old male with Hep C/ETOH cirrhosis. Hep C, gt 1a, treatment naive. Cirrhosis is well compensated at this time with no overt ascites, signs of fluid overload or overt hepatic encephalopathy. We discussed the need for sobriety from ETOH. Patient has been lost to follow for the past few years. History of LIRADs 3 lesion, overdue for Abdominal MRI. He will be due for variceal screening at the end of 2019. We discussed the importance of absolute sobriety moving forwards. We will plan on treating Hepatitis C after he has HCC screening. We discussed the treatment regimen and medication side effects. Patient would be a good candidate for Hepatitis C treatment to prevent worsening fibrosis, decompensation of liver disease and prevent extrahepatic manifestations of the disease.     - Due for variceal screening end of 2019  - History of LIRADs-3 lesion: MRI Abdomen at next convenience  - Will plan to send prescription for Hepatitis C pending genotype and fibrosis  - Follow-up in clinic in 6 months or sooner as needed    Murphy Enciso PA-C   [facility] Hepatology clinic    -----------------------------------------------------       HPI:   Ten Johnson is a 58 year old male presenting for follow-up.     Cirrhosis   - dx Jan 2012  - ETOH/HCV  - no hx HE, ascites or variceal bleed  - last EGD Oct 2017- MW tear, small EV, mild portal hypertensive gastropathy  - HCC screening- MRI liver Oct 2017- LIRADS-3 lesion     HCV  - dx?  - hx CHYNA  - GT-1a or 1b  - liver bx 2012- stage IV fibrosis, steatosis  - no prior rx    Patient was last seen by Dr. Sandoval on 12/4/2017. Patient had a right TKA done on 2/7/2019 complicated by infection requiring washout and polyexchange on 3/4/2019. The was started on IV antibody and then had  JENNIFER and Cr peaked at 3.6 now 0.97. He is now taking Bactrim once daily.     His weight has remained stable. He had a good appetite. He has regular bowel movements. He has swelling in his right knee, but otherwise no pitting edema.     Patient denies jaundice, abdominal distension or confusion.  Patient also denies melena, hematochezia or hematemesis. Patient denies  fevers, sweats or chills.    He was previously a , currently on disability. He states he last drank a beer last week. He states he has cut down drinking significantly and has not drank regularly in the past three to four months.     Medical hx Surgical hx   Past Medical History:   Diagnosis Date     Alcohol use disorder      Alcoholic cirrhosis (H)      Anticoagulant long-term use      Ascites      Chronic allergic rhinitis      Chronic anemia      Chronic hepatitis C without hepatic coma (H) 05/10/2016     Cirrhosis (H) 2017     Diastolic dysfunction      Erosive gastropathy      Esophageal varices in alcoholic cirrhosis (H)      H/O upper gastrointestinal hemorrhage 09/2017     History of blood transfusion      History of drug abuse      Hypertension      JANELLE (iron deficiency anemia)      Sarahi-Hoffman tear      Marijuana abuse      MRSA (methicillin resistant Staphylococcus aureus)      Olecranon bursitis      Portal hypertension (H)      Right shoulder pain      S/p TAVR (transcatheter aortic valve replacement), bioprosthetic      Severe aortic stenosis      Thrombocytopenia (H)     Past Surgical History:   Procedure Laterality Date     ARTHROSCOPY SHOULDER ROTATOR CUFF REPAIR  7/31/2012    Procedure: ARTHROSCOPY SHOULDER ROTATOR CUFF REPAIR;  Right Shoulder Arthroscopic Rotator Cuff Repair, BicepsTenodesis,  Subacromial Decompression ;  Surgeon: Joi Castillo MD;  [facility]     ESOPHAGOSCOPY, GASTROSCOPY, DUODENOSCOPY (EGD), COMBINED N/A 10/23/2017    Procedure: COMBINED ESOPHAGOSCOPY, GASTROSCOPY, DUODENOSCOPY  (EGD);;  Surgeon: Gentry Salas MD;  [facility]     EXCHANGE POLY COMPONENT ARTHROPLASTY KNEE Right 3/4/2019    Procedure: REVISION RIGHT TOTAL KNEE POLY COMPONENT EXCHANGE;  Surgeon: Olvin Joe MD;  [facility]     FACIAL RECONSTRUCTION SURGERY  1971     HEART CATH FEMORAL CANNULIZATION WITH OPEN STANDBY REPAIR AORTIC VALVE N/A 2/21/2018    Procedure: HEART CATH FEMORAL CANNULIZATION WITH OPEN STANDBY REPAIR AORTIC VALVE;;  Surgeon: Luis Baird MD;  [facility]     IRRIGATION AND DEBRIDEMENT UPPER EXTREMITY, COMBINED  1/3/2012    Procedure:COMBINED IRRIGATION AND DEBRIDEMENT UPPER EXTREMITY; Irrigation & Debridement Left Elbow; Surgeon:CRISTHIAN ZHOU; [facility]     OPTICAL TRACKING SYSTEM ARTHROPLASTY KNEE Right 2/7/2019    Procedure: ARTHROPLASTY KNEE RIGHT;  Surgeon: Olvin Joe MD;  [facility]     REPAIR TENDON TRICEPS UPPER EXTREMITY  11/8/2011    Procedure:REPAIR TENDON TRICEPS UPPER EXTREMITY; Surgeon:CRISTHIAN ZHOU; [facility]     SHOULDER SURGERY  2003    left, injury, torn tendons, hematoma     TRANSCATHETER AORTIC VALVE IMPLANT ANESTHESIA N/A 2/21/2018    Procedure: TRANSCATHETER AORTIC VALVE IMPLANT ANESTHESIA;  Transfemoral (Quiroz) Aortic Valve Implant 26mm MARTHA 3, with Cardiopulmonary Bypass Standby, transthoracic echocardiogram;  Surgeon: GENERIC ANESTHESIA PROVIDER;  [facility]     TRANSPOSITION ULNAR NERVE (ELBOW)  11/8/2011    Procedure:TRANSPOSITION ULNAR NERVE (ELBOW); Final Procedure Done: Left Elbow Lateral Ulnar Collateral Repair And  Left Elbow Triceps Repair                   Medications:     Current Outpatient Medications   Medication     fluticasone (FLONASE) 50 MCG/ACT nasal spray     lactobacillus rhamnosus, GG, (CULTURELL) capsule     loratadine (CLARITIN) 10 MG tablet     metoprolol succinate ER (TOPROL-XL) 50 MG 24 hr tablet     montelukast (SINGULAIR) 10 MG tablet     Multiple Vitamin  "(MULTIVITAMINS PO)     sulfamethoxazole-trimethoprim (BACTRIM DS/SEPTRA DS) 800-160 MG tablet     vitamin B1 (THIAMINE) 50 MG tablet     No current facility-administered medications for this visit.             Allergies:     Allergies   Allergen Reactions     Zolpidem Other (See Comments)     Alcoholic.  Had reaction 3/17/13 while intoxicated which included black out, loss of awareness, paranoia.  Do not prescribe.  Dr. Celeste     Cats Other (See Comments)     rhinitis     Dogs Other (See Comments)     rhinitis     Pollen Extract Other (See Comments)     rhinits.            Review of Systems:   10 points ROS was obtained and highlighted in the HPI, otherwise negative.          Physical Exam:   VS:  BP (!) 168/92 (BP Location: Left arm, Patient Position: Sitting, Cuff Size: Adult Regular)   Pulse 59   Temp 97.5  F (36.4  C) (Oral)   Resp 18   Ht 1.753 m (5' 9\")   Wt 75.9 kg (167 lb 6.4 oz)   SpO2 100%   BMI 24.72 kg/m      Gen: A&Ox3, NAD, well developed  HEENT: non-icteric CV: RRR, no overt murmurs  Lung: CTA Bilatererally, no wheezing or crackles.   Lym- no palpable lymphadenopathy  Abd: soft, NT, ND, hepatomegaly. No overt ascites.   Ext: no edema, intact pulses.   Skin: No rash, no palmar erythema, telangiectasias or jaundice, vitiligo   Neuro: grossly intact, no asterixis   Psych: appropriate mood and affects           Data:   Reviewed in person and significant for:    Lab Results   Component Value Date     05/07/2019      Lab Results   Component Value Date    POTASSIUM 3.8 05/07/2019     Lab Results   Component Value Date    CHLORIDE 108 05/07/2019     Lab Results   Component Value Date    CO2 22 05/07/2019     Lab Results   Component Value Date    BUN 20 05/07/2019     Lab Results   Component Value Date    CR 1.14 05/07/2019       Lab Results   Component Value Date    WBC 7.4 04/23/2019     Lab Results   Component Value Date    HGB 13.2 05/07/2019     Lab Results   Component Value Date    HCT " 37.3 04/23/2019     Lab Results   Component Value Date    MCV 88 04/23/2019     Lab Results   Component Value Date    PLT 75 04/23/2019       Lab Results   Component Value Date     04/23/2019     Lab Results   Component Value Date    ALT 94 04/23/2019     Lab Results   Component Value Date    BILICONJ 0.0 12/18/2012      Lab Results   Component Value Date    BILITOTAL 0.6 04/23/2019       Lab Results   Component Value Date    ALBUMIN 3.1 05/07/2019     Lab Results   Component Value Date    PROTTOTAL 7.2 04/23/2019      Lab Results   Component Value Date    ALKPHOS 113 04/23/2019       Lab Results   Component Value Date    INR 1.25 04/10/2019

## 2019-05-20 NOTE — NURSING NOTE
"Chief Complaint   Patient presents with     RECHECK     alcohol induced cirrhosis       Vital signs:  Temp: 97.5  F (36.4  C) Temp src: Oral BP: (!) 168/92(provider notified) Pulse: 59   Resp: 18 SpO2: 100 %     Height: 175.3 cm (5' 9\") Weight: 75.9 kg (167 lb 6.4 oz)  Estimated body mass index is 24.72 kg/m  as calculated from the following:    Height as of this encounter: 1.753 m (5' 9\").    Weight as of this encounter: 75.9 kg (167 lb 6.4 oz).        Keke Ambriz [facility]  5/20/2019 1:49 PM      "

## 2019-05-21 LAB
HBV CORE AB SERPL QL IA: NONREACTIVE
HIV 1+2 AB+HIV1 P24 AG SERPL QL IA: NONREACTIVE

## 2019-05-22 ENCOUNTER — CARE COORDINATION (OUTPATIENT)
Dept: NEPHROLOGY | Facility: CLINIC | Age: 59
End: 2019-05-22

## 2019-05-23 ENCOUNTER — OFFICE VISIT (OUTPATIENT)
Dept: INTERNAL MEDICINE | Facility: CLINIC | Age: 59
End: 2019-05-23
Payer: COMMERCIAL

## 2019-05-23 VITALS
SYSTOLIC BLOOD PRESSURE: 155 MMHG | WEIGHT: 165.3 LBS | TEMPERATURE: 97.5 F | OXYGEN SATURATION: 100 % | HEIGHT: 69 IN | BODY MASS INDEX: 24.48 KG/M2 | HEART RATE: 85 BPM | RESPIRATION RATE: 16 BRPM | DIASTOLIC BLOOD PRESSURE: 93 MMHG

## 2019-05-23 DIAGNOSIS — Z96.651 HISTORY OF ARTHROPLASTY OF RIGHT KNEE: ICD-10-CM

## 2019-05-23 DIAGNOSIS — I10 HYPERTENSION, UNSPECIFIED TYPE: Primary | ICD-10-CM

## 2019-05-23 LAB
HCV RNA SERPL NAA+PROBE-ACNC: ABNORMAL [IU]/ML
HCV RNA SERPL NAA+PROBE-LOG IU: 5.6 LOG IU/ML

## 2019-05-23 RX ORDER — LISINOPRIL 10 MG/1
10 TABLET ORAL DAILY
Qty: 30 TABLET | Refills: 1 | Status: SHIPPED | OUTPATIENT
Start: 2019-05-23 | End: 2019-06-05

## 2019-05-23 ASSESSMENT — MIFFLIN-ST. JEOR: SCORE: 1550.18

## 2019-05-23 ASSESSMENT — PAIN SCALES - GENERAL: PAINLEVEL: NO PAIN (0)

## 2019-05-23 NOTE — PROGRESS NOTES
Per chart review, patient was seen in the primary care clinic today. He was started on lisinopril 10mg daily. Will update Evelin and continue to monitor.    Kimberli Chairez RN

## 2019-05-23 NOTE — NURSING NOTE
PICC was removed and dressing was applied. Pt was tolerated well and tip was patent.    Maximo Munoz RN
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
## 2019-05-23 NOTE — PATIENT INSTRUCTIONS
[facility] Medication Refill Request Information:  * Please contact your pharmacy regarding ANY request for medication refills.  ** [facility] Prescription Fax = [phone]  * Please allow 3 business days for routine medication refills.  * Please allow 5 business days for controlled substance medication refills.     [facility] Test Result notification information:  *You will be notified with in 7-10 days of your appointment day regarding the results of your test.  If you are on MyChart you will be notified as soon as the provider has reviewed the results and signed off on them.    [facility]: [phone]              [facility]         Internal Medicine Resident                   Continuity Clinic    Who We Are    Resident Continuity Clinic is a part of the [facility] Primary Care Clinic.  Resident physicians see patients independently and establish a relationship with them over the course of their three-year residency program.  As with the Primary Care Clinic, our Resident Continuity Clinic models a group practice.  If your doctor is not available, you will be able to see another resident physician.  At the end of a resident s training, patients will be transitioned to a new resident physician for ongoing care.     We treat patients with a wide array of medical needs from routine physicals, to acute illnesses, to diabetes and blood pressure management, to complex medical illness.  What is a Resident Physician?    Resident physicians hold medical degrees and are doctors. They are training to become specialists in Internal Medicine. They work under the supervision of board-certified faculty physicians.  Expectations for Your Care    We strive to provide accessible, quality care at all times.    In order to provide this care, it is best to see your primary care resident doctor consistently rather switching between providers.  In the event you do see another physician, you should  schedule a follow-up visit with your usual primary care doctor.    If you are transitioning your care from another clinic, it is helpful to have your records available for your doctor to review.    We do not prescribe controlled substances, such as ADD medications or narcotic pain medications, on your first visit.  We will review your health records and concerns prior to devising a treatment plan with you in order to provide the best care.      Clinic Services     Extended clinic hours; patient  to help navigate your visit;  parking; laboratory and imaging services with evening and weekend hours    Multiple medical and surgical specialties in one building    Complementary services, including Nutrition, Integrative Medicine, Pharmacy consultations, Mental and Behavioral Health, Sports Medicine and Physical Therapy    Thank You    We would like to thank you for choosing the [facility] Internal Medicine Resident Continuity Clinic for your primary care. You are making a priceless contribution to the training of the next generation of health care practitioners.     Contact us at [phone] for appointments or questions.    Resident Clinic Hours are Tuesdays and Thursdays, 7:30am-5:00pm    Residents   Emiliano Julian MD   (Male)   Rosina Rodriguez MD  (Female)  Cinthya Ramirez MD   (Female)   Vidya Hummel MD   (Female)   Blaine Tijerina MD  (Male)   Ludin Bruner MD  (Male)   Verónica Santiago MD    (Female)   Cole Bass MD  (Male)   Eliazar Singh MD  (Male)    Emmy Lozada MD  (Female)   Tanner Camara MD  (Male)   Daphne Willard MD  (Female)   Jed Chapa MD    (Female)   Barron Galarza MD  (Male)   Tom Carter MD  (Male)   Ludin Rodriguez MD (Male)   Nain Mcnamara MD  (Male)   Sherrie Villeda MD (Female)    Mya Walker MD (Female)   Tiny Garay MD  (Male)   Elvira Wilson MD    (Female)   Paige Andrews MD  (Female)    Supervising Physicians   Cuca  Roula, MD Jenniffer Burnett, MD Ishaan Noel, MD Capo Mishra, MD Priscila Jean, MD Philomena Preston, MD Marcos Escobar, MD Katherine Michelle, MD Karolina Avelar MD

## 2019-05-23 NOTE — NURSING NOTE
Chief Complaint   Patient presents with     Hospital F/U     Pt is here for a hospital follow up.         Shahbaz Henriquez, EMT on 5/23/2019 at 12:42 PM

## 2019-05-23 NOTE — PROGRESS NOTES
PRIMARY CARE CENTER     Patient Name: Ten Johnson  YOB: 1960  MRN: [id number]    Date of Service: May 23, 2019  Chief Complaint: ***            HISTORY OF PRESENT ILLNESS:    Ten Johnson is a 58 year old male with a PMH of Hep C/alcoholic cirrhosis, hx of of esophageal varices, HTN, severe aortic stenosis s/p TAVR, and right knee TKA in 02/2019 that was complicated by an infection with E. coli and staph epi requiring I&D and liner exchange in 03/2019, followed by course of IV antibiotics which was complicated by supratherapeutic vancomycin induced JENNIFER. He presents today for multiple issues.    1) Supratherapeutic vancomycin induced JENNIFER:  Patient was recently admitted     2) HTN    3) right TKA     4) Hep C           PAST MEDICAL HISTORY:     Past Medical History:   Diagnosis Date     Alcohol use disorder      Alcoholic cirrhosis (H)      Anticoagulant long-term use     plavix     Ascites      Chronic allergic rhinitis      Chronic anemia      Chronic hepatitis C without hepatic coma (H) 05/10/2016    Untreated as of 2/2018     Cirrhosis (H) 2017    MRI finding     Diastolic dysfunction      Erosive gastropathy      Esophageal varices in alcoholic cirrhosis (H)      H/O upper gastrointestinal hemorrhage 09/2017     History of blood transfusion      History of drug abuse     intranasal     Hypertension     essential     JANELLE (iron deficiency anemia)      Sarahi-Hoffman tear     History     Marijuana abuse      MRSA (methicillin resistant Staphylococcus aureus)      Olecranon bursitis      Portal hypertension (H)      Right shoulder pain     history of rotator cuff repair     S/p TAVR (transcatheter aortic valve replacement), bioprosthetic      Severe aortic stenosis      Thrombocytopenia (H)             PAST SURGICAL HISTORY:     Past Surgical History:   Procedure Laterality Date     ARTHROSCOPY SHOULDER ROTATOR CUFF REPAIR  7/31/2012    Procedure: ARTHROSCOPY SHOULDER ROTATOR CUFF REPAIR;  Right  Shoulder Arthroscopic Rotator Cuff Repair, BicepsTenodesis,  Subacromial Decompression ;  Surgeon: Joi Castillo MD;  [facility]     ESOPHAGOSCOPY, GASTROSCOPY, DUODENOSCOPY (EGD), COMBINED N/A 10/23/2017    Procedure: COMBINED ESOPHAGOSCOPY, GASTROSCOPY, DUODENOSCOPY (EGD);;  Surgeon: Gentry Salas MD;  [facility]     EXCHANGE POLY COMPONENT ARTHROPLASTY KNEE Right 3/4/2019    Procedure: REVISION RIGHT TOTAL KNEE POLY COMPONENT EXCHANGE;  Surgeon: Olvin Joe MD;  [facility]     FACIAL RECONSTRUCTION SURGERY  1971     HEART CATH FEMORAL CANNULIZATION WITH OPEN STANDBY REPAIR AORTIC VALVE N/A 2/21/2018    Procedure: HEART CATH FEMORAL CANNULIZATION WITH OPEN STANDBY REPAIR AORTIC VALVE;;  Surgeon: Luis Baird MD;  [facility]     IRRIGATION AND DEBRIDEMENT UPPER EXTREMITY, COMBINED  1/3/2012    Procedure:COMBINED IRRIGATION AND DEBRIDEMENT UPPER EXTREMITY; Irrigation & Debridement Left Elbow; Surgeon:CRISTHIAN ZHOU; [facility]     OPTICAL TRACKING SYSTEM ARTHROPLASTY KNEE Right 2/7/2019    Procedure: ARTHROPLASTY KNEE RIGHT;  Surgeon: Olvin Joe MD;  [facility]     REPAIR TENDON TRICEPS UPPER EXTREMITY  11/8/2011    Procedure:REPAIR TENDON TRICEPS UPPER EXTREMITY; Surgeon:CRISTHIAN ZHOU; [facility]     SHOULDER SURGERY  2003    left, injury, torn tendons, hematoma     TRANSCATHETER AORTIC VALVE IMPLANT ANESTHESIA N/A 2/21/2018    Procedure: TRANSCATHETER AORTIC VALVE IMPLANT ANESTHESIA;  Transfemoral (Quiroz) Aortic Valve Implant 26mm MARTHA 3, with Cardiopulmonary Bypass Standby, transthoracic echocardiogram;  Surgeon: GENERIC ANESTHESIA PROVIDER;  [facility]     TRANSPOSITION ULNAR NERVE (ELBOW)  11/8/2011    Procedure:TRANSPOSITION ULNAR NERVE (ELBOW); Final Procedure Done: Left Elbow Lateral Ulnar Collateral Repair And  Left Elbow Triceps Repair              ALLERGIES:      Allergies   Allergen Reactions      Zolpidem Other (See Comments)     Alcoholic.  Had reaction 3/17/13 while intoxicated which included black out, loss of awareness, paranoia.  Do not prescribe.  Dr. Celeste     Cats Other (See Comments)     rhinitis     Dogs Other (See Comments)     rhinitis     Pollen Extract Other (See Comments)     rhinits.            HOME MEDICATIONS:     Current Outpatient Medications   Medication     fluticasone (FLONASE) 50 MCG/ACT nasal spray     lisinopril (PRINIVIL/ZESTRIL) 10 MG tablet     loratadine (CLARITIN) 10 MG tablet     metoprolol succinate ER (TOPROL-XL) 50 MG 24 hr tablet     montelukast (SINGULAIR) 10 MG tablet     Multiple Vitamin (MULTIVITAMINS PO)     sulfamethoxazole-trimethoprim (BACTRIM DS/SEPTRA DS) 800-160 MG tablet     No current facility-administered medications for this visit.             FAMILY HISTORY:     Family History   Problem Relation Age of Onset     Cancer Mother 62     Alcoholism Paternal Uncle      No Known Problems Father      No Known Problems Brother      Diabetes Maternal Grandmother      Myocardial Infarction Maternal Grandfather      No Known Problems Paternal Grandmother      Unknown/Adopted Paternal Grandfather      Cirrhosis No family hx of        Heart Disease: ***  Diabetes: ***  Cancer: ***         SOCIAL HISTORY:     Social History     Tobacco Use     Smoking status: Never Smoker     Smokeless tobacco: Never Used   Substance Use Topics     Alcohol use: Yes     Comment: Alcohol use disorder, still actively drinking     Drug use: No     Comment: denies       Diet:***  Exercise: ***  Occupation: ***    Tobacco: ***  Alcohol: ***  Drugs: ***    Home: ***  Relationships: ***  Sexual Activity: ***  Depression: ***  Abuse: Current or Past(Physical, Sexual or Emotional)- {YES/NO/NA:473591}  Do you feel safe in your environment - {YES/NO/NA:224461}         REVIEW OF SYSTEMS:     10 point ROS was negative except as noted in HPI           PHYSICAL EXAM:   Vitals: BP (!) 155/93   Pulse  "85   Temp 97.5  F (36.4  C) (Oral)   Resp 16   Ht 1.753 m (5' 9\")   Wt 75 kg (165 lb 4.8 oz)   SpO2 100%   BMI 24.41 kg/m      Wt Readings from Last 4 Encounters:   05/23/19 75 kg (165 lb 4.8 oz)   05/20/19 75.9 kg (167 lb 6.4 oz)   05/07/19 75.1 kg (165 lb 9.6 oz)   04/23/19 76.6 kg (168 lb 12.8 oz)       GENERAL: Alert, interactive, NAD  HEENT: Normocephalic, atraumatic, PERRL, EOMI, no conjunctivitis, anicteric sclera, tympanic membranes translucent with normal light reflex, no cervical LAD, no thyromegaly, no oropharyngeal lesions or erythema  LUNGS: clear to auscultation bilaterally, no crackles or wheezes  HEART: regular rate and rhythm, normal S1 and S2, no murmur appreciated  ABDOMEN: Soft, nontender, nondistended. +BS, no HSM or masses, no rebound or guarding.  MUSCULOSKELETAL: no tenderness to spinous processes, no chest wall tenderness  EXTREMITIES: No LE edema bilaterally, 2+ DP and radial pulses bialterally  SKIN: Warm and dry, no jaundice or acute rashes  NEURO: CN II-XII intact, 5/5 upper and lower extremity strength bilaterally, 2+ biceps and patellar reflexes bilaterally, no dysmetria bilaterally, sensation intact, normal gait  PSYCH: A&O to person, place and time. appropriate mood, normal speech, linear thought.            DATA:     Laboratory Data:  ***  Imaging:  ***          ASSESSMENT & PLAN:     Ten was seen today for hospital f/u.    Diagnoses and all orders for this visit:    Hypertension, unspecified type  -     lisinopril (PRINIVIL/ZESTRIL) 10 MG tablet; Take 1 tablet (10 mg) by mouth daily    History of arthroplasty of right knee  -     PHYSICAL THERAPY REFERRAL; Future  -     SPORTS MEDICINE REFERRAL    Other orders  -     PICC removal        # Healthcare Maintenance  -Blood Pressure: ***  -Lipids: ***  -Diabetes screening: ***  -Colonoscopy: ***  -Mammogram: ***  -Gyn: ***  -Immunizations: ***  -HCV Screening: ***    RTC: ***      Patient care plan discussed with attending " physician,  ***, who agreed with above.       Emiliano Julian MD  Internal Medicine, PGY-1  [id number]

## 2019-05-25 LAB — HCV GENTYP SERPL NAA+PROBE: NORMAL

## 2019-05-31 ENCOUNTER — HOME INFUSION (PRE-WILLOW HOME INFUSION) (OUTPATIENT)
Dept: PHARMACY | Facility: CLINIC | Age: 59
End: 2019-05-31

## 2019-06-01 ENCOUNTER — APPOINTMENT (OUTPATIENT)
Dept: LAB | Facility: CLINIC | Age: 59
End: 2019-06-01
Attending: INTERNAL MEDICINE
Payer: COMMERCIAL

## 2019-06-05 ENCOUNTER — MYC MEDICAL ADVICE (OUTPATIENT)
Dept: INTERNAL MEDICINE | Facility: CLINIC | Age: 59
End: 2019-06-05

## 2019-06-05 ENCOUNTER — OFFICE VISIT (OUTPATIENT)
Dept: NEPHROLOGY | Facility: CLINIC | Age: 59
End: 2019-06-05
Payer: COMMERCIAL

## 2019-06-05 VITALS
HEART RATE: 69 BPM | SYSTOLIC BLOOD PRESSURE: 123 MMHG | WEIGHT: 169.1 LBS | OXYGEN SATURATION: 99 % | DIASTOLIC BLOOD PRESSURE: 77 MMHG | BODY MASS INDEX: 24.97 KG/M2

## 2019-06-05 DIAGNOSIS — N17.9 ACUTE KIDNEY INJURY (H): Primary | ICD-10-CM

## 2019-06-05 DIAGNOSIS — T84.59XS INFECTION OF PROSTHETIC KNEE JOINT, SEQUELA: ICD-10-CM

## 2019-06-05 DIAGNOSIS — Z96.659 INFECTION OF PROSTHETIC KNEE JOINT, SEQUELA: ICD-10-CM

## 2019-06-05 DIAGNOSIS — I10 HYPERTENSION, UNSPECIFIED TYPE: ICD-10-CM

## 2019-06-05 LAB
ALBUMIN SERPL-MCNC: 3.3 G/DL (ref 3.4–5)
ALP SERPL-CCNC: 103 U/L (ref 40–150)
ALT SERPL W P-5'-P-CCNC: 106 U/L (ref 0–70)
ANION GAP SERPL CALCULATED.3IONS-SCNC: 9 MMOL/L (ref 3–14)
AST SERPL W P-5'-P-CCNC: 92 U/L (ref 0–45)
BASOPHILS # BLD AUTO: 0.1 10E9/L (ref 0–0.2)
BASOPHILS NFR BLD AUTO: 0.6 %
BILIRUB SERPL-MCNC: 0.6 MG/DL (ref 0.2–1.3)
BUN SERPL-MCNC: 18 MG/DL (ref 7–30)
CALCIUM SERPL-MCNC: 8.7 MG/DL (ref 8.5–10.1)
CHLORIDE SERPL-SCNC: 107 MMOL/L (ref 94–109)
CO2 SERPL-SCNC: 22 MMOL/L (ref 20–32)
CREAT SERPL-MCNC: 1.04 MG/DL (ref 0.66–1.25)
CRP SERPL-MCNC: <2.9 MG/L (ref 0–8)
DIFFERENTIAL METHOD BLD: ABNORMAL
EOSINOPHIL # BLD AUTO: 0.1 10E9/L (ref 0–0.7)
EOSINOPHIL NFR BLD AUTO: 0.6 %
ERYTHROCYTE [DISTWIDTH] IN BLOOD BY AUTOMATED COUNT: 16.5 % (ref 10–15)
ERYTHROCYTE [SEDIMENTATION RATE] IN BLOOD BY WESTERGREN METHOD: 10 MM/H (ref 0–20)
GFR SERPL CREATININE-BSD FRML MDRD: 77 ML/MIN/{1.73_M2}
GLUCOSE SERPL-MCNC: 95 MG/DL (ref 70–99)
HCT VFR BLD AUTO: 36.2 % (ref 40–53)
HGB BLD-MCNC: 13 G/DL (ref 13.3–17.7)
IMM GRANULOCYTES # BLD: 0 10E9/L (ref 0–0.4)
IMM GRANULOCYTES NFR BLD: 0.3 %
LYMPHOCYTES # BLD AUTO: 1.5 10E9/L (ref 0.8–5.3)
LYMPHOCYTES NFR BLD AUTO: 19.7 %
MCH RBC QN AUTO: 32.6 PG (ref 26.5–33)
MCHC RBC AUTO-ENTMCNC: 35.9 G/DL (ref 31.5–36.5)
MCV RBC AUTO: 91 FL (ref 78–100)
MONOCYTES # BLD AUTO: 0.8 10E9/L (ref 0–1.3)
MONOCYTES NFR BLD AUTO: 10.6 %
NEUTROPHILS # BLD AUTO: 5.3 10E9/L (ref 1.6–8.3)
NEUTROPHILS NFR BLD AUTO: 68.2 %
NRBC # BLD AUTO: 0 10*3/UL
NRBC BLD AUTO-RTO: 0 /100
PLATELET # BLD AUTO: 83 10E9/L (ref 150–450)
POTASSIUM SERPL-SCNC: 3.8 MMOL/L (ref 3.4–5.3)
PROT SERPL-MCNC: 7 G/DL (ref 6.8–8.8)
RBC # BLD AUTO: 3.99 10E12/L (ref 4.4–5.9)
SODIUM SERPL-SCNC: 139 MMOL/L (ref 133–144)
WBC # BLD AUTO: 7.8 10E9/L (ref 4–11)

## 2019-06-05 PROCEDURE — 85025 COMPLETE CBC W/AUTO DIFF WBC: CPT | Performed by: INTERNAL MEDICINE

## 2019-06-05 PROCEDURE — 85652 RBC SED RATE AUTOMATED: CPT | Performed by: INTERNAL MEDICINE

## 2019-06-05 PROCEDURE — 80053 COMPREHEN METABOLIC PANEL: CPT | Performed by: INTERNAL MEDICINE

## 2019-06-05 PROCEDURE — 86140 C-REACTIVE PROTEIN: CPT | Performed by: INTERNAL MEDICINE

## 2019-06-05 RX ORDER — SULFAMETHOXAZOLE/TRIMETHOPRIM 800-160 MG
1 TABLET ORAL DAILY
Qty: 10 TABLET | Refills: 1
Start: 2019-06-05 | End: 2019-10-04

## 2019-06-05 RX ORDER — LISINOPRIL 20 MG/1
20 TABLET ORAL DAILY
Qty: 90 TABLET | Refills: 3
Start: 2019-06-05 | End: 2019-07-29

## 2019-06-05 NOTE — TELEPHONE ENCOUNTER
Patient stopped by clinic and talked with care coordination.  He brought in the original form signed by Dr. Preston and a blank form.  Patient needs an estimated RTW date notated on form for benefit coverage.  Notes from last visit 05/23/19 state to return to clinic in 2 months.  Form will be forwarded to Dr. Preston to review.    MAYANK FARMER at 1:43 PM on 6/5/2019.

## 2019-06-05 NOTE — NURSING NOTE
Chief Complaint   Patient presents with     Blood Draw     labs drawn via venipuncture by RN     There were no vitals taken for this visit.    Labs collected and sent from right lower forearm antecubital venipuncture in lab by RN. Pt tolerated well.    Molly Vargas RN

## 2019-06-05 NOTE — TELEPHONE ENCOUNTER
Sent ,. Asked pt to bring forms to us , provide the fax number, advised MD not in until Friday and asked about the date 7/15/19.  Mendy Cuenca on 6/5/2019 at 10:08 AM

## 2019-06-05 NOTE — LETTER
6/5/2019       RE: Ten Johnson  [address]  [facility] [address]     Dear Colleague,    Thank you for referring your patient, Ten Johnson, to the [facility] NEPHROLOGY at [facility]. Please see a copy of my visit note below.    Nephrology Clinic Visit 6/5/19    Assessment and Plan:    1. JENNIFER - Improved but not resolved. Creat 1.0, baseline 0.7. eGFR 77 ml/mn. No voiding concerns. Remains on Bactrim for suppression of his knee infection given presence of hardware.    - Etiology of his JENNIFER Vancomycin toxicity.    - Creat will likely improve further after discontinuation of Bactrim   - Recommended avoiding NSAIDs    2. HTN - Controlled. Clinic blood pressures 119-123/777. No home readings. On Lisinopril 20 mg every day. No edema. Weight 76.7 kg, up from 75.1 kg last visit.    - Recommend home blood pressure readings    3. Right TKA infection - Followed by Dr Hare in [place]. Remains on Bactrim for suppression.     4. Electrolytes - No acute concerns. K 3.8, Na 139    5. Disposition - No further Nephrology f/u required    Assessment and plan was discussed with patient and he voiced his understanding and agreement.    Reason for Visit:  JENNIFER/ HTN f/u    HPI:  Mr Johnson is a 60 yo male with ETOH cirrhosis, Hep C, HTN, Aortic stenosis s/p TAVR, h/o inderjit evans tear, OA s/p right TKA c/b infection/fall being treated with Vanco/Rocephin with hospital admission 4/9-4/11/19 for JENNIFER 2/2 Vanco toxicity with supratherapeutic level to 60.5. Baseline creat 0.7, peak creat 4.0. Vancomycin was discontinued. He completed course of Rocephin on 4/15/19. He remains on Bactrim for suppression.     ROS:   Patient continues to have improved strength. He is biking daily. Muscle strength is improving. His appetite is good. No edema. No voiding issues. Denies CP or dyspnea. Does have serious seasonal allergies. Started on Lisinopril 10 mg every day by PCP on 5/22/19. Patient has since increased  to 20 mg every day and stopped the Toprol. He does not monitor blood pressures at home.     Chronic Health Problems:    Alcohol use disorder  Alcoholic cirrhosis   Ascites   Chronic allergic rhinitis   Chronic anemia   Chronic hepatitis C  Diastolic dysfunction   Erosive gastropathy   Esophageal varices in alcoholic cirrhosis   Upper gastrointestinal hemorrhage   Hypertension   Iron deficiency anemia   Sarahi-Hoffman tear   Marijuana abuse   MRSA   Olecranon bursitis   Severe aortic stenosis  Thrombocytopenia   Presbyopia   Rotator cuff tear, right   Osteoarthritis of knee   JENNIFER    Social Hx:   Single, employed in construction/. NS  Previous documentation reports active etoh use. I did not discuss    Family Hx:   Family History   Problem Relation Age of Onset     Cancer Mother 62     Alcoholism Paternal Uncle      No Known Problems Father      No Known Problems Brother      Diabetes Maternal Grandmother      Myocardial Infarction Maternal Grandfather      No Known Problems Paternal Grandmother      Unknown/Adopted Paternal Grandfather      Cirrhosis No family hx of      Allergies:  Allergies   Allergen Reactions     Zolpidem Other (See Comments)     Alcoholic.  Had reaction 3/17/13 while intoxicated which included black out, loss of awareness, paranoia.  Do not prescribe.  Dr. Celeste     Cats Other (See Comments)     rhinitis     Dogs Other (See Comments)     rhinitis     Pollen Extract Other (See Comments)     rhinits.       Medications:  Current Outpatient Medications   Medication Sig     fluticasone (FLONASE) 50 MCG/ACT nasal spray Spray 2 sprays into both nostrils daily     lisinopril (PRINIVIL/ZESTRIL) 20 MG tablet Take 1 tablet (20 mg) by mouth daily     loratadine (CLARITIN) 10 MG tablet Take 10 mg by mouth daily     Multiple Vitamin (MULTIVITAMINS PO) Take 1 tablet by mouth At Bedtime      sulfamethoxazole-trimethoprim (BACTRIM DS/SEPTRA DS) 800-160 MG tablet Take 1 tablet by mouth daily     No  current facility-administered medications for this visit.       Vitals:  /77   Pulse 69   Wt 76.7 kg (169 lb 1.6 oz)   SpO2 99%   BMI 24.97 kg/m       Exam:  GEN: Pleasant male in NAD.   CARDIAC: RRR  LUNGS: CTA  ABDOMEN: Soft, NT  EXT: No edema. Right knee incision well healed  NEURO: Alert, oriented    LABS:   CMP  Recent Labs   Lab Test 06/05/19  1302 05/20/19  1312 05/07/19  1249 04/29/19  0805    140 138 141   POTASSIUM 3.8 4.0 3.8 4.0   CHLORIDE 107 111* 108 106   CO2 22 23 22 25   ANIONGAP 9 6 7 10   GLC 95 82 123* 80   BUN 18 15 20 30   CR 1.04 0.97 1.14 1.73*   GFRESTIMATED 77 84 69 42*   GFRESTBLACK 89 >90 80 48*   JOSEPHINE 8.7 8.7 8.7 8.8     Recent Labs   Lab Test 06/05/19  1302 05/20/19  1312 04/23/19  1538 04/15/19  0852 04/10/19  0637   BILITOTAL 0.6 0.6 0.6  --  0.5   ALKPHOS 103 104 113  --  98   * 102* 94*  --  72*   AST 92* 90* 112* 96* 78*     CBC  Recent Labs   Lab Test 06/05/19  1302 05/20/19  1312 05/07/19  1249 04/23/19  1538 04/15/19  0852   HGB 13.0* 12.7* 13.2* 13.2* 13.5   WBC 7.8 6.2  --  7.4 10.3   RBC 3.99* 4.08*  --  4.22* 4.30*   HCT 36.2* 36.0*  --  37.3* 38.8*   MCV 91 88  --  88 90   MCH 32.6 31.1  --  31.3 31.4   MCHC 35.9 35.3  --  35.4 34.8   RDW 16.5* 15.7*  --  13.4 13.7   PLT 83* 69*  --  75* 84*     URINE STUDIES  Recent Labs   Lab Test 04/09/19  2108 02/07/19  0830 10/23/17  0512 08/25/14  1645   COLOR Light Yellow Yellow Yellow Light Yellow   APPEARANCE Clear Clear Clear Clear   URINEGLC Negative Negative Negative Negative   URINEBILI Negative Negative Negative Negative   URINEKETONE Negative Negative Negative Negative   SG 1.010 1.015 1.018 1.003   UBLD Negative Negative Negative Negative   URINEPH 5.5 6.5 6.5 5.0   PROTEIN Negative Negative Negative Negative   NITRITE Negative Negative Negative Negative   LEUKEST Negative Negative Negative Negative   RBCU 0 0 0  --    WBCU <1 <1 2  --      No lab results found.  PTH  Recent Labs   Lab Test  05/07/19  1249   PTHI 48     IRON STUDIES  Recent Labs   Lab Test 05/07/19  1249 02/01/18  0755   IRON 154  --      --    IRONSAT 46  --    CARIDAD 293 48       Jessica Sotelo, APRN, CNP

## 2019-06-05 NOTE — PROGRESS NOTES
Nephrology Clinic Visit 6/5/19    Assessment and Plan:    1. JENNIFER - Improved but not resolved. Creat 1.0, baseline 0.7. eGFR 77 ml/mn. No voiding concerns. Remains on Bactrim for suppression of his knee infection given presence of hardware.    - Etiology of his JENNIFER Vancomycin toxicity.    - Creat will likely improve further after discontinuation of Bactrim   - Recommended avoiding NSAIDs    2. HTN - Controlled. Clinic blood pressures 119-123/777. No home readings. On Lisinopril 20 mg every day. No edema. Weight 76.7 kg, up from 75.1 kg last visit.    - Recommend home blood pressure readings    3. Right TKA infection - Followed by Dr Hare in [place]. Remains on Bactrim for suppression.     4. Electrolytes - No acute concerns. K 3.8, Na 139    5. Disposition - No further Nephrology f/u required    Assessment and plan was discussed with patient and he voiced his understanding and agreement.    Reason for Visit:  JENNIFER/ HTN f/u    HPI:  Mr Johnson is a 60 yo male with ETOH cirrhosis, Hep C, HTN, Aortic stenosis s/p TAVR, h/o inderjit evans tear, OA s/p right TKA c/b infection/fall being treated with Vanco/Rocephin with hospital admission 4/9-4/11/19 for JENNIFER 2/2 Vanco toxicity with supratherapeutic level to 60.5. Baseline creat 0.7, peak creat 4.0. Vancomycin was discontinued. He completed course of Rocephin on 4/15/19. He remains on Bactrim for suppression.     ROS:   Patient continues to have improved strength. He is biking daily. Muscle strength is improving. His appetite is good. No edema. No voiding issues. Denies CP or dyspnea. Does have serious seasonal allergies. Started on Lisinopril 10 mg every day by PCP on 5/22/19. Patient has since increased to 20 mg every day and stopped the Toprol. He does not monitor blood pressures at home.     Chronic Health Problems:    Alcohol use disorder  Alcoholic cirrhosis   Ascites   Chronic allergic rhinitis   Chronic anemia   Chronic hepatitis C  Diastolic dysfunction   Erosive  gastropathy   Esophageal varices in alcoholic cirrhosis   Upper gastrointestinal hemorrhage   Hypertension   Iron deficiency anemia   Sarahi-Hoffman tear   Marijuana abuse   MRSA   Olecranon bursitis   Severe aortic stenosis  Thrombocytopenia   Presbyopia   Rotator cuff tear, right   Osteoarthritis of knee   JENNIFER    Social Hx:   Single, employed in construction/. NS  Previous documentation reports active etoh use. I did not discuss    Family Hx:   Family History   Problem Relation Age of Onset     Cancer Mother 62     Alcoholism Paternal Uncle      No Known Problems Father      No Known Problems Brother      Diabetes Maternal Grandmother      Myocardial Infarction Maternal Grandfather      No Known Problems Paternal Grandmother      Unknown/Adopted Paternal Grandfather      Cirrhosis No family hx of      Allergies:  Allergies   Allergen Reactions     Zolpidem Other (See Comments)     Alcoholic.  Had reaction 3/17/13 while intoxicated which included black out, loss of awareness, paranoia.  Do not prescribe.  Dr. Celeste     Cats Other (See Comments)     rhinitis     Dogs Other (See Comments)     rhinitis     Pollen Extract Other (See Comments)     rhinits.       Medications:  Current Outpatient Medications   Medication Sig     fluticasone (FLONASE) 50 MCG/ACT nasal spray Spray 2 sprays into both nostrils daily     lisinopril (PRINIVIL/ZESTRIL) 20 MG tablet Take 1 tablet (20 mg) by mouth daily     loratadine (CLARITIN) 10 MG tablet Take 10 mg by mouth daily     Multiple Vitamin (MULTIVITAMINS PO) Take 1 tablet by mouth At Bedtime      sulfamethoxazole-trimethoprim (BACTRIM DS/SEPTRA DS) 800-160 MG tablet Take 1 tablet by mouth daily     No current facility-administered medications for this visit.       Vitals:  /77   Pulse 69   Wt 76.7 kg (169 lb 1.6 oz)   SpO2 99%   BMI 24.97 kg/m      Exam:  GEN: Pleasant male in NAD.   CARDIAC: RRR  LUNGS: CTA  ABDOMEN: Soft, NT  EXT: No edema. Right knee  incision well healed  NEURO: Alert, oriented    LABS:   CMP  Recent Labs   Lab Test 06/05/19  1302 05/20/19  1312 05/07/19  1249 04/29/19  0805    140 138 141   POTASSIUM 3.8 4.0 3.8 4.0   CHLORIDE 107 111* 108 106   CO2 22 23 22 25   ANIONGAP 9 6 7 10   GLC 95 82 123* 80   BUN 18 15 20 30   CR 1.04 0.97 1.14 1.73*   GFRESTIMATED 77 84 69 42*   GFRESTBLACK 89 >90 80 48*   JOSEPHINE 8.7 8.7 8.7 8.8     Recent Labs   Lab Test 06/05/19  1302 05/20/19  1312 04/23/19  1538 04/15/19  0852 04/10/19  0637   BILITOTAL 0.6 0.6 0.6  --  0.5   ALKPHOS 103 104 113  --  98   * 102* 94*  --  72*   AST 92* 90* 112* 96* 78*     CBC  Recent Labs   Lab Test 06/05/19  1302 05/20/19  1312 05/07/19  1249 04/23/19  1538 04/15/19  0852   HGB 13.0* 12.7* 13.2* 13.2* 13.5   WBC 7.8 6.2  --  7.4 10.3   RBC 3.99* 4.08*  --  4.22* 4.30*   HCT 36.2* 36.0*  --  37.3* 38.8*   MCV 91 88  --  88 90   MCH 32.6 31.1  --  31.3 31.4   MCHC 35.9 35.3  --  35.4 34.8   RDW 16.5* 15.7*  --  13.4 13.7   PLT 83* 69*  --  75* 84*     URINE STUDIES  Recent Labs   Lab Test 04/09/19  2108 02/07/19  0830 10/23/17  0512 08/25/14  1645   COLOR Light Yellow Yellow Yellow Light Yellow   APPEARANCE Clear Clear Clear Clear   URINEGLC Negative Negative Negative Negative   URINEBILI Negative Negative Negative Negative   URINEKETONE Negative Negative Negative Negative   SG 1.010 1.015 1.018 1.003   UBLD Negative Negative Negative Negative   URINEPH 5.5 6.5 6.5 5.0   PROTEIN Negative Negative Negative Negative   NITRITE Negative Negative Negative Negative   LEUKEST Negative Negative Negative Negative   RBCU 0 0 0  --    WBCU <1 <1 2  --      No lab results found.  PTH  Recent Labs   Lab Test 05/07/19  1249   PTHI 48     IRON STUDIES  Recent Labs   Lab Test 05/07/19  1249 02/01/18  0755   IRON 154  --      --    IRONSAT 46  --    CARIDAD 293 48       Jessica Sotelo, APRN, CNP

## 2019-06-07 ENCOUNTER — MYC MEDICAL ADVICE (OUTPATIENT)
Dept: INTERNAL MEDICINE | Facility: CLINIC | Age: 59
End: 2019-06-07

## 2019-06-08 ENCOUNTER — MYC MEDICAL ADVICE (OUTPATIENT)
Dept: INTERNAL MEDICINE | Facility: CLINIC | Age: 59
End: 2019-06-08

## 2019-06-09 ENCOUNTER — ANCILLARY PROCEDURE (OUTPATIENT)
Dept: MRI IMAGING | Facility: CLINIC | Age: 59
End: 2019-06-09
Attending: PHYSICIAN ASSISTANT
Payer: COMMERCIAL

## 2019-06-09 RX ORDER — GADOBUTROL 604.72 MG/ML
7.5 INJECTION INTRAVENOUS ONCE
Status: COMPLETED | OUTPATIENT
Start: 2019-06-09 | End: 2019-06-09

## 2019-06-09 RX ADMIN — GADOBUTROL 7.5 ML: 604.72 INJECTION INTRAVENOUS at 16:03

## 2019-06-09 NOTE — DISCHARGE INSTRUCTIONS
MRI Contrast Discharge Instructions    The IV contrast you received today will pass out of your body in your  urine. This will happen in the next 24 hours. You will not feel this process.  Your urine will not change color.    Drink at least 4 extra glasses of water or juice today (unless your doctor  has restricted your fluids). This reduces the stress on your kidneys.  You may take your regular medicines.    If you are on dialysis: It is best to have dialysis today.    If you have a reaction: Most reactions happen right away. If you have  any new symptoms after leaving the hospital (such as hives or swelling),  call your hospital at the correct number below. Or call your family doctor.  If you have breathing distress or wheezing, call 911.    Special instructions: ***    I have read and understand the above information.    Signature:______________________________________ Date:___________    Staff:__________________________________________ Date:___________     Time:__________    [place] Radiology Departments:    ___[place]: [phone]  ___[facility]: [phone]  ___[place]: [phone] ___[facility]: [phone]  ___[facility]: [phone]  ___[facility]: [phone]  ___[phone]  ___[facility]: [phone]  ___[phone]

## 2019-06-10 ENCOUNTER — TELEPHONE (OUTPATIENT)
Dept: INTERNAL MEDICINE | Facility: CLINIC | Age: 59
End: 2019-06-10

## 2019-06-10 ENCOUNTER — MYC MEDICAL ADVICE (OUTPATIENT)
Dept: INTERNAL MEDICINE | Facility: CLINIC | Age: 59
End: 2019-06-10

## 2019-06-10 NOTE — TELEPHONE ENCOUNTER
[facility] Call Center    Phone Message    May a detailed message be left on voicemail: yes    Reason for Call: Other: Pt is wanting to get a call back in regards to getting a fax- refaxed with date instead of unable to determine date. Pt states it is Urgent and needing to speak with a nurse. Pt was not wanting to provide alot of information but states needing to speak with nurse or doctor.      Action Taken: Message routed to:  [facility] ([facility]): [email or web address]

## 2019-06-13 NOTE — PROGRESS NOTES
This is a recent snapshot of the patient's [facility] medical record.  For current drug dose and complete information and questions, call [phone] or In [facility] pool, fv home infusion ([id number])  CSN Number:  [id number]

## 2019-06-14 ENCOUNTER — TEAM CONFERENCE (OUTPATIENT)
Dept: GASTROENTEROLOGY | Facility: CLINIC | Age: 59
End: 2019-06-14

## 2019-06-14 DIAGNOSIS — K76.9 LIVER LESION: Primary | ICD-10-CM

## 2019-06-14 NOTE — TELEPHONE ENCOUNTER
Multidisciplinary Liver Tumor Conference - 6/14/2019    1. Recommendations were to obtain an abdominal ultrasound to be better determine imaging modality for percutanous liver biopsy of two hepatic lesions.  2. IR consult for percutaneous liver biopsy  3. Complete Staging with Chest CT   4. Will re-discuss at tumor conference after biopsy results    Patient was called and notified of plan. He will be expecting calls to get appointments scheduled.     Murphy Enciso PA-C

## 2019-06-17 ENCOUNTER — TELEPHONE (OUTPATIENT)
Dept: GASTROENTEROLOGY | Facility: CLINIC | Age: 59
End: 2019-06-17

## 2019-06-17 ENCOUNTER — TELEPHONE (OUTPATIENT)
Dept: VASCULAR SURGERY | Facility: CLINIC | Age: 59
End: 2019-06-17

## 2019-06-17 NOTE — TELEPHONE ENCOUNTER
Called and left pt a msg that Murphy Zaria had reached out to IR to see when we can get pt schedule for liver biopsy.    Left direct line for him to return my call.      *2nd attempt to reach pt. Left a VM in regards to scheduling a liver biopsy again.       I did call pt and was finally able to connect with him  We had a conversation regarding his upcoming scans.   He has all his appts for imaging on Wesd 6/26.   I informed him that this is for pending liver biopsy.     I informed him that since he's coming in to get his imaging completed then he should also see Carolynn Arellano in regards to consulting on the liver biopys.     The US is most important to see if the lesion for pending liver biopsy.    He agrees to plan.     We will add him onto Carolynn's clinic for liver biopsy consult at 2pm following his CT and US.        Priscila BEARD RN, BSN  Interventional Radiology Nurse Coordinator   Phone: [phone]      Priscila BEARD RN, BSN  Interventional Radiology Nurse Coordinator   Phone: [phone]

## 2019-06-25 NOTE — PROGRESS NOTES
This is a recent snapshot of the patient's [facility] medical record.  For current drug dose and complete information and questions, call [phone] or In [facility] pool, fv home infusion ([id number])  CSN Number:  [id number]

## 2019-06-29 ENCOUNTER — ANCILLARY PROCEDURE (OUTPATIENT)
Dept: ULTRASOUND IMAGING | Facility: CLINIC | Age: 59
End: 2019-06-29
Attending: PHYSICIAN ASSISTANT
Payer: COMMERCIAL

## 2019-06-29 ENCOUNTER — ANCILLARY PROCEDURE (OUTPATIENT)
Dept: CT IMAGING | Facility: CLINIC | Age: 59
End: 2019-06-29
Attending: PHYSICIAN ASSISTANT
Payer: COMMERCIAL

## 2019-06-29 DIAGNOSIS — K76.9 LIVER LESION: ICD-10-CM

## 2019-06-29 RX ORDER — IOPAMIDOL 755 MG/ML
100 INJECTION, SOLUTION INTRAVASCULAR ONCE
Status: COMPLETED | OUTPATIENT
Start: 2019-06-29 | End: 2019-06-29

## 2019-06-29 RX ADMIN — IOPAMIDOL 83 ML: 755 INJECTION, SOLUTION INTRAVASCULAR at 10:35

## 2019-06-29 NOTE — DISCHARGE INSTRUCTIONS

## 2019-07-05 NOTE — TELEPHONE ENCOUNTER
RECORDS RECEIVED FROM: consult liver biopsy    DATE RECEIVED: 7.8.19   NOTES STATUS DETAILS   OFFICE NOTE from referring provider Internal    OFFICE NOTE from other specialist Internal    DISCHARGE SUMMARY from hospital N/A    DISCHARGE REPORT from the ER N/A    OPERATIVE REPORT N/A    MEDICATION LIST Internal    XRAYS (IMAGES & REPORTS) Internal    PATHOLOGY  Slides & report N/A

## 2019-07-08 ENCOUNTER — OFFICE VISIT (OUTPATIENT)
Dept: INTERVENTIONAL RADIOLOGY/VASCULAR | Facility: CLINIC | Age: 59
End: 2019-07-08
Payer: COMMERCIAL

## 2019-07-08 ENCOUNTER — PRE VISIT (OUTPATIENT)
Dept: INTERVENTIONAL RADIOLOGY/VASCULAR | Facility: CLINIC | Age: 59
End: 2019-07-08

## 2019-07-08 VITALS
HEART RATE: 75 BPM | SYSTOLIC BLOOD PRESSURE: 147 MMHG | OXYGEN SATURATION: 98 % | BODY MASS INDEX: 24.41 KG/M2 | DIASTOLIC BLOOD PRESSURE: 81 MMHG | WEIGHT: 165.3 LBS

## 2019-07-08 DIAGNOSIS — K76.9 LIVER LESION, RIGHT LOBE: Primary | ICD-10-CM

## 2019-07-08 DIAGNOSIS — K76.9 LIVER LESION, RIGHT LOBE: ICD-10-CM

## 2019-07-08 PROBLEM — T84.59XA INFECTED PROSTHETIC KNEE JOINT (H): Status: RESOLVED | Noted: 2019-03-04 | Resolved: 2019-07-08

## 2019-07-08 PROBLEM — I35.0 AORTIC STENOSIS, SEVERE: Status: RESOLVED | Noted: 2018-02-21 | Resolved: 2019-07-08

## 2019-07-08 PROBLEM — T84.59XA INFECTION OF TOTAL KNEE REPLACEMENT (H): Status: RESOLVED | Noted: 2019-03-09 | Resolved: 2019-07-08

## 2019-07-08 PROBLEM — Z96.659 INFECTION OF TOTAL KNEE REPLACEMENT (H): Status: RESOLVED | Noted: 2019-03-09 | Resolved: 2019-07-08

## 2019-07-08 PROBLEM — N17.9 AKI (ACUTE KIDNEY INJURY) (H): Status: RESOLVED | Noted: 2019-04-09 | Resolved: 2019-07-08

## 2019-07-08 PROBLEM — I35.0 NONRHEUMATIC AORTIC VALVE STENOSIS: Status: RESOLVED | Noted: 2018-02-20 | Resolved: 2019-07-08

## 2019-07-08 PROBLEM — Z96.659 INFECTED PROSTHETIC KNEE JOINT (H): Status: RESOLVED | Noted: 2019-03-04 | Resolved: 2019-07-08

## 2019-07-08 LAB
ERYTHROCYTE [DISTWIDTH] IN BLOOD BY AUTOMATED COUNT: 14.8 % (ref 10–15)
HCT VFR BLD AUTO: 44.9 % (ref 40–53)
HGB BLD-MCNC: 15.9 G/DL (ref 13.3–17.7)
INR PPP: 1.19 (ref 0.86–1.14)
MCH RBC QN AUTO: 33.8 PG (ref 26.5–33)
MCHC RBC AUTO-ENTMCNC: 35.4 G/DL (ref 31.5–36.5)
MCV RBC AUTO: 95 FL (ref 78–100)
PLATELET # BLD AUTO: 84 10E9/L (ref 150–450)
RBC # BLD AUTO: 4.71 10E12/L (ref 4.4–5.9)
WBC # BLD AUTO: 5.6 10E9/L (ref 4–11)

## 2019-07-08 ASSESSMENT — ENCOUNTER SYMPTOMS
INCREASED ENERGY: 1
HEARTBURN: 1
NAIL CHANGES: 0
FEVER: 0
LIGHT-HEADEDNESS: 0
SKIN CHANGES: 0
TASTE DISTURBANCE: 0
NECK MASS: 0
WEIGHT LOSS: 0
SORE THROAT: 1
DYSPNEA ON EXERTION: 0
DIARRHEA: 0
HALLUCINATIONS: 0
JOINT SWELLING: 1
HYPERTENSION: 1
TROUBLE SWALLOWING: 1
SLEEP DISTURBANCES DUE TO BREATHING: 0
NIGHT SWEATS: 1
HEMOPTYSIS: 0
SYNCOPE: 0
SNORES LOUDLY: 0
POOR WOUND HEALING: 0
COUGH: 1
BACK PAIN: 0
SINUS CONGESTION: 1
ORTHOPNEA: 0
ABDOMINAL PAIN: 0
EXERCISE INTOLERANCE: 0
PALPITATIONS: 0
LEG PAIN: 0
SHORTNESS OF BREATH: 1
NECK PAIN: 0
POLYPHAGIA: 0
WEIGHT GAIN: 0
CHILLS: 0
SMELL DISTURBANCE: 0
EYE WATERING: 1
EYE REDNESS: 1
MUSCLE WEAKNESS: 1
HOARSE VOICE: 0
BLOOD IN STOOL: 0
ARTHRALGIAS: 1
CONSTIPATION: 0
EYE IRRITATION: 1
STIFFNESS: 1
FATIGUE: 1
WHEEZING: 0
BLOATING: 0
MYALGIAS: 1
HYPOTENSION: 0
SINUS PAIN: 1
MUSCLE CRAMPS: 1
EYE PAIN: 0
DOUBLE VISION: 0
SPUTUM PRODUCTION: 0
NAUSEA: 1
ALTERED TEMPERATURE REGULATION: 0
DECREASED APPETITE: 0
POLYDIPSIA: 0
VOMITING: 0

## 2019-07-08 NOTE — PATIENT INSTRUCTIONS
You are scheduled for your biopsy on Thursday, July 18, 2019  Report to the [facility] Waiting room at 11:30 AM  The [facility] Waiting Room is located on the 2nd floor (street level) of the [address]  Your procedure is scheduled to start at approximately 1:00 PM    No solid foods or milk products for 6 hours prior on the day of the procedure 7:00 AM  You may have clear liquids until 2 hours prior on the day of the procedure.(water, apple juice, broth, coffee or tea without milk or sugar, jell-o, white grape juice)  11:00 AM\    May taking your medications the morning of the procedure    You will need a     If you have any questions you may call the Radiology Nurse Line [phone]

## 2019-07-08 NOTE — LETTER
7/8/2019       RE: Ten Johnson  [address]  [facility] [address]     Dear Colleague,    Thank you for referring your patient, Ten Johnson, to the [facility] INTERVENTIONAL RADIOLOGY at [facility]. Please see a copy of my visit note below.    First Name: Ten   Age: 59 year old   Referring Physician: Dr. Enciso  REASON FOR REFERRAL: Consult for liver lesion biopsy    Assessment:  Ten is a 59 year old chronic hepatitis C, EROH cirrhosis.  He continues to actively consume alcohol.  MRI from 6/5/19 shows to liver lesions, one is an LIRADS 4 and one is an LIRADS M.  Biopsy is requested to evaluate for malignancy.    Plan:  Image guided liver lesion biopsy, radiologist choice  Dr. Orozco suggested perhaps the LIRADS 4 lesion  Blood work was performed today  The patient was given specific instructions that he would need a  for the liver biopsy.  That he could not take a taxi home.    HPI:  This is a patient with a hx of Hep C/ETOH cirrhosis. Hep C, gt 1a, treatment naive. Cirrhosis is well compensated at this time with no overt ascites, signs of fluid overload or overt hepatic encephalopathy.  He previously had an MRI in December 2017,  LIRADS 3 Lesion, 1.3 cm in segment 6.    MRI from 6/5/19 shows   1. 1.4 cm arterially enhancing lesion in hepatic segment 6/7 more conspicuous from the prior study as described above. LIRADS 4.  2. New 2.2 cm enhancing lesion in hepatic segment 7 with MRI characteristics not specific for HCC. Differentials include  intrahepatic cholangiocarcinoma versus combined HCC-cholangiocarcinoma as well as HCC with atypical appearance and less likely other non-HCC malignancies. LIRADS M  The patients case was discussed at liver tumor conference.  An ultrasound was requested by Interventional Radiology prior to proceeding with biopsy, to decided which modality to use.  Both of the lesions can be seen on ultrasound.  Dr. Orozco reviewed the  imaging and approved the biopsy under ultrasound guidance, suggesting perhaps the LIRADS 4 lesion.  The patient did have a random liver biopsy back in 2012, just to evaluate the status of his liver, he did not have any problems at the time of that biopsy.    Series 8, image 44   Series 8, image 35  PAST MEDICAL HISTORY:   Past Medical History:   Diagnosis Date     JENNIFER (acute kidney injury) (H) 4/9/2019     Alcohol use disorder      Alcoholic cirrhosis (H)      Anticoagulant long-term use     plavix     Aortic stenosis, severe 2/21/2018     Ascites      Chronic allergic rhinitis      Chronic anemia      Chronic hepatitis C without hepatic coma (H) 05/10/2016    Untreated as of 2/2018     Cirrhosis (H) 2017    MRI finding     Diastolic dysfunction      Erosive gastropathy      Esophageal varices in alcoholic cirrhosis (H)      H/O upper gastrointestinal hemorrhage 09/2017     History of blood transfusion      History of drug abuse     intranasal     Hypertension     essential     JANELLE (iron deficiency anemia)      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     Sarahi-Hoffman tear     History     Marijuana abuse      MRSA (methicillin resistant Staphylococcus aureus)      Nonrheumatic aortic valve stenosis 2/20/2018     Olecranon bursitis      Portal hypertension (H)      Right shoulder pain     history of rotator cuff repair     S/p TAVR (transcatheter aortic valve replacement), bioprosthetic      Severe aortic stenosis      Thrombocytopenia (H)      PAST SURGICAL HISTORY:   Past Surgical History:   Procedure Laterality Date     ARTHROSCOPY SHOULDER ROTATOR CUFF REPAIR  7/31/2012    Procedure: ARTHROSCOPY SHOULDER ROTATOR CUFF REPAIR;  Right Shoulder Arthroscopic Rotator Cuff Repair, BicepsTenodesis,  Subacromial Decompression ;  Surgeon: Joi Castillo MD;  [facility]     ESOPHAGOSCOPY, GASTROSCOPY, DUODENOSCOPY (EGD), COMBINED N/A 10/23/2017     Procedure: COMBINED ESOPHAGOSCOPY, GASTROSCOPY, DUODENOSCOPY (EGD);;  Surgeon: Gentry Salas MD;  [facility]     EXCHANGE POLY COMPONENT ARTHROPLASTY KNEE Right 3/4/2019    Procedure: REVISION RIGHT TOTAL KNEE POLY COMPONENT EXCHANGE;  Surgeon: Olvin Joe MD;  [facility]     FACIAL RECONSTRUCTION SURGERY  1971     HEART CATH FEMORAL CANNULIZATION WITH OPEN STANDBY REPAIR AORTIC VALVE N/A 2/21/2018    Procedure: HEART CATH FEMORAL CANNULIZATION WITH OPEN STANDBY REPAIR AORTIC VALVE;;  Surgeon: Luis Baird MD;  [facility]     IRRIGATION AND DEBRIDEMENT UPPER EXTREMITY, COMBINED  1/3/2012    Procedure:COMBINED IRRIGATION AND DEBRIDEMENT UPPER EXTREMITY; Irrigation & Debridement Left Elbow; Surgeon:CRISTHIAN ZHOU; [facility]     OPTICAL TRACKING SYSTEM ARTHROPLASTY KNEE Right 2/7/2019    Procedure: ARTHROPLASTY KNEE RIGHT;  Surgeon: Olvin Joe MD;  [facility]     REPAIR TENDON TRICEPS UPPER EXTREMITY  11/8/2011    Procedure:REPAIR TENDON TRICEPS UPPER EXTREMITY; Surgeon:CRISTHIAN ZHOU; [facility]     SHOULDER SURGERY  2003    left, injury, torn tendons, hematoma     TRANSCATHETER AORTIC VALVE IMPLANT ANESTHESIA N/A 2/21/2018    Procedure: TRANSCATHETER AORTIC VALVE IMPLANT ANESTHESIA;  Transfemoral (Quiroz) Aortic Valve Implant 26mm MARTHA 3, with Cardiopulmonary Bypass Standby, transthoracic echocardiogram;  Surgeon: GENERIC ANESTHESIA PROVIDER;  [facility]     TRANSPOSITION ULNAR NERVE (ELBOW)  11/8/2011    Procedure:TRANSPOSITION ULNAR NERVE (ELBOW); Final Procedure Done: Left Elbow Lateral Ulnar Collateral Repair And  Left Elbow Triceps Repair       FAMILY HISTORY:   Family History   Problem Relation Age of Onset     Cancer Mother 62     Alcoholism Paternal Uncle      No Known Problems Father      No Known Problems Brother      Diabetes Maternal Grandmother      Myocardial Infarction Maternal Grandfather      No Known Problems  Paternal Grandmother      Unknown/Adopted Paternal Grandfather      Cirrhosis No family hx of      SOCIAL HISTORY:   Social History     Tobacco Use     Smoking status: Never Smoker     Smokeless tobacco: Never Used   Substance Use Topics     Alcohol use: Yes     Comment: Alcohol use disorder, still actively drinking     PROBLEM LIST:   Patient Active Problem List    Diagnosis Date Noted     Foot contracture 03/09/2019     Priority: Medium     S/P total knee arthroplasty, right 02/07/2019     Priority: Medium     History of drug abuse in remission 10/11/2018     Priority: Medium     Olecranon bursitis      Priority: Medium     S/p TAVR (transcatheter aortic valve replacement), bioprosthetic      Priority: Medium     Right shoulder pain      Priority: Medium     history of rotator cuff repair       Diastolic dysfunction      Priority: Medium     Chronic allergic rhinitis      Priority: Medium     Portal hypertension (H)      Priority: Medium     Rhinitis, allergic      Priority: Medium     Cirrhosis (H) 01/01/2017     Priority: Medium     MRI finding       OA (osteoarthritis) of knee 04/01/2013     Priority: Medium     Rotator cuff tear, right 07/30/2012     Priority: Medium     Presbyopia 11/01/2011     Priority: Medium     MEDICATIONS:   Prescription Medications as of 7/8/2019       Rx Number Disp Refills Start End Last Dispensed Date Next Fill Date Owning Pharmacy    fluticasone (FLONASE) 50 MCG/ACT nasal spray  3 Bottle 3 1/17/2019    [facility] [address] [phone]    Sig: Spray 2 sprays into both nostrils daily    Class: E-Prescribe    Notes to Pharmacy: 1 btl=16 gm    Route: Both Nostrils    lisinopril (PRINIVIL/ZESTRIL) 20 MG tablet  90 tablet 3 6/5/2019        Sig: Take 1 tablet (20 mg) by mouth daily    Class: No Print Out    Route: Oral    loratadine (CLARITIN) 10 MG tablet            Sig: Take 10 mg by mouth daily    Class: Historical    Route: Oral     Multiple Vitamin (MULTIVITAMINS PO)            Sig: Take 1 tablet by mouth At Bedtime     Class: Historical    Route: Oral    sulfamethoxazole-trimethoprim (BACTRIM DS/SEPTRA DS) 800-160 MG tablet  10 tablet 1 6/5/2019        Sig: Take 1 tablet by mouth daily    Class: No Print Out    Route: Oral        ALLERGIES: Zolpidem; Cats; Dogs; and Pollen extract  VITALS: /81   Pulse 75   Wt 75 kg (165 lb 4.8 oz)   SpO2 98%   BMI 24.41 kg/m       Physical Examination: Vital signs are reviewed and they are stable  Constitutional: Middle aged gentleman, in no acute physical distress, came alone to his appt  Cardiovascular: negative, RRR  Respiratory: negative findings: normal respiratory rate and rhythm, lungs clear to auscultation  Musculoskeletal: extremities normal- no gross deformities noted, gait normal and normal muscle tone  Skin: no suspicious lesions or rashes  Neurologic: negative  Psychiatric: affect normal/bright and mentation appears normal.    BMP RESULTS:  Lab Results   Component Value Date     06/05/2019    POTASSIUM 3.8 06/05/2019    CHLORIDE 107 06/05/2019    CO2 22 06/05/2019    ANIONGAP 9 06/05/2019    GLC 95 06/05/2019    BUN 18 06/05/2019    CR 1.04 06/05/2019    GFRESTIMATED 77 06/05/2019    GFRESTBLACK 89 06/05/2019    JOSEPHINE 8.7 06/05/2019        CBC RESULTS:  Lab Results   Component Value Date    WBC 5.6 07/08/2019    RBC 4.71 07/08/2019    HGB 15.9 07/08/2019    HCT 44.9 07/08/2019    MCV 95 07/08/2019    MCH 33.8 (H) 07/08/2019    MCHC 35.4 07/08/2019    RDW 14.8 07/08/2019    PLT 84 (L) 07/08/2019       INR/PTT:  Lab Results   Component Value Date    INR 1.19 (H) 07/08/2019    PTT 24 03/25/2018       Diagnostic studies: see MRI and ultrasound    PROVIDER NOTE:  I explained the procedure to Ten  This included:  Preparing for the procedure, the actual procedure and recovery.  I explained the risks of the liver biopsy:  Bleeding, infection, potential for puncturing the lung, and pain  related to the procedure.  I explained that usually the results return after three to four business days.    I explained that he/she would be contacted by someone from [facility] office following this to determine a future plan.  Thank you for involving us in the care of your patient.    40 minutes was spent with Ten.  35 minutes was spent in counseling.  Carolynn Barcenas MS, APRN, CNS, CRN  Clinical Nurse Specialist  Interventional Radiology  [phone] (voice mail)  [phone] (pager)    CC  Patient Care Team:  Cuca Celeste MD as PCP - General (Internal Medicine)  Mili Capps MD as MD (Internal Medicine)  Kieran Ulloa MD as MD (Family Practice)  Emma Mendez as Nurse Coordinator (Cardiology)  Eliazar Singh MD as Resident (Student in organized health care education/training program)  [facility] ([place], [place]) ([facility] ([facility] ([place]))  Murphy Enciso PA-C as Physician Assistant (Physician Assistant)  Murphy Enciso PA

## 2019-07-08 NOTE — PROGRESS NOTES
First Name: Ten   Age: 59 year old   Referring Physician: Dr. Enciso  REASON FOR REFERRAL: Consult for liver lesion biopsy    Assessment:  Ten is a 59 year old chronic hepatitis C, EROH cirrhosis.  He continues to actively consume alcohol.  MRI from 6/5/19 shows three liver lesions, two are LIRADS 4 and one is an LIRADS M.  Biopsy is requested to evaluate for malignancy.    Plan:  Image guided liver lesion biopsy  Biopsy is being requested of both of the LIRADS 4 lesions.  Blood work was performed today  The patient was given specific instructions that he would need a  for the liver biopsy.  That he could not take a taxi home.    HPI:  This is a patient with a hx of Hep C/ETOH cirrhosis. Hep C, gt 1a, treatment naive. Cirrhosis is well compensated at this time with no overt ascites, signs of fluid overload or overt hepatic encephalopathy.  He previously had an MRI in December 2017,  LIRADS 3 Lesion, 1.3 cm in segment 6.    MRI from 6/5/19 shows   1. 1.4 cm arterially enhancing lesion in hepatic segment 6/7 more conspicuous from the prior study as described above. LIRADS 4.  2. New 2.2 cm enhancing lesion in hepatic segment 7 with MRI characteristics not specific for HCC. Differentials include intrahepatic cholangiocarcinoma versus combined HCC-cholangiocarcinoma as well as HCC with atypical appearance and less likely other non-HCC malignancies. LIRADS M  3. 2.1 cm arterially enhancing lesion in hepatic segment 6/7, series 8 image 52 with no convincing washout, pseudocapsule, restricted diffusion and no definite increased T2 signal. LIRADS 4.  The patients case was discussed at liver tumor conference.  An ultrasound was requested by Interventional Radiology prior to proceeding with biopsy, to decided which modality to use.  The lesions can be seen on ultrasound.    Dr. Orozco and Dr. Paulson reviewed the imaging and approved the biopsy under ultrasound guidance,  The LIRADS 4 lesions can be be  biopsied.  The LIRADS M lesions would be difficult to biopsy because it is near the dome of the liver.  The patient did have a random liver biopsy back in 2012, just to evaluate the status of his liver, he did not have any problems at the time of that biopsy.    Series 8, image 44   Series 8, image 35   Series 8, image 52  PAST MEDICAL HISTORY:   Past Medical History:   Diagnosis Date     JENNIFER (acute kidney injury) (H) 4/9/2019     Alcohol use disorder      Alcoholic cirrhosis (H)      Anticoagulant long-term use     plavix     Aortic stenosis, severe 2/21/2018     Ascites      Chronic allergic rhinitis      Chronic anemia      Chronic hepatitis C without hepatic coma (H) 05/10/2016    Untreated as of 2/2018     Cirrhosis (H) 2017    MRI finding     Diastolic dysfunction      Erosive gastropathy      Esophageal varices in alcoholic cirrhosis (H)      H/O upper gastrointestinal hemorrhage 09/2017     History of blood transfusion      History of drug abuse     intranasal     Hypertension     essential     JANELLE (iron deficiency anemia)      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     Sarahi-Hoffman tear     History     Marijuana abuse      MRSA (methicillin resistant Staphylococcus aureus)      Nonrheumatic aortic valve stenosis 2/20/2018     Olecranon bursitis      Portal hypertension (H)      Right shoulder pain     history of rotator cuff repair     S/p TAVR (transcatheter aortic valve replacement), bioprosthetic      Severe aortic stenosis      Thrombocytopenia (H)      PAST SURGICAL HISTORY:   Past Surgical History:   Procedure Laterality Date     ARTHROSCOPY SHOULDER ROTATOR CUFF REPAIR  7/31/2012    Procedure: ARTHROSCOPY SHOULDER ROTATOR CUFF REPAIR;  Right Shoulder Arthroscopic Rotator Cuff Repair, BicepsTenodesis,  Subacromial Decompression ;  Surgeon: Joi Castillo MD;  [facility]     ESOPHAGOSCOPY, GASTROSCOPY, DUODENOSCOPY (EGD), COMBINED  N/A 10/23/2017    Procedure: COMBINED ESOPHAGOSCOPY, GASTROSCOPY, DUODENOSCOPY (EGD);;  Surgeon: Gentry Salas MD;  [facility]     EXCHANGE POLY COMPONENT ARTHROPLASTY KNEE Right 3/4/2019    Procedure: REVISION RIGHT TOTAL KNEE POLY COMPONENT EXCHANGE;  Surgeon: Olvin Joe MD;  [facility]     FACIAL RECONSTRUCTION SURGERY  1971     HEART CATH FEMORAL CANNULIZATION WITH OPEN STANDBY REPAIR AORTIC VALVE N/A 2/21/2018    Procedure: HEART CATH FEMORAL CANNULIZATION WITH OPEN STANDBY REPAIR AORTIC VALVE;;  Surgeon: Luis Baird MD;  [facility]     IRRIGATION AND DEBRIDEMENT UPPER EXTREMITY, COMBINED  1/3/2012    Procedure:COMBINED IRRIGATION AND DEBRIDEMENT UPPER EXTREMITY; Irrigation & Debridement Left Elbow; Surgeon:CRISTHIAN ZHOU; [facility]     OPTICAL TRACKING SYSTEM ARTHROPLASTY KNEE Right 2/7/2019    Procedure: ARTHROPLASTY KNEE RIGHT;  Surgeon: Olvin Joe MD;  [facility]     REPAIR TENDON TRICEPS UPPER EXTREMITY  11/8/2011    Procedure:REPAIR TENDON TRICEPS UPPER EXTREMITY; Surgeon:CRISTHIAN ZHOU; [facility]     SHOULDER SURGERY  2003    left, injury, torn tendons, hematoma     TRANSCATHETER AORTIC VALVE IMPLANT ANESTHESIA N/A 2/21/2018    Procedure: TRANSCATHETER AORTIC VALVE IMPLANT ANESTHESIA;  Transfemoral (Quiroz) Aortic Valve Implant 26mm MARTHA 3, with Cardiopulmonary Bypass Standby, transthoracic echocardiogram;  Surgeon: GENERIC ANESTHESIA PROVIDER;  [facility]     TRANSPOSITION ULNAR NERVE (ELBOW)  11/8/2011    Procedure:TRANSPOSITION ULNAR NERVE (ELBOW); Final Procedure Done: Left Elbow Lateral Ulnar Collateral Repair And  Left Elbow Triceps Repair       FAMILY HISTORY:   Family History   Problem Relation Age of Onset     Cancer Mother 62     Alcoholism Paternal Uncle      No Known Problems Father      No Known Problems Brother      Diabetes Maternal Grandmother      Myocardial Infarction Maternal Grandfather       No Known Problems Paternal Grandmother      Unknown/Adopted Paternal Grandfather      Cirrhosis No family hx of      SOCIAL HISTORY:   Social History     Tobacco Use     Smoking status: Never Smoker     Smokeless tobacco: Never Used   Substance Use Topics     Alcohol use: Yes     Comment: Alcohol use disorder, still actively drinking     PROBLEM LIST:   Patient Active Problem List    Diagnosis Date Noted     Foot contracture 03/09/2019     Priority: Medium     S/P total knee arthroplasty, right 02/07/2019     Priority: Medium     History of drug abuse in remission 10/11/2018     Priority: Medium     Olecranon bursitis      Priority: Medium     S/p TAVR (transcatheter aortic valve replacement), bioprosthetic      Priority: Medium     Right shoulder pain      Priority: Medium     history of rotator cuff repair       Diastolic dysfunction      Priority: Medium     Chronic allergic rhinitis      Priority: Medium     Portal hypertension (H)      Priority: Medium     Rhinitis, allergic      Priority: Medium     Cirrhosis (H) 01/01/2017     Priority: Medium     MRI finding       OA (osteoarthritis) of knee 04/01/2013     Priority: Medium     Rotator cuff tear, right 07/30/2012     Priority: Medium     Presbyopia 11/01/2011     Priority: Medium     MEDICATIONS:   Prescription Medications as of 7/8/2019       Rx Number Disp Refills Start End Last Dispensed Date Next Fill Date Owning Pharmacy    fluticasone (FLONASE) 50 MCG/ACT nasal spray  3 Bottle 3 1/17/2019    [place], [place] - [address] [place] [address] [phone]    Sig: Spray 2 sprays into both nostrils daily    Class: E-Prescribe    Notes to Pharmacy: 1 btl=16 gm    Route: Both Nostrils    lisinopril (PRINIVIL/ZESTRIL) 20 MG tablet  90 tablet 3 6/5/2019        Sig: Take 1 tablet (20 mg) by mouth daily    Class: No Print Out    Route: Oral    loratadine (CLARITIN) 10 MG tablet            Sig: Take 10 mg by mouth daily    Class: Historical     Route: Oral    Multiple Vitamin (MULTIVITAMINS PO)            Sig: Take 1 tablet by mouth At Bedtime     Class: Historical    Route: Oral    sulfamethoxazole-trimethoprim (BACTRIM DS/SEPTRA DS) 800-160 MG tablet  10 tablet 1 6/5/2019        Sig: Take 1 tablet by mouth daily    Class: No Print Out    Route: Oral        ALLERGIES: Zolpidem; Cats; Dogs; and Pollen extract  VITALS: /81   Pulse 75   Wt 75 kg (165 lb 4.8 oz)   SpO2 98%   BMI 24.41 kg/m      ROS:  Answers for HPI/ROS submitted by the patient on 7/8/2019   General Symptoms: Yes  Skin Symptoms: Yes  HENT Symptoms: Yes  EYE SYMPTOMS: Yes  HEART SYMPTOMS: Yes  LUNG SYMPTOMS: Yes  INTESTINAL SYMPTOMS: Yes  URINARY SYMPTOMS: No  REPRODUCTIVE SYMPTOMS: No  SKELETAL SYMPTOMS: Yes  BLOOD SYMPTOMS: No  NERVOUS SYSTEM SYMPTOMS: No  MENTAL HEALTH SYMPTOMS: No  Fever: No  Loss of appetite: No  Weight loss: No  Weight gain: No  Fatigue: Yes  Night sweats: Yes  Chills: No  Increased stress: No  Excessive hunger: No  Excessive thirst: No  Feeling hot or cold when others believe the temperature is normal: No  Loss of height: No  Post-operative complications: Yes  Surgical site pain: Yes  Hallucinations: No  Change in or Loss of Energy: Yes  Hyperactivity: No  Changes in hair: No  Changes in moles/birth marks: No  Itching: Yes  Rashes: No  Changes in nails: No  Acne: No  Change in facial hair: No  Warts: No  Non-healing sores: No  Scarring: No  Flaking of skin: No  Color changes of hands/feet in cold : No  Sun sensitivity: No  Skin thickening: No  Ear pain: No  Ear discharge: No  Hearing loss: No  Tinnitus: No  Nosebleeds: No  Congestion: Yes  Sinus pain: Yes  Trouble swallowing: Yes   Voice hoarseness: No  Mouth sores: No  Sore throat: Yes  Tooth pain: No  Gum tenderness: No  Bleeding gums: No  Change in taste: No  Change in sense of smell: No  Dry mouth: No  Hearing aid used: No  Neck lump: No  Eye pain: No  Vision loss: No  Dry eyes: No  Watery eyes: Yes  Eye  bulging: No  Double vision: No  Flashing of lights: No  Spots: No  Floaters: No  Redness: Yes  Crossed eyes: No  Tunnel Vision: No  Yellowing of eyes: No  Eye irritation: Yes  Cough: Yes  Sputum or phlegm: No  Coughing up blood: No  Difficulty breating or shortness of breath: Yes  Snoring: No  Wheezing: No  Difficulty breathing on exertion: No  Chest pain or pressure: No  Fast or irregular heartbeat: No  Pain in legs with walking: No  Trouble breathing while lying down: No  Fingers or toes appear blue: No  High blood pressure: Yes  Low blood pressure: No  Fainting: No  Murmurs: No  Pacemaker: No  Varicose veins: No  Edema or swelling: No  Wake up at night with shortness of breath: No  Light-headedness: No  Exercise intolerance: No  Heart burn or indigestion: Yes  Nausea: Yes  Vomiting: No  Abdominal pain: No  Bloating: No  Constipation: No  Diarrhea: No  Blood in stool: No  Back pain: No  Muscle aches: Yes  Neck pain: No  Swollen joints: Yes  Joint pain: Yes  Bone pain: No  Muscle cramps: Yes  Muscle weakness: Yes  Joint stiffness: Yes  Bone fracture: No    Physical Examination: Vital signs are reviewed and they are stable  Constitutional: Middle aged gentleman, in no acute physical distress, came alone to his appt  Cardiovascular: negative, RRR  Respiratory: negative findings: normal respiratory rate and rhythm, lungs clear to auscultation  Musculoskeletal: extremities normal- no gross deformities noted, gait normal and normal muscle tone  Skin: no suspicious lesions or rashes  Neurologic: negative  Psychiatric: affect normal/bright and mentation appears normal.    BMP RESULTS:  Lab Results   Component Value Date     06/05/2019    POTASSIUM 3.8 06/05/2019    CHLORIDE 107 06/05/2019    CO2 22 06/05/2019    ANIONGAP 9 06/05/2019    GLC 95 06/05/2019    BUN 18 06/05/2019    CR 1.04 06/05/2019    GFRESTIMATED 77 06/05/2019    GFRESTBLACK 89 06/05/2019    JOSEPHINE 8.7 06/05/2019        CBC RESULTS:  Lab Results    Component Value Date    WBC 5.6 07/08/2019    RBC 4.71 07/08/2019    HGB 15.9 07/08/2019    HCT 44.9 07/08/2019    MCV 95 07/08/2019    MCH 33.8 (H) 07/08/2019    MCHC 35.4 07/08/2019    RDW 14.8 07/08/2019    PLT 84 (L) 07/08/2019       INR/PTT:  Lab Results   Component Value Date    INR 1.19 (H) 07/08/2019    PTT 24 03/25/2018       Diagnostic studies: see MRI and ultrasound    PROVIDER NOTE:  I explained the procedure to Ten  This included:  Preparing for the procedure, the actual procedure and recovery.  I explained the risks of the liver biopsy:  Bleeding, infection, potential for puncturing the lung, and pain related to the procedure.  I explained that usually the results return after three to four business days.    I explained that he/she would be contacted by someone from [facility] office following this to determine a future plan.  Thank you for involving us in the care of your patient.    40 minutes was spent with Ten.  35 minutes was spent in counseling.  Carolynn Barcenas MS, APRN, CNS, CRN  Clinical Nurse Specialist  Interventional Radiology  [phone] (voice mail)  [phone] (pager)    CC  Patient Care Team:  Cuca Celeste MD as PCP - General (Internal Medicine)  Mili Capps MD as MD (Internal Medicine)  Kieran Ulloa MD as MD (Family Practice)  Emma Mendez as Nurse Coordinator (Cardiology)  Eliazar Singh MD as Resident (Student in organized health care education/training program)  [facility] ([facility] ([facility] ([place]))  Murphy Enciso PA-C as Physician Assistant (Physician Assistant)  Murphy Enciso PA

## 2019-07-15 DIAGNOSIS — M25.561 RIGHT KNEE PAIN: Primary | ICD-10-CM

## 2019-07-16 ENCOUNTER — TELEPHONE (OUTPATIENT)
Dept: INTERVENTIONAL RADIOLOGY/VASCULAR | Facility: CLINIC | Age: 59
End: 2019-07-16

## 2019-07-18 ENCOUNTER — APPOINTMENT (OUTPATIENT)
Dept: MEDSURG UNIT | Facility: CLINIC | Age: 59
End: 2019-07-18
Attending: RADIOLOGY
Payer: COMMERCIAL

## 2019-07-18 ENCOUNTER — HOSPITAL ENCOUNTER (OUTPATIENT)
Facility: CLINIC | Age: 59
Discharge: HOME | End: 2019-07-18
Attending: RADIOLOGY | Admitting: RADIOLOGY
Payer: COMMERCIAL

## 2019-07-18 ENCOUNTER — APPOINTMENT (OUTPATIENT)
Dept: INTERVENTIONAL RADIOLOGY/VASCULAR | Facility: CLINIC | Age: 59
End: 2019-07-18
Attending: CLINICAL NURSE SPECIALIST
Payer: COMMERCIAL

## 2019-07-18 VITALS
DIASTOLIC BLOOD PRESSURE: 64 MMHG | TEMPERATURE: 97.5 F | OXYGEN SATURATION: 96 % | HEART RATE: 64 BPM | RESPIRATION RATE: 16 BRPM | SYSTOLIC BLOOD PRESSURE: 104 MMHG

## 2019-07-18 DIAGNOSIS — K76.9 LIVER LESION, RIGHT LOBE: ICD-10-CM

## 2019-07-18 LAB — PLATELET # BLD AUTO: 85 10E9/L (ref 150–450)

## 2019-07-18 PROCEDURE — 27210909 ZZH NEEDLE CR5

## 2019-07-18 PROCEDURE — 99152 MOD SED SAME PHYS/QHP 5/>YRS: CPT

## 2019-07-18 PROCEDURE — 27210732 ZZH ACCESSORY CR1

## 2019-07-18 PROCEDURE — 76942 ECHO GUIDE FOR BIOPSY: CPT

## 2019-07-18 PROCEDURE — 88313 SPECIAL STAINS GROUP 2: CPT | Performed by: PHYSICIAN ASSISTANT

## 2019-07-18 PROCEDURE — 88341 IMHCHEM/IMCYTCHM EA ADD ANTB: CPT | Performed by: PHYSICIAN ASSISTANT

## 2019-07-18 PROCEDURE — 25000125 ZZHC RX 250: Performed by: STUDENT IN AN ORGANIZED HEALTH CARE EDUCATION/TRAINING PROGRAM

## 2019-07-18 PROCEDURE — 99153 MOD SED SAME PHYS/QHP EA: CPT

## 2019-07-18 PROCEDURE — C1769 GUIDE WIRE: HCPCS

## 2019-07-18 PROCEDURE — 40000167 ZZH STATISTIC PP CARE STAGE 2

## 2019-07-18 PROCEDURE — 88307 TISSUE EXAM BY PATHOLOGIST: CPT | Performed by: PHYSICIAN ASSISTANT

## 2019-07-18 PROCEDURE — 25000128 H RX IP 250 OP 636: Performed by: STUDENT IN AN ORGANIZED HEALTH CARE EDUCATION/TRAINING PROGRAM

## 2019-07-18 PROCEDURE — 85049 AUTOMATED PLATELET COUNT: CPT | Performed by: RADIOLOGY

## 2019-07-18 PROCEDURE — 88342 IMHCHEM/IMCYTCHM 1ST ANTB: CPT | Performed by: PHYSICIAN ASSISTANT

## 2019-07-18 PROCEDURE — 00000468 ZZHCL STATISTIC CYTO QA-OR TOUCH APT TC 88333: Performed by: PHYSICIAN ASSISTANT

## 2019-07-18 RX ORDER — SODIUM CHLORIDE 9 MG/ML
INJECTION, SOLUTION INTRAVENOUS CONTINUOUS
Status: DISCONTINUED | OUTPATIENT
Start: 2019-07-18 | End: 2019-07-18 | Stop reason: HOSPADM

## 2019-07-18 RX ORDER — NALOXONE HYDROCHLORIDE 0.4 MG/ML
.1-.4 INJECTION, SOLUTION INTRAMUSCULAR; INTRAVENOUS; SUBCUTANEOUS
Status: DISCONTINUED | OUTPATIENT
Start: 2019-07-18 | End: 2019-07-18 | Stop reason: HOSPADM

## 2019-07-18 RX ORDER — FENTANYL CITRATE 50 UG/ML
25-50 INJECTION, SOLUTION INTRAMUSCULAR; INTRAVENOUS EVERY 5 MIN PRN
Status: DISCONTINUED | OUTPATIENT
Start: 2019-07-18 | End: 2019-07-18 | Stop reason: HOSPADM

## 2019-07-18 RX ORDER — FLUMAZENIL 0.1 MG/ML
0.2 INJECTION, SOLUTION INTRAVENOUS
Status: DISCONTINUED | OUTPATIENT
Start: 2019-07-18 | End: 2019-07-18 | Stop reason: HOSPADM

## 2019-07-18 RX ORDER — LIDOCAINE 40 MG/G
CREAM TOPICAL
Status: DISCONTINUED | OUTPATIENT
Start: 2019-07-18 | End: 2019-07-18 | Stop reason: HOSPADM

## 2019-07-18 RX ADMIN — LIDOCAINE HYDROCHLORIDE 10 ML: 10 INJECTION, SOLUTION EPIDURAL; INFILTRATION; INTRACAUDAL; PERINEURAL at 15:10

## 2019-07-18 RX ADMIN — FENTANYL CITRATE 50 MCG: 50 INJECTION INTRAMUSCULAR; INTRAVENOUS at 14:42

## 2019-07-18 RX ADMIN — MIDAZOLAM 1 MG: 1 INJECTION INTRAMUSCULAR; INTRAVENOUS at 14:23

## 2019-07-18 RX ADMIN — MIDAZOLAM 1 MG: 1 INJECTION INTRAMUSCULAR; INTRAVENOUS at 14:06

## 2019-07-18 RX ADMIN — FENTANYL CITRATE 50 MCG: 50 INJECTION INTRAMUSCULAR; INTRAVENOUS at 14:24

## 2019-07-18 RX ADMIN — FENTANYL CITRATE 50 MCG: 50 INJECTION INTRAMUSCULAR; INTRAVENOUS at 14:13

## 2019-07-18 NOTE — IP AVS SNAPSHOT
[address] [address]                                    After Visit Summary   7/18/2019    Ten Johnson    MRN: [id number]           After Visit Summary Signature Page    I have received my discharge instructions, and my questions have been answered. I have discussed any challenges I see with this plan with the nurse or doctor.    ..........................................................................................................................................  Patient/Patient Representative Signature      ..........................................................................................................................................  Patient Representative Print Name and Relationship to Patient    ..................................................               ................................................  Date                                   Time    ..........................................................................................................................................  Reviewed by Signature/Title    ...................................................              ..............................................  Date                                               Time          22EPIC Rev 08/18

## 2019-07-18 NOTE — IR NOTE
Interventional Radiology Pre-Procedure Sedation Assessment   Time of Assessment: 1:11 PM    Expected Level: Moderate Sedation    Indication: Sedation is required for the following type of Procedure: Biopsy    Sedation and procedural consent: Risks, benefits and alternatives were discussed with Patient    PO Intake: Appropriately NPO for procedure    ASA Class: Class 3 - SEVERE SYSTEMIC DISEASE, DEFINITE FUNCTIONAL LIMITATIONS.    Mallampati: Grade 1:  Soft palate, uvula, tonsillar pillars, and posterior pharyngeal wall visible    Lungs: Lungs Clear with good breath sounds bilaterally    Heart: Normal heart sounds and rate    History and physical reviewed and no updates needed. I have reviewed the lab findings, diagnostic data, medications, and the plan for sedation. I have determined this patient to be an appropriate candidate for the planned sedation and procedure and have reassessed the patient IMMEDIATELY PRIOR to sedation and procedure.    Eladio Arana MD

## 2019-07-18 NOTE — PROGRESS NOTES
Pt on 2A, assessments and sedation meds ordered, IV in, platelet count drawn and pending, consent done.

## 2019-07-18 NOTE — DISCHARGE INSTRUCTIONS
[facility]    Interventional Radiology  Patient Instructions Following Liver Biopsy    AFTER YOU GO HOME  ? If you were given sedation DO NOT drive or operate machinery at home or at work for at least 24 hours  ? DO relax and take it easy for 48 hours, no strenuous activity for 24 hours  ? DO drink plenty of fluids  ? DO resume your regular diet, unless otherwise instructed by your Primary Physician  ? Keep the dressing dry and in place for 24 hours.  ? DO NOT SMOKE FOR AT LEAST 24 HOURS, if you have been given any medications that were to help you relax or sedate you during your procedure  ? DO NOT drink alcoholic beverages the day of your procedure  ? DO NOT do any strenuous exercise or lifting (> 10 lbs) for at least 7 days following your procedure  ? DO NOT take a bath or shower for at least 12 hours following your procedure  ? Remove dressing after shower the next day. Replace with Band aid for 2 days.  Never leave a wet dressing in place.  ? DO NOT make any important or legal decisions for 24 hours following your procedure  ? There should be minimum drainage from the biopsy site    CALL THE PHYSICIAN IF:  ? You start bleeding from the procedure site.  If you do start to bleed from that site, lie down flat and hold pressure on the site for a minimum of 10 minutes.  Your physician will tell you if you need to return to the hospital  ? You develop nausea or vomiting  ? You have excessive swelling, redness, or tenderness at the site  ? You have drainage that looks like it is infected.  ? You experience severe pain  ? You develop hives or a rash or unexplained itching  ? You develop shortness of breath  ? You develop a temperature of 101 degrees F or greater      [facility] INTERVENTIONAL RADIOLOGY DEPARTMENT  Procedure Physician: Dr Orozco                                     Date of procedure: July 18, 2019  Telephone Numbers: [phone] Monday-Friday 8:00 am to 4:30 pm  [phone] After 4:30 pm  Monday-Friday, Weekends & Holidays.   Ask for the Interventional Radiologist on call.  Someone is on call 24 hrs/day  [facility] toll free number: [phone] Monday-Friday 8:00 am to 4:30 pm  [facility] Emergency Dept: [phone]

## 2019-07-18 NOTE — PROGRESS NOTES
Pt back from IR s/p liver biopsy.  VSS.  Pt denies any pain.  Pt alert and oriented x4.  RUQ drsg F/D/I.  Family is not at the bedside. 1500-Pt tolerated po well.  Pt tolerted ambulation in the hallway and up to the bathroom.  Discharge instructions went over with and given to pt, all questions answered.   PIV D/C'ed, catheter intact.  1505-Pt ready for discharge, waiting for his ride.  1720-Pt D/C'ed to home with his friend.

## 2019-07-18 NOTE — IP AVS SNAPSHOT
MRN:[id number]                      After Visit Summary   7/18/2019    Ten Johnson    MRN: [id number]           Visit Information        Department      7/18/2019 11:42 AM [address] [facility]          Review of your medicines      UNREVIEWED medicines. Ask your doctor about these medicines       Dose / Directions   fluticasone 50 MCG/ACT nasal spray  Commonly known as:  FLONASE  Used for:  Cough, Post-nasal drip      Dose:  2 spray  Spray 2 sprays into both nostrils daily  Quantity:  3 Bottle  Refills:  3     lisinopril 20 MG tablet  Commonly known as:  PRINIVIL/ZESTRIL  Used for:  Hypertension, unspecified type      Dose:  20 mg  Take 1 tablet (20 mg) by mouth daily  Quantity:  90 tablet  Refills:  3     loratadine 10 MG tablet  Commonly known as:  CLARITIN      Dose:  10 mg  Take 10 mg by mouth daily  Refills:  0     MULTIVITAMINS PO      Dose:  1 tablet  Take 1 tablet by mouth At Bedtime  Refills:  0     sulfamethoxazole-trimethoprim 800-160 MG tablet  Commonly known as:  BACTRIM DS/SEPTRA DS      Dose:  1 tablet  Take 1 tablet by mouth daily  Quantity:  10 tablet  Refills:  1              Protect others around you: Learn how to safely use, store and throw away your medicines at www.disposemymeds.org.       Follow-ups after your visit       Your next 10 appointments already scheduled    Jul 22, 2019  2:40 PM CDT  XR KNEE RIGHT 1/2 VIEWS with [id number]  [facility] ([facility]) [address] [place] [address] [phone]   How do I prepare for my exam? (Food and drink instructions)  No Food and Drink Restrictions.    How do I prepare for my exam? (Other instructions)  You do not need to do anything special for this exam.    What should I wear: Wear comfortable clothes.    How long does the exam take: Most scans take less than 5 minutes.    What should I bring: Bring a list of your medicines, including vitamins, minerals  and over-the-counter drugs. It is safest to leave personal items at home.    Do I need a : No  is needed.    What do I need to tell my doctor: Tell your doctor if there s any chance you are pregnant.    What should I do after the exam: No restrictions, You may resume normal activities.    What is this test: An image of a specific body part shown in shades of black and white.    Who should I call with questions: If you have any questions, please call the Imaging Department where you will have your exam. Directions, parking instructions, and other information is available on our website, [email or web address].     Jul 22, 2019  3:00 PM CDT  (Arrive by 2:45 PM)  RETURN KNEE with Olvin Joe MD  [facility] ([facility]) [address] [place] [address]  4th Floor  [facility] [address]  [phone]      Nov 11, 2019  1:15 PM CST  [facility] Lab Draw with  [facility] LAB DRAW  [facility] Lab Draw ([facility]) [address] [place] [address]  [address]  [facility] [address]  [phone]      Nov 11, 2019  2:15 PM CST  (Arrive by 2:00 PM)  Return General Liver with Murphy Enciso PA-C  [facility] Hepatology ([facility]) [address] [place] [address]  [address]  [facility] [address] [phone]         Care Instructions       Further instructions from your care team       [facility]    Interventional Radiology  Patient Instructions Following Liver Biopsy    AFTER YOU GO HOME  ? If you were given sedation DO NOT drive or operate machinery at home or at work for at least 24 hours  ? DO relax and take it easy for 48 hours, no strenuous activity for 24 hours  ? DO drink plenty of fluids  ? DO resume your regular diet, unless otherwise instructed by your Primary Physician  ? Keep the dressing dry and in place for 24 hours.  ? DO NOT SMOKE FOR AT LEAST 24 HOURS, if you have been given any medications that were  to help you relax or sedate you during your procedure  ? DO NOT drink alcoholic beverages the day of your procedure  ? DO NOT do any strenuous exercise or lifting (> 10 lbs) for at least 7 days following your procedure  ? DO NOT take a bath or shower for at least 12 hours following your procedure  ? Remove dressing after shower the next day. Replace with Band aid for 2 days.  Never leave a wet dressing in place.  ? DO NOT make any important or legal decisions for 24 hours following your procedure  ? There should be minimum drainage from the biopsy site    CALL THE PHYSICIAN IF:  ? You start bleeding from the procedure site.  If you do start to bleed from that site, lie down flat and hold pressure on the site for a minimum of 10 minutes.  Your physician will tell you if you need to return to the hospital  ? You develop nausea or vomiting  ? You have excessive swelling, redness, or tenderness at the site  ? You have drainage that looks like it is infected.  ? You experience severe pain  ? You develop hives or a rash or unexplained itching  ? You develop shortness of breath  ? You develop a temperature of 101 degrees F or greater      [facility] INTERVENTIONAL RADIOLOGY DEPARTMENT  Procedure Physician: Dr Orozco                                     Date of procedure: July 18, 2019  Telephone Numbers: [phone] Monday-Friday 8:00 am to 4:30 pm  [phone] After 4:30 pm Monday-Friday, Weekends & Holidays.   Ask for the Interventional Radiologist on call.  Someone is on call 24 hrs/day  [facility] toll free number: [phone] Monday-Friday 8:00 am to 4:30 pm  [facility] Emergency Dept: [phone]          Additional Information About Your Visit       [facility] Information    [employer] gives you secure access to your electronic health record. If you see a primary care provider, you can also send messages to your care team and make appointments. If you have questions, please call your primary care clinic.  If you do not have a primary  care provider, please call [phone] and they will assist you.       Care EveryWhere ID    This is your Care EveryWhere ID. This could be used by other organizations to access your [facility] medical records  [id number]       Your Vitals Were     Blood Pressure   112/63    Pulse   62    Temperature   97.5  F (36.4  C) (Oral)    Respirations   16    Pulse Oximetry   97%          Primary Care Provider Office Phone # Fax #    Cuca Celeste [phone]      Equal Access to Services    [facility]: Hadii aad ku hadasho Soomaali, waaxda luqadaha, qaybta kaalmada adeegyada, waxdevi gutierrez haymeshan konrad crespo . So [facility] [phone].    ATENCIÓN: Si habla español, tiene a ha disposición servicios gratuitos de asistencia lingüística. Llame al [phone].    We comply with applicable federal civil rights laws and [place] laws. We do not discriminate on the basis of race, color, national origin, age, disability, sex, sexual orientation, or gender identity.           Thank you!    Thank you for choosing [facility] for your care. Our goal is always to provide you with excellent care. Hearing back from our patients is one way we can continue to improve our services. Please take a few minutes to complete the written survey that you may receive in the mail after you visit with us. Thank you!            Medication List      ASK your doctor about these medications          Morning Afternoon Evening Bedtime As Needed    fluticasone 50 MCG/ACT nasal spray  Also known as:  FLONASE  INSTRUCTIONS:  Spray 2 sprays into both nostrils daily  Doctor's comments:  1 btl=16 gm                     lisinopril 20 MG tablet  Also known as:  PRINIVIL/ZESTRIL  INSTRUCTIONS:  Take 1 tablet (20 mg) by mouth daily                     loratadine 10 MG tablet  Also known as:  CLARITIN  INSTRUCTIONS:  Take 10 mg by mouth daily                     MULTIVITAMINS PO  INSTRUCTIONS:  Take 1 tablet by mouth At Bedtime                      sulfamethoxazole-trimethoprim 800-160 MG tablet  Also known as:  BACTRIM DS/SEPTRA DS  INSTRUCTIONS:  Take 1 tablet by mouth daily

## 2019-07-18 NOTE — PROCEDURES
Interventional Radiology Brief Post Procedure Note    Procedure: IR LIVER BIOPSY PERCUTANEOUS    Proceduralist: Manny Orozco MD    Assistant: None    Time Out: Prior to the start of the procedure and with procedural staff participation, I verbally confirmed the patient s identity using two indicators, relevant allergies, that the procedure was appropriate and matched the consent or emergent situation, and that the correct equipment/implants were available. Immediately prior to starting the procedure I conducted the Time Out with the procedural staff and re-confirmed the patient s name, procedure, and site/side. (The Joint Commission universal protocol was followed.)  Yes    Medications   Medication Event Details Admin User Admin Time   midazolam (VERSED) injection 0.5-2 mg Medication Given Dose: 1 mg; Route: Intravenous; Comment: in Cee Tomas RN 7/18/2019  2:06 PM   fentaNYL (PF) (SUBLIMAZE) injection 25-50 mcg Medication Given Dose: 50 mcg; Route: Intravenous; Comment: in Cee Tomas RN 7/18/2019  2:13 PM   midazolam (VERSED) injection 0.5-2 mg Medication Given Dose: 1 mg; Route: Intravenous Cee Bravo RN 7/18/2019  2:23 PM   fentaNYL (PF) (SUBLIMAZE) injection 25-50 mcg Medication Given Dose: 50 mcg; Route: Intravenous Cee Bravo RN 7/18/2019  2:24 PM   fentaNYL (PF) (SUBLIMAZE) injection 25-50 mcg Medication Given Dose: 50 mcg; Route: Intravenous; Comment: in Cee Tomas RN 7/18/2019  2:42 PM       Sedation: IR Nurse Monitored Care   Post Procedure Summary:  Prior to the start of the procedure and with procedural staff participation, I verbally confirmed the patient s identity using two indicators, relevant allergies, that the procedure was appropriate and matched the consent or emergent situation, and that the correct equipment/implants were available. Immediately prior to starting the procedure I conducted the Time Out with the procedural staff and  re-confirmed the patient s name, procedure, and site/side. (The Joint Commission universal protocol was followed.)  Yes       Sedatives: Fentanyl and Midazolam (Versed)    Vital signs, airway and pulse oximetry were monitored and remained stable throughout the procedure and sedation was maintained until the procedure was complete.  The patient was monitored by staff until sedation discharge criteria were met.    Patient tolerance: Patient tolerated the procedure well with no immediate complications.    Time of sedation in minutes: 45 Minutes minutes from beginning to end of physician one to one monitoring.          Findings: 3 18 gauge cores taken from more superficial right lobe lesion with punctate hyperechoic center. 5 18 gauge cores taken from deeper right lobe lesion.    Estimated Blood Loss: Minimal    Fluoroscopy Time: 0 minute(s)    SPECIMENS: Core needle biopsy specimens sent for pathological analysis    Complications: 1. None     Condition: Stable    Plan: prn    Comments: See dictated procedure note for full details.    Manny Orozco MD

## 2019-07-18 NOTE — PROGRESS NOTES
Patient Name: Ten Johnson  Medical Record Number: [id number]  Today's Date: 7/18/2019    Procedure: Percutaneous Liver Lesion biopsies  Proceduralist: RAINE Orozco MD    Sedation start time: 1406  Sedation end time: 1454  Sedation medications administered: versed 2mg; fentanyl 150 mcg  Total sedation time: 45 minutes  Sedation Notes: VSS     Procedure start time: 1424  Puncture time: 1424  Procedure end time: 1454    Report given to: 2A JAMES Mathew  : none    Other Notes: Pt arrived to IR room 2 from . Consent reviewed. Pt denies any questions or concerns regarding procedure. Pt positioned supine and monitored per protocol. Pt tolerated procedure without any noted complications. Dressings over biopsy sites are clean, dry and intact,Pt transferred back to .  Biopsy cores (right liver lobe, shallow and deep) sent in separate containers of formalin to path lab after being examined by pathology in IR.  Pt awake and asking questions postprocedure, VSS.  Of note, pt asked if MD would write script for about 5 roxicodones postprocedure; RN stated usually no meds or tylenol/ibuprofen or similar are adequate or ice pack, pt stated he might have to go buy some Roxicodone off the street - was not unpleasant, stated it in a joking manner.  2A RN informed of this in handoff report via phone.

## 2019-07-19 LAB — COPATH REPORT: NORMAL

## 2019-07-22 ENCOUNTER — MYC MEDICAL ADVICE (OUTPATIENT)
Dept: INTERNAL MEDICINE | Facility: CLINIC | Age: 59
End: 2019-07-22

## 2019-07-25 ENCOUNTER — MYC MEDICAL ADVICE (OUTPATIENT)
Dept: INTERNAL MEDICINE | Facility: CLINIC | Age: 59
End: 2019-07-25

## 2019-07-25 NOTE — TELEPHONE ENCOUNTER
Responded to patient via MC, routed to . Mendy Cuenca Paramedic on 7/25/2019 at 4:34 PM   Replied to patient that the MD is out of the clinic until Monday. Mendy Cuenca Paramedic on 7/26/2019 at 8:32 AM

## 2019-07-29 ENCOUNTER — TELEPHONE (OUTPATIENT)
Dept: VASCULAR SURGERY | Facility: CLINIC | Age: 59
End: 2019-07-29

## 2019-07-29 ENCOUNTER — MYC MEDICAL ADVICE (OUTPATIENT)
Dept: INTERNAL MEDICINE | Facility: CLINIC | Age: 59
End: 2019-07-29

## 2019-07-29 ENCOUNTER — MYC REFILL (OUTPATIENT)
Dept: NEPHROLOGY | Facility: CLINIC | Age: 59
End: 2019-07-29

## 2019-07-29 DIAGNOSIS — I10 HYPERTENSION, UNSPECIFIED TYPE: ICD-10-CM

## 2019-07-29 DIAGNOSIS — K76.9 LIVER LESION: Primary | ICD-10-CM

## 2019-07-29 DIAGNOSIS — C22.0 HCC (HEPATOCELLULAR CARCINOMA) (H): ICD-10-CM

## 2019-07-29 RX ORDER — LISINOPRIL 20 MG/1
20 TABLET ORAL DAILY
Qty: 90 TABLET | Refills: 3 | Status: SHIPPED | OUTPATIENT
Start: 2019-07-29 | End: 2019-09-19

## 2019-07-29 NOTE — TELEPHONE ENCOUNTER
Called pt and left a VM asking that he return my call in regards to scheduling a 2nd liver biopsy  Per Jaswinder Taylor's note.     Left my direct line for him to return my call.     Priscila BEARD RN, BSN  Interventional Radiology Nurse Coordinator   Phone: [phone]    Plan: segment 7 lesion to r/o HCC vs Cholangio      *pt did return my call: explained the above to him in which he states that Weds are the best days for him.     He'd like biopsy sooner due to treatment plans are awaiting this biopsy.    Will see what I can do and then call him back.     He agrees to plan.

## 2019-07-30 ENCOUNTER — MYC MEDICAL ADVICE (OUTPATIENT)
Dept: INTERNAL MEDICINE | Facility: CLINIC | Age: 59
End: 2019-07-30

## 2019-07-30 ENCOUNTER — HOSPITAL ENCOUNTER (OUTPATIENT)
Facility: CLINIC | Age: 59
End: 2019-07-30
Admitting: RADIOLOGY
Payer: COMMERCIAL

## 2019-07-31 ENCOUNTER — MYC MEDICAL ADVICE (OUTPATIENT)
Dept: INTERNAL MEDICINE | Facility: CLINIC | Age: 59
End: 2019-07-31

## 2019-07-31 ENCOUNTER — TELEPHONE (OUTPATIENT)
Dept: INTERNAL MEDICINE | Facility: CLINIC | Age: 59
End: 2019-07-31

## 2019-07-31 ENCOUNTER — TELEPHONE (OUTPATIENT)
Dept: VASCULAR SURGERY | Facility: CLINIC | Age: 59
End: 2019-07-31

## 2019-07-31 NOTE — TELEPHONE ENCOUNTER
Pt returning my call regarding biopsy consult with Dr. He team for lap biopsy    He states that he would just like this scheduled, he's aware of all the risks.     Explained that this is more so related to the surgeon who would want to meet pt and go through risks.     Informed pt that someone from his team will be contacting him for an appt.     Priscila BEARD RN, BSN  Interventional Radiology Nurse Coordinato   Phone: [phone]

## 2019-07-31 NOTE — TELEPHONE ENCOUNTER
Replied to pt to advise he needed an apt with Dr NEFTALI Cuenca Paramedic on 7/31/2019 at 2:28 PM

## 2019-07-31 NOTE — TELEPHONE ENCOUNTER
[facility] Call Center    Phone Message    May a detailed message be left on voicemail: yes    Reason for Call: Other: The patient would like a call from care team he said he won't have insurance by then because he needs the form filled out. I did let him know Dr. Celeste needs him to schedule an appointment but the patient refused and want a call from care team asap     Action Taken: Message routed to:  [facility] yaneth

## 2019-07-31 NOTE — TELEPHONE ENCOUNTER
Spoke to Angel who dropped off the form on Tuesday 7/30/19 after being told he needed to be seen.  I asked Roula Starr if she would fill out the form and she advised me that no, she would not due to his (games )and he needs to be seen by her and only her.  He advised me that he would loose his insurance if the form was not received by 4:00 today.  He told me nothing has changed except that he has cancer now and the form should be completed.  I again advised him that he needs to be seen and that our forms policy is 5-7 business days.  He hung up the phone on me after advising me that he is not taking our crap any longer

## 2019-07-31 NOTE — TELEPHONE ENCOUNTER
Called pt and left a VM. I explained that after review of the imaging, and how IR would get to the lesion for a successful biopsy, it was determined that the lesion could best be reached Laprascopically with one of our surgeons.    Informed him that we did consult with Dr He, who feels that he'd be able to get to the lesion.     Informed pt someone from his team will be contacting him for an appt that way he can meet with Dr He and understand this procedure more.     Also informed him the importance of this biopsy due to transplant purposes that the pathology could be HCC vs CholangioCa.     Someone from Dr. He's team will be reaching out to him for an appt.     I left my direct line for him to call me back should he have any other questions.     Priscila BEARD RN, BSN  Interventional Radiology Nurse Coordinator   Phone: [phone]

## 2019-07-31 NOTE — TELEPHONE ENCOUNTER
Sent a MC to have him schedule an appointment. Mendy Cuenca Paramedic on 7/31/2019 at 5:14 PM

## 2019-07-31 NOTE — TELEPHONE ENCOUNTER
Per Dr. 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

## 2019-08-01 NOTE — TELEPHONE ENCOUNTER
Advised he needs to make an appointment with MD for forms. Mendy Cuenca Paramedic on 8/1/2019 at 7:32 AM

## 2019-08-02 NOTE — TELEPHONE ENCOUNTER
RECORDS STATUS - ALL OTHER DIAGNOSIS      RECORDS RECEIVED FROM: [facility]   DATE RECEIVED: 8/2/19   NOTES STATUS DETAILS   OFFICE NOTE from referring provider [facility]    OFFICE NOTE from medical oncologist NA    DISCHARGE SUMMARY from hospital [facility] 7/18/19, 4/9/19   DISCHARGE REPORT from the ER NA    OPERATIVE REPORT NA    MEDICATION LIST [facility] 7/29/19   CLINICAL TRIAL TREATMENTS TO DATE     LABS     PATHOLOGY REPORTS [facility] 7/18/19: [id number]  6/12/19: [id number]     ANYTHING RELATED TO DIAGNOSIS Epic 7/18/19   GENONOMIC TESTING     TYPE:     IMAGING (NEED IMAGES & REPORT)     CT SCANS PACS    MRI PACS    MAMMO     ULTRASOUND PACS    PET

## 2019-08-05 ENCOUNTER — TELEPHONE (OUTPATIENT)
Dept: SURGERY | Facility: CLINIC | Age: 59
End: 2019-08-05

## 2019-08-05 ENCOUNTER — HOSPITAL ENCOUNTER (OUTPATIENT)
Facility: CLINIC | Age: 59
End: 2019-08-05
Attending: SURGERY | Admitting: SURGERY

## 2019-08-05 ENCOUNTER — OFFICE VISIT (OUTPATIENT)
Dept: SURGERY | Facility: CLINIC | Age: 59
End: 2019-08-05
Attending: SURGERY
Payer: COMMERCIAL

## 2019-08-05 ENCOUNTER — PRE VISIT (OUTPATIENT)
Dept: SURGERY | Facility: CLINIC | Age: 59
End: 2019-08-05

## 2019-08-05 VITALS
SYSTOLIC BLOOD PRESSURE: 144 MMHG | BODY MASS INDEX: 25.8 KG/M2 | RESPIRATION RATE: 16 BRPM | HEIGHT: 69 IN | OXYGEN SATURATION: 97 % | DIASTOLIC BLOOD PRESSURE: 81 MMHG | TEMPERATURE: 97 F | WEIGHT: 174.2 LBS | HEART RATE: 57 BPM

## 2019-08-05 DIAGNOSIS — C22.0 HCC (HEPATOCELLULAR CARCINOMA) (H): ICD-10-CM

## 2019-08-05 DIAGNOSIS — K74.60 CIRRHOSIS (H): ICD-10-CM

## 2019-08-05 DIAGNOSIS — K76.9 LIVER LESION: Primary | ICD-10-CM

## 2019-08-05 PROCEDURE — G0463 HOSPITAL OUTPT CLINIC VISIT: HCPCS | Mod: ZF

## 2019-08-05 ASSESSMENT — PAIN SCALES - GENERAL: PAINLEVEL: MODERATE PAIN (5)

## 2019-08-05 ASSESSMENT — MIFFLIN-ST. JEOR: SCORE: 1585.55

## 2019-08-05 NOTE — TELEPHONE ENCOUNTER
Patient is scheduled for surgery with Dr. Elio He      Spoke with: Ten    Date of Surgery: 08/15/2019    Location: [address] [address]  3rd floor- [address]C    Informed patient they will need an adult  YES    Pre-op with surgeon (if applicable): on 08/05/2019    H&P: Scheduled with PAC on 08/12/2019    Additional imaging/appointments: None    Surgery packet: Mailed per patient request     Additional comments: Above information confirmed with patient.

## 2019-08-05 NOTE — PATIENT INSTRUCTIONS
Surgical Oncology RN Care Coordination Note:     - We will plan to proceed surgery on 8/15/2019. Prior to the surgery we will arrange for a visit with our PAC clinic.     - Our surgery scheduler will contact you to confirm the date and times of both appointments.     Julienne Ceja RN, BSN  Care Coordinator   [phone]

## 2019-08-05 NOTE — LETTER
8/5/2019       RE: Ten Johnson  [address]  [facility] [address]     Dear Colleague,    Thank you for referring your patient, Ten Johnson, to the [facility]. Please see a copy of my visit note below.    NEW PATIENT CONSULTATION       REASON FOR EVALUATION:  Suspected cholangiocarcinoma of the liver.      HISTORY OF PRESENT ILLNESS:  Mr. Johnson is a 59-year-old gentleman with a history of cirrhosis of the liver who has multiple arterial enhancing lesions in the liver, several of which are concerning for hepatocellular cancer.  One of the lesions, however, has different imaging characteristics and raises the possibility of a cholangiocarcinoma or a cholangiohepatoma.  The patient was sent for biopsy in Interventional Radiology and one of the lesions was biopsied which confirmed hepatocellular cancer.  However, given the different imaging characteristics of the lesion in segment 7, a second biopsy was requested to be sure that all of his lesions represent the same pathology.  Interventional Radiology reviewed his case and determined that percutaneous biopsy would not be possible because of the location of the lesion in segment 7 of the liver and therefore, I was asked to see Mr. Johnson for consideration of a laparoscopic liver biopsy.      I discussed with Mr. Johnson the issues above.  I discussed the concern for a second type of malignancy within the liver.  I recommended that he undergo a laparoscopic ultrasound-guided liver biopsy of this lesion to determine its etiology.  In addition, I discussed that several of these lesions could actually be treated at the same time with microwave ablation.  The patient actually has several lesions which will not be amenable to ablation because of the proximity to the kenneth hepatis.  However, given that he will already be under anesthesia, certainly we can address the lesion that we biopsy once the frozen section is finalized and in addition, we  could probably address at least 2 other lesions, one in the right lobe and one in the anterior left lateral segment.  Mr. Johnson was agreeable to this and I offered that we would present his case at our multidisciplinary liver conference.      I discussed the risks of surgery including but not limited to bleeding, infection, wound complications and unintentional injury to other structures.  I also discussed the very slight risk of damaging liver function, although that would be unlikely in this case.        We will get Mr. Johnson on the operating room schedule for a planned diagnostic laparoscopy with intraoperative ultrasound-guided liver biopsy and microwave ablation of liver tumors.  A total of 30 minutes was spent with Mr. Johnson, all of which was in consultation.           Again, thank you for allowing me to participate in the care of your patient.      Sincerely,    Elio He MD

## 2019-08-05 NOTE — NURSING NOTE
"Oncology Rooming Note    August 5, 2019 2:27 PM   Ten Johnson is a 59 year old male who presents for:    Chief Complaint   Patient presents with     New Patient     NEW PT; HCC; VITALS COMPLETED BY CMA      Initial Vitals: BP (!) 144/81   Pulse 57   Temp 97  F (36.1  C) (Oral)   Resp 16   Ht 1.753 m (5' 9\")   Wt 79 kg (174 lb 3.2 oz)   SpO2 97%   BMI 25.72 kg/m   Estimated body mass index is 25.72 kg/m  as calculated from the following:    Height as of this encounter: 1.753 m (5' 9\").    Weight as of this encounter: 79 kg (174 lb 3.2 oz). Body surface area is 1.96 meters squared.  Moderate Pain (5) Comment: Data Unavailable   No LMP for male patient.  Allergies reviewed: Yes  Medications reviewed: Yes    Medications: Medication refills not needed today.  Pharmacy name entered into [employer]: [place], [place] - [address] [facility] [address] [id number]    Clinical concerns: No new concerns today  Dr He was notified.      Hailee Vargas              "

## 2019-08-05 NOTE — TELEPHONE ENCOUNTER
----- Message from Julienne Ceja RN sent at 8/5/2019  2:47 PM CDT -----  Regarding: PAC and confirm surgery   Keaganaurea Ksenia - this patient needs to be scheduled for PAC and then his surgery on 8/15 which also needs to be added to calendar.     He is aware of planned date and that you will be calling with PAC appointment etc.     Thanks!   KJ

## 2019-08-06 NOTE — PROGRESS NOTES
NEW PATIENT CONSULTATION       REASON FOR EVALUATION:  Suspected cholangiocarcinoma of the liver.      HISTORY OF PRESENT ILLNESS:  Mr. Johnson is a 59-year-old gentleman with a history of cirrhosis of the liver who has multiple arterial enhancing lesions in the liver, several of which are concerning for hepatocellular cancer.  One of the lesions, however, has different imaging characteristics and raises the possibility of a cholangiocarcinoma or a cholangiohepatoma.  The patient was sent for biopsy in Interventional Radiology and one of the lesions was biopsied which confirmed hepatocellular cancer.  However, given the different imaging characteristics of the lesion in segment 7, a second biopsy was requested to be sure that all of his lesions represent the same pathology.  Interventional Radiology reviewed his case and determined that percutaneous biopsy would not be possible because of the location of the lesion in segment 7 of the liver and therefore, I was asked to see Mr. Johnson for consideration of a laparoscopic liver biopsy.      I discussed with Mr. Johnson the issues above.  I discussed the concern for a second type of malignancy within the liver.  I recommended that he undergo a laparoscopic ultrasound-guided liver biopsy of this lesion to determine its etiology.  In addition, I discussed that several of these lesions could actually be treated at the same time with microwave ablation.  The patient actually has several lesions which will not be amenable to ablation because of the proximity to the kenneth hepatis.  However, given that he will already be under anesthesia, certainly we can address the lesion that we biopsy once the frozen section is finalized and in addition, we could probably address at least 2 other lesions, one in the right lobe and one in the anterior left lateral segment.  Mr. Johnson was agreeable to this and I offered that we would present his case at our multidisciplinary liver conference.      I  discussed the risks of surgery including but not limited to bleeding, infection, wound complications and unintentional injury to other structures.  I also discussed the very slight risk of damaging liver function, although that would be unlikely in this case.        We will get Mr. Johnson on the operating room schedule for a planned diagnostic laparoscopy with intraoperative ultrasound-guided liver biopsy and microwave ablation of liver tumors.  A total of 30 minutes was spent with Alejandro Elizabeth, all of which was in consultation.

## 2019-08-06 NOTE — TELEPHONE ENCOUNTER
FUTURE VISIT INFORMATION      SURGERY INFORMATION:    Date: 8/15/2019    Location: [facility] OR    Surgeon:  Dr. Elio He    Anesthesia Type:  General     RECORDS REQUESTED FROM:       Primary Care Provider:  Dr. Cuca Celeste [facility]    Pertinent Medical History:  S/P TAVR, hypertension, aortic stenosis    Most recent EKG+ [id number]    Most recent ECHO:  2018    Most recent Coronary Angiogram:  2018

## 2019-08-07 ENCOUNTER — MYC MEDICAL ADVICE (OUTPATIENT)
Dept: INTERNAL MEDICINE | Facility: CLINIC | Age: 59
End: 2019-08-07

## 2019-08-08 NOTE — TELEPHONE ENCOUNTER
There aren't any open spots.Responded to the patient via .  Mendy Cuenca Paramedic on 8/8/2019 at 2:05 PM   Patient needs to see Roula, for his form before his surgery for a biopsy 08/12/19   Have any openings. Left message for pt to call back. No openings in Dr Celeste's schedule 08/09/2019.  Teresita Huizar RN 2:26 PM on 8/8/2019.    Pt called back and is informed that Dr Celeste is unavailable 08/09/2019. Pt will bring form to August 22nd appt with Dr Celeste.  Teresita Huizar RN 3:02 PM on 8/8/2019.

## 2019-08-09 ENCOUNTER — MYC MEDICAL ADVICE (OUTPATIENT)
Dept: SURGERY | Facility: CLINIC | Age: 59
End: 2019-08-09

## 2019-08-12 ENCOUNTER — PRE VISIT (OUTPATIENT)
Dept: SURGERY | Facility: CLINIC | Age: 59
End: 2019-08-12

## 2019-08-16 ENCOUNTER — PRE VISIT (OUTPATIENT)
Dept: SURGERY | Facility: CLINIC | Age: 59
End: 2019-08-16

## 2019-08-16 NOTE — TELEPHONE ENCOUNTER
RECORDS STATUS - ALL OTHER DIAGNOSIS      RECORDS RECEIVED FROM: [facility]   DATE RECEIVED: 8/16/19   NOTES STATUS DETAILS   OFFICE NOTE from referring provider [facility] Dr. He   OFFICE NOTE from medical oncologist Epic    DISCHARGE SUMMARY from hospital [facility] 7/18/19, 4/9/19   DISCHARGE REPORT from the ER     OPERATIVE REPORT     MEDICATION LIST [facility] 7/29/19   CLINICAL TRIAL TREATMENTS TO DATE     LABS     PATHOLOGY REPORTS [facility] 7/18/19: [id number]  6/12/19: [id number]   ANYTHING RELATED TO DIAGNOSIS Epic 7/18/19   GENONOMIC TESTING     TYPE:     IMAGING (NEED IMAGES & REPORT)     CT SCANS PACS    MRI PACS    MAMMO     ULTRASOUND PACS    PET

## 2019-08-16 NOTE — TELEPHONE ENCOUNTER
FUTURE VISIT INFORMATION      SURGERY INFORMATION:    Date: TBD    Surgeon:  Dr. Elio He    Anesthesia Type:  General    RECORDS REQUESTED FROM:         Primary Care Provider:  Dr. Cuca Celeste [facility]     Pertinent Medical History:  S/P TAVR, hypertension, aortic stenosis     Most recent EKG+ [id number]     Most recent ECHO:  2018     Most recent Coronary Angiogram:  2018

## 2019-08-21 NOTE — PROGRESS NOTES
[facility]    CC: insurance requirements    HPI:   Zaki Johnson is a 59 year old man with a history of Hep C, alcoholic cirrhosis, esophageal varices, aortic stenosis s/p TAVR, right knee TKA who presents for health maintenance for insurance form requirements.    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    Recent imaging revealed suspected cholangiocarcinoma and HCC. One biopsy per IR did reveal HCC, but due to heterogeneity of the lesions on imaging, a laparoscopic biopsy was recommended due to the location of the suspect lesion. During surgery he will also undergo microwave ablation of several of his HCC lesions. Surgery was cancelled initially due to lack of insurance and has not been rescheduled yet.    He needs a clinic appointment today to meet insurance requirement.    TD 3/5/19  pneumo 13 valent 2015  pneumo 23 valent 2011  Twinrix a/b 2012, 2015  Due for colonoscopy    Exercise: rides bike 10-20 miles a day  Diet: Meat and cheese, thinks for the most part balanced. Eats out frequently. Good fruit intake  Home: Lives alone, concerned about neighborhood safety  Tobacco: none  Alcohol: minimal. Less than 12/month  No STI concerns.   No regular dental or eye check ups.    Fatigue, had some weight gain in the last month.    HTN: Usually takes BP meds but forgot this morning. Follows with nephrology.     ROS: 10 point ROS neg other than the symptoms noted above in the HPI.    Current Outpatient Medications   Medication     fluticasone (FLONASE) 50 MCG/ACT nasal spray     lisinopril (PRINIVIL/ZESTRIL) 20 MG tablet     loratadine (CLARITIN) 10 MG tablet     Multiple Vitamin (MULTIVITAMINS PO)     sulfamethoxazole-trimethoprim (BACTRIM DS/SEPTRA DS) 800-160 MG tablet     No current facility-administered medications for this visit.      Patient Active Problem List   Diagnosis      Presbyopia     Rotator cuff tear, right     OA (osteoarthritis) of knee     Rhinitis, allergic     Olecranon bursitis     S/p TAVR (transcatheter aortic valve replacement), bioprosthetic     Right shoulder pain     Diastolic dysfunction     Chronic allergic rhinitis     Portal hypertension (H)     History of drug abuse in remission     S/P total knee arthroplasty, right     Foot contracture     Cirrhosis (H)     Allergies   Allergen Reactions     Zolpidem Other (See Comments)     Alcoholic.  Had reaction 3/17/13 while intoxicated which included black out, loss of awareness, paranoia.  Do not prescribe.  Dr. Celeste     Cats Other (See Comments)     rhinitis     Dogs Other (See Comments)     rhinitis     Pollen Extract Other (See Comments)     rhinits.     Past Medical History:   Diagnosis Date     JENNIFER (acute kidney injury) (H) 4/9/2019     Alcohol use disorder      Alcoholic cirrhosis (H)      Anticoagulant long-term use     plavix     Aortic stenosis, severe 2/21/2018     Ascites      Chronic allergic rhinitis      Chronic anemia      Chronic hepatitis C without hepatic coma (H) 05/10/2016    Untreated as of 2/2018     Cirrhosis (H) 2017    MRI finding     Diastolic dysfunction      Erosive gastropathy      Esophageal varices in alcoholic cirrhosis (H)      H/O upper gastrointestinal hemorrhage 09/2017     History of blood transfusion      History of drug abuse     intranasal     Hypertension     essential     JANELLE (iron deficiency anemia)      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     Sarahi-Hoffman tear     History     Marijuana abuse      MRSA (methicillin resistant Staphylococcus aureus)      Nonrheumatic aortic valve stenosis 2/20/2018     Olecranon bursitis      Portal hypertension (H)      Right shoulder pain     history of rotator cuff repair     S/p TAVR (transcatheter aortic valve replacement), bioprosthetic      Severe aortic stenosis       Thrombocytopenia (H)      Past Surgical History:   Procedure Laterality Date     ARTHROSCOPY SHOULDER ROTATOR CUFF REPAIR  7/31/2012    Procedure: ARTHROSCOPY SHOULDER ROTATOR CUFF REPAIR;  Right Shoulder Arthroscopic Rotator Cuff Repair, BicepsTenodesis,  Subacromial Decompression ;  Surgeon: Joi Castillo MD;  [facility]     ESOPHAGOSCOPY, GASTROSCOPY, DUODENOSCOPY (EGD), COMBINED N/A 10/23/2017    Procedure: COMBINED ESOPHAGOSCOPY, GASTROSCOPY, DUODENOSCOPY (EGD);;  Surgeon: Gentry Salas MD;  Location: [facility]     EXCHANGE POLY COMPONENT ARTHROPLASTY KNEE Right 3/4/2019    Procedure: REVISION RIGHT TOTAL KNEE POLY COMPONENT EXCHANGE;  Surgeon: Olvin Joe MD;  Location: [facility] OR     FACIAL RECONSTRUCTION SURGERY  1971     HEART CATH FEMORAL CANNULIZATION WITH OPEN STANDBY REPAIR AORTIC VALVE N/A 2/21/2018    Procedure: HEART CATH FEMORAL CANNULIZATION WITH OPEN STANDBY REPAIR AORTIC VALVE;;  Surgeon: Luis Baird MD;  Location: [facility] OR     IR LIVER BIOPSY PERCUTANEOUS  7/18/2019     IRRIGATION AND DEBRIDEMENT UPPER EXTREMITY, COMBINED  1/3/2012    Procedure:COMBINED IRRIGATION AND DEBRIDEMENT UPPER EXTREMITY; Irrigation & Debridement Left Elbow; Surgeon:CRISTHIAN ZHOU; Location:[facility] OR     OPTICAL TRACKING SYSTEM ARTHROPLASTY KNEE Right 2/7/2019    Procedure: ARTHROPLASTY KNEE RIGHT;  Surgeon: Olvin Joe MD;  [facility]     REPAIR TENDON TRICEPS UPPER EXTREMITY  11/8/2011    Procedure:REPAIR TENDON TRICEPS UPPER EXTREMITY; Surgeon:CRISTHIAN ZHOU; Location:[facility]     SHOULDER SURGERY  2003    left, injury, torn tendons, hematoma     TRANSCATHETER AORTIC VALVE IMPLANT ANESTHESIA N/A 2/21/2018    Procedure: TRANSCATHETER AORTIC VALVE IMPLANT ANESTHESIA;  Transfemoral (Quiroz) Aortic Valve Implant 26mm MARTHA 3, with Cardiopulmonary Bypass Standby, transthoracic echocardiogram;  Surgeon: GENERIC ANESTHESIA PROVIDER;  Location: [facility] OR     TRANSPOSITION  ULNAR NERVE (ELBOW)  11/8/2011    Procedure:TRANSPOSITION ULNAR NERVE (ELBOW); Final Procedure Done: Left Elbow Lateral Ulnar Collateral Repair And  Left Elbow Triceps Repair       Family History   Problem Relation Age of Onset     Cancer Mother 62     Alcoholism Paternal Uncle      No Known Problems Father      No Known Problems Brother      Diabetes Maternal Grandmother      Myocardial Infarction Maternal Grandfather      No Known Problems Paternal Grandmother      Unknown/Adopted Paternal Grandfather      Cirrhosis No family hx of      Social History     Socioeconomic History     Marital status: Single     Spouse name: Not on file     Number of children: 0     Years of education: Not on file     Highest education level: Not on file   Occupational History     Occupation: unemployed   Social Needs     Financial resource strain: Not on file     Food insecurity:     Worry: Not on file     Inability: Not on file     Transportation needs:     Medical: Not on file     Non-medical: Not on file   Tobacco Use     Smoking status: Never Smoker     Smokeless tobacco: Never Used   Substance and Sexual Activity     Alcohol use: Yes     Comment: Alcohol use disorder, still actively drinking     Drug use: No     Comment: denies     Sexual activity: Not Currently     Partners: Female   Lifestyle     Physical activity:     Days per week: Not on file     Minutes per session: Not on file     Stress: Not on file   Relationships     Social connections:     Talks on phone: Not on file     Gets together: Not on file     Attends Sabianist service: Not on file     Active member of club or organization: Not on file     Attends meetings of clubs or organizations: Not on file     Relationship status: Not on file     Intimate partner violence:     Fear of current or ex partner: Not on file     Emotionally abused: Not on file     Physically abused: Not on file     Forced sexual activity: Not on file   Other Topics Concern     Parent/sibling w/  "CABG, MI or angioplasty before 65F 55M? Not Asked   Social History Narrative    .  Bicycles a lot.  Excessive alcohol use.  Smokes cigars.  Occasional marijuana use.     Objective  Physical Examination:  BP (!) 155/91   Pulse 58   Resp 16   Ht 1.753 m (5' 9\")   Wt 78.9 kg (173 lb 15.1 oz)   SpO2 99%   BMI 25.69 kg/m      BP Readings from Last 6 Encounters:   08/22/19 (!) 155/91   08/05/19 (!) 144/81   07/18/19 104/64   07/08/19 147/81   06/05/19 123/77   05/23/19 (!) 155/93     General:  Conversant, generally healthy appearing, no acute distress  Head: atraumatic  Eyes:  Pupils 2-3 mm, sclera white, EOM's full, conjunctiva moist, no lid lag    Ears:  TM's normal, EAC's patent, clear of cerumen  Nose:  Nasal passages clear, turbinates not swollen  Throat/Mouth:  No pharyngeal erythema, exudate, ulcers, oral mucosa and tongue moist, normal hard and soft palate  Neck:  Trachea midline, Full AROM, supple, thyroid smooth, symmetric, not enlarged, no nodules, no neck lymphadenopathy  Lungs:  Clear to auscultation throughout, no wheezes, rhonchi or rales. Normal respiratory effort and no intercostal retractions.  C/V:  Regular rate and rhythm, Grade II systolic murmur, no rubs or gallops.    Abdomen:  Mildly distended.  Bowel sounds active.  Liver edge 3cm below costal margin. No tenderness. No splenomegaly or masses.  No CVA tenderness or masses.  Lymph:  No cervical lymph nodes.  Neuro: Alert and oriented, face symmetric. No tremor.  Gait steady.   M/S:   No joint deformities noted.  No joint swelling. Well healed surgical scar over Right knee  Skin:   Normal temperature., turgor and texture. No rashes, suspicious lesions,  jaundice or ulcers    Extremities:  No peripheral edema, no digital cyanosis  Psych:  Alert and oriented to person, place and time. Appropriate affect.  Not psychomotor slowed.  No signs of anxiety or agitation.    Labs  Results for USNNY NOVA (MRN [id number]) as of " 8/22/2019 06:55   Ref. Range 6/5/2019 13:02   Sodium Latest Ref Range: 133 - 144 mmol/L 139   Potassium Latest Ref Range: 3.4 - 5.3 mmol/L 3.8   Chloride Latest Ref Range: 94 - 109 mmol/L 107   Carbon Dioxide Latest Ref Range: 20 - 32 mmol/L 22   Urea Nitrogen Latest Ref Range: 7 - 30 mg/dL 18   Creatinine Latest Ref Range: 0.66 - 1.25 mg/dL 1.04   GFR Estimate Latest Ref Range: >60 mL/min/1.73_m2 77   GFR Estimate If Black Latest Ref Range: >60 mL/min/1.73_m2 89   Calcium Latest Ref Range: 8.5 - 10.1 mg/dL 8.7   Anion Gap Latest Ref Range: 3 - 14 mmol/L 9   Albumin Latest Ref Range: 3.4 - 5.0 g/dL 3.3 (L)   Protein Total Latest Ref Range: 6.8 - 8.8 g/dL 7.0   Bilirubin Total Latest Ref Range: 0.2 - 1.3 mg/dL 0.6   Alkaline Phosphatase Latest Ref Range: 40 - 150 U/L 103   ALT Latest Ref Range: 0 - 70 U/L 106 (H)   AST Latest Ref Range: 0 - 45 U/L 92 (H)   CRP Inflammation Latest Ref Range: 0.0 - 8.0 mg/L <2.9   Results for PHYLLIS JOHNSON (MRN [id number]) as of 8/22/2019 06:55   Ref. Range 7/8/2019 14:42   WBC Latest Ref Range: 4.0 - 11.0 10e9/L 5.6   Hemoglobin Latest Ref Range: 13.3 - 17.7 g/dL 15.9   Hematocrit Latest Ref Range: 40.0 - 53.0 % 44.9   Platelet Count Latest Ref Range: 150 - 450 10e9/L 84 (L)   RBC Count Latest Ref Range: 4.4 - 5.9 10e12/L 4.71   MCV Latest Ref Range: 78 - 100 fl 95   MCH Latest Ref Range: 26.5 - 33.0 pg 33.8 (H)   MCHC Latest Ref Range: 31.5 - 36.5 g/dL 35.4   RDW Latest Ref Range: 10.0 - 15.0 % 14.8   Results for PHYLLIS JOHNSON (MRN [id number]) as of 8/22/2019 06:55   Ref. Range 7/8/2019 14:42   INR Latest Ref Range: 0.86 - 1.14  1.19 (H)       BP Readings from Last 6 Encounters:   08/22/19 (!) 155/91   08/05/19 (!) 144/81   07/18/19 104/64   07/08/19 147/81   06/05/19 123/77   05/23/19 (!) 155/93       Assessment/Plan    Phyllis Johnson was seen today for completion of insurance forms    HTN - Continued hypertension today, however patient forgot to take medications this morning. He  states he does take it regularly. Follows with nephrology.  - Recommended home BP log and medication compliance    Health Care Maintenance - No needs at this time          Satinder You, MS  08/22/19      Total time spent 70 minutes.  More than 50% of the time spent with Mr. Johnson on reviewing records since our last visit with patient, reviewing prior forms information, completing form today, reviewing health care maintenance, advising regarding compliance with medications,  and counseling / coordinating his care    Routine f/u 3-4 months.      Cuca Celeste M.D.  Internal Medicine  [facility] Center   pager [phone]

## 2019-08-22 ENCOUNTER — OFFICE VISIT (OUTPATIENT)
Dept: INTERNAL MEDICINE | Facility: CLINIC | Age: 59
End: 2019-08-22
Payer: COMMERCIAL

## 2019-08-22 VITALS
WEIGHT: 173.94 LBS | HEIGHT: 69 IN | DIASTOLIC BLOOD PRESSURE: 91 MMHG | RESPIRATION RATE: 16 BRPM | SYSTOLIC BLOOD PRESSURE: 155 MMHG | BODY MASS INDEX: 25.76 KG/M2 | OXYGEN SATURATION: 99 % | HEART RATE: 58 BPM

## 2019-08-22 DIAGNOSIS — I10 BENIGN ESSENTIAL HYPERTENSION: ICD-10-CM

## 2019-08-22 DIAGNOSIS — G89.29 CHRONIC PAIN OF RIGHT KNEE: ICD-10-CM

## 2019-08-22 DIAGNOSIS — Z02.9 ADMINISTRATIVE ENCOUNTER: ICD-10-CM

## 2019-08-22 DIAGNOSIS — C22.0 HEPATOCELLULAR CARCINOMA (H): Primary | ICD-10-CM

## 2019-08-22 DIAGNOSIS — M25.561 CHRONIC PAIN OF RIGHT KNEE: ICD-10-CM

## 2019-08-22 ASSESSMENT — PAIN SCALES - GENERAL: PAINLEVEL: MODERATE PAIN (4)

## 2019-08-22 ASSESSMENT — MIFFLIN-ST. JEOR: SCORE: 1584.38

## 2019-08-22 NOTE — NURSING NOTE
Chief Complaint   Patient presents with     Forms     Pt is here for the completion of insurance forms.         Shahbaz Henriquez, EMT on 8/22/2019 at 6:57 AM

## 2019-08-22 NOTE — PATIENT INSTRUCTIONS
[facility] Medication Refill Request Information:  * Please contact your pharmacy regarding ANY request for medication refills.  ** [facility] Prescription Fax = [phone]  * Please allow 3 business days for routine medication refills.  * Please allow 5 business days for controlled substance medication refills.     [facility] Test Result notification information:  *You will be notified with in 7-10 days of your appointment day regarding the results of your test.  If you are on MyChart you will be notified as soon as the provider has reviewed the results and signed off on them.    [facility]: [phone]

## 2019-08-22 NOTE — PROGRESS NOTES
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  He did not bring these forms, but I was able to find them in the media section of our EHR.  He initially stated he wanted the form today to date back to the one I completed in January which had an expected RTW date of 5/1/19    Today the following form was completed and scanned into the chart.  [employer]  Loss of Time Application, Physician Continuation Form  [address], [address], [place] [address]  Phone [phone], Fax [phone]  Name of illness:  Right knee pain, liver cancer  Not hospitalized since time of last form expiration (7/24/19)  Surgical procedure (liver biopsy) was performed on 7/18/19.  First consulted me on 8/22/19 about his current condition.  Frequency of \"treatments\"--we estimated up to 2 x per month  First unable to work due to disability 7/24/19  Has been continuously disability from performing their job since 7/24/19  The patient will be able to return to work approx 2/1/2020.   Disability is not the result of illness or injury arising out of or in the course of employment     It was reported to me that Ten was disrespectful to our staff when trying to get his form signed. He was insisting that I sign the form without seeing him even though, per our prior agreement, I require him to have a face-to-face appointment to complete any forms. I ladvised him today that his behavior was unacceptable and that he needs to make appointments with me, not my collegues, with me for form completion and he needs to plan ahead for these appointments.    He states that his current health care plan for time away from work is for maximum of 13 months duration.  After that, he may be applying for disability.    Cuca Celeste M.D.  Internal Medicine  [facility] "   pager [phone]

## 2019-09-13 ENCOUNTER — PRE VISIT (OUTPATIENT)
Dept: SURGERY | Facility: CLINIC | Age: 59
End: 2019-09-13

## 2019-09-13 ENCOUNTER — CARE COORDINATION (OUTPATIENT)
Dept: SURGERY | Facility: CLINIC | Age: 59
End: 2019-09-13

## 2019-09-13 NOTE — PROGRESS NOTES
Surgical Oncology RN Care Coordination Note:     Message sent to  to reach out to patient to see if we can assist in trying to ensure patient care as he has no canceled twice d/t insurance reasons. Will wait to reschedule per patient at this time as he stated he will call to arrange once he has coverage.     Julienne Ceja RN, BSN  Care Coordinator   [phone]

## 2019-09-19 ENCOUNTER — DOCUMENTATION ONLY (OUTPATIENT)
Dept: SURGERY | Facility: CLINIC | Age: 59
End: 2019-09-19

## 2019-09-19 ENCOUNTER — MYC REFILL (OUTPATIENT)
Dept: NEPHROLOGY | Facility: CLINIC | Age: 59
End: 2019-09-19

## 2019-09-19 ENCOUNTER — MYC MEDICAL ADVICE (OUTPATIENT)
Dept: INTERNAL MEDICINE | Facility: CLINIC | Age: 59
End: 2019-09-19

## 2019-09-19 ENCOUNTER — MYC REFILL (OUTPATIENT)
Dept: INTERNAL MEDICINE | Facility: CLINIC | Age: 59
End: 2019-09-19

## 2019-09-19 DIAGNOSIS — K74.60 CIRRHOSIS (H): ICD-10-CM

## 2019-09-19 DIAGNOSIS — C22.0 HCC (HEPATOCELLULAR CARCINOMA) (H): Primary | ICD-10-CM

## 2019-09-19 DIAGNOSIS — Z95.3 S/P TAVR (TRANSCATHETER AORTIC VALVE REPLACEMENT), BIOPROSTHETIC: Primary | ICD-10-CM

## 2019-09-19 DIAGNOSIS — Z91.09 ENVIRONMENTAL ALLERGIES: Primary | ICD-10-CM

## 2019-09-19 DIAGNOSIS — R09.82 POST-NASAL DRIP: ICD-10-CM

## 2019-09-19 DIAGNOSIS — I10 HYPERTENSION, UNSPECIFIED TYPE: ICD-10-CM

## 2019-09-19 DIAGNOSIS — R05.9 COUGH: ICD-10-CM

## 2019-09-19 RX ORDER — LISINOPRIL 20 MG/1
20 TABLET ORAL DAILY
Qty: 90 TABLET | Refills: 3 | Status: SHIPPED | OUTPATIENT
Start: 2019-09-19 | End: 2019-12-10

## 2019-09-19 RX ORDER — FLUTICASONE PROPIONATE 50 MCG
2 SPRAY, SUSPENSION (ML) NASAL DAILY
Status: CANCELLED | OUTPATIENT
Start: 2019-09-19

## 2019-09-19 NOTE — PROGRESS NOTES
Date: 9/19/2019    Time of Call: 11:26 AM     Diagnosis:  S/p TAVR     [ TORB ] Ordering provider: Ema Pierson NP  Order:   Echo  Labs  EKG     Order received by: Emma Mendez RN     Follow-up/additional notes:   S/p TAVR follow up orders

## 2019-09-19 NOTE — PROGRESS NOTES
Surgical Oncology RN Care Coordination Note:     Patient contacted office to reschedule consult with surgery as now has insurance. Orders placed for new MRI of abdomen to be done prior to surgical consult as last scans are over 3 months old.     Message sent to scheduling to contact patient with appointment info for 10/4 at 230 pm.     Julienne Ceja RN, BSN  Care Coordinator   [phone]

## 2019-09-19 NOTE — TELEPHONE ENCOUNTER
loratadine (CLARITIN) 10 MG tablet      HISTORICAL ONLY    Last Office Visit : 8-22-19  Future Office visit:  12-9-19    Routing refill request to provider for review/approval because:  Medication is reported/historical      Kathleen M Doege RN

## 2019-09-20 ENCOUNTER — TELEPHONE (OUTPATIENT)
Dept: GASTROENTEROLOGY | Facility: CLINIC | Age: 59
End: 2019-09-20

## 2019-09-20 NOTE — TELEPHONE ENCOUNTER
Patient contacted and reminded of upcoming appointment.  Patient confirmed they will be attending.  Patient instructed to bring updated medications list to appointment.    Jasmine Guido on 9/20/2019 at 3:24 PM

## 2019-09-23 RX ORDER — LORATADINE 10 MG/1
10 TABLET ORAL DAILY
Qty: 90 TABLET | Refills: 0 | Status: SHIPPED | OUTPATIENT
Start: 2019-09-23 | End: 2019-12-09

## 2019-09-24 ENCOUNTER — OFFICE VISIT (OUTPATIENT)
Dept: GASTROENTEROLOGY | Facility: CLINIC | Age: 59
End: 2019-09-24
Attending: INTERNAL MEDICINE
Payer: COMMERCIAL

## 2019-09-24 VITALS
HEIGHT: 69 IN | OXYGEN SATURATION: 97 % | BODY MASS INDEX: 26.7 KG/M2 | DIASTOLIC BLOOD PRESSURE: 85 MMHG | TEMPERATURE: 97.5 F | SYSTOLIC BLOOD PRESSURE: 145 MMHG | HEART RATE: 60 BPM | WEIGHT: 180.3 LBS

## 2019-09-24 DIAGNOSIS — K70.30 ALCOHOLIC CIRRHOSIS OF LIVER WITHOUT ASCITES (H): ICD-10-CM

## 2019-09-24 DIAGNOSIS — R16.0 LIVER MASS: ICD-10-CM

## 2019-09-24 DIAGNOSIS — C22.0 HEPATOCELLULAR CARCINOMA (H): ICD-10-CM

## 2019-09-24 DIAGNOSIS — K70.30 ALCOHOLIC CIRRHOSIS OF LIVER WITHOUT ASCITES (H): Primary | ICD-10-CM

## 2019-09-24 DIAGNOSIS — B18.2 CHRONIC HEPATITIS C WITHOUT HEPATIC COMA (H): Primary | ICD-10-CM

## 2019-09-24 LAB
AFP SERPL-MCNC: 24.8 UG/L (ref 0–8)
ALBUMIN SERPL-MCNC: 3.1 G/DL (ref 3.4–5)
ALP SERPL-CCNC: 95 U/L (ref 40–150)
ALT SERPL W P-5'-P-CCNC: 134 U/L (ref 0–70)
ANION GAP SERPL CALCULATED.3IONS-SCNC: 5 MMOL/L (ref 3–14)
AST SERPL W P-5'-P-CCNC: 79 U/L (ref 0–45)
BILIRUB DIRECT SERPL-MCNC: 0.4 MG/DL (ref 0–0.2)
BILIRUB SERPL-MCNC: 0.6 MG/DL (ref 0.2–1.3)
BUN SERPL-MCNC: 26 MG/DL (ref 7–30)
CALCIUM SERPL-MCNC: 8.9 MG/DL (ref 8.5–10.1)
CHLORIDE SERPL-SCNC: 107 MMOL/L (ref 94–109)
CO2 SERPL-SCNC: 26 MMOL/L (ref 20–32)
CREAT SERPL-MCNC: 0.85 MG/DL (ref 0.66–1.25)
ERYTHROCYTE [DISTWIDTH] IN BLOOD BY AUTOMATED COUNT: 14.2 % (ref 10–15)
GFR SERPL CREATININE-BSD FRML MDRD: >90 ML/MIN/{1.73_M2}
GLUCOSE SERPL-MCNC: 86 MG/DL (ref 70–99)
HCT VFR BLD AUTO: 44.1 % (ref 40–53)
HGB BLD-MCNC: 15.6 G/DL (ref 13.3–17.7)
INR PPP: 1.13 (ref 0.86–1.14)
MCH RBC QN AUTO: 34.4 PG (ref 26.5–33)
MCHC RBC AUTO-ENTMCNC: 35.4 G/DL (ref 31.5–36.5)
MCV RBC AUTO: 97 FL (ref 78–100)
PLATELET # BLD AUTO: 64 10E9/L (ref 150–450)
POTASSIUM SERPL-SCNC: 4.3 MMOL/L (ref 3.4–5.3)
PROT SERPL-MCNC: 6.8 G/DL (ref 6.8–8.8)
RBC # BLD AUTO: 4.53 10E12/L (ref 4.4–5.9)
SODIUM SERPL-SCNC: 138 MMOL/L (ref 133–144)
WBC # BLD AUTO: 5.4 10E9/L (ref 4–11)

## 2019-09-24 PROCEDURE — 80048 BASIC METABOLIC PNL TOTAL CA: CPT | Performed by: INTERNAL MEDICINE

## 2019-09-24 PROCEDURE — 80076 HEPATIC FUNCTION PANEL: CPT | Performed by: INTERNAL MEDICINE

## 2019-09-24 PROCEDURE — 82105 ALPHA-FETOPROTEIN SERUM: CPT | Performed by: INTERNAL MEDICINE

## 2019-09-24 PROCEDURE — G0463 HOSPITAL OUTPT CLINIC VISIT: HCPCS | Mod: ZF

## 2019-09-24 PROCEDURE — 85027 COMPLETE CBC AUTOMATED: CPT | Performed by: INTERNAL MEDICINE

## 2019-09-24 PROCEDURE — 36415 COLL VENOUS BLD VENIPUNCTURE: CPT | Performed by: INTERNAL MEDICINE

## 2019-09-24 PROCEDURE — 85610 PROTHROMBIN TIME: CPT | Performed by: INTERNAL MEDICINE

## 2019-09-24 ASSESSMENT — PAIN SCALES - GENERAL: PAINLEVEL: MODERATE PAIN (4)

## 2019-09-24 ASSESSMENT — MIFFLIN-ST. JEOR: SCORE: 1613.22

## 2019-09-24 NOTE — PROGRESS NOTES
Date of Service: 9/24/2019     Subjective:            Ten Johnson is a 59 year old male presenting for evaluation of liver disease    History of Present Illness   Ten Johnson is a 59 year old male with past medical history of history of IV drug use with chronic hepatitis C, alcohol use disorder (active) with biopsy-proven cirrhosis complicated by biopsy-proven hepatocellular carcinoma and other liver mass of unclear etiology who presents in routine follow-up.    Patient was unable to participate in care plan over the last 2 months as he had lost his insurance.    Since last seen he did imaging which demonstrated 3 hepatic masses.  2 of the left-sided hepatic masses were biopsied: 1 of them demonstrated an area of cirrhosis and the other demonstrated hepatocellular carcinoma.  Unfortunately, a segment 7 mass was unable to be biopsied in interventional radiology based on its location and the plan was for the patient undergo a laparoscopic biopsy with surgical oncology.  This has been delayed at this point secondary to loss of insurance.  He is to undergo a repeat MRI with and without contrast this coming Friday.    In regards to his alcohol use, the patient admits that he is continued to drink alcohol, with his last use this past Saturday.  We had a long discussion about transplant evaluation process, and that absolute sobriety is mandatory and that he will need to go through chemical dependency evaluation and rehabilitation plan.  The patient said that he is not willing to do this, that quality of life is more important than quantity.  He made it clear today that he did not have interested in pursuing transplant evaluation.      Past Medical History:  Past Medical History:   Diagnosis Date     JENNIFER (acute kidney injury) (H) 4/9/2019     Alcohol use disorder      Alcoholic cirrhosis (H)      Anticoagulant long-term use     plavix     Aortic stenosis, severe 2/21/2018     Ascites      Chronic allergic rhinitis       Chronic anemia      Chronic hepatitis C without hepatic coma (H) 05/10/2016    Untreated as of 2/2018     Cirrhosis (H) 2017    MRI finding     Diastolic dysfunction      Erosive gastropathy      Esophageal varices in alcoholic cirrhosis (H)      H/O upper gastrointestinal hemorrhage 09/2017     History of blood transfusion      History of drug abuse     intranasal     Hypertension     essential     JANELLE (iron deficiency anemia)      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     Sarahi-Hoffman tear     History     Marijuana abuse      MRSA (methicillin resistant Staphylococcus aureus)      Nonrheumatic aortic valve stenosis 2/20/2018     Olecranon bursitis      Portal hypertension (H)      Right shoulder pain     history of rotator cuff repair     S/p TAVR (transcatheter aortic valve replacement), bioprosthetic      Severe aortic stenosis      Thrombocytopenia (H)        Surgical History:  Past Surgical History:   Procedure Laterality Date     ARTHROSCOPY SHOULDER ROTATOR CUFF REPAIR  7/31/2012    Procedure: ARTHROSCOPY SHOULDER ROTATOR CUFF REPAIR;  Right Shoulder Arthroscopic Rotator Cuff Repair, BicepsTenodesis,  Subacromial Decompression ;  Surgeon: Joi Castillo MD;  [facility]     ESOPHAGOSCOPY, GASTROSCOPY, DUODENOSCOPY (EGD), COMBINED N/A 10/23/2017    Procedure: COMBINED ESOPHAGOSCOPY, GASTROSCOPY, DUODENOSCOPY (EGD);;  Surgeon: Gentry Salas MD;  [facility]: [facility]     EXCHANGE POLY COMPONENT ARTHROPLASTY KNEE Right 3/4/2019    Procedure: REVISION RIGHT TOTAL KNEE POLY COMPONENT EXCHANGE;  Surgeon: Olvin Joe MD;  Location: [facility]     FACIAL RECONSTRUCTION SURGERY  1971     HEART CATH FEMORAL CANNULIZATION WITH OPEN STANDBY REPAIR AORTIC VALVE N/A 2/21/2018    Procedure: HEART CATH FEMORAL CANNULIZATION WITH OPEN STANDBY REPAIR AORTIC VALVE;;  Surgeon: Luis Baird MD;  Location: [facility]     IR LIVER BIOPSY  PERCUTANEOUS  7/18/2019     IRRIGATION AND DEBRIDEMENT UPPER EXTREMITY, COMBINED  1/3/2012    Procedure:COMBINED IRRIGATION AND DEBRIDEMENT UPPER EXTREMITY; Irrigation & Debridement Left Elbow; Surgeon:CRISTHIAN ZHOU; Location:[facility]     OPTICAL TRACKING SYSTEM ARTHROPLASTY KNEE Right 2/7/2019    Procedure: ARTHROPLASTY KNEE RIGHT;  Surgeon: Olvin Joe MD;  [facility]     REPAIR TENDON TRICEPS UPPER EXTREMITY  11/8/2011    Procedure:REPAIR TENDON TRICEPS UPPER EXTREMITY; Surgeon:CRISTHIAN ZHOU; [facility]     SHOULDER SURGERY  2003    left, injury, torn tendons, hematoma     TRANSCATHETER AORTIC VALVE IMPLANT ANESTHESIA N/A 2/21/2018    Procedure: TRANSCATHETER AORTIC VALVE IMPLANT ANESTHESIA;  Transfemoral (Quiroz) Aortic Valve Implant 26mm MARTHA 3, with Cardiopulmonary Bypass Standby, transthoracic echocardiogram;  Surgeon: GENERIC ANESTHESIA PROVIDER;  Location: [facility]     TRANSPOSITION ULNAR NERVE (ELBOW)  11/8/2011    Procedure:TRANSPOSITION ULNAR NERVE (ELBOW); Final Procedure Done: Left Elbow Lateral Ulnar Collateral Repair And  Left Elbow Triceps Repair         Social History:  Social History     Tobacco Use     Smoking status: Never Smoker     Smokeless tobacco: Never Used   Substance Use Topics     Alcohol use: Yes     Comment: Alcohol use disorder, still actively drinking     Drug use: No     Comment: denies       Family History:  Family History   Problem Relation Age of Onset     Cancer Mother 62     Alcoholism Paternal Uncle      No Known Problems Father      No Known Problems Brother      Diabetes Maternal Grandmother      Myocardial Infarction Maternal Grandfather      No Known Problems Paternal Grandmother      Unknown/Adopted Paternal Grandfather      Cirrhosis No family hx of        Medications:  Current Outpatient Medications   Medication     fluticasone (FLONASE) 50 MCG/ACT nasal spray     lisinopril (PRINIVIL/ZESTRIL) 20 MG tablet     loratadine (CLARITIN) 10  "MG tablet     Multiple Vitamin (MULTIVITAMINS PO)     sulfamethoxazole-trimethoprim (BACTRIM DS/SEPTRA DS) 800-160 MG tablet     No current facility-administered medications for this visit.        Review of Systems  A complete 10 point review of systems was asked and answered in the negative unless specifically commented upon in the HPI    Objective:         Vitals:    09/24/19 1023   BP: (!) 145/85   Pulse: 60   Temp: 97.5  F (36.4  C)   TempSrc: Oral   SpO2: 97%   Weight: 81.8 kg (180 lb 4.8 oz)   Height: 1.753 m (5' 9\")     Body mass index is 26.63 kg/m .     Physical Exam  Constitutional: Well-developed, well-nourished, in no apparent distress.    HEENT: Normocephalic. no scleral icterus. Moist oral mucosa. Dentition moderate  Neck/Lymph: Normal ROM, supple. No thyromegaly.  No lymphadenopathy  Cardiac:  Regular rate and rhythm.  No overt murmurs  Respiratory: Clear to auscultation bilaterally.  No wheezes or rales  GI:  Abdomen soft, non-distended, non-tender. BS present. no shifting dullness. No overt hepatosplenomegaly.     Skin:  Skin is warm and dry. No rash noted.  no jaundice. + spider nevi noted.  no palmar erythema  Peripheral Vascular: no lower extremity edema. 2+ pulses in all extremities  Musculoskeletal:  ROM intact, normal muscle bulk    Psychiatric: Normal mood and affect. Behavior is normal.  Neuro:  no asterixis, no tremor    Labs and Diagnostic tests:  Lab Results   Component Value Date     09/24/2019    POTASSIUM 4.3 09/24/2019    CHLORIDE 107 09/24/2019    CO2 26 09/24/2019    BUN 26 09/24/2019    CR 0.85 09/24/2019     Lab Results   Component Value Date    BILITOTAL 0.6 09/24/2019     09/24/2019    AST 79 09/24/2019    ALKPHOS 95 09/24/2019     Lab Results   Component Value Date    ALBUMIN 3.1 09/24/2019    PROTTOTAL 6.8 09/24/2019      Lab Results   Component Value Date    WBC 5.4 09/24/2019    HGB 15.6 09/24/2019    MCV 97 09/24/2019    PLT 64 09/24/2019     Lab Results "   Component Value Date    INR 1.13 09/24/2019       MELD-Na score: 7 at 9/24/2019  9:54 AM  MELD score: 7 at 9/24/2019  9:54 AM  Calculated from:  Serum Creatinine: 0.85 mg/dL (Rounded to 1 mg/dL) at 9/24/2019  9:54 AM  Serum Sodium: 138 mmol/L (Rounded to 137 mmol/L) at 9/24/2019  9:54 AM  Total Bilirubin: 0.6 mg/dL (Rounded to 1 mg/dL) at 9/24/2019  9:54 AM  INR(ratio): 1.13 at 9/24/2019  9:54 AM  Age: 59 years    Imaging:  MRI 6/5/2019  IMPRESSION:    1. Cirrhotic configuration of the liver parenchyma. Sequela of portal  hypertension including mild splenomegaly and small volume of ascites.  2. 1.4 cm arterially enhancing lesion in hepatic segment 6/7 more  conspicuous from the prior study as described above. LIRADS 4.  3. New 2.1 cm arterially enhancing lesion in hepatic segment 6/7 as  described above.  LIRADS 4.  4. New 2.2 cm enhancing lesion in hepatic segment 7 with MRI  characteristics not specific for HCC. Differentials include  intrahepatic cholangiocarcinoma versus combined HCC-cholangiocarcinoma  as well as HCC with atypical appearance and less likely other non-HCC  malignancies. LIRADS M.  5. Additional subcentimeter arterially enhancing foci without washout,  pseudocapsule or restricted diffusion, remain indeterminate for HCC.  LIRADS 3. Attention on follow-up studies.    CT Chest 6/29/2019  IMPRESSION:   1. There are a few small pulmonary nodules in the right lung measuring  up to 4 mm. Recommend follow-up CT in 12 months for further  evaluation.  2. Unchanged arterially enhancing lesion in the right hepatic lobe  measuring 1.4 cm.      Procedures:  Liver biopsy: 7/18/2019  SPECIMEN(S):   A: Shallow right liver lobe   B: Deep right liver lobe     FINAL DIAGNOSIS:   A. Liver, Shallow Right Lobe, Core Needle Biopsy:   Cirrhosis, Laennec fibrosis stage 4B; a neoplasm is not identified  (See   Comment)     B. Liver, Deep Right, Lobe, Core Needle Biopsy:   Hepatocellular carcinoma, moderately  differentiated    EGD: 10/2017  Findings:        Small (< 5 mm) varices were found in the lower third of the esophagus.        A small non-bleeding Sarahi-Hoffman tear with stigmata of recent bleeding        was found.        Mild portal hypertensive gastropathy was found in the stomach.        Multiple dispersed, non-bleeding erosions were found in the gastric        antrum. There were no stigmata of recent bleeding.        The examined duodenum was normal.     Assessment and Plan:    Cirrhosis:    - Secondary to EtOH and chronic hepatitis C  - He continues to drink alcohol intermittently  - Not yet had his hepatitis C treated  - Well compensated at this time  - Discussed the need for absolute sobriety    HCV:  - Genotype 1a, treatment naive  - After assessment/treatment of liver mass/HCC, patient would be a good candidate for treatment of his HCV  - Will reassess at next clinic visit    Liver Transplant Candidacy:   - Discussed the liver transplant candidacy process, and the patient stated that he is more interested in quality of life than quantity and is not interested in pursuing liver transplant    Hepatocellular Cancer:   -He does have a history of biopsy-proven hepatocellular carcinoma.  There is still a liver lesion within segment 7 that has not been sampled, and in review of tumor board has been most concerning for a cholangiocarcinoma or a combination cholangio-/hepatocellular carcinoma  -He is to be scheduled for a laparoscopic biopsy with the surgical oncology team  -He is to complete a surveillance MRI this week that is to be used for surgical planning    Ascites:  - no acute issues  - Continue to follow a sodium restricted (<2g sodium diet)     Hepatic Encephalopathy:  - no acute issues    Esophageal Varices:   - last EGD 2017 with small varices  - He will benefit from an EGD after completion of assessment of his liver masses.    Nutrition:  As with most patients with chronic liver disease, there is a  significant degree of protein malnutrition.  Dicussed need to change dietary habits to improve overall protein balance.  Discussed the importance of eating between 1.2-1.5g/kg/day lean protein like eggs, fish, chicken, nuts, and legumes, in addition to a diet rich in fresh fruits and vegetables.  Continue to follow a sodium restricted (<2g sodium diet) and discussed the need to minimize the intake of carbohydrates and sugars, to avoid obesity.   - Strongly encourage protein supplements 2-3 times daily (Boost, Ensure, Windfall Instant Milk, etc.) to meet protein and caloric intake.  - Recommend a bedtime snack with protein and complex carbohydrate to minimize risk of muscle catabolism overnight    Follow Up:  3 months     Thank you very much for the opportunity to participate in the care of this patient.  If you have any further questions, please don't hesitate to contact our office.    Thomas M. Leventhal, M.D.   of Medicine  Advanced & Transplant Hepatology  [facility]

## 2019-09-24 NOTE — NURSING NOTE
"Chief Complaint   Patient presents with     Consult     Cirrhosis     BP (!) 145/85   Pulse 60   Temp 97.5  F (36.4  C) (Oral)   Ht 1.753 m (5' 9\")   Wt 81.8 kg (180 lb 4.8 oz)   SpO2 97%   BMI 26.63 kg/m    Amisha Jean-Baptiste CMA    "

## 2019-09-24 NOTE — LETTER
9/24/2019      RE: Ten Johnson  [address]  [facility] [address]       Date of Service: 9/24/2019     Subjective:            Ten Johnson is a 59 year old male presenting for evaluation of liver disease    History of Present Illness   Ten Johnson is a 59 year old male with past medical history of history of IV drug use with chronic hepatitis C, alcohol use disorder (active) with biopsy-proven cirrhosis complicated by biopsy-proven hepatocellular carcinoma and other liver mass of unclear etiology who presents in routine follow-up.    Patient was unable to participate in care plan over the last 2 months as he had lost his insurance.    Since last seen he did imaging which demonstrated 3 hepatic masses.  2 of the left-sided hepatic masses were biopsied: 1 of them demonstrated an area of cirrhosis and the other demonstrated hepatocellular carcinoma.  Unfortunately, a segment 7 mass was unable to be biopsied in interventional radiology based on its location and the plan was for the patient undergo a laparoscopic biopsy with surgical oncology.  This has been delayed at this point secondary to loss of insurance.  He is to undergo a repeat MRI with and without contrast this coming Friday.    In regards to his alcohol use, the patient admits that he is continued to drink alcohol, with his last use this past Saturday.  We had a long discussion about transplant evaluation process, and that absolute sobriety is mandatory and that he will need to go through chemical dependency evaluation and rehabilitation plan.  The patient said that he is not willing to do this, that quality of life is more important than quantity.  He made it clear today that he did not have interested in pursuing transplant evaluation.      Past Medical History:  Past Medical History:   Diagnosis Date     JENNIFER (acute kidney injury) (H) 4/9/2019     Alcohol use disorder      Alcoholic cirrhosis (H)      Anticoagulant long-term use      plavix     Aortic stenosis, severe 2/21/2018     Ascites      Chronic allergic rhinitis      Chronic anemia      Chronic hepatitis C without hepatic coma (H) 05/10/2016    Untreated as of 2/2018     Cirrhosis (H) 2017    MRI finding     Diastolic dysfunction      Erosive gastropathy      Esophageal varices in alcoholic cirrhosis (H)      H/O upper gastrointestinal hemorrhage 09/2017     History of blood transfusion      History of drug abuse     intranasal     Hypertension     essential     JANELLE (iron deficiency anemia)      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     Sarahi-Hoffman tear     History     Marijuana abuse      MRSA (methicillin resistant Staphylococcus aureus)      Nonrheumatic aortic valve stenosis 2/20/2018     Olecranon bursitis      Portal hypertension (H)      Right shoulder pain     history of rotator cuff repair     S/p TAVR (transcatheter aortic valve replacement), bioprosthetic      Severe aortic stenosis      Thrombocytopenia (H)        Surgical History:  Past Surgical History:   Procedure Laterality Date     ARTHROSCOPY SHOULDER ROTATOR CUFF REPAIR  7/31/2012    Procedure: ARTHROSCOPY SHOULDER ROTATOR CUFF REPAIR;  Right Shoulder Arthroscopic Rotator Cuff Repair, BicepsTenodesis,  Subacromial Decompression ;  Surgeon: Joi Castillo MD;  [facility]     ESOPHAGOSCOPY, GASTROSCOPY, DUODENOSCOPY (EGD), COMBINED N/A 10/23/2017    Procedure: COMBINED ESOPHAGOSCOPY, GASTROSCOPY, DUODENOSCOPY (EGD);;  Surgeon: Gentry Salas MD;  [facility]     EXCHANGE POLY COMPONENT ARTHROPLASTY KNEE Right 3/4/2019    Procedure: REVISION RIGHT TOTAL KNEE POLY COMPONENT EXCHANGE;  Surgeon: Olvin Joe MD;  Location: [facility]     FACIAL RECONSTRUCTION SURGERY  1971     HEART CATH FEMORAL CANNULIZATION WITH OPEN STANDBY REPAIR AORTIC VALVE N/A 2/21/2018    Procedure: HEART CATH FEMORAL CANNULIZATION WITH OPEN STANDBY REPAIR AORTIC  VALVE;;  Surgeon: Luis Baird MD;  [facility]     IR LIVER BIOPSY PERCUTANEOUS  7/18/2019     IRRIGATION AND DEBRIDEMENT UPPER EXTREMITY, COMBINED  1/3/2012    Procedure:COMBINED IRRIGATION AND DEBRIDEMENT UPPER EXTREMITY; Irrigation & Debridement Left Elbow; Surgeon:CRISTHIAN ZHOU; [facility]     OPTICAL TRACKING SYSTEM ARTHROPLASTY KNEE Right 2/7/2019    Procedure: ARTHROPLASTY KNEE RIGHT;  Surgeon: Olvin Joe MD;  [facility]     REPAIR TENDON TRICEPS UPPER EXTREMITY  11/8/2011    Procedure:REPAIR TENDON TRICEPS UPPER EXTREMITY; Surgeon:CRISTHIAN ZHOU; [facility]     SHOULDER SURGERY  2003    left, injury, torn tendons, hematoma     TRANSCATHETER AORTIC VALVE IMPLANT ANESTHESIA N/A 2/21/2018    Procedure: TRANSCATHETER AORTIC VALVE IMPLANT ANESTHESIA;  Transfemoral (Quiroz) Aortic Valve Implant 26mm MARTHA 3, with Cardiopulmonary Bypass Standby, transthoracic echocardiogram;  Surgeon: GENERIC ANESTHESIA PROVIDER;  [facility]: [facility]     TRANSPOSITION ULNAR NERVE (ELBOW)  11/8/2011    Procedure:TRANSPOSITION ULNAR NERVE (ELBOW); Final Procedure Done: Left Elbow Lateral Ulnar Collateral Repair And  Left Elbow Triceps Repair         Social History:  Social History     Tobacco Use     Smoking status: Never Smoker     Smokeless tobacco: Never Used   Substance Use Topics     Alcohol use: Yes     Comment: Alcohol use disorder, still actively drinking     Drug use: No     Comment: denies       Family History:  Family History   Problem Relation Age of Onset     Cancer Mother 62     Alcoholism Paternal Uncle      No Known Problems Father      No Known Problems Brother      Diabetes Maternal Grandmother      Myocardial Infarction Maternal Grandfather      No Known Problems Paternal Grandmother      Unknown/Adopted Paternal Grandfather      Cirrhosis No family hx of        Medications:  Current Outpatient Medications   Medication     fluticasone (FLONASE) 50 MCG/ACT nasal  "spray     lisinopril (PRINIVIL/ZESTRIL) 20 MG tablet     loratadine (CLARITIN) 10 MG tablet     Multiple Vitamin (MULTIVITAMINS PO)     sulfamethoxazole-trimethoprim (BACTRIM DS/SEPTRA DS) 800-160 MG tablet     No current facility-administered medications for this visit.        Review of Systems  A complete 10 point review of systems was asked and answered in the negative unless specifically commented upon in the HPI    Objective:         Vitals:    09/24/19 1023   BP: (!) 145/85   Pulse: 60   Temp: 97.5  F (36.4  C)   TempSrc: Oral   SpO2: 97%   Weight: 81.8 kg (180 lb 4.8 oz)   Height: 1.753 m (5' 9\")     Body mass index is 26.63 kg/m .     Physical Exam  Constitutional: Well-developed, well-nourished, in no apparent distress.    HEENT: Normocephalic. no scleral icterus. Moist oral mucosa. Dentition moderate  Neck/Lymph: Normal ROM, supple. No thyromegaly.  No lymphadenopathy  Cardiac:  Regular rate and rhythm.  No overt murmurs  Respiratory: Clear to auscultation bilaterally.  No wheezes or rales  GI:  Abdomen soft, non-distended, non-tender. BS present. no shifting dullness. No overt hepatosplenomegaly.     Skin:  Skin is warm and dry. No rash noted.  no jaundice. + spider nevi noted.  no palmar erythema  Peripheral Vascular: no lower extremity edema. 2+ pulses in all extremities  Musculoskeletal:  ROM intact, normal muscle bulk    Psychiatric: Normal mood and affect. Behavior is normal.  Neuro:  no asterixis, no tremor    Labs and Diagnostic tests:  Lab Results   Component Value Date     09/24/2019    POTASSIUM 4.3 09/24/2019    CHLORIDE 107 09/24/2019    CO2 26 09/24/2019    BUN 26 09/24/2019    CR 0.85 09/24/2019     Lab Results   Component Value Date    BILITOTAL 0.6 09/24/2019     09/24/2019    AST 79 09/24/2019    ALKPHOS 95 09/24/2019     Lab Results   Component Value Date    ALBUMIN 3.1 09/24/2019    PROTTOTAL 6.8 09/24/2019      Lab Results   Component Value Date    WBC 5.4 09/24/2019    " HGB 15.6 09/24/2019    MCV 97 09/24/2019    PLT 64 09/24/2019     Lab Results   Component Value Date    INR 1.13 09/24/2019       MELD-Na score: 7 at 9/24/2019  9:54 AM  MELD score: 7 at 9/24/2019  9:54 AM  Calculated from:  Serum Creatinine: 0.85 mg/dL (Rounded to 1 mg/dL) at 9/24/2019  9:54 AM  Serum Sodium: 138 mmol/L (Rounded to 137 mmol/L) at 9/24/2019  9:54 AM  Total Bilirubin: 0.6 mg/dL (Rounded to 1 mg/dL) at 9/24/2019  9:54 AM  INR(ratio): 1.13 at 9/24/2019  9:54 AM  Age: 59 years    Imaging:  MRI 6/5/2019  IMPRESSION:    1. Cirrhotic configuration of the liver parenchyma. Sequela of portal  hypertension including mild splenomegaly and small volume of ascites.  2. 1.4 cm arterially enhancing lesion in hepatic segment 6/7 more  conspicuous from the prior study as described above. LIRADS 4.  3. New 2.1 cm arterially enhancing lesion in hepatic segment 6/7 as  described above.  LIRADS 4.  4. New 2.2 cm enhancing lesion in hepatic segment 7 with MRI  characteristics not specific for HCC. Differentials include  intrahepatic cholangiocarcinoma versus combined HCC-cholangiocarcinoma  as well as HCC with atypical appearance and less likely other non-HCC  malignancies. LIRADS M.  5. Additional subcentimeter arterially enhancing foci without washout,  pseudocapsule or restricted diffusion, remain indeterminate for HCC.  LIRADS 3. Attention on follow-up studies.    CT Chest 6/29/2019  IMPRESSION:   1. There are a few small pulmonary nodules in the right lung measuring  up to 4 mm. Recommend follow-up CT in 12 months for further  evaluation.  2. Unchanged arterially enhancing lesion in the right hepatic lobe  measuring 1.4 cm.      Procedures:  Liver biopsy: 7/18/2019  SPECIMEN(S):   A: Shallow right liver lobe   B: Deep right liver lobe     FINAL DIAGNOSIS:   A. Liver, Shallow Right Lobe, Core Needle Biopsy:   Cirrhosis, Laennec fibrosis stage 4B; a neoplasm is not identified  (See   Comment)     B. Liver, Deep Right,  Lobe, Core Needle Biopsy:   Hepatocellular carcinoma, moderately differentiated    EGD: 10/2017  Findings:        Small (< 5 mm) varices were found in the lower third of the esophagus.        A small non-bleeding Sarahi-Hoffman tear with stigmata of recent bleeding        was found.        Mild portal hypertensive gastropathy was found in the stomach.        Multiple dispersed, non-bleeding erosions were found in the gastric        antrum. There were no stigmata of recent bleeding.        The examined duodenum was normal.     Assessment and Plan:    Cirrhosis:    - Secondary to EtOH and chronic hepatitis C  - He continues to drink alcohol intermittently  - Not yet had his hepatitis C treated  - Well compensated at this time  - Discussed the need for absolute sobriety    HCV:  - Genotype 1a, treatment naive  - After assessment/treatment of liver mass/HCC, patient would be a good candidate for treatment of his HCV  - Will reassess at next clinic visit    Liver Transplant Candidacy:   - Discussed the liver transplant candidacy process, and the patient stated that he is more interested in quality of life than quantity and is not interested in pursuing liver transplant    Hepatocellular Cancer:   -He does have a history of biopsy-proven hepatocellular carcinoma.  There is still a liver lesion within segment 7 that has not been sampled, and in review of tumor board has been most concerning for a cholangiocarcinoma or a combination cholangio-/hepatocellular carcinoma  -He is to be scheduled for a laparoscopic biopsy with the surgical oncology team  -He is to complete a surveillance MRI this week that is to be used for surgical planning    Ascites:  - no acute issues  - Continue to follow a sodium restricted (<2g sodium diet)     Hepatic Encephalopathy:  - no acute issues    Esophageal Varices:   - last EGD 2017 with small varices  - He will benefit from an EGD after completion of assessment of his liver  masses.    Nutrition:  As with most patients with chronic liver disease, there is a significant degree of protein malnutrition.  Dicussed need to change dietary habits to improve overall protein balance.  Discussed the importance of eating between 1.2-1.5g/kg/day lean protein like eggs, fish, chicken, nuts, and legumes, in addition to a diet rich in fresh fruits and vegetables.  Continue to follow a sodium restricted (<2g sodium diet) and discussed the need to minimize the intake of carbohydrates and sugars, to avoid obesity.   - Strongly encourage protein supplements 2-3 times daily (Boost, Ensure, Melstone Instant Milk, etc.) to meet protein and caloric intake.  - Recommend a bedtime snack with protein and complex carbohydrate to minimize risk of muscle catabolism overnight    Follow Up:  3 months     Thank you very much for the opportunity to participate in the care of this patient.  If you have any further questions, please don't hesitate to contact our office.    Thomas M. Leventhal, M.D.   of Medicine  Advanced & Transplant Hepatology  [facility]      Thomas M. Leventhal, MD

## 2019-10-03 ENCOUNTER — HOSPITAL ENCOUNTER (OUTPATIENT)
Dept: MRI IMAGING | Facility: CLINIC | Age: 59
Discharge: HOME | End: 2019-10-03
Attending: SURGERY | Admitting: SURGERY
Payer: COMMERCIAL

## 2019-10-03 DIAGNOSIS — K74.60 CIRRHOSIS (H): ICD-10-CM

## 2019-10-03 DIAGNOSIS — C22.0 HCC (HEPATOCELLULAR CARCINOMA) (H): ICD-10-CM

## 2019-10-03 PROCEDURE — 25000128 H RX IP 250 OP 636: Performed by: SURGERY

## 2019-10-03 PROCEDURE — A9585 GADOBUTROL INJECTION: HCPCS | Performed by: SURGERY

## 2019-10-03 PROCEDURE — 74183 MRI ABD W/O CNTR FLWD CNTR: CPT

## 2019-10-03 PROCEDURE — 25500064 ZZH RX 255 OP 636: Performed by: SURGERY

## 2019-10-03 RX ORDER — GADOBUTROL 604.72 MG/ML
10 INJECTION INTRAVENOUS ONCE
Status: COMPLETED | OUTPATIENT
Start: 2019-10-03 | End: 2019-10-03

## 2019-10-03 RX ADMIN — SODIUM CHLORIDE 30 ML: 9 INJECTION, SOLUTION INTRAVENOUS at 13:01

## 2019-10-03 RX ADMIN — GADOBUTROL 8.2 ML: 604.72 INJECTION INTRAVENOUS at 13:01

## 2019-10-04 ENCOUNTER — OFFICE VISIT (OUTPATIENT)
Dept: SURGERY | Facility: CLINIC | Age: 59
End: 2019-10-04
Attending: SURGERY
Payer: COMMERCIAL

## 2019-10-04 VITALS
SYSTOLIC BLOOD PRESSURE: 124 MMHG | OXYGEN SATURATION: 100 % | DIASTOLIC BLOOD PRESSURE: 83 MMHG | WEIGHT: 176.5 LBS | HEIGHT: 69 IN | RESPIRATION RATE: 13 BRPM | BODY MASS INDEX: 26.14 KG/M2 | TEMPERATURE: 98.3 F | HEART RATE: 64 BPM

## 2019-10-04 DIAGNOSIS — R16.0 LIVER MASS: ICD-10-CM

## 2019-10-04 DIAGNOSIS — C22.0 HCC (HEPATOCELLULAR CARCINOMA) (H): Primary | ICD-10-CM

## 2019-10-04 PROCEDURE — G0463 HOSPITAL OUTPT CLINIC VISIT: HCPCS | Mod: ZF

## 2019-10-04 ASSESSMENT — PAIN SCALES - GENERAL: PAINLEVEL: MILD PAIN (3)

## 2019-10-04 ASSESSMENT — MIFFLIN-ST. JEOR: SCORE: 1596.23

## 2019-10-04 NOTE — PROGRESS NOTES
Reason for Visit: Undiagnosed Liver Mass     Pertinent Oncologic History: 59 M w/ HCV cirrhosis and continued drinking p/w known HCC of the right liver lobe, biopsy proven cirrhosis, portal HTN, and a LIRADS M lesion in the right lobe of the liver that is not percutaneously accessible.  Given the diagnosis will effect management, the patient has been referred for laparoscopic liver biopsy.  He has not had prior abdominal surgery.     Pertinent Work-Up/Findings: Most recent imaging reviewed by me shows 3 stable lesions in the liver and no new evidence of disease.  The lesion in question in segment 7 is still LIRADS-M.     Pertinent Exam: Abdomen soft, non-tender, and non-distended.  No jaundice or scleral icterus.  Patient appears fairly fit.     Assessment/Counseling/Plan: 59 M w/ HCV cirrhosis, HCC, and segment 7 LIRADS M lesion.  He does not want to be listed for transplant and continues to drink alcohol.  He is not a candidate for surgical resection given portal HTN and need for major hepatectomy.  As such, he has been referred for laparoscopic biopsy to diagnose the LIRADS M lesion and help the direction of his future therapy.   I discussed risks including but not limited to death, bleeding, infection, UTI, DVT/PE, PNA, cardiac events, and bile leak.  Will schedule patient for surgery in the near future.  Consent obtained in the office.  All questions were answered.  The patient was in agreement with and understanding of the above plan.     A total of 20 minutes of face to face time was spent on this case, of which, more than half (50%) was spent in counseling and coordination of care.

## 2019-10-04 NOTE — LETTER
10/4/2019       RE: Ten Johnson  [address]  [facility] [address]     Dear Colleague,    Thank you for referring your patient, Ten Johnson, to the [facility]. Please see a copy of my visit note below.    Reason for Visit: Undiagnosed Liver Mass     Pertinent Oncologic History: 59 M w/ HCV cirrhosis and continued drinking p/w known HCC of the right liver lobe, biopsy proven cirrhosis, portal HTN, and a LIRADS M lesion in the right lobe of the liver that is not percutaneously accessible.  Given the diagnosis will effect management, the patient has been referred for laparoscopic liver biopsy.  He has not had prior abdominal surgery.     Pertinent Work-Up/Findings: Most recent imaging reviewed by me shows 3 stable lesions in the liver and no new evidence of disease.  The lesion in question in segment 7 is still LIRADS-M.     Pertinent Exam: Abdomen soft, non-tender, and non-distended.  No jaundice or scleral icterus.  Patient appears fairly fit.     Assessment/Counseling/Plan: 59 M w/ HCV cirrhosis, HCC, and segment 7 LIRADS M lesion.  He does not want to be listed for transplant and continues to drink alcohol.  He is not a candidate for surgical resection given portal HTN and need for major hepatectomy.  As such, he has been referred for laparoscopic biopsy to diagnose the LIRADS M lesion and help the direction of his future therapy.   I discussed risks including but not limited to death, bleeding, infection, UTI, DVT/PE, PNA, cardiac events, and bile leak.  Will schedule patient for surgery in the near future.  Consent obtained in the office.  All questions were answered.  The patient was in agreement with and understanding of the above plan.     A total of 20 minutes of face to face time was spent on this case, of which, more than half (50%) was spent in counseling and coordination of care.    Gregorio Benoit MD

## 2019-10-04 NOTE — PATIENT INSTRUCTIONS
Surgical Oncology RN Care Coordination Note:     - we will plan to proceed with liver biopsy.     - our scheduling team will contact you to schedule a visit with our pre operative assessment team for clearance and confirm surgery date/time information.     Julienne Ceja RN, BSN  Care Coordinator   [phone]       Colorectal Cancer Screening: During our visit today, we discussed that it is recommended you receive colorectal cancer screening. Please call or make an appointment with your primary care provider to discuss this. You may also call the  [facility] scheduling line ([phone]) to set up a colonoscopy appointment.

## 2019-10-04 NOTE — NURSING NOTE
"Oncology Rooming Note    October 4, 2019 2:38 PM   Ten Johnson is a 59 year old male who presents for:    Chief Complaint   Patient presents with     Oncology Clinic Visit     New; Hepatocellular Carcinoma     Initial Vitals: /83   Pulse 64   Temp 98.3  F (36.8  C) (Oral)   Resp 13   Ht 1.753 m (5' 9.02\")   Wt 80.1 kg (176 lb 8 oz)   SpO2 100%   BMI 26.05 kg/m   Estimated body mass index is 26.05 kg/m  as calculated from the following:    Height as of this encounter: 1.753 m (5' 9.02\").    Weight as of this encounter: 80.1 kg (176 lb 8 oz). Body surface area is 1.97 meters squared.  Mild Pain (3) Comment: all over aches   No LMP for male patient.  Allergies reviewed: Yes  Medications reviewed: Yes    Medications: Medication refills not needed today.  Pharmacy name entered into [email or web address]: [place], [place] - [id number]    Clinical concerns: No concerns        Emmy Camarillo CMA              "

## 2019-10-09 ENCOUNTER — TELEPHONE (OUTPATIENT)
Dept: SURGERY | Facility: CLINIC | Age: 59
End: 2019-10-09

## 2019-10-09 NOTE — TELEPHONE ENCOUNTER
Called Ten and scheduled his PAC and post-op appointments in regards to his surgery on 10/22 at [facility] with Dr. Benoit.     Also sent a MyChart message and letter per his request.

## 2019-10-10 NOTE — TELEPHONE ENCOUNTER
FUTURE VISIT INFORMATION      SURGERY INFORMATION:    Date: 10/22/19    Location[facility]    Surgeon: DR CHACON    Anesthesia Type:GENERAL        RECORDS REQUESTED FROM:       Primary Care Provider: RAYMOND PINON    Pertinent Medical History: HCC,LIVER MASS, PORTAL HYPERTENSION,     Most recent EKG with tracings/strips: 9/19/19    Most recent ECHO: 9/19/19    Most recent Cardiac Stress Test:    Most recent Coronary Angiogram: 2/2/18

## 2019-10-16 ENCOUNTER — PRE VISIT (OUTPATIENT)
Dept: SURGERY | Facility: CLINIC | Age: 59
End: 2019-10-16

## 2019-10-16 NOTE — TELEPHONE ENCOUNTER
FUTURE VISIT INFORMATION      SURGERY INFORMATION:    Date: 10/22/19    Location: [facility]    Surgeon:  Gregorio Benoit    Anesthesia Type:  General    RECORDS REQUESTED FROM:       [facility]    Pertinent Medical History: Transcatherter aortic valve replacement, diastolic dysfunction    Most recent EKG+ Tracin19    Most recent ECHO: 19    Most recent Coronary Angiogram: 18

## 2019-10-17 ENCOUNTER — PRE VISIT (OUTPATIENT)
Dept: SURGERY | Facility: CLINIC | Age: 59
End: 2019-10-17

## 2019-10-18 ENCOUNTER — ANESTHESIA EVENT (OUTPATIENT)
Dept: SURGERY | Facility: CLINIC | Age: 59
End: 2019-10-18
Payer: COMMERCIAL

## 2019-10-18 ENCOUNTER — OFFICE VISIT (OUTPATIENT)
Dept: SURGERY | Facility: CLINIC | Age: 59
End: 2019-10-18
Payer: COMMERCIAL

## 2019-10-18 VITALS
TEMPERATURE: 98 F | SYSTOLIC BLOOD PRESSURE: 129 MMHG | RESPIRATION RATE: 16 BRPM | DIASTOLIC BLOOD PRESSURE: 84 MMHG | WEIGHT: 174 LBS | HEIGHT: 69 IN | HEART RATE: 79 BPM | BODY MASS INDEX: 25.77 KG/M2 | OXYGEN SATURATION: 95 %

## 2019-10-18 DIAGNOSIS — C22.0 HCC (HEPATOCELLULAR CARCINOMA) (H): ICD-10-CM

## 2019-10-18 DIAGNOSIS — Z01.818 PREOP EXAMINATION: Primary | ICD-10-CM

## 2019-10-18 DIAGNOSIS — Z01.818 PREOP EXAMINATION: ICD-10-CM

## 2019-10-18 DIAGNOSIS — Z95.3 S/P TAVR (TRANSCATHETER AORTIC VALVE REPLACEMENT), BIOPROSTHETIC: ICD-10-CM

## 2019-10-18 LAB
ABO + RH BLD: NORMAL
ABO + RH BLD: NORMAL
ANION GAP SERPL CALCULATED.3IONS-SCNC: 7 MMOL/L (ref 3–14)
BLD GP AB SCN SERPL QL: NORMAL
BLOOD BANK CMNT PATIENT-IMP: NORMAL
BLOOD BANK CMNT PATIENT-IMP: NORMAL
BUN SERPL-MCNC: 19 MG/DL (ref 7–30)
CALCIUM SERPL-MCNC: 9.1 MG/DL (ref 8.5–10.1)
CHLORIDE SERPL-SCNC: 102 MMOL/L (ref 94–109)
CO2 SERPL-SCNC: 30 MMOL/L (ref 20–32)
CREAT SERPL-MCNC: 0.9 MG/DL (ref 0.66–1.25)
ERYTHROCYTE [DISTWIDTH] IN BLOOD BY AUTOMATED COUNT: 13.3 % (ref 10–15)
GFR SERPL CREATININE-BSD FRML MDRD: >90 ML/MIN/{1.73_M2}
GLUCOSE SERPL-MCNC: 96 MG/DL (ref 70–99)
HCT VFR BLD AUTO: 43.4 % (ref 40–53)
HGB BLD-MCNC: 15.8 G/DL (ref 13.3–17.7)
MCH RBC QN AUTO: 34.3 PG (ref 26.5–33)
MCHC RBC AUTO-ENTMCNC: 36.4 G/DL (ref 31.5–36.5)
MCV RBC AUTO: 94 FL (ref 78–100)
PLATELET # BLD AUTO: 87 10E9/L (ref 150–450)
POTASSIUM SERPL-SCNC: 4.6 MMOL/L (ref 3.4–5.3)
RBC # BLD AUTO: 4.6 10E12/L (ref 4.4–5.9)
SODIUM SERPL-SCNC: 139 MMOL/L (ref 133–144)
SPECIMEN EXP DATE BLD: NORMAL
WBC # BLD AUTO: 8.5 10E9/L (ref 4–11)

## 2019-10-18 ASSESSMENT — MIFFLIN-ST. JEOR: SCORE: 1584.64

## 2019-10-18 ASSESSMENT — LIFESTYLE VARIABLES: TOBACCO_USE: 0

## 2019-10-18 NOTE — H&P
Pre-Operative H & P     CC:  Preoperative exam to assess for increased cardiopulmonary risk while undergoing surgery and anesthesia.    Date of Encounter: 10/18/2019  Primary Care Physician:  Cuca Celeste  Associated diagnosis:  Hepatocellular carcinoma      HPI  Ten Johnson is a 59 year old male who presents for pre-operative H & P in preparation for a Diagnostic laparoscopy (N/A Abdomen) intraoperative liver ultrasound, laparoscopic liver biopsy possible open by Dr. Mari at [facility].     Ten Johnson is a 59 year old male with hypertension, s/p TAVR, allergic rhinitis, thrombocytopenia, alcoholic cirrhosis, GERD, esophageal varices, chronic hepatitis C, portal hypertension, MRSA and alcohol dependence that has recently been diagnosed with hepatocellular carcinoma.  He was being followed by hepatology for chronic hep C and alcoholic cirrhosis.  In June 2019 an MRI was ordered for follow up imaging.  Three concerning liver lesions were noted on the MRI report.  Biopsies were done of two of them on 7/18/2019 and one of the lesions biopsied positive for HCC.  The third lesion wasn't percutaneously accessible so he was referred to Dr. Benoit for surgical consideration.  The above listed procedure has now been recommended for further evaluation.       History is obtained from the patient and the medical record.     Past Medical History  Past Medical History:   Diagnosis Date     JENNIFER (acute kidney injury) (H) 4/9/2019     Alcohol use disorder      Alcoholic cirrhosis (H)      Anticoagulant long-term use     plavix     Aortic stenosis, severe 2/21/2018     Ascites      Chronic allergic rhinitis      Chronic anemia      Chronic hepatitis C without hepatic coma (H) 05/10/2016    Untreated as of 2/2018     Cirrhosis (H) 2017    MRI finding     Diastolic dysfunction      Erosive gastropathy      Esophageal varices in alcoholic cirrhosis (H)      H/O upper gastrointestinal hemorrhage  09/2017     History of blood transfusion      History of drug abuse (H)     intranasal     Hypertension     essential     JANELLE (iron deficiency anemia)      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     Sarahi-Hoffman tear     History     Marijuana abuse      MRSA (methicillin resistant Staphylococcus aureus)      Nonrheumatic aortic valve stenosis 2/20/2018     Olecranon bursitis      Portal hypertension (H)      Right shoulder pain     history of rotator cuff repair     S/p TAVR (transcatheter aortic valve replacement), bioprosthetic      Severe aortic stenosis      Thrombocytopenia (H)        Past Surgical History  Past Surgical History:   Procedure Laterality Date     ARTHROSCOPY SHOULDER ROTATOR CUFF REPAIR  7/31/2012    Procedure: ARTHROSCOPY SHOULDER ROTATOR CUFF REPAIR;  Right Shoulder Arthroscopic Rotator Cuff Repair, BicepsTenodesis,  Subacromial Decompression ;  Surgeon: Joi Castillo MD;  [facility]     ESOPHAGOSCOPY, GASTROSCOPY, DUODENOSCOPY (EGD), COMBINED N/A 10/23/2017    Procedure: COMBINED ESOPHAGOSCOPY, GASTROSCOPY, DUODENOSCOPY (EGD);;  Surgeon: Gentry Salas MD;  Location: [facility]     EXCHANGE POLY COMPONENT ARTHROPLASTY KNEE Right 3/4/2019    Procedure: REVISION RIGHT TOTAL KNEE POLY COMPONENT EXCHANGE;  Surgeon: lOvin Joe MD;  Location: [facility]     FACIAL RECONSTRUCTION SURGERY  1971     HEART CATH FEMORAL CANNULIZATION WITH OPEN STANDBY REPAIR AORTIC VALVE N/A 2/21/2018    Procedure: HEART CATH FEMORAL CANNULIZATION WITH OPEN STANDBY REPAIR AORTIC VALVE;;  Surgeon: Luis Baird MD;  Location: [facility]     IR LIVER BIOPSY PERCUTANEOUS  7/18/2019     IRRIGATION AND DEBRIDEMENT UPPER EXTREMITY, COMBINED  1/3/2012    Procedure:COMBINED IRRIGATION AND DEBRIDEMENT UPPER EXTREMITY; Irrigation & Debridement Left Elbow; Surgeon:CRISTHIAN ZHOU; Location:[facility]     OPTICAL TRACKING SYSTEM ARTHROPLASTY  KNEE Right 2/7/2019    Procedure: ARTHROPLASTY KNEE RIGHT;  Surgeon: Olvin Joe MD;  [facility]: [facility]     REPAIR TENDON TRICEPS UPPER EXTREMITY  11/8/2011    Procedure:REPAIR TENDON TRICEPS UPPER EXTREMITY; Surgeon:CRISTHIAN ZHOU; Location:[facility]     SHOULDER SURGERY  2003    left, injury, torn tendons, hematoma     TRANSCATHETER AORTIC VALVE IMPLANT ANESTHESIA N/A 2/21/2018    Procedure: TRANSCATHETER AORTIC VALVE IMPLANT ANESTHESIA;  Transfemoral (Quiroz) Aortic Valve Implant 26mm MARTHA 3, with Cardiopulmonary Bypass Standby, transthoracic echocardiogram;  Surgeon: GENERIC ANESTHESIA PROVIDER;  Location: [facility]     TRANSPOSITION ULNAR NERVE (ELBOW)  11/8/2011    Procedure:TRANSPOSITION ULNAR NERVE (ELBOW); Final Procedure Done: Left Elbow Lateral Ulnar Collateral Repair And  Left Elbow Triceps Repair         Hx of Blood transfusions/reactions: yes, no reactions     Hx of abnormal bleeding or anti-platelet use: none      Steroid use in the last year: none    Personal or FH with difficulty with Anesthesia:  none    Prior to Admission Medications  Current Outpatient Medications   Medication Sig Dispense Refill     fluticasone (FLONASE) 50 MCG/ACT nasal spray Spray 2 sprays into both nostrils daily 3 Bottle 3     lisinopril (PRINIVIL/ZESTRIL) 20 MG tablet Take 1 tablet (20 mg) by mouth daily 90 tablet 3     loratadine (CLARITIN) 10 MG tablet Take 1 tablet (10 mg) by mouth daily 90 tablet 0     Multiple Vitamin (MULTIVITAMINS PO) Take 1 tablet by mouth At Bedtime          Allergies  Allergies   Allergen Reactions     Zolpidem Other (See Comments)     Alcoholic.  Had reaction 3/17/13 while intoxicated which included black out, loss of awareness, paranoia.  Do not prescribe.  Dr. Celeste     Cats Other (See Comments)     rhinitis     Dogs Other (See Comments)     rhinitis     Pollen Extract Other (See Comments)     rhinits.       Social History  Social History     Socioeconomic History     Marital  status: Single     Spouse name: Not on file     Number of children: 0     Years of education: Not on file     Highest education level: Not on file   Occupational History     Occupation:    Social Needs     Financial resource strain: Not on file     Food insecurity:     Worry: Not on file     Inability: Not on file     Transportation needs:     Medical: Not on file     Non-medical: Not on file   Tobacco Use     Smoking status: Never Smoker     Smokeless tobacco: Never Used   Substance and Sexual Activity     Alcohol use: Yes     Comment: Alcohol use disorder, still actively drinking     Drug use: No     Comment: denies     Sexual activity: Not Currently     Partners: Female   Lifestyle     Physical activity:     Days per week: Not on file     Minutes per session: Not on file     Stress: Not on file   Relationships     Social connections:     Talks on phone: Not on file     Gets together: Not on file     Attends Religion service: Not on file     Active member of club or organization: Not on file     Attends meetings of clubs or organizations: Not on file     Relationship status: Not on file     Intimate partner violence:     Fear of current or ex partner: Not on file     Emotionally abused: Not on file     Physically abused: Not on file     Forced sexual activity: Not on file   Other Topics Concern     Parent/sibling w/ CABG, MI or angioplasty before 65F 55M? Not Asked   Social History Narrative    .  Bicycles a lot.  Excessive alcohol use.  Smokes cigars.  Occasional marijuana use.       Family History  Family History   Problem Relation Age of Onset     Cancer Mother 62        unknown primary     Alcoholism Paternal Uncle      Unknown/Adopted Father      No Known Problems Brother      Diabetes Maternal Grandmother      Myocardial Infarction Maternal Grandfather      No Known Problems Paternal Grandmother      Unknown/Adopted Paternal Grandfather      Cirrhosis No family hx of   "              ROS/MED HX  The complete review of systems is negative other than noted in the HPI or here.  ENT/Pulmonary:     (+)VIC risk factors snores loudly, hypertension, allergic rhinitis, , . .   (-) tobacco use   Neurologic:  - neg neurologic ROS     Cardiovascular: Comment: S/p TAVR 2/21/2018    (+) hypertension----.   METS/Exercise Tolerance:  >4 METS   Hematologic:     (+) Anemia, History of Transfusion no previous transfusion reaction Other Hematologic Disorder-thrombocytopenia      Musculoskeletal:   (+) arthritis,  other musculoskeletal- periodic right knee pain s/p knee replacement.  limited ROM in bilateral shoulders from previous injuries and surgeries.       GI/Hepatic:     (+) GERD Other, hepatitis type C, liver disease, Other GI/Hepatic alcoholic cirrhosis, esophageal varices, portal htn      Renal/Genitourinary:  - ROS Renal section negative       Endo:  - neg endo ROS       Psychiatric:     (+) psychiatric history other (comment) (alcohol dependence)      Infectious Disease:   (+) MRSA,       Malignancy:   (+) Malignancy History of Other  Other CA hepatocellular Active status post         Other:    (+) no H/O Chronic Pain,                       PHYSICAL EXAM:   Mental Status/Neuro: A/A/O   Airway: Facies: Feasible  Mallampati: I  Mouth/Opening: Full  TM distance: > 6 cm  Neck ROM: Full   Respiratory: Auscultation: CTAB     Resp. Rate: Normal     Resp. Effort: Normal      CV: Rhythm: Regular  Rate: Age appropriate  Heart: Normal Sounds  Edema: None   Comments: Multiple cracked and broken teeth     Dental: Details              Temp: 98  F (36.7  C) Temp src: Oral BP: 129/84 Pulse: 79   Resp: 16 SpO2: 95 %         174 lbs 0 oz  5' 9\"   Body mass index is 25.7 kg/m .       Physical Exam  Constitutional: Awake, alert, cooperative, no apparent distress, and appears stated age.  Eyes: Pupils equal, round and reactive to light, extra ocular muscles intact, sclera clear, conjunctiva normal.  HENT: " Normocephalic, oral pharynx with moist mucus membranes, poor dentition. No goiter appreciated.   Respiratory: Clear to auscultation bilaterally, no crackles or wheezing.  Cardiovascular: Regular rate and rhythm, normal S1 and S2, and no murmur noted.  Carotids +2, no bruits. No edema. Palpable pulses to radial  DP and PT arteries.   GI: Normal bowel sounds, soft, non-distended, non-tender, no masses palpated, no hepatosplenomegaly.    Lymph/Hematologic: No cervical lymphadenopathy and no supraclavicular lymphadenopathy.  Genitourinary:  deferred  Skin: Warm and dry.  No rashes at anticipated surgical site.   Musculoskeletal: Full ROM of neck. There is no redness, warmth, or swelling of the exposed joints. Gross motor strength is normal.    Neurologic: Awake, alert, oriented to name, place and time. Cranial nerves II-XII are grossly intact. Gait is normal.   Neuropsychiatric: Calm, cooperative. Normal affect.     Labs: (personally reviewed)   Component      Latest Ref Rng & Units 9/24/2019 10/18/2019   Sodium      133 - 144 mmol/L  139   Potassium      3.4 - 5.3 mmol/L  4.6   Chloride      94 - 109 mmol/L  102   Carbon Dioxide      20 - 32 mmol/L  30   Anion Gap      3 - 14 mmol/L  7   Glucose      70 - 99 mg/dL  96   Urea Nitrogen      7 - 30 mg/dL  19   Creatinine      0.66 - 1.25 mg/dL  0.90   GFR Estimate      >60 mL/min/1.73:m2  >90   GFR Estimate If Black      >60 mL/min/1.73:m2  >90   Calcium      8.5 - 10.1 mg/dL  9.1   WBC      4.0 - 11.0 10e9/L  8.5   RBC Count      4.4 - 5.9 10e12/L  4.60   Hemoglobin      13.3 - 17.7 g/dL  15.8   Hematocrit      40.0 - 53.0 %  43.4   MCV      78 - 100 fl  94   MCH      26.5 - 33.0 pg  34.3 (H)   MCHC      31.5 - 36.5 g/dL  36.4   RDW      10.0 - 15.0 %  13.3   Platelet Count      150 - 450 10e9/L  87 (L)   INR      0.86 - 1.14 1.13          9/2018  Interpretation Summary  History of TAVR with 26 mm Quiroz Victor M 3 bioprosthesis.  Global and regional left ventricular  function is hyperkinetic with an EF of  65-70%.  Doppler interrogation of the aortic valve is normal.  The mean gradient is 14 mmHg.  No aortic regurgitation is present.  No significant change compared to 3/15/18    EKG  2/2019  Sinus bradycardia  Possible Anterior infarct , age undetermined  Abnormal ECG  When compared with ECG of 22-FEB-2018 07:33,  T wave inversion no longer evident in Inferior leads  T wave inversion no longer evident in Anterolateral leads        ASSESSMENT and PLAN  Ten Johnson is a 59 year old male scheduled for a Diagnostic laparoscopy (N/A Abdomen) intraoperative liver ultrasound, laparoscopic liver biopsy possible open by Dr. Benoit in evaluation of hepatocellular carcinoma.  PAC referral for risk assessment and optimization for anesthesia with comorbid conditions of: hypertension, s/p TAVR, allergic rhinitis, thrombocytopenia, alcoholic cirrhosis, GERD, esophageal varices, chronic hepatitis C, portal hypertension, MRSA and alcohol dependence.      Pre-operative considerations:  1.  Cardiac:  Functional status- METS >4.  He is an avid bicyclist and has been riding around 100 miles per week now.  He denies any cardiopulmonary complications when riding. He had a TAVR on 2/21/2018 and reports doing well since.  He saw his cardiology provider SYDNIE Foss last in Sept 2019.  He was supposed to follow up with her in March 2019, but the appt got cancelled as he was inpatient with some complications s/p knee replacement.  He had another appt scheduled with her 2 days ago, but cancelled because he was out of town.  He reports that he told them not to reschedule him until after the above surgery.   Being that he is currently asymptomatic, has METS >4 and his planned surgery is non-elective for HCC, will approve proceeding with out further cardiac workup at this time.  Will send a message to SYDNIE Pate to notify of planned procedure and his visit today.  Prior to his TAVR he had a  coronary cath that showed normal coronaries and mild pulmonary hypertension.  His last echo in Sept 2018 showed and EF of 65-70%.  Hypertension is managed with lisinopril; hold DOS.  Intermediate risk surgery with 0.4% risk of major adverse cardiac event.   2.  Pulm:  Airway feasible.  VIC risk: intermediate.    3.  GI:  Risk of PONV score = 2.  If > 2, anti-emetic intervention recommended.  He has alcoholic cirrhosis, chronic hepatitis C, esophageal varices (note as mild with last EGD), portal hypertension and a history of bleeding from a inderjit evans tear.  Prior to the recent HCC diagnosis, the plan was to initiate Hep C treatment, but that has now been deferred.  He continues to drink alcohol, but on a sporadic basis.  He notes that he hasn't drank now in a full week in preparation for surgery and doesn't intend to drink any between now and surgery next week.  He denies any withdrawal symptoms.   Rare GERD symptoms are managed with unspecified prn over the counter medication.     4. ID:  +MRSA - contact precautions per protocol.    5. Heme: He has a history of iron deficiency anemia, but hgb has been WNL.  He has chronic thrombocytopenia.  His platelet count today is improved from previous at 84.         VTE risk: 3%    Patient is optimized and is acceptable candidate for the proposed procedure.  No further diagnostic evaluation is needed.         Raven Ceja DNP, RN, APRN  Preoperative Assessment Center  [facility]  Phone: [phone]  Fax: [phone]

## 2019-10-18 NOTE — ANESTHESIA PREPROCEDURE EVALUATION
Anesthesia Pre-Procedure Evaluation    Patient: Ten Johnson   MRN:     [id number] Gender:   male   Age:    59 year old :      1960        Preoperative Diagnosis: HCC (hepatocellular carcinoma) (H) [C22.0]  Liver mass [R16.0]   Procedure(s):  Diagnostic laparoscopy  intraoperative liver ultrasound, laparoscopic liver biopsy possible open     Past Medical History:   Diagnosis Date     JENNIFER (acute kidney injury) (H) 2019     Alcohol use disorder      Alcoholic cirrhosis (H)      Anticoagulant long-term use     plavix     Aortic stenosis, severe 2018     Ascites      Chronic allergic rhinitis      Chronic anemia      Chronic hepatitis C without hepatic coma (H) 05/10/2016    Untreated as of 2018     Cirrhosis (H)     MRI finding     Diastolic dysfunction      Erosive gastropathy      Esophageal varices in alcoholic cirrhosis (H)      H/O upper gastrointestinal hemorrhage 2017     History of blood transfusion      History of drug abuse (H)     intranasal     Hypertension     essential     JANELLE (iron deficiency anemia)      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     Sarahi-Hoffman tear     History     Marijuana abuse      MRSA (methicillin resistant Staphylococcus aureus)      Nonrheumatic aortic valve stenosis 2018     Olecranon bursitis      Portal hypertension (H)      Right shoulder pain     history of rotator cuff repair     S/p TAVR (transcatheter aortic valve replacement), bioprosthetic      Severe aortic stenosis      Thrombocytopenia (H)       Past Surgical History:   Procedure Laterality Date     ARTHROSCOPY SHOULDER ROTATOR CUFF REPAIR  2012    Procedure: ARTHROSCOPY SHOULDER ROTATOR CUFF REPAIR;  Right Shoulder Arthroscopic Rotator Cuff Repair, BicepsTenodesis,  Subacromial Decompression ;  Surgeon: Joi Castillo MD;  [facility]     ESOPHAGOSCOPY, GASTROSCOPY, DUODENOSCOPY (EGD), COMBINED N/A 10/23/2017     Procedure: COMBINED ESOPHAGOSCOPY, GASTROSCOPY, DUODENOSCOPY (EGD);;  Surgeon: Gentry Salas MD;  [facility]: [facility]     EXCHANGE POLY COMPONENT ARTHROPLASTY KNEE Right 3/4/2019    Procedure: REVISION RIGHT TOTAL KNEE POLY COMPONENT EXCHANGE;  Surgeon: Olvin Joe MD;  [facility]: [facility]     FACIAL RECONSTRUCTION SURGERY  1971     HEART CATH FEMORAL CANNULIZATION WITH OPEN STANDBY REPAIR AORTIC VALVE N/A 2/21/2018    Procedure: HEART CATH FEMORAL CANNULIZATION WITH OPEN STANDBY REPAIR AORTIC VALVE;;  Surgeon: Luis Baird MD;  [facility]     IR LIVER BIOPSY PERCUTANEOUS  7/18/2019     IRRIGATION AND DEBRIDEMENT UPPER EXTREMITY, COMBINED  1/3/2012    Procedure:COMBINED IRRIGATION AND DEBRIDEMENT UPPER EXTREMITY; Irrigation & Debridement Left Elbow; Surgeon:CRISTHIAN ZHOU; [facility]     OPTICAL TRACKING SYSTEM ARTHROPLASTY KNEE Right 2/7/2019    Procedure: ARTHROPLASTY KNEE RIGHT;  Surgeon: Olvin Joe MD;  [facility]     REPAIR TENDON TRICEPS UPPER EXTREMITY  11/8/2011    Procedure:REPAIR TENDON TRICEPS UPPER EXTREMITY; Surgeon:CRISTHIAN ZHOU; [facility]     SHOULDER SURGERY  2003    left, injury, torn tendons, hematoma     TRANSCATHETER AORTIC VALVE IMPLANT ANESTHESIA N/A 2/21/2018    Procedure: TRANSCATHETER AORTIC VALVE IMPLANT ANESTHESIA;  Transfemoral (Quiroz) Aortic Valve Implant 26mm MARTHA 3, with Cardiopulmonary Bypass Standby, transthoracic echocardiogram;  Surgeon: GENERIC ANESTHESIA PROVIDER;  [facility]     TRANSPOSITION ULNAR NERVE (ELBOW)  11/8/2011    Procedure:TRANSPOSITION ULNAR NERVE (ELBOW); Final Procedure Done: Left Elbow Lateral Ulnar Collateral Repair And  Left Elbow Triceps Repair            Anesthesia Evaluation     . Pt has had prior anesthetic. Type: General and MAC    No history of anesthetic complications          ROS/MED HX    ENT/Pulmonary:     (+)VIC risk factors snores loudly, hypertension, allergic rhinitis,  , . .   (-) tobacco use   Neurologic:  - neg neurologic ROS     Cardiovascular: Comment: S/p TAVR 2/21/2018    (+) hypertension----. : . . . :. . Previous cardiac testing Echodate:9/2018results:Interpretation Summary  History of TAVR with 26 mm Quiroz Victor M 3 bioprosthesis.  Global and regional left ventricular function is hyperkinetic with an EF of  65-70%.  Doppler interrogation of the aortic valve is normal.  The mean gradient is 14 mmHg.  No aortic regurgitation is present.  No significant change compared to 3/15/18Stress Testdate:2/2018 results:SUMMARY:   >> Normal right sided filling pressures.  >> High left sided filling pressures with mean wedge of 20 mmhg  >> Borderline pulmonary artery hypertension with mean of 25 mm Hg  >> Normal cardiac output, 5.2 L/min with index 2.7 L/min/m2  Normal coronary arteries   date: results: date: results:          METS/Exercise Tolerance:  >4 METS   Hematologic:     (+) Anemia, History of Transfusion no previous transfusion reaction Other Hematologic Disorder-thrombocytopenia      Musculoskeletal:   (+) arthritis,  other musculoskeletal- periodic right knee pain s/p knee replacement.  limited ROM in bilateral shoulders from previous injuries and surgeries.       GI/Hepatic:     (+) GERD Other, hepatitis type C, liver disease, Other GI/Hepatic alcoholic cirrhosis, esophageal varices, portal htn      Renal/Genitourinary:  - ROS Renal section negative       Endo:  - neg endo ROS       Psychiatric:     (+) psychiatric history other (comment) (alcohol dependence)      Infectious Disease:   (+) MRSA,       Malignancy:   (+) Malignancy History of Other  Other CA hepatocellular Active status post         Other:    (+) no H/O Chronic Pain,                       PHYSICAL EXAM:   Mental Status/Neuro: A/A/O   Airway: Facies: Feasible  Mallampati: I  Mouth/Opening: Full  TM distance: > 6 cm  Neck ROM: Full   Respiratory: Auscultation: CTAB     Resp. Rate: Normal     Resp. Effort:  "Normal      CV: Rhythm: Regular  Rate: Age appropriate  Heart: Normal Sounds  Edema: None   Comments: Multiple cracked and broken teeth     Dental: Details                LABS:  CBC:   Lab Results   Component Value Date    WBC 5.4 09/24/2019    WBC 5.6 07/08/2019    HGB 15.6 09/24/2019    HGB 15.9 07/08/2019    HCT 44.1 09/24/2019    HCT 44.9 07/08/2019    PLT 64 (L) 09/24/2019    PLT 85 (L) 07/18/2019     BMP:   Lab Results   Component Value Date     09/24/2019     06/05/2019    POTASSIUM 4.3 09/24/2019    POTASSIUM 3.8 06/05/2019    CHLORIDE 107 09/24/2019    CHLORIDE 107 06/05/2019    CO2 26 09/24/2019    CO2 22 06/05/2019    BUN 26 09/24/2019    BUN 18 06/05/2019    CR 0.85 09/24/2019    CR 1.04 06/05/2019    GLC 86 09/24/2019    GLC 95 06/05/2019     COAGS:   Lab Results   Component Value Date    PTT 24 03/25/2018    INR 1.13 09/24/2019     POC:   Lab Results   Component Value Date     (H) 02/21/2018     OTHER:   Lab Results   Component Value Date    PH 7.43 02/21/2018    LACT 1.1 02/21/2018    JOSEPHINE 8.9 09/24/2019    PHOS 2.7 05/07/2019    MAG 2.0 03/08/2019    ALBUMIN 3.1 (L) 09/24/2019    PROTTOTAL 6.8 09/24/2019     (H) 09/24/2019    AST 79 (H) 09/24/2019    ALKPHOS 95 09/24/2019    BILITOTAL 0.6 09/24/2019    MORENITA 29 10/22/2017    CRP <2.9 06/05/2019    SED 10 06/05/2019        Preop Vitals    BP Readings from Last 3 Encounters:   10/18/19 129/84   10/04/19 124/83   09/24/19 (!) 145/85    Pulse Readings from Last 3 Encounters:   10/18/19 79   10/04/19 64   09/24/19 60      Resp Readings from Last 3 Encounters:   10/18/19 16   10/04/19 13   08/22/19 16    SpO2 Readings from Last 3 Encounters:   10/18/19 95%   10/04/19 100%   09/24/19 97%      Temp Readings from Last 1 Encounters:   10/18/19 98  F (36.7  C) (Oral)    Ht Readings from Last 1 Encounters:   10/18/19 1.753 m (5' 9\")      Wt Readings from Last 1 Encounters:   10/18/19 78.9 kg (174 lb)    Estimated body mass index is 25.7 " "kg/m  as calculated from the following:    Height as of this encounter: 1.753 m (5' 9\").    Weight as of this encounter: 78.9 kg (174 lb).     LDA:  PICC Single Lumen 03/05/19 Right Basilic (Active)   Number of days: 227       Left Groin Interventional Procedure Access (Active)   Number of days: 604       Right Groin Interventional Procedure Access (Active)   Number of days: 604       Right Radial Interventional Procedure Access (Active)   Number of days: 623       Right Jugular Interventional Procedure Access (Active)   Number of days: 623        Assessment:   ASA SCORE: 3    H&P: History and physical reviewed and following examination; no interval change.   Smoking Status:  Non-Smoker/Unknown   NPO Status: NPO Appropriate     Plan:   Anes. Type:  General   Pre-Medication: None   Induction:  IV (Standard)   Airway: ETT; Oral   Access/Monitoring: PIV   Maintenance: Balanced     Postop Plan:   Postop Pain: Opioids  Postop Sedation/Airway: Not planned  Disposition: Outpatient     PONV Management:   Adult Risk Factors:, Non-Smoker, Postop Opioids   Prevention: Ondansetron, Dexamethasone     CONSENT: Direct conversation   Plan and risks discussed with: Patient   Blood Products: Consented (ALL Blood Products)                PAC Discussion and Assessment    ASA Classification: 3  Case is suitable for: [place]  Anesthetic techniques and relevant risks discussed: GA  Invasive monitoring and risk discussed:   Types:   Possibility and Risk of blood transfusion discussed:   NPO instructions given:   Additional anesthetic preparation and risks discussed:   Needs early admission to pre-op area:   Other:     PAC Resident/NP Anesthesia Assessment:  Ten Johnson is a 59 year old male scheduled for a Diagnostic laparoscopy (N/A Abdomen) intraoperative liver ultrasound, laparoscopic liver biopsy possible open by Dr. Benoit in evaluation of hepatocellular carcinoma.  PAC referral for risk assessment and optimization for anesthesia " with comorbid conditions of: hypertension, s/p TAVR, allergic rhinitis, thrombocytopenia, alcoholic cirrhosis, GERD, esophageal varices, chronic hepatitis C, portal hypertension, MRSA and alcohol dependence.      Pre-operative considerations:  1.  Cardiac:  Functional status- METS >4.  He is an avid bicyclist and has been riding around 100 miles per week now.  He denies any cardiopulmonary complications when riding. He had a TAVR on 2/21/2018 and reports doing well since.  He saw his cardiology provider SYDNIE Foss last in Sept 2019.  He was supposed to follow up with her in March 2019, but the appt got cancelled as he was inpatient with some complications s/p knee replacement.  He had another appt scheduled with her 2 days ago, but cancelled because he was out of town.  He reports that he told them not to reschedule him until after the above surgery.   Being that he is currently asymptomatic, has METS >4 and his planned surgery is non-elective for HCC, will approve proceeding with out further cardiac workup at this time.  Will send a message to SYDNIE Pate to notify of planned procedure and his visit today.  Prior to his TAVR he had a coronary cath that showed normal coronaries and mild pulmonary hypertension.  His last echo in Sept 2018 showed and EF of 65-70%.  Hypertension is managed with lisinopril; hold DOS.  Intermediate risk surgery with 0.4% risk of major adverse cardiac event.   2.  Pulm:  Airway feasible.  VIC risk: intermediate.   2.  Pulm:  Airway feasible.  VIC risk: intermediate.    3.  GI:  Risk of PONV score = 2.  If > 2, anti-emetic intervention recommended.  He has alcoholic cirrhosis, chronic hepatitis C, esophageal varices (note as mild with last EGD), portal hypertension and a history of bleeding from a inderjit evans tear.  Prior to the recent HCC diagnosis, the plan was to initiate Hep C treatment, but that has now been deferred.  He continues to drink alcohol, but on a sporadic  basis.  He notes that he hasn't drank now in a full week in preparation for surgery and doesn't intend to drink any between now and surgery next week.  He denies any withdrawal symptoms.   Rare GERD symptoms are managed with unspecified prn over the counter medication.     4. ID:  +MRSA - contact precautions per protocol.    5. Heme: He has a history of iron deficiency anemia, but hgb has been WNL.  He has chronic thrombocytopenia.  His platelet count today is improved from previous at 84.         VTE risk: 3%    Patient is optimized and is acceptable candidate for the proposed procedure.  No further diagnostic evaluation is needed.       **For further details of assessment, testing, and physical exam please see H and P completed on same date.          Raven Ceja DNP, RN, APRN                Reviewed and Signed by PAC Mid-Level Provider/Resident  Mid-Level Provider/Resident: Raven Ceja DNP, RN, APRN  Date: 10/18/2019  Time: 1744    Attending Anesthesiologist Anesthesia Assessment:        Anesthesiologist:   Date:   Time:   Pass/Fail:   Disposition:     PAC Pharmacist Assessment:        Pharmacist:   Date:   Time:    SYDNIE Arango CNP

## 2019-10-18 NOTE — TELEPHONE ENCOUNTER
FUTURE VISIT INFORMATION        SURGERY INFORMATION:    Date: 10/22/19    Location: [facility]    Surgeon:  Gregorio Benoit    Anesthesia Type:  General     RECORDS REQUESTED FROM:         [facility]     Pertinent Medical History: Transcatherter aortic valve replacement, diastolic dysfunction     Most recent EKG+ Tracin19     Most recent ECHO: 19     Most recent Coronary Angiogram: 18

## 2019-10-18 NOTE — PATIENT INSTRUCTIONS
Preparing for Your Surgery      Name:  Ten Johnson   MRN:  [id number]   :  1960   Today's Date:  10/18/2019     Arriving for surgery:  Surgery date:  10/22/19  Arrival time:  06:00 am  Please come to:       [facility] [address]  [address] [place]  [address]    - ? parking is available in front of the hospital      -    Please proceed to [address] on the 3rd floor. [phone]?     - ?If you are in need of directions, wheelchair or escort please stop at the Information Desk in the lobby.  Inform the information person that you are here for surgery; a wheelchair and escort to [address] will be provided.?     What can I eat or drink?  -  You may have solid food or milk products until 8 hours prior to your surgery.  -  You may have water, apple juice or 7up/Sprite until 2 hours prior to your surgery.    Which medicines can I take?  Stop Aspirin, vitamins and supplements one week prior to surgery.  Hold Ibuprofen for 24 hours and/or Naproxen for 48 hours prior to surgery.   -  Do NOT take these medications in the morning, the day of surgery:  Lisinopril if normally taken in the morning.      How do I prepare myself?  -  Take two showers: one the night before surgery; and one the morning of surgery.         Use Scrubcare or Hibiclens to wash from neck down, leave soap on your skin for up to one minute.  Do not get soap in your eyes or ears.  You may use your own shampoo and conditioner; no other hair products.   -  Do NOT use lotion, powder, deodorant, or antiperspirant the day of your surgery.  -  Do NOT wear any jewelry.  - Do not bring your own medications to the hospital, except for inhalers and eye drops.  -  Bring your ID and insurance card.    -If you are scheduled to go home the Same Day as surgery you must have a responsible adult as a  and to stay with you overnight the first 24 hours after surgery.     Questions or Concerns:  -If you are scheduled  Problem: Patient Care Overview  Goal: Plan of Care Review  Outcome: Ongoing (interventions implemented as appropriate)  Mother called to check on infant tonight, update given, plan of care reviewed, appropriate questions and concerns addressed. Infant has exhibited no apnea, bradycardia, or desaturation this shift. Infant remains in isolette on servo-control, temps initially borderline low, but temp probe adjusted and improved. Infant remains under single phototherapy with eyes shielded. Infant is tolerating gavage feedings of EBM24 with no emesis or residual. Infant is voiding adequately and stooling. Urine collected and sent to lab for CMV testing per orders. Overall, infant doing well tonight.       on the [facility] or [facility] campus and have questions or concerns regarding the day of surgery, please call Preadmission Nursing at [phone].   -For questions after surgery please call your surgeons office.

## 2019-10-22 ENCOUNTER — ANESTHESIA (OUTPATIENT)
Dept: SURGERY | Facility: CLINIC | Age: 59
End: 2019-10-22
Payer: COMMERCIAL

## 2019-10-22 ENCOUNTER — HOSPITAL ENCOUNTER (INPATIENT)
Facility: CLINIC | Age: 59
LOS: 6 days | Discharge: HOME | End: 2019-10-28
Attending: SURGERY | Admitting: SURGERY
Payer: COMMERCIAL

## 2019-10-22 DIAGNOSIS — G89.18 POSTOPERATIVE PAIN: ICD-10-CM

## 2019-10-22 DIAGNOSIS — C22.0 HCC (HEPATOCELLULAR CARCINOMA) (H): ICD-10-CM

## 2019-10-22 DIAGNOSIS — K59.09 OTHER CONSTIPATION: ICD-10-CM

## 2019-10-22 DIAGNOSIS — R16.0 LIVER MASS: Primary | ICD-10-CM

## 2019-10-22 DIAGNOSIS — R16.0 LIVER MASS: ICD-10-CM

## 2019-10-22 LAB
ALBUMIN SERPL-MCNC: 3.2 G/DL (ref 3.4–5)
ALP SERPL-CCNC: 94 U/L (ref 40–150)
ALT SERPL W P-5'-P-CCNC: 250 U/L (ref 0–70)
ANION GAP SERPL CALCULATED.3IONS-SCNC: 10 MMOL/L (ref 3–14)
APTT PPP: 29 SEC (ref 22–37)
AST SERPL W P-5'-P-CCNC: 330 U/L (ref 0–45)
BASOPHILS # BLD AUTO: 0 10E9/L (ref 0–0.2)
BASOPHILS NFR BLD AUTO: 0 %
BILIRUB SERPL-MCNC: 1.9 MG/DL (ref 0.2–1.3)
BUN SERPL-MCNC: 22 MG/DL (ref 7–30)
CALCIUM SERPL-MCNC: 8.4 MG/DL (ref 8.5–10.1)
CHLORIDE SERPL-SCNC: 110 MMOL/L (ref 94–109)
CO2 SERPL-SCNC: 20 MMOL/L (ref 20–32)
CREAT SERPL-MCNC: 1.07 MG/DL (ref 0.66–1.25)
DIFFERENTIAL METHOD BLD: ABNORMAL
EOSINOPHIL # BLD AUTO: 0 10E9/L (ref 0–0.7)
EOSINOPHIL NFR BLD AUTO: 0 %
ERYTHROCYTE [DISTWIDTH] IN BLOOD BY AUTOMATED COUNT: 13.7 % (ref 10–15)
GFR SERPL CREATININE-BSD FRML MDRD: 74 ML/MIN/{1.73_M2}
GLUCOSE BLDC GLUCOMTR-MCNC: 107 MG/DL (ref 70–99)
GLUCOSE SERPL-MCNC: 189 MG/DL (ref 70–99)
HCT VFR BLD AUTO: 44.5 % (ref 40–53)
HGB BLD-MCNC: 16 G/DL (ref 13.3–17.7)
INR PPP: 1.28 (ref 0.86–1.14)
LYMPHOCYTES # BLD AUTO: 0.8 10E9/L (ref 0.8–5.3)
LYMPHOCYTES NFR BLD AUTO: 4.3 %
MCH RBC QN AUTO: 34.1 PG (ref 26.5–33)
MCHC RBC AUTO-ENTMCNC: 36 G/DL (ref 31.5–36.5)
MCV RBC AUTO: 95 FL (ref 78–100)
MONOCYTES # BLD AUTO: 1.4 10E9/L (ref 0–1.3)
MONOCYTES NFR BLD AUTO: 7.8 %
NEUTROPHILS # BLD AUTO: 15.5 10E9/L (ref 1.6–8.3)
NEUTROPHILS NFR BLD AUTO: 87.9 %
PLATELET # BLD AUTO: 75 10E9/L (ref 150–450)
PLATELET # BLD AUTO: 87 10E9/L (ref 150–450)
PLATELET # BLD EST: ABNORMAL 10*3/UL
POTASSIUM SERPL-SCNC: 4 MMOL/L (ref 3.4–5.3)
PROT SERPL-MCNC: 6.4 G/DL (ref 6.8–8.8)
RBC # BLD AUTO: 4.69 10E12/L (ref 4.4–5.9)
RBC MORPH BLD: NORMAL
SODIUM SERPL-SCNC: 139 MMOL/L (ref 133–144)
WBC # BLD AUTO: 17.6 10E9/L (ref 4–11)

## 2019-10-22 PROCEDURE — 40000171 ZZH STATISTIC PRE-PROCEDURE ASSESSMENT III: Performed by: SURGERY

## 2019-10-22 PROCEDURE — 25000128 H RX IP 250 OP 636: Performed by: NURSE ANESTHETIST, CERTIFIED REGISTERED

## 2019-10-22 PROCEDURE — 85049 AUTOMATED PLATELET COUNT: CPT | Performed by: STUDENT IN AN ORGANIZED HEALTH CARE EDUCATION/TRAINING PROGRAM

## 2019-10-22 PROCEDURE — 36000070 ZZH SURGERY LEVEL 5 EA 15 ADDTL MIN - UMMC: Performed by: SURGERY

## 2019-10-22 PROCEDURE — 25000128 H RX IP 250 OP 636: Performed by: STUDENT IN AN ORGANIZED HEALTH CARE EDUCATION/TRAINING PROGRAM

## 2019-10-22 PROCEDURE — 27210794 ZZH OR GENERAL SUPPLY STERILE: Performed by: SURGERY

## 2019-10-22 PROCEDURE — 80053 COMPREHEN METABOLIC PANEL: CPT | Performed by: STUDENT IN AN ORGANIZED HEALTH CARE EDUCATION/TRAINING PROGRAM

## 2019-10-22 PROCEDURE — 12000001 ZZH R&B MED SURG/OB UMMC

## 2019-10-22 PROCEDURE — 25800030 ZZH RX IP 258 OP 636: Performed by: STUDENT IN AN ORGANIZED HEALTH CARE EDUCATION/TRAINING PROGRAM

## 2019-10-22 PROCEDURE — 88341 IMHCHEM/IMCYTCHM EA ADD ANTB: CPT | Performed by: SURGERY

## 2019-10-22 PROCEDURE — 85025 COMPLETE CBC W/AUTO DIFF WBC: CPT | Performed by: STUDENT IN AN ORGANIZED HEALTH CARE EDUCATION/TRAINING PROGRAM

## 2019-10-22 PROCEDURE — 25800030 ZZH RX IP 258 OP 636: Performed by: NURSE ANESTHETIST, CERTIFIED REGISTERED

## 2019-10-22 PROCEDURE — 85730 THROMBOPLASTIN TIME PARTIAL: CPT | Performed by: STUDENT IN AN ORGANIZED HEALTH CARE EDUCATION/TRAINING PROGRAM

## 2019-10-22 PROCEDURE — 0F510ZF DESTRUCTION OF RIGHT LOBE LIVER USING IRREVERSIBLE ELECTROPORATION, OPEN APPROACH: ICD-10-PCS | Performed by: SURGERY

## 2019-10-22 PROCEDURE — 37000008 ZZH ANESTHESIA TECHNICAL FEE, 1ST 30 MIN: Performed by: SURGERY

## 2019-10-22 PROCEDURE — 71000015 ZZH RECOVERY PHASE 1 LEVEL 2 EA ADDTL HR: Performed by: SURGERY

## 2019-10-22 PROCEDURE — 25000132 ZZH RX MED GY IP 250 OP 250 PS 637: Performed by: STUDENT IN AN ORGANIZED HEALTH CARE EDUCATION/TRAINING PROGRAM

## 2019-10-22 PROCEDURE — 25000128 H RX IP 250 OP 636: Performed by: SURGERY

## 2019-10-22 PROCEDURE — 25000125 ZZHC RX 250: Performed by: NURSE ANESTHETIST, CERTIFIED REGISTERED

## 2019-10-22 PROCEDURE — 36415 COLL VENOUS BLD VENIPUNCTURE: CPT | Performed by: STUDENT IN AN ORGANIZED HEALTH CARE EDUCATION/TRAINING PROGRAM

## 2019-10-22 PROCEDURE — 85610 PROTHROMBIN TIME: CPT | Performed by: STUDENT IN AN ORGANIZED HEALTH CARE EDUCATION/TRAINING PROGRAM

## 2019-10-22 PROCEDURE — 00000146 ZZHCL STATISTIC GLUCOSE BY METER IP

## 2019-10-22 PROCEDURE — 25000566 ZZH SEVOFLURANE, EA 15 MIN: Performed by: SURGERY

## 2019-10-22 PROCEDURE — 88307 TISSUE EXAM BY PATHOLOGIST: CPT | Performed by: SURGERY

## 2019-10-22 PROCEDURE — 71000014 ZZH RECOVERY PHASE 1 LEVEL 2 FIRST HR: Performed by: SURGERY

## 2019-10-22 PROCEDURE — 0DNW4ZZ RELEASE PERITONEUM, PERCUTANEOUS ENDOSCOPIC APPROACH: ICD-10-PCS | Performed by: SURGERY

## 2019-10-22 PROCEDURE — 0FB10ZX EXCISION OF RIGHT LOBE LIVER, OPEN APPROACH, DIAGNOSTIC: ICD-10-PCS | Performed by: SURGERY

## 2019-10-22 PROCEDURE — 88331 PATH CONSLTJ SURG 1 BLK 1SPC: CPT | Performed by: SURGERY

## 2019-10-22 PROCEDURE — 88342 IMHCHEM/IMCYTCHM 1ST ANTB: CPT | Performed by: SURGERY

## 2019-10-22 PROCEDURE — 37000009 ZZH ANESTHESIA TECHNICAL FEE, EACH ADDTL 15 MIN: Performed by: SURGERY

## 2019-10-22 PROCEDURE — 25000128 H RX IP 250 OP 636: Performed by: ANESTHESIOLOGY

## 2019-10-22 PROCEDURE — 36000068 ZZH SURGERY LEVEL 5 1ST 30 MIN - UMMC: Performed by: SURGERY

## 2019-10-22 PROCEDURE — C9290 INJ, BUPIVACAINE LIPOSOME: HCPCS | Performed by: STUDENT IN AN ORGANIZED HEALTH CARE EDUCATION/TRAINING PROGRAM

## 2019-10-22 RX ORDER — ONDANSETRON 2 MG/ML
INJECTION INTRAMUSCULAR; INTRAVENOUS PRN
Status: DISCONTINUED | OUTPATIENT
Start: 2019-10-22 | End: 2019-10-22

## 2019-10-22 RX ORDER — MEPERIDINE HYDROCHLORIDE 25 MG/ML
12.5 INJECTION INTRAMUSCULAR; INTRAVENOUS; SUBCUTANEOUS
Status: DISCONTINUED | OUTPATIENT
Start: 2019-10-22 | End: 2019-10-22 | Stop reason: HOSPADM

## 2019-10-22 RX ORDER — FLUMAZENIL 0.1 MG/ML
0.2 INJECTION, SOLUTION INTRAVENOUS
Status: DISCONTINUED | OUTPATIENT
Start: 2019-10-22 | End: 2019-10-22 | Stop reason: HOSPADM

## 2019-10-22 RX ORDER — PROPOFOL 10 MG/ML
INJECTION, EMULSION INTRAVENOUS PRN
Status: DISCONTINUED | OUTPATIENT
Start: 2019-10-22 | End: 2019-10-22

## 2019-10-22 RX ORDER — LIDOCAINE HYDROCHLORIDE 20 MG/ML
INJECTION, SOLUTION INFILTRATION; PERINEURAL PRN
Status: DISCONTINUED | OUTPATIENT
Start: 2019-10-22 | End: 2019-10-22

## 2019-10-22 RX ORDER — BUPIVACAINE HYDROCHLORIDE 2.5 MG/ML
INJECTION, SOLUTION EPIDURAL; INFILTRATION; INTRACAUDAL PRN
Status: DISCONTINUED | OUTPATIENT
Start: 2019-10-22 | End: 2019-10-22

## 2019-10-22 RX ORDER — HYDROMORPHONE HYDROCHLORIDE 1 MG/ML
.3-.5 INJECTION, SOLUTION INTRAMUSCULAR; INTRAVENOUS; SUBCUTANEOUS EVERY 10 MIN PRN
Status: DISCONTINUED | OUTPATIENT
Start: 2019-10-22 | End: 2019-10-22 | Stop reason: HOSPADM

## 2019-10-22 RX ORDER — ACETAMINOPHEN 325 MG/1
650 TABLET ORAL EVERY 4 HOURS PRN
Status: DISCONTINUED | OUTPATIENT
Start: 2019-10-25 | End: 2019-10-22

## 2019-10-22 RX ORDER — GABAPENTIN 300 MG/1
300 CAPSULE ORAL 2 TIMES DAILY
Status: COMPLETED | OUTPATIENT
Start: 2019-10-22 | End: 2019-10-25

## 2019-10-22 RX ORDER — ONDANSETRON 4 MG/1
4 TABLET, ORALLY DISINTEGRATING ORAL EVERY 30 MIN PRN
Status: DISCONTINUED | OUTPATIENT
Start: 2019-10-22 | End: 2019-10-22 | Stop reason: HOSPADM

## 2019-10-22 RX ORDER — NALOXONE HYDROCHLORIDE 0.4 MG/ML
.1-.4 INJECTION, SOLUTION INTRAMUSCULAR; INTRAVENOUS; SUBCUTANEOUS
Status: DISCONTINUED | OUTPATIENT
Start: 2019-10-22 | End: 2019-10-22 | Stop reason: HOSPADM

## 2019-10-22 RX ORDER — SODIUM CHLORIDE, SODIUM LACTATE, POTASSIUM CHLORIDE, CALCIUM CHLORIDE 600; 310; 30; 20 MG/100ML; MG/100ML; MG/100ML; MG/100ML
INJECTION, SOLUTION INTRAVENOUS CONTINUOUS
Status: DISCONTINUED | OUTPATIENT
Start: 2019-10-22 | End: 2019-10-24

## 2019-10-22 RX ORDER — ACETAMINOPHEN 325 MG/1
975 TABLET ORAL ONCE
Status: DISCONTINUED | OUTPATIENT
Start: 2019-10-22 | End: 2019-10-22 | Stop reason: HOSPADM

## 2019-10-22 RX ORDER — SODIUM CHLORIDE, SODIUM LACTATE, POTASSIUM CHLORIDE, CALCIUM CHLORIDE 600; 310; 30; 20 MG/100ML; MG/100ML; MG/100ML; MG/100ML
INJECTION, SOLUTION INTRAVENOUS CONTINUOUS
Status: DISCONTINUED | OUTPATIENT
Start: 2019-10-22 | End: 2019-10-22 | Stop reason: HOSPADM

## 2019-10-22 RX ORDER — EPHEDRINE SULFATE 50 MG/ML
INJECTION, SOLUTION INTRAMUSCULAR; INTRAVENOUS; SUBCUTANEOUS PRN
Status: DISCONTINUED | OUTPATIENT
Start: 2019-10-22 | End: 2019-10-22

## 2019-10-22 RX ORDER — SODIUM CHLORIDE, SODIUM LACTATE, POTASSIUM CHLORIDE, CALCIUM CHLORIDE 600; 310; 30; 20 MG/100ML; MG/100ML; MG/100ML; MG/100ML
INJECTION, SOLUTION INTRAVENOUS CONTINUOUS PRN
Status: DISCONTINUED | OUTPATIENT
Start: 2019-10-22 | End: 2019-10-22

## 2019-10-22 RX ORDER — ACETAMINOPHEN 325 MG/1
650 TABLET ORAL EVERY 8 HOURS PRN
Status: DISCONTINUED | OUTPATIENT
Start: 2019-10-24 | End: 2019-10-23

## 2019-10-22 RX ORDER — LIDOCAINE 4 G/G
1-2 PATCH TOPICAL
Status: DISCONTINUED | OUTPATIENT
Start: 2019-10-22 | End: 2019-10-28 | Stop reason: HOSPADM

## 2019-10-22 RX ORDER — LABETALOL 20 MG/4 ML (5 MG/ML) INTRAVENOUS SYRINGE
5 EVERY 5 MIN PRN
Status: DISCONTINUED | OUTPATIENT
Start: 2019-10-22 | End: 2019-10-23

## 2019-10-22 RX ORDER — CYCLOBENZAPRINE HCL 10 MG
10 TABLET ORAL 3 TIMES DAILY PRN
Status: DISCONTINUED | OUTPATIENT
Start: 2019-10-22 | End: 2019-10-28 | Stop reason: HOSPADM

## 2019-10-22 RX ORDER — ACETAMINOPHEN 325 MG/1
650 TABLET ORAL EVERY 8 HOURS
Status: DISCONTINUED | OUTPATIENT
Start: 2019-10-22 | End: 2019-10-23

## 2019-10-22 RX ORDER — CEFAZOLIN SODIUM 2 G/100ML
2 INJECTION, SOLUTION INTRAVENOUS
Status: COMPLETED | OUTPATIENT
Start: 2019-10-22 | End: 2019-10-22

## 2019-10-22 RX ORDER — ONDANSETRON 4 MG/1
4 TABLET, ORALLY DISINTEGRATING ORAL EVERY 6 HOURS PRN
Status: DISCONTINUED | OUTPATIENT
Start: 2019-10-22 | End: 2019-10-28 | Stop reason: HOSPADM

## 2019-10-22 RX ORDER — FENTANYL CITRATE 50 UG/ML
INJECTION, SOLUTION INTRAMUSCULAR; INTRAVENOUS PRN
Status: DISCONTINUED | OUTPATIENT
Start: 2019-10-22 | End: 2019-10-22

## 2019-10-22 RX ORDER — HEPARIN SODIUM 5000 [USP'U]/.5ML
5000 INJECTION, SOLUTION INTRAVENOUS; SUBCUTANEOUS EVERY 8 HOURS
Status: DISCONTINUED | OUTPATIENT
Start: 2019-10-23 | End: 2019-10-23 | Stop reason: CLARIF

## 2019-10-22 RX ORDER — CEFAZOLIN SODIUM 1 G/3ML
1 INJECTION, POWDER, FOR SOLUTION INTRAMUSCULAR; INTRAVENOUS SEE ADMIN INSTRUCTIONS
Status: DISCONTINUED | OUTPATIENT
Start: 2019-10-22 | End: 2019-10-22 | Stop reason: HOSPADM

## 2019-10-22 RX ORDER — LIDOCAINE 40 MG/G
CREAM TOPICAL
Status: DISCONTINUED | OUTPATIENT
Start: 2019-10-22 | End: 2019-10-28 | Stop reason: HOSPADM

## 2019-10-22 RX ORDER — AMOXICILLIN 250 MG
2 CAPSULE ORAL 2 TIMES DAILY
Status: DISCONTINUED | OUTPATIENT
Start: 2019-10-22 | End: 2019-10-28 | Stop reason: HOSPADM

## 2019-10-22 RX ORDER — PROCHLORPERAZINE MALEATE 5 MG
10 TABLET ORAL EVERY 6 HOURS PRN
Status: DISCONTINUED | OUTPATIENT
Start: 2019-10-22 | End: 2019-10-28 | Stop reason: HOSPADM

## 2019-10-22 RX ORDER — OXYCODONE HYDROCHLORIDE 5 MG/1
5-10 TABLET ORAL
Status: DISCONTINUED | OUTPATIENT
Start: 2019-10-22 | End: 2019-10-22

## 2019-10-22 RX ORDER — NALOXONE HYDROCHLORIDE 0.4 MG/ML
.1-.4 INJECTION, SOLUTION INTRAMUSCULAR; INTRAVENOUS; SUBCUTANEOUS
Status: DISCONTINUED | OUTPATIENT
Start: 2019-10-22 | End: 2019-10-28 | Stop reason: HOSPADM

## 2019-10-22 RX ORDER — ACETAMINOPHEN 325 MG/1
975 TABLET ORAL EVERY 8 HOURS
Status: DISCONTINUED | OUTPATIENT
Start: 2019-10-22 | End: 2019-10-22

## 2019-10-22 RX ORDER — FENTANYL CITRATE 50 UG/ML
25-50 INJECTION, SOLUTION INTRAMUSCULAR; INTRAVENOUS
Status: DISCONTINUED | OUTPATIENT
Start: 2019-10-22 | End: 2019-10-22 | Stop reason: HOSPADM

## 2019-10-22 RX ORDER — ONDANSETRON 2 MG/ML
4 INJECTION INTRAMUSCULAR; INTRAVENOUS EVERY 6 HOURS PRN
Status: DISCONTINUED | OUTPATIENT
Start: 2019-10-22 | End: 2019-10-28 | Stop reason: HOSPADM

## 2019-10-22 RX ORDER — AMOXICILLIN 250 MG
1 CAPSULE ORAL 2 TIMES DAILY
Status: DISCONTINUED | OUTPATIENT
Start: 2019-10-22 | End: 2019-10-28 | Stop reason: HOSPADM

## 2019-10-22 RX ORDER — ONDANSETRON 2 MG/ML
4 INJECTION INTRAMUSCULAR; INTRAVENOUS EVERY 30 MIN PRN
Status: DISCONTINUED | OUTPATIENT
Start: 2019-10-22 | End: 2019-10-22 | Stop reason: HOSPADM

## 2019-10-22 RX ORDER — DEXAMETHASONE SODIUM PHOSPHATE 10 MG/ML
INJECTION, SOLUTION INTRAMUSCULAR; INTRAVENOUS PRN
Status: DISCONTINUED | OUTPATIENT
Start: 2019-10-22 | End: 2019-10-22

## 2019-10-22 RX ORDER — HYDRALAZINE HYDROCHLORIDE 20 MG/ML
5 INJECTION INTRAMUSCULAR; INTRAVENOUS EVERY 5 MIN PRN
Status: DISCONTINUED | OUTPATIENT
Start: 2019-10-22 | End: 2019-10-23

## 2019-10-22 RX ADMIN — HYDROMORPHONE HYDROCHLORIDE 0.5 MG: 1 INJECTION, SOLUTION INTRAMUSCULAR; INTRAVENOUS; SUBCUTANEOUS at 15:04

## 2019-10-22 RX ADMIN — CEFAZOLIN SODIUM 2 G: 2 INJECTION, SOLUTION INTRAVENOUS at 08:50

## 2019-10-22 RX ADMIN — LABETALOL 20 MG/4 ML (5 MG/ML) INTRAVENOUS SYRINGE 5 MG: at 15:51

## 2019-10-22 RX ADMIN — DEXAMETHASONE SODIUM PHOSPHATE 4 MG: 10 INJECTION, SOLUTION INTRAMUSCULAR; INTRAVENOUS at 09:00

## 2019-10-22 RX ADMIN — BUPIVACAINE 20 ML: 13.3 INJECTION, SUSPENSION, LIPOSOMAL INFILTRATION at 07:50

## 2019-10-22 RX ADMIN — FENTANYL CITRATE 50 MCG: 50 INJECTION, SOLUTION INTRAMUSCULAR; INTRAVENOUS at 09:28

## 2019-10-22 RX ADMIN — FENTANYL CITRATE 100 MCG: 50 INJECTION, SOLUTION INTRAMUSCULAR; INTRAVENOUS at 08:34

## 2019-10-22 RX ADMIN — SODIUM CHLORIDE, POTASSIUM CHLORIDE, SODIUM LACTATE AND CALCIUM CHLORIDE: 600; 310; 30; 20 INJECTION, SOLUTION INTRAVENOUS at 11:54

## 2019-10-22 RX ADMIN — LABETALOL 20 MG/4 ML (5 MG/ML) INTRAVENOUS SYRINGE 5 MG: at 16:12

## 2019-10-22 RX ADMIN — PHENYLEPHRINE HYDROCHLORIDE 100 MCG: 10 INJECTION INTRAVENOUS at 09:24

## 2019-10-22 RX ADMIN — FENTANYL CITRATE 50 MCG: 50 INJECTION, SOLUTION INTRAMUSCULAR; INTRAVENOUS at 13:45

## 2019-10-22 RX ADMIN — PHENYLEPHRINE HYDROCHLORIDE 100 MCG: 10 INJECTION INTRAVENOUS at 12:06

## 2019-10-22 RX ADMIN — FENTANYL CITRATE 50 MCG: 50 INJECTION INTRAMUSCULAR; INTRAVENOUS at 07:43

## 2019-10-22 RX ADMIN — HYDROMORPHONE HYDROCHLORIDE 0.5 MG: 1 INJECTION, SOLUTION INTRAMUSCULAR; INTRAVENOUS; SUBCUTANEOUS at 10:28

## 2019-10-22 RX ADMIN — LIDOCAINE 2 PATCH: 560 PATCH PERCUTANEOUS; TOPICAL; TRANSDERMAL at 21:10

## 2019-10-22 RX ADMIN — GABAPENTIN 300 MG: 300 CAPSULE ORAL at 21:10

## 2019-10-22 RX ADMIN — ONDANSETRON 4 MG: 2 INJECTION INTRAMUSCULAR; INTRAVENOUS at 16:00

## 2019-10-22 RX ADMIN — ONDANSETRON 4 MG: 2 INJECTION INTRAMUSCULAR; INTRAVENOUS at 13:37

## 2019-10-22 RX ADMIN — SODIUM CHLORIDE, POTASSIUM CHLORIDE, SODIUM LACTATE AND CALCIUM CHLORIDE: 600; 310; 30; 20 INJECTION, SOLUTION INTRAVENOUS at 08:16

## 2019-10-22 RX ADMIN — HYDROMORPHONE HYDROCHLORIDE 0.3 MG: 1 INJECTION, SOLUTION INTRAMUSCULAR; INTRAVENOUS; SUBCUTANEOUS at 14:28

## 2019-10-22 RX ADMIN — BUPIVACAINE HYDROCHLORIDE 20 ML: 2.5 INJECTION, SOLUTION EPIDURAL; INFILTRATION; INTRACAUDAL; PERINEURAL at 07:50

## 2019-10-22 RX ADMIN — CEFAZOLIN 1 G: 1 INJECTION, POWDER, FOR SOLUTION INTRAMUSCULAR; INTRAVENOUS at 10:50

## 2019-10-22 RX ADMIN — HYDRALAZINE HYDROCHLORIDE 5 MG: 20 INJECTION INTRAMUSCULAR; INTRAVENOUS at 16:24

## 2019-10-22 RX ADMIN — MIDAZOLAM 1 MG: 1 INJECTION INTRAMUSCULAR; INTRAVENOUS at 07:46

## 2019-10-22 RX ADMIN — SODIUM CHLORIDE, POTASSIUM CHLORIDE, SODIUM LACTATE AND CALCIUM CHLORIDE: 600; 310; 30; 20 INJECTION, SOLUTION INTRAVENOUS at 15:09

## 2019-10-22 RX ADMIN — FENTANYL CITRATE 50 MCG: 50 INJECTION, SOLUTION INTRAMUSCULAR; INTRAVENOUS at 08:59

## 2019-10-22 RX ADMIN — FENTANYL CITRATE 50 MCG: 50 INJECTION, SOLUTION INTRAMUSCULAR; INTRAVENOUS at 09:55

## 2019-10-22 RX ADMIN — FENTANYL CITRATE 50 MCG: 50 INJECTION, SOLUTION INTRAMUSCULAR; INTRAVENOUS at 13:54

## 2019-10-22 RX ADMIN — ROCURONIUM BROMIDE 20 MG: 10 INJECTION INTRAVENOUS at 11:01

## 2019-10-22 RX ADMIN — HYDROMORPHONE HYDROCHLORIDE 0.5 MG: 1 INJECTION, SOLUTION INTRAMUSCULAR; INTRAVENOUS; SUBCUTANEOUS at 13:07

## 2019-10-22 RX ADMIN — LIDOCAINE HYDROCHLORIDE 100 MG: 20 INJECTION, SOLUTION INFILTRATION; PERINEURAL at 08:34

## 2019-10-22 RX ADMIN — SODIUM CHLORIDE, POTASSIUM CHLORIDE, SODIUM LACTATE AND CALCIUM CHLORIDE: 600; 310; 30; 20 INJECTION, SOLUTION INTRAVENOUS at 23:52

## 2019-10-22 RX ADMIN — ROCURONIUM BROMIDE 20 MG: 10 INJECTION INTRAVENOUS at 11:54

## 2019-10-22 RX ADMIN — SUGAMMADEX 200 MG: 100 INJECTION, SOLUTION INTRAVENOUS at 13:47

## 2019-10-22 RX ADMIN — PHENYLEPHRINE HYDROCHLORIDE 100 MCG: 10 INJECTION INTRAVENOUS at 12:09

## 2019-10-22 RX ADMIN — Medication 5 MG: at 09:46

## 2019-10-22 RX ADMIN — CEFAZOLIN 1 G: 1 INJECTION, POWDER, FOR SOLUTION INTRAMUSCULAR; INTRAVENOUS at 12:50

## 2019-10-22 RX ADMIN — LABETALOL 20 MG/4 ML (5 MG/ML) INTRAVENOUS SYRINGE 5 MG: at 16:04

## 2019-10-22 RX ADMIN — ROCURONIUM BROMIDE 10 MG: 10 INJECTION INTRAVENOUS at 12:52

## 2019-10-22 RX ADMIN — Medication 10 MG: at 08:34

## 2019-10-22 RX ADMIN — ROCURONIUM BROMIDE 60 MG: 10 INJECTION INTRAVENOUS at 08:35

## 2019-10-22 RX ADMIN — Medication: at 14:49

## 2019-10-22 RX ADMIN — ROCURONIUM BROMIDE 20 MG: 10 INJECTION INTRAVENOUS at 09:57

## 2019-10-22 RX ADMIN — ROCURONIUM BROMIDE 20 MG: 10 INJECTION INTRAVENOUS at 09:12

## 2019-10-22 RX ADMIN — PROPOFOL 140 MG: 10 INJECTION, EMULSION INTRAVENOUS at 08:34

## 2019-10-22 RX ADMIN — MIDAZOLAM 1 MG: 1 INJECTION INTRAMUSCULAR; INTRAVENOUS at 07:43

## 2019-10-22 ASSESSMENT — PAIN DESCRIPTION - DESCRIPTORS: DESCRIPTORS: SHARP

## 2019-10-22 ASSESSMENT — MIFFLIN-ST. JEOR: SCORE: 1587.38

## 2019-10-22 ASSESSMENT — ACTIVITIES OF DAILY LIVING (ADL): ADLS_ACUITY_SCORE: 14

## 2019-10-22 NOTE — ANESTHESIA CARE TRANSFER NOTE
Patient: Ten Johnson    Procedure(s):  Diagnostic laparoscopy  intraoperative liver ultrasound, laparoscopic converted to open liver biopsy x 6  Ablate liver tumor x3  Laparotomy, lysis adhesions, combined    Diagnosis: HCC (hepatocellular carcinoma) (H) [C22.0]  Liver mass [R16.0]  Diagnosis Additional Information: No value filed.    Anesthesia Type:   No value filed.     Note:  Airway :Nasal Cannula  Patient transferred to:PACU  Comments: Alert and exchanging well. No complaints of pain. Report given to RN. Handoff Report: Identifed the Patient, Identified the Reponsible Provider, Reviewed the pertinent medical history, Discussed the surgical course, Reviewed Intra-OP anesthesia mangement and issues during anesthesia, Set expectations for post-procedure period and Allowed opportunity for questions and acknowledgement of understanding      Vitals: (Last set prior to Anesthesia Care Transfer)    CRNA VITALS  10/22/2019 1338 - 10/22/2019 1415      10/22/2019             NIBP:  (!) 131/111    Pulse:  89    Ht Rate:  89    SpO2:  97 %                Electronically Signed By: SYDNIE Travis CRNA  October 22, 2019  2:15 PM

## 2019-10-22 NOTE — ANESTHESIA POSTPROCEDURE EVALUATION
Anesthesia POST Procedure Evaluation    Patient: Ten Johnson   MRN:     [id number] Gender:   male   Age:    59 year old :      1960        Preoperative Diagnosis: HCC (hepatocellular carcinoma) (H) [C22.0]  Liver mass [R16.0]   Procedure(s):  Diagnostic laparoscopy  intraoperative liver ultrasound, laparoscopic converted to open liver biopsy x 6  Ablate liver tumor x3  Laparotomy, lysis adhesions, combined   Postop Comments: No value filed.       Anesthesia Type:  Not documented  No value filed.    Reportable Event: NO     PAIN: Uncomplicated   Sign Out status: Comfortable, Well controlled pain     PONV: No PONV   Sign Out status:  No Nausea or Vomiting     Neuro/Psych: Uneventful perioperative course   Sign Out Status: Preoperative baseline; Age appropriate mentation     Airway/Resp.: Uneventful perioperative course   Sign Out Status: Non labored breathing, age appropriate RR; Resp. Status within EXPECTED Parameters     CV:   Sign Out status: OTHER     Blood pressure:  HypERtension (mild/mod.)     Rate/Rhythm: Normal HR     Perfusion: Adequate Perfusion Indices     Disposition:   Sign Out in:  PACU  Disposition:  Floor  Recovery Course: Uneventful     Comments/Narrative:  I assessed the patient in PACU prior to discharge.  The patient was awake and alert with minimal to moderate pain which was well controlled with medications.  The patient denied any significant nausea or sore throat.  The patient's heart rate and blood pressure with consistent with his preoperative measurements after receiving labetalol and hydralazine, and he was breathing comfortably without any signs of respiratory distress.  There were no readily apparent anesthetic complications.  All his questions were answered.             Last Anesthesia Record Vitals:  CRNA VITALS  10/22/2019 1338 - 10/22/2019 1438      10/22/2019             NIBP:  (!) 131/111    Pulse:  89    Ht Rate:  89    SpO2:  97 %          Last PACU Vitals:  Vitals  Value Taken Time   /84 10/22/2019  4:35 PM   Temp 36.4  C (97.6  F) 10/22/2019  4:35 PM   Pulse 55 10/22/2019  4:35 PM   Resp 16 10/22/2019  4:35 PM   SpO2 99 % 10/22/2019  4:38 PM   Temp src     NIBP 131/111 10/22/2019  2:11 PM   Pulse 89 10/22/2019  2:11 PM   SpO2 97 % 10/22/2019  2:11 PM   Resp     Temp     Ht Rate 89 10/22/2019  2:11 PM   Temp 2     Vitals shown include unvalidated device data.      Electronically Signed By: Gregorio Orourke MD, October 22, 2019, 4:48 PM

## 2019-10-22 NOTE — OP NOTE
Patient: Ten Johnson  MRN: [id number]  Date of Surgery: 10/22/2019     Pre-Op Diagnosis: Liver Masses x 3 (1 Positive for HCC Seg 5, 1 LI-RADS 5 Seg 6/7, 1 LI-RADS M Seg 7)  Post-Op Diagnosis: Liver Lesions x 4 (1 Known HCC Seg 5, 1 Additional Lesion Seg 6/7, 1 Biopsied HCC Seg 6/7, 1 LI-RADS M Seg 7)      Title of Operation:  1. Diagnostic Laparoscopy  2. Laparoscopic Lysis of Adhesions  3. Exploratory Laparotomy  4. Intra-Operative Liver Ultrasound  5. Multiple Liver Core Needle Biopsies (6 Frozen Sections, 4 Permanent)  6. Intra-Operative Microwave Ablation of 3 Lesions (Seg 6/7 x 2, Seg 7)     Anesthesia Type: General Endotracheal  Attending Physician: Gregorio Benoit MD  Assistant/Resident: Declan Hernandez MD     Indications: 59 M w/ cirrhosis and continued alcohol use.  Has multifocal liver lesions and not a candidate for resection or transplant.  Lesion #1 is biopsy proven HCC in segment 5 adjacent to the kenneth-hepatis.  Lesion #2 is in segment 6/7 and is LI-RADS 5, but biopsy showed cirrhosis (likely missed lesion).  Lesion #3 is in segment 7 and is LI-RADS M but inaccessible for percutaneous liver biopsy.  Given the LI-RADS M lesion, hepatology wanted a biopsy of the lesion to help direct future systemic therapies and/or work-up if the patient needed it, as non-HCC would be treated differently.  In addition, the size and location of the lesions with exception of the lesion adjacent to the kenneth-hepatis would be amenable to treatment by ablation.  Given this, I discussed with the patient an attempt at laparoscopic liver biopsy +/- ablation under ultrasound guidance with possible need for open surgery.     Findings: Diagnostic laparoscopy revealed intra-abdominal adhesions of the omentum to the right lateral abdominal wall and also to the gallbladder.  These were taken down with Harmonic.  The liver was very nodular and very firm in texture.  Laparoscopic ultrasound was very difficult to perform given the  uneven surface.  Even with partial mobilization of the right lobe of the liver, I was not able to follow the liver vasculature and locate the lesions of interest.  As such, I converted to an open operation to finish.  The right lobe of the liver was completely mobilized to facilitate ultrasound.  Open ultrasound subsequently revealed 4 lesions in the liver.  The first was a previously known lesion seen on MRI and previously biopsied as positive for HCC.  This was seen as a hypoechoic lesion with somewhat ill defined borders located between the segment 5 branch of the portal vein and the right posterior main branch.  The second lesion was also previously known from MRI as a LI-RADS 5.  Previous biopsy did not show malignancy.  This lesion was hypoechoic and located in segment 6/7 and was located along a segment 6/7 branch of the right hepatic vein.  Intra-operative frozen section from this lesion did show HCC.  A 14 gauge permanent core was also sent of this lesion.  Near to the second lesion, the third lesion was not previously described, but was hypoechoic, smaller, and had some internal hyperdensity.  This was also in segment 6/7 1-2 cm from the second lesion.  This was biopsied, but sent for permanent section and not frozen section.  Of note, there does appear to be an MRI correlate to this lesion on the arterial phase of the MRI from 10/3/2019.  The fourth lesion was also previously seen on MRI.  This lesion was posterior in segment 7 adjacent to a segment 7 branch of the right posterior portal vein.  This lesion was very vague, but circumscribed, and had some homogenous mild hyperdensity in the center with a hypoechoic border.  Multiple intra-operative frozen sections on this lesion did not show evidence of malignancy.  Two 14 gauge permanent cores were also sent of this lesion.  Following completion of biopsies, microwave ablation was performed on the two lesions in segment 6/7 and also the lesion in segment  7.  All three lesions were completely obliterated by the ablation zone on ultrasound.     Description of Procedure: After appropriate informed consent was obtained, the patient was brought to the operating room where a timeout was performed to identify the correct patient, procedure, and site.  Ancef was administered for perioperative antibiotic prophylaxis.  SCDs were placed for DVT prophylaxis.  The patient was also given 5000 units subcutaneous heparin in the pre-operative holding area for DVT prophylaxis.  The patient was placed in suppine position with a right sided bump, the right arm tucked to the side, and the left arm out to the side.  He was then placed under general endotracheal anesthesia.  A Mosher catheter was placed.  An OG tube was placed.  The patient was prepped and draped in a sterile fashion.  We began by placing a Veres needle into the peritoneal cavity at Sauceda's point in the left upper quadrant.  Insufflation with CO2 was established.  We then inserted a supraumbilical 5 mm OptiView port under direct visualization.  At this point, we could see there were adhesions of the omentum to the right lateral abdominal wall and gallbladder in the right upper quadrant that obstructed view of the operating field and prevented placement of ports in that location.  A second 5 mm OptiView port was place in the right abdomen, below the adhesions, at the level of the umbilicus.  We then took down the adhesions to the abdominal wall using Harmonic.  The liver appeared very nodular and was very firm in texture.  We were then able to place a subcostal 12 mm OptiView port in the right upper quadrant for facilitation of ultrasound.  We retracted the gallbladder cephalad and took down omental adhesions using Harmonic.  At this point, we attempted laparoscopic ultrasound, but this was very difficult given the rough and uneven surface of the liver.  This made visualization poor.  Even with partial mobilization of the  right liver, I was unable to follow vasculature to locate the lesions of interest.  We thus removed the trocars and made a right subcostal incision and placed a retractor.  The falciform ligament was completely taken down to the IVC and the root of the right hepatic vein.  The right liver was then completely mobilized via incision of the diaphragmatic attachments.  Laps were placed behind the right liver to rotate medial and anterior into our surgical field.  Intra-operative ultrasound was then performed and identified a lesion in segment 5 adjacent to the portal structures, a lesion in segment 6/7 adjacent to the segment 6/7 branch of the right hepatic vein and corresponding to a LI-RADS 5 lesion on MRI, a smaller lesion in segment 6/7 approximately 1-2 cm from the other segment 6/7 lesion, and a fourth lesion in segment 7 adjacent to a segment 7 branch of the right posterior portal vein.  These lesions were all marked on the surface of the liver using Bovie cautery.  We proceeded to take 4 TruCut core needle biopsies of the segment 7 lesion and 2 TruCut core needle biopsies of the LI-RADS 5 segment 6/7 lesion under ultrasound guidance and sent for frozen section.  These all returned negative with the exception of 1 of 2 core biopsies from the LI-RADS 5 segment 6/7 lesion returning as positive for HCC.  We sent 4 additional permanent core needle sections under ultrasound guidance, 1 from each of the segment 6/7 lesions, and 2 from the segment 7 lesion.  We did not biopsy the segment 5 lesion given this was already a known HCC.  At this point, I placed a NeuWave PR20 probe under ultrasound guidance and performed three successive ablations (65 logan x 5 minutes each) spanning both segment 6/7 lesions in order to obtain a good ablation margin.  Ultrasound confirmed obliteration of the lesions within the ablation zone.  I subsequently placed a NeuWave PR20 probe in the segment 7 lesion under ultrasound guidance and  "performed a single ablation at 65 logan x 5 minutes.  Again, ultrasound confirmed obliteration of the lesion within the ablation zone.  At this point, the probe and all laps were removed and hemostasis achieved with Argon Beam and SurgiCell.  The abdomen was irrigated with sterile saline.  An intra-operative liver ultrasound was again performed, this time with doppler flow confirming portal and hepatic venous flow to all non-ablated areas of the liver.  The liver color and appearance were unchanged from prior to the procedure.  The 12 mm OptiView trocar sight fascia was closed from the inside using an 0 vicryl suture.  We then closed the abdomen in 2 layers using looped #1 PDS.  The incisions were irrigated then closed with 4-0 monacryl and covered with skin glue.  All instrument, sponge, and needle counts were correct x 2 at the end of the case.  The patient was then extubated and taken to the PACU in stable condition.  He tolerated the procedure well.  There was no family to discuss the case with in the waiting room.     Estimated Blood Loss: 100 mL  Urine Output: See anesthesia record  Fluids: See anesthesia record  Drains: None  Specimens:   1. Core Specimens for Frozen (A1, B1, C1, and F1) from Segment 7 Lesion (All Negative on Frozen)  2. Core Specimens for Permanent (x 2) from Segment 7 Lesion  3. Core Specimens for Frozen (D1 and E1) from LI-RADS 5 Segment 6/7 Lesion (1 of 2 Positive for HCC on Frozen)  4. Core Specimen for Permanent (x 1 \"Lateral\") from LI-RADS 5 Segment 6/7 Lesion  5. Core Specimen for Permanent (x 1 \"Medial\") from Segment 6/7 Lesion     Disposition: PACU --> 7C  "

## 2019-10-22 NOTE — PROGRESS NOTES
I made a call to the patient's brother, Rob Johnson, at [phone] and spoke with him in order to discuss addition of possible microwave ablation to the patient's consent given 2 of the lesions in the liver are amenable to ablation.  The patient was okay with this as it had previously been discussed, but had received a dose of Versed.  Thus he was not able to consent.  He has given DPOA to his brother for the purpose of confirming the add on consent today.  His brother confirmed addition of the consent and possible ablation was added to the consent today.

## 2019-10-22 NOTE — OR NURSING
Dr. Orourke pagefabiola regarding pts hypertension. 160-180s/90-110s. PRN labetalol ordered q5 min. Goal: SBP<140 DBP<100

## 2019-10-22 NOTE — ANESTHESIA PROCEDURE NOTES
Peripheral Nerve Block Procedure Note  Date/Time: 10/22/2019 7:50 AM    Staff:     Anesthesiologist:  Jasen Lechuga MD    Resident/CRNA:  Viola Ragsdale MD    Block performed by resident/CRNA in the presence of a teaching physician    Location: Pre-op  Procedure Start/Stop TImes:      10/22/2019 7:45 AM     10/22/2019 7:54 AM    patient identified, IV checked, site marked, risks and benefits discussed, informed consent, monitors and equipment checked, pre-op evaluation, at physician/surgeon's request and post-op pain management      Correct Patient: Yes      Correct Position: Yes      Correct Site: Yes      Correct Procedure: Yes      Correct Laterality:  Yes    Site Marked:  Yes  Procedure details:     Procedure:  TAP    ASA:  3    Diagnosis:  Diagnostic laparoscopy    Laterality:  Bilateral    Position:  Supine    Sterile Prep: chloraprep, mask and sterile gloves      Needle:  Short bevel    Needle gauge:  21    Needle length (mm):  110    Ultrasound: Yes      Ultrasound used to identify targeted nerve, plexus, or vascular structure and placed a needle adjacent to it      Permanent Image entered into patiient's record      Abnormal pain on injection: No      Blood Aspirated: No      Bleeding at site: No      Bolus via:  Needle    Infusion Method:  Single Shot    Complications:  None  Assessment/Narrative:      Bilateral subcostal Blck

## 2019-10-23 ENCOUNTER — APPOINTMENT (OUTPATIENT)
Dept: PHYSICAL THERAPY | Facility: CLINIC | Age: 59
End: 2019-10-23
Attending: SURGERY
Payer: COMMERCIAL

## 2019-10-23 LAB
ALBUMIN SERPL-MCNC: 3 G/DL (ref 3.4–5)
ALP SERPL-CCNC: 108 U/L (ref 40–150)
ALT SERPL W P-5'-P-CCNC: 624 U/L (ref 0–70)
ANION GAP SERPL CALCULATED.3IONS-SCNC: 10 MMOL/L (ref 3–14)
APTT PPP: 28 SEC (ref 22–37)
AST SERPL W P-5'-P-CCNC: 1100 U/L (ref 0–45)
BASOPHILS # BLD AUTO: 0 10E9/L (ref 0–0.2)
BASOPHILS NFR BLD AUTO: 0.1 %
BILIRUB SERPL-MCNC: 1.7 MG/DL (ref 0.2–1.3)
BUN SERPL-MCNC: 18 MG/DL (ref 7–30)
CALCIUM SERPL-MCNC: 8.3 MG/DL (ref 8.5–10.1)
CHLORIDE SERPL-SCNC: 105 MMOL/L (ref 94–109)
CO2 SERPL-SCNC: 22 MMOL/L (ref 20–32)
CREAT SERPL-MCNC: 0.92 MG/DL (ref 0.66–1.25)
DIFFERENTIAL METHOD BLD: ABNORMAL
EOSINOPHIL # BLD AUTO: 0 10E9/L (ref 0–0.7)
EOSINOPHIL NFR BLD AUTO: 0 %
ERYTHROCYTE [DISTWIDTH] IN BLOOD BY AUTOMATED COUNT: 13.8 % (ref 10–15)
GFR SERPL CREATININE-BSD FRML MDRD: 89 ML/MIN/{1.73_M2}
GLUCOSE BLDC GLUCOMTR-MCNC: 91 MG/DL (ref 70–99)
GLUCOSE SERPL-MCNC: 113 MG/DL (ref 70–99)
HCT VFR BLD AUTO: 45 % (ref 40–53)
HGB BLD-MCNC: 16.3 G/DL (ref 13.3–17.7)
IMM GRANULOCYTES # BLD: 0.2 10E9/L (ref 0–0.4)
IMM GRANULOCYTES NFR BLD: 0.7 %
INR PPP: 1.24 (ref 0.86–1.14)
LYMPHOCYTES # BLD AUTO: 1.7 10E9/L (ref 0.8–5.3)
LYMPHOCYTES NFR BLD AUTO: 7.7 %
MCH RBC QN AUTO: 34.4 PG (ref 26.5–33)
MCHC RBC AUTO-ENTMCNC: 36.2 G/DL (ref 31.5–36.5)
MCV RBC AUTO: 95 FL (ref 78–100)
MONOCYTES # BLD AUTO: 3.1 10E9/L (ref 0–1.3)
MONOCYTES NFR BLD AUTO: 13.9 %
NEUTROPHILS # BLD AUTO: 17.2 10E9/L (ref 1.6–8.3)
NEUTROPHILS NFR BLD AUTO: 77.6 %
NRBC # BLD AUTO: 0 10*3/UL
NRBC BLD AUTO-RTO: 0 /100
PHOSPHATE SERPL-MCNC: 3.7 MG/DL (ref 2.5–4.5)
PLATELET # BLD AUTO: 82 10E9/L (ref 150–450)
POTASSIUM SERPL-SCNC: 4.2 MMOL/L (ref 3.4–5.3)
PROT SERPL-MCNC: 6.4 G/DL (ref 6.8–8.8)
RBC # BLD AUTO: 4.74 10E12/L (ref 4.4–5.9)
SODIUM SERPL-SCNC: 137 MMOL/L (ref 133–144)
WBC # BLD AUTO: 22.2 10E9/L (ref 4–11)

## 2019-10-23 PROCEDURE — 97530 THERAPEUTIC ACTIVITIES: CPT | Mod: GP

## 2019-10-23 PROCEDURE — 25800030 ZZH RX IP 258 OP 636: Performed by: PHYSICIAN ASSISTANT

## 2019-10-23 PROCEDURE — 25000128 H RX IP 250 OP 636: Performed by: STUDENT IN AN ORGANIZED HEALTH CARE EDUCATION/TRAINING PROGRAM

## 2019-10-23 PROCEDURE — 00000146 ZZHCL STATISTIC GLUCOSE BY METER IP

## 2019-10-23 PROCEDURE — 99231 SBSQ HOSP IP/OBS SF/LOW 25: CPT | Performed by: NURSE PRACTITIONER

## 2019-10-23 PROCEDURE — 12000001 ZZH R&B MED SURG/OB UMMC

## 2019-10-23 PROCEDURE — 80053 COMPREHEN METABOLIC PANEL: CPT | Performed by: STUDENT IN AN ORGANIZED HEALTH CARE EDUCATION/TRAINING PROGRAM

## 2019-10-23 PROCEDURE — 25000132 ZZH RX MED GY IP 250 OP 250 PS 637: Performed by: STUDENT IN AN ORGANIZED HEALTH CARE EDUCATION/TRAINING PROGRAM

## 2019-10-23 PROCEDURE — 85610 PROTHROMBIN TIME: CPT | Performed by: STUDENT IN AN ORGANIZED HEALTH CARE EDUCATION/TRAINING PROGRAM

## 2019-10-23 PROCEDURE — 85025 COMPLETE CBC W/AUTO DIFF WBC: CPT | Performed by: STUDENT IN AN ORGANIZED HEALTH CARE EDUCATION/TRAINING PROGRAM

## 2019-10-23 PROCEDURE — 36415 COLL VENOUS BLD VENIPUNCTURE: CPT | Performed by: STUDENT IN AN ORGANIZED HEALTH CARE EDUCATION/TRAINING PROGRAM

## 2019-10-23 PROCEDURE — 97161 PT EVAL LOW COMPLEX 20 MIN: CPT | Mod: GP

## 2019-10-23 PROCEDURE — 84100 ASSAY OF PHOSPHORUS: CPT | Performed by: STUDENT IN AN ORGANIZED HEALTH CARE EDUCATION/TRAINING PROGRAM

## 2019-10-23 PROCEDURE — 25800030 ZZH RX IP 258 OP 636: Performed by: STUDENT IN AN ORGANIZED HEALTH CARE EDUCATION/TRAINING PROGRAM

## 2019-10-23 PROCEDURE — 85730 THROMBOPLASTIN TIME PARTIAL: CPT | Performed by: STUDENT IN AN ORGANIZED HEALTH CARE EDUCATION/TRAINING PROGRAM

## 2019-10-23 RX ORDER — HYDROXYZINE HYDROCHLORIDE 25 MG/1
50 TABLET, FILM COATED ORAL EVERY 6 HOURS PRN
Status: DISCONTINUED | OUTPATIENT
Start: 2019-10-23 | End: 2019-10-28 | Stop reason: HOSPADM

## 2019-10-23 RX ORDER — HYDRALAZINE HYDROCHLORIDE 20 MG/ML
5 INJECTION INTRAMUSCULAR; INTRAVENOUS EVERY 6 HOURS PRN
Status: DISCONTINUED | OUTPATIENT
Start: 2019-10-23 | End: 2019-10-28 | Stop reason: HOSPADM

## 2019-10-23 RX ORDER — HYDROXYZINE HYDROCHLORIDE 25 MG/1
25 TABLET, FILM COATED ORAL EVERY 6 HOURS PRN
Status: DISCONTINUED | OUTPATIENT
Start: 2019-10-23 | End: 2019-10-28 | Stop reason: HOSPADM

## 2019-10-23 RX ORDER — LABETALOL 20 MG/4 ML (5 MG/ML) INTRAVENOUS SYRINGE
20 EVERY 6 HOURS PRN
Status: DISCONTINUED | OUTPATIENT
Start: 2019-10-23 | End: 2019-10-28 | Stop reason: HOSPADM

## 2019-10-23 RX ORDER — LORATADINE 10 MG/1
10 TABLET ORAL DAILY
Status: DISCONTINUED | OUTPATIENT
Start: 2019-10-23 | End: 2019-10-28 | Stop reason: HOSPADM

## 2019-10-23 RX ORDER — OXYCODONE HYDROCHLORIDE 5 MG/1
5 TABLET ORAL EVERY 4 HOURS PRN
Status: DISCONTINUED | OUTPATIENT
Start: 2019-10-23 | End: 2019-10-23

## 2019-10-23 RX ORDER — LISINOPRIL 20 MG/1
20 TABLET ORAL DAILY
Status: DISCONTINUED | OUTPATIENT
Start: 2019-10-23 | End: 2019-10-28 | Stop reason: HOSPADM

## 2019-10-23 RX ORDER — FLUTICASONE PROPIONATE 50 MCG
2 SPRAY, SUSPENSION (ML) NASAL DAILY
Status: DISCONTINUED | OUTPATIENT
Start: 2019-10-23 | End: 2019-10-28 | Stop reason: HOSPADM

## 2019-10-23 RX ADMIN — SODIUM CHLORIDE, POTASSIUM CHLORIDE, SODIUM LACTATE AND CALCIUM CHLORIDE 500 ML: 600; 310; 30; 20 INJECTION, SOLUTION INTRAVENOUS at 12:30

## 2019-10-23 RX ADMIN — HYDROXYZINE HYDROCHLORIDE 50 MG: 25 TABLET, FILM COATED ORAL at 23:54

## 2019-10-23 RX ADMIN — SODIUM CHLORIDE, POTASSIUM CHLORIDE, SODIUM LACTATE AND CALCIUM CHLORIDE: 600; 310; 30; 20 INJECTION, SOLUTION INTRAVENOUS at 15:39

## 2019-10-23 RX ADMIN — GABAPENTIN 300 MG: 300 CAPSULE ORAL at 07:41

## 2019-10-23 RX ADMIN — SODIUM CHLORIDE, POTASSIUM CHLORIDE, SODIUM LACTATE AND CALCIUM CHLORIDE: 600; 310; 30; 20 INJECTION, SOLUTION INTRAVENOUS at 09:40

## 2019-10-23 RX ADMIN — CYCLOBENZAPRINE HYDROCHLORIDE 10 MG: 10 TABLET, FILM COATED ORAL at 23:54

## 2019-10-23 RX ADMIN — GABAPENTIN 300 MG: 300 CAPSULE ORAL at 20:20

## 2019-10-23 RX ADMIN — ONDANSETRON 4 MG: 4 TABLET, ORALLY DISINTEGRATING ORAL at 07:41

## 2019-10-23 RX ADMIN — HYDROXYZINE HYDROCHLORIDE 50 MG: 25 TABLET, FILM COATED ORAL at 08:43

## 2019-10-23 RX ADMIN — CYCLOBENZAPRINE HYDROCHLORIDE 10 MG: 10 TABLET, FILM COATED ORAL at 07:42

## 2019-10-23 RX ADMIN — CYCLOBENZAPRINE HYDROCHLORIDE 10 MG: 10 TABLET, FILM COATED ORAL at 13:11

## 2019-10-23 RX ADMIN — LABETALOL 20 MG/4 ML (5 MG/ML) INTRAVENOUS SYRINGE 20 MG: at 09:08

## 2019-10-23 RX ADMIN — HYDRALAZINE HYDROCHLORIDE 5 MG: 20 INJECTION INTRAMUSCULAR; INTRAVENOUS at 07:59

## 2019-10-23 RX ADMIN — MENTHOL 2 PATCH: 205.5 PATCH TOPICAL at 17:28

## 2019-10-23 RX ADMIN — HYDROXYZINE HYDROCHLORIDE 50 MG: 25 TABLET, FILM COATED ORAL at 14:47

## 2019-10-23 RX ADMIN — FLUTICASONE PROPIONATE 2 SPRAY: 50 SPRAY, METERED NASAL at 07:42

## 2019-10-23 RX ADMIN — LISINOPRIL 20 MG: 20 TABLET ORAL at 11:11

## 2019-10-23 RX ADMIN — LORATADINE 10 MG: 10 TABLET ORAL at 07:42

## 2019-10-23 ASSESSMENT — PAIN DESCRIPTION - DESCRIPTORS
DESCRIPTORS: ACHING
DESCRIPTORS: ACHING
DESCRIPTORS: ACHING;CONSTANT;DISCOMFORT;SHARP;SORE
DESCRIPTORS: ACHING;SHARP
DESCRIPTORS: ACHING;CONSTANT

## 2019-10-23 ASSESSMENT — ACTIVITIES OF DAILY LIVING (ADL)
ADLS_ACUITY_SCORE: 14
ADLS_ACUITY_SCORE: 14
ADLS_ACUITY_SCORE: 15
ADLS_ACUITY_SCORE: 16
ADLS_ACUITY_SCORE: 16
ADLS_ACUITY_SCORE: 14

## 2019-10-23 NOTE — PROGRESS NOTES
"POST OP CHECK  10/22/2019    Ten Johnson is a 59 year old male with h/o cirrhosis and continued alcohol use now POD #0 s/p exploratory laparotomy, lysis of adhesions, multiple liver core needle biopsies.    Pt reports pain is controlled. Denies any current nausea or vomiting. Has not passed gas. No new issues.    BP (!) 141/81 (BP Location: Right arm)   Pulse 60   Temp 95.8  F (35.4  C) (Axillary)   Resp 13   Ht 1.753 m (5' 9\")   Wt 79.2 kg (174 lb 9.7 oz)   SpO2 93%   BMI 25.78 kg/m    General: Alert, interactive, & in NAD.  Resp: Non labored breathing on room air  Cardiac: Non-cyanotic; extremities warm;   Abdomen: Soft, appropriately tender, non-distended. Incision clean, dry, intact, no erythema, warmth, or discharge. Lidocaine patch in place.  Extremities: No LE edema or obvious joint abnormalities    A/P: No acute post-op issues. Continue plan of care per primary team. Please call with questions.    Sarah Higgins MD  General Surgery    "

## 2019-10-23 NOTE — PROGRESS NOTES
"Surgical Oncology Progress Note    Subjective: No acute issues overnight. Pain is controlled. Denies fevers, chills, CP, SOB, and N/V. Tolerating clears. Voiding to waters. Denies flatus, denies BM.    Objective:   BP (!) 170/86 (BP Location: Right arm)   Pulse 60   Temp 97.6  F (36.4  C) (Oral)   Resp 17   Ht 1.753 m (5' 9\")   Wt 79.2 kg (174 lb 9.7 oz)   SpO2 93%   BMI 25.78 kg/m      PE:  Gen: Awake, alert, NAD   CV: RRR, normotensive  Resp: non-labored on room air  Abd: Soft, minimally-distended, ATTP, no signs of peritonitis  Ext: warm and well perfused  Incision: c/d/i with surgical glue    I/O last 3 completed shifts:  In: 2893.33 [P.O.:250; I.V.:2643.33]  Out: 1045 [Urine:945; Blood:100]    Recent labs Pending.      A/P: Ten Johnson is a 59 year old male, who is POD # 1  following diagnostic laparoscopy converted to open, IO US with liver biopsy and ablation x4.     NEURO Pain well controlled on  scheduled APAP and  Dilaudid PCA. PRN flexaril, pain patches  Changes:  Add PRN atarax    CV HDS. HTN. Baseline 140-150 SBP. Holding lisinopril until Cr results this AM. PRN labetalol and hydralazine for SBP>160   PULM Aggressive pulmonary toilet and I/S.   FEN/GI mIVF @ 100 ml/hr. Continue current diet     Trial of void today    HEME Hgb as above. Postop anemia expected for this surgery.  Continue to monitor. No transfusions indicated at this time   ID Afebrile, no leukocytosis.    Antibiotics: Completed periop antibiotics    ENDO No issues   ACTIVITY Up as tolerated  PT/OT consulted  Ambulate at least TID    PPx Heparin 5000 U TID   DISPO 7C, anticipated d/c to home in next 1-2 days pending pain control       Seen and discussed with chief resident, Dr. Hernandez, who will discuss with staff    Loni South MD  PGY-3, General Surgery  [phone]      "

## 2019-10-23 NOTE — PLAN OF CARE
7C PT  Discharge Planner PT   Patient plan for discharge: Home  Current status: PT evaluation complete, treatment initiated. Pt requires strong encouragement to participate, declines OOR activity 2/2 significant abdominal pain. Educated pt on abdominal precautions, provided handout, pt will require reinforcement. Pt performs supine>sit with SBA, poor adherence to abdominal precautions. SBA for sit<>stand.   Barriers to return to prior living situation: Medical status, pain, abdominal precautions  Recommendations for discharge: Anticipate home, possible HH/OP PT  Rationale for recommendations: Anticipate pt will be safe to return home when medically stable for discharge. Currently pt would benefit from further PT services to progress strength, endurance, and safety and independence with functional mobility.       Entered by: Rani Herman 10/23/2019 3:02 PM

## 2019-10-23 NOTE — PLAN OF CARE
POD # 1  following diagnostic laparoscopy converted to open, IO US with liver biopsy and ablation x4.  A&O x 4. Hypertensive, improved with prn labetalol, hydralazine and atarax for anxiety and flexeril for muscle spasms. Discussed BP with provider, goal is to have SBP < 160, home lisinopril ordered.   Pt states his pain is well managed when laying down but not tolerable when he tries to move.  Lido patches removed this morning.  RUQ incision has mild swelling, mild bruising noted. Ice packs helping with pain. LR bolus given per orders.  Mosher removed, clear, orange urine. Pain managed with dilaudid PCA. Tolerating clears, but poor appetite.  Only had sips of water and popsicles.  MIVF infusing via left PIV, right PIV saline locked. Pt sat at edge of bed and stood, but did not walk due to pain. PLAN: Encourage activity, due to void at 8:10 pm.

## 2019-10-23 NOTE — PROVIDER NOTIFICATION
Provider Paged: Resident Brannon Ivey.    Message:  BP above parameters. 196/97, hydralazine given, 165/86 upon recheck.      Next BP was 188/108, prn labetolol given. 168/88 upon recheck.      Orders to notify provider if SBP >180.    Addendum:  After prn labetolol and hydralazine, atarax, flexeril, /84.

## 2019-10-23 NOTE — PROGRESS NOTES
"   10/23/19 1506   Quick Adds   Type of Visit Initial PT Evaluation   Living Environment   Lives With alone   Living Arrangements house   Home Accessibility stairs to enter home   Number of Stairs, Main Entrance 4   Stair Railings, Main Entrance railing on right side (ascending)   Transportation Anticipated public transportation   Living Environment Comment Pt lives in a \"[facility] by [facility] that is split into several small apartments/rooms. He lives alone, has 4 stairs to enter building and resides on 1st level with all needs on 1 level. Pt does not drive at baseline, frequently uses bicycle for transportation.   Self-Care   Usual Activity Tolerance good   Current Activity Tolerance moderate   Regular Exercise Yes   Activity/Exercise Type biking   Exercise Amount/Frequency daily   Equipment Currently Used at Home none   Activity/Exercise/Self-Care Comment Pt active with frequent bicycling for transportation. Has a cane but does not use.   Functional Level Prior   Ambulation 0-->independent   Transferring 0-->independent   Toileting 0-->independent   Bathing 0-->independent   Communication 0-->understands/communicates without difficulty   Swallowing 0-->swallows foods/liquids without difficulty   Cognition 0 - no cognition issues reported   Fall history within last six months no   Which of the above functional risks had a recent onset or change? ambulation;transferring   Prior Functional Level Comment Prior independent with functional mobility and ADLs   General Information   Onset of Illness/Injury or Date of Surgery - Date 10/22/19   Referring Physician Loni South MD   Patient/Family Goals Statement To return home   Pertinent History of Current Problem (include personal factors and/or comorbidities that impact the POC) Per chart, Ten Johnson is a 59 year old male, who is POD # 1  following diagnostic laparoscopy converted to open, IO US with liver biopsy and ablation x4.    Precautions/Limitations " abdominal precautions   General Info Comments Activity orders: up with assist   Cognitive Status Examination   Orientation orientation to person, place and time   Level of Consciousness alert   Follows Commands and Answers Questions 100% of the time   Personal Safety and Judgment intact   Memory intact   Cognitive Comment Pt tangential with conversation, often requires redirection.   Pain Assessment   Patient Currently in Pain Yes, see Vital Sign flowsheet   Integumentary/Edema   Integumentary/Edema no deficits were identifed   Posture    Posture Forward head position;Protracted shoulders   Range of Motion (ROM)   ROM Comment B LE ROM WFL   Strength   Strength Comments B LE strength not formally assessed 2/2 abdominal precautions though pt demonstrates at least 3/5 B LE strength per observation of functional status   Bed Mobility   Bed Mobility Comments SBA supine>sit, poor adherence to log roll technique   Transfer Skills   Transfer Comments SBA sit<>stand   Gait   Gait Comments Not assessed   Balance   Balance Comments Good seated balance, SBA for standing balance   Sensory Examination   Sensory Perception no deficits were identified   General Therapy Interventions   Planned Therapy Interventions balance training;bed mobility training;gait training;neuromuscular re-education;strengthening;transfer training;home program guidelines;progressive activity/exercise;risk factor education   Clinical Impression   Criteria for Skilled Therapeutic Intervention yes, treatment indicated   PT Diagnosis Impaired functional mobility   Influenced by the following impairments Pain, abdominal precautions, decreased activity tolerance   Functional limitations due to impairments Pt requires assist for safe bed mobility and transfers within post-op precautions   Clinical Presentation Stable/Uncomplicated   Clinical Presentation Rationale PMH and clinical judgment   Clinical Decision Making (Complexity) Low complexity   Therapy  "Frequency 6x/week   Predicted Duration of Therapy Intervention (days/wks) 1 week   Anticipated Equipment Needs at Discharge   (Likely none though TBD)   Anticipated Discharge Disposition Home with Home Therapy;Home with Outpatient Therapy;Home   Risk & Benefits of therapy have been explained Yes   Patient, Family & other staff in agreement with plan of care Yes   [facility] TM \"6 Clicks\"   2016, Trustees of [facility], under license to [email or web address].  All rights reserved.   6 Clicks Short Forms Basic Mobility Inpatient Short Form   [facility]  \"6 Clicks\" V.2 Basic Mobility Inpatient Short Form   1. Turning from your back to your side while in a flat bed without using bedrails? 4 - None   2. Moving from lying on your back to sitting on the side of a flat bed without using bedrails? 3 - A Little   3. Moving to and from a bed to a chair (including a wheelchair)? 3 - A Little   4. Standing up from a chair using your arms (e.g., wheelchair, or bedside chair)? 3 - A Little   5. To walk in hospital room? 3 - A Little   6. Climbing 3-5 steps with a railing? 3 - A Little   Basic Mobility Raw Score (Score out of 24.Lower scores equate to lower levels of function) 19   Total Evaluation Time   Total Evaluation Time (Minutes) 10     "

## 2019-10-23 NOTE — PROGRESS NOTES
REGIONAL ANESTHESIA PAIN SERVICE FOLLOW UP:    Subjective and Interval History: Patient reports acute postop abdominal pain was well controlled overnight with nerve block and Dilaudid PCA and he slept comfortably for about 6 hours.  Pain worsened this morning and difficult for him to move.  States he is forgetful to use PCA, has adjuvants that are helping, but has not got out of bed due to postop pain.  Currently rating pain 8/10 at rest and 10/10 with activity.  Denies any weakness, paresthesias, circumoral numbness, metallic taste or tinnitus.      Medications related to Pain Management (From now, onward)    Start     Dose/Rate Route Frequency Ordered Stop    10/23/19 0737  hydrOXYzine (ATARAX) tablet 25 mg      25 mg Oral EVERY 6 HOURS PRN 10/23/19 0738      10/23/19 0737  hydrOXYzine (ATARAX) tablet 50 mg      50 mg Oral EVERY 6 HOURS PRN 10/23/19 0738      10/22/19 2000  Lidocaine (LIDOCARE) 4 % Patch 1-2 patch      1-2 patch  over 12 Hours Transdermal EVERY 24 HOURS 2000 10/22/19 1703      10/22/19 2000  lidocaine patch in PLACE       Transdermal EVERY 8 HOURS 10/22/19 1703      10/22/19 2000  gabapentin (NEURONTIN) capsule 300 mg      300 mg Oral 2 TIMES DAILY 10/22/19 1703 10/25/19 1959    10/22/19 2000  senna-docusate (SENOKOT-S/PERICOLACE) 8.6-50 MG per tablet 1 tablet      1 tablet Oral 2 TIMES DAILY 10/22/19 1703      10/22/19 2000  senna-docusate (SENOKOT-S/PERICOLACE) 8.6-50 MG per tablet 2 tablet      2 tablet Oral 2 TIMES DAILY 10/22/19 1703      10/22/19 1703  lidocaine 1 % 0.1-1 mL      0.1-1 mL Other EVERY 1 HOUR PRN 10/22/19 1703      10/22/19 1703  lidocaine (LMX4) cream       Topical EVERY 1 HOUR PRN 10/22/19 1703      10/22/19 1703  cyclobenzaprine (FLEXERIL) tablet 10 mg      10 mg Oral 3 TIMES DAILY PRN 10/22/19 1703      10/22/19 1445  HYDROmorphone (DILAUDID) PCA 0.2 mg/mL OPIOID NAIVE CrCl > 50       Intravenous CONTINUOUS 10/22/19 1443            Objective:  /80 (BP Location: Left  "arm)   Pulse 70   Temp 98.5  F (36.9  C) (Oral)   Resp 19   Ht 1.753 m (5' 9\")   Wt 79.2 kg (174 lb 9.7 oz)   SpO2 93%   BMI 25.78 kg/m    Exam:  General: alert and mild distress  Neuro: Strength BLE 5/5    Assessment and Plan:   Ten Johnson is a 59 year old male with history of cirrhosis, continued alcohol use, liver masses x 3 and liver lesions x 4, now POD #1 s/p diagnostic laparoscopy converted to open, I) US with liver biopsy and ablation x4.  Preoperatively patient received single shot injection bilateral transversus abdominis plane (TAP) nerve block.  Total bupivacaine 0.25% 20 mL and liposomal (long-acting) bupivacaine (Exparel) 1.3% 20 mL administered in anticipation of laparoscopic surgery; converted to upper right quadrant abdomen open incision.   No evidence of adverse side effects associated with nerve block injections.  Patient reporting improved pain control with adjustments in multimodal analgesia plan throughout today.        - NO other local anesthetic use within 96 hours of liposomal bupivacaine (Exparel), unless approved by [facility]  - patient received verbal and written instructions about liposomal bupivacaine and counseling about pharmacologic and nonpharmacologic measures for acute postoperative pain management  - please call [facility] if questions or concerns    SYDNIE Dangelo [facility]  Regional Anesthesia Pain Service ([facility])  10/23/2019 3:20 PM    [facility] Contact Info (for in-house use only):  Job code ID: [id number]   [place] [address]  [facility] [address]  [employer] phone: dial [phone], enter jobcode ID, then enter call-back number.    Text: Use [employer]COM on the Intranet <Paging/Directory> tab and enter Jobcode ID.   If no call back at any time, contact the hospital  and ask for [facility] attending or backup     "

## 2019-10-23 NOTE — PLAN OF CARE
Pt arrived from PACU at 1700, ambulated from litter to bed with Aof1. -160/, resolved on its own. Pt states baseline -150/80-90. AOVSS, on RA. Transverse RUQ incision and lap sites dermabond, ISHA, approximated, some edema around transverse incision. Mosher with good UOP. Last BM this morning prior to surgery. PIV infusing MIVF and PCA dilaudid. Pain controlled with PCA, lidocaine patches and ice packs. Denies nausea, tolerating CL diet. Continue POC.

## 2019-10-23 NOTE — PLAN OF CARE
A&O.VSS- hypertensive at baseline, parameters not met for prn hydralazine. CAPNO WNL. Pain controlled with PCA dilaudid and lidocaine patches. Transverse incision and lap sites c/d/i. PIV infusing MIVF. Mosher with adequate urine output. Denies passing gas. Clear liquid diet- denies nausea. PCD's in place. Pt refusing to use IS until morning, education done. Dangled on evenings. Continue with plan of care.

## 2019-10-24 ENCOUNTER — APPOINTMENT (OUTPATIENT)
Dept: PHYSICAL THERAPY | Facility: CLINIC | Age: 59
End: 2019-10-24
Attending: SURGERY
Payer: COMMERCIAL

## 2019-10-24 ENCOUNTER — APPOINTMENT (OUTPATIENT)
Dept: GENERAL RADIOLOGY | Facility: CLINIC | Age: 59
End: 2019-10-24
Attending: SURGERY
Payer: COMMERCIAL

## 2019-10-24 PROBLEM — D69.6 THROMBOCYTOPENIA (H): Status: ACTIVE | Noted: 2019-10-24

## 2019-10-24 PROBLEM — R74.8 ELEVATED LIVER ENZYMES: Status: ACTIVE | Noted: 2019-10-24

## 2019-10-24 PROBLEM — I10 HTN (HYPERTENSION): Status: ACTIVE | Noted: 2019-10-24

## 2019-10-24 PROBLEM — D72.829 LEUKOCYTOSIS: Status: ACTIVE | Noted: 2019-10-24

## 2019-10-24 PROBLEM — K56.7 ILEUS (H): Status: ACTIVE | Noted: 2019-10-24

## 2019-10-24 PROBLEM — E83.39 HYPOPHOSPHATEMIA: Status: ACTIVE | Noted: 2019-10-24

## 2019-10-24 LAB
ALBUMIN SERPL-MCNC: 2.6 G/DL (ref 3.4–5)
ALBUMIN SERPL-MCNC: 2.6 G/DL (ref 3.4–5)
ALP SERPL-CCNC: 118 U/L (ref 40–150)
ALP SERPL-CCNC: 119 U/L (ref 40–150)
ALT SERPL W P-5'-P-CCNC: 541 U/L (ref 0–70)
ALT SERPL W P-5'-P-CCNC: 593 U/L (ref 0–70)
ANION GAP SERPL CALCULATED.3IONS-SCNC: 6 MMOL/L (ref 3–14)
ANION GAP SERPL CALCULATED.3IONS-SCNC: 8 MMOL/L (ref 3–14)
AST SERPL W P-5'-P-CCNC: 602 U/L (ref 0–45)
AST SERPL W P-5'-P-CCNC: 748 U/L (ref 0–45)
BILIRUB SERPL-MCNC: 2 MG/DL (ref 0.2–1.3)
BILIRUB SERPL-MCNC: 2.1 MG/DL (ref 0.2–1.3)
BUN SERPL-MCNC: 21 MG/DL (ref 7–30)
BUN SERPL-MCNC: 22 MG/DL (ref 7–30)
CALCIUM SERPL-MCNC: 8 MG/DL (ref 8.5–10.1)
CALCIUM SERPL-MCNC: 8 MG/DL (ref 8.5–10.1)
CHLORIDE SERPL-SCNC: 102 MMOL/L (ref 94–109)
CHLORIDE SERPL-SCNC: 104 MMOL/L (ref 94–109)
CO2 SERPL-SCNC: 23 MMOL/L (ref 20–32)
CO2 SERPL-SCNC: 24 MMOL/L (ref 20–32)
COPATH REPORT: NORMAL
CREAT SERPL-MCNC: 1.06 MG/DL (ref 0.66–1.25)
CREAT SERPL-MCNC: 1.12 MG/DL (ref 0.66–1.25)
ERYTHROCYTE [DISTWIDTH] IN BLOOD BY AUTOMATED COUNT: 13.8 % (ref 10–15)
ERYTHROCYTE [DISTWIDTH] IN BLOOD BY AUTOMATED COUNT: 13.9 % (ref 10–15)
GFR SERPL CREATININE-BSD FRML MDRD: 70 ML/MIN/{1.73_M2}
GFR SERPL CREATININE-BSD FRML MDRD: 75 ML/MIN/{1.73_M2}
GLUCOSE SERPL-MCNC: 113 MG/DL (ref 70–99)
GLUCOSE SERPL-MCNC: 95 MG/DL (ref 70–99)
HCT VFR BLD AUTO: 37.4 % (ref 40–53)
HCT VFR BLD AUTO: 37.6 % (ref 40–53)
HGB BLD-MCNC: 13.1 G/DL (ref 13.3–17.7)
HGB BLD-MCNC: 13.2 G/DL (ref 13.3–17.7)
INR PPP: 1.2 (ref 0.86–1.14)
LACTATE BLD-SCNC: 2.2 MMOL/L (ref 0.7–2)
LACTATE BLD-SCNC: 2.6 MMOL/L (ref 0.7–2)
MCH RBC QN AUTO: 34.1 PG (ref 26.5–33)
MCH RBC QN AUTO: 34.7 PG (ref 26.5–33)
MCHC RBC AUTO-ENTMCNC: 35 G/DL (ref 31.5–36.5)
MCHC RBC AUTO-ENTMCNC: 35.1 G/DL (ref 31.5–36.5)
MCV RBC AUTO: 97 FL (ref 78–100)
MCV RBC AUTO: 99 FL (ref 78–100)
PHOSPHATE SERPL-MCNC: 2.4 MG/DL (ref 2.5–4.5)
PLATELET # BLD AUTO: 62 10E9/L (ref 150–450)
PLATELET # BLD AUTO: 64 10E9/L (ref 150–450)
POTASSIUM SERPL-SCNC: 4.2 MMOL/L (ref 3.4–5.3)
POTASSIUM SERPL-SCNC: 4.2 MMOL/L (ref 3.4–5.3)
PROT SERPL-MCNC: 5.6 G/DL (ref 6.8–8.8)
PROT SERPL-MCNC: 5.7 G/DL (ref 6.8–8.8)
RADIOLOGIST FLAGS: ABNORMAL
RBC # BLD AUTO: 3.78 10E12/L (ref 4.4–5.9)
RBC # BLD AUTO: 3.87 10E12/L (ref 4.4–5.9)
SODIUM SERPL-SCNC: 132 MMOL/L (ref 133–144)
SODIUM SERPL-SCNC: 135 MMOL/L (ref 133–144)
WBC # BLD AUTO: 15.5 10E9/L (ref 4–11)
WBC # BLD AUTO: 18.8 10E9/L (ref 4–11)

## 2019-10-24 PROCEDURE — 25000132 ZZH RX MED GY IP 250 OP 250 PS 637: Performed by: STUDENT IN AN ORGANIZED HEALTH CARE EDUCATION/TRAINING PROGRAM

## 2019-10-24 PROCEDURE — 84100 ASSAY OF PHOSPHORUS: CPT | Performed by: PHYSICIAN ASSISTANT

## 2019-10-24 PROCEDURE — 40000141 ZZH STATISTIC PERIPHERAL IV START W/O US GUIDANCE

## 2019-10-24 PROCEDURE — 83605 ASSAY OF LACTIC ACID: CPT | Performed by: PHYSICIAN ASSISTANT

## 2019-10-24 PROCEDURE — 74018 RADEX ABDOMEN 1 VIEW: CPT

## 2019-10-24 PROCEDURE — 99024 POSTOP FOLLOW-UP VISIT: CPT | Performed by: PHYSICIAN ASSISTANT

## 2019-10-24 PROCEDURE — 97116 GAIT TRAINING THERAPY: CPT | Mod: GP

## 2019-10-24 PROCEDURE — 25000125 ZZHC RX 250: Performed by: PHYSICIAN ASSISTANT

## 2019-10-24 PROCEDURE — 87040 BLOOD CULTURE FOR BACTERIA: CPT | Performed by: SURGERY

## 2019-10-24 PROCEDURE — 25800030 ZZH RX IP 258 OP 636: Performed by: STUDENT IN AN ORGANIZED HEALTH CARE EDUCATION/TRAINING PROGRAM

## 2019-10-24 PROCEDURE — 83605 ASSAY OF LACTIC ACID: CPT

## 2019-10-24 PROCEDURE — 36415 COLL VENOUS BLD VENIPUNCTURE: CPT | Performed by: PHYSICIAN ASSISTANT

## 2019-10-24 PROCEDURE — 80053 COMPREHEN METABOLIC PANEL: CPT | Performed by: PHYSICIAN ASSISTANT

## 2019-10-24 PROCEDURE — 36415 COLL VENOUS BLD VENIPUNCTURE: CPT | Performed by: SURGERY

## 2019-10-24 PROCEDURE — 85027 COMPLETE CBC AUTOMATED: CPT | Performed by: PHYSICIAN ASSISTANT

## 2019-10-24 PROCEDURE — 71045 X-RAY EXAM CHEST 1 VIEW: CPT

## 2019-10-24 PROCEDURE — 25800025 ZZH RX 258: Performed by: SURGERY

## 2019-10-24 PROCEDURE — 12000001 ZZH R&B MED SURG/OB UMMC

## 2019-10-24 PROCEDURE — 25800030 ZZH RX IP 258 OP 636: Performed by: PHYSICIAN ASSISTANT

## 2019-10-24 PROCEDURE — 85610 PROTHROMBIN TIME: CPT | Performed by: SURGERY

## 2019-10-24 PROCEDURE — 25000128 H RX IP 250 OP 636: Performed by: STUDENT IN AN ORGANIZED HEALTH CARE EDUCATION/TRAINING PROGRAM

## 2019-10-24 PROCEDURE — 97530 THERAPEUTIC ACTIVITIES: CPT | Mod: GP

## 2019-10-24 RX ORDER — LIDOCAINE 40 MG/G
CREAM TOPICAL
Status: DISCONTINUED | OUTPATIENT
Start: 2019-10-24 | End: 2019-10-26

## 2019-10-24 RX ORDER — DEXTROSE MONOHYDRATE, SODIUM CHLORIDE, AND POTASSIUM CHLORIDE 50; 1.49; 4.5 G/1000ML; G/1000ML; G/1000ML
INJECTION, SOLUTION INTRAVENOUS CONTINUOUS
Status: DISCONTINUED | OUTPATIENT
Start: 2019-10-24 | End: 2019-10-24

## 2019-10-24 RX ORDER — DEXTROSE MONOHYDRATE, SODIUM CHLORIDE, AND POTASSIUM CHLORIDE 50; 1.49; 9 G/1000ML; G/1000ML; G/1000ML
INJECTION, SOLUTION INTRAVENOUS CONTINUOUS
Status: DISCONTINUED | OUTPATIENT
Start: 2019-10-24 | End: 2019-10-25

## 2019-10-24 RX ORDER — OXYCODONE HYDROCHLORIDE 5 MG/1
5-10 TABLET ORAL EVERY 4 HOURS PRN
Status: DISCONTINUED | OUTPATIENT
Start: 2019-10-24 | End: 2019-10-28 | Stop reason: HOSPADM

## 2019-10-24 RX ORDER — LINEZOLID 2 MG/ML
600 INJECTION, SOLUTION INTRAVENOUS EVERY 12 HOURS
Status: DISCONTINUED | OUTPATIENT
Start: 2019-10-24 | End: 2019-10-28 | Stop reason: HOSPADM

## 2019-10-24 RX ORDER — HYDROMORPHONE HCL/0.9% NACL/PF 0.2MG/0.2
0.2 SYRINGE (ML) INTRAVENOUS
Status: DISCONTINUED | OUTPATIENT
Start: 2019-10-24 | End: 2019-10-28 | Stop reason: HOSPADM

## 2019-10-24 RX ADMIN — LIDOCAINE 2 PATCH: 560 PATCH PERCUTANEOUS; TOPICAL; TRANSDERMAL at 17:37

## 2019-10-24 RX ADMIN — OXYCODONE HYDROCHLORIDE 5 MG: 5 TABLET ORAL at 07:56

## 2019-10-24 RX ADMIN — CYCLOBENZAPRINE HYDROCHLORIDE 10 MG: 10 TABLET, FILM COATED ORAL at 04:24

## 2019-10-24 RX ADMIN — POTASSIUM PHOSPHATE, MONOBASIC AND POTASSIUM PHOSPHATE, DIBASIC 15 MMOL: 224; 236 INJECTION, SOLUTION INTRAVENOUS at 11:12

## 2019-10-24 RX ADMIN — OXYCODONE HYDROCHLORIDE 10 MG: 5 TABLET ORAL at 21:53

## 2019-10-24 RX ADMIN — GABAPENTIN 300 MG: 300 CAPSULE ORAL at 07:57

## 2019-10-24 RX ADMIN — POTASSIUM CHLORIDE, DEXTROSE MONOHYDRATE AND SODIUM CHLORIDE: 150; 5; 900 INJECTION, SOLUTION INTRAVENOUS at 09:41

## 2019-10-24 RX ADMIN — FLUTICASONE PROPIONATE 2 SPRAY: 50 SPRAY, METERED NASAL at 07:57

## 2019-10-24 RX ADMIN — HYDROXYZINE HYDROCHLORIDE 50 MG: 25 TABLET, FILM COATED ORAL at 09:00

## 2019-10-24 RX ADMIN — SODIUM CHLORIDE, POTASSIUM CHLORIDE, SODIUM LACTATE AND CALCIUM CHLORIDE: 600; 310; 30; 20 INJECTION, SOLUTION INTRAVENOUS at 01:36

## 2019-10-24 RX ADMIN — OXYCODONE HYDROCHLORIDE 10 MG: 5 TABLET ORAL at 11:25

## 2019-10-24 RX ADMIN — OXYCODONE HYDROCHLORIDE 5 MG: 5 TABLET ORAL at 06:46

## 2019-10-24 RX ADMIN — GABAPENTIN 300 MG: 300 CAPSULE ORAL at 20:20

## 2019-10-24 RX ADMIN — LINEZOLID 600 MG: 600 INJECTION, SOLUTION INTRAVENOUS at 15:10

## 2019-10-24 RX ADMIN — MENTHOL 2 PATCH: 205.5 PATCH TOPICAL at 09:39

## 2019-10-24 RX ADMIN — LORATADINE 10 MG: 10 TABLET ORAL at 07:57

## 2019-10-24 RX ADMIN — CYCLOBENZAPRINE HYDROCHLORIDE 10 MG: 10 TABLET, FILM COATED ORAL at 12:20

## 2019-10-24 RX ADMIN — LISINOPRIL 20 MG: 20 TABLET ORAL at 07:57

## 2019-10-24 RX ADMIN — OXYCODONE HYDROCHLORIDE 10 MG: 5 TABLET ORAL at 17:37

## 2019-10-24 ASSESSMENT — ACTIVITIES OF DAILY LIVING (ADL)
ADLS_ACUITY_SCORE: 15

## 2019-10-24 ASSESSMENT — PAIN DESCRIPTION - DESCRIPTORS
DESCRIPTORS: ACHING;CONSTANT;DISCOMFORT;SORE
DESCRIPTORS: ACHING;CONSTANT
DESCRIPTORS: ACHING;CONSTANT;DISCOMFORT;SORE
DESCRIPTORS: ACHING;CONSTANT
DESCRIPTORS: ACHING;CONSTANT;DISCOMFORT;SORE;SHARP

## 2019-10-24 NOTE — PLAN OF CARE
VSS on RA. A&O. Pt sat at edge of bed to use urinal numerous times, but refused to walk. States he will walk with PT this afternoon. IS encouraged. Education reinforced. Voiding adequate amounts.  No gas, no BM. Abdomen slightly distended. Declined senna. Tolerating clears without nausea. Pain managed with prn oxycodone, prn atarax, prn flexeril and icy hot patches. Pt. Would like Lidocaine patches when his icy hot patches are removed. PIV infusing phos replacement currently. Abdominal incision slightly edematous with small amount of bruising. Encourage ambulation, I.S., and return of bowel function.

## 2019-10-24 NOTE — PROVIDER NOTIFICATION
Notified Resident at 1523 regarding lab results.      Spoke with: Dr. South    Orders were not obtained.    Comments: Lactic 2.6   Pt already on antibiotics and hepatology will be consulted per MD

## 2019-10-24 NOTE — PLAN OF CARE
AOx4. TMAX 100.1, OVSS on 1 L nc; encouraged IS use and pt refused. Stood at bedside overnight to use urinal; PT/OT following. Tolerated clear liq diet, denies N/V. Denies flatus, no BM overnight. Abd inc liq bandage, ISHA. Pain managed with PCA Dilaudid, PRN Atarax, PRN Flexeril, and aqua-k pad. PIV infusing with IVMF and PCA Diludid. Cont to encourage pt IS use and ambulation.     Addendum 0650: PCA Dilaudid discontinued; started pt on PRN Oxycodone, PRN IV Dilaudid ordered for breakthrough pain.

## 2019-10-24 NOTE — PROGRESS NOTES
Surgical Oncology Progress Note    Interval History:  No acute events overnight. Tmax 100.1. Right upper abdominal pain around his incision. Denies nausea but does feel distended. No flatus or stool yet.     Physical Exam:   Temp:  [98.4  F (36.9  C)-100.1  F (37.8  C)] 99.5  F (37.5  C)  Pulse:  [70-78] 76  Heart Rate:  [70-94] 94  Resp:  [16-20] 20  BP: (113-188)/() 113/68  SpO2:  [92 %-96 %] 92 %  General: Alert, oriented, appears comfortable, NAD.  Respiratory: breathing non labored  Abdomen: Abdomen is soft, tender in right upper abdomen and right flank with erythema, mildly distended. Incision is clean, dry and intact.     Data:   All laboratory and imaging data in the past 24 hours reviewed  I/O last 3 completed shifts:  In: 4100 [P.O.:1200; I.V.:2400; IV Piggyback:500]  Out: 900 [Urine:900]  Recent Labs   Lab Test 10/23/19  0920 10/22/19  1914 10/22/19  1530 10/18/19  1752 09/24/19  0954   WBC 22.2*  --  17.6* 8.5 5.4   HGB 16.3  --  16.0 15.8 15.6   PLT 82* 75* 87* 87* 64*   INR 1.24*  --  1.28*  --  1.13      Recent Labs   Lab Test 10/23/19  0920 10/22/19  1530 10/18/19  1752    139 139   POTASSIUM 4.2 4.0 4.6   CHLORIDE 105 110* 102   CO2 22 20 30   BUN 18 22 19   CR 0.92 1.07 0.90   ANIONGAP 10 10 7   JOSEPHINE 8.3* 8.4* 9.1   * 189* 96      Recent Labs   Lab Test 10/23/19  0920   PROTTOTAL 6.4*   ALBUMIN 3.0*   BILITOTAL 1.7*   ALKPHOS 108   AST 1,100*   *     Assessment and Plan:     Ten Johnson is a 59 year old male with history of hepatocellular carcinoma POD 2 s/p diagnostic laparoscopy converted to open, multiple liver biopsies and liver ablation x4. Now with pain erythema at right upper abdomen and flank and ileus.     - available for pain is oxycodone prn, diluadid prn, flexeril prn, atarax prn, lidocaine patches, menthol patches  - HTN; improved with home lisinopril restarting, labetalol prn, hydralazine prn  - ileus: clear liquid diet,  ml/hr  - cellulitis vs  dependent edema: will discuss starting antibiotics with Dr. Benoit.   - thrombocytopenia: monitor, holding Lovenox  - elevated liver enzymes expected after liver ablation: improving, monitor  - hypophosphatemia: monitor and replace as needed  - PT, IS, out of bed and ambulation     Seen with Chief resident who will discuss with Staff.     CHAD Strong-C   Surgical Oncology

## 2019-10-24 NOTE — PLAN OF CARE
"/75 (BP Location: Right arm)   Pulse 76   Temp 98.4  F (36.9  C) (Oral)   Resp 16   Ht 1.753 m (5' 9\")   Wt 79.2 kg (174 lb 9.7 oz)   SpO2 93%   BMI 25.78 kg/m      A&Ox4. Pain more managed this evening with PCA dilaudid. Denies nausea and tolerating clear liquid diet. Bladder scanned at 2000 for 61cc then bladder scanned at 2200 for 209cc and pt was able to void 150cc. PIV infusing MIVF. Abd inc ISHA. Icy hot patches on. To be removed at 0130. No gas or stool. Refused stool softeners. Dangled and stood at edge of bed. Refused walking.   "

## 2019-10-24 NOTE — PROGRESS NOTES
Sepsis Evaluation Progress Note    I was called to see Ten Johnson due to abnormal vital signs triggering the Sepsis SIRS screening alert. He is not known to have an infection.     Physical Exam   Vital Signs:  Temp: 98.2  F (36.8  C) Temp src: Axillary BP: 126/85 Pulse: 101 Heart Rate: 109 Resp: 18 SpO2: 96 % O2 Device: None (Room air)      Lab:  Lactic Acid   Date Value Ref Range Status   10/24/2019 2.2 (H) 0.7 - 2.0 mmol/L Final       The patient is at baseline mental status.     The rest of their physical exam is significant for right flank abdominal pain, moderate abdominal distention, and blanching erythema along incision.    Assessment & Plan   Ten Johnson meets SIRS criteria but does NOT have a lactate >2 or other evidence of acute organ damage. There is no confirmed source of infection and leukocytosis is improving. These vital sign, lab and physical exam findings are NOT consistent with SEPSIS. Obtaining upright CXR as clinically suspicious for atelectasis contributing to vitals change, but also to evaluate for pneumoperitoneum on POD #2 after open liver biopsy.    Sepsis Time-Zero (time Sepsis diagnosis confirmed): 9:18 AM  10/24/19    Anti-infectives (From now, onward)    None        Current antibiotic coverage Will discuss with staff, as no indication of intra-abdominal infection at this time.     Disposition: The patient will remain on the current unit. We will continue to monitor this patient closely.  Loni South MD    Sepsis Criteria   Sepsis: 2+ SIRS criteria due to infection  Severe Sepsis: Sepsis AND 1+ new sign of acute organ dysfunction (Note: lactate >2 is organ dysfunction)  Septic Shock: Sepsis AND hypotension despite volume resuscitation with 30 ml/kg crystalloid

## 2019-10-24 NOTE — PROVIDER NOTIFICATION
Notified MD at 0844 AM regarding lab results.      Spoke with: Dr. Loni South    Comments: Provider notified that patient triggered the sepsis protocol and lactic acid was 2.2.  Awaiting orders.

## 2019-10-24 NOTE — PLAN OF CARE
Discharge Planner PT   Patient plan for discharge: home  Current status: PT: Pt agreeable to get out of bed but stay in room when PT approached. Pt transfers sup<>sit CGA with cues/edu for abd precs and partially followed today with half rolling to R side. Sit<>stand SBA and toileting SBA. Pt ambulated in room with IV pole SBA, steady and does move quickly. Pt supine in bed at end session, continued to declined amb OOR. Education provided on mobility/walking assisting with ileus which will lead to progressing oral intake, pt continued to decline.  Barriers to return to prior living situation: medical, functional status  Recommendations for discharge: home  Rationale for recommendations: Anticipate pt will progress functional mobility to be able to return home.       Entered by: Radha Padilla 10/24/2019 6:35 PM

## 2019-10-25 ENCOUNTER — APPOINTMENT (OUTPATIENT)
Dept: PHYSICAL THERAPY | Facility: CLINIC | Age: 59
End: 2019-10-25
Attending: SURGERY
Payer: COMMERCIAL

## 2019-10-25 LAB
ALBUMIN SERPL-MCNC: 3 G/DL (ref 3.4–5)
ALP SERPL-CCNC: 138 U/L (ref 40–150)
ALT SERPL W P-5'-P-CCNC: 484 U/L (ref 0–70)
ANION GAP SERPL CALCULATED.3IONS-SCNC: 8 MMOL/L (ref 3–14)
AST SERPL W P-5'-P-CCNC: 430 U/L (ref 0–45)
BILIRUB SERPL-MCNC: 3.1 MG/DL (ref 0.2–1.3)
BUN SERPL-MCNC: 15 MG/DL (ref 7–30)
CALCIUM SERPL-MCNC: 8.7 MG/DL (ref 8.5–10.1)
CHLORIDE SERPL-SCNC: 101 MMOL/L (ref 94–109)
CO2 SERPL-SCNC: 25 MMOL/L (ref 20–32)
CREAT SERPL-MCNC: 0.88 MG/DL (ref 0.66–1.25)
ERYTHROCYTE [DISTWIDTH] IN BLOOD BY AUTOMATED COUNT: 13.3 % (ref 10–15)
GFR SERPL CREATININE-BSD FRML MDRD: >90 ML/MIN/{1.73_M2}
GLUCOSE SERPL-MCNC: 89 MG/DL (ref 70–99)
HCT VFR BLD AUTO: 41.4 % (ref 40–53)
HGB BLD-MCNC: 14.5 G/DL (ref 13.3–17.7)
INR PPP: 1.12 (ref 0.86–1.14)
LACTATE BLD-SCNC: 1.8 MMOL/L (ref 0.7–2)
MCH RBC QN AUTO: 34.4 PG (ref 26.5–33)
MCHC RBC AUTO-ENTMCNC: 35 G/DL (ref 31.5–36.5)
MCV RBC AUTO: 98 FL (ref 78–100)
PHOSPHATE SERPL-MCNC: 2.7 MG/DL (ref 2.5–4.5)
PLATELET # BLD AUTO: 64 10E9/L (ref 150–450)
POTASSIUM SERPL-SCNC: 3.9 MMOL/L (ref 3.4–5.3)
PROT SERPL-MCNC: 6.7 G/DL (ref 6.8–8.8)
RBC # BLD AUTO: 4.21 10E12/L (ref 4.4–5.9)
SODIUM SERPL-SCNC: 134 MMOL/L (ref 133–144)
WBC # BLD AUTO: 17.2 10E9/L (ref 4–11)

## 2019-10-25 PROCEDURE — 25000128 H RX IP 250 OP 636: Performed by: STUDENT IN AN ORGANIZED HEALTH CARE EDUCATION/TRAINING PROGRAM

## 2019-10-25 PROCEDURE — 36415 COLL VENOUS BLD VENIPUNCTURE: CPT | Performed by: PHYSICIAN ASSISTANT

## 2019-10-25 PROCEDURE — 85610 PROTHROMBIN TIME: CPT | Performed by: PHYSICIAN ASSISTANT

## 2019-10-25 PROCEDURE — 99024 POSTOP FOLLOW-UP VISIT: CPT | Performed by: PHYSICIAN ASSISTANT

## 2019-10-25 PROCEDURE — 83605 ASSAY OF LACTIC ACID: CPT

## 2019-10-25 PROCEDURE — 40000556 ZZH STATISTIC PERIPHERAL IV START W US GUIDANCE

## 2019-10-25 PROCEDURE — 25000132 ZZH RX MED GY IP 250 OP 250 PS 637: Performed by: STUDENT IN AN ORGANIZED HEALTH CARE EDUCATION/TRAINING PROGRAM

## 2019-10-25 PROCEDURE — 85027 COMPLETE CBC AUTOMATED: CPT | Performed by: PHYSICIAN ASSISTANT

## 2019-10-25 PROCEDURE — 97530 THERAPEUTIC ACTIVITIES: CPT | Mod: GP | Performed by: PHYSICAL THERAPIST

## 2019-10-25 PROCEDURE — 90682 RIV4 VACC RECOMBINANT DNA IM: CPT | Performed by: SURGERY

## 2019-10-25 PROCEDURE — 25000128 H RX IP 250 OP 636: Performed by: SURGERY

## 2019-10-25 PROCEDURE — 80053 COMPREHEN METABOLIC PANEL: CPT | Performed by: PHYSICIAN ASSISTANT

## 2019-10-25 PROCEDURE — 97116 GAIT TRAINING THERAPY: CPT | Mod: GP | Performed by: PHYSICAL THERAPIST

## 2019-10-25 PROCEDURE — 12000001 ZZH R&B MED SURG/OB UMMC

## 2019-10-25 PROCEDURE — 84100 ASSAY OF PHOSPHORUS: CPT | Performed by: PHYSICIAN ASSISTANT

## 2019-10-25 RX ORDER — BISACODYL 10 MG
10 SUPPOSITORY, RECTAL RECTAL ONCE
Status: DISCONTINUED | OUTPATIENT
Start: 2019-10-25 | End: 2019-10-28 | Stop reason: HOSPADM

## 2019-10-25 RX ADMIN — GABAPENTIN 300 MG: 300 CAPSULE ORAL at 08:49

## 2019-10-25 RX ADMIN — OXYCODONE HYDROCHLORIDE 10 MG: 5 TABLET ORAL at 22:17

## 2019-10-25 RX ADMIN — LORATADINE 10 MG: 10 TABLET ORAL at 08:49

## 2019-10-25 RX ADMIN — LINEZOLID 600 MG: 600 INJECTION, SOLUTION INTRAVENOUS at 14:05

## 2019-10-25 RX ADMIN — OXYCODONE HYDROCHLORIDE 10 MG: 5 TABLET ORAL at 10:14

## 2019-10-25 RX ADMIN — INFLUENZA A VIRUS A/BRISBANE/02/2018 (H1N1) RECOMBINANT HEMAGGLUTININ ANTIGEN, INFLUENZA A VIRUS A/KANSAS/14/2017 (H3N2) RECOMBINANT HEMAGGLUTININ ANTIGEN, INFLUENZA B VIRUS B/PHUKET/3073/2013 RECOMBINANT HEMAGGLUTININ ANTIGEN, AND INFLUENZA B VIRUS B/MARYLAND/15/2016 RECOMBINANT HEMAGGLUTININ ANTIGEN 0.5 ML: 45; 45; 45; 45 INJECTION INTRAMUSCULAR at 09:02

## 2019-10-25 RX ADMIN — HYDROXYZINE HYDROCHLORIDE 50 MG: 25 TABLET, FILM COATED ORAL at 00:30

## 2019-10-25 RX ADMIN — OXYCODONE HYDROCHLORIDE 10 MG: 5 TABLET ORAL at 14:00

## 2019-10-25 RX ADMIN — SENNOSIDES AND DOCUSATE SODIUM 2 TABLET: 8.6; 5 TABLET ORAL at 21:04

## 2019-10-25 RX ADMIN — LINEZOLID 600 MG: 600 INJECTION, SOLUTION INTRAVENOUS at 02:02

## 2019-10-25 RX ADMIN — OXYCODONE HYDROCHLORIDE 10 MG: 5 TABLET ORAL at 02:01

## 2019-10-25 RX ADMIN — CYCLOBENZAPRINE HYDROCHLORIDE 10 MG: 10 TABLET, FILM COATED ORAL at 21:05

## 2019-10-25 RX ADMIN — MENTHOL 2 PATCH: 205.5 PATCH TOPICAL at 10:15

## 2019-10-25 RX ADMIN — LISINOPRIL 20 MG: 20 TABLET ORAL at 08:49

## 2019-10-25 RX ADMIN — OXYCODONE HYDROCHLORIDE 10 MG: 5 TABLET ORAL at 18:17

## 2019-10-25 RX ADMIN — HYDROXYZINE HYDROCHLORIDE 50 MG: 25 TABLET, FILM COATED ORAL at 19:05

## 2019-10-25 RX ADMIN — OXYCODONE HYDROCHLORIDE 10 MG: 5 TABLET ORAL at 05:57

## 2019-10-25 RX ADMIN — LIDOCAINE 2 PATCH: 560 PATCH PERCUTANEOUS; TOPICAL; TRANSDERMAL at 18:04

## 2019-10-25 RX ADMIN — MENTHOL 2 PATCH: 205.5 PATCH TOPICAL at 14:01

## 2019-10-25 ASSESSMENT — ACTIVITIES OF DAILY LIVING (ADL)
ADLS_ACUITY_SCORE: 16
ADLS_ACUITY_SCORE: 15
ADLS_ACUITY_SCORE: 16
ADLS_ACUITY_SCORE: 16

## 2019-10-25 ASSESSMENT — PAIN DESCRIPTION - DESCRIPTORS
DESCRIPTORS: ACHING

## 2019-10-25 ASSESSMENT — MIFFLIN-ST. JEOR: SCORE: 1615.03

## 2019-10-25 NOTE — PLAN OF CARE
"/75   Pulse 100   Temp 97.6  F (36.4  C) (Axillary)   Resp 16   Ht 1.753 m (5' 9\")   Wt 79.2 kg (174 lb 9.7 oz)   SpO2 94%   BMI 25.78 kg/m      A&Ox4. Pain managed with lidocane patches and oxycodone. Denies nausea. PIV infusing 30cc/hr. Abd inc with erythema, edema, and ecchymosis. On IV antibiotics. Abd distended. No gas or BM. Pt refuses to walk in the castro or take stool softeners. I have tried to educated the pt on his post op ileus, but refuses to listen to my education/advice. Voiding spont using the urinal. SBA when he goes to the bathroom.    "

## 2019-10-25 NOTE — PROGRESS NOTES
Surgical Oncology Progress Note    Interval History:  No acute events overnight. Afebrile. Feeling better today and pain controlled. Denies nausea. Passing flatus but no bowel movement yet.     Physical Exam:   Temp:  [96.4  F (35.8  C)-98.1  F (36.7  C)] 96.4  F (35.8  C)  Pulse:  [100] 100  Heart Rate:  [82-92] 87  Resp:  [15-18] 17  BP: (128-138)/(69-81) 138/81  SpO2:  [93 %-95 %] 93 %  General: Alert, oriented, appears comfortable, NAD.  Respiratory: breathing non labored  Abdomen: Abdomen is soft, tender around incision, mildly distended. Incision is clean, dry and intact. Right upper abdomen and flank ecchymosis.     Data:   All laboratory and imaging data in the past 24 hours reviewed  I/O last 3 completed shifts:  In: 1854.83 [P.O.:660; I.V.:1194.83]  Out: 2275 [Urine:2275]  Recent Labs   Lab Test 10/25/19  0752 10/24/19  1406 10/24/19  1003 10/24/19  0715 10/23/19  0920  10/22/19  1530   WBC 17.2* 15.5*  --  18.8* 22.2*  --  17.6*   HGB 14.5 13.1*  --  13.2* 16.3  --  16.0   PLT 64* 64*  --  62* 82*   < > 87*   INR  --   --  1.20*  --  1.24*  --  1.28*    < > = values in this interval not displayed.      Recent Labs   Lab Test 10/25/19  0752 10/24/19  1406 10/24/19  0715    132* 135   POTASSIUM 3.9 4.2 4.2   CHLORIDE 101 102 104   CO2 25 24 23   BUN 15 22 21   CR 0.88 1.12 1.06   ANIONGAP 8 6 8   JOSEPHINE 8.7 8.0* 8.0*   GLC 89 113* 95     Recent Labs   Lab Test 10/25/19  0752   PROTTOTAL 6.7*   ALBUMIN 3.0*   BILITOTAL 3.1*   ALKPHOS 138   *   *     Assessment and Plan:     Ten Johnson is a 59 year old male with history of hepatocellular carcinoma POD 3 s/p diagnostic laparoscopy converted to open, multiple liver biopsies and liver ablation x4.      - available for pain is oxycodone prn, gabapentin, diluadid prn, flexeril prn, atarax prn, lidocaine patches, menthol patches  - HTN: controlled with home lisinopril   - Tachycardia (resolved) with leukocytosis: Blood cultures pending, CXR 10/24  with left pleural effusion and pneumoperitoneum (expected after surgery). Linezolid started 10/24 (x5 days).   - ileus: AXR 10/24 demonstrating ileus. Now passing flatus. NPO, IVF 30 ml/hr. Suppository today. Possible diet advancement today pending INR   - elevated liver enzymes expected after liver ablation: improving, monitor  - hypophosphatemia: monitor and replace as needed  - hyponatremia: fluid restriction, monitor   - PT, IS, out of bed and ambulation     Seen with Chief resident who will discuss with Staff.     Ramandeep Hansen PA-C   Surgical Oncology

## 2019-10-25 NOTE — PLAN OF CARE
AVSS.  Patient states improved pain control today.  Also c/o left shoulder pain.  Icy Hot patches to shoulder and abdomen. LS diminished, using IS with encouragement.  Abdomen rounded, BS hypoactive, passing flatus, no BM.  Diet advanced to FL, writer advised pt to go slowly.  Transverse incision with mild edema and significant talia-incisional ecchymosis. No drainage present.  Ambulated in hallway x2 with SBA, showered.  PIV SL between abx.  Awaiting ROBF.

## 2019-10-25 NOTE — PLAN OF CARE
Discharge Planner PT   Patient plan for discharge: home  Current status: independent supine to/from sidelying to/from sitting with verbal cues to for abd precautions; independent sit to/from stand from EOB, armchair & toilet; independent amb on level without device 200 ft; modified independence to ascend/descend 4 steps with rail  Barriers to return to prior living situation: medical status  Recommendations for discharge: home  Rationale for recommendations: has progressed to independent mobility without device; needed verbal cues for abd precautions thru sidelying - patient verbalizes understanding, but is adamant that his way of moving is better. Has attained PT goals for inpatient stay. PT intervention discontinued.        Entered by: Dannielle Schumacher 10/25/2019 10:55 AM    Physical Therapy Discharge Summary    Reason for therapy discharge:    All goals and outcomes met, no further needs identified.    Progress towards therapy goal(s). See goals on Care Plan in [facility] electronic health record for goal details.  Goals met    Therapy recommendation(s):    No further therapy is recommended.  Continue home exercise program. - continue with daily walking program

## 2019-10-25 NOTE — PLAN OF CARE
AOx4. AVSS on ra. Up with SBA at bedside to use urinal overnight. IS use encouraged, pt refused. Abd distended, denies passing flatus and no BM on shift. NPO + sips okay. Denies N/V. AUO. Rt transverse abd inc with edema, erythema, and ecchymosis; liq bandage, ISHA. Lap sites liq bandage CDI. Abd pain managed with PRN Oxycodone, PRN Atarax, and Lidocaine patches. PIV infusing with IVMF. Encourage pt to ambulate, pt refuses most activity, pt educated on importance of ambulation; PT/OT following. Cont POC.

## 2019-10-25 NOTE — CONSULTS
Hepatology Consultation    Ten Johnson   MRN# [id number]     Age: 59 year old YOB: 1960       Referring provider: Gregorio Benoit  Attending Hepatologist: Zeb Sesay MD   Consult requested for: Evaluation of new ascities       Assessment and Recommendation:   Assessment:   Ten Johnson is a 59 year old male with past medical history of history of IV drug use with chronic hepatitis C, alcohol use disorder (active) with biopsy-proven cirrhosis complicated by EV and biopsy-proven hepatocellular carcinoma and  who is currently POD 3 s/p diagnostic laparoscopy converted to open, multiple liver biopsies and liver ablation x4. During the procedure, ascites was noted in the peritoneal cavity, which is new for him. His post-op course has been complicated by an adynamic ileus.       Recommendations:   1. Decompensated cirrhosis c/b HCC  2. Chronic HCV  3. New ascites  Follows with Dr Leventhal in clinic. Last seen 9/24/19. Was admitted for biopsy and ablation of newly discovered liver lesions. Last EGD (10/2017) with small varices (<5mm) in lower 1/3 of esophagus, small, Sarahi Hoffman tear with signs of recent bleeding, and mild portal hypertensive gastropathy. Abdominal MRI (6/19) demonstrated small amount of ascites which appears to have dissipated by the follow up MRI (10/19). He had an acute elevation in his transaminases after the procedure (AST 1100, ) which has been down-trending for the past two days (, ). His exam is complicated by distention and tenderness secondary to recent ex lap, but he does not appear to have a significant amount of abdominal fluid and exhibits only trace peripheral edema. Weight is at baseline. He is at risk for decompensation given his   - Monitor abdominal exam and daily weights for signs of fluid build up  - Suggest gentle use of diuretics (lasix 20 mg/day, spironolactone 50 mg/day)  if fluid overload becomes a concern   - Will defer scheduling  follow-up EGD as outpatient for monitoring of EV to outpatient.     4. Ileus  Abdominal xray indicates adynamic ileus. Has not had bowel movement since procedure; endorses flatus this morning. Reports he feels overall less distension. No N/V. No appetite. Abdominal pain lessened today when compared with yesterday. His post op pain management has included a dilaudid PCA (10/22-10/24) which was transitioned to oxycodone. He has received 70 mg oxycodone between yesterday morning and today. This is likely a large contributing factor along with adhesionlysis and surgery.   - Appears to be resolving slowly, continue to monitor   - PT/OT; encourage OOB activity  - Continue to educate on minimizing narcotic use   - Monitor electrolytes, replace as needed- Magnesium >2.0, Potassium >4.0    5. Alcohol use syndrome  Patient is actively drinking, which he knows rules him out as a candidate for transplant evaluation. Is not interested in stopping use.    6.Immunizations  - due for repeat pneumovax- may be given prior to discharge.     Will sign off for now, please call with questions or concerns.     Plan of care discussed with Dr. Sesay    Thank you for the opportunity to be involved in Ten Johnson care. Please call with any questions or concerns.     SYDNIE Osborn, CNP  Inpatient Hepatology [facility]  [phone]               History of Present Illness:   Ten Johnson is a 59 year old male with a history of Ten Johnson is a 59 year old male with past medical history of history of IV drug use with chronic hepatitis C, alcohol use disorder (active) with biopsy-proven cirrhosis complicated by EV and biopsy-proven hepatocellular carcinoma and other liver mass of unclear etiology who is currently POD 3 s/p diagnostic laparoscopy converted to open, multiple liver biopsies and liver ablation x4. During the procedure, acites was noted in the peritoneal cavity, which is new for him.     He reports feeling much better today.  "Endorses abdominal fullness and distention. No stool since surgery. Passing flatus. Denies nausea or vomiting. Reports pain in right lower quadrant/flank described as a dull ache at rest and \"makes me clench my teeth\" with activity. Denies edema, confusion, or jaundice. No change in bowel habits, melena, or hematochezia prior to admission. Reports that he has been active prior to admission, riding his bike and walking frequently. Positive for current EtOH use.            Past Medical History:     Past Medical History:   Diagnosis Date     JENNIFER (acute kidney injury) (H) 4/9/2019     Alcohol use disorder      Alcoholic cirrhosis (H)      Anticoagulant long-term use     plavix     Aortic stenosis, severe 2/21/2018     Ascites      Chronic allergic rhinitis      Chronic anemia      Chronic hepatitis C without hepatic coma (H) 05/10/2016    Untreated as of 2/2018     Cirrhosis (H) 2017    MRI finding     Diastolic dysfunction      Erosive gastropathy      Esophageal varices in alcoholic cirrhosis (H)      H/O upper gastrointestinal hemorrhage 09/2017     Hepatocellular carcinoma (H)      History of blood transfusion      History of drug abuse (H)     intranasal     Hypertension     essential     JANELLE (iron deficiency anemia)      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     Sarahi-Hoffman tear     History     Marijuana abuse      MRSA (methicillin resistant Staphylococcus aureus)      Nonrheumatic aortic valve stenosis 2/20/2018     Olecranon bursitis      Portal hypertension (H)      Right shoulder pain     history of rotator cuff repair     S/p TAVR (transcatheter aortic valve replacement), bioprosthetic      Severe aortic stenosis      Thrombocytopenia (H)               Past Surgical History:     Past Surgical History:   Procedure Laterality Date     ABLATE LIVER TUMOR N/A 10/22/2019    Procedure: Ablate liver tumor x3;  Surgeon: Gregorio Benoit MD;  Location:  OR "     ARTHROSCOPY SHOULDER ROTATOR CUFF REPAIR  7/31/2012    Procedure: ARTHROSCOPY SHOULDER ROTATOR CUFF REPAIR;  Right Shoulder Arthroscopic Rotator Cuff Repair, BicepsTenodesis,  Subacromial Decompression ;  Surgeon: Joi Castillo MD;  [facility]     ESOPHAGOSCOPY, GASTROSCOPY, DUODENOSCOPY (EGD), COMBINED N/A 10/23/2017    Procedure: COMBINED ESOPHAGOSCOPY, GASTROSCOPY, DUODENOSCOPY (EGD);;  Surgeon: Gentry Salas MD;  Location: [facility]     EXCHANGE POLY COMPONENT ARTHROPLASTY KNEE Right 3/4/2019    Procedure: REVISION RIGHT TOTAL KNEE POLY COMPONENT EXCHANGE;  Surgeon: Olvin Joe MD;  Location: [facility]     FACIAL RECONSTRUCTION SURGERY  1971     HEART CATH FEMORAL CANNULIZATION WITH OPEN STANDBY REPAIR AORTIC VALVE N/A 2/21/2018    Procedure: HEART CATH FEMORAL CANNULIZATION WITH OPEN STANDBY REPAIR AORTIC VALVE;;  Surgeon: Luis Baird MD;  Location: [facility]     IR LIVER BIOPSY PERCUTANEOUS  7/18/2019     IRRIGATION AND DEBRIDEMENT UPPER EXTREMITY, COMBINED  1/3/2012    Procedure:COMBINED IRRIGATION AND DEBRIDEMENT UPPER EXTREMITY; Irrigation & Debridement Left Elbow; Surgeon:CRISTHIAN ZHOU; Location:[facility]     LAPAROSCOPIC BIOPSY LIVER N/A 10/22/2019    Procedure: intraoperative liver ultrasound, laparoscopic converted to open liver biopsy x 6;  Surgeon: Gregorio Benoit MD;  [facility]: [facility]     LAPAROSCOPY DIAGNOSTIC (GENERAL) N/A 10/22/2019    Procedure: Diagnostic laparoscopy;  Surgeon: Gregorio Benoit MD;  [facility]     LAPAROTOMY, LYSIS ADHESIONS, COMBINED N/A 10/22/2019    Procedure: Laparotomy, lysis adhesions, combined;  Surgeon: Gregorio Benoit MD;  [facility]: [facility]     OPTICAL TRACKING SYSTEM ARTHROPLASTY KNEE Right 2/7/2019    Procedure: ARTHROPLASTY KNEE RIGHT;  Surgeon: Olvin Joe MD;  [facility]     REPAIR TENDON TRICEPS UPPER EXTREMITY  11/8/2011    Procedure:REPAIR TENDON TRICEPS UPPER EXTREMITY; Surgeon:CRISTHIAN ZHOU;  [facility]     SHOULDER SURGERY  2003    left, injury, torn tendons, hematoma     TRANSCATHETER AORTIC VALVE IMPLANT ANESTHESIA N/A 2/21/2018    Procedure: TRANSCATHETER AORTIC VALVE IMPLANT ANESTHESIA;  Transfemoral (Quiroz) Aortic Valve Implant 26mm MARTHA 3, with Cardiopulmonary Bypass Standby, transthoracic echocardiogram;  Surgeon: GENERIC ANESTHESIA PROVIDER;  Location: [facility]     TRANSPOSITION ULNAR NERVE (ELBOW)  11/8/2011    Procedure:TRANSPOSITION ULNAR NERVE (ELBOW); Final Procedure Done: Left Elbow Lateral Ulnar Collateral Repair And  Left Elbow Triceps Repair                Social History:     Social History     Tobacco Use     Smoking status: Never Smoker     Smokeless tobacco: Never Used   Substance Use Topics     Alcohol use: Yes     Comment: Alcohol use disorder, still actively drinking             Family History:   The family history includes Alcoholism in his paternal uncle; Cancer (age of onset: 62) in his mother; Diabetes in his maternal grandmother; Myocardial Infarction in his maternal grandfather; No Known Problems in his brother and paternal grandmother; Unknown/Adopted in his father and paternal grandfather.             Immunizations:     Immunization History   Administered Date(s) Administered     HEPA 03/08/2001     Influenza (IIV3) PF 10/28/2011, 12/18/2012, 02/09/2015     Influenza Quad, Recombinant, p-free (RIV4) 10/25/2019     Influenza Vaccine IM > 6 months Valent IIV4 10/24/2017, 01/30/2019     Pneumo Conj 13-V (2010&after) 02/09/2015     Pneumococcal 23 valent 09/20/2011     TD (ADULT, 7+) 03/08/2001, 09/01/2011, 03/05/2019     Twinrix A/B 05/31/2012, 02/09/2015            Allergies:     Allergies   Allergen Reactions     Zolpidem Other (See Comments)     Alcoholic.  Had reaction 3/17/13 while intoxicated which included black out, loss of awareness, paranoia.  Do not prescribe.  Dr. Celeste     Cats Other (See Comments)     rhinitis     Dogs Other (See Comments)     rhinitis  "    Pollen Extract Other (See Comments)     rhinits.             Medications:     Current Facility-Administered Medications   Medication     benzocaine-menthol (CEPACOL) 15-3.6 MG lozenge 1 lozenge     bisacodyl (DULCOLAX) Suppository 10 mg     cyclobenzaprine (FLEXERIL) tablet 10 mg     fluticasone (FLONASE) 50 MCG/ACT spray 2 spray     hydrALAZINE (APRESOLINE) injection 5 mg     HYDROmorphone (DILAUDID) injection 0.2 mg     hydrOXYzine (ATARAX) tablet 25 mg    Or     hydrOXYzine (ATARAX) tablet 50 mg     labetalol (NORMODYNE/TRANDATE) syringe 20 mg     Lidocaine (LIDOCARE) 4 % Patch 1-2 patch    And     lidocaine patch REMOVAL    And     lidocaine patch in PLACE     lidocaine (LMX4) cream     lidocaine (LMX4) cream     lidocaine 1 % 0.1-1 mL     lidocaine 1 % 0.1-1 mL     linezolid (ZYVOX) infusion 600 mg     lisinopril (PRINIVIL/ZESTRIL) tablet 20 mg     loratadine (CLARITIN) tablet 10 mg     menthol (ICY HOT) 5 % patch 2 patch    And     menthol (ICY HOT) Patch in Place    And     menthol (ICY HOT) patch REMOVAL     naloxone (NARCAN) injection 0.1-0.4 mg     ondansetron (ZOFRAN-ODT) ODT tab 4 mg    Or     ondansetron (ZOFRAN) injection 4 mg     oxyCODONE (ROXICODONE) tablet 5-10 mg     prochlorperazine (COMPAZINE) injection 10 mg    Or     prochlorperazine (COMPAZINE) tablet 10 mg     senna-docusate (SENOKOT-S/PERICOLACE) 8.6-50 MG per tablet 1 tablet    Or     senna-docusate (SENOKOT-S/PERICOLACE) 8.6-50 MG per tablet 2 tablet                 Review of Systems:    ROS: 10 point ROS neg other than the symptoms noted above in the HPI.          Physical Exam:   Blood pressure 138/81, pulse 100, temperature 96.4  F (35.8  C), temperature source Oral, resp. rate 17, height 1.753 m (5' 9\"), weight 82 kg (180 lb 11.2 oz), SpO2 93 %. Body mass index is 26.68 kg/m .    Intake/Output Summary (Last 24 hours) at 10/25/2019 1108  Last data filed at 10/25/2019 1000  Gross per 24 hour   Intake 1714.83 ml   Output 2575 ml "   Net -860.17 ml     General: In no acute distress, no facial muscle wasting  Neuro: AOx3, no asterixis  HEENT: PERRL, noscleral icterus, no oral lesions, mucous membranes dry with white coating on tongue  CV: S1/S2, without murmurs, skin warm and dry.   Lungs: clear to auscultation Respirations even and nonlabored on room air  Abd: Distended, transverse surgical incision, well approximated with adhesive with edema and ecchymosis at site. Tender to palpation near incision and right lower quadrant/flank. Mildlyympanic to percussion.   Extrem: Trace peripehral edema, bilateral LE  Skin: No jaundice.   Psych: Darting eye contact throughout exam. Fidgeting.          Data:     Lab Results   Component Value Date    WBC 17.2 10/25/2019     Lab Results   Component Value Date    RBC 4.21 10/25/2019     Lab Results   Component Value Date    HGB 14.5 10/25/2019     Lab Results   Component Value Date    HCT 41.4 10/25/2019     No components found for: MCT  Lab Results   Component Value Date    MCV 98 10/25/2019     Lab Results   Component Value Date    MCH 34.4 10/25/2019     Lab Results   Component Value Date    MCHC 35.0 10/25/2019     Lab Results   Component Value Date    RDW 13.3 10/25/2019     Lab Results   Component Value Date    PLT 64 10/25/2019       Last Basic Metabolic Panel:  Lab Results   Component Value Date     10/25/2019      Lab Results   Component Value Date    POTASSIUM 3.9 10/25/2019     Lab Results   Component Value Date    CHLORIDE 101 10/25/2019     Lab Results   Component Value Date    JOSEPHINE 8.7 10/25/2019     Lab Results   Component Value Date    CO2 25 10/25/2019     Lab Results   Component Value Date    BUN 15 10/25/2019     Lab Results   Component Value Date    CR 0.88 10/25/2019     Lab Results   Component Value Date    GLC 89 10/25/2019       Liver Function Studies -   Recent Labs   Lab Test 10/25/19  0752   PROTTOTAL 6.7*   ALBUMIN 3.0*   BILITOTAL 3.1*   ALKPHOS 138   *   *        Lab Results   Component Value Date    INR 1.12 10/25/2019       MELD-Na score: 15 at 10/25/2019  7:52 AM  MELD score: 12 at 10/25/2019  7:52 AM  Calculated from:  Serum Creatinine: 0.88 mg/dL (Rounded to 1 mg/dL) at 10/25/2019  7:52 AM  Serum Sodium: 134 mmol/L at 10/25/2019  7:52 AM  Total Bilirubin: 3.1 mg/dL at 10/25/2019  7:52 AM  INR(ratio): 1.12 at 10/25/2019  7:52 AM  Age: 59 years           Previous Endoscopy:   Last 10/23/17:   - Small (< 5 mm) varices were found in the lower third of the esophagus.   - A small non-bleeding Sarahi-Hoffman tear with stigmata of recent bleeding        was found.   - Mild portal hypertensive gastropathy was found in the stomach.   - Multiple dispersed, non-bleeding erosions were found in the gastric        antrum. There were no stigmata of recent bleeding.   - The examined duodenum was normal.     IMAGING:  Chest xray 10/24:   1. Pneumoperitoneum.  2. Small right greater than left pleural effusions with associated  atelectasis. Pneumonia difficult to exclude.  3. Air dilated large bowel loops over the left upper quadrant.    Abdominal xray 10/24:   Diffuse gaseous distention of the bowel compatible with adynamic  Ileus.

## 2019-10-26 LAB
ALBUMIN SERPL-MCNC: 2.5 G/DL (ref 3.4–5)
ALP SERPL-CCNC: 111 U/L (ref 40–150)
ALT SERPL W P-5'-P-CCNC: 298 U/L (ref 0–70)
ANION GAP SERPL CALCULATED.3IONS-SCNC: 5 MMOL/L (ref 3–14)
AST SERPL W P-5'-P-CCNC: 222 U/L (ref 0–45)
BILIRUB SERPL-MCNC: 2.6 MG/DL (ref 0.2–1.3)
BUN SERPL-MCNC: 16 MG/DL (ref 7–30)
CALCIUM SERPL-MCNC: 8.2 MG/DL (ref 8.5–10.1)
CHLORIDE SERPL-SCNC: 103 MMOL/L (ref 94–109)
CO2 SERPL-SCNC: 26 MMOL/L (ref 20–32)
CREAT SERPL-MCNC: 0.84 MG/DL (ref 0.66–1.25)
ERYTHROCYTE [DISTWIDTH] IN BLOOD BY AUTOMATED COUNT: 12.9 % (ref 10–15)
GFR SERPL CREATININE-BSD FRML MDRD: >90 ML/MIN/{1.73_M2}
GLUCOSE SERPL-MCNC: 90 MG/DL (ref 70–99)
HCT VFR BLD AUTO: 35 % (ref 40–53)
HGB BLD-MCNC: 12.3 G/DL (ref 13.3–17.7)
MCH RBC QN AUTO: 34.1 PG (ref 26.5–33)
MCHC RBC AUTO-ENTMCNC: 35.1 G/DL (ref 31.5–36.5)
MCV RBC AUTO: 97 FL (ref 78–100)
PHOSPHATE SERPL-MCNC: 3.1 MG/DL (ref 2.5–4.5)
PLATELET # BLD AUTO: 69 10E9/L (ref 150–450)
POTASSIUM SERPL-SCNC: 3.8 MMOL/L (ref 3.4–5.3)
PROT SERPL-MCNC: 5.6 G/DL (ref 6.8–8.8)
RBC # BLD AUTO: 3.61 10E12/L (ref 4.4–5.9)
SODIUM SERPL-SCNC: 134 MMOL/L (ref 133–144)
WBC # BLD AUTO: 12.4 10E9/L (ref 4–11)

## 2019-10-26 PROCEDURE — 80053 COMPREHEN METABOLIC PANEL: CPT | Performed by: PHYSICIAN ASSISTANT

## 2019-10-26 PROCEDURE — 25000132 ZZH RX MED GY IP 250 OP 250 PS 637: Performed by: STUDENT IN AN ORGANIZED HEALTH CARE EDUCATION/TRAINING PROGRAM

## 2019-10-26 PROCEDURE — 84100 ASSAY OF PHOSPHORUS: CPT | Performed by: PHYSICIAN ASSISTANT

## 2019-10-26 PROCEDURE — 40000556 ZZH STATISTIC PERIPHERAL IV START W US GUIDANCE

## 2019-10-26 PROCEDURE — 36415 COLL VENOUS BLD VENIPUNCTURE: CPT | Performed by: PHYSICIAN ASSISTANT

## 2019-10-26 PROCEDURE — 85027 COMPLETE CBC AUTOMATED: CPT | Performed by: PHYSICIAN ASSISTANT

## 2019-10-26 PROCEDURE — 25000128 H RX IP 250 OP 636: Performed by: STUDENT IN AN ORGANIZED HEALTH CARE EDUCATION/TRAINING PROGRAM

## 2019-10-26 PROCEDURE — 12000001 ZZH R&B MED SURG/OB UMMC

## 2019-10-26 RX ADMIN — OXYCODONE HYDROCHLORIDE 10 MG: 5 TABLET ORAL at 02:45

## 2019-10-26 RX ADMIN — OXYCODONE HYDROCHLORIDE 10 MG: 5 TABLET ORAL at 11:37

## 2019-10-26 RX ADMIN — CYCLOBENZAPRINE HYDROCHLORIDE 10 MG: 10 TABLET, FILM COATED ORAL at 18:20

## 2019-10-26 RX ADMIN — OXYCODONE HYDROCHLORIDE 10 MG: 5 TABLET ORAL at 07:05

## 2019-10-26 RX ADMIN — LINEZOLID 600 MG: 600 INJECTION, SOLUTION INTRAVENOUS at 14:08

## 2019-10-26 RX ADMIN — LORATADINE 10 MG: 10 TABLET ORAL at 08:38

## 2019-10-26 RX ADMIN — SENNOSIDES AND DOCUSATE SODIUM 2 TABLET: 8.6; 5 TABLET ORAL at 08:38

## 2019-10-26 RX ADMIN — OXYCODONE HYDROCHLORIDE 10 MG: 5 TABLET ORAL at 15:57

## 2019-10-26 RX ADMIN — LIDOCAINE 2 PATCH: 560 PATCH PERCUTANEOUS; TOPICAL; TRANSDERMAL at 18:20

## 2019-10-26 RX ADMIN — FLUTICASONE PROPIONATE 2 SPRAY: 50 SPRAY, METERED NASAL at 08:39

## 2019-10-26 RX ADMIN — OXYCODONE HYDROCHLORIDE 10 MG: 5 TABLET ORAL at 20:14

## 2019-10-26 RX ADMIN — LISINOPRIL 20 MG: 20 TABLET ORAL at 08:38

## 2019-10-26 RX ADMIN — HYDROXYZINE HYDROCHLORIDE 50 MG: 25 TABLET, FILM COATED ORAL at 14:17

## 2019-10-26 RX ADMIN — SENNOSIDES AND DOCUSATE SODIUM 2 TABLET: 8.6; 5 TABLET ORAL at 20:14

## 2019-10-26 RX ADMIN — CYCLOBENZAPRINE HYDROCHLORIDE 10 MG: 10 TABLET, FILM COATED ORAL at 08:38

## 2019-10-26 RX ADMIN — LINEZOLID 600 MG: 600 INJECTION, SOLUTION INTRAVENOUS at 02:44

## 2019-10-26 ASSESSMENT — ACTIVITIES OF DAILY LIVING (ADL)
ADLS_ACUITY_SCORE: 16
ADLS_ACUITY_SCORE: 16
ADLS_ACUITY_SCORE: 14
ADLS_ACUITY_SCORE: 16

## 2019-10-26 ASSESSMENT — PAIN DESCRIPTION - DESCRIPTORS
DESCRIPTORS: ACHING

## 2019-10-26 ASSESSMENT — MIFFLIN-ST. JEOR: SCORE: 1614.12

## 2019-10-26 NOTE — PLAN OF CARE
POD#4  s/p diagnostic laparoscopy converted to open, multiple liver biopsies and liver ablation. A&O. Hypertensive but WDL all other vitals stable. Pt reports good pain control, slept comfortably between cares. Lidocaine patch on abdomen. Pain managed with prn oxycodone x1. Transverse abdominal incision ISHA. Denies nausea, tolerating small amounts of full liquid diet. PIV in left arm. Pt reports positive flatus, no BM, voiding spontaneously in urinal. Independent with cares. Plan: Waiting for ROBF.

## 2019-10-26 NOTE — PLAN OF CARE
POD 3 s/p diagnostic laparoscopy converted to open, multiple liver biopsies and liver ablation. Pt reports good pain control, denies nausea, tolerating small amounts of full liquid diet. New PIV in place. Pt reports positive flatus, no BM, voiding spontaneously in urinal. Independent with cares. Plan: Waiting for ROBF.

## 2019-10-26 NOTE — PLAN OF CARE
A&O. /93, but within parameters for patient. SBA. Ambulated x 1. Voiding in urinal. Pain managed with oxycodone, atarax, flexeril. Lido patches on abdomen removed this morning. Drank mostly clears today, patient aware he can have fulls but not interested in the options. Passing lots of gas, no BM. PIV SL'd. IV abx. Took senna this morning. Continue to encourage return of bowel function.

## 2019-10-26 NOTE — PROGRESS NOTES
Surgical Oncology Progress Note    Interval History:  No acute events overnight. Afebrile. Pain well controlled. Passing a lot of flatus. No BM. Refusing enema - thinks he will go today. Tolerating FLD without issues. Denies nausea.     Physical Exam:   Temp:  [97.4  F (36.3  C)-98.9  F (37.2  C)] 97.7  F (36.5  C)  Pulse:  [81] 81  Heart Rate:  [] 97  Resp:  [16-18] 18  BP: (112-154)/(74-93) 153/93  SpO2:  [86 %-96 %] 92 %  General: Alert, oriented, appears comfortable, NAD.  Respiratory: breathing non labored  Abdomen: Abdomen is soft, tender around incision, mildly distended. Incision is clean, dry and intact. Right upper abdomen and flank ecchymosis.     Data:   All laboratory and imaging data in the past 24 hours reviewed  I/O last 3 completed shifts:  In: 1260 [P.O.:1260]  Out: 1600 [Urine:1600]  Recent Labs   Lab Test 10/26/19  0654 10/25/19  0752 10/24/19  1406 10/24/19  1003  10/23/19  0920   WBC 12.4* 17.2* 15.5*  --    < > 22.2*   HGB 12.3* 14.5 13.1*  --    < > 16.3   PLT 69* 64* 64*  --    < > 82*   INR  --  1.12  --  1.20*  --  1.24*    < > = values in this interval not displayed.      Recent Labs   Lab Test 10/26/19  0654 10/25/19  0752 10/24/19  1406    134 132*   POTASSIUM 3.8 3.9 4.2   CHLORIDE 103 101 102   CO2 26 25 24   BUN 16 15 22   CR 0.84 0.88 1.12   ANIONGAP 5 8 6   JOSEPHINE 8.2* 8.7 8.0*   GLC 90 89 113*     T bili 2.6      AST 22    Assessment and Plan:   Ten Johnson is a 59 year old male with history of hepatocellular carcinoma POD 3 s/p diagnostic laparoscopy converted to open, multiple liver biopsies and liver ablation x4.      - Pain: xycodone prn, gabapentin, diluadid prn, flexeril prn, atarax prn, lidocaine patches, menthol patches  - HTN: controlled with home lisinopril   - Tachycardia (resolved) with leukocytosis: Blood cultures NGTD, CXR 10/24 with left pleural effusion and pneumoperitoneum (expected after surgery). Linezolid started 10/24 (x5 days).   -  ileus: AXR 10/24 demonstrating ileus. Now passing flatus.   - FLD. Okay to advance to regular diet once BM.  - elevated liver enzymes expected after liver ablation: continue to down-trend. Re-check in AM  - hypophosphatemia: monitor and replace as needed  - hyponatremia: fluid restriction, monitor   - PT, IS, out of bed and ambulation  - Dispo: potential discharge 10/27 vs 10/28 pending ROBF, regular diet and continueed down-trending of WBC    Declan Hernandez MD (PGY-6)  Chief Resident - General Surgery  Pager #[phone]

## 2019-10-27 LAB
ALBUMIN SERPL-MCNC: 2.5 G/DL (ref 3.4–5)
ALP SERPL-CCNC: 114 U/L (ref 40–150)
ALT SERPL W P-5'-P-CCNC: 247 U/L (ref 0–70)
ANION GAP SERPL CALCULATED.3IONS-SCNC: 6 MMOL/L (ref 3–14)
AST SERPL W P-5'-P-CCNC: 167 U/L (ref 0–45)
BILIRUB SERPL-MCNC: 2.7 MG/DL (ref 0.2–1.3)
BUN SERPL-MCNC: 13 MG/DL (ref 7–30)
CALCIUM SERPL-MCNC: 8.3 MG/DL (ref 8.5–10.1)
CHLORIDE SERPL-SCNC: 101 MMOL/L (ref 94–109)
CO2 SERPL-SCNC: 28 MMOL/L (ref 20–32)
CREAT SERPL-MCNC: 0.88 MG/DL (ref 0.66–1.25)
ERYTHROCYTE [DISTWIDTH] IN BLOOD BY AUTOMATED COUNT: 12.8 % (ref 10–15)
GFR SERPL CREATININE-BSD FRML MDRD: >90 ML/MIN/{1.73_M2}
GLUCOSE SERPL-MCNC: 87 MG/DL (ref 70–99)
HCT VFR BLD AUTO: 35.5 % (ref 40–53)
HGB BLD-MCNC: 12.8 G/DL (ref 13.3–17.7)
LACTATE BLD-SCNC: 0.9 MMOL/L (ref 0.7–2)
MCH RBC QN AUTO: 34.5 PG (ref 26.5–33)
MCHC RBC AUTO-ENTMCNC: 36.1 G/DL (ref 31.5–36.5)
MCV RBC AUTO: 96 FL (ref 78–100)
PHOSPHATE SERPL-MCNC: 3.8 MG/DL (ref 2.5–4.5)
PLATELET # BLD AUTO: 84 10E9/L (ref 150–450)
POTASSIUM SERPL-SCNC: 3.9 MMOL/L (ref 3.4–5.3)
PROT SERPL-MCNC: 5.6 G/DL (ref 6.8–8.8)
RBC # BLD AUTO: 3.71 10E12/L (ref 4.4–5.9)
SODIUM SERPL-SCNC: 135 MMOL/L (ref 133–144)
WBC # BLD AUTO: 11.3 10E9/L (ref 4–11)

## 2019-10-27 PROCEDURE — 83605 ASSAY OF LACTIC ACID: CPT

## 2019-10-27 PROCEDURE — 85027 COMPLETE CBC AUTOMATED: CPT | Performed by: PHYSICIAN ASSISTANT

## 2019-10-27 PROCEDURE — 25000132 ZZH RX MED GY IP 250 OP 250 PS 637: Performed by: STUDENT IN AN ORGANIZED HEALTH CARE EDUCATION/TRAINING PROGRAM

## 2019-10-27 PROCEDURE — 40000556 ZZH STATISTIC PERIPHERAL IV START W US GUIDANCE

## 2019-10-27 PROCEDURE — 12000001 ZZH R&B MED SURG/OB UMMC

## 2019-10-27 PROCEDURE — 80053 COMPREHEN METABOLIC PANEL: CPT | Performed by: PHYSICIAN ASSISTANT

## 2019-10-27 PROCEDURE — 84100 ASSAY OF PHOSPHORUS: CPT | Performed by: PHYSICIAN ASSISTANT

## 2019-10-27 PROCEDURE — 25000132 ZZH RX MED GY IP 250 OP 250 PS 637: Performed by: SURGERY

## 2019-10-27 PROCEDURE — 25000128 H RX IP 250 OP 636: Performed by: STUDENT IN AN ORGANIZED HEALTH CARE EDUCATION/TRAINING PROGRAM

## 2019-10-27 PROCEDURE — 36415 COLL VENOUS BLD VENIPUNCTURE: CPT | Performed by: PHYSICIAN ASSISTANT

## 2019-10-27 RX ORDER — BISACODYL 10 MG
10 SUPPOSITORY, RECTAL RECTAL ONCE
Status: DISCONTINUED | OUTPATIENT
Start: 2019-10-27 | End: 2019-10-27

## 2019-10-27 RX ORDER — POLYETHYLENE GLYCOL 3350 17 G/17G
17 POWDER, FOR SOLUTION ORAL 3 TIMES DAILY
Status: DISCONTINUED | OUTPATIENT
Start: 2019-10-27 | End: 2019-10-28 | Stop reason: HOSPADM

## 2019-10-27 RX ORDER — MAGNESIUM CARB/ALUMINUM HYDROX 105-160MG
296 TABLET,CHEWABLE ORAL 2 TIMES DAILY
Status: DISPENSED | OUTPATIENT
Start: 2019-10-27 | End: 2019-10-28

## 2019-10-27 RX ADMIN — MAGNESIUM CITRATE 296 ML: 1.75 LIQUID ORAL at 09:42

## 2019-10-27 RX ADMIN — CYCLOBENZAPRINE HYDROCHLORIDE 10 MG: 10 TABLET, FILM COATED ORAL at 16:33

## 2019-10-27 RX ADMIN — FLUTICASONE PROPIONATE 2 SPRAY: 50 SPRAY, METERED NASAL at 08:13

## 2019-10-27 RX ADMIN — LINEZOLID 600 MG: 600 INJECTION, SOLUTION INTRAVENOUS at 02:33

## 2019-10-27 RX ADMIN — OXYCODONE HYDROCHLORIDE 10 MG: 5 TABLET ORAL at 17:33

## 2019-10-27 RX ADMIN — HYDROXYZINE HYDROCHLORIDE 50 MG: 25 TABLET, FILM COATED ORAL at 12:11

## 2019-10-27 RX ADMIN — CYCLOBENZAPRINE HYDROCHLORIDE 10 MG: 10 TABLET, FILM COATED ORAL at 08:08

## 2019-10-27 RX ADMIN — OXYCODONE HYDROCHLORIDE 10 MG: 5 TABLET ORAL at 09:17

## 2019-10-27 RX ADMIN — POLYETHYLENE GLYCOL 3350 17 G: 17 POWDER, FOR SOLUTION ORAL at 09:41

## 2019-10-27 RX ADMIN — OXYCODONE HYDROCHLORIDE 10 MG: 5 TABLET ORAL at 05:05

## 2019-10-27 RX ADMIN — POLYETHYLENE GLYCOL 3350 17 G: 17 POWDER, FOR SOLUTION ORAL at 14:24

## 2019-10-27 RX ADMIN — LORATADINE 10 MG: 10 TABLET ORAL at 08:08

## 2019-10-27 RX ADMIN — SENNOSIDES AND DOCUSATE SODIUM 2 TABLET: 8.6; 5 TABLET ORAL at 08:08

## 2019-10-27 RX ADMIN — LISINOPRIL 20 MG: 20 TABLET ORAL at 08:08

## 2019-10-27 RX ADMIN — OXYCODONE HYDROCHLORIDE 10 MG: 5 TABLET ORAL at 00:42

## 2019-10-27 RX ADMIN — OXYCODONE HYDROCHLORIDE 10 MG: 5 TABLET ORAL at 13:25

## 2019-10-27 RX ADMIN — LIDOCAINE 2 PATCH: 560 PATCH PERCUTANEOUS; TOPICAL; TRANSDERMAL at 19:14

## 2019-10-27 RX ADMIN — OXYCODONE HYDROCHLORIDE 10 MG: 5 TABLET ORAL at 21:35

## 2019-10-27 RX ADMIN — LINEZOLID 600 MG: 600 INJECTION, SOLUTION INTRAVENOUS at 14:25

## 2019-10-27 ASSESSMENT — ACTIVITIES OF DAILY LIVING (ADL)
ADLS_ACUITY_SCORE: 14

## 2019-10-27 ASSESSMENT — PAIN DESCRIPTION - DESCRIPTORS
DESCRIPTORS: ACHING
DESCRIPTORS: ACHING;SORE;STABBING
DESCRIPTORS: ACHING;SORE
DESCRIPTORS: ACHING

## 2019-10-27 ASSESSMENT — MIFFLIN-ST. JEOR: SCORE: 1599.61

## 2019-10-27 NOTE — PLAN OF CARE
POD 4 s/p diagnostic laparoscopy converted to open, multiple liver biopsies and liver ablation. Pt reports good pain control. Denies nausea, poor PO intake, reports no appetite. PIV in place. Pt reports positive flatus, no BM, voiding spontaneously in urinal. Walked x 3 on previous shift. Pt was in bed most the shift, declined to walk this evening, risks and benefits explained. Independent with cares. Plan: Waiting for ROBF.

## 2019-10-27 NOTE — PROGRESS NOTES
Surgical Oncology Progress Note    Interval History:  No acute events overnight. Afebrile. Pain well controlled. Passing a lot of flatus. No BM. Refusing enema. Tolerating FLD without issues - hungry. Denies nausea.     Physical Exam:   Temp:  [96.8  F (36  C)-98.7  F (37.1  C)] 96.8  F (36  C)  Heart Rate:  [] 78  Resp:  [16-18] 16  BP: (134-150)/(85-98) 150/98  SpO2:  [92 %-97 %] 93 %  General: Alert, oriented, appears comfortable, NAD.  Respiratory: breathing non labored  Abdomen: Abdomen is soft, tender around incision, mildly distended. Incision is clean, dry and intact. Right upper abdomen and flank ecchymosis.     Data:   All laboratory and imaging data in the past 24 hours reviewed  I/O last 3 completed shifts:  In: 480 [P.O.:480]  Out: 1735 [Urine:1735]  Recent Labs   Lab Test 10/27/19  0722 10/26/19  0654 10/25/19  0752  10/24/19  1003  10/23/19  0920   WBC 11.3* 12.4* 17.2*   < >  --    < > 22.2*   HGB 12.8* 12.3* 14.5   < >  --    < > 16.3   PLT 84* 69* 64*   < >  --    < > 82*   INR  --   --  1.12  --  1.20*  --  1.24*    < > = values in this interval not displayed.      Recent Labs   Lab Test 10/27/19  0722 10/26/19  0654 10/25/19  0752    134 134   POTASSIUM 3.9 3.8 3.9   CHLORIDE 101 103 101   CO2 28 26 25   BUN 13 16 15   CR 0.88 0.84 0.88   ANIONGAP 6 5 8   JOSEPHINE 8.3* 8.2* 8.7   GLC 87 90 89      -> 167   -> 247   -> 114  T bili 2.6 -> 2.7        Assessment and Plan:   Ten Johnson is a 59 year old male with history of hepatocellular carcinoma POD 3 s/p diagnostic laparoscopy converted to open, multiple liver biopsies and liver ablation x4.      - Pain: 0xycodone prn, gabapentin, diluadid prn, flexeril prn, atarax prn, lidocaine patches, menthol patches  - HTN: controlled with home lisinopril   - Tachycardia (resolved) with leukocytosis: Blood cultures NGTD, CXR 10/24 with left pleural effusion and pneumoperitoneum (expected after surgery). Linezolid started 10/24 (x5  days).   - ileus: AXR 10/24 demonstrating ileus. Now passing flatus. Large stool burden. Patient refusing enema. Senna BID. Add mag citrate BID, miralax TID, see if patient willing for suppository as well  - FLD. Okay to advance to regular diet once BM.  - elevated liver enzymes expected after liver ablation: continue to down-trend. Re-check in AM  - hypophosphatemia: monitor and replace as needed  - hyponatremia: fluid restriction, monitor   - PT, IS, out of bed and ambulation  - Dispo: potential discharge 10/28 vs 10/29 pending ROBF, regular diet    Declan Hernandez MD (PGY-6)  Chief Resident - General Surgery  Pager #[phone]

## 2019-10-27 NOTE — PLAN OF CARE
POD#5  s/p diagnostic laparoscopy converted to open, multiple liver biopsies and liver ablation. A&O. Triggered sepsis. Lactic came back @ 0.9.  Hypertensive but WDL all other vitals stable. Pt reports good pain control, slept comfortably between cares. Lidocaine patch  and  icy  hot patches on abdomen. Pain managed with prn oxycodone x1. Transverse abdominal incision ISHA. Denies nausea, tolerating small amounts of full liquid diet. New PIV in right arm. Pt reports positive flatus, no BM, voiding spontaneously in urinal. Independent with cares. Plan: Waiting for ROBF.

## 2019-10-27 NOTE — PLAN OF CARE
VSS. A&O. Abdomen slightly distended.  Passing gas. No BM. Senna, miralax and mag citrate given, no BM yet. Pain controlled with oxycodone, flexeril and atarax.  Incision with some bruising.  Lots of bruising right flank area. Up ad tierra. Voiding adequate amounts in the urinal. PIV TKO. IV antibiotics. Tolerating full liquid diet. Can advance diet once patient has BM per note. Denies nausea. Encourage KRISTOPHER.

## 2019-10-28 VITALS
OXYGEN SATURATION: 97 % | DIASTOLIC BLOOD PRESSURE: 87 MMHG | RESPIRATION RATE: 16 BRPM | HEART RATE: 83 BPM | WEIGHT: 177.3 LBS | BODY MASS INDEX: 26.26 KG/M2 | SYSTOLIC BLOOD PRESSURE: 144 MMHG | TEMPERATURE: 96.3 F | HEIGHT: 69 IN

## 2019-10-28 PROBLEM — E87.1 HYPONATREMIA: Status: ACTIVE | Noted: 2019-10-28

## 2019-10-28 LAB
ALBUMIN SERPL-MCNC: 2.6 G/DL (ref 3.4–5)
ALP SERPL-CCNC: 128 U/L (ref 40–150)
ALT SERPL W P-5'-P-CCNC: 221 U/L (ref 0–70)
ANION GAP SERPL CALCULATED.3IONS-SCNC: 6 MMOL/L (ref 3–14)
AST SERPL W P-5'-P-CCNC: 137 U/L (ref 0–45)
BILIRUB SERPL-MCNC: 2.6 MG/DL (ref 0.2–1.3)
BUN SERPL-MCNC: 14 MG/DL (ref 7–30)
CALCIUM SERPL-MCNC: 8 MG/DL (ref 8.5–10.1)
CHLORIDE SERPL-SCNC: 100 MMOL/L (ref 94–109)
CO2 SERPL-SCNC: 29 MMOL/L (ref 20–32)
CREAT SERPL-MCNC: 0.91 MG/DL (ref 0.66–1.25)
ERYTHROCYTE [DISTWIDTH] IN BLOOD BY AUTOMATED COUNT: 12.8 % (ref 10–15)
GFR SERPL CREATININE-BSD FRML MDRD: >90 ML/MIN/{1.73_M2}
GLUCOSE SERPL-MCNC: 82 MG/DL (ref 70–99)
HCT VFR BLD AUTO: 37.1 % (ref 40–53)
HGB BLD-MCNC: 13.5 G/DL (ref 13.3–17.7)
MCH RBC QN AUTO: 35 PG (ref 26.5–33)
MCHC RBC AUTO-ENTMCNC: 36.4 G/DL (ref 31.5–36.5)
MCV RBC AUTO: 96 FL (ref 78–100)
PLATELET # BLD AUTO: 102 10E9/L (ref 150–450)
POTASSIUM SERPL-SCNC: 3.7 MMOL/L (ref 3.4–5.3)
PROT SERPL-MCNC: 6 G/DL (ref 6.8–8.8)
RBC # BLD AUTO: 3.86 10E12/L (ref 4.4–5.9)
SODIUM SERPL-SCNC: 135 MMOL/L (ref 133–144)
WBC # BLD AUTO: 11.8 10E9/L (ref 4–11)

## 2019-10-28 PROCEDURE — 80053 COMPREHEN METABOLIC PANEL: CPT | Performed by: SURGERY

## 2019-10-28 PROCEDURE — 85027 COMPLETE CBC AUTOMATED: CPT | Performed by: SURGERY

## 2019-10-28 PROCEDURE — 99024 POSTOP FOLLOW-UP VISIT: CPT | Performed by: PHYSICIAN ASSISTANT

## 2019-10-28 PROCEDURE — 25000132 ZZH RX MED GY IP 250 OP 250 PS 637: Performed by: STUDENT IN AN ORGANIZED HEALTH CARE EDUCATION/TRAINING PROGRAM

## 2019-10-28 PROCEDURE — 25000128 H RX IP 250 OP 636: Performed by: STUDENT IN AN ORGANIZED HEALTH CARE EDUCATION/TRAINING PROGRAM

## 2019-10-28 PROCEDURE — 36415 COLL VENOUS BLD VENIPUNCTURE: CPT | Performed by: SURGERY

## 2019-10-28 RX ORDER — OXYCODONE HYDROCHLORIDE 5 MG/1
5-10 TABLET ORAL EVERY 6 HOURS PRN
Qty: 20 TABLET | Refills: 0 | Status: ON HOLD | OUTPATIENT
Start: 2019-10-28 | End: 2019-11-07

## 2019-10-28 RX ORDER — POLYETHYLENE GLYCOL 3350 17 G/17G
17 POWDER, FOR SOLUTION ORAL DAILY
Qty: 14 PACKET | Refills: 0 | Status: ON HOLD | OUTPATIENT
Start: 2019-10-28 | End: 2019-11-04

## 2019-10-28 RX ORDER — CYCLOBENZAPRINE HCL 10 MG
10 TABLET ORAL 3 TIMES DAILY PRN
Qty: 10 TABLET | Refills: 0 | Status: ON HOLD | OUTPATIENT
Start: 2019-10-28 | End: 2019-11-07

## 2019-10-28 RX ADMIN — OXYCODONE HYDROCHLORIDE 10 MG: 5 TABLET ORAL at 16:31

## 2019-10-28 RX ADMIN — OXYCODONE HYDROCHLORIDE 10 MG: 5 TABLET ORAL at 10:47

## 2019-10-28 RX ADMIN — LISINOPRIL 20 MG: 20 TABLET ORAL at 08:59

## 2019-10-28 RX ADMIN — LORATADINE 10 MG: 10 TABLET ORAL at 08:59

## 2019-10-28 RX ADMIN — OXYCODONE HYDROCHLORIDE 10 MG: 5 TABLET ORAL at 02:39

## 2019-10-28 RX ADMIN — LINEZOLID 600 MG: 600 INJECTION, SOLUTION INTRAVENOUS at 14:07

## 2019-10-28 RX ADMIN — CYCLOBENZAPRINE HYDROCHLORIDE 10 MG: 10 TABLET, FILM COATED ORAL at 12:42

## 2019-10-28 RX ADMIN — OXYCODONE HYDROCHLORIDE 10 MG: 5 TABLET ORAL at 06:50

## 2019-10-28 RX ADMIN — ONDANSETRON 4 MG: 4 TABLET, ORALLY DISINTEGRATING ORAL at 10:54

## 2019-10-28 RX ADMIN — FLUTICASONE PROPIONATE 2 SPRAY: 50 SPRAY, METERED NASAL at 08:58

## 2019-10-28 RX ADMIN — LINEZOLID 600 MG: 600 INJECTION, SOLUTION INTRAVENOUS at 02:00

## 2019-10-28 ASSESSMENT — PAIN DESCRIPTION - DESCRIPTORS
DESCRIPTORS: ACHING;SORE
DESCRIPTORS: ACHING;SORE

## 2019-10-28 ASSESSMENT — ACTIVITIES OF DAILY LIVING (ADL)
ADLS_ACUITY_SCORE: 14

## 2019-10-28 NOTE — DISCHARGE SUMMARY
[facility] Discharge Summary    Ten Johnson MRN# [id number]   Age: 59 year old YOB: 1960     Date of Admission:  10/22/2019  Date of Discharge::  10/28/2019  Admitting Physician:  Gregorio Benoit MD  Discharge Physician:  Gregorio Benoit MD     PCP:  Cuca Celeste    Disposition: Patient discharged to home in stable condition.    Admission Diagnosis:  Hepatocellular carcinoma  Hypertension  Thrombocytopenia  Liver Cirrhosis  Hepatitis C  Alcohol use disorder  Aortic stenosis   History of acute kidney injury   Portal hypertension   Allergic rhinitis  Chronic anemia  Erosive gastropathy  Esophageal varices  Osteoarthritis   History of infected knee implant   MRSA  Sarahi Robert tear    Discharge Diagnosis:  Hepatocellular carcinoma  Ileus  Leukocytosis  Elevated liver enzymes  Hypertension  Thrombocytopenia  Liver Cirrhosis  Hepatitis C  Alcohol use disorder  Aortic stenosis   History of acute kidney injury   Portal hypertension   Allergic rhinitis  Chronic anemia  Erosive gastropathy  Esophageal varices  Osteoarthritis   History of infected knee implant   MRSA  Sarahi Robert tear    Discharge medications  Current Discharge Medication List      START taking these medications    Details   cyclobenzaprine (FLEXERIL) 10 MG tablet Take 1 tablet (10 mg) by mouth 3 times daily as needed for muscle spasms  Qty: 10 tablet, Refills: 0    Associated Diagnoses: Postoperative pain      oxyCODONE (ROXICODONE) 5 MG tablet Take 1-2 tablets (5-10 mg) by mouth every 6 hours as needed for moderate to severe pain or severe pain  Qty: 20 tablet, Refills: 0    Associated Diagnoses: Postoperative pain      polyethylene glycol (MIRALAX/GLYCOLAX) packet Take 17 g by mouth daily  Qty: 14 packet, Refills: 0    Associated Diagnoses: Other constipation         CONTINUE these medications which have NOT CHANGED    Details   fluticasone (FLONASE) 50 MCG/ACT nasal spray Spray 2 sprays into both nostrils  daily  Qty: 3 Bottle, Refills: 3    Comments: 1 btl=16 gm  Associated Diagnoses: Cough; Post-nasal drip      lisinopril (PRINIVIL/ZESTRIL) 20 MG tablet Take 1 tablet (20 mg) by mouth daily  Qty: 90 tablet, Refills: 3    Associated Diagnoses: Hypertension, unspecified type      loratadine (CLARITIN) 10 MG tablet Take 1 tablet (10 mg) by mouth daily  Qty: 90 tablet, Refills: 0    Associated Diagnoses: Environmental allergies      Multiple Vitamin (MULTIVITAMINS PO) Take 1 tablet by mouth At Bedtime            Follow up, Special Instructions:  After Care     Future Labs/Procedures    Activity     Comments:    Your activity upon discharge: No lifting more than 10-15 lbs for 6 weeks. Recommend frequent ambulation.    Diet     Comments:    Follow this diet upon discharge: Regular diet    Discharge Instructions     Comments:    If your travel plans upon discharge include prolonged periods of sitting (a lengthy car or plane ride), it is highly beneficial to get up and walk at least once per hour to help prevent swelling and blood clots.     You may shower after operation, let warm soapy water run over incision and pat dry. Do not submerge, soak, or scrub incision.    Take incentive spirometer home for continued frequent use    You will be discharged with narcotic pain medications. Please take them only as needed and do not operate a car or heavy machinery for 24 hours after taking them.  Narcotic pain medications like oxycodone are constipating. Please ensure that you are drinking adequate amounts of fluids and taking stool softeners while you are taking narcotics. Take Miralax as needed for constipation.     Do not drive until you can with stand pressing the brake pedal quickly and fully without pain and you are not distracted by pain.     Call for fever greater than 101.5, chills, red skin around incision, drainage from incision, increased swelling from the incision, bleeding from the incision that is not controlled with  "light pressure, inability to tolerate diet,new nausea/vomiting or other new/worsening symptoms.   You may also call the surgical oncology nurse care coordinator from 8:00am-4:30pm VINNIE Ceja at [phone]. For after hours questions or concerns you can contact the on-call surgical oncology resident (nights and weekends [phone] and ask \"I would like to page the Surgical Oncology Resident on call.\"). In emergencies, call [phone]    Follow Up:  Follow up in clinic with Dr. Benoit in 2 weeks. You should be called to make an appointment within 3 business days. If you are not contacted, call [phone] to make an appointment.        Procedures:  10/22/19 Dr. Benoit  1. Diagnostic Laparoscopy  2. Laparoscopic Lysis of Adhesions  3. Exploratory Laparotomy  4. Intra-Operative Liver Ultrasound  5. Multiple Liver Core Needle Biopsies (6 Frozen Sections, 4 Permanent)  6. Intra-Operative Microwave Ablation of 3 Lesions (Seg 6/7 x 2, Seg 7)    Consultations:  PHYSICAL THERAPY ADULT IP CONSULT  VASCULAR ACCESS CARE ADULT IP CONSULT  GI HEPATOLOGY ADULT IP CONSULT  VASCULAR ACCESS CARE ADULT IP CONSULT  VASCULAR ACCESS CARE ADULT IP CONSULT  VASCULAR ACCESS CARE ADULT IP CONSULT    Brief HPI:  Ten Johnson is a 59 year old male with cirrhosis and continued alcohol use.  Has multifocal liver lesions and not a candidate for resection or transplant.  He has biopsy proven hepatocellular carcinoma and numerous other liver masses.     Hospital Course:  The patient was admitted and underwent the above procedure. The patient tolerated the procedure well. His post operative course was significant for the following.     # Hepatocellular carcinoma with other liver masses s/p multiple liver biopies and microwave liver ablation.   - Oxycodone and flexeril as needed for pain controll  - Follow up with Dr. Benoit in 2 weeks    # Ileus: Abdominal xray 10/24 demonstrated an ileus. He was started on a bowel regimen. He did have full " return of richardson function and his diet was advanced as tolerated.   - Regular diet  - Continue Miralax daily while on narcotics   - Frequent ambulation     # Leukocytosis with tachycardia: He triggered the sepsis protocol on post operative day 2. He was started on linezolid 10/24. Lactic acid was 2.6 and return to normal on recheck labs. Blood cultures were negative. Chest xray demonstrated expected pneumoperitoneum and left pleural effusion. There was initially question of cellulitis but monitoring this was clearly bruising and dependant edema. He completed 5 days of antibiotics while in the hospital    # Elevated liver enzymes: expected after liver ablation. Monitored and down trending.     # Hypertension: controlled once home lisinopril was restarted    # Thrombocytopenia: monitored    # Hypophosphatemia: monitored and replaced as needed    # Hyponatremia: fluid restriction. Monitored and returned to normal range.     # Liver Cirrhosis with hepatitis C and ascites: gastroenterology was consulted     Prior to discharge pain was controlled with oral pain medication and the patient was able to ambulate and void without difficulty. The patient received appropriate education post operatively. On POD #6 the patient was discharged to home.    Surgical pathology  SPECIMEN(S):   A: Liver biopsy segment 7 #1   B: Liver biopsy segment 7 #2   C: Liver biopsy segment 7 #3   D: Liver biopsy segment 6/7 lateral lesion #1   E: Liver biopsy segment 6/7 lateral lesion #2   F: Original segment 7   G: Liver biopsy segment 6/7 medial lesion   H: Liver biopsy segment 6/7 lateral lesion   I: Liver biopsy segment 7 #1 lateral     FINAL DIAGNOSIS:   A. LIVER, SEGMENT 7, BIOPSY # 1:   - Scant liver tissue, no definitive evidence of malignancy     B. LIVER, SEGMENT 7, BIOPSY #2:   - Scant liver tissue, no definitive evidence of malignancy     C. LIVER, SEGMENT 7, BIOPSY #3:   - Scant liver tissue, no definitive evidence of malignancy     D.  LIVER, SEGMENT 6/7 LATERAL, BIOPSY #1:   - Positive for hepatocellular carcinoma     E. LIVER, SEGMENT 6/7 LATERAL, BIOPSY #2:   - Cirrhosis with regenerative nodules   - Negative for malignancy     F. LIVER, SEGMENT 7, BIOPSY:   - Cirrhosis with regenerative nodules and benign bile duct proliferation   - Negative for malignancy     G. LIVER, SEGMENT 6/7 MEDIAL, BIOPSY:   - Positive for hepatocellular carcinoma     H. LIVER, SEGMENT 6/7 LATERAL, BIOPSY:   - Positive for hepatocellular carcinoma     I. LIVER, SEGMENT 7 LATERAL, BIOPSY:   - Cirrhosis with regenerative nodules and benign bile duct proliferation   - Negative for malignancy     Ramandeep Hansen PA-C

## 2019-10-28 NOTE — PLAN OF CARE
Discharge orders placed. Zyvox infusion completed. PIV removed. Discharge instructions and follow-up reviewed with patient, all questions answered. Medications picked up from discharge pharmacy. Pt's friend here to pick him up, taken down to lobby in a wheelchair.

## 2019-10-28 NOTE — PLAN OF CARE
AVSS on RA. A+OX4. Pain controlled with prn oxycodone and flexeril. Denies nausea. Full liquid diet, fair intake. Passing gas, had 2 loose BMs this shift. Voiding good amounts. Transverse incision with edema/ecchymosis, significant ecchymosis to right flank that extends down through right thigh/buttock. PIV TKO between ABX for patency (loses IVs easily). UAL, steady on feet.     Continue POC.

## 2019-10-28 NOTE — PLAN OF CARE
Writer assumed care of patient from [id number].    AVSS on RA, HTN at baseline. A&O x4, up ad tierra. Hypoactive BS/+ passing flatus. No BMs reported overnight. Denies N/V. Voiding adequately in urinal. Abd incision ISHA/WNL except large ecchymosis down right hip/thigh and some talia-incisional edema. Right PIV infusing TKO. Abd and back pain adequately managed w/ PRN PO Oxycodone 10mg. Potential discharge 10/28 vs 10/29 when tolerating PO and having BMs.    Carol Serrano, RN on 10/28/2019 at 5:21 AM

## 2019-10-28 NOTE — PLAN OF CARE
VSS on RA. A&O x 4. Pt having numerous BMs, patient estimates 5-6 loose stools. Voiding adequate amounts in the urinal.  Abdominal incision with bruising and slight swelling. Large bruising on right flank/hip area. PIV infusing TKO, IV Zyvox to be given this afternoon.  Patient potentially planning to discharge to home following antibiotic infusion. Diet advanced to regular this morning. Some nausea reported after eating, zofran given with relief. Pain managed with oxycodone and flexeril.

## 2019-10-28 NOTE — PROGRESS NOTES
Surgical Oncology Progress Note    Interval History:  No acute events overnight. Pain controlled. Passing stool and flatus. Tolerating full liquid diet without nausea or vomiting.     Physical Exam:   Temp:  [96.3  F (35.7  C)-98.6  F (37  C)] 96.3  F (35.7  C)  Pulse:  [83] 83  Heart Rate:  [78-85] 83  Resp:  [15-18] 16  BP: (128-137)/(76-90) 135/82  SpO2:  [95 %-98 %] 96 %  General: Alert, oriented, appears comfortable, NAD.  Respiratory: breathing non labored  Abdomen: Abdomen is soft, tender around incision, mildly distended. Incision is clean, dry and intact. Right upper quadrant and flank ecchymosis, stable and improving    Data:   All laboratory and imaging data in the past 24 hours reviewed  I/O last 3 completed shifts:  In: 1480 [P.O.:1060; I.V.:420]  Out: 2100 [Urine:2100]  Recent Labs   Lab Test 10/28/19  0712 10/27/19  0722 10/26/19  0654 10/25/19  0752  10/24/19  1003  10/23/19  0920   WBC 11.8* 11.3* 12.4* 17.2*   < >  --    < > 22.2*   HGB 13.5 12.8* 12.3* 14.5   < >  --    < > 16.3   * 84* 69* 64*   < >  --    < > 82*   INR  --   --   --  1.12  --  1.20*  --  1.24*    < > = values in this interval not displayed.      Recent Labs   Lab Test 10/28/19  0712 10/27/19  0722 10/26/19  0654    135 134   POTASSIUM 3.7 3.9 3.8   CHLORIDE 100 101 103   CO2 29 28 26   BUN 14 13 16   CR 0.91 0.88 0.84   ANIONGAP 6 6 5   JOSEPHINE 8.0* 8.3* 8.2*   GLC 82 87 90      Recent Labs   Lab Test 10/28/19  0712   PROTTOTAL PENDING   ALBUMIN PENDING   BILITOTAL PENDING   ALKPHOS PENDING   AST PENDING   ALT PENDING     Assessment and Plan:     Ten Johnson is a 59 year old male with history of hepatocellular carcinoma POD 6 s/p diagnostic laparoscopy converted to open, multiple liver biopsies and liver ablation x4.      - Pain: Oxycodone prn,, diluadid prn, flexeril prn, atarax prn, lidocaine patches, menthol patches  - HTN: controlled with home lisinopril   - Tachycardia (resolved) with leukocytosis: Blood cultures  NGTD, CXR 10/24 with left pleural effusion and pneumoperitoneum (expected after surgery). Linezolid started 10/24 (x5 days).   - ileus: Resolving. AXR 10/24 demonstrating ileus. Now passing flatus and stool. Senna BID, miralax TID. Advance to regular diet.   - elevated liver enzymes expected after liver ablation: continue to down-trend, monitor  - hypophosphatemia: monitor and replace as needed  - hyponatremia: resolved  - PT, IS, out of bed and ambulation  - Dispo: discharge to home today later today after second dose of antibiotics.     Seen with Chief resident who will discuss with Staff.     GLENNA StrongC   Surgical Oncology

## 2019-10-29 ENCOUNTER — PATIENT OUTREACH (OUTPATIENT)
Dept: ONCOLOGY | Facility: CLINIC | Age: 59
End: 2019-10-29

## 2019-10-29 DIAGNOSIS — C22.0 HCC (HEPATOCELLULAR CARCINOMA) (H): Primary | ICD-10-CM

## 2019-10-29 NOTE — PROGRESS NOTES
Surgical Oncology RN Care Coordination Note:     Attempted to reach patient for post op discharge phone call. Line busy and unable to leave message, will attempt again.     Julienne Ceja, RN, BSN  Care Coordinator   [phone]

## 2019-10-30 ENCOUNTER — TELEPHONE (OUTPATIENT)
Dept: SURGERY | Facility: CLINIC | Age: 59
End: 2019-10-30

## 2019-10-30 ENCOUNTER — MYC MEDICAL ADVICE (OUTPATIENT)
Dept: SURGERY | Facility: CLINIC | Age: 59
End: 2019-10-30

## 2019-10-30 LAB
BACTERIA SPEC CULT: NO GROWTH
BACTERIA SPEC CULT: NO GROWTH
Lab: NORMAL
Lab: NORMAL
SPECIMEN SOURCE: NORMAL
SPECIMEN SOURCE: NORMAL

## 2019-10-30 NOTE — PROGRESS NOTES
Surgical Oncology RN Care Coordination Note:     Called and left a message for the patient that we are trying to reach him to check in post discharge. Informed him of the postop appointment information for next Friday, November 8 and also informed him that we do have him scheduled for an MRI the same day to check the sites that were treated during surgery. Informed him if he would like to reschedule the scan he can call the scheduling line and reschedule the MRI to any time 1 to 2 days prior to the appointment otherwise anytime shortly after the appointment with Dr. Benoit. Provided the scheduling number for the patient and asked that he call our triage line if he has any concerns prior to his postop visit.    Julienne Ceja, RN, BSN  Care Coordinator   [phone]

## 2019-10-30 NOTE — TELEPHONE ENCOUNTER
Called and spoke with patient regarding pain medication request per discussion patient states he is having increases in nausea and he is unable to move. He reports he is not eating because of these issues. I informed him that if he truly is not able to eat and unable to get up that we would want him to come to the ER for further evaluation. Informed him typically we do not provide any additional pain medication besides what he received already for hit the surgery that he had had. Informed him that we can discuss further pain medication management with Dr. Benoit but that typically we would first want him to proceed with trying ibuprofen Tylenol and also using ice and heat for relief. Patient was not interested in discussing alternative pain medication Options and was not willing to go to the ER. He states he would prefer to just be seen in the office for an appointment within the next 1 to 2 days. I informed him I would work on getting an appointment scheduled in the office to meet with Dr. Benoit to discuss his concerns further. Attempted to try and talk with Patient regarding alternative options for pain management and he was not interested. On multiple occasions I tried to assess patient further but he was not willing to have conversation and continued to say he has been through this multiple times and just wants to be seen in the office. Informed him our office would contact him with an appointment information ASAP and that if he was not able to eat or drink that he should present to the ER for further evaluation.

## 2019-10-30 NOTE — TELEPHONE ENCOUNTER
Called Pt. Left message that Dr Pringle is not in today, typically does not refill for this surgery, and to call Julienne or Myself to discuss pain management with ASA, IBU, ice, heat, etc.

## 2019-11-02 ENCOUNTER — HEALTH MAINTENANCE LETTER (OUTPATIENT)
Age: 59
End: 2019-11-02

## 2019-11-03 ENCOUNTER — APPOINTMENT (OUTPATIENT)
Dept: CT IMAGING | Facility: CLINIC | Age: 59
End: 2019-11-03
Attending: EMERGENCY MEDICINE
Payer: MEDICAID

## 2019-11-03 ENCOUNTER — HOSPITAL ENCOUNTER (INPATIENT)
Facility: CLINIC | Age: 59
LOS: 4 days | Discharge: HOME | End: 2019-11-07
Attending: EMERGENCY MEDICINE | Admitting: SURGERY
Payer: MEDICAID

## 2019-11-03 DIAGNOSIS — K70.31 ALCOHOLIC CIRRHOSIS OF LIVER WITH ASCITES (H): ICD-10-CM

## 2019-11-03 DIAGNOSIS — R10.84 ABDOMINAL PAIN, GENERALIZED: ICD-10-CM

## 2019-11-03 DIAGNOSIS — R18.8 OTHER ASCITES: Primary | ICD-10-CM

## 2019-11-03 DIAGNOSIS — C22.0 HEPATOCELLULAR CARCINOMA (H): ICD-10-CM

## 2019-11-03 DIAGNOSIS — G89.18 POSTOPERATIVE PAIN: ICD-10-CM

## 2019-11-03 LAB
ALBUMIN SERPL-MCNC: 2.6 G/DL (ref 3.4–5)
ALP SERPL-CCNC: 116 U/L (ref 40–150)
ALT SERPL W P-5'-P-CCNC: 100 U/L (ref 0–70)
ANION GAP SERPL CALCULATED.3IONS-SCNC: 7 MMOL/L (ref 3–14)
AST SERPL W P-5'-P-CCNC: 84 U/L (ref 0–45)
BASOPHILS # BLD AUTO: 0.1 10E9/L (ref 0–0.2)
BASOPHILS NFR BLD AUTO: 0.6 %
BILIRUB SERPL-MCNC: 1.4 MG/DL (ref 0.2–1.3)
BUN SERPL-MCNC: 22 MG/DL (ref 7–30)
CALCIUM SERPL-MCNC: 8.3 MG/DL (ref 8.5–10.1)
CHLORIDE SERPL-SCNC: 101 MMOL/L (ref 94–109)
CO2 SERPL-SCNC: 25 MMOL/L (ref 20–32)
CREAT SERPL-MCNC: 1.06 MG/DL (ref 0.66–1.25)
DIFFERENTIAL METHOD BLD: ABNORMAL
EOSINOPHIL # BLD AUTO: 0.1 10E9/L (ref 0–0.7)
EOSINOPHIL NFR BLD AUTO: 0.4 %
ERYTHROCYTE [DISTWIDTH] IN BLOOD BY AUTOMATED COUNT: 13.3 % (ref 10–15)
GFR SERPL CREATININE-BSD FRML MDRD: 75 ML/MIN/{1.73_M2}
GLUCOSE SERPL-MCNC: 94 MG/DL (ref 70–99)
HCT VFR BLD AUTO: 38.8 % (ref 40–53)
HGB BLD-MCNC: 14.2 G/DL (ref 13.3–17.7)
IMM GRANULOCYTES # BLD: 0.1 10E9/L (ref 0–0.4)
IMM GRANULOCYTES NFR BLD: 0.8 %
LIPASE SERPL-CCNC: 467 U/L (ref 73–393)
LYMPHOCYTES # BLD AUTO: 3.1 10E9/L (ref 0.8–5.3)
LYMPHOCYTES NFR BLD AUTO: 18 %
MCH RBC QN AUTO: 34.6 PG (ref 26.5–33)
MCHC RBC AUTO-ENTMCNC: 36.6 G/DL (ref 31.5–36.5)
MCV RBC AUTO: 95 FL (ref 78–100)
MONOCYTES # BLD AUTO: 2.7 10E9/L (ref 0–1.3)
MONOCYTES NFR BLD AUTO: 15.5 %
NEUTROPHILS # BLD AUTO: 11.1 10E9/L (ref 1.6–8.3)
NEUTROPHILS NFR BLD AUTO: 64.7 %
NRBC # BLD AUTO: 0 10*3/UL
NRBC BLD AUTO-RTO: 0 /100
PLATELET # BLD AUTO: 138 10E9/L (ref 150–450)
POTASSIUM SERPL-SCNC: 4.1 MMOL/L (ref 3.4–5.3)
PROT SERPL-MCNC: 6 G/DL (ref 6.8–8.8)
RBC # BLD AUTO: 4.1 10E12/L (ref 4.4–5.9)
SODIUM SERPL-SCNC: 133 MMOL/L (ref 133–144)
WBC # BLD AUTO: 17.1 10E9/L (ref 4–11)

## 2019-11-03 PROCEDURE — 80053 COMPREHEN METABOLIC PANEL: CPT | Performed by: EMERGENCY MEDICINE

## 2019-11-03 PROCEDURE — 96375 TX/PRO/DX INJ NEW DRUG ADDON: CPT

## 2019-11-03 PROCEDURE — 96361 HYDRATE IV INFUSION ADD-ON: CPT

## 2019-11-03 PROCEDURE — 25000132 ZZH RX MED GY IP 250 OP 250 PS 637: Performed by: STUDENT IN AN ORGANIZED HEALTH CARE EDUCATION/TRAINING PROGRAM

## 2019-11-03 PROCEDURE — 12000001 ZZH R&B MED SURG/OB UMMC

## 2019-11-03 PROCEDURE — 25800030 ZZH RX IP 258 OP 636: Performed by: STUDENT IN AN ORGANIZED HEALTH CARE EDUCATION/TRAINING PROGRAM

## 2019-11-03 PROCEDURE — 99285 EMERGENCY DEPT VISIT HI MDM: CPT | Mod: Z6 | Performed by: EMERGENCY MEDICINE

## 2019-11-03 PROCEDURE — 40000556 ZZH STATISTIC PERIPHERAL IV START W US GUIDANCE

## 2019-11-03 PROCEDURE — 25000128 H RX IP 250 OP 636: Performed by: SURGERY

## 2019-11-03 PROCEDURE — 96376 TX/PRO/DX INJ SAME DRUG ADON: CPT

## 2019-11-03 PROCEDURE — 96365 THER/PROPH/DIAG IV INF INIT: CPT

## 2019-11-03 PROCEDURE — 25000132 ZZH RX MED GY IP 250 OP 250 PS 637: Performed by: EMERGENCY MEDICINE

## 2019-11-03 PROCEDURE — 83690 ASSAY OF LIPASE: CPT | Performed by: EMERGENCY MEDICINE

## 2019-11-03 PROCEDURE — 25000128 H RX IP 250 OP 636: Performed by: EMERGENCY MEDICINE

## 2019-11-03 PROCEDURE — 85025 COMPLETE CBC W/AUTO DIFF WBC: CPT | Performed by: EMERGENCY MEDICINE

## 2019-11-03 PROCEDURE — 74177 CT ABD & PELVIS W/CONTRAST: CPT

## 2019-11-03 PROCEDURE — 25000128 H RX IP 250 OP 636: Performed by: STUDENT IN AN ORGANIZED HEALTH CARE EDUCATION/TRAINING PROGRAM

## 2019-11-03 PROCEDURE — 96367 TX/PROPH/DG ADDL SEQ IV INF: CPT

## 2019-11-03 PROCEDURE — 25800030 ZZH RX IP 258 OP 636: Performed by: EMERGENCY MEDICINE

## 2019-11-03 PROCEDURE — 25800030 ZZH RX IP 258 OP 636: Performed by: SURGERY

## 2019-11-03 PROCEDURE — 96366 THER/PROPH/DIAG IV INF ADDON: CPT

## 2019-11-03 PROCEDURE — 99285 EMERGENCY DEPT VISIT HI MDM: CPT | Mod: 25

## 2019-11-03 RX ORDER — PROCHLORPERAZINE MALEATE 5 MG
10 TABLET ORAL EVERY 6 HOURS PRN
Status: DISCONTINUED | OUTPATIENT
Start: 2019-11-03 | End: 2019-11-07 | Stop reason: HOSPADM

## 2019-11-03 RX ORDER — ONDANSETRON 2 MG/ML
4 INJECTION INTRAMUSCULAR; INTRAVENOUS ONCE
Status: COMPLETED | OUTPATIENT
Start: 2019-11-03 | End: 2019-11-03

## 2019-11-03 RX ORDER — NALOXONE HYDROCHLORIDE 0.4 MG/ML
.1-.4 INJECTION, SOLUTION INTRAMUSCULAR; INTRAVENOUS; SUBCUTANEOUS
Status: DISCONTINUED | OUTPATIENT
Start: 2019-11-03 | End: 2019-11-07 | Stop reason: HOSPADM

## 2019-11-03 RX ORDER — PIPERACILLIN SODIUM, TAZOBACTAM SODIUM 3; .375 G/15ML; G/15ML
3.38 INJECTION, POWDER, LYOPHILIZED, FOR SOLUTION INTRAVENOUS EVERY 6 HOURS
Status: DISCONTINUED | OUTPATIENT
Start: 2019-11-03 | End: 2019-11-04

## 2019-11-03 RX ORDER — ONDANSETRON 2 MG/ML
4 INJECTION INTRAMUSCULAR; INTRAVENOUS EVERY 6 HOURS PRN
Status: DISCONTINUED | OUTPATIENT
Start: 2019-11-03 | End: 2019-11-07 | Stop reason: HOSPADM

## 2019-11-03 RX ORDER — IOPAMIDOL 755 MG/ML
104 INJECTION, SOLUTION INTRAVASCULAR ONCE
Status: COMPLETED | OUTPATIENT
Start: 2019-11-03 | End: 2019-11-03

## 2019-11-03 RX ORDER — DIPHENHYDRAMINE HYDROCHLORIDE AND LIDOCAINE HYDROCHLORIDE AND ALUMINUM HYDROXIDE AND MAGNESIUM HYDRO
10 KIT EVERY 6 HOURS PRN
Status: DISCONTINUED | OUTPATIENT
Start: 2019-11-03 | End: 2019-11-07 | Stop reason: HOSPADM

## 2019-11-03 RX ORDER — HYDROMORPHONE HYDROCHLORIDE 1 MG/ML
0.5 INJECTION, SOLUTION INTRAMUSCULAR; INTRAVENOUS; SUBCUTANEOUS ONCE
Status: COMPLETED | OUTPATIENT
Start: 2019-11-03 | End: 2019-11-03

## 2019-11-03 RX ORDER — SALIVA STIMULANT COMB. NO.3
2 SPRAY, NON-AEROSOL (ML) MUCOUS MEMBRANE ONCE
Status: COMPLETED | OUTPATIENT
Start: 2019-11-03 | End: 2019-11-03

## 2019-11-03 RX ORDER — PROCHLORPERAZINE 25 MG
25 SUPPOSITORY, RECTAL RECTAL EVERY 12 HOURS PRN
Status: DISCONTINUED | OUTPATIENT
Start: 2019-11-03 | End: 2019-11-07 | Stop reason: HOSPADM

## 2019-11-03 RX ORDER — SODIUM CHLORIDE, SODIUM LACTATE, POTASSIUM CHLORIDE, CALCIUM CHLORIDE 600; 310; 30; 20 MG/100ML; MG/100ML; MG/100ML; MG/100ML
INJECTION, SOLUTION INTRAVENOUS CONTINUOUS
Status: DISCONTINUED | OUTPATIENT
Start: 2019-11-03 | End: 2019-11-05

## 2019-11-03 RX ORDER — ONDANSETRON 4 MG/1
4 TABLET, ORALLY DISINTEGRATING ORAL EVERY 6 HOURS PRN
Status: DISCONTINUED | OUTPATIENT
Start: 2019-11-03 | End: 2019-11-07 | Stop reason: HOSPADM

## 2019-11-03 RX ORDER — OXYCODONE HYDROCHLORIDE 5 MG/1
5-10 TABLET ORAL
Status: DISCONTINUED | OUTPATIENT
Start: 2019-11-03 | End: 2019-11-05

## 2019-11-03 RX ORDER — LIDOCAINE HYDROCHLORIDE 10 MG/ML
INJECTION, SOLUTION EPIDURAL; INFILTRATION; INTRACAUDAL; PERINEURAL
Status: DISCONTINUED
Start: 2019-11-03 | End: 2019-11-03 | Stop reason: HOSPADM

## 2019-11-03 RX ORDER — PIPERACILLIN SODIUM, TAZOBACTAM SODIUM 3; .375 G/15ML; G/15ML
3.38 INJECTION, POWDER, LYOPHILIZED, FOR SOLUTION INTRAVENOUS ONCE
Status: DISCONTINUED | OUTPATIENT
Start: 2019-11-03 | End: 2019-11-03

## 2019-11-03 RX ORDER — AMOXICILLIN 250 MG
2 CAPSULE ORAL 2 TIMES DAILY PRN
Status: DISCONTINUED | OUTPATIENT
Start: 2019-11-03 | End: 2019-11-07 | Stop reason: HOSPADM

## 2019-11-03 RX ORDER — DIPHENHYDRAMINE HYDROCHLORIDE AND LIDOCAINE HYDROCHLORIDE AND ALUMINUM HYDROXIDE AND MAGNESIUM HYDRO
10 KIT ONCE
Status: COMPLETED | OUTPATIENT
Start: 2019-11-03 | End: 2019-11-03

## 2019-11-03 RX ORDER — AMOXICILLIN 250 MG
1 CAPSULE ORAL 2 TIMES DAILY PRN
Status: DISCONTINUED | OUTPATIENT
Start: 2019-11-03 | End: 2019-11-07 | Stop reason: HOSPADM

## 2019-11-03 RX ORDER — SODIUM CHLORIDE, SODIUM LACTATE, POTASSIUM CHLORIDE, CALCIUM CHLORIDE 600; 310; 30; 20 MG/100ML; MG/100ML; MG/100ML; MG/100ML
1000 INJECTION, SOLUTION INTRAVENOUS CONTINUOUS
Status: DISCONTINUED | OUTPATIENT
Start: 2019-11-03 | End: 2019-11-04

## 2019-11-03 RX ADMIN — HYDROMORPHONE HYDROCHLORIDE 0.5 MG: 1 INJECTION, SOLUTION INTRAMUSCULAR; INTRAVENOUS; SUBCUTANEOUS at 12:53

## 2019-11-03 RX ADMIN — SODIUM CHLORIDE, POTASSIUM CHLORIDE, SODIUM LACTATE AND CALCIUM CHLORIDE 1000 ML: 600; 310; 30; 20 INJECTION, SOLUTION INTRAVENOUS at 12:54

## 2019-11-03 RX ADMIN — OXYCODONE HYDROCHLORIDE 5 MG: 5 TABLET ORAL at 21:11

## 2019-11-03 RX ADMIN — PIPERACILLIN SODIUM AND TAZOBACTAM SODIUM 3.38 G: 3; .375 INJECTION, POWDER, LYOPHILIZED, FOR SOLUTION INTRAVENOUS at 23:02

## 2019-11-03 RX ADMIN — HYDROMORPHONE HYDROCHLORIDE 0.5 MG: 1 INJECTION, SOLUTION INTRAMUSCULAR; INTRAVENOUS; SUBCUTANEOUS at 18:12

## 2019-11-03 RX ADMIN — DIPHENHYDRAMINE HYDROCHLORIDE AND LIDOCAINE HYDROCHLORIDE AND ALUMINUM HYDROXIDE AND MAGNESIUM HYDRO 10 ML: KIT at 18:09

## 2019-11-03 RX ADMIN — ONDANSETRON 4 MG: 2 INJECTION INTRAMUSCULAR; INTRAVENOUS at 12:53

## 2019-11-03 RX ADMIN — OXYCODONE HYDROCHLORIDE 5 MG: 5 TABLET ORAL at 21:07

## 2019-11-03 RX ADMIN — IOPAMIDOL 104 ML: 755 INJECTION, SOLUTION INTRAVENOUS at 13:28

## 2019-11-03 RX ADMIN — SODIUM CHLORIDE, POTASSIUM CHLORIDE, SODIUM LACTATE AND CALCIUM CHLORIDE: 600; 310; 30; 20 INJECTION, SOLUTION INTRAVENOUS at 20:55

## 2019-11-03 RX ADMIN — VANCOMYCIN HYDROCHLORIDE 1750 MG: 10 INJECTION, POWDER, LYOPHILIZED, FOR SOLUTION INTRAVENOUS at 17:43

## 2019-11-03 RX ADMIN — PIPERACILLIN SODIUM AND TAZOBACTAM SODIUM 3.38 G: 3; .375 INJECTION, POWDER, LYOPHILIZED, FOR SOLUTION INTRAVENOUS at 15:55

## 2019-11-03 RX ADMIN — Medication 2 SPRAY: at 18:09

## 2019-11-03 ASSESSMENT — ENCOUNTER SYMPTOMS
APPETITE CHANGE: 1
DIARRHEA: 1
DYSURIA: 0
VOMITING: 0
ABDOMINAL PAIN: 1
NAUSEA: 1

## 2019-11-03 ASSESSMENT — MIFFLIN-ST. JEOR: SCORE: 1566.49

## 2019-11-03 NOTE — ED NOTES
Initial Assessment: VSS. Communicates needs without difficulty. Pleasant and co-op. Denies SOB. Denies chest/shoulder/arm pain or discomfort. Pt has c/o abd to right flank pain appropriate to incision location. Pt tolerating discomfort but braces with movement.

## 2019-11-03 NOTE — ED TRIAGE NOTES
"Patient came into the ED for right sided abdominal pain, nausea, diarrhea.  S/p liver surgery about 2 weeks ago and was d/c'd last Monday. After he went home he felt he got worse. He is unable to tolerate food and fluids and is nauseated constantly. He has been having watery stools and has a distended abdomen.He has also noted a vsion change since Monday.     Hx hepatocellular carcinoma, MRSA, alcoholic cirhosis    /89   Pulse 111   Temp 98.4  F (36.9  C) (Oral)   Resp 20   Ht 1.753 m (5' 9\")   Wt 77.1 kg (170 lb)   SpO2 97%   BMI 25.10 kg/m      "

## 2019-11-03 NOTE — CONSULTS
SURGICAL ONCOLOGY CONSULT  Ten Johnson MRN# [id number]   YOB: 1960 Age: 59 year old     Date of Admission: 11/03/19    REASON FOR CONSULTATION: Wound check     HPI: Ten Johnson is a 59 year old year old male with history of liver cirrhosis secondary to hepatitis C and alcohol, severe aortic stenosis s/p TAVR, portal HTN, and thrombocytopenia with recent diagnosis of hepatocellular carcinoma. He is status post laparoscopic converted to open collection of liver core needle biopsies and intra-operative microwave ablation of 3 lesions on 10/22/19 with Dr. Benoit. He was discharged to home on 10/28/19. He complains of increasing abdominal pain and bloating since discharge. He has been passing gas and having bowel movements. He denies nausea/vomiting, though has not been tolerating much oral intake secondary to the pain. He denies fevers/chills. He does complain of pain over the surgical site, but diffuse pain as well. He has not noticed discharge from the wound site or erythema surrounding the wound. He is scheduled to follow up with Dr. Benoit 11/8/19. Workup today significant for WBC 17, CT abd/pelvis with new ascites and post surgical changes for . No hematoma/seroma of the wound.     REVIEW OF SYSTEMS: The remainder of the complete ROS was negative unless noted in the HPI. Denies visual changes, headache, sore throat, rhinorrhea, chest pain, sob, fevers, night sweats, weight loss.     PAST MEDICAL HISTORY:   Past Medical History:   Diagnosis Date     JENNIFER (acute kidney injury) (H) 4/9/2019     Alcohol use disorder      Alcoholic cirrhosis (H)      Anticoagulant long-term use     plavix     Aortic stenosis, severe 2/21/2018     Ascites      Chronic allergic rhinitis      Chronic anemia      Chronic hepatitis C without hepatic coma (H) 05/10/2016    Untreated as of 2/2018     Cirrhosis (H) 2017    MRI finding     Diastolic dysfunction      Erosive gastropathy      Esophageal varices in alcoholic  cirrhosis (H)      H/O upper gastrointestinal hemorrhage 09/2017     Hepatocellular carcinoma (H)      History of blood transfusion      History of drug abuse (H)     intranasal     Hypertension     essential     JANELLE (iron deficiency anemia)      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     Sarahi-Hoffman tear     History     Marijuana abuse      MRSA (methicillin resistant Staphylococcus aureus)      Nonrheumatic aortic valve stenosis 2/20/2018     Olecranon bursitis      Portal hypertension (H)      Right shoulder pain     history of rotator cuff repair     S/p TAVR (transcatheter aortic valve replacement), bioprosthetic      Severe aortic stenosis      Thrombocytopenia (H)        PAST SURGICAL HISTORY:   Past Surgical History:   Procedure Laterality Date     ABLATE LIVER TUMOR N/A 10/22/2019    Procedure: Ablate liver tumor x3;  Surgeon: Gregorio Benoit MD;  Location: [facility]     ARTHROSCOPY SHOULDER ROTATOR CUFF REPAIR  7/31/2012    Procedure: ARTHROSCOPY SHOULDER ROTATOR CUFF REPAIR;  Right Shoulder Arthroscopic Rotator Cuff Repair, BicepsTenodesis,  Subacromial Decompression ;  Surgeon: Joi Castillo MD;  [facility]     ESOPHAGOSCOPY, GASTROSCOPY, DUODENOSCOPY (EGD), COMBINED N/A 10/23/2017    Procedure: COMBINED ESOPHAGOSCOPY, GASTROSCOPY, DUODENOSCOPY (EGD);;  Surgeon: Gentry Salas MD;  Location: [facility]     EXCHANGE POLY COMPONENT ARTHROPLASTY KNEE Right 3/4/2019    Procedure: REVISION RIGHT TOTAL KNEE POLY COMPONENT EXCHANGE;  Surgeon: Olvin Joe MD;  Location: [facility]     FACIAL RECONSTRUCTION SURGERY  1971     HEART CATH FEMORAL CANNULIZATION WITH OPEN STANDBY REPAIR AORTIC VALVE N/A 2/21/2018    Procedure: HEART CATH FEMORAL CANNULIZATION WITH OPEN STANDBY REPAIR AORTIC VALVE;;  Surgeon: Luis Baird MD;  Location: [facility]     IR LIVER BIOPSY PERCUTANEOUS  7/18/2019     IRRIGATION AND DEBRIDEMENT UPPER EXTREMITY,  COMBINED  1/3/2012    Procedure:COMBINED IRRIGATION AND DEBRIDEMENT UPPER EXTREMITY; Irrigation & Debridement Left Elbow; Surgeon:CRISTHIAN ZHOU; Location:[facility]     LAPAROSCOPIC BIOPSY LIVER N/A 10/22/2019    Procedure: intraoperative liver ultrasound, laparoscopic converted to open liver biopsy x 6;  Surgeon: Gregorio Benoit MD;  Location: [facility]     LAPAROSCOPY DIAGNOSTIC (GENERAL) N/A 10/22/2019    Procedure: Diagnostic laparoscopy;  Surgeon: Gregorio Benoit MD;  Location: [facility]     LAPAROTOMY, LYSIS ADHESIONS, COMBINED N/A 10/22/2019    Procedure: Laparotomy, lysis adhesions, combined;  Surgeon: Gregorio Benoit MD;  Location: [facility]     OPTICAL TRACKING SYSTEM ARTHROPLASTY KNEE Right 2/7/2019    Procedure: ARTHROPLASTY KNEE RIGHT;  Surgeon: Olvin Joe MD;  [facility]     REPAIR TENDON TRICEPS UPPER EXTREMITY  11/8/2011    Procedure:REPAIR TENDON TRICEPS UPPER EXTREMITY; Surgeon:CRISTHIAN ZHOU; [facility]     SHOULDER SURGERY  2003    left, injury, torn tendons, hematoma     TRANSCATHETER AORTIC VALVE IMPLANT ANESTHESIA N/A 2/21/2018    Procedure: TRANSCATHETER AORTIC VALVE IMPLANT ANESTHESIA;  Transfemoral (Quiroz) Aortic Valve Implant 26mm MARTHA 3, with Cardiopulmonary Bypass Standby, transthoracic echocardiogram;  Surgeon: GENERIC ANESTHESIA PROVIDER;  Location: [facility]     TRANSPOSITION ULNAR NERVE (ELBOW)  11/8/2011    Procedure:TRANSPOSITION ULNAR NERVE (ELBOW); Final Procedure Done: Left Elbow Lateral Ulnar Collateral Repair And  Left Elbow Triceps Repair         ALLERGIES:    Allergies   Allergen Reactions     Zolpidem Other (See Comments)     Alcoholic.  Had reaction 3/17/13 while intoxicated which included black out, loss of awareness, paranoia.  Do not prescribe.  Dr. Celeste     Cats Other (See Comments)     rhinitis     Dogs Other (See Comments)     rhinitis     Pollen Extract Other (See Comments)     rhinits.       HOME MEDICATIONS: No current facility-administered  "medications on file prior to encounter.   cyclobenzaprine (FLEXERIL) 10 MG tablet, Take 1 tablet (10 mg) by mouth 3 times daily as needed for muscle spasms  fluticasone (FLONASE) 50 MCG/ACT nasal spray, Spray 2 sprays into both nostrils daily  lisinopril (PRINIVIL/ZESTRIL) 20 MG tablet, Take 1 tablet (20 mg) by mouth daily  loratadine (CLARITIN) 10 MG tablet, Take 1 tablet (10 mg) by mouth daily  Multiple Vitamin (MULTIVITAMINS PO), Take 1 tablet by mouth At Bedtime   oxyCODONE (ROXICODONE) 5 MG tablet, Take 1-2 tablets (5-10 mg) by mouth every 6 hours as needed for moderate to severe pain or severe pain  polyethylene glycol (MIRALAX/GLYCOLAX) packet, Take 17 g by mouth daily        SOCIAL HISTORY:   Social History     Tobacco Use     Smoking status: Never Smoker     Smokeless tobacco: Never Used   Substance Use Topics     Alcohol use: Yes     Comment: Alcohol use disorder, still actively drinking     Drug use: No     Comment: denies       FAMILY HISTORY:   Family History   Problem Relation Age of Onset     Cancer Mother 62        unknown primary     Alcoholism Paternal Uncle      Unknown/Adopted Father      No Known Problems Brother      Diabetes Maternal Grandmother      Myocardial Infarction Maternal Grandfather      No Known Problems Paternal Grandmother      Unknown/Adopted Paternal Grandfather      Cirrhosis No family hx of        PHYSICAL EXAMINATION:  /89   Pulse 111   Temp 98.4  F (36.9  C) (Oral)   Resp 20   Ht 1.753 m (5' 9\")   Wt 77.1 kg (170 lb)   SpO2 97%   BMI 25.10 kg/m       General: NAD, awake and alert   CV: Non-cyanotic   Pulm: No increased work of breathing on room air   Abd: Distended abdomen, soft, diffusely tender to deep palpation. No guarding or rebound tenderness. Right-sided surgical scar healing appropriately with no surrounding erythema, no areas concerning for hematoma or seroma. I am able to dispel a small amount of serosanguinous fluid from the most inferior aspect of " the wound.   Extremities: WWP without edema  Neuro: No focal deficits noted, patient moves all extremities spontaneously    LABS:  Recent Labs   Lab 10/28/19  0712   WBC 11.8*   RBC 3.86*   HGB 13.5   HCT 37.1*   MCV 96   MCH 35.0*   MCHC 36.4   RDW 12.8   *       Recent Labs   Lab 10/28/19  0712      POTASSIUM 3.7   CHLORIDE 100   CO2 29   BUN 14   CR 0.91   GLC 82   JOSEPHINE 8.0*       Recent Labs   Lab 10/28/19  0712   *   *   ALKPHOS 128   BILITOTAL 2.6*   ALBUMIN 2.6*       IMAGING: reviewed   CT abd/pelvis with contrast 11/03/19   IMPRESSION:   1. New large amount of ascites in the abdomen (postsurgical  20/2/2019). Although no hematocrit level is demonstrated and fluid  measures simple, a bleed cannot be ruled out.  2. New hypoattenuating lesions in the right lobe of liver presumably  secondary to ablation during surgery. These extend to the capsule, can  not rule out extension beyond capsule.   3. Atelectasis or infection left lung base.      A/P: Ten Johnosn is a 59 year old male with history of liver cirrhosis secondary to hepatitis C and alcohol, severe aortic stenosis s/p TAVR, portal HTN, and thrombocytopenia with recent diagnosis of hepatocellular carcinoma. He is status post laparoscopic converted to open collection of liver core needle biopsies and intra-operative microwave ablation of 3 lesions on 10/22/19 with Dr. Benoit. He now presents with increasing abdominal distension and abdominal pain and fluid in the abdomen as well as a leukocytosis. His wound appears to be healing appropriately. CT without sign of hematoma/seroma.     - Admit to surgical oncology under Dr. Lindquist  - NPO, okay for ice chips, meds   - IV antibiotics     Discussed with Dr. Lexa Treviño   Surgery Resident   [id number]

## 2019-11-03 NOTE — ED NOTES
[facility], [place]   ED Nurse to Floor Handoff     Ten Johnson is a 59 year old male who speaks English and lives alone,  in a home  They arrived in the ED by car from home    ED Chief Complaint: Abdominal Pain and Nausea    ED Dx;   Final diagnoses:   None         Needed?: No    Allergies:   Allergies   Allergen Reactions     Zolpidem Other (See Comments)     Alcoholic.  Had reaction 3/17/13 while intoxicated which included black out, loss of awareness, paranoia.  Do not prescribe.  Dr. Celeste     Cats Other (See Comments)     rhinitis     Dogs Other (See Comments)     rhinitis     Pollen Extract Other (See Comments)     rhinits.   .  Past Medical Hx:   Past Medical History:   Diagnosis Date     JENNIFER (acute kidney injury) (H) 4/9/2019     Alcohol use disorder      Alcoholic cirrhosis (H)      Anticoagulant long-term use     plavix     Aortic stenosis, severe 2/21/2018     Ascites      Chronic allergic rhinitis      Chronic anemia      Chronic hepatitis C without hepatic coma (H) 05/10/2016    Untreated as of 2/2018     Cirrhosis (H) 2017    MRI finding     Diastolic dysfunction      Erosive gastropathy      Esophageal varices in alcoholic cirrhosis (H)      H/O upper gastrointestinal hemorrhage 09/2017     Hepatocellular carcinoma (H)      History of blood transfusion      History of drug abuse (H)     intranasal     Hypertension     essential     JANELLE (iron deficiency anemia)      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     Sarahi-Hoffman tear     History     Marijuana abuse      MRSA (methicillin resistant Staphylococcus aureus)      Nonrheumatic aortic valve stenosis 2/20/2018     Olecranon bursitis      Portal hypertension (H)      Right shoulder pain     history of rotator cuff repair     S/p TAVR (transcatheter aortic valve replacement), bioprosthetic      Severe aortic stenosis      Thrombocytopenia (H)        Baseline Mental status: WDL  Current Mental Status changes: at basesline    Infection present or suspected this encounter: yes skin/wound/contact  Sepsis suspected: No  Isolation type: Contact     Activity level - Baseline/Home:  Independent  Activity Level - Current:   Stand with Assist - SBA    Bariatric equipment needed?: No    In the ED these meds were given:   Medications   lactated ringers infusion (0 mLs Intravenous Stopped 11/3/19 1547)   magic mouthwash suspension (diphenhydramine, lidocaine, aluminum-magnesium & simethicone) (has no administration in time range)   lidocaine (PF) (XYLOCAINE) 1 % injection (has no administration in time range)   piperacillin-tazobactam (ZOSYN) 3.375 g vial to attach to  mL bag (3.375 g Intravenous New Bag 11/3/19 6615)   naloxone (NARCAN) injection 0.1-0.4 mg (has no administration in time range)   lactated ringers infusion (has no administration in time range)   oxyCODONE (ROXICODONE) tablet 5-10 mg (has no administration in time range)   ondansetron (ZOFRAN-ODT) ODT tab 4 mg (has no administration in time range)     Or   ondansetron (ZOFRAN) injection 4 mg (has no administration in time range)   prochlorperazine (COMPAZINE) injection 10 mg (has no administration in time range)     Or   prochlorperazine (COMPAZINE) tablet 10 mg (has no administration in time range)     Or   prochlorperazine (COMPAZINE) Suppository 25 mg (has no administration in time range)   senna-docusate (SENOKOT-S/PERICOLACE) 8.6-50 MG per tablet 1 tablet (has no administration in time range)     Or   senna-docusate (SENOKOT-S/PERICOLACE) 8.6-50 MG per tablet 2 tablet (has no administration in time range)   ondansetron (ZOFRAN) injection 4 mg (4 mg Intravenous Given 11/3/19 1253)   HYDROmorphone (PF) (DILAUDID) injection 0.5 mg (0.5 mg Intravenous Given 11/3/19 1253)   sodium chloride (PF) 0.9% PF flush 76 mL (80 mLs Intravenous Given 11/3/19 1328)   iopamidol (ISOVUE-370) solution 104 mL (104  mLs Intravenous Given 11/3/19 1228)       Drips running?  No    Home pump  No    Current LDAs  Peripheral IV 11/03/19 Left Lower forearm (Active)   Number of days: 0       Incision/Surgical Site 10/22/19 Right Abdomen (Active)   Number of days: 12       Incision/Surgical Site 10/22/19 Bilateral Abdomen (Active)   Number of days: 12       Labs results:   Labs Ordered and Resulted from Time of ED Arrival Up to the Time of Departure from the ED   CBC WITH PLATELETS DIFFERENTIAL - Abnormal; Notable for the following components:       Result Value    WBC 17.1 (*)     RBC Count 4.10 (*)     Hematocrit 38.8 (*)     MCH 34.6 (*)     MCHC 36.6 (*)     Platelet Count 138 (*)     Absolute Neutrophil 11.1 (*)     Absolute Monocytes 2.7 (*)     All other components within normal limits   COMPREHENSIVE METABOLIC PANEL - Abnormal; Notable for the following components:    Calcium 8.3 (*)     Bilirubin Total 1.4 (*)     Albumin 2.6 (*)     Protein Total 6.0 (*)      (*)     AST 84 (*)     All other components within normal limits   LIPASE - Abnormal; Notable for the following components:    Lipase 467 (*)     All other components within normal limits   PERIPHERAL IV CATHETER   FREE WATER   IP ASSIGN PROVIDER TEAM TO TREATMENT TEAM   VITAL SIGNS   STRICT INTAKE AND OUTPUT   ACTIVITY   APPLY PNEUMATIC COMPRESSION DEVICE (PCD)       Imaging Studies:   Recent Results (from the past 24 hour(s))   CT Abdomen Pelvis w Contrast    Narrative    EXAMINATION: CT ABDOMEN PELVIS W CONTRAST, 11/3/2019 1:33 PM    TECHNIQUE:  Helical CT images from the lung bases through the  symphysis pubis were obtained  with IV contrast. Contrast dose: 104ml  isovue 370    COMPARISON: 12/13/2017    HISTORY: abdominal pain and distention, eval for ileus vs. SBO. Recent  ex-lap and liver biopsies    FINDINGS:  Lower thorax: Opacity in the left lung base with air bronchograms  favor atelectasis. Trace right pleural effusion. Prosthetic aortic  valve.      "Abdomen/pelvis: Cirrhotic liver. Large amount of ascites which is new  from 10/3/2019. No hematocrit level. Changes of ablation in the right  lobe of the liver result in 2 large hypoattenuating lesions extending  to the capsule, the larger of which measures 5.6 cm. Cholelithiasis.  Pancreas is unremarkable. Spleen, bilateral adrenal glands and kidneys  are normal in appearance. Portal vein, celiac access, SMA, bilateral  renal arteries are patent. Colon and small bowel are normal in  caliber.    Degenerative changes of thoracic spine.      Impression    IMPRESSION:   1. New large amount of ascites in the abdomen (postsurgical  20/2/2019). Although no hematocrit level is demonstrated and fluid  measures simple, a bleed cannot be ruled out.  2. New hypoattenuating lesions in the right lobe of liver presumably  secondary to ablation during surgery. These extend to the capsule, can  not rule out extension beyond capsule.   3. Atelectasis or infection left lung base.       LUIS RIZO MD       Recent vital signs:   /66   Pulse 88   Temp 98.4  F (36.9  C) (Oral)   Resp 20   Ht 1.753 m (5' 9\")   Wt 77.1 kg (170 lb)   SpO2 98%   BMI 25.10 kg/m      [place] Coma Scale Score: 15 (11/03/19 1544)       Cardiac Rhythm: Normal Sinus  Pt needs tele? No  Skin/wound Issues: Incision on abdomen from liver surgery    Code Status: Full Code    Pain control: good    Nausea control: good    Abnormal labs/tests/findings requiring intervention: See EPIC    Family present during ED course? No   Family Comments/Social Situation comments: NA    Tasks needing completion: None    Ludin Plascencia, RN  [phone] [facility]    "

## 2019-11-03 NOTE — ED PROVIDER NOTES
"      [facility] ([facility])  November 3, 2019    History     Chief Complaint   Patient presents with     Abdominal Pain     Nausea     HPI  Ten Johnson is a 59 year old male with history notable for recent diagnosis of hepatocellular carcinoma (s/p microwave tumor ablation and laparoscopic lysis adhesions on 10/22/19 by Dr. Benoit), chronic hepatitis C, alcoholic liver cirrhosis,  ileus,severe aortic stenosis (s/p TAVR bioprosthetic), portal HTN,  thrombocytopenia, and HTN who presents to the ED for 2 days of ongoing abdominal pain. Patient states after he was discharged, he started to have diffuse abdominal pain 2 days ago. He also complains of R shoulder pain and states he has had a previous rotator cuff repair in that shoulder. He also reports having a dry mouth, feeling mouth irritation like a \"chemical burn\", nausea with dry heaving, loss of appetite,  and 5 episodes of watery diarrhea in the past 24 hours. Patient states he hasn't been taking Miralax and doesn't have home pain medications. He states he last had oxycodone 3 days ago. He also states his last alcoholic drink was months ago. He states he does have follow up with Surgery in 5 days and otherwise denies vomiting or dysuria.     Per chart review, patient was hospitalized from 10/22-10/28/19 for hepatocellular carcinoma where he had the above procedure, multiple liver core needle biopsies, and exploratory laparotomy.He was noted to not be a good candidate for resection or transplant. During hospitalization, he triggered the sepsis protocol so he was put on 5 day course of linezolid starting 10/24. Blood cultures were negative.     PAST MEDICAL HISTORY  Past Medical History:   Diagnosis Date     JENNIFER (acute kidney injury) (H) 4/9/2019     Alcohol use disorder      Alcoholic cirrhosis (H)      Anticoagulant long-term use     plavix     Aortic stenosis, severe 2/21/2018     Ascites      Chronic allergic rhinitis      Chronic " anemia      Chronic hepatitis C without hepatic coma (H) 05/10/2016    Untreated as of 2/2018     Cirrhosis (H) 2017    MRI finding     Diastolic dysfunction      Erosive gastropathy      Esophageal varices in alcoholic cirrhosis (H)      H/O upper gastrointestinal hemorrhage 09/2017     Hepatocellular carcinoma (H)      History of blood transfusion      History of drug abuse (H)     intranasal     Hypertension     essential     JANELLE (iron deficiency anemia)      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     Sarahi-Hoffman tear     History     Marijuana abuse      MRSA (methicillin resistant Staphylococcus aureus)      Nonrheumatic aortic valve stenosis 2/20/2018     Olecranon bursitis      Portal hypertension (H)      Right shoulder pain     history of rotator cuff repair     S/p TAVR (transcatheter aortic valve replacement), bioprosthetic      Severe aortic stenosis      Thrombocytopenia (H)      PAST SURGICAL HISTORY  Past Surgical History:   Procedure Laterality Date     ABLATE LIVER TUMOR N/A 10/22/2019    Procedure: Ablate liver tumor x3;  Surgeon: Gregorio Benoit MD;  Location: [facility]     ARTHROSCOPY SHOULDER ROTATOR CUFF REPAIR  7/31/2012    Procedure: ARTHROSCOPY SHOULDER ROTATOR CUFF REPAIR;  Right Shoulder Arthroscopic Rotator Cuff Repair, BicepsTenodesis,  Subacromial Decompression ;  Surgeon: Joi Castillo MD;  [facility]     ESOPHAGOSCOPY, GASTROSCOPY, DUODENOSCOPY (EGD), COMBINED N/A 10/23/2017    Procedure: COMBINED ESOPHAGOSCOPY, GASTROSCOPY, DUODENOSCOPY (EGD);;  Surgeon: Gentry Salas MD;  Location: [facility] GI     EXCHANGE POLY COMPONENT ARTHROPLASTY KNEE Right 3/4/2019    Procedure: REVISION RIGHT TOTAL KNEE POLY COMPONENT EXCHANGE;  Surgeon: Olvin Joe MD;  Location: [facility] OR     FACIAL RECONSTRUCTION SURGERY  1971     HEART CATH FEMORAL CANNULIZATION WITH OPEN STANDBY REPAIR AORTIC VALVE N/A 2/21/2018    Procedure:  HEART CATH FEMORAL CANNULIZATION WITH OPEN STANDBY REPAIR AORTIC VALVE;;  Surgeon: Luis Baird MD;  Location: [facility]     IR LIVER BIOPSY PERCUTANEOUS  7/18/2019     IRRIGATION AND DEBRIDEMENT UPPER EXTREMITY, COMBINED  1/3/2012    Procedure:COMBINED IRRIGATION AND DEBRIDEMENT UPPER EXTREMITY; Irrigation & Debridement Left Elbow; Surgeon:CRISTHIAN ZHOU; Location:[facility] OR     LAPAROSCOPIC BIOPSY LIVER N/A 10/22/2019    Procedure: intraoperative liver ultrasound, laparoscopic converted to open liver biopsy x 6;  Surgeon: Gregorio Benoit MD;  [facility]     LAPAROSCOPY DIAGNOSTIC (GENERAL) N/A 10/22/2019    Procedure: Diagnostic laparoscopy;  Surgeon: Gregorio Benoit MD;  [facility]     LAPAROTOMY, LYSIS ADHESIONS, COMBINED N/A 10/22/2019    Procedure: Laparotomy, lysis adhesions, combined;  Surgeon: Gregorio Benoit MD;  [facility]: [facility]     OPTICAL TRACKING SYSTEM ARTHROPLASTY KNEE Right 2/7/2019    Procedure: ARTHROPLASTY KNEE RIGHT;  Surgeon: Olvin Joe MD;  [facility]     REPAIR TENDON TRICEPS UPPER EXTREMITY  11/8/2011    Procedure:REPAIR TENDON TRICEPS UPPER EXTREMITY; Surgeon:CRISTHIAN ZHOU; Location[facility]     SHOULDER SURGERY  2003    left, injury, torn tendons, hematoma     TRANSCATHETER AORTIC VALVE IMPLANT ANESTHESIA N/A 2/21/2018    Procedure: TRANSCATHETER AORTIC VALVE IMPLANT ANESTHESIA;  Transfemoral (Quiroz) Aortic Valve Implant 26mm MARTHA 3, with Cardiopulmonary Bypass Standby, transthoracic echocardiogram;  Surgeon: GENERIC ANESTHESIA PROVIDER;  Location: [facility]     TRANSPOSITION ULNAR NERVE (ELBOW)  11/8/2011    Procedure:TRANSPOSITION ULNAR NERVE (ELBOW); Final Procedure Done: Left Elbow Lateral Ulnar Collateral Repair And  Left Elbow Triceps Repair       FAMILY HISTORY  Family History   Problem Relation Age of Onset     Cancer Mother 62        unknown primary     Alcoholism Paternal Uncle      Unknown/Adopted Father      No Known Problems Brother       Diabetes Maternal Grandmother      Myocardial Infarction Maternal Grandfather      No Known Problems Paternal Grandmother      Unknown/Adopted Paternal Grandfather      Cirrhosis No family hx of      SOCIAL HISTORY  Social History     Tobacco Use     Smoking status: Never Smoker     Smokeless tobacco: Never Used   Substance Use Topics     Alcohol use: Yes     Comment: Alcohol use disorder, still actively drinking     MEDICATIONS  Current Facility-Administered Medications   Medication     lactated ringers infusion     lactated ringers infusion     lidocaine (PF) (XYLOCAINE) 1 % injection     magic mouthwash suspension (diphenhydramine, lidocaine, aluminum-magnesium & simethicone)     naloxone (NARCAN) injection 0.1-0.4 mg     ondansetron (ZOFRAN-ODT) ODT tab 4 mg    Or     ondansetron (ZOFRAN) injection 4 mg     oxyCODONE (ROXICODONE) tablet 5-10 mg     piperacillin-tazobactam (ZOSYN) 3.375 g vial to attach to  mL bag     prochlorperazine (COMPAZINE) injection 10 mg    Or     prochlorperazine (COMPAZINE) tablet 10 mg    Or     prochlorperazine (COMPAZINE) Suppository 25 mg     senna-docusate (SENOKOT-S/PERICOLACE) 8.6-50 MG per tablet 1 tablet    Or     senna-docusate (SENOKOT-S/PERICOLACE) 8.6-50 MG per tablet 2 tablet     vancomycin (VANCOCIN) 1,750 mg in sodium chloride 0.9 % 500 mL intermittent infusion     Current Outpatient Medications   Medication     cyclobenzaprine (FLEXERIL) 10 MG tablet     fluticasone (FLONASE) 50 MCG/ACT nasal spray     lisinopril (PRINIVIL/ZESTRIL) 20 MG tablet     loratadine (CLARITIN) 10 MG tablet     Multiple Vitamin (MULTIVITAMINS PO)     oxyCODONE (ROXICODONE) 5 MG tablet     polyethylene glycol (MIRALAX/GLYCOLAX) packet     ALLERGIES  Allergies   Allergen Reactions     Zolpidem Other (See Comments)     Alcoholic.  Had reaction 3/17/13 while intoxicated which included black out, loss of awareness, paranoia.  Do not prescribe.  Dr. Celeste     Cats Other (See Comments)      "rhinitis     Dogs Other (See Comments)     rhinitis     Pollen Extract Other (See Comments)     rhinits.           I have reviewed the Medications, Allergies, Past Medical and Surgical History, and Social History in the Epic system.    Review of Systems   Constitutional: Positive for appetite change (loss of appetite).   Gastrointestinal: Positive for abdominal pain, diarrhea and nausea. Negative for vomiting.   Genitourinary: Negative for dysuria.   Musculoskeletal:        Positive for R shoulder pain.    All other systems reviewed and are negative.      Physical Exam   BP: 139/89  Pulse: 111  Temp: 98.4  F (36.9  C)  Resp: 20  Height: 175.3 cm (5' 9\")  Weight: 77.1 kg (170 lb)  SpO2: 97 %      Physical Exam  Gen:A&Ox3, uncomfortable  HEENT:PERRL, no facial tenderness or wounds, head atraumatic, mucous membranes dry and inflamed, no oral lesions  Neck:no bony tenderness or step offs, no JVD, trachea midline  Back: no CVA tenderness, no midline bony tenderness  CV:RRR without murmurs  PULM:Clear to auscultation bilaterally  Abd:soft, distended, mild diffuse tenderness, Right abdominal incision with sanguinous drainage.   UE:No traumatic injuries, skin normal  LE:no traumatic injuries, skin normal, no LE edema.   Neuro:CN II-XII intact, strength 5/5 throughout  Skin: no rashes or ecchymoses    ED Course        Procedures   9:31 AM  The patient was seen and examined by Dr. Shaikh in Room 25.                Critical Care time:  none             Labs Ordered and Resulted from Time of ED Arrival Up to the Time of Departure from the ED   CBC WITH PLATELETS DIFFERENTIAL - Abnormal; Notable for the following components:       Result Value    WBC 17.1 (*)     RBC Count 4.10 (*)     Hematocrit 38.8 (*)     MCH 34.6 (*)     MCHC 36.6 (*)     Platelet Count 138 (*)     Absolute Neutrophil 11.1 (*)     Absolute Monocytes 2.7 (*)     All other components within normal limits   COMPREHENSIVE METABOLIC PANEL - Abnormal; Notable for " the following components:    Calcium 8.3 (*)     Bilirubin Total 1.4 (*)     Albumin 2.6 (*)     Protein Total 6.0 (*)      (*)     AST 84 (*)     All other components within normal limits   LIPASE - Abnormal; Notable for the following components:    Lipase 467 (*)     All other components within normal limits   PERIPHERAL IV CATHETER   FREE WATER   IP ASSIGN PROVIDER TEAM TO TREATMENT TEAM   VITAL SIGNS   STRICT INTAKE AND OUTPUT   ACTIVITY   APPLY PNEUMATIC COMPRESSION DEVICE (PCD)            Assessments & Plan (with Medical Decision Making)   60 yo M with Hep C and alcoholic cirrhosis with hepatocellular carcinoma. POD #13 from exploratory laparotomy converted to open laparotomy with IZZY, liver biopsies and intraoperative microwave ablation. Presenting with abdominal pain, distention, nausea.   Vitals stable.   IV access obtained and lab testing done.   DDx included ileus, hematoma or abscess.   CBC with increasing WBC to 17.1. Hgb stable at 14.2. Plts 138.   CMP with normal electrolytes. Cr 1.06. Slight improvement in LFTs since discharge. Lipase 467.   Treated with IV zofran and dilaudid.   CT A/P showing recurrent ascites and hypodensity presumed to be the area of previous ablation.  Treated with IV zosyn and vancomycin.  Pt seen by surgery service and admitted for further care.    Pt also notes oral mucosal dryness and feeling of chemical burn. Treated with biotene and magic mouthwash. No evidence of thrush or other oral lesions.    I have reviewed the nursing notes.    I have reviewed the findings, diagnosis, plan and need for follow up with the patient.    New Prescriptions    No medications on file       Final diagnoses:   Abdominal pain, generalized   Alcoholic cirrhosis of liver with ascites (H)   Hepatocellular carcinoma (H)     Graeme BROWN, criss serving as a trained medical scribe to document services personally performed by Elvia Shaikh MD, based on the provider's statements to me.      I,  Elvia Shaikh MD, was physically present and have reviewed and verified the accuracy of this note documented by Graeme Hernandez.     11/3/2019   [facility], [place], EMERGENCY DEPARTMENT    MD ROBB Garcia Katrina Anne, MD  11/03/19 1946

## 2019-11-03 NOTE — PHARMACY-VANCOMYCIN DOSING SERVICE
Pharmacy Vancomycin Initial Note  Date of Service November 3, 2019  Patient's  1960  59 year old, male    Indication: Intra-abdominal infection and Skin and Soft Tissue Infection    Current estimated CrCl = Estimated Creatinine Clearance: 79.8 mL/min (based on SCr of 1.06 mg/dL).    Creatinine for last 3 days  11/3/2019: 10:57 AM Creatinine 1.06 mg/dL    Recent Vancomycin Level(s) for last 3 days  No results found for requested labs within last 72 hours.      Vancomycin IV Administrations (past 72 hours)      No vancomycin orders with administrations in past 72 hours.                Nephrotoxins and other renal medications (From now, onward)    Start     Dose/Rate Route Frequency Ordered Stop    19 1700  vancomycin (VANCOCIN) 1,750 mg in sodium chloride 0.9 % 500 mL intermittent infusion      1,750 mg  over 2 Hours Intravenous EVERY 12 HOURS 19 1626      19 1600  piperacillin-tazobactam (ZOSYN) 3.375 g vial to attach to  mL bag      3.375 g  over 30 Minutes Intravenous EVERY 6 HOURS 19 1540            Contrast Orders - past 72 hours (72h ago, onward)    Start     Dose/Rate Route Frequency Ordered Stop    19 1127  iopamidol (ISOVUE-370) solution 104 mL      104 mL Intravenous ONCE 19 1126 19 1328                Plan:  1.  Start vancomycin  1750 mg IV q12h based on patient's extensive dosing history.   2.  Goal Trough Level: 10-15 mg/L   3.  Pharmacy will check trough levels as appropriate in 1-3 Days.    4. Serum creatinine levels will be ordered daily for the first week of therapy and at least twice weekly for subsequent weeks.    5. Seymour method utilized to dose vancomycin therapy: Method 2    Sandro Wagner, Pharm.D.

## 2019-11-04 ENCOUNTER — APPOINTMENT (OUTPATIENT)
Dept: GENERAL RADIOLOGY | Facility: CLINIC | Age: 59
End: 2019-11-04
Attending: PHYSICIAN ASSISTANT
Payer: MEDICAID

## 2019-11-04 PROBLEM — J98.11 ATELECTASIS: Status: ACTIVE | Noted: 2019-11-04

## 2019-11-04 LAB
ALBUMIN FLD-MCNC: 1.3 G/DL
ALBUMIN SERPL-MCNC: 2.7 G/DL (ref 3.4–5)
ALBUMIN UR-MCNC: 10 MG/DL
ANION GAP SERPL CALCULATED.3IONS-SCNC: 7 MMOL/L (ref 3–14)
APPEARANCE FLD: NORMAL
APPEARANCE UR: CLEAR
BILIRUB UR QL STRIP: NEGATIVE
BUN SERPL-MCNC: 18 MG/DL (ref 7–30)
CALCIUM SERPL-MCNC: 8.1 MG/DL (ref 8.5–10.1)
CHLORIDE SERPL-SCNC: 104 MMOL/L (ref 94–109)
CO2 SERPL-SCNC: 23 MMOL/L (ref 20–32)
COLOR FLD: NORMAL
COLOR UR AUTO: YELLOW
CREAT SERPL-MCNC: 1.16 MG/DL (ref 0.66–1.25)
ERYTHROCYTE [DISTWIDTH] IN BLOOD BY AUTOMATED COUNT: 13.8 % (ref 10–15)
GFR SERPL CREATININE-BSD FRML MDRD: 67 ML/MIN/{1.73_M2}
GLUCOSE SERPL-MCNC: 84 MG/DL (ref 70–99)
GLUCOSE UR STRIP-MCNC: NEGATIVE MG/DL
GRAM STN SPEC: NORMAL
HCT VFR BLD AUTO: 39.9 % (ref 40–53)
HGB BLD-MCNC: 14.2 G/DL (ref 13.3–17.7)
HGB UR QL STRIP: NEGATIVE
KETONES UR STRIP-MCNC: NEGATIVE MG/DL
LACTATE BLD-SCNC: 1 MMOL/L (ref 0.7–2)
LEUKOCYTE ESTERASE UR QL STRIP: NEGATIVE
LYMPHOCYTES NFR FLD MANUAL: 64 %
MCH RBC QN AUTO: 34.5 PG (ref 26.5–33)
MCHC RBC AUTO-ENTMCNC: 35.6 G/DL (ref 31.5–36.5)
MCV RBC AUTO: 97 FL (ref 78–100)
NEUTS BAND NFR FLD MANUAL: 6 %
NITRATE UR QL: NEGATIVE
OTHER CELLS FLD MANUAL: 30 %
PH UR STRIP: 6 PH (ref 5–7)
PLATELET # BLD AUTO: 123 10E9/L (ref 150–450)
POTASSIUM SERPL-SCNC: 4.1 MMOL/L (ref 3.4–5.3)
PROT FLD-MCNC: 2.4 G/DL
RBC # BLD AUTO: 4.11 10E12/L (ref 4.4–5.9)
RBC #/AREA URNS AUTO: 1 /HPF (ref 0–2)
SODIUM SERPL-SCNC: 133 MMOL/L (ref 133–144)
SOURCE: ABNORMAL
SP GR UR STRIP: 1.02 (ref 1–1.03)
SPECIMEN SOURCE FLD: NORMAL
SPECIMEN SOURCE: NORMAL
TRANS CELLS #/AREA URNS HPF: <1 /HPF (ref 0–1)
UROBILINOGEN UR STRIP-MCNC: NORMAL MG/DL (ref 0–2)
WBC # BLD AUTO: 12.8 10E9/L (ref 4–11)
WBC # FLD AUTO: 1548 /UL
WBC #/AREA URNS AUTO: 2 /HPF (ref 0–5)

## 2019-11-04 PROCEDURE — 00000155 ZZHCL STATISTIC H-CELL BLOCK W/STAIN: Performed by: PHYSICIAN ASSISTANT

## 2019-11-04 PROCEDURE — 82040 ASSAY OF SERUM ALBUMIN: CPT | Performed by: STUDENT IN AN ORGANIZED HEALTH CARE EDUCATION/TRAINING PROGRAM

## 2019-11-04 PROCEDURE — 25000128 H RX IP 250 OP 636: Performed by: PHYSICIAN ASSISTANT

## 2019-11-04 PROCEDURE — 49083 ABD PARACENTESIS W/IMAGING: CPT | Mod: GC | Performed by: STUDENT IN AN ORGANIZED HEALTH CARE EDUCATION/TRAINING PROGRAM

## 2019-11-04 PROCEDURE — 25800030 ZZH RX IP 258 OP 636: Performed by: STUDENT IN AN ORGANIZED HEALTH CARE EDUCATION/TRAINING PROGRAM

## 2019-11-04 PROCEDURE — 88305 TISSUE EXAM BY PATHOLOGIST: CPT | Performed by: PHYSICIAN ASSISTANT

## 2019-11-04 PROCEDURE — 71046 X-RAY EXAM CHEST 2 VIEWS: CPT

## 2019-11-04 PROCEDURE — 84157 ASSAY OF PROTEIN OTHER: CPT | Performed by: PHYSICIAN ASSISTANT

## 2019-11-04 PROCEDURE — 12000001 ZZH R&B MED SURG/OB UMMC

## 2019-11-04 PROCEDURE — 25000128 H RX IP 250 OP 636: Performed by: STUDENT IN AN ORGANIZED HEALTH CARE EDUCATION/TRAINING PROGRAM

## 2019-11-04 PROCEDURE — 99024 POSTOP FOLLOW-UP VISIT: CPT | Performed by: PHYSICIAN ASSISTANT

## 2019-11-04 PROCEDURE — 00000102 ZZHCL STATISTIC CYTO WRIGHT STAIN TC: Performed by: PHYSICIAN ASSISTANT

## 2019-11-04 PROCEDURE — 25800030 ZZH RX IP 258 OP 636: Performed by: SURGERY

## 2019-11-04 PROCEDURE — 80048 BASIC METABOLIC PNL TOTAL CA: CPT | Performed by: STUDENT IN AN ORGANIZED HEALTH CARE EDUCATION/TRAINING PROGRAM

## 2019-11-04 PROCEDURE — 87070 CULTURE OTHR SPECIMN AEROBIC: CPT | Performed by: STUDENT IN AN ORGANIZED HEALTH CARE EDUCATION/TRAINING PROGRAM

## 2019-11-04 PROCEDURE — 83605 ASSAY OF LACTIC ACID: CPT | Performed by: SURGERY

## 2019-11-04 PROCEDURE — 85027 COMPLETE CBC AUTOMATED: CPT | Performed by: STUDENT IN AN ORGANIZED HEALTH CARE EDUCATION/TRAINING PROGRAM

## 2019-11-04 PROCEDURE — 82042 OTHER SOURCE ALBUMIN QUAN EA: CPT | Performed by: PHYSICIAN ASSISTANT

## 2019-11-04 PROCEDURE — 81001 URINALYSIS AUTO W/SCOPE: CPT | Performed by: PHYSICIAN ASSISTANT

## 2019-11-04 PROCEDURE — 40000556 ZZH STATISTIC PERIPHERAL IV START W US GUIDANCE

## 2019-11-04 PROCEDURE — 88112 CYTOPATH CELL ENHANCE TECH: CPT | Performed by: PHYSICIAN ASSISTANT

## 2019-11-04 PROCEDURE — 89051 BODY FLUID CELL COUNT: CPT | Performed by: PHYSICIAN ASSISTANT

## 2019-11-04 PROCEDURE — 36415 COLL VENOUS BLD VENIPUNCTURE: CPT | Performed by: STUDENT IN AN ORGANIZED HEALTH CARE EDUCATION/TRAINING PROGRAM

## 2019-11-04 PROCEDURE — 25000132 ZZH RX MED GY IP 250 OP 250 PS 637: Performed by: EMERGENCY MEDICINE

## 2019-11-04 PROCEDURE — 25000132 ZZH RX MED GY IP 250 OP 250 PS 637: Performed by: STUDENT IN AN ORGANIZED HEALTH CARE EDUCATION/TRAINING PROGRAM

## 2019-11-04 PROCEDURE — 87205 SMEAR GRAM STAIN: CPT | Performed by: STUDENT IN AN ORGANIZED HEALTH CARE EDUCATION/TRAINING PROGRAM

## 2019-11-04 PROCEDURE — 25000128 H RX IP 250 OP 636: Performed by: SURGERY

## 2019-11-04 PROCEDURE — 0W9G3ZZ DRAINAGE OF PERITONEAL CAVITY, PERCUTANEOUS APPROACH: ICD-10-PCS | Performed by: STUDENT IN AN ORGANIZED HEALTH CARE EDUCATION/TRAINING PROGRAM

## 2019-11-04 PROCEDURE — 36415 COLL VENOUS BLD VENIPUNCTURE: CPT | Performed by: SURGERY

## 2019-11-04 RX ORDER — LISINOPRIL 20 MG/1
20 TABLET ORAL DAILY
Status: DISCONTINUED | OUTPATIENT
Start: 2019-11-04 | End: 2019-11-07 | Stop reason: HOSPADM

## 2019-11-04 RX ORDER — PIPERACILLIN SODIUM, TAZOBACTAM SODIUM 3; .375 G/15ML; G/15ML
3.38 INJECTION, POWDER, LYOPHILIZED, FOR SOLUTION INTRAVENOUS EVERY 6 HOURS
Status: DISCONTINUED | OUTPATIENT
Start: 2019-11-04 | End: 2019-11-05

## 2019-11-04 RX ORDER — LINEZOLID 2 MG/ML
600 INJECTION, SOLUTION INTRAVENOUS EVERY 12 HOURS
Status: DISCONTINUED | OUTPATIENT
Start: 2019-11-04 | End: 2019-11-05

## 2019-11-04 RX ADMIN — OXYCODONE HYDROCHLORIDE 10 MG: 5 TABLET ORAL at 04:41

## 2019-11-04 RX ADMIN — OXYCODONE HYDROCHLORIDE 10 MG: 5 TABLET ORAL at 07:55

## 2019-11-04 RX ADMIN — SODIUM CHLORIDE, POTASSIUM CHLORIDE, SODIUM LACTATE AND CALCIUM CHLORIDE: 600; 310; 30; 20 INJECTION, SOLUTION INTRAVENOUS at 00:51

## 2019-11-04 RX ADMIN — OXYCODONE HYDROCHLORIDE 10 MG: 5 TABLET ORAL at 14:12

## 2019-11-04 RX ADMIN — DIPHENHYDRAMINE HYDROCHLORIDE AND LIDOCAINE HYDROCHLORIDE AND ALUMINUM HYDROXIDE AND MAGNESIUM HYDRO 10 ML: KIT at 05:24

## 2019-11-04 RX ADMIN — PIPERACILLIN SODIUM AND TAZOBACTAM SODIUM 3.38 G: 3; .375 INJECTION, POWDER, LYOPHILIZED, FOR SOLUTION INTRAVENOUS at 04:39

## 2019-11-04 RX ADMIN — LISINOPRIL 20 MG: 20 TABLET ORAL at 07:55

## 2019-11-04 RX ADMIN — LINEZOLID 600 MG: 600 INJECTION, SOLUTION INTRAVENOUS at 22:00

## 2019-11-04 RX ADMIN — OXYCODONE HYDROCHLORIDE 10 MG: 5 TABLET ORAL at 11:03

## 2019-11-04 RX ADMIN — LINEZOLID 600 MG: 600 INJECTION, SOLUTION INTRAVENOUS at 09:14

## 2019-11-04 RX ADMIN — OXYCODONE HYDROCHLORIDE 10 MG: 5 TABLET ORAL at 21:01

## 2019-11-04 RX ADMIN — PIPERACILLIN SODIUM AND TAZOBACTAM SODIUM 3.38 G: 3; .375 INJECTION, POWDER, LYOPHILIZED, FOR SOLUTION INTRAVENOUS at 16:21

## 2019-11-04 RX ADMIN — VANCOMYCIN HYDROCHLORIDE 1750 MG: 10 INJECTION, POWDER, LYOPHILIZED, FOR SOLUTION INTRAVENOUS at 05:11

## 2019-11-04 RX ADMIN — OXYCODONE HYDROCHLORIDE 10 MG: 5 TABLET ORAL at 17:52

## 2019-11-04 RX ADMIN — DIPHENHYDRAMINE HYDROCHLORIDE AND LIDOCAINE HYDROCHLORIDE AND ALUMINUM HYDROXIDE AND MAGNESIUM HYDRO 10 ML: KIT at 10:53

## 2019-11-04 RX ADMIN — OXYCODONE HYDROCHLORIDE 10 MG: 5 TABLET ORAL at 01:10

## 2019-11-04 ASSESSMENT — ACTIVITIES OF DAILY LIVING (ADL)
ADLS_ACUITY_SCORE: 14

## 2019-11-04 ASSESSMENT — PAIN DESCRIPTION - DESCRIPTORS
DESCRIPTORS: TIGHTNESS

## 2019-11-04 ASSESSMENT — MIFFLIN-ST. JEOR: SCORE: 1566.38

## 2019-11-04 NOTE — PHARMACY-ADMISSION MEDICATION HISTORY
Admission medication history interview status for the 11/3/2019 admission is complete. See Epic admission navigator for allergy information, pharmacy, prior to admission medications and immunization status.     Medication history interview sources: patient, SureScripts    Changes made to PTA medication list (reason)  Added: N/A  Deleted:  - PEG (not using per pt)  Changed: N/A    Additional medication history information (including reliability of information, actions taken by pharmacist):  - pt was knowledgeable about his medications  - per pt, he has been in and out of the hospital and on different surgery protocols that ask him to hold different medications, so some of his medications may be changing    Prior to Admission medications    Medication Sig Last Dose Taking? Auth Provider   cyclobenzaprine (FLEXERIL) 10 MG tablet Take 1 tablet (10 mg) by mouth 3 times daily as needed for muscle spasms Past Month at Unknown time Yes Ramandeep Hansen PA-C   fluticasone (FLONASE) 50 MCG/ACT nasal spray Spray 2 sprays into both nostrils daily 11/3/2019 at Unknown time Yes Cuca Celeste MD   lisinopril (PRINIVIL/ZESTRIL) 20 MG tablet Take 1 tablet (20 mg) by mouth daily 11/3/2019 at Unknown time Yes DeepakJessica erwin, NP   loratadine (CLARITIN) 10 MG tablet Take 1 tablet (10 mg) by mouth daily 11/3/2019 at Unknown time Yes Cuca Celeste MD   Multiple Vitamin (MULTIVITAMINS PO) Take 1 tablet by mouth At Bedtime  Past Month at Unknown time Yes Reported, Patient   oxyCODONE (ROXICODONE) 5 MG tablet Take 1-2 tablets (5-10 mg) by mouth every 6 hours as needed for moderate to severe pain or severe pain 11/3/2019 at Unknown time Yes Ramandeep Hansen PA-C     Medication history completed by:    Bárbara Williamson, PharmD, MPH  PGY1 Pharmacy Practice Resident

## 2019-11-04 NOTE — PLAN OF CARE
Pt arrived to unit from ED around midnight for increased abd distention, pain, nausea. VSS ex BP; notified SurgOnc resident regarding hypertension; pt normally takes Lisinopril at night but missed dose last evening. Asymptomatic. No new orders - day team will discuss plan. Pain managed overnight with 10mg Oxycodone q 3hrs. Takes magic mouthwash for mouth dryness/irritation. Denies nausea. Voiding fine, had BM yesterday. Abd very distended; BS active in all quadrants. R abd inc from previous surgery ISHA with small area covered with gauze for scant amount of bleeding. NPO since midnight; might go to IR today. PIV infusing LR @ 50mL/hr with IV abx.Up ind. Continue to monitor.

## 2019-11-04 NOTE — PROGRESS NOTES
Antimicrobial Stewardship Team Note    Antimicrobial Stewardship Program - A joint venture between [facility] and  Physicians to optimize antibiotic management.  NOT a formal consult - Restricted Antimicrobial Review     Patient: Ten Johnson  MRN: [id number]  Allergies: Zolpidem; Cats; Dogs; and Pollen extract    Brief Summary: Ten Johnson is a 59 year old male who was admitted on 11/3/19 with worsening abdominal pain and distension and new large amount of ascites in the abdomen s/p laparoscopic converted to open collection of liver core needle biopsies and intra-operative microwave ablation of 3 lesions on 10/22/19. PMH includes liver cirrhosis secondary to hep C and alcohol use, severe aortic stenosis s/p TAVR with recent diagnosis of hepatocellular carcinoma. Per surgical notes, wound appears to be healing appropriately without signs of infection. Patient was started on vancomycin and Zosyn upon admission which was switched to linezolid today. No Bcx have been obtained and UA still needs to be collected. CXR without concern for pneumonia but noted large amount of ascites. GI hepatology consulted for management of ascites and recommended paracentesis.         Active Anti-infective Medications   (From admission, onward)                Start     Stop    11/04/19 0800  linezolid  600 mg,   Intravenous,   EVERY 12 HOURS     Possible post-op infection        --          Assessment: New ascites s/p liver core needle biopsies and microwave ablation. Patient's new ascites and increased WBC count concerning for intra-abdominal infection. Linezolid provides good gram positive coverage but does not provide adequate gram negative coverage for an intra-abdominal infection. Agree with GI hepatology recommendation to obtain paracentesis. If concerned for peritonitis infection, empiric gram negative coverage is needed and a more appropriate choice of antibiotics would be  ceftriaxone.    Recommendations:  Switch linezolid to ceftriaxone 2g IV q24h to provide better intra-abdominal infection coverage.    Discussed with ID Staff Petey Breen MD, and Natalia Bean, ZeeshanD    Raeann Melgar PharmD  AMT Pager: [phone]  Desk Phone: [phone]    Vital Signs/Clinical Features:  Vitals         11/02 0700  -  11/03 0659 11/03 0700  -  11/04 0659 11/04 0700  -  11/04 1447   Most Recent    Temp ( F)   97 -  98.4    96.4 -  97.4     96.4 (35.8)    Pulse   77 -  111    92 -  102     95    Resp   18 -  20      18     18    BP   117/66 -  168/108    129/85 -  156/90     129/85    SpO2 (%)   97 -  100    94 -  96     96            Labs  Estimated Creatinine Clearance: 72.9 mL/min (based on SCr of 1.16 mg/dL).  Recent Labs   Lab Test 10/25/19  0752 10/26/19  0654 10/27/19  0722 10/28/19  0712 11/03/19  1057 11/04/19  0720   CR 0.88 0.84 0.88 0.91 1.06 1.16       Recent Labs   Lab Test 04/23/19  1538  05/20/19  1312 06/05/19  1302  10/22/19  1530  10/23/19  0920  10/25/19  0752 10/26/19  0654 10/27/19  0722 10/28/19  0712 11/03/19  1057 11/04/19  0720   WBC 7.4  --  6.2 7.8   < > 17.6*  --  22.2*   < > 17.2* 12.4* 11.3* 11.8* 17.1* 12.8*   ANEU 4.5  --  4.1 5.3  --  15.5*  --  17.2*  --   --   --   --   --  11.1*  --    ALYM 1.6  --  1.3 1.5  --  0.8  --  1.7  --   --   --   --   --  3.1  --    LOCO 1.1  --  0.7 0.8  --  1.4*  --  3.1*  --   --   --   --   --  2.7*  --    AEOS 0.1  --  0.1 0.1  --  0.0  --  0.0  --   --   --   --   --  0.1  --    HGB 13.2*   < > 12.7* 13.0*   < > 16.0  --  16.3   < > 14.5 12.3* 12.8* 13.5 14.2 14.2   HCT 37.3*  --  36.0* 36.2*   < > 44.5  --  45.0   < > 41.4 35.0* 35.5* 37.1* 38.8* 39.9*   MCV 88  --  88 91   < > 95  --  95   < > 98 97 96 96 95 97   PLT 75*  --  69* 83*   < > 87*   < > 82*   < > 64* 69* 84* 102* 138* 123*    < > = values in this interval not displayed.       Recent Labs   Lab Test 10/24/19  1406 10/25/19  0752 10/26/19  0654 10/27/19  0722  10/28/19  0712 11/03/19  1057   BILITOTAL 2.0* 3.1* 2.6* 2.7* 2.6* 1.4*   ALKPHOS 119 138 111 114 128 116   ALBUMIN 2.6* 3.0* 2.5* 2.5* 2.6* 2.6*   * 430* 222* 167* 137* 84*   * 484* 298* 247* 221* 100*       Recent Labs   Lab Test 10/22/17  2241 02/21/18  0927  03/28/19  0810 04/08/19  0815 04/15/19  0852 04/23/19  1538 05/20/19  1315 06/05/19  1302 10/24/19  0841 10/24/19  1406 10/25/19  1607 10/27/19  0059   LACT 1.9 1.1  --   --   --   --   --   --   --  2.2* 2.6* 1.8 0.9   CRP  --   --    < > <2.9 <2.9 <2.9 <2.9 <2.9 <2.9  --   --   --   --    SED  --   --    < > 9 10 17 16 11 10  --   --   --   --     < > = values in this interval not displayed.       Recent Labs   Lab Test 04/11/19  0715   VANCOMYCIN 26.5*       Culture Results:  7-Day Micro Results       Procedure Component Value Units Date/Time    Fluid Culture Aerobic Bacterial     Order Status:  No result Lab Status:  No result     Specimen:  Ascites Fluid     Gram stain     Order Status:  No result Lab Status:  No result     Specimen:  Ascites Fluid     Protein fluid     Order Status:  No result Lab Status:  No result     Specimen:  Ascites Fluid     Albumin fluid     Order Status:  No result Lab Status:  No result     Specimen:  Ascites Fluid     Cell count with differential fluid     Order Status:  No result Lab Status:  No result     Specimen:  Peritoneal Fluid             Recent Labs   Lab Test 08/25/14  1645 10/23/17  0512 02/07/19  0830 04/09/19  2108 11/04/19  0850   URINEPH 5.0 6.5 6.5 5.5 6.0   NITRITE Negative Negative Negative Negative Negative   LEUKEST Negative Negative Negative Negative Negative   WBCU  --  2 <1 <1 2                         Imaging: Xr Chest 2 Views    Result Date: 11/4/2019  XR CHEST 2 VW  11/4/2019 9:00 AM  HISTORY: post op leukocytosis COMPARISON: Chest radiograph from 10/24/2019 FINDINGS: PA and lateral views of the chest. Postprocedural changes of TAVR. Cardiac silhouette is stable. Patchy bibasilar  opacities. Stable small bilateral pleural effusions. No pneumothorax. Slight improvement in Air distended bowel loops.     IMPRESSION: 1. Patchy bibasilar opacities, likely atelectasis versus consolidation. 2. Slight improvement in air distended bowel loops. I have personally reviewed the examination and initial interpretation and I agree with the findings. HUGH ESCALANTE MD    Ct Abdomen Pelvis W Contrast    Result Date: 11/3/2019  EXAMINATION: CT ABDOMEN PELVIS W CONTRAST, 11/3/2019 1:33 PM TECHNIQUE:  Helical CT images from the lung bases through the symphysis pubis were obtained  with IV contrast. Contrast dose: 104ml isovue 370 COMPARISON: 12/13/2017 HISTORY: abdominal pain and distention, eval for ileus vs. SBO. Recent ex-lap and liver biopsies FINDINGS: Lower thorax: Opacity in the left lung base with air bronchograms favor atelectasis. Trace right pleural effusion. Prosthetic aortic valve.  Abdomen/pelvis: Cirrhotic liver. Large amount of ascites which is new from 10/3/2019. No hematocrit level. Changes of ablation in the right lobe of the liver result in 2 large hypoattenuating lesions extending to the capsule, the larger of which measures 5.6 cm. Cholelithiasis. Pancreas is unremarkable. Spleen, bilateral adrenal glands and kidneys are normal in appearance. Portal vein, celiac access, SMA, bilateral renal arteries are patent. Colon and small bowel are normal in caliber. Degenerative changes of thoracic spine.     IMPRESSION: 1. New large amount of ascites in the abdomen (postsurgical 20/2/2019). Although no hematocrit level is demonstrated and fluid measures simple, a bleed cannot be ruled out. 2. New hypoattenuating lesions in the right lobe of liver presumably secondary to ablation during surgery. These extend to the capsule, can not rule out extension beyond capsule. 3. Atelectasis or infection left lung base.  LUIS RIZO MD

## 2019-11-04 NOTE — PROGRESS NOTES
Surgical Oncology Progress Note    Interval History:  Denies pain. Continue abdominal distention. Denies nausea. Passing stool and flatus.     Physical Exam:   Temp:  [97  F (36.1  C)-98.4  F (36.9  C)] 97  F (36.1  C)  Pulse:  [] 100  Resp:  [18-20] 18  BP: (117-168)/() 153/95  SpO2:  [97 %-100 %] 98 %  General: Alert, oriented, appears comfortable, NAD.  Respiratory: breathing non labored  Abdomen: Abdomen is firm, non-tender, largely distended. Incision is clean, dry and intact with resolving bruising. No erythema.     Data:   All laboratory and imaging data in the past 24 hours reviewed  I/O last 3 completed shifts:  In: -   Out: 320 [Urine:320]  Recent Labs   Lab Test 11/03/19  1057 10/28/19  0712 10/27/19  0722  10/25/19  0752  10/24/19  1003  10/23/19  0920   WBC 17.1* 11.8* 11.3*   < > 17.2*   < >  --    < > 22.2*   HGB 14.2 13.5 12.8*   < > 14.5   < >  --    < > 16.3   * 102* 84*   < > 64*   < >  --    < > 82*   INR  --   --   --   --  1.12  --  1.20*  --  1.24*    < > = values in this interval not displayed.      Recent Labs   Lab Test 11/03/19  1057 10/28/19  0712 10/27/19  0722    135 135   POTASSIUM 4.1 3.7 3.9   CHLORIDE 101 100 101   CO2 25 29 28   BUN 22 14 13   CR 1.06 0.91 0.88   ANIONGAP 7 6 6   JOSEPHINE 8.3* 8.0* 8.3*   GLC 94 82 87     Recent Labs   Lab Test 11/03/19  1057   PROTTOTAL 6.0*   ALBUMIN 2.6*   BILITOTAL 1.4*   ALKPHOS 116   AST 84*   *     Assessment and Plan:     Ten Johnson is a 59 year old male with history of liver cirrhosis secondary to hepatitis C and alcohol, severe aortic stenosis s/p TAVR, portal HTN, and thrombocytopenia with recent diagnosis of hepatocellular carcinoma. He is status post laparoscopic converted to open collection of liver core needle biopsies and intra-operative microwave ablation of 3 lesions on 10/22/19 with Dr. Benoit. He now presents with increasing abdominal distension and abdominal pain. CT 11/3 with new large amount of  ascites. WBC elevated to 17, IV antibiotics started.     -Pain: oxycodone prn  -HTN: home lisinopril restarted  -NPO, IVF  -New ascites on CT 11/3 with leukocytosis: zosyn/vanco 11/3-11/4. Obtain UA and CXR. Switch to linezolid. Consult GI Hepatology for recommendations and management of ascites.     Seen with Chief resident who will discuss with Staff.     GLENNA StrongC   Surgical Oncology

## 2019-11-04 NOTE — CONSULTS
Consult and Procedure Service - Procedure Note    Attending: Tea  Resident: RAINE Holland  Procedure: Diagnostic and therapeutic paracentesis  Indication: evaluate source of ascites, distension  Risk Assessment: standard      The risks and benefits of the procedure were explained to the patient  who expressed understanding and opted to proceed.  Consent was obtained and placed in the chart.  A time out was performed.  An area of ascites was located and marked using ultrasound guidance in the left lower quadrant; the area was prepped and draped in the usual sterile fashion.  8 ml of 1% lidocaine was instilled and ascites located.  The Perceptis paracentesis catheter and needle were inserted under real-time guidance until ascites obtained then the needle removed and the catheter advanced.  The apparatus was connected to vacuum bottles and a total of 2600 ml of elijah orange colored fluid removed.   A specimen was  sent for analysis. The catheter was withdrawn and the area dressed.  Patient tolerated the procedure well with no immediate complications.  Please contact the Consult and Procedure Service if any complications or concerns arise.   I was present for the entire procedure.      Luis Metcalf MD  DOS:  11/4/19

## 2019-11-04 NOTE — CONSULTS
Hepatology Consultation    Ten Johnson   MRN# [id number]     Age: 59 year old YOB: 1960       Referring provider: Nestor Lindquist  Attending Hepatologist: Dr. Lock  Consult requested for: ascites        Assessment and Recommendation:   Assessment:    is a 59-year-old with a history of biopsy-proven HCC status post ablation with open laparotomy 10/22, HCV cirrhosis, continued alcohol use, esophageal varices and new onset ascites.      Recommendations:   1. Ascites  - new since recent abdominal surgery. Please perform paracentesis and send diagnostic sample (cellcount with diff and cultures) along with sample for cytology.  Imaging does have concern for possible rupture of liver capsule versus lesion to capsule edge.  Paracentesis will be able to distinguish depending on bleeding and ascitic fluid.  - give 25% albumin following paracentesis given slight rise in creatinine over baseline  - May consider diuretics once cell count removed.  We will started low-dose with Lasix 20, spironolactone 50 if cell count negative.    2. HCC s/p ablation 10/22/19  3. HCV cirrhosis  4. HCV, treatment naive  - has not been treated for HCV in past due to HCC  - MELD 11, he declined transplant evaluation in past due to continued desire to drink alcohol.   - transaminases and bilirubin continue to improve following ablation. With large area ablated, likelihood of decompensation occurs along with need for open abdomen in surgery.   - please send AFP, continue to trend liver tests  -He is due for repeat MRI per Dr. Mitchell    5. Immunizations  -Pneumovax prior to discharge    Plan of care discussed with Dr. Lock    Thank you for the opportunity to be involved in Ten Johnson care. Please call with any questions or concerns.     Regi Pike, SYDNIE, CNP  Inpatient Hepatology [facility]  [phone]               History of Present Illness:   Ten Johnson is a 59 year old male with a history of cirrhosis  secondary to hepatitis C and alcohol.  His liver disease has been complicated by treatment naïve hepatitis C, esophageal varices, biopsy-proven HCC status post diagnostic laparoscopy converted to open procedure with HCC ablation x4 (x2 lesions were not HCC) on 10/22 and new onset ascites.  He was admitted with worsening abdominal pain related to abdominal distention and ascites.  He reports following discharge from his recent procedure, he had abdominal pain and took more than scheduled oxycodone due to the pain.  He denies leaking from surgical site until he was seen in the ED and small oozing noted at the right lateral side of incision.  He denies any redness, erythema, fevers.      Mr. Johnson  Reports that he has had a decreased intake due to his abdominal distention and also has not been able to mobilize around his home due to the pain.  He denies nausea, vomiting, melena, hematochezia.             Past Medical History:     Past Medical History:   Diagnosis Date     JENNIFER (acute kidney injury) (H) 4/9/2019     Alcohol use disorder      Alcoholic cirrhosis (H)      Anticoagulant long-term use     plavix     Aortic stenosis, severe 2/21/2018     Ascites      Chronic allergic rhinitis      Chronic anemia      Chronic hepatitis C without hepatic coma (H) 05/10/2016    Untreated as of 2/2018     Cirrhosis (H) 2017    MRI finding     Diastolic dysfunction      Erosive gastropathy      Esophageal varices in alcoholic cirrhosis (H)      H/O upper gastrointestinal hemorrhage 09/2017     Hepatocellular carcinoma (H)      History of blood transfusion      History of drug abuse (H)     intranasal     Hypertension     essential     JANELLE (iron deficiency anemia)      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     Sarahi-Hoffman tear     History     Marijuana abuse      MRSA (methicillin resistant Staphylococcus aureus)      Nonrheumatic aortic valve stenosis 2/20/2018      Olecranon bursitis      Portal hypertension (H)      Right shoulder pain     history of rotator cuff repair     S/p TAVR (transcatheter aortic valve replacement), bioprosthetic      Severe aortic stenosis      Thrombocytopenia (H)               Past Surgical History:     Past Surgical History:   Procedure Laterality Date     ABLATE LIVER TUMOR N/A 10/22/2019    Procedure: Ablate liver tumor x3;  Surgeon: Gregorio Benoit MD;  Location: [facility]     ARTHROSCOPY SHOULDER ROTATOR CUFF REPAIR  7/31/2012    Procedure: ARTHROSCOPY SHOULDER ROTATOR CUFF REPAIR;  Right Shoulder Arthroscopic Rotator Cuff Repair, BicepsTenodesis,  Subacromial Decompression ;  Surgeon: Joi Castillo MD;  [facility]     ESOPHAGOSCOPY, GASTROSCOPY, DUODENOSCOPY (EGD), COMBINED N/A 10/23/2017    Procedure: COMBINED ESOPHAGOSCOPY, GASTROSCOPY, DUODENOSCOPY (EGD);;  Surgeon: Gentry Salas MD;  Location: [facility]     EXCHANGE POLY COMPONENT ARTHROPLASTY KNEE Right 3/4/2019    Procedure: REVISION RIGHT TOTAL KNEE POLY COMPONENT EXCHANGE;  Surgeon: Olvin Joe MD;  Location: [facility]     FACIAL RECONSTRUCTION SURGERY  1971     HEART CATH FEMORAL CANNULIZATION WITH OPEN STANDBY REPAIR AORTIC VALVE N/A 2/21/2018    Procedure: HEART CATH FEMORAL CANNULIZATION WITH OPEN STANDBY REPAIR AORTIC VALVE;;  Surgeon: Luis Baird MD;  Location: [facility]     IR LIVER BIOPSY PERCUTANEOUS  7/18/2019     IRRIGATION AND DEBRIDEMENT UPPER EXTREMITY, COMBINED  1/3/2012    Procedure:COMBINED IRRIGATION AND DEBRIDEMENT UPPER EXTREMITY; Irrigation & Debridement Left Elbow; Surgeon:CRISTHIAN ZHOU; Location:[facility]     LAPAROSCOPIC BIOPSY LIVER N/A 10/22/2019    Procedure: intraoperative liver ultrasound, laparoscopic converted to open liver biopsy x 6;  Surgeon: Gregorio Benoit MD;  Location: [facility]     LAPAROSCOPY DIAGNOSTIC (GENERAL) N/A 10/22/2019    Procedure: Diagnostic laparoscopy;  Surgeon: Gregorio Benoit MD;  [facility]: [facility]      LAPAROTOMY, LYSIS ADHESIONS, COMBINED N/A 10/22/2019    Procedure: Laparotomy, lysis adhesions, combined;  Surgeon: Gregorio Benoit MD;  Location: [facility]     OPTICAL TRACKING SYSTEM ARTHROPLASTY KNEE Right 2/7/2019    Procedure: ARTHROPLASTY KNEE RIGHT;  Surgeon: Olvin Joe MD;  [facility]     REPAIR TENDON TRICEPS UPPER EXTREMITY  11/8/2011    Procedure:REPAIR TENDON TRICEPS UPPER EXTREMITY; Surgeon:CRISTHIAN ZHOU; Location[facility]     SHOULDER SURGERY  2003    left, injury, torn tendons, hematoma     TRANSCATHETER AORTIC VALVE IMPLANT ANESTHESIA N/A 2/21/2018    Procedure: TRANSCATHETER AORTIC VALVE IMPLANT ANESTHESIA;  Transfemoral (Quiroz) Aortic Valve Implant 26mm MARTHA 3, with Cardiopulmonary Bypass Standby, transthoracic echocardiogram;  Surgeon: GENERIC ANESTHESIA PROVIDER;  Location: [facility]     TRANSPOSITION ULNAR NERVE (ELBOW)  11/8/2011    Procedure:TRANSPOSITION ULNAR NERVE (ELBOW); Final Procedure Done: Left Elbow Lateral Ulnar Collateral Repair And  Left Elbow Triceps Repair                Social History:     Social History     Tobacco Use     Smoking status: Never Smoker     Smokeless tobacco: Never Used   Substance Use Topics     Alcohol use: Yes     Comment: Alcohol use disorder, still actively drinking             Family History:   The family history includes Alcoholism in his paternal uncle; Cancer (age of onset: 62) in his mother; Diabetes in his maternal grandmother; Myocardial Infarction in his maternal grandfather; No Known Problems in his brother and paternal grandmother; Unknown/Adopted in his father and paternal grandfather.             Immunizations:     Immunization History   Administered Date(s) Administered     HEPA 03/08/2001     Influenza (IIV3) PF 10/28/2011, 12/18/2012, 02/09/2015     Influenza Quad, Recombinant, p-free (RIV4) 10/25/2019     Influenza Vaccine IM > 6 months Valent IIV4 10/24/2017, 01/30/2019     Pneumo Conj 13-V (2010&after) 02/09/2015      "Pneumococcal 23 valent 09/20/2011     TD (ADULT, 7+) 03/08/2001, 09/01/2011, 03/05/2019     Twinrix A/B 05/31/2012, 02/09/2015            Allergies:     Allergies   Allergen Reactions     Zolpidem Other (See Comments)     Alcoholic.  Had reaction 3/17/13 while intoxicated which included black out, loss of awareness, paranoia.  Do not prescribe.  Dr. Celeste     Cats Other (See Comments)     rhinitis     Dogs Other (See Comments)     rhinitis     Pollen Extract Other (See Comments)     rhinits.             Medications:   @  Medications Prior to Admission   Medication Sig Dispense Refill Last Dose     cyclobenzaprine (FLEXERIL) 10 MG tablet Take 1 tablet (10 mg) by mouth 3 times daily as needed for muscle spasms 10 tablet 0 Past Month at Unknown time     fluticasone (FLONASE) 50 MCG/ACT nasal spray Spray 2 sprays into both nostrils daily 3 Bottle 3 11/3/2019 at Unknown time     lisinopril (PRINIVIL/ZESTRIL) 20 MG tablet Take 1 tablet (20 mg) by mouth daily 90 tablet 3 11/3/2019 at Unknown time     loratadine (CLARITIN) 10 MG tablet Take 1 tablet (10 mg) by mouth daily 90 tablet 0 11/3/2019 at Unknown time     Multiple Vitamin (MULTIVITAMINS PO) Take 1 tablet by mouth At Bedtime    Past Month at Unknown time     oxyCODONE (ROXICODONE) 5 MG tablet Take 1-2 tablets (5-10 mg) by mouth every 6 hours as needed for moderate to severe pain or severe pain 20 tablet 0 11/3/2019 at Unknown time     polyethylene glycol (MIRALAX/GLYCOLAX) packet Take 17 g by mouth daily 14 packet 0 Past Month at Unknown time   @          Review of Systems:    ROS: 10 point ROS neg other than the symptoms noted above in the HPI.          Physical Exam:   Blood pressure (!) 156/90, pulse 95, temperature 97.2  F (36.2  C), temperature source Oral, resp. rate 18, height 1.753 m (5' 9\"), weight 77.1 kg (169 lb 15.6 oz), SpO2 96 %. Body mass index is 25.1 kg/m .    Intake/Output Summary (Last 24 hours) at 11/4/2019 1008  Last data filed at 11/4/2019 " 0800  Gross per 24 hour   Intake --   Output 420 ml   Net -420 ml     General: In no acute distress, mild facial muscle wasting  Neuro: AOx3, No asterixis  HEENT: PERRL mild scleral icterus, No oral lesions  Lymph:  Nocervical lymphadenoapthy  CV: S1/[id number] murmurs, Skin warm and dry  Lungs: clear to auscultation Respirations even and nonlabored on room air  Abd: Moderately distended, +BS, Healing abdominal wound.   Extrem: Noperipehral edema  Skin: trace jaundice  Psych: pleasant         Data:     Lab Results   Component Value Date    WBC 12.8 11/04/2019     Lab Results   Component Value Date    RBC 4.11 11/04/2019     Lab Results   Component Value Date    HGB 14.2 11/04/2019     Lab Results   Component Value Date    HCT 39.9 11/04/2019     No components found for: MCT  Lab Results   Component Value Date    MCV 97 11/04/2019     Lab Results   Component Value Date    MCH 34.5 11/04/2019     Lab Results   Component Value Date    MCHC 35.6 11/04/2019     Lab Results   Component Value Date    RDW 13.8 11/04/2019     Lab Results   Component Value Date     11/04/2019       Last Basic Metabolic Panel:  Lab Results   Component Value Date     11/04/2019      Lab Results   Component Value Date    POTASSIUM 4.1 11/04/2019     Lab Results   Component Value Date    CHLORIDE 104 11/04/2019     Lab Results   Component Value Date    JOSEPHINE 8.1 11/04/2019     Lab Results   Component Value Date    CO2 23 11/04/2019     Lab Results   Component Value Date    BUN 18 11/04/2019     Lab Results   Component Value Date    CR 1.16 11/04/2019     Lab Results   Component Value Date    GLC 84 11/04/2019       Liver Function Studies -   Recent Labs   Lab Test 11/03/19  1057   PROTTOTAL 6.0*   ALBUMIN 2.6*   BILITOTAL 1.4*   ALKPHOS 116   AST 84*   *       Lab Results   Component Value Date    INR 1.12 10/25/2019       MELD-Na score: 11 at 10/27/2019  7:22 AM  MELD score: 11 at 10/27/2019  7:22 AM  Calculated from:  Serum  Creatinine: 0.88 mg/dL (Rounded to 1 mg/dL) at 10/27/2019  7:22 AM  Serum Sodium: 135 mmol/L at 10/27/2019  7:22 AM  Total Bilirubin: 2.7 mg/dL at 10/27/2019  7:22 AM  INR(ratio): 1.12 at 10/25/2019  7:52 AM  Age: 59 years           Previous Endoscopy:     EGD 10/23/17  Impression:          - No active bleedings was seen. Sarahi hoffman tear did                        ooze with endoscope passage and favor this is source of                        his hematemesis                        - Small (< 5 mm) esophageal varices.                        - Sarahi-Hoffman tear.                        - Mild Portal hypertensive gastropathy.                        - Non-bleeding erosive gastropathy.                        - Normal examined duodenum.     IMAGING:  CT abd w 11/3/19                                                              IMPRESSION:   1. New large amount of ascites in the abdomen (postsurgical  20/2/2019). Although no hematocrit level is demonstrated and fluid  measures simple, a bleed cannot be ruled out.  2. New hypoattenuating lesions in the right lobe of liver presumably  secondary to ablation during surgery. These extend to the capsule, can  not rule out extension beyond capsule.   3. Atelectasis or infection left lung base.

## 2019-11-04 NOTE — PLAN OF CARE
Abdomen is very distended, oxycodone managing pain, seems to need it every 3 hours. Voiding, up ad tierra in room. Continues getting IV antibiotics. Triggered the sepsis protocol this morning, lactic was 1.0. Blood pressures improved after taking his lisinopril. Has been NPO today, paracentesis ordered.

## 2019-11-05 LAB
ALBUMIN SERPL-MCNC: 2.3 G/DL (ref 3.4–5)
ALP SERPL-CCNC: 88 U/L (ref 40–150)
ALT SERPL W P-5'-P-CCNC: 73 U/L (ref 0–70)
ANION GAP SERPL CALCULATED.3IONS-SCNC: 8 MMOL/L (ref 3–14)
AST SERPL W P-5'-P-CCNC: 72 U/L (ref 0–45)
BILIRUB SERPL-MCNC: 1.6 MG/DL (ref 0.2–1.3)
BUN SERPL-MCNC: 16 MG/DL (ref 7–30)
CALCIUM SERPL-MCNC: 8 MG/DL (ref 8.5–10.1)
CHLORIDE SERPL-SCNC: 104 MMOL/L (ref 94–109)
CO2 SERPL-SCNC: 22 MMOL/L (ref 20–32)
CREAT SERPL-MCNC: 1.16 MG/DL (ref 0.66–1.25)
ERYTHROCYTE [DISTWIDTH] IN BLOOD BY AUTOMATED COUNT: 13.9 % (ref 10–15)
GFR SERPL CREATININE-BSD FRML MDRD: 67 ML/MIN/{1.73_M2}
GLUCOSE SERPL-MCNC: 76 MG/DL (ref 70–99)
HCT VFR BLD AUTO: 39.7 % (ref 40–53)
HGB BLD-MCNC: 13.8 G/DL (ref 13.3–17.7)
MCH RBC QN AUTO: 34 PG (ref 26.5–33)
MCHC RBC AUTO-ENTMCNC: 34.8 G/DL (ref 31.5–36.5)
MCV RBC AUTO: 98 FL (ref 78–100)
PLATELET # BLD AUTO: 117 10E9/L (ref 150–450)
POTASSIUM SERPL-SCNC: 4.1 MMOL/L (ref 3.4–5.3)
PROT SERPL-MCNC: 5.4 G/DL (ref 6.8–8.8)
RBC # BLD AUTO: 4.06 10E12/L (ref 4.4–5.9)
SODIUM SERPL-SCNC: 134 MMOL/L (ref 133–144)
WBC # BLD AUTO: 14.7 10E9/L (ref 4–11)

## 2019-11-05 PROCEDURE — 12000001 ZZH R&B MED SURG/OB UMMC

## 2019-11-05 PROCEDURE — 25000132 ZZH RX MED GY IP 250 OP 250 PS 637: Performed by: NURSE PRACTITIONER

## 2019-11-05 PROCEDURE — 85027 COMPLETE CBC AUTOMATED: CPT | Performed by: STUDENT IN AN ORGANIZED HEALTH CARE EDUCATION/TRAINING PROGRAM

## 2019-11-05 PROCEDURE — P9047 ALBUMIN (HUMAN), 25%, 50ML: HCPCS | Performed by: NURSE PRACTITIONER

## 2019-11-05 PROCEDURE — 80053 COMPREHEN METABOLIC PANEL: CPT | Performed by: STUDENT IN AN ORGANIZED HEALTH CARE EDUCATION/TRAINING PROGRAM

## 2019-11-05 PROCEDURE — 25000132 ZZH RX MED GY IP 250 OP 250 PS 637: Performed by: PHYSICIAN ASSISTANT

## 2019-11-05 PROCEDURE — 25000128 H RX IP 250 OP 636: Performed by: PHYSICIAN ASSISTANT

## 2019-11-05 PROCEDURE — 25800030 ZZH RX IP 258 OP 636: Performed by: STUDENT IN AN ORGANIZED HEALTH CARE EDUCATION/TRAINING PROGRAM

## 2019-11-05 PROCEDURE — 25000128 H RX IP 250 OP 636: Performed by: NURSE PRACTITIONER

## 2019-11-05 PROCEDURE — 25000132 ZZH RX MED GY IP 250 OP 250 PS 637: Performed by: STUDENT IN AN ORGANIZED HEALTH CARE EDUCATION/TRAINING PROGRAM

## 2019-11-05 PROCEDURE — 99024 POSTOP FOLLOW-UP VISIT: CPT | Performed by: PHYSICIAN ASSISTANT

## 2019-11-05 PROCEDURE — 36415 COLL VENOUS BLD VENIPUNCTURE: CPT | Performed by: STUDENT IN AN ORGANIZED HEALTH CARE EDUCATION/TRAINING PROGRAM

## 2019-11-05 RX ORDER — LINEZOLID 600 MG/1
600 TABLET, FILM COATED ORAL EVERY 12 HOURS SCHEDULED
Status: CANCELLED | OUTPATIENT
Start: 2019-11-05 | End: 2019-11-12

## 2019-11-05 RX ORDER — OXYCODONE HYDROCHLORIDE 5 MG/1
5-10 TABLET ORAL EVERY 4 HOURS PRN
Status: DISCONTINUED | OUTPATIENT
Start: 2019-11-05 | End: 2019-11-05

## 2019-11-05 RX ORDER — ALBUMIN (HUMAN) 12.5 G/50ML
50 SOLUTION INTRAVENOUS ONCE
Status: COMPLETED | OUTPATIENT
Start: 2019-11-05 | End: 2019-11-05

## 2019-11-05 RX ORDER — LINEZOLID 600 MG/1
600 TABLET, FILM COATED ORAL EVERY 12 HOURS SCHEDULED
Status: DISCONTINUED | OUTPATIENT
Start: 2019-11-05 | End: 2019-11-07 | Stop reason: HOSPADM

## 2019-11-05 RX ORDER — PIPERACILLIN SODIUM, TAZOBACTAM SODIUM 3; .375 G/15ML; G/15ML
3.38 INJECTION, POWDER, LYOPHILIZED, FOR SOLUTION INTRAVENOUS EVERY 6 HOURS
Status: DISCONTINUED | OUTPATIENT
Start: 2019-11-05 | End: 2019-11-06

## 2019-11-05 RX ORDER — POLYETHYLENE GLYCOL 3350 17 G/17G
17 POWDER, FOR SOLUTION ORAL DAILY
Status: DISCONTINUED | OUTPATIENT
Start: 2019-11-05 | End: 2019-11-07 | Stop reason: HOSPADM

## 2019-11-05 RX ORDER — FUROSEMIDE 20 MG
20 TABLET ORAL DAILY
Status: DISCONTINUED | OUTPATIENT
Start: 2019-11-05 | End: 2019-11-07 | Stop reason: HOSPADM

## 2019-11-05 RX ORDER — AMOXICILLIN AND CLAVULANATE POTASSIUM 500; 125 MG/1; MG/1
1 TABLET, FILM COATED ORAL EVERY 8 HOURS SCHEDULED
Status: CANCELLED | OUTPATIENT
Start: 2019-11-05 | End: 2019-11-12

## 2019-11-05 RX ORDER — SPIRONOLACTONE 25 MG/1
50 TABLET ORAL DAILY
Status: DISCONTINUED | OUTPATIENT
Start: 2019-11-05 | End: 2019-11-07 | Stop reason: HOSPADM

## 2019-11-05 RX ORDER — OXYCODONE HYDROCHLORIDE 5 MG/1
5 TABLET ORAL EVERY 4 HOURS PRN
Status: DISCONTINUED | OUTPATIENT
Start: 2019-11-05 | End: 2019-11-07 | Stop reason: HOSPADM

## 2019-11-05 RX ADMIN — OXYCODONE HYDROCHLORIDE 5 MG: 5 TABLET ORAL at 18:34

## 2019-11-05 RX ADMIN — DIPHENHYDRAMINE HYDROCHLORIDE AND LIDOCAINE HYDROCHLORIDE AND ALUMINUM HYDROXIDE AND MAGNESIUM HYDRO 10 ML: KIT at 12:44

## 2019-11-05 RX ADMIN — PIPERACILLIN SODIUM AND TAZOBACTAM SODIUM 3.38 G: 3; .375 INJECTION, POWDER, LYOPHILIZED, FOR SOLUTION INTRAVENOUS at 22:32

## 2019-11-05 RX ADMIN — FUROSEMIDE 20 MG: 20 TABLET ORAL at 11:43

## 2019-11-05 RX ADMIN — ALBUMIN HUMAN 50 G: 0.25 SOLUTION INTRAVENOUS at 11:43

## 2019-11-05 RX ADMIN — DIPHENHYDRAMINE HYDROCHLORIDE AND LIDOCAINE HYDROCHLORIDE AND ALUMINUM HYDROXIDE AND MAGNESIUM HYDRO 10 ML: KIT at 01:11

## 2019-11-05 RX ADMIN — OXYCODONE HYDROCHLORIDE 5 MG: 5 TABLET ORAL at 10:42

## 2019-11-05 RX ADMIN — SODIUM CHLORIDE, POTASSIUM CHLORIDE, SODIUM LACTATE AND CALCIUM CHLORIDE: 600; 310; 30; 20 INJECTION, SOLUTION INTRAVENOUS at 01:06

## 2019-11-05 RX ADMIN — PIPERACILLIN SODIUM AND TAZOBACTAM SODIUM 3.38 G: 3; .375 INJECTION, POWDER, LYOPHILIZED, FOR SOLUTION INTRAVENOUS at 15:44

## 2019-11-05 RX ADMIN — OXYCODONE HYDROCHLORIDE 10 MG: 5 TABLET ORAL at 06:33

## 2019-11-05 RX ADMIN — OXYCODONE HYDROCHLORIDE 5 MG: 5 TABLET ORAL at 22:32

## 2019-11-05 RX ADMIN — SPIRONOLACTONE 50 MG: 25 TABLET ORAL at 11:43

## 2019-11-05 RX ADMIN — LINEZOLID 600 MG: 600 TABLET, FILM COATED ORAL at 08:18

## 2019-11-05 RX ADMIN — AMOXICILLIN AND CLAVULANATE POTASSIUM 1 TABLET: 875; 125 TABLET, FILM COATED ORAL at 08:18

## 2019-11-05 RX ADMIN — PIPERACILLIN SODIUM AND TAZOBACTAM SODIUM 3.38 G: 3; .375 INJECTION, POWDER, LYOPHILIZED, FOR SOLUTION INTRAVENOUS at 06:33

## 2019-11-05 RX ADMIN — DIPHENHYDRAMINE HYDROCHLORIDE AND LIDOCAINE HYDROCHLORIDE AND ALUMINUM HYDROXIDE AND MAGNESIUM HYDRO 10 ML: KIT at 07:02

## 2019-11-05 RX ADMIN — PIPERACILLIN SODIUM AND TAZOBACTAM SODIUM 3.38 G: 3; .375 INJECTION, POWDER, LYOPHILIZED, FOR SOLUTION INTRAVENOUS at 10:10

## 2019-11-05 RX ADMIN — OXYCODONE HYDROCHLORIDE 10 MG: 5 TABLET ORAL at 03:18

## 2019-11-05 RX ADMIN — OXYCODONE HYDROCHLORIDE 5 MG: 5 TABLET ORAL at 14:36

## 2019-11-05 RX ADMIN — LINEZOLID 600 MG: 600 TABLET, FILM COATED ORAL at 20:40

## 2019-11-05 RX ADMIN — OXYCODONE HYDROCHLORIDE 10 MG: 5 TABLET ORAL at 00:18

## 2019-11-05 RX ADMIN — PIPERACILLIN SODIUM AND TAZOBACTAM SODIUM 3.38 G: 3; .375 INJECTION, POWDER, LYOPHILIZED, FOR SOLUTION INTRAVENOUS at 00:08

## 2019-11-05 RX ADMIN — LISINOPRIL 20 MG: 20 TABLET ORAL at 08:18

## 2019-11-05 ASSESSMENT — ACTIVITIES OF DAILY LIVING (ADL)
ADLS_ACUITY_SCORE: 14

## 2019-11-05 ASSESSMENT — PAIN DESCRIPTION - DESCRIPTORS
DESCRIPTORS: TIGHTNESS
DESCRIPTORS: TIGHTNESS;SORE
DESCRIPTORS: TIGHTNESS

## 2019-11-05 NOTE — PLAN OF CARE
"/77 (BP Location: Right arm)   Pulse 105   Temp 96.6  F (35.9  C) (Axillary)   Resp 18   Ht 1.753 m (5' 9\")   Wt 77.1 kg (169 lb 15.6 oz)   SpO2 94%   BMI 25.10 kg/m      VSS on RA. Pain comfortably controlled with oxy Q3h. Pt would like to be woken up overnight to remain on this pain control schedule. UAL in room. Voids spont into bedside urinal. NPO + meds and sips of clears. Passing stool and flatus. Paracentesis performed today, labs collected and sent down. L PIV infusing MIVF and abx. Pt resting comfortably between cares. Continue POC.   "

## 2019-11-05 NOTE — PLAN OF CARE
AVSS. Pain managed with 10mg oxycodone q 3hrs. NPO ex ice, denies nausea. Abd distended, pt had paracentesis yesterday. R abd inc C/D/I. Voiding adequately, had BM yesterday. New PIV infusing LR with intermittent abx. Up ad tierra in room. Continue to monitor.    0705 Addendum: New orders placed. Oxycodone now q 4hrs, on regular diet, IV fluids discontinued. PIV set TKO

## 2019-11-05 NOTE — PLAN OF CARE
"Patient reports feeling better since paracentesis yesterday, abdominal bandage is dry and intact. Pain medication was decreased, patient upset about this, states pain is \"everywhere\", also says mouth is very sore, magic mouthwash helps temporarily. Paged team per patient request. Started diuretics today, is currently getting albumin, also continues on oral and IV antibiotics. Urine volumes marginal earlier, will continue to watch since diuretics administered.  "

## 2019-11-05 NOTE — PROGRESS NOTES
Surgical Oncology Progress Note    Interval History:  No acute events overnight. Feeling better this morning with less abdominal pain. Denies nausea or vomiting. No bowel movement.     Physical Exam:   Temp:  [95.6  F (35.3  C)-98  F (36.7  C)] 98  F (36.7  C)  Pulse:  [] 92  Resp:  [18] 18  BP: (112-156)/(74-90) 112/80  SpO2:  [94 %-96 %] 95 %  General: Alert, oriented, appears comfortable, NAD.  Respiratory: breathing non labored  Abdomen: Abdomen is soft, non-tender, mildly distended but much less. Incision is clean, dry and intact.     Data:   All laboratory and imaging data in the past 24 hours reviewed  I/O last 3 completed shifts:  In: 1020 [P.O.:20; I.V.:1000]  Out: 1075 [Urine:1075]  Recent Labs   Lab Test 11/04/19  0720 11/03/19  1057 10/28/19  0712  10/25/19  0752  10/24/19  1003  10/23/19  0920   WBC 12.8* 17.1* 11.8*   < > 17.2*   < >  --    < > 22.2*   HGB 14.2 14.2 13.5   < > 14.5   < >  --    < > 16.3   * 138* 102*   < > 64*   < >  --    < > 82*   INR  --   --   --   --  1.12  --  1.20*  --  1.24*    < > = values in this interval not displayed.      Recent Labs   Lab Test 11/04/19  0720 11/03/19  1057 10/28/19  0712    133 135   POTASSIUM 4.1 4.1 3.7   CHLORIDE 104 101 100   CO2 23 25 29   BUN 18 22 14   CR 1.16 1.06 0.91   ANIONGAP 7 7 6   JOSEPHINE 8.1* 8.3* 8.0*   GLC 84 94 82      Recent Labs   Lab Test 11/04/19  1640 11/03/19  1057   PROTTOTAL  --  6.0*   ALBUMIN 2.7* 2.6*   BILITOTAL  --  1.4*   ALKPHOS  --  116   AST  --  84*   ALT  --  100*     Assessment and Plan:     Ten Johnson is a 59 year old male with history of liver cirrhosis secondary to hepatitis C and alcohol, severe aortic stenosis s/p TAVR, portal HTN, and thrombocytopenia with recent diagnosis of hepatocellular carcinoma. He is status post laparoscopic converted to open collection of liver core needle biopsies and intra-operative microwave ablation of 3 lesions on 10/22/19 with Dr. Benoit. He now presents with  increasing abdominal distension and abdominal pain. CT 11/3 with new large amount of ascites. WBC elevated to 17 at admission, IV antibiotics started.      -Pain: oxycodone prn  -HTN: home lisinopril restarted  -Regular diet, miralax  -New ascites on CT 11/3 with leukocytosis(improved): UA negative. CXR with atelectasis. zosyn 11/3. vanco 11/3-11/4. linezolid 11/4.    Appreciate Hepatology recommendations and management of ascites.     Seen with Chief resident who will discuss with Staff.     GLENNA StrongC   Surgical Oncology

## 2019-11-06 LAB
ALBUMIN SERPL-MCNC: 2.6 G/DL (ref 3.4–5)
ALP SERPL-CCNC: 76 U/L (ref 40–150)
ALT SERPL W P-5'-P-CCNC: 53 U/L (ref 0–70)
ANION GAP SERPL CALCULATED.3IONS-SCNC: 7 MMOL/L (ref 3–14)
AST SERPL W P-5'-P-CCNC: 58 U/L (ref 0–45)
BILIRUB SERPL-MCNC: 1.3 MG/DL (ref 0.2–1.3)
BUN SERPL-MCNC: 16 MG/DL (ref 7–30)
CALCIUM SERPL-MCNC: 7.8 MG/DL (ref 8.5–10.1)
CHLORIDE SERPL-SCNC: 106 MMOL/L (ref 94–109)
CO2 SERPL-SCNC: 23 MMOL/L (ref 20–32)
COPATH REPORT: NORMAL
CREAT SERPL-MCNC: 1.08 MG/DL (ref 0.66–1.25)
ERYTHROCYTE [DISTWIDTH] IN BLOOD BY AUTOMATED COUNT: 13.5 % (ref 10–15)
GFR SERPL CREATININE-BSD FRML MDRD: 73 ML/MIN/{1.73_M2}
GLUCOSE SERPL-MCNC: 112 MG/DL (ref 70–99)
HCT VFR BLD AUTO: 37.4 % (ref 40–53)
HGB BLD-MCNC: 13.3 G/DL (ref 13.3–17.7)
MCH RBC QN AUTO: 33.9 PG (ref 26.5–33)
MCHC RBC AUTO-ENTMCNC: 35.6 G/DL (ref 31.5–36.5)
MCV RBC AUTO: 95 FL (ref 78–100)
PLATELET # BLD AUTO: 105 10E9/L (ref 150–450)
POTASSIUM SERPL-SCNC: 4 MMOL/L (ref 3.4–5.3)
PROT SERPL-MCNC: 5.3 G/DL (ref 6.8–8.8)
RBC # BLD AUTO: 3.92 10E12/L (ref 4.4–5.9)
SODIUM SERPL-SCNC: 136 MMOL/L (ref 133–144)
WBC # BLD AUTO: 12 10E9/L (ref 4–11)

## 2019-11-06 PROCEDURE — 99207 ZZC CONSULT E&M CHANGED TO INITIAL LEVEL: CPT | Performed by: PHYSICIAN ASSISTANT

## 2019-11-06 PROCEDURE — 36415 COLL VENOUS BLD VENIPUNCTURE: CPT | Performed by: STUDENT IN AN ORGANIZED HEALTH CARE EDUCATION/TRAINING PROGRAM

## 2019-11-06 PROCEDURE — 85027 COMPLETE CBC AUTOMATED: CPT | Performed by: STUDENT IN AN ORGANIZED HEALTH CARE EDUCATION/TRAINING PROGRAM

## 2019-11-06 PROCEDURE — 25000128 H RX IP 250 OP 636: Performed by: PHYSICIAN ASSISTANT

## 2019-11-06 PROCEDURE — 25000132 ZZH RX MED GY IP 250 OP 250 PS 637: Performed by: NURSE PRACTITIONER

## 2019-11-06 PROCEDURE — 25000132 ZZH RX MED GY IP 250 OP 250 PS 637: Performed by: PHYSICIAN ASSISTANT

## 2019-11-06 PROCEDURE — 80053 COMPREHEN METABOLIC PANEL: CPT | Performed by: STUDENT IN AN ORGANIZED HEALTH CARE EDUCATION/TRAINING PROGRAM

## 2019-11-06 PROCEDURE — 25000132 ZZH RX MED GY IP 250 OP 250 PS 637: Performed by: STUDENT IN AN ORGANIZED HEALTH CARE EDUCATION/TRAINING PROGRAM

## 2019-11-06 PROCEDURE — 99222 1ST HOSP IP/OBS MODERATE 55: CPT | Performed by: PHYSICIAN ASSISTANT

## 2019-11-06 PROCEDURE — 12000001 ZZH R&B MED SURG/OB UMMC

## 2019-11-06 RX ORDER — METHOCARBAMOL 500 MG/1
500 TABLET, FILM COATED ORAL EVERY 8 HOURS PRN
Status: DISCONTINUED | OUTPATIENT
Start: 2019-11-06 | End: 2019-11-07 | Stop reason: HOSPADM

## 2019-11-06 RX ORDER — LIDOCAINE 4 G/G
1-3 PATCH TOPICAL
Status: DISCONTINUED | OUTPATIENT
Start: 2019-11-06 | End: 2019-11-07 | Stop reason: HOSPADM

## 2019-11-06 RX ORDER — HEPARIN SODIUM (PORCINE) LOCK FLUSH IV SOLN 100 UNIT/ML 100 UNIT/ML
5 SOLUTION INTRAVENOUS
Status: CANCELLED | OUTPATIENT
Start: 2019-11-06

## 2019-11-06 RX ORDER — AMOXICILLIN AND CLAVULANATE POTASSIUM 500; 125 MG/1; MG/1
1 TABLET, FILM COATED ORAL EVERY 8 HOURS SCHEDULED
Status: DISCONTINUED | OUTPATIENT
Start: 2019-11-06 | End: 2019-11-07 | Stop reason: HOSPADM

## 2019-11-06 RX ORDER — HEPARIN SODIUM,PORCINE 10 UNIT/ML
5 VIAL (ML) INTRAVENOUS
Status: CANCELLED | OUTPATIENT
Start: 2019-11-06

## 2019-11-06 RX ORDER — LIDOCAINE HYDROCHLORIDE 10 MG/ML
20 INJECTION, SOLUTION EPIDURAL; INFILTRATION; INTRACAUDAL; PERINEURAL ONCE
Status: CANCELLED | OUTPATIENT
Start: 2019-11-06

## 2019-11-06 RX ORDER — ALBUMIN (HUMAN) 12.5 G/50ML
12.5 SOLUTION INTRAVENOUS
Status: CANCELLED | OUTPATIENT
Start: 2019-11-06

## 2019-11-06 RX ADMIN — OXYCODONE HYDROCHLORIDE 5 MG: 5 TABLET ORAL at 18:24

## 2019-11-06 RX ADMIN — METHOCARBAMOL 500 MG: 500 TABLET, FILM COATED ORAL at 13:33

## 2019-11-06 RX ADMIN — OXYCODONE HYDROCHLORIDE 5 MG: 5 TABLET ORAL at 22:38

## 2019-11-06 RX ADMIN — PIPERACILLIN SODIUM AND TAZOBACTAM SODIUM 3.38 G: 3; .375 INJECTION, POWDER, LYOPHILIZED, FOR SOLUTION INTRAVENOUS at 04:11

## 2019-11-06 RX ADMIN — AMOXICILLIN AND CLAVULANATE POTASSIUM 1 TABLET: 500; 125 TABLET, FILM COATED ORAL at 21:37

## 2019-11-06 RX ADMIN — LINEZOLID 600 MG: 600 TABLET, FILM COATED ORAL at 08:14

## 2019-11-06 RX ADMIN — LINEZOLID 600 MG: 600 TABLET, FILM COATED ORAL at 19:21

## 2019-11-06 RX ADMIN — FUROSEMIDE 20 MG: 20 TABLET ORAL at 08:14

## 2019-11-06 RX ADMIN — METHOCARBAMOL 500 MG: 500 TABLET, FILM COATED ORAL at 21:37

## 2019-11-06 RX ADMIN — OXYCODONE HYDROCHLORIDE 5 MG: 5 TABLET ORAL at 11:11

## 2019-11-06 RX ADMIN — OXYCODONE HYDROCHLORIDE 5 MG: 5 TABLET ORAL at 14:25

## 2019-11-06 RX ADMIN — SPIRONOLACTONE 50 MG: 25 TABLET ORAL at 08:13

## 2019-11-06 RX ADMIN — OXYCODONE HYDROCHLORIDE 5 MG: 5 TABLET ORAL at 06:58

## 2019-11-06 RX ADMIN — OXYCODONE HYDROCHLORIDE 5 MG: 5 TABLET ORAL at 02:47

## 2019-11-06 RX ADMIN — AMOXICILLIN AND CLAVULANATE POTASSIUM 1 TABLET: 500; 125 TABLET, FILM COATED ORAL at 13:33

## 2019-11-06 RX ADMIN — MENTHOL 2 PATCH: 205.5 PATCH TOPICAL at 15:44

## 2019-11-06 RX ADMIN — LISINOPRIL 20 MG: 20 TABLET ORAL at 08:14

## 2019-11-06 ASSESSMENT — MIFFLIN-ST. JEOR: SCORE: 1610.95

## 2019-11-06 ASSESSMENT — ACTIVITIES OF DAILY LIVING (ADL)
ADLS_ACUITY_SCORE: 14
ADLS_ACUITY_SCORE: 16
ADLS_ACUITY_SCORE: 16
ADLS_ACUITY_SCORE: 14

## 2019-11-06 ASSESSMENT — PAIN DESCRIPTION - DESCRIPTORS
DESCRIPTORS: TIGHTNESS

## 2019-11-06 NOTE — PLAN OF CARE
AOx4. AVSS on ra. UAL. Tolerating reg diet. AUO. BM x1 on shift, +flatus. Paracentesis drsg changed, CDI; site WDL. Old abd inc CDI, liq bandage, ISHA. Pain managed with PRN Oxycodone. PIV tko in between abx tx. Cont POC.

## 2019-11-06 NOTE — CONSULTS
Inpatient Pain Management Service: Consultation      DATE OF CONSULT: November 6, 2019      REASON FOR PAIN CONSULTATION:  Ten Johnson is a 59 year old male I am seeing in consultation at the request of Loni South MD for evaluation and recommendations for his abdominal pain.       CHIEF PAIN COMPLAINT: RLQ pain      ASSESSMENT:   1. RLQ pain related to ascites. He states pain worsens with movements.  Pain has improved with paracentesis on 11/4/19, however the ascites is returning.  2. Hepatocellular carcinoma status post laparoscopic converted to open collection of liver core needle biopsies and intraoperative microwave ablation of 3 lesions on October 22, 2019 and  discharged on 10/28/19.  3. Liver cirrhosis secondary to hepatitis C and alcohol abuse.  Patient is not on the liver transplant list as patient states that he will likely continue to drink alcohol.  4. History of aortic stenosis status post aortic valve replacement  5. History of joint pain of the shoulders, knee pain status post joint surgeries    -- Outpatient opioid requirements prior to admission: only for post procedures    October 28, 2019 oxycodone 5 mg tablet #20 for 3 days  April 23, 2019 tramadol 50 mg tablet #40 for 10 days  April 13, 2019 hydrocodone-acetaminophen 5-325 mg tablet #40 for 10 days    Primary Care Provider: Cuca Celeste  Chronic Pain Provider: none.    TREATMENT RECOMMENDATIONS/PLAN:   1. Continue with oxycodone 5 mg p.o. every 4 hours as needed while inpatient.    Upon discharge provide a small tapering prescription of oxycodone of what is usual and customary for this type of admission.  2. Start lidocaine patch 4% 1-3 patches 3 times daily every 24 hours, 12 hours on 12 hours off.  3. Start menthol patch 5% 1 patch TD every 8 hours while lidocaine patch is off.  4. Consider Robaxin 500 mg p.o. every 8 hours as needed for muscle spasms.    Upon discharge may provide a small prescription of Robaxin.  5. Recommend avoiding  medications that may cause dry mouth such as hydroxyzine.  6. Bowel regimen to prevent opiate-induced constipation.  7. Patient declined referral to pain clinic.    Pain Service will Sign Off at this time.     Recommendations were discussed with surgery team and pain team  Plan was reviewed by the Pain Service consisting of Dr. Yeh    Thank you for consulting the Inpatient Pain Management Service.   The above recommendations are to be acted upon at the primary team s discretion.    To reach us:  Mon - Friday 8 AM - 3 PM: Pager [phone] (Text Page)  After hours, weekends and holidays: Primary service should call [phone] for the on-call pain specialist    HISTORY OF PRESENT ILLNESS: Ten Johnson is a 59 year old male with history of liver cirrhosis secondary to hepatitis C and alcohol use, aortic stenosis status post aortic valve replacement, portal hypertension, hepatocellular carcinoma status post ablation with open laparotomy on October 22, 2019 who was admitted on November 3, 2019 with abdominal pain and ascites.  Patient underwent paracentesis on November 4, 2019 with slight pain improvement    Patient was evaluated today on November 6, 2019.  He is able to ambulate independently down the hallway.  He is awake alert and oriented x3 and pleasantly conversant.  He states pain is in the right lower quadrant at the end of the laparotomy incision site.  He describes the pain as dull ache like being kicked in the ribs.  He rates the severity as 8-9/10 on the VAS and has not been below that for the past few weeks.  He feels that movements, increase activities, and increase fluid accumulation in the abdomen have increased the pain.  Pain tends to be worse at the end of the day as activities of the day increase pain.  He feels oxycodone 10 mg is helpful for the severe night pain.  Yesterday when the oxycodone dose was decreased to 5 mg he was upset as pain was not relieved in the nighttime.    Today we  "review his current pain medication regimen.  We discussed risks and benefits of opioids and the importance of using the lowest most effective dose to manage main pain to minimize potential side effects.  We also discussed alternatives which he is willing to consider.  We discussed the importance of abstaining from alcohol use which he respectfully disagrees.  He states that it is \"drinking season \"and will likely drink in the future.  I counseled him on the importance of not using opiates and other sedating medications with alcohol which he expresses understanding.      PAIN HISTORY:   Location: RLQ  Duration: constant  Pain intensity: 8-9/10 on VAS  Quality of the pain: dull ache  Aggravating factors: movements,   Relieving factors : paracentesis    CAPA (Clinically Aligned Pain Assessment)  Comfort (How is your pain?): Tolerable with discomfort  Change in Pain (Since your last medication/intervention?): Getting better  Pain Control (How are your pain treatments working?): Partially effective pain control  Functioning (Are you able to do activities to get better?) : Can do most things, but pain gets in the way of some   Sleep (Does your pain management allow you to sleep or rest?): Normal sleep       FUNCTIONAL STATUS:  Change:      improving  Oral intake:     Regular  Bowel function:    Normal  Activity level:     Ambulating in castro  Sleep:      No concerns, sleeps well through night  Mood:      Pleasant, cooperative      REVIEW OF 10 BODY SYSTEMS: 10 point ROS of systems including Constitutional, Eyes, Respiratory, Cardiovascular, Gastroenterology, Genitourinary, Integumentary, Musculoskeletal, Psychiatric were all negative except for pertinent positives noted in my HPI.       INPATIENT MEDICATIONS PERTINENT TO PAIN CONSULT:   Medications related to Pain Management (From now, onward)    Start     Dose/Rate Route Frequency Ordered Stop    11/05/19 0692  oxyCODONE (ROXICODONE) tablet 5 mg      5 mg Oral EVERY 4 " HOURS PRN 11/05/19 0859      11/05/19 0800  polyethylene glycol (MIRALAX/GLYCOLAX) Packet 17 g      17 g Oral DAILY 11/05/19 0714      11/03/19 1543  senna-docusate (SENOKOT-S/PERICOLACE) 8.6-50 MG per tablet 1 tablet      1 tablet Oral 2 TIMES DAILY PRN 11/03/19 1543      11/03/19 1543  senna-docusate (SENOKOT-S/PERICOLACE) 8.6-50 MG per tablet 2 tablet      2 tablet Oral 2 TIMES DAILY PRN 11/03/19 1543              CURRENT MEDICATIONS:   Current Facility-Administered Medications Ordered in Epic   Medication Dose Route Frequency Provider Last Rate Last Dose     amoxicillin-clavulanate (AUGMENTIN) 500-125 MG per tablet 1 tablet  1 tablet Oral Q8H Loni Bai MD         furosemide (LASIX) tablet 20 mg  20 mg Oral Daily Regi Pike, APRN CNP   20 mg at 11/06/19 0814     linezolid (ZYVOX) tablet 600 mg  600 mg Oral Q12H [facility] Ramandeep Hansen, PA-C   600 mg at 11/06/19 0814     lisinopril (PRINIVIL/ZESTRIL) tablet 20 mg  20 mg Oral Daily Loni South MD   20 mg at 11/06/19 0814     magic mouthwash suspension (diphenhydramine, lidocaine, aluminum-magnesium & simethicone)  10 mL Swish & Spit Q6H PRN Elvia Shaikh MD   10 mL at 11/05/19 1244     naloxone (NARCAN) injection 0.1-0.4 mg  0.1-0.4 mg Intravenous Q2 Min PRN Yadira Treviño MD         ondansetron (ZOFRAN-ODT) ODT tab 4 mg  4 mg Oral Q6H PRN Yadira Treviño MD        Or     ondansetron (ZOFRAN) injection 4 mg  4 mg Intravenous Q6H PRN Yadira Treviño MD         oxyCODONE (ROXICODONE) tablet 5 mg  5 mg Oral Q4H PRN Ramandeep Hansen, PA-C   5 mg at 11/06/19 0658     polyethylene glycol (MIRALAX/GLYCOLAX) Packet 17 g  17 g Oral Daily Ramandeep Hansen, PAThangC         prochlorperazine (COMPAZINE) injection 10 mg  10 mg Intravenous Q6H PRN Yadira Treviño MD        Or     prochlorperazine (COMPAZINE) tablet 10 mg  10 mg Oral Q6H PRN Yadira Treviño MD        Or     prochlorperazine (COMPAZINE)  Suppository 25 mg  25 mg Rectal Q12H PRN Yadira Treviño MD         senna-docusate (SENOKOT-S/PERICOLACE) 8.6-50 MG per tablet 1 tablet  1 tablet Oral BID PRN Yadira Treviño MD        Or     senna-docusate (SENOKOT-S/PERICOLACE) 8.6-50 MG per tablet 2 tablet  2 tablet Oral BID PRN Yadira Treviño MD         spironolactone (ALDACTONE) tablet 50 mg  50 mg Oral Daily Regi Pike, SYDNIE CNP   50 mg at 11/06/19 0813     No current [facility]-ordered outpatient medications on file.           HOME/PREVIOUS MEDICATIONS:   Prior to Admission medications    Medication Sig Start Date End Date Taking? Authorizing Provider   cyclobenzaprine (FLEXERIL) 10 MG tablet Take 1 tablet (10 mg) by mouth 3 times daily as needed for muscle spasms 10/28/19  Yes Ramandeep Hansen PA-C   fluticasone (FLONASE) 50 MCG/ACT nasal spray Spray 2 sprays into both nostrils daily 1/17/19  Yes Cuca Celeste MD   lisinopril (PRINIVIL/ZESTRIL) 20 MG tablet Take 1 tablet (20 mg) by mouth daily 9/19/19  Yes DeepakJessica erwin, NP   loratadine (CLARITIN) 10 MG tablet Take 1 tablet (10 mg) by mouth daily 9/23/19  Yes Cuca Celeste MD   Multiple Vitamin (MULTIVITAMINS PO) Take 1 tablet by mouth At Bedtime    Yes Reported, Patient   oxyCODONE (ROXICODONE) 5 MG tablet Take 1-2 tablets (5-10 mg) by mouth every 6 hours as needed for moderate to severe pain or severe pain 10/28/19  Yes Ramandeep Hansen PA-C                 ALLERGIES:    Allergies   Allergen Reactions     Zolpidem Other (See Comments)     Alcoholic.  Had reaction 3/17/13 while intoxicated which included black out, loss of awareness, paranoia.  Do not prescribe.  Dr. Celeste     Cats Other (See Comments)     rhinitis     Dogs Other (See Comments)     rhinitis     Pollen Extract Other (See Comments)     rhinits.            PAST MEDICAL AND PSYCHIATRIC HISTORY:    Past Medical History:   Diagnosis Date     JENNIFER (acute kidney injury) (H) 4/9/2019     Alcohol  use disorder      Alcoholic cirrhosis (H)      Anticoagulant long-term use     plavix     Aortic stenosis, severe 2/21/2018     Ascites      Chronic allergic rhinitis      Chronic anemia      Chronic hepatitis C without hepatic coma (H) 05/10/2016    Untreated as of 2/2018     Cirrhosis (H) 2017    MRI finding     Diastolic dysfunction      Erosive gastropathy      Esophageal varices in alcoholic cirrhosis (H)      H/O upper gastrointestinal hemorrhage 09/2017     Hepatocellular carcinoma (H)      History of blood transfusion      History of drug abuse (H)     intranasal     Hypertension     essential     JANELLE (iron deficiency anemia)      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     Sarahi-Hoffman tear     History     Marijuana abuse      MRSA (methicillin resistant Staphylococcus aureus)      Nonrheumatic aortic valve stenosis 2/20/2018     Olecranon bursitis      Portal hypertension (H)      Right shoulder pain     history of rotator cuff repair     S/p TAVR (transcatheter aortic valve replacement), bioprosthetic      Severe aortic stenosis      Thrombocytopenia (H)            PAST SURGICAL HISTORY:   Past Surgical History:   Procedure Laterality Date     ABLATE LIVER TUMOR N/A 10/22/2019    Procedure: Ablate liver tumor x3;  Surgeon: Gregorio Benoit MD;  [facility]     ARTHROSCOPY SHOULDER ROTATOR CUFF REPAIR  7/31/2012    Procedure: ARTHROSCOPY SHOULDER ROTATOR CUFF REPAIR;  Right Shoulder Arthroscopic Rotator Cuff Repair, BicepsTenodesis,  Subacromial Decompression ;  Surgeon: Joi Castillo MD;  [facility]     ESOPHAGOSCOPY, GASTROSCOPY, DUODENOSCOPY (EGD), COMBINED N/A 10/23/2017    Procedure: COMBINED ESOPHAGOSCOPY, GASTROSCOPY, DUODENOSCOPY (EGD);;  Surgeon: Gentry Salas MD;  [facility]     EXCHANGE POLY COMPONENT ARTHROPLASTY KNEE Right 3/4/2019    Procedure: REVISION RIGHT TOTAL KNEE POLY COMPONENT EXCHANGE;  Surgeon:  Olvin Joe MD;  [facility]: [facility]     FACIAL RECONSTRUCTION SURGERY  1971     HEART CATH FEMORAL CANNULIZATION WITH OPEN STANDBY REPAIR AORTIC VALVE N/A 2/21/2018    Procedure: HEART CATH FEMORAL CANNULIZATION WITH OPEN STANDBY REPAIR AORTIC VALVE;;  Surgeon: Luis Baird MD;  [facility]     IR LIVER BIOPSY PERCUTANEOUS  7/18/2019     IRRIGATION AND DEBRIDEMENT UPPER EXTREMITY, COMBINED  1/3/2012    Procedure:COMBINED IRRIGATION AND DEBRIDEMENT UPPER EXTREMITY; Irrigation & Debridement Left Elbow; Surgeon:CRISTHIAN ZHOU; [facility]     LAPAROSCOPIC BIOPSY LIVER N/A 10/22/2019    Procedure: intraoperative liver ultrasound, laparoscopic converted to open liver biopsy x 6;  Surgeon: Gregorio Benoit MD;  [facility]     LAPAROSCOPY DIAGNOSTIC (GENERAL) N/A 10/22/2019    Procedure: Diagnostic laparoscopy;  Surgeon: Gregorio Benoit MD;  [facility]     LAPAROTOMY, LYSIS ADHESIONS, COMBINED N/A 10/22/2019    Procedure: Laparotomy, lysis adhesions, combined;  Surgeon: Gregorio Benoit MD;  [facility]     OPTICAL TRACKING SYSTEM ARTHROPLASTY KNEE Right 2/7/2019    Procedure: ARTHROPLASTY KNEE RIGHT;  Surgeon: Olvin Joe MD;  [facility]     REPAIR TENDON TRICEPS UPPER EXTREMITY  11/8/2011    Procedure:REPAIR TENDON TRICEPS UPPER EXTREMITY; Surgeon:CRISTHIAN ZHOU; [facility]     SHOULDER SURGERY  2003    left, injury, torn tendons, hematoma     TRANSCATHETER AORTIC VALVE IMPLANT ANESTHESIA N/A 2/21/2018    Procedure: TRANSCATHETER AORTIC VALVE IMPLANT ANESTHESIA;  Transfemoral (Quiroz) Aortic Valve Implant 26mm MARTHA 3, with Cardiopulmonary Bypass Standby, transthoracic echocardiogram;  Surgeon: GENERIC ANESTHESIA PROVIDER;  [facility]     TRANSPOSITION ULNAR NERVE (ELBOW)  11/8/2011    Procedure:TRANSPOSITION ULNAR NERVE (ELBOW); Final Procedure Done: Left Elbow Lateral Ulnar Collateral Repair And  Left Elbow Triceps Repair             FAMILY  "HISTORY: family history includes Alcoholism in his paternal uncle; Cancer (age of onset: 62) in his mother; Diabetes in his maternal grandmother; Myocardial Infarction in his maternal grandfather; No Known Problems in his brother and paternal grandmother; Unknown/Adopted in his father and paternal grandfather.      HEALTH & LIFESTYLE PRACTICES:   Tobacco:  reports that he has never smoked. He has never used smokeless tobacco.  Alcohol:  reports current alcohol use.  Illicit drugs:  reports no history of drug use.      SOCIAL HISTORY:  He states that he has not consumed any alcohol in the past 6-7 weeks, however he will likely drink alcohol in the future.  He states that \"drinking season is here\" with activities such as pheasant hunting, deer hunting.  He states that he understands that alcohol consumption may worsen his health conditions.  He is an  and plans on returning to work.  Social History     Socioeconomic History     Marital status: Single     Spouse name: Not on file     Number of children: 0     Years of education: Not on file     Highest education level: Not on file   Occupational History     Occupation:    Social Needs     Financial resource strain: Not on file     Food insecurity:     Worry: Not on file     Inability: Not on file     Transportation needs:     Medical: Not on file     Non-medical: Not on file   Tobacco Use     Smoking status: Never Smoker     Smokeless tobacco: Never Used   Substance and Sexual Activity     Alcohol use: Yes     Comment: Alcohol use disorder, still actively drinking     Drug use: No     Comment: denies     Sexual activity: Not Currently     Partners: Female   Lifestyle     Physical activity:     Days per week: Not on file     Minutes per session: Not on file     Stress: Not on file   Relationships     Social connections:     Talks on phone: Not on file     Gets together: Not on file     Attends Mandaen service: Not on file     Active member of " club or organization: Not on file     Attends meetings of clubs or organizations: Not on file     Relationship status: Not on file     Intimate partner violence:     Fear of current or ex partner: Not on file     Emotionally abused: Not on file     Physically abused: Not on file     Forced sexual activity: Not on file   Other Topics Concern     Parent/sibling w/ CABG, MI or angioplasty before 65F 55M? Not Asked   Social History Narrative    .  Bicycles a lot.  Excessive alcohol use.  Smokes cigars.  Occasional marijuana use.       LABORATORY VALUES:   Last Basic Metabolic Panel:  Lab Results   Component Value Date     11/06/2019      Lab Results   Component Value Date    POTASSIUM 4.0 11/06/2019     Lab Results   Component Value Date    CHLORIDE 106 11/06/2019     Lab Results   Component Value Date    JOSEPHINE 7.8 11/06/2019     Lab Results   Component Value Date    CO2 23 11/06/2019     Lab Results   Component Value Date    BUN 16 11/06/2019     Lab Results   Component Value Date    CR 1.08 11/06/2019     Lab Results   Component Value Date     11/06/2019       CBC results:  Lab Results   Component Value Date    WBC 12.0 11/06/2019     Lab Results   Component Value Date    HGB 13.3 11/06/2019     Lab Results   Component Value Date    HCT 37.4 11/06/2019     Lab Results   Component Value Date     11/06/2019       DIAGNOSTIC TESTS:       Labs above reviewed as well as additional relevant diagnostic studies from the EPIC record.       PHYSICAL EXAMINATION:  VITAL SIGNS:  B/P: 129/81, T: 96.7, P: 79, R: 16    CONSTITUTIONAL/GENERAL APPEARANCE: AAOx3. Conversant.  EYES: EOMI, sclerae clear  ENT/NECK: neck is supple  RESPIRATORY: non labored breathing  CARDIOVASCULAR:HR within normal limits  GI: Ascites present, tender to light touch, right sided large healing scar-d,c,i.   MUSCULOSKELETAL/BACK/SPINE/EXTREMITIES: ambulates independently.        NEURO:  SILT to UE and LE.  SKIN/VASCULAR  EXAM:  Dry and warm.  PSYCHIATRIC/BEHAVIORAL/OBSERVATIONS:  No objective signs of pain observed during our interview.   Judgment/Insight -fair   Orientation - x3   Memory -good   Mood and affect - calm, pleasant, cooperative      Total face to face time spent was 60 minutes, and more than 50% of face to face time was spent in counseling and/or coordination of care regarding principles of multidisciplinary care which included discussions on self care, rehabilitation, psychological aspects of pain, medication management and interventions.    Lucero West PA-C  November 6, 2019, 10:03 AM  Inpatient Pain Management Service

## 2019-11-06 NOTE — PLAN OF CARE
Patient is having increased pain in his right abdomen, feels like its taut with fluids. Oxycodone and robaxin for pain, icy hot patches ordered. Tolerating diet, declined laxative states he had a BM yesterday. All antibiotics are oral now. PIV TKO per patient request as he's a difficult stick. Feeling more uncomfortable throughout the day with abdominal distention.

## 2019-11-06 NOTE — PROGRESS NOTES
Surgical Oncology Progress Note    Interval History:  No acute events overnight. Abdominal pain improving despite slightly increased distension today. No new complaints.    Physical Exam:   Temp:  [96.7  F (35.9  C)-98  F (36.7  C)] 96.7  F (35.9  C)  Pulse:  [79-98] 79  Resp:  [15-18] 16  BP: (111-129)/(74-81) 129/81  SpO2:  [97 %-98 %] 97 %  General: Alert, oriented, appears comfortable, NAD.  Respiratory: breathing non labored  Abdomen: Abdomen is soft, non-tender, moderately distended, reducible umbilical hernia. Incision is clean, dry and intact.     Data:   All laboratory and imaging data in the past 24 hours reviewed  I/O last 3 completed shifts:  In: 220 [P.O.:120; I.V.:100]  Out: 1725 [Urine:1725]  Recent Labs   Lab Test 11/06/19 0712 11/05/19 0724 11/04/19  0720  10/25/19  0752  10/24/19  1003  10/23/19  0920   WBC 12.0* 14.7* 12.8*   < > 17.2*   < >  --    < > 22.2*   HGB 13.3 13.8 14.2   < > 14.5   < >  --    < > 16.3   * 117* 123*   < > 64*   < >  --    < > 82*   INR  --   --   --   --  1.12  --  1.20*  --  1.24*    < > = values in this interval not displayed.      Recent Labs   Lab Test 11/06/19 0712 11/05/19 0724 11/04/19  0720    134 133   POTASSIUM 4.0 4.1 4.1   CHLORIDE 106 104 104   CO2 23 22 23   BUN 16 16 18   CR 1.08 1.16 1.16   ANIONGAP 7 8 7   JOSEPHINE 7.8* 8.0* 8.1*   * 76 84      Recent Labs   Lab Test 11/04/19  1640 11/03/19  1057   PROTTOTAL  --  6.0*   ALBUMIN 2.7* 2.6*   BILITOTAL  --  1.4*   ALKPHOS  --  116   AST  --  84*   ALT  --  100*     Assessment and Plan:     Ten Johnson is a 59 year old male with history of liver cirrhosis secondary to hepatitis C and alcohol, severe aortic stenosis s/p TAVR, portal HTN, and thrombocytopenia with recent diagnosis of hepatocellular carcinoma. He is status post laparoscopic converted to open collection of liver core needle biopsies and intra-operative microwave ablation of 3 lesions on 10/22/19 with Dr. Benoit. He now  presents with increasing abdominal distension and abdominal pain. CT 11/3 with new large amount of ascites. WBC elevated to 17 at admission, IV antibiotics started.      -Pain: Appreciate pain consultation. oxycodone prn, lidoderm, prn menthol patches, prn robaxin  -HTN: home lisinopril   -Regular diet, miralax  -New ascites on CT 11/3 with leukocytosis(improved): UA negative. CXR with atelectasis. zosyn 11/3. vanco 11/3-11/4. linezolid 11/4. augmentin 11/6. To complete 10 day course abx for presumed intra-abdominal infection  - Appreciate Hepatology recommendations and management of ascites. Per our discussion, will plan for paracentesis followed by 25g albumin day of discharge, continue lasix and spironolactone, follow up OP with hepatology & PCP    Seen with staff, Dr. Cy South MD   Surgical Oncology

## 2019-11-06 NOTE — PLAN OF CARE
AVSS. A&O x4. Pt reports abdominal pain, given 5 mg of oxycodone with some improvement. Denies nausea on a regular diet. Passing flatus and last BM was yesterday. Voiding adequate amounts. Up ab tierra. Abdominal incision is CDI. Abdomen is distended, pt had a paracentesis a day ago. On IV Zosyn. Continue to monitor pain, abdomen, liver function and infection.

## 2019-11-06 NOTE — PROGRESS NOTES
"[facility]  HEPATOLOGY PROGRESS NOTE  Ten Johnson [id number]       CC: [facility]    SUBJECTIVE:  No acute events overnight. Feels abdomen is distended again, close to the level of fullness he felt when he presented to the ER. Good appetite, no nausea, vomiting, diarrhea or constipation. No peripheral edema. Patient reports increased pain during the night after decreased dosing of oxycodone.    ROS:  A 10-point review of systems was negative.    OBJECTIVE:  VS: /81 (BP Location: Right arm)   Pulse 79   Temp 96.7  F (35.9  C) (Oral)   Resp 16   Ht 1.753 m (5' 9\")   Wt 77.1 kg (169 lb 15.6 oz)   SpO2 97%   BMI 25.10 kg/m     General: In no acute distress, mild facial muscle wasting  Neuro: AOx3, no asterixis  Lymph: No cervical lymphadenopathy  HEENT:  No scleral icterus or oral lesions, mucous membranes dry.   CV: S1/S2 without murmurs. Valve click at 2nd intercostal space RSB. Skin warm and dry  Lungs: Good airflow bilaterally. No wheezes rhonchi, or crackles.  Respirations even and nonlabored on room air  Abd: Distended, semi-firm. More fluid appreciated today compared to yesterday. Tender to palpation in right upper quadrant and flank. Transverse incision with ecchymosis and edema. No drainage.   Extrem: trace peripehral edema BLE  Skin: No jaundice  Psych: Mood stable    MEDICATIONS:  Current Facility-Administered Medications   Medication     amoxicillin-clavulanate (AUGMENTIN) 500-125 MG per tablet 1 tablet     furosemide (LASIX) tablet 20 mg     linezolid (ZYVOX) tablet 600 mg     lisinopril (PRINIVIL/ZESTRIL) tablet 20 mg     magic mouthwash suspension (diphenhydramine, lidocaine, aluminum-magnesium & simethicone)     naloxone (NARCAN) injection 0.1-0.4 mg     ondansetron (ZOFRAN-ODT) ODT tab 4 mg    Or     ondansetron (ZOFRAN) injection 4 mg     oxyCODONE (ROXICODONE) tablet 5 mg     polyethylene glycol (MIRALAX/GLYCOLAX) Packet 17 g     prochlorperazine (COMPAZINE) injection 10 mg    Or     " prochlorperazine (COMPAZINE) tablet 10 mg    Or     prochlorperazine (COMPAZINE) Suppository 25 mg     senna-docusate (SENOKOT-S/PERICOLACE) 8.6-50 MG per tablet 1 tablet    Or     senna-docusate (SENOKOT-S/PERICOLACE) 8.6-50 MG per tablet 2 tablet     spironolactone (ALDACTONE) tablet 50 mg       REVIEW OF LABORATORY, PATHOLOGY AND IMAGING RESULTS:  BMP  Recent Labs   Lab 11/06/19  0712 11/05/19  0724 11/04/19  0720 11/03/19  1057    134 133 133   POTASSIUM 4.0 4.1 4.1 4.1   CHLORIDE 106 104 104 101   JOSEPHINE 7.8* 8.0* 8.1* 8.3*   CO2 23 22 23 25   BUN 16 16 18 22   CR 1.08 1.16 1.16 1.06   * 76 84 94     CBC  Recent Labs   Lab 11/06/19  0712 11/05/19  0724 11/04/19  0720 11/03/19  1057   WBC 12.0* 14.7* 12.8* 17.1*   RBC 3.92* 4.06* 4.11* 4.10*   HGB 13.3 13.8 14.2 14.2   HCT 37.4* 39.7* 39.9* 38.8*   MCV 95 98 97 95   MCH 33.9* 34.0* 34.5* 34.6*   MCHC 35.6 34.8 35.6 36.6*   RDW 13.5 13.9 13.8 13.3   * 117* 123* 138*     INRNo lab results found in last 7 days.  LFTs  Recent Labs   Lab 11/06/19  0712 11/05/19  0724 11/04/19  1640 11/03/19  1057   ALKPHOS 76 88  --  116   AST 58* 72*  --  84*   ALT 53 73*  --  100*   BILITOTAL 1.3 1.6*  --  1.4*   PROTTOTAL 5.3* 5.4*  --  6.0*   ALBUMIN 2.6* 2.3* 2.7* 2.6*      PANC  Recent Labs   Lab 11/03/19  1057   LIPASE 467*      MELD-Na score: 11 at 10/27/2019  7:22 AM  MELD score: 11 at 10/27/2019  7:22 AM  Calculated from:  Serum Creatinine: 0.88 mg/dL (Rounded to 1 mg/dL) at 10/27/2019  7:22 AM  Serum Sodium: 135 mmol/L at 10/27/2019  7:22 AM  Total Bilirubin: 2.7 mg/dL at 10/27/2019  7:22 AM  INR(ratio): 1.12 at 10/25/2019  7:52 AM  Age: 59 years      Imaging Results:   Last endoscopy (10/2017):  No active bleedings was seen.  Small (< 5 mm) esophageal varices. Sarahi-Hoffman tear.  Mild Portal hypertensive gastropathy. Non-bleeding erosive gastropathy. Normal examined duodenum.      IMPRESSION:   is a 59-year-old with a history of biopsy-proven HCC  status post ablation with open laparotomy 10/22, HCV cirrhosis, continued alcohol use, esophageal varices and new onset ascites.     RECOMMENDATIONS:  1. Acites:  New since recent abdominal surgery. Paracentesis (11/4) with 2.6L off. Fluid analysis from paracentesis without evidence of SBP. Culture with NGTD. Creatinine and GFR improved today after administration of albumin and staring diuresis with furosemide and spironolactone. His abdomen is more distended today, have scheduled OP paracentesis next week but would likely benefit from fluid removal prior to discharge  - Recommend paracentesis prior to discharge  - Continue furosemide 20 mg PO daily and spironolactone 50 mg PO daily  - Will need to repeat electrolytes in one week as OP (scheduled)  - Follow up paracentesis as OP 11/6 at 12:30 (scheduled and discussed with patient)   - Follow up with GI clinic as OP- [place] 1/2 1130     2. Decompensated cirrhosis  3. HCC s/p ablation 10/22/19  4. HCV, treatment naive: Has not been treated for HCV in the past in the setting of HCC. Continues to drink actively and understands ramifications of this. Transaminases and bilirubin continue to improve since ablation. MELD today 11. No acute issues.   - Due for repeat MRI per Dr Benoit       5. Immunizations  Needs pnuemovax prior to discharge (ordered)     Patient was discussed with attending physician, Dr. Lock.       Next follow up appointment: [place] 1/2/20 at 1130     Thank you for the opportunity to be involved with  Ten Johnson [facility]. Please call with any questions or concerns.     Regi Pike, APRN, CNP  Inpatient Hepatology [facility]  [phone]

## 2019-11-07 ENCOUNTER — TELEPHONE (OUTPATIENT)
Dept: SURGERY | Facility: CLINIC | Age: 59
End: 2019-11-07

## 2019-11-07 VITALS
TEMPERATURE: 96.6 F | WEIGHT: 179.8 LBS | HEART RATE: 91 BPM | BODY MASS INDEX: 26.63 KG/M2 | RESPIRATION RATE: 16 BRPM | OXYGEN SATURATION: 95 % | SYSTOLIC BLOOD PRESSURE: 140 MMHG | HEIGHT: 69 IN | DIASTOLIC BLOOD PRESSURE: 92 MMHG

## 2019-11-07 PROCEDURE — 25000132 ZZH RX MED GY IP 250 OP 250 PS 637: Performed by: PHYSICIAN ASSISTANT

## 2019-11-07 PROCEDURE — 49083 ABD PARACENTESIS W/IMAGING: CPT | Mod: GC | Performed by: ORTHOPAEDIC SURGERY

## 2019-11-07 PROCEDURE — 25000132 ZZH RX MED GY IP 250 OP 250 PS 637: Performed by: STUDENT IN AN ORGANIZED HEALTH CARE EDUCATION/TRAINING PROGRAM

## 2019-11-07 PROCEDURE — 0W9G3ZZ DRAINAGE OF PERITONEAL CAVITY, PERCUTANEOUS APPROACH: ICD-10-PCS | Performed by: STUDENT IN AN ORGANIZED HEALTH CARE EDUCATION/TRAINING PROGRAM

## 2019-11-07 PROCEDURE — 99207 ZZC NO CHARGE SIGN-OFF PS: CPT

## 2019-11-07 PROCEDURE — 25000128 H RX IP 250 OP 636: Performed by: STUDENT IN AN ORGANIZED HEALTH CARE EDUCATION/TRAINING PROGRAM

## 2019-11-07 PROCEDURE — 25000132 ZZH RX MED GY IP 250 OP 250 PS 637: Performed by: NURSE PRACTITIONER

## 2019-11-07 PROCEDURE — P9047 ALBUMIN (HUMAN), 25%, 50ML: HCPCS | Performed by: STUDENT IN AN ORGANIZED HEALTH CARE EDUCATION/TRAINING PROGRAM

## 2019-11-07 RX ORDER — SPIRONOLACTONE 50 MG/1
50 TABLET, FILM COATED ORAL DAILY
Qty: 30 TABLET | Refills: 1 | Status: SHIPPED | OUTPATIENT
Start: 2019-11-08 | End: 2019-11-20

## 2019-11-07 RX ORDER — AMOXICILLIN AND CLAVULANATE POTASSIUM 500; 125 MG/1; MG/1
1 TABLET, FILM COATED ORAL EVERY 8 HOURS
Qty: 30 TABLET | Refills: 0 | Status: SHIPPED | OUTPATIENT
Start: 2019-11-05 | End: 2020-01-24

## 2019-11-07 RX ORDER — LIDOCAINE 4 G/G
1 PATCH TOPICAL EVERY 24 HOURS
Qty: 7 PATCH | Refills: 0 | Status: SHIPPED | OUTPATIENT
Start: 2019-11-07 | End: 2019-12-10

## 2019-11-07 RX ORDER — LINEZOLID 600 MG/1
600 TABLET, FILM COATED ORAL EVERY 12 HOURS
Qty: 20 TABLET | Refills: 0 | Status: SHIPPED | OUTPATIENT
Start: 2019-11-05 | End: 2020-01-24

## 2019-11-07 RX ORDER — AMOXICILLIN 250 MG
1 CAPSULE ORAL 2 TIMES DAILY PRN
Qty: 60 TABLET | Refills: 0 | Status: SHIPPED | OUTPATIENT
Start: 2019-11-07 | End: 2020-01-24

## 2019-11-07 RX ORDER — FUROSEMIDE 20 MG
20 TABLET ORAL DAILY
Qty: 30 TABLET | Refills: 1 | Status: SHIPPED | OUTPATIENT
Start: 2019-11-08 | End: 2019-11-20

## 2019-11-07 RX ORDER — OXYCODONE HYDROCHLORIDE 5 MG/1
5 TABLET ORAL EVERY 6 HOURS PRN
Qty: 10 TABLET | Refills: 0 | Status: SHIPPED | OUTPATIENT
Start: 2019-11-07 | End: 2020-01-24

## 2019-11-07 RX ORDER — ALBUMIN (HUMAN) 12.5 G/50ML
12.5 SOLUTION INTRAVENOUS ONCE
Status: COMPLETED | OUTPATIENT
Start: 2019-11-07 | End: 2019-11-07

## 2019-11-07 RX ORDER — METHOCARBAMOL 500 MG/1
500 TABLET, FILM COATED ORAL EVERY 8 HOURS PRN
Qty: 30 TABLET | Refills: 0 | Status: SHIPPED | OUTPATIENT
Start: 2019-11-07 | End: 2019-12-10

## 2019-11-07 RX ADMIN — OXYCODONE HYDROCHLORIDE 5 MG: 5 TABLET ORAL at 08:53

## 2019-11-07 RX ADMIN — LINEZOLID 600 MG: 600 TABLET, FILM COATED ORAL at 08:55

## 2019-11-07 RX ADMIN — AMOXICILLIN AND CLAVULANATE POTASSIUM 1 TABLET: 500; 125 TABLET, FILM COATED ORAL at 06:09

## 2019-11-07 RX ADMIN — SPIRONOLACTONE 50 MG: 25 TABLET ORAL at 08:55

## 2019-11-07 RX ADMIN — FUROSEMIDE 20 MG: 20 TABLET ORAL at 08:55

## 2019-11-07 RX ADMIN — LISINOPRIL 20 MG: 20 TABLET ORAL at 08:55

## 2019-11-07 RX ADMIN — OXYCODONE HYDROCHLORIDE 5 MG: 5 TABLET ORAL at 02:38

## 2019-11-07 RX ADMIN — ALBUMIN HUMAN 12.5 G: 0.25 SOLUTION INTRAVENOUS at 10:53

## 2019-11-07 ASSESSMENT — PAIN DESCRIPTION - DESCRIPTORS
DESCRIPTORS: TIGHTNESS
DESCRIPTORS: TIGHTNESS

## 2019-11-07 ASSESSMENT — ACTIVITIES OF DAILY LIVING (ADL)
ADLS_ACUITY_SCORE: 16

## 2019-11-07 NOTE — TELEPHONE ENCOUNTER
[facility] Call Center    Phone Message    May a detailed message be left on voicemail: yes    Reason for Call: Other: Patient needs a hosp f/u with Dr. Benoit in 1-2 weeks,     Action Taken: Other: General Surg

## 2019-11-07 NOTE — PLAN OF CARE
AOx4, UAL. AVSS on ra. Tolerated reg diet, pt to be NPO at midnight. ABD distended, plan is for pt to have paracentesis tomorrow 11/7. LBM 11/6, +flatus. AUO. ABD inc CDI, liq inc. Abd pain some what managed with PRN Robaxin, PRN Oxycodone, and ICY Hot patch in place. PIV TKO per pt req. Pt may discharge after paracentesis tomorrow.Cont POC.

## 2019-11-07 NOTE — PROGRESS NOTES
Pt understood discharge orders/instructions : Yes.  Pt's belongings : Pt took.  Discharge medications : Pt will pick it up at discharge pharmacy.  Lines : PIV was removed.  How is pt getting home/transportion : Pt is calling a taxi.  Discharge papers : Given to the pt.  Follow up appt : As stated on the papers.  Home supply : N/A.

## 2019-11-07 NOTE — CONSULTS
Consult and Procedure Service - Procedure Note    Attending: Dusty Olmos MD  Resident: Citlali Holland MD  Procedure: Diagnostic and therapeutic paracentesis  Indication: Ascites  Risk Assessment: Low risk  Pre-procedure diagnosis: Ascites  Post-procedure diagnosis: Ascites    The risks and benefits of the procedure were explained to Mr Johnson who expressed understanding and opted to proceed.  Consent was obtained and placed in the chart.  A time out was performed.  An area of ascites was located and marked using ultrasound guidance in the left lower quadrant; the area was prepped and draped in the usual sterile fashion.  10 ml of 1% lidocaine was instilled and ascites located.  The  paracentesis catheter and needle were inserted under real-time guidance until ascites obtained then the needle removed and the catheter advanced.  The apparatus was connected to vacuum bottles and a total of 2750 ml of klaus colored fluid removed.  A specimen was  sent for analysis. The catheter was withdrawn and the area dressed.  Patient tolerated the procedure well with no immediate complications.  Please contact the Consult and Procedure Service if any complications or concerns arise.     Citlali Holland MD   Med Peds PGY-2   [facility]  P     DOS:  11/07/19

## 2019-11-07 NOTE — PLAN OF CARE
Assumed care of Patient from [id number]. Patient A&Ox4. AVSS on room air. Pain managed with PO oxycodone. NPO for anesthesia  assisted paracentesis today. Abdomin distended, +BS, +flatus. Voiding with adequate urine output. PIV TKO. Continue with POC. Patient UAL. Continue with POC.

## 2019-11-07 NOTE — DISCHARGE SUMMARY
General Surgery Discharge Summary    Ten Johnson MRN# [id number]   YOB: 1960 Age: 59 year old     Date of Admission:  11/3/2019  Date of Discharge::  11/7/2019  Admitting Physician:  Nestor Lindquist MD  Discharge Physician:  Gregorio Benoit MD  Primary Care Physician:        Cuca Celeste          Admission Diagnoses:   Abdominal pain, generalized [R10.84]  Hepatocellular carcinoma (H) [C22.0]  Alcoholic cirrhosis of liver with ascites (H) [K70.31]            Discharge Diagnosis:   Abdominal pain, generalized [R10.84]  Hepatocellular carcinoma (H) [C22.0]  Alcoholic cirrhosis of liver with ascites (H) [K70.31]  Presumed intra-abdominal infection         Procedures:   None        Non-operative procedures:   Diagnostic and therapeutic paracentesis by Dr. Metcalf  Therapeutic paracentesis by Dr. Olmos          Consultations:   VASCULAR ACCESS CARE ADULT IP CONSULT  VASCULAR ACCESS CARE ADULT IP CONSULT  PHARMACY TO DOSE [facility]ANCO  GI HEPATOLOGY ADULT IP CONSULT  INTERNAL MEDICINE PROCEDURE TEAM ADULT IP CONSULT [place] - DIALYSIS NON TUNNELED CENTRAL LINE PLACEMENT  MEDICATION HISTORY IP PHARMACY CONSULT  VASCULAR ACCESS CARE ADULT IP CONSULT  PAIN MANAGEMENT ADULT IP CONSULT  INTERNAL MEDICINE PROCEDURE TEAM ADULT IP CONSULT [place] - DIALYSIS NON TUNNELED CENTRAL LINE PLACEMENT             Medications Prior to Admission:     Medications Prior to Admission   Medication Sig Dispense Refill Last Dose     fluticasone (FLONASE) 50 MCG/ACT nasal spray Spray 2 sprays into both nostrils daily 3 Bottle 3 11/3/2019 at Unknown time     lisinopril (PRINIVIL/ZESTRIL) 20 MG tablet Take 1 tablet (20 mg) by mouth daily 90 tablet 3 11/3/2019 at Unknown time     loratadine (CLARITIN) 10 MG tablet Take 1 tablet (10 mg) by mouth daily 90 tablet 0 11/3/2019 at Unknown time     Multiple Vitamin (MULTIVITAMINS PO) Take 1 tablet by mouth At Bedtime    Past Month at Unknown time     [DISCONTINUED] cyclobenzaprine  "(FLEXERIL) 10 MG tablet Take 1 tablet (10 mg) by mouth 3 times daily as needed for muscle spasms 10 tablet 0 Past Month at Unknown time     [DISCONTINUED] oxyCODONE (ROXICODONE) 5 MG tablet Take 1-2 tablets (5-10 mg) by mouth every 6 hours as needed for moderate to severe pain or severe pain 20 tablet 0 11/3/2019 at Unknown time            Discharge Medications:      Ten Johnson   Home Medication Instructions CHRISTIANO:[id number]    Printed on:11/07/19 0903   Medication Information                      cyclobenzaprine (FLEXERIL) 10 MG tablet  Take 1 tablet (10 mg) by mouth 3 times daily as needed for muscle spasms             fluticasone (FLONASE) 50 MCG/ACT nasal spray  Spray 2 sprays into both nostrils daily             lisinopril (PRINIVIL/ZESTRIL) 20 MG tablet  Take 1 tablet (20 mg) by mouth daily             loratadine (CLARITIN) 10 MG tablet  Take 1 tablet (10 mg) by mouth daily             Multiple Vitamin (MULTIVITAMINS PO)  Take 1 tablet by mouth At Bedtime              oxyCODONE (ROXICODONE) 5 MG tablet  Take 1-2 tablets (5-10 mg) by mouth every 6 hours as needed for moderate to severe pain or severe pain                       Day of Discharge Exam   BP (!) 140/92 (BP Location: Right arm)   Pulse 91   Temp 96.6  F (35.9  C) (Oral)   Resp 16   Ht 1.753 m (5' 9\")   Wt 81.6 kg (179 lb 12.8 oz)   SpO2 95%   BMI 26.55 kg/m      General:  A&Ox3, NAD  Cardio:   RRR  Chest:   Non labored breathing on RA  Abd:   Soft, moderately-distended, NTTP, incision healing well, reducible umbilical hernia  Ext:   WWP          Brief History of Illness:   From admission H&P:  Ten Johnson is a 59 year old year old male with history of liver cirrhosis secondary to hepatitis C and alcohol, severe aortic stenosis s/p TAVR, portal HTN, and thrombocytopenia with recent diagnosis of hepatocellular carcinoma. He is status post laparoscopic converted to open collection of liver core needle biopsies and intra-operative microwave " ablation of 3 lesions on 10/22/19 with Dr. Benoit. He was discharged to home on 10/28/19. He complains of increasing abdominal pain and bloating since discharge. He has been passing gas and having bowel movements. He denies nausea/vomiting, though has not been tolerating much oral intake secondary to the pain. He denies fevers/chills. He does complain of pain over the surgical site, but diffuse pain as well. He has not noticed discharge from the wound site or erythema surrounding the wound. He is scheduled to follow up with Dr. Benoit 11/8/19. Workup today significant for WBC 17, CT abd/pelvis with new ascites and post surgical changes for . No hematoma/seroma of the wound.            Hospital Course:   The patient was admitted, started on zosyn/vancomycin and underwent the above procedures. The patient tolerated the procedure well. There were no complications. He was transitioned to Augmentin and linezolid as leukocytosis improved with plans for a 10 day course of antibiotics for presumed intra-abdominal infection given high WBC count in peritoneal fluid; although, no microbes grew from cultures. Pain was controlled with oral pain medication and the patient was able to ambulate and void without difficulty. The patient received appropriate education post operatively. On HD 5 the patient was discharged to home with appropriate instructions and follow up. The patient acknowledged understanding and were in agreement with the plan.         Antibiotics Prescribed at Discharge:   Augmentin and linezolid, Duration: 11/5/19 - 11/15/19         Imaging Studies:   No studies require specific follow-up  Results for orders placed or performed during the hospital encounter of 11/03/19   CT Abdomen Pelvis w Contrast    Narrative    EXAMINATION: CT ABDOMEN PELVIS W CONTRAST, 11/3/2019 1:33 PM    TECHNIQUE:  Helical CT images from the lung bases through the  symphysis pubis were obtained  with IV contrast. Contrast dose:  104ml  isovue 370    COMPARISON: 12/13/2017    HISTORY: abdominal pain and distention, eval for ileus vs. SBO. Recent  ex-lap and liver biopsies    FINDINGS:  Lower thorax: Opacity in the left lung base with air bronchograms  favor atelectasis. Trace right pleural effusion. Prosthetic aortic  valve.     Abdomen/pelvis: Cirrhotic liver. Large amount of ascites which is new  from 10/3/2019. No hematocrit level. Changes of ablation in the right  lobe of the liver result in 2 large hypoattenuating lesions extending  to the capsule, the larger of which measures 5.6 cm. Cholelithiasis.  Pancreas is unremarkable. Spleen, bilateral adrenal glands and kidneys  are normal in appearance. Portal vein, celiac access, SMA, bilateral  renal arteries are patent. Colon and small bowel are normal in  caliber.    Degenerative changes of thoracic spine.      Impression    IMPRESSION:   1. New large amount of ascites in the abdomen (postsurgical  20/2/2019). Although no hematocrit level is demonstrated and fluid  measures simple, a bleed cannot be ruled out.  2. New hypoattenuating lesions in the right lobe of liver presumably  secondary to ablation during surgery. These extend to the capsule, can  not rule out extension beyond capsule.   3. Atelectasis or infection left lung base.       LUIS RIZO MD   XR Chest 2 Views    Narrative    XR CHEST 2 VW  11/4/2019 9:00 AM      HISTORY: post op leukocytosis    COMPARISON: Chest radiograph from 10/24/2019    FINDINGS: PA and lateral views of the chest. Postprocedural changes of  TAVR.    Cardiac silhouette is stable. Patchy bibasilar opacities. Stable small  bilateral pleural effusions. No pneumothorax. Slight improvement in  Air distended bowel loops.      Impression    IMPRESSION:   1. Patchy bibasilar opacities, likely atelectasis versus  consolidation.  2. Slight improvement in air distended bowel loops.    I have personally reviewed the examination and initial  interpretation  and I agree with the findings.    HUGH ESCALANTE MD            Final Pathology Result:   No pathology submitted         Discharge Instructions:        Review of your medicines      START taking      Dose / Directions   amoxicillin-clavulanate 500-125 MG tablet  Commonly known as:  AUGMENTIN  Indication:  Infection Within the Abdomen      Dose:  1 tablet  Take 1 tablet by mouth every 8 hours for 10 days  Quantity:  30 tablet  Refills:  0     furosemide 20 MG tablet  Commonly known as:  LASIX      Dose:  20 mg  Start taking on:  November 8, 2019  Take 1 tablet (20 mg) by mouth daily  Quantity:  30 tablet  Refills:  1     linezolid 600 MG tablet  Commonly known as:  ZYVOX  Indication:  Infection Within the Abdomen      Dose:  600 mg  Take 1 tablet (600 mg) by mouth every 12 hours for 10 days  Quantity:  20 tablet  Refills:  0     methocarbamol 500 MG tablet  Commonly known as:  ROBAXIN  Used for:  Abdominal pain, generalized      Dose:  500 mg  Take 1 tablet (500 mg) by mouth every 8 hours as needed for muscle spasms  Quantity:  30 tablet  Refills:  0     senna-docusate 8.6-50 MG tablet  Commonly known as:  SENOKOT-S/PERICOLACE  Used for:  Abdominal pain, generalized      Dose:  1 tablet  Take 1 tablet by mouth 2 times daily as needed for constipation  Quantity:  60 tablet  Refills:  0     spironolactone 50 MG tablet  Commonly known as:  ALDACTONE      Dose:  50 mg  Start taking on:  November 8, 2019  Take 1 tablet (50 mg) by mouth daily  Quantity:  30 tablet  Refills:  1        CONTINUE these medicines which may have CHANGED, or have new prescriptions. If we are uncertain of the size of tablets/capsules you have at home, strength may be listed as something that might have changed.      Dose / Directions   oxyCODONE 5 MG tablet  Commonly known as:  ROXICODONE  This may have changed:      how much to take    reasons to take this  Used for:  Abdominal pain, generalized      Dose:  5 mg  Take 1 tablet (5  mg) by mouth every 6 hours as needed for breakthrough pain  Quantity:  10 tablet  Refills:  0        CONTINUE these medicines which have NOT CHANGED      Dose / Directions   fluticasone 50 MCG/ACT nasal spray  Commonly known as:  FLONASE  Used for:  Cough, Post-nasal drip      Dose:  2 spray  Spray 2 sprays into both nostrils daily  Quantity:  3 Bottle  Refills:  3     lisinopril 20 MG tablet  Commonly known as:  PRINIVIL/ZESTRIL  Used for:  Hypertension, unspecified type      Dose:  20 mg  Take 1 tablet (20 mg) by mouth daily  Quantity:  90 tablet  Refills:  3     loratadine 10 MG tablet  Commonly known as:  CLARITIN  Used for:  Environmental allergies      Dose:  10 mg  Take 1 tablet (10 mg) by mouth daily  Quantity:  90 tablet  Refills:  0     MULTIVITAMINS PO      Dose:  1 tablet  Take 1 tablet by mouth At Bedtime  Refills:  0        STOP taking    cyclobenzaprine 10 MG tablet  Commonly known as:  FLEXERIL             If your travel plans upon discharge include prolonged periods of sitting (a lengthy car or plane ride), it is highly beneficial to get up and walk at least once per hour to help prevent swelling and blood clots.     You may shower after operation, let warm soapy water run over incision and pat dry. Do not submerge, soak, or scrub incision.    Take incentive spirometer home for continued frequent use    You will be discharged with narcotic pain medications. Please take them only as needed and do not operate a car or heavy machinery for 24 hours after taking them.  Narcotic pain medications like oxycodone are constipating. Please ensure that you are drinking adequate amounts of fluids and taking stool softeners while you are taking narcotics. Take Miralax as needed for constipation.     Do not drive until you can with stand pressing the brake pedal quickly and fully without pain and you are not distracted by pain.     Call clinic [phone] for fever greater than 101.5, chills, red skin around  "incision, drainage from incision, increased swelling from the incision, bleeding from the incision that is not controlled with light pressure, inability to tolerate diet,new nausea/vomiting or other new/worsening symptoms.    For after hours questions or concerns you can contact the on-call surgical oncology resident (nights and weekends [phone] and ask \"I would like to page the Surgical Oncology Resident on call.\"). In emergencies, call [phone]           Follow-Up:   Follow Up:  -Follow up in clinic with Dr Gregorio Benoit in 1-2 weeks. You should be called to make an appointment within 3 business days. If you are not contacted, call [phone] to make an appointment.        Home Health Care:   Not needed           Discharge Disposition:   Discharged to home      Condition at discharge: Stable          "

## 2019-11-09 LAB
BACTERIA SPEC CULT: NO GROWTH
SPECIMEN SOURCE: NORMAL

## 2019-11-12 ENCOUNTER — PATIENT OUTREACH (OUTPATIENT)
Dept: SURGERY | Facility: CLINIC | Age: 59
End: 2019-11-12

## 2019-11-12 NOTE — PROGRESS NOTES
Surgical Oncology RN Care Coordination Note:     Called and spoke with patient regarding request for appointment later in the afternoon. Informed him that we can see him at 2:30 on Friday afternoon instead, he agreed with this plan and was appreciative of the call. He also requested that we send a message to Hepatology for him to get a sooner appointment for follow up. He has concerned regarding his diuretics and management and feels that the medication amount may need to be adjusted. I informed him I would send a message to their team and ask that they reach out to him regarding this but that he can also reach out to them directly either via my chart or called a hepatology clinic as well. He agreed with this plan and will continue with the plan for us to see him on Friday as scheduled.    Julienne Ceja, RN, BSN  Care Coordinator   [phone]

## 2019-11-13 ENCOUNTER — OFFICE VISIT (OUTPATIENT)
Dept: INFUSION THERAPY | Facility: CLINIC | Age: 59
End: 2019-11-13
Attending: INTERNAL MEDICINE
Payer: MEDICAID

## 2019-11-13 ENCOUNTER — ANCILLARY PROCEDURE (OUTPATIENT)
Dept: ULTRASOUND IMAGING | Facility: CLINIC | Age: 59
End: 2019-11-13
Attending: INTERNAL MEDICINE
Payer: MEDICAID

## 2019-11-13 VITALS
SYSTOLIC BLOOD PRESSURE: 135 MMHG | WEIGHT: 167.9 LBS | DIASTOLIC BLOOD PRESSURE: 85 MMHG | HEART RATE: 86 BPM | BODY MASS INDEX: 24.79 KG/M2

## 2019-11-13 DIAGNOSIS — K70.31 ASCITES DUE TO ALCOHOLIC CIRRHOSIS (H): Primary | ICD-10-CM

## 2019-11-13 DIAGNOSIS — R18.8 OTHER ASCITES: ICD-10-CM

## 2019-11-13 LAB
ANION GAP SERPL CALCULATED.3IONS-SCNC: 8 MMOL/L (ref 3–14)
BUN SERPL-MCNC: 26 MG/DL (ref 7–30)
CALCIUM SERPL-MCNC: 8.4 MG/DL (ref 8.5–10.1)
CHLORIDE SERPL-SCNC: 106 MMOL/L (ref 94–109)
CO2 SERPL-SCNC: 24 MMOL/L (ref 20–32)
CREAT SERPL-MCNC: 1.25 MG/DL (ref 0.66–1.25)
GFR SERPL CREATININE-BSD FRML MDRD: 62 ML/MIN/{1.73_M2}
GLUCOSE SERPL-MCNC: 109 MG/DL (ref 70–99)
POTASSIUM SERPL-SCNC: 4 MMOL/L (ref 3.4–5.3)
SODIUM SERPL-SCNC: 137 MMOL/L (ref 133–144)

## 2019-11-13 PROCEDURE — P9047 ALBUMIN (HUMAN), 25%, 50ML: HCPCS | Mod: ZF | Performed by: INTERNAL MEDICINE

## 2019-11-13 PROCEDURE — 80048 BASIC METABOLIC PNL TOTAL CA: CPT | Performed by: NURSE PRACTITIONER

## 2019-11-13 PROCEDURE — 25000125 ZZHC RX 250: Mod: ZF | Performed by: INTERNAL MEDICINE

## 2019-11-13 PROCEDURE — 36415 COLL VENOUS BLD VENIPUNCTURE: CPT

## 2019-11-13 PROCEDURE — 25000128 H RX IP 250 OP 636: Mod: ZF | Performed by: INTERNAL MEDICINE

## 2019-11-13 PROCEDURE — 27210190 US PARACENTESIS

## 2019-11-13 RX ORDER — HEPARIN SODIUM,PORCINE 10 UNIT/ML
5 VIAL (ML) INTRAVENOUS
Status: CANCELLED | OUTPATIENT
Start: 2019-11-20

## 2019-11-13 RX ORDER — ALBUMIN (HUMAN) 12.5 G/50ML
12.5 SOLUTION INTRAVENOUS
Status: CANCELLED | OUTPATIENT
Start: 2019-11-20

## 2019-11-13 RX ORDER — LIDOCAINE HYDROCHLORIDE 10 MG/ML
20 INJECTION, SOLUTION EPIDURAL; INFILTRATION; INTRACAUDAL; PERINEURAL ONCE
Status: CANCELLED | OUTPATIENT
Start: 2019-11-20

## 2019-11-13 RX ORDER — HEPARIN SODIUM (PORCINE) LOCK FLUSH IV SOLN 100 UNIT/ML 100 UNIT/ML
5 SOLUTION INTRAVENOUS
Status: CANCELLED | OUTPATIENT
Start: 2019-11-20

## 2019-11-13 RX ORDER — ALBUMIN (HUMAN) 12.5 G/50ML
12.5 SOLUTION INTRAVENOUS
Status: COMPLETED | OUTPATIENT
Start: 2019-11-13 | End: 2019-11-13

## 2019-11-13 RX ORDER — LIDOCAINE HYDROCHLORIDE 10 MG/ML
20 INJECTION, SOLUTION EPIDURAL; INFILTRATION; INTRACAUDAL; PERINEURAL ONCE
Status: COMPLETED | OUTPATIENT
Start: 2019-11-13 | End: 2019-11-13

## 2019-11-13 RX ADMIN — ALBUMIN HUMAN 12.5 G: 0.25 SOLUTION INTRAVENOUS at 13:07

## 2019-11-13 RX ADMIN — ALBUMIN HUMAN 12.5 G: 0.25 SOLUTION INTRAVENOUS at 13:15

## 2019-11-13 RX ADMIN — LIDOCAINE HYDROCHLORIDE 15 ML: 10 INJECTION, SOLUTION EPIDURAL; INFILTRATION; INTRACAUDAL; PERINEURAL at 12:52

## 2019-11-13 RX ADMIN — ALBUMIN HUMAN 12.5 G: 0.25 SOLUTION INTRAVENOUS at 12:49

## 2019-11-13 RX ADMIN — ALBUMIN HUMAN 12.5 G: 0.25 SOLUTION INTRAVENOUS at 12:59

## 2019-11-13 NOTE — PATIENT INSTRUCTIONS
Dear Ten Johnson    Thank you for choosing [facility] ([facility]) for your procedure.  The following information is a summary of our appointment as well as important reminders.          We look forward in seeing you on your next appointment here at [facility] ([facility]).  Please don t hesitate to call us at [phone] to reschedule any of your appointments or to speak with one of the [facility] registered nurses.  It was a pleasure taking care of you today.    Sincerely,    [facility]  [facility]  [address] [place] [address]  [address]  Phone:  [phone]    Patient Education     Discharge Instructions for Paracentesis  Paracentesis is a procedure to remove extra fluid from your belly (abdomen). This fluid buildup in the abdomen is called ascites. The procedure may have been done to take a sample of the fluid. Or, it may have been done to drain the extra fluid from your abdomen and help make you more comfortable.     Ascites is buildup of excess fluid in the abdomen.   Home care    If you have pain after the procedure, your healthcare provider can prescribe or recommend pain medicines. Take these exactly as directed. If you stopped taking other medicines before the procedure, ask your provider when you can start them again.    Take it easy for 24 hours after the procedure. Avoid physical activity until your provider says it s OK.    You will have a small bandage over the puncture site. Stitches (sutures), surgical staples, adhesive tapes, adhesive strips, or surgical glue may be used to close the incision. They also help stop bleeding and speed healing. You may take the bandage off in 24 hours.    Check the puncture site for the signs of infection listed below.  Follow-up care  Make a follow-up appointment with your healthcare provider as directed. During your follow-up  visit, your provider will check your healing. Let your provider know how you are feeling. You can also discuss the cause of your ascites and if you need any further treatment.  When to seek medical advice  Call your healthcare provider if you have any of the following after the procedure:    A fever of 100.4 F (38 C) or higher    Trouble breathing    Pain that doesn't go away even after taking pain medicine    Belly pain not caused by having the skin punctured    Bleeding from the puncture site    More than a small amount of fluid leaking from the puncture site    Swollen belly    Signs of infection at the puncture site. These include increased pain, redness, or swelling, warmth, or bad-smelling drainage.    Blood in your urine    Feeling dizzy or lightheaded, or fainting   Date Last Reviewed: 7/1/2016 2000-2018 [employer]. [address] [place] [address], [place] [address]. All rights reserved. This information is not intended as a substitute for professional medical care. Always follow your healthcare professional's instructions.

## 2019-11-13 NOTE — PROGRESS NOTES
Paracentesis Nursing Note  Ten Johnson presents today [facility] for a [facility]acentesis.    During today's appointment orders from Dr. Leventhal were completed.    Progress Note:  Patient identification verified by name and date of birth.  Assessment completed.  Vitals monitored throughout appointment and recorded in Doc Flowsheets.  See proceduralist note in ultrasound.    Vascular Access: peripheral IV placed today.  Labs: were drawn per orders.     Date of consent or authorization: 11/13/19.  Invasive Procedure Safety Checklist was completed and sent for scanning.     Paracentesis performed by Kenyatta Bryson PA-C Radiology.    The following labs were communicated to provider performing paracentesis:  Lab Results   Component Value Date     11/06/2019       Total amount of ascites fluid drained: 4.8 liters.  Color of ascites fluid: yellow.  Total amount of albumin given: 50  grams.    Patient tolerated procedure well.    Post procedure,denies pain or discomfort post paracentesis.      Discharge Plan:  Discharge instructions were reviewed with patient.  Patient/Representative verbalized understanding and all questions were answered.   Discharged [facility] in stable condition.    Sally Marie RN    Administrations This Visit     albumin human 25 % injection 12.5 g     Admin Date  11/13/2019 Action  New Bag Dose  12.5 g Route  Intravenous Administered By  Sally Marie RN           Admin Date  11/13/2019 Action  New Bag Dose  12.5 g Route  Intravenous Administered By  Sally Marie RN           Admin Date  11/13/2019 Action  New Bag Dose  12.5 g Route  Intravenous Administered By  Sally Marie RN           Admin Date  11/13/2019 Action  New Bag Dose  12.5 g Route  Intravenous Administered By  Sally Marie RN          lidocaine (PF) (XYLOCAINE) 1 % injection 20 mL     Admin Date  11/13/2019 Action  Given Dose  15 mL  Route  Subcutaneous Administered By  Sally Marie RN              BP (!) 132/90   Pulse 87

## 2019-11-14 ENCOUNTER — OFFICE VISIT (OUTPATIENT)
Dept: INTERNAL MEDICINE | Facility: CLINIC | Age: 59
End: 2019-11-14

## 2019-11-14 ENCOUNTER — ANCILLARY PROCEDURE (OUTPATIENT)
Dept: CT IMAGING | Facility: CLINIC | Age: 59
End: 2019-11-14
Attending: INTERNAL MEDICINE

## 2019-11-14 VITALS
DIASTOLIC BLOOD PRESSURE: 80 MMHG | BODY MASS INDEX: 25.1 KG/M2 | SYSTOLIC BLOOD PRESSURE: 129 MMHG | OXYGEN SATURATION: 99 % | WEIGHT: 170 LBS | HEART RATE: 82 BPM

## 2019-11-14 DIAGNOSIS — K72.90 DECOMPENSATION OF CIRRHOSIS OF LIVER (H): ICD-10-CM

## 2019-11-14 DIAGNOSIS — H53.8 BLURRED VISION: ICD-10-CM

## 2019-11-14 DIAGNOSIS — R68.89 RIGORS: ICD-10-CM

## 2019-11-14 DIAGNOSIS — R10.31 ABDOMINAL PAIN, RIGHT LOWER QUADRANT: ICD-10-CM

## 2019-11-14 DIAGNOSIS — R10.31 ABDOMINAL PAIN, RIGHT LOWER QUADRANT: Primary | ICD-10-CM

## 2019-11-14 DIAGNOSIS — K70.30 ALCOHOLIC CIRRHOSIS OF LIVER WITHOUT ASCITES (H): ICD-10-CM

## 2019-11-14 DIAGNOSIS — K74.60 DECOMPENSATION OF CIRRHOSIS OF LIVER (H): ICD-10-CM

## 2019-11-14 LAB
ALBUMIN SERPL-MCNC: 3.2 G/DL (ref 3.4–5)
ALP SERPL-CCNC: 91 U/L (ref 40–150)
ALT SERPL W P-5'-P-CCNC: 60 U/L (ref 0–70)
ANION GAP SERPL CALCULATED.3IONS-SCNC: 7 MMOL/L (ref 3–14)
AST SERPL W P-5'-P-CCNC: 76 U/L (ref 0–45)
BILIRUB SERPL-MCNC: 0.6 MG/DL (ref 0.2–1.3)
BUN SERPL-MCNC: 24 MG/DL (ref 7–30)
CALCIUM SERPL-MCNC: 8.5 MG/DL (ref 8.5–10.1)
CHLORIDE SERPL-SCNC: 112 MMOL/L (ref 94–109)
CO2 SERPL-SCNC: 21 MMOL/L (ref 20–32)
CREAT SERPL-MCNC: 0.83 MG/DL (ref 0.66–1.25)
GFR SERPL CREATININE-BSD FRML MDRD: >90 ML/MIN/{1.73_M2}
GLUCOSE SERPL-MCNC: 95 MG/DL (ref 70–99)
POTASSIUM SERPL-SCNC: 4.4 MMOL/L (ref 3.4–5.3)
PROT SERPL-MCNC: 6.4 G/DL (ref 6.8–8.8)
SODIUM SERPL-SCNC: 140 MMOL/L (ref 133–144)

## 2019-11-14 RX ORDER — IOPAMIDOL 755 MG/ML
104 INJECTION, SOLUTION INTRAVASCULAR ONCE
Status: COMPLETED | OUTPATIENT
Start: 2019-11-14 | End: 2019-11-14

## 2019-11-14 RX ORDER — OXYCODONE HYDROCHLORIDE 5 MG/1
10 TABLET ORAL EVERY 6 HOURS PRN
Qty: 24 TABLET | Refills: 0 | Status: SHIPPED | OUTPATIENT
Start: 2019-11-14 | End: 2020-01-24

## 2019-11-14 RX ADMIN — IOPAMIDOL 104 ML: 755 INJECTION, SOLUTION INTRAVASCULAR at 12:29

## 2019-11-14 NOTE — PATIENT INSTRUCTIONS
It was nice to meet you today. Head down to the lab for a blood draw and to radiology for a CT scan. Come back to clinic following imaging and lab draw. You will follow up with Dr. Celeste 12/9/2019, with Surgery follow up 11/15/2019 and Hepatology follow up 11/20/2019.

## 2019-11-14 NOTE — NURSING NOTE
Chief Complaint   Patient presents with     Hospital F/U     pt complains of pain that keeps him up       Kimberly Nissen, EMT at 9:53 AM on 11/14/2019

## 2019-11-19 ENCOUNTER — ANCILLARY PROCEDURE (OUTPATIENT)
Dept: ULTRASOUND IMAGING | Facility: CLINIC | Age: 59
End: 2019-11-19
Attending: INTERNAL MEDICINE
Payer: MEDICAID

## 2019-11-19 ENCOUNTER — OFFICE VISIT (OUTPATIENT)
Dept: INFUSION THERAPY | Facility: CLINIC | Age: 59
End: 2019-11-19
Attending: SURGERY
Payer: MEDICAID

## 2019-11-19 VITALS
DIASTOLIC BLOOD PRESSURE: 74 MMHG | WEIGHT: 166.2 LBS | BODY MASS INDEX: 24.54 KG/M2 | OXYGEN SATURATION: 99 % | SYSTOLIC BLOOD PRESSURE: 115 MMHG | HEART RATE: 91 BPM | TEMPERATURE: 98.2 F

## 2019-11-19 DIAGNOSIS — K70.31 ASCITES DUE TO ALCOHOLIC CIRRHOSIS (H): Primary | ICD-10-CM

## 2019-11-19 PROCEDURE — P9047 ALBUMIN (HUMAN), 25%, 50ML: HCPCS | Mod: ZF | Performed by: INTERNAL MEDICINE

## 2019-11-19 PROCEDURE — 40000141 ZZH STATISTIC PERIPHERAL IV START W/O US GUIDANCE: Mod: ZF

## 2019-11-19 PROCEDURE — 27210190 US PARACENTESIS

## 2019-11-19 PROCEDURE — 25000128 H RX IP 250 OP 636: Mod: ZF | Performed by: INTERNAL MEDICINE

## 2019-11-19 PROCEDURE — 25000125 ZZHC RX 250: Mod: ZF | Performed by: INTERNAL MEDICINE

## 2019-11-19 RX ORDER — HEPARIN SODIUM (PORCINE) LOCK FLUSH IV SOLN 100 UNIT/ML 100 UNIT/ML
5 SOLUTION INTRAVENOUS
Status: CANCELLED | OUTPATIENT
Start: 2019-11-26

## 2019-11-19 RX ORDER — HEPARIN SODIUM,PORCINE 10 UNIT/ML
5 VIAL (ML) INTRAVENOUS
Status: CANCELLED | OUTPATIENT
Start: 2019-11-26

## 2019-11-19 RX ORDER — ALBUMIN (HUMAN) 12.5 G/50ML
12.5 SOLUTION INTRAVENOUS
Status: CANCELLED | OUTPATIENT
Start: 2019-11-26

## 2019-11-19 RX ORDER — LIDOCAINE HYDROCHLORIDE 10 MG/ML
20 INJECTION, SOLUTION EPIDURAL; INFILTRATION; INTRACAUDAL; PERINEURAL ONCE
Status: COMPLETED | OUTPATIENT
Start: 2019-11-19 | End: 2019-11-19

## 2019-11-19 RX ORDER — LIDOCAINE HYDROCHLORIDE 10 MG/ML
20 INJECTION, SOLUTION EPIDURAL; INFILTRATION; INTRACAUDAL; PERINEURAL ONCE
Status: CANCELLED | OUTPATIENT
Start: 2019-11-26

## 2019-11-19 RX ORDER — ALBUMIN (HUMAN) 12.5 G/50ML
12.5 SOLUTION INTRAVENOUS
Status: COMPLETED | OUTPATIENT
Start: 2019-11-19 | End: 2019-11-19

## 2019-11-19 RX ADMIN — ALBUMIN HUMAN 12.5 G: 0.25 SOLUTION INTRAVENOUS at 10:26

## 2019-11-19 RX ADMIN — ALBUMIN HUMAN 12.5 G: 0.25 SOLUTION INTRAVENOUS at 10:53

## 2019-11-19 RX ADMIN — ALBUMIN HUMAN 12.5 G: 0.25 SOLUTION INTRAVENOUS at 10:28

## 2019-11-19 RX ADMIN — LIDOCAINE HYDROCHLORIDE 10 ML: 10 INJECTION, SOLUTION EPIDURAL; INFILTRATION; INTRACAUDAL; PERINEURAL at 10:08

## 2019-11-19 RX ADMIN — ALBUMIN HUMAN 12.5 G: 0.25 SOLUTION INTRAVENOUS at 10:09

## 2019-11-19 NOTE — PROGRESS NOTES
Paracentesis Nursing Note  Ten Johnson presents today to [facility] for a [facility]esis.    During today's appointment orders from Leventhal, Thomas Michael, MD were completed.    Progress Note:  Patient identification verified by name and date of birth.  Assessment completed.  Vitals monitored throughout appointment and recorded in Doc Flowsheets.  See proceduralist note in ultrasound.    Vascular Access: peripheral IV placed today by vascular access RN, Luana Lemus    Labs: were not ordered for this appointment.    Date of consent or authorization: 11/13/2019  Invasive Procedure Safety Checklist was completed and sent for scanning.      Paracentesis performed by Jason Britt PA-C Radiology.    The following labs were communicated to provider performing paracentesis:  Lab Results   Component Value Date     11/06/2019     Administrations This Visit     albumin human 25 % injection 12.5 g     Admin Date  11/19/2019 Action  New Bag Dose  12.5 g Route  Intravenous Administered By  Camille Coleman RN           Admin Date  11/19/2019 Action  New Bag Dose  12.5 g Route  Intravenous Administered By  Camille Coleman RN           Admin Date  11/19/2019 Action  New Bag Dose  12.5 g Route  Intravenous Administered By  Camille Coleman RN           Admin Date  11/19/2019 Action  New Bag Dose  12.5 g Route  Intravenous Administered By  Camille Coleman RN          lidocaine (PF) (XYLOCAINE) 1 % injection 20 mL     Admin Date  11/19/2019 Action  Given by Other Clinician Dose  10 mL Route  Subcutaneous Administered By  Camille Coleman RN              Total amount of ascites fluid drained: 4 liters.  Color of ascites fluid: straw colored.  Total amount of albumin given: 50  grams.    Patient tolerated procedure well.    Post procedure,denies pain or discomfort post paracentesis.    Discharge Plan:  Discharge instructions were reviewed with patient.  Patient/Representative verbalized understanding and all  questions were answered.   Discharged [facility] in stable condition.    Camille Coleman RN    /74 (BP Location: Left arm)   Pulse 91   Temp 98.2  F (36.8  C) (Oral)   Wt 79.3 kg (174 lb 12.8 oz)   SpO2 99%   BMI 25.81 kg/m

## 2019-11-19 NOTE — PATIENT INSTRUCTIONS
Dear Ten Johnson    Thank you for choosing [facility] ([facility]) for your procedure.  The following information is a summary of our appointment as well as important reminders.      Please refer to your hospital discharge instructions for details on home care services, future appointments, phone numbers, and diet/activity levels.    We look forward in seeing you on your next appointment here at [facility] ([facility]).  Please don t hesitate to call us at [phone] to reschedule any of your appointments or to speak with one of the [facility] registered nurses.  It was a pleasure taking care of you today.    Sincerely,    [facility]  [facility]  [address] [place]  [address]  Phone:  [phone]

## 2019-11-20 ENCOUNTER — OFFICE VISIT (OUTPATIENT)
Dept: GASTROENTEROLOGY | Facility: CLINIC | Age: 59
End: 2019-11-20
Attending: PHYSICIAN ASSISTANT
Payer: MEDICAID

## 2019-11-20 VITALS
WEIGHT: 165.9 LBS | HEART RATE: 90 BPM | TEMPERATURE: 98 F | OXYGEN SATURATION: 99 % | SYSTOLIC BLOOD PRESSURE: 124 MMHG | BODY MASS INDEX: 24.5 KG/M2 | DIASTOLIC BLOOD PRESSURE: 88 MMHG

## 2019-11-20 DIAGNOSIS — R18.8 OTHER ASCITES: ICD-10-CM

## 2019-11-20 LAB
BACTERIA SPEC CULT: NO GROWTH
Lab: NORMAL
SPECIMEN SOURCE: NORMAL

## 2019-11-20 PROCEDURE — G0463 HOSPITAL OUTPT CLINIC VISIT: HCPCS | Mod: ZF

## 2019-11-20 RX ORDER — FUROSEMIDE 20 MG
60 TABLET ORAL DAILY
Qty: 90 TABLET | Refills: 1 | Status: SHIPPED | OUTPATIENT
Start: 2019-11-20 | End: 2019-12-10

## 2019-11-20 RX ORDER — SPIRONOLACTONE 50 MG/1
100 TABLET, FILM COATED ORAL DAILY
Qty: 60 TABLET | Refills: 3 | Status: SHIPPED | OUTPATIENT
Start: 2019-11-20 | End: 2019-12-10

## 2019-11-20 NOTE — NURSING NOTE
Chief Complaint   Patient presents with     RECHECK     Blood pressure 124/88, pulse 90, temperature 98  F (36.7  C), temperature source Oral, weight 75.3 kg (165 lb 14.4 oz), SpO2 99 %.    Claudia Javier/ALBERT  November 20, 2019 10:02 AM

## 2019-11-20 NOTE — LETTER
11/20/2019       RE: Ten Johnson  [address]  [address]  [place] [address]     Dear Colleague,    Thank you for referring your patient, Ten Johnson, to the [facility] at [facility]. Please see a copy of my visit note below.    Hepatology Follow-up Clinic note  Ten Johnson   Date of Birth 1960  Date of Service 11/20/2019    Reason for follow-up: HCC/ETOH cirrhosis          Assessment/plan:   Ten Johnson is a 59 year old male with history of Hep C/ETOH cirrhosis and HCC S/p Intra-Operative Microwave Ablation of 3 Lesions (Seg 6/7 x 2, Seg 7) on 10/22/2019. He has recently decompensated with ascites requiring paracentesis. We will adjust his diuretics. His liver function did worsen after procedure, but it is slowly improving. MELD-Na is currently 11.  He will be due for variceal screening in the future due to recent recent decompensation.     # Ascites:   - Improve compliance with 2000 mg sodium diet, high protein diet   - Paracentesis as needed  - Increase Lasix to 60 mg  - Increase Spironolactone to 100 mg   - Repeat BMP in one week   # Variceal screening: will need EGD in the near future. This was not discussed today.    # Follow up with Dr. Pringle in Surg-Onc as previously scheduled   # MRI is scheduled on 11/21/2019 (1 month post-microwave ablation)   # Follow up with Dr. Leventhal in January as previously scheduled     Murphy Enciso PA-C   [facility]    -----------------------------------------------------       HPI:   Ten Johnson is a 59 year old male presenting for follow-up.     Cirrhosis   - dx Jan 2012  - ETOH/HCV  - no hx HE, ascites or variceal bleed  - last EGD Oct 2017- MW tear, small EV, mild portal hypertensive gastropathy  - HCC screening- see below      HCV  - dx?  - hx CHYNA  - GT-1a or 1b  - liver bx 2012- stage IV fibrosis, steatosis  - no prior rx     HCC:   MRI: 10/6/2019:    10/22/2019: S/p  Intra-Operative Microwave Ablation of 3 Lesions (Seg 6/7 x 2, Seg 7)  - Seg 6/7 biopsy confirming HCC  - Seg 7 - showed regenerative nodules     Patient was last seen by Dr. Leventhal on 11/20/19. Since last visit, he underwent diagnostic laparoscopy, lap IZZY converted exploratory lap, intra-op ultrasound, multiple liver core biopsies and intra-op microwave ablation of 3 lesions (Segment 6/7 x 2, Seg &)     Since that time, he has developed ascites requiring paracentesis. He's had one weekly removing about 4 L.    He states he finally feels improvement of his abdominal pain within the last few days. He is still getting spasms in his abdomen.      He currently taking 20 mg Lasix and 50 mg spironolactone. His weight is down about 30 lbs in the past six months. Her is eating fairly well and having good protein sources. He is not strict with sodium restriction.     Patient denies jaundice, lower extremity edema, or confusion. Patient also denies melena, hematochezia or hematemesis. Patient denies fevers, sweats or chills.    He admits that he hasn't had alcohol in the past 3 months.     Medical hx Surgical hx   Past Medical History:   Diagnosis Date     JENNIFER (acute kidney injury) (H) 4/9/2019     Alcohol use disorder      Alcoholic cirrhosis (H)      Anticoagulant long-term use      Aortic stenosis, severe 2/21/2018     Ascites      Chronic allergic rhinitis      Chronic anemia      Chronic hepatitis C without hepatic coma (H) 05/10/2016     Cirrhosis (H) 2017     Diastolic dysfunction      Erosive gastropathy      Esophageal varices in alcoholic cirrhosis (H)      H/O upper gastrointestinal hemorrhage 09/2017     Hepatocellular carcinoma (H)      History of blood transfusion      History of drug abuse (H)      Hypertension      JANELLE (iron deficiency anemia)      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     Sarahi-Hoffman tear      Marijuana abuse      MRSA  (methicillin resistant Staphylococcus aureus)      Nonrheumatic aortic valve stenosis 2/20/2018     Olecranon bursitis      Portal hypertension (H)      Right shoulder pain      S/p TAVR (transcatheter aortic valve replacement), bioprosthetic      Severe aortic stenosis      Thrombocytopenia (H)     Past Surgical History:   Procedure Laterality Date     ABLATE LIVER TUMOR N/A 10/22/2019    Procedure: Ablate liver tumor x3;  Surgeon: Gregorio Benoit MD;  Location: [facility]     ARTHROSCOPY SHOULDER ROTATOR CUFF REPAIR  7/31/2012    Procedure: ARTHROSCOPY SHOULDER ROTATOR CUFF REPAIR;  Right Shoulder Arthroscopic Rotator Cuff Repair, BicepsTenodesis,  Subacromial Decompression ;  Surgeon: Joi Castillo MD;  [facility]     ESOPHAGOSCOPY, GASTROSCOPY, DUODENOSCOPY (EGD), COMBINED N/A 10/23/2017    Procedure: COMBINED ESOPHAGOSCOPY, GASTROSCOPY, DUODENOSCOPY (EGD);;  Surgeon: Gentry Salas MD;  Location: [facility] GI     EXCHANGE POLY COMPONENT ARTHROPLASTY KNEE Right 3/4/2019    Procedure: REVISION RIGHT TOTAL KNEE POLY COMPONENT EXCHANGE;  Surgeon: Olvin Joe MD;  Location: [facility] OR     FACIAL RECONSTRUCTION SURGERY  1971     HEART CATH FEMORAL CANNULIZATION WITH OPEN STANDBY REPAIR AORTIC VALVE N/A 2/21/2018    Procedure: HEART CATH FEMORAL CANNULIZATION WITH OPEN STANDBY REPAIR AORTIC VALVE;;  Surgeon: Luis Baird MD;  Location: [facility] OR     IR LIVER BIOPSY PERCUTANEOUS  7/18/2019     IRRIGATION AND DEBRIDEMENT UPPER EXTREMITY, COMBINED  1/3/2012    Procedure:COMBINED IRRIGATION AND DEBRIDEMENT UPPER EXTREMITY; Irrigation & Debridement Left Elbow; Surgeon:CRISTHIAN ZHOU; Location:[facility] OR     LAPAROSCOPIC BIOPSY LIVER N/A 10/22/2019    Procedure: intraoperative liver ultrasound, laparoscopic converted to open liver biopsy x 6;  Surgeon: Gregorio Benoit MD;  Location: [facility]     LAPAROSCOPY DIAGNOSTIC (GENERAL) N/A 10/22/2019    Procedure: Diagnostic laparoscopy;  Surgeon: Gregorio Benoit  MD;  Location: [facility]     LAPAROTOMY, LYSIS ADHESIONS, COMBINED N/A 10/22/2019    Procedure: Laparotomy, lysis adhesions, combined;  Surgeon: Gregorio Benoit MD;  Location: [facility]     OPTICAL TRACKING SYSTEM ARTHROPLASTY KNEE Right 2/7/2019    Procedure: ARTHROPLASTY KNEE RIGHT;  Surgeon: Olvin Joe MD;  [facility]     REPAIR TENDON TRICEPS UPPER EXTREMITY  11/8/2011    Procedure:REPAIR TENDON TRICEPS UPPER EXTREMITY; Surgeon:CRISTHIAN ZHOU; Location[facility]     SHOULDER SURGERY  2003    left, injury, torn tendons, hematoma     TRANSCATHETER AORTIC VALVE IMPLANT ANESTHESIA N/A 2/21/2018    Procedure: TRANSCATHETER AORTIC VALVE IMPLANT ANESTHESIA;  Transfemoral (Quiroz) Aortic Valve Implant 26mm MARTHA 3, with Cardiopulmonary Bypass Standby, transthoracic echocardiogram;  Surgeon: GENERIC ANESTHESIA PROVIDER;  Location: [facility]     TRANSPOSITION ULNAR NERVE (ELBOW)  11/8/2011    Procedure:TRANSPOSITION ULNAR NERVE (ELBOW); Final Procedure Done: Left Elbow Lateral Ulnar Collateral Repair And  Left Elbow Triceps Repair                   Medications:     Current Outpatient Medications   Medication     Lidocaine (LIDOCARE) 4 % Patch     lisinopril (PRINIVIL/ZESTRIL) 20 MG tablet     loratadine (CLARITIN) 10 MG tablet     menthol (ICY HOT) 5 % PTCH     fluticasone (FLONASE) 50 MCG/ACT nasal spray     furosemide (LASIX) 20 MG tablet     methocarbamol (ROBAXIN) 500 MG tablet     Multiple Vitamin (MULTIVITAMINS PO)     oxyCODONE (ROXICODONE) 5 MG tablet     senna-docusate (SENOKOT-S/PERICOLACE) 8.6-50 MG tablet     spironolactone (ALDACTONE) 50 MG tablet     No current facility-administered medications for this visit.             Allergies:     Allergies   Allergen Reactions     Zolpidem Other (See Comments)     Alcoholic.  Had reaction 3/17/13 while intoxicated which included black out, loss of awareness, paranoia.  Do not prescribe.  Dr. Celeste     Cats Other (See Comments)     rhinitis     Dogs Other  (See Comments)     rhinitis     Pollen Extract Other (See Comments)     rhinits.            Review of Systems:   10 points ROS was obtained and highlighted in the HPI, otherwise negative.          Physical Exam:   VS:  /88 (BP Location: Right arm, Patient Position: Chair, Cuff Size: Adult Regular)   Pulse 90   Temp 98  F (36.7  C) (Oral)   Wt 75.3 kg (165 lb 14.4 oz)   SpO2 99%   BMI 24.50 kg/m         Gen: A&Ox3, NAD, thin  HEENT: non-icteric   CV: RRR,   Lung: CTA Bilatererally, no wheezing or crackles.   Lym- no palpable lymphadenopathy  Abd: soft, NT, ND, moderative ascites  Ext: no edema, intact pulses.   Skin: no palmar erythema, visible chest telangiectasias  Neuro: grossly intact, no asterixis   Psych: appropriate mood and affects           Data:   Reviewed in person and significant for:    Lab Results   Component Value Date     11/14/2019      Lab Results   Component Value Date    POTASSIUM 4.4 11/14/2019     Lab Results   Component Value Date    CHLORIDE 112 11/14/2019     Lab Results   Component Value Date    CO2 21 11/14/2019     Lab Results   Component Value Date    BUN 24 11/14/2019     Lab Results   Component Value Date    CR 0.83 11/14/2019       Lab Results   Component Value Date    WBC 12.0 11/06/2019     Lab Results   Component Value Date    HGB 13.3 11/06/2019     Lab Results   Component Value Date    HCT 37.4 11/06/2019     Lab Results   Component Value Date    MCV 95 11/06/2019     Lab Results   Component Value Date     11/06/2019       Lab Results   Component Value Date    AST 76 11/14/2019     Lab Results   Component Value Date    ALT 60 11/14/2019     Lab Results   Component Value Date    BILICONJ 0.0 12/18/2012      Lab Results   Component Value Date    BILITOTAL 0.6 11/14/2019       Lab Results   Component Value Date    ALBUMIN 3.2 11/14/2019     Lab Results   Component Value Date    PROTTOTAL 6.4 11/14/2019      Lab Results   Component Value Date    ALKPHOS 91  11/14/2019       Lab Results   Component Value Date    INR 1.12 10/25/2019     MRI ABDOMEN W WO   10/3/2019:   1. Cirrhosis without evidence of portal hypertension.  2. Stable 1.5 cm segment 6/7 lesion, presumably corresponding with the  more shallow lesion biopsied on 7/18/2018 (biopsy negative for HCC).  Technically meets LIRADS 5 criteria but not OPTN-5 criteria.  3. Stable 2.4 cm peripherally enhancing lesion in segment 7. Again  concerning for malignancy, LR-M.  4. Stable 2.3 cm segment 6 lesion, presumably corresponding with the  deeper lesion biopsied on 7/18/2018 (biopsy positive for HCC).  OPTN-5b.  5. Based on this exam only, the patient is within Bishop criteria.      MRI ABDOMEN W WO   6/9/2019:   1. Cirrhotic configuration of the liver parenchyma. Sequela of portal  hypertension including mild splenomegaly and small volume of ascites.  2. 1.4 cm arterially enhancing lesion in hepatic segment 6/7 more  conspicuous from the prior study as described above. LIRADS 4.  3. New 2.1 cm arterially enhancing lesion in hepatic segment 6/7 as  described above.  LIRADS 4.  4. New 2.2 cm enhancing lesion in hepatic segment 7 with MRI  characteristics not specific for HCC. Differentials include  intrahepatic cholangiocarcinoma versus combined HCC-cholangiocarcinoma  as well as HCC with atypical appearance and less likely other non-HCC  malignancies. LIRADS M.  5. Additional subcentimeter arterially enhancing foci without washout,  pseudocapsule or restricted diffusion, remain indeterminate for HCC.  LIRADS 3. Attention on follow-up studies.    10/22/19:   Surgical Pathology:   A: Liver biopsy segment 7 #1   B: Liver biopsy segment 7 #2   C: Liver biopsy segment 7 #3   D: Liver biopsy segment 6/7 lateral lesion #1   E: Liver biopsy segment 6/7 lateral lesion #2   F: Original segment 7   G: Liver biopsy segment 6/7 medial lesion   H: Liver biopsy segment 6/7 lateral lesion   I: Liver biopsy segment 7 #1 lateral     FINAL  DIAGNOSIS:   A. LIVER, SEGMENT 7, BIOPSY # 1:   - Scant liver tissue, no definitive evidence of malignancy     B. LIVER, SEGMENT 7, BIOPSY #2:   - Scant liver tissue, no definitive evidence of malignancy     C. LIVER, SEGMENT 7, BIOPSY #3:   - Scant liver tissue, no definitive evidence of malignancy     D. LIVER, SEGMENT 6/7 LATERAL, BIOPSY #1:   - Positive for hepatocellular carcinoma     E. LIVER, SEGMENT 6/7 LATERAL, BIOPSY #2:   - Cirrhosis with regenerative nodules   - Negative for malignancy     F. LIVER, SEGMENT 7, BIOPSY:   - Cirrhosis with regenerative nodules and benign bile duct proliferation   - Negative for malignancy     G. LIVER, SEGMENT 6/7 MEDIAL, BIOPSY:   - Positive for hepatocellular carcinoma     H. LIVER, SEGMENT 6/7 LATERAL, BIOPSY:   - Positive for hepatocellular carcinoma     I. LIVER, SEGMENT 7 LATERAL, BIOPSY:   - Cirrhosis with regenerative nodules and benign bile duct proliferation   - Negative for malignancy     COMMENT:   Part D: Tumor cells are positive for Hep-Par1 and negative for CK7 and   HMB-45 by immunohistochemistry,   consistent with hepatocellular carcinoma.        Again, thank you for allowing me to participate in the care of your patient.      Sincerely,    Murphy Enciso PA-C

## 2019-11-20 NOTE — PROGRESS NOTES
Hepatology Follow-up Clinic note  Ten Johnson   Date of Birth 1960  Date of Service 11/20/2019    Reason for follow-up: HCC/ETOH cirrhosis          Assessment/plan:   Ten Johnson is a 59 year old male with history of Hep C/ETOH cirrhosis and HCC S/p Intra-Operative Microwave Ablation of 3 Lesions (Seg 6/7 x 2, Seg 7) on 10/22/2019. He has recently decompensated with ascites requiring paracentesis. We will adjust his diuretics. His liver function did worsen after procedure, but it is slowly improving. MELD-Na is currently 11.  He will be due for variceal screening in the future due to recent recent decompensation.     # Ascites:   - Improve compliance with 2000 mg sodium diet, high protein diet   - Paracentesis as needed  - Increase Lasix to 60 mg  - Increase Spironolactone to 100 mg   - Repeat BMP in one week   # Variceal screening: will need EGD in the near future. This was not discussed today.    # Follow up with Dr. Pringle in Surg-Onc as previously scheduled   # MRI is scheduled on 11/21/2019 (1 month post-microwave ablation)   # Follow up with Dr. Leventhal in January as previously scheduled     Murphy Enciso PA-C   [facility] Hepatology clinic    -----------------------------------------------------       HPI:   Ten Johnson is a 59 year old male presenting for follow-up.     Cirrhosis   - dx Jan 2012  - ETOH/HCV  - no hx HE, ascites or variceal bleed  - last EGD Oct 2017- MW tear, small EV, mild portal hypertensive gastropathy  - HCC screening- see below      HCV  - dx?  - hx CHYNA  - GT-1a or 1b  - liver bx 2012- stage IV fibrosis, steatosis  - no prior rx     HCC:   MRI: 10/6/2019:    10/22/2019: S/p Intra-Operative Microwave Ablation of 3 Lesions (Seg 6/7 x 2, Seg 7)  - Seg 6/7 biopsy confirming HCC  - Seg 7 - showed regenerative nodules     Patient was last seen by Dr. Leventhal on 11/20/19. Since last visit, he underwent diagnostic laparoscopy, lap IZZY converted exploratory lap,  intra-op ultrasound, multiple liver core biopsies and intra-op microwave ablation of 3 lesions (Segment 6/7 x 2, Seg &)     Since that time, he has developed ascites requiring paracentesis. He's had one weekly removing about 4 L.    He states he finally feels improvement of his abdominal pain within the last few days. He is still getting spasms in his abdomen.      He currently taking 20 mg Lasix and 50 mg spironolactone. His weight is down about 30 lbs in the past six months. Her is eating fairly well and having good protein sources. He is not strict with sodium restriction.     Patient denies jaundice, lower extremity edema, or confusion. Patient also denies melena, hematochezia or hematemesis. Patient denies fevers, sweats or chills.    He admits that he hasn't had alcohol in the past 3 months.     Medical hx Surgical hx   Past Medical History:   Diagnosis Date     JENNIFER (acute kidney injury) (H) 4/9/2019     Alcohol use disorder      Alcoholic cirrhosis (H)      Anticoagulant long-term use      Aortic stenosis, severe 2/21/2018     Ascites      Chronic allergic rhinitis      Chronic anemia      Chronic hepatitis C without hepatic coma (H) 05/10/2016     Cirrhosis (H) 2017     Diastolic dysfunction      Erosive gastropathy      Esophageal varices in alcoholic cirrhosis (H)      H/O upper gastrointestinal hemorrhage 09/2017     Hepatocellular carcinoma (H)      History of blood transfusion      History of drug abuse (H)      Hypertension      JANELLE (iron deficiency anemia)      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     Sarahi-Hoffman tear      Marijuana abuse      MRSA (methicillin resistant Staphylococcus aureus)      Nonrheumatic aortic valve stenosis 2/20/2018     Olecranon bursitis      Portal hypertension (H)      Right shoulder pain      S/p TAVR (transcatheter aortic valve replacement), bioprosthetic      Severe aortic stenosis      Thrombocytopenia (H)      Past Surgical History:   Procedure Laterality Date     ABLATE LIVER TUMOR N/A 10/22/2019    Procedure: Ablate liver tumor x3;  Surgeon: Gregorio Benoit MD;  Location: [facility]     ARTHROSCOPY SHOULDER ROTATOR CUFF REPAIR  7/31/2012    Procedure: ARTHROSCOPY SHOULDER ROTATOR CUFF REPAIR;  Right Shoulder Arthroscopic Rotator Cuff Repair, BicepsTenodesis,  Subacromial Decompression ;  Surgeon: Joi Castillo MD;  [facility]     ESOPHAGOSCOPY, GASTROSCOPY, DUODENOSCOPY (EGD), COMBINED N/A 10/23/2017    Procedure: COMBINED ESOPHAGOSCOPY, GASTROSCOPY, DUODENOSCOPY (EGD);;  Surgeon: Gentry Salas MD;  Location: [facility]     EXCHANGE POLY COMPONENT ARTHROPLASTY KNEE Right 3/4/2019    Procedure: REVISION RIGHT TOTAL KNEE POLY COMPONENT EXCHANGE;  Surgeon: Olvin Joe MD;  Location: [facility]     FACIAL RECONSTRUCTION SURGERY  1971     HEART CATH FEMORAL CANNULIZATION WITH OPEN STANDBY REPAIR AORTIC VALVE N/A 2/21/2018    Procedure: HEART CATH FEMORAL CANNULIZATION WITH OPEN STANDBY REPAIR AORTIC VALVE;;  Surgeon: Luis Baird MD;  Location: [facility]     IR LIVER BIOPSY PERCUTANEOUS  7/18/2019     IRRIGATION AND DEBRIDEMENT UPPER EXTREMITY, COMBINED  1/3/2012    Procedure:COMBINED IRRIGATION AND DEBRIDEMENT UPPER EXTREMITY; Irrigation & Debridement Left Elbow; Surgeon:CRISTHIAN ZHOU; Location:[facility]     LAPAROSCOPIC BIOPSY LIVER N/A 10/22/2019    Procedure: intraoperative liver ultrasound, laparoscopic converted to open liver biopsy x 6;  Surgeon: Gregorio Benoit MD;  Location: [facility]     LAPAROSCOPY DIAGNOSTIC (GENERAL) N/A 10/22/2019    Procedure: Diagnostic laparoscopy;  Surgeon: Gregorio Benoit MD;  Location: [facility]     LAPAROTOMY, LYSIS ADHESIONS, COMBINED N/A 10/22/2019    Procedure: Laparotomy, lysis adhesions, combined;  Surgeon: Gregorio Benoit MD;  Location: [facility]     OPTICAL TRACKING SYSTEM ARTHROPLASTY KNEE Right 2/7/2019    Procedure: ARTHROPLASTY KNEE RIGHT;  Surgeon: Olvin Joe  MD Olivier;  [facility]     REPAIR TENDON TRICEPS UPPER EXTREMITY  11/8/2011    Procedure:REPAIR TENDON TRICEPS UPPER EXTREMITY; Surgeon:CRISTHIAN ZHOU; Location[facility]     SHOULDER SURGERY  2003    left, injury, torn tendons, hematoma     TRANSCATHETER AORTIC VALVE IMPLANT ANESTHESIA N/A 2/21/2018    Procedure: TRANSCATHETER AORTIC VALVE IMPLANT ANESTHESIA;  Transfemoral (Quiroz) Aortic Valve Implant 26mm MARTHA 3, with Cardiopulmonary Bypass Standby, transthoracic echocardiogram;  Surgeon: GENERIC ANESTHESIA PROVIDER;  Location: [facility]     TRANSPOSITION ULNAR NERVE (ELBOW)  11/8/2011    Procedure:TRANSPOSITION ULNAR NERVE (ELBOW); Final Procedure Done: Left Elbow Lateral Ulnar Collateral Repair And  Left Elbow Triceps Repair                   Medications:     Current Outpatient Medications   Medication     Lidocaine (LIDOCARE) 4 % Patch     lisinopril (PRINIVIL/ZESTRIL) 20 MG tablet     loratadine (CLARITIN) 10 MG tablet     menthol (ICY HOT) 5 % PTCH     fluticasone (FLONASE) 50 MCG/ACT nasal spray     furosemide (LASIX) 20 MG tablet     methocarbamol (ROBAXIN) 500 MG tablet     Multiple Vitamin (MULTIVITAMINS PO)     oxyCODONE (ROXICODONE) 5 MG tablet     senna-docusate (SENOKOT-S/PERICOLACE) 8.6-50 MG tablet     spironolactone (ALDACTONE) 50 MG tablet     No current facility-administered medications for this visit.             Allergies:     Allergies   Allergen Reactions     Zolpidem Other (See Comments)     Alcoholic.  Had reaction 3/17/13 while intoxicated which included black out, loss of awareness, paranoia.  Do not prescribe.  Dr. Celeste     Cats Other (See Comments)     rhinitis     Dogs Other (See Comments)     rhinitis     Pollen Extract Other (See Comments)     rhinits.            Review of Systems:   10 points ROS was obtained and highlighted in the HPI, otherwise negative.          Physical Exam:   VS:  /88 (BP Location: Right arm, Patient Position: Chair, Cuff Size: Adult  Regular)   Pulse 90   Temp 98  F (36.7  C) (Oral)   Wt 75.3 kg (165 lb 14.4 oz)   SpO2 99%   BMI 24.50 kg/m        Gen: A&Ox3, NAD, thin  HEENT: non-icteric   CV: RRR,   Lung: CTA Bilatererally, no wheezing or crackles.   Lym- no palpable lymphadenopathy  Abd: soft, NT, ND, moderative ascites  Ext: no edema, intact pulses.   Skin: no palmar erythema, visible chest telangiectasias  Neuro: grossly intact, no asterixis   Psych: appropriate mood and affects           Data:   Reviewed in person and significant for:    Lab Results   Component Value Date     11/14/2019      Lab Results   Component Value Date    POTASSIUM 4.4 11/14/2019     Lab Results   Component Value Date    CHLORIDE 112 11/14/2019     Lab Results   Component Value Date    CO2 21 11/14/2019     Lab Results   Component Value Date    BUN 24 11/14/2019     Lab Results   Component Value Date    CR 0.83 11/14/2019       Lab Results   Component Value Date    WBC 12.0 11/06/2019     Lab Results   Component Value Date    HGB 13.3 11/06/2019     Lab Results   Component Value Date    HCT 37.4 11/06/2019     Lab Results   Component Value Date    MCV 95 11/06/2019     Lab Results   Component Value Date     11/06/2019       Lab Results   Component Value Date    AST 76 11/14/2019     Lab Results   Component Value Date    ALT 60 11/14/2019     Lab Results   Component Value Date    BILICONJ 0.0 12/18/2012      Lab Results   Component Value Date    BILITOTAL 0.6 11/14/2019       Lab Results   Component Value Date    ALBUMIN 3.2 11/14/2019     Lab Results   Component Value Date    PROTTOTAL 6.4 11/14/2019      Lab Results   Component Value Date    ALKPHOS 91 11/14/2019       Lab Results   Component Value Date    INR 1.12 10/25/2019     MRI ABDOMEN W WO   10/3/2019:   1. Cirrhosis without evidence of portal hypertension.  2. Stable 1.5 cm segment 6/7 lesion, presumably corresponding with the  more shallow lesion biopsied on 7/18/2018 (biopsy negative for  HCC).  Technically meets LIRADS 5 criteria but not OPTN-5 criteria.  3. Stable 2.4 cm peripherally enhancing lesion in segment 7. Again  concerning for malignancy, LR-M.  4. Stable 2.3 cm segment 6 lesion, presumably corresponding with the  deeper lesion biopsied on 7/18/2018 (biopsy positive for HCC).  OPTN-5b.  5. Based on this exam only, the patient is within Mendez criteria.      MRI ABDOMEN W WO   6/9/2019:   1. Cirrhotic configuration of the liver parenchyma. Sequela of portal  hypertension including mild splenomegaly and small volume of ascites.  2. 1.4 cm arterially enhancing lesion in hepatic segment 6/7 more  conspicuous from the prior study as described above. LIRADS 4.  3. New 2.1 cm arterially enhancing lesion in hepatic segment 6/7 as  described above.  LIRADS 4.  4. New 2.2 cm enhancing lesion in hepatic segment 7 with MRI  characteristics not specific for HCC. Differentials include  intrahepatic cholangiocarcinoma versus combined HCC-cholangiocarcinoma  as well as HCC with atypical appearance and less likely other non-HCC  malignancies. LIRADS M.  5. Additional subcentimeter arterially enhancing foci without washout,  pseudocapsule or restricted diffusion, remain indeterminate for HCC.  LIRADS 3. Attention on follow-up studies.    10/22/19:   Surgical Pathology:   A: Liver biopsy segment 7 #1   B: Liver biopsy segment 7 #2   C: Liver biopsy segment 7 #3   D: Liver biopsy segment 6/7 lateral lesion #1   E: Liver biopsy segment 6/7 lateral lesion #2   F: Original segment 7   G: Liver biopsy segment 6/7 medial lesion   H: Liver biopsy segment 6/7 lateral lesion   I: Liver biopsy segment 7 #1 lateral     FINAL DIAGNOSIS:   A. LIVER, SEGMENT 7, BIOPSY # 1:   - Scant liver tissue, no definitive evidence of malignancy     B. LIVER, SEGMENT 7, BIOPSY #2:   - Scant liver tissue, no definitive evidence of malignancy     C. LIVER, SEGMENT 7, BIOPSY #3:   - Scant liver tissue, no definitive evidence of malignancy      D. LIVER, SEGMENT 6/7 LATERAL, BIOPSY #1:   - Positive for hepatocellular carcinoma     E. LIVER, SEGMENT 6/7 LATERAL, BIOPSY #2:   - Cirrhosis with regenerative nodules   - Negative for malignancy     F. LIVER, SEGMENT 7, BIOPSY:   - Cirrhosis with regenerative nodules and benign bile duct proliferation   - Negative for malignancy     G. LIVER, SEGMENT 6/7 MEDIAL, BIOPSY:   - Positive for hepatocellular carcinoma     H. LIVER, SEGMENT 6/7 LATERAL, BIOPSY:   - Positive for hepatocellular carcinoma     I. LIVER, SEGMENT 7 LATERAL, BIOPSY:   - Cirrhosis with regenerative nodules and benign bile duct proliferation   - Negative for malignancy     COMMENT:   Part D: Tumor cells are positive for Hep-Par1 and negative for CK7 and   HMB-45 by immunohistochemistry,   consistent with hepatocellular carcinoma.

## 2019-11-20 NOTE — LETTER
11/20/2019      RE: Ten Johnson  [address]  [address]  [facility] [address]       Hepatology Follow-up Clinic note  Ten Johnson   Date of Birth 1960  Date of Service 11/20/2019    Reason for follow-up: HCC/ETOH cirrhosis          Assessment/plan:   Ten Johnson is a 59 year old male with history of Hep C/ETOH cirrhosis and HCC S/p Intra-Operative Microwave Ablation of 3 Lesions (Seg 6/7 x 2, Seg 7) on 10/22/2019. He has recently decompensated with ascites requiring paracentesis. We will adjust his diuretics. His liver function did worsen after procedure, but it is slowly improving. MELD-Na is currently 11.  He will be due for variceal screening in the future due to recent recent decompensation.     # Ascites:   - Improve compliance with 2000 mg sodium diet, high protein diet   - Paracentesis as needed  - Increase Lasix to 60 mg  - Increase Spironolactone to 100 mg   - Repeat BMP in one week   # Variceal screening: will need EGD in the near future. This was not discussed today.    # Follow up with Dr. Pringle in Surg-Onc as previously scheduled   # MRI is scheduled on 11/21/2019 (1 month post-microwave ablation)   # Follow up with Dr. Leventhal in January as previously scheduled     Murphy Enciso PA-C   [facility] Hepatology clinic    -----------------------------------------------------       HPI:   Ten Johnson is a 59 year old male presenting for follow-up.     Cirrhosis   - dx Jan 2012  - ETOH/HCV  - no hx HE, ascites or variceal bleed  - last EGD Oct 2017- MW tear, small EV, mild portal hypertensive gastropathy  - HCC screening- see below      HCV  - dx?  - hx CHYNA  - GT-1a or 1b  - liver bx 2012- stage IV fibrosis, steatosis  - no prior rx     HCC:   MRI: 10/6/2019:    10/22/2019: S/p Intra-Operative Microwave Ablation of 3 Lesions (Seg 6/7 x 2, Seg 7)  - Seg 6/7 biopsy confirming HCC  - Seg 7 - showed regenerative nodules     Patient was last seen by Dr. Leventhal on 11/20/19.  Since last visit, he underwent diagnostic laparoscopy, lap IZZY converted exploratory lap, intra-op ultrasound, multiple liver core biopsies and intra-op microwave ablation of 3 lesions (Segment 6/7 x 2, Seg &)     Since that time, he has developed ascites requiring paracentesis. He's had one weekly removing about 4 L.    He states he finally feels improvement of his abdominal pain within the last few days. He is still getting spasms in his abdomen.      He currently taking 20 mg Lasix and 50 mg spironolactone. His weight is down about 30 lbs in the past six months. Her is eating fairly well and having good protein sources. He is not strict with sodium restriction.     Patient denies jaundice, lower extremity edema, or confusion. Patient also denies melena, hematochezia or hematemesis. Patient denies fevers, sweats or chills.    He admits that he hasn't had alcohol in the past 3 months.     Medical hx Surgical hx   Past Medical History:   Diagnosis Date     JENNIFER (acute kidney injury) (H) 4/9/2019     Alcohol use disorder      Alcoholic cirrhosis (H)      Anticoagulant long-term use      Aortic stenosis, severe 2/21/2018     Ascites      Chronic allergic rhinitis      Chronic anemia      Chronic hepatitis C without hepatic coma (H) 05/10/2016     Cirrhosis (H) 2017     Diastolic dysfunction      Erosive gastropathy      Esophageal varices in alcoholic cirrhosis (H)      H/O upper gastrointestinal hemorrhage 09/2017     Hepatocellular carcinoma (H)      History of blood transfusion      History of drug abuse (H)      Hypertension      JANELLE (iron deficiency anemia)      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     Sarahi-Hoffman tear      Marijuana abuse      MRSA (methicillin resistant Staphylococcus aureus)      Nonrheumatic aortic valve stenosis 2/20/2018     Olecranon bursitis      Portal hypertension (H)      Right shoulder pain      S/p TAVR (transcatheter  aortic valve replacement), bioprosthetic      Severe aortic stenosis      Thrombocytopenia (H)     Past Surgical History:   Procedure Laterality Date     ABLATE LIVER TUMOR N/A 10/22/2019    Procedure: Ablate liver tumor x3;  Surgeon: Gregorio Benoit MD;  [facility]: [facility]     ARTHROSCOPY SHOULDER ROTATOR CUFF REPAIR  7/31/2012    Procedure: ARTHROSCOPY SHOULDER ROTATOR CUFF REPAIR;  Right Shoulder Arthroscopic Rotator Cuff Repair, BicepsTenodesis,  Subacromial Decompression ;  Surgeon: Joi Castillo MD;  [facility]     ESOPHAGOSCOPY, GASTROSCOPY, DUODENOSCOPY (EGD), COMBINED N/A 10/23/2017    Procedure: COMBINED ESOPHAGOSCOPY, GASTROSCOPY, DUODENOSCOPY (EGD);;  Surgeon: Gentry Salas MD;  Location: [facility]     EXCHANGE POLY COMPONENT ARTHROPLASTY KNEE Right 3/4/2019    Procedure: REVISION RIGHT TOTAL KNEE POLY COMPONENT EXCHANGE;  Surgeon: Olvin Joe MD;  Location: [facility]     FACIAL RECONSTRUCTION SURGERY  1971     HEART CATH FEMORAL CANNULIZATION WITH OPEN STANDBY REPAIR AORTIC VALVE N/A 2/21/2018    Procedure: HEART CATH FEMORAL CANNULIZATION WITH OPEN STANDBY REPAIR AORTIC VALVE;;  Surgeon: Luis Baird MD;  Location: [facility]     IR LIVER BIOPSY PERCUTANEOUS  7/18/2019     IRRIGATION AND DEBRIDEMENT UPPER EXTREMITY, COMBINED  1/3/2012    Procedure:COMBINED IRRIGATION AND DEBRIDEMENT UPPER EXTREMITY; Irrigation & Debridement Left Elbow; Surgeon:CRISTHIAN ZHOU; Location:[facility]     LAPAROSCOPIC BIOPSY LIVER N/A 10/22/2019    Procedure: intraoperative liver ultrasound, laparoscopic converted to open liver biopsy x 6;  Surgeon: Gregorio Benoit MD;  [facility]     LAPAROSCOPY DIAGNOSTIC (GENERAL) N/A 10/22/2019    Procedure: Diagnostic laparoscopy;  Surgeon: Gregorio Benoit MD;  [facility]     LAPAROTOMY, LYSIS ADHESIONS, COMBINED N/A 10/22/2019    Procedure: Laparotomy, lysis adhesions, combined;  Surgeon: Gregorio Benoit MD;  [facility]: [facility]     OPTICAL TRACKING  SYSTEM ARTHROPLASTY KNEE Right 2/7/2019    Procedure: ARTHROPLASTY KNEE RIGHT;  Surgeon: Olvin Joe MD;  [facility]     REPAIR TENDON TRICEPS UPPER EXTREMITY  11/8/2011    Procedure:REPAIR TENDON TRICEPS UPPER EXTREMITY; Surgeon:CRISTHIAN ZHOU; [facility]     SHOULDER SURGERY  2003    left, injury, torn tendons, hematoma     TRANSCATHETER AORTIC VALVE IMPLANT ANESTHESIA N/A 2/21/2018    Procedure: TRANSCATHETER AORTIC VALVE IMPLANT ANESTHESIA;  Transfemoral (Quiroz) Aortic Valve Implant 26mm MARTHA 3, with Cardiopulmonary Bypass Standby, transthoracic echocardiogram;  Surgeon: GENERIC ANESTHESIA PROVIDER;  Location: [facility]     TRANSPOSITION ULNAR NERVE (ELBOW)  11/8/2011    Procedure:TRANSPOSITION ULNAR NERVE (ELBOW); Final Procedure Done: Left Elbow Lateral Ulnar Collateral Repair And  Left Elbow Triceps Repair                   Medications:     Current Outpatient Medications   Medication     Lidocaine (LIDOCARE) 4 % Patch     lisinopril (PRINIVIL/ZESTRIL) 20 MG tablet     loratadine (CLARITIN) 10 MG tablet     menthol (ICY HOT) 5 % PTCH     fluticasone (FLONASE) 50 MCG/ACT nasal spray     furosemide (LASIX) 20 MG tablet     methocarbamol (ROBAXIN) 500 MG tablet     Multiple Vitamin (MULTIVITAMINS PO)     oxyCODONE (ROXICODONE) 5 MG tablet     senna-docusate (SENOKOT-S/PERICOLACE) 8.6-50 MG tablet     spironolactone (ALDACTONE) 50 MG tablet     No current facility-administered medications for this visit.             Allergies:     Allergies   Allergen Reactions     Zolpidem Other (See Comments)     Alcoholic.  Had reaction 3/17/13 while intoxicated which included black out, loss of awareness, paranoia.  Do not prescribe.  Dr. Celeste     Cats Other (See Comments)     rhinitis     Dogs Other (See Comments)     rhinitis     Pollen Extract Other (See Comments)     rhinits.            Review of Systems:   10 points ROS was obtained and highlighted in the HPI, otherwise negative.           Physical Exam:   VS:  /88 (BP Location: Right arm, Patient Position: Chair, Cuff Size: Adult Regular)   Pulse 90   Temp 98  F (36.7  C) (Oral)   Wt 75.3 kg (165 lb 14.4 oz)   SpO2 99%   BMI 24.50 kg/m         Gen: A&Ox3, NAD, thin  HEENT: non-icteric   CV: RRR,   Lung: CTA Bilatererally, no wheezing or crackles.   Lym- no palpable lymphadenopathy  Abd: soft, NT, ND, moderative ascites  Ext: no edema, intact pulses.   Skin: no palmar erythema, visible chest telangiectasias  Neuro: grossly intact, no asterixis   Psych: appropriate mood and affects           Data:   Reviewed in person and significant for:    Lab Results   Component Value Date     11/14/2019      Lab Results   Component Value Date    POTASSIUM 4.4 11/14/2019     Lab Results   Component Value Date    CHLORIDE 112 11/14/2019     Lab Results   Component Value Date    CO2 21 11/14/2019     Lab Results   Component Value Date    BUN 24 11/14/2019     Lab Results   Component Value Date    CR 0.83 11/14/2019       Lab Results   Component Value Date    WBC 12.0 11/06/2019     Lab Results   Component Value Date    HGB 13.3 11/06/2019     Lab Results   Component Value Date    HCT 37.4 11/06/2019     Lab Results   Component Value Date    MCV 95 11/06/2019     Lab Results   Component Value Date     11/06/2019       Lab Results   Component Value Date    AST 76 11/14/2019     Lab Results   Component Value Date    ALT 60 11/14/2019     Lab Results   Component Value Date    BILICONJ 0.0 12/18/2012      Lab Results   Component Value Date    BILITOTAL 0.6 11/14/2019       Lab Results   Component Value Date    ALBUMIN 3.2 11/14/2019     Lab Results   Component Value Date    PROTTOTAL 6.4 11/14/2019      Lab Results   Component Value Date    ALKPHOS 91 11/14/2019       Lab Results   Component Value Date    INR 1.12 10/25/2019     MRI ABDOMEN W WO   10/3/2019:   1. Cirrhosis without evidence of portal hypertension.  2. Stable 1.5 cm segment 6/7  lesion, presumably corresponding with the  more shallow lesion biopsied on 7/18/2018 (biopsy negative for HCC).  Technically meets LIRADS 5 criteria but not OPTN-5 criteria.  3. Stable 2.4 cm peripherally enhancing lesion in segment 7. Again  concerning for malignancy, LR-M.  4. Stable 2.3 cm segment 6 lesion, presumably corresponding with the  deeper lesion biopsied on 7/18/2018 (biopsy positive for HCC).  OPTN-5b.  5. Based on this exam only, the patient is within Mendez criteria.      MRI ABDOMEN W WO   6/9/2019:   1. Cirrhotic configuration of the liver parenchyma. Sequela of portal  hypertension including mild splenomegaly and small volume of ascites.  2. 1.4 cm arterially enhancing lesion in hepatic segment 6/7 more  conspicuous from the prior study as described above. LIRADS 4.  3. New 2.1 cm arterially enhancing lesion in hepatic segment 6/7 as  described above.  LIRADS 4.  4. New 2.2 cm enhancing lesion in hepatic segment 7 with MRI  characteristics not specific for HCC. Differentials include  intrahepatic cholangiocarcinoma versus combined HCC-cholangiocarcinoma  as well as HCC with atypical appearance and less likely other non-HCC  malignancies. LIRADS M.  5. Additional subcentimeter arterially enhancing foci without washout,  pseudocapsule or restricted diffusion, remain indeterminate for HCC.  LIRADS 3. Attention on follow-up studies.    10/22/19:   Surgical Pathology:   A: Liver biopsy segment 7 #1   B: Liver biopsy segment 7 #2   C: Liver biopsy segment 7 #3   D: Liver biopsy segment 6/7 lateral lesion #1   E: Liver biopsy segment 6/7 lateral lesion #2   F: Original segment 7   G: Liver biopsy segment 6/7 medial lesion   H: Liver biopsy segment 6/7 lateral lesion   I: Liver biopsy segment 7 #1 lateral     FINAL DIAGNOSIS:   A. LIVER, SEGMENT 7, BIOPSY # 1:   - Scant liver tissue, no definitive evidence of malignancy     B. LIVER, SEGMENT 7, BIOPSY #2:   - Scant liver tissue, no definitive evidence of  malignancy     C. LIVER, SEGMENT 7, BIOPSY #3:   - Scant liver tissue, no definitive evidence of malignancy     D. LIVER, SEGMENT 6/7 LATERAL, BIOPSY #1:   - Positive for hepatocellular carcinoma     E. LIVER, SEGMENT 6/7 LATERAL, BIOPSY #2:   - Cirrhosis with regenerative nodules   - Negative for malignancy     F. LIVER, SEGMENT 7, BIOPSY:   - Cirrhosis with regenerative nodules and benign bile duct proliferation   - Negative for malignancy     G. LIVER, SEGMENT 6/7 MEDIAL, BIOPSY:   - Positive for hepatocellular carcinoma     H. LIVER, SEGMENT 6/7 LATERAL, BIOPSY:   - Positive for hepatocellular carcinoma     I. LIVER, SEGMENT 7 LATERAL, BIOPSY:   - Cirrhosis with regenerative nodules and benign bile duct proliferation   - Negative for malignancy     COMMENT:   Part D: Tumor cells are positive for Hep-Par1 and negative for CK7 and   HMB-45 by immunohistochemistry,   consistent with hepatocellular carcinoma.        Murphy Enciso PA-C

## 2019-11-21 ENCOUNTER — HOSPITAL ENCOUNTER (OUTPATIENT)
Dept: MRI IMAGING | Facility: CLINIC | Age: 59
Discharge: HOME | End: 2019-11-21
Attending: SURGERY | Admitting: SURGERY
Payer: MEDICAID

## 2019-11-21 ENCOUNTER — OFFICE VISIT (OUTPATIENT)
Dept: INFUSION THERAPY | Facility: CLINIC | Age: 59
End: 2019-11-21
Attending: INTERNAL MEDICINE
Payer: MEDICAID

## 2019-11-21 ENCOUNTER — ANCILLARY PROCEDURE (OUTPATIENT)
Dept: ULTRASOUND IMAGING | Facility: CLINIC | Age: 59
End: 2019-11-21
Attending: INTERNAL MEDICINE
Payer: MEDICAID

## 2019-11-21 VITALS
DIASTOLIC BLOOD PRESSURE: 80 MMHG | OXYGEN SATURATION: 99 % | SYSTOLIC BLOOD PRESSURE: 129 MMHG | WEIGHT: 159.6 LBS | BODY MASS INDEX: 23.57 KG/M2 | TEMPERATURE: 97.3 F

## 2019-11-21 DIAGNOSIS — K70.31 ASCITES DUE TO ALCOHOLIC CIRRHOSIS (H): Primary | ICD-10-CM

## 2019-11-21 DIAGNOSIS — C22.0 HCC (HEPATOCELLULAR CARCINOMA) (H): ICD-10-CM

## 2019-11-21 DIAGNOSIS — R18.8 OTHER ASCITES: ICD-10-CM

## 2019-11-21 LAB
ANION GAP SERPL CALCULATED.3IONS-SCNC: 6 MMOL/L (ref 3–14)
BUN SERPL-MCNC: 22 MG/DL (ref 7–30)
CALCIUM SERPL-MCNC: 8.3 MG/DL (ref 8.5–10.1)
CHLORIDE SERPL-SCNC: 107 MMOL/L (ref 94–109)
CO2 SERPL-SCNC: 26 MMOL/L (ref 20–32)
CREAT SERPL-MCNC: 1.39 MG/DL (ref 0.66–1.25)
ERYTHROCYTE [DISTWIDTH] IN BLOOD BY AUTOMATED COUNT: 14.2 % (ref 10–15)
GFR SERPL CREATININE-BSD FRML MDRD: 54 ML/MIN/{1.73_M2}
GLUCOSE SERPL-MCNC: 69 MG/DL (ref 70–99)
HCT VFR BLD AUTO: 34.7 % (ref 40–53)
HGB BLD-MCNC: 12.4 G/DL (ref 13.3–17.7)
MCH RBC QN AUTO: 34 PG (ref 26.5–33)
MCHC RBC AUTO-ENTMCNC: 35.7 G/DL (ref 31.5–36.5)
MCV RBC AUTO: 95 FL (ref 78–100)
PLATELET # BLD AUTO: 102 10E9/L (ref 150–450)
POTASSIUM SERPL-SCNC: 4.1 MMOL/L (ref 3.4–5.3)
RBC # BLD AUTO: 3.65 10E12/L (ref 4.4–5.9)
SODIUM SERPL-SCNC: 139 MMOL/L (ref 133–144)
WBC # BLD AUTO: 11.8 10E9/L (ref 4–11)

## 2019-11-21 PROCEDURE — 27210190 US PARACENTESIS

## 2019-11-21 PROCEDURE — 80048 BASIC METABOLIC PNL TOTAL CA: CPT | Performed by: PHYSICIAN ASSISTANT

## 2019-11-21 PROCEDURE — 85027 COMPLETE CBC AUTOMATED: CPT | Performed by: PHYSICIAN ASSISTANT

## 2019-11-21 PROCEDURE — 25000128 H RX IP 250 OP 636: Mod: ZF | Performed by: INTERNAL MEDICINE

## 2019-11-21 PROCEDURE — 74183 MRI ABD W/O CNTR FLWD CNTR: CPT

## 2019-11-21 PROCEDURE — 25800030 ZZH RX IP 258 OP 636: Performed by: SURGERY

## 2019-11-21 PROCEDURE — P9047 ALBUMIN (HUMAN), 25%, 50ML: HCPCS | Mod: ZF | Performed by: INTERNAL MEDICINE

## 2019-11-21 PROCEDURE — 25000125 ZZHC RX 250: Mod: ZF | Performed by: INTERNAL MEDICINE

## 2019-11-21 PROCEDURE — A9585 GADOBUTROL INJECTION: HCPCS | Performed by: SURGERY

## 2019-11-21 PROCEDURE — 25500064 ZZH RX 255 OP 636: Performed by: SURGERY

## 2019-11-21 RX ORDER — HEPARIN SODIUM,PORCINE 10 UNIT/ML
5 VIAL (ML) INTRAVENOUS
Status: CANCELLED | OUTPATIENT
Start: 2019-11-26

## 2019-11-21 RX ORDER — GADOBUTROL 604.72 MG/ML
7.5 INJECTION INTRAVENOUS ONCE
Status: COMPLETED | OUTPATIENT
Start: 2019-11-21 | End: 2019-11-21

## 2019-11-21 RX ORDER — ALBUMIN (HUMAN) 12.5 G/50ML
12.5 SOLUTION INTRAVENOUS
Status: CANCELLED | OUTPATIENT
Start: 2019-11-26

## 2019-11-21 RX ORDER — LIDOCAINE HYDROCHLORIDE 10 MG/ML
20 INJECTION, SOLUTION EPIDURAL; INFILTRATION; INTRACAUDAL; PERINEURAL ONCE
Status: COMPLETED | OUTPATIENT
Start: 2019-11-21 | End: 2019-11-21

## 2019-11-21 RX ORDER — HEPARIN SODIUM (PORCINE) LOCK FLUSH IV SOLN 100 UNIT/ML 100 UNIT/ML
5 SOLUTION INTRAVENOUS
Status: DISCONTINUED | OUTPATIENT
Start: 2019-11-21 | End: 2019-11-21 | Stop reason: HOSPADM

## 2019-11-21 RX ORDER — LIDOCAINE HYDROCHLORIDE 10 MG/ML
20 INJECTION, SOLUTION EPIDURAL; INFILTRATION; INTRACAUDAL; PERINEURAL ONCE
Status: CANCELLED | OUTPATIENT
Start: 2019-11-26

## 2019-11-21 RX ORDER — GADOBUTROL 604.72 MG/ML
7.5 INJECTION INTRAVENOUS ONCE
Status: DISCONTINUED | OUTPATIENT
Start: 2019-11-21 | End: 2019-11-21

## 2019-11-21 RX ORDER — HEPARIN SODIUM,PORCINE 10 UNIT/ML
5 VIAL (ML) INTRAVENOUS
Status: DISCONTINUED | OUTPATIENT
Start: 2019-11-21 | End: 2019-11-21 | Stop reason: HOSPADM

## 2019-11-21 RX ORDER — ALBUMIN (HUMAN) 12.5 G/50ML
12.5 SOLUTION INTRAVENOUS
Status: DISCONTINUED | OUTPATIENT
Start: 2019-11-21 | End: 2019-11-21 | Stop reason: HOSPADM

## 2019-11-21 RX ORDER — HEPARIN SODIUM (PORCINE) LOCK FLUSH IV SOLN 100 UNIT/ML 100 UNIT/ML
5 SOLUTION INTRAVENOUS
Status: CANCELLED | OUTPATIENT
Start: 2019-11-26

## 2019-11-21 RX ADMIN — LIDOCAINE HYDROCHLORIDE 10 ML: 10 INJECTION, SOLUTION EPIDURAL; INFILTRATION; INTRACAUDAL; PERINEURAL at 14:55

## 2019-11-21 RX ADMIN — ALBUMIN HUMAN 12.5 G: 0.25 SOLUTION INTRAVENOUS at 14:55

## 2019-11-21 RX ADMIN — ALBUMIN HUMAN 25 G: 0.25 SOLUTION INTRAVENOUS at 15:05

## 2019-11-21 RX ADMIN — GADOBUTROL 7.5 ML: 604.72 INJECTION INTRAVENOUS at 11:25

## 2019-11-21 RX ADMIN — SODIUM CHLORIDE 30 ML: 9 INJECTION, SOLUTION INTRAVENOUS at 11:25

## 2019-11-21 NOTE — PATIENT INSTRUCTIONS
Patient Education     Paracentesis  Your healthcare provider recommends that you have paracentesis. This is a procedure to remove extra fluid from your belly (abdomen). A needle is used to drain the fluid. A small sample of fluid may be taken and tested for problems. If the fluid buildup is causing discomfort or pain, all of the fluid may be drained. To do this, a tube is attached to the needle. The fluid is drained into a container that sits outside of the body. If symptoms are severe, paracentesis may be done as an emergency procedure. Otherwise, it will be scheduled ahead of time. Read on to learn more about paracentesis and how it works.     Understanding ascites  Many of the body s organs, including the liver and intestines, are inside the belly (abdomen). The organs are covered in a thin membrane called the peritoneum. The peritoneum has 2 layers. It makes a fluid that allows the layers to glide smoothly past each other. If this fluid builds up in the belly, the condition is called ascites. Ascites causes pain and discomfort. It can also make it hard to breathe. Fluid can build up for a number of reasons. These include chronic liver disease (cirrhosis), heart or kidney failure, and cancer. Your provider can tell you more about the cause of your ascites.  How paracentesis works  The goal of paracentesis may be to help diagnose the cause of the excess fluid. Or, the goal may be to drain excess fluid from the abdomen. In some cases, fluid returns and the procedure needs to be repeated.  Before the procedure    Tell your provider about any medicines you are taking. This includes all prescription medicines, over-the-counter medicines, street drugs, herbs, vitamins, and other supplements.    Tell your provider about any allergies you have.    Before the procedure begins, you ll be asked to empty your bladder. This helps prevent injury to the bladder during the procedure. If needed, a thin tube (Mosher catheter) may  be placed into your bladder to drain urine during the procedure. This tube is removed after the procedure.    An IV (intravenous) line may be put into a vein in your arm or hand. This line supplies fluids and medicines.  During the procedure    You are awake during the procedure.    An imaging method called ultrasound may be used to guide the procedure. It shows live images of the inside of your belly on a video screen. This helps the provider find the site of the excess fluid inside your belly and decide where to insert the needle.    A numbing medicine (local anesthesia) is injected into your belly where the needle will be inserted.    Once the skin is numb, the provider carefully inserts the needle into the belly. This causes the needle to fill with fluid.    The needle may be removed with only a small sample of fluid. This sample is sent to a lab for testing. Getting a sample takes about 10 to 15 minutes.    Or, a tube may be attached to the needle so that more of the excess fluid can be drained. The tube may be taped or stitched into place. This keeps it from pulling the needle out of your belly.    How long it takes to drain all of the fluid varies for each person. In most cases, it takes about 30 minutes. Your provider will let you know if the procedure is expected to take longer than usual.    Once all of the fluid is drained, the needle and tube are removed.    Pressure is put on the puncture site to stop any fluid leakage or bleeding.    A small bandage is placed over the puncture site. Albumin may be given during or after the procedure to prevent low blood pressure or kidney problems.  After the procedure  You may be taken to a recovery room to rest after the procedure. If you are in pain, you will be given medicine as needed. You will likely be sent home 1 to 2 hours after the procedure is done. When you leave the hospital, have an adult family member or friend drive you home. If you are staying in the  hospital, you will return to your hospital room.  Risks and possible complications of paracentesis  This procedure is considered safe. But like all procedures, it carries some risks. These include the following:    Bleeding    Infection    Injury to structures in the belly    Fast drop in blood pressure   Recovering at home    If needed, your provider can prescribe or recommend pain medicines for you to take at home. Take these exactly as directed. If you stopped taking other medicines before the procedure, ask your provider when you can start them again.    You may remove the bandage 24 hours after the procedure.    Take it easy for 24 hours after the procedure. Avoid physical activity until your provider says it s OK.  Follow-up care  Make a follow-up appointment with your provider as directed. During your follow-up visit, your provider will check your healing. Let your provider know how you are feeling. You can also discuss the cause of your ascites and if any more treatment is needed.   When to seek medical care  Call your healthcare provider if you notice any of the following after the procedure:    A fever of 100.4 F (38.0 C) or higher    Trouble breathing    Pain that does not go away even after taking pain medicine    Belly pain not caused by having the skin punctured    Bleeding from the puncture site    More than a small amount of fluid leakage from the puncture site    Swollen belly    Signs of infection at the puncture site. These include increased pain, redness, or swelling, as well as warmth or bad-smelling drainage.    Blood in your urine    Dizziness, lightheadedness, or fainting   Date Last Reviewed: 7/1/2016 2000-2018 The "[employer]. [address] [place] [address], [place] [address]. All rights reserved. This information is not intended as a substitute for professional medical care. Always follow your healthcare professional's instructions.

## 2019-11-22 ENCOUNTER — OFFICE VISIT (OUTPATIENT)
Dept: SURGERY | Facility: CLINIC | Age: 59
End: 2019-11-22
Attending: SURGERY
Payer: MEDICAID

## 2019-11-22 VITALS
WEIGHT: 159 LBS | HEART RATE: 76 BPM | TEMPERATURE: 98.1 F | BODY MASS INDEX: 23.48 KG/M2 | DIASTOLIC BLOOD PRESSURE: 85 MMHG | OXYGEN SATURATION: 96 % | SYSTOLIC BLOOD PRESSURE: 129 MMHG

## 2019-11-22 DIAGNOSIS — C22.0 HCC (HEPATOCELLULAR CARCINOMA) (H): Primary | ICD-10-CM

## 2019-11-22 PROCEDURE — G0463 HOSPITAL OUTPT CLINIC VISIT: HCPCS | Mod: ZF

## 2019-11-22 ASSESSMENT — PAIN SCALES - GENERAL: PAINLEVEL: WORST PAIN (10)

## 2019-11-22 NOTE — NURSING NOTE
"Oncology Rooming Note    November 22, 2019 2:31 PM   Ten Johnson is a 59 year old male who presents for:    Chief Complaint   Patient presents with     Oncology Clinic Visit     Return; post op hepatocellular carcinoma; vitals taken     Initial Vitals: /85   Pulse 76   Temp 98.1  F (36.7  C) (Oral)   Wt 72.1 kg (159 lb)   SpO2 96%   BMI 23.48 kg/m   Estimated body mass index is 23.48 kg/m  as calculated from the following:    Height as of 11/3/19: 1.753 m (5' 9\").    Weight as of this encounter: 72.1 kg (159 lb). Body surface area is 1.87 meters squared.  Worst Pain (10) Comment: Data Unavailable   No LMP for male patient.  Allergies reviewed: Yes  Medications reviewed: Yes    Medications: Medication refills not needed today.  Pharmacy name entered into [employer]: [facility] [place], [place] - [address] [facility] [phone]    Clinical concerns: No new concerns today. Dr. Benoit was notified.      ANUPAMA LARSEN CMA            "

## 2019-11-23 NOTE — PROGRESS NOTES
[facility] - Surgical Oncology    POST-OP VISIT  Nov 22, 2019    Reason for Visit:    Ten Johnson is a 59 year old male who presents s/p open ablation of 3 liver lesions approximately 4 weeks ago.  He was referred by Dr Schmitt.    Pertinent Oncologic History: Patient with cirrhosis and continued alcohol use.  Multifocal HCC not treatable with resection given portal HTN.  S/p open microwave ablation x 3 4 weeks ago.  Presents for follow-up    Pertinent Exam: Abdomen soft, non-tender, and non-distended.  No jaundice or scleral icterus.  Some abdominal fluid wave.    HISTORY OF PRESENT ILLNESS:  Patient is feeling much better as of late.  Developed post-operative ascites from decompensation of his portal HTN.  Last paracentesis yesterday.  Eating well.  Pain is minimal.  No fevers, chills, or sweats.    ROS  A full 14-point review of systems was performed, and the pertinent positives and negatives were mentioned in the history of present illness.    Pertinent Work-Up/Findings:    MRI: Most recent MRI with ablation zones and no evidence of viable tumor with exception of the non-treated HCC in segment 6.  Patient meets Mendez criteria.    Assessment/Counseling/Plan:    Ten Johnson is a 59 year old male with multifocal HCC and recent ablation of 3 lesions 4 weeks ago.  No evidence of viable tumor in ablation zones.  I discussed that embolization is an option for his segment 6 lesion, but that transplant would be the best treatment for him if he were willing to comply with requirements.  He discussed that he would like to see his hepatologist and have repeat discussion of transplantation.  Ascites being managed medically.  No need to RTC unless issues or questions arise.  All questions answered.  Patient in agreement with and understanding of the plan.    Total time spent with the patient was 10 minutes, of which more than half was counseling and coordination of care.

## 2019-11-25 ENCOUNTER — MYC MEDICAL ADVICE (OUTPATIENT)
Dept: GASTROENTEROLOGY | Facility: CLINIC | Age: 59
End: 2019-11-25

## 2019-11-25 ENCOUNTER — TELEPHONE (OUTPATIENT)
Dept: GASTROENTEROLOGY | Facility: CLINIC | Age: 59
End: 2019-11-25

## 2019-11-25 ENCOUNTER — OFFICE VISIT (OUTPATIENT)
Dept: INFUSION THERAPY | Facility: CLINIC | Age: 59
End: 2019-11-25
Attending: INTERNAL MEDICINE
Payer: MEDICAID

## 2019-11-25 ENCOUNTER — ANCILLARY PROCEDURE (OUTPATIENT)
Dept: ULTRASOUND IMAGING | Facility: CLINIC | Age: 59
End: 2019-11-25
Attending: INTERNAL MEDICINE
Payer: MEDICAID

## 2019-11-25 VITALS
SYSTOLIC BLOOD PRESSURE: 107 MMHG | WEIGHT: 164.5 LBS | TEMPERATURE: 97.6 F | BODY MASS INDEX: 24.29 KG/M2 | OXYGEN SATURATION: 98 % | DIASTOLIC BLOOD PRESSURE: 68 MMHG

## 2019-11-25 DIAGNOSIS — E44.0 MODERATE PROTEIN-CALORIE MALNUTRITION (H): Primary | ICD-10-CM

## 2019-11-25 DIAGNOSIS — K70.31 ASCITES DUE TO ALCOHOLIC CIRRHOSIS (H): Primary | ICD-10-CM

## 2019-11-25 DIAGNOSIS — K70.30 ALCOHOLIC CIRRHOSIS OF LIVER WITHOUT ASCITES (H): ICD-10-CM

## 2019-11-25 DIAGNOSIS — K70.30 ALCOHOLIC CIRRHOSIS OF LIVER WITHOUT ASCITES (H): Primary | ICD-10-CM

## 2019-11-25 LAB
ANION GAP SERPL CALCULATED.3IONS-SCNC: 5 MMOL/L (ref 3–14)
BUN SERPL-MCNC: 22 MG/DL (ref 7–30)
CALCIUM SERPL-MCNC: 8.2 MG/DL (ref 8.5–10.1)
CHLORIDE SERPL-SCNC: 110 MMOL/L (ref 94–109)
CO2 SERPL-SCNC: 24 MMOL/L (ref 20–32)
CREAT SERPL-MCNC: 1.05 MG/DL (ref 0.66–1.25)
GFR SERPL CREATININE-BSD FRML MDRD: 76 ML/MIN/{1.73_M2}
GLUCOSE SERPL-MCNC: 84 MG/DL (ref 70–99)
POTASSIUM SERPL-SCNC: 4.4 MMOL/L (ref 3.4–5.3)
SODIUM SERPL-SCNC: 139 MMOL/L (ref 133–144)

## 2019-11-25 PROCEDURE — 36415 COLL VENOUS BLD VENIPUNCTURE: CPT

## 2019-11-25 PROCEDURE — 80048 BASIC METABOLIC PNL TOTAL CA: CPT | Performed by: PHYSICIAN ASSISTANT

## 2019-11-25 PROCEDURE — 27210190 US PARACENTESIS

## 2019-11-25 PROCEDURE — 25000128 H RX IP 250 OP 636: Mod: ZF | Performed by: INTERNAL MEDICINE

## 2019-11-25 PROCEDURE — 25000125 ZZHC RX 250: Mod: ZF | Performed by: INTERNAL MEDICINE

## 2019-11-25 PROCEDURE — P9047 ALBUMIN (HUMAN), 25%, 50ML: HCPCS | Mod: ZF | Performed by: INTERNAL MEDICINE

## 2019-11-25 RX ORDER — ALBUMIN (HUMAN) 12.5 G/50ML
12.5 SOLUTION INTRAVENOUS
Status: DISCONTINUED | OUTPATIENT
Start: 2019-11-25 | End: 2019-11-25 | Stop reason: HOSPADM

## 2019-11-25 RX ORDER — LIDOCAINE HYDROCHLORIDE 10 MG/ML
20 INJECTION, SOLUTION EPIDURAL; INFILTRATION; INTRACAUDAL; PERINEURAL ONCE
Status: CANCELLED | OUTPATIENT
Start: 2019-12-02

## 2019-11-25 RX ORDER — HEPARIN SODIUM,PORCINE 10 UNIT/ML
5 VIAL (ML) INTRAVENOUS
Status: CANCELLED | OUTPATIENT
Start: 2019-12-02

## 2019-11-25 RX ORDER — LIDOCAINE HYDROCHLORIDE 10 MG/ML
20 INJECTION, SOLUTION EPIDURAL; INFILTRATION; INTRACAUDAL; PERINEURAL ONCE
Status: COMPLETED | OUTPATIENT
Start: 2019-11-25 | End: 2019-11-25

## 2019-11-25 RX ORDER — ALBUMIN (HUMAN) 12.5 G/50ML
12.5 SOLUTION INTRAVENOUS
Status: CANCELLED | OUTPATIENT
Start: 2019-12-02

## 2019-11-25 RX ORDER — HEPARIN SODIUM (PORCINE) LOCK FLUSH IV SOLN 100 UNIT/ML 100 UNIT/ML
5 SOLUTION INTRAVENOUS
Status: CANCELLED | OUTPATIENT
Start: 2019-12-02

## 2019-11-25 RX ADMIN — ALBUMIN HUMAN 12.5 G: 0.25 SOLUTION INTRAVENOUS at 08:18

## 2019-11-25 RX ADMIN — ALBUMIN HUMAN 12.5 G: 0.25 SOLUTION INTRAVENOUS at 08:26

## 2019-11-25 RX ADMIN — LIDOCAINE HYDROCHLORIDE 10 ML: 10 INJECTION, SOLUTION EPIDURAL; INFILTRATION; INTRACAUDAL; PERINEURAL at 08:19

## 2019-11-25 NOTE — PROGRESS NOTES
Paracentesis Nursing Note  Ten Johnson presents today to [facility] for a paracentesis.    During today's appointment orders from Leventhal, Thomas Michael, MD  were completed.    Progress Note:  Patient identification verified by name and date of birth.  Assessment completed.  Vitals monitored throughout appointment and recorded in Doc Flowsheets.  See proceduralist note in ultrasound.    Vascular Access: peripheral IV placed today.    Labs: Paged Zaria BONILLA, regarding patients blood work. No orders, however; pt saw provider 11/20 and she mentioned getting labs this week. She ordered via telephone one BMP. Drawn per orders and sent to lab. The patient was instructed in clinic to increase his diuretics, but pt hasn't picked them up from pharmacy yet. PA informed of this and had pt not increase dose until BMP results. Pt aware to continue taking his normal dose until he hears otherwise depending on blood work.     Date of consent or authorization: 11/13/2019  Invasive Procedure Safety Checklist was completed and sent for scanning.     Paracentesis performed by Ludin Gooden PA-C Radiology.    The following labs were communicated to provider performing paracentesis:  Lab Results   Component Value Date     11/21/2019     Administrations This Visit     albumin human 25 % injection 12.5 g     Admin Date  11/25/2019 Action  New Bag Dose  12.5 g Route  Intravenous Administered By  Camille Coleamn RN           Admin Date  11/25/2019 Action  New Bag Dose  12.5 g Route  Intravenous Administered By  Camille Coleman RN          lidocaine (PF) (XYLOCAINE) 1 % injection 20 mL     Admin Date  11/25/2019 Action  Given by Other Clinician Dose  10 mL Route  Subcutaneous Administered By  Camille Coleman RN              Total amount of ascites fluid drained: 2.5 liters.  Color of ascites fluid: straw colored.  Total amount of albumin given: 25 grams.    Patient tolerated procedure well.    Post  procedure,denies pain or discomfort post paracentesis.    Discharge Plan:  Discharge instructions were reviewed with patient.  Patient/Representative verbalized understanding and all questions were answered.   Discharged from [facility] in stable condition.    Camille Coleman RN    /68   Temp 97.6  F (36.4  C) (Oral)   Wt 74.6 kg (164 lb 8 oz)   SpO2 98%   BMI 24.29 kg/m

## 2019-11-25 NOTE — TELEPHONE ENCOUNTER
"Spoke with patient about his medication changes with his Lasix. He was to cut back his Lasix  to 20 mg daily (last Rx was prescribed for 60 mg daily) Patient  told me today that he never did increase his Lasix to 60 mg. He is very frustrated about all the medications he is on because he is not sure what he is supposed to take and the doses and what he is not supposed to take anymore. I offered an appointment with a Pharmacist to go over all his meds, but he was not interested in that. During the conversation he stated \"I am not going to take any medications until I have my Paracentesis on 12/03/19 and have someone go over his medications with him\". I let Murphy know about this and she is going to call the patient.     Hortensia Walden LPN    "

## 2019-11-26 ENCOUNTER — TELEPHONE (OUTPATIENT)
Dept: ONCOLOGY | Facility: CLINIC | Age: 59
End: 2019-11-26

## 2019-11-26 NOTE — TELEPHONE ENCOUNTER
ONCOLOGY INTAKE: Records Information      APPT INFORMATION:  Referring provider:  Dr. Gregorio Benoit  Referring provider s clinic:   [facility]  Reason for visit/diagnosis:  hepatocelluar cancer  Has patient been notified of appointment date and time?: NA    RECORDS INFORMATION:  Were the records received with the referral (via [employer])? Internal Referral    ADDITIONAL INFORMATION:  Left VM with hours and phone. Letter sent for follow up. Referral in Epic

## 2019-11-27 ENCOUNTER — TELEPHONE (OUTPATIENT)
Dept: GASTROENTEROLOGY | Facility: CLINIC | Age: 59
End: 2019-11-27

## 2019-11-27 DIAGNOSIS — K76.6 PORTAL HYPERTENSION (H): ICD-10-CM

## 2019-11-27 DIAGNOSIS — C22.0 HEPATOCELLULAR CARCINOMA (H): ICD-10-CM

## 2019-11-27 DIAGNOSIS — B19.20 HEPATITIS C: ICD-10-CM

## 2019-11-27 DIAGNOSIS — K70.31 ALCOHOLIC CIRRHOSIS OF LIVER WITH ASCITES (H): Primary | ICD-10-CM

## 2019-11-27 NOTE — TELEPHONE ENCOUNTER
Health Call Center    Phone Message    May a detailed message be left on voicemail: yes    Reason for Call: Medication Question or concern regarding medication   Prescription Clarification  Name of Medication: Nutritional Supplements (ENSURE COMPLETE SHAKE) LIQD  Prescribing Provider: Dr Enciso   Pharmacy: [facility] in [place]   What on the order needs clarification?  Pt has MA.....and MA is not covered for this drAlejandro  Pharmacy is requesting for another provider that is covered to submit this rx.               Action Taken: Message routed to:  [facility] ([facility] Hepatology

## 2019-12-02 ENCOUNTER — TELEPHONE (OUTPATIENT)
Dept: TRANSPLANT | Facility: CLINIC | Age: 59
End: 2019-12-02

## 2019-12-05 ENCOUNTER — OFFICE VISIT (OUTPATIENT)
Dept: INFUSION THERAPY | Facility: CLINIC | Age: 59
End: 2019-12-05
Attending: INTERNAL MEDICINE
Payer: MEDICAID

## 2019-12-05 ENCOUNTER — ANCILLARY PROCEDURE (OUTPATIENT)
Dept: ULTRASOUND IMAGING | Facility: CLINIC | Age: 59
End: 2019-12-05
Attending: INTERNAL MEDICINE
Payer: MEDICAID

## 2019-12-05 VITALS
OXYGEN SATURATION: 97 % | RESPIRATION RATE: 16 BRPM | BODY MASS INDEX: 23.97 KG/M2 | WEIGHT: 162.3 LBS | SYSTOLIC BLOOD PRESSURE: 127 MMHG | TEMPERATURE: 98.6 F | DIASTOLIC BLOOD PRESSURE: 81 MMHG

## 2019-12-05 DIAGNOSIS — K70.31 ASCITES DUE TO ALCOHOLIC CIRRHOSIS (H): Primary | ICD-10-CM

## 2019-12-05 DIAGNOSIS — K76.6 PORTAL HYPERTENSION (H): ICD-10-CM

## 2019-12-05 DIAGNOSIS — B19.20 HEPATITIS C: ICD-10-CM

## 2019-12-05 DIAGNOSIS — C22.0 HEPATOCELLULAR CARCINOMA (H): ICD-10-CM

## 2019-12-05 DIAGNOSIS — K70.30 ALCOHOLIC CIRRHOSIS OF LIVER WITHOUT ASCITES (H): ICD-10-CM

## 2019-12-05 DIAGNOSIS — K70.31 ALCOHOLIC CIRRHOSIS OF LIVER WITH ASCITES (H): ICD-10-CM

## 2019-12-05 LAB
ANION GAP SERPL CALCULATED.3IONS-SCNC: 4 MMOL/L (ref 3–14)
BUN SERPL-MCNC: 16 MG/DL (ref 7–30)
CALCIUM SERPL-MCNC: 8.5 MG/DL (ref 8.5–10.1)
CHLORIDE SERPL-SCNC: 109 MMOL/L (ref 94–109)
CO2 SERPL-SCNC: 24 MMOL/L (ref 20–32)
CREAT SERPL-MCNC: 0.95 MG/DL (ref 0.66–1.25)
GFR SERPL CREATININE-BSD FRML MDRD: 85 ML/MIN/{1.73_M2}
GLUCOSE SERPL-MCNC: 76 MG/DL (ref 70–99)
POTASSIUM SERPL-SCNC: 3.9 MMOL/L (ref 3.4–5.3)
SODIUM SERPL-SCNC: 137 MMOL/L (ref 133–144)

## 2019-12-05 PROCEDURE — 27210190 US PARACENTESIS

## 2019-12-05 PROCEDURE — 80048 BASIC METABOLIC PNL TOTAL CA: CPT | Performed by: PHYSICIAN ASSISTANT

## 2019-12-05 PROCEDURE — 80321 ALCOHOLS BIOMARKERS 1OR 2: CPT | Performed by: INTERNAL MEDICINE

## 2019-12-05 PROCEDURE — P9047 ALBUMIN (HUMAN), 25%, 50ML: HCPCS | Mod: ZF | Performed by: INTERNAL MEDICINE

## 2019-12-05 PROCEDURE — 36415 COLL VENOUS BLD VENIPUNCTURE: CPT

## 2019-12-05 PROCEDURE — 25000125 ZZHC RX 250: Mod: ZF | Performed by: INTERNAL MEDICINE

## 2019-12-05 PROCEDURE — 25000128 H RX IP 250 OP 636: Mod: ZF | Performed by: INTERNAL MEDICINE

## 2019-12-05 RX ORDER — LIDOCAINE HYDROCHLORIDE 10 MG/ML
20 INJECTION, SOLUTION EPIDURAL; INFILTRATION; INTRACAUDAL; PERINEURAL ONCE
Status: CANCELLED | OUTPATIENT
Start: 2019-12-12

## 2019-12-05 RX ORDER — ALBUMIN (HUMAN) 12.5 G/50ML
12.5 SOLUTION INTRAVENOUS
Status: CANCELLED | OUTPATIENT
Start: 2019-12-12

## 2019-12-05 RX ORDER — HEPARIN SODIUM,PORCINE 10 UNIT/ML
5 VIAL (ML) INTRAVENOUS
Status: CANCELLED | OUTPATIENT
Start: 2019-12-12

## 2019-12-05 RX ORDER — ALBUMIN (HUMAN) 12.5 G/50ML
12.5 SOLUTION INTRAVENOUS
Status: DISCONTINUED | OUTPATIENT
Start: 2019-12-05 | End: 2019-12-05 | Stop reason: HOSPADM

## 2019-12-05 RX ORDER — LIDOCAINE HYDROCHLORIDE 10 MG/ML
20 INJECTION, SOLUTION EPIDURAL; INFILTRATION; INTRACAUDAL; PERINEURAL ONCE
Status: COMPLETED | OUTPATIENT
Start: 2019-12-05 | End: 2019-12-05

## 2019-12-05 RX ORDER — HEPARIN SODIUM (PORCINE) LOCK FLUSH IV SOLN 100 UNIT/ML 100 UNIT/ML
5 SOLUTION INTRAVENOUS
Status: CANCELLED | OUTPATIENT
Start: 2019-12-12

## 2019-12-05 RX ADMIN — LIDOCAINE HYDROCHLORIDE 10 ML: 10 INJECTION, SOLUTION EPIDURAL; INFILTRATION; INTRACAUDAL; PERINEURAL at 08:16

## 2019-12-05 RX ADMIN — ALBUMIN HUMAN 12.5 G: 0.25 SOLUTION INTRAVENOUS at 08:20

## 2019-12-05 RX ADMIN — ALBUMIN HUMAN 12.5 G: 0.25 SOLUTION INTRAVENOUS at 08:35

## 2019-12-05 RX ADMIN — ALBUMIN HUMAN 12.5 G: 0.25 SOLUTION INTRAVENOUS at 08:16

## 2019-12-05 NOTE — PROGRESS NOTES
Paracentesis Nursing Note  Ten Johnson presents today to [facility] for a paracentesis.    During today's appointment orders from Dr. Leventhal were completed.    Progress Note:  Patient identification verified by name and date of birth.  Assessment completed.  Vitals monitored throughout appointment and recorded in Doc Flowsheets.  See proceduralist note in ultrasound.    Vascular Access: peripheral IV placed today.  Labs: were drawn per orders.     Date of consent or authorization: 11/13/19.  Invasive Procedure Safety Checklist was completed and sent for scanning.     Paracentesis performed by Luis Sanchez PA-C Radiology.    The following labs were communicated to provider performing paracentesis:  Lab Results   Component Value Date     11/21/2019       Total amount of ascites fluid drained: 3.2 liters.  Color of ascites fluid: yellow.  Total amount of albumin given: 37.5  grams.    Patient tolerated procedure well.    Post procedure,denies pain or discomfort post paracentesis.      Discharge Plan:  Discharge instructions were reviewed with patient.  Patient/Representative verbalized understanding and all questions were answered.   Discharged from [facility] in stable condition.    Vy Holt RN    Administrations This Visit     albumin human 25 % injection 12.5 g     Admin Date  12/05/2019 Action  New Bag Dose  12.5 g Route  Intravenous Administered By  Vy Holt RN           Admin Date  12/05/2019 Action  New Bag Dose  12.5 g Route  Intravenous Administered By  Vy Holt RN           Admin Date  12/05/2019 Action  New Bag Dose  12.5 g Route  Intravenous Administered By  Vy Holt RN          lidocaine (PF) (XYLOCAINE) 1 % injection 20 mL     Admin Date  12/05/2019 Action  Given by Other Dose  10 mL Route  Subcutaneous Administered By  Vy Holt RN                Temp 98.6  F (37  C) (Oral)   Resp 16    Wt 76.6 kg (168 lb 14.4 oz)   SpO2 97%   BMI 24.94 kg/m

## 2019-12-09 ENCOUNTER — TELEPHONE (OUTPATIENT)
Dept: TRANSPLANT | Facility: CLINIC | Age: 59
End: 2019-12-09

## 2019-12-09 ENCOUNTER — OFFICE VISIT (OUTPATIENT)
Dept: INTERNAL MEDICINE | Facility: CLINIC | Age: 59
End: 2019-12-09

## 2019-12-09 VITALS
WEIGHT: 164.5 LBS | HEART RATE: 87 BPM | OXYGEN SATURATION: 99 % | DIASTOLIC BLOOD PRESSURE: 82 MMHG | BODY MASS INDEX: 24.37 KG/M2 | HEIGHT: 69 IN | SYSTOLIC BLOOD PRESSURE: 116 MMHG | TEMPERATURE: 98 F | RESPIRATION RATE: 18 BRPM

## 2019-12-09 DIAGNOSIS — Z91.09 ENVIRONMENTAL ALLERGIES: ICD-10-CM

## 2019-12-09 DIAGNOSIS — C22.0 HCC (HEPATOCELLULAR CARCINOMA) (H): ICD-10-CM

## 2019-12-09 DIAGNOSIS — K70.31 ALCOHOLIC CIRRHOSIS OF LIVER WITH ASCITES (H): Primary | ICD-10-CM

## 2019-12-09 DIAGNOSIS — I10 ESSENTIAL HYPERTENSION: ICD-10-CM

## 2019-12-09 PROBLEM — R09.82 POST-NASAL DRIP: Status: ACTIVE | Noted: 2019-12-09

## 2019-12-09 LAB — PETH BLD-MCNC: NEGATIVE NG/ML

## 2019-12-09 RX ORDER — LORATADINE 10 MG/1
10 TABLET ORAL DAILY
Qty: 90 TABLET | Refills: 3 | Status: ON HOLD | OUTPATIENT
Start: 2019-12-09 | End: 2020-01-30

## 2019-12-09 RX ORDER — FLUTICASONE PROPIONATE 50 MCG
2 SPRAY, SUSPENSION (ML) NASAL DAILY
Qty: 48 ML | Refills: 3 | Status: SHIPPED | OUTPATIENT
Start: 2019-12-09 | End: 2020-04-29

## 2019-12-09 ASSESSMENT — MIFFLIN-ST. JEOR: SCORE: 1541.55

## 2019-12-09 ASSESSMENT — PAIN SCALES - GENERAL: PAINLEVEL: SEVERE PAIN (7)

## 2019-12-09 NOTE — PROGRESS NOTES
"CC: \"3 month f/u\"    Other health care team members:  JOSE, CHAD Enciso--  GI, Dr. Leventhal  Surgery/Oncology, Dr. Benoit  Nephrology NP INDIO Sotelo  Ortho Dr. Joe  Sports Med Dr. Dickerson and Dr. Ulloa  Cardiology NP NEFTALI Pierson, GLORIA Pacheco  Cardiology Dr. Merino    Future appointments  Cardiology GLORIA Pierson 12/20/19  GI Dr. Leventhal 1/2/19  Paracentesis 12/12/19    Labs being followed by Dr. Awad, CHAD Enciso and GLORIA Pierson    HPI:    Ten is a 59 year old man with a history of Hep C, alcoholic cirrhosis, esophageal varices Sarahi Robert tear, portal hypertension,  aortic stenosis s/p TAVR, right knee TKA 2/2019 complicated by e coli and staph epi infection which required I&D, liner exchange, and IV antibiotic course resulting in vancomycin-induced JENNIFER, chronic anemia, MRSA HCC diagnosed 10/22/19 (see below), low sodium and phosphorus, essential hypertension     Since our last OV in Aug 2019:    He was seen in IM by Dr. Mcnamara on 11/19/19 for abdominal pain    He was admitted ot [facility] 11/3/19- 11/7/19 for generalized abdominal pain and HCC, alcoholic cirrhosis of liver with ascities.  Had paracentesis, rx'd with IV antibiotics followed by orals (Augmentin and linezolid).  No microbes grew.    He was admitted 10/22/19-10/28/19  He is status post laparoscopic converted to open collection of liver core needle biopsies and intra-operative microwave ablation of 3 lesions on 10/22/19.  Positive HCC on liver biopsy.     Per Dr. Benoit 11/22/19's note:  Assessment/Counseling/Plan:     \"Ten Johnson is a 59 year old male with multifocal HCC and recent ablation of 3 lesions 4 weeks ago.  No evidence of viable tumor in ablation zones.  I discussed that embolization is an option for his segment 6 lesion, but that transplant would be the best treatment for him if he were willing to comply with requirements.  He discussed that he would like to see his hepatologist and have repeat discussion of transplantation.  Ascites " "being managed medically.  No need to RTC unless issues or questions arise.  All questions answered.  Patient in agreement with and understanding of the plan.\"     Today:  Today Ten is mostly concerned about difficulty coordinating care  Together we reviewed specialists' and hospitalization notes (summarized above) and clarified who is managing what  Not interested in seeing Veto  Last drink September per his report  Declines to have CD assessment, \"screw that\"  Does not want liver transplant  Pursuing SSD, has arrangements for rides  Answered questions about level of activity restrictions.  I advised him to avoid lifting and pushing heavy items.  He walks and rides his bike daily and metro transit as needed.    Patient Active Problem List   Diagnosis     Presbyopia     Rotator cuff tear, right     OA (osteoarthritis) of knee     Alcoholic cirrhosis (H)     Rhinitis, allergic     Olecranon bursitis     S/p TAVR (transcatheter aortic valve replacement), bioprosthetic     Right shoulder pain     Diastolic dysfunction     Chronic allergic rhinitis     Portal hypertension (H)     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)     S/P total knee arthroplasty, right     Foot contracture     Cirrhosis (H)     HCC (hepatocellular carcinoma) (H)     Liver mass     Leukocytosis     Thrombocytopenia (H)     Ileus (H)     Elevated liver enzymes     HTN (hypertension)     Hypophosphatemia     Hyponatremia     Ascites     Atelectasis     Past Surgical History:   Procedure Laterality Date     ABLATE LIVER TUMOR N/A 10/22/2019    Procedure: Ablate liver tumor x3;  Surgeon: Gregorio Benoit MD;  Location: [facility]     ARTHROSCOPY SHOULDER ROTATOR CUFF REPAIR  7/31/2012    Procedure: ARTHROSCOPY SHOULDER ROTATOR CUFF REPAIR;  Right Shoulder Arthroscopic Rotator Cuff Repair, BicepsTenodesis,  Subacromial Decompression ;  Surgeon: Joi Castillo MD;  [facility]     ESOPHAGOSCOPY, GASTROSCOPY, DUODENOSCOPY (EGD), COMBINED N/A " 10/23/2017    Procedure: COMBINED ESOPHAGOSCOPY, GASTROSCOPY, DUODENOSCOPY (EGD);;  Surgeon: Gentry Salas MD;  Location: [facility] GI     EXCHANGE POLY COMPONENT ARTHROPLASTY KNEE Right 3/4/2019    Procedure: REVISION RIGHT TOTAL KNEE POLY COMPONENT EXCHANGE;  Surgeon: Olvin Joe MD;  Location: [facility]     FACIAL RECONSTRUCTION SURGERY  1971     HEART CATH FEMORAL CANNULIZATION WITH OPEN STANDBY REPAIR AORTIC VALVE N/A 2/21/2018    Procedure: HEART CATH FEMORAL CANNULIZATION WITH OPEN STANDBY REPAIR AORTIC VALVE;;  Surgeon: Luis Baird MD;  Location: [facility]     IR LIVER BIOPSY PERCUTANEOUS  7/18/2019     IRRIGATION AND DEBRIDEMENT UPPER EXTREMITY, COMBINED  1/3/2012    Procedure:COMBINED IRRIGATION AND DEBRIDEMENT UPPER EXTREMITY; Irrigation & Debridement Left Elbow; Surgeon:CRISTHIAN ZHOU; Location:[facility] OR     LAPAROSCOPIC BIOPSY LIVER N/A 10/22/2019    Procedure: intraoperative liver ultrasound, laparoscopic converted to open liver biopsy x 6;  Surgeon: Gregorio Benoit MD;  Location: [facility]     LAPAROSCOPY DIAGNOSTIC (GENERAL) N/A 10/22/2019    Procedure: Diagnostic laparoscopy;  Surgeon: Gregorio Benoit MD;  [facility]     LAPAROTOMY, LYSIS ADHESIONS, COMBINED N/A 10/22/2019    Procedure: Laparotomy, lysis adhesions, combined;  Surgeon: Gregorio Benoit MD;  Location: [facility]     OPTICAL TRACKING SYSTEM ARTHROPLASTY KNEE Right 2/7/2019    Procedure: ARTHROPLASTY KNEE RIGHT;  Surgeon: Olvin Joe MD;  [facility]     REPAIR TENDON TRICEPS UPPER EXTREMITY  11/8/2011    Procedure:REPAIR TENDON TRICEPS UPPER EXTREMITY; Surgeon:CRISTHIAN ZHOU; [facility]     SHOULDER SURGERY  2003    left, injury, torn tendons, hematoma     TRANSCATHETER AORTIC VALVE IMPLANT ANESTHESIA N/A 2/21/2018    Procedure: TRANSCATHETER AORTIC VALVE IMPLANT ANESTHESIA;  Transfemoral (Quiroz) Aortic Valve Implant 26mm MARTHA 3, with Cardiopulmonary Bypass Standby, transthoracic echocardiogram;   Surgeon: GENERIC ANESTHESIA PROVIDER;  Location: [facility]     TRANSPOSITION ULNAR NERVE (ELBOW)  11/8/2011    Procedure:TRANSPOSITION ULNAR NERVE (ELBOW); Final Procedure Done: Left Elbow Lateral Ulnar Collateral Repair And  Left Elbow Triceps Repair         Current Outpatient Medications   Medication Sig Dispense Refill     fluticasone (FLONASE) 50 MCG/ACT nasal spray Spray 2 sprays into both nostrils daily 3 Bottle 3     furosemide (LASIX) 20 MG tablet Take 3 tablets (60 mg) by mouth daily 90 tablet 1     Lidocaine (LIDOCARE) 4 % Patch Place 1 patch onto the skin every 24 hours To prevent lidocaine toxicity, patient should be patch free for 12 hrs daily. 7 patch 0     lisinopril (PRINIVIL/ZESTRIL) 20 MG tablet Take 1 tablet (20 mg) by mouth daily 90 tablet 3     loratadine (CLARITIN) 10 MG tablet Take 1 tablet (10 mg) by mouth daily 90 tablet 0     methocarbamol (ROBAXIN) 500 MG tablet Take 1 tablet (500 mg) by mouth every 8 hours as needed for muscle spasms (Patient not taking: Reported on 12/5/2019) 30 tablet 0     Multiple Vitamin (MULTIVITAMINS PO) Take 1 tablet by mouth At Bedtime        Nutritional Supplements (ENSURE COMPLETE SHAKE) LIQD Take 237 mLs by mouth daily (Patient not taking: Reported on 12/5/2019) 24 Bottle 1     ondansetron (ZOFRAN) 4 MG tablet Take 1 tablet (4 mg) by mouth every 8 hours as needed for nausea 20 tablet 0     oxyCODONE (ROXICODONE) 5 MG tablet Take 1 tablet (5 mg) by mouth every 6 hours as needed for breakthrough pain (Patient not taking: Reported on 11/13/2019) 10 tablet 0     senna-docusate (SENOKOT-S/PERICOLACE) 8.6-50 MG tablet Take 1 tablet by mouth 2 times daily as needed for constipation (Patient not taking: Reported on 11/13/2019) 60 tablet 0     spironolactone (ALDACTONE) 50 MG tablet Take 2 tablets (100 mg) by mouth daily 60 tablet 3     Allergies   Allergen Reactions     Zolpidem Other (See Comments)     Alcoholic.  Had reaction 3/17/13 while intoxicated which included  "black out, loss of awareness, paranoia.  Do not prescribe.  Dr. Celeste     Cats Other (See Comments)     rhinitis     Dogs Other (See Comments)     rhinitis     Pollen Extract Other (See Comments)     rhinits.     /82 (BP Location: Right arm, Patient Position: Chair, Cuff Size: Adult Large)   Pulse 87   Temp 98  F (36.7  C) (Oral)   Resp 18   Ht 1.753 m (5' 9\")   Wt 74.6 kg (164 lb 8 oz)   SpO2 99%   BMI 24.29 kg/m      BP Readings from Last 6 Encounters:   12/05/19 127/81   11/25/19 107/68   11/22/19 129/85   11/21/19 129/80   11/20/19 124/88   11/19/19 115/74     Wt Readings from Last 5 Encounters:   12/05/19 73.6 kg (162 lb 4.8 oz)   11/25/19 74.6 kg (164 lb 8 oz)   11/22/19 72.1 kg (159 lb)   11/21/19 72.4 kg (159 lb 9.6 oz)   11/20/19 75.3 kg (165 lb 14.4 oz)     Component      Latest Ref Rng & Units 11/21/2019   Hemoglobin      13.3 - 17.7 g/dL 12.4 (L)     Component      Latest Ref Rng & Units 11/21/2019   Platelet Count      150 - 450 10e9/L 102 (L)     Component      Latest Ref Rng & Units 12/5/2019   Sodium      133 - 144 mmol/L 137   Potassium      3.4 - 5.3 mmol/L 3.9   Chloride      94 - 109 mmol/L 109   Carbon Dioxide      20 - 32 mmol/L 24   Anion Gap      3 - 14 mmol/L 4   Glucose      70 - 99 mg/dL 76   Urea Nitrogen      7 - 30 mg/dL 16   Creatinine      0.66 - 1.25 mg/dL 0.95   GFR Estimate      >60 mL/min/1.73:m2 85   GFR Estimate If Black      >60 mL/min/1.73:m2 >90   Calcium      8.5 - 10.1 mg/dL 8.5     Component      Latest Ref Rng & Units 11/14/2019   Bilirubin Total      0.2 - 1.3 mg/dL 0.6   Albumin      3.4 - 5.0 g/dL 3.2 (L)   Protein Total      6.8 - 8.8 g/dL 6.4 (L)   Alkaline Phosphatase      40 - 150 U/L 91   ALT      0 - 70 U/L 60   AST      0 - 45 U/L 76 (H)     Component      Latest Ref Rng & Units 10/25/2019   INR      0.86 - 1.14 1.12       Ten was seen today for recheck medication.    Diagnoses and all orders for this visit:    Alcoholic cirrhosis of liver " with ascites (H)    Environmental allergies  -    refill fluticasone (FLONASE) 50 MCG/ACT nasal spray; Spray 2 sprays into both nostrils daily  -    refill loratadine (CLARITIN) 10 MG tablet; Take 1 tablet (10 mg) by mouth daily    HCC (hepatocellular carcinoma) (H)    Essential hypertension    F/U 6 months.    Total time spent 40 minutes.  More than 50% of the time spent with Mr. Johnson on reviewing records, and counseling / coordinating his care      Cuca Celeste M.D.  Internal Medicine  [facility]   pager [phone]

## 2019-12-09 NOTE — PATIENT INSTRUCTIONS
Your health care Provider has recommended that you receive the new Shingle vaccine called Shingrix to prevent shingles for ages 50 and above. Many private insurance and Medicare Part D plans cover Shingrix. However, not all insurance carriers cover the entire cost of the Shingrix vaccine if the vaccine is administered at your primary care clinic. The clinic cannot determine your insurance benefits.  Please call your insurance carrier prior. The vaccine comes in two doses. Your second dose should be 2-6 months from your first dose.       Prior to receiving the vaccine, we recommend that you call your insurance carrier and ask them the following questions:            1. Is there a cost difference if I receive the vaccine at my doctor's office or a pharmacy?          2. Does my insurance cover the Shingrix Vaccine and administration of the vaccine?          3. What is my co-pay or deductible for the vaccine?        Please call to schedule a Nurse-Visit only at [phone].  Nurse Visit hours are available Monday, Wednesday, and Friday from 9:00 AM-11:00 AM and 1:00 PM-3:00 PM.       [facility] Medication Refill Request Information:  * Please contact your pharmacy regarding ANY request for medication refills.  ** [facility] Prescription Fax = [phone]  * Please allow 3 business days for routine medication refills.  * Please allow 5 business days for controlled substance medication refills.     [facility] Test Result notification information:  *You will be notified with in 7-10 days of your appointment day regarding the results of your test.  If you are on MyChart you will be notified as soon as the provider has reviewed the results and signed off on them.      Here are your health care team members and what they are managing:    Cirrhosis, gastrointestinal problems, ascites (fluid in abdomen), diuretics, abdominal pain, lab test/blood work monitoring, discussion regarding liver transplantation: Physician  Anastacia Enciso and Dr. Leventhal    Surgery regarding liver cancer:  Dr. Benoit    Liver cancer (oncologist): to be determined by Dr. Benoit or gastroenterology    Heart related problems ZAIDA Pate and Dr. Merino    Orthopedic (joints) Dr. Dickerson, Dr. Ulloa, Dr. Joe    General, primary care concerns such as cough, colds, immunizations, medication for allergies  All other medications to be handled by your specialists

## 2019-12-09 NOTE — NURSING NOTE
Chief Complaint   Patient presents with     Recheck Medication     pt. is here for a follow up and to get his medications figured out        [place], EMT

## 2019-12-09 NOTE — TELEPHONE ENCOUNTER
"Transplant Social Work Services Phone Call      Data: Received call from patient.  He had planned to pick medications up from our clinic pharmacy but was unable because his health insurance does not show [place] as his primary/sole insurance.  He did have BCBS in the past.  Ten reports a \"government person\" named Becky helped him call [facility] last week to resolve this.  He left her a voice message today to discuss this matter further.  I had left patient a voice message last week to call me for the purpose of recommending a substance use assessment.  I discussed this with Ten today and explained a substance use assessment would be required if/when patient is referred for a liver transplant evaluation.  Ten states, \"we're not going down the transplant route.\"  He also is not willing to complete a substance use assessment.    Intervention: I offered to contact [facility] to determine if medication costs could be placed on an account while this issue is pending, patient politely declined.  Assessment: Ten has MA effective November 1st.  He is not interested in contacting [facility] to follow up on this matter.  Plan: Ten will check with [facility] when he returns to clinic Thursday.  He is awaiting a call back from Becky, the person he has been working with on this insurance issue.       SANA Oneil, [facility]  Liver Transplant   Phone [phone]  Pager [phone]   "

## 2019-12-10 ENCOUNTER — MYC REFILL (OUTPATIENT)
Dept: SURGERY | Facility: CLINIC | Age: 59
End: 2019-12-10

## 2019-12-10 ENCOUNTER — MYC REFILL (OUTPATIENT)
Dept: GASTROENTEROLOGY | Facility: CLINIC | Age: 59
End: 2019-12-10

## 2019-12-10 ENCOUNTER — MYC REFILL (OUTPATIENT)
Dept: INTERNAL MEDICINE | Facility: CLINIC | Age: 59
End: 2019-12-10

## 2019-12-10 ENCOUNTER — MYC REFILL (OUTPATIENT)
Dept: NEPHROLOGY | Facility: CLINIC | Age: 59
End: 2019-12-10

## 2019-12-10 DIAGNOSIS — R10.84 ABDOMINAL PAIN, GENERALIZED: ICD-10-CM

## 2019-12-10 DIAGNOSIS — I10 HYPERTENSION, UNSPECIFIED TYPE: ICD-10-CM

## 2019-12-10 DIAGNOSIS — R18.8 OTHER ASCITES: ICD-10-CM

## 2019-12-10 DIAGNOSIS — G89.18 POSTOPERATIVE PAIN: ICD-10-CM

## 2019-12-10 DIAGNOSIS — Z91.09 ENVIRONMENTAL ALLERGIES: ICD-10-CM

## 2019-12-10 DIAGNOSIS — K70.30 ALCOHOLIC CIRRHOSIS OF LIVER WITHOUT ASCITES (H): ICD-10-CM

## 2019-12-10 DIAGNOSIS — C22.0 HEPATOCELLULAR CARCINOMA (H): ICD-10-CM

## 2019-12-10 DIAGNOSIS — E44.0 MODERATE PROTEIN-CALORIE MALNUTRITION (H): ICD-10-CM

## 2019-12-10 RX ORDER — LORATADINE 10 MG/1
10 TABLET ORAL DAILY
Qty: 90 TABLET | Refills: 3 | Status: CANCELLED | OUTPATIENT
Start: 2019-12-10

## 2019-12-10 RX ORDER — FLUTICASONE PROPIONATE 50 MCG
2 SPRAY, SUSPENSION (ML) NASAL DAILY
Qty: 48 ML | Refills: 3 | Status: CANCELLED | OUTPATIENT
Start: 2019-12-10

## 2019-12-11 RX ORDER — SPIRONOLACTONE 50 MG/1
100 TABLET, FILM COATED ORAL DAILY
Qty: 60 TABLET | Refills: 3 | Status: SHIPPED | OUTPATIENT
Start: 2019-12-11 | End: 2021-02-01

## 2019-12-11 RX ORDER — METHOCARBAMOL 500 MG/1
500 TABLET, FILM COATED ORAL EVERY 8 HOURS PRN
Qty: 30 TABLET | Refills: 0 | Status: ON HOLD | OUTPATIENT
Start: 2019-12-11 | End: 2020-01-30

## 2019-12-11 RX ORDER — LIDOCAINE 4 G/G
1 PATCH TOPICAL EVERY 24 HOURS
Qty: 7 PATCH | Refills: 0 | Status: SHIPPED | OUTPATIENT
Start: 2019-12-11 | End: 2020-01-24

## 2019-12-11 RX ORDER — ONDANSETRON 4 MG/1
4 TABLET, FILM COATED ORAL EVERY 8 HOURS PRN
Qty: 20 TABLET | Refills: 1 | Status: SHIPPED | OUTPATIENT
Start: 2019-12-11 | End: 2020-01-24

## 2019-12-11 RX ORDER — FUROSEMIDE 20 MG
60 TABLET ORAL DAILY
Qty: 90 TABLET | Refills: 1 | Status: SHIPPED | OUTPATIENT
Start: 2019-12-11 | End: 2020-02-20

## 2019-12-12 ENCOUNTER — TELEPHONE (OUTPATIENT)
Dept: TRANSPLANT | Facility: CLINIC | Age: 59
End: 2019-12-12

## 2019-12-12 ENCOUNTER — ANCILLARY PROCEDURE (OUTPATIENT)
Dept: ULTRASOUND IMAGING | Facility: CLINIC | Age: 59
End: 2019-12-12
Attending: INTERNAL MEDICINE
Payer: MEDICAID

## 2019-12-12 ENCOUNTER — OFFICE VISIT (OUTPATIENT)
Dept: INFUSION THERAPY | Facility: CLINIC | Age: 59
End: 2019-12-12
Attending: INTERNAL MEDICINE
Payer: MEDICAID

## 2019-12-12 VITALS
OXYGEN SATURATION: 99 % | TEMPERATURE: 97.3 F | DIASTOLIC BLOOD PRESSURE: 86 MMHG | WEIGHT: 164.5 LBS | BODY MASS INDEX: 24.29 KG/M2 | HEART RATE: 83 BPM | SYSTOLIC BLOOD PRESSURE: 127 MMHG

## 2019-12-12 DIAGNOSIS — C22.0 HEPATOCELLULAR CARCINOMA (H): ICD-10-CM

## 2019-12-12 DIAGNOSIS — B18.2 CHRONIC HEPATITIS C WITHOUT HEPATIC COMA (H): ICD-10-CM

## 2019-12-12 DIAGNOSIS — K70.30 ALCOHOLIC CIRRHOSIS OF LIVER WITHOUT ASCITES (H): ICD-10-CM

## 2019-12-12 DIAGNOSIS — K70.30 ALCOHOLIC CIRRHOSIS OF LIVER WITHOUT ASCITES (H): Primary | ICD-10-CM

## 2019-12-12 DIAGNOSIS — K70.31 ASCITES DUE TO ALCOHOLIC CIRRHOSIS (H): Primary | ICD-10-CM

## 2019-12-12 LAB
ALBUMIN SERPL-MCNC: 3.6 G/DL (ref 3.4–5)
ALP SERPL-CCNC: 88 U/L (ref 40–150)
ALT SERPL W P-5'-P-CCNC: 57 U/L (ref 0–70)
ANION GAP SERPL CALCULATED.3IONS-SCNC: 6 MMOL/L (ref 3–14)
AST SERPL W P-5'-P-CCNC: 63 U/L (ref 0–45)
BILIRUB DIRECT SERPL-MCNC: 0.4 MG/DL (ref 0–0.2)
BILIRUB SERPL-MCNC: 0.8 MG/DL (ref 0.2–1.3)
BUN SERPL-MCNC: 16 MG/DL (ref 7–30)
CALCIUM SERPL-MCNC: 8.3 MG/DL (ref 8.5–10.1)
CHLORIDE SERPL-SCNC: 111 MMOL/L (ref 94–109)
CO2 SERPL-SCNC: 22 MMOL/L (ref 20–32)
CREAT SERPL-MCNC: 0.83 MG/DL (ref 0.66–1.25)
ERYTHROCYTE [DISTWIDTH] IN BLOOD BY AUTOMATED COUNT: 15.5 % (ref 10–15)
GFR SERPL CREATININE-BSD FRML MDRD: >90 ML/MIN/{1.73_M2}
GLUCOSE SERPL-MCNC: 95 MG/DL (ref 70–99)
HCT VFR BLD AUTO: 35.3 % (ref 40–53)
HGB BLD-MCNC: 12.7 G/DL (ref 13.3–17.7)
INR PPP: 1.24 (ref 0.86–1.14)
MCH RBC QN AUTO: 34.7 PG (ref 26.5–33)
MCHC RBC AUTO-ENTMCNC: 36 G/DL (ref 31.5–36.5)
MCV RBC AUTO: 96 FL (ref 78–100)
PLATELET # BLD AUTO: 50 10E9/L (ref 150–450)
POTASSIUM SERPL-SCNC: 4 MMOL/L (ref 3.4–5.3)
PROT SERPL-MCNC: 6.6 G/DL (ref 6.8–8.8)
RBC # BLD AUTO: 3.66 10E12/L (ref 4.4–5.9)
SODIUM SERPL-SCNC: 139 MMOL/L (ref 133–144)
WBC # BLD AUTO: 6.8 10E9/L (ref 4–11)

## 2019-12-12 PROCEDURE — 40000556 ZZH STATISTIC PERIPHERAL IV START W US GUIDANCE: Mod: ZF

## 2019-12-12 PROCEDURE — 85610 PROTHROMBIN TIME: CPT | Performed by: PHYSICIAN ASSISTANT

## 2019-12-12 PROCEDURE — 80076 HEPATIC FUNCTION PANEL: CPT | Performed by: PHYSICIAN ASSISTANT

## 2019-12-12 PROCEDURE — 36415 COLL VENOUS BLD VENIPUNCTURE: CPT

## 2019-12-12 PROCEDURE — 80048 BASIC METABOLIC PNL TOTAL CA: CPT | Performed by: PHYSICIAN ASSISTANT

## 2019-12-12 PROCEDURE — 25000128 H RX IP 250 OP 636: Mod: ZF | Performed by: INTERNAL MEDICINE

## 2019-12-12 PROCEDURE — 85027 COMPLETE CBC AUTOMATED: CPT | Performed by: PHYSICIAN ASSISTANT

## 2019-12-12 PROCEDURE — 27210190 US PARACENTESIS

## 2019-12-12 PROCEDURE — 25000125 ZZHC RX 250: Mod: ZF | Performed by: INTERNAL MEDICINE

## 2019-12-12 PROCEDURE — P9047 ALBUMIN (HUMAN), 25%, 50ML: HCPCS | Mod: ZF | Performed by: INTERNAL MEDICINE

## 2019-12-12 RX ORDER — HEPARIN SODIUM,PORCINE 10 UNIT/ML
5 VIAL (ML) INTRAVENOUS
Status: CANCELLED | OUTPATIENT
Start: 2019-12-19

## 2019-12-12 RX ORDER — ALBUMIN (HUMAN) 12.5 G/50ML
12.5 SOLUTION INTRAVENOUS
Status: CANCELLED | OUTPATIENT
Start: 2019-12-19

## 2019-12-12 RX ORDER — HEPARIN SODIUM (PORCINE) LOCK FLUSH IV SOLN 100 UNIT/ML 100 UNIT/ML
5 SOLUTION INTRAVENOUS
Status: CANCELLED | OUTPATIENT
Start: 2019-12-19

## 2019-12-12 RX ORDER — ALBUMIN (HUMAN) 12.5 G/50ML
12.5 SOLUTION INTRAVENOUS
Status: DISCONTINUED | OUTPATIENT
Start: 2019-12-12 | End: 2019-12-12 | Stop reason: HOSPADM

## 2019-12-12 RX ORDER — LIDOCAINE HYDROCHLORIDE 10 MG/ML
20 INJECTION, SOLUTION EPIDURAL; INFILTRATION; INTRACAUDAL; PERINEURAL ONCE
Status: COMPLETED | OUTPATIENT
Start: 2019-12-12 | End: 2019-12-12

## 2019-12-12 RX ORDER — LIDOCAINE HYDROCHLORIDE 10 MG/ML
20 INJECTION, SOLUTION EPIDURAL; INFILTRATION; INTRACAUDAL; PERINEURAL ONCE
Status: CANCELLED | OUTPATIENT
Start: 2019-12-19

## 2019-12-12 RX ORDER — LISINOPRIL 20 MG/1
20 TABLET ORAL DAILY
Qty: 90 TABLET | Refills: 3 | Status: SHIPPED | OUTPATIENT
Start: 2019-12-12 | End: 2020-04-29

## 2019-12-12 RX ADMIN — ALBUMIN HUMAN 12.5 G: 0.25 SOLUTION INTRAVENOUS at 15:05

## 2019-12-12 RX ADMIN — ALBUMIN HUMAN 12.5 G: 0.25 SOLUTION INTRAVENOUS at 15:19

## 2019-12-12 RX ADMIN — ALBUMIN HUMAN 12.5 G: 0.25 SOLUTION INTRAVENOUS at 15:11

## 2019-12-12 RX ADMIN — LIDOCAINE HYDROCHLORIDE 10 ML: 10 INJECTION, SOLUTION EPIDURAL; INFILTRATION; INTRACAUDAL; PERINEURAL at 14:56

## 2019-12-12 NOTE — PATIENT INSTRUCTIONS
Dear Ten Johnson    Thank you for choosing [facility] ([facility]) for your paracentesis.  The following information is a summary of our appointment as well as important reminders.      Patient Education     Paracentesis  Your healthcare provider recommends that you have paracentesis. This is a procedure to remove extra fluid from your belly (abdomen). A needle is used to drain the fluid. A small sample of fluid may be taken and tested for problems. If the fluid buildup is causing discomfort or pain, all of the fluid may be drained. To do this, a tube is attached to the needle. The fluid is drained into a container that sits outside of the body. If symptoms are severe, paracentesis may be done as an emergency procedure. Otherwise, it will be scheduled ahead of time. Read on to learn more about paracentesis and how it works.     Understanding ascites  Many of the body s organs, including the liver and intestines, are inside the belly (abdomen). The organs are covered in a thin membrane called the peritoneum. The peritoneum has 2 layers. It makes a fluid that allows the layers to glide smoothly past each other. If this fluid builds up in the belly, the condition is called ascites. Ascites causes pain and discomfort. It can also make it hard to breathe. Fluid can build up for a number of reasons. These include chronic liver disease (cirrhosis), heart or kidney failure, and cancer. Your provider can tell you more about the cause of your ascites.  How paracentesis works  The goal of paracentesis may be to help diagnose the cause of the excess fluid. Or, the goal may be to drain excess fluid from the abdomen. In some cases, fluid returns and the procedure needs to be repeated.  Before the procedure    Tell your provider about any medicines you are taking. This includes all prescription medicines, over-the-counter medicines, street drugs, herbs, vitamins, and  other supplements.    Tell your provider about any allergies you have.    Before the procedure begins, you ll be asked to empty your bladder. This helps prevent injury to the bladder during the procedure. If needed, a thin tube (Mosher catheter) may be placed into your bladder to drain urine during the procedure. This tube is removed after the procedure.    An IV (intravenous) line may be put into a vein in your arm or hand. This line supplies fluids and medicines.  During the procedure    You are awake during the procedure.    An imaging method called ultrasound may be used to guide the procedure. It shows live images of the inside of your belly on a video screen. This helps the provider find the site of the excess fluid inside your belly and decide where to insert the needle.    A numbing medicine (local anesthesia) is injected into your belly where the needle will be inserted.    Once the skin is numb, the provider carefully inserts the needle into the belly. This causes the needle to fill with fluid.    The needle may be removed with only a small sample of fluid. This sample is sent to a lab for testing. Getting a sample takes about 10 to 15 minutes.    Or, a tube may be attached to the needle so that more of the excess fluid can be drained. The tube may be taped or stitched into place. This keeps it from pulling the needle out of your belly.    How long it takes to drain all of the fluid varies for each person. In most cases, it takes about 30 minutes. Your provider will let you know if the procedure is expected to take longer than usual.    Once all of the fluid is drained, the needle and tube are removed.    Pressure is put on the puncture site to stop any fluid leakage or bleeding.    A small bandage is placed over the puncture site. Albumin may be given during or after the procedure to prevent low blood pressure or kidney problems.  After the procedure  You may be taken to a recovery room to rest after the  procedure. If you are in pain, you will be given medicine as needed. You will likely be sent home 1 to 2 hours after the procedure is done. When you leave the hospital, have an adult family member or friend drive you home. If you are staying in the hospital, you will return to your hospital room.  Risks and possible complications of paracentesis  This procedure is considered safe. But like all procedures, it carries some risks. These include the following:    Bleeding    Infection    Injury to structures in the belly    Fast drop in blood pressure   Recovering at home    If needed, your provider can prescribe or recommend pain medicines for you to take at home. Take these exactly as directed. If you stopped taking other medicines before the procedure, ask your provider when you can start them again.    You may remove the bandage 24 hours after the procedure.    Take it easy for 24 hours after the procedure. Avoid physical activity until your provider says it s OK.  Follow-up care  Make a follow-up appointment with your provider as directed. During your follow-up visit, your provider will check your healing. Let your provider know how you are feeling. You can also discuss the cause of your ascites and if any more treatment is needed.   When to seek medical care  Call your healthcare provider if you notice any of the following after the procedure:    A fever of 100.4 F (38.0 C) or higher    Trouble breathing    Pain that does not go away even after taking pain medicine    Belly pain not caused by having the skin punctured    Bleeding from the puncture site    More than a small amount of fluid leakage from the puncture site    Swollen belly    Signs of infection at the puncture site. These include increased pain, redness, or swelling, as well as warmth or bad-smelling drainage.    Blood in your urine    Dizziness, lightheadedness, or fainting   Date Last Reviewed: 7/1/2016 2000-2018 [employer] [address]  [place] [address]. All rights reserved. This information is not intended as a substitute for professional medical care. Always follow your healthcare professional's instructions.             We look forward in seeing you on your next appointment here at [facility] ([facility]).  Please don t hesitate to call us at [phone] to reschedule any of your appointments or to speak with one of the [facility] registered nurses.  It was a pleasure taking care of you today.    Sincerely,    [facility]  [facility]  [address] [place] [address]  [address]  Phone:  [phone]

## 2019-12-12 NOTE — PROGRESS NOTES
Paracentesis Nursing Note  Ten Johnson presents today to [facility] for a [facility]acentesis.    During today's appointment orders from Thomas Leventhal, MD were completed.    Progress Note:  Patient identification verified by name and date of birth.  Assessment completed.  Vitals monitored throughout appointment and recorded in Doc Flowsheets.  See proceduralist note in ultrasound.    Vascular Access: peripheral IV placed today by vascular access.  Labs: drawn per order.    Date of consent or authorization: 11/13/19.  Invasive Procedure Safety Checklist was completed and sent for scanning.     Paracentesis performed by Alexandre Alves PA-C Radiology.    The following labs were communicated to provider performing paracentesis:  Lab Results   Component Value Date     11/21/2019       Total amount of ascites fluid drained: 3 liters.  Color of ascites fluid: yellow.  Total amount of albumin given: 37.5  grams.    Patient tolerated procedure well.    Post procedure,denies pain or discomfort post paracentesis.      Discharge Plan:  Discharge instructions were reviewed with patient.  Patient/Representative verbalized understanding and all questions were answered.   Discharged from [facility] in stable condition.    Kirti Corona RN    Administrations This Visit     albumin human 25 % injection 12.5 g     Admin Date  12/12/2019 Action  New Bag Dose  12.5 g Route  Intravenous Administered By  Kirti Corona RN           Admin Date  12/12/2019 Action  New Bag Dose  12.5 g Route  Intravenous Administered By  Kirti Corona RN           Admin Date  12/12/2019 Action  New Bag Dose  12.5 g Route  Intravenous Administered By  Kirti Corona RN          lidocaine (PF) (XYLOCAINE) 1 % injection 20 mL     Admin Date  12/12/2019 Action  Given by Other Clinician Dose  10 mL Route  Subcutaneous Administered By  Kirti Corona RN                /84   Pulse 79    Temp 97.3  F (36.3  C)   SpO2 99%

## 2019-12-16 ENCOUNTER — ANCILLARY PROCEDURE (OUTPATIENT)
Dept: CARDIOLOGY | Facility: CLINIC | Age: 59
End: 2019-12-16
Attending: NURSE PRACTITIONER

## 2019-12-16 DIAGNOSIS — Z95.3 S/P TAVR (TRANSCATHETER AORTIC VALVE REPLACEMENT), BIOPROSTHETIC: ICD-10-CM

## 2019-12-23 NOTE — PROGRESS NOTES
PRIMARY CARE CENTER       SUBJECTIVE:  Ten Johnson is a 59 year old male with a history of HTN, aortic stenosis s/p TAVR, chronic thrombocytopenia, HCV/EtOH cirrhosis complicated by portal hypertension manifesting with EV, ascites and HCC, who presents for postoperative evaluation for diagnostic exploratory laparoscopy with lysis of adhesions, laparoscopic liver biopsy and microwave ablation of three HCC lesions (in segments 6/7 x2, and segment 7 x1). The patient's surgery was on 10/22, and was complicated in the postoperative period by ileus; he subsequently recovered anterograde function. Also complicated by sepsis on 10/24; he finished a 5 day course of linezolid. Intraoperative biopsy was positive for HCC. The patient was discharged on 10/28. Following discharge, the patient continued having abdominal pain and bloating, and presented again to [facility] ED on 11/3, and underwent a diagnostic paracentesis 11/4 that showed no SBP, but which had ~1500 WBCs. The patient was initially started on broad spectrum antibiotics (vanc/zosyn), then narrowed to Augmentin and linezolid for a presumed intraabdominal infection. The patient was discharged on 11/7. Notes that since discharge, his pain has not been well controlled, and has worsened for the past week. Had a paracentesis for greater than 5 L on 11/13. Notes worsening abdominal distension. The patient stopped his diuretics due to lightheadedness. Endorses abdominal pain radiating from liver to his R shoulder and down to his R leg. Notes some mild difficulty swallowing for the past two weeks. Has been having small BMs, starting to hurt when they pass. Last EtOH drink was around 10 weeks ago. The patient notes that yesterday, 11/13, he noticed chills and rigors, with nausea. Denies fever, emesis, hematemesis, odynophagia, chest pain, shortness of breath, diarrhea, constipation, hematochezia, melena, dysuria or hematuria.     Medications and allergies  reviewed by me today.     ROS:   10-point review of systems negative unless otherwise specified above.    OBJECTIVE:    /80   Pulse 82   Wt 77.1 kg (170 lb)   SpO2 99%   BMI 25.10 kg/m     Wt Readings from Last 1 Encounters:   12/12/19 74.6 kg (164 lb 8 oz)     General: Pleasant slightly cachectic elderly man in mild distress  HEENT: AT/NC, PERRL, EOMI, anicteric sclerae, conjunctivae without injection, benign oropharynx, MMM  Neck: Supple, no palpable lymphadenopathy  Cardiovascular: RRR, normal S1 S2, no m/r/g, 2+ peripheral pulses, no peripheral edema, no JVP  Respiratory: LCTAB, no w/r/r, normal respiratory effort  Abdominal: Soft, diffusely tender, distended, normal bowel sounds, midline scar covered with bandage  Musculoskeletal: Thin bulk, no appreciable joint abnormalities  Neurologic: CN II-XII grossly intact, no focal deficits, AAOx4    Laboratory and imaging results were reviewed by me.    ASSESSMENT/PLAN:  59 year old male with a history of HTN, aortic stenosis s/p TAVR, chronic thrombocytopenia, HCV/EtOH cirrhosis complicated by portal hypertension manifesting with EV, ascites and HCC, who presents for postoperative evaluation for diagnostic exploratory laparoscopy with lysis of adhesions, laparoscopic liver biopsy and microwave ablation of three HCC lesions (in segments 6/7 x2, and segment 7 x1), with constitutional symptoms concerning for recurrence of intraabdominal infeciton.     Decompensation of cirrhosis of liver (H)  Abdominal pain, right lower quadrant: In the postoperative period, concerning for recurrence of intraabdominal infection vs. SBO vs. SBP vs. appendicitis. Patient passing gas and BMs. CT A/P with no acute changes, persistent large volume ascites (last therapeutic paracentesis 11/13). Afebrile. Will treat pain with opioids currently in the postoperative period. Patient has good followup with Surgery and Hepatology in the next few weeks, so he can be assessed for worsening  of symptoms. Will draw blood culture, if positive for bacteremia, will have patient return to ED for admission for treatment with IV antibiotics.   -     oxyCODONE (ROXICODONE) 5 MG tablet; Take 2 tablets (10 mg) by mouth every 6 hours as needed for pain  -     CBC with platelets differential; Future  -     Blood culture; Future    Blurred vision: Patient endorses progressively worsening vision, will refer to Ophthalmology.   -     OPHTHALMOLOGY ADULT REFERRAL    The patient should follow up with Surgery on 11/15/19, with Hepatology on 11/20/19 and with his PCP on 12/9/19.     Nain Mcnamara MD PhD  Nov 14, 2019    The patient was seen and discussed with Dr. Capo Mishra, who agrees with the assessment and plan.    Pt was seen and examined with Dr. Mcnamara.  I agree with his documentation as noted above.    My additional comments: None    Capo Mishra MD

## 2019-12-24 DIAGNOSIS — Z95.3 S/P TAVR (TRANSCATHETER AORTIC VALVE REPLACEMENT), BIOPROSTHETIC: Primary | ICD-10-CM

## 2019-12-31 ENCOUNTER — TELEPHONE (OUTPATIENT)
Dept: GASTROENTEROLOGY | Facility: CLINIC | Age: 59
End: 2019-12-31

## 2020-01-02 ENCOUNTER — TELEPHONE (OUTPATIENT)
Dept: GASTROENTEROLOGY | Facility: CLINIC | Age: 60
End: 2020-01-02

## 2020-01-02 DIAGNOSIS — K76.9 LIVER LESION: Primary | ICD-10-CM

## 2020-01-02 NOTE — TELEPHONE ENCOUNTER
Order entered. Writer will send message to clinic coordinator to reach out to patient to schedule MRI appointment in Feb/2020.    Vidya Fulton LPN  Hepatology Clinic        ----- Message from Thomas Michael Leventhal, MD sent at 1/2/2020  2:26 PM CST -----  Vidya,    Could you please or an MRI w/wo contrast Liver Protocol for 2/2020 for this gent.  Thanks!    TL

## 2020-01-03 NOTE — TELEPHONE ENCOUNTER
ONCOLOGY INTAKE: Records Information      APPT INFORMATION:  Referring provider:  Gregorio Benoit  Referring provider s clinic:   ONCOLOGY ADULT  Reason for visit/diagnosis:  liver Cancer  Has patient been notified of appointment date and time?: Yes      RECORDS INFORMATION:  Were the records received with the referral (via [employer])? No, Internal Referred     Has patient been seen for any external appt for this diagnosis? No    If yes, where? Na    Has patient had any imaging or procedures outside of [facility] for this condition? NO      If Yes, where? na    ADDITIONAL INFORMATION:

## 2020-01-06 ENCOUNTER — OFFICE VISIT (OUTPATIENT)
Dept: INFUSION THERAPY | Facility: CLINIC | Age: 60
End: 2020-01-06
Attending: INTERNAL MEDICINE
Payer: COMMERCIAL

## 2020-01-06 ENCOUNTER — ANCILLARY PROCEDURE (OUTPATIENT)
Dept: ULTRASOUND IMAGING | Facility: CLINIC | Age: 60
End: 2020-01-06
Attending: INTERNAL MEDICINE
Payer: COMMERCIAL

## 2020-01-06 VITALS
TEMPERATURE: 97.8 F | DIASTOLIC BLOOD PRESSURE: 85 MMHG | SYSTOLIC BLOOD PRESSURE: 128 MMHG | OXYGEN SATURATION: 98 % | BODY MASS INDEX: 23.41 KG/M2 | WEIGHT: 158.5 LBS

## 2020-01-06 DIAGNOSIS — K70.31 ASCITES DUE TO ALCOHOLIC CIRRHOSIS (H): Primary | ICD-10-CM

## 2020-01-06 PROCEDURE — 25000128 H RX IP 250 OP 636: Mod: ZF | Performed by: INTERNAL MEDICINE

## 2020-01-06 PROCEDURE — P9047 ALBUMIN (HUMAN), 25%, 50ML: HCPCS | Mod: ZF | Performed by: INTERNAL MEDICINE

## 2020-01-06 PROCEDURE — 25000125 ZZHC RX 250: Mod: ZF | Performed by: INTERNAL MEDICINE

## 2020-01-06 PROCEDURE — 49083 ABD PARACENTESIS W/IMAGING: CPT

## 2020-01-06 RX ORDER — HEPARIN SODIUM (PORCINE) LOCK FLUSH IV SOLN 100 UNIT/ML 100 UNIT/ML
5 SOLUTION INTRAVENOUS
Status: CANCELLED | OUTPATIENT
Start: 2020-01-13

## 2020-01-06 RX ORDER — HEPARIN SODIUM,PORCINE 10 UNIT/ML
5 VIAL (ML) INTRAVENOUS
Status: CANCELLED | OUTPATIENT
Start: 2020-01-13

## 2020-01-06 RX ORDER — ALBUMIN (HUMAN) 12.5 G/50ML
12.5 SOLUTION INTRAVENOUS
Status: CANCELLED | OUTPATIENT
Start: 2020-01-13

## 2020-01-06 RX ORDER — ALBUMIN (HUMAN) 12.5 G/50ML
12.5 SOLUTION INTRAVENOUS
Status: COMPLETED | OUTPATIENT
Start: 2020-01-06 | End: 2020-01-06

## 2020-01-06 RX ORDER — LIDOCAINE HYDROCHLORIDE 10 MG/ML
20 INJECTION, SOLUTION EPIDURAL; INFILTRATION; INTRACAUDAL; PERINEURAL ONCE
Status: CANCELLED | OUTPATIENT
Start: 2020-01-13

## 2020-01-06 RX ORDER — LIDOCAINE HYDROCHLORIDE 10 MG/ML
20 INJECTION, SOLUTION EPIDURAL; INFILTRATION; INTRACAUDAL; PERINEURAL ONCE
Status: COMPLETED | OUTPATIENT
Start: 2020-01-06 | End: 2020-01-06

## 2020-01-06 RX ADMIN — ALBUMIN HUMAN 12.5 G: 0.25 SOLUTION INTRAVENOUS at 10:32

## 2020-01-06 RX ADMIN — ALBUMIN HUMAN 12.5 G: 0.25 SOLUTION INTRAVENOUS at 10:24

## 2020-01-06 RX ADMIN — ALBUMIN HUMAN 12.5 G: 0.25 SOLUTION INTRAVENOUS at 10:40

## 2020-01-06 RX ADMIN — ALBUMIN HUMAN 12.5 G: 0.25 SOLUTION INTRAVENOUS at 10:17

## 2020-01-06 RX ADMIN — LIDOCAINE HYDROCHLORIDE 10 ML: 10 INJECTION, SOLUTION EPIDURAL; INFILTRATION; INTRACAUDAL; PERINEURAL at 10:17

## 2020-01-06 NOTE — TELEPHONE ENCOUNTER
Action    Action Taken January 6, 2020  After review of chart, verifies all records in Epic

## 2020-01-06 NOTE — PROGRESS NOTES
Paracentesis Nursing Note  Ten Johnson presents [facility] for [facility].    During today's appointment orders from Dr. Leventhal were completed.    Progress Note:  Patient identification verified by name and date of birth.  Assessment completed.  Vitals monitored throughout appointment and recorded in Doc Flowsheets.  See proceduralist note in ultrasound.    Vascular Access: peripheral IV placed today.  Labs: were not ordered for this appointment.    Date of consent or authorization: 11/13/19.  Invasive Procedure Safety Checklist was completed and sent for scanning.     Paracentesis performed by Nathan Alves PA-C Radiology.    The following labs were communicated to provider performing paracentesis:  Lab Results   Component Value Date    PLT 50 12/12/2019       Total amount of ascites fluid drained: 4.6 liters.  Color of ascites fluid: yellow.  Total amount of albumin given: 50  grams.    Patient tolerated procedure well.    Post procedure,denies pain or discomfort post paracentesis.      Discharge Plan:  Discharge instructions were reviewed with patient.  Patient/Representative verbalized understanding and all questions were answered.   Discharged [facility] in stable condition.    Vy Holt RN       Administrations This Visit     albumin human 25 % injection 12.5 g     Admin Date  01/06/2020 Action  New Bag Dose  12.5 g Route  Intravenous Administered By  Vy Holt RN           Admin Date  01/06/2020 Action  New Bag Dose  12.5 g Route  Intravenous Administered By  Vy Holt RN           Admin Date  01/06/2020 Action  New Bag Dose  12.5 g Route  Intravenous Administered By  Vy Holt RN           Admin Date  01/06/2020 Action  New Bag Dose  12.5 g Route  Intravenous Administered By  Vy Holt RN          lidocaine (PF) (XYLOCAINE) 1 % injection 20 mL     Admin Date  01/06/2020  Action  Given by Other Dose  10 mL Route  Subcutaneous Administered By  Vy Holt, JAMES                  Temp 97.8  F (36.6  C) (Oral)   Wt 76.5 kg (168 lb 9.6 oz)   SpO2 98%   BMI 24.90 kg/m

## 2020-01-09 ENCOUNTER — TELEPHONE (OUTPATIENT)
Dept: GASTROENTEROLOGY | Facility: CLINIC | Age: 60
End: 2020-01-09

## 2020-01-09 NOTE — TELEPHONE ENCOUNTER
Patient contacted and reminded of upcoming appointment.  Patient confirmed they will be attending.  Patient instructed to bring updated medications list to appointment.    Jasmine Guido on 1/9/2020 at 2:49 PM

## 2020-01-14 ENCOUNTER — ANCILLARY PROCEDURE (OUTPATIENT)
Dept: MRI IMAGING | Facility: CLINIC | Age: 60
End: 2020-01-14
Attending: INTERNAL MEDICINE
Payer: COMMERCIAL

## 2020-01-14 DIAGNOSIS — K76.9 LIVER LESION: ICD-10-CM

## 2020-01-14 RX ORDER — GADOBUTROL 604.72 MG/ML
7.5 INJECTION INTRAVENOUS ONCE
Status: COMPLETED | OUTPATIENT
Start: 2020-01-14 | End: 2020-01-14

## 2020-01-14 RX ADMIN — GADOBUTROL 7.5 ML: 604.72 INJECTION INTRAVENOUS at 10:55

## 2020-01-17 ENCOUNTER — OFFICE VISIT (OUTPATIENT)
Dept: INFUSION THERAPY | Facility: CLINIC | Age: 60
End: 2020-01-17
Attending: INTERNAL MEDICINE
Payer: COMMERCIAL

## 2020-01-17 ENCOUNTER — ANCILLARY PROCEDURE (OUTPATIENT)
Dept: ULTRASOUND IMAGING | Facility: CLINIC | Age: 60
End: 2020-01-17
Attending: INTERNAL MEDICINE
Payer: COMMERCIAL

## 2020-01-17 VITALS
SYSTOLIC BLOOD PRESSURE: 143 MMHG | DIASTOLIC BLOOD PRESSURE: 88 MMHG | WEIGHT: 161.7 LBS | HEART RATE: 70 BPM | BODY MASS INDEX: 23.88 KG/M2 | TEMPERATURE: 97.4 F | OXYGEN SATURATION: 99 %

## 2020-01-17 DIAGNOSIS — K70.31 ASCITES DUE TO ALCOHOLIC CIRRHOSIS (H): Primary | ICD-10-CM

## 2020-01-17 DIAGNOSIS — C22.0 HEPATOCELLULAR CARCINOMA (H): Primary | ICD-10-CM

## 2020-01-17 LAB — PLATELET # BLD AUTO: 112 10E9/L (ref 150–450)

## 2020-01-17 PROCEDURE — 85049 AUTOMATED PLATELET COUNT: CPT | Performed by: INTERNAL MEDICINE

## 2020-01-17 PROCEDURE — P9047 ALBUMIN (HUMAN), 25%, 50ML: HCPCS | Mod: ZF | Performed by: INTERNAL MEDICINE

## 2020-01-17 PROCEDURE — 25000128 H RX IP 250 OP 636: Mod: ZF | Performed by: INTERNAL MEDICINE

## 2020-01-17 PROCEDURE — 36415 COLL VENOUS BLD VENIPUNCTURE: CPT

## 2020-01-17 PROCEDURE — 27210190 US PARACENTESIS

## 2020-01-17 PROCEDURE — 25000125 ZZHC RX 250: Mod: ZF | Performed by: INTERNAL MEDICINE

## 2020-01-17 RX ORDER — ALBUMIN (HUMAN) 12.5 G/50ML
12.5 SOLUTION INTRAVENOUS
Status: COMPLETED | OUTPATIENT
Start: 2020-01-17 | End: 2020-01-17

## 2020-01-17 RX ORDER — LIDOCAINE HYDROCHLORIDE 10 MG/ML
20 INJECTION, SOLUTION EPIDURAL; INFILTRATION; INTRACAUDAL; PERINEURAL ONCE
Status: COMPLETED | OUTPATIENT
Start: 2020-01-17 | End: 2020-01-17

## 2020-01-17 RX ORDER — LIDOCAINE HYDROCHLORIDE 10 MG/ML
20 INJECTION, SOLUTION EPIDURAL; INFILTRATION; INTRACAUDAL; PERINEURAL ONCE
Status: CANCELLED | OUTPATIENT
Start: 2020-01-20

## 2020-01-17 RX ORDER — HEPARIN SODIUM (PORCINE) LOCK FLUSH IV SOLN 100 UNIT/ML 100 UNIT/ML
5 SOLUTION INTRAVENOUS
Status: CANCELLED | OUTPATIENT
Start: 2020-01-20

## 2020-01-17 RX ORDER — HEPARIN SODIUM,PORCINE 10 UNIT/ML
5 VIAL (ML) INTRAVENOUS
Status: CANCELLED | OUTPATIENT
Start: 2020-01-20

## 2020-01-17 RX ORDER — ALBUMIN (HUMAN) 12.5 G/50ML
12.5 SOLUTION INTRAVENOUS
Status: CANCELLED | OUTPATIENT
Start: 2020-01-20

## 2020-01-17 RX ADMIN — ALBUMIN HUMAN 12.5 G: 0.25 SOLUTION INTRAVENOUS at 10:47

## 2020-01-17 RX ADMIN — LIDOCAINE HYDROCHLORIDE 10 ML: 10 INJECTION, SOLUTION EPIDURAL; INFILTRATION; INTRACAUDAL; PERINEURAL at 10:20

## 2020-01-17 RX ADMIN — ALBUMIN HUMAN 12.5 G: 0.25 SOLUTION INTRAVENOUS at 11:04

## 2020-01-17 RX ADMIN — ALBUMIN HUMAN 12.5 G: 0.25 SOLUTION INTRAVENOUS at 10:40

## 2020-01-17 RX ADMIN — ALBUMIN HUMAN 12.5 G: 0.25 SOLUTION INTRAVENOUS at 10:56

## 2020-01-17 NOTE — PROGRESS NOTES
Paracentesis Nursing Note  Ten Johnson presents today to [facility] for a [facility].  During today's appointment orders from Dr. Leventhal were completed.    Progress Note:  Patient identification verified by name and date of birth.  Assessment completed.  Vitals monitored throughout appointment and recorded in Doc Flowsheets.  See proceduralist note in ultrasound.    Vascular Access: peripheral IV placed today.  Labs: were drawn per orders.     Date of consent or authorization: 11/13/19.  Invasive Procedure Safety Checklist was completed and sent for scanning.     Paracentesis performed by Ludin Gooden PA-C Radiology.    The following labs were communicated to provider performing paracentesis:  Lab Results   Component Value Date     01/17/2020       Total amount of ascites fluid drained: 4.4 liters.  Color of ascites fluid: yellow.  Total amount of albumin given: 50  grams.    Patient tolerated procedure well.    Post procedure,denies pain or discomfort post paracentesis.      Discharge Plan:  Discharge instructions were reviewed with patient.  Patient/Representative verbalized understanding and all questions were answered.   Discharged from [facility] in stable condition.    Maia Bernabe RN       Administrations This Visit     albumin human 25 % injection 12.5 g     Admin Date  01/17/2020 Action  New Bag Dose  12.5 g Route  Intravenous Administered By  Maia Bernabe RN           Admin Date  01/17/2020 Action  New Bag Dose  12.5 g Route  Intravenous Administered By  Maia Bernabe RN           Admin Date  01/17/2020 Action  New Bag Dose  12.5 g Route  Intravenous Administered By  Maia Bernabe RN           Admin Date  01/17/2020 Action  New Bag Dose  12.5 g Route  Intravenous Administered By  Maia Bernabe RN          lidocaine (PF) (XYLOCAINE) 1 % injection 20 mL     Admin Date  01/17/2020 Action  Given by Other Dose  10 mL  Route  Subcutaneous Administered By  Maia Bernabe, RN                Temp 97.4  F (36.3  C) (Oral)   Wt 77.7 kg (171 lb 6.4 oz)   SpO2 99%   BMI 25.31 kg/m

## 2020-01-17 NOTE — PATIENT INSTRUCTIONS
Patient Education     Discharge Instructions for Paracentesis  Paracentesis is a procedure to remove extra fluid from your belly (abdomen). This fluid buildup in the abdomen is called ascites. The procedure may have been done to take a sample of the fluid. Or, it may have been done to drain the extra fluid from your abdomen and help make you more comfortable.     Ascites is buildup of excess fluid in the abdomen.   Home care    If you have pain after the procedure, your healthcare provider can prescribe or recommend pain medicines. Take these exactly as directed. If you stopped taking other medicines before the procedure, ask your provider when you can start them again.    Take it easy for 24 hours after the procedure. Avoid physical activity until your provider says it s OK.    You will have a small bandage over the puncture site. Stitches (sutures), surgical staples, adhesive tapes, adhesive strips, or surgical glue may be used to close the incision. They also help stop bleeding and speed healing. You may take the bandage off in 24 hours.    Check the puncture site for the signs of infection listed below.  Follow-up care  Make a follow-up appointment with your healthcare provider as directed. During your follow-up visit, your provider will check your healing. Let your provider know how you are feeling. You can also discuss the cause of your ascites and if you need any further treatment.  When to seek medical advice  Call your healthcare provider if you have any of the following after the procedure:    A fever of 100.4 F (38 C) or higher    Trouble breathing    Pain that doesn't go away even after taking pain medicine    Belly pain not caused by having the skin punctured    Bleeding from the puncture site    More than a small amount of fluid leaking from the puncture site    Swollen belly    Signs of infection at the puncture site. These include increased pain, redness, or swelling, warmth, or bad-smelling  drainage.    Blood in your urine    Feeling dizzy or lightheaded, or fainting   Date Last Reviewed: 7/1/2016 2000-2019 [employer]. [address] [place] [address], [place] [address]. All rights reserved. This information is not intended as a substitute for professional medical care. Always follow your healthcare professional's instructions.

## 2020-01-22 NOTE — TELEPHONE ENCOUNTER
DIAGNOSIS: Hepatocellular carcinoma    DATE RECEIVED: 1.24.20   NOTES STATUS DETAILS   OFFICE NOTE from referring provider Internal 1.17.20, 9.24.19 Dr. Leventhal   OFFICE NOTE from other specialist Internal 12.9.19 Dr. Celeste  11.22.19 Dr. Benoit   DISCHARGE SUMMARY from hospital Internal 11.3-11.7.19 [facility]  10.22-10.28.19 [facility]   DISCHARGE REPORT from the ER N/A    OPERATIVE REPORT N/A    MEDICATION LIST Internal    XRAYS (IMAGES & REPORTS) Internal    PATHOLOGY  Slides & report N/A

## 2020-01-23 ENCOUNTER — TELEPHONE (OUTPATIENT)
Dept: VASCULAR SURGERY | Facility: CLINIC | Age: 60
End: 2020-01-23

## 2020-01-23 DIAGNOSIS — C22.0 HCC (HEPATOCELLULAR CARCINOMA) (H): ICD-10-CM

## 2020-01-23 DIAGNOSIS — K76.9 LIVER LESION: Primary | ICD-10-CM

## 2020-01-23 DIAGNOSIS — C22.0 HCC (HEPATOCELLULAR CARCINOMA) (H): Primary | ICD-10-CM

## 2020-01-23 NOTE — TELEPHONE ENCOUNTER
FUTURE VISIT INFORMATION      SURGERY INFORMATION:    chemoembolization, with anesthesia, DOS 2020, Dr. Arteaag, [facility]    RECORDS REQUESTED FROM:       Primary Care Provider: Cuca Celeste- [facility]    Pertinent Medical History: Diastolic dysfunction, portal hypertension    Most recent EKG+ Tracin19    Most recent ECHO: 19    Most recent Coronary Angiogram: 18

## 2020-01-23 NOTE — TELEPHONE ENCOUNTER
"Pt calling stating that he will not go through the treatment procedure without general anesthesia.     He states that he cannot tolerate conscious sedation as \"this is too much\". States that his body will reject the procedure and that he will not be able to go through with treatment.     I asked him to further explain, he states that he'll \"spazz\" due to he's gone through many procedure in the past and that he cannot mentally go through this without general anesthesia.     He is requesting that the TACE procedure be done with anesthesia.     I informed him that I will consult with Dr. Arteaga about this and that we will see what we can do as sometimes GA is hard to schedule.     Should Dr. Arteaga agree then we will also have him see PAC on Monday after his para appt as well.     He agrees to plan.     Priscila BEARD RN, BSN  Interventional Radiology Nurse Coordinator   Phone: [phone]      "

## 2020-01-24 ENCOUNTER — PRE VISIT (OUTPATIENT)
Dept: SURGERY | Facility: CLINIC | Age: 60
End: 2020-01-24

## 2020-01-24 ENCOUNTER — ANESTHESIA EVENT (OUTPATIENT)
Dept: SURGERY | Facility: CLINIC | Age: 60
End: 2020-01-24
Payer: COMMERCIAL

## 2020-01-24 ENCOUNTER — PRE VISIT (OUTPATIENT)
Dept: RADIOLOGY | Facility: CLINIC | Age: 60
End: 2020-01-24

## 2020-01-24 ENCOUNTER — TELEPHONE (OUTPATIENT)
Dept: VASCULAR SURGERY | Facility: CLINIC | Age: 60
End: 2020-01-24

## 2020-01-24 ENCOUNTER — OFFICE VISIT (OUTPATIENT)
Dept: SURGERY | Facility: CLINIC | Age: 60
End: 2020-01-24
Payer: COMMERCIAL

## 2020-01-24 VITALS
DIASTOLIC BLOOD PRESSURE: 91 MMHG | OXYGEN SATURATION: 95 % | HEIGHT: 68 IN | TEMPERATURE: 98.1 F | BODY MASS INDEX: 25.17 KG/M2 | RESPIRATION RATE: 18 BRPM | WEIGHT: 166.1 LBS | HEART RATE: 104 BPM | SYSTOLIC BLOOD PRESSURE: 139 MMHG

## 2020-01-24 DIAGNOSIS — Z01.818 PREOP EXAMINATION: Primary | ICD-10-CM

## 2020-01-24 DIAGNOSIS — C22.0 HEPATOCELLULAR CARCINOMA (H): ICD-10-CM

## 2020-01-24 RX ORDER — ONDANSETRON 4 MG/1
TABLET, FILM COATED ORAL
Status: ON HOLD | COMMUNITY
Start: 2019-12-11 | End: 2020-01-30

## 2020-01-24 RX ORDER — CITRIC ACID/SODIUM CITRATE 334-500MG
30 SOLUTION, ORAL ORAL
Status: CANCELLED | OUTPATIENT
Start: 2020-01-24

## 2020-01-24 ASSESSMENT — LIFESTYLE VARIABLES: TOBACCO_USE: 0

## 2020-01-24 ASSESSMENT — PAIN SCALES - GENERAL: PAINLEVEL: WORST PAIN (10)

## 2020-01-24 ASSESSMENT — PATIENT HEALTH QUESTIONNAIRE - PHQ9: SUM OF ALL RESPONSES TO PHQ QUESTIONS 1-9: 12

## 2020-01-24 ASSESSMENT — MIFFLIN-ST. JEOR: SCORE: 1532.92

## 2020-01-24 NOTE — H&P
Pre-Operative H & P     CC:  Preoperative exam to assess for increased cardiopulmonary risk while undergoing surgery and anesthesia.    Date of Encounter: 1/24/2020  Primary Care Physician:  Cuca Celeste  Associated diagnosis:  Hepatocellular carcinoma    HPI  Ten Johnson is a 59 year old male who presents for pre-operative H & P in preparation for a Chemo Embolizationon 1/29/2020 by Dr. Arteaga at [facility].     Zaki Johnson is a 59 year old male with hypertension, s/p TAVR, allergic rhinitis, thrombocytopenia, anemia, portal hypertension, esophageal varices, chronic hepatitis C, alcoholic cirrhosis, ascites, MRSA, GERD and alcohol dependence that has hepatocellular carcinoma.  He underwent an open liver ablation of three lesions on 10/22/2019.  There was a fourth area that was unable to be treated during that procedure.  He has declined considering liver transplant, so the above listed procedure has been recommended for treatment of the fourth area.      History is obtained from the patient and the medical record.     Past Medical History  Past Medical History:   Diagnosis Date     JENNIFER (acute kidney injury) (H) 4/9/2019     Alcohol use disorder      Alcoholic cirrhosis (H)      Anticoagulant long-term use     plavix     Aortic stenosis, severe 2/21/2018     Ascites      Chronic allergic rhinitis      Chronic anemia      Chronic hepatitis C without hepatic coma (H) 05/10/2016    Untreated as of 2/2018     Cirrhosis (H) 2017    MRI finding     Diastolic dysfunction      Erosive gastropathy      Esophageal varices in alcoholic cirrhosis (H)      H/O upper gastrointestinal hemorrhage 09/2017     Hepatocellular carcinoma (H)      History of blood transfusion      History of drug abuse (H)     intranasal     Hypertension     essential     JANELLE (iron deficiency anemia)      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     Infection of total knee replacement (H)  3/9/2019     Sarahi-Hoffman tear     History     Marijuana abuse      MRSA (methicillin resistant Staphylococcus aureus)      Nonrheumatic aortic valve stenosis 2/20/2018     Olecranon bursitis      Portal hypertension (H)      Right shoulder pain     history of rotator cuff repair     S/p TAVR (transcatheter aortic valve replacement), bioprosthetic      Severe aortic stenosis      Thrombocytopenia (H)        Past Surgical History  Past Surgical History:   Procedure Laterality Date     ABLATE LIVER TUMOR N/A 10/22/2019    Procedure: Ablate liver tumor x3;  Surgeon: Gregorio Benoit MD;  Location: [facility]     ARTHROSCOPY SHOULDER ROTATOR CUFF REPAIR  7/31/2012    Procedure: ARTHROSCOPY SHOULDER ROTATOR CUFF REPAIR;  Right Shoulder Arthroscopic Rotator Cuff Repair, BicepsTenodesis,  Subacromial Decompression ;  Surgeon: Joi Castillo MD;  [facility]     ESOPHAGOSCOPY, GASTROSCOPY, DUODENOSCOPY (EGD), COMBINED N/A 10/23/2017    Procedure: COMBINED ESOPHAGOSCOPY, GASTROSCOPY, DUODENOSCOPY (EGD);;  Surgeon: Gentry Salas MD;  Location: [facility]     EXCHANGE POLY COMPONENT ARTHROPLASTY KNEE Right 3/4/2019    Procedure: REVISION RIGHT TOTAL KNEE POLY COMPONENT EXCHANGE;  Surgeon: Olvin Joe MD;  Location: [facility]     FACIAL RECONSTRUCTION SURGERY  1971     HEART CATH FEMORAL CANNULIZATION WITH OPEN STANDBY REPAIR AORTIC VALVE N/A 2/21/2018    Procedure: HEART CATH FEMORAL CANNULIZATION WITH OPEN STANDBY REPAIR AORTIC VALVE;;  Surgeon: Luis Baird MD;  Location: [facility]     IR LIVER BIOPSY PERCUTANEOUS  7/18/2019     IRRIGATION AND DEBRIDEMENT UPPER EXTREMITY, COMBINED  1/3/2012    Procedure:COMBINED IRRIGATION AND DEBRIDEMENT UPPER EXTREMITY; Irrigation & Debridement Left Elbow; Surgeon:CRISTHIAN ZHOU; Location:[facility]     LAPAROSCOPIC BIOPSY LIVER N/A 10/22/2019    Procedure: intraoperative liver ultrasound, laparoscopic converted to open liver biopsy x 6;  Surgeon: Gregorio Benoit,  MD;  Location: [facility]     LAPAROSCOPY DIAGNOSTIC (GENERAL) N/A 10/22/2019    Procedure: Diagnostic laparoscopy;  Surgeon: Gregorio Benoit MD;  [facility]: [facility]     LAPAROTOMY, LYSIS ADHESIONS, COMBINED N/A 10/22/2019    Procedure: Laparotomy, lysis adhesions, combined;  Surgeon: Gregorio Benoit MD;  [facility]: [facility]     OPTICAL TRACKING SYSTEM ARTHROPLASTY KNEE Right 2/7/2019    Procedure: ARTHROPLASTY KNEE RIGHT;  Surgeon: Olvin Joe MD;  [facility]     REPAIR TENDON TRICEPS UPPER EXTREMITY  11/8/2011    Procedure:REPAIR TENDON TRICEPS UPPER EXTREMITY; Surgeon:CRISTHIAN ZHOU; [facility]     SHOULDER SURGERY  2003    left, injury, torn tendons, hematoma     TRANSCATHETER AORTIC VALVE IMPLANT ANESTHESIA N/A 2/21/2018    Procedure: TRANSCATHETER AORTIC VALVE IMPLANT ANESTHESIA;  Transfemoral (Quiroz) Aortic Valve Implant 26mm MARTHA 3, with Cardiopulmonary Bypass Standby, transthoracic echocardiogram;  Surgeon: GENERIC ANESTHESIA PROVIDER;  Location: [facility]     TRANSPOSITION ULNAR NERVE (ELBOW)  11/8/2011    Procedure:TRANSPOSITION ULNAR NERVE (ELBOW); Final Procedure Done: Left Elbow Lateral Ulnar Collateral Repair And  Left Elbow Triceps Repair         Hx of Blood transfusions/reactions: yes     Hx of abnormal bleeding or anti-platelet use: aspirin      Steroid use in the last year: none    Personal or FH with difficulty with Anesthesia:  none    Prior to Admission Medications  Current Outpatient Medications   Medication Sig Dispense Refill     aspirin (ASA) 81 MG tablet Take 1 tablet (81 mg) by mouth daily 90 tablet 3     fluticasone (FLONASE) 50 MCG/ACT nasal spray Spray 2 sprays into both nostrils daily (Patient taking differently: Spray 2 sprays into both nostrils At Bedtime ) 48 mL 3     furosemide (LASIX) 20 MG tablet Take 3 tablets (60 mg) by mouth daily (Patient taking differently: Take 60 mg by mouth as needed (PT last dose approx 2 days ago 1.24.2020) ) 90 tablet 1     lisinopril  (PRINIVIL/ZESTRIL) 20 MG tablet Take 1 tablet (20 mg) by mouth daily (Patient taking differently: Take 20 mg by mouth At Bedtime ) 90 tablet 3     loratadine (CLARITIN) 10 MG tablet Take 1 tablet (10 mg) by mouth daily (Patient taking differently: Take 10 mg by mouth At Bedtime ) 90 tablet 3     Multiple Vitamin (MULTIVITAMINS PO) Take 1 tablet by mouth At Bedtime        spironolactone (ALDACTONE) 50 MG tablet Take 2 tablets (100 mg) by mouth daily (Patient taking differently: Take 100 mg by mouth as needed (PT last dose approx two days ago 1.24.2020) ) 60 tablet 3     methocarbamol (ROBAXIN) 500 MG tablet Take 1 tablet (500 mg) by mouth every 8 hours as needed for muscle spasms (Patient taking differently: Take 500 mg by mouth every 8 hours as needed for muscle spasms (PT last dose approx 2 weeks ago) ) 30 tablet 0     ondansetron (ZOFRAN) 4 MG tablet          Allergies  Allergies   Allergen Reactions     Zolpidem Other (See Comments)     Alcoholic.  Had reaction 3/17/13 while intoxicated which included black out, loss of awareness, paranoia.  Do not prescribe.  Dr. Celeste     Cats Other (See Comments)     rhinitis     Dogs Other (See Comments)     rhinitis     Pollen Extract Other (See Comments)     rhinits.       Social History  Social History     Socioeconomic History     Marital status: Single     Spouse name: Not on file     Number of children: 0     Years of education: Not on file     Highest education level: Not on file   Occupational History     Occupation:    Social Needs     Financial resource strain: Not on file     Food insecurity:     Worry: Not on file     Inability: Not on file     Transportation needs:     Medical: Not on file     Non-medical: Not on file   Tobacco Use     Smoking status: Never Smoker     Smokeless tobacco: Never Used   Substance and Sexual Activity     Alcohol use: Not Currently     Comment: Alcohol use disorder, still actively drinking     Drug use: No     Comment:  denies     Sexual activity: Not Currently     Partners: Female   Lifestyle     Physical activity:     Days per week: Not on file     Minutes per session: Not on file     Stress: Not on file   Relationships     Social connections:     Talks on phone: Not on file     Gets together: Not on file     Attends Denominational service: Not on file     Active member of club or organization: Not on file     Attends meetings of clubs or organizations: Not on file     Relationship status: Not on file     Intimate partner violence:     Fear of current or ex partner: Not on file     Emotionally abused: Not on file     Physically abused: Not on file     Forced sexual activity: Not on file   Other Topics Concern     Parent/sibling w/ CABG, MI or angioplasty before 65F 55M? Not Asked   Social History Narrative    .  Bicycles a lot.  Excessive alcohol use.  Smokes cigars.  Occasional marijuana use.       Family History  Family History   Problem Relation Age of Onset     Cancer Mother 62        unknown primary     Alcoholism Paternal Uncle      Unknown/Adopted Father      No Known Problems Brother      Diabetes Maternal Grandmother      Myocardial Infarction Maternal Grandfather      No Known Problems Paternal Grandmother      Unknown/Adopted Paternal Grandfather      Cirrhosis No family hx of              ROS/MED HX  The complete review of systems is negative other than noted in the HPI or here.   ENT/Pulmonary:     (+)VIC risk factors snores loudly, hypertension, allergic rhinitis, , . .   (-) tobacco use   Neurologic:  - neg neurologic ROS     Cardiovascular: Comment: S/p TAVR 2/21/2018    (+) hypertension----. : . .  METS/Exercise Tolerance:  >4 METS   Hematologic:     (+) Anemia, History of Transfusion no previous transfusion reaction Other Hematologic Disorder-thrombocytopenia      Musculoskeletal:   (+) arthritis,  other musculoskeletal- periodic right knee pain s/p knee replacement.  limited ROM in bilateral  "shoulders from previous injuries and surgeries.       GI/Hepatic:     (+) GERD Symptomatic, hepatitis type C, liver disease, Other GI/Hepatic alcoholic cirrhosis, esophageal varices, portal htn      Renal/Genitourinary:  - ROS Renal section negative       Endo:  - neg endo ROS       Psychiatric:     (+) psychiatric history other (comment) (alcohol dependence)      Infectious Disease:   (+) MRSA,       Malignancy:   (+) Malignancy History of Other  Other CA hepatocellular Active status post Surgery         Other:    (+) no H/O Chronic Pain,                       PHYSICAL EXAM:   Mental Status/Neuro: A/A/O   Airway: Facies: Feasible  Mallampati: I  Mouth/Opening: Full  TM distance: > 6 cm  Neck ROM: Full   Respiratory: Auscultation: CTAB     Resp. Rate: Normal     Resp. Effort: Normal      CV: Rhythm: Regular  Rate: Age appropriate  Heart: Normal Sounds  Edema: None  Pulses: Weak   Comments: Multiple broken/cracked teeth     Dental: Details Habitus:  Abd. Exam: Distended; Ascites                 Temp: 98.1  F (36.7  C) Temp src: Oral BP: (!) 139/91 Pulse: 104   Resp: 18 SpO2: 95 %         166 lbs 1.6 oz  5' 8\"   Body mass index is 25.26 kg/m .       Physical Exam  Constitutional: Awake, alert, cooperative, no apparent distress, and appears stated age.  Eyes: Pupils equal, round and reactive to light, extra ocular muscles intact, sclera clear, conjunctiva normal.  HENT: Normocephalic, oral pharynx with moist mucus membranes, poor dentition. No goiter appreciated.   Respiratory: Clear to auscultation bilaterally, no crackles or wheezing.  Cardiovascular: Regular rate and rhythm, normal S1 and S2, and no murmur noted.  Carotids +2, no bruits. No edema. Palpable pulses to radial  DP and PT arteries.   GI: Normal bowel sounds, soft, mildly distended/ascites, non-tender, no masses palpated, no hepatosplenomegaly.  Surgical scars: RUQ  Lymph/Hematologic: No cervical lymphadenopathy and no supraclavicular " lymphadenopathy.  Genitourinary:  deferred  Skin: Warm and dry.  No rashes at anticipated surgical site.   Musculoskeletal: Full ROM of neck. There is no redness, warmth, or swelling of the exposed joints. Gross motor strength is normal.    Neurologic: Awake, alert, oriented to name, place and time. Cranial nerves II-XII are grossly intact. Gait is normal.   Neuropsychiatric: Calm, cooperative. Normal affect.     Labs: (personally reviewed)  Component      Latest Ref Rng & Units 12/12/2019 1/17/2020   Sodium      133 - 144 mmol/L 139    Potassium      3.4 - 5.3 mmol/L 4.0    Chloride      94 - 109 mmol/L 111 (H)    Carbon Dioxide      20 - 32 mmol/L 22    Anion Gap      3 - 14 mmol/L 6    Glucose      70 - 99 mg/dL 95    Urea Nitrogen      7 - 30 mg/dL 16    Creatinine      0.66 - 1.25 mg/dL 0.83    GFR Estimate      >60 mL/min/1.73:m2 >90    GFR Estimate If Black      >60 mL/min/1.73:m2 >90    Calcium      8.5 - 10.1 mg/dL 8.3 (L)    WBC      4.0 - 11.0 10e9/L 6.8    RBC Count      4.4 - 5.9 10e12/L 3.66 (L)    Hemoglobin      13.3 - 17.7 g/dL 12.7 (L)    Hematocrit      40.0 - 53.0 % 35.3 (L)    MCV      78 - 100 fl 96    MCH      26.5 - 33.0 pg 34.7 (H)    MCHC      31.5 - 36.5 g/dL 36.0    RDW      10.0 - 15.0 % 15.5 (H)    Platelet Count      150 - 450 10e9/L 50 (L) 112 (L)   Bilirubin Direct      0.0 - 0.2 mg/dL 0.4 (H)    Bilirubin Total      0.2 - 1.3 mg/dL 0.8    Albumin      3.4 - 5.0 g/dL 3.6    Protein Total      6.8 - 8.8 g/dL 6.6 (L)    Alkaline Phosphatase      40 - 150 U/L 88    ALT      0 - 70 U/L 57    AST      0 - 45 U/L 63 (H)    INR      0.86 - 1.14 1.24 (H)        Cardiac echo: 12/2019  echocardiogram  12/2019  Interpretation Summary  TAVR with 26 mm Quiroz Victor M 3.  The mean gradient is 9 mmHg.Trace aortic insufficiency is present.  Global and regional left ventricular function is hyperkinetic with an EF of  65-70%.  No pericardial effusion is present.  IVC diameter <2.1 cm collapsing >50%  with sniff suggests a normal RA pressure  of 3 mmHg.     No change from prior, other than lower measured TAVR gradient.      EKG  2/2019  Sinus bradycardia  Possible Anterior infarct , age undetermined  Abnormal ECG  When compared with ECG of 22-FEB-2018 07:33,  T wave inversion no longer evident in Inferior leads  T wave inversion no longer evident in Anterolateral leads          ASSESSMENT and PLAN  Zaki Johnson is a 59 year old male scheduled for a Chemo Embolizationon 1/29/2020 by Dr. Arteaga in treatment of hepatocellular carcinoma.  PAC referral for risk assessment and optimization for anesthesia with comorbid conditions of: hypertension, s/p TAVR, allergic rhinitis, thrombocytopenia, anemia, portal hypertension, esophageal varices, chronic hepatitis C, alcoholic cirrhosis, ascites, MRSA, GERD and alcohol dependence.      Pre-operative considerations:  1.  Cardiac:  Functional status- METS >4.  Up until his liver ablation procedure in the end of Oct 2019 he was biking and walking regularly for exercise, but since has had to limit his activity due to the required restrictions and other non-specified reasons.  He reports that he can still walk 0.5 miles. He just isn't purposefully exercising.  He is s/p TAVR in Feb 2018 and he had a follow up echocardiogram last month that showed an EF of 65-75%.  He had a cath prior to his TAVR that showed normal coronary arteries.  Hypertension is managed with lisinopril which he will take the evening before the procedure.   Low risk surgery with 0.4% risk of major adverse cardiac event.   2.  Pulm:  Airway feasible.  VIC risk: intermediate.    3.  GI:  Risk of PONV score = 2.  If > 2, anti-emetic intervention recommended.  GERD isn't well controlled at all times.  Will order Bicitra for DOS.  Anesthesia to consider RSI if felt indicated.  Followed by hepatology here for all above liver related conditions.  He has active chronic hepatitis C.  He has declined considering  liver transplant.  He has lasix and aldactone prescribed for ascites, but reports he doesn't take them consistently.  Instructed to hold both the DOS.   4. Heme:  He has chronic thrombocytopenia, anemia and elevated INR.  He declined labs today as he is a difficult stick and requested to just have his labs done on 1/27/2019 when he comes in for his next paracentesis.  Labs for that day are visible in Epic.  Last hgb was 12.7, last platelet count was 112 and last INR was 1.24.  Please see lab flowsheet for updated labs next week.  Last dose of aspirin was 1/23/2020.  5. ID:  +MRSA.  Contact precautions as indicated.  6. Psych:  Alcohol dependence is still active.  He has decreased his intake and notes that he hasn't had anything since Christmas and at that time it was just one 12oz beer.  He has declined alcohol treatment per previous notes.        VTE risk: 3%    Patient is optimized and is acceptable candidate for the proposed procedure.  No further diagnostic evaluation is needed.     Patient discussed with Dr. Barrera.          Raven Ceja DNP, RN, APRN  Preoperative Assessment Center  [facility]  [facility]  Phone: [phone]  Fax: [phone]

## 2020-01-24 NOTE — PATIENT INSTRUCTIONS
Preparing for Your Surgery      Name:  Ten Johnson   MRN:  [id number]   :  1960   Today's Date:  2020     Arriving for surgery:  Surgery date:  2020  Arrival time:  8:30 am  Please come to:       [facility] [address]  [address] [place]  [address]    - ? parking is available in front of the hospital      -    Please proceed to [address] on the 3rd floor. [phone]?     - ?If you are in need of directions, wheelchair or escort please stop at the Information Desk in the lobby.  Inform the information person that you are here for surgery; a wheelchair and escort to [address] will be provided.?     What can I eat or drink?  -  You may have solid food or milk products until 8 hours prior to your surgery.(midnight)  -  You may have water, apple juice or 7up/Sprite until 2 hours prior to your surgery.(until 8:30 am arrival time)    ---No Alcohol for at least 24 hours before procedure--    Which medicines can I take?          Stop Aspirin, vitamins and supplements one week prior to surgery.        Hold Ibuprofen for 24 hours and/or Naproxen for 48 hours prior to surgery.     -  Do NOT take these medications in the morning, the day of surgery:  Lisinopril   Furosemide   Spironolactone       -  Please take these medications the day of surgery:  Loratadine       How do I prepare myself?  -  Take two showers: one the night before surgery; and one the morning of surgery.         Use Scrubcare or Hibiclens to wash from neck down, leave soap on your skin for up to one minute.       Do not get soap in your eyes or ears.  You may use your own shampoo and conditioner; no other hair products.   -  Do NOT use lotion, powder, deodorant, or antiperspirant the day of your surgery.  -  Do NOT wear any makeup, fingernail polish or jewelry.  - Do not bring your own medications to the hospital, except for inhalers and eye   drops.  -  Bring your ID and insurance  card.      Questions or Concerns:    -Please call the Pre Admission Nursing Office at [phone].       -If you have health changes between today and your surgery please call your surgeon.     For questions after surgery please call your surgeons office.      Priscila BEARD RN, BSN  Interventional Radiology Nurse Coordinator    Phone: [phone]          AFTER YOUR SURGERY  Breathing exercises   Breathing exercises help you recover faster. Take deep breaths and let the air out slowly. This will:     Help you wake up after surgery.    Help prevent complications like pneumonia.  Preventing complications will help you go home sooner.   We may give you a breathing device (incentive spirometer) to encourage you to breathe deeply.   Nausea and vomiting   You may feel sick to your stomach after surgery; if so, let your nurse know.    Pain control:  After surgery, you may have pain. Our goal is to help you manage your pain. Pain medicine will help you feel comfortable enough to do activities that will help you heal.  These activities may include breathing exercises, walking and physical therapy.   To help your health care team treat your pain we will ask: 1) If you have pain  2) where it is located 3) describe your pain in your words  Methods of pain control include medications given by mouth, vein or by nerve block for some surgeries.  Sequential Compression Device (SCD):  You may need to wear SCD S (also called pneumo boots)on your legs or feet. These are wraps connected to a machine that pumps in air and releases it. The repeated pumping helps prevent blood clots from forming.

## 2020-01-24 NOTE — ANESTHESIA PREPROCEDURE EVALUATION
Anesthesia Pre-Procedure Evaluation    Patient: Ten Johnson   MRN:     [id number] Gender:   male   Age:    59 year old :      1960        Preoperative Diagnosis: Hepatocellular carcinoma (H) [C22.0]   Procedure(s):  ANESTHESIA OUT OF OR Chemo Embolization @1030     Past Medical History:   Diagnosis Date     JENNIFER (acute kidney injury) (H) 2019     Alcohol use disorder      Alcoholic cirrhosis (H)      Anticoagulant long-term use     plavix     Aortic stenosis, severe 2018     Ascites      Chronic allergic rhinitis      Chronic anemia      Chronic hepatitis C without hepatic coma (H) 05/10/2016    Untreated as of 2018     Cirrhosis (H)     MRI finding     Diastolic dysfunction      Erosive gastropathy      Esophageal varices in alcoholic cirrhosis (H)      H/O upper gastrointestinal hemorrhage 2017     Hepatocellular carcinoma (H)      History of blood transfusion      History of drug abuse (H)     intranasal     Hypertension     essential     JANELLE (iron deficiency anemia)      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     Sarahi-Hoffman tear     History     Marijuana abuse      MRSA (methicillin resistant Staphylococcus aureus)      Nonrheumatic aortic valve stenosis 2018     Olecranon bursitis      Portal hypertension (H)      Right shoulder pain     history of rotator cuff repair     S/p TAVR (transcatheter aortic valve replacement), bioprosthetic      Severe aortic stenosis      Thrombocytopenia (H)       Past Surgical History:   Procedure Laterality Date     ABLATE LIVER TUMOR N/A 10/22/2019    Procedure: Ablate liver tumor x3;  Surgeon: Gregorio Benoit MD;  Location:  OR     ARTHROSCOPY SHOULDER ROTATOR CUFF REPAIR  2012    Procedure: ARTHROSCOPY SHOULDER ROTATOR CUFF REPAIR;  Right Shoulder Arthroscopic Rotator Cuff Repair, BicepsTenodesis,  Subacromial Decompression ;  Surgeon: Joi Castillo MD;  Location:   OR     ESOPHAGOSCOPY, GASTROSCOPY, DUODENOSCOPY (EGD), COMBINED N/A 10/23/2017    Procedure: COMBINED ESOPHAGOSCOPY, GASTROSCOPY, DUODENOSCOPY (EGD);;  Surgeon: Gentry Salas MD;  [facility]     EXCHANGE POLY COMPONENT ARTHROPLASTY KNEE Right 3/4/2019    Procedure: REVISION RIGHT TOTAL KNEE POLY COMPONENT EXCHANGE;  Surgeon: Olvin Joe MD;  [facility]     FACIAL RECONSTRUCTION SURGERY  1971     HEART CATH FEMORAL CANNULIZATION WITH OPEN STANDBY REPAIR AORTIC VALVE N/A 2/21/2018    Procedure: HEART CATH FEMORAL CANNULIZATION WITH OPEN STANDBY REPAIR AORTIC VALVE;;  Surgeon: Luis Baird MD;  [facility]     IR LIVER BIOPSY PERCUTANEOUS  7/18/2019     IRRIGATION AND DEBRIDEMENT UPPER EXTREMITY, COMBINED  1/3/2012    Procedure:COMBINED IRRIGATION AND DEBRIDEMENT UPPER EXTREMITY; Irrigation & Debridement Left Elbow; Surgeon:CRISTHIAN ZHOU; [facility]     LAPAROSCOPIC BIOPSY LIVER N/A 10/22/2019    Procedure: intraoperative liver ultrasound, laparoscopic converted to open liver biopsy x 6;  Surgeon: Gregorio Benoit MD;  [facility]     LAPAROSCOPY DIAGNOSTIC (GENERAL) N/A 10/22/2019    Procedure: Diagnostic laparoscopy;  Surgeon: Gregorio Benoit MD;  [facility]     LAPAROTOMY, LYSIS ADHESIONS, COMBINED N/A 10/22/2019    Procedure: Laparotomy, lysis adhesions, combined;  Surgeon: Gregorio Benoit MD;  [facility]     OPTICAL TRACKING SYSTEM ARTHROPLASTY KNEE Right 2/7/2019    Procedure: ARTHROPLASTY KNEE RIGHT;  Surgeon: Olvin Joe MD;  [facility]     REPAIR TENDON TRICEPS UPPER EXTREMITY  11/8/2011    Procedure:REPAIR TENDON TRICEPS UPPER EXTREMITY; Surgeon:CRISTHIAN ZHOU; [facility]     SHOULDER SURGERY  2003    left, injury, torn tendons, hematoma     TRANSCATHETER AORTIC VALVE IMPLANT ANESTHESIA N/A 2/21/2018    Procedure: TRANSCATHETER AORTIC VALVE IMPLANT ANESTHESIA;  Transfemoral (Quiroz) Aortic Valve Implant 26mm MARTHA 3,  with Cardiopulmonary Bypass Standby, transthoracic echocardiogram;  Surgeon: GENERIC ANESTHESIA PROVIDER;  [facility]     TRANSPOSITION ULNAR NERVE (ELBOW)  11/8/2011    Procedure:TRANSPOSITION ULNAR NERVE (ELBOW); Final Procedure Done: Left Elbow Lateral Ulnar Collateral Repair And  Left Elbow Triceps Repair            Anesthesia Evaluation     . Pt has had prior anesthetic. Type: General and MAC    No history of anesthetic complications          ROS/MED HX    ENT/Pulmonary:     (+)VIC risk factors snores loudly, hypertension, allergic rhinitis, , . .   (-) tobacco use   Neurologic:  - neg neurologic ROS     Cardiovascular: Comment: S/p TAVR 2/21/2018    (+) hypertension----. : . . . :. . Previous cardiac testing Echodate:results:Stress Testdate:2/2018 results:SUMMARY:   >> Normal right sided filling pressures.  >> High left sided filling pressures with mean wedge of 20 mmhg  >> Borderline pulmonary artery hypertension with mean of 25 mm Hg  >> Normal cardiac output, 5.2 L/min with index 2.7 L/min/m2  Normal coronary arteries   date: results: date: results:          METS/Exercise Tolerance:  >4 METS   Hematologic:     (+) Anemia, History of Transfusion no previous transfusion reaction Other Hematologic Disorder-thrombocytopenia      Musculoskeletal:   (+) arthritis,  other musculoskeletal- periodic right knee pain s/p knee replacement.  limited ROM in bilateral shoulders from previous injuries and surgeries.       GI/Hepatic:     (+) GERD Symptomatic, hepatitis type C, liver disease, Other GI/Hepatic alcoholic cirrhosis, esophageal varices, portal htn      Renal/Genitourinary:  - ROS Renal section negative       Endo:  - neg endo ROS       Psychiatric:     (+) psychiatric history other (comment) (alcohol dependence)      Infectious Disease:   (+) MRSA,       Malignancy:   (+) Malignancy History of Other  Other CA hepatocellular Active status post Surgery         Other:    (+) no H/O Chronic Pain,                        PHYSICAL EXAM:   Mental Status/Neuro: A/A/O   Airway: Facies: Feasible  Mallampati: I  Mouth/Opening: Full  TM distance: > 6 cm  Neck ROM: Full   Respiratory: Auscultation: CTAB     Resp. Rate: Normal     Resp. Effort: Normal      CV: Rhythm: Regular  Rate: Age appropriate  Heart: Normal Sounds  Edema: None  Pulses: Weak   Comments: Multiple broken/cracked teeth     Dental: Details Habitus:  Abd. Exam: Distended; Ascites               LABS:  CBC:   Lab Results   Component Value Date    WBC 6.8 12/12/2019    WBC 11.8 (H) 11/21/2019    HGB 12.7 (L) 12/12/2019    HGB 12.4 (L) 11/21/2019    HCT 35.3 (L) 12/12/2019    HCT 34.7 (L) 11/21/2019     (L) 01/17/2020    PLT 50 (L) 12/12/2019     BMP:   Lab Results   Component Value Date     12/12/2019     12/05/2019    POTASSIUM 4.0 12/12/2019    POTASSIUM 3.9 12/05/2019    CHLORIDE 111 (H) 12/12/2019    CHLORIDE 109 12/05/2019    CO2 22 12/12/2019    CO2 24 12/05/2019    BUN 16 12/12/2019    BUN 16 12/05/2019    CR 0.83 12/12/2019    CR 0.95 12/05/2019    GLC 95 12/12/2019    GLC 76 12/05/2019     COAGS:   Lab Results   Component Value Date    PTT 28 10/23/2019    INR 1.24 (H) 12/12/2019     POC:   Lab Results   Component Value Date    BGM 91 10/23/2019     OTHER:   Lab Results   Component Value Date    PH 7.43 02/21/2018    LACT 0.9 10/27/2019    JOSEPHINE 8.3 (L) 12/12/2019    PHOS 3.8 10/27/2019    MAG 2.0 03/08/2019    ALBUMIN 3.6 12/12/2019    PROTTOTAL 6.6 (L) 12/12/2019    ALT 57 12/12/2019    AST 63 (H) 12/12/2019    ALKPHOS 88 12/12/2019    BILITOTAL 0.8 12/12/2019    LIPASE 467 (H) 11/03/2019    MORENITA 29 10/22/2017    CRP <2.9 06/05/2019    SED 10 06/05/2019        Preop Vitals    BP Readings from Last 3 Encounters:   01/24/20 (!) 139/91   01/17/20 (!) 143/88   01/06/20 128/85    Pulse Readings from Last 3 Encounters:   01/24/20 104   01/17/20 70   12/12/19 83      Resp Readings from Last 3 Encounters:   01/24/20 18   12/09/19 18  "  12/05/19 16    SpO2 Readings from Last 3 Encounters:   01/24/20 95%   01/17/20 99%   01/06/20 98%      Temp Readings from Last 1 Encounters:   01/24/20 98.1  F (36.7  C) (Oral)    Ht Readings from Last 1 Encounters:   01/24/20 1.727 m (5' 8\")      Wt Readings from Last 1 Encounters:   01/24/20 75.3 kg (166 lb 1.6 oz)    Estimated body mass index is 25.26 kg/m  as calculated from the following:    Height as of this encounter: 1.727 m (5' 8\").    Weight as of this encounter: 75.3 kg (166 lb 1.6 oz).     LDA:        Assessment:   ASA SCORE: 3    H&P: History and physical reviewed and following examination; no interval change.   Smoking Status:  Non-Smoker/Unknown   NPO Status: NPO Appropriate     Plan:   Anes. Type:  General   Pre-Medication: None   Induction:  IV (RSI)   Airway: ETT; Oral   Access/Monitoring: PIV; 2nd PIV   Maintenance: Balanced     Postop Plan:   Postop Pain: Opioids  Postop Sedation/Airway: Not planned  Disposition: Inpatient/Admit     PONV Management:   Adult Risk Factors:, Non-Smoker, Postop Opioids   Prevention: Ondansetron, Dexamethasone     CONSENT: Direct conversation   Plan and risks discussed with: Patient   Blood Products: Consented (ALL Blood Products)                PAC Discussion and Assessment    ASA Classification: 3  Case is suitable for: [place]  Anesthetic techniques and relevant risks discussed: GA  Invasive monitoring and risk discussed:   Types:   Possibility and Risk of blood transfusion discussed:   NPO instructions given:   Additional anesthetic preparation and risks discussed:   Needs early admission to pre-op area:   Other:     PAC Resident/NP Anesthesia Assessment:  Zaki Johnson is a 59 year old male scheduled for a Chemo Embolizationon 1/29/2020 by Dr. Arteaga in treatment of hepatocellular carcinoma.  PAC referral for risk assessment and optimization for anesthesia with comorbid conditions of: hypertension, s/p TAVR, allergic rhinitis, thrombocytopenia, anemia, " portal hypertension, esophageal varices, chronic hepatitis C, alcoholic cirrhosis, ascites, MRSA, GERD and alcohol dependence.      Pre-operative considerations:  1.  Cardiac:  Functional status- METS >4.  Up until his liver ablation procedure in the end of Oct 2019 he was biking and walking regularly for exercise, but since has had to limit his activity due to the required restrictions and other non-specified reasons.  He reports that he can still walk 0.5 miles. He just isn't purposefully exercising.  He is s/p TAVR in Feb 2018 and he had a follow up echocardiogram last month that showed an EF of 65-75%.  He had a cath prior to his TAVR that showed normal coronary arteries.  Hypertension is managed with lisinopril which he will take the evening before the procedure.   Low risk surgery with 0.4% risk of major adverse cardiac event.   2.  Pulm:  Airway feasible.  VIC risk: intermediate.    3.  GI:  Risk of PONV score = 2.  If > 2, anti-emetic intervention recommended.  GERD isn't well controlled at all times.  Will order Bicitra for DOS.  Anesthesia to consider RSI if felt indicated.  Followed by hepatology here for all above liver related conditions.  He has active chronic hepatitis C.  He has declined considering liver transplant.  He has lasix and aldactone prescribed for ascites, but reports he doesn't take them consistently.  Instructed to hold both the DOS.   4. Heme:  He has chronic thrombocytopenia, anemia and elevated INR.  He declined labs today as he is a difficult stick and requested to just have his labs done on 1/27/2019 when he comes in for his next paracentesis.  Labs for that day are visible in Epic.  Last hgb was 12.7, last platelet count was 112 and last INR was 1.24.  Please see lab flowsheet for updated labs next week.  5. ID:  +MRSA.  Contact precautions as indicated.  6. Psych:  Alcohol dependence is still active.  He has decreased his intake and notes that he hasn't had anything since  Silver and at that time it was just one 12oz beer.  He has declined alcohol treatment per previous notes.        VTE risk: 3%    Patient is optimized and is acceptable candidate for the proposed procedure.  No further diagnostic evaluation is needed.     Patient discussed with Dr. Barrera.      **For further details of assessment, testing, and physical exam please see H and P completed on same date.          Raven Ceja DNP, RN, APRN      Reviewed and Signed by PAC Mid-Level Provider/Resident  Mid-Level Provider/Resident: Raven Ceja DNP, RN, APRN  Date: 1/24/2020  Time: 1529    Attending Anesthesiologist Anesthesia Assessment:        Anesthesiologist:   Date:   Time:   Pass/Fail:   Disposition:     PAC Pharmacist Assessment:        Pharmacist:   Date:   Time:    SYDNIE Arango CNP

## 2020-01-24 NOTE — TELEPHONE ENCOUNTER
Pt states that he may be running late to his PAC appt.     I informed him that he needs to call to let them know or reschedule.     Called pt back and informed him of the rescheduling number for pac.    Reminded him again that this needs to be done before Weds 1/29.       Priscila BEARD RN, BSN  Interventional Radiology Nurse Coordinator   Phone: [phone]     Assessment and Plan  Normal / Healthy   - Family Discussion: Feeding and possible baby weight loss were discussed today. Parent questions were answered  - Feeding Breast Feeding and/or Formula ad deni   - Continue routine  care

## 2020-01-27 ENCOUNTER — OFFICE VISIT (OUTPATIENT)
Dept: INFUSION THERAPY | Facility: CLINIC | Age: 60
End: 2020-01-27
Attending: INTERNAL MEDICINE
Payer: COMMERCIAL

## 2020-01-27 ENCOUNTER — ANCILLARY PROCEDURE (OUTPATIENT)
Dept: ULTRASOUND IMAGING | Facility: CLINIC | Age: 60
End: 2020-01-27
Attending: INTERNAL MEDICINE
Payer: COMMERCIAL

## 2020-01-27 VITALS
TEMPERATURE: 97.4 F | OXYGEN SATURATION: 98 % | DIASTOLIC BLOOD PRESSURE: 91 MMHG | SYSTOLIC BLOOD PRESSURE: 142 MMHG | BODY MASS INDEX: 24.65 KG/M2 | WEIGHT: 162.1 LBS

## 2020-01-27 DIAGNOSIS — K70.31 ASCITES DUE TO ALCOHOLIC CIRRHOSIS (H): Primary | ICD-10-CM

## 2020-01-27 LAB
ALBUMIN SERPL-MCNC: 3.7 G/DL (ref 3.4–5)
ALP SERPL-CCNC: 73 U/L (ref 40–150)
ALT SERPL W P-5'-P-CCNC: 54 U/L (ref 0–70)
ANION GAP SERPL CALCULATED.3IONS-SCNC: 9 MMOL/L (ref 3–14)
APTT PPP: 31 SEC (ref 22–37)
AST SERPL W P-5'-P-CCNC: 70 U/L (ref 0–45)
BILIRUB DIRECT SERPL-MCNC: 0.4 MG/DL (ref 0–0.2)
BILIRUB SERPL-MCNC: 1 MG/DL (ref 0.2–1.3)
BUN SERPL-MCNC: 17 MG/DL (ref 7–30)
CALCIUM SERPL-MCNC: 8.6 MG/DL (ref 8.5–10.1)
CHLORIDE SERPL-SCNC: 111 MMOL/L (ref 94–109)
CO2 SERPL-SCNC: 22 MMOL/L (ref 20–32)
CREAT SERPL-MCNC: 0.65 MG/DL (ref 0.66–1.25)
ERYTHROCYTE [DISTWIDTH] IN BLOOD BY AUTOMATED COUNT: 14.6 % (ref 10–15)
GFR SERPL CREATININE-BSD FRML MDRD: >90 ML/MIN/{1.73_M2}
GLUCOSE SERPL-MCNC: 95 MG/DL (ref 70–99)
HCT VFR BLD AUTO: 33.9 % (ref 40–53)
HGB BLD-MCNC: 12.2 G/DL (ref 13.3–17.7)
INR PPP: 1.15 (ref 0.86–1.14)
MCH RBC QN AUTO: 34.2 PG (ref 26.5–33)
MCHC RBC AUTO-ENTMCNC: 36 G/DL (ref 31.5–36.5)
MCV RBC AUTO: 95 FL (ref 78–100)
PLATELET # BLD AUTO: 77 10E9/L (ref 150–450)
POTASSIUM SERPL-SCNC: 3.8 MMOL/L (ref 3.4–5.3)
PROT SERPL-MCNC: 6.7 G/DL (ref 6.8–8.8)
RBC # BLD AUTO: 3.57 10E12/L (ref 4.4–5.9)
SODIUM SERPL-SCNC: 142 MMOL/L (ref 133–144)
WBC # BLD AUTO: 5.7 10E9/L (ref 4–11)

## 2020-01-27 PROCEDURE — P9047 ALBUMIN (HUMAN), 25%, 50ML: HCPCS | Mod: ZF | Performed by: INTERNAL MEDICINE

## 2020-01-27 PROCEDURE — 25000128 H RX IP 250 OP 636: Mod: ZF | Performed by: INTERNAL MEDICINE

## 2020-01-27 PROCEDURE — 85027 COMPLETE CBC AUTOMATED: CPT | Performed by: RADIOLOGY

## 2020-01-27 PROCEDURE — 85610 PROTHROMBIN TIME: CPT | Performed by: RADIOLOGY

## 2020-01-27 PROCEDURE — 80048 BASIC METABOLIC PNL TOTAL CA: CPT | Performed by: RADIOLOGY

## 2020-01-27 PROCEDURE — 80076 HEPATIC FUNCTION PANEL: CPT | Performed by: RADIOLOGY

## 2020-01-27 PROCEDURE — 25000125 ZZHC RX 250: Mod: ZF | Performed by: INTERNAL MEDICINE

## 2020-01-27 PROCEDURE — 85730 THROMBOPLASTIN TIME PARTIAL: CPT | Performed by: RADIOLOGY

## 2020-01-27 PROCEDURE — 27210190 US PARACENTESIS

## 2020-01-27 RX ORDER — LIDOCAINE HYDROCHLORIDE 10 MG/ML
20 INJECTION, SOLUTION EPIDURAL; INFILTRATION; INTRACAUDAL; PERINEURAL ONCE
Status: COMPLETED | OUTPATIENT
Start: 2020-01-27 | End: 2020-01-27

## 2020-01-27 RX ORDER — LIDOCAINE HYDROCHLORIDE 10 MG/ML
20 INJECTION, SOLUTION EPIDURAL; INFILTRATION; INTRACAUDAL; PERINEURAL ONCE
Status: CANCELLED | OUTPATIENT
Start: 2020-02-03

## 2020-01-27 RX ORDER — ALBUMIN (HUMAN) 12.5 G/50ML
12.5 SOLUTION INTRAVENOUS
Status: COMPLETED | OUTPATIENT
Start: 2020-01-27 | End: 2020-01-27

## 2020-01-27 RX ORDER — ALBUMIN (HUMAN) 12.5 G/50ML
12.5 SOLUTION INTRAVENOUS
Status: CANCELLED | OUTPATIENT
Start: 2020-02-03

## 2020-01-27 RX ORDER — HEPARIN SODIUM (PORCINE) LOCK FLUSH IV SOLN 100 UNIT/ML 100 UNIT/ML
5 SOLUTION INTRAVENOUS
Status: CANCELLED | OUTPATIENT
Start: 2020-02-03

## 2020-01-27 RX ORDER — HEPARIN SODIUM,PORCINE 10 UNIT/ML
5 VIAL (ML) INTRAVENOUS
Status: CANCELLED | OUTPATIENT
Start: 2020-02-03

## 2020-01-27 RX ADMIN — ALBUMIN HUMAN 12.5 G: 0.25 SOLUTION INTRAVENOUS at 10:32

## 2020-01-27 RX ADMIN — ALBUMIN HUMAN 12.5 G: 0.25 SOLUTION INTRAVENOUS at 10:14

## 2020-01-27 RX ADMIN — ALBUMIN HUMAN 12.5 G: 0.25 SOLUTION INTRAVENOUS at 10:21

## 2020-01-27 RX ADMIN — ALBUMIN HUMAN 12.5 G: 0.25 SOLUTION INTRAVENOUS at 10:40

## 2020-01-27 RX ADMIN — LIDOCAINE HYDROCHLORIDE 10 ML: 10 INJECTION, SOLUTION EPIDURAL; INFILTRATION; INTRACAUDAL; PERINEURAL at 10:15

## 2020-01-27 NOTE — PROGRESS NOTES
Paracentesis Nursing Note  Ten Johnson presents today to [facility] for a paracentesis.    During today's appointment orders from Dr. Leventhal were completed.    Progress Note:  Patient identification verified by name and date of birth.  Assessment completed.  Vitals monitored throughout appointment and recorded in Doc Flowsheets.  See proceduralist note in ultrasound.    Vascular Access: peripheral IV placed today.  Labs: were drawn per orders. Pre op labs from Dr. Arteaga drawn per pt request. Note on today's appt requests to draw Dr. Clark's labs, however there are no lab orders for Dr. Clark.    Date of consent or authorization: 11/13/19.  Invasive Procedure Safety Checklist was completed and sent for scanning.     Paracentesis performed by Jason Britt PA-C Radiology.    The following labs were communicated to provider performing paracentesis:  Lab Results   Component Value Date     01/17/2020       Total amount of ascites fluid drained: 4.0 liters.  Color of ascites fluid: yellow and clear.  Total amount of albumin given: 50  grams.    Patient tolerated procedure well.    Post procedure,pt left with the same joint pain he arrived with.      Discharge Plan:  Discharge instructions were reviewed with patient.  Patient/Representative verbalized understanding and all questions were answered.   Discharged from [facility] in stable condition.    Yamila Armstrong, JAMES    Administrations This Visit     albumin human 25 % injection 12.5 g     Admin Date  01/27/2020 Action  New Bag Dose  12.5 g Route  Intravenous Administered By  Yamila Armstrong, JAMES           Admin Date  01/27/2020 Action  New Bag Dose  12.5 g Route  Intravenous Administered By  Yamila Armstrong, JAMES           Admin Date  01/27/2020 Action  New Bag Dose  12.5 g Route  Intravenous Administered By  Yamila Armstrong, JAMES           Admin Date  01/27/2020 Action  New Bag Dose  12.5 g Route  Intravenous  Administered By  Yamila Armstrong, RN          lidocaine (PF) (XYLOCAINE) 1 % injection 20 mL     Admin Date  01/27/2020 Action  Given by Other Clinician Dose  10 mL Route  Subcutaneous Administered By  Yamila Armstrong, RN                BP (!) (P) 159/99   Temp 97.4  F (36.3  C)   Wt 77.9 kg (171 lb 12.8 oz)   SpO2 98%   BMI 26.12 kg/m

## 2020-01-28 ENCOUNTER — OFFICE VISIT (OUTPATIENT)
Dept: RADIOLOGY | Facility: CLINIC | Age: 60
End: 2020-01-28
Attending: INTERNAL MEDICINE
Payer: COMMERCIAL

## 2020-01-28 VITALS
HEIGHT: 68 IN | SYSTOLIC BLOOD PRESSURE: 146 MMHG | BODY MASS INDEX: 25.2 KG/M2 | DIASTOLIC BLOOD PRESSURE: 89 MMHG | HEART RATE: 60 BPM | OXYGEN SATURATION: 98 % | WEIGHT: 166.3 LBS | RESPIRATION RATE: 16 BRPM | TEMPERATURE: 98.4 F

## 2020-01-28 DIAGNOSIS — C22.8 PRIMARY MALIGNANT NEOPLASM OF LIVER (H): Primary | ICD-10-CM

## 2020-01-28 PROCEDURE — G0463 HOSPITAL OUTPT CLINIC VISIT: HCPCS | Mod: ZF

## 2020-01-28 ASSESSMENT — PAIN SCALES - GENERAL: PAINLEVEL: EXTREME PAIN (9)

## 2020-01-28 ASSESSMENT — MIFFLIN-ST. JEOR: SCORE: 1533.7

## 2020-01-28 NOTE — LETTER
1/28/2020       RE: Ten Johnson  [address]  [address]  [facility] [address]     Dear Colleague,    Thank you for referring your patient, Ten Johnson, to the [facility]. Please see a copy of my visit note below.    Mr. Johnson is a 60 year-old patient with HCC and a growing IRAD 4 lesion who is referred for evaluation for liver directed therapy. He has had a few ablations in the past. His case was discussed at the tumor board and the recommendation was a cTACE.  The patient's ECOG is 0. He is doing well and would like to take care of this lesion ASAP. I reviewed the images and the lab, and discussed with the patient the risks and benefits of the procedure.      Past Medical History:   Diagnosis Date     JENNIFER (acute kidney injury) (H) 4/9/2019     Alcohol use disorder      Alcoholic cirrhosis (H)      Anticoagulant long-term use     plavix     Aortic stenosis, severe 2/21/2018     Ascites      Chronic allergic rhinitis      Chronic anemia      Chronic hepatitis C without hepatic coma (H) 05/10/2016    Untreated as of 2/2018     Cirrhosis (H) 2017    MRI finding     Diastolic dysfunction      Erosive gastropathy      Esophageal varices in alcoholic cirrhosis (H)      H/O upper gastrointestinal hemorrhage 09/2017     Hepatocellular carcinoma (H)      History of blood transfusion      History of drug abuse (H)     intranasal     Hypertension     essential     JANELLE (iron deficiency anemia)      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     Sarahi-Hoffman tear     History     Marijuana abuse      MRSA (methicillin resistant Staphylococcus aureus)      Nonrheumatic aortic valve stenosis 2/20/2018     Olecranon bursitis      Portal hypertension (H)      Right shoulder pain     history of rotator cuff repair     S/p TAVR (transcatheter aortic valve replacement), bioprosthetic      Severe aortic stenosis      Thrombocytopenia (H)        Past  Surgical History:   Procedure Laterality Date     ABLATE LIVER TUMOR N/A 10/22/2019    Procedure: Ablate liver tumor x3;  Surgeon: Gregorio Benoit MD;  [facility]     ARTHROSCOPY SHOULDER ROTATOR CUFF REPAIR  7/31/2012    Procedure: ARTHROSCOPY SHOULDER ROTATOR CUFF REPAIR;  Right Shoulder Arthroscopic Rotator Cuff Repair, BicepsTenodesis,  Subacromial Decompression ;  Surgeon: Joi Castillo MD;  [facility]     ESOPHAGOSCOPY, GASTROSCOPY, DUODENOSCOPY (EGD), COMBINED N/A 10/23/2017    Procedure: COMBINED ESOPHAGOSCOPY, GASTROSCOPY, DUODENOSCOPY (EGD);;  Surgeon: Gentry Salas MD;  [facility]     EXCHANGE POLY COMPONENT ARTHROPLASTY KNEE Right 3/4/2019    Procedure: REVISION RIGHT TOTAL KNEE POLY COMPONENT EXCHANGE;  Surgeon: Olvin Joe MD;  [facility]     FACIAL RECONSTRUCTION SURGERY  1971     HEART CATH FEMORAL CANNULIZATION WITH OPEN STANDBY REPAIR AORTIC VALVE N/A 2/21/2018    Procedure: HEART CATH FEMORAL CANNULIZATION WITH OPEN STANDBY REPAIR AORTIC VALVE;;  Surgeon: Luis Baird MD;  [facility]     IR LIVER BIOPSY PERCUTANEOUS  7/18/2019     IRRIGATION AND DEBRIDEMENT UPPER EXTREMITY, COMBINED  1/3/2012    Procedure:COMBINED IRRIGATION AND DEBRIDEMENT UPPER EXTREMITY; Irrigation & Debridement Left Elbow; Surgeon:CRISTHIAN ZHOU; [facility]     LAPAROSCOPIC BIOPSY LIVER N/A 10/22/2019    Procedure: intraoperative liver ultrasound, laparoscopic converted to open liver biopsy x 6;  Surgeon: Gregorio Benoit MD;  [facility]     LAPAROSCOPY DIAGNOSTIC (GENERAL) N/A 10/22/2019    Procedure: Diagnostic laparoscopy;  Surgeon: Gregorio Benoit MD;  [facility]     LAPAROTOMY, LYSIS ADHESIONS, COMBINED N/A 10/22/2019    Procedure: Laparotomy, lysis adhesions, combined;  Surgeon: Gregorio Benoit MD;  [facility]     OPTICAL TRACKING SYSTEM ARTHROPLASTY KNEE Right 2/7/2019    Procedure: ARTHROPLASTY KNEE RIGHT;  Surgeon: Olvin Joe,  MD;  [facility]     REPAIR TENDON TRICEPS UPPER EXTREMITY  11/8/2011    Procedure:REPAIR TENDON TRICEPS UPPER EXTREMITY; Surgeon:CRISTHIAN ZHOU; [facility]     SHOULDER SURGERY  2003    left, injury, torn tendons, hematoma     TRANSCATHETER AORTIC VALVE IMPLANT ANESTHESIA N/A 2/21/2018    Procedure: TRANSCATHETER AORTIC VALVE IMPLANT ANESTHESIA;  Transfemoral (Quiroz) Aortic Valve Implant 26mm MARTHA 3, with Cardiopulmonary Bypass Standby, transthoracic echocardiogram;  Surgeon: GENERIC ANESTHESIA PROVIDER;  [facility]     TRANSPOSITION ULNAR NERVE (ELBOW)  11/8/2011    Procedure:TRANSPOSITION ULNAR NERVE (ELBOW); Final Procedure Done: Left Elbow Lateral Ulnar Collateral Repair And  Left Elbow Triceps Repair         Family History   Problem Relation Age of Onset     Cancer Mother 62        unknown primary     Alcoholism Paternal Uncle      Unknown/Adopted Father      No Known Problems Brother      Diabetes Maternal Grandmother      Myocardial Infarction Maternal Grandfather      No Known Problems Paternal Grandmother      Unknown/Adopted Paternal Grandfather      Cirrhosis No family hx of        Social History     Tobacco Use     Smoking status: Never Smoker     Smokeless tobacco: Never Used   Substance Use Topics     Alcohol use: Not Currently     Comment: Alcohol use disorder, still actively drinking     Impression and Plan:    Patient with history of HCC with ablations and a growing IRAD 4 lesion, referred by Dr. Leventhal for cTACE. The patient is a good candidate. He would like the procedure to be performed under general.    Again, thank you for allowing me to participate in the care of your patient.      Sincerely,    Tomi Arteaga MD

## 2020-01-28 NOTE — PROGRESS NOTES
Mr. Johnson is a 60 year-old patient with HCC and a growing IRAD 4 lesion who is referred for evaluation for liver directed therapy. He has had a few ablations in the past. His case was discussed at the tumor board and the recommendation was a cTACE.  The patient's ECOG is 0. He is doing well and would like to take care of this lesion ASAP. I reviewed the images and the lab, and discussed with the patient the risks and benefits of the procedure.      Past Medical History:   Diagnosis Date     JENNIFER (acute kidney injury) (H) 4/9/2019     Alcohol use disorder      Alcoholic cirrhosis (H)      Anticoagulant long-term use     plavix     Aortic stenosis, severe 2/21/2018     Ascites      Chronic allergic rhinitis      Chronic anemia      Chronic hepatitis C without hepatic coma (H) 05/10/2016    Untreated as of 2/2018     Cirrhosis (H) 2017    MRI finding     Diastolic dysfunction      Erosive gastropathy      Esophageal varices in alcoholic cirrhosis (H)      H/O upper gastrointestinal hemorrhage 09/2017     Hepatocellular carcinoma (H)      History of blood transfusion      History of drug abuse (H)     intranasal     Hypertension     essential     JANELLE (iron deficiency anemia)      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     Sarahi-Hoffman tear     History     Marijuana abuse      MRSA (methicillin resistant Staphylococcus aureus)      Nonrheumatic aortic valve stenosis 2/20/2018     Olecranon bursitis      Portal hypertension (H)      Right shoulder pain     history of rotator cuff repair     S/p TAVR (transcatheter aortic valve replacement), bioprosthetic      Severe aortic stenosis      Thrombocytopenia (H)        Past Surgical History:   Procedure Laterality Date     ABLATE LIVER TUMOR N/A 10/22/2019    Procedure: Ablate liver tumor x3;  Surgeon: Gregorio Benoit MD;  [facility]     ARTHROSCOPY SHOULDER ROTATOR CUFF REPAIR  7/31/2012    Procedure: ARTHROSCOPY  SHOULDER ROTATOR CUFF REPAIR;  Right Shoulder Arthroscopic Rotator Cuff Repair, BicepsTenodesis,  Subacromial Decompression ;  Surgeon: Joi Castillo MD;  [facility]     ESOPHAGOSCOPY, GASTROSCOPY, DUODENOSCOPY (EGD), COMBINED N/A 10/23/2017    Procedure: COMBINED ESOPHAGOSCOPY, GASTROSCOPY, DUODENOSCOPY (EGD);;  Surgeon: Gentry Salas MD;  [facility]     EXCHANGE POLY COMPONENT ARTHROPLASTY KNEE Right 3/4/2019    Procedure: REVISION RIGHT TOTAL KNEE POLY COMPONENT EXCHANGE;  Surgeon: Olvin Joe MD;  [facility]     FACIAL RECONSTRUCTION SURGERY  1971     HEART CATH FEMORAL CANNULIZATION WITH OPEN STANDBY REPAIR AORTIC VALVE N/A 2/21/2018    Procedure: HEART CATH FEMORAL CANNULIZATION WITH OPEN STANDBY REPAIR AORTIC VALVE;;  Surgeon: Luis Baird MD;  [facility]     IR LIVER BIOPSY PERCUTANEOUS  7/18/2019     IRRIGATION AND DEBRIDEMENT UPPER EXTREMITY, COMBINED  1/3/2012    Procedure:COMBINED IRRIGATION AND DEBRIDEMENT UPPER EXTREMITY; Irrigation & Debridement Left Elbow; Surgeon:CRISTHIAN ZHOU; [facility]     LAPAROSCOPIC BIOPSY LIVER N/A 10/22/2019    Procedure: intraoperative liver ultrasound, laparoscopic converted to open liver biopsy x 6;  Surgeon: Gregorio Benoit MD;  [facility]     LAPAROSCOPY DIAGNOSTIC (GENERAL) N/A 10/22/2019    Procedure: Diagnostic laparoscopy;  Surgeon: Gregorio Benoit MD;  [facility]     LAPAROTOMY, LYSIS ADHESIONS, COMBINED N/A 10/22/2019    Procedure: Laparotomy, lysis adhesions, combined;  Surgeon: Gregorio Benoit MD;  [facility]     OPTICAL TRACKING SYSTEM ARTHROPLASTY KNEE Right 2/7/2019    Procedure: ARTHROPLASTY KNEE RIGHT;  Surgeon: Olvin Joe MD;  [facility]     REPAIR TENDON TRICEPS UPPER EXTREMITY  11/8/2011    Procedure:REPAIR TENDON TRICEPS UPPER EXTREMITY; Surgeon:CRISTHIAN ZHOU; [facility]     SHOULDER SURGERY  2003    left, injury, torn tendons, hematoma      TRANSCATHETER AORTIC VALVE IMPLANT ANESTHESIA N/A 2/21/2018    Procedure: TRANSCATHETER AORTIC VALVE IMPLANT ANESTHESIA;  Transfemoral (Quiroz) Aortic Valve Implant 26mm MARTHA 3, with Cardiopulmonary Bypass Standby, transthoracic echocardiogram;  Surgeon: GENERIC ANESTHESIA PROVIDER;  [facility]     TRANSPOSITION ULNAR NERVE (ELBOW)  11/8/2011    Procedure:TRANSPOSITION ULNAR NERVE (ELBOW); Final Procedure Done: Left Elbow Lateral Ulnar Collateral Repair And  Left Elbow Triceps Repair         Family History   Problem Relation Age of Onset     Cancer Mother 62        unknown primary     Alcoholism Paternal Uncle      Unknown/Adopted Father      No Known Problems Brother      Diabetes Maternal Grandmother      Myocardial Infarction Maternal Grandfather      No Known Problems Paternal Grandmother      Unknown/Adopted Paternal Grandfather      Cirrhosis No family hx of        Social History     Tobacco Use     Smoking status: Never Smoker     Smokeless tobacco: Never Used   Substance Use Topics     Alcohol use: Not Currently     Comment: Alcohol use disorder, still actively drinking     Impression and Plan:    Patient with history of HCC with ablations and a growing IRAD 4 lesion, referred by Dr. Leventhal for cTACE. The patient is a good candidate. He would like the procedure to be performed under general.

## 2020-01-28 NOTE — NURSING NOTE
"Oncology Rooming Note    January 28, 2020 1:48 PM   Ten Johnson is a 59 year old male who presents for:    Chief Complaint   Patient presents with     Oncology Clinic Visit     [facility]     Initial Vitals: BP (!) 146/89 (BP Location: Right arm, Patient Position: Chair, Cuff Size: Adult Regular)   Pulse 60   Temp 98.4  F (36.9  C)   Resp 16   Ht 1.727 m (5' 7.99\")   Wt 75.4 kg (166 lb 4.8 oz)   SpO2 98%   BMI 25.29 kg/m   Estimated body mass index is 25.29 kg/m  as calculated from the following:    Height as of this encounter: 1.727 m (5' 7.99\").    Weight as of this encounter: 75.4 kg (166 lb 4.8 oz). Body surface area is 1.9 meters squared.  Extreme Pain (9) Comment: Data Unavailable   No LMP for male patient.  Allergies reviewed: Yes  Medications reviewed: Yes    Medications: Medication refills not needed today.  Pharmacy name entered into [facility]:    [facility] [place], [place] - [phone]  [id number] - [place], [place] - [address]    Clinical concerns: Patient fell on ice last week and hurt his tailbone 8/10 pain. Dr. Arteaga was notified.      Devin Boyd LPN            "

## 2020-01-29 ENCOUNTER — APPOINTMENT (OUTPATIENT)
Dept: INTERVENTIONAL RADIOLOGY/VASCULAR | Facility: CLINIC | Age: 60
End: 2020-01-29
Attending: RADIOLOGY
Payer: COMMERCIAL

## 2020-01-29 ENCOUNTER — ANESTHESIA (OUTPATIENT)
Dept: SURGERY | Facility: CLINIC | Age: 60
End: 2020-01-29
Payer: COMMERCIAL

## 2020-01-29 ENCOUNTER — HOSPITAL ENCOUNTER (OUTPATIENT)
Facility: CLINIC | Age: 60
Setting detail: OBSERVATION
Discharge: HOME | End: 2020-01-30
Attending: ANESTHESIOLOGY | Admitting: INTERNAL MEDICINE
Payer: COMMERCIAL

## 2020-01-29 DIAGNOSIS — C22.0 HCC (HEPATOCELLULAR CARCINOMA) (H): ICD-10-CM

## 2020-01-29 DIAGNOSIS — K76.9 LIVER LESION: ICD-10-CM

## 2020-01-29 DIAGNOSIS — Z91.09 ENVIRONMENTAL ALLERGIES: ICD-10-CM

## 2020-01-29 LAB
ABO + RH BLD: NORMAL
ABO + RH BLD: NORMAL
ALBUMIN SERPL-MCNC: 3.1 G/DL (ref 3.4–5)
ALP SERPL-CCNC: 75 U/L (ref 40–150)
ALT SERPL W P-5'-P-CCNC: 60 U/L (ref 0–70)
ANION GAP SERPL CALCULATED.3IONS-SCNC: 6 MMOL/L (ref 3–14)
AST SERPL W P-5'-P-CCNC: 76 U/L (ref 0–45)
BILIRUB SERPL-MCNC: 0.8 MG/DL (ref 0.2–1.3)
BLD GP AB SCN SERPL QL: NORMAL
BLOOD BANK CMNT PATIENT-IMP: NORMAL
BUN SERPL-MCNC: 17 MG/DL (ref 7–30)
CALCIUM SERPL-MCNC: 8.2 MG/DL (ref 8.5–10.1)
CHLORIDE SERPL-SCNC: 112 MMOL/L (ref 94–109)
CO2 SERPL-SCNC: 22 MMOL/L (ref 20–32)
CREAT SERPL-MCNC: 0.71 MG/DL (ref 0.66–1.25)
CREAT SERPL-MCNC: 0.94 MG/DL (ref 0.66–1.25)
GFR SERPL CREATININE-BSD FRML MDRD: 87 ML/MIN/{1.73_M2}
GFR SERPL CREATININE-BSD FRML MDRD: >90 ML/MIN/{1.73_M2}
GLUCOSE BLDC GLUCOMTR-MCNC: 102 MG/DL (ref 70–99)
GLUCOSE SERPL-MCNC: 121 MG/DL (ref 70–99)
MAGNESIUM SERPL-MCNC: 1.8 MG/DL (ref 1.6–2.3)
PHOSPHATE SERPL-MCNC: 3.4 MG/DL (ref 2.5–4.5)
PLATELET # BLD AUTO: 96 10E9/L (ref 150–450)
POTASSIUM SERPL-SCNC: 4 MMOL/L (ref 3.4–5.3)
POTASSIUM SERPL-SCNC: 4.4 MMOL/L (ref 3.4–5.3)
PROT SERPL-MCNC: 6.2 G/DL (ref 6.8–8.8)
SODIUM SERPL-SCNC: 140 MMOL/L (ref 133–144)
SPECIMEN EXP DATE BLD: NORMAL

## 2020-01-29 PROCEDURE — G0378 HOSPITAL OBSERVATION PER HR: HCPCS

## 2020-01-29 PROCEDURE — 25000125 ZZHC RX 250: Performed by: STUDENT IN AN ORGANIZED HEALTH CARE EDUCATION/TRAINING PROGRAM

## 2020-01-29 PROCEDURE — 27210780 ZZH KIT CR3

## 2020-01-29 PROCEDURE — 25000128 H RX IP 250 OP 636: Performed by: STUDENT IN AN ORGANIZED HEALTH CARE EDUCATION/TRAINING PROGRAM

## 2020-01-29 PROCEDURE — 36415 COLL VENOUS BLD VENIPUNCTURE: CPT | Performed by: PHYSICIAN ASSISTANT

## 2020-01-29 PROCEDURE — 25000128 H RX IP 250 OP 636: Performed by: NURSE ANESTHETIST, CERTIFIED REGISTERED

## 2020-01-29 PROCEDURE — 80053 COMPREHEN METABOLIC PANEL: CPT | Performed by: PHYSICIAN ASSISTANT

## 2020-01-29 PROCEDURE — 27210804 ZZH SHEATH CR3

## 2020-01-29 PROCEDURE — C1769 GUIDE WIRE: HCPCS

## 2020-01-29 PROCEDURE — 36248 INS CATH ABD/L-EXT ART ADDL: CPT

## 2020-01-29 PROCEDURE — 71000015 ZZH RECOVERY PHASE 1 LEVEL 2 EA ADDTL HR

## 2020-01-29 PROCEDURE — 40000170 ZZH STATISTIC PRE-PROCEDURE ASSESSMENT II

## 2020-01-29 PROCEDURE — 27210732 ZZH ACCESSORY CR1

## 2020-01-29 PROCEDURE — 25800030 ZZH RX IP 258 OP 636: Performed by: STUDENT IN AN ORGANIZED HEALTH CARE EDUCATION/TRAINING PROGRAM

## 2020-01-29 PROCEDURE — 86901 BLOOD TYPING SEROLOGIC RH(D): CPT | Performed by: ANESTHESIOLOGY

## 2020-01-29 PROCEDURE — 99218 ZZC INITIAL OBSERVATION CARE,LEVL I: CPT | Mod: AI | Performed by: INTERNAL MEDICINE

## 2020-01-29 PROCEDURE — 86850 RBC ANTIBODY SCREEN: CPT | Performed by: ANESTHESIOLOGY

## 2020-01-29 PROCEDURE — 27210908 ZZH NEEDLE CR4

## 2020-01-29 PROCEDURE — 86900 BLOOD TYPING SEROLOGIC ABO: CPT | Performed by: ANESTHESIOLOGY

## 2020-01-29 PROCEDURE — 25000128 H RX IP 250 OP 636: Performed by: ANESTHESIOLOGY

## 2020-01-29 PROCEDURE — 85049 AUTOMATED PLATELET COUNT: CPT | Performed by: ANESTHESIOLOGY

## 2020-01-29 PROCEDURE — 75726 ARTERY X-RAYS ABDOMEN: CPT | Mod: XU

## 2020-01-29 PROCEDURE — 82565 ASSAY OF CREATININE: CPT | Performed by: ANESTHESIOLOGY

## 2020-01-29 PROCEDURE — 25000125 ZZHC RX 250: Performed by: NURSE ANESTHETIST, CERTIFIED REGISTERED

## 2020-01-29 PROCEDURE — 25000128 H RX IP 250 OP 636: Performed by: RADIOLOGY

## 2020-01-29 PROCEDURE — C1887 CATHETER, GUIDING: HCPCS

## 2020-01-29 PROCEDURE — 37000009 ZZH ANESTHESIA TECHNICAL FEE, EACH ADDTL 15 MIN

## 2020-01-29 PROCEDURE — 75774 ARTERY X-RAY EACH VESSEL: CPT

## 2020-01-29 PROCEDURE — 71000014 ZZH RECOVERY PHASE 1 LEVEL 2 FIRST HR

## 2020-01-29 PROCEDURE — 84132 ASSAY OF SERUM POTASSIUM: CPT | Mod: 91 | Performed by: ANESTHESIOLOGY

## 2020-01-29 PROCEDURE — 25000125 ZZHC RX 250: Performed by: ANESTHESIOLOGY

## 2020-01-29 PROCEDURE — 00000146 ZZHCL STATISTIC GLUCOSE BY METER IP

## 2020-01-29 PROCEDURE — 83735 ASSAY OF MAGNESIUM: CPT | Performed by: PHYSICIAN ASSISTANT

## 2020-01-29 PROCEDURE — 25800030 ZZH RX IP 258 OP 636: Performed by: ANESTHESIOLOGY

## 2020-01-29 PROCEDURE — 36247 INS CATH ABD/L-EXT ART 3RD: CPT

## 2020-01-29 PROCEDURE — 37000008 ZZH ANESTHESIA TECHNICAL FEE, 1ST 30 MIN

## 2020-01-29 PROCEDURE — 25500064 ZZH RX 255 OP 636: Performed by: RADIOLOGY

## 2020-01-29 PROCEDURE — 25000566 ZZH SEVOFLURANE, EA 15 MIN

## 2020-01-29 PROCEDURE — 96420 CHEMO IA PUSH TECNIQUE: CPT

## 2020-01-29 PROCEDURE — 25000132 ZZH RX MED GY IP 250 OP 250 PS 637: Performed by: PHYSICIAN ASSISTANT

## 2020-01-29 PROCEDURE — 37243 VASC EMBOLIZE/OCCLUDE ORGAN: CPT

## 2020-01-29 PROCEDURE — 84100 ASSAY OF PHOSPHORUS: CPT | Performed by: PHYSICIAN ASSISTANT

## 2020-01-29 PROCEDURE — 27210889 ZZH ACCESSORY CR8

## 2020-01-29 RX ORDER — POTASSIUM CHLORIDE 750 MG/1
20-40 TABLET, EXTENDED RELEASE ORAL
Status: DISCONTINUED | OUTPATIENT
Start: 2020-01-29 | End: 2020-01-30 | Stop reason: HOSPADM

## 2020-01-29 RX ORDER — SODIUM CHLORIDE 9 MG/ML
INJECTION, SOLUTION INTRAVENOUS CONTINUOUS
Status: DISCONTINUED | OUTPATIENT
Start: 2020-01-29 | End: 2020-01-29

## 2020-01-29 RX ORDER — LIDOCAINE HYDROCHLORIDE 20 MG/ML
INJECTION, SOLUTION INFILTRATION; PERINEURAL PRN
Status: DISCONTINUED | OUTPATIENT
Start: 2020-01-29 | End: 2020-01-29

## 2020-01-29 RX ORDER — NITROGLYCERIN 5 MG/ML
100-500 VIAL (ML) INTRAVENOUS
Status: DISCONTINUED | OUTPATIENT
Start: 2020-01-29 | End: 2020-01-30

## 2020-01-29 RX ORDER — ONDANSETRON 2 MG/ML
4 INJECTION INTRAMUSCULAR; INTRAVENOUS EVERY 30 MIN PRN
Status: DISCONTINUED | OUTPATIENT
Start: 2020-01-29 | End: 2020-01-29

## 2020-01-29 RX ORDER — ONDANSETRON 2 MG/ML
4 INJECTION INTRAMUSCULAR; INTRAVENOUS EVERY 6 HOURS PRN
Status: DISCONTINUED | OUTPATIENT
Start: 2020-01-29 | End: 2020-01-30 | Stop reason: HOSPADM

## 2020-01-29 RX ORDER — CITRIC ACID/SODIUM CITRATE 334-500MG
30 SOLUTION, ORAL ORAL
Status: DISCONTINUED | OUTPATIENT
Start: 2020-01-29 | End: 2020-01-30 | Stop reason: HOSPADM

## 2020-01-29 RX ORDER — DOCUSATE SODIUM 100 MG/1
100 CAPSULE, LIQUID FILLED ORAL 2 TIMES DAILY
Status: DISCONTINUED | OUTPATIENT
Start: 2020-01-29 | End: 2020-01-30 | Stop reason: HOSPADM

## 2020-01-29 RX ORDER — POTASSIUM CHLORIDE 7.45 MG/ML
10 INJECTION INTRAVENOUS
Status: DISCONTINUED | OUTPATIENT
Start: 2020-01-29 | End: 2020-01-30 | Stop reason: HOSPADM

## 2020-01-29 RX ORDER — DEXTROSE MONOHYDRATE 25 G/50ML
25-50 INJECTION, SOLUTION INTRAVENOUS
Status: DISCONTINUED | OUTPATIENT
Start: 2020-01-29 | End: 2020-01-30 | Stop reason: HOSPADM

## 2020-01-29 RX ORDER — HEPARIN SOD,PORCINE/0.9 % NACL 5K/1000 ML
1000-10000 INTRAVENOUS SOLUTION INTRAVENOUS
Status: COMPLETED | OUTPATIENT
Start: 2020-01-29 | End: 2020-01-29

## 2020-01-29 RX ORDER — ONDANSETRON 4 MG/1
4 TABLET, ORALLY DISINTEGRATING ORAL EVERY 30 MIN PRN
Status: DISCONTINUED | OUTPATIENT
Start: 2020-01-29 | End: 2020-01-29

## 2020-01-29 RX ORDER — MAGNESIUM SULFATE HEPTAHYDRATE 40 MG/ML
4 INJECTION, SOLUTION INTRAVENOUS EVERY 4 HOURS PRN
Status: DISCONTINUED | OUTPATIENT
Start: 2020-01-29 | End: 2020-01-30 | Stop reason: HOSPADM

## 2020-01-29 RX ORDER — SODIUM CHLORIDE, SODIUM LACTATE, POTASSIUM CHLORIDE, CALCIUM CHLORIDE 600; 310; 30; 20 MG/100ML; MG/100ML; MG/100ML; MG/100ML
INJECTION, SOLUTION INTRAVENOUS CONTINUOUS
Status: DISCONTINUED | OUTPATIENT
Start: 2020-01-29 | End: 2020-01-29

## 2020-01-29 RX ORDER — DEXAMETHASONE SODIUM PHOSPHATE 4 MG/ML
INJECTION, SOLUTION INTRA-ARTICULAR; INTRALESIONAL; INTRAMUSCULAR; INTRAVENOUS; SOFT TISSUE PRN
Status: DISCONTINUED | OUTPATIENT
Start: 2020-01-29 | End: 2020-01-29

## 2020-01-29 RX ORDER — LISINOPRIL 20 MG/1
20 TABLET ORAL AT BEDTIME
Status: DISCONTINUED | OUTPATIENT
Start: 2020-01-29 | End: 2020-01-30 | Stop reason: HOSPADM

## 2020-01-29 RX ORDER — NALOXONE HYDROCHLORIDE 0.4 MG/ML
.1-.4 INJECTION, SOLUTION INTRAMUSCULAR; INTRAVENOUS; SUBCUTANEOUS
Status: DISCONTINUED | OUTPATIENT
Start: 2020-01-29 | End: 2020-01-30

## 2020-01-29 RX ORDER — POTASSIUM CHLORIDE 1.5 G/1.58G
20-40 POWDER, FOR SOLUTION ORAL
Status: DISCONTINUED | OUTPATIENT
Start: 2020-01-29 | End: 2020-01-30 | Stop reason: HOSPADM

## 2020-01-29 RX ORDER — SCOLOPAMINE TRANSDERMAL SYSTEM 1 MG/1
1 PATCH, EXTENDED RELEASE TRANSDERMAL ONCE
Status: DISCONTINUED | OUTPATIENT
Start: 2020-01-29 | End: 2020-01-30 | Stop reason: HOSPADM

## 2020-01-29 RX ORDER — LIDOCAINE 40 MG/G
CREAM TOPICAL
Status: DISCONTINUED | OUTPATIENT
Start: 2020-01-29 | End: 2020-01-30 | Stop reason: HOSPADM

## 2020-01-29 RX ORDER — FENTANYL CITRATE 50 UG/ML
25-50 INJECTION, SOLUTION INTRAMUSCULAR; INTRAVENOUS
Status: DISCONTINUED | OUTPATIENT
Start: 2020-01-29 | End: 2020-01-30

## 2020-01-29 RX ORDER — IODIXANOL 320 MG/ML
150 INJECTION, SOLUTION INTRAVASCULAR ONCE
Status: COMPLETED | OUTPATIENT
Start: 2020-01-29 | End: 2020-01-29

## 2020-01-29 RX ORDER — LIDOCAINE HYDROCHLORIDE 10 MG/ML
1-30 INJECTION, SOLUTION EPIDURAL; INFILTRATION; INTRACAUDAL; PERINEURAL
Status: COMPLETED | OUTPATIENT
Start: 2020-01-29 | End: 2020-01-29

## 2020-01-29 RX ORDER — FENTANYL CITRATE 50 UG/ML
INJECTION, SOLUTION INTRAMUSCULAR; INTRAVENOUS PRN
Status: DISCONTINUED | OUTPATIENT
Start: 2020-01-29 | End: 2020-01-29

## 2020-01-29 RX ORDER — NICOTINE POLACRILEX 4 MG
15-30 LOZENGE BUCCAL
Status: DISCONTINUED | OUTPATIENT
Start: 2020-01-29 | End: 2020-01-30 | Stop reason: HOSPADM

## 2020-01-29 RX ORDER — EPHEDRINE SULFATE 50 MG/ML
INJECTION, SOLUTION INTRAMUSCULAR; INTRAVENOUS; SUBCUTANEOUS PRN
Status: DISCONTINUED | OUTPATIENT
Start: 2020-01-29 | End: 2020-01-29

## 2020-01-29 RX ORDER — ONDANSETRON 2 MG/ML
4 INJECTION INTRAMUSCULAR; INTRAVENOUS ONCE
Status: DISCONTINUED | OUTPATIENT
Start: 2020-01-29 | End: 2020-01-30

## 2020-01-29 RX ORDER — POTASSIUM CHLORIDE 29.8 MG/ML
20 INJECTION INTRAVENOUS
Status: DISCONTINUED | OUTPATIENT
Start: 2020-01-29 | End: 2020-01-30 | Stop reason: HOSPADM

## 2020-01-29 RX ORDER — FLUTICASONE PROPIONATE 50 MCG
2 SPRAY, SUSPENSION (ML) NASAL
Status: DISCONTINUED | OUTPATIENT
Start: 2020-01-29 | End: 2020-01-30 | Stop reason: HOSPADM

## 2020-01-29 RX ORDER — POTASSIUM CL/LIDO/0.9 % NACL 10MEQ/0.1L
10 INTRAVENOUS SOLUTION, PIGGYBACK (ML) INTRAVENOUS
Status: DISCONTINUED | OUTPATIENT
Start: 2020-01-29 | End: 2020-01-30 | Stop reason: HOSPADM

## 2020-01-29 RX ORDER — PROCHLORPERAZINE MALEATE 5 MG
10 TABLET ORAL EVERY 6 HOURS PRN
Status: DISCONTINUED | OUTPATIENT
Start: 2020-01-29 | End: 2020-01-30 | Stop reason: HOSPADM

## 2020-01-29 RX ORDER — AMPICILLIN AND SULBACTAM 2; 1 G/1; G/1
3 INJECTION, POWDER, FOR SOLUTION INTRAMUSCULAR; INTRAVENOUS
Status: COMPLETED | OUTPATIENT
Start: 2020-01-29 | End: 2020-01-29

## 2020-01-29 RX ORDER — DEXTROSE MONOHYDRATE 25 G/50ML
25-50 INJECTION, SOLUTION INTRAVENOUS
Status: DISCONTINUED | OUTPATIENT
Start: 2020-01-29 | End: 2020-01-30

## 2020-01-29 RX ORDER — GLYCOPYRROLATE 0.2 MG/ML
INJECTION, SOLUTION INTRAMUSCULAR; INTRAVENOUS PRN
Status: DISCONTINUED | OUTPATIENT
Start: 2020-01-29 | End: 2020-01-29

## 2020-01-29 RX ORDER — SPIRONOLACTONE 100 MG/1
100 TABLET, FILM COATED ORAL DAILY
Status: DISCONTINUED | OUTPATIENT
Start: 2020-01-30 | End: 2020-01-30 | Stop reason: HOSPADM

## 2020-01-29 RX ORDER — HYDROMORPHONE HYDROCHLORIDE 1 MG/ML
.3-.5 INJECTION, SOLUTION INTRAMUSCULAR; INTRAVENOUS; SUBCUTANEOUS
Status: DISCONTINUED | OUTPATIENT
Start: 2020-01-29 | End: 2020-01-30 | Stop reason: HOSPADM

## 2020-01-29 RX ORDER — NICOTINE POLACRILEX 4 MG
15-30 LOZENGE BUCCAL
Status: DISCONTINUED | OUTPATIENT
Start: 2020-01-29 | End: 2020-01-30

## 2020-01-29 RX ORDER — OXYCODONE HYDROCHLORIDE 10 MG/1
10 TABLET ORAL EVERY 4 HOURS PRN
Status: DISCONTINUED | OUTPATIENT
Start: 2020-01-29 | End: 2020-01-30 | Stop reason: HOSPADM

## 2020-01-29 RX ORDER — PROPOFOL 10 MG/ML
INJECTION, EMULSION INTRAVENOUS PRN
Status: DISCONTINUED | OUTPATIENT
Start: 2020-01-29 | End: 2020-01-29

## 2020-01-29 RX ORDER — ONDANSETRON 2 MG/ML
INJECTION INTRAMUSCULAR; INTRAVENOUS PRN
Status: DISCONTINUED | OUTPATIENT
Start: 2020-01-29 | End: 2020-01-29

## 2020-01-29 RX ADMIN — HYDROMORPHONE HYDROCHLORIDE 0.5 MG: 1 INJECTION, SOLUTION INTRAMUSCULAR; INTRAVENOUS; SUBCUTANEOUS at 15:44

## 2020-01-29 RX ADMIN — LIDOCAINE HYDROCHLORIDE 5 ML: 10 INJECTION, SOLUTION EPIDURAL; INFILTRATION; INTRACAUDAL; PERINEURAL at 15:44

## 2020-01-29 RX ADMIN — MITOMYCIN: 5 INJECTION, POWDER, LYOPHILIZED, FOR SOLUTION INTRAVENOUS at 14:29

## 2020-01-29 RX ADMIN — FENTANYL CITRATE 25 MCG: 50 INJECTION, SOLUTION INTRAMUSCULAR; INTRAVENOUS at 16:50

## 2020-01-29 RX ADMIN — LIDOCAINE HYDROCHLORIDE 100 MG: 20 INJECTION, SOLUTION INFILTRATION; PERINEURAL at 13:33

## 2020-01-29 RX ADMIN — SODIUM CHLORIDE, POTASSIUM CHLORIDE, SODIUM LACTATE AND CALCIUM CHLORIDE: 600; 310; 30; 20 INJECTION, SOLUTION INTRAVENOUS at 13:17

## 2020-01-29 RX ADMIN — OXYCODONE HYDROCHLORIDE 10 MG: 10 TABLET ORAL at 23:51

## 2020-01-29 RX ADMIN — Medication 5000 UNITS: at 10:27

## 2020-01-29 RX ADMIN — FENTANYL CITRATE 50 MCG: 50 INJECTION, SOLUTION INTRAMUSCULAR; INTRAVENOUS at 13:30

## 2020-01-29 RX ADMIN — OXYCODONE HYDROCHLORIDE 10 MG: 10 TABLET ORAL at 18:10

## 2020-01-29 RX ADMIN — FENTANYL CITRATE 50 MCG: 50 INJECTION, SOLUTION INTRAMUSCULAR; INTRAVENOUS at 17:01

## 2020-01-29 RX ADMIN — NITROGLYCERIN 150 MCG: 5 INJECTION, SOLUTION INTRAVENOUS at 15:17

## 2020-01-29 RX ADMIN — PHENYLEPHRINE HYDROCHLORIDE 100 MCG: 10 INJECTION INTRAVENOUS at 13:46

## 2020-01-29 RX ADMIN — Medication 5 MG: at 15:06

## 2020-01-29 RX ADMIN — FENTANYL CITRATE 50 MCG: 50 INJECTION, SOLUTION INTRAMUSCULAR; INTRAVENOUS at 15:17

## 2020-01-29 RX ADMIN — Medication 5 MG: at 14:11

## 2020-01-29 RX ADMIN — PHENYLEPHRINE HYDROCHLORIDE 100 MCG: 10 INJECTION INTRAVENOUS at 14:39

## 2020-01-29 RX ADMIN — ROCURONIUM BROMIDE 20 MG: 10 INJECTION INTRAVENOUS at 13:56

## 2020-01-29 RX ADMIN — PHENYLEPHRINE HYDROCHLORIDE 100 MCG: 10 INJECTION INTRAVENOUS at 13:51

## 2020-01-29 RX ADMIN — IODIXANOL 80 ML: 320 INJECTION, SOLUTION INTRAVASCULAR at 15:42

## 2020-01-29 RX ADMIN — PROPOFOL 200 MG: 10 INJECTION, EMULSION INTRAVENOUS at 13:33

## 2020-01-29 RX ADMIN — PHENYLEPHRINE HYDROCHLORIDE 100 MCG: 10 INJECTION INTRAVENOUS at 14:34

## 2020-01-29 RX ADMIN — LISINOPRIL 20 MG: 20 TABLET ORAL at 22:11

## 2020-01-29 RX ADMIN — SUGAMMADEX 200 MG: 100 INJECTION, SOLUTION INTRAVENOUS at 15:35

## 2020-01-29 RX ADMIN — ROCURONIUM BROMIDE 10 MG: 10 INJECTION INTRAVENOUS at 13:36

## 2020-01-29 RX ADMIN — DEXAMETHASONE SODIUM PHOSPHATE 4 MG: 4 INJECTION, SOLUTION INTRA-ARTICULAR; INTRALESIONAL; INTRAMUSCULAR; INTRAVENOUS; SOFT TISSUE at 13:56

## 2020-01-29 RX ADMIN — ONDANSETRON 4 MG: 2 INJECTION INTRAMUSCULAR; INTRAVENOUS at 15:30

## 2020-01-29 RX ADMIN — ROCURONIUM BROMIDE 20 MG: 10 INJECTION INTRAVENOUS at 14:36

## 2020-01-29 RX ADMIN — ROCURONIUM BROMIDE 20 MG: 10 INJECTION INTRAVENOUS at 14:11

## 2020-01-29 RX ADMIN — Medication 100 MG: at 13:33

## 2020-01-29 RX ADMIN — PHENYLEPHRINE HYDROCHLORIDE 100 MCG: 10 INJECTION INTRAVENOUS at 13:59

## 2020-01-29 RX ADMIN — GLYCOPYRROLATE 0.1 MG: 0.2 INJECTION, SOLUTION INTRAMUSCULAR; INTRAVENOUS at 15:12

## 2020-01-29 RX ADMIN — Medication 5000 UNITS: at 15:43

## 2020-01-29 RX ADMIN — PHENYLEPHRINE HYDROCHLORIDE 100 MCG: 10 INJECTION INTRAVENOUS at 14:19

## 2020-01-29 RX ADMIN — PHENYLEPHRINE HYDROCHLORIDE 100 MCG: 10 INJECTION INTRAVENOUS at 14:50

## 2020-01-29 RX ADMIN — AMPICILLIN AND SULBACTAM 3 G: 2; 1 INJECTION, POWDER, FOR SOLUTION INTRAMUSCULAR; INTRAVENOUS at 13:45

## 2020-01-29 RX ADMIN — PHENYLEPHRINE HYDROCHLORIDE 100 MCG: 10 INJECTION INTRAVENOUS at 13:56

## 2020-01-29 RX ADMIN — PHENYLEPHRINE HYDROCHLORIDE 100 MCG: 10 INJECTION INTRAVENOUS at 14:08

## 2020-01-29 ASSESSMENT — PAIN DESCRIPTION - DESCRIPTORS: DESCRIPTORS: SORE

## 2020-01-29 ASSESSMENT — MIFFLIN-ST. JEOR: SCORE: 1552.38

## 2020-01-29 NOTE — ANESTHESIA POSTPROCEDURE EVALUATION
Anesthesia POST Procedure Evaluation    Patient: Ten Johnson   MRN:     [id number] Gender:   male   Age:    59 year old :      1960        Preoperative Diagnosis: Hepatocellular carcinoma (H) [C22.0]   Procedure(s):  ANESTHESIA OUT OF OR Chemo Embolization @1030   Postop Comments: No value filed.       Anesthesia Type:  Not documented  General    Reportable Event: NO     PAIN: Uncomplicated   Sign Out status: Comfortable, Well controlled pain     PONV: No PONV   Sign Out status:  No Nausea or Vomiting     Neuro/Psych: Uneventful perioperative course   Sign Out Status: Preoperative baseline; Age appropriate mentation     Airway/Resp.: Uneventful perioperative course   Sign Out Status: Non labored breathing, age appropriate RR; Resp. Status within EXPECTED Parameters     CV: Uneventful perioperative course   Sign Out status: Appropriate BP and perfusion indices; Appropriate HR/Rhythm     Disposition:   Sign Out in:  PACU  Disposition:  Floor  Recovery Course: Uneventful  Follow-Up: Not required           Last Anesthesia Record Vitals:  CRNA VITALS  2020 1517 - 2020 1604      2020             NIBP:  118/82    Pulse:  100    SpO2:  100 %          Last PACU Vitals:  Vitals Value Taken Time   /81 2020  4:00 PM   Temp     Pulse 104 2020  4:00 PM   Resp     SpO2 100 % 2020  4:03 PM   Temp src     NIBP 118/82 2020  3:55 PM   Pulse 100 2020  3:55 PM   SpO2 100 % 2020  3:55 PM   Resp     Temp     Ht Rate     Temp 2     Vitals shown include unvalidated device data.      Electronically Signed By: Eladio Villegas MD, 2020, 4:04 PM

## 2020-01-29 NOTE — H&P
[facility], [place]    Hematology / Oncology  Admission History & Physical     Date of Admission:  01/29/20  Date of Service: 01/29/20  Primary Hematologist/Oncologist: Dr. Leventhal, Dr. Benoit (Surgical Oncologist)    Assessment & Plan     Ten Johnson is a 59 year old male with history of liver cirrhosis secondary to hepatitis C and alcohol, severe aortic stenosis s/p TAVR, portal HTN, and thrombocytopenia with recent diagnosis of HCC s/p laparoscopic converted to open collection of liver core needle biopsies and intra-operative microwave ablation of 3 lesions on 10/22/19 with Dr. Benoit, and ascites requiring ~weekly paracentesis (last para completed 1/27/20) who is here for a TACE procedure to the fourth area that was unable to be treated during that procedure.    ONC  # Hepatocellular Carcinoma  He underwent diagnostic laparoscopy, lap IZZY converted exploratory lap, intra-op ultrasound, multiple liver core biopsies and intra-op microwave ablation of 3 lesions (Segment 6/7 x 2, Seg 7) 10/22/19. Presenting for TACE procedure to the fourth area that was unable to be treated during that procedure. He underwent successful TACE of the large hepatic segment 6 liver lesion concerning for HCC on 1/29/20.   - Admit to observation overnight.  - Monitor for increased pain, nausea/vomiting, bleeding, fever/chills, confusion and decreased RLE distal pulse.  - PRN Oxycodone 10mg, Dilaudid 0.3-0.5mg IV. Patient was not started on PCA post-procedure.   - Compazine and zofran PO IV for nausea.  - Bedrest with RLE straight x3 hours post procedure.  Up with assistance/ad tierra as tolerated after.  - Advance diet as tolerated to regular.  - CT abdomen w/o contrast was already completed intra procedurally and patient would not require repeat imaging tomorrow. This was discussed with IR. CBC/CMP tomorrow AM.  - IR clinic appointment after one month with pre-clinic MRI liver.    GI  # Ascites  #  Cirrhosis  Since his procedure 10/2019 he has been requiring ~weekly paracenteses, last para was completed 1/27/20 with 4L of fluid removed followed by 50g albumin.   - 2000 mg sodium diet, high protein diet per GI  - Spironolactone 100mg daily.   - Lasix 60mg daily.   - Continue follow up with GI    CV  # HTN  - Resume PTA lisinopril 20mg at bedtime.     MISC  # Alcohol use  Patient relates that he has cut down on his alcohol intake significantly. He would earlier consume up to 12 packs a day but, since 09/2019, he has only had 6 packs in total and has not been actively drinking.   - No indication to start CIWA protocol.  - Monitor  - Mag/Phos, replace lytes PRN    FEN: Regular diet, replete lytes PRN  Prophylaxis: SCDs only in the setting of TACE procedure   Code: FULL  Disposition: Admitted to observation on 3rd service    Patient was seen and plan of care was discussed with attending physician Dr. Villarreal.    Tina Fraser PA-C  Hematology/Oncology  Pager # [phone]  Phone # [phone]     Chief Complaint   HCC  - History obtained from the patient and through chart review.  - Upon my encounter with the patient after the procedure, he was doing well. He denied being in acute pain or discomfort from the procedure. No bleeding at the procedural site. No abdominal pain or nausea/vomiting. He has had issues with nausea during prior procedures so he was started on a scopolamine patch this time which is helping him. He was eating a popsicle and is eager to eat dinner when he is lifted off the strict bed rest. No other complaints.   - On exam, his R inguinal site was draped in a dressing with no active bleeding or oozing. Right femoral vein, PT, and DP pulse 2+ each. Neurovascularly intact.       Past Medical History    I have reviewed this patient's medical history and updated it with pertinent information if needed.   Past Medical History:   Diagnosis Date     JENNIFER (acute kidney injury) (H) 4/9/2019     Alcohol  use disorder      Alcoholic cirrhosis (H)      Anticoagulant long-term use     plavix     Aortic stenosis, severe 2/21/2018     Ascites      Chronic allergic rhinitis      Chronic anemia      Chronic hepatitis C without hepatic coma (H) 05/10/2016    Untreated as of 2/2018     Cirrhosis (H) 2017    MRI finding     Diastolic dysfunction      Erosive gastropathy      Esophageal varices in alcoholic cirrhosis (H)      H/O upper gastrointestinal hemorrhage 09/2017     Hepatocellular carcinoma (H)      History of blood transfusion      History of drug abuse (H)     intranasal     Hypertension     essential     JANELLE (iron deficiency anemia)      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  Past Surgical History:   Procedure Laterality Date     ABLATE LIVER TUMOR N/A 10/22/2019    Procedure: Ablate liver tumor x3;  Surgeon: Gregorio Benoit MD;  Location:  OR     ARTHROSCOPY SHOULDER ROTATOR CUFF REPAIR  7/31/2012    Procedure: ARTHROSCOPY SHOULDER ROTATOR CUFF REPAIR;  Right Shoulder Arthroscopic Rotator Cuff Repair, BicepsTenodesis,  Subacromial Decompression ;  Surgeon: Joi Castillo MD;  [facility]     ESOPHAGOSCOPY, GASTROSCOPY, DUODENOSCOPY (EGD), COMBINED N/A 10/23/2017    Procedure: COMBINED ESOPHAGOSCOPY, GASTROSCOPY, DUODENOSCOPY (EGD);;  Surgeon: Gentry Salas MD;  [facility]     EXCHANGE POLY COMPONENT  ARTHROPLASTY KNEE Right 3/4/2019    Procedure: REVISION RIGHT TOTAL KNEE POLY COMPONENT EXCHANGE;  Surgeon: Olvin Joe MD;  [facility]     FACIAL RECONSTRUCTION SURGERY  1971     HEART CATH FEMORAL CANNULIZATION WITH OPEN STANDBY REPAIR AORTIC VALVE N/A 2/21/2018    Procedure: HEART CATH FEMORAL CANNULIZATION WITH OPEN STANDBY REPAIR AORTIC VALVE;;  Surgeon: Luis Baird MD;  [facility]     IR LIVER BIOPSY PERCUTANEOUS  7/18/2019     IRRIGATION AND DEBRIDEMENT UPPER EXTREMITY, COMBINED  1/3/2012    Procedure:COMBINED IRRIGATION AND DEBRIDEMENT UPPER EXTREMITY; Irrigation & Debridement Left Elbow; Surgeon:CRISTHIAN ZHOU; [facility]     LAPAROSCOPIC BIOPSY LIVER N/A 10/22/2019    Procedure: intraoperative liver ultrasound, laparoscopic converted to open liver biopsy x 6;  Surgeon: Gregorio Benoit MD;  [facility]     LAPAROSCOPY DIAGNOSTIC (GENERAL) N/A 10/22/2019    Procedure: Diagnostic laparoscopy;  Surgeon: Gregorio Benoit MD;  [facility]     LAPAROTOMY, LYSIS ADHESIONS, COMBINED N/A 10/22/2019    Procedure: Laparotomy, lysis adhesions, combined;  Surgeon: Gregorio Benoit MD;  [facility]     OPTICAL TRACKING SYSTEM ARTHROPLASTY KNEE Right 2/7/2019    Procedure: ARTHROPLASTY KNEE RIGHT;  Surgeon: Olvin Joe MD;  [facility]     REPAIR TENDON TRICEPS UPPER EXTREMITY  11/8/2011    Procedure:REPAIR TENDON TRICEPS UPPER EXTREMITY; Surgeon:CRISTHIAN ZHOU; [facility]     SHOULDER SURGERY  2003    left, injury, torn tendons, hematoma     TRANSCATHETER AORTIC VALVE IMPLANT ANESTHESIA N/A 2/21/2018    Procedure: TRANSCATHETER AORTIC VALVE IMPLANT ANESTHESIA;  Transfemoral (Quiroz) Aortic Valve Implant 26mm MARTHA 3, with Cardiopulmonary Bypass Standby, transthoracic echocardiogram;  Surgeon: GENERIC ANESTHESIA PROVIDER;  [facility]     TRANSPOSITION ULNAR NERVE (ELBOW)  11/8/2011    Procedure:TRANSPOSITION ULNAR NERVE (ELBOW); Final  Procedure Done: Left Elbow Lateral Ulnar Collateral Repair And  Left Elbow Triceps Repair         Prior to Admission Medications   Prior to Admission Medications   Prescriptions Last Dose Informant Patient Reported? Taking?   Multiple Vitamin (MULTIVITAMINS PO) Past Week at Unknown time Self Yes Yes   Sig: Take 1 tablet by mouth At Bedtime    aspirin (ASA) 81 MG tablet Past Week at Unknown time  No Yes   Sig: Take 1 tablet (81 mg) by mouth daily   fluticasone (FLONASE) 50 MCG/ACT nasal spray 1/28/2020 at Unknown time  No Yes   Sig: Spray 2 sprays into both nostrils daily   Patient taking differently: Spray 2 sprays into both nostrils At Bedtime    furosemide (LASIX) 20 MG tablet Past Week at Unknown time  No Yes   Sig: Take 3 tablets (60 mg) by mouth daily   lisinopril (PRINIVIL/ZESTRIL) 20 MG tablet 1/28/2020 at Unknown time  No Yes   Sig: Take 1 tablet (20 mg) by mouth daily   Patient taking differently: Take 20 mg by mouth At Bedtime    loratadine (CLARITIN) 10 MG tablet 1/26/2020  No No   Sig: Take 1 tablet (10 mg) by mouth daily   Patient not taking: Reported on 1/28/2020   methocarbamol (ROBAXIN) 500 MG tablet   No No   Sig: Take 1 tablet (500 mg) by mouth every 8 hours as needed for muscle spasms   Patient not taking: Reported on 1/28/2020   ondansetron (ZOFRAN) 4 MG tablet 1/25/2020  Yes No   spironolactone (ALDACTONE) 50 MG tablet Past Week at Unknown time  No Yes   Sig: Take 2 tablets (100 mg) by mouth daily      Facility-Administered Medications: None     Allergies   Allergies   Allergen Reactions     Zolpidem Other (See Comments)     Alcoholic.  Had reaction 3/17/13 while intoxicated which included black out, loss of awareness, paranoia.  Do not prescribe.  Dr. Celeste     Cats Other (See Comments)     rhinitis     Dogs Other (See Comments)     rhinitis     Pollen Extract Other (See Comments)     rhinits.       Social History   Social History     Socioeconomic History     Marital status: Single      Spouse name: Not on file     Number of children: 0     Years of education: Not on file     Highest education level: Not on file   Occupational History     Occupation:    Social Needs     Financial resource strain: Not on file     Food insecurity:     Worry: Not on file     Inability: Not on file     Transportation needs:     Medical: Not on file     Non-medical: Not on file   Tobacco Use     Smoking status: Never Smoker     Smokeless tobacco: Never Used   Substance and Sexual Activity     Alcohol use: Not Currently     Comment: Alcohol use disorder, still actively drinking     Drug use: No     Comment: denies     Sexual activity: Not Currently     Partners: Female   Lifestyle     Physical activity:     Days per week: Not on file     Minutes per session: Not on file     Stress: Not on file   Relationships     Social connections:     Talks on phone: Not on file     Gets together: Not on file     Attends Mu-ism service: Not on file     Active member of club or organization: Not on file     Attends meetings of clubs or organizations: Not on file     Relationship status: Not on file     Intimate partner violence:     Fear of current or ex partner: Not on file     Emotionally abused: Not on file     Physically abused: Not on file     Forced sexual activity: Not on file   Other Topics Concern     Parent/sibling w/ CABG, MI or angioplasty before 65F 55M? Not Asked   Social History Narrative    .  Bicycles a lot.  Excessive alcohol use.  Smokes cigars.  Occasional marijuana use.       Family History   I have reviewed this patient's family history and updated it with pertinent information if needed.   Family History   Problem Relation Age of Onset     Cancer Mother 62        unknown primary     Alcoholism Paternal Uncle      Unknown/Adopted Father      No Known Problems Brother      Diabetes Maternal Grandmother      Myocardial Infarction Maternal Grandfather      No Known Problems Paternal  Grandmother      Unknown/Adopted Paternal Grandfather      Cirrhosis No family hx of        Review of Systems   A comprehensive ROS was performed with the patient and was found to be negative or non-contributory with the exception of that noted in the HPI above.    Physical Exam   Temp:  [97.8  F (36.6  C)] 97.8  F (36.6  C)  Pulse:  [72] 72  Heart Rate:  [72] 72  Resp:  [18] 18  BP: (131)/(84) 131/84  SpO2:  [100 %] 100 %  Gen: NAD  HEENT: NCAT, PERLLA, MMM  Chest: CTAB  Heart: RRR  Abdomen: Soft, BS present, non tender  MSK: No pedal edema    Data   I have personally reviewed the following labs/imaging:  Results for orders placed or performed during the hospital encounter of 01/29/20 (from the past 24 hour(s))   Glucose by meter   Result Value Ref Range    Glucose 102 (H) 70 - 99 mg/dL   Potassium   Result Value Ref Range    Potassium 4.0 3.4 - 5.3 mmol/L   Creatinine   Result Value Ref Range    Creatinine 0.94 0.66 - 1.25 mg/dL    GFR Estimate 87 >60 mL/min/[1.73_m2]    GFR Estimate If Black >90 >60 mL/min/[1.73_m2]   Platelet count   Result Value Ref Range    Platelet Count 96 (L) 150 - 450 10e9/L   ABO/Rh type and screen   Result Value Ref Range    ABO O     RH(D) Pos     Antibody Screen Neg     Test Valid Only At          [facility]    Specimen Expires 02/01/2020      *Note: Due to a large number of results and/or encounters for the requested time period, some results have not been displayed. A complete set of results can be found in Results Review.

## 2020-01-29 NOTE — PROCEDURES
[facility], [place]    Procedure: IR Procedure Note  Date/Time: 1/29/2020 3:46 PM  Performed by: Kieran Schmitt MD  Authorized by: Kieran Schmitt MD   IR Fellow Physician:  Other(s) attending procedure: nile Almeida    UNIVERSAL PROTOCOL   Site Marked: NA  Prior Images Obtained and Reviewed:  Yes  Required items: Required blood products, implants, devices and special equipment available    Patient identity confirmed:  Verbally with patient, arm band, provided demographic data and hospital-assigned identification number  Patient was reevaluated immediately before administering moderate or deep sedation or anesthesia  Confirmation Checklist:  Patient's identity using two indicators, relevant allergies, procedure was appropriate and matched the consent or emergent situation and correct equipment/implants were available  Time out: Immediately prior to the procedure a time out was called    Universal Protocol: the Joint Commission Universal Protocol was followed    Preparation: Patient was prepped and draped in usual sterile fashion           ANESTHESIA    Anesthesia: Local infiltration  Local Anesthetic:  Lidocaine 1% without epinephrine      SEDATION    : General Anesthesia.    Sedation Type:  Moderate (conscious) sedation  Sedation:  Fentanyl and midazolam  Vital signs: Vital signs monitored during sedation    Fluoroscopy Time: 23 minute(s)  See dictated procedure note for full details.  Findings: Tumor blush in segment 6 adjacent to the gallbladder fossa.     Specimens: none    Complications: None    Condition: Stable    Plan: - 3 hours of bedrest  - Admit for overnight observation  - Pain control prn  - The non-con CT of the abdomen was acquired at the end of the procedure.  - Anticipate discharge tomorrow    PROCEDURE   Patient Tolerance:  Patient tolerated the procedure well with no immediate complications  Describe Procedure: - cTACE of the segment 6  lesion  Length of time physician/provider present for 1:1 monitoring during sedation: 0 (General Anesthesia)

## 2020-01-29 NOTE — PROGRESS NOTES
Patient Name: Ten Johnson  Medical Record Number: [id number]  Today's Date: 1/29/2020    Procedure: Chemo embolization  Proceduralist: Dr. Arteaga and Dr. Self    Sedation: Per anesthesia    Procedure start time: 1400  Puncture time: 1405  Procedure end time: 1540    Report: Per anestia    Other Notes: Pt arrived to IR room 1 from . Consent reviewed. Pt denies any questions or concerns regarding procedure. Pt positioned supine and monitored per protocol. Pt tolerated procedure without any noted complications. Right groin puncture closed with Angio-Seal at 1545. 1845 ambulatory time. Pt transferred back to .

## 2020-01-29 NOTE — ANESTHESIA CARE TRANSFER NOTE
Patient: Ten Johnson    Procedure(s):  ANESTHESIA OUT OF OR Chemo Embolization @1030    Diagnosis: Hepatocellular carcinoma (H) [C22.0]  Diagnosis Additional Information: No value filed.    Anesthesia Type:   General     Note:  Airway :Face Mask  Patient transferred to:PACU  Comments: VSS. Report to RN. Patient awake. Denies pain.Handoff Report: Identifed the Patient, Identified the Reponsible Provider, Reviewed the pertinent medical history, Discussed the surgical course, Reviewed Intra-OP anesthesia mangement and issues during anesthesia, Set expectations for post-procedure period and Allowed opportunity for questions and acknowledgement of understanding      Vitals: (Last set prior to Anesthesia Care Transfer)    CRNA VITALS  1/29/2020 1517 - 1/29/2020 1558      1/29/2020             NIBP:  118/82    Pulse:  100    SpO2:  100 %                Electronically Signed By: SYDNIE Neumann CRNA  January 29, 2020  3:58 PM

## 2020-01-30 VITALS
BODY MASS INDEX: 24.72 KG/M2 | RESPIRATION RATE: 16 BRPM | OXYGEN SATURATION: 96 % | SYSTOLIC BLOOD PRESSURE: 129 MMHG | WEIGHT: 166.89 LBS | HEIGHT: 69 IN | HEART RATE: 97 BPM | TEMPERATURE: 97.2 F | DIASTOLIC BLOOD PRESSURE: 75 MMHG

## 2020-01-30 LAB
ALBUMIN SERPL-MCNC: 3 G/DL (ref 3.4–5)
ALP SERPL-CCNC: 73 U/L (ref 40–150)
ALT SERPL W P-5'-P-CCNC: 58 U/L (ref 0–70)
ANION GAP SERPL CALCULATED.3IONS-SCNC: 7 MMOL/L (ref 3–14)
AST SERPL W P-5'-P-CCNC: ABNORMAL U/L (ref 0–45)
BILIRUB SERPL-MCNC: 1.2 MG/DL (ref 0.2–1.3)
BUN SERPL-MCNC: 17 MG/DL (ref 7–30)
CALCIUM SERPL-MCNC: 8.8 MG/DL (ref 8.5–10.1)
CHLORIDE SERPL-SCNC: 110 MMOL/L (ref 94–109)
CO2 SERPL-SCNC: 21 MMOL/L (ref 20–32)
CREAT SERPL-MCNC: 0.83 MG/DL (ref 0.66–1.25)
ERYTHROCYTE [DISTWIDTH] IN BLOOD BY AUTOMATED COUNT: 15 % (ref 10–15)
GFR SERPL CREATININE-BSD FRML MDRD: >90 ML/MIN/{1.73_M2}
GLUCOSE SERPL-MCNC: 107 MG/DL (ref 70–99)
HCT VFR BLD AUTO: 38.6 % (ref 40–53)
HGB BLD-MCNC: 13.4 G/DL (ref 13.3–17.7)
MAGNESIUM SERPL-MCNC: 1.9 MG/DL (ref 1.6–2.3)
MCH RBC QN AUTO: 34.4 PG (ref 26.5–33)
MCHC RBC AUTO-ENTMCNC: 34.7 G/DL (ref 31.5–36.5)
MCV RBC AUTO: 99 FL (ref 78–100)
PHOSPHATE SERPL-MCNC: 3.6 MG/DL (ref 2.5–4.5)
PLATELET # BLD AUTO: 81 10E9/L (ref 150–450)
POTASSIUM SERPL-SCNC: 4.7 MMOL/L (ref 3.4–5.3)
PROT SERPL-MCNC: 6.5 G/DL (ref 6.8–8.8)
RBC # BLD AUTO: 3.9 10E12/L (ref 4.4–5.9)
SODIUM SERPL-SCNC: 138 MMOL/L (ref 133–144)
WBC # BLD AUTO: 11 10E9/L (ref 4–11)

## 2020-01-30 PROCEDURE — 84075 ASSAY ALKALINE PHOSPHATASE: CPT

## 2020-01-30 PROCEDURE — 80048 BASIC METABOLIC PNL TOTAL CA: CPT

## 2020-01-30 PROCEDURE — 84155 ASSAY OF PROTEIN SERUM: CPT

## 2020-01-30 PROCEDURE — 84100 ASSAY OF PHOSPHORUS: CPT | Performed by: PHYSICIAN ASSISTANT

## 2020-01-30 PROCEDURE — 99217 ZZC OBSERVATION CARE DISCHARGE: CPT | Performed by: INTERNAL MEDICINE

## 2020-01-30 PROCEDURE — 25000132 ZZH RX MED GY IP 250 OP 250 PS 637: Performed by: PHYSICIAN ASSISTANT

## 2020-01-30 PROCEDURE — 36415 COLL VENOUS BLD VENIPUNCTURE: CPT | Performed by: PHYSICIAN ASSISTANT

## 2020-01-30 PROCEDURE — 84460 ALANINE AMINO (ALT) (SGPT): CPT

## 2020-01-30 PROCEDURE — G0378 HOSPITAL OBSERVATION PER HR: HCPCS

## 2020-01-30 PROCEDURE — 83735 ASSAY OF MAGNESIUM: CPT | Performed by: PHYSICIAN ASSISTANT

## 2020-01-30 PROCEDURE — 82040 ASSAY OF SERUM ALBUMIN: CPT

## 2020-01-30 PROCEDURE — 85027 COMPLETE CBC AUTOMATED: CPT | Performed by: PHYSICIAN ASSISTANT

## 2020-01-30 PROCEDURE — 82247 BILIRUBIN TOTAL: CPT

## 2020-01-30 PROCEDURE — 25000132 ZZH RX MED GY IP 250 OP 250 PS 637: Performed by: STUDENT IN AN ORGANIZED HEALTH CARE EDUCATION/TRAINING PROGRAM

## 2020-01-30 RX ORDER — LORATADINE 10 MG/1
10 TABLET ORAL DAILY PRN
Qty: 90 TABLET | Refills: 3 | Status: SHIPPED | OUTPATIENT
Start: 2020-01-30 | End: 2021-03-09

## 2020-01-30 RX ORDER — OXYCODONE HYDROCHLORIDE 10 MG/1
5-10 TABLET ORAL 2 TIMES DAILY PRN
Qty: 14 TABLET | Refills: 0 | Status: SHIPPED | OUTPATIENT
Start: 2020-01-30 | End: 2020-03-27

## 2020-01-30 RX ORDER — ONDANSETRON 4 MG/1
4 TABLET, FILM COATED ORAL EVERY 8 HOURS PRN
Qty: 30 TABLET | Refills: 0 | Status: SHIPPED | OUTPATIENT
Start: 2020-01-30 | End: 2021-02-01

## 2020-01-30 RX ADMIN — OXYCODONE HYDROCHLORIDE 10 MG: 10 TABLET ORAL at 12:00

## 2020-01-30 RX ADMIN — FUROSEMIDE 60 MG: 20 TABLET ORAL at 08:04

## 2020-01-30 RX ADMIN — OXYCODONE HYDROCHLORIDE 10 MG: 10 TABLET ORAL at 03:59

## 2020-01-30 RX ADMIN — SPIRONOLACTONE 100 MG: 100 TABLET ORAL at 08:04

## 2020-01-30 RX ADMIN — OXYCODONE HYDROCHLORIDE 10 MG: 10 TABLET ORAL at 08:00

## 2020-01-30 ASSESSMENT — PAIN DESCRIPTION - DESCRIPTORS
DESCRIPTORS: SORE
DESCRIPTORS: ACHING;DISCOMFORT;SORE

## 2020-01-30 NOTE — PLAN OF CARE
Alert & Orientated. Vitals stable. Up ad tierra. Tolerating reg diet. Pain managed with prn oxycodone. Discharge paperwork explained and signed with no further questions or concerns.

## 2020-01-30 NOTE — DISCHARGE SUMMARY
[facility], [place]    Discharge Summary  Hematology / Oncology    Date of Admission:  1/29/2020  Date of Discharge:  1/30/2020 12:15 PM  Discharging Provider: Tina Fraser  Date of Service (when I saw the patient): 01/30/20    Discharge Diagnoses   Patient Active Problem List   Diagnosis     Presbyopia     Rotator cuff tear, right     OA (osteoarthritis) of knee     Alcoholic cirrhosis (H)     Rhinitis, allergic     Olecranon bursitis     S/p TAVR (transcatheter aortic valve replacement), bioprosthetic     Right shoulder pain     Diastolic dysfunction     Chronic allergic rhinitis     Portal hypertension (H)     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)     S/P total knee arthroplasty, right     Foot contracture     Cirrhosis (H)     HCC (hepatocellular carcinoma) (H)     Liver mass     Leukocytosis     Thrombocytopenia (H)     Ileus (H)     Elevated liver enzymes     HTN (hypertension)     Hypophosphatemia     Hyponatremia     Ascites     Atelectasis     Post-nasal drip     Environmental allergies     Hepatocellular carcinoma (H)       History of Present Illness   Tne Johnson is a 59 year old male with history of liver cirrhosis secondary to hepatitis C and alcohol, severe aortic stenosis s/p TAVR, portal HTN, and thrombocytopenia with recent diagnosis of HCC s/p laparoscopic converted to open collection of liver core needle biopsies and intra-operative microwave ablation of 3 lesions on 10/22/19 with Dr. Benoit, and ascites requiring ~weekly paracentesis (last para completed 1/27/20) who is here for a TACE procedure to the fourth area that was unable to be treated during that procedure. He underwent successful TACE of the large hepatic segment 6 liver lesion concerning for HCC on 1/29/20. Intra procedural CT Abd shows no complications. Tolerated well and pain remained controlled with PO oxycodone. Discharged 1/30, will follow up with Dr. Nolan 2/6, and IR clinic appointment after  one month with pre-clinic MRI liver.     Hospital Course   Ten Johnson was admitted on 1/29/2020.  The following problems were addressed during his hospitalization:    ONC  # Hepatocellular Carcinoma  He underwent diagnostic laparoscopy, lap IZZY converted exploratory lap, intra-op ultrasound, multiple liver core biopsies and intra-op microwave ablation of 3 lesions (Segment 6/7 x 2, Seg 7) 10/22/19. Presenting for TACE procedure to the fourth area that was unable to be treated during that procedure. He underwent successful TACE of the large hepatic segment 6 liver lesion concerning for HCC on 1/29/20.   - Admitted to observation overnight and discharged 1/31  - PRN Oxycodone 10mg, Dilaudid 0.3-0.5mg IV. Patient was not started on PCA post-procedure. Discharged with oxcydone 5-10mg up to twice daily PRN  - Zofran PO for nausea, refills sent  - Bedrest with RLE straight x3 hours post procedure.  Up with assistance/ad tierra as tolerated after.  - Advanced diet to regular  - CT abdomen w/o contrast was already completed intra procedurally and patient would not require repeat imaging. This was discussed with IR.  - IR clinic appointment after one month with pre-clinic MRI liver.     GI  # Ascites  # Cirrhosis  Since his procedure 10/2019 he has been requiring ~weekly paracenteses, last para was completed 1/27/20 with 4L of fluid removed followed by 50g albumin.   - 2000 mg sodium diet, high protein diet per GI  - Spironolactone 100mg daily.   - Lasix 60mg daily.   - Continue follow up with GI     CV  # HTN  - Resume PTA lisinopril 20mg at bedtime.      MISC  # Alcohol use  Patient relates that he has cut down on his alcohol intake significantly. He would earlier consume up to 12 packs a day but, since 09/2019, he has only had 6 packs in total and has not been actively drinking.   - No indication to start CIWA protocol.  - Monitor  - Mag/Phos, replace lytes PRN     FEN: Regular diet, replete lytes PRN  Prophylaxis: SCDs  only in the setting of TACE procedure   Code: FULL  Disposition: Admitted to observation on 3rd service     Patient was seen and plan of care was discussed with attending physician Dr. Villarreal.     Tina Fraser PA-C  Hematology/Oncology  Pager # [phone]  Phone # [phone]     Significant Results and Procedures   See below    Pending Results   These results will be followed up by Dr. Nolan  Unresulted Labs Ordered in the Past 30 Days of this Admission     No orders found for last 31 day(s).          Code Status   Full Code    Primary Care Physician   Cuca Celeste    Physical Exam   Temp: 97.2  F (36.2  C) Temp src: Axillary BP: 129/75 Pulse: 97 Heart Rate: 62 Resp: 16 SpO2: 96 % O2 Device: None (Room air) Oxygen Delivery: 4 LPM  Vitals:    01/29/20 0837   Weight: 75.7 kg (166 lb 14.2 oz)     Vital Signs with Ranges  Temp:  [95.6  F (35.3  C)-97.4  F (36.3  C)] 97.2  F (36.2  C)  Pulse:  [] 97  Heart Rate:  [] 62  Resp:  [12-18] 16  BP: (117-148)/(74-99) 129/75  SpO2:  [93 %-100 %] 96 %  I/O last 3 completed shifts:  In: 1710 [P.O.:860; I.V.:850]  Out: 685 [Urine:675; Blood:10]  Gen: NAD  HEENT: NCAT, PERLLA, MMM  Chest: CTAB  Heart: RRR  Abdomen: Soft, umbilical hernia, healing scar from prior procedure, slight tenderness to palpation RUL, BS present  MSK: No pedal edema  Neuro: A&O x3    Time Spent on this Encounter   I, Tina Fraser PA-C, personally saw the patient today and spent greater than 30 minutes discharging this patient.    Discharge Disposition   Discharged to home  Condition at discharge: Good    Consultations This Hospital Stay   None    Discharge Orders      CBC with platelets differential    Last Lab Result: Hemoglobin (g/dL)       Date                     Value                 01/30/2020               13.4             ----------     Comprehensive metabolic panel     Discharge Instructions    Patient will be contacted by IR Nurse Coordinator to schedule post procedure  appointment and inform patient of post procedure imaging recommendations.     Reason for your hospital stay    TACE procedure     Follow Up and recommended labs and tests    Follow up as scheduled below.     Activity    No strenuous activity for 10 days, ok to return to normal activity in 1-3 weeks as able     When to contact your care team    [facility] triage line at [phone] for temp >100.4, uncontrolled nausea/vomiting/diarrhea/constipation, unrelieved pain, bleeding not relieved with pressure, dizziness, chest pain, shortness of breath, loss of consciousness, and any new or concerning symptoms.     Discharge Instructions    You can take oxycodone up to twice daily as needed for pain. Use Miralax as needed for constipation.     Full Code     Diet    Follow this diet upon discharge: Orders Placed This Encounter      Advance Diet as Tolerated: Regular Diet Adult     Discharge Medications   Discharge Medication List as of 1/30/2020 10:15 AM      START taking these medications    Details   oxyCODONE IR (ROXICODONE) 10 MG tablet Take 0.5-1 tablets (5-10 mg) by mouth 2 times daily as needed for moderate to severe pain, Disp-14 tablet, R-0, E-Prescribe         CONTINUE these medications which have CHANGED    Details   loratadine (CLARITIN) 10 MG tablet Take 1 tablet (10 mg) by mouth daily as needed for allergies, Disp-90 tablet, R-3, E-Prescribe      ondansetron (ZOFRAN) 4 MG tablet Take 1 tablet (4 mg) by mouth every 8 hours as needed for nausea, Disp-30 tablet, R-0, E-Prescribe         CONTINUE these medications which have NOT CHANGED    Details   aspirin (ASA) 81 MG tablet Take 1 tablet (81 mg) by mouth daily, Disp-90 tablet, R-3, E-Prescribe      fluticasone (FLONASE) 50 MCG/ACT nasal spray Spray 2 sprays into both nostrils daily, Disp-48 mL, R-3, E-Prescribe      furosemide (LASIX) 20 MG tablet Take 3 tablets (60 mg) by mouth daily, Disp-90 tablet, R-1, E-Prescribe      lisinopril  (PRINIVIL/ZESTRIL) 20 MG tablet Take 1 tablet (20 mg) by mouth daily, Disp-90 tablet, R-3, E-Prescribe      Multiple Vitamin (MULTIVITAMINS PO) Take 1 tablet by mouth At Bedtime , Historical      spironolactone (ALDACTONE) 50 MG tablet Take 2 tablets (100 mg) by mouth daily, Disp-60 tablet, R-3, E-Prescribe         STOP taking these medications       methocarbamol (ROBAXIN) 500 MG tablet Comments:   Reason for Stopping:             Allergies   Allergies   Allergen Reactions     Zolpidem Other (See Comments)     Alcoholic.  Had reaction 3/17/13 while intoxicated which included black out, loss of awareness, paranoia.  Do not prescribe.  Dr. Celeste     Cats Other (See Comments)     rhinitis     Dogs Other (See Comments)     rhinitis     Pollen Extract Other (See Comments)     rhinits.     Data   Most Recent 3 CBC's:  Recent Labs   Lab Test 01/30/20  0715 01/29/20  0931 01/27/20  1050  12/12/19  1530   WBC 11.0  --  5.7  --  6.8   HGB 13.4  --  12.2*  --  12.7*   MCV 99  --  95  --  96   PLT 81* 96* 77*   < > 50*    < > = values in this interval not displayed.      Most Recent 3 BMP's:  Recent Labs   Lab Test 01/30/20 0715 01/29/20  1859 01/29/20  0931 01/27/20  1050    140  --  142   POTASSIUM 4.7 4.4 4.0 3.8   CHLORIDE 110* 112*  --  111*   CO2 21 22  --  22   BUN 17 17  --  17   CR 0.83 0.71 0.94 0.65*   ANIONGAP 7 6  --  9   JOSEPHINE 8.8 8.2*  --  8.6   * 121*  --  95     Most Recent 2 LFT's:  Recent Labs   Lab Test 01/30/20 0715 01/29/20  1859   AST Canceled, Test credited 76*   ALT 58 60   ALKPHOS 73 75   BILITOTAL 1.2 0.8     Most Recent INR's and Anticoagulation Dosing History:  Anticoagulation Dose History     Recent Dosing and Labs Latest Ref Rng & Units 9/24/2019 10/22/2019 10/23/2019 10/24/2019 10/25/2019 12/12/2019 1/27/2020    INR 0.86 - 1.14 1.13 1.28(H) 1.24(H) 1.20(H) 1.12 1.24(H) 1.15(H)    INR Point of Care 0.86 - 1.14 - - - - - - -        Most Recent 3 Troponin's:  Recent Labs   Lab Test  06/26/15  1949 06/26/15  1813 06/26/15  1705   TROPI <0.015  The 99th percentile for upper reference range is 0.045 ug/L.  Troponin values in   the range of 0.045 - 0.120 ug/L may be associated with risks of adverse   clinical events.   Unsatisfactory specimen - hemolyzed   Specimen markedly hemolyzed, potassium may be falsely elevated  NOTIFIED RUPAL BRADSHAW ER [id number] AT 1925    Unsatisfactory specimen - hemolyzed  MEHREEN GOLDSMITH ON ER ON 06/26/15 BY BG       Most Recent Cholesterol Panel:  Recent Labs   Lab Test 05/09/16  1535   CHOL 146   LDL 61   HDL 64   TRIG 106     Most Recent 6 Bacteria Isolates From Any Culture (See EPIC Reports for Culture Details):  Recent Labs   Lab Test 11/14/19  1156 11/04/19  1605 10/24/19  1003 10/24/19  0955 03/04/19  1836 03/04/19  1810   CULT No growth No growth No growth No growth After further review, there is NO Gram positive cocci in clusters present in this culture  No anaerobes isolated  Previously reported as:  On day 12, isolated in the broth only:  Gram positive cocci in clusters    No growth No anaerobes isolated  Light growth  Staphylococcus epidermidis  *  On day 1, isolated in broth only:  Escherichia coli  *  Susceptibility testing done on previous specimen     Most Recent TSH, T4 and A1c Labs:No lab results found.  Results for orders placed or performed during the hospital encounter of 01/29/20   IR Chemo Embolization    Narrative    Procedures 1/29/2020:  1. Ultrasound guided right common femoral arteriotomy.  2. Catheterization and angiography of the celiac artery.  3. Catheterization and angiography of the right hepatic artery.  4. Catheterization and angiography of the common and proper hepatic  artery.  5. Right hepatic artery CT angiogram.  6. Catheterization and angiography of the posterior division of the  right hepatic artery.  7. Catheterization and angiography of hepatic segment 6 and 7 hepatic  arteries and subsegmental arteries.  8. Subsegmental  chemoembolization of hepatic segment 6 branch from the  proximal segment 7 artery feeding the segment 6 lesion.  9. Noncontrast CT abdomen.  10. Limited right iliofemoral angiography.  11. Angio-Seal device closure of right common femoral arteriotomy.    History: 59 year old male with chronic hepatitis C, portal  hypertension, and hepatocellular carcinoma.  He underwent an hepatic  ablation of three lesions on 10/22/2019.  There was a fourth area that  was unable to be treated during that procedure. Referred for treatment  of the LR4 lesion posterior to the gallbladder fossa.     Comparison: MRI 1/14/2020    Staff: Tomi Arteaga MD    Fellow/Resident: Kieran Schmitt M.D.    Monitoring: Patient was placed on under general anesthesia by  anesthesiology staff, see anesthesiology notes for details. No  additional conscious sedation provided by IR nursing staff or  supervised by the IR attending. Patient remained stable throughout the  procedure.     Medications:   1. 5 cc 1% lidocaine.  2. 150 mcg nitroglycerin intraarterial  3. Mitomycin 10 mg and Doxorubicin HCl 50 mg in sterile contrast.     Fluoroscopy time: 23.2 minutes    DLP: 1939 mGy*cm     Findings/procedure:    Prior to the procedure, both verbal and written informed consent  obtained from the patient.     The patient was placed on interventional table in the supine position.  The right groin was prepped and draped in usual sterile fashion. 1%  lidocaine administered for local anesthesia. Ultrasound demonstrated a  patent right common femoral artery. Under ultrasound guidance, right  common femoral arteriotomy made with a micropuncture needle. Image  documenting needle within the patent artery was saved. Micropuncture  needle ultimately exchanged for a 11 cm 5  Israeli sheath.    The celiac artery was catheterized with a 5 Israeli Sos 2 catheter.  Angiography performed demonstrating origin of common hepatic artery  from the aorta adjacent to the origin of  the celiac axis. Through the  base catheter, 2.4 Mosotho Drakon microcatheter and 0.016 Appifier  Scientific Fathom microwire were advanced into the common hepatic  artery, subsequently into the right hepatic artery. Catheter  angiography performed demonstrating the larger tumor blush of the  segment 6 lesion.  Catheter was retracted and additional angiogram  from the proper hepatic artery demonstrated the origin of the left  hepatic and segment 4 segmental hepatic arteries. Microcatheter was  advanced into the right hepatic artery and a right hepatic arterial CT  angiogram was obtained. Arterial supply to the segment 6 lesion was  identified as a small 6 branch off the proximal segment 7 hepatic  artery Subselective catheterization of the right hepatic artery and  the segment 7 artery were catheterized. The origin of the segment 6  subsegmental artery supplying the larger tumor blush was catheterized  from the proximal segment 7 artery. From this position,  chemoembolization was performed . Approximately a total of 10 cc of  the embolization mixture (50 mg doxorubicin and 10 mg mitomycin  diluted to 30 cc with lipiodol and contrast)  was administered with  dense staining of the lesion under direct fluoroscopic supervision.  Embolization of the segment 7 and parenchymal segment 6 artery is  noted within minutes of initiation of embolization, injection of 150  mcg intra-arterial nitroglycerin was performed which improved flow of  the chemoembolization cocktail into the segment 6 lesion. The desired  embolic endpoint was reached without bland particle embolization.     Noncontrast CT was obtained at the conclusion of the embolization,  this demonstrates dense lipiodol staining of the segment 6 lesion and  surrounding segment 6 parenchyma with small areas of ill-defined low  staining in the distal segments 7 and 6 parenchyma.    The catheters were removed and disposed of in the appropriate  biohazard compares. Limited  right iliofemoral angiography performed  demonstrating no pseudoaneurysm or dissection and puncture amenable to  use of a closure device. Right common femoral arteriotomy closed with  a 6 Turkish Angio-Seal device. The procedure was well tolerated with no  immediate complication.      Impression    Impression:  Successful transarterial chemoembolization of the larger hepatic  segment 6 liver lesion concerning for hepatocellular carcinoma.     Plan:   1. Admit to hematology/oncology service, observation status, for  control of PES symptoms. Anticipate discharge in the a.m.  2. Bed rest 3 hours with right leg straight.  3. Interventional radiology clinical and imaging follow-up for in one  month will be coordinated.           *Note: Due to a large number of results and/or encounters for the requested time period, some results have not been displayed. A complete set of results can be found in Results Review.

## 2020-01-30 NOTE — PLAN OF CARE
Patient arrived from PACU around 1745. VSS on room air. Pain controlled with oxycodone. Right groin puncture site WNL, covered with tegaderm dressing. On regular diet but declined a courtesy meal. Tolerating clear liquids. Voiding with adequate urine output. Last bowel movement this morning before surgery. Up with SBA. Likely will discharge tomorrow.

## 2020-01-30 NOTE — PROGRESS NOTES
Observation Goals:  1. Pain is controlled with oral medications: met  2. Nausea is controlled with oral medications: met  3. Mentation is at baseline: met  4. Ambulating at baseline: met

## 2020-01-30 NOTE — PLAN OF CARE
"[id number]    BP (!) 148/93 (BP Location: Right arm)   Pulse 97   Temp 96.3  F (35.7  C) (Axillary)   Resp 16   Ht 1.753 m (5' 9\")   Wt 75.7 kg (166 lb 14.2 oz)   SpO2 98%   BMI 24.65 kg/m      Reason for admission: TACE procedure for liver lesion  Activity: Up ad tierra  Pain: c/o pain abdominal pain, pain at coccyx from fall on ice, given PRN Oxycodone with effect.   Neuro: AxOx4. Neuros intact.   Cardiac: WDL.  Respiratory: WDL. Non labored respirations on RA.   GI/: Abdomen softly distended, non tender. +BSx4, no post op BM. Voiding adequate UOP.   Diet: Advanced to regular, pt declined courtesy meal, having snacks until breakfast time  Lines: L PIV intact infusing TKO. Site WDL.   Wounds: R groin surgical site WDL. Unchanged sm serosang staining, without hematoma.   Labs/imaging: Reviewed.       Continue to monitor and follow POC    Observation Goals:  1. Pain is controlled with oral medications: met  2. Nausea is controlled with oral medications: met  3. Mentation is at baseline: met  4. Ambulating at baseline: met    "

## 2020-01-30 NOTE — PROGRESS NOTES
"    Interventional Radiology Progress Note     January 30, 2020    Subjective: No acute events overnight. Pain managed on PO pain medications only. Tolerating diet and activity.     Objective: /75 (BP Location: Right arm)   Pulse 97   Temp 97.2  F (36.2  C) (Axillary)   Resp 16   Ht 1.753 m (5' 9\")   Wt 75.7 kg (166 lb 14.2 oz)   SpO2 96%   BMI 24.65 kg/m      Intake/Output Summary (Last 24 hours) at 1/30/2020 1318  Last data filed at 1/30/2020 0600  Gross per 24 hour   Intake 1710 ml   Output 685 ml   Net 1025 ml       Imaging: CT abd acquired at end of procedure, no new imaging    Assessment/Plan: 59 year old male with chronic hepatitis C, portal  hypertension, and hepatocellular carcinoma.  He underwent an hepatic  ablation of three lesions on 10/22/2019.  There was a fourth area that  was unable to be treated during that procedure. Referred for treatment  of the LR4 lesion posterior to the gallbladder fossa. POD1 s/p cTACE of the LR4 lesion adjacent to the GB. Dense lipiodol staining on post procedure non-con CT abd. Patient doing well post procedure.   - Follow up with Dr. Arteaga in 1 month with repeat MRI, IR will coordinate  - OK to discharge from IR standpoint.     Kieran Schmitt MD  Interventional Radiology Fellow  January 30, 2020    [phone] After Hours    "

## 2020-01-30 NOTE — PROGRESS NOTES
Admitted/transferred from: PACU  2 RN full skin assessment completed by Lissy Ritchie, RN and Sally Crarillo RN.  Skin assessment finding: issues found : blanchable erythema on bilateral elbows, dry flaky skin on feet with cracked/bleeding area near left pinky toenail   Interventions/actions: skin interventions : elevate elbows on pillows, moisturize feet     Will continue to monitor.

## 2020-02-04 DIAGNOSIS — K70.30 ALCOHOLIC CIRRHOSIS OF LIVER WITHOUT ASCITES (H): Primary | ICD-10-CM

## 2020-02-06 ENCOUNTER — ONCOLOGY VISIT (OUTPATIENT)
Dept: ONCOLOGY | Facility: CLINIC | Age: 60
End: 2020-02-06
Attending: SURGERY
Payer: COMMERCIAL

## 2020-02-06 ENCOUNTER — TELEPHONE (OUTPATIENT)
Dept: VASCULAR SURGERY | Facility: CLINIC | Age: 60
End: 2020-02-06

## 2020-02-06 ENCOUNTER — PRE VISIT (OUTPATIENT)
Dept: ONCOLOGY | Facility: CLINIC | Age: 60
End: 2020-02-06

## 2020-02-06 VITALS
DIASTOLIC BLOOD PRESSURE: 84 MMHG | HEIGHT: 69 IN | BODY MASS INDEX: 24.85 KG/M2 | SYSTOLIC BLOOD PRESSURE: 131 MMHG | TEMPERATURE: 99.3 F | WEIGHT: 167.8 LBS | OXYGEN SATURATION: 99 % | HEART RATE: 77 BPM

## 2020-02-06 DIAGNOSIS — R91.8 PULMONARY NODULES: Primary | ICD-10-CM

## 2020-02-06 DIAGNOSIS — K70.30 ALCOHOLIC CIRRHOSIS OF LIVER WITHOUT ASCITES (H): ICD-10-CM

## 2020-02-06 DIAGNOSIS — R10.84 ABDOMINAL PAIN, GENERALIZED: Primary | ICD-10-CM

## 2020-02-06 DIAGNOSIS — C22.0 HCC (HEPATOCELLULAR CARCINOMA) (H): ICD-10-CM

## 2020-02-06 PROCEDURE — 80053 COMPREHEN METABOLIC PANEL: CPT | Performed by: PHYSICIAN ASSISTANT

## 2020-02-06 PROCEDURE — G0463 HOSPITAL OUTPT CLINIC VISIT: HCPCS | Mod: ZF

## 2020-02-06 PROCEDURE — 99215 OFFICE O/P EST HI 40 MIN: CPT | Mod: ZP | Performed by: INTERNAL MEDICINE

## 2020-02-06 PROCEDURE — 40000559 ZZH STATISTIC FAILED PERIPHERAL IV START: Mod: ZF

## 2020-02-06 RX ORDER — OXYCODONE HYDROCHLORIDE 5 MG/1
5 TABLET ORAL EVERY 6 HOURS PRN
Qty: 14 TABLET | Refills: 0 | Status: SHIPPED | OUTPATIENT
Start: 2020-02-06 | End: 2020-03-27

## 2020-02-06 ASSESSMENT — MIFFLIN-ST. JEOR: SCORE: 1556.52

## 2020-02-06 ASSESSMENT — PAIN SCALES - GENERAL: PAINLEVEL: SEVERE PAIN (6)

## 2020-02-06 NOTE — PROGRESS NOTES
Oncology initial visit:  Date on this visit: 2/6/2020    Ten Johnson  is referred by Dr.Jacob Benoit for an oncology consultation. He requires evaluation for new diagnosis of HCC.    Primary Physician: Cuca Celeste     History Of Present Illness:  Mr. Johnson is a 59 year old male who has a hx of HCV/ETOH cirrhosis, severe aortic stenosis s/p TAVR, portal HTN, was recently diagnosed with HCC ( biopsy proven from right liver lobe 7/18/2019) s/p laparoscopic converted to open and had liver core needle biopsies and intra-operative microwave ablation of 3 lesions (Seg 6/7 x 2, Seg 7) on 10/22/19 with Dr. Benoit. ( S6/7 biopsies were positive for HCC but S7 biopsies were negative for malignancy )  He has another 2.3 cm S5 lesion adjacent to the gall bladder fossa which was unable to be treated then so he had TACE for it on 1/29/2020.  He gets weekly paracentesis.    Previously he had CT cjest which showed tiny indeterminate lung nodules. I do not see that a Bone Scan has been done.  Baseline AFP was 24.8.    He comes in today and tells me that overall he has been feeling well.  He has chronic pains from his previous joint injuries including the shoulder left elbow as well as the right knee.  He also notices discomfort in his abdomen which gets better with paracentesis.  He gets therapeutic paracentesis every 10 to 14 days and this has been going on since since after he had microwave ablation procedure.  He does not take diuretics because he does not like the way he feels on it.  He denies nausea vomiting diarrhea or constipation.  Denies bleeding.  Denies any recent infections.  No shortness of breath per se.  Denies any new swellings.  No neuropathy.  He feels somewhat fatigued and is not very active.  He mentions that he now drinks very occasionally and last alcohol drink was on Christmas 2019.     ECOG 1-2    ROS:  A comprehensive ROS was otherwise neg    Past Medical/Surgical History:  Past Medical History:    Diagnosis Date     JENNIFER (acute kidney injury) (H) 4/9/2019     Alcohol use disorder      Alcoholic cirrhosis (H)      Anticoagulant long-term use     plavix     Aortic stenosis, severe 2/21/2018     Ascites      Chronic allergic rhinitis      Chronic anemia      Chronic hepatitis C without hepatic coma (H) 05/10/2016    Untreated as of 2/2018     Cirrhosis (H) 2017    MRI finding     Diastolic dysfunction      Erosive gastropathy      Esophageal varices in alcoholic cirrhosis (H)      H/O upper gastrointestinal hemorrhage 09/2017     Hepatocellular carcinoma (H)      History of blood transfusion      History of drug abuse (H)     intranasal     Hypertension     essential     JANELLE (iron deficiency anemia)      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     Sarahi-Hoffman tear     History     Marijuana abuse      MRSA (methicillin resistant Staphylococcus aureus)      Nonrheumatic aortic valve stenosis 2/20/2018     Olecranon bursitis      Portal hypertension (H)      Right shoulder pain     history of rotator cuff repair     S/p TAVR (transcatheter aortic valve replacement), bioprosthetic      Severe aortic stenosis      Thrombocytopenia (H)      Past Surgical History:   Procedure Laterality Date     ABLATE LIVER TUMOR N/A 10/22/2019    Procedure: Ablate liver tumor x3;  Surgeon: Gregorio Benoit MD;  [facility]     ARTHROSCOPY SHOULDER ROTATOR CUFF REPAIR  7/31/2012    Procedure: ARTHROSCOPY SHOULDER ROTATOR CUFF REPAIR;  Right Shoulder Arthroscopic Rotator Cuff Repair, BicepsTenodesis,  Subacromial Decompression ;  Surgeon: Joi Castillo MD;  [facility]     ESOPHAGOSCOPY, GASTROSCOPY, DUODENOSCOPY (EGD), COMBINED N/A 10/23/2017    Procedure: COMBINED ESOPHAGOSCOPY, GASTROSCOPY, DUODENOSCOPY (EGD);;  Surgeon: Gentry Salas MD;  [facility]     EXCHANGE POLY COMPONENT ARTHROPLASTY KNEE Right 3/4/2019    Procedure: REVISION RIGHT  TOTAL KNEE POLY COMPONENT EXCHANGE;  Surgeon: Olvin Joe MD;  [facility]: [facility]     FACIAL RECONSTRUCTION SURGERY  1971     HEART CATH FEMORAL CANNULIZATION WITH OPEN STANDBY REPAIR AORTIC VALVE N/A 2/21/2018    Procedure: HEART CATH FEMORAL CANNULIZATION WITH OPEN STANDBY REPAIR AORTIC VALVE;;  Surgeon: Luis Baird MD;  [facility]     IR CHEMO EMBOLIZATION  1/29/2020     IR LIVER BIOPSY PERCUTANEOUS  7/18/2019     IRRIGATION AND DEBRIDEMENT UPPER EXTREMITY, COMBINED  1/3/2012    Procedure:COMBINED IRRIGATION AND DEBRIDEMENT UPPER EXTREMITY; Irrigation & Debridement Left Elbow; Surgeon:CRISTHIAN ZHOU; [facility]     LAPAROSCOPIC BIOPSY LIVER N/A 10/22/2019    Procedure: intraoperative liver ultrasound, laparoscopic converted to open liver biopsy x 6;  Surgeon: Gregorio Benoit MD;  [facility]     LAPAROSCOPY DIAGNOSTIC (GENERAL) N/A 10/22/2019    Procedure: Diagnostic laparoscopy;  Surgeon: Gregorio Benoit MD;  [facility]     LAPAROTOMY, LYSIS ADHESIONS, COMBINED N/A 10/22/2019    Procedure: Laparotomy, lysis adhesions, combined;  Surgeon: Gregorio Benoit MD;  [facility]     OPTICAL TRACKING SYSTEM ARTHROPLASTY KNEE Right 2/7/2019    Procedure: ARTHROPLASTY KNEE RIGHT;  Surgeon: Olvin Joe MD;  [facility]     REPAIR TENDON TRICEPS UPPER EXTREMITY  11/8/2011    Procedure:REPAIR TENDON TRICEPS UPPER EXTREMITY; Surgeon:CRISTHIAN ZHOU; [facility]     SHOULDER SURGERY  2003    left, injury, torn tendons, hematoma     TRANSCATHETER AORTIC VALVE IMPLANT ANESTHESIA N/A 2/21/2018    Procedure: TRANSCATHETER AORTIC VALVE IMPLANT ANESTHESIA;  Transfemoral (Quiroz) Aortic Valve Implant 26mm MARTHA 3, with Cardiopulmonary Bypass Standby, transthoracic echocardiogram;  Surgeon: GENERIC ANESTHESIA PROVIDER;  [facility]     TRANSPOSITION ULNAR NERVE (ELBOW)  11/8/2011    Procedure:TRANSPOSITION ULNAR NERVE (ELBOW); Final Procedure Done: Left Elbow  Lateral Ulnar Collateral Repair And  Left Elbow Triceps Repair       Cancer History:   As above    Allergies:  Allergies as of 02/06/2020 - Reviewed 02/06/2020   Allergen Reaction Noted     Zolpidem Other (See Comments) 03/26/2013     Cats Other (See Comments) 10/28/2011     Dogs Other (See Comments) 10/28/2011     Pollen extract Other (See Comments) 10/28/2011     Current Medications:  Current Outpatient Medications   Medication Sig Dispense Refill     aspirin (ASA) 81 MG tablet Take 1 tablet (81 mg) by mouth daily 90 tablet 3     fluticasone (FLONASE) 50 MCG/ACT nasal spray Spray 2 sprays into both nostrils daily 48 mL 3     ondansetron (ZOFRAN) 4 MG tablet Take 1 tablet (4 mg) by mouth every 8 hours as needed for nausea 30 tablet 0     spironolactone (ALDACTONE) 50 MG tablet Take 2 tablets (100 mg) by mouth daily 60 tablet 3     furosemide (LASIX) 20 MG tablet Take 3 tablets (60 mg) by mouth daily (Patient not taking: Reported on 2/6/2020) 90 tablet 1     lisinopril (PRINIVIL/ZESTRIL) 20 MG tablet Take 1 tablet (20 mg) by mouth daily (Patient not taking: Reported on 2/6/2020) 90 tablet 3     loratadine (CLARITIN) 10 MG tablet Take 1 tablet (10 mg) by mouth daily as needed for allergies (Patient not taking: Reported on 2/6/2020) 90 tablet 3     Multiple Vitamin (MULTIVITAMINS PO) Take 1 tablet by mouth At Bedtime        oxyCODONE (ROXICODONE) 5 MG tablet Take 1 tablet (5 mg) by mouth every 6 hours as needed for severe pain (Patient not taking: Reported on 2/6/2020) 14 tablet 0     oxyCODONE IR (ROXICODONE) 10 MG tablet Take 0.5-1 tablets (5-10 mg) by mouth 2 times daily as needed for moderate to severe pain (Patient not taking: Reported on 2/6/2020) 14 tablet 0      Family History:  Family History   Problem Relation Age of Onset     Cancer Mother 62        unknown primary     Alcoholism Paternal Uncle      Unknown/Adopted Father      No Known Problems Brother      Diabetes Maternal Grandmother      Myocardial  Infarction Maternal Grandfather      No Known Problems Paternal Grandmother      Unknown/Adopted Paternal Grandfather      Cirrhosis No family hx of      His mother had lung cancer.  He does not have any children.    Social History:  Social History     Socioeconomic History     Marital status: Single     Spouse name: Not on file     Number of children: 0     Years of education: Not on file     Highest education level: Not on file   Occupational History     Occupation:    Social Needs     Financial resource strain: Not on file     Food insecurity:     Worry: Not on file     Inability: Not on file     Transportation needs:     Medical: Not on file     Non-medical: Not on file   Tobacco Use     Smoking status: Never Smoker     Smokeless tobacco: Never Used   Substance and Sexual Activity     Alcohol use: Not Currently     Comment: Alcohol use disorder, still actively drinking     Drug use: No     Comment: denies     Sexual activity: Not Currently     Partners: Female   Lifestyle     Physical activity:     Days per week: Not on file     Minutes per session: Not on file     Stress: Not on file   Relationships     Social connections:     Talks on phone: Not on file     Gets together: Not on file     Attends L[employer] service: Not on file     Active member of club or organization: Not on file     Attends meetings of clubs or organizations: Not on file     Relationship status: Not on file     Intimate partner violence:     Fear of current or ex partner: Not on file     Emotionally abused: Not on file     Physically abused: Not on file     Forced sexual activity: Not on file   Other Topics Concern     Parent/sibling w/ CABG, MI or angioplasty before 65F 55M? Not Asked   Social History Narrative    .  Bicycles a lot.  Excessive alcohol use.  Smokes cigars.  Occasional marijuana use.     He denies any smoking.  He used to be a heavy alcohol drinker but over the last 1 year he has significantly  "cut it down and his last alcohol drink was on Christmas 2019.  He used to smoke marijuana but he denies any IV drug use.    Physical Exam:  /84   Pulse 77   Temp 99.3  F (37.4  C) (Oral)   Ht 1.753 m (5' 9\")   Wt 76.1 kg (167 lb 12.8 oz)   SpO2 99%   BMI 24.78 kg/m     Wt Readings from Last 4 Encounters:   02/06/20 76.1 kg (167 lb 12.8 oz)   01/29/20 75.7 kg (166 lb 14.2 oz)   01/28/20 75.4 kg (166 lb 4.8 oz)   01/27/20 73.5 kg (162 lb 1.6 oz)     CONSTITUTIONAL: no acute distress  EYES: PERRLA, no palor or icterus.   ENT/MOUTH: no mouth lesions. Ears normal  CVS: s1s2.  Systolic murmur is heard.  RESPIRATORY: clear to auscultation b/l  GI: Well-healed surgical scar.  There is ascites.  There is no significant tenderness.  It is difficult to palpate underlying organomegaly.    NEURO: AAOX3  Grossly non focal neuro exam  INTEGUMENT: no obvious rashes  LYMPHATIC: no palpable cervical, supraclavicular, axillary or inguinal LAD  MUSCULOSKELETAL: Unremarkable. No bony tenderness.   EXTREMITIES: no edema  PSYCH: Mentation, mood and affect are normal. Decision making capacity is intact    Laboratory/Imaging Studies    Reviewed      ASSESSMENT/PLAN:    HCC in the setting of HCV/ETOH cirrhosis, treated with MWA of S6/7 lesions x 2 and S7 lesion on 10/22/19 with Dr. Benoit. ( S6/7 biopsies were positive for HCC but S7 biopsies were negative for malignancy )  He has another 2.3 cm S5 lesion adjacent to the gall bladder fossa which was unable to be treated then so he had TACE for it on 1/29/2020.    Baseline AFP was 24.8.  I did not see a baseline Bone scan. We will check Bone Scan.     CT chest in June 2019 showed tiny indeterminate lung nodules.    I would like to repeat AFP today and schedule bone scan in next few days.   He will have repeat MRI next week and he will follow up with IR.    Then in 3 months we will repeat MRI Abdomen and labs. I will see him after that     Hepatitis C/alcohol related cirrhosis with " refractory ascites requiring therapeutic paracentesis.  Hepatitis C has never been treated.  He mentions that he does not like the way he feels on diuretics so he prefers not to take it and he just gets therapeutic paracentesis.  He will follow with Dr. Leventhal.    Thrombocytopenia. Previous B12/folate were unremarkable. This is the setting of Hep C/etoh cirrhosis and portal HTn and some hypersplenism - spleen is upper limit of normal 12 cm. Last platelet count was 81. Cont to observe.    Discussion regarding alcohol.  He used to be heavy drinker and he has significantly cut it down but still occasionally drinks alcohol.  I strongly encouraged him to completely quit alcohol.    I would like to see him back in 3 months with repeat his scans and labs prior to that.    All questions answered. He is agreeable and comfortable with the plan.    Pita Nolan MD

## 2020-02-06 NOTE — NURSING NOTE
"Oncology Rooming Note    February 6, 2020 2:29 PM   Ten Johnson is a 59 year old male who presents for:    Chief Complaint   Patient presents with     New Patient     [facility] NEW; HEPATOCELLULAR CARCINOMA     Initial Vitals: /84   Pulse 77   Temp 99.3  F (37.4  C) (Oral)   Ht 1.753 m (5' 9\")   Wt 76.1 kg (167 lb 12.8 oz)   SpO2 99%   BMI 24.78 kg/m   Estimated body mass index is 24.78 kg/m  as calculated from the following:    Height as of this encounter: 1.753 m (5' 9\").    Weight as of this encounter: 76.1 kg (167 lb 12.8 oz). Body surface area is 1.92 meters squared.  Severe Pain (6) Comment: Data Unavailable   No LMP for male patient.  Allergies reviewed: Yes  Medications reviewed: Yes    Medications: MEDICATION REFILLS NEEDED TODAY. Provider was notified.  Pharmacy name entered into [facility]:    [place], [place] - [address] [place] [address] [phone]  [id number] - [place], [place] - [address]    Clinical concerns: Discuss pain medications.  Dr. Nolan was notified.      Caryn Craft [facility]              "

## 2020-02-06 NOTE — TELEPHONE ENCOUNTER
Called pt and left a VM inquiring on how he is doing s/p TACE.    Left direct line for him to return my call     Priscila BEARD RN, BSN  Interventional Radiology Nurse Coordinator   Phone: [phone]

## 2020-02-06 NOTE — LETTER
RE: Ten Johnson  [address]  [facility] [address]     Dear Colleague,    Thank you for referring your patient, Ten Johnson, to the [facility]. Please see a copy of my visit note below.    Oncology initial visit:  Date on this visit: 2/6/2020    Ten Johnson  is referred by Dr.Jacob Benoit for an oncology consultation. He requires evaluation for new diagnosis of HCC.    Primary Physician: Cuca Celeste     History Of Present Illness:  Mr. Johnson is a 59 year old male who has a hx of HCV/ETOH cirrhosis, severe aortic stenosis s/p TAVR, portal HTN, was recently diagnosed with HCC ( biopsy proven from right liver lobe 7/18/2019) s/p laparoscopic converted to open and had liver core needle biopsies and intra-operative microwave ablation of 3 lesions (Seg 6/7 x 2, Seg 7) on 10/22/19 with Dr. Benoit. ( S6/7 biopsies were positive for HCC but S7 biopsies were negative for malignancy )  He has another 2.3 cm S5 lesion adjacent to the gall bladder fossa which was unable to be treated then so he had TACE for it on 1/29/2020.  He gets weekly paracentesis.    Previously he had CT cjest which showed tiny indeterminate lung nodules. I do not see that a Bone Scan has been done.  Baseline AFP was 24.8.    He comes in today and tells me that overall he has been feeling well.  He has chronic pains from his previous joint injuries including the shoulder left elbow as well as the right knee.  He also notices discomfort in his abdomen which gets better with paracentesis.  He gets therapeutic paracentesis every 10 to 14 days and this has been going on since since after he had microwave ablation procedure.  He does not take diuretics because he does not like the way he feels on it.  He denies nausea vomiting diarrhea or constipation.  Denies bleeding.  Denies any recent infections.  No shortness of breath per se.  Denies any new swellings.  No neuropathy.  He feels somewhat fatigued and is not very  active.  He mentions that he now drinks very occasionally and last alcohol drink was on Christmas 2019.     ECOG 1-2    ROS:  A comprehensive ROS was otherwise neg    Past Medical/Surgical History:  Past Medical History:   Diagnosis Date     JENNIFER (acute kidney injury) (H) 4/9/2019     Alcohol use disorder      Alcoholic cirrhosis (H)      Anticoagulant long-term use     plavix     Aortic stenosis, severe 2/21/2018     Ascites      Chronic allergic rhinitis      Chronic anemia      Chronic hepatitis C without hepatic coma (H) 05/10/2016    Untreated as of 2/2018     Cirrhosis (H) 2017    MRI finding     Diastolic dysfunction      Erosive gastropathy      Esophageal varices in alcoholic cirrhosis (H)      H/O upper gastrointestinal hemorrhage 09/2017     Hepatocellular carcinoma (H)      History of blood transfusion      History of drug abuse (H)     intranasal     Hypertension     essential     JANELLE (iron deficiency anemia)      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     Sarahi-Hoffman tear     History     Marijuana abuse      MRSA (methicillin resistant Staphylococcus aureus)      Nonrheumatic aortic valve stenosis 2/20/2018     Olecranon bursitis      Portal hypertension (H)      Right shoulder pain     history of rotator cuff repair     S/p TAVR (transcatheter aortic valve replacement), bioprosthetic      Severe aortic stenosis      Thrombocytopenia (H)      Past Surgical History:   Procedure Laterality Date     ABLATE LIVER TUMOR N/A 10/22/2019    Procedure: Ablate liver tumor x3;  Surgeon: Gregorio Benoit MD;  [facility]: [facility]     ARTHROSCOPY SHOULDER ROTATOR CUFF REPAIR  7/31/2012    Procedure: ARTHROSCOPY SHOULDER ROTATOR CUFF REPAIR;  Right Shoulder Arthroscopic Rotator Cuff Repair, BicepsTenodesis,  Subacromial Decompression ;  Surgeon: Joi Castillo MD;  [facility]     ESOPHAGOSCOPY, GASTROSCOPY, DUODENOSCOPY (EGD), COMBINED N/A 10/23/2017     Procedure: COMBINED ESOPHAGOSCOPY, GASTROSCOPY, DUODENOSCOPY (EGD);;  Surgeon: Gentry Salas MD;  Location: [facility]     EXCHANGE POLY COMPONENT ARTHROPLASTY KNEE Right 3/4/2019    Procedure: REVISION RIGHT TOTAL KNEE POLY COMPONENT EXCHANGE;  Surgeon: Olvin Joe MD;  Location: [facility]     FACIAL RECONSTRUCTION SURGERY  1971     HEART CATH FEMORAL CANNULIZATION WITH OPEN STANDBY REPAIR AORTIC VALVE N/A 2/21/2018    Procedure: HEART CATH FEMORAL CANNULIZATION WITH OPEN STANDBY REPAIR AORTIC VALVE;;  Surgeon: Luis Baird MD;  Location: [facility]     IR CHEMO EMBOLIZATION  1/29/2020     IR LIVER BIOPSY PERCUTANEOUS  7/18/2019     IRRIGATION AND DEBRIDEMENT UPPER EXTREMITY, COMBINED  1/3/2012    Procedure:COMBINED IRRIGATION AND DEBRIDEMENT UPPER EXTREMITY; Irrigation & Debridement Left Elbow; Surgeon:CRISTHIAN ZHOU; Location:[facility]     LAPAROSCOPIC BIOPSY LIVER N/A 10/22/2019    Procedure: intraoperative liver ultrasound, laparoscopic converted to open liver biopsy x 6;  Surgeon: Gregorio Benoit MD;  [facility]     LAPAROSCOPY DIAGNOSTIC (GENERAL) N/A 10/22/2019    Procedure: Diagnostic laparoscopy;  Surgeon: Gregorio Benoit MD;  [facility]     LAPAROTOMY, LYSIS ADHESIONS, COMBINED N/A 10/22/2019    Procedure: Laparotomy, lysis adhesions, combined;  Surgeon: Gregorio Benoit MD;  [facility]     OPTICAL TRACKING SYSTEM ARTHROPLASTY KNEE Right 2/7/2019    Procedure: ARTHROPLASTY KNEE RIGHT;  Surgeon: Olvin Joe MD;  [facility]     REPAIR TENDON TRICEPS UPPER EXTREMITY  11/8/2011    Procedure:REPAIR TENDON TRICEPS UPPER EXTREMITY; Surgeon:CRISTHIAN ZHOU; [facility]     SHOULDER SURGERY  2003    left, injury, torn tendons, hematoma     TRANSCATHETER AORTIC VALVE IMPLANT ANESTHESIA N/A 2/21/2018    Procedure: TRANSCATHETER AORTIC VALVE IMPLANT ANESTHESIA;  Transfemoral (Quiroz) Aortic Valve Implant 26mm MARTHA 3, with Cardiopulmonary Bypass Standby,  transthoracic echocardiogram;  Surgeon: GENERIC ANESTHESIA PROVIDER;  Location: [facility]     TRANSPOSITION ULNAR NERVE (ELBOW)  11/8/2011    Procedure:TRANSPOSITION ULNAR NERVE (ELBOW); Final Procedure Done: Left Elbow Lateral Ulnar Collateral Repair And  Left Elbow Triceps Repair       Cancer History:   As above    Allergies:  Allergies as of 02/06/2020 - Reviewed 02/06/2020   Allergen Reaction Noted     Zolpidem Other (See Comments) 03/26/2013     Cats Other (See Comments) 10/28/2011     Dogs Other (See Comments) 10/28/2011     Pollen extract Other (See Comments) 10/28/2011     Current Medications:  Current Outpatient Medications   Medication Sig Dispense Refill     aspirin (ASA) 81 MG tablet Take 1 tablet (81 mg) by mouth daily 90 tablet 3     fluticasone (FLONASE) 50 MCG/ACT nasal spray Spray 2 sprays into both nostrils daily 48 mL 3     ondansetron (ZOFRAN) 4 MG tablet Take 1 tablet (4 mg) by mouth every 8 hours as needed for nausea 30 tablet 0     spironolactone (ALDACTONE) 50 MG tablet Take 2 tablets (100 mg) by mouth daily 60 tablet 3     furosemide (LASIX) 20 MG tablet Take 3 tablets (60 mg) by mouth daily (Patient not taking: Reported on 2/6/2020) 90 tablet 1     lisinopril (PRINIVIL/ZESTRIL) 20 MG tablet Take 1 tablet (20 mg) by mouth daily (Patient not taking: Reported on 2/6/2020) 90 tablet 3     loratadine (CLARITIN) 10 MG tablet Take 1 tablet (10 mg) by mouth daily as needed for allergies (Patient not taking: Reported on 2/6/2020) 90 tablet 3     Multiple Vitamin (MULTIVITAMINS PO) Take 1 tablet by mouth At Bedtime        oxyCODONE (ROXICODONE) 5 MG tablet Take 1 tablet (5 mg) by mouth every 6 hours as needed for severe pain (Patient not taking: Reported on 2/6/2020) 14 tablet 0     oxyCODONE IR (ROXICODONE) 10 MG tablet Take 0.5-1 tablets (5-10 mg) by mouth 2 times daily as needed for moderate to severe pain (Patient not taking: Reported on 2/6/2020) 14 tablet 0      Family History:  Family History    Problem Relation Age of Onset     Cancer Mother 62        unknown primary     Alcoholism Paternal Uncle      Unknown/Adopted Father      No Known Problems Brother      Diabetes Maternal Grandmother      Myocardial Infarction Maternal Grandfather      No Known Problems Paternal Grandmother      Unknown/Adopted Paternal Grandfather      Cirrhosis No family hx of      His mother had lung cancer.  He does not have any children.    Social History:  Social History     Socioeconomic History     Marital status: Single     Spouse name: Not on file     Number of children: 0     Years of education: Not on file     Highest education level: Not on file   Occupational History     Occupation:    Social Needs     Financial resource strain: Not on file     Food insecurity:     Worry: Not on file     Inability: Not on file     Transportation needs:     Medical: Not on file     Non-medical: Not on file   Tobacco Use     Smoking status: Never Smoker     Smokeless tobacco: Never Used   Substance and Sexual Activity     Alcohol use: Not Currently     Comment: Alcohol use disorder, still actively drinking     Drug use: No     Comment: denies     Sexual activity: Not Currently     Partners: Female   Lifestyle     Physical activity:     Days per week: Not on file     Minutes per session: Not on file     Stress: Not on file   Relationships     Social connections:     Talks on phone: Not on file     Gets together: Not on file     Attends Judaism service: Not on file     Active member of club or organization: Not on file     Attends meetings of clubs or organizations: Not on file     Relationship status: Not on file     Intimate partner violence:     Fear of current or ex partner: Not on file     Emotionally abused: Not on file     Physically abused: Not on file     Forced sexual activity: Not on file   Other Topics Concern     Parent/sibling w/ CABG, MI or angioplasty before 65F 55M? Not Asked   Social History Narrative     ".  Bicycles a lot.  Excessive alcohol use.  Smokes cigars.  Occasional marijuana use.     He denies any smoking.  He used to be a heavy alcohol drinker but over the last 1 year he has significantly cut it down and his last alcohol drink was on Christmas 2019.  He used to smoke marijuana but he denies any IV drug use.    Physical Exam:  /84   Pulse 77   Temp 99.3  F (37.4  C) (Oral)   Ht 1.753 m (5' 9\")   Wt 76.1 kg (167 lb 12.8 oz)   SpO2 99%   BMI 24.78 kg/m      Wt Readings from Last 4 Encounters:   02/06/20 76.1 kg (167 lb 12.8 oz)   01/29/20 75.7 kg (166 lb 14.2 oz)   01/28/20 75.4 kg (166 lb 4.8 oz)   01/27/20 73.5 kg (162 lb 1.6 oz)     CONSTITUTIONAL: no acute distress  EYES: PERRLA, no palor or icterus.   ENT/MOUTH: no mouth lesions. Ears normal  CVS: s1s2.  Systolic murmur is heard.  RESPIRATORY: clear to auscultation b/l  GI: Well-healed surgical scar.  There is ascites.  There is no significant tenderness.  It is difficult to palpate underlying organomegaly.    NEURO: AAOX3  Grossly non focal neuro exam  INTEGUMENT: no obvious rashes  LYMPHATIC: no palpable cervical, supraclavicular, axillary or inguinal LAD  MUSCULOSKELETAL: Unremarkable. No bony tenderness.   EXTREMITIES: no edema  PSYCH: Mentation, mood and affect are normal. Decision making capacity is intact    Laboratory/Imaging Studies    Reviewed    ASSESSMENT/PLAN:    HCC in the setting of HCV/ETOH cirrhosis, treated with MWA of S6/7 lesions x 2 and S7 lesion on 10/22/19 with Dr. Benoit. ( S6/7 biopsies were positive for HCC but S7 biopsies were negative for malignancy )  He has another 2.3 cm S5 lesion adjacent to the gall bladder fossa which was unable to be treated then so he had TACE for it on 1/29/2020.    Baseline AFP was 24.8.  I did not see a baseline Bone scan. We will check Bone Scan.     CT chest in June 2019 showed tiny indeterminate lung nodules.    I would like to repeat AFP today and schedule bone " scan in next few days.   He will have repeat MRI next week and he will follow up with IR.    Then in 3 months we will repeat MRI Abdomen and labs. I will see him after that     Hepatitis C/alcohol related cirrhosis with refractory ascites requiring therapeutic paracentesis.  Hepatitis C has never been treated.  He mentions that he does not like the way he feels on diuretics so he prefers not to take it and he just gets therapeutic paracentesis.  He will follow with Dr. Leventhal.    Thrombocytopenia. Previous B12/folate were unremarkable. This is the setting of Hep C/etoh cirrhosis and portal HTn and some hypersplenism - spleen is upper limit of normal 12 cm. Last platelet count was 81. Cont to observe.    Discussion regarding alcohol.  He used to be heavy drinker and he has significantly cut it down but still occasionally drinks alcohol.  I strongly encouraged him to completely quit alcohol.    I would like to see him back in 3 months with repeat his scans and labs prior to that.    All questions answered. He is agreeable and comfortable with the plan.    Pita Nolan MD

## 2020-02-06 NOTE — PATIENT INSTRUCTIONS
Labs today    Schedule Bone Scan    Follow with Dr Arteaga and Dr Leventhal    In 3 months, repeat labs and MRI and CT Chest and see me a few days after that

## 2020-02-08 ENCOUNTER — MYC MEDICAL ADVICE (OUTPATIENT)
Dept: INTERNAL MEDICINE | Facility: CLINIC | Age: 60
End: 2020-02-08

## 2020-02-08 DIAGNOSIS — R10.9 CHRONIC ABDOMINAL PAIN: Primary | ICD-10-CM

## 2020-02-08 DIAGNOSIS — G89.29 CHRONIC ABDOMINAL PAIN: Primary | ICD-10-CM

## 2020-02-10 ENCOUNTER — MYC MEDICAL ADVICE (OUTPATIENT)
Dept: GASTROENTEROLOGY | Facility: CLINIC | Age: 60
End: 2020-02-10

## 2020-02-10 ENCOUNTER — OFFICE VISIT (OUTPATIENT)
Dept: INFUSION THERAPY | Facility: CLINIC | Age: 60
End: 2020-02-10
Attending: INTERNAL MEDICINE
Payer: COMMERCIAL

## 2020-02-10 ENCOUNTER — ANCILLARY PROCEDURE (OUTPATIENT)
Dept: ULTRASOUND IMAGING | Facility: CLINIC | Age: 60
End: 2020-02-10
Attending: INTERNAL MEDICINE
Payer: COMMERCIAL

## 2020-02-10 ENCOUNTER — MYC MEDICAL ADVICE (OUTPATIENT)
Dept: ONCOLOGY | Facility: CLINIC | Age: 60
End: 2020-02-10

## 2020-02-10 VITALS
BODY MASS INDEX: 25.24 KG/M2 | OXYGEN SATURATION: 97 % | WEIGHT: 170.9 LBS | DIASTOLIC BLOOD PRESSURE: 70 MMHG | SYSTOLIC BLOOD PRESSURE: 110 MMHG | HEART RATE: 70 BPM | TEMPERATURE: 98 F

## 2020-02-10 DIAGNOSIS — C22.0 HCC (HEPATOCELLULAR CARCINOMA) (H): ICD-10-CM

## 2020-02-10 DIAGNOSIS — C22.0 HCC (HEPATOCELLULAR CARCINOMA) (H): Primary | ICD-10-CM

## 2020-02-10 DIAGNOSIS — M75.101 ROTATOR CUFF TEAR, RIGHT: ICD-10-CM

## 2020-02-10 DIAGNOSIS — C22.0 HEPATOCELLULAR CARCINOMA (H): ICD-10-CM

## 2020-02-10 DIAGNOSIS — K70.31 ALCOHOLIC CIRRHOSIS OF LIVER WITH ASCITES (H): ICD-10-CM

## 2020-02-10 DIAGNOSIS — B19.20 HEPATITIS C: ICD-10-CM

## 2020-02-10 DIAGNOSIS — K70.30 ALCOHOLIC CIRRHOSIS OF LIVER WITHOUT ASCITES (H): Primary | ICD-10-CM

## 2020-02-10 DIAGNOSIS — K70.31 ASCITES DUE TO ALCOHOLIC CIRRHOSIS (H): ICD-10-CM

## 2020-02-10 DIAGNOSIS — M17.11 PRIMARY OSTEOARTHRITIS OF RIGHT KNEE: ICD-10-CM

## 2020-02-10 DIAGNOSIS — K76.6 PORTAL HYPERTENSION (H): ICD-10-CM

## 2020-02-10 LAB
AFP SERPL-MCNC: 14.3 UG/L (ref 0–8)
ALBUMIN SERPL-MCNC: 3.1 G/DL (ref 3.4–5)
ALP SERPL-CCNC: 109 U/L (ref 40–150)
ALT SERPL W P-5'-P-CCNC: 67 U/L (ref 0–70)
ANION GAP SERPL CALCULATED.3IONS-SCNC: 5 MMOL/L (ref 3–14)
AST SERPL W P-5'-P-CCNC: 85 U/L (ref 0–45)
BASOPHILS # BLD AUTO: 0 10E9/L (ref 0–0.2)
BASOPHILS NFR BLD AUTO: 0.6 %
BILIRUB DIRECT SERPL-MCNC: 0.2 MG/DL (ref 0–0.2)
BILIRUB SERPL-MCNC: 0.5 MG/DL (ref 0.2–1.3)
BUN SERPL-MCNC: 17 MG/DL (ref 7–30)
CALCIUM SERPL-MCNC: 8.5 MG/DL (ref 8.5–10.1)
CHLORIDE SERPL-SCNC: 111 MMOL/L (ref 94–109)
CO2 SERPL-SCNC: 23 MMOL/L (ref 20–32)
CREAT SERPL-MCNC: 0.76 MG/DL (ref 0.66–1.25)
DIFFERENTIAL METHOD BLD: ABNORMAL
EOSINOPHIL # BLD AUTO: 0.1 10E9/L (ref 0–0.7)
EOSINOPHIL NFR BLD AUTO: 0.7 %
ERYTHROCYTE [DISTWIDTH] IN BLOOD BY AUTOMATED COUNT: 14.1 % (ref 10–15)
GFR SERPL CREATININE-BSD FRML MDRD: >90 ML/MIN/{1.73_M2}
GLUCOSE SERPL-MCNC: 123 MG/DL (ref 70–99)
HCT VFR BLD AUTO: 38.6 % (ref 40–53)
HGB BLD-MCNC: 13.8 G/DL (ref 13.3–17.7)
IMM GRANULOCYTES # BLD: 0 10E9/L (ref 0–0.4)
IMM GRANULOCYTES NFR BLD: 0.1 %
LYMPHOCYTES # BLD AUTO: 1.1 10E9/L (ref 0.8–5.3)
LYMPHOCYTES NFR BLD AUTO: 16.2 %
MCH RBC QN AUTO: 34.5 PG (ref 26.5–33)
MCHC RBC AUTO-ENTMCNC: 35.8 G/DL (ref 31.5–36.5)
MCV RBC AUTO: 97 FL (ref 78–100)
MONOCYTES # BLD AUTO: 0.8 10E9/L (ref 0–1.3)
MONOCYTES NFR BLD AUTO: 11.4 %
NEUTROPHILS # BLD AUTO: 5 10E9/L (ref 1.6–8.3)
NEUTROPHILS NFR BLD AUTO: 71 %
NRBC # BLD AUTO: 0 10*3/UL
NRBC BLD AUTO-RTO: 0 /100
PLATELET # BLD AUTO: 92 10E9/L (ref 150–450)
POTASSIUM SERPL-SCNC: 4.3 MMOL/L (ref 3.4–5.3)
PROT SERPL-MCNC: 6.5 G/DL (ref 6.8–8.8)
RBC # BLD AUTO: 4 10E12/L (ref 4.4–5.9)
SODIUM SERPL-SCNC: 139 MMOL/L (ref 133–144)
WBC # BLD AUTO: 7 10E9/L (ref 4–11)

## 2020-02-10 PROCEDURE — 36415 COLL VENOUS BLD VENIPUNCTURE: CPT

## 2020-02-10 PROCEDURE — 82105 ALPHA-FETOPROTEIN SERUM: CPT | Performed by: INTERNAL MEDICINE

## 2020-02-10 PROCEDURE — 25000125 ZZHC RX 250: Mod: ZF | Performed by: INTERNAL MEDICINE

## 2020-02-10 PROCEDURE — 25000128 H RX IP 250 OP 636: Mod: ZF | Performed by: INTERNAL MEDICINE

## 2020-02-10 PROCEDURE — 80076 HEPATIC FUNCTION PANEL: CPT | Performed by: INTERNAL MEDICINE

## 2020-02-10 PROCEDURE — 49083 ABD PARACENTESIS W/IMAGING: CPT

## 2020-02-10 PROCEDURE — 85025 COMPLETE CBC W/AUTO DIFF WBC: CPT | Performed by: INTERNAL MEDICINE

## 2020-02-10 PROCEDURE — 80048 BASIC METABOLIC PNL TOTAL CA: CPT | Performed by: INTERNAL MEDICINE

## 2020-02-10 PROCEDURE — 80321 ALCOHOLS BIOMARKERS 1OR 2: CPT | Performed by: INTERNAL MEDICINE

## 2020-02-10 PROCEDURE — P9047 ALBUMIN (HUMAN), 25%, 50ML: HCPCS | Mod: ZF | Performed by: INTERNAL MEDICINE

## 2020-02-10 RX ORDER — LIDOCAINE HYDROCHLORIDE 10 MG/ML
20 INJECTION, SOLUTION EPIDURAL; INFILTRATION; INTRACAUDAL; PERINEURAL ONCE
Status: COMPLETED | OUTPATIENT
Start: 2020-02-10 | End: 2020-02-10

## 2020-02-10 RX ORDER — ALBUMIN (HUMAN) 12.5 G/50ML
12.5 SOLUTION INTRAVENOUS
Status: CANCELLED | OUTPATIENT
Start: 2020-02-17

## 2020-02-10 RX ORDER — LIDOCAINE HYDROCHLORIDE 10 MG/ML
20 INJECTION, SOLUTION EPIDURAL; INFILTRATION; INTRACAUDAL; PERINEURAL ONCE
Status: CANCELLED | OUTPATIENT
Start: 2020-02-17

## 2020-02-10 RX ORDER — HEPARIN SODIUM,PORCINE 10 UNIT/ML
5 VIAL (ML) INTRAVENOUS
Status: CANCELLED | OUTPATIENT
Start: 2020-02-17

## 2020-02-10 RX ORDER — HEPARIN SODIUM (PORCINE) LOCK FLUSH IV SOLN 100 UNIT/ML 100 UNIT/ML
5 SOLUTION INTRAVENOUS
Status: CANCELLED | OUTPATIENT
Start: 2020-02-17

## 2020-02-10 RX ORDER — ALBUMIN (HUMAN) 12.5 G/50ML
12.5 SOLUTION INTRAVENOUS
Status: DISCONTINUED | OUTPATIENT
Start: 2020-02-10 | End: 2020-02-10 | Stop reason: HOSPADM

## 2020-02-10 RX ADMIN — ALBUMIN HUMAN 12.5 G: 0.25 SOLUTION INTRAVENOUS at 12:16

## 2020-02-10 RX ADMIN — ALBUMIN HUMAN 12.5 G: 0.25 SOLUTION INTRAVENOUS at 12:31

## 2020-02-10 RX ADMIN — ALBUMIN HUMAN 12.5 G: 0.25 SOLUTION INTRAVENOUS at 12:46

## 2020-02-10 RX ADMIN — LIDOCAINE HYDROCHLORIDE 10 ML: 10 INJECTION, SOLUTION EPIDURAL; INFILTRATION; INTRACAUDAL; PERINEURAL at 12:09

## 2020-02-10 ASSESSMENT — PAIN SCALES - GENERAL: PAINLEVEL: SEVERE PAIN (6)

## 2020-02-10 NOTE — PATIENT INSTRUCTIONS
Dear Ten Johnson    Thank you for choosing [facility] ([facility]) for your paracentesis.  The following information is a summary of our appointment as well as important reminders.      Patient Education     Discharge Instructions for Paracentesis  Paracentesis is a procedure to remove extra fluid from your belly (abdomen). This fluid buildup in the abdomen is called ascites. The procedure may have been done to take a sample of the fluid. Or, it may have been done to drain the extra fluid from your abdomen and help make you more comfortable.     Ascites is buildup of excess fluid in the abdomen.   Home care    If you have pain after the procedure, your healthcare provider can prescribe or recommend pain medicines. Take these exactly as directed. If you stopped taking other medicines before the procedure, ask your provider when you can start them again.    Take it easy for 24 hours after the procedure. Avoid physical activity until your provider says it s OK.    You will have a small bandage over the puncture site. Stitches (sutures), surgical staples, adhesive tapes, adhesive strips, or surgical glue may be used to close the incision. They also help stop bleeding and speed healing. You may take the bandage off in 24 hours.    Check the puncture site for the signs of infection listed below.  Follow-up care  Make a follow-up appointment with your healthcare provider as directed. During your follow-up visit, your provider will check your healing. Let your provider know how you are feeling. You can also discuss the cause of your ascites and if you need any further treatment.  When to seek medical advice  Call your healthcare provider if you have any of the following after the procedure:    A fever of 100.4 F (38 C) or higher    Trouble breathing    Pain that doesn't go away even after taking pain medicine    Belly pain not caused by having the skin  punctured    Bleeding from the puncture site    More than a small amount of fluid leaking from the puncture site    Swollen belly    Signs of infection at the puncture site. These include increased pain, redness, or swelling, warmth, or bad-smelling drainage.    Blood in your urine    Feeling dizzy or lightheaded, or fainting   Date Last Reviewed: 7/1/2016 2000-2019 [employer]. [address] [place] [address]. All rights reserved. This information is not intended as a substitute for professional medical care. Always follow your healthcare professional's instructions.             We look forward in seeing you on your next appointment here at [facility]  Please don t hesitate to call us at [phone] to reschedule any of your appointments or to speak with one of the [facility] registered nurses.  It was a pleasure taking care of you today.    Sincerely,    [facility]  [facility]  [address] [place] [address]  [id number]  Phone:  [phone]

## 2020-02-10 NOTE — TELEPHONE ENCOUNTER
"Pt sent Quidt messages to Oncology, Vasc Surgery and Internal Med requesting pain management. Stated he received #14 Oxycodone on 2/6 from Dr. Arteaga on 2/6 and is taking 3 tabs a day, took his last tab today. Does not understand where he is supposed to get his refills from. Saw Dr. Nolan on 2/6 with follow-up in 3 months.    Pt state chronic all over pain that he \"cannot\" take ibuprofen or tylenol for. State oxycodone 5 mg is working well, may take up to 3 tabs daily. State he always takes one before his paracentesis when he has the worst pain. Paged Dr. Nolan.  "

## 2020-02-10 NOTE — PROGRESS NOTES
Paracentesis Nursing Note  Ten Johnson presents today to [facility] for [facility].    During today's appointment orders from Thomas Leventhal were completed.    Progress Note:  Patient identification verified by name and date of birth.  Assessment completed.  Vitals monitored throughout appointment and recorded in Doc Flowsheets.  See proceduralist note in ultrasound.    Vascular Access: peripheral IV placed today.  Labs: were drawn per orders.     Date of consent or authorization: 11/13/2019.  Invasive Procedure Safety Checklist was completed and sent for scanning.     Paracentesis performed by Nathan Alves PA-C Radiology.    The following labs were communicated to provider performing paracentesis:  Lab Results   Component Value Date    PLT 81 01/30/2020       Total amount of ascites fluid drained: 3.5 liters.  Color of ascites fluid: clear, yellow.  Total amount of albumin given: 37.5  grams.    Patient tolerated procedure well.    Post procedure,denies pain or discomfort post paracentesis. Other than his tailbone pain which he arrived with.       Discharge Plan:  Discharge instructions were reviewed with patient.  Patient/Representative verbalized understanding and all questions were answered.   Discharged from [facility] in stable condition.    Stefanie De La Torre RN       Administrations This Visit     albumin human 25 % injection 12.5 g     Admin Date  02/10/2020 Action  New Bag Dose  12.5 g Rate   Route  Intravenous Administered By  Stefanie De La Torre RN           Admin Date  02/10/2020 Action  New Bag Dose  12.5 g Rate  200 mL/hr Route  Intravenous Administered By  Stefanie De La Torre RN           Admin Date  02/10/2020 Action  New Bag Dose  12.5 g Rate  200 mL/hr Route  Intravenous Administered By  Stefanie De La Torre RN          lidocaine (PF) (XYLOCAINE) 1 % injection 20 mL     Admin Date  02/10/2020 Action  Given by Other Clinician Dose  10  mL Route  Subcutaneous Administered By  Stefanie De La Torre, RN                BP (!) 151/93   Pulse 100   Temp 98  F (36.7  C) (Oral)   Wt 77.5 kg (170 lb 14.4 oz)   SpO2 97%   BMI 25.24 kg/m

## 2020-02-11 RX ORDER — OXYCODONE HYDROCHLORIDE 5 MG/1
5 TABLET ORAL EVERY 6 HOURS PRN
Qty: 30 TABLET | Refills: 0 | Status: SHIPPED | OUTPATIENT
Start: 2020-02-11 | End: 2020-02-19

## 2020-02-11 NOTE — TELEPHONE ENCOUNTER
Per Dr. Nolan: ok to refill 30, refer to palliative care for future refills. Dr. Nolan is at [facility] today, unable to electronically prescribe. LM for pt to call back if he can  Rx from [facility].

## 2020-02-11 NOTE — TELEPHONE ENCOUNTER
Pt called back and state he is unable to drive to [place] but he has enough pills to last him until Thursday when he is coming for his MRI. Dr. Nolan [place] at [facility] [place] and can send Rx to downstairs pharmacy for . Pt verbalized understanding and agreeable to establishing care with palliative care, referral sent.

## 2020-02-11 NOTE — TELEPHONE ENCOUNTER
Patient requesting pain management.  Summary of messages reviewed below:    2/6/20 Patient sent [employer] messages to me:  On1/29/20 had liver surgery and was discharged next day 1/30/20 with 14 count 10 mg oxycodin for pain these ran out and I require more I was told by Dr Leventhal office I had to get these from you   I'm in the clinic to meet new oncologist  today at 2:30 pm can we make this happen I'm quite miserable     [facility] staff response:  Navi,   I have sent this to Dr. Celeste for review, however typically she does not prescribe controlled medications. Since this is due to your surgery I have also sent this to your new oncologist to see if they are able to help.   I concurred with this advice    2/6/20 patient sent [employer] message to Murphy Enciso:  Post op pain management I need relief yesterday not in a week how can yo cut folks up and then make them katie around for pain management I put in request with dr Celeste and know this takes time relief was needed yesterday when discharge meds were gone not next week.     Help please     2/6/20 LPN response:  Mr. Johnson,   You can give Dr. Arteaga's nurse Rpiscila a call regarding pain medication [phone].   SHANELL Dasilva   Hepatology Clinic     2/6/20 patient sent message to Vascular Surgery:  Once again I am having a difficult tome in getting post surgery pain management discharged with several days worth of meds which are gone relief was needed Tuesday not next week. I will be meet oncologist at 2:30 pm in clinic today I have tried to ask all involved in last weeks procedure for assistance as I'm quite miserable please help. Thanks Navi REECE response:  Josue Mcelroy   This is Ev, I'm the LPN here in [facility], our dept. Did your pre-op assessment. I think this would be best handled at your oncology appointment you will be having later today. We don't prescribe pain medications in this department.   I wish for the best for you and I hope this was helpful.      2/6/20  patient message to Dr. La:  Yesterday I asked Dr Leventhal office for pain meds as the few I received post surgery last week ran out  I have sent request as directed to Dr Celeste from prior experience this may take some time. I quite miserable now.  If you could help expedite relief I would appreciate your help.  Thanks Navi RAMIREZ response:  Good morning Navi,   Please call me to when you can.   Thank you,   Priscila BEARD RN, BSN   Interventional Radiology Nurse Coordinator   Phone: [phone]    Pt did return my call.   He states that he is still having pain.   He states that his pain is located in the abdominal area.   He states that he does ache all day long.   He states that he has one pain point on his right hip,  10/22 surgical site where did Dr. Benoit. He states that the incision has been painful since.   He is asking for more pain meds.   He does have a para on Mon 2/10.   He states that 6/7-10. He states that he's been napping and so feels good.   Asked about pain meds in which he states that he took 2 on a daily basis.   Asked about puncture site: looks good.    He states that he has nausea.   He does have nausea medication in which he had a scopolamine patch. He's not sure.   Asked about fever: he states that he did have one on Monday and Tues.   Asked about appetite.   He states that he hasn't had much.   BM? Normal.   I informed him that I will consult with Dr. Arteaga and then he can  the script after 4pm today.  He agrees to plan.  Priscila BEARD RN, BSN  Interventional Radiology Nurse Coordinator    Phone: [phone]    Rx for oxycodone sent by Dr. Arteaga on 2/6/20 (#14 tabs)    Patient saw Dr. Nolan on Oncology on 2/6/20  No comments on pain management in progress note.      2/10/20:  Patient sent [employer] message to Dr. Nolan:  I'm in csc finishing parasentisis. In need of pain management thanks for working on something real.  Current patchwork remedies are BS     RN response note:  Pt  "sent [employer] messages to Oncology, Vasc Surgery and Internal Med requesting pain management. Stated he received #14 Oxycodone on 2/6 from Dr. Arteaga on 2/6 and is taking 3 tabs a day, took his last tab today. Does not understand where he is supposed to get his refills from. Saw Dr. Nolan on 2/6 with follow-up in 3 months.  Pt state chronic all over pain that he \"cannot\" take ibuprofen or tylenol for. State oxycodone 5 mg is working well, may take up to 3 tabs daily. State he always takes one before his paracentesis when he has the worst pain. Paged Dr. Nolan.    2/10/20 patient sent message to Murphy Enciso:  Dr Celeste is working on pain management that's ongoing this patchwork remedy is BS no need to live this way.  Input for Dr Celeste would be appreciated thanks Navi REECE response:  Message routed to me   (even though I am not involved in his pain management)    Message from patient to me:  Still ongoing problem  the remedy was a couple days of relief I need a pain clinic referral this is not a day to day deal     My response 2/11/20:  I will send a referral to the pain clinic.  I will not be prescribing controlled substances for Navi.    Dr. Celeste        "

## 2020-02-11 NOTE — TELEPHONE ENCOUNTER
Patient referred to pain clinic.  I do not recommend opioids and will not be prescribing them.  SB

## 2020-02-13 ENCOUNTER — ANCILLARY PROCEDURE (OUTPATIENT)
Dept: MRI IMAGING | Facility: CLINIC | Age: 60
End: 2020-02-13
Attending: INTERNAL MEDICINE
Payer: COMMERCIAL

## 2020-02-13 DIAGNOSIS — B19.20 HEPATITIS C: ICD-10-CM

## 2020-02-13 DIAGNOSIS — C22.0 HEPATOCELLULAR CARCINOMA (H): ICD-10-CM

## 2020-02-13 DIAGNOSIS — K70.31 ALCOHOLIC CIRRHOSIS OF LIVER WITH ASCITES (H): ICD-10-CM

## 2020-02-13 DIAGNOSIS — K76.6 PORTAL HYPERTENSION (H): ICD-10-CM

## 2020-02-13 RX ORDER — GADOBUTROL 604.72 MG/ML
7.5 INJECTION INTRAVENOUS ONCE
Status: COMPLETED | OUTPATIENT
Start: 2020-02-13 | End: 2020-02-13

## 2020-02-13 RX ADMIN — GADOBUTROL 7.5 ML: 604.72 INJECTION INTRAVENOUS at 13:41

## 2020-02-13 NOTE — DISCHARGE INSTRUCTIONS
MRI Contrast Discharge Instructions    The IV contrast you received today will pass out of your body in your  urine. This will happen in the next 24 hours. You will not feel this process.  Your urine will not change color.    Drink at least 4 extra glasses of water or juice today (unless your doctor  has restricted your fluids). This reduces the stress on your kidneys.  You may take your regular medicines.    If you are on dialysis: It is best to have dialysis today.    If you have a reaction: Most reactions happen right away. If you have  any new symptoms after leaving the hospital (such as hives or swelling),  call your hospital at the correct number below. Or call your family doctor.  If you have breathing distress or wheezing, call 911.    Special instructions: ***    I have read and understand the above information.    Signature:______________________________________ Date:___________    Staff:__________________________________________ Date:___________     Time:__________    [facility] Departments:    ___[place]: [phone]  ___[facility]: [phone]  ___[place]: [phone] ___[facility]: [phone]  ___[facility]: [phone]  ___[facility]: [phone]  ___[phone]  ___[facility]: [phone]  ___[phone]

## 2020-02-14 ENCOUNTER — TELEPHONE (OUTPATIENT)
Dept: GASTROENTEROLOGY | Facility: CLINIC | Age: 60
End: 2020-02-14

## 2020-02-14 NOTE — TELEPHONE ENCOUNTER
Left message for pt reminding them of upcoming appointment.  Instructed pt to bring updated medications list.  Jasmine Guido on 2/14/2020 at 12:43 PM

## 2020-02-15 LAB — PETH BLD-MCNC: 38 NG/ML

## 2020-02-18 ENCOUNTER — OFFICE VISIT (OUTPATIENT)
Dept: RADIOLOGY | Facility: CLINIC | Age: 60
End: 2020-02-18
Attending: RADIOLOGY
Payer: COMMERCIAL

## 2020-02-18 VITALS
SYSTOLIC BLOOD PRESSURE: 146 MMHG | OXYGEN SATURATION: 98 % | RESPIRATION RATE: 18 BRPM | WEIGHT: 162.9 LBS | DIASTOLIC BLOOD PRESSURE: 104 MMHG | HEIGHT: 69 IN | HEART RATE: 90 BPM | TEMPERATURE: 98.5 F | BODY MASS INDEX: 24.13 KG/M2

## 2020-02-18 DIAGNOSIS — C22.8 PRIMARY MALIGNANT NEOPLASM OF LIVER (H): Primary | ICD-10-CM

## 2020-02-18 PROCEDURE — G0463 HOSPITAL OUTPT CLINIC VISIT: HCPCS

## 2020-02-18 ASSESSMENT — PAIN SCALES - GENERAL: PAINLEVEL: SEVERE PAIN (6)

## 2020-02-18 ASSESSMENT — MIFFLIN-ST. JEOR: SCORE: 1534.54

## 2020-02-18 NOTE — LETTER
2/18/2020       RE: Ten Johnson  [address] [address]     Dear Colleague,    Thank you for referring your patient, Ten Johnson, to the [facility]. Please see a copy of my visit note below.    Mr. Johnson is a 60 year-old patient with HCC and a growing IRAD 4 lesion who is referred for evaluation for liver directed therapy. He has had a few ablations in the past. His case was discussed at the tumor board and the recommendation was a cTACE. He is coming in follow-up 1 month post cTACE. He tolerated the procedure very well. No complaints.    ECOG is 0.     I reviewed the images and the lab. The MRI shows good treatment of the lesion with a rim of hypersignal that is equivocal for remaining tumor.     AFP is now 14 down from 24 in September    Lab Results   Component Value Date    AST 85 02/10/2020     Lab Results   Component Value Date    ALT 67 02/10/2020     Lab Results   Component Value Date    BILICONJ 0.0 12/18/2012      Lab Results   Component Value Date    BILITOTAL 0.5 02/10/2020     Lab Results   Component Value Date    ALBUMIN 3.1 02/10/2020     Lab Results   Component Value Date    PROTTOTAL 6.5 02/10/2020      Lab Results   Component Value Date    ALKPHOS 109 02/10/2020         Past Medical History:   Diagnosis Date     JENNIFER (acute kidney injury) (H) 4/9/2019     Alcohol use disorder      Alcoholic cirrhosis (H)      Anticoagulant long-term use     plavix     Aortic stenosis, severe 2/21/2018     Ascites      Chronic allergic rhinitis      Chronic anemia      Chronic hepatitis C without hepatic coma (H) 05/10/2016    Untreated as of 2/2018     Cirrhosis (H) 2017    MRI finding     Diastolic dysfunction      Erosive gastropathy      Esophageal varices in alcoholic cirrhosis (H)      H/O upper gastrointestinal hemorrhage 09/2017     Hepatocellular carcinoma (H)      History of blood transfusion      History of drug abuse (H)     intranasal     Hypertension     essential      JANELLE (iron deficiency anemia)      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     Sarahi-Hoffman tear     History     Marijuana abuse      MRSA (methicillin resistant Staphylococcus aureus)      Nonrheumatic aortic valve stenosis 2/20/2018     Olecranon bursitis      Portal hypertension (H)      Right shoulder pain     history of rotator cuff repair     S/p TAVR (transcatheter aortic valve replacement), bioprosthetic      Severe aortic stenosis      Thrombocytopenia (H)        Past Surgical History:   Procedure Laterality Date     ABLATE LIVER TUMOR N/A 10/22/2019    Procedure: Ablate liver tumor x3;  Surgeon: Gregorio Benoit MD;  [facility]     ARTHROSCOPY SHOULDER ROTATOR CUFF REPAIR  7/31/2012    Procedure: ARTHROSCOPY SHOULDER ROTATOR CUFF REPAIR;  Right Shoulder Arthroscopic Rotator Cuff Repair, BicepsTenodesis,  Subacromial Decompression ;  Surgeon: Joi Castillo MD;  [facility]     ESOPHAGOSCOPY, GASTROSCOPY, DUODENOSCOPY (EGD), COMBINED N/A 10/23/2017    Procedure: COMBINED ESOPHAGOSCOPY, GASTROSCOPY, DUODENOSCOPY (EGD);;  Surgeon: Gentry Salas MD;  [facility]     EXCHANGE POLY COMPONENT ARTHROPLASTY KNEE Right 3/4/2019    Procedure: REVISION RIGHT TOTAL KNEE POLY COMPONENT EXCHANGE;  Surgeon: Olvin Joe MD;  [facility]     FACIAL RECONSTRUCTION SURGERY  1971     HEART CATH FEMORAL CANNULIZATION WITH OPEN STANDBY REPAIR AORTIC VALVE N/A 2/21/2018    Procedure: HEART CATH FEMORAL CANNULIZATION WITH OPEN STANDBY REPAIR AORTIC VALVE;;  Surgeon: Luis Baird MD;  [facility]     IR CHEMO EMBOLIZATION  1/29/2020     IR LIVER BIOPSY PERCUTANEOUS  7/18/2019     IRRIGATION AND DEBRIDEMENT UPPER EXTREMITY, COMBINED  1/3/2012    Procedure:COMBINED IRRIGATION AND DEBRIDEMENT UPPER EXTREMITY; Irrigation & Debridement Left Elbow; Surgeon:CRISTHIAN ZHOU; [facility]     LAPAROSCOPIC BIOPSY  LIVER N/A 10/22/2019    Procedure: intraoperative liver ultrasound, laparoscopic converted to open liver biopsy x 6;  Surgeon: Gregorio Benoit MD;  [facility]     LAPAROSCOPY DIAGNOSTIC (GENERAL) N/A 10/22/2019    Procedure: Diagnostic laparoscopy;  Surgeon: Gregorio Benoit MD;  [facility]     LAPAROTOMY, LYSIS ADHESIONS, COMBINED N/A 10/22/2019    Procedure: Laparotomy, lysis adhesions, combined;  Surgeon: Gregorio Benoit MD;  [facility]     OPTICAL TRACKING SYSTEM ARTHROPLASTY KNEE Right 2/7/2019    Procedure: ARTHROPLASTY KNEE RIGHT;  Surgeon: Olvin Joe MD;  [facility]     REPAIR TENDON TRICEPS UPPER EXTREMITY  11/8/2011    Procedure:REPAIR TENDON TRICEPS UPPER EXTREMITY; Surgeon:CRISTHIAN ZHOU; [facility]     SHOULDER SURGERY  2003    left, injury, torn tendons, hematoma     TRANSCATHETER AORTIC VALVE IMPLANT ANESTHESIA N/A 2/21/2018    Procedure: TRANSCATHETER AORTIC VALVE IMPLANT ANESTHESIA;  Transfemoral (Quiroz) Aortic Valve Implant 26mm MARTHA 3, with Cardiopulmonary Bypass Standby, transthoracic echocardiogram;  Surgeon: GENERIC ANESTHESIA PROVIDER;  [facility]     TRANSPOSITION ULNAR NERVE (ELBOW)  11/8/2011    Procedure:TRANSPOSITION ULNAR NERVE (ELBOW); Final Procedure Done: Left Elbow Lateral Ulnar Collateral Repair And  Left Elbow Triceps Repair         Family History   Problem Relation Age of Onset     Cancer Mother 62        unknown primary     Alcoholism Paternal Uncle      Unknown/Adopted Father      No Known Problems Brother      Diabetes Maternal Grandmother      Myocardial Infarction Maternal Grandfather      No Known Problems Paternal Grandmother      Unknown/Adopted Paternal Grandfather      Cirrhosis No family hx of        Social History     Tobacco Use     Smoking status: Never Smoker     Smokeless tobacco: Never Used   Substance Use Topics     Alcohol use: Not Currently     Comment: Alcohol use disorder, still actively drinking         Current  Outpatient Medications   Medication     aspirin (ASA) 81 MG tablet     fluticasone (FLONASE) 50 MCG/ACT nasal spray     furosemide (LASIX) 20 MG tablet     lisinopril (PRINIVIL/ZESTRIL) 20 MG tablet     loratadine (CLARITIN) 10 MG tablet     Multiple Vitamin (MULTIVITAMINS PO)     ondansetron (ZOFRAN) 4 MG tablet     oxyCODONE (ROXICODONE) 5 MG tablet     oxyCODONE (ROXICODONE) 5 MG tablet     oxyCODONE IR (ROXICODONE) 10 MG tablet     spironolactone (ALDACTONE) 50 MG tablet     No current facility-administered medications for this visit.      There were no vitals taken for this visit.    Impression and plan:    Good results based on MRI post cTACE.  There is a hypersignal rim around the treated area to be followed-up in 2 months with a new MR. I told him about the possibility of another ablation in case that segment becomes suspicious of HCC at follow up MR.  The patient understood and agreed with the plan.    Again, thank you for allowing me to participate in the care of your patient.      Sincerely,    Tomi Arteaga MD

## 2020-02-18 NOTE — NURSING NOTE
"Oncology Rooming Note    February 18, 2020 1:23 PM   Ten Johnson is a 59 year old male who presents for:    Chief Complaint   Patient presents with     Oncology Clinic Visit     P RETURN- [facility]     Initial Vitals: BP (!) 146/104 (BP Location: Right arm, Patient Position: Chair, Cuff Size: Adult Regular)   Pulse 90   Temp 98.5  F (36.9  C) (Oral)   Resp 18   Ht 1.753 m (5' 9.02\")   Wt 73.9 kg (162 lb 14.4 oz)   SpO2 98%   BMI 24.04 kg/m   Estimated body mass index is 24.04 kg/m  as calculated from the following:    Height as of this encounter: 1.753 m (5' 9.02\").    Weight as of this encounter: 73.9 kg (162 lb 14.4 oz). Body surface area is 1.9 meters squared.  Severe Pain (6) Comment: Data Unavailable   No LMP for male patient.  Allergies reviewed: Yes  Medications reviewed: Yes    Medications: Medication refills not needed today.  Pharmacy name entered into [facility]:    [facility] [place], [place] - [address] [place] [address] [phone]  [id number] [phone]    Clinical concerns: Patient is worked up and irritated by scam callers he was dealing with prior to appointment blood pressure 146/104 Dr. Arteaga was notified.      Devin Boyd LPN            "

## 2020-02-18 NOTE — PROGRESS NOTES
Mr. Johnson is a 60 year-old patient with HCC and a growing IRAD 4 lesion who is referred for evaluation for liver directed therapy. He has had a few ablations in the past. His case was discussed at the tumor board and the recommendation was a cTACE. He is coming in follow-up 1 month post cTACE. He tolerated the procedure very well. No complaints.    ECOG is 0.     I reviewed the images and the lab. The MRI shows good treatment of the lesion with a rim of hypersignal that is equivocal for remaining tumor.     AFP is now 14 down from 24 in September    Lab Results   Component Value Date    AST 85 02/10/2020     Lab Results   Component Value Date    ALT 67 02/10/2020     Lab Results   Component Value Date    BILICONJ 0.0 12/18/2012      Lab Results   Component Value Date    BILITOTAL 0.5 02/10/2020     Lab Results   Component Value Date    ALBUMIN 3.1 02/10/2020     Lab Results   Component Value Date    PROTTOTAL 6.5 02/10/2020      Lab Results   Component Value Date    ALKPHOS 109 02/10/2020         Past Medical History:   Diagnosis Date     JENNIFER (acute kidney injury) (H) 4/9/2019     Alcohol use disorder      Alcoholic cirrhosis (H)      Anticoagulant long-term use     plavix     Aortic stenosis, severe 2/21/2018     Ascites      Chronic allergic rhinitis      Chronic anemia      Chronic hepatitis C without hepatic coma (H) 05/10/2016    Untreated as of 2/2018     Cirrhosis (H) 2017    MRI finding     Diastolic dysfunction      Erosive gastropathy      Esophageal varices in alcoholic cirrhosis (H)      H/O upper gastrointestinal hemorrhage 09/2017     Hepatocellular carcinoma (H)      History of blood transfusion      History of drug abuse (H)     intranasal     Hypertension     essential     JANELLE (iron deficiency anemia)      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     Sarahi-Hoffman tear     History     Marijuana abuse      MRSA (methicillin resistant  Staphylococcus aureus)      Nonrheumatic aortic valve stenosis 2/20/2018     Olecranon bursitis      Portal hypertension (H)      Right shoulder pain     history of rotator cuff repair     S/p TAVR (transcatheter aortic valve replacement), bioprosthetic      Severe aortic stenosis      Thrombocytopenia (H)        Past Surgical History:   Procedure Laterality Date     ABLATE LIVER TUMOR N/A 10/22/2019    Procedure: Ablate liver tumor x3;  Surgeon: Gregorio Benoit MD;  [facility]     ARTHROSCOPY SHOULDER ROTATOR CUFF REPAIR  7/31/2012    Procedure: ARTHROSCOPY SHOULDER ROTATOR CUFF REPAIR;  Right Shoulder Arthroscopic Rotator Cuff Repair, BicepsTenodesis,  Subacromial Decompression ;  Surgeon: Joi Castillo MD;  [facility]     ESOPHAGOSCOPY, GASTROSCOPY, DUODENOSCOPY (EGD), COMBINED N/A 10/23/2017    Procedure: COMBINED ESOPHAGOSCOPY, GASTROSCOPY, DUODENOSCOPY (EGD);;  Surgeon: Gentry Salas MD;  [facility]     EXCHANGE POLY COMPONENT ARTHROPLASTY KNEE Right 3/4/2019    Procedure: REVISION RIGHT TOTAL KNEE POLY COMPONENT EXCHANGE;  Surgeon: Olvin Joe MD;  [facility]     FACIAL RECONSTRUCTION SURGERY  1971     HEART CATH FEMORAL CANNULIZATION WITH OPEN STANDBY REPAIR AORTIC VALVE N/A 2/21/2018    Procedure: HEART CATH FEMORAL CANNULIZATION WITH OPEN STANDBY REPAIR AORTIC VALVE;;  Surgeon: Luis Baird MD;  [facility]     IR CHEMO EMBOLIZATION  1/29/2020     IR LIVER BIOPSY PERCUTANEOUS  7/18/2019     IRRIGATION AND DEBRIDEMENT UPPER EXTREMITY, COMBINED  1/3/2012    Procedure:COMBINED IRRIGATION AND DEBRIDEMENT UPPER EXTREMITY; Irrigation & Debridement Left Elbow; Surgeon:CRISTHIAN ZHOU; [facility]     LAPAROSCOPIC BIOPSY LIVER N/A 10/22/2019    Procedure: intraoperative liver ultrasound, laparoscopic converted to open liver biopsy x 6;  Surgeon: Gregorio Benoit MD;  [facility]     LAPAROSCOPY DIAGNOSTIC (GENERAL) N/A 10/22/2019     Procedure: Diagnostic laparoscopy;  Surgeon: Gregorio Benoit MD;  [facility]     LAPAROTOMY, LYSIS ADHESIONS, COMBINED N/A 10/22/2019    Procedure: Laparotomy, lysis adhesions, combined;  Surgeon: Gregorio Benoit MD;  [facility]     OPTICAL TRACKING SYSTEM ARTHROPLASTY KNEE Right 2/7/2019    Procedure: ARTHROPLASTY KNEE RIGHT;  Surgeon: Olvin Joe MD;  [facility]     REPAIR TENDON TRICEPS UPPER EXTREMITY  11/8/2011    Procedure:REPAIR TENDON TRICEPS UPPER EXTREMITY; Surgeon:CRISTHIAN ZHOU; [facility]     SHOULDER SURGERY  2003    left, injury, torn tendons, hematoma     TRANSCATHETER AORTIC VALVE IMPLANT ANESTHESIA N/A 2/21/2018    Procedure: TRANSCATHETER AORTIC VALVE IMPLANT ANESTHESIA;  Transfemoral (Quiroz) Aortic Valve Implant 26mm MARTHA 3, with Cardiopulmonary Bypass Standby, transthoracic echocardiogram;  Surgeon: GENERIC ANESTHESIA PROVIDER;  [facility]     TRANSPOSITION ULNAR NERVE (ELBOW)  11/8/2011    Procedure:TRANSPOSITION ULNAR NERVE (ELBOW); Final Procedure Done: Left Elbow Lateral Ulnar Collateral Repair And  Left Elbow Triceps Repair         Family History   Problem Relation Age of Onset     Cancer Mother 62        unknown primary     Alcoholism Paternal Uncle      Unknown/Adopted Father      No Known Problems Brother      Diabetes Maternal Grandmother      Myocardial Infarction Maternal Grandfather      No Known Problems Paternal Grandmother      Unknown/Adopted Paternal Grandfather      Cirrhosis No family hx of        Social History     Tobacco Use     Smoking status: Never Smoker     Smokeless tobacco: Never Used   Substance Use Topics     Alcohol use: Not Currently     Comment: Alcohol use disorder, still actively drinking         Current Outpatient Medications   Medication     aspirin (ASA) 81 MG tablet     fluticasone (FLONASE) 50 MCG/ACT nasal spray     furosemide (LASIX) 20 MG tablet     lisinopril (PRINIVIL/ZESTRIL) 20 MG tablet     loratadine  (CLARITIN) 10 MG tablet     Multiple Vitamin (MULTIVITAMINS PO)     ondansetron (ZOFRAN) 4 MG tablet     oxyCODONE (ROXICODONE) 5 MG tablet     oxyCODONE (ROXICODONE) 5 MG tablet     oxyCODONE IR (ROXICODONE) 10 MG tablet     spironolactone (ALDACTONE) 50 MG tablet     No current facility-administered medications for this visit.      There were no vitals taken for this visit.    Impression and plan:    Good results based on MRI post cTACE.  There is a hypersignal rim around the treated area to be followed-up in 2 months with a new MR. I told him about the possibility of another ablation in case that segment becomes suspicious of HCC at follow up MR.  The patient understood and agreed with the plan.

## 2020-02-19 ENCOUNTER — MYC REFILL (OUTPATIENT)
Dept: CARDIOLOGY | Facility: CLINIC | Age: 60
End: 2020-02-19

## 2020-02-19 DIAGNOSIS — Z95.3 S/P TAVR (TRANSCATHETER AORTIC VALVE REPLACEMENT), BIOPROSTHETIC: ICD-10-CM

## 2020-02-20 ENCOUNTER — ANCILLARY PROCEDURE (OUTPATIENT)
Dept: ULTRASOUND IMAGING | Facility: CLINIC | Age: 60
End: 2020-02-20
Attending: INTERNAL MEDICINE
Payer: COMMERCIAL

## 2020-02-20 ENCOUNTER — OFFICE VISIT (OUTPATIENT)
Dept: INFUSION THERAPY | Facility: CLINIC | Age: 60
End: 2020-02-20
Attending: INTERNAL MEDICINE
Payer: COMMERCIAL

## 2020-02-20 ENCOUNTER — MYC REFILL (OUTPATIENT)
Dept: GASTROENTEROLOGY | Facility: CLINIC | Age: 60
End: 2020-02-20

## 2020-02-20 VITALS
TEMPERATURE: 98.7 F | RESPIRATION RATE: 16 BRPM | WEIGHT: 162.7 LBS | BODY MASS INDEX: 24.02 KG/M2 | HEART RATE: 89 BPM | SYSTOLIC BLOOD PRESSURE: 120 MMHG | DIASTOLIC BLOOD PRESSURE: 85 MMHG | OXYGEN SATURATION: 98 %

## 2020-02-20 DIAGNOSIS — R18.8 OTHER ASCITES: ICD-10-CM

## 2020-02-20 DIAGNOSIS — K70.31 ASCITES DUE TO ALCOHOLIC CIRRHOSIS (H): Primary | ICD-10-CM

## 2020-02-20 PROCEDURE — 40000556 ZZH STATISTIC PERIPHERAL IV START W US GUIDANCE: Mod: ZF

## 2020-02-20 PROCEDURE — P9047 ALBUMIN (HUMAN), 25%, 50ML: HCPCS | Mod: ZF | Performed by: INTERNAL MEDICINE

## 2020-02-20 PROCEDURE — 25000128 H RX IP 250 OP 636: Mod: ZF | Performed by: INTERNAL MEDICINE

## 2020-02-20 PROCEDURE — 27210190 US PARACENTESIS

## 2020-02-20 PROCEDURE — 25000125 ZZHC RX 250: Mod: ZF | Performed by: INTERNAL MEDICINE

## 2020-02-20 RX ORDER — FUROSEMIDE 20 MG
60 TABLET ORAL DAILY
Qty: 90 TABLET | Refills: 1 | Status: SHIPPED | OUTPATIENT
Start: 2020-02-20 | End: 2021-02-01

## 2020-02-20 RX ORDER — ALBUMIN (HUMAN) 12.5 G/50ML
12.5 SOLUTION INTRAVENOUS
Status: DISCONTINUED | OUTPATIENT
Start: 2020-02-20 | End: 2020-02-20 | Stop reason: HOSPADM

## 2020-02-20 RX ORDER — LIDOCAINE HYDROCHLORIDE 10 MG/ML
20 INJECTION, SOLUTION EPIDURAL; INFILTRATION; INTRACAUDAL; PERINEURAL ONCE
Status: CANCELLED | OUTPATIENT
Start: 2020-02-24

## 2020-02-20 RX ORDER — ALBUMIN (HUMAN) 12.5 G/50ML
12.5 SOLUTION INTRAVENOUS
Status: CANCELLED | OUTPATIENT
Start: 2020-02-24

## 2020-02-20 RX ORDER — HEPARIN SODIUM (PORCINE) LOCK FLUSH IV SOLN 100 UNIT/ML 100 UNIT/ML
5 SOLUTION INTRAVENOUS
Status: CANCELLED | OUTPATIENT
Start: 2020-02-24

## 2020-02-20 RX ORDER — LIDOCAINE HYDROCHLORIDE 10 MG/ML
20 INJECTION, SOLUTION EPIDURAL; INFILTRATION; INTRACAUDAL; PERINEURAL ONCE
Status: COMPLETED | OUTPATIENT
Start: 2020-02-20 | End: 2020-02-20

## 2020-02-20 RX ORDER — HEPARIN SODIUM,PORCINE 10 UNIT/ML
5 VIAL (ML) INTRAVENOUS
Status: CANCELLED | OUTPATIENT
Start: 2020-02-24

## 2020-02-20 RX ADMIN — LIDOCAINE HYDROCHLORIDE 10 ML: 10 INJECTION, SOLUTION EPIDURAL; INFILTRATION; INTRACAUDAL; PERINEURAL at 12:20

## 2020-02-20 RX ADMIN — ALBUMIN HUMAN 12.5 G: 0.25 SOLUTION INTRAVENOUS at 12:35

## 2020-02-20 RX ADMIN — ALBUMIN HUMAN 12.5 G: 0.25 SOLUTION INTRAVENOUS at 12:20

## 2020-02-20 RX ADMIN — ALBUMIN HUMAN 12.5 G: 0.25 SOLUTION INTRAVENOUS at 12:28

## 2020-02-20 NOTE — PROGRESS NOTES
Paracentesis Nursing Note  Ten Johnson presents today to [facility] for a paracentesis.    During today's appointment orders from Thomas Leventhal, MD were completed.    Progress Note:  Patient identification verified by name and date of birth.  Assessment completed.  Vitals monitored throughout appointment and recorded in Doc Flowsheets.  See proceduralist note in ultrasound.    Vascular Access: peripheral IV was placed by vascular access nurse.  Labs: were not ordered for this appointment.    Date of consent or authorization: Today, 2/20/2020.  Invasive Procedure Safety Checklist was completed and sent for scanning.     Paracentesis performed by Jason Britt PA-C Radiology.    Consent completed today.    The following labs were communicated to provider performing paracentesis:  Lab Results   Component Value Date    PLT 92 02/10/2020       Total amount of ascites fluid drained: 3.9 liters.  Color of ascites fluid: yellow.  Total amount of albumin given: 37.5  grams.    Patient tolerated procedure well.    Post procedure,denies pain or discomfort post paracentesis.      Discharge Plan:  Discharge instructions were reviewed with patient.  Patient/Representative verbalized understanding and all questions were answered.   Discharged from [facility] in stable condition.    Kirti Corona RN    Administrations This Visit     albumin human 25 % injection 12.5 g     Admin Date  02/20/2020 Action  New Bag Dose  12.5 g Route  Intravenous Administered By  Kirti Corona RN           Admin Date  02/20/2020 Action  New Bag Dose  12.5 g Route  Intravenous Administered By  Kirti Corona RN           Admin Date  02/20/2020 Action  New Bag Dose  12.5 g Route  Intravenous Administered By  Kirti Corona RN          lidocaine (PF) (XYLOCAINE) 1 % injection 20 mL     Admin Date  02/20/2020 Action  Given by Other Clinician Dose  10 mL Route  Subcutaneous Administered  By  Kirti Corona, RN                /88   Pulse 82   Temp 98.7  F (37.1  C) (Oral)   Resp 16   SpO2 98%

## 2020-02-20 NOTE — PATIENT INSTRUCTIONS
Dear Ten Johnson    Thank you for choosing [facility] ([facility]) for your paracentesis.  The following information is a summary of our appointment as well as important reminders.      Patient Education     Paracentesis  Your healthcare provider recommends that you have paracentesis. This is a procedure to remove extra fluid from your belly (abdomen). A needle is used to drain the fluid. A small sample of fluid may be taken and tested for problems. If the fluid buildup is causing discomfort or pain, all of the fluid may be drained. To do this, a tube is attached to the needle. The fluid is drained into a container that sits outside of the body. If symptoms are severe, paracentesis may be done as an emergency procedure. Otherwise, it will be scheduled ahead of time. Read on to learn more about paracentesis and how it works.    Understanding ascites  Many of the body s organs, including the liver and intestines, are inside the belly (abdomen). The organs are covered in a thin membrane called the peritoneum. The peritoneum has 2 layers. It makes a fluid that allows the layers to glide smoothly past each other. If this fluid builds up in the belly, the condition is called ascites. Ascites causes pain and discomfort. It can also make it hard to breathe. Fluid can build up for a number of reasons. These include chronic liver disease (cirrhosis), heart or kidney failure, and cancer. Your provider can tell you more about the cause of your ascites.  How paracentesis works  The goal of paracentesis may be to help diagnose the cause of the excess fluid. Or, the goal may be to drain excess fluid from the abdomen. In some cases, fluid returns and the procedure needs to be repeated.  Before the procedure    Tell your provider about any medicines you are taking. This includes all prescription medicines, over-the-counter medicines, street drugs, herbs, vitamins, and  other supplements.    Tell your provider about any allergies you have.    Before the procedure begins, you ll be asked to empty your bladder. This helps prevent injury to the bladder during the procedure. If needed, a thin tube (Mosher catheter) may be placed into your bladder to drain urine during the procedure. This tube is removed after the procedure.    An IV (intravenous) line may be put into a vein in your arm or hand. This line supplies fluids and medicines.    During the procedure    You are awake during the procedure.    An imaging method called ultrasound may be used to guide the procedure. It shows live images of the inside of your belly on a video screen. This helps the provider find the site of the excess fluid inside your belly and decide where to insert the needle.    A numbing medicine (local anesthesia) is injected into your belly where the needle will be inserted.    Once the skin is numb, the provider carefully inserts the needle into the belly. This causes the needle to fill with fluid.    The needle may be removed with only a small sample of fluid. This sample is sent to a lab for testing. Getting a sample takes about 10 to 15 minutes.    Or, a tube may be attached to the needle so that more of the excess fluid can be drained. The tube may be taped or stitched into place. This keeps it from pulling the needle out of your belly.    How long it takes to drain all of the fluid varies for each person. In most cases, it takes about 30 minutes. Your provider will let you know if the procedure is expected to take longer than usual.    Once all of the fluid is drained, the needle and tube are removed.    Pressure is put on the puncture site to stop any fluid leakage or bleeding.    A small bandage is placed over the puncture site. Albumin may be given during or after the procedure to prevent low blood pressure or kidney problems.    After the procedure  You may be taken to a recovery room to rest after  the procedure. If you are in pain, you will be given medicine as needed. You will likely be sent home 1 to 2 hours after the procedure is done. When you leave the hospital, have an adult family member or friend drive you home. If you are staying in the hospital, you will return to your hospital room. If your fluid is infected, you will be sent home on antibiotics. In some cases, the paracentesis may need to be repeated if the fluid reaccumulates. Diuretics (medicines that increase urination) are often prescribed to decrease reaccumulation of the fluid in the future.  Risks and possible complications of paracentesis  This procedure is considered safe. But like all procedures, it carries some risks. These include the following:    Bleeding    Infection    Injury to structures in the belly    Fast drop in blood pressure    Kidney problems after the procedure  When to seek medical care  Call your healthcare provider if you notice any of the following after the procedure:    A fever of 100.4 F (38.0 C) or higher    Trouble breathing    Pain that does not go away even after taking pain medicine    Belly pain not caused by having the skin punctured    Bleeding from the puncture site    More than a small amount of fluid leakage from the puncture site    Swollen belly    Signs of infection at the puncture site. These include increased pain, redness, or swelling, as well as warmth or bad-smelling drainage.    Blood in your urine    Dizziness, lightheadedness, or fainting  Date Last Reviewed: 7/1/2016 2000-2019 [employer]. [address] [place] [address]. All rights reserved. This information is not intended as a substitute for professional medical care. Always follow your healthcare professional's instructions.             We look forward in seeing you on your next appointment here at [facility] ([facility]).  Please don t hesitate to call us at [phone] to reschedule any  of your appointments or to speak with one of the [facility] registered nurses.  It was a pleasure taking care of you today.    Sincerely,    [facility]  [facility]  [address] [place] [address]  [address]  Phone:  [phone]

## 2020-02-21 ENCOUNTER — DOCUMENTATION ONLY (OUTPATIENT)
Dept: GASTROENTEROLOGY | Facility: CLINIC | Age: 60
End: 2020-02-21

## 2020-02-21 DIAGNOSIS — C22.0 HCC (HEPATOCELLULAR CARCINOMA) (H): Primary | ICD-10-CM

## 2020-02-21 NOTE — PROGRESS NOTES
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 Leventhal, M.D.   of Medicine  Advanced & Transplant Hepatology  [facility]

## 2020-02-24 ENCOUNTER — TELEPHONE (OUTPATIENT)
Dept: VASCULAR SURGERY | Facility: CLINIC | Age: 60
End: 2020-02-24

## 2020-02-24 NOTE — TELEPHONE ENCOUNTER
"Pt did return my call.     He is requesting for more pain meds.     I informed him that it's about a month now     He states that he's having pain more so generalized abdominal pain and near the ribs but above the hips.     It's not constant, but intermittent. He notices that pain is more with activity. He states that the sensation is \"hot\" and that it travels around his abdomen.     He states that with the para, they've been accessing on the right side now versus on the left side.     He states that this has been ongoing for quite some time.     Asked about fevers: He states that he's had time where he's had chills in the last week.   No BM changes.     I informed him that normally our patients do not have ongoing pain x 1 mos post treatment. Also, he didn't mention anything about it to Dr Arteaga at the time of follow up.     He states that he does have pain and is not sure what to do. I informed him that I will consult with Dr. Arteaga however he may not refill.     Pt did get upset due to he does have a pain medicine consult appt in April, however he states that from his first treatment he's been having pain and doesn't know who to go to now.    I informed him that he's gotten a refill again and I will consult with Dr. Arteaga but unsure if he will refill it.     Pt disconnected call.     Priscila BEARD RN, BSN  Interventional Radiology Nurse Coordinator   Phone: [phone]                    "

## 2020-02-27 ENCOUNTER — OFFICE VISIT (OUTPATIENT)
Dept: INFUSION THERAPY | Facility: CLINIC | Age: 60
End: 2020-02-27
Attending: INTERNAL MEDICINE
Payer: COMMERCIAL

## 2020-02-27 ENCOUNTER — ANCILLARY PROCEDURE (OUTPATIENT)
Dept: ULTRASOUND IMAGING | Facility: CLINIC | Age: 60
End: 2020-02-27
Attending: INTERNAL MEDICINE
Payer: COMMERCIAL

## 2020-02-27 VITALS
WEIGHT: 160.8 LBS | HEART RATE: 82 BPM | RESPIRATION RATE: 16 BRPM | OXYGEN SATURATION: 100 % | DIASTOLIC BLOOD PRESSURE: 90 MMHG | TEMPERATURE: 98.7 F | BODY MASS INDEX: 23.74 KG/M2 | SYSTOLIC BLOOD PRESSURE: 145 MMHG

## 2020-02-27 DIAGNOSIS — K70.31 ASCITES DUE TO ALCOHOLIC CIRRHOSIS (H): Primary | ICD-10-CM

## 2020-02-27 DIAGNOSIS — M75.101 ROTATOR CUFF TEAR, RIGHT: ICD-10-CM

## 2020-02-27 DIAGNOSIS — M17.11 PRIMARY OSTEOARTHRITIS OF RIGHT KNEE: ICD-10-CM

## 2020-02-27 DIAGNOSIS — C22.0 HCC (HEPATOCELLULAR CARCINOMA) (H): ICD-10-CM

## 2020-02-27 PROCEDURE — 40000556 ZZH STATISTIC PERIPHERAL IV START W US GUIDANCE: Mod: ZF

## 2020-02-27 PROCEDURE — 27210190 US PARACENTESIS

## 2020-02-27 PROCEDURE — 25000125 ZZHC RX 250: Mod: ZF | Performed by: INTERNAL MEDICINE

## 2020-02-27 PROCEDURE — P9047 ALBUMIN (HUMAN), 25%, 50ML: HCPCS | Mod: ZF | Performed by: INTERNAL MEDICINE

## 2020-02-27 PROCEDURE — 25000128 H RX IP 250 OP 636: Mod: ZF | Performed by: INTERNAL MEDICINE

## 2020-02-27 RX ORDER — ALBUMIN (HUMAN) 12.5 G/50ML
12.5 SOLUTION INTRAVENOUS
Status: COMPLETED | OUTPATIENT
Start: 2020-02-27 | End: 2020-02-27

## 2020-02-27 RX ORDER — LIDOCAINE HYDROCHLORIDE 10 MG/ML
20 INJECTION, SOLUTION EPIDURAL; INFILTRATION; INTRACAUDAL; PERINEURAL ONCE
Status: CANCELLED | OUTPATIENT
Start: 2020-03-05

## 2020-02-27 RX ORDER — LIDOCAINE HYDROCHLORIDE 10 MG/ML
20 INJECTION, SOLUTION EPIDURAL; INFILTRATION; INTRACAUDAL; PERINEURAL ONCE
Status: COMPLETED | OUTPATIENT
Start: 2020-02-27 | End: 2020-02-27

## 2020-02-27 RX ORDER — OXYCODONE HYDROCHLORIDE 5 MG/1
5 TABLET ORAL EVERY 6 HOURS PRN
Qty: 30 TABLET | Refills: 0 | Status: SHIPPED | OUTPATIENT
Start: 2020-02-27 | End: 2020-03-12

## 2020-02-27 RX ORDER — ALBUMIN (HUMAN) 12.5 G/50ML
12.5 SOLUTION INTRAVENOUS
Status: CANCELLED | OUTPATIENT
Start: 2020-03-05

## 2020-02-27 RX ORDER — HEPARIN SODIUM (PORCINE) LOCK FLUSH IV SOLN 100 UNIT/ML 100 UNIT/ML
5 SOLUTION INTRAVENOUS
Status: CANCELLED | OUTPATIENT
Start: 2020-03-05

## 2020-02-27 RX ORDER — HEPARIN SODIUM,PORCINE 10 UNIT/ML
5 VIAL (ML) INTRAVENOUS
Status: CANCELLED | OUTPATIENT
Start: 2020-03-05

## 2020-02-27 RX ADMIN — LIDOCAINE HYDROCHLORIDE 10 ML: 10 INJECTION, SOLUTION EPIDURAL; INFILTRATION; INTRACAUDAL; PERINEURAL at 12:13

## 2020-02-27 RX ADMIN — ALBUMIN HUMAN 12.5 G: 0.25 SOLUTION INTRAVENOUS at 12:35

## 2020-02-27 RX ADMIN — ALBUMIN HUMAN 12.5 G: 0.25 SOLUTION INTRAVENOUS at 13:07

## 2020-02-27 RX ADMIN — ALBUMIN HUMAN 12.5 G: 0.25 SOLUTION INTRAVENOUS at 12:51

## 2020-02-27 RX ADMIN — ALBUMIN HUMAN 12.5 G: 0.25 SOLUTION INTRAVENOUS at 12:22

## 2020-02-27 NOTE — PATIENT INSTRUCTIONS
Dear Ten Johnson    Thank you for choosing [facility] ([facility]) for your paracentesis.  The following information is a summary of our appointment as well as important reminders.      Patient Education     Discharge Instructions for Paracentesis  Paracentesis is a procedure to remove extra fluid from your belly (abdomen). This fluid buildup in the abdomen is called ascites. The procedure may have been done to take a sample of the fluid. Or, it may have been done to drain the extra fluid from your abdomen and help make you more comfortable.     Ascites is buildup of excess fluid in the abdomen.   Home care    If you have pain after the procedure, your healthcare provider can prescribe or recommend pain medicines. Take these exactly as directed. If you stopped taking other medicines before the procedure, ask your provider when you can start them again.    Take it easy for 24 hours after the procedure. Avoid physical activity until your provider says it s OK.    You will have a small bandage over the puncture site. Stitches (sutures), surgical staples, adhesive tapes, adhesive strips, or surgical glue may be used to close the incision. They also help stop bleeding and speed healing. You may take the bandage off in 24 hours.    Check the puncture site for the signs of infection listed below.  Follow-up care  Make a follow-up appointment with your healthcare provider as directed. During your follow-up visit, your provider will check your healing. Let your provider know how you are feeling. You can also discuss the cause of your ascites and if you need any further treatment.  When to seek medical advice  Call your healthcare provider if you have any of the following after the procedure:    A fever of 100.4 F (38 C) or higher    Trouble breathing    Pain that doesn't go away even after taking pain medicine    Belly pain not caused by having the skin  punctured    Bleeding from the puncture site    More than a small amount of fluid leaking from the puncture site    Swollen belly    Signs of infection at the puncture site. These include increased pain, redness, or swelling, warmth, or bad-smelling drainage.    Blood in your urine    Feeling dizzy or lightheaded, or fainting   Date Last Reviewed: 7/1/2016 2000-2019 [employer]. [address] [place] [address]. All rights reserved. This information is not intended as a substitute for professional medical care. Always follow your healthcare professional's instructions.             We look forward in seeing you on your next appointment here at [facility] ([facility]).  Please don t hesitate to call us at [phone] to reschedule any of your appointments or to speak with one of the [facility] registered nurses.  It was a pleasure taking care of you today.    Sincerely,    [facility]  [facility]  [address] [place] [address]  [address]  Phone:  [phone]

## 2020-02-27 NOTE — PROGRESS NOTES
Paracentesis Nursing Note  Ten Johnson presents today to [facility] for a [facility]acentesis.    During today's appointment orders from Dr. Leventhal were completed.    Progress Note:  Patient identification verified by name and date of birth.  Assessment completed.  Vitals monitored throughout appointment and recorded in Doc Flowsheets.  See proceduralist note in ultrasound.    Vascular Access: peripheral IV was placed by vascular access nurse.  Labs: were not ordered for this appointment.    Date of consent or authorization: 2/20/2020.  Invasive Procedure Safety Checklist was completed and sent for scanning.     Paracentesis performed by Nathan Alves PA-C Radiology.    The following labs were communicated to provider performing paracentesis:  Lab Results   Component Value Date    PLT 92 02/10/2020       Total amount of ascites fluid drained: 4.3 liters.  Color of ascites fluid: yellow, clear.  Total amount of albumin given: 50 grams. 200 ml/hr.    Patient tolerated procedure well.    Post procedure,denies pain or discomfort post paracentesis.      Discharge Plan:  Discharge instructions were reviewed with patient.  Patient/Representative verbalized understanding and all questions were answered.   Discharged from [facility] in stable condition.    Gabbi Moody RN       Administrations This Visit     albumin human 25 % injection 12.5 g     Admin Date  02/27/2020 Action  New Bag Dose  12.5 g Route  Intravenous Administered By  Gabbi Moody RN           Admin Date  02/27/2020 Action  New Bag Dose  12.5 g Route  Intravenous Administered By  Gabbi Moody RN           Admin Date  02/27/2020 Action  New Bag Dose  12.5 g Route  Intravenous Administered By  Gabbi Moody RN           Admin Date  02/27/2020 Action  New Bag Dose  12.5 g Route  Intravenous Administered By  Gabbi Moody RN          lidocaine (PF) (XYLOCAINE) 1 % injection 20  "mL     Admin Date  02/27/2020 Action  Given Dose  10 mL Route  Subcutaneous Administered By  Gabbi Moody RN                Vital signs:  Temp: 98.7  F (37.1  C) Temp src: Oral       Resp: 16 SpO2: 100 %       Weight: 77 kg (169 lb 12.8 oz)  Estimated body mass index is 25.06 kg/m  as calculated from the following:    Height as of 2/18/20: 1.753 m (5' 9.02\").    Weight as of this encounter: 77 kg (169 lb 12.8 oz).          "

## 2020-03-06 ENCOUNTER — HOSPITAL ENCOUNTER (OUTPATIENT)
Dept: NUCLEAR MEDICINE | Facility: CLINIC | Age: 60
Setting detail: NUCLEAR MEDICINE
End: 2020-03-06
Attending: INTERNAL MEDICINE
Payer: COMMERCIAL

## 2020-03-06 DIAGNOSIS — C22.0 HCC (HEPATOCELLULAR CARCINOMA) (H): ICD-10-CM

## 2020-03-06 PROCEDURE — A9503 TC99M MEDRONATE: HCPCS | Performed by: INTERNAL MEDICINE

## 2020-03-06 PROCEDURE — 34300033 ZZH RX 343: Performed by: INTERNAL MEDICINE

## 2020-03-06 PROCEDURE — 78306 BONE IMAGING WHOLE BODY: CPT

## 2020-03-06 PROCEDURE — 40000141 ZZH STATISTIC PERIPHERAL IV START W/O US GUIDANCE

## 2020-03-06 RX ORDER — TC 99M MEDRONATE 20 MG/10ML
23.1 INJECTION, POWDER, LYOPHILIZED, FOR SOLUTION INTRAVENOUS ONCE
Status: COMPLETED | OUTPATIENT
Start: 2020-03-06 | End: 2020-03-06

## 2020-03-06 RX ADMIN — TC 99M MEDRONATE 23.1 MCI.: 20 INJECTION, POWDER, LYOPHILIZED, FOR SOLUTION INTRAVENOUS at 11:45

## 2020-03-12 ENCOUNTER — ANCILLARY PROCEDURE (OUTPATIENT)
Dept: ULTRASOUND IMAGING | Facility: CLINIC | Age: 60
End: 2020-03-12
Attending: INTERNAL MEDICINE
Payer: COMMERCIAL

## 2020-03-12 ENCOUNTER — OFFICE VISIT (OUTPATIENT)
Dept: INFUSION THERAPY | Facility: CLINIC | Age: 60
End: 2020-03-12
Attending: INTERNAL MEDICINE
Payer: COMMERCIAL

## 2020-03-12 ENCOUNTER — OFFICE VISIT (OUTPATIENT)
Dept: PALLIATIVE CARE | Facility: CLINIC | Age: 60
End: 2020-03-12
Attending: INTERNAL MEDICINE
Payer: COMMERCIAL

## 2020-03-12 VITALS
RESPIRATION RATE: 16 BRPM | DIASTOLIC BLOOD PRESSURE: 77 MMHG | WEIGHT: 158.5 LBS | HEART RATE: 74 BPM | OXYGEN SATURATION: 99 % | SYSTOLIC BLOOD PRESSURE: 132 MMHG | BODY MASS INDEX: 23.4 KG/M2 | TEMPERATURE: 98 F

## 2020-03-12 VITALS
OXYGEN SATURATION: 99 % | SYSTOLIC BLOOD PRESSURE: 132 MMHG | RESPIRATION RATE: 16 BRPM | TEMPERATURE: 98 F | DIASTOLIC BLOOD PRESSURE: 77 MMHG | HEIGHT: 69 IN | BODY MASS INDEX: 23.47 KG/M2 | WEIGHT: 158.5 LBS | HEART RATE: 74 BPM

## 2020-03-12 DIAGNOSIS — B19.20 HEPATITIS C: ICD-10-CM

## 2020-03-12 DIAGNOSIS — K70.31 ALCOHOLIC CIRRHOSIS OF LIVER WITH ASCITES (H): ICD-10-CM

## 2020-03-12 DIAGNOSIS — C22.0 HEPATOCELLULAR CARCINOMA (H): ICD-10-CM

## 2020-03-12 DIAGNOSIS — K70.31 ASCITES DUE TO ALCOHOLIC CIRRHOSIS (H): Primary | ICD-10-CM

## 2020-03-12 DIAGNOSIS — M17.11 PRIMARY OSTEOARTHRITIS OF RIGHT KNEE: ICD-10-CM

## 2020-03-12 DIAGNOSIS — C22.0 HCC (HEPATOCELLULAR CARCINOMA) (H): Primary | ICD-10-CM

## 2020-03-12 DIAGNOSIS — Z51.81 ENCOUNTER FOR THERAPEUTIC DRUG MONITORING: ICD-10-CM

## 2020-03-12 DIAGNOSIS — M15.3 POST-TRAUMATIC OSTEOARTHRITIS OF MULTIPLE JOINTS: ICD-10-CM

## 2020-03-12 DIAGNOSIS — S46.011S TRAUMATIC TEAR OF RIGHT ROTATOR CUFF, UNSPECIFIED TEAR EXTENT, SEQUELA: ICD-10-CM

## 2020-03-12 DIAGNOSIS — K76.6 PORTAL HYPERTENSION (H): ICD-10-CM

## 2020-03-12 LAB
AMPHETAMINES UR QL SCN: NEGATIVE
BARBITURATES UR QL: NEGATIVE
BENZODIAZ UR QL: NEGATIVE
CANNABINOIDS UR QL SCN: POSITIVE
COCAINE UR QL: POSITIVE
ERYTHROCYTE [DISTWIDTH] IN BLOOD BY AUTOMATED COUNT: 13.6 % (ref 10–15)
ETHANOL UR QL SCN: NEGATIVE
HCT VFR BLD AUTO: 41.6 % (ref 40–53)
HGB BLD-MCNC: 15.1 G/DL (ref 13.3–17.7)
MCH RBC QN AUTO: 33.7 PG (ref 26.5–33)
MCHC RBC AUTO-ENTMCNC: 36.3 G/DL (ref 31.5–36.5)
MCV RBC AUTO: 93 FL (ref 78–100)
OPIATES UR QL SCN: NEGATIVE
PLATELET # BLD AUTO: 95 10E9/L (ref 150–450)
RBC # BLD AUTO: 4.48 10E12/L (ref 4.4–5.9)
WBC # BLD AUTO: 8.5 10E9/L (ref 4–11)

## 2020-03-12 PROCEDURE — 99204 OFFICE O/P NEW MOD 45 MIN: CPT | Mod: GC | Performed by: STUDENT IN AN ORGANIZED HEALTH CARE EDUCATION/TRAINING PROGRAM

## 2020-03-12 PROCEDURE — 80320 DRUG SCREEN QUANTALCOHOLS: CPT | Performed by: INTERNAL MEDICINE

## 2020-03-12 PROCEDURE — 25000125 ZZHC RX 250: Mod: ZF | Performed by: INTERNAL MEDICINE

## 2020-03-12 PROCEDURE — 80307 DRUG TEST PRSMV CHEM ANLYZR: CPT | Performed by: INTERNAL MEDICINE

## 2020-03-12 PROCEDURE — 40000556 ZZH STATISTIC PERIPHERAL IV START W US GUIDANCE: Mod: ZF

## 2020-03-12 PROCEDURE — P9047 ALBUMIN (HUMAN), 25%, 50ML: HCPCS | Mod: ZF | Performed by: INTERNAL MEDICINE

## 2020-03-12 PROCEDURE — 25000128 H RX IP 250 OP 636: Mod: ZF | Performed by: INTERNAL MEDICINE

## 2020-03-12 PROCEDURE — 27210190 US PARACENTESIS

## 2020-03-12 PROCEDURE — G0463 HOSPITAL OUTPT CLINIC VISIT: HCPCS | Mod: ZF

## 2020-03-12 PROCEDURE — 85027 COMPLETE CBC AUTOMATED: CPT | Performed by: INTERNAL MEDICINE

## 2020-03-12 RX ORDER — OXYCODONE HYDROCHLORIDE 5 MG/1
5 TABLET ORAL EVERY 6 HOURS PRN
Qty: 30 TABLET | Refills: 0 | Status: SHIPPED | OUTPATIENT
Start: 2020-03-12 | End: 2020-03-27

## 2020-03-12 RX ORDER — ALBUMIN (HUMAN) 12.5 G/50ML
12.5 SOLUTION INTRAVENOUS
Status: COMPLETED | OUTPATIENT
Start: 2020-03-12 | End: 2020-03-12

## 2020-03-12 RX ORDER — ALBUMIN (HUMAN) 12.5 G/50ML
12.5 SOLUTION INTRAVENOUS
Status: CANCELLED | OUTPATIENT
Start: 2020-03-19

## 2020-03-12 RX ORDER — OXYCODONE HYDROCHLORIDE 5 MG/1
5 TABLET ORAL EVERY 6 HOURS PRN
Qty: 30 TABLET | Refills: 0 | Status: SHIPPED | OUTPATIENT
Start: 2020-03-12 | End: 2020-03-12

## 2020-03-12 RX ORDER — LIDOCAINE HYDROCHLORIDE 10 MG/ML
20 INJECTION, SOLUTION EPIDURAL; INFILTRATION; INTRACAUDAL; PERINEURAL ONCE
Status: COMPLETED | OUTPATIENT
Start: 2020-03-12 | End: 2020-03-12

## 2020-03-12 RX ORDER — HEPARIN SODIUM,PORCINE 10 UNIT/ML
5 VIAL (ML) INTRAVENOUS
Status: CANCELLED | OUTPATIENT
Start: 2020-03-19

## 2020-03-12 RX ORDER — HEPARIN SODIUM (PORCINE) LOCK FLUSH IV SOLN 100 UNIT/ML 100 UNIT/ML
5 SOLUTION INTRAVENOUS
Status: CANCELLED | OUTPATIENT
Start: 2020-03-19

## 2020-03-12 RX ORDER — LIDOCAINE HYDROCHLORIDE 10 MG/ML
20 INJECTION, SOLUTION EPIDURAL; INFILTRATION; INTRACAUDAL; PERINEURAL ONCE
Status: CANCELLED | OUTPATIENT
Start: 2020-03-19

## 2020-03-12 RX ADMIN — ALBUMIN HUMAN 12.5 G: 0.25 SOLUTION INTRAVENOUS at 12:43

## 2020-03-12 RX ADMIN — LIDOCAINE HYDROCHLORIDE 10 ML: 10 INJECTION, SOLUTION EPIDURAL; INFILTRATION; INTRACAUDAL; PERINEURAL at 12:28

## 2020-03-12 RX ADMIN — ALBUMIN HUMAN 12.5 G: 0.25 SOLUTION INTRAVENOUS at 12:31

## 2020-03-12 RX ADMIN — ALBUMIN HUMAN 12.5 G: 0.25 SOLUTION INTRAVENOUS at 12:28

## 2020-03-12 RX ADMIN — ALBUMIN HUMAN 12.5 G: 0.25 SOLUTION INTRAVENOUS at 12:39

## 2020-03-12 ASSESSMENT — MIFFLIN-ST. JEOR: SCORE: 1514.64

## 2020-03-12 NOTE — PROGRESS NOTES
"Palliative Care Outpatient Clinic Consultation Note    Patient:  Ten Johnson    Chief Complaint:   Ten Johnson 59 year old male who is presenting to the palliative medicine clinic today at the request of Dr. Nolan for a palliative care consultation secondary to his Hepatocellular carcinoma, chronic joint pains        The patient's primary care provider is:  Cuca Celeste     History of Present Illness:  Mr. Johnson is a 59 year old male who has a hx of HCV/ETOH cirrhosis, severe aortic stenosis s/p TAVR, portal HTN, was recently diagnosed with HCC ( biopsy proven from right liver lobe 7/18/2019) s/p laparoscopic converted to open and had liver core needle biopsies and intra-operative microwave ablation of 3 lesions (Seg 6/7 x 2, Seg 7) on 10/22/19 with Dr. Benoit. ( S6/7 biopsies were positive for HCC but S7 biopsies were negative for malignancy). He has another 2.3 cm S5 lesion adjacent to the gall bladder fossa which was unable to be treated then so he had TACE for it on 1/29/2020.    Patient presents to clinic today alone after his weekly paracentesis.      He reports his abdominal discomfort is significantly improved after completing his paracentesis and describes most of his pain currently is intermittent pain in both hips and lower abdomen. He also reports some intermittent mild upper abdominal discomfort associated with is surgical scares, which feels like retained \"monofilament\".  Patient also describes several musculoskeletal injuries and surgeries for shoulder, knees and hips.  Has been taking his 5 mg dose of oxycodone consistently for his pain which appears to help both his abdominal and generalized musculoskeletal pain.  Patient currently not using any other over-the-counter pain medications including Tylenol and or ibuprofen.  Was previously told that he could use a limited dose of Tylenol, less than 2000 mg daily but currently is not.  Patient reports a desire to use CBD for pain, but has never " "used this in the past.      Patient also describes severe setbacks and believes that his knee replacement surgery, approximately 1 year ago, Joe significant ongoing issues with his health and recovery.     Prescription monitoring program report reviewed: Several recent refills for Oxycodone 5 mg, Previous scripts for Tramadol Hcl 50 mg, Hydrocodone 5-325, in April of last year and Hydromorphone in March.    Patient's Disease Understanding: Has good basic understanding of his disease state.    Coping: Appears to be coping relatively well, but uncertain from my initial visit with him.  Patient did mention if his disease had worse at he would likely not want to slowly decline at home and would prefer to travel possibly internationally and states \" whatever happens happens\"    Social History:  Living Situation: [employer]imena is a multiplex in the [facility], reports that he lives alone  Children: No children  Actual/Potential Caregiver(s): No caregivers identified  Support System: Girl Friend, friends, brother from a distance  Occupation: Retired    Hobbies: Hunting, motor sports prior to his acute decline in functional capacity.  Substance Use/History of misuse: Alcohol abuse history, use in the past and continues to drink occassionally, A few beers at holiday's special actions. Patient reported no use of recreational drugs/ Street drugs, but did report some mariajuana use, no smoking.  Financial Concerns: Financal concerns, but he reports manageable  Spiritual Background: Gnosticist upbring, not practicing after he was a teenager  Spiritual Concerns/Needs: No concerns    Social History     Tobacco Use     Smoking status: Never Smoker     Smokeless tobacco: Never Used   Substance Use Topics     Alcohol use: Not Currently     Comment: Alcohol use disorder, still actively drinking     Drug use: No     Comment: denies     Family History:  Family History   Problem Relation Age of Onset     Cancer " Mother 62        unknown primary     Alcoholism Paternal Uncle      Unknown/Adopted Father      No Known Problems Brother      Diabetes Maternal Grandmother      Myocardial Infarction Maternal Grandfather      No Known Problems Paternal Grandmother      Unknown/Adopted Paternal Grandfather      Cirrhosis No family hx of      Patient's Involvement with Prior History of Serious Illness in Family: Mother pasted away from cancer, quickly per patient's report     Advance Care Planning:  Advance Directive: None on file  Where is written copy located: None on file  Health Care Agent Contact Information: Patient reported his brother Rob is his healthcare agent,  [phone] ([employer]).   POLST: None on file    Allergies   Allergen Reactions     Zolpidem Other (See Comments)     Alcoholic.  Had reaction 3/17/13 while intoxicated which included black out, loss of awareness, paranoia.  Do not prescribe.  Dr. Celeste     Cats Other (See Comments)     rhinitis     Dogs Other (See Comments)     rhinitis     Pollen Extract Other (See Comments)     rhinits.     Current Outpatient Medications   Medication Sig Dispense Refill     fluticasone (FLONASE) 50 MCG/ACT nasal spray Spray 2 sprays into both nostrils daily 48 mL 3     furosemide 20 MG PO tablet Take 3 tablets (60 mg) by mouth daily 90 tablet 1     lisinopril (PRINIVIL/ZESTRIL) 20 MG tablet Take 1 tablet (20 mg) by mouth daily 90 tablet 3     Multiple Vitamin (MULTIVITAMINS PO) Take 1 tablet by mouth At Bedtime        naloxone (NARCAN) 4 MG/0.1ML nasal spray Spray 1 spray (4 mg) into one nostril alternating nostrils once as needed for opioid reversal every 2-3 minutes until assistance arrives 0.2 mL 1     ondansetron (ZOFRAN) 4 MG tablet Take 1 tablet (4 mg) by mouth every 8 hours as needed for nausea 30 tablet 0     oxyCODONE (ROXICODONE) 5 MG tablet Take 1 tablet (5 mg) by mouth every 6 hours as needed for severe pain 30 tablet 0     spironolactone (ALDACTONE) 50 MG tablet  Take 2 tablets (100 mg) by mouth daily 60 tablet 3     aspirin (ASA) 81 MG tablet Take 1 tablet (81 mg) by mouth daily (Patient not taking: Reported on 3/12/2020) 90 tablet 3     loratadine (CLARITIN) 10 MG tablet Take 1 tablet (10 mg) by mouth daily as needed for allergies (Patient not taking: Reported on 2/6/2020) 90 tablet 3     oxyCODONE (ROXICODONE) 5 MG tablet Take 1 tablet (5 mg) by mouth every 6 hours as needed for severe pain (Patient not taking: Reported on 2/6/2020) 14 tablet 0     oxyCODONE IR (ROXICODONE) 10 MG tablet Take 0.5-1 tablets (5-10 mg) by mouth 2 times daily as needed for moderate to severe pain (Patient not taking: Reported on 2/6/2020) 14 tablet 0     Past Medical History:   Diagnosis Date     JENNIFER (acute kidney injury) (H) 4/9/2019     Alcohol use disorder      Alcoholic cirrhosis (H)      Anticoagulant long-term use     plavix     Aortic stenosis, severe 2/21/2018     Ascites      Chronic allergic rhinitis      Chronic anemia      Chronic hepatitis C without hepatic coma (H) 05/10/2016    Untreated as of 2/2018     Cirrhosis (H) 2017    MRI finding     Diastolic dysfunction      Erosive gastropathy      Esophageal varices in alcoholic cirrhosis (H)      H/O upper gastrointestinal hemorrhage 09/2017     Hepatocellular carcinoma (H)      History of blood transfusion      History of drug abuse (H)     intranasal     Hypertension     essential     JANELLE (iron deficiency anemia)      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     Sarahi-Hoffman tear     History     Marijuana abuse      MRSA (methicillin resistant Staphylococcus aureus)      Nonrheumatic aortic valve stenosis 2/20/2018     Olecranon bursitis      Portal hypertension (H)      Right shoulder pain     history of rotator cuff repair     S/p TAVR (transcatheter aortic valve replacement), bioprosthetic      Severe aortic stenosis      Thrombocytopenia (H)      Past Surgical History:    Procedure Laterality Date     ABLATE LIVER TUMOR N/A 10/22/2019    Procedure: Ablate liver tumor x3;  Surgeon: Gregorio Benoit MD;  [facility]     ARTHROSCOPY SHOULDER ROTATOR CUFF REPAIR  7/31/2012    Procedure: ARTHROSCOPY SHOULDER ROTATOR CUFF REPAIR;  Right Shoulder Arthroscopic Rotator Cuff Repair, BicepsTenodesis,  Subacromial Decompression ;  Surgeon: Joi Castillo MD;  [facility]     ESOPHAGOSCOPY, GASTROSCOPY, DUODENOSCOPY (EGD), COMBINED N/A 10/23/2017    Procedure: COMBINED ESOPHAGOSCOPY, GASTROSCOPY, DUODENOSCOPY (EGD);;  Surgeon: Gentry Salas MD;  [facility]: [facility]     EXCHANGE POLY COMPONENT ARTHROPLASTY KNEE Right 3/4/2019    Procedure: REVISION RIGHT TOTAL KNEE POLY COMPONENT EXCHANGE;  Surgeon: Olvin Joe MD;  [facility]: [facility]     FACIAL RECONSTRUCTION SURGERY  1971     HEART CATH FEMORAL CANNULIZATION WITH OPEN STANDBY REPAIR AORTIC VALVE N/A 2/21/2018    Procedure: HEART CATH FEMORAL CANNULIZATION WITH OPEN STANDBY REPAIR AORTIC VALVE;;  Surgeon: Luis Baird MD;  [facility]     IR CHEMO EMBOLIZATION  1/29/2020     IR LIVER BIOPSY PERCUTANEOUS  7/18/2019     IRRIGATION AND DEBRIDEMENT UPPER EXTREMITY, COMBINED  1/3/2012    Procedure:COMBINED IRRIGATION AND DEBRIDEMENT UPPER EXTREMITY; Irrigation & Debridement Left Elbow; Surgeon:CRISTHIAN ZHOU; Location[facility]     LAPAROSCOPIC BIOPSY LIVER N/A 10/22/2019    Procedure: intraoperative liver ultrasound, laparoscopic converted to open liver biopsy x 6;  Surgeon: Gregorio Benoit MD;  [facility]     LAPAROSCOPY DIAGNOSTIC (GENERAL) N/A 10/22/2019    Procedure: Diagnostic laparoscopy;  Surgeon: Gregorio Benoit MD;  [facility]     LAPAROTOMY, LYSIS ADHESIONS, COMBINED N/A 10/22/2019    Procedure: Laparotomy, lysis adhesions, combined;  Surgeon: Gregorio Benoit MD;  [facility]     OPTICAL TRACKING SYSTEM ARTHROPLASTY KNEE Right 2/7/2019    Procedure: ARTHROPLASTY KNEE RIGHT;  Surgeon:  "Olvin Joe MD;  [facility]     REPAIR TENDON TRICEPS UPPER EXTREMITY  11/8/2011    Procedure:REPAIR TENDON TRICEPS UPPER EXTREMITY; Surgeon:CRISTHIAN ZHOU; [facility]     SHOULDER SURGERY  2003    left, injury, torn tendons, hematoma     TRANSCATHETER AORTIC VALVE IMPLANT ANESTHESIA N/A 2/21/2018    Procedure: TRANSCATHETER AORTIC VALVE IMPLANT ANESTHESIA;  Transfemoral (Quiroz) Aortic Valve Implant 26mm MARTHA 3, with Cardiopulmonary Bypass Standby, transthoracic echocardiogram;  Surgeon: GENERIC ANESTHESIA PROVIDER;  [facility]     TRANSPOSITION ULNAR NERVE (ELBOW)  11/8/2011    Procedure:TRANSPOSITION ULNAR NERVE (ELBOW); Final Procedure Done: Left Elbow Lateral Ulnar Collateral Repair And  Left Elbow Triceps Repair       Review of Systems:  ROS: 10 point ROS neg other than the symptoms noted above in the HPI and here:  Palliative Symptom Review (0=no symptom/no concern, 1=mild, 2=moderate, 3=severe):      Pain: 1      Fatigue: 1      Nausea: 1      Constipation: 0, reports never having problems with constipation that couldn't be resolved with diet, says he has never used PRN bowel medications       Diarrhea: 0, Nothing currently, has history of some loose stools with antibiotic use       Depressive Symptoms: 0      Anxiety: 0      Drowsiness: 0      Poor Appetite: 0 Reports good PO intake, cooks and eats well      Shortness of Breath: 0      Insomnia: 1-2, chronic issues with insomnia      Overall (0 good/no concerns, 3 very poor): 1-2    Physical Exam:  /77   Pulse 74   Temp 98  F (36.7  C) (Oral)   Resp 16   Ht 1.753 m (5' 9.02\")   Wt 71.9 kg (158 lb 8 oz)   SpO2 99%   BMI 23.39 kg/m    Constitutional: No acute distress, comfortable, thin  HEENT: Anicteric, normal extra-ocular movements, MMM, throat with no lesions   Cardiovascular: RRR, no LE edema  Respiratory: CTA, no wheezes or crackles, normal work of breathing   Gastrointestinal: Post surgical scars, " mild-tenderness lower quadrants, Non-distended (Post para), + bowel sounds  Musculoskeletal: Pain with movement of upper extremities in shoulders, hip and knee pain reported with ROM.   Skin: Several healed scares on arm and exposed skin, no rashes, no jaundice   Neurological: Alert and orientated x 3, cranial nerves grossly intact, no tremors  Psychological: combative mood, anxious     Data Reviewed:  LABS: CBC: WBC 8.5, Hgb 15.1, Plt count 95    IMAGING:    Ultrasound guided therapeutic paracentesis (CSC).                 IMPRESSION: Ultrasound guided therapeutic paracentesis. Total of  4500cc of clear serous ascites aspirated.    Impressions:  Palliative Performance Score: 70%      Decision Making Capacity:  Yes    Impression & Recommendations & Counseling:  Ten Johnson 59 year old male was seen today in Palliative care clinic today to establish care and for symptom management.    # Neoplasm pain:  # Chronic MSK pain:  Several large joint surgeries (Rotator cuff tear, Right knee replacement, osteoarthritis, etc).  Uncertain if patient's pain is cancer related considering his extensive MSK issues and limited abdominal pain or signs of bony spread on recent imaging. Will prescribe current doses of opioids considering good recent medication compliance, but concerns for medical coping and substance abuse history.  - Drug abuse screen 6 urine (chem dep), returned positive  - Plan to complete ORT at next visit  - Patient provided handout for opioid safety  - Refill oxyCODONE (ROXICODONE) 5 MG tablet; Take 1 tablet (5 mg) by mouth every 6 hours as needed for severe pain Take no more than 2 pill a day  - Patient instructed to follow up pain clinic on 04/28/20    # HCC (hepatocellular carcinoma):  # Advance Care Planning:  - Ongoing discussion regarding patient's approach to care and goals as condition progresses.   - Brother Rob is identified as his healthcare agent  - Follow up with patient next visit regarding  completing an advance care directive.    Return to clinic 2 months    Patient was seen and staffed by Dr. Jefferson Bruner MD, A  Palliative Care Fellow PGY-4  [facility]  Pager: [phone]    Attending attestation:   Patient seen and evaluated with Dr. Bruner and I agree with findings/recs in this note.   On evaluation, pain is chronic, from longstanding musculoskeletal pain, but with cirrhosis and HCC are limited on simple analgesics.  Could do cautious trial of gabapentin or duloxetine, not currently interested.  Will prescribe small amount of oxycodone, max 2 pills/day and continue to discuss nonopioid approaches to pain.  Also has pain clinic appointment, which I think would be helpful.  UDT today, discussed opioid safety.  Somewhat bizarre responses to safety recommendations, will need to monitor closely for misuse, plan for q2wk refills for now.      Thank you for involving us in the patient's care.   Sharmila Paulino MD / Palliative Medicine / Pager [phone] / After-Hours Answering Service [phone] / Main Palliative Clinic - [facility] [phone] / [facility] Inpatient Team Consult Pager [phone] (answered 8am-430pm M-F)

## 2020-03-12 NOTE — NURSING NOTE
"Oncology Rooming Note    March 12, 2020 3:01 PM   Ten Johnson is a 59 year old male who presents for:    Chief Complaint   Patient presents with     New Patient     [facility] NEW; HEPATOCELLULAR CARCINOMA     Initial Vitals: /77   Pulse 74   Temp 98  F (36.7  C) (Oral)   Resp 16   Ht 1.753 m (5' 9.02\")   Wt 71.9 kg (158 lb 8 oz)   SpO2 99%   BMI 23.39 kg/m   Estimated body mass index is 23.39 kg/m  as calculated from the following:    Height as of this encounter: 1.753 m (5' 9.02\").    Weight as of this encounter: 71.9 kg (158 lb 8 oz). Body surface area is 1.87 meters squared.  Data Unavailable Comment: Data Unavailable   No LMP for male patient.  Allergies reviewed: Yes  Medications reviewed: Yes    Medications: Medication refills not needed today.  Pharmacy name entered into EPIC:    [place], [place] - [address] [place] [address] [phone]  [id number] [id number]    Clinical concerns: Discuss pain medications and results from scan.  Dr. Bruner was notified.      Caryn Craft CMA              "

## 2020-03-12 NOTE — PROGRESS NOTES
Paracentesis Nursing Note  Ten Johnson presents today to [facility] for a paracentesis.    During today's appointment orders from Dr. Leventhal were completed.    Progress Note:  Patient identification verified by name and date of birth.  Assessment completed.  Vitals monitored throughout appointment and recorded in Doc Flowsheets.  See proceduralist note in ultrasound.    Vascular Access: peripheral IV was placed by vascular access nurse.  Labs: were drawn per orders.     Date of consent or authorization: 2/20/20.  Invasive Procedure Safety Checklist was completed and sent for scanning.     Paracentesis performed by Ludin Gooden PA-C Radiology.    The following labs were communicated to provider performing paracentesis:  Lab Results   Component Value Date    PLT 95 03/12/2020       Total amount of ascites fluid drained: 4.5 liters.  Color of ascites fluid: yellow.  Total amount of albumin given: 50  grams.    Patient tolerated procedure well.    Post procedure,denies pain or discomfort post paracentesis.      Discharge Plan:  Discharge instructions were reviewed with patient.  Patient/Representative verbalized understanding and all questions were answered.   Discharged from [facility] in stable condition.    Vy Holt RN    Administrations This Visit     albumin human 25 % injection 12.5 g     Admin Date  03/12/2020 Action  New Bag Dose  12.5 g Route  Intravenous Administered By  Vy Holt RN           Admin Date  03/12/2020 Action  New Bag Dose  12.5 g Route  Intravenous Administered By  Vy Holt RN           Admin Date  03/12/2020 Action  New Bag Dose  12.5 g Route  Intravenous Administered By  Vy Holt RN           Admin Date  03/12/2020 Action  New Bag Dose  12.5 g Route  Intravenous Administered By  Vy Holt RN          lidocaine (PF) (XYLOCAINE) 1 % injection 20 mL     Admin Date  03/12/2020  Action  Given by Other Dose  10 mL Route  Subcutaneous Administered By  Vy Holt, RN                BP (!) 140/89   Pulse 71   Temp 98  F (36.7  C) (Oral)   Resp 16   Wt 76.2 kg (168 lb)   SpO2 99%   BMI 24.80 kg/m

## 2020-03-12 NOTE — PATIENT INSTRUCTIONS
Thank you for coming into the Palliative Care Clinic today.      1. Urine drug screen  2. Script for Oxycodone sent to the pharmacy  3. Call a few days prior to script running out    Return in clinic in 2 months /as needed for a follow-up.      You can reach the Palliative Care Team during business hours at the following numbers:   -For the [facility], call [phone]     To reach the Palliative Care Provider on-call After-hours or on holidays and weekends, call: [phone].  Please note that we are not able to provide pain medication refills on evenings or weekends.     [facility] Palliative Care Clinics   The [facility] Palliative Care Team is committed to treating your pain and other symptoms. This handout is for all patients treated with opioid medications in our clinics.     What are opioid medicines?   Opioid medications are used to ease some types of pain and shortness of breath. They are sometimes called narcotics. They include: Morphine (MS Contin, Roxanol), Oxycodone (OxyContin, OxyFast, Percocet), Hydrocodone (Vicodin, Norco), Hydromorphone (Dilaudid), Fentanyl (Duragesic), Methadone (Dolophine).   Are opioid medicines safe to take?   Opioid medicines are safe when you follow the directions from your doctor or medical provider.  Opioids can cause serious side effects and become unsafe if you do not follow your instructions.   To make sure you are taking opioid medicines safely, we may ask you to bring in your pill bottles or to allow us to test your urine. Our goal is to keep you safe and to improve your ability to function & be active. If we don t think opioids are safely doing that, we will work with you to taper you off of them.   Do not drive unless your medical provider tells you it is safe.   Do not take opioids with alcohol or anxiety/sleep medicines unless your doctor tells you it is ok to do so.   Are opioids  addictive?   Addiction means you crave a drug and have trouble limiting the amount you use.   Addiction is more likely to happen if you take opioids to relieve stress or to feel altered. If you or your loved one feels this way when taking opioid medicines, let your medical provider know. We may refer you for additional assessment or services if this is a concern.   Your body will get used to the opioid medicines if you take them for more than two weeks in a row. This means if you stop them suddenly, you may have withdrawal. If this occurs, you will feel uncomfortable (nausea, diarrhea, increased pain). This does not mean you are addicted. Never stop taking your pain medicines all at once unless your medical provider tells you to. If you think you need less medicine, your medical provider will help you to safely lower your dose.   What is the safest way to store opioids?   Keep your medicines in a safe place away from children, teens, pets, and visitors. Be careful about who knows that you have these medicines.   How do I get rid of my old opioids?   Opioids should be brought to your [facility] drop-off site, or you can purchase a disposal kit from your local pharmacy. If neither of these options is available, the Food and Drug Administration recommends that you flush unused opioids down the toilet.   Do not share or sell your pain medicines. This is illegal and very dangerous. We cannot prescribe opioid pain medicines to you if do this.   How do I get refills?   Opioid medicines need signed paper prescriptions. This means it may take longer to refill than other medicines. Our clinic cannot prescribe them on weekends or at night.     Give us one week s notice when requesting a refill. This gives us the time we need to get it signed and back to you. It may be possible to mail prescriptions to you.

## 2020-03-12 NOTE — LETTER
"3/12/2020       RE: Ten Johnson  [address] [facility] [address]     Dear Colleague,    Thank you for referring your patient, Ten Johnson, to the [facility] at [facility]. Please see a copy of my visit note below.    Palliative Care Outpatient Clinic Consultation Note    Patient:  Ten Johnson    Chief Complaint:   Ten Johnson 59 year old male who is presenting to the palliative medicine clinic today at the request of Dr. Nolan for a palliative care consultation secondary to his Hepatocellular carcinoma, chronic joint pains        The patient's primary care provider is:  Cuca Celeste     History of Present Illness:  Mr. Johnson is a 59 year old male who has a hx of HCV/ETOH cirrhosis, severe aortic stenosis s/p TAVR, portal HTN, was recently diagnosed with HCC ( biopsy proven from right liver lobe 7/18/2019) s/p laparoscopic converted to open and had liver core needle biopsies and intra-operative microwave ablation of 3 lesions (Seg 6/7 x 2, Seg 7) on 10/22/19 with Dr. Benoit. ( S6/7 biopsies were positive for HCC but S7 biopsies were negative for malignancy). He has another 2.3 cm S5 lesion adjacent to the gall bladder fossa which was unable to be treated then so he had TACE for it on 1/29/2020.    Patient presents to clinic today alone after his weekly paracentesis.      He reports his abdominal discomfort is significantly improved after completing his paracentesis and describes most of his pain currently is intermittent pain in both hips and lower abdomen. He also reports some intermittent mild upper abdominal discomfort associated with is surgical scares, which feels like retained \"monofilament\".  Patient also describes several musculoskeletal injuries and surgeries for shoulder, knees and hips.  Has been taking his 5 mg dose of oxycodone consistently for his pain which appears to help both his abdominal and generalized musculoskeletal " "pain.  Patient currently not using any other over-the-counter pain medications including Tylenol and or ibuprofen.  Was previously told that he could use a limited dose of Tylenol, less than 2000 mg daily but currently is not.  Patient reports a desire to use CBD for pain, but has never used this in the past.      Patient also describes severe setbacks and believes that his knee replacement surgery, approximately 1 year ago, Joe significant ongoing issues with his health and recovery.     Prescription monitoring program report reviewed: Several recent refills for Oxycodone 5 mg, Previous scripts for Tramadol Hcl 50 mg, Hydrocodone 5-325, in April of last year and Hydromorphone in March.    Patient's Disease Understanding: Has good basic understanding of his disease state.    Coping: Appears to be coping relatively well, but uncertain from my initial visit with him.  Patient did mention if his disease had worse at he would likely not want to slowly decline at home and would prefer to travel possibly internationally and states \" whatever happens happens\"    Social History:  Living Situation: Lives is a multiplex in the [facility], reports that he lives alone  Children: No children  Actual/Potential Caregiver(s): No caregivers identified  Support System: Girl Friend, friends, brother from a distance  Occupation: Retired    Hobbies: Hunting, motor sports prior to his acute decline in functional capacity.  Substance Use/History of misuse: Alcohol abuse history, use in the past and continues to drink occassionally, A few beers at holiday's special actions. Patient reported no use of recreational drugs/ Street drugs, but did report some mariajuana use, no smoking.  Financial Concerns: Financal concerns, but he reports manageable  Spiritual Background: Sabianist upbring, not practicing after he was a teenager  Spiritual Concerns/Needs: No concerns    Social History     Tobacco Use     " Smoking status: Never Smoker     Smokeless tobacco: Never Used   Substance Use Topics     Alcohol use: Not Currently     Comment: Alcohol use disorder, still actively drinking     Drug use: No     Comment: denies     Family History:  Family History   Problem Relation Age of Onset     Cancer Mother 62        unknown primary     Alcoholism Paternal Uncle      Unknown/Adopted Father      No Known Problems Brother      Diabetes Maternal Grandmother      Myocardial Infarction Maternal Grandfather      No Known Problems Paternal Grandmother      Unknown/Adopted Paternal Grandfather      Cirrhosis No family hx of      Patient's Involvement with Prior History of Serious Illness in Family: Mother pasted away from cancer, quickly per patient's report     Advance Care Planning:  Advance Directive: None on file  Where is written copy located: None on file  Health Care Agent Contact Information: Patient reported his brother Rob is his healthcare agent,  [phone] (mobile).   POLST: None on file    Allergies   Allergen Reactions     Zolpidem Other (See Comments)     Alcoholic.  Had reaction 3/17/13 while intoxicated which included black out, loss of awareness, paranoia.  Do not prescribe.  Dr. Celeste     Cats Other (See Comments)     rhinitis     Dogs Other (See Comments)     rhinitis     Pollen Extract Other (See Comments)     rhinits.     Current Outpatient Medications   Medication Sig Dispense Refill     fluticasone (FLONASE) 50 MCG/ACT nasal spray Spray 2 sprays into both nostrils daily 48 mL 3     furosemide 20 MG PO tablet Take 3 tablets (60 mg) by mouth daily 90 tablet 1     lisinopril (PRINIVIL/ZESTRIL) 20 MG tablet Take 1 tablet (20 mg) by mouth daily 90 tablet 3     Multiple Vitamin (MULTIVITAMINS PO) Take 1 tablet by mouth At Bedtime        naloxone (NARCAN) 4 MG/0.1ML nasal spray Spray 1 spray (4 mg) into one nostril alternating nostrils once as needed for opioid reversal every 2-3 minutes until assistance  arrives 0.2 mL 1     ondansetron (ZOFRAN) 4 MG tablet Take 1 tablet (4 mg) by mouth every 8 hours as needed for nausea 30 tablet 0     oxyCODONE (ROXICODONE) 5 MG tablet Take 1 tablet (5 mg) by mouth every 6 hours as needed for severe pain 30 tablet 0     spironolactone (ALDACTONE) 50 MG tablet Take 2 tablets (100 mg) by mouth daily 60 tablet 3     aspirin (ASA) 81 MG tablet Take 1 tablet (81 mg) by mouth daily (Patient not taking: Reported on 3/12/2020) 90 tablet 3     loratadine (CLARITIN) 10 MG tablet Take 1 tablet (10 mg) by mouth daily as needed for allergies (Patient not taking: Reported on 2/6/2020) 90 tablet 3     oxyCODONE (ROXICODONE) 5 MG tablet Take 1 tablet (5 mg) by mouth every 6 hours as needed for severe pain (Patient not taking: Reported on 2/6/2020) 14 tablet 0     oxyCODONE IR (ROXICODONE) 10 MG tablet Take 0.5-1 tablets (5-10 mg) by mouth 2 times daily as needed for moderate to severe pain (Patient not taking: Reported on 2/6/2020) 14 tablet 0     Past Medical History:   Diagnosis Date     JENNIFER (acute kidney injury) (H) 4/9/2019     Alcohol use disorder      Alcoholic cirrhosis (H)      Anticoagulant long-term use     plavix     Aortic stenosis, severe 2/21/2018     Ascites      Chronic allergic rhinitis      Chronic anemia      Chronic hepatitis C without hepatic coma (H) 05/10/2016    Untreated as of 2/2018     Cirrhosis (H) 2017    MRI finding     Diastolic dysfunction      Erosive gastropathy      Esophageal varices in alcoholic cirrhosis (H)      H/O upper gastrointestinal hemorrhage 09/2017     Hepatocellular carcinoma (H)      History of blood transfusion      History of drug abuse (H)     intranasal     Hypertension     essential     JANELLE (iron deficiency anemia)      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     Sarahi-Hoffman tear     History     Marijuana abuse      MRSA (methicillin resistant Staphylococcus aureus)      Nonrheumatic  aortic valve stenosis 2/20/2018     Olecranon bursitis      Portal hypertension (H)      Right shoulder pain     history of rotator cuff repair     S/p TAVR (transcatheter aortic valve replacement), bioprosthetic      Severe aortic stenosis      Thrombocytopenia (H)      Past Surgical History:   Procedure Laterality Date     ABLATE LIVER TUMOR N/A 10/22/2019    Procedure: Ablate liver tumor x3;  Surgeon: Gregorio Benoit MD;  [facility]: [facility]     ARTHROSCOPY SHOULDER ROTATOR CUFF REPAIR  7/31/2012    Procedure: ARTHROSCOPY SHOULDER ROTATOR CUFF REPAIR;  Right Shoulder Arthroscopic Rotator Cuff Repair, BicepsTenodesis,  Subacromial Decompression ;  Surgeon: Joi Castillo MD;  [facility]     ESOPHAGOSCOPY, GASTROSCOPY, DUODENOSCOPY (EGD), COMBINED N/A 10/23/2017    Procedure: COMBINED ESOPHAGOSCOPY, GASTROSCOPY, DUODENOSCOPY (EGD);;  Surgeon: Gentry Salas MD;  [facility]: [facility] GI     EXCHANGE POLY COMPONENT ARTHROPLASTY KNEE Right 3/4/2019    Procedure: REVISION RIGHT TOTAL KNEE POLY COMPONENT EXCHANGE;  Surgeon: Olvin Joe MD;  Location: [facility] OR     FACIAL RECONSTRUCTION SURGERY  1971     HEART CATH FEMORAL CANNULIZATION WITH OPEN STANDBY REPAIR AORTIC VALVE N/A 2/21/2018    Procedure: HEART CATH FEMORAL CANNULIZATION WITH OPEN STANDBY REPAIR AORTIC VALVE;;  Surgeon: Luis Baird MD;  [facility]: [facility]     IR CHEMO EMBOLIZATION  1/29/2020     IR LIVER BIOPSY PERCUTANEOUS  7/18/2019     IRRIGATION AND DEBRIDEMENT UPPER EXTREMITY, COMBINED  1/3/2012    Procedure:COMBINED IRRIGATION AND DEBRIDEMENT UPPER EXTREMITY; Irrigation & Debridement Left Elbow; Surgeon:CRISTHIAN ZHOU; Location:[facility] OR     LAPAROSCOPIC BIOPSY LIVER N/A 10/22/2019    Procedure: intraoperative liver ultrasound, laparoscopic converted to open liver biopsy x 6;  Surgeon: Gregorio Benoit MD;  [facility]     LAPAROSCOPY DIAGNOSTIC (GENERAL) N/A 10/22/2019    Procedure: Diagnostic laparoscopy;  Surgeon:  "Gregorio Benoit MD;  [facility]     LAPAROTOMY, LYSIS ADHESIONS, COMBINED N/A 10/22/2019    Procedure: Laparotomy, lysis adhesions, combined;  Surgeon: Gregorio Benoit MD;  [facility]     OPTICAL TRACKING SYSTEM ARTHROPLASTY KNEE Right 2/7/2019    Procedure: ARTHROPLASTY KNEE RIGHT;  Surgeon: Olvin Joe MD;  [facility]     REPAIR TENDON TRICEPS UPPER EXTREMITY  11/8/2011    Procedure:REPAIR TENDON TRICEPS UPPER EXTREMITY; Surgeon:CRISTHIAN ZHOU; [facility]     SHOULDER SURGERY  2003    left, injury, torn tendons, hematoma     TRANSCATHETER AORTIC VALVE IMPLANT ANESTHESIA N/A 2/21/2018    Procedure: TRANSCATHETER AORTIC VALVE IMPLANT ANESTHESIA;  Transfemoral (Quiroz) Aortic Valve Implant 26mm MARTHA 3, with Cardiopulmonary Bypass Standby, transthoracic echocardiogram;  Surgeon: GENERIC ANESTHESIA PROVIDER;  Location: [facility]     TRANSPOSITION ULNAR NERVE (ELBOW)  11/8/2011    Procedure:TRANSPOSITION ULNAR NERVE (ELBOW); Final Procedure Done: Left Elbow Lateral Ulnar Collateral Repair And  Left Elbow Triceps Repair       Review of Systems:  ROS: 10 point ROS neg other than the symptoms noted above in the HPI and here:  Palliative Symptom Review (0=no symptom/no concern, 1=mild, 2=moderate, 3=severe):      Pain: 1      Fatigue: 1      Nausea: 1      Constipation: 0, reports never having problems with constipation that couldn't be resolved with diet, says he has never used PRN bowel medications       Diarrhea: 0, Nothing currently, has history of some loose stools with antibiotic use       Depressive Symptoms: 0      Anxiety: 0      Drowsiness: 0      Poor Appetite: 0 Reports good PO intake, cooks and eats well      Shortness of Breath: 0      Insomnia: 1-2, chronic issues with insomnia      Overall (0 good/no concerns, 3 very poor): 1-2    Physical Exam:  /77   Pulse 74   Temp 98  F (36.7  C) (Oral)   Resp 16   Ht 1.753 m (5' 9.02\")   Wt 71.9 kg (158 lb 8 oz)   SpO2 99%   " BMI 23.39 kg/m    Constitutional: No acute distress, comfortable, thin  HEENT: Anicteric, normal extra-ocular movements, MMM, throat with no lesions   Cardiovascular: RRR, no LE edema  Respiratory: CTA, no wheezes or crackles, normal work of breathing   Gastrointestinal: Post surgical scars, mild-tenderness lower quadrants, Non-distended (Post para), + bowel sounds  Musculoskeletal: Pain with movement of upper extremities in shoulders, hip and knee pain reported with ROM.   Skin: Several healed scares on arm and exposed skin, no rashes, no jaundice   Neurological: Alert and orientated x 3, cranial nerves grossly intact, no tremors  Psychological: combative mood, anxious     Data Reviewed:  LABS: CBC: WBC 8.5, Hgb 15.1, Plt count 95    IMAGING:    Ultrasound guided therapeutic paracentesis (CSC).                 IMPRESSION: Ultrasound guided therapeutic paracentesis. Total of  4500cc of clear serous ascites aspirated.    Impressions:  Palliative Performance Score: 70%      Decision Making Capacity:  Yes    Impression & Recommendations & Counseling:  Ten Johnson 59 year old male was seen today in Palliative care clinic today to establish care and for symptom management.    # Neoplasm pain:  # Chronic MSK pain:  Several large joint surgeries (Rotator cuff tear, Right knee replacement, osteoarthritis, etc).  Uncertain if patient's pain is cancer related considering his extensive MSK issues and limited abdominal pain or signs of bony spread on recent imaging. Will prescribe current doses of opioids considering good recent medication compliance, but concerns for medical coping and substance abuse history.  - Drug abuse screen 6 urine (chem dep), returned positive  - Plan to complete ORT at next visit  - Patient provided handout for opioid safety  - Refill oxyCODONE (ROXICODONE) 5 MG tablet; Take 1 tablet (5 mg) by mouth every 6 hours as needed for severe pain Take no more than 2 pill a day  - Patient instructed to  follow up pain clinic on 04/28/20    # HCC (hepatocellular carcinoma):  # Advance Care Planning:  - Ongoing discussion regarding patient's approach to care and goals as condition progresses.   - Brother Rob is identified as his healthcare agent  - Follow up with patient next visit regarding completing an advance care directive.    Return to clinic 2 months    Patient was seen and staffed by Dr. Jefferson Bruner MD, [facility]  Palliative Care Fellow PGY-4  [facility]  Pager: [phone]    Attending attestation:   Patient seen and evaluated with Dr. Bruner and I agree with findings/recs in this note.   On evaluation, pain is chronic, from longstanding musculoskeletal pain, but with cirrhosis and HCC are limited on simple analgesics.  Could do cautious trial of gabapentin or duloxetine, not currently interested.  Will prescribe small amount of oxycodone, max 2 pills/day and continue to discuss nonopioid approaches to pain.  Also has pain clinic appointment, which I think would be helpful.  UDT today, discussed opioid safety.  Somewhat bizarre responses to safety recommendations, will need to monitor closely for misuse, plan for q2wk refills for now.      Thank you for involving us in the patient's care.   Sharmila Paulino MD / Palliative Medicine / Pager [phone] / After-Hours Answering Service [phone] / Main Palliative Clinic - [facility] [phone] / [facility] Inpatient Team Consult Pager [phone] (answered 8am-430pm M-F)        Again, thank you for allowing me to participate in the care of your patient.      Sincerely,    Ludin Bruner MD

## 2020-03-15 ENCOUNTER — TELEPHONE (OUTPATIENT)
Dept: PALLIATIVE CARE | Facility: CLINIC | Age: 60
End: 2020-03-15

## 2020-03-17 NOTE — TELEPHONE ENCOUNTER
Patient was called about the results of a positive urine drug screen, positive for cannabinoids and cocaine. Patient was honest about using for coping and reports he will not continue taking. Patient was informed that if he had a future positive urine for anything other than what was prescribed or cannabinoids we would not be able to continue to prescribe your pain medications. Patient was offered resources to support quitting, but he refused offer.     Repeat urine drug screen ordered for next Thursday (03/19/20)    Ludin Bruner MD, A  Palliative Care Fellow PGY-4  [facility]  Pager: [phone]

## 2020-03-20 ENCOUNTER — MYC MEDICAL ADVICE (OUTPATIENT)
Dept: PALLIATIVE CARE | Facility: CLINIC | Age: 60
End: 2020-03-20

## 2020-03-23 ENCOUNTER — ANCILLARY PROCEDURE (OUTPATIENT)
Dept: ULTRASOUND IMAGING | Facility: CLINIC | Age: 60
End: 2020-03-23
Attending: INTERNAL MEDICINE
Payer: COMMERCIAL

## 2020-03-23 ENCOUNTER — OFFICE VISIT (OUTPATIENT)
Dept: INFUSION THERAPY | Facility: CLINIC | Age: 60
End: 2020-03-23
Attending: INTERNAL MEDICINE
Payer: COMMERCIAL

## 2020-03-23 VITALS
DIASTOLIC BLOOD PRESSURE: 85 MMHG | WEIGHT: 169.6 LBS | TEMPERATURE: 98.5 F | SYSTOLIC BLOOD PRESSURE: 123 MMHG | BODY MASS INDEX: 25.03 KG/M2 | RESPIRATION RATE: 16 BRPM | OXYGEN SATURATION: 100 %

## 2020-03-23 DIAGNOSIS — K70.31 ASCITES DUE TO ALCOHOLIC CIRRHOSIS (H): Primary | ICD-10-CM

## 2020-03-23 DIAGNOSIS — Z51.81 ENCOUNTER FOR THERAPEUTIC DRUG MONITORING: ICD-10-CM

## 2020-03-23 PROCEDURE — 25000125 ZZHC RX 250: Mod: ZF | Performed by: INTERNAL MEDICINE

## 2020-03-23 PROCEDURE — 80307 DRUG TEST PRSMV CHEM ANLYZR: CPT | Performed by: INTERNAL MEDICINE

## 2020-03-23 PROCEDURE — P9047 ALBUMIN (HUMAN), 25%, 50ML: HCPCS | Mod: ZF | Performed by: INTERNAL MEDICINE

## 2020-03-23 PROCEDURE — 49083 ABD PARACENTESIS W/IMAGING: CPT

## 2020-03-23 PROCEDURE — 25000128 H RX IP 250 OP 636: Mod: ZF | Performed by: INTERNAL MEDICINE

## 2020-03-23 PROCEDURE — 40000556 ZZH STATISTIC PERIPHERAL IV START W US GUIDANCE: Mod: ZF

## 2020-03-23 RX ORDER — ALBUMIN (HUMAN) 12.5 G/50ML
12.5 SOLUTION INTRAVENOUS
Status: CANCELLED | OUTPATIENT
Start: 2020-03-26

## 2020-03-23 RX ORDER — LIDOCAINE HYDROCHLORIDE 10 MG/ML
20 INJECTION, SOLUTION EPIDURAL; INFILTRATION; INTRACAUDAL; PERINEURAL ONCE
Status: CANCELLED | OUTPATIENT
Start: 2020-03-26

## 2020-03-23 RX ORDER — LIDOCAINE HYDROCHLORIDE 10 MG/ML
20 INJECTION, SOLUTION EPIDURAL; INFILTRATION; INTRACAUDAL; PERINEURAL ONCE
Status: COMPLETED | OUTPATIENT
Start: 2020-03-23 | End: 2020-03-23

## 2020-03-23 RX ORDER — HEPARIN SODIUM (PORCINE) LOCK FLUSH IV SOLN 100 UNIT/ML 100 UNIT/ML
5 SOLUTION INTRAVENOUS
Status: CANCELLED | OUTPATIENT
Start: 2020-03-26

## 2020-03-23 RX ORDER — ALBUMIN (HUMAN) 12.5 G/50ML
12.5 SOLUTION INTRAVENOUS
Status: COMPLETED | OUTPATIENT
Start: 2020-03-23 | End: 2020-03-23

## 2020-03-23 RX ORDER — HEPARIN SODIUM,PORCINE 10 UNIT/ML
5 VIAL (ML) INTRAVENOUS
Status: CANCELLED | OUTPATIENT
Start: 2020-03-26

## 2020-03-23 RX ADMIN — ALBUMIN HUMAN 12.5 G: 0.25 SOLUTION INTRAVENOUS at 10:27

## 2020-03-23 RX ADMIN — ALBUMIN HUMAN 12.5 G: 0.25 SOLUTION INTRAVENOUS at 10:03

## 2020-03-23 RX ADMIN — ALBUMIN HUMAN 12.5 G: 0.25 SOLUTION INTRAVENOUS at 09:57

## 2020-03-23 RX ADMIN — LIDOCAINE HYDROCHLORIDE 10 ML: 10 INJECTION, SOLUTION EPIDURAL; INFILTRATION; INTRACAUDAL; PERINEURAL at 09:54

## 2020-03-23 RX ADMIN — ALBUMIN HUMAN 12.5 G: 0.25 SOLUTION INTRAVENOUS at 09:54

## 2020-03-23 NOTE — PATIENT INSTRUCTIONS
Patient Education     Discharge Instructions for Paracentesis  Paracentesis is a procedure to remove extra fluid from your belly (abdomen). This fluid buildup in the abdomen is called ascites. The procedure may have been done to take a sample of the fluid. Or, it may have been done to drain the extra fluid from your abdomen and help make you more comfortable.     Ascites is buildup of excess fluid in the abdomen.   Home care    If you have pain after the procedure, your healthcare provider can prescribe or recommend pain medicines. Take these exactly as directed. If you stopped taking other medicines before the procedure, ask your provider when you can start them again.    Take it easy for 24 hours after the procedure. Avoid physical activity until your provider says it s OK.    You will have a small bandage over the puncture site. Stitches (sutures), surgical staples, adhesive tapes, adhesive strips, or surgical glue may be used to close the incision. They also help stop bleeding and speed healing. You may take the bandage off in 24 hours.    Check the puncture site for the signs of infection listed below.  Follow-up care  Make a follow-up appointment with your healthcare provider as directed. During your follow-up visit, your provider will check your healing. Let your provider know how you are feeling. You can also discuss the cause of your ascites and if you need any further treatment.  When to seek medical advice  Call your healthcare provider if you have any of the following after the procedure:    A fever of 100.4 F (38 C) or higher    Trouble breathing    Pain that doesn't go away even after taking pain medicine    Belly pain not caused by having the skin punctured    Bleeding from the puncture site    More than a small amount of fluid leaking from the puncture site    Swollen belly    Signs of infection at the puncture site. These include increased pain, redness, or swelling, warmth, or bad-smelling  drainage.    Blood in your urine    Feeling dizzy or lightheaded, or fainting   Date Last Reviewed: 7/1/2016 2000-2019 [employer]. [address] [place] [address], [place] [address]. All rights reserved. This information is not intended as a substitute for professional medical care. Always follow your healthcare professional's instructions.

## 2020-03-23 NOTE — PROGRESS NOTES
Paracentesis Nursing Note  Ten Johnson presents today to [facility] for a paracentesis.    During today's appointment orders from Dr. Leventhal were completed.    Progress Note:  Patient identification verified by name and date of birth.  Assessment completed.  Vitals monitored throughout appointment and recorded in Doc Flowsheets.  See proceduralist note in ultrasound.    Vascular Access: peripheral IV was placed by vascular access nurse.  Labs: urine sent.    Date of consent or authorization: 2/20/20.  Invasive Procedure Safety Checklist was completed and sent for scanning.     Paracentesis performed by Nathan Alves PA-C Radiology.    The following labs were communicated to provider performing paracentesis:  Lab Results   Component Value Date    PLT 95 03/12/2020       Total amount of ascites fluid drained: 4.8 liters.  Color of ascites fluid: yellow.  Total amount of albumin given: 50  grams.    Patient tolerated procedure well.    Post procedure,denies pain or discomfort post paracentesis.      Discharge Plan:  Discharge instructions were reviewed with patient.  Patient/Representative verbalized understanding and all questions were answered.   Discharged from [facility] in stable condition.    Vy Holt RN    Administrations This Visit     albumin human 25 % injection 12.5 g     Admin Date  03/23/2020 Action  New Bag Dose  12.5 g Route  Intravenous Administered By  Vy Holt RN           Admin Date  03/23/2020 Action  New Bag Dose  12.5 g Route  Intravenous Administered By  Vy Holt RN           Admin Date  03/23/2020 Action  New Bag Dose  12.5 g Route  Intravenous Administered By  Vy Holt RN           Admin Date  03/23/2020 Action  New Bag Dose  12.5 g Route  Intravenous Administered By  Vy Holt RN          lidocaine (PF) (XYLOCAINE) 1 % injection 20 mL     Admin Date  03/23/2020  Action  Given by Other Dose  10 mL Route  Subcutaneous Administered By  Vy Holt, JAMES                /85   Temp 98.5  F (36.9  C) (Oral)   Resp 16   Wt 76.9 kg (169 lb 9.6 oz)   SpO2 100%   BMI 25.03 kg/m

## 2020-03-24 ENCOUNTER — MYC MEDICAL ADVICE (OUTPATIENT)
Dept: PALLIATIVE CARE | Facility: CLINIC | Age: 60
End: 2020-03-24

## 2020-03-24 DIAGNOSIS — K70.31 ALCOHOLIC CIRRHOSIS OF LIVER WITH ASCITES (H): ICD-10-CM

## 2020-03-24 DIAGNOSIS — R18.8 OTHER ASCITES: Primary | ICD-10-CM

## 2020-03-25 ENCOUNTER — PATIENT OUTREACH (OUTPATIENT)
Dept: CARE COORDINATION | Facility: CLINIC | Age: 60
End: 2020-03-25

## 2020-03-25 ENCOUNTER — HOSPITAL ENCOUNTER (OUTPATIENT)
Facility: CLINIC | Age: 60
End: 2020-03-25
Payer: COMMERCIAL

## 2020-03-25 LAB — PAIN DRUG SCR UR W RPTD MEDS: NORMAL

## 2020-03-25 NOTE — PROGRESS NOTES
Social Work Intervention  [facility]    Data/Intervention:    Patient Name:  Ten Johnson  /Age:  1960 (59 year old)    Visit Type: telephone  Referral Source: SHANELL Nelson in Hepatology Clinic   Reason for Referral:  Transportation     Collaborated With:    - Patient     Patient Concerns/Issues:   Received referral for transportation issues, as pt's paracentesis needs to be scheduled at a different clinic due to COVID outbreak. Called pt; introduced self and role of SW, reason for call. Pt reported that he does not drive and so was concerned about scheduling elsewhere. Discussed options for rescheduling, as provided to me in message from SHANELL Dasilva. Pt currently has been biking from his home to get to [facility] clinic. Pt thought he would easily be able to bike to [facility] location and it's on public transportation path. Provided pt with number to call to schedule at [facility]. Pt also wanted SW contact info for future needs. Agreed to send pt a [employer] [place] [email or web address] message with this information. Pt thanked SW for the call.     Intervention/Education/Resources Provided:  - Assistance navigating logistics for appts   - Brief into to clinic SW role     Assessment/Plan:  Pt was pleasant and receptive to SW, easily engaged, open to support.     Provided patient/family with contact information and availability.    Teetee Hathaway, SANA, [facility]  Clinical   Outpatient Speciality Clinics   [facility]  Ph. [phone]    NO LETTER

## 2020-04-01 ENCOUNTER — TELEPHONE (OUTPATIENT)
Dept: INTERVENTIONAL RADIOLOGY/VASCULAR | Facility: CLINIC | Age: 60
End: 2020-04-01

## 2020-04-06 ENCOUNTER — TELEPHONE (OUTPATIENT)
Dept: VASCULAR SURGERY | Facility: CLINIC | Age: 60
End: 2020-04-06

## 2020-04-06 NOTE — TELEPHONE ENCOUNTER
Called and left pt a msg    Informed him that I do see that is MRI is rescheduled to 4pm tomorrow after his f/up appt with Dr. Arteaga.     Informed him that we will reschedule his appt with Surinder Arteaga to the following  TUesday 4/14 @ 10am that way he can review and follow up on imaging results as well.     Dee reminded him that we also need labs as well.    Informed him that we will have him come in tomorrow to complete the MRI and lab and then we will f/up next week.     Left my direct line for him to return my call to confirm my VM.    Priscila BEARD RN, BSN  Interventional Radiology Nurse Coordinator   Phone: [phone]

## 2020-04-07 ENCOUNTER — TELEPHONE (OUTPATIENT)
Dept: VASCULAR SURGERY | Facility: CLINIC | Age: 60
End: 2020-04-07

## 2020-04-07 NOTE — TELEPHONE ENCOUNTER
Called pt to f/up on his VM.    He was able to reschedule his MRI out to 4/14.     Informed him that we will also move his tele consult to the following Tues 4/21 @ 11am so that Dr Arteaga can call him.     He agrees to plan.    rPiscila BEARD RN, BSN  Interventional Radiology Nurse Coordinator   Phone: [phone]

## 2020-04-10 ENCOUNTER — TELEPHONE (OUTPATIENT)
Dept: MEDSURG UNIT | Facility: CLINIC | Age: 60
End: 2020-04-10

## 2020-04-13 ENCOUNTER — HOSPITAL ENCOUNTER (OUTPATIENT)
Facility: CLINIC | Age: 60
Discharge: HOME | End: 2020-04-13
Admitting: INTERNAL MEDICINE
Payer: COMMERCIAL

## 2020-04-13 ENCOUNTER — HOSPITAL ENCOUNTER (OUTPATIENT)
Dept: ULTRASOUND IMAGING | Facility: CLINIC | Age: 60
End: 2020-04-13
Attending: INTERNAL MEDICINE
Payer: COMMERCIAL

## 2020-04-13 ENCOUNTER — MYC MEDICAL ADVICE (OUTPATIENT)
Dept: INTERNAL MEDICINE | Facility: CLINIC | Age: 60
End: 2020-04-13

## 2020-04-13 ENCOUNTER — HOSPITAL ENCOUNTER (OUTPATIENT)
Facility: CLINIC | Age: 60
End: 2020-04-13
Payer: COMMERCIAL

## 2020-04-13 VITALS
SYSTOLIC BLOOD PRESSURE: 122 MMHG | TEMPERATURE: 97.4 F | OXYGEN SATURATION: 99 % | DIASTOLIC BLOOD PRESSURE: 78 MMHG | HEART RATE: 67 BPM | RESPIRATION RATE: 16 BRPM

## 2020-04-13 DIAGNOSIS — K70.31 ALCOHOLIC CIRRHOSIS OF LIVER WITH ASCITES (H): ICD-10-CM

## 2020-04-13 DIAGNOSIS — G89.29 CHRONIC PAIN OF MULTIPLE JOINTS: ICD-10-CM

## 2020-04-13 DIAGNOSIS — M25.50 CHRONIC PAIN OF MULTIPLE JOINTS: ICD-10-CM

## 2020-04-13 DIAGNOSIS — C22.0 HCC (HEPATOCELLULAR CARCINOMA) (H): ICD-10-CM

## 2020-04-13 DIAGNOSIS — K70.31 ASCITES DUE TO ALCOHOLIC CIRRHOSIS (H): Primary | ICD-10-CM

## 2020-04-13 LAB
INR PPP: 1.08 (ref 0.86–1.14)
PLATELET # BLD AUTO: 109 10E9/L (ref 150–450)

## 2020-04-13 PROCEDURE — 49083 ABD PARACENTESIS W/IMAGING: CPT

## 2020-04-13 PROCEDURE — 25000125 ZZHC RX 250: Performed by: RADIOLOGY

## 2020-04-13 PROCEDURE — 25000128 H RX IP 250 OP 636: Performed by: INTERNAL MEDICINE

## 2020-04-13 PROCEDURE — 85049 AUTOMATED PLATELET COUNT: CPT | Performed by: PHYSICIAN ASSISTANT

## 2020-04-13 PROCEDURE — P9047 ALBUMIN (HUMAN), 25%, 50ML: HCPCS | Performed by: INTERNAL MEDICINE

## 2020-04-13 PROCEDURE — 36415 COLL VENOUS BLD VENIPUNCTURE: CPT

## 2020-04-13 PROCEDURE — 85610 PROTHROMBIN TIME: CPT | Performed by: PHYSICIAN ASSISTANT

## 2020-04-13 PROCEDURE — 40000863 ZZH STATISTIC RADIOLOGY XRAY, US, CT, MAR, NM

## 2020-04-13 RX ORDER — HEPARIN SODIUM (PORCINE) LOCK FLUSH IV SOLN 100 UNIT/ML 100 UNIT/ML
5 SOLUTION INTRAVENOUS
Status: CANCELLED | OUTPATIENT
Start: 2020-04-20

## 2020-04-13 RX ORDER — OXYCODONE HYDROCHLORIDE 5 MG/1
5 TABLET ORAL EVERY 6 HOURS PRN
Qty: 30 TABLET | Refills: 0 | Status: SHIPPED | OUTPATIENT
Start: 2020-04-13 | End: 2020-05-03

## 2020-04-13 RX ORDER — ALBUMIN (HUMAN) 12.5 G/50ML
12.5 SOLUTION INTRAVENOUS
Status: DISCONTINUED | OUTPATIENT
Start: 2020-04-13 | End: 2020-04-13 | Stop reason: HOSPADM

## 2020-04-13 RX ORDER — LIDOCAINE HYDROCHLORIDE 10 MG/ML
5 INJECTION, SOLUTION EPIDURAL; INFILTRATION; INTRACAUDAL; PERINEURAL ONCE
Status: COMPLETED | OUTPATIENT
Start: 2020-04-13 | End: 2020-04-13

## 2020-04-13 RX ORDER — LIDOCAINE HYDROCHLORIDE 10 MG/ML
20 INJECTION, SOLUTION EPIDURAL; INFILTRATION; INTRACAUDAL; PERINEURAL ONCE
Status: CANCELLED | OUTPATIENT
Start: 2020-04-20

## 2020-04-13 RX ORDER — LIDOCAINE HYDROCHLORIDE 10 MG/ML
10 INJECTION, SOLUTION EPIDURAL; INFILTRATION; INTRACAUDAL; PERINEURAL ONCE
Status: COMPLETED | OUTPATIENT
Start: 2020-04-13 | End: 2020-04-13

## 2020-04-13 RX ORDER — HEPARIN SODIUM (PORCINE) LOCK FLUSH IV SOLN 100 UNIT/ML 100 UNIT/ML
5 SOLUTION INTRAVENOUS
Status: DISCONTINUED | OUTPATIENT
Start: 2020-04-13 | End: 2020-04-13 | Stop reason: HOSPADM

## 2020-04-13 RX ORDER — HEPARIN SODIUM,PORCINE 10 UNIT/ML
5 VIAL (ML) INTRAVENOUS
Status: CANCELLED | OUTPATIENT
Start: 2020-04-20

## 2020-04-13 RX ORDER — LIDOCAINE HYDROCHLORIDE 10 MG/ML
20 INJECTION, SOLUTION EPIDURAL; INFILTRATION; INTRACAUDAL; PERINEURAL ONCE
Status: DISCONTINUED | OUTPATIENT
Start: 2020-04-13 | End: 2020-04-13 | Stop reason: HOSPADM

## 2020-04-13 RX ORDER — ALBUMIN (HUMAN) 12.5 G/50ML
12.5 SOLUTION INTRAVENOUS
Status: CANCELLED | OUTPATIENT
Start: 2020-04-20

## 2020-04-13 RX ORDER — HEPARIN SODIUM,PORCINE 10 UNIT/ML
5 VIAL (ML) INTRAVENOUS
Status: DISCONTINUED | OUTPATIENT
Start: 2020-04-13 | End: 2020-04-13 | Stop reason: HOSPADM

## 2020-04-13 RX ADMIN — ALBUMIN HUMAN 12.5 G: 0.25 SOLUTION INTRAVENOUS at 13:25

## 2020-04-13 RX ADMIN — LIDOCAINE HYDROCHLORIDE 10 ML: 10 INJECTION, SOLUTION EPIDURAL; INFILTRATION; INTRACAUDAL; PERINEURAL at 13:19

## 2020-04-13 RX ADMIN — ALBUMIN HUMAN 12.5 G: 0.25 SOLUTION INTRAVENOUS at 13:35

## 2020-04-13 RX ADMIN — LIDOCAINE HYDROCHLORIDE 5 ML: 10 INJECTION, SOLUTION EPIDURAL; INFILTRATION; INTRACAUDAL; PERINEURAL at 13:23

## 2020-04-13 NOTE — PROGRESS NOTES
Care Suites Procedure Nursing Note    Patient Information  Name: eTn Johnson  Age: 59 year old    Procedure  Procedure: paracentesis to left side abdomen,2700 ml of clear klaus fluid removed, VSS, pt tolerated well denies pain, bandage applied to site, CDI, albumin given per orders  Procedure start time: 1310  Procedure complete time: 1336  Concerns/abnormal assessment: no  If abnormal assessment, provider notified: N/A  Plan/Other: proceed    Marcella Menard RN

## 2020-04-13 NOTE — DISCHARGE INSTRUCTIONS
Paracentesis & Thoracentesis Discharge Instructions     After you go home:      You may resume your normal diet.    Care of Puncture Sites:      For the first 48 hrs, check your puncture sites every couple hours while you are awake     If there is a bandaid - you may remove it tomorrow morning    You may shower tomorrow    No tub baths, whirlpools or swimming until your puncture sites have fully healed    Fluid may leak from the abdominal site. Change the bandaid as needed - keep the site dry    If the fluid leaks for more than 48 hours, call your ordering provider     Activity:      You may go back to normal activity in 24 hours     Wait 48 hours before lifting, straining, exercise or other strenuous activity    Medicines:      You may resume all your medications    For minor pain, you may take Acetaminophen (Tylenol) or Ibuprofen (Advil)            Call the provider who ordered this procedure if:      The site is red, swollen, hot or tender    Blood or fluid is draining from the site    Chills or a fever greater than 101 F (38 C)    Shortness of breath    Pain that is getting worse    Leaking from the site that does not stop    Any questions or concerns      Additional Information:      During a thoracentesis, a needle will sometimes enter the lung. This can cause the lung to collapse - called a pneumothorax. Symptoms include severe chest pain, breathing problems or increased blood in your sputum (phlegm).     Call  911 or go to the Emergency Room if:      Severe chest pain or trouble breathing    Increased blood in your sputum (phlegm)    Bleeding that you cannot control      If you have questions call:        [facility] Radiology Dept @ [phone]      The provider who performed your procedure was _________________.

## 2020-04-13 NOTE — PROGRESS NOTES
Care Suites Admission Nursing Note    Patient Information  Name: Ten Johnson  Age: 59 year old  Reason for admission: paracentesis  Care Suites arrival time:1215    Patient Admission/Assessment   Pre-procedure assessment complete: Yes  If abnormal assessment/labs, provider notified: N/A  NPO: N/A  Medications held per instructions/orders: N/A  Consent: deferred  If applicable, pregnancy test status: deferred  Patient oriented to room: Yes  Education/questions answered: Yes  Plan/other: proceed    Discharge Planning  Accompanied by: self  Discharge name/phone number:   Overnight post sedation caregiver:   Discharge location: home    Marcella Menard RN

## 2020-04-20 ENCOUNTER — TELEPHONE (OUTPATIENT)
Dept: VASCULAR SURGERY | Facility: CLINIC | Age: 60
End: 2020-04-20

## 2020-04-20 NOTE — TELEPHONE ENCOUNTER
Called pt and lnformed him that we will have to cancel tomorrow's appt due to I noticed that he canceled his MRI and labs that he was supposed to have last week prior to his follow up with Dr. Arteaga tomorrow.    Informed him that I did send him instructions through to [facility] on how to do this.    I informed him that the last time we met with him he had one area that we were keeping an eye on and proper follow up is important for cancer to not grow larger.     I informed him that he can call me back with any other questions, otherwise he is to reschedule his appt with Dr. Arteaga.    Left direct line for him.     .musig

## 2020-04-29 ENCOUNTER — MYC REFILL (OUTPATIENT)
Dept: INTERNAL MEDICINE | Facility: CLINIC | Age: 60
End: 2020-04-29

## 2020-04-29 ENCOUNTER — MYC REFILL (OUTPATIENT)
Dept: NEPHROLOGY | Facility: CLINIC | Age: 60
End: 2020-04-29

## 2020-04-29 DIAGNOSIS — I10 HYPERTENSION, UNSPECIFIED TYPE: ICD-10-CM

## 2020-04-29 DIAGNOSIS — Z91.09 ENVIRONMENTAL ALLERGIES: ICD-10-CM

## 2020-04-29 RX ORDER — FLUTICASONE PROPIONATE 50 MCG
2 SPRAY, SUSPENSION (ML) NASAL DAILY
Qty: 48 ML | Refills: 4 | Status: SHIPPED | OUTPATIENT
Start: 2020-04-29

## 2020-04-29 RX ORDER — LISINOPRIL 20 MG/1
20 TABLET ORAL DAILY
Qty: 90 TABLET | Refills: 3 | Status: SHIPPED | OUTPATIENT
Start: 2020-04-29 | End: 2020-08-13

## 2020-05-01 NOTE — PROGRESS NOTES
PATIENT WELLNESS SCREENING    Step 1: Answer all screening questions 1-3.    1. In the last month, have you been in contact with someone who was confirmed or suspected to have Coronavirus/COVID-19? No    2. Do you have the following symptoms?   Fever? No   Cough? No   Shortness of breath? No   Skin rash? No    3. Have you traveled internationally in the last month? No   If so, where?     Step 2: Refer to logic grid below for actions    NO SYMPTOM(S)    ACTIONS:  1. Standard rooming process  2. Provider to assess per normal protocol    POSITIVE SYMPTOM(S)  If positive for ANY of the following symptoms: fever, cough, shortness of breath, rash    ACTIONS  1. Mask patient  2. Room patient as soon as possible  3. Don appropriate PPE when entering room  4. Provider evaluation     Teaching for self and infant care reviewed. Discharge instructions reviewed and signed. Copies given to pt. Prescriptions given with information. Review Follow up appointments for self and infant. Questions encouraged and answered. Identification verified with mom and infant bands and signed. Instructed to call when ready for discharge.

## 2020-05-03 ENCOUNTER — MYC REFILL (OUTPATIENT)
Dept: INTERNAL MEDICINE | Facility: CLINIC | Age: 60
End: 2020-05-03

## 2020-05-03 DIAGNOSIS — G89.29 CHRONIC PAIN OF MULTIPLE JOINTS: ICD-10-CM

## 2020-05-03 DIAGNOSIS — C22.0 HCC (HEPATOCELLULAR CARCINOMA) (H): ICD-10-CM

## 2020-05-03 DIAGNOSIS — M25.50 CHRONIC PAIN OF MULTIPLE JOINTS: ICD-10-CM

## 2020-05-04 RX ORDER — OXYCODONE HYDROCHLORIDE 5 MG/1
5 TABLET ORAL EVERY 6 HOURS PRN
Qty: 30 TABLET | Refills: 0 | Status: SHIPPED | OUTPATIENT
Start: 2020-05-04 | End: 2020-05-26

## 2020-05-04 NOTE — TELEPHONE ENCOUNTER
"Received mychart request from patient for refill of oxycodone.     Last refill: 4/13/2020  Last office visit: 3/12/2020  Message to schedulers for follow up this week or in two weeks with Dr. Bruner.     Will route request to MD for review.     [place]ed [place]  Report.    Per last office visit note: \"On evaluation, pain is chronic, from longstanding musculoskeletal pain, but with cirrhosis and HCC are limited on simple analgesics.  Could do cautious trial of gabapentin or duloxetine, not currently interested.  Will prescribe small amount of oxycodone, max 2 pills/day and continue to discuss nonopioid approaches to pain.  Also has pain clinic appointment, which I think would be helpful.  UDT today, discussed opioid safety.  Somewhat bizarre responses to safety recommendations, will need to monitor closely for misuse, plan for q2wk refills for now.\"    "

## 2020-05-11 ENCOUNTER — HOSPITAL ENCOUNTER (OUTPATIENT)
Facility: CLINIC | Age: 60
End: 2020-05-11
Payer: COMMERCIAL

## 2020-05-11 RX ORDER — ALBUMIN (HUMAN) 12.5 G/50ML
12.5 SOLUTION INTRAVENOUS ONCE
Status: CANCELLED | OUTPATIENT
Start: 2020-05-11 | End: 2020-05-11

## 2020-05-12 ENCOUNTER — TELEPHONE (OUTPATIENT)
Dept: MEDSURG UNIT | Facility: CLINIC | Age: 60
End: 2020-05-12

## 2020-05-12 NOTE — PROGRESS NOTES
Questions regarding travel and covid screening complete. Patient will check in 30 minutes prior to procedure. Last labs 4/13 WNL.

## 2020-05-13 ENCOUNTER — TELEPHONE (OUTPATIENT)
Dept: GASTROENTEROLOGY | Facility: CLINIC | Age: 60
End: 2020-05-13

## 2020-05-13 ENCOUNTER — NURSE TRIAGE (OUTPATIENT)
Dept: NURSING | Facility: CLINIC | Age: 60
End: 2020-05-13

## 2020-05-13 DIAGNOSIS — Z11.59 ENCOUNTER FOR SCREENING FOR OTHER VIRAL DISEASES: Primary | ICD-10-CM

## 2020-05-13 NOTE — TELEPHONE ENCOUNTER
Pt calls in - this is approx the third person he has contacted today    Pt says he needs to be tested for Covid    Looking in Chart I see TWO orders for Covid swab testing by TWO different MD's     Pt says he was given 3 locations to go and get tested  Pt calling me - saying he only has a bike and cant get to those places    [facility] and [facility]     Pt advised to call the clinic back that ordered Labs and discuss - as to maybe - they could test pt - since they ARE within his bikes range     Protocol and care advice reviewed  Caller states understanding of the recommended disposition    Advised to call back if further questions or concerns    Luis Dinero , RN / [facility]                    Additional Information    Health Information question, no triage required and triager able to answer question    Protocols used: INFORMATION ONLY CALL-A-AH

## 2020-05-13 NOTE — TELEPHONE ENCOUNTER
Call back to patient regarding call center message. Patient reports that all covid testing sites pt was given are to far for him to ride his bike. Writer looked on the [facility] site and found that [facility] was doing testing which pt said would work for him. Pt was given number to call [facility] to schedule appointment. Asked that pt let writer know if he was not able to get an appointment.     Vidya Fulton LPN  Hepatology Clinic          [facility] Call Center    Phone Message    May a detailed message be left on voicemail: yes     Reason for Call: Other:      Navi is unable to get to the locations that were offered for Covid-19 testing.   He was told to call and speak with someone in Dr Leventhal's clinic to see if there was anything that could be done to get this testing done at the ?    Please call Navi back to discuss.         Action Taken: Message routed to:  [facility] hepatology    Travel Screening: Not Applicable

## 2020-05-14 ENCOUNTER — TRANSFERRED RECORDS (OUTPATIENT)
Dept: HEALTH INFORMATION MANAGEMENT | Facility: CLINIC | Age: 60
End: 2020-05-14

## 2020-05-15 ENCOUNTER — TELEPHONE (OUTPATIENT)
Dept: MEDSURG UNIT | Facility: CLINIC | Age: 60
End: 2020-05-15

## 2020-05-15 NOTE — TELEPHONE ENCOUNTER
Pre-Procedure Unconfirmed COVID Test     Ten Johnson    Pt had a COVID test that was done outside of [facility], unable to see test results pending.     COVID Screening  Due to the inability to confirm the patient's COVID status (positive or negative), the patient was screened for COVID symptoms     Patient reports the following:  Fever? No   Cough? No   Shortness of breath? No   Skin rash? No    Pt has hay fever and runny nose with no fever.     If the patient is positive for new symptoms or worsening symptoms, and the procedure is deemed necessary by the ordering provider, notify your manager/supervisor     Patient Information  Patient informed of the no visitor policy  Patient instructed to continue to self-quarantine prior to procedure  Patient informed to contact the ordering provider if the following symptoms develop prior to procedure:   Fever  Cough  Shortness of Breath  Sore throat   Runny or stuffy nose  Muscle or body aches  Headaches  Fatigue  Vomiting or diarrhea   Rash    Emily Lundberg RN

## 2020-05-17 ENCOUNTER — NURSE TRIAGE (OUTPATIENT)
Dept: NURSING | Facility: CLINIC | Age: 60
End: 2020-05-17

## 2020-05-17 LAB — Lab: NORMAL

## 2020-05-17 NOTE — TELEPHONE ENCOUNTER
Navi to have paracentesis tomorrow (5/18/20) which he normally has every 7-10 days and at this point has been 33 days.  Navi was tested at [facility] (only site in [facility]) and is unable to access email with his test results and only has a smart phone (no computer).      Unable to access [facility] records, no on call provider available for records review.             Additional Information    [1] Follow-up call to recent contact AND [2] information only call, no triage required    Protocols used: INFORMATION ONLY CALL-A-

## 2020-05-18 ENCOUNTER — HOSPITAL ENCOUNTER (OUTPATIENT)
Dept: ULTRASOUND IMAGING | Facility: CLINIC | Age: 60
End: 2020-05-18
Attending: INTERNAL MEDICINE
Payer: COMMERCIAL

## 2020-05-18 ENCOUNTER — HOSPITAL ENCOUNTER (OUTPATIENT)
Facility: CLINIC | Age: 60
Discharge: HOME | End: 2020-05-18
Admitting: RADIOLOGY
Payer: COMMERCIAL

## 2020-05-18 VITALS
TEMPERATURE: 97.7 F | HEART RATE: 69 BPM | BODY MASS INDEX: 23.63 KG/M2 | WEIGHT: 155.9 LBS | HEIGHT: 68 IN | RESPIRATION RATE: 16 BRPM | DIASTOLIC BLOOD PRESSURE: 75 MMHG | SYSTOLIC BLOOD PRESSURE: 146 MMHG | OXYGEN SATURATION: 99 %

## 2020-05-18 DIAGNOSIS — K70.31 ALCOHOLIC CIRRHOSIS OF LIVER WITH ASCITES (H): ICD-10-CM

## 2020-05-18 DIAGNOSIS — K70.31 ASCITES DUE TO ALCOHOLIC CIRRHOSIS (H): Primary | ICD-10-CM

## 2020-05-18 LAB
INR PPP: 1.11 (ref 0.86–1.14)
PLATELET # BLD AUTO: 126 10E9/L (ref 150–450)

## 2020-05-18 PROCEDURE — 40000863 ZZH STATISTIC RADIOLOGY XRAY, US, CT, MAR, NM

## 2020-05-18 PROCEDURE — 25000125 ZZHC RX 250: Performed by: RADIOLOGY

## 2020-05-18 PROCEDURE — 27210190 US PARACENTESIS

## 2020-05-18 PROCEDURE — P9047 ALBUMIN (HUMAN), 25%, 50ML: HCPCS | Performed by: INTERNAL MEDICINE

## 2020-05-18 PROCEDURE — 25000128 H RX IP 250 OP 636: Performed by: INTERNAL MEDICINE

## 2020-05-18 PROCEDURE — 36415 COLL VENOUS BLD VENIPUNCTURE: CPT

## 2020-05-18 PROCEDURE — 85610 PROTHROMBIN TIME: CPT

## 2020-05-18 PROCEDURE — 85049 AUTOMATED PLATELET COUNT: CPT

## 2020-05-18 RX ORDER — HEPARIN SODIUM,PORCINE 10 UNIT/ML
5 VIAL (ML) INTRAVENOUS
Status: CANCELLED | OUTPATIENT
Start: 2020-05-25

## 2020-05-18 RX ORDER — LIDOCAINE HYDROCHLORIDE 10 MG/ML
20 INJECTION, SOLUTION EPIDURAL; INFILTRATION; INTRACAUDAL; PERINEURAL ONCE
Status: CANCELLED | OUTPATIENT
Start: 2020-05-25

## 2020-05-18 RX ORDER — ALBUMIN (HUMAN) 12.5 G/50ML
12.5 SOLUTION INTRAVENOUS
Status: CANCELLED | OUTPATIENT
Start: 2020-05-25

## 2020-05-18 RX ORDER — ALBUMIN (HUMAN) 12.5 G/50ML
12.5 SOLUTION INTRAVENOUS
Status: DISCONTINUED | OUTPATIENT
Start: 2020-05-18 | End: 2020-05-18 | Stop reason: HOSPADM

## 2020-05-18 RX ORDER — HEPARIN SODIUM (PORCINE) LOCK FLUSH IV SOLN 100 UNIT/ML 100 UNIT/ML
5 SOLUTION INTRAVENOUS
Status: DISCONTINUED | OUTPATIENT
Start: 2020-05-18 | End: 2020-05-18 | Stop reason: HOSPADM

## 2020-05-18 RX ORDER — LIDOCAINE HYDROCHLORIDE 10 MG/ML
20 INJECTION, SOLUTION EPIDURAL; INFILTRATION; INTRACAUDAL; PERINEURAL ONCE
Status: DISCONTINUED | OUTPATIENT
Start: 2020-05-18 | End: 2020-05-18 | Stop reason: HOSPADM

## 2020-05-18 RX ORDER — HEPARIN SODIUM (PORCINE) LOCK FLUSH IV SOLN 100 UNIT/ML 100 UNIT/ML
5 SOLUTION INTRAVENOUS
Status: CANCELLED | OUTPATIENT
Start: 2020-05-25

## 2020-05-18 RX ORDER — LIDOCAINE HYDROCHLORIDE 10 MG/ML
10 INJECTION, SOLUTION EPIDURAL; INFILTRATION; INTRACAUDAL; PERINEURAL ONCE
Status: COMPLETED | OUTPATIENT
Start: 2020-05-18 | End: 2020-05-18

## 2020-05-18 RX ORDER — ALBUMIN (HUMAN) 12.5 G/50ML
12.5 SOLUTION INTRAVENOUS ONCE
Status: DISCONTINUED | OUTPATIENT
Start: 2020-05-18 | End: 2020-05-18 | Stop reason: HOSPADM

## 2020-05-18 RX ORDER — HEPARIN SODIUM,PORCINE 10 UNIT/ML
5 VIAL (ML) INTRAVENOUS
Status: DISCONTINUED | OUTPATIENT
Start: 2020-05-18 | End: 2020-05-18 | Stop reason: HOSPADM

## 2020-05-18 RX ADMIN — ALBUMIN HUMAN 12.5 G: 0.25 SOLUTION INTRAVENOUS at 10:06

## 2020-05-18 RX ADMIN — ALBUMIN HUMAN 12.5 G: 0.25 SOLUTION INTRAVENOUS at 09:55

## 2020-05-18 RX ADMIN — LIDOCAINE HYDROCHLORIDE 10 ML: 10 INJECTION, SOLUTION EPIDURAL; INFILTRATION; INTRACAUDAL; PERINEURAL at 09:47

## 2020-05-18 RX ADMIN — ALBUMIN HUMAN 12.5 G: 0.25 SOLUTION INTRAVENOUS at 10:23

## 2020-05-18 ASSESSMENT — MIFFLIN-ST. JEOR: SCORE: 1486.66

## 2020-05-18 NOTE — DISCHARGE INSTRUCTIONS

## 2020-05-18 NOTE — PROGRESS NOTES
Care Suites Procedure Nursing Note    Patient Information  Name: Ten Johnson  Age: 59 year old    Procedure  Procedure: Paracentesis.  0942  Informed consent obtained by Dr. Burgess.  0947 Time out completed.  Procedure start time: 0947  Procedure complete time: 1004  Concerns/abnormal assessment: No  If abnormal assessment, provider notified: N/A  Plan/Other: Return to care suites.    Vidya Crews RN     Paracentesis: Pt tolerated well. VSS. 3,000 cc clear yellow fluid removed from abdomen w/o difficulty. Bandaid applied to site - CDI.  Albumin given per protocol.

## 2020-05-18 NOTE — PROGRESS NOTES
Care Suites Discharge Nursing Note    Patient Information  Name: Ten Johnson  Age: 59 year old    Discharge Education:  Discharge instructions reviewed: Yes  Additional education/resources provided:AVS given   Albumin completed and IV discontinued    Discharge Plans:   Discharge location: home   Discharge ride contacted: drive self   Approximate discharge time: 1050    Discharge Criteria:  Discharge criteria met and vital signs stable: Yes    Patient Belongs:  Patient belongings returned to patient: Yes    Steff Segal RN

## 2020-05-18 NOTE — PROGRESS NOTES
Care Suites Admission Nursing Note    Patient Information  Name: Ten Johnson  Age: 59 year old  Reason for admission: Paracentesis.  Care Suites arrival time: 0830    Patient Admission/Assessment   Pre-procedure assessment complete: Yes  If abnormal assessment/labs, provider notified: N/A  NPO: N/A  Medications held per instructions/orders: N/A  Consent: deferred  If applicable, pregnancy test status: NA  Patient oriented to room: Yes  Education/questions answered: Yes.  POC and procedure discussed/reviewed.  All questions and concerns addressed.  No further questions at this time.  Plan/other: Proceed as scheduled    Discharge Planning  Accompanied by: Self  Discharge name/phone number: NA  Overnight post sedation caregiver: NA  Discharge location: home    Vidya Crews, RN     2657  AVS/Discharge instructions given and reviewed with verbal understanding received.

## 2020-05-19 ENCOUNTER — TELEPHONE (OUTPATIENT)
Dept: GASTROENTEROLOGY | Facility: CLINIC | Age: 60
End: 2020-05-19

## 2020-05-19 ENCOUNTER — PATIENT OUTREACH (OUTPATIENT)
Dept: ONCOLOGY | Facility: CLINIC | Age: 60
End: 2020-05-19

## 2020-05-19 DIAGNOSIS — F41.9 ANXIETY: ICD-10-CM

## 2020-05-19 DIAGNOSIS — C22.0 HCC (HEPATOCELLULAR CARCINOMA) (H): ICD-10-CM

## 2020-05-19 DIAGNOSIS — J30.9 CHRONIC ALLERGIC RHINITIS: Primary | ICD-10-CM

## 2020-05-19 RX ORDER — DIAZEPAM 5 MG
TABLET ORAL
Qty: 2 TABLET | Refills: 0 | Status: SHIPPED | OUTPATIENT
Start: 2020-05-19 | End: 2020-08-13

## 2020-05-19 NOTE — TELEPHONE ENCOUNTER
Pt also sent [employer] message.  Writer will reply back to [employer] message.    Vidya Fulton LPN  Hepatology Clinic  ------  Leventhal, Thomas Michael, MD Beckenbach, Shannon, LPN     He will not be receiving any sedation from our clinic for a paracentesis nor for imaging     --------  Patient requesting sedation prior to imaging and any procedures including a paracentesis, because he has severe allergies that cause pt  uncontrollable sneezing and coughing during this time of the year until the snow falls. Writer will sent message to Dr. Leventhal for recommendations.    Vidya Fulton LPN  Hepatology Clinic    -------   Health Call Center    Phone Message    May a detailed message be left on voicemail: yes     Reason for Call: Other: Pt would like to discuss some partial sedation for imaging. He will not do any imaging until this is figured out.      Action Taken: Message routed to:  [facility] Hep    Travel Screening: Not Applicable

## 2020-05-19 NOTE — PROGRESS NOTES
"Spoke to Navi who has cancelled his imaging appointments due to allergy issues. He does not feel that the recommendation to take 25 mg of benadryl prior to imaging would help him to not sneeze or cough in the machine. He is requesting \"some sort of sedation\". Discussed we can give him Valium for the imaging but that is not guaranteed to help his situation. He would like to try this option. Valium script called into [facility].  He declined the option of rescheduling the appointments for tomorrow, he would like them scheduled for next week.   Message sent to scheduling.   "

## 2020-05-21 ENCOUNTER — PATIENT OUTREACH (OUTPATIENT)
Dept: ONCOLOGY | Facility: CLINIC | Age: 60
End: 2020-05-21

## 2020-05-21 DIAGNOSIS — C22.0 HCC (HEPATOCELLULAR CARCINOMA) (H): Primary | ICD-10-CM

## 2020-05-26 ENCOUNTER — MYC REFILL (OUTPATIENT)
Dept: INTERNAL MEDICINE | Facility: CLINIC | Age: 60
End: 2020-05-26

## 2020-05-26 ENCOUNTER — HOSPITAL ENCOUNTER (OUTPATIENT)
Dept: CT IMAGING | Facility: CLINIC | Age: 60
End: 2020-05-26
Attending: INTERNAL MEDICINE
Payer: COMMERCIAL

## 2020-05-26 ENCOUNTER — HOSPITAL ENCOUNTER (OUTPATIENT)
Dept: MRI IMAGING | Facility: CLINIC | Age: 60
End: 2020-05-26
Attending: INTERNAL MEDICINE
Payer: COMMERCIAL

## 2020-05-26 ENCOUNTER — TELEPHONE (OUTPATIENT)
Dept: MEDSURG UNIT | Facility: CLINIC | Age: 60
End: 2020-05-26

## 2020-05-26 DIAGNOSIS — R91.8 PULMONARY NODULES: ICD-10-CM

## 2020-05-26 DIAGNOSIS — G89.29 CHRONIC PAIN OF MULTIPLE JOINTS: ICD-10-CM

## 2020-05-26 DIAGNOSIS — C22.0 HCC (HEPATOCELLULAR CARCINOMA) (H): ICD-10-CM

## 2020-05-26 DIAGNOSIS — M25.50 CHRONIC PAIN OF MULTIPLE JOINTS: ICD-10-CM

## 2020-05-26 PROCEDURE — 25500064 ZZH RX 255 OP 636: Performed by: INTERNAL MEDICINE

## 2020-05-26 PROCEDURE — 71250 CT THORAX DX C-: CPT

## 2020-05-26 PROCEDURE — 40000141 ZZH STATISTIC PERIPHERAL IV START W/O US GUIDANCE

## 2020-05-26 PROCEDURE — A9585 GADOBUTROL INJECTION: HCPCS | Performed by: INTERNAL MEDICINE

## 2020-05-26 PROCEDURE — 74183 MRI ABD W/O CNTR FLWD CNTR: CPT

## 2020-05-26 RX ORDER — GADOBUTROL 604.72 MG/ML
7.5 INJECTION INTRAVENOUS ONCE
Status: COMPLETED | OUTPATIENT
Start: 2020-05-26 | End: 2020-05-26

## 2020-05-26 RX ADMIN — GADOBUTROL 7.5 ML: 604.72 INJECTION INTRAVENOUS at 13:38

## 2020-05-26 NOTE — TELEPHONE ENCOUNTER
Pre-Procedure Negative COVID Test     Ten Johnson    Patient Notification  Patient notified of the negative COVID test result    Patient Information  Patient informed of the no visitor policy  Patient instructed to continue to self-quarantine prior to procedure  Patient informed to contact the ordering provider if the following symptoms develop prior to procedure:   Fever  Cough  Shortness of Breath  Sore throat   Runny or stuffy nose  Muscle or body aches  Headaches  Fatigue  Vomiting or diarrhea   Rash    Josy Dominique RN

## 2020-05-27 ENCOUNTER — HOSPITAL ENCOUNTER (OUTPATIENT)
Facility: CLINIC | Age: 60
Discharge: HOME | End: 2020-05-27
Admitting: INTERNAL MEDICINE
Payer: COMMERCIAL

## 2020-05-27 ENCOUNTER — TELEPHONE (OUTPATIENT)
Dept: GASTROENTEROLOGY | Facility: CLINIC | Age: 60
End: 2020-05-27

## 2020-05-27 ENCOUNTER — HOSPITAL ENCOUNTER (OUTPATIENT)
Dept: ULTRASOUND IMAGING | Facility: CLINIC | Age: 60
End: 2020-05-27
Attending: INTERNAL MEDICINE
Payer: COMMERCIAL

## 2020-05-27 ENCOUNTER — HOSPITAL ENCOUNTER (OUTPATIENT)
Facility: CLINIC | Age: 60
End: 2020-05-27
Payer: COMMERCIAL

## 2020-05-27 VITALS
SYSTOLIC BLOOD PRESSURE: 151 MMHG | HEART RATE: 71 BPM | BODY MASS INDEX: 23.3 KG/M2 | WEIGHT: 153.7 LBS | DIASTOLIC BLOOD PRESSURE: 98 MMHG | OXYGEN SATURATION: 100 % | TEMPERATURE: 97 F | HEIGHT: 68 IN | RESPIRATION RATE: 16 BRPM

## 2020-05-27 DIAGNOSIS — K70.31 ALCOHOLIC CIRRHOSIS OF LIVER WITH ASCITES (H): ICD-10-CM

## 2020-05-27 DIAGNOSIS — K70.31 ASCITES DUE TO ALCOHOLIC CIRRHOSIS (H): ICD-10-CM

## 2020-05-27 DIAGNOSIS — C22.0 HCC (HEPATOCELLULAR CARCINOMA) (H): Primary | ICD-10-CM

## 2020-05-27 LAB
AFP SERPL-MCNC: 10.5 UG/L (ref 0–8)
ALBUMIN SERPL-MCNC: 3.4 G/DL (ref 3.4–5)
ALP SERPL-CCNC: 86 U/L (ref 40–150)
ALT SERPL W P-5'-P-CCNC: 76 U/L (ref 0–70)
ANION GAP SERPL CALCULATED.3IONS-SCNC: 6 MMOL/L (ref 3–14)
AST SERPL W P-5'-P-CCNC: 75 U/L (ref 0–45)
BASOPHILS # BLD AUTO: 0 10E9/L (ref 0–0.2)
BASOPHILS NFR BLD AUTO: 0.6 %
BILIRUB SERPL-MCNC: 0.7 MG/DL (ref 0.2–1.3)
BUN SERPL-MCNC: 21 MG/DL (ref 7–30)
CALCIUM SERPL-MCNC: 8.8 MG/DL (ref 8.5–10.1)
CHLORIDE SERPL-SCNC: 107 MMOL/L (ref 94–109)
CO2 SERPL-SCNC: 25 MMOL/L (ref 20–32)
CREAT SERPL-MCNC: 0.77 MG/DL (ref 0.66–1.25)
DIFFERENTIAL METHOD BLD: ABNORMAL
EOSINOPHIL # BLD AUTO: 0.1 10E9/L (ref 0–0.7)
EOSINOPHIL NFR BLD AUTO: 1.1 %
ERYTHROCYTE [DISTWIDTH] IN BLOOD BY AUTOMATED COUNT: 14.6 % (ref 10–15)
GFR SERPL CREATININE-BSD FRML MDRD: >90 ML/MIN/{1.73_M2}
GLUCOSE SERPL-MCNC: 83 MG/DL (ref 70–99)
HCT VFR BLD AUTO: 41.9 % (ref 40–53)
HGB BLD-MCNC: 15 G/DL (ref 13.3–17.7)
IMM GRANULOCYTES # BLD: 0 10E9/L (ref 0–0.4)
IMM GRANULOCYTES NFR BLD: 0 %
LYMPHOCYTES # BLD AUTO: 1.5 10E9/L (ref 0.8–5.3)
LYMPHOCYTES NFR BLD AUTO: 22.1 %
MCH RBC QN AUTO: 32.4 PG (ref 26.5–33)
MCHC RBC AUTO-ENTMCNC: 35.8 G/DL (ref 31.5–36.5)
MCV RBC AUTO: 91 FL (ref 78–100)
MONOCYTES # BLD AUTO: 0.9 10E9/L (ref 0–1.3)
MONOCYTES NFR BLD AUTO: 12.8 %
NEUTROPHILS # BLD AUTO: 4.2 10E9/L (ref 1.6–8.3)
NEUTROPHILS NFR BLD AUTO: 63.4 %
PLATELET # BLD AUTO: 106 10E9/L (ref 150–450)
POTASSIUM SERPL-SCNC: 3.7 MMOL/L (ref 3.4–5.3)
PROT SERPL-MCNC: 7.2 G/DL (ref 6.8–8.8)
RBC # BLD AUTO: 4.63 10E12/L (ref 4.4–5.9)
SODIUM SERPL-SCNC: 138 MMOL/L (ref 133–144)
WBC # BLD AUTO: 6.6 10E9/L (ref 4–11)

## 2020-05-27 PROCEDURE — 85025 COMPLETE CBC W/AUTO DIFF WBC: CPT

## 2020-05-27 PROCEDURE — 80053 COMPREHEN METABOLIC PANEL: CPT | Performed by: INTERNAL MEDICINE

## 2020-05-27 PROCEDURE — 36415 COLL VENOUS BLD VENIPUNCTURE: CPT

## 2020-05-27 PROCEDURE — 40000863 ZZH STATISTIC RADIOLOGY XRAY, US, CT, MAR, NM

## 2020-05-27 PROCEDURE — 80053 COMPREHEN METABOLIC PANEL: CPT

## 2020-05-27 PROCEDURE — 25000125 ZZHC RX 250: Performed by: RADIOLOGY

## 2020-05-27 PROCEDURE — 82105 ALPHA-FETOPROTEIN SERUM: CPT

## 2020-05-27 PROCEDURE — 49083 ABD PARACENTESIS W/IMAGING: CPT

## 2020-05-27 RX ORDER — ALBUMIN (HUMAN) 12.5 G/50ML
12.5 SOLUTION INTRAVENOUS ONCE
Status: DISCONTINUED | OUTPATIENT
Start: 2020-05-27 | End: 2020-05-27 | Stop reason: HOSPADM

## 2020-05-27 RX ORDER — NICOTINE POLACRILEX 4 MG
15-30 LOZENGE BUCCAL
Status: DISCONTINUED | OUTPATIENT
Start: 2020-05-27 | End: 2020-05-27 | Stop reason: HOSPADM

## 2020-05-27 RX ORDER — ALBUMIN (HUMAN) 12.5 G/50ML
12.5 SOLUTION INTRAVENOUS
Status: CANCELLED | OUTPATIENT
Start: 2020-06-01

## 2020-05-27 RX ORDER — DEXTROSE MONOHYDRATE 25 G/50ML
25-50 INJECTION, SOLUTION INTRAVENOUS
Status: DISCONTINUED | OUTPATIENT
Start: 2020-05-27 | End: 2020-05-27 | Stop reason: HOSPADM

## 2020-05-27 RX ORDER — LIDOCAINE HYDROCHLORIDE 10 MG/ML
20 INJECTION, SOLUTION EPIDURAL; INFILTRATION; INTRACAUDAL; PERINEURAL ONCE
Status: CANCELLED | OUTPATIENT
Start: 2020-06-01

## 2020-05-27 RX ORDER — ALBUMIN (HUMAN) 12.5 G/50ML
12.5 SOLUTION INTRAVENOUS
Status: DISCONTINUED | OUTPATIENT
Start: 2020-05-27 | End: 2020-05-27 | Stop reason: HOSPADM

## 2020-05-27 RX ORDER — LIDOCAINE 40 MG/G
CREAM TOPICAL
Status: DISCONTINUED | OUTPATIENT
Start: 2020-05-27 | End: 2020-05-27 | Stop reason: HOSPADM

## 2020-05-27 RX ORDER — LIDOCAINE HYDROCHLORIDE 10 MG/ML
10 INJECTION, SOLUTION EPIDURAL; INFILTRATION; INTRACAUDAL; PERINEURAL ONCE
Status: COMPLETED | OUTPATIENT
Start: 2020-05-27 | End: 2020-05-27

## 2020-05-27 RX ORDER — LIDOCAINE HYDROCHLORIDE 10 MG/ML
20 INJECTION, SOLUTION EPIDURAL; INFILTRATION; INTRACAUDAL; PERINEURAL ONCE
Status: DISCONTINUED | OUTPATIENT
Start: 2020-05-27 | End: 2020-05-27 | Stop reason: HOSPADM

## 2020-05-27 RX ORDER — HEPARIN SODIUM (PORCINE) LOCK FLUSH IV SOLN 100 UNIT/ML 100 UNIT/ML
5 SOLUTION INTRAVENOUS
Status: CANCELLED | OUTPATIENT
Start: 2020-06-01

## 2020-05-27 RX ORDER — HEPARIN SODIUM,PORCINE 10 UNIT/ML
5 VIAL (ML) INTRAVENOUS
Status: CANCELLED | OUTPATIENT
Start: 2020-06-01

## 2020-05-27 RX ADMIN — LIDOCAINE HYDROCHLORIDE 10 ML: 10 INJECTION, SOLUTION EPIDURAL; INFILTRATION; INTRACAUDAL; PERINEURAL at 11:20

## 2020-05-27 ASSESSMENT — MIFFLIN-ST. JEOR: SCORE: 1476.68

## 2020-05-27 NOTE — PROGRESS NOTES
Care Suites Admission Nursing Note    Patient Information  Name: Ten Johnson  Age: 59 year old  Reason for admission: Paracentesis.  Care Suites arrival time: 1020    Patient Admission/Assessment   Pre-procedure assessment complete: Yes  If abnormal assessment/labs, provider notified: N/A  NPO: N/A  Medications held per instructions/orders: N/A  Consent: deferred  If applicable, pregnancy test status: NA  Patient oriented to room: Yes  Education/questions answered: Yes  Plan/other: Proceed as scheduled    Discharge Planning  Accompanied by: Self.  Discharge name/phone number: NA  Overnight post sedation caregiver: NA  Discharge location: Patient states he will take the bus home.    Vidya Crews RN     1100  AVS/Discharge instructions given and reviewed with verbal understanding received.

## 2020-05-27 NOTE — PROGRESS NOTES
1120 Time Out done.  Consent signed    Paracentesis: Pt tolerated well. VSS. 3300 cc clear yellow fluid removed from abdomen w/o difficulty. Derma bond applied.  Bandaid applied to site - CDI. No Albumin given per standing order.     1150  Pt back to Care Suites.      Care Suites Post Procedure Note    Patient Information  Name: Ten Johnson  Age: 59 year old    Post Procedure  Time patient returned to Care Suites: 1150  Concerns/abnormal assessment: No immediate  If abnormal assessment, provider notified: NA Plan/Other: Continue post procedure plan of care    VS stable, paracentesis site Tegaderm applied per pt requested, CDI.  Denies any pain or discomfort.  Voiding.  Pt is taking po fluids well.    Aram West RN     Care Suites Discharge Nursing Note    Patient Information  Name: Ten Johnson  Age: 59 year old    Discharge Education:  Discharge instructions reviewed: yes  Additional education/resources provided: NA  Patient/patient representative verbalizes understanding:  yes  Patient discharging on new medications: No Medication education completed: NA  Discharge Plans:   Discharge location:  self  Discharge ride contacted: self   Approximate discharge time: 1225    Discharge Criteria:  Discharge criteria met and vital signs stable: yes  Patient Belongs:  Patient belongings returned to patient:  Yes    Aram West RN

## 2020-05-27 NOTE — DISCHARGE INSTRUCTIONS

## 2020-05-27 NOTE — TELEPHONE ENCOUNTER
[facility] Call Center    Phone Message    May a detailed message be left on voicemail: yes     Reason for Call: Other: The pt is inpat. His nurse is calling to clarify the paracentesis orders and asking for Vidya. Can someone call [facility] - the test is scheduled for 10:30 am. Thanks     Action Taken: Message routed to:  [facility] maría neph    Travel Screening: Not Applicable

## 2020-05-27 NOTE — PROGRESS NOTES
Attempted to contact Dr. Leventhal's office to clarify albumin order.  Message left for his nurse or Dr. Leventhal to call back.    U/S paracentesis's written order on May 19, 2020 and signed by Dr. Leventhal is different from therapy plan albumin order.

## 2020-05-27 NOTE — PROGRESS NOTES
Return call from [facility] regarding infusion of albumin for paracentesis.  We are to Follow Standing Order which states no albumin to be given unless reaching greater than 4 liters.

## 2020-05-27 NOTE — PROGRESS NOTES
PATIENT WELLNESS SCREENING    Step 1: Answer all screening questions 1-3.    1. In the last month, have you been in contact with someone who was confirmed or suspected to have Coronavirus/COVID-19? No    2. Do you have the following symptoms?   Fever? No   Cough? Yes: d/t seasonal allergies   Shortness of breath? No   Skin rash? No    Step 2: Refer to logic grid below for actions    NO SYMPTOM(S)    ACTIONS:  1. Standard rooming process  2. Provider to assess per normal protocol    POSITIVE SYMPTOM(S)  If positive for ANY of the following symptoms: fever, cough, shortness of breath, rash    ACTIONS  1. Mask patient  2. Room patient as soon as possible  3. Don appropriate PPE when entering room  4. Provider evaluation

## 2020-05-28 ENCOUNTER — VIRTUAL VISIT (OUTPATIENT)
Dept: ONCOLOGY | Facility: CLINIC | Age: 60
End: 2020-05-28
Attending: INTERNAL MEDICINE
Payer: COMMERCIAL

## 2020-05-28 DIAGNOSIS — C22.0 HCC (HEPATOCELLULAR CARCINOMA) (H): Primary | ICD-10-CM

## 2020-05-28 DIAGNOSIS — R91.8 PULMONARY NODULES: ICD-10-CM

## 2020-05-28 PROCEDURE — 99214 OFFICE O/P EST MOD 30 MIN: CPT | Mod: 95 | Performed by: INTERNAL MEDICINE

## 2020-05-28 PROCEDURE — 40000114 ZZH STATISTIC NO CHARGE CLINIC VISIT

## 2020-05-28 RX ORDER — OXYCODONE HYDROCHLORIDE 5 MG/1
5 TABLET ORAL EVERY 6 HOURS PRN
Qty: 30 TABLET | Refills: 0 | Status: SHIPPED | OUTPATIENT
Start: 2020-05-28 | End: 2020-06-12

## 2020-05-28 NOTE — TELEPHONE ENCOUNTER
Last seen 3/12 and has not rescheduled.  +Drug screen after visit, needs repeat visit for any further refills.  Needs evaluation by comprehensive pain for non-opioid approaches as this is chronic musculoskeletal pain, scheduled for 6/15 - he needs to keep this appointment.      [employer] message sent reflecting this.     Sharmila Paulino MD  Palliative Medicine  Pager [phone]

## 2020-05-28 NOTE — TELEPHONE ENCOUNTER
Controlled Medication Requested:  Oxycodone   Last date written and prescriber:  5/4/2020  15 day supply  Sharmila Jefferson   Last date dispensed per  website: 5/4/2020  Last office visit: 3/12/2020   Next office visit:  Not yet scheduled  NOTES: patient cancelled his pain clinic appointment on 4/28/2020    Recent Documentation from [place] prescription monitoring website:

## 2020-05-28 NOTE — LETTER
"  5/28/2020       RE: Ten Johnson  [address] [place] [address]    Dear Colleague,    Thank you for referring your patient, Ten Johnson, to the [facility]. Please see a copy of my visit note below.    Ten Johnson is a 59 year old male who is being evaluated via a billable telephone visit.      The patient has been notified of following:     \"This telephone visit will be conducted via a call between you and your physician/provider. We have found that certain health care needs can be provided without the need for a physical exam.  This service lets us provide the care you need with a short phone conversation.  If a prescription is necessary we can send it directly to your pharmacy.  If lab work is needed we can place an order for that and you can then stop by our lab to have the test done at a later time.    Telephone visits are billed at different rates depending on your insurance coverage. During this emergency period, for some insurers they may be billed the same as an in-person visit.  Please reach out to your insurance provider with any questions.    If during the course of the call the physician/provider feels a telephone visit is not appropriate, you will not be charged for this service.\"    Patient has given verbal consent for Telephone visit?  Yes    What phone number would you like to be contacted at? [phone]    How would you like to obtain your AVS? Hamilton Craft CMA    Phone call duration: 22 minutes    Pita Nolan MD        Oncology follow up visit:  Date on this visit: 5/28/2020     Ten Johnson  is referred by Dr.Jacob Benoit for an oncology consultation. He requires evaluation for new diagnosis of HCC.    Primary Physician: Cuca Celeste     History Of Present Illness:      Please see previous note for details. I have copied and updated from prior note.   Mr. Johnson is a 59 year old male who has a hx of HCV/ETOH cirrhosis, severe aortic " stenosis s/p TAVR, portal HTN, was recently diagnosed with HCC ( biopsy proven from right liver lobe 7/18/2019) s/p laparoscopic converted to open and had liver core needle biopsies and intra-operative microwave ablation of 3 lesions (Seg 6/7 x 2, Seg 7) on 10/22/19 with Dr. Benoit. ( S6/7 biopsies were positive for HCC but S7 biopsies were negative for malignancy )  He has another 2.3 cm S5 lesion adjacent to the gall bladder fossa which was unable to be treated then so he had TACE for it on 1/29/2020.  He gets weekly paracentesis.    Previously he had CT chest which showed tiny indeterminate lung nodules.   Bone scan is negative for metastatic disease.      Baseline AFP was 24.8.      Interval hx:  This is a phone visit.  He is feeling good. He gets therapeutic paracentesis every 10 -14 days or so since after he had microwave ablation procedure.  He has occasional mild nausea for which Zofran helps. No vomiting. BMs are fine. No bleeding. No infections. No dyspnea.   Energy is good and he is biking regularly.   He has chronic pains from his previous joint injuries and he is taking oxycodone and he also smokes marijuana which also helps. Denies neuropathy. He still drinks occasionally. Last beer use was last week. But now he is drinking much less.    ECOG 1    ROS:  Rest of the comprehensive review of the system was essentially unremarkable.    I reviewed other hx in Epic as below.      Past Medical/Surgical History:  Past Medical History:   Diagnosis Date     JENNIFER (acute kidney injury) (H) 4/9/2019     Alcohol use disorder      Alcoholic cirrhosis (H)      Anticoagulant long-term use     plavix     Aortic stenosis, severe 2/21/2018     Ascites      Chronic allergic rhinitis      Chronic anemia      Chronic hepatitis C without hepatic coma (H) 05/10/2016    Untreated as of 2/2018     Cirrhosis (H) 2017    MRI finding     Diastolic dysfunction      Erosive gastropathy      Esophageal varices in alcoholic cirrhosis  (H)      H/O upper gastrointestinal hemorrhage 09/2017     Hepatocellular carcinoma (H)      History of blood transfusion      History of drug abuse (H)     intranasal     Hypertension     essential     JANELLE (iron deficiency anemia)      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     Sarahi-Hoffman tear     History     Marijuana abuse      MRSA (methicillin resistant Staphylococcus aureus)      Nonrheumatic aortic valve stenosis 2/20/2018     Olecranon bursitis      Portal hypertension (H)      Right shoulder pain     history of rotator cuff repair     S/p TAVR (transcatheter aortic valve replacement), bioprosthetic      Severe aortic stenosis      Thrombocytopenia (H)      Past Surgical History:   Procedure Laterality Date     ABLATE LIVER TUMOR N/A 10/22/2019    Procedure: Ablate liver tumor x3;  Surgeon: Gregorio Benoit MD;  Location: [facility]     ARTHROSCOPY SHOULDER ROTATOR CUFF REPAIR  7/31/2012    Procedure: ARTHROSCOPY SHOULDER ROTATOR CUFF REPAIR;  Right Shoulder Arthroscopic Rotator Cuff Repair, BicepsTenodesis,  Subacromial Decompression ;  Surgeon: Joi Castillo MD;  [facility]     ESOPHAGOSCOPY, GASTROSCOPY, DUODENOSCOPY (EGD), COMBINED N/A 10/23/2017    Procedure: COMBINED ESOPHAGOSCOPY, GASTROSCOPY, DUODENOSCOPY (EGD);;  Surgeon: Gentry Salas MD;  Location: [facility]     EXCHANGE POLY COMPONENT ARTHROPLASTY KNEE Right 3/4/2019    Procedure: REVISION RIGHT TOTAL KNEE POLY COMPONENT EXCHANGE;  Surgeon: Olvin Joe MD;  Location: [facility]     FACIAL RECONSTRUCTION SURGERY  1971     HEART CATH FEMORAL CANNULIZATION WITH OPEN STANDBY REPAIR AORTIC VALVE N/A 2/21/2018    Procedure: HEART CATH FEMORAL CANNULIZATION WITH OPEN STANDBY REPAIR AORTIC VALVE;;  Surgeon: Luis aBird MD;  Location: [facility]     IR CHEMO EMBOLIZATION  1/29/2020     IR LIVER BIOPSY PERCUTANEOUS  7/18/2019     IRRIGATION AND DEBRIDEMENT UPPER EXTREMITY,  COMBINED  1/3/2012    Procedure:COMBINED IRRIGATION AND DEBRIDEMENT UPPER EXTREMITY; Irrigation & Debridement Left Elbow; Surgeon:CRISTHIAN ZHOU; Location:[facility]     LAPAROSCOPIC BIOPSY LIVER N/A 10/22/2019    Procedure: intraoperative liver ultrasound, laparoscopic converted to open liver biopsy x 6;  Surgeon: Gregorio Benoit MD;  Location: [facility]     LAPAROSCOPY DIAGNOSTIC (GENERAL) N/A 10/22/2019    Procedure: Diagnostic laparoscopy;  Surgeon: Gregorio Benoit MD;  [facility]: [facility]     LAPAROTOMY, LYSIS ADHESIONS, COMBINED N/A 10/22/2019    Procedure: Laparotomy, lysis adhesions, combined;  Surgeon: Gregorio Benoit MD;  Location: [facility]     OPTICAL TRACKING SYSTEM ARTHROPLASTY KNEE Right 2/7/2019    Procedure: ARTHROPLASTY KNEE RIGHT;  Surgeon: Olvin Joe MD;  [facility]     REPAIR TENDON TRICEPS UPPER EXTREMITY  11/8/2011    Procedure:REPAIR TENDON TRICEPS UPPER EXTREMITY; Surgeon:CRISTHIAN ZHOU; [facility]     SHOULDER SURGERY  2003    left, injury, torn tendons, hematoma     TRANSCATHETER AORTIC VALVE IMPLANT ANESTHESIA N/A 2/21/2018    Procedure: TRANSCATHETER AORTIC VALVE IMPLANT ANESTHESIA;  Transfemoral (Quiroz) Aortic Valve Implant 26mm MARTHA 3, with Cardiopulmonary Bypass Standby, transthoracic echocardiogram;  Surgeon: GENERIC ANESTHESIA PROVIDER;  Location: [facility]     TRANSPOSITION ULNAR NERVE (ELBOW)  11/8/2011    Procedure:TRANSPOSITION ULNAR NERVE (ELBOW); Final Procedure Done: Left Elbow Lateral Ulnar Collateral Repair And  Left Elbow Triceps Repair       Cancer History:   As above    Allergies:  Allergies as of 05/28/2020 - Reviewed 05/28/2020   Allergen Reaction Noted     Zolpidem Other (See Comments) 03/26/2013     Cats Other (See Comments) 10/28/2011     Dogs Other (See Comments) 10/28/2011     Pollen extract Other (See Comments) 10/28/2011     Current Medications:  Current Outpatient Medications   Medication Sig Dispense Refill     aspirin (ASA) 81 MG tablet Take 1  tablet (81 mg) by mouth daily 90 tablet 3     diazepam (VALIUM) 5 MG tablet Take one tablet by mouth one hour prior to scan. May repeat dose one time 2 tablet 0     fluticasone (FLONASE) 50 MCG/ACT nasal spray Spray 2 sprays into both nostrils daily 48 mL 4     furosemide 20 MG PO tablet Take 3 tablets (60 mg) by mouth daily 90 tablet 1     lisinopril (ZESTRIL) 20 MG tablet Take 1 tablet (20 mg) by mouth daily 90 tablet 3     loratadine (CLARITIN) 10 MG tablet Take 1 tablet (10 mg) by mouth daily as needed for allergies (Patient taking differently: Take 10 mg by mouth 2 times daily ) 90 tablet 3     Multiple Vitamin (MULTIVITAMINS PO) Take 1 tablet by mouth At Bedtime        naloxone (NARCAN) 4 MG/0.1ML nasal spray Spray 1 spray (4 mg) into one nostril alternating nostrils once as needed for opioid reversal every 2-3 minutes until assistance arrives 0.2 mL 1     ondansetron (ZOFRAN) 4 MG tablet Take 1 tablet (4 mg) by mouth every 8 hours as needed for nausea 30 tablet 0     spironolactone (ALDACTONE) 50 MG tablet Take 2 tablets (100 mg) by mouth daily 60 tablet 3     oxyCODONE (ROXICODONE) 5 MG tablet Take 1 tablet (5 mg) by mouth every 6 hours as needed for severe pain Take no more than 2 pills a day 30 tablet 0      Family History:  Family History   Problem Relation Age of Onset     Cancer Mother 62        unknown primary     Alcoholism Paternal Uncle      Unknown/Adopted Father      No Known Problems Brother      Diabetes Maternal Grandmother      Myocardial Infarction Maternal Grandfather      No Known Problems Paternal Grandmother      Unknown/Adopted Paternal Grandfather      Cirrhosis No family hx of      His mother had lung cancer.  He does not have any children.    Social History:  Social History     Socioeconomic History     Marital status: Single     Spouse name: Not on file     Number of children: 0     Years of education: Not on file     Highest education level: Not on file   Occupational History      "Occupation:    Social Needs     Financial resource strain: Not on file     Food insecurity     Worry: Not on file     Inability: Not on file     Transportation needs     Medical: Not on file     Non-medical: Not on file   Tobacco Use     Smoking status: Never Smoker     Smokeless tobacco: Never Used   Substance and Sexual Activity     Alcohol use: Yes     Comment: drinks a \"beer occasionally\"     Drug use: Yes     Types: Marijuana     Comment: denies     Sexual activity: Not Currently     Partners: Female   Lifestyle     Physical activity     Days per week: Not on file     Minutes per session: Not on file     Stress: Not on file   Relationships     Social connections     Talks on phone: Not on file     Gets together: Not on file     Attends Scientologist service: Not on file     Active member of club or organization: Not on file     Attends meetings of clubs or organizations: Not on file     Relationship status: Not on file     Intimate partner violence     Fear of current or ex partner: Not on file     Emotionally abused: Not on file     Physically abused: Not on file     Forced sexual activity: Not on file   Other Topics Concern     Parent/sibling w/ CABG, MI or angioplasty before 65F 55M? Not Asked   Social History Narrative    .  Bicycles a lot.  Excessive alcohol use.  Smokes cigars.  Occasional marijuana use.     He denies any smoking.  He used to be a heavy alcohol drinker but over the last 1 year he has significantly cut it down and his last alcohol drink was on Christmas 2019.  He used to smoke marijuana but he denies any IV drug use.    Physical Exam:  There were no vitals taken for this visit.   Wt Readings from Last 4 Encounters:   05/27/20 69.7 kg (153 lb 11.2 oz)   05/18/20 70.7 kg (155 lb 14.4 oz)   03/23/20 76.9 kg (169 lb 9.6 oz)   03/12/20 71.9 kg (158 lb 8 oz)     Constitutional.  Does not seem to be in any acute distress.  Respiratory.  Speaking in full sentences.  No " coughing noted.  Neurological.  Alert and oriented x3.  Psychiatric.  Mood, mentation and affect are normal.  Decision making capacity is intact.      The rest of a comprehensive physical examination is deferred due to Public Health Emergency telephone visit restrictions.    Laboratory/Imaging Studies    Reviewed    Results for GUSTAVO CORREA (MRN [id number]) as of 5/28/2020 15:13   Ref. Range 2/13/2015 10:02 10/24/2017 08:20 9/24/2019 09:54 2/10/2020 12:00 5/27/2020 12:00   Alpha Fetoprotein Latest Ref Range: 0 - 8 ug/L 15.7 (H) 10.6 (H) 24.8 (H) 14.3 (H) 10.5 (H)       MRI ABDOMEN 5/26/2020     CLINICAL HISTORY:  Liver disease with history of HCC. HCC  surveillance.     TECHNIQUE: Images were acquired with and without intravenous contrast  through the abdomen. The following MR images were acquired: TrueFISP,  multiplanar T2 weighted, axial T1 in/out of phase, diffusion-weighted.  Multiplanar T1-weighted images with fat saturation were before  contrast administration and at multiple time points following the  administration of intravenous contrast. Contrast dose: 7.5 mls  Gadavist     FINDINGS:     Comparison study: MR abdomen 2/13/2020, 1/14/2020.     Moderate limited examination secondary to diffuse ascites and motion  artifact.     Liver: Liver cirrhosis without steatosis. Grossly unchanged treatment  changes in Couinaud's segment 6 and 7 of the liver. No large  suspicious arterially enhancing lesions in the liver. Previously  described hemangioma in hepatic segment 4A is not well delineated in  this study.      Lesion 1: 2.5 x 3.3 cm T2 hypointense lesion in segment 7 without  arterial enhancement, washout, restricted diffusion or suspicious T2  signal (series 10 image 23). LI-RADS TR, Non-Viable.      Lesion 2: 3.3 x 7.6 cm T2 hypointense lesion in segment 6 has slightly  decreased in size without arterial enhancement, washout, restricted  diffusion or suspicious T2 signal (series 10 image 19). LI-RADS  TR,  Non-Viable.      Lesion 3: 1.8 x 1.5 cm T2 hyperintense lesion in segment 6 has  slightly decreased in size without obvious arterial enhancement or  washout. No suspicious restricted diffusion. Evaluation of subtle  arterial enhancement is somewhat limited secondary to motion artifact.  LI-RADS TR Non-Viable, previously TR-equivocal.     Gallbladder: Cholelithiasis at the neck. Mild gallbladder wall edema.     Spleen: Splenomegaly measuring up to 13 cm in craniocaudal dimension.     Kidneys: Unremarkable.     Adrenal glands: Unremarkable.     Pancreas: Unremarkable.     Bowel: Unremarkable.     Lymph nodes: Unremarkable.     Blood vessels: Unremarkable.     Lung bases: Unchanged mildly elevated left hemidiaphragm.     Bones and soft tissues: Unremarkable.     Mesentery and abdominal wall: Mildly heterogeneous secondary to  abdominal ascites.     Ascites: Moderate large abdominal ascites.                                                                      IMPRESSION:    Moderately limited exam secondary to motion artifact and large  abdominal ascites:  1. Liver cirrhosis with portal hypertension. Moderate to large  abdominal ascites.  2. Previously described LI-RADS TR equivocal lesion in hepatic segment  6 demonstrates no obvious arterial enhancement or suspicious feature  as above, now most compatible with LI-RADS TR Nonviable.   3. LI-RADS TR Nonviable in segment 6 and 7 as above.   4. Based on this exam only, the patient is within Mendez criteria    ASSESSMENT/PLAN:    HCC in the setting of HCV/ETOH cirrhosis, treated with MWA of S6/7 lesions x 2 and S7 lesion on 10/22/19 with Dr. Benoit. ( S6/7 biopsies were positive for HCC but S7 biopsies were negative for malignancy )  He has another 2.3 cm S5 lesion adjacent to the gall bladder fossa which was unable to be treated then so he had TACE for it on 1/29/2020.    Baseline AFP was 24.8.  Bone scan was negative.   CT chest in June 2019 showed tiny indeterminate  lung nodules.    Repeat MRI shows no viable tumor.AFP is 10.5.  He tells me that he is NOT at all interested in transplant.    I recommend repeating MRI and AFP in 3 months.    Hepatitis C/alcohol related cirrhosis with refractory ascites requiring therapeutic paracentesis.  Hepatitis C has never been treated.  He does not want to take diuretics and wants only therapeutic paracentesis.  He will follow with Dr. Leventhal.    Thrombocytopenia. From Hep C/ etoh cirrhosis and portal HTN. Cont to monitor.    Discussion regarding alcohol.  He used to be heavy drinker. He is now drinking much less and I again recommended complete abstinence. He tells me he will not stop drinking beer here and there.     Lung nodules- indeterminate- will repeat CT chest in 3 months.     RTC in 3 months, with repeat labs, CT Chest and MRI       All questions answered. He is agreeable and comfortable with the plan.    Pita Nolan MD    Total phone call time. 22 minutes

## 2020-05-28 NOTE — PROGRESS NOTES
"Ten Johnson is a 59 year old male who is being evaluated via a billable telephone visit.      The patient has been notified of following:     \"This telephone visit will be conducted via a call between you and your physician/provider. We have found that certain health care needs can be provided without the need for a physical exam.  This service lets us provide the care you need with a short phone conversation.  If a prescription is necessary we can send it directly to your pharmacy.  If lab work is needed we can place an order for that and you can then stop by our lab to have the test done at a later time.    Telephone visits are billed at different rates depending on your insurance coverage. During this emergency period, for some insurers they may be billed the same as an in-person visit.  Please reach out to your insurance provider with any questions.    If during the course of the call the physician/provider feels a telephone visit is not appropriate, you will not be charged for this service.\"    Patient has given verbal consent for Telephone visit?  Yes    What phone number would you like to be contacted at? [phone]    How would you like to obtain your AVS? Hamilton Craft [facility]    Phone call duration: 22 minutes    Pita Nolan MD      "

## 2020-05-28 NOTE — PATIENT INSTRUCTIONS
In 3 months, we will repeat labs, CT Chest and MRI and see me a few days after that    Keep on following with Dr Leventhal

## 2020-06-12 ENCOUNTER — HOSPITAL ENCOUNTER (OUTPATIENT)
Facility: CLINIC | Age: 60
End: 2020-06-12
Payer: COMMERCIAL

## 2020-06-12 ENCOUNTER — VIRTUAL VISIT (OUTPATIENT)
Dept: PALLIATIVE CARE | Facility: CLINIC | Age: 60
End: 2020-06-12
Attending: INTERNAL MEDICINE
Payer: COMMERCIAL

## 2020-06-12 DIAGNOSIS — Z11.59 ENCOUNTER FOR SCREENING FOR OTHER VIRAL DISEASES: Primary | ICD-10-CM

## 2020-06-12 DIAGNOSIS — G89.29 CHRONIC PAIN OF MULTIPLE JOINTS: ICD-10-CM

## 2020-06-12 DIAGNOSIS — C22.0 HCC (HEPATOCELLULAR CARCINOMA) (H): Primary | ICD-10-CM

## 2020-06-12 DIAGNOSIS — M25.50 CHRONIC PAIN OF MULTIPLE JOINTS: ICD-10-CM

## 2020-06-12 PROCEDURE — 99214 OFFICE O/P EST MOD 30 MIN: CPT | Mod: 95 | Performed by: INTERNAL MEDICINE

## 2020-06-12 PROCEDURE — 40001009 ZZH VIDEO/TELEPHONE VISIT; NO CHARGE
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
## 2020-06-12 NOTE — PROGRESS NOTES
"Ten Johnson is a 60 year old male who is being evaluated via a billable telephone visit.      The patient has been notified of following:     \"This telephone visit will be conducted via a call between you and your physician/provider. We have found that certain health care needs can be provided without the need for a physical exam.  This service lets us provide the care you need with a short phone conversation.  If a prescription is necessary we can send it directly to your pharmacy.  If lab work is needed we can place an order for that and you can then stop by our lab to have the test done at a later time.    Telephone visits are billed at different rates depending on your insurance coverage. During this emergency period, for some insurers they may be billed the same as an in-person visit.  Please reach out to your insurance provider with any questions.    If during the course of the call the physician/provider feels a telephone visit is not appropriate, you will not be charged for this service.\"    Patient has given verbal consent for Telephone visit?  Yes    What phone number would you like to be contacted at? [phone]    How would you like to obtain your AVS? Mail a copy     Devin Boyd LPN      Phone call duration:  minutes          "

## 2020-06-15 ENCOUNTER — VIRTUAL VISIT (OUTPATIENT)
Dept: ANESTHESIOLOGY | Facility: CLINIC | Age: 60
End: 2020-06-15
Attending: INTERNAL MEDICINE

## 2020-06-15 DIAGNOSIS — Z53.9 ERRONEOUS ENCOUNTER--DISREGARD: Primary | ICD-10-CM

## 2020-06-15 RX ORDER — NICOTINE POLACRILEX 4 MG
15-30 LOZENGE BUCCAL
Status: CANCELLED | OUTPATIENT
Start: 2020-06-15

## 2020-06-15 RX ORDER — DEXTROSE MONOHYDRATE 25 G/50ML
25-50 INJECTION, SOLUTION INTRAVENOUS
Status: CANCELLED | OUTPATIENT
Start: 2020-06-15

## 2020-06-15 RX ORDER — ALBUMIN (HUMAN) 12.5 G/50ML
12.5 SOLUTION INTRAVENOUS ONCE
Status: CANCELLED | OUTPATIENT
Start: 2020-06-15 | End: 2020-06-15

## 2020-06-15 RX ORDER — LIDOCAINE 40 MG/G
CREAM TOPICAL
Status: CANCELLED | OUTPATIENT
Start: 2020-06-15

## 2020-06-15 ASSESSMENT — ENCOUNTER SYMPTOMS
TREMORS: 0
PARALYSIS: 0
TROUBLE SWALLOWING: 1
TINGLING: 1
SORE THROAT: 1
POOR WOUND HEALING: 0
SINUS CONGESTION: 1
ARTHRALGIAS: 1
COUGH DISTURBING SLEEP: 1
POLYPHAGIA: 0
SYNCOPE: 0
TASTE DISTURBANCE: 1
HYPOTENSION: 0
PALPITATIONS: 0
MEMORY LOSS: 0
NECK PAIN: 1
LOSS OF CONSCIOUSNESS: 0
SLEEP DISTURBANCES DUE TO BREATHING: 0
EXERCISE INTOLERANCE: 1
INCREASED ENERGY: 1
HOARSE VOICE: 1
DYSPNEA ON EXERTION: 0
NIGHT SWEATS: 1
SMELL DISTURBANCE: 0
PANIC: 0
HEMOPTYSIS: 0
ALTERED TEMPERATURE REGULATION: 1
COUGH: 1
WEAKNESS: 1
FATIGUE: 1
HEADACHES: 1
WHEEZING: 1
DECREASED CONCENTRATION: 0
SHORTNESS OF BREATH: 0
HYPERTENSION: 1
WEIGHT LOSS: 1
FEVER: 0
WEIGHT GAIN: 0
CHILLS: 1
JOINT SWELLING: 1
NECK MASS: 0
SPEECH CHANGE: 0
NUMBNESS: 1
MUSCLE CRAMPS: 1
DEPRESSION: 1
DECREASED APPETITE: 1
SINUS PAIN: 1
HALLUCINATIONS: 0
POLYDIPSIA: 0
MUSCLE WEAKNESS: 1
DOUBLE VISION: 0
STIFFNESS: 1
EYE PAIN: 0
EYE WATERING: 1
LIGHT-HEADEDNESS: 1
MYALGIAS: 1
BACK PAIN: 1
LEG PAIN: 1
SPUTUM PRODUCTION: 1
ORTHOPNEA: 1
EYE IRRITATION: 1
SKIN CHANGES: 1
NERVOUS/ANXIOUS: 1
SNORES LOUDLY: 1
DIZZINESS: 1
NAIL CHANGES: 0
INSOMNIA: 1
SEIZURES: 0
EYE REDNESS: 0

## 2020-06-15 ASSESSMENT — PATIENT HEALTH QUESTIONNAIRE - PHQ9
10. IF YOU CHECKED OFF ANY PROBLEMS, HOW DIFFICULT HAVE THESE PROBLEMS MADE IT FOR YOU TO DO YOUR WORK, TAKE CARE OF THINGS AT HOME, OR GET ALONG WITH OTHER PEOPLE: VERY DIFFICULT
SUM OF ALL RESPONSES TO PHQ QUESTIONS 1-9: 7
SUM OF ALL RESPONSES TO PHQ QUESTIONS 1-9: 7

## 2020-06-15 ASSESSMENT — PAIN SCALES - GENERAL: PAINLEVEL: SEVERE PAIN (6)

## 2020-06-15 NOTE — LETTER
"6/15/2020       RE: Ten Johnson  [address] [facility] [address]     Dear Colleague,    Thank you for referring your patient, Ten Johnson, to the [facility] FOR COMPREHENSIVE PAIN MANAGEMENT at [facility]. Please see a copy of my visit note below.    Ten Johnson is a 60 year old male who is being evaluated via a billable video visit.      The patient has been notified of following:     \"This video visit will be conducted via a call between you and your physician/provider. We have found that certain health care needs can be provided without the need for an in-person physical exam.  This service lets us provide the care you need with a video conversation.  If a prescription is necessary we can send it directly to your pharmacy.  If lab work is needed we can place an order for that and you can then stop by our lab to have the test done at a later time.    Video visits are billed at different rates depending on your insurance coverage.  Please reach out to your insurance provider with any questions.    If during the course of the call the physician/provider feels a video visit is not appropriate, you will not be charged for this service.\"    Patient has given verbal consent for Video visit? Yes    [phone]    Will anyone else be joining your video visit? No        Teetee Garduno CMA        Ten Johnson is a 60 year old male who is being evaluated via a billable video visit.      The patient has been notified of following:     \"This video visit will be conducted via a call between you and your physician/provider. We have found that certain health care needs can be provided without the need for an in-person physical exam.  This service lets us provide the care you need with a video conversation.  If a prescription is necessary we can send it directly to your pharmacy.  If lab work is needed we can place an order for that and you can then stop by our lab to " "have the test done at a later time.    Video visits are billed at different rates depending on your insurance coverage.  Please reach out to your insurance provider with any questions.    If during the course of the call the physician/provider feels a video visit is not appropriate, you will not be charged for this service.\"    Patient has given verbal consent for Video visit? Yes    Will anyone else be joining your video visit? No      The patient wanted to discuss only opioid medications. I discussed with him about multidisciplinary care and Dr. Douglas recommendations. He chose to cancel the visit.       AVS declined per MD.     Citlali Fowler LPN  "

## 2020-06-15 NOTE — PROGRESS NOTES
"Ten Johnson is a 60 year old male who is being evaluated via a billable video visit.      The patient has been notified of following:     \"This video visit will be conducted via a call between you and your physician/provider. We have found that certain health care needs can be provided without the need for an in-person physical exam.  This service lets us provide the care you need with a video conversation.  If a prescription is necessary we can send it directly to your pharmacy.  If lab work is needed we can place an order for that and you can then stop by our lab to have the test done at a later time.    Video visits are billed at different rates depending on your insurance coverage.  Please reach out to your insurance provider with any questions.    If during the course of the call the physician/provider feels a video visit is not appropriate, you will not be charged for this service.\"    Patient has given verbal consent for Video visit? Yes    Will anyone else be joining your video visit? No      The patient wanted to discuss only opioid medications. I discussed with him about multidisciplinary care and Dr. Douglas recommendations. He chose to cancel the visit.                                                   "

## 2020-06-15 NOTE — PROGRESS NOTES
"Ten Johnson is a 60 year old male who is being evaluated via a billable video visit.      The patient has been notified of following:     \"This video visit will be conducted via a call between you and your physician/provider. We have found that certain health care needs can be provided without the need for an in-person physical exam.  This service lets us provide the care you need with a video conversation.  If a prescription is necessary we can send it directly to your pharmacy.  If lab work is needed we can place an order for that and you can then stop by our lab to have the test done at a later time.    Video visits are billed at different rates depending on your insurance coverage.  Please reach out to your insurance provider with any questions.    If during the course of the call the physician/provider feels a video visit is not appropriate, you will not be charged for this service.\"    Patient has given verbal consent for Video visit? Yes    [phone]    Will anyone else be joining your video visit? No        Teetee Garduno CMA      "

## 2020-06-16 ENCOUNTER — MYC MEDICAL ADVICE (OUTPATIENT)
Dept: ONCOLOGY | Facility: CLINIC | Age: 60
End: 2020-06-16

## 2020-06-16 ASSESSMENT — PATIENT HEALTH QUESTIONNAIRE - PHQ9: SUM OF ALL RESPONSES TO PHQ QUESTIONS 1-9: 7

## 2020-06-16 NOTE — TELEPHONE ENCOUNTER
"I called Navi to discuss his messages. After a long and tangential conversation we came to the understanding that he is upset that he is no longer being prescribed oxycodone, and that he will not take the Belbuca as prescribed by Dr. Douglas. He says he was able to do some research over the weekend and that this will \"make him messed up all of the time.\" He ponders why this was prescribed as he only has pain on occasion, at night, and the only thing that will work is oxycodone. He states \"some of us still need opioids some of the time.\" He equates the pain level in his stomach at night at times to that off taking a steel pipe and getting hit across the calves, but states that he hasn't experienced it since Saturday.    I let him know that I would follow up with the team and get back to him.    Margaret Delgado, MSN, RN, OCN  Patient Care Supervisor  [facility]  "

## 2020-06-17 ENCOUNTER — TELEPHONE (OUTPATIENT)
Dept: MEDSURG UNIT | Facility: CLINIC | Age: 60
End: 2020-06-17

## 2020-06-17 DIAGNOSIS — Z11.59 ENCOUNTER FOR SCREENING FOR OTHER VIRAL DISEASES: ICD-10-CM

## 2020-06-17 LAB
SARS-COV-2 PCR COMMENT: NORMAL
SARS-COV-2 RNA SPEC QL NAA+PROBE: NEGATIVE
SARS-COV-2 RNA SPEC QL NAA+PROBE: NORMAL
SPECIMEN SOURCE: NORMAL
SPECIMEN SOURCE: NORMAL

## 2020-06-17 NOTE — TELEPHONE ENCOUNTER
Pre-Procedure Unconfirmed COVID Test     COVID Screening  Due to the inability to confirm the patient's COVID status (positive or negative), the patient was screened for COVID symptoms     Patient reports the following:  Fever? No   Cough? No   Shortness of breath? No   Skin rash? No    If the patient is positive for new symptoms or worsening symptoms, and the procedure is deemed necessary by the ordering provider, notify your manager/supervisor     Patient Information  Patient informed of the no visitor policy  Patient instructed to continue to self-quarantine prior to procedure  Patient informed to contact the ordering provider if the following symptoms develop prior to procedure:   Fever  Cough  Shortness of Breath  Sore throat   Runny or stuffy nose  Muscle or body aches  Headaches  Fatigue  Vomiting or diarrhea   Rash    Komal Tan RN

## 2020-06-24 ENCOUNTER — TELEPHONE (OUTPATIENT)
Dept: MEDSURG UNIT | Facility: CLINIC | Age: 60
End: 2020-06-24

## 2020-06-24 NOTE — TELEPHONE ENCOUNTER
Pre-Procedure Unconfirmed COVID Test     Step 1 COVID Screening  The patient was screened for COVID symptoms due to the inability to confirm the patient's COVID status (positive or negative test)    Patient reports the following:  Fever/Chills? No   Cough? No   Shortness of breath? No   New loss of taste or smell? No  Sore throat? No  Muscle or body aches? No  Headaches? No  Fatigue? No  Vomiting or diarrhea? No    Patient informed to contact the ordering provider if any of the symptoms develop prior to the procedure    Step 2 Screening Results (Skip if the patient is negative for symptoms)  If the patient is positive for new or worsening symptoms, contact the ordering provider to determine if the procedure is deemed necessary    Determine if the procedure can be re-scheduled when the patient is symptom free or has a negative COVID test     Step 3 Review Visitor Policy  Patient informed of the updated visitor policy   1 visitor allowed per patient   Visitor must screen negative for COVID symptoms   Visitor must wear a mask   Waiting rooms continue to be closed to visitors    Josy Dominique RN

## 2020-06-25 ENCOUNTER — HOSPITAL ENCOUNTER (OUTPATIENT)
Facility: CLINIC | Age: 60
Discharge: HOME | End: 2020-06-25
Admitting: INTERNAL MEDICINE
Payer: COMMERCIAL

## 2020-06-25 ENCOUNTER — HOSPITAL ENCOUNTER (OUTPATIENT)
Dept: ULTRASOUND IMAGING | Facility: CLINIC | Age: 60
End: 2020-06-25
Attending: INTERNAL MEDICINE
Payer: COMMERCIAL

## 2020-06-25 VITALS
BODY MASS INDEX: 22.22 KG/M2 | HEIGHT: 69 IN | HEART RATE: 81 BPM | OXYGEN SATURATION: 96 % | TEMPERATURE: 99 F | WEIGHT: 150 LBS | DIASTOLIC BLOOD PRESSURE: 79 MMHG | SYSTOLIC BLOOD PRESSURE: 131 MMHG | RESPIRATION RATE: 16 BRPM

## 2020-06-25 DIAGNOSIS — K70.31 ALCOHOLIC CIRRHOSIS OF LIVER WITH ASCITES (H): ICD-10-CM

## 2020-06-25 LAB — INR PPP: 1.2 (ref 0.86–1.14)

## 2020-06-25 PROCEDURE — 85610 PROTHROMBIN TIME: CPT

## 2020-06-25 PROCEDURE — 27210190 US PARACENTESIS

## 2020-06-25 PROCEDURE — 36415 COLL VENOUS BLD VENIPUNCTURE: CPT

## 2020-06-25 PROCEDURE — 40000863 ZZH STATISTIC RADIOLOGY XRAY, US, CT, MAR, NM

## 2020-06-25 RX ORDER — DEXTROSE MONOHYDRATE 25 G/50ML
25-50 INJECTION, SOLUTION INTRAVENOUS
Status: DISCONTINUED | OUTPATIENT
Start: 2020-06-25 | End: 2020-06-25 | Stop reason: HOSPADM

## 2020-06-25 RX ORDER — ALBUMIN (HUMAN) 12.5 G/50ML
12.5 SOLUTION INTRAVENOUS ONCE
Status: DISCONTINUED | OUTPATIENT
Start: 2020-06-25 | End: 2020-06-25 | Stop reason: HOSPADM

## 2020-06-25 RX ORDER — LIDOCAINE HYDROCHLORIDE 10 MG/ML
10 INJECTION, SOLUTION EPIDURAL; INFILTRATION; INTRACAUDAL; PERINEURAL ONCE
Status: COMPLETED | OUTPATIENT
Start: 2020-06-25 | End: 2020-06-25

## 2020-06-25 RX ORDER — NICOTINE POLACRILEX 4 MG
15-30 LOZENGE BUCCAL
Status: DISCONTINUED | OUTPATIENT
Start: 2020-06-25 | End: 2020-06-25 | Stop reason: HOSPADM

## 2020-06-25 RX ADMIN — LIDOCAINE HYDROCHLORIDE 10 ML: 10 INJECTION, SOLUTION EPIDURAL; INFILTRATION; INTRACAUDAL; PERINEURAL at 15:35

## 2020-06-25 ASSESSMENT — MIFFLIN-ST. JEOR: SCORE: 1470.78

## 2020-06-25 NOTE — PROGRESS NOTES
Care Suites Procedure Nursing Note    Patient Information  Name: Ten Johnson  Age: 60 year old    Procedure  Procedure:  paracentesis  Procedure start time:  1534 with Timeout  Procedure complete time: 1600.  4600cc aspirated.  Concerns/abnormal assessment: no/no  If abnormal assessment, provider notified: N/A  Plan/Other: 1 bag albumin.    Care Suites Discharge Nursing Note    Patient Information  Name: Ten Johnson  Age: 60 year old    Discharge Education:  Discharge instructions reviewed: Yes  Additional education/resources provided: no  Patient/patient representative verbalizes understanding: Yes  Patient discharging on new medications: No  Medication education completed: N/A    Discharge Plans:   Discharge location: home  Discharge ride contacted: N/A  Approximate discharge time: 1625    Discharge Criteria:  Discharge criteria met and vital signs stable: Yes.  Patient declined albumin infusion.    Patient Belongs:  Patient belongings returned to patient: Yes    Discharged, ambulatory and independently, at 1625    JAMES Aragon RN

## 2020-06-25 NOTE — PROGRESS NOTES
PATIENT/VISITOR WELLNESS SCREENING    Step 1 Patient Screening    1. In the last month, have you been in contact with someone who was confirmed or suspected to have Coronavirus/COVID-19? No    2. Do you have the following symptoms?  Fever/Chills? No   Cough? No   Shortness of breath? No   New loss of taste or smell? No  Sore throat? No  Muscle or body aches? No  Headaches? No  Fatigue? No  Vomiting or diarrhea? No    Step 2 Visitor Screening    Step 3 Refer to logic grid below for actions    NO SYMPTOM(S)    ACTIONS:  1. Standard rooming process  2. Provider to assess per normal protocol  3. Implement precautions as needed and per guidelines     POSITIVE SYMPTOM(S)  If positive for ANY of the following symptoms: fever, cough, shortness of breath, rash    ACTION:  1. Continue to have the patient wear a mask   2. Room patient as soon as possible  3. Don appropriate PPE when entering room  4. Provider evaluation  Care Suites Admission Nursing Note    Patient Information  Name: Ten Johnson  Age: 60 year old  Reason for admission: paracentesis  Care Suites arrival time: 1420    Patient Admission/Assessment   Pre-procedure assessment complete: Yes  If abnormal assessment/labs, provider notified: N/A  NPO: N/A  Medications held per instructions/orders: N/A  Consent: await radiologist  If applicable, pregnancy test status: NA  Patient oriented to room: Yes  Education/questions answered: Yes  Plan/other: await INR and then proceed     Discharge Planning  Accompanied by: self  Discharge name/phone number: [email or web address]   Overnight post sedation caregiver: NA  Discharge location: home    Prudence Crews RN

## 2020-06-25 NOTE — DISCHARGE INSTRUCTIONS

## 2020-07-16 ENCOUNTER — OFFICE VISIT (OUTPATIENT)
Dept: INFUSION THERAPY | Facility: CLINIC | Age: 60
End: 2020-07-16
Attending: INTERNAL MEDICINE
Payer: COMMERCIAL

## 2020-07-16 ENCOUNTER — ANCILLARY PROCEDURE (OUTPATIENT)
Dept: ULTRASOUND IMAGING | Facility: CLINIC | Age: 60
End: 2020-07-16
Attending: INTERNAL MEDICINE
Payer: COMMERCIAL

## 2020-07-16 VITALS
TEMPERATURE: 97.8 F | DIASTOLIC BLOOD PRESSURE: 86 MMHG | OXYGEN SATURATION: 97 % | HEART RATE: 77 BPM | WEIGHT: 152.1 LBS | BODY MASS INDEX: 22.46 KG/M2 | SYSTOLIC BLOOD PRESSURE: 146 MMHG

## 2020-07-16 DIAGNOSIS — K70.31 ASCITES DUE TO ALCOHOLIC CIRRHOSIS (H): Primary | ICD-10-CM

## 2020-07-16 LAB — PLATELET # BLD AUTO: 100 10E9/L (ref 150–450)

## 2020-07-16 PROCEDURE — 25000125 ZZHC RX 250: Mod: ZF | Performed by: INTERNAL MEDICINE

## 2020-07-16 PROCEDURE — 36415 COLL VENOUS BLD VENIPUNCTURE: CPT

## 2020-07-16 PROCEDURE — 27210190 US PARACENTESIS

## 2020-07-16 PROCEDURE — 85049 AUTOMATED PLATELET COUNT: CPT | Performed by: PHYSICIAN ASSISTANT

## 2020-07-16 PROCEDURE — P9047 ALBUMIN (HUMAN), 25%, 50ML: HCPCS | Mod: ZF | Performed by: INTERNAL MEDICINE

## 2020-07-16 PROCEDURE — 25000128 H RX IP 250 OP 636: Mod: ZF | Performed by: INTERNAL MEDICINE

## 2020-07-16 RX ORDER — ALBUMIN (HUMAN) 12.5 G/50ML
12.5 SOLUTION INTRAVENOUS
Status: DISCONTINUED | OUTPATIENT
Start: 2020-07-16 | End: 2020-07-16 | Stop reason: HOSPADM

## 2020-07-16 RX ORDER — HEPARIN SODIUM,PORCINE 10 UNIT/ML
5 VIAL (ML) INTRAVENOUS
Status: CANCELLED | OUTPATIENT
Start: 2020-07-23

## 2020-07-16 RX ORDER — ALBUMIN (HUMAN) 12.5 G/50ML
12.5 SOLUTION INTRAVENOUS
Status: CANCELLED | OUTPATIENT
Start: 2020-07-23

## 2020-07-16 RX ORDER — LIDOCAINE HYDROCHLORIDE 10 MG/ML
20 INJECTION, SOLUTION EPIDURAL; INFILTRATION; INTRACAUDAL; PERINEURAL ONCE
Status: COMPLETED | OUTPATIENT
Start: 2020-07-16 | End: 2020-07-16

## 2020-07-16 RX ORDER — HEPARIN SODIUM (PORCINE) LOCK FLUSH IV SOLN 100 UNIT/ML 100 UNIT/ML
5 SOLUTION INTRAVENOUS
Status: CANCELLED | OUTPATIENT
Start: 2020-07-23

## 2020-07-16 RX ORDER — LIDOCAINE HYDROCHLORIDE 10 MG/ML
20 INJECTION, SOLUTION EPIDURAL; INFILTRATION; INTRACAUDAL; PERINEURAL ONCE
Status: CANCELLED | OUTPATIENT
Start: 2020-07-23

## 2020-07-16 RX ADMIN — LIDOCAINE HYDROCHLORIDE 20 ML: 10 INJECTION, SOLUTION EPIDURAL; INFILTRATION; INTRACAUDAL; PERINEURAL at 14:27

## 2020-07-16 RX ADMIN — ALBUMIN HUMAN 12.5 G: 0.25 SOLUTION INTRAVENOUS at 14:37

## 2020-07-16 RX ADMIN — ALBUMIN HUMAN 12.5 G: 0.25 SOLUTION INTRAVENOUS at 14:44

## 2020-07-16 NOTE — PROGRESS NOTES
Paracentesis Nursing Note  Ten Johnson presents today to [facility].    During today's appointment orders from Dr. Leventhal were completed.    Progress Note:  Patient identification verified by name and date of birth.  Assessment completed.  Vitals monitored throughout appointment and recorded in Doc Flowsheets.  See proceduralist note in ultrasound.    Vascular Access: peripheral IV placed today.  Labs: were drawn per orders.     Date of consent or authorization: 07/16/20.  Invasive Procedure Safety Checklist was completed and sent for scanning.     Paracentesis performed by Dr. Arvizu.    The following labs were communicated to provider performing paracentesis:  Platelet Count   Date Value Ref Range Status   07/16/2020 100 (L) 150 - 450 10e9/L Final         Total amount of ascites fluid drained: 5.3 liters.  Color of ascites fluid: yellow and clear.  Total amount of albumin given:  25  grams.    Per orders- Give albumin (25%) only when 4 liters or more of ascites is removed, up to 50 grams.   For 4-5 liters give 12.5 grams  For 5.1-6 liters give 25 grams,   For 6.1-7 liters give 37.5 grams  For 7.1 and above give 50 grams.    Patient tolerated procedure well.    Post procedure,pt reports pain is back to baseline. .      Discharge Plan:  Discharge instructions were reviewed with patient.  Patient/Representative verbalized understanding and all questions were answered.   Discharged from [facility] in stable condition.    Yamila Armstrong RN       Administrations This Visit     albumin human 25 % injection 12.5 g     Admin Date  07/16/2020 Action  New Bag Dose  12.5 g Route  Intravenous Administered By  Yamila Armstrong RN           Admin Date  07/16/2020 Action  New Bag Dose  12.5 g Route  Intravenous Administered By  Yamila Armstrong, JAMES          lidocaine (PF) (XYLOCAINE) 1 % injection 20 mL     Admin Date  07/16/2020 Action  Given by Other  Clinician Dose  20 mL Route  Subcutaneous Administered By  Yamila Armstrong, RN                  BP (!) 149/71   Pulse 75   Temp 97.8  F (36.6  C) (Oral)   SpO2 97%

## 2020-07-16 NOTE — LETTER
7/16/2020         RE: Ten Johnson  [address] [facility] [address]        Dear Colleague,    Thank you for referring your patient, Ten Johnson, to the [facility] TREATMENT [place] SPECIALTY AND PROCEDURE. Please see a copy of my visit note below.    Paracentesis Nursing Note  Ten Johnson presents today to [facility] for [facility].    During today's appointment orders from Dr. Leventhal were completed.    Progress Note:  Patient identification verified by name and date of birth.  Assessment completed.  Vitals monitored throughout appointment and recorded in Doc Flowsheets.  See proceduralist note in ultrasound.    Vascular Access: peripheral IV placed today.  Labs: were drawn per orders.     Date of consent or authorization: 07/16/20.  Invasive Procedure Safety Checklist was completed and sent for scanning.     Paracentesis performed by Dr. Arvizu.    The following labs were communicated to provider performing paracentesis:  Platelet Count   Date Value Ref Range Status   07/16/2020 100 (L) 150 - 450 10e9/L Final         Total amount of ascites fluid drained: 5.3 liters.  Color of ascites fluid: yellow and clear.  Total amount of albumin given:  25  grams.    Per orders- Give albumin (25%) only when 4 liters or more of ascites is removed, up to 50 grams.   For 4-5 liters give 12.5 grams  For 5.1-6 liters give 25 grams,   For 6.1-7 liters give 37.5 grams  For 7.1 and above give 50 grams.    Patient tolerated procedure well.    Post procedure,pt reports pain is back to baseline. .      Discharge Plan:  Discharge instructions were reviewed with patient.  Patient/Representative verbalized understanding and all questions were answered.   Discharged from [facility] in stable condition.    Yamila Armstrong RN       Administrations This Visit     albumin human 25 % injection 12.5 g     Admin Date  07/16/2020 Action  New Bag Dose  12.5 g  Route  Intravenous Administered By  Yamila Armstrong, JAMES           Admin Date  07/16/2020 Action  New Bag Dose  12.5 g Route  Intravenous Administered By  Yamila Armstrong, JAMES          lidocaine (PF) (XYLOCAINE) 1 % injection 20 mL     Admin Date  07/16/2020 Action  Given by Other Clinician Dose  20 mL Route  Subcutaneous Administered By  Yamila Armstrong, RN                  BP (!) 149/71   Pulse 75   Temp 97.8  F (36.6  C) (Oral)   SpO2 97%         Again, thank you for allowing me to participate in the care of your patient.        Sincerely,        [facility] Provider

## 2020-07-23 ENCOUNTER — TELEPHONE (OUTPATIENT)
Dept: ONCOLOGY | Facility: CLINIC | Age: 60
End: 2020-07-23

## 2020-07-23 NOTE — TELEPHONE ENCOUNTER
"Sent [employer] message with recommendations from Dr. Leventhal as stated below.    Vidya Fulton LPN  Hepatology Clinic  ------  Leventhal, Thomas Michael, MD [place]enbach, SHANELL Dasilva    The bulges are related to increasing hernias, which can happen with ascites.   - We often recommend an abdominal wrap with velcro that the patient should wear throughout the day to provide support for these hernias.     - Would recommend that they use other sites on his abdomen for paracentesis over the next month to allow that irritated area a chance to heal     TL       ---------  Pt CO 3 area where he has \"bulges\" on his abdominal midline he attributes to pre-existing umbilical hernia and pressure from fluid build up (for which he receives paracentesis). Wonders who we would speak with to address this. Each bulge is 1-2\", 2 are new in the past month, are tender to touch and not discolored. Will \"go in and out\" with pressure.  Also CO pain at his last paracentesis site. States site is painful but is not swollen, red or draining, just \"feels like someone is putting a cigarette out on me.   Pt is afebrile and no other concerning Sx.  Per Monalisa HOWARD Pt is being managed by Gastro/Hepetology.  "

## 2020-07-30 ENCOUNTER — ANCILLARY PROCEDURE (OUTPATIENT)
Dept: ULTRASOUND IMAGING | Facility: CLINIC | Age: 60
End: 2020-07-30
Attending: INTERNAL MEDICINE
Payer: COMMERCIAL

## 2020-07-30 ENCOUNTER — OFFICE VISIT (OUTPATIENT)
Dept: INFUSION THERAPY | Facility: CLINIC | Age: 60
End: 2020-07-30
Attending: INTERNAL MEDICINE
Payer: COMMERCIAL

## 2020-07-30 VITALS
DIASTOLIC BLOOD PRESSURE: 82 MMHG | HEART RATE: 61 BPM | BODY MASS INDEX: 22.82 KG/M2 | WEIGHT: 154.5 LBS | OXYGEN SATURATION: 98 % | SYSTOLIC BLOOD PRESSURE: 127 MMHG | TEMPERATURE: 98.7 F

## 2020-07-30 DIAGNOSIS — K70.31 ASCITES DUE TO ALCOHOLIC CIRRHOSIS (H): Primary | ICD-10-CM

## 2020-07-30 LAB — INR PPP: 1.14 (ref 0.86–1.14)

## 2020-07-30 PROCEDURE — 85610 PROTHROMBIN TIME: CPT | Performed by: INTERNAL MEDICINE

## 2020-07-30 PROCEDURE — 27210190 US PARACENTESIS

## 2020-07-30 PROCEDURE — 36415 COLL VENOUS BLD VENIPUNCTURE: CPT

## 2020-07-30 PROCEDURE — 25000125 ZZHC RX 250: Mod: ZF | Performed by: INTERNAL MEDICINE

## 2020-07-30 RX ORDER — ALBUMIN (HUMAN) 12.5 G/50ML
12.5 SOLUTION INTRAVENOUS
Status: DISCONTINUED | OUTPATIENT
Start: 2020-07-30 | End: 2020-07-30 | Stop reason: HOSPADM

## 2020-07-30 RX ORDER — HEPARIN SODIUM (PORCINE) LOCK FLUSH IV SOLN 100 UNIT/ML 100 UNIT/ML
5 SOLUTION INTRAVENOUS
Status: CANCELLED | OUTPATIENT
Start: 2020-08-06

## 2020-07-30 RX ORDER — LIDOCAINE HYDROCHLORIDE 10 MG/ML
20 INJECTION, SOLUTION EPIDURAL; INFILTRATION; INTRACAUDAL; PERINEURAL ONCE
Status: COMPLETED | OUTPATIENT
Start: 2020-07-30 | End: 2020-07-30

## 2020-07-30 RX ORDER — HEPARIN SODIUM,PORCINE 10 UNIT/ML
5 VIAL (ML) INTRAVENOUS
Status: CANCELLED | OUTPATIENT
Start: 2020-08-06

## 2020-07-30 RX ORDER — ALBUMIN (HUMAN) 12.5 G/50ML
12.5 SOLUTION INTRAVENOUS
Status: CANCELLED | OUTPATIENT
Start: 2020-08-06

## 2020-07-30 RX ORDER — LIDOCAINE HYDROCHLORIDE 10 MG/ML
20 INJECTION, SOLUTION EPIDURAL; INFILTRATION; INTRACAUDAL; PERINEURAL ONCE
Status: CANCELLED | OUTPATIENT
Start: 2020-08-06

## 2020-07-30 RX ADMIN — LIDOCAINE HYDROCHLORIDE 10 ML: 10 INJECTION, SOLUTION EPIDURAL; INFILTRATION; INTRACAUDAL; PERINEURAL at 14:14

## 2020-07-30 NOTE — PROGRESS NOTES
Paracentesis Nursing Note  Ten Johnson presents today to [facility] for [facility].    During today's appointment orders from Dr. Leventhal were completed.    Progress Note:  Patient identification verified by name and date of birth.  Assessment completed.  Vitals monitored throughout appointment and recorded in Doc Flowsheets.  See proceduralist note in ultrasound.    Vascular Access: peripheral IV placed today.  Labs: were drawn per orders.     Date of consent or authorization: 07/16/20.  Invasive Procedure Safety Checklist was completed and sent for scanning.   Hgb 100  INR 1.1   Paracentesis performed by Dr. Arvizu.    The following labs were communicated to provider performing paracentesis:    Total amount of ascites fluid drained: 2.9 liters.  Color of ascites fluid: yellow and clear.  Total amount of albumin given: 0 grams.    Per orders- Give albumin (25%) only when 4 liters or more of ascites is removed, up to 50 grams.   For 4-5 liters give 12.5 grams  For 5.1-6 liters give 25 grams,   For 6.1-7 liters give 37.5 grams  For 7.1 and above give 50 grams.    Patient tolerated procedure well.    Post procedure hunger pain      Discharge Plan:  Discharge instructions were reviewed with patient.  Patient/Representative verbalized understanding and all questions were answered.   Discharged from [facility] in stable condition.    Bridgette Katz RN    Administrations This Visit     lidocaine (PF) (XYLOCAINE) 1 % injection 20 mL     Admin Date  07/30/2020 Action  Given by Other Clinician Dose  10 mL Route  Subcutaneous Administered By  Bridgette Katz RN                     /82   Pulse 61   Temp 98.7  F (37.1  C)   Wt 73.2 kg (161 lb 6.4 oz)   SpO2 98%   BMI 23.83 kg/m

## 2020-07-30 NOTE — LETTER
7/30/2020         RE: Ten Johnson  [address] [facility] [address]        Dear Colleague,    Thank you for referring your patient, Ten Johnson, to the [facility] TREATMENT [place] SPECIALTY AND PROCEDURE. Please see a copy of my visit note below.    Paracentesis Nursing Note  Ten Johnson presents today to [facility].    During today's appointment orders from Dr. Leventhal were completed.    Progress Note:  Patient identification verified by name and date of birth.  Assessment completed.  Vitals monitored throughout appointment and recorded in Doc Flowsheets.  See proceduralist note in ultrasound.    Vascular Access: peripheral IV placed today.  Labs: were drawn per orders.     Date of consent or authorization: 07/16/20.  Invasive Procedure Safety Checklist was completed and sent for scanning.   Hgb 100  INR 1.1   Paracentesis performed by Dr. Arvizu.    The following labs were communicated to provider performing paracentesis:    Total amount of ascites fluid drained: 2.9 liters.  Color of ascites fluid: yellow and clear.  Total amount of albumin given: 0 grams.    Per orders- Give albumin (25%) only when 4 liters or more of ascites is removed, up to 50 grams.   For 4-5 liters give 12.5 grams  For 5.1-6 liters give 25 grams,   For 6.1-7 liters give 37.5 grams  For 7.1 and above give 50 grams.    Patient tolerated procedure well.    Post procedure hunger pain      Discharge Plan:  Discharge instructions were reviewed with patient.  Patient/Representative verbalized understanding and all questions were answered.   Discharged from [facility] in stable condition.    Bridgette Katz RN    Administrations This Visit     lidocaine (PF) (XYLOCAINE) 1 % injection 20 mL     Admin Date  07/30/2020 Action  Given by Other Clinician Dose  10 mL Route  Subcutaneous Administered By  Bridgette Katz RN                      /82   Pulse 61   Temp 98.7  F (37.1  C)   Wt 73.2 kg (161 lb 6.4 oz)   SpO2 98%   BMI 23.83 kg/m        Again, thank you for allowing me to participate in the care of your patient.        Sincerely,        [facility] Par[facility] Provider

## 2020-07-30 NOTE — PATIENT INSTRUCTIONS
DISCHARGE INSTRUCTIONS FOLLOWING ABDOMINAL PARACENTESIS    After you go home:    No strenuous activity for 24 hours    Resume your regular diet    Limit fluid intake for the first 48 hours to no more than 2 quarts per day.  There should be minimal drainage from the needle site.  If drainage does occur and soaks through the bandage, apply gentle pressure with your hand for 5 minutes.    Notify MD for the following:    Excessive drainage    Excessive swelling, redness or tenderness at the needle site    Fever greater than 101 degrees F    Dizziness or light-headedness when getting up or walking      IF THIS IS A MEDICAL EMERGENCY, CALL [phone]    If you have any questions or concerns    Contact the Hepatology Clinic:   [phone]    If you are a post-transplant patient, contact the Transplant Office:  [phone]    If this is after hours, contact the hospital :  [phone] and as to have the GI resident on call paged                   I have received and understand my discharge instructions and I have all of my personal belongings.          ------------------------------------------------------        ---------------------------------------------------    Patient / Significant Other's Signature     Relationship

## 2020-08-11 ENCOUNTER — OFFICE VISIT (OUTPATIENT)
Dept: INFUSION THERAPY | Facility: CLINIC | Age: 60
End: 2020-08-11
Attending: INTERNAL MEDICINE
Payer: COMMERCIAL

## 2020-08-11 ENCOUNTER — ANCILLARY PROCEDURE (OUTPATIENT)
Dept: ULTRASOUND IMAGING | Facility: CLINIC | Age: 60
End: 2020-08-11
Attending: INTERNAL MEDICINE
Payer: COMMERCIAL

## 2020-08-11 VITALS
WEIGHT: 154.4 LBS | SYSTOLIC BLOOD PRESSURE: 161 MMHG | OXYGEN SATURATION: 96 % | TEMPERATURE: 98.8 F | BODY MASS INDEX: 22.8 KG/M2 | DIASTOLIC BLOOD PRESSURE: 87 MMHG | HEART RATE: 71 BPM

## 2020-08-11 DIAGNOSIS — K70.31 ASCITES DUE TO ALCOHOLIC CIRRHOSIS (H): Primary | ICD-10-CM

## 2020-08-11 PROCEDURE — 25000125 ZZHC RX 250: Mod: ZF | Performed by: INTERNAL MEDICINE

## 2020-08-11 PROCEDURE — 40000556 ZZH STATISTIC PERIPHERAL IV START W US GUIDANCE: Mod: ZF

## 2020-08-11 PROCEDURE — 25000128 H RX IP 250 OP 636: Mod: ZF | Performed by: INTERNAL MEDICINE

## 2020-08-11 PROCEDURE — P9047 ALBUMIN (HUMAN), 25%, 50ML: HCPCS | Mod: ZF | Performed by: INTERNAL MEDICINE

## 2020-08-11 PROCEDURE — 27210190 US PARACENTESIS

## 2020-08-11 RX ORDER — ALBUMIN (HUMAN) 12.5 G/50ML
12.5 SOLUTION INTRAVENOUS
Status: DISCONTINUED | OUTPATIENT
Start: 2020-08-11 | End: 2020-08-11 | Stop reason: HOSPADM

## 2020-08-11 RX ORDER — HEPARIN SODIUM,PORCINE 10 UNIT/ML
5 VIAL (ML) INTRAVENOUS
Status: CANCELLED | OUTPATIENT
Start: 2020-08-13

## 2020-08-11 RX ORDER — LIDOCAINE HYDROCHLORIDE 10 MG/ML
20 INJECTION, SOLUTION EPIDURAL; INFILTRATION; INTRACAUDAL; PERINEURAL ONCE
Status: CANCELLED | OUTPATIENT
Start: 2020-08-13

## 2020-08-11 RX ORDER — LIDOCAINE HYDROCHLORIDE 10 MG/ML
20 INJECTION, SOLUTION EPIDURAL; INFILTRATION; INTRACAUDAL; PERINEURAL ONCE
Status: COMPLETED | OUTPATIENT
Start: 2020-08-11 | End: 2020-08-11

## 2020-08-11 RX ORDER — HEPARIN SODIUM (PORCINE) LOCK FLUSH IV SOLN 100 UNIT/ML 100 UNIT/ML
5 SOLUTION INTRAVENOUS
Status: CANCELLED | OUTPATIENT
Start: 2020-08-13

## 2020-08-11 RX ORDER — ALBUMIN (HUMAN) 12.5 G/50ML
12.5 SOLUTION INTRAVENOUS
Status: CANCELLED | OUTPATIENT
Start: 2020-08-13

## 2020-08-11 RX ADMIN — LIDOCAINE HYDROCHLORIDE 15 ML: 10 INJECTION, SOLUTION EPIDURAL; INFILTRATION; INTRACAUDAL; PERINEURAL at 13:55

## 2020-08-11 RX ADMIN — ALBUMIN HUMAN 12.5 G: 0.25 SOLUTION INTRAVENOUS at 15:05

## 2020-08-11 RX ADMIN — ALBUMIN HUMAN 12.5 G: 0.25 SOLUTION INTRAVENOUS at 14:53

## 2020-08-11 ASSESSMENT — PAIN SCALES - GENERAL: PAINLEVEL: SEVERE PAIN (7)

## 2020-08-11 NOTE — LETTER
8/11/2020         RE: Ten Johnson  [address] [facility] [address]        Dear Colleague,    Thank you for referring your patient, Ten Johnson, to the [facility]REAT[facility] [place] SPECIALTY AND PROCEDURE. Please see a copy of my visit note below.    Paracentesis Nursing Note  Ten Johnson [facility].    During today's appointment orders from Dr. Leventhal were completed.    Progress Note:  Patient identification verified by name and date of birth.  Assessment completed.  Vitals monitored throughout appointment and recorded in Doc Flowsheets.  See proceduralist note in ultrasound.    Vascular Access: peripheral IV was placed by vascular access nurse.  Labs: were not ordered for this appointment.    Date of consent or authorization: 7/16/20   Invasive Procedure Safety Checklist was completed and sent for scanning.     Paracentesis performed by Dr. Orozco and fellow.    The following labs were communicated to provider performing paracentesis:  Lab Results   Component Value Date     07/16/2020     Total amount of ascites fluid drained: 5.1 liters.  Color of ascites fluid: yellow.  Total amount of albumin given: 25  grams.  Patient tolerated procedure well.  Post procedure,denies pain or discomfort post paracentesis.    Discharge Plan:  Discharge instructions were reviewed with patient.  Patient/Representative verbalized understanding and all questions were answered.   [facility] in stable condition.    Karina Eubanks RN    Administrations This Visit     albumin human 25 % injection 12.5 g     Admin Date  08/11/2020 Action  New Bag Dose  12.5 g Route  Intravenous Administered By  Karina Eubanks RN           Admin Date  08/11/2020 Action  New Bag Dose  12.5 g Route  Intravenous Administered By  Karina Eubanks RN          lidocaine (PF) (XYLOCAINE) 1 % injection 20 mL      Admin Date  08/11/2020 Action  Given by Other Dose  15 mL Route  Subcutaneous Administered By  Karina Eubanks RN                /83   Pulse 62   Temp 98.8  F (37.1  C) (Oral)   Wt 75.9 kg (167 lb 6.4 oz)   SpO2 96%   BMI 24.72 kg/m          Again, thank you for allowing me to participate in the care of your patient.        Sincerely,        [facility] Paracentesis Provider

## 2020-08-11 NOTE — PROGRESS NOTES
Paracentesis Nursing Note  Ten Johnson presents [facility].    During today's appointment orders from Dr. Leventhal were completed.    Progress Note:  Patient identification verified by name and date of birth.  Assessment completed.  Vitals monitored throughout appointment and recorded in Doc Flowsheets.  See proceduralist note in ultrasound.    Vascular Access: peripheral IV was placed by vascular access nurse.  Labs: were not ordered for this appointment.    Date of consent or authorization: 7/16/20   Invasive Procedure Safety Checklist was completed and sent for scanning.     Paracentesis performed by Dr. Orozco and fellow.    The following labs were communicated to provider performing paracentesis:  Lab Results   Component Value Date     07/16/2020     Total amount of ascites fluid drained: 5.1 liters.  Color of ascites fluid: yellow.  Total amount of albumin given: 25  grams.  Patient tolerated procedure well.  Post procedure,denies pain or discomfort post paracentesis.    Discharge Plan:  Discharge instructions were reviewed with patient.  Patient/Representative verbalized understanding and all questions were answered.   [facility] in stable condition.    Karina Eubanks RN    Administrations This Visit     albumin human 25 % injection 12.5 g     Admin Date  08/11/2020 Action  New Bag Dose  12.5 g Route  Intravenous Administered By  Karina Eubanks RN           Admin Date  08/11/2020 Action  New Bag Dose  12.5 g Route  Intravenous Administered By  Karina Eubanks RN          lidocaine (PF) (XYLOCAINE) 1 % injection 20 mL     Admin Date  08/11/2020 Action  Given by Other Dose  15 mL Route  Subcutaneous Administered By  Karina Eubanks RN                /83   Pulse 62   Temp 98.8  F (37.1  C) (Oral)   Wt 75.9 kg (167 lb 6.4 oz)   SpO2 96%   BMI 24.72 kg/m

## 2020-08-13 ENCOUNTER — MYC REFILL (OUTPATIENT)
Dept: CARDIOLOGY | Facility: CLINIC | Age: 60
End: 2020-08-13

## 2020-08-13 ENCOUNTER — MYC REFILL (OUTPATIENT)
Dept: NEPHROLOGY | Facility: CLINIC | Age: 60
End: 2020-08-13

## 2020-08-13 ENCOUNTER — MYC REFILL (OUTPATIENT)
Dept: ONCOLOGY | Facility: CLINIC | Age: 60
End: 2020-08-13

## 2020-08-13 DIAGNOSIS — F41.9 ANXIETY: ICD-10-CM

## 2020-08-13 DIAGNOSIS — I10 HYPERTENSION, UNSPECIFIED TYPE: ICD-10-CM

## 2020-08-13 DIAGNOSIS — Z95.3 S/P TAVR (TRANSCATHETER AORTIC VALVE REPLACEMENT), BIOPROSTHETIC: ICD-10-CM

## 2020-08-13 DIAGNOSIS — J30.9 CHRONIC ALLERGIC RHINITIS: ICD-10-CM

## 2020-08-13 DIAGNOSIS — C22.0 HCC (HEPATOCELLULAR CARCINOMA) (H): ICD-10-CM

## 2020-08-14 RX ORDER — LISINOPRIL 20 MG/1
20 TABLET ORAL DAILY
Qty: 90 TABLET | Refills: 3 | Status: SHIPPED | OUTPATIENT
Start: 2020-08-14 | End: 2022-01-01

## 2020-08-14 RX ORDER — DIAZEPAM 5 MG
TABLET ORAL
Qty: 2 TABLET | Refills: 0 | Status: SHIPPED | OUTPATIENT
Start: 2020-08-14 | End: 2020-11-06

## 2020-08-17 DIAGNOSIS — K43.2 INCISIONAL HERNIA, WITHOUT OBSTRUCTION OR GANGRENE: Primary | ICD-10-CM

## 2020-08-21 NOTE — PATIENT INSTRUCTIONS
Dear Ten Johnson    Thank you for choosing [facility] ([facility]) for your procedure.  The following information is a summary of our appointment as well as important reminders.      Please refer to your hospital discharge instructions for details on home care services, future appointments, phone numbers, and diet/activity levels.    We look forward in seeing you on your next appointment here at [facility] ([facility]).  Please don t hesitate to call us at [phone] to reschedule any of your appointments or to speak with one of the [facility] registered nurses.  It was a pleasure taking care of you today.    Sincerely,    [facility]  [facility]  [address] [place]  [address]  Phone:  [phone]   Home Suture Removal Text: Patient was provided instructions on removing sutures and will remove their sutures at home.  If they have any questions or difficulties they will call the office.

## 2020-08-25 ENCOUNTER — ANCILLARY PROCEDURE (OUTPATIENT)
Dept: ULTRASOUND IMAGING | Facility: CLINIC | Age: 60
End: 2020-08-25
Attending: INTERNAL MEDICINE
Payer: COMMERCIAL

## 2020-08-25 ENCOUNTER — OFFICE VISIT (OUTPATIENT)
Dept: INFUSION THERAPY | Facility: CLINIC | Age: 60
End: 2020-08-25
Attending: INTERNAL MEDICINE
Payer: COMMERCIAL

## 2020-08-25 VITALS
SYSTOLIC BLOOD PRESSURE: 120 MMHG | DIASTOLIC BLOOD PRESSURE: 76 MMHG | OXYGEN SATURATION: 97 % | WEIGHT: 152 LBS | HEART RATE: 66 BPM | TEMPERATURE: 99 F | BODY MASS INDEX: 22.45 KG/M2

## 2020-08-25 DIAGNOSIS — K70.31 ASCITES DUE TO ALCOHOLIC CIRRHOSIS (H): Primary | ICD-10-CM

## 2020-08-25 LAB — PLATELET # BLD AUTO: 112 10E9/L (ref 150–450)

## 2020-08-25 PROCEDURE — 25000128 H RX IP 250 OP 636: Mod: ZF | Performed by: INTERNAL MEDICINE

## 2020-08-25 PROCEDURE — P9047 ALBUMIN (HUMAN), 25%, 50ML: HCPCS | Mod: ZF | Performed by: INTERNAL MEDICINE

## 2020-08-25 PROCEDURE — 40000141 ZZH STATISTIC PERIPHERAL IV START W/O US GUIDANCE: Mod: ZF

## 2020-08-25 PROCEDURE — 85049 AUTOMATED PLATELET COUNT: CPT | Performed by: PHYSICIAN ASSISTANT

## 2020-08-25 PROCEDURE — 27210190 US PARACENTESIS

## 2020-08-25 PROCEDURE — 25000125 ZZHC RX 250: Mod: ZF | Performed by: INTERNAL MEDICINE

## 2020-08-25 PROCEDURE — 36415 COLL VENOUS BLD VENIPUNCTURE: CPT

## 2020-08-25 RX ORDER — LIDOCAINE HYDROCHLORIDE 10 MG/ML
20 INJECTION, SOLUTION EPIDURAL; INFILTRATION; INTRACAUDAL; PERINEURAL ONCE
Status: COMPLETED | OUTPATIENT
Start: 2020-08-25 | End: 2020-08-25

## 2020-08-25 RX ORDER — HEPARIN SODIUM,PORCINE 10 UNIT/ML
5 VIAL (ML) INTRAVENOUS
Status: CANCELLED | OUTPATIENT
Start: 2020-09-01

## 2020-08-25 RX ORDER — HEPARIN SODIUM (PORCINE) LOCK FLUSH IV SOLN 100 UNIT/ML 100 UNIT/ML
5 SOLUTION INTRAVENOUS
Status: CANCELLED | OUTPATIENT
Start: 2020-09-01

## 2020-08-25 RX ORDER — ALBUMIN (HUMAN) 12.5 G/50ML
12.5 SOLUTION INTRAVENOUS
Status: CANCELLED | OUTPATIENT
Start: 2020-09-01

## 2020-08-25 RX ORDER — LIDOCAINE HYDROCHLORIDE 10 MG/ML
20 INJECTION, SOLUTION EPIDURAL; INFILTRATION; INTRACAUDAL; PERINEURAL ONCE
Status: CANCELLED | OUTPATIENT
Start: 2020-09-01

## 2020-08-25 RX ORDER — ALBUMIN (HUMAN) 12.5 G/50ML
12.5 SOLUTION INTRAVENOUS
Status: DISCONTINUED | OUTPATIENT
Start: 2020-08-25 | End: 2020-08-25 | Stop reason: HOSPADM

## 2020-08-25 RX ADMIN — ALBUMIN HUMAN 12.5 G: 0.25 SOLUTION INTRAVENOUS at 14:55

## 2020-08-25 RX ADMIN — ALBUMIN HUMAN 12.5 G: 0.25 SOLUTION INTRAVENOUS at 14:39

## 2020-08-25 RX ADMIN — LIDOCAINE HYDROCHLORIDE 10 ML: 10 INJECTION, SOLUTION EPIDURAL; INFILTRATION; INTRACAUDAL; PERINEURAL at 14:17

## 2020-08-25 ASSESSMENT — PAIN SCALES - GENERAL: PAINLEVEL: WORST PAIN (10)

## 2020-08-25 NOTE — PROGRESS NOTES
Paracentesis Nursing Note  Ten Johnson presents today [facility] for [facility]esis.    During today's appointment orders from Dr. Tom Leventhal were completed.    Progress Note:  Patient identification verified by name and date of birth.  Assessment completed.  Vitals monitored throughout appointment and recorded in Doc Flowsheets.  See proceduralist note in ultrasound.    Vascular Access: peripheral IV was placed by vascular access nurse.  Labs: were drawn per orders.     Date of consent or authorization: 7/16/2020.  Invasive Procedure Safety Checklist was completed and sent for scanning.     Paracentesis performed by Dr. Manny Orozco.    The following labs were communicated to provider performing paracentesis:  Lab Results   Component Value Date     07/16/2020       Total amount of ascites fluid drained: 5.5 liters.  Color of ascites fluid: clear, yellow.  Total amount of albumin given: 25  grams.    For 4-5 liters give 12.5 grams  For 5.1-6 liters give 25 grams,   For 6.1-7 liters give 37.5 grams  For 7.1 and above give 50 grams.    Patient tolerated procedure well.    Post procedure,denies pain or discomfort post paracentesis.other than what he came in with.       Discharge Plan:  Discharge instructions were reviewed with patient.  Patient/Representative verbalized understanding and all questions were answered.   Dis[facility] in stable condition.    Stefanie De La Torre RN       Administrations This Visit     albumin human 25 % injection 12.5 g     Admin Date  08/25/2020 Action  New Bag Dose  12.5 g Route  Intravenous Administered By  Stefanie De La Torre RN           Admin Date  08/25/2020 Action  New Bag Dose  12.5 g Route  Intravenous Administered By  Stefanie De La Torre RN          lidocaine (PF) (XYLOCAINE) 1 % injection 20 mL     Admin Date  08/25/2020 Action  Given Dose  10 mL Route  Subcutaneous Administered By  Stefanie De La Torre RN                 /77   Pulse 67   Temp 99  F (37.2  C) (Oral)   Wt 74.4 kg (164 lb 1.6 oz)   SpO2 97%   BMI 24.23 kg/m

## 2020-08-25 NOTE — PATIENT INSTRUCTIONS
Dear Ten Johnson    Thank you for choosing [facility] ([facility]) for your paracentesis.  The following information is a summary of our appointment as well as important reminders.      Patient Education     Discharge Instructions for Paracentesis  Paracentesis is a procedure to remove extra fluid from your belly (abdomen). This fluid buildup in the abdomen is called ascites. The procedure may have been done to take a sample of the fluid. Or, it may have been done to drain the extra fluid from your abdomen and help make you more comfortable.     Ascites is buildup of excess fluid in the abdomen.   Home care    If you have pain after the procedure, your healthcare provider can prescribe or recommend pain medicines. Take these exactly as directed. If you stopped taking other medicines before the procedure, ask your provider when you can start them again.    Take it easy for 24 hours after the procedure. Avoid physical activity until your provider says it s OK.    You will have a small bandage over the puncture site. Stitches (sutures), surgical staples, adhesive tapes, adhesive strips, or surgical glue may be used to close the incision. They also help stop bleeding and speed healing. You may take the bandage off in 24 hours.    Check the puncture site for the signs of infection listed below.  Follow-up care  Make a follow-up appointment with your healthcare provider as directed. During your follow-up visit, your provider will check your healing. Let your provider know how you are feeling. You can also discuss the cause of your ascites and if you need any further treatment.  When to seek medical advice  Call your healthcare provider if you have any of the following after the procedure:    A fever of 100.4 F (38 C) or higher    Trouble breathing    Pain that doesn't go away even after taking pain medicine    Belly pain not caused by having the skin  punctured    Bleeding from the puncture site    More than a small amount of fluid leaking from the puncture site    Swollen belly    Signs of infection at the puncture site. These include increased pain, redness, or swelling, warmth, or bad-smelling drainage.    Blood in your urine    Feeling dizzy or lightheaded, or fainting   Date Last Reviewed: 7/1/2016 2000-2019 [employer]. [address] [place] [address]. All rights reserved. This information is not intended as a substitute for professional medical care. Always follow your healthcare professional's instructions.       We look forward in seeing you on your next appointment here at [facility] ([facility]).  Please don t hesitate to call us at [phone] to reschedule any of your appointments or to speak with one of the [facility] registered nurses.  It was a pleasure taking care of you today.    Sincerely,    [facility]  [facility]  [address] [place] [address]  [address]  Phone:  [phone]

## 2020-08-25 NOTE — LETTER
8/25/2020         RE: Ten Johnson  [address]  [facility] [address]        Dear Colleague,    Thank you for referring your patient, Ten Johnson, to the [facility] TREATMENT [place] SPECIALTY AND PROCEDURE. Please see a copy of my visit note below.    Paracentesis Nursing Note  Ten Johnson presents today [facility] for a [facility]acentesis.    During today's appointment orders from Dr. Tom Leventhal were completed.    Progress Note:  Patient identification verified by name and date of birth.  Assessment completed.  Vitals monitored throughout appointment and recorded in Doc Flowsheets.  See proceduralist note in ultrasound.    Vascular Access: peripheral IV was placed by vascular access nurse.  Labs: were drawn per orders.     Date of consent or authorization: 7/16/2020.  Invasive Procedure Safety Checklist was completed and sent for scanning.     Paracentesis performed by Dr. Manny Orozco.    The following labs were communicated to provider performing paracentesis:  Lab Results   Component Value Date     07/16/2020       Total amount of ascites fluid drained: 5.5 liters.  Color of ascites fluid: clear, yellow.  Total amount of albumin given: 25  grams.    For 4-5 liters give 12.5 grams  For 5.1-6 liters give 25 grams,   For 6.1-7 liters give 37.5 grams  For 7.1 and above give 50 grams.    Patient tolerated procedure well.    Post procedure,denies pain or discomfort post paracentesis.other than what he came in with.       Discharge Plan:  Discharge instructions were reviewed with patient.  Patient/Representative verbalized understanding and all questions were answered.   Discharged [facility] in stable condition.    Stefanie De La Torre RN       Administrations This Visit     albumin human 25 % injection 12.5 g     Admin Date  08/25/2020 Action  New Bag Dose  12.5 g Route  Intravenous Administered By  Stefanie De La Torre RN            Admin Date  08/25/2020 Action  New Bag Dose  12.5 g Route  Intravenous Administered By  Stefanie De La Torre, JAMES          lidocaine (PF) (XYLOCAINE) 1 % injection 20 mL     Admin Date  08/25/2020 Action  Given Dose  10 mL Route  Subcutaneous Administered By  Stefanie De La Torre, JAMES                /77   Pulse 67   Temp 99  F (37.2  C) (Oral)   Wt 74.4 kg (164 lb 1.6 oz)   SpO2 97%   BMI 24.23 kg/m        Again, thank you for allowing me to participate in the care of your patient.        Sincerely,        [facility] Paracentesis Provider

## 2020-09-03 ENCOUNTER — OFFICE VISIT (OUTPATIENT)
Dept: INFUSION THERAPY | Facility: CLINIC | Age: 60
End: 2020-09-03
Attending: INTERNAL MEDICINE
Payer: COMMERCIAL

## 2020-09-03 ENCOUNTER — ANCILLARY PROCEDURE (OUTPATIENT)
Dept: ULTRASOUND IMAGING | Facility: CLINIC | Age: 60
End: 2020-09-03
Attending: INTERNAL MEDICINE
Payer: COMMERCIAL

## 2020-09-03 VITALS
BODY MASS INDEX: 22.79 KG/M2 | HEART RATE: 78 BPM | TEMPERATURE: 98.4 F | DIASTOLIC BLOOD PRESSURE: 89 MMHG | RESPIRATION RATE: 16 BRPM | SYSTOLIC BLOOD PRESSURE: 151 MMHG | WEIGHT: 154.3 LBS

## 2020-09-03 DIAGNOSIS — K43.2 INCISIONAL HERNIA, WITHOUT OBSTRUCTION OR GANGRENE: ICD-10-CM

## 2020-09-03 DIAGNOSIS — C22.0 HCC (HEPATOCELLULAR CARCINOMA) (H): ICD-10-CM

## 2020-09-03 DIAGNOSIS — K70.31 ASCITES DUE TO ALCOHOLIC CIRRHOSIS (H): Primary | ICD-10-CM

## 2020-09-03 DIAGNOSIS — R91.8 PULMONARY NODULES: ICD-10-CM

## 2020-09-03 LAB
AFP SERPL-MCNC: 12.1 UG/L (ref 0–8)
ALBUMIN SERPL-MCNC: 2.9 G/DL (ref 3.4–5)
ALP SERPL-CCNC: 89 U/L (ref 40–150)
ALT SERPL W P-5'-P-CCNC: 54 U/L (ref 0–70)
ANION GAP SERPL CALCULATED.3IONS-SCNC: 5 MMOL/L (ref 3–14)
AST SERPL W P-5'-P-CCNC: ABNORMAL U/L (ref 0–45)
BASOPHILS # BLD AUTO: 0 10E9/L (ref 0–0.2)
BASOPHILS NFR BLD AUTO: 0.5 %
BILIRUB SERPL-MCNC: 0.7 MG/DL (ref 0.2–1.3)
BUN SERPL-MCNC: 15 MG/DL (ref 7–30)
CALCIUM SERPL-MCNC: 8.5 MG/DL (ref 8.5–10.1)
CHLORIDE SERPL-SCNC: 112 MMOL/L (ref 94–109)
CO2 SERPL-SCNC: 23 MMOL/L (ref 20–32)
CREAT SERPL-MCNC: 0.81 MG/DL (ref 0.66–1.25)
DIFFERENTIAL METHOD BLD: ABNORMAL
EOSINOPHIL # BLD AUTO: 0 10E9/L (ref 0–0.7)
EOSINOPHIL NFR BLD AUTO: 0.4 %
ERYTHROCYTE [DISTWIDTH] IN BLOOD BY AUTOMATED COUNT: 14.3 % (ref 10–15)
GFR SERPL CREATININE-BSD FRML MDRD: >90 ML/MIN/{1.73_M2}
GLUCOSE SERPL-MCNC: 74 MG/DL (ref 70–99)
HCT VFR BLD AUTO: 43.4 % (ref 40–53)
HGB BLD-MCNC: 15.4 G/DL (ref 13.3–17.7)
IMM GRANULOCYTES # BLD: 0 10E9/L (ref 0–0.4)
IMM GRANULOCYTES NFR BLD: 0.3 %
LYMPHOCYTES # BLD AUTO: 1 10E9/L (ref 0.8–5.3)
LYMPHOCYTES NFR BLD AUTO: 13.3 %
MCH RBC QN AUTO: 33 PG (ref 26.5–33)
MCHC RBC AUTO-ENTMCNC: 35.5 G/DL (ref 31.5–36.5)
MCV RBC AUTO: 93 FL (ref 78–100)
MONOCYTES # BLD AUTO: 1 10E9/L (ref 0–1.3)
MONOCYTES NFR BLD AUTO: 12.9 %
NEUTROPHILS # BLD AUTO: 5.4 10E9/L (ref 1.6–8.3)
NEUTROPHILS NFR BLD AUTO: 72.6 %
NRBC # BLD AUTO: 0 10*3/UL
NRBC BLD AUTO-RTO: 0 /100
PLATELET # BLD AUTO: 97 10E9/L (ref 150–450)
POTASSIUM SERPL-SCNC: 4.5 MMOL/L (ref 3.4–5.3)
PROT SERPL-MCNC: 6.6 G/DL (ref 6.8–8.8)
RBC # BLD AUTO: 4.67 10E12/L (ref 4.4–5.9)
SODIUM SERPL-SCNC: 140 MMOL/L (ref 133–144)
WBC # BLD AUTO: 7.4 10E9/L (ref 4–11)

## 2020-09-03 PROCEDURE — 49083 ABD PARACENTESIS W/IMAGING: CPT

## 2020-09-03 PROCEDURE — 82105 ALPHA-FETOPROTEIN SERUM: CPT | Performed by: INTERNAL MEDICINE

## 2020-09-03 PROCEDURE — 36415 COLL VENOUS BLD VENIPUNCTURE: CPT

## 2020-09-03 PROCEDURE — 80053 COMPREHEN METABOLIC PANEL: CPT | Performed by: INTERNAL MEDICINE

## 2020-09-03 PROCEDURE — 85025 COMPLETE CBC W/AUTO DIFF WBC: CPT | Performed by: INTERNAL MEDICINE

## 2020-09-03 PROCEDURE — 25000125 ZZHC RX 250: Mod: ZF | Performed by: INTERNAL MEDICINE

## 2020-09-03 RX ORDER — ALBUMIN (HUMAN) 12.5 G/50ML
12.5 SOLUTION INTRAVENOUS
Status: CANCELLED | OUTPATIENT
Start: 2020-09-08

## 2020-09-03 RX ORDER — HEPARIN SODIUM,PORCINE 10 UNIT/ML
5 VIAL (ML) INTRAVENOUS
Status: CANCELLED | OUTPATIENT
Start: 2020-09-08

## 2020-09-03 RX ORDER — HEPARIN SODIUM (PORCINE) LOCK FLUSH IV SOLN 100 UNIT/ML 100 UNIT/ML
5 SOLUTION INTRAVENOUS
Status: CANCELLED | OUTPATIENT
Start: 2020-09-08

## 2020-09-03 RX ORDER — LIDOCAINE HYDROCHLORIDE 10 MG/ML
20 INJECTION, SOLUTION EPIDURAL; INFILTRATION; INTRACAUDAL; PERINEURAL ONCE
Status: CANCELLED | OUTPATIENT
Start: 2020-09-08

## 2020-09-03 RX ORDER — ALBUMIN (HUMAN) 12.5 G/50ML
12.5 SOLUTION INTRAVENOUS
Status: DISCONTINUED | OUTPATIENT
Start: 2020-09-03 | End: 2020-09-03 | Stop reason: HOSPADM

## 2020-09-03 RX ORDER — LIDOCAINE HYDROCHLORIDE 10 MG/ML
20 INJECTION, SOLUTION EPIDURAL; INFILTRATION; INTRACAUDAL; PERINEURAL ONCE
Status: COMPLETED | OUTPATIENT
Start: 2020-09-03 | End: 2020-09-03

## 2020-09-03 RX ADMIN — LIDOCAINE HYDROCHLORIDE 10 ML: 10 INJECTION, SOLUTION EPIDURAL; INFILTRATION; INTRACAUDAL; PERINEURAL at 14:23

## 2020-09-03 NOTE — PROGRESS NOTES
Paracentesis Nursing Note  Ten Johnson presents today [facility]esis.    During today's appointment orders from Dr. Leventhal were completed.    Progress Note:  Patient identification verified by name and date of birth.  Assessment completed.  Vitals monitored throughout appointment and recorded in Doc Flowsheets.  See proceduralist note in ultrasound.    Vascular Access: peripheral IV placed today.  Labs: were drawn per orders.   Date of consent or authorization: 7/16/20.  Invasive Procedure Safety Checklist was completed and sent for scanning.   Paracentesis performed by Winston Paulson MD .    The following labs were communicated to provider performing paracentesis:  Lab Results   Component Value Date    PLT 97 09/03/2020     Total amount of ascites fluid drained: 3.8 liters.  Color of ascites fluid: yellow.  Total amount of albumin given: 0 grams per therapy plan  Patient tolerated procedure well.  Post procedure,denies pain or discomfort post paracentesis.    Discharge Plan:  Discharge instructions were reviewed with patient.  Patient/Representative verbalized understanding and all questions were answered.   Discharged [facility] in stable condition.    Karina Eubanks RN    Administrations This Visit     lidocaine (PF) (XYLOCAINE) 1 % injection 20 mL     Admin Date  09/03/2020 Action  Given Dose  10 mL Route  Subcutaneous Administered By  Karina Eubanks RN                Temp 98.4  F (36.9  C) (Oral)   Resp 16

## 2020-09-03 NOTE — LETTER
9/3/2020     RE: Ten Johnson  [address] [facility] [address]    Dear Colleague,    Thank you for referring your patient, Ten Johnson, to the [facility] TRE[facility] [place] SPECIALTY AND PROCEDURE. Please see a copy of my visit note below.    Paracentesis Nursing Note  Ten Johnson presents today to [facility] for a [facility]acentesis.    During today's appointment orders from Dr. Leventhal were completed.    Progress Note:  Patient identification verified by name and date of birth.  Assessment completed.  Vitals monitored throughout appointment and recorded in Doc Flowsheets.  See proceduralist note in ultrasound.    Vascular Access: peripheral IV placed today.  Labs: were drawn per orders.   Date of consent or authorization: 7/16/20.  Invasive Procedure Safety Checklist was completed and sent for scanning.   Paracentesis performed by Winston Paulson MD .    The following labs were communicated to provider performing paracentesis:  Lab Results   Component Value Date    PLT 97 09/03/2020     Total amount of ascites fluid drained: 3.8 liters.  Color of ascites fluid: yellow.  Total amount of albumin given: 0 grams per therapy plan  Patient tolerated procedure well.  Post procedure,denies pain or discomfort post paracentesis.    Discharge Plan:  Discharge instructions were reviewed with patient.  Patient/Representative verbalized understanding and all questions were answered.   Discharged from [facility] in stable condition.    Karina Eubanks RN    Administrations This Visit     lidocaine (PF) (XYLOCAINE) 1 % injection 20 mL     Admin Date  09/03/2020 Action  Given Dose  10 mL Route  Subcutaneous Administered By  Karina Eubanks RN              Temp 98.4  F (36.9  C) (Oral)   Resp 16     Again, thank you for allowing me to participate in the care of your patient.      Sincerely,    [facility] Paracentesis Provider

## 2020-09-10 ENCOUNTER — ANCILLARY PROCEDURE (OUTPATIENT)
Dept: ULTRASOUND IMAGING | Facility: CLINIC | Age: 60
End: 2020-09-10
Attending: INTERNAL MEDICINE
Payer: COMMERCIAL

## 2020-09-10 ENCOUNTER — OFFICE VISIT (OUTPATIENT)
Dept: INFUSION THERAPY | Facility: CLINIC | Age: 60
End: 2020-09-10
Attending: INTERNAL MEDICINE
Payer: COMMERCIAL

## 2020-09-10 VITALS
TEMPERATURE: 97.9 F | DIASTOLIC BLOOD PRESSURE: 92 MMHG | HEART RATE: 69 BPM | SYSTOLIC BLOOD PRESSURE: 139 MMHG | RESPIRATION RATE: 16 BRPM

## 2020-09-10 DIAGNOSIS — Z11.59 ENCOUNTER FOR SCREENING FOR OTHER VIRAL DISEASES: ICD-10-CM

## 2020-09-10 DIAGNOSIS — Z11.59 ENCOUNTER FOR SCREENING FOR OTHER VIRAL DISEASES: Primary | ICD-10-CM

## 2020-09-10 DIAGNOSIS — C22.0 HEPATOCELLULAR CARCINOMA (H): ICD-10-CM

## 2020-09-10 DIAGNOSIS — K70.31 ASCITES DUE TO ALCOHOLIC CIRRHOSIS (H): Primary | ICD-10-CM

## 2020-09-10 LAB
APPEARANCE FLD: NORMAL
BASOPHILS NFR FLD MANUAL: 1 %
COLOR FLD: YELLOW
GRAM STN SPEC: NORMAL
INR PPP: 1.07 (ref 0.86–1.14)
LYMPHOCYTES NFR FLD MANUAL: 23 %
NEUTS BAND NFR FLD MANUAL: 7 %
OTHER CELLS FLD MANUAL: 69 %
PROT FLD-MCNC: 1.7 G/DL
SPECIMEN SOURCE FLD: NORMAL
SPECIMEN SOURCE FLD: NORMAL
SPECIMEN SOURCE: NORMAL
WBC # FLD AUTO: 225 /UL

## 2020-09-10 PROCEDURE — 85610 PROTHROMBIN TIME: CPT | Performed by: INTERNAL MEDICINE

## 2020-09-10 PROCEDURE — 40000556 ZZH STATISTIC PERIPHERAL IV START W US GUIDANCE: Mod: ZF

## 2020-09-10 PROCEDURE — 87015 SPECIMEN INFECT AGNT CONCNTJ: CPT | Performed by: INTERNAL MEDICINE

## 2020-09-10 PROCEDURE — 89051 BODY FLUID CELL COUNT: CPT | Performed by: INTERNAL MEDICINE

## 2020-09-10 PROCEDURE — 25000125 ZZHC RX 250: Mod: ZF | Performed by: INTERNAL MEDICINE

## 2020-09-10 PROCEDURE — 84157 ASSAY OF PROTEIN OTHER: CPT | Performed by: INTERNAL MEDICINE

## 2020-09-10 PROCEDURE — 87070 CULTURE OTHR SPECIMN AEROBIC: CPT | Performed by: INTERNAL MEDICINE

## 2020-09-10 PROCEDURE — 27210190 US PARACENTESIS

## 2020-09-10 PROCEDURE — P9047 ALBUMIN (HUMAN), 25%, 50ML: HCPCS | Mod: ZF | Performed by: INTERNAL MEDICINE

## 2020-09-10 PROCEDURE — 25000128 H RX IP 250 OP 636: Mod: ZF | Performed by: INTERNAL MEDICINE

## 2020-09-10 PROCEDURE — 87205 SMEAR GRAM STAIN: CPT | Performed by: INTERNAL MEDICINE

## 2020-09-10 PROCEDURE — 36415 COLL VENOUS BLD VENIPUNCTURE: CPT

## 2020-09-10 RX ORDER — HEPARIN SODIUM,PORCINE 10 UNIT/ML
5 VIAL (ML) INTRAVENOUS
Status: CANCELLED | OUTPATIENT
Start: 2020-09-17

## 2020-09-10 RX ORDER — HEPARIN SODIUM (PORCINE) LOCK FLUSH IV SOLN 100 UNIT/ML 100 UNIT/ML
5 SOLUTION INTRAVENOUS
Status: CANCELLED | OUTPATIENT
Start: 2020-09-17

## 2020-09-10 RX ORDER — LIDOCAINE HYDROCHLORIDE 10 MG/ML
20 INJECTION, SOLUTION EPIDURAL; INFILTRATION; INTRACAUDAL; PERINEURAL ONCE
Status: CANCELLED | OUTPATIENT
Start: 2020-09-17

## 2020-09-10 RX ORDER — LIDOCAINE HYDROCHLORIDE 10 MG/ML
20 INJECTION, SOLUTION EPIDURAL; INFILTRATION; INTRACAUDAL; PERINEURAL ONCE
Status: COMPLETED | OUTPATIENT
Start: 2020-09-10 | End: 2020-09-10

## 2020-09-10 RX ORDER — ALBUMIN (HUMAN) 12.5 G/50ML
12.5 SOLUTION INTRAVENOUS
Status: DISCONTINUED | OUTPATIENT
Start: 2020-09-10 | End: 2020-09-14 | Stop reason: HOSPADM

## 2020-09-10 RX ORDER — ALBUMIN (HUMAN) 12.5 G/50ML
12.5 SOLUTION INTRAVENOUS
Status: CANCELLED | OUTPATIENT
Start: 2020-09-17

## 2020-09-10 RX ADMIN — LIDOCAINE HYDROCHLORIDE 20 ML: 10 INJECTION, SOLUTION EPIDURAL; INFILTRATION; INTRACAUDAL; PERINEURAL at 14:33

## 2020-09-10 RX ADMIN — ALBUMIN HUMAN 12.5 G: 0.25 SOLUTION INTRAVENOUS at 14:59

## 2020-09-10 NOTE — PROGRESS NOTES
"Paracentesis Nursing Note  Ten Johnson presents today to [facility] for a [facility]esis.    During today's appointment orders from Dr. Thomas Leventhal were completed.    Progress Note:  Patient identification verified by name and date of birth.  Assessment completed.  Vitals monitored throughout appointment and recorded in Doc Flowsheets.  See proceduralist note in ultrasound.    Patient was feeling well today but told RN the past two days he felt the sickest he's ever been. RN asked for more information. Patient explained he had a severe headache, hallucinations, was \"spewing liquid from both ends of his body\" (vomitting and diarrhea). He was unsure if he had a fever as he did not check his temperature but did describe chills and shakes. He said it had been going around his neighborhood and even asked if people thought something could have been \"spiked\".   Patient placed on droppet precautions   Dr. Leventhal paged who was out of town. His RN called back. Maggie Block, Covid test ordered and PRN asites labs collected. Patient given number to call to schedule COVID test before next appointment.     Vascular Access: peripheral IV was placed by vascular access nurse.  Labs: were drawn per orders.     Date of consent or authorization: 7/23/2020.  Invasive Procedure Safety Checklist was completed and sent for scanning.     Paracentesis performed by Dr. Capo Cuenca supervised by Dr. BETZY Arvizu.    The following labs were communicated to provider performing paracentesis:  Lab Results   Component Value Date    PLT 97 09/03/2020       Total amount of ascites fluid drained: 4.8 liters.  Color of ascites fluid: clear, yellow.  Total amount of albumin given: 12.5 grams.    Patient tolerated procedure well.    Post procedure,denies pain or discomfort post paracentesis.      Discharge Plan:  Discharge instructions were reviewed with patient.  Patient/Representative verbalized understanding and all " questions were answered.   Discharged from [facility] in stable condition.    Stefanie De La Torre RN       Administrations This Visit     albumin human 25 % injection 12.5 g     Admin Date  09/10/2020 Action  New Bag Dose  12.5 g Route  Intravenous Administered By  Stefanie De La Torre RN          lidocaine (PF) (XYLOCAINE) 1 % injection 20 mL     Admin Date  09/10/2020 Action  Given by Other Clinician Dose  20 mL Route  Subcutaneous Administered By  Stefanie De La Torre RN                /88   Pulse 72   Temp 97.9  F (36.6  C) (Oral)   Resp 16

## 2020-09-15 LAB
BACTERIA SPEC CULT: NO GROWTH
SPECIMEN SOURCE: NORMAL

## 2020-09-16 ENCOUNTER — APPOINTMENT (OUTPATIENT)
Dept: CT IMAGING | Facility: CLINIC | Age: 60
End: 2020-09-16
Attending: EMERGENCY MEDICINE
Payer: COMMERCIAL

## 2020-09-16 ENCOUNTER — HOSPITAL ENCOUNTER (EMERGENCY)
Facility: CLINIC | Age: 60
Discharge: HOME | End: 2020-09-16
Attending: EMERGENCY MEDICINE | Admitting: EMERGENCY MEDICINE
Payer: COMMERCIAL

## 2020-09-16 ENCOUNTER — APPOINTMENT (OUTPATIENT)
Dept: GENERAL RADIOLOGY | Facility: CLINIC | Age: 60
End: 2020-09-16
Attending: EMERGENCY MEDICINE
Payer: COMMERCIAL

## 2020-09-16 ENCOUNTER — NURSE TRIAGE (OUTPATIENT)
Dept: NURSING | Facility: CLINIC | Age: 60
End: 2020-09-16

## 2020-09-16 VITALS
HEART RATE: 67 BPM | OXYGEN SATURATION: 98 % | DIASTOLIC BLOOD PRESSURE: 88 MMHG | SYSTOLIC BLOOD PRESSURE: 128 MMHG | TEMPERATURE: 98.3 F | WEIGHT: 160.7 LBS | RESPIRATION RATE: 16 BRPM | HEIGHT: 69 IN | BODY MASS INDEX: 23.8 KG/M2
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
## 2020-09-16 DIAGNOSIS — S40.022A CONTUSION OF LEFT UPPER EXTREMITY, INITIAL ENCOUNTER: ICD-10-CM

## 2020-09-16 LAB
BASOPHILS # BLD AUTO: 0 10E9/L (ref 0–0.2)
BASOPHILS NFR BLD AUTO: 0.4 %
DIFFERENTIAL METHOD BLD: ABNORMAL
EOSINOPHIL # BLD AUTO: 0.1 10E9/L (ref 0–0.7)
EOSINOPHIL NFR BLD AUTO: 1.6 %
ERYTHROCYTE [DISTWIDTH] IN BLOOD BY AUTOMATED COUNT: 14.6 % (ref 10–15)
HCT VFR BLD AUTO: 40.2 % (ref 40–53)
HGB BLD-MCNC: 13.8 G/DL (ref 13.3–17.7)
IMM GRANULOCYTES # BLD: 0 10E9/L (ref 0–0.4)
IMM GRANULOCYTES NFR BLD: 0.2 %
LYMPHOCYTES # BLD AUTO: 2 10E9/L (ref 0.8–5.3)
LYMPHOCYTES NFR BLD AUTO: 22.5 %
MCH RBC QN AUTO: 33.2 PG (ref 26.5–33)
MCHC RBC AUTO-ENTMCNC: 34.3 G/DL (ref 31.5–36.5)
MCV RBC AUTO: 97 FL (ref 78–100)
MONOCYTES # BLD AUTO: 1.4 10E9/L (ref 0–1.3)
MONOCYTES NFR BLD AUTO: 15.6 %
NEUTROPHILS # BLD AUTO: 5.4 10E9/L (ref 1.6–8.3)
NEUTROPHILS NFR BLD AUTO: 59.7 %
NRBC # BLD AUTO: 0 10*3/UL
NRBC BLD AUTO-RTO: 0 /100
PLATELET # BLD AUTO: 101 10E9/L (ref 150–450)
RBC # BLD AUTO: 4.16 10E12/L (ref 4.4–5.9)
WBC # BLD AUTO: 9 10E9/L (ref 4–11)

## 2020-09-16 PROCEDURE — 99285 EMERGENCY DEPT VISIT HI MDM: CPT | Mod: 25 | Performed by: EMERGENCY MEDICINE

## 2020-09-16 PROCEDURE — 70450 CT HEAD/BRAIN W/O DYE: CPT

## 2020-09-16 PROCEDURE — 73060 X-RAY EXAM OF HUMERUS: CPT | Mod: LT

## 2020-09-16 PROCEDURE — 85025 COMPLETE CBC W/AUTO DIFF WBC: CPT | Performed by: EMERGENCY MEDICINE

## 2020-09-16 PROCEDURE — 93010 ELECTROCARDIOGRAM REPORT: CPT | Mod: Z6 | Performed by: EMERGENCY MEDICINE

## 2020-09-16 PROCEDURE — 25000132 ZZH RX MED GY IP 250 OP 250 PS 637: Performed by: EMERGENCY MEDICINE

## 2020-09-16 PROCEDURE — 93005 ELECTROCARDIOGRAM TRACING: CPT | Performed by: EMERGENCY MEDICINE

## 2020-09-16 PROCEDURE — 71046 X-RAY EXAM CHEST 2 VIEWS: CPT

## 2020-09-16 PROCEDURE — 70486 CT MAXILLOFACIAL W/O DYE: CPT

## 2020-09-16 RX ORDER — OXYCODONE HYDROCHLORIDE 5 MG/1
5 TABLET ORAL ONCE
Status: COMPLETED | OUTPATIENT
Start: 2020-09-16 | End: 2020-09-16

## 2020-09-16 RX ADMIN — OXYCODONE HYDROCHLORIDE 5 MG: 5 TABLET ORAL at 13:08

## 2020-09-16 ASSESSMENT — MIFFLIN-ST. JEOR: SCORE: 1519.31

## 2020-09-16 ASSESSMENT — ENCOUNTER SYMPTOMS
WEAKNESS: 1
FEVER: 0
COUGH: 0
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
## 2020-09-16 NOTE — ED AVS SNAPSHOT
[facility], [place], Emergency Department  [address] [place] [id number]  Phone:  [phone]                                    Ten Johnson   MRN: [id number]    Department:  [facility], Emergency Department   Date of Visit:  9/16/2020           After Visit Summary Signature Page    I have received my discharge instructions, and my questions have been answered. I have discussed any challenges I see with this plan with the nurse or doctor.    ..........................................................................................................................................  Patient/Patient Representative Signature      ..........................................................................................................................................  Patient Representative Print Name and Relationship to Patient    ..................................................               ................................................  Date                                   Time    ..........................................................................................................................................  Reviewed by Signature/Title    ...................................................              ..............................................  Date                                               Time          22EPIC Rev 08/18
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
## 2020-09-17 ENCOUNTER — OFFICE VISIT (OUTPATIENT)
Dept: INFUSION THERAPY | Facility: CLINIC | Age: 60
End: 2020-09-17
Attending: INTERNAL MEDICINE
Payer: COMMERCIAL

## 2020-09-17 ENCOUNTER — ANCILLARY PROCEDURE (OUTPATIENT)
Dept: ULTRASOUND IMAGING | Facility: CLINIC | Age: 60
End: 2020-09-17
Attending: INTERNAL MEDICINE
Payer: COMMERCIAL

## 2020-09-17 VITALS
OXYGEN SATURATION: 99 % | WEIGHT: 149.4 LBS | RESPIRATION RATE: 16 BRPM | SYSTOLIC BLOOD PRESSURE: 125 MMHG | TEMPERATURE: 97.7 F | DIASTOLIC BLOOD PRESSURE: 75 MMHG | HEART RATE: 87 BPM | BODY MASS INDEX: 22.06 KG/M2

## 2020-09-17 DIAGNOSIS — Z11.59 ENCOUNTER FOR SCREENING FOR OTHER VIRAL DISEASES: ICD-10-CM

## 2020-09-17 DIAGNOSIS — K70.31 ASCITES DUE TO ALCOHOLIC CIRRHOSIS (H): Primary | ICD-10-CM

## 2020-09-17 LAB — INTERPRETATION ECG - MUSE: NORMAL

## 2020-09-17 PROCEDURE — 25000125 ZZHC RX 250: Mod: ZF | Performed by: INTERNAL MEDICINE

## 2020-09-17 PROCEDURE — 27210190 US PARACENTESIS

## 2020-09-17 PROCEDURE — 40000556 ZZH STATISTIC PERIPHERAL IV START W US GUIDANCE: Mod: ZF

## 2020-09-17 PROCEDURE — 25000128 H RX IP 250 OP 636: Mod: ZF | Performed by: INTERNAL MEDICINE

## 2020-09-17 PROCEDURE — P9047 ALBUMIN (HUMAN), 25%, 50ML: HCPCS | Mod: ZF | Performed by: INTERNAL MEDICINE

## 2020-09-17 RX ORDER — HEPARIN SODIUM (PORCINE) LOCK FLUSH IV SOLN 100 UNIT/ML 100 UNIT/ML
5 SOLUTION INTRAVENOUS
Status: CANCELLED | OUTPATIENT
Start: 2020-09-24

## 2020-09-17 RX ORDER — LIDOCAINE HYDROCHLORIDE 10 MG/ML
20 INJECTION, SOLUTION EPIDURAL; INFILTRATION; INTRACAUDAL; PERINEURAL ONCE
Status: CANCELLED | OUTPATIENT
Start: 2020-09-24

## 2020-09-17 RX ORDER — LIDOCAINE HYDROCHLORIDE 10 MG/ML
20 INJECTION, SOLUTION EPIDURAL; INFILTRATION; INTRACAUDAL; PERINEURAL ONCE
Status: COMPLETED | OUTPATIENT
Start: 2020-09-17 | End: 2020-09-17

## 2020-09-17 RX ORDER — HEPARIN SODIUM,PORCINE 10 UNIT/ML
5 VIAL (ML) INTRAVENOUS
Status: CANCELLED | OUTPATIENT
Start: 2020-09-24

## 2020-09-17 RX ORDER — ALBUMIN (HUMAN) 12.5 G/50ML
12.5 SOLUTION INTRAVENOUS
Status: DISCONTINUED | OUTPATIENT
Start: 2020-09-17 | End: 2020-09-21 | Stop reason: HOSPADM

## 2020-09-17 RX ORDER — ALBUMIN (HUMAN) 12.5 G/50ML
12.5 SOLUTION INTRAVENOUS
Status: CANCELLED | OUTPATIENT
Start: 2020-09-24

## 2020-09-17 RX ADMIN — ALBUMIN HUMAN 12.5 G: 0.25 SOLUTION INTRAVENOUS at 14:33

## 2020-09-17 RX ADMIN — LIDOCAINE HYDROCHLORIDE 10 ML: 10 INJECTION, SOLUTION EPIDURAL; INFILTRATION; INTRACAUDAL; PERINEURAL at 14:16

## 2020-09-17 RX ADMIN — ALBUMIN HUMAN 12.5 G: 0.25 SOLUTION INTRAVENOUS at 14:48

## 2020-09-17 NOTE — PROGRESS NOTES
Paracentesis Nursing Note  Ten Johnson presents today to [facility] for a paracentesis.    During today's appointment orders from Dr. Thomas Leventhal were completed.    Progress Note:  Patient identification verified by name and date of birth.  Assessment completed.  Vitals monitored throughout appointment and recorded in Doc Flowsheets.  See proceduralist note in ultrasound.    Vascular Access: peripheral IV was placed by vascular access nurse.  Labs: were not ordered for this appointment.    Date of consent or authorization: 7/16/20.  Invasive Procedure Safety Checklist was completed and sent for scanning.     Paracentesis performed by Dr. Chapo Estrella, MEG.    The following labs were communicated to provider performing paracentesis:  Lab Results   Component Value Date     09/16/2020       Total amount of ascites fluid drained: 5.4 liters.  Color of ascites fluid: clear yellow.  Total amount of albumin given: 25  grams.   For 4-5 liters give 12.5 grams   For 5.1-6 liters give 25 grams,    For 6.1-7 liters give 37.5 grams   For 7.1 and above give 50 grams.     Patient tolerated procedure well.    Post procedure,denies pain or discomfort post paracentesis.      Discharge Plan:  Discharge instructions were reviewed with patient.  Patient/Representative verbalized understanding and all questions were answered. Patient declined printed AVS. Message sent to  asking for another para appointment to be scheduled for next week.   Discharged from [facility] in stable condition.    Stefanie De La Torre RN       Administrations This Visit     albumin human 25 % injection 12.5 g     Admin Date  09/17/2020 Action  New Bag Dose  12.5 g Route  Intravenous Administered By  Stefanie De La Torre RN           Admin Date  09/17/2020 Action  New Bag Dose  12.5 g Route  Intravenous Administered By  Stefanie De La Torre RN          lidocaine (PF) (XYLOCAINE) 1 % injection 20 mL      Admin Date  09/17/2020 Action  Given by Other Clinician Dose  10 mL Route  Subcutaneous Administered By  Stefanie De La Torre, JAMES                /75   Pulse 87   Temp 97.7  F (36.5  C)   Resp 16   Wt 67.8 kg (149 lb 6.4 oz)   SpO2 99%   BMI 22.06 kg/m

## 2020-09-17 NOTE — PATIENT INSTRUCTIONS
Dear Ten Johnson    Thank you for choosing [facility] ([facility]) for your paracentesis.  The following information is a summary of our appointment as well as important reminders.      Patient Education     Discharge Instructions for Paracentesis  Paracentesis is a procedure to remove extra fluid from your belly (abdomen). This fluid buildup in the abdomen is called ascites. The procedure may have been done to take a sample of the fluid. Or, it may have been done to drain the extra fluid from your abdomen and help make you more comfortable.     Ascites is buildup of excess fluid in the abdomen.   Home care    If you have pain after the procedure, your healthcare provider can prescribe or recommend pain medicines. Take these exactly as directed. If you stopped taking other medicines before the procedure, ask your provider when you can start them again.    Take it easy for 24 hours after the procedure. Avoid physical activity until your provider says it s OK.    You will have a small bandage over the puncture site. Stitches (sutures), surgical staples, adhesive tapes, adhesive strips, or surgical glue may be used to close the incision. They also help stop bleeding and speed healing. You may take the bandage off in 24 hours.    Check the puncture site for the signs of infection listed below.  Follow-up care  Make a follow-up appointment with your healthcare provider as directed. During your follow-up visit, your provider will check your healing. Let your provider know how you are feeling. You can also discuss the cause of your ascites and if you need any further treatment.  When to seek medical advice  Call your healthcare provider if you have any of the following after the procedure:    A fever of 100.4 F (38 C) or higher    Trouble breathing    Pain that doesn't go away even after taking pain medicine    Belly pain not caused by having the skin  punctured    Bleeding from the puncture site    More than a small amount of fluid leaking from the puncture site    Swollen belly    Signs of infection at the puncture site. These include increased pain, redness, or swelling, warmth, or bad-smelling drainage.    Blood in your urine    Feeling dizzy or lightheaded, or fainting   Date Last Reviewed: 7/1/2016 2000-2019 [employer]. [address] [place] [address]. All rights reserved. This information is not intended as a substitute for professional medical care. Always follow your healthcare professional's instructions.       We look forward in seeing you on your next appointment here at [facility] ([facility]).  Please don t hesitate to call us at [phone] to reschedule any of your appointments or to speak with one of the [facility] registered nurses.  It was a pleasure taking care of you today.    Sincerely,    [facility]  [facility]  [address] [place] [address]  [id number]  Phone:  [phone]

## 2020-09-17 NOTE — LETTER
9/17/2020     RE: Ten Johnson  [address]  [facility] [address]    Dear Colleague,    Thank you for referring your patient, Ten Johnson, to the [facility]RE[facility]MENT [place] SPECIALTY AND PROCEDURE. Please see a copy of my visit note below.    Paracentesis Nursing Note  Ten Johnson presents [facility].    During today's appointment orders from Dr. Thomas Leventhal were completed.    Progress Note:  Patient identification verified by name and date of birth.  Assessment completed.  Vitals monitored throughout appointment and recorded in Doc Flowsheets.  See proceduralist note in ultrasound.    Vascular Access: peripheral IV was placed by vascular access nurse.  Labs: were not ordered for this appointment.    Date of consent or authorization: 7/16/20.  Invasive Procedure Safety Checklist was completed and sent for scanning.     Paracentesis performed by MEG Ramirez.    The following labs were communicated to provider performing paracentesis:  Lab Results   Component Value Date     09/16/2020       Total amount of ascites fluid drained: 5.4 liters.  Color of ascites fluid: clear yellow.  Total amount of albumin given: 25  grams.   For 4-5 liters give 12.5 grams   For 5.1-6 liters give 25 grams,    For 6.1-7 liters give 37.5 grams   For 7.1 and above give 50 grams.     Patient tolerated procedure well.    Post procedure,denies pain or discomfort post paracentesis.      Discharge Plan:  Discharge instructions were reviewed with patient.  Patient/Representative verbalized understanding and all questions were answered. Patient declined printed AVS. Message sent to  asking for another para appointment to be scheduled for next week.   [facility] in stable condition.    Stefanie De La Torre RN       Administrations This Visit     albumin human 25 % injection 12.5 g     Admin  Date  09/17/2020 Action  New Bag Dose  12.5 g Route  Intravenous Administered By  Stefanie De La Torre RN           Admin Date  09/17/2020 Action  New Bag Dose  12.5 g Route  Intravenous Administered By  Stefanie De La Torre RN          lidocaine (PF) (XYLOCAINE) 1 % injection 20 mL     Admin Date  09/17/2020 Action  Given by Other Clinician Dose  10 mL Route  Subcutaneous Administered By  Stefanie De La Torre RN                /75   Pulse 87   Temp 97.7  F (36.5  C)   Resp 16   Wt 67.8 kg (149 lb 6.4 oz)   SpO2 99%   BMI 22.06 kg/m        Again, thank you for allowing me to participate in the care of your patient.        Sincerely,        Specialty Infusion Paracentesis Provider

## 2020-09-18 LAB
SARS-COV-2 RNA SPEC QL NAA+PROBE: NOT DETECTED
SPECIMEN SOURCE: NORMAL

## 2020-09-21 ENCOUNTER — HOSPITAL ENCOUNTER (OUTPATIENT)
Dept: MRI IMAGING | Facility: CLINIC | Age: 60
End: 2020-09-21
Attending: INTERNAL MEDICINE
Payer: COMMERCIAL

## 2020-09-21 ENCOUNTER — HOSPITAL ENCOUNTER (OUTPATIENT)
Dept: CT IMAGING | Facility: CLINIC | Age: 60
End: 2020-09-21
Attending: INTERNAL MEDICINE
Payer: COMMERCIAL

## 2020-09-21 DIAGNOSIS — R91.8 PULMONARY NODULES: ICD-10-CM

## 2020-09-21 DIAGNOSIS — K43.2 INCISIONAL HERNIA, WITHOUT OBSTRUCTION OR GANGRENE: ICD-10-CM

## 2020-09-21 DIAGNOSIS — C22.0 HCC (HEPATOCELLULAR CARCINOMA) (H): ICD-10-CM

## 2020-09-21 PROCEDURE — 25500064 ZZH RX 255 OP 636: Performed by: INTERNAL MEDICINE

## 2020-09-21 PROCEDURE — 74183 MRI ABD W/O CNTR FLWD CNTR: CPT

## 2020-09-21 PROCEDURE — 74176 CT ABD & PELVIS W/O CONTRAST: CPT

## 2020-09-21 PROCEDURE — A9585 GADOBUTROL INJECTION: HCPCS | Performed by: INTERNAL MEDICINE

## 2020-09-21 PROCEDURE — 40000141 ZZH STATISTIC PERIPHERAL IV START W/O US GUIDANCE

## 2020-09-21 RX ORDER — GADOBUTROL 604.72 MG/ML
6.5 INJECTION INTRAVENOUS ONCE
Status: COMPLETED | OUTPATIENT
Start: 2020-09-21 | End: 2020-09-21

## 2020-09-21 RX ADMIN — GADOBUTROL 6.5 ML: 604.72 INJECTION INTRAVENOUS at 12:44

## 2020-09-24 ENCOUNTER — ANCILLARY PROCEDURE (OUTPATIENT)
Dept: ULTRASOUND IMAGING | Facility: CLINIC | Age: 60
End: 2020-09-24
Attending: INTERNAL MEDICINE
Payer: COMMERCIAL

## 2020-09-24 ENCOUNTER — OFFICE VISIT (OUTPATIENT)
Dept: INFUSION THERAPY | Facility: CLINIC | Age: 60
End: 2020-09-24
Attending: INTERNAL MEDICINE
Payer: COMMERCIAL

## 2020-09-24 VITALS
SYSTOLIC BLOOD PRESSURE: 159 MMHG | RESPIRATION RATE: 16 BRPM | TEMPERATURE: 98.2 F | BODY MASS INDEX: 23.73 KG/M2 | WEIGHT: 160.7 LBS | HEART RATE: 74 BPM | DIASTOLIC BLOOD PRESSURE: 95 MMHG | OXYGEN SATURATION: 98 %

## 2020-09-24 DIAGNOSIS — K70.31 ASCITES DUE TO ALCOHOLIC CIRRHOSIS (H): Primary | ICD-10-CM

## 2020-09-24 PROCEDURE — 25000128 H RX IP 250 OP 636: Mod: ZF | Performed by: INTERNAL MEDICINE

## 2020-09-24 PROCEDURE — 25000125 ZZHC RX 250: Mod: ZF | Performed by: INTERNAL MEDICINE

## 2020-09-24 PROCEDURE — 49083 ABD PARACENTESIS W/IMAGING: CPT

## 2020-09-24 PROCEDURE — P9047 ALBUMIN (HUMAN), 25%, 50ML: HCPCS | Mod: ZF | Performed by: INTERNAL MEDICINE

## 2020-09-24 PROCEDURE — 40000556 ZZH STATISTIC PERIPHERAL IV START W US GUIDANCE: Mod: ZF

## 2020-09-24 RX ORDER — ALBUMIN (HUMAN) 12.5 G/50ML
12.5 SOLUTION INTRAVENOUS
Status: DISCONTINUED | OUTPATIENT
Start: 2020-09-24 | End: 2020-09-24 | Stop reason: HOSPADM

## 2020-09-24 RX ORDER — LIDOCAINE HYDROCHLORIDE 10 MG/ML
20 INJECTION, SOLUTION EPIDURAL; INFILTRATION; INTRACAUDAL; PERINEURAL ONCE
Status: COMPLETED | OUTPATIENT
Start: 2020-09-24 | End: 2020-09-24

## 2020-09-24 RX ORDER — ALBUMIN (HUMAN) 12.5 G/50ML
12.5 SOLUTION INTRAVENOUS
Status: CANCELLED | OUTPATIENT
Start: 2020-10-01

## 2020-09-24 RX ORDER — LIDOCAINE HYDROCHLORIDE 10 MG/ML
20 INJECTION, SOLUTION EPIDURAL; INFILTRATION; INTRACAUDAL; PERINEURAL ONCE
Status: CANCELLED | OUTPATIENT
Start: 2020-10-01

## 2020-09-24 RX ORDER — HEPARIN SODIUM,PORCINE 10 UNIT/ML
5 VIAL (ML) INTRAVENOUS
Status: CANCELLED | OUTPATIENT
Start: 2020-10-01

## 2020-09-24 RX ORDER — HEPARIN SODIUM (PORCINE) LOCK FLUSH IV SOLN 100 UNIT/ML 100 UNIT/ML
5 SOLUTION INTRAVENOUS
Status: CANCELLED | OUTPATIENT
Start: 2020-10-01

## 2020-09-24 RX ADMIN — LIDOCAINE HYDROCHLORIDE 10 ML: 10 INJECTION, SOLUTION EPIDURAL; INFILTRATION; INTRACAUDAL; PERINEURAL at 14:19

## 2020-09-24 RX ADMIN — ALBUMIN HUMAN 12.5 G: 0.25 SOLUTION INTRAVENOUS at 14:36

## 2020-09-24 NOTE — PATIENT INSTRUCTIONS
Dear Ten Johnson    Thank you for choosing [facility] ([facility]) for your paracentesis.  The following information is a summary of our appointment as well as important reminders.      Patient Education     Discharge Instructions for Paracentesis  Paracentesis is a procedure to remove extra fluid from your belly (abdomen). This fluid buildup in the abdomen is called ascites. The procedure may have been done to take a sample of the fluid. Or, it may have been done to drain the extra fluid from your abdomen and help make you more comfortable.     Ascites is buildup of excess fluid in the abdomen.   Home care    If you have pain after the procedure, your healthcare provider can prescribe or recommend pain medicines. Take these exactly as directed. If you stopped taking other medicines before the procedure, ask your provider when you can start them again.    Take it easy for 24 hours after the procedure. Avoid physical activity until your provider says it s OK.    You will have a small bandage over the puncture site. Stitches (sutures), surgical staples, adhesive tapes, adhesive strips, or surgical glue may be used to close the incision. They also help stop bleeding and speed healing. You may take the bandage off in 24 hours.    Check the puncture site for the signs of infection listed below.  Follow-up care  Make a follow-up appointment with your healthcare provider as directed. During your follow-up visit, your provider will check your healing. Let your provider know how you are feeling. You can also discuss the cause of your ascites and if you need any further treatment.  When to seek medical advice  Call your healthcare provider if you have any of the following after the procedure:    A fever of 100.4 F (38 C) or higher    Trouble breathing    Pain that doesn't go away even after taking pain medicine    Belly pain not caused by having the skin  punctured    Bleeding from the puncture site    More than a small amount of fluid leaking from the puncture site    Swollen belly    Signs of infection at the puncture site. These include increased pain, redness, or swelling, warmth, or bad-smelling drainage.    Blood in your urine    Feeling dizzy or lightheaded, or fainting   Date Last Reviewed: 7/1/2016 2000-2019 [employer]. [address] [place] [address]. All rights reserved. This information is not intended as a substitute for professional medical care. Always follow your healthcare professional's instructions.             We look forward in seeing you on your next appointment here at [facility] ([facility]).  Please don t hesitate to call us at [phone] to reschedule any of your appointments or to speak with one of the [facility] registered nurses.  It was a pleasure taking care of you today.    Sincerely,    [facility]  [facility]  [address] [place] [address]  [id number]  Phone:  [phone]

## 2020-09-24 NOTE — PROGRESS NOTES
Paracentesis Nursing Note  Ten Johnson presents today to [facility] for a [facility]esis.    During today's appointment orders from Dr. Thomas Leventhal were completed.    Progress Note:  Patient identification verified by name and date of birth.  Assessment completed.  Vitals monitored throughout appointment and recorded in Doc Flowsheets.  See proceduralist note in ultrasound.    Vascular Access: peripheral IV was placed by vascular access nurse.  Labs: were not ordered for this appointment.    Date of consent or authorization: 7/16/2020.  Invasive Procedure Safety Checklist was completed and sent for scanning.     Paracentesis performed by Ludin Gooden PA-C Radiology.    The following labs were communicated to provider performing paracentesis:  Lab Results   Component Value Date     09/16/2020       Total amount of ascites fluid drained: 4.1 liters.  Color of ascites fluid: straw colored.  Total amount of albumin given: 12.5  grams.    Patient tolerated procedure well.    Post paracentesis, patient denies pain, discomfort or lightheaded/dizziness. [facility] Denies questions/concerns. Discharged from [facility] in stable condition with next paracentesis scheduled for 9/2/2020.       Discharge Plan:  Discharge instructions were reviewed with patient.  Patient/Representative verbalized understanding and all questions were answered.   Discharged [facility] in stable condition.    Olimpia Adan RN       Administrations This Visit     albumin human 25 % injection 12.5 g     Admin Date  09/24/2020 Action  New Bag Dose  12.5 g Route  Intravenous Administered By  Olimpia Adan RN          lidocaine (PF) (XYLOCAINE) 1 % injection 20 mL     Admin Date  09/24/2020 Action  Given by Other Clinician Dose  10 mL Route  Subcutaneous Administered By  Olimpia Adan RN                Temp 98.2  F (36.8  C) (Oral)   Resp 16   Wt 77.2 kg (170  lb 1.6 oz)   SpO2 98%   BMI 25.12 kg/m

## 2020-09-24 NOTE — LETTER
9/24/2020         RE: Ten Johnson  [address]  [facility] [address]        Dear Colleague,    Thank you for referring your patient, Ten Johnson, to the [facility] TREAT[facility] [place] SPECIALTY AND PROCEDURE. Please see a copy of my visit note below.    Paracentesis Nursing Note  Ten Johnson presents today [facility].    During today's appointment orders from Dr. Thomas Leventhal were completed.    Progress Note:  Patient identification verified by name and date of birth.  Assessment completed.  Vitals monitored throughout appointment and recorded in Doc Flowsheets.  See proceduralist note in ultrasound.    Vascular Access: peripheral IV was placed by vascular access nurse.  Labs: were not ordered for this appointment.    Date of consent or authorization: 7/16/2020.  Invasive Procedure Safety Checklist was completed and sent for scanning.     Paracentesis performed by Ludin Gooden PA-C Radiology.    The following labs were communicated to provider performing paracentesis:  Lab Results   Component Value Date     09/16/2020       Total amount of ascites fluid drained: 4.1 liters.  Color of ascites fluid: straw colored.  Total amount of albumin given: 12.5  grams.    Patient tolerated procedure well.    Post paracentesis, patient denies pain, discomfort or lightheaded/dizziness. AVS to myChart. Denies questions/concerns. Discharged from [facility] in stable condition with next paracentesis scheduled for 9/2/2020.       Discharge Plan:  Discharge instructions were reviewed with patient.  Patient/Representative verbalized understanding and all questions were answered.   [facility] in stable condition.    Olimpia Adan RN       Administrations This Visit     albumin human 25 % injection 12.5 g     Admin Date  09/24/2020 Action  New Bag Dose  12.5 g Route  Intravenous Administered By  Nadine  JAMES Doan          lidocaine (PF) (XYLOCAINE) 1 % injection 20 mL     Admin Date  09/24/2020 Action  Given by Other Clinician Dose  10 mL Route  Subcutaneous Administered By  Olimpia Mccoy RN                Temp 98.2  F (36.8  C) (Oral)   Resp 16   Wt 77.2 kg (170 lb 1.6 oz)   SpO2 98%   BMI 25.12 kg/m        Again, thank you for allowing me to participate in the care of your patient.        Sincerely,        Specialty [facility]fusion Paracentesis Provider

## 2020-10-02 DIAGNOSIS — Z11.59 ENCOUNTER FOR SCREENING FOR OTHER VIRAL DISEASES: Primary | ICD-10-CM

## 2020-10-07 DIAGNOSIS — Z11.59 ENCOUNTER FOR SCREENING FOR OTHER VIRAL DISEASES: ICD-10-CM

## 2020-10-07 LAB
SARS-COV-2 RNA SPEC QL NAA+PROBE: NOT DETECTED
SPECIMEN SOURCE: NORMAL

## 2020-10-07 PROCEDURE — U0003 INFECTIOUS AGENT DETECTION BY NUCLEIC ACID (DNA OR RNA); SEVERE ACUTE RESPIRATORY SYNDROME CORONAVIRUS 2 (SARS-COV-2) (CORONAVIRUS DISEASE [COVID-19]), AMPLIFIED PROBE TECHNIQUE, MAKING USE OF HIGH THROUGHPUT TECHNOLOGIES AS DESCRIBED BY CMS-2020-01-R: HCPCS | Mod: 90 | Performed by: PATHOLOGY

## 2020-10-07 PROCEDURE — 99000 SPECIMEN HANDLING OFFICE-LAB: CPT | Performed by: PATHOLOGY

## 2020-10-08 ENCOUNTER — TELEPHONE (OUTPATIENT)
Dept: MEDSURG UNIT | Facility: CLINIC | Age: 60
End: 2020-10-08

## 2020-10-08 ENCOUNTER — VIRTUAL VISIT (OUTPATIENT)
Dept: ONCOLOGY | Facility: CLINIC | Age: 60
End: 2020-10-08
Attending: INTERNAL MEDICINE
Payer: COMMERCIAL

## 2020-10-08 DIAGNOSIS — C22.0 HCC (HEPATOCELLULAR CARCINOMA) (H): Primary | ICD-10-CM

## 2020-10-08 PROCEDURE — 99214 OFFICE O/P EST MOD 30 MIN: CPT | Mod: 95 | Performed by: INTERNAL MEDICINE

## 2020-10-08 PROCEDURE — 999N001193 HC VIDEO/TELEPHONE VISIT; NO CHARGE

## 2020-10-08 NOTE — PROGRESS NOTES
"Ten Johnson is a 60 year old male who is being evaluated via a billable telephone visit.      The patient has been notified of following:     \"This telephone visit will be conducted via a call between you and your physician/provider. We have found that certain health care needs can be provided without the need for a physical exam.  This service lets us provide the care you need with a short phone conversation.  If a prescription is necessary we can send it directly to your pharmacy.  If lab work is needed we can place an order for that and you can then stop by our lab to have the test done at a later time.    Telephone visits are billed at different rates depending on your insurance coverage. During this emergency period, for some insurers they may be billed the same as an in-person visit.  Please reach out to your insurance provider with any questions.    If during the course of the call the physician/provider feels a telephone visit is not appropriate, you will not be charged for this service.\"    Patient has given verbal consent for Telephone visit?  Yes    What phone number would you like to be contacted at? [phone]    How would you like to obtain your AVS? MyChart        I have reviewed and updated the patient's allergies and medication list. Patient was asked if they had any patient reported vital signs to present, if yes, please see documented vitals.  Patient was also asked for their current weight and height, if presented, documented in vitals.      Concerns: Pt said he has no concerns.    Refills: Refill of Diazepam needed.       [place] CMA ([facility])      "

## 2020-10-08 NOTE — TELEPHONE ENCOUNTER
Pre-Procedure Negative COVID Test Results    Results Reviewed  The patient has a negative COVID test result within the required timeframe for the scheduled procedure.     No COVID pre-call needed.     Josy Dominique RN
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
## 2020-10-09 ENCOUNTER — HOSPITAL ENCOUNTER (OUTPATIENT)
Facility: CLINIC | Age: 60
Discharge: HOME | End: 2020-10-09
Admitting: RADIOLOGY
Payer: COMMERCIAL

## 2020-10-09 ENCOUNTER — HOSPITAL ENCOUNTER (OUTPATIENT)
Dept: ULTRASOUND IMAGING | Facility: CLINIC | Age: 60
End: 2020-10-09
Attending: INTERNAL MEDICINE
Payer: COMMERCIAL

## 2020-10-09 VITALS
SYSTOLIC BLOOD PRESSURE: 122 MMHG | OXYGEN SATURATION: 98 % | TEMPERATURE: 97.1 F | DIASTOLIC BLOOD PRESSURE: 70 MMHG | HEART RATE: 64 BPM | RESPIRATION RATE: 16 BRPM

## 2020-10-09 DIAGNOSIS — K70.31 ALCOHOLIC CIRRHOSIS OF LIVER WITH ASCITES (H): ICD-10-CM

## 2020-10-09 DIAGNOSIS — K70.31 ASCITES DUE TO ALCOHOLIC CIRRHOSIS (H): Primary | ICD-10-CM

## 2020-10-09 PROCEDURE — 272N000047 US PARACENTESIS

## 2020-10-09 PROCEDURE — P9047 ALBUMIN (HUMAN), 25%, 50ML: HCPCS | Performed by: INTERNAL MEDICINE

## 2020-10-09 PROCEDURE — 999N000154 HC STATISTIC RADIOLOGY XRAY, US, CT, MAR, NM

## 2020-10-09 PROCEDURE — 250N000011 HC RX IP 250 OP 636: Performed by: INTERNAL MEDICINE

## 2020-10-09 RX ORDER — ALBUMIN (HUMAN) 12.5 G/50ML
12.5 SOLUTION INTRAVENOUS
Status: CANCELLED | OUTPATIENT
Start: 2020-10-15

## 2020-10-09 RX ORDER — LIDOCAINE HYDROCHLORIDE 10 MG/ML
20 INJECTION, SOLUTION EPIDURAL; INFILTRATION; INTRACAUDAL; PERINEURAL ONCE
Status: DISCONTINUED | OUTPATIENT
Start: 2020-10-09 | End: 2020-10-09 | Stop reason: HOSPADM

## 2020-10-09 RX ORDER — ALBUMIN (HUMAN) 12.5 G/50ML
12.5 SOLUTION INTRAVENOUS
Status: DISCONTINUED | OUTPATIENT
Start: 2020-10-09 | End: 2020-10-09 | Stop reason: HOSPADM

## 2020-10-09 RX ORDER — LIDOCAINE HYDROCHLORIDE 10 MG/ML
10 INJECTION, SOLUTION EPIDURAL; INFILTRATION; INTRACAUDAL; PERINEURAL ONCE
Status: COMPLETED | OUTPATIENT
Start: 2020-10-09 | End: 2020-10-09

## 2020-10-09 RX ORDER — HEPARIN SODIUM (PORCINE) LOCK FLUSH IV SOLN 100 UNIT/ML 100 UNIT/ML
5 SOLUTION INTRAVENOUS
Status: CANCELLED | OUTPATIENT
Start: 2020-10-15

## 2020-10-09 RX ORDER — HEPARIN SODIUM,PORCINE 10 UNIT/ML
5 VIAL (ML) INTRAVENOUS
Status: CANCELLED | OUTPATIENT
Start: 2020-10-15

## 2020-10-09 RX ORDER — LIDOCAINE 40 MG/G
CREAM TOPICAL
Status: DISCONTINUED | OUTPATIENT
Start: 2020-10-09 | End: 2020-10-09 | Stop reason: HOSPADM

## 2020-10-09 RX ORDER — ALBUMIN (HUMAN) 12.5 G/50ML
12.5 SOLUTION INTRAVENOUS ONCE
Status: DISCONTINUED | OUTPATIENT
Start: 2020-10-09 | End: 2020-10-09 | Stop reason: HOSPADM

## 2020-10-09 RX ORDER — LIDOCAINE HYDROCHLORIDE 10 MG/ML
20 INJECTION, SOLUTION EPIDURAL; INFILTRATION; INTRACAUDAL; PERINEURAL ONCE
Status: CANCELLED | OUTPATIENT
Start: 2020-10-15 | End: 2020-10-15

## 2020-10-09 RX ADMIN — ALBUMIN HUMAN 12.5 G: 0.25 SOLUTION INTRAVENOUS at 11:23

## 2020-10-09 RX ADMIN — LIDOCAINE HYDROCHLORIDE 10 ML: 10 INJECTION, SOLUTION EPIDURAL; INFILTRATION; INTRACAUDAL; PERINEURAL at 11:07

## 2020-10-09 RX ADMIN — ALBUMIN HUMAN 12.5 G: 0.25 SOLUTION INTRAVENOUS at 11:30

## 2020-10-09 NOTE — PROGRESS NOTES
Care Suites Procedure Nursing Note    Patient Information  Name: Ten Johnson  Age: 60 year old    Procedure  Procedure: Paracentesis  Informed consent 1104  Time out done 1105  Procedure start time: 1106  Procedure complete time: 1132  Concerns/abnormal assessment: None at this time.  If abnormal assessment, provider notified: N/A  Plan/Other: Return to care suites    Vidya Crews RN     Paracentesis: Pt tolerated well. VSS. 5,300 cc Clear yellow fluid removed from abdomen w/o difficulty. Bandaid applied to site - CDI. 25 grams Albumin given per therapy order.       Back to room at 1140.  Hand off report given to Zaire HILL RN

## 2020-10-09 NOTE — DISCHARGE SUMMARY
Care Suites Discharge Nursing Note    Patient Information  Name: Ten Johnson  Age: 60 year old    Discharge Education:  Discharge instructions reviewed: Yes  Additional education/resources provided: no  Patient/patient representative verbalizes understanding: Yes  Patient discharging on new medications: No  Medication education completed: N/A    Discharge Plans:   Discharge location: home  Discharge ride contacted: N/A  Approximate discharge time: 1200    Discharge Criteria:  Discharge criteria met and vital signs stable: Yes    Patient Belongs:  Patient belongings returned to patient: Yes    Milo Sheppard RN

## 2020-10-09 NOTE — PROGRESS NOTES
RADIOLOGY PROCEDURE NOTE  Patient name: Ten Johnson  MRN: [id number]  : 1960    Pre-procedure diagnosis: Ascites  Post-procedure diagnosis: Same    Procedure Date/Time: 2020  11:59 AM  Procedure: Paracentesis  Estimated blood loss: None  Specimen(s) collected with description: Ascites.  The patient tolerated the procedure well with no immediate complications.    See imaging dictation for procedural details and findings.    Provider name: Eladio Bradshaw MD  Assistant(s):None

## 2020-10-09 NOTE — DISCHARGE INSTRUCTIONS

## 2020-10-09 NOTE — PROGRESS NOTES
Care Suites Admission Nursing Note    Patient Information  Name: Ten Johnson  Age: 60 year old  Reason for admission: paracentesis  Care Suites arrival time: 0947    Visitor Information  Name: n./a  Informed of visitor restrictions: N/A  1 visitor allowed per patient   Visitor must screen negative for COVID symptoms   Visitor must wear a mask  Waiting rooms closed to visitors    Patient Admission/Assessment   Pre-procedure assessment complete: Yes  If abnormal assessment/labs, provider notified: N/A  NPO: Yes  Medications held per instructions/orders: Yes  Consent: obtained  If applicable, pregnancy test status: n/a  Patient oriented to room: Yes  Education/questions answered: Yes  Plan/other: proceed    Discharge Planning  Discharge name/phone number:n/a  Overnight post sedation caregiver:n/a  Discharge location: home  PATIENT/VISITOR WELLNESS SCREENING    Step 1 Patient Screening    1. In the last month, have you been in contact with someone who was confirmed or suspected to have Coronavirus/COVID-19? No    2. Do you have the following symptoms?  Fever/Chills? No   Cough? No   Shortness of breath? No   New loss of taste or smell? No  Sore throat? No  Muscle or body aches? No  Headaches? No  Fatigue? No  Vomiting or diarrhea? No    Step 2 Visitor Screening    1. Name of Visitor (1 visitor per patient): n/a        If the visitor has positive symptoms, notify supervisor/manger  Per policy, the visitor will need to leave the facility     Step 3 Refer to logic grid below for actions    NO SYMPTOM(S)    ACTIONS:  1. Standard rooming process  2. Provider to assess per normal protocol  3. Implement precautions as needed and per guidelines     POSITIVE SYMPTOM(S)  If positive for ANY of the following symptoms: fever, cough, shortness of breath, rash    ACTION:  1. Continue to have the patient wear a mask   2. Room patient as soon as possible  3. Don appropriate PPE when entering room  4. Provider evaluation    Manny KO  JAMES Vargas

## 2020-10-20 ENCOUNTER — OFFICE VISIT (OUTPATIENT)
Dept: INFUSION THERAPY | Facility: CLINIC | Age: 60
End: 2020-10-20
Attending: INTERNAL MEDICINE
Payer: COMMERCIAL

## 2020-10-20 ENCOUNTER — ANCILLARY PROCEDURE (OUTPATIENT)
Dept: ULTRASOUND IMAGING | Facility: CLINIC | Age: 60
End: 2020-10-20
Attending: INTERNAL MEDICINE
Payer: COMMERCIAL

## 2020-10-20 VITALS
DIASTOLIC BLOOD PRESSURE: 93 MMHG | SYSTOLIC BLOOD PRESSURE: 145 MMHG | OXYGEN SATURATION: 97 % | HEART RATE: 71 BPM | WEIGHT: 150.4 LBS | RESPIRATION RATE: 16 BRPM | BODY MASS INDEX: 22.21 KG/M2

## 2020-10-20 DIAGNOSIS — K70.31 ALCOHOLIC CIRRHOSIS OF LIVER WITH ASCITES (H): ICD-10-CM

## 2020-10-20 DIAGNOSIS — K70.31 ASCITES DUE TO ALCOHOLIC CIRRHOSIS (H): Primary | ICD-10-CM

## 2020-10-20 LAB — PLATELET # BLD AUTO: 119 10E9/L (ref 150–450)

## 2020-10-20 PROCEDURE — 36415 COLL VENOUS BLD VENIPUNCTURE: CPT

## 2020-10-20 PROCEDURE — 999N000127 HC STATISTIC PERIPHERAL IV START W US GUIDANCE

## 2020-10-20 PROCEDURE — 250N000009 HC RX 250: Performed by: INTERNAL MEDICINE

## 2020-10-20 PROCEDURE — P9047 ALBUMIN (HUMAN), 25%, 50ML: HCPCS | Performed by: INTERNAL MEDICINE

## 2020-10-20 PROCEDURE — 272N000021 US PARACENTESIS

## 2020-10-20 PROCEDURE — 99207 PR NO CHARGE LOS: CPT

## 2020-10-20 PROCEDURE — 49083 ABD PARACENTESIS W/IMAGING: CPT | Performed by: RADIOLOGY

## 2020-10-20 PROCEDURE — 85049 AUTOMATED PLATELET COUNT: CPT | Performed by: PHYSICIAN ASSISTANT

## 2020-10-20 PROCEDURE — 250N000011 HC RX IP 250 OP 636: Performed by: INTERNAL MEDICINE

## 2020-10-20 RX ORDER — HEPARIN SODIUM (PORCINE) LOCK FLUSH IV SOLN 100 UNIT/ML 100 UNIT/ML
5 SOLUTION INTRAVENOUS
Status: CANCELLED | OUTPATIENT
Start: 2020-10-27

## 2020-10-20 RX ORDER — HEPARIN SODIUM,PORCINE 10 UNIT/ML
5 VIAL (ML) INTRAVENOUS
Status: CANCELLED | OUTPATIENT
Start: 2020-10-27

## 2020-10-20 RX ORDER — ALBUMIN (HUMAN) 12.5 G/50ML
12.5 SOLUTION INTRAVENOUS
Status: DISCONTINUED | OUTPATIENT
Start: 2020-10-20 | End: 2020-10-20 | Stop reason: HOSPADM

## 2020-10-20 RX ORDER — LIDOCAINE HYDROCHLORIDE 10 MG/ML
20 INJECTION, SOLUTION EPIDURAL; INFILTRATION; INTRACAUDAL; PERINEURAL ONCE
Status: COMPLETED | OUTPATIENT
Start: 2020-10-20 | End: 2020-10-20

## 2020-10-20 RX ORDER — LIDOCAINE HYDROCHLORIDE 10 MG/ML
20 INJECTION, SOLUTION EPIDURAL; INFILTRATION; INTRACAUDAL; PERINEURAL ONCE
Status: CANCELLED | OUTPATIENT
Start: 2020-10-27 | End: 2020-10-27

## 2020-10-20 RX ORDER — ALBUMIN (HUMAN) 12.5 G/50ML
12.5 SOLUTION INTRAVENOUS
Status: CANCELLED | OUTPATIENT
Start: 2020-10-27

## 2020-10-20 RX ADMIN — ALBUMIN HUMAN 12.5 G: 0.25 SOLUTION INTRAVENOUS at 14:33

## 2020-10-20 RX ADMIN — LIDOCAINE HYDROCHLORIDE 8 ML: 10 INJECTION, SOLUTION EPIDURAL; INFILTRATION; INTRACAUDAL; PERINEURAL at 14:05

## 2020-10-20 RX ADMIN — ALBUMIN HUMAN 12.5 G: 0.25 SOLUTION INTRAVENOUS at 14:19

## 2020-10-20 NOTE — LETTER
10/20/2020         RE: Ten Johnson  [address]  [facility] [address]        Dear Colleague,    Thank you for referring your patient, Ten Johnson, to the [facility]VANC[facility] TREATMENT [facility]. Please see a copy of my visit note below.    Paracentesis Nursing Note  Ten Johnson presents today to [facility].    During today's appointment orders from Dr. Leventhal were completed.    Progress Note:  Patient identification verified by name and date of birth.  Assessment completed.  Vitals monitored throughout appointment and recorded in Doc Flowsheets.  See proceduralist note in ultrasound.    Vascular Access: peripheral IV was placed by vascular access nurse.  Labs: were drawn per orders.   Date of consent or authorization: 10/20/20 - 1/18/2021.  Invasive Procedure Safety Checklist was completed and sent for scanning.   Paracentesis performed by Dr. Orozco.    The following labs were communicated to provider performing paracentesis:  Lab Results   Component Value Date     10/20/2020     Total amount of ascites fluid drained: 5.3 liters.  Color of ascites fluid: yellow.  Total amount of albumin given: 25  grams.  Patient tolerated procedure well.  Post procedure,denies pain or discomfort post paracentesis.    Discharge Plan:  Discharge instructions were reviewed with patient.  Patient/Representative verbalized understanding and all questions were answered.   Discharged from [facility] in stable condition.    Karina Eubanks RN    Administrations This Visit     lidocaine (PF) (XYLOCAINE) 1 % injection 20 mL     Admin Date  10/20/2020 Action  Given Dose  8 mL Route  Subcutaneous Administered By  Karina Eubanks RN                BP (!) 145/93   Pulse 71   Resp 16   Wt 73.5 kg (162 lb)   SpO2 97%   BMI 23.92 kg/m            Again, thank you for allowing me to participate in the care of your  patient.        Sincerely,        [facility] Provider

## 2020-10-20 NOTE — PROGRESS NOTES
Paracentesis Nursing Note  Ten Johnson presents today to [facility] for a [facility]acentesis.    During today's appointment orders from Dr. Leventhal were completed.    Progress Note:  Patient identification verified by name and date of birth.  Assessment completed.  Vitals monitored throughout appointment and recorded in Doc Flowsheets.  See proceduralist note in ultrasound.    Vascular Access: peripheral IV was placed by vascular access nurse.  Labs: were drawn per orders.   Date of consent or authorization: 10/20/20 - 1/18/2021.  Invasive Procedure Safety Checklist was completed and sent for scanning.   Paracentesis performed by Dr. Orozco.    The following labs were communicated to provider performing paracentesis:  Lab Results   Component Value Date     10/20/2020     Total amount of ascites fluid drained: 5.3 liters.  Color of ascites fluid: yellow.  Total amount of albumin given: 25  grams.  Patient tolerated procedure well.  Post procedure,denies pain or discomfort post paracentesis.    Discharge Plan:  Discharge instructions were reviewed with patient.  Patient/Representative verbalized understanding and all questions were answered.   Discharged from [facility] in stable condition.    Karina Eubanks RN    Administrations This Visit     lidocaine (PF) (XYLOCAINE) 1 % injection 20 mL     Admin Date  10/20/2020 Action  Given Dose  8 mL Route  Subcutaneous Administered By  Karina Eubanks RN                BP (!) 145/93   Pulse 71   Resp 16   Wt 73.5 kg (162 lb)   SpO2 97%   BMI 23.92 kg/m

## 2020-10-29 ENCOUNTER — ANCILLARY PROCEDURE (OUTPATIENT)
Dept: ULTRASOUND IMAGING | Facility: CLINIC | Age: 60
End: 2020-10-29
Attending: INTERNAL MEDICINE
Payer: COMMERCIAL

## 2020-10-29 ENCOUNTER — OFFICE VISIT (OUTPATIENT)
Dept: INFUSION THERAPY | Facility: CLINIC | Age: 60
End: 2020-10-29
Attending: INTERNAL MEDICINE
Payer: COMMERCIAL

## 2020-10-29 VITALS
HEART RATE: 62 BPM | SYSTOLIC BLOOD PRESSURE: 107 MMHG | BODY MASS INDEX: 22.74 KG/M2 | TEMPERATURE: 98.1 F | RESPIRATION RATE: 16 BRPM | OXYGEN SATURATION: 98 % | WEIGHT: 154 LBS | DIASTOLIC BLOOD PRESSURE: 70 MMHG

## 2020-10-29 DIAGNOSIS — K70.31 ASCITES DUE TO ALCOHOLIC CIRRHOSIS (H): Primary | ICD-10-CM

## 2020-10-29 LAB — INR PPP: 1.13 (ref 0.86–1.14)

## 2020-10-29 PROCEDURE — 85610 PROTHROMBIN TIME: CPT | Performed by: INTERNAL MEDICINE

## 2020-10-29 PROCEDURE — 999N000128 HC STATISTIC PERIPHERAL IV START W/O US GUIDANCE

## 2020-10-29 PROCEDURE — P9047 ALBUMIN (HUMAN), 25%, 50ML: HCPCS | Performed by: INTERNAL MEDICINE

## 2020-10-29 PROCEDURE — 272N000021 US PARACENTESIS

## 2020-10-29 PROCEDURE — 36415 COLL VENOUS BLD VENIPUNCTURE: CPT

## 2020-10-29 PROCEDURE — 49083 ABD PARACENTESIS W/IMAGING: CPT | Performed by: RADIOLOGY

## 2020-10-29 PROCEDURE — 99207 PR NO CHARGE LOS: CPT

## 2020-10-29 PROCEDURE — 250N000011 HC RX IP 250 OP 636: Performed by: INTERNAL MEDICINE

## 2020-10-29 PROCEDURE — 250N000009 HC RX 250: Performed by: INTERNAL MEDICINE

## 2020-10-29 RX ORDER — HEPARIN SODIUM,PORCINE 10 UNIT/ML
5 VIAL (ML) INTRAVENOUS
Status: CANCELLED | OUTPATIENT
Start: 2020-11-03

## 2020-10-29 RX ORDER — LIDOCAINE HYDROCHLORIDE 10 MG/ML
20 INJECTION, SOLUTION EPIDURAL; INFILTRATION; INTRACAUDAL; PERINEURAL ONCE
Status: COMPLETED | OUTPATIENT
Start: 2020-10-29 | End: 2020-10-29

## 2020-10-29 RX ORDER — ALBUMIN (HUMAN) 12.5 G/50ML
12.5 SOLUTION INTRAVENOUS
Status: CANCELLED | OUTPATIENT
Start: 2020-11-03

## 2020-10-29 RX ORDER — LIDOCAINE HYDROCHLORIDE 10 MG/ML
20 INJECTION, SOLUTION EPIDURAL; INFILTRATION; INTRACAUDAL; PERINEURAL ONCE
Status: CANCELLED | OUTPATIENT
Start: 2020-11-03 | End: 2020-11-03

## 2020-10-29 RX ORDER — HEPARIN SODIUM (PORCINE) LOCK FLUSH IV SOLN 100 UNIT/ML 100 UNIT/ML
5 SOLUTION INTRAVENOUS
Status: CANCELLED | OUTPATIENT
Start: 2020-11-03

## 2020-10-29 RX ORDER — ALBUMIN (HUMAN) 12.5 G/50ML
12.5 SOLUTION INTRAVENOUS
Status: DISCONTINUED | OUTPATIENT
Start: 2020-10-29 | End: 2020-10-29 | Stop reason: HOSPADM

## 2020-10-29 RX ADMIN — ALBUMIN HUMAN 12.5 G: 0.25 SOLUTION INTRAVENOUS at 14:28

## 2020-10-29 RX ADMIN — LIDOCAINE HYDROCHLORIDE 10 ML: 10 INJECTION, SOLUTION EPIDURAL; INFILTRATION; INTRACAUDAL; PERINEURAL at 14:16

## 2020-10-29 NOTE — LETTER
10/29/2020         RE: Ten Johnson  [address]  [facility] [address]        Dear Colleague,    Thank you for referring your patient, Ten Johnson, to the [facility] TREATMENT [facility]. Please see a copy of my visit note below.    Paracentesis Nursing Note  Ten Johnson presents today to [facility] for a [facility]acentesis.    During today's appointment orders from Dr. Leventhal were completed.    Progress Note:  Patient identification verified by name and date of birth.  Assessment completed.  Vitals monitored throughout appointment and recorded in Doc Flowsheets.  See proceduralist note in ultrasound.    Vascular Access: peripheral IV was placed by vascular access nurse.  Labs: were drawn per orders.     Date of consent or authorization: 10/20.  Invasive Procedure Safety Checklist was completed and sent for scanning.     Paracentesis performed by Dr. Orozco.    The following labs were communicated to provider performing paracentesis:  Lab Results   Component Value Date     10/20/2020       Total amount of ascites fluid drained: 4.9 liters.  Color of ascites fluid: pale yellow.  Total amount of albumin given: 12.5  grams.    Patient tolerated procedure well.    Post procedure,denies pain or discomfort post paracentesis.      Discharge Plan:  Discharge instructions were reviewed with patient.  Patient/Representative verbalized understanding and all questions were answered.   Discharged from [facility] in stable condition.    Vy Holt RN    Administrations This Visit     albumin human 25 % injection 12.5 g     Admin Date  10/29/2020 Action  New Bag Dose  12.5 g Route  Intravenous Administered By  Vy Holt RN          lidocaine (PF) (XYLOCAINE) 1 % injection 20 mL     Admin Date  10/29/2020 Action  Given by Other Dose  10 mL Route  Subcutaneous Administered By  Vy Holt RN                BP  107/70   Pulse 62   Temp 98.1  F (36.7  C) (Oral)   Resp 16   Wt 74.8 kg (164 lb 12.8 oz)   SpO2 97%   BMI 24.34 kg/m          Again, thank you for allowing me to participate in the care of your patient.        Sincerely,        [facility] Provider

## 2020-10-29 NOTE — PROGRESS NOTES
Paracentesis Nursing Note  Ten Johnson presents today to [facility].    During today's appointment orders from Dr. Leventhal were completed.    Progress Note:  Patient identification verified by name and date of birth.  Assessment completed.  Vitals monitored throughout appointment and recorded in Doc Flowsheets.  See proceduralist note in ultrasound.    Vascular Access: peripheral IV was placed by vascular access nurse.  Labs: were drawn per orders.     Date of consent or authorization: 10/20.  Invasive Procedure Safety Checklist was completed and sent for scanning.     Paracentesis performed by Dr. Orozco.    The following labs were communicated to provider performing paracentesis:  Lab Results   Component Value Date     10/20/2020       Total amount of ascites fluid drained: 4.9 liters.  Color of ascites fluid: pale yellow.  Total amount of albumin given: 12.5  grams.    Patient tolerated procedure well.    Post procedure,denies pain or discomfort post paracentesis.      Discharge Plan:  Discharge instructions were reviewed with patient.  Patient/Representative verbalized understanding and all questions were answered.   Discharged [facility] in stable condition.    Vy Holt RN    Administrations This Visit     albumin human 25 % injection 12.5 g     Admin Date  10/29/2020 Action  New Bag Dose  12.5 g Route  Intravenous Administered By  Vy Holt RN          lidocaine (PF) (XYLOCAINE) 1 % injection 20 mL     Admin Date  10/29/2020 Action  Given by Other Dose  10 mL Route  Subcutaneous Administered By  Vy Holt RN                /70   Pulse 62   Temp 98.1  F (36.7  C) (Oral)   Resp 16   Wt 74.8 kg (164 lb 12.8 oz)   SpO2 97%   BMI 24.34 kg/m

## 2020-11-06 ENCOUNTER — MYC REFILL (OUTPATIENT)
Dept: ONCOLOGY | Facility: CLINIC | Age: 60
End: 2020-11-06

## 2020-11-06 ENCOUNTER — MYC REFILL (OUTPATIENT)
Dept: CARDIOLOGY | Facility: CLINIC | Age: 60
End: 2020-11-06

## 2020-11-06 DIAGNOSIS — F41.9 ANXIETY: ICD-10-CM

## 2020-11-06 DIAGNOSIS — J30.9 CHRONIC ALLERGIC RHINITIS: ICD-10-CM

## 2020-11-06 DIAGNOSIS — C22.0 HCC (HEPATOCELLULAR CARCINOMA) (H): ICD-10-CM

## 2020-11-06 DIAGNOSIS — Z95.3 S/P TAVR (TRANSCATHETER AORTIC VALVE REPLACEMENT), BIOPROSTHETIC: ICD-10-CM

## 2020-11-09 ENCOUNTER — ANCILLARY PROCEDURE (OUTPATIENT)
Dept: ULTRASOUND IMAGING | Facility: CLINIC | Age: 60
End: 2020-11-09
Attending: INTERNAL MEDICINE
Payer: COMMERCIAL

## 2020-11-09 ENCOUNTER — OFFICE VISIT (OUTPATIENT)
Dept: INFUSION THERAPY | Facility: CLINIC | Age: 60
End: 2020-11-09
Attending: INTERNAL MEDICINE
Payer: COMMERCIAL

## 2020-11-09 VITALS
WEIGHT: 147.3 LBS | RESPIRATION RATE: 16 BRPM | HEART RATE: 57 BPM | TEMPERATURE: 98.3 F | DIASTOLIC BLOOD PRESSURE: 83 MMHG | SYSTOLIC BLOOD PRESSURE: 129 MMHG | BODY MASS INDEX: 21.75 KG/M2

## 2020-11-09 DIAGNOSIS — K70.31 ASCITES DUE TO ALCOHOLIC CIRRHOSIS (H): Primary | ICD-10-CM

## 2020-11-09 PROCEDURE — 49083 ABD PARACENTESIS W/IMAGING: CPT | Performed by: RADIOLOGY

## 2020-11-09 PROCEDURE — 999N000127 HC STATISTIC PERIPHERAL IV START W US GUIDANCE

## 2020-11-09 PROCEDURE — 99207 PR NO CHARGE LOS: CPT

## 2020-11-09 PROCEDURE — 250N000009 HC RX 250: Performed by: INTERNAL MEDICINE

## 2020-11-09 PROCEDURE — 272N000021 US PARACENTESIS

## 2020-11-09 RX ORDER — DIAZEPAM 5 MG
TABLET ORAL
Qty: 2 TABLET | Refills: 0 | Status: SHIPPED | OUTPATIENT
Start: 2020-11-09 | End: 2021-02-01

## 2020-11-09 RX ORDER — LIDOCAINE HYDROCHLORIDE 10 MG/ML
20 INJECTION, SOLUTION EPIDURAL; INFILTRATION; INTRACAUDAL; PERINEURAL ONCE
Status: CANCELLED | OUTPATIENT
Start: 2020-11-16 | End: 2020-11-16

## 2020-11-09 RX ORDER — LIDOCAINE HYDROCHLORIDE 10 MG/ML
20 INJECTION, SOLUTION EPIDURAL; INFILTRATION; INTRACAUDAL; PERINEURAL ONCE
Status: COMPLETED | OUTPATIENT
Start: 2020-11-09 | End: 2020-11-09

## 2020-11-09 RX ORDER — HEPARIN SODIUM (PORCINE) LOCK FLUSH IV SOLN 100 UNIT/ML 100 UNIT/ML
5 SOLUTION INTRAVENOUS
Status: CANCELLED | OUTPATIENT
Start: 2020-11-16

## 2020-11-09 RX ORDER — HEPARIN SODIUM,PORCINE 10 UNIT/ML
5 VIAL (ML) INTRAVENOUS
Status: CANCELLED | OUTPATIENT
Start: 2020-11-16

## 2020-11-09 RX ORDER — ALBUMIN (HUMAN) 12.5 G/50ML
12.5 SOLUTION INTRAVENOUS
Status: CANCELLED | OUTPATIENT
Start: 2020-11-16

## 2020-11-09 RX ORDER — HEPARIN SODIUM (PORCINE) LOCK FLUSH IV SOLN 100 UNIT/ML 100 UNIT/ML
5 SOLUTION INTRAVENOUS
Status: CANCELLED | OUTPATIENT
Start: 2020-11-09

## 2020-11-09 RX ORDER — ALBUMIN (HUMAN) 12.5 G/50ML
12.5 SOLUTION INTRAVENOUS
Status: CANCELLED | OUTPATIENT
Start: 2020-11-09

## 2020-11-09 RX ORDER — ALBUMIN (HUMAN) 12.5 G/50ML
12.5 SOLUTION INTRAVENOUS
Status: DISCONTINUED | OUTPATIENT
Start: 2020-11-09 | End: 2020-11-09 | Stop reason: HOSPADM

## 2020-11-09 RX ORDER — LIDOCAINE HYDROCHLORIDE 10 MG/ML
20 INJECTION, SOLUTION EPIDURAL; INFILTRATION; INTRACAUDAL; PERINEURAL ONCE
Status: CANCELLED | OUTPATIENT
Start: 2020-11-09 | End: 2020-11-09

## 2020-11-09 RX ORDER — HEPARIN SODIUM,PORCINE 10 UNIT/ML
5 VIAL (ML) INTRAVENOUS
Status: CANCELLED | OUTPATIENT
Start: 2020-11-09

## 2020-11-09 RX ADMIN — LIDOCAINE HYDROCHLORIDE 10 ML: 10 INJECTION, SOLUTION EPIDURAL; INFILTRATION; INTRACAUDAL; PERINEURAL at 14:19

## 2020-11-09 NOTE — PATIENT INSTRUCTIONS
Dear Ten Johnson    Thank you for choosing [facility] ([facility]) for your Paracentesis.  The following information is a summary of our appointment as well as important reminders.      Patient Education     Discharge Instructions for Paracentesis   Paracentesis is a procedure to remove extra fluid from your belly (abdomen). This fluid buildup in the abdomen is called ascites. The procedure may have been done to take a sample of the fluid. Or, it may have been done to drain the extra fluid from your abdomen and help make you more comfortable.     Ascites is buildup of excess fluid in the abdomen.   Home care    If you have pain after the procedure, your healthcare provider can prescribe or recommend pain medicines. Take these exactly as directed. If you stopped taking other medicines before the procedure, ask your provider when you can start them again.    Take it easy for 24 hours after the procedure. Don't do any physical activity until your provider says it s OK.    You will have a small bandage over the puncture site. Stitches, surgical staples, adhesive tapes, adhesive strips, or surgical glue may be used to close the incision. They also help stop bleeding and speed healing. You may take the bandage off in 24 hours.    Check the puncture site for the signs of infection listed below.    Follow-up care  Make a follow-up appointment with your healthcare provider as directed. During your follow-up visit, your provider will check your healing. Let your provider know how you are feeling. You can also discuss the cause of your ascites and if you need any further treatment. If your fluid is infected, you will be sent home on antibiotics. In some cases, the paracentesis may need to be repeated if the fluid returns. Your provider may also prescribe medicines that increase urination (diuretics) to decrease the buildup of fluid.  When to call your healthcare  provider  Call your healthcare provider if you have any of the following after the procedure:    A fever of 100.4  F ( 38.0 C) or higher, or as directed by your provider    Chills    Trouble breathing    Pain that doesn't go away even after taking pain medicine    Belly pain not caused by having the skin punctured    Bleeding from the puncture site    More than a small amount of fluid leaking from the puncture site    Swollen belly    Signs of infection at the puncture site. These include increased pain, redness, or swelling, warmth, or bad-smelling drainage.    Blood in your urine    Feeling dizzy or lightheaded, or fainting  [employer] last reviewed this educational content on 10/1/2019    2654-3189 [employer]. [address] [place] [address], [place] [address]. All rights reserved. This information is not intended as a substitute for professional medical care. Always follow your healthcare professional's instructions.             We look forward in seeing you on your next appointment here at [facility] ([facility]).  Please don t hesitate to call us at [phone] to reschedule any of your appointments or to speak with one of the [facility] registered nurses.  It was a pleasure taking care of you today.    Sincerely,    [facility]  [facility]  [address] [place] [address]  [id number]  Phone:  [phone]

## 2020-11-09 NOTE — LETTER
11/9/2020         RE: Ten Johnson  [address]  [place] [facility] [address]        Dear Colleague,    Thank you for referring your patient, Ten Johnson, to the [facility] AD[facility]REATMENT [facility]. Please see a copy of my visit note below.    Paracentesis Nursing Note  Ten Johnson presents today to [facility] for a [facility]esis.    During today's appointment orders from Dr. Thomas Leventhal were completed.    Progress Note:  Patient identification verified by name and date of birth.  Assessment completed.  Vitals monitored throughout appointment and recorded in Doc Flowsheets.  See proceduralist note in ultrasound.    Vascular Access: peripheral IV was placed by vascular access nurse.  Labs: were not ordered for this appointment.    Date of consent or authorization: 10/20/2020  Invasive Procedure Safety Checklist was completed and sent for scanning.     Paracentesis performed by Dr. Paulson.    The following labs were communicated to provider performing paracentesis:  Lab Results   Component Value Date     10/20/2020       Total amount of ascites fluid drained: 2.4 liters.  Color of ascites fluid: yellow.  Total amount of albumin given: zero grams.    Patient tolerated procedure well. Denies discomfort, pain or lightheaded/dizziness post paracentesis. Declined printed AVS, discharged from [facility] in stable condition with next paracentesis scheduled 11/19.      Discharge Plan:  Discharge instructions were reviewed with patient.  Patient/Representative verbalized understanding and all questions were answered.   Discharged from [facility] in stable condition.    Olimpia Adan RN       Administrations This Visit     lidocaine (PF) (XYLOCAINE) 1 % injection 20 mL     Admin Date  11/09/2020 Action  Given by Other Clinician Dose  10 mL Route  Subcutaneous Administered By  Olimpia Adan RN                /85   Temp  98.3  F (36.8  C) (Oral)   Resp 16   Wt 69.3 kg (152 lb 11.2 oz)   BMI 22.55 kg/m          Again, thank you for allowing me to participate in the care of your patient.        Sincerely,        [facility] Provider

## 2020-11-09 NOTE — PROGRESS NOTES
Paracentesis Nursing Note  Ten Johnson presents today to [facility] for a [facility].    During today's appointment orders from Dr. Thomas Leventhal were completed.    Progress Note:  Patient identification verified by name and date of birth.  Assessment completed.  Vitals monitored throughout appointment and recorded in Doc Flowsheets.  See proceduralist note in ultrasound.    Vascular Access: peripheral IV was placed by vascular access nurse.  Labs: were not ordered for this appointment.    Date of consent or authorization: 10/20/2020  Invasive Procedure Safety Checklist was completed and sent for scanning.     Paracentesis performed by Dr. Paulson.    The following labs were communicated to provider performing paracentesis:  Lab Results   Component Value Date     10/20/2020       Total amount of ascites fluid drained: 2.4 liters.  Color of ascites fluid: yellow.  Total amount of albumin given: zero grams.    Patient tolerated procedure well. Denies discomfort, pain or lightheaded/dizziness post paracentesis. Declined printed AVS, discharged from [facility] in stable condition with next paracentesis scheduled 11/19.      Discharge Plan:  Discharge instructions were reviewed with patient.  Patient/Representative verbalized understanding and all questions were answered.   Discharged from [facility] in stable condition.    Olimpia Adan RN       Administrations This Visit     lidocaine (PF) (XYLOCAINE) 1 % injection 20 mL     Admin Date  11/09/2020 Action  Given by Other Clinician Dose  10 mL Route  Subcutaneous Administered By  Olimpia Adan, RN                /85   Temp 98.3  F (36.8  C) (Oral)   Resp 16   Wt 69.3 kg (152 lb 11.2 oz)   BMI 22.55 kg/m

## 2020-11-09 NOTE — TELEPHONE ENCOUNTER
ASA 81 MG TABS      Last Written Prescription Date:  12-24-19  Last Fill Quantity: 90,   # refills: 3  Last Office Visit : 9-    Plan Summary:  1) Start aspirin 81 mg daily lifelong  2) Increase lisinopril to 20 mg daily  3) Lifelong antibiotic prophylaxis prior to all dental procedures.  4) Follow-up with valve clinic in 6 months with labs, ECG and echo prior to albina    Future Office visit:  NONE    Routing refill request to provider for review/approval because:  Patient has not been seen in over 2 yerars and recommended RTC was 6 months - no scheduled appointment.  Scheduling has been notified to contact the pt for appointment.        Kathleen M Doege RN

## 2020-11-11 RX ORDER — ASPIRIN 81 MG/1
81 TABLET ORAL DAILY
Qty: 90 TABLET | Refills: 3 | Status: SHIPPED | OUTPATIENT
Start: 2020-11-11 | End: 2021-02-17

## 2020-11-14 ENCOUNTER — HEALTH MAINTENANCE LETTER (OUTPATIENT)
Age: 60
End: 2020-11-14

## 2020-11-20 DIAGNOSIS — Z01.812 ENCOUNTER FOR PREPROCEDURE SCREENING LABORATORY TESTING FOR COVID-19: Primary | ICD-10-CM

## 2020-11-20 DIAGNOSIS — Z11.52 ENCOUNTER FOR PREPROCEDURE SCREENING LABORATORY TESTING FOR COVID-19: Primary | ICD-10-CM

## 2020-11-27 DIAGNOSIS — Z11.52 ENCOUNTER FOR PREPROCEDURE SCREENING LABORATORY TESTING FOR COVID-19: ICD-10-CM

## 2020-11-27 DIAGNOSIS — Z01.812 ENCOUNTER FOR PREPROCEDURE SCREENING LABORATORY TESTING FOR COVID-19: ICD-10-CM

## 2020-11-27 PROCEDURE — U0003 INFECTIOUS AGENT DETECTION BY NUCLEIC ACID (DNA OR RNA); SEVERE ACUTE RESPIRATORY SYNDROME CORONAVIRUS 2 (SARS-COV-2) (CORONAVIRUS DISEASE [COVID-19]), AMPLIFIED PROBE TECHNIQUE, MAKING USE OF HIGH THROUGHPUT TECHNOLOGIES AS DESCRIBED BY CMS-2020-01-R: HCPCS | Performed by: INTERNAL MEDICINE

## 2020-11-28 LAB
SARS-COV-2 RNA SPEC QL NAA+PROBE: NOT DETECTED
SPECIMEN SOURCE: NORMAL

## 2020-11-30 ENCOUNTER — ANCILLARY PROCEDURE (OUTPATIENT)
Dept: ULTRASOUND IMAGING | Facility: CLINIC | Age: 60
End: 2020-11-30
Attending: INTERNAL MEDICINE
Payer: COMMERCIAL

## 2020-11-30 ENCOUNTER — OFFICE VISIT (OUTPATIENT)
Dept: INFUSION THERAPY | Facility: CLINIC | Age: 60
End: 2020-11-30
Attending: INTERNAL MEDICINE
Payer: COMMERCIAL

## 2020-11-30 VITALS
BODY MASS INDEX: 21.71 KG/M2 | DIASTOLIC BLOOD PRESSURE: 83 MMHG | OXYGEN SATURATION: 98 % | TEMPERATURE: 98 F | WEIGHT: 147 LBS | HEART RATE: 77 BPM | RESPIRATION RATE: 16 BRPM | SYSTOLIC BLOOD PRESSURE: 125 MMHG

## 2020-11-30 DIAGNOSIS — Z01.812 ENCOUNTER FOR PREPROCEDURE SCREENING LABORATORY TESTING FOR COVID-19: Primary | ICD-10-CM

## 2020-11-30 DIAGNOSIS — K70.31 ASCITES DUE TO ALCOHOLIC CIRRHOSIS (H): ICD-10-CM

## 2020-11-30 DIAGNOSIS — Z11.52 ENCOUNTER FOR PREPROCEDURE SCREENING LABORATORY TESTING FOR COVID-19: Primary | ICD-10-CM

## 2020-11-30 LAB — PLATELET # BLD AUTO: 144 10E9/L (ref 150–450)

## 2020-11-30 PROCEDURE — 999N000127 HC STATISTIC PERIPHERAL IV START W US GUIDANCE

## 2020-11-30 PROCEDURE — 85049 AUTOMATED PLATELET COUNT: CPT | Performed by: INTERNAL MEDICINE

## 2020-11-30 PROCEDURE — 49083 ABD PARACENTESIS W/IMAGING: CPT

## 2020-11-30 PROCEDURE — 49083 ABD PARACENTESIS W/IMAGING: CPT | Performed by: RADIOLOGY

## 2020-11-30 PROCEDURE — P9047 ALBUMIN (HUMAN), 25%, 50ML: HCPCS | Performed by: INTERNAL MEDICINE

## 2020-11-30 PROCEDURE — 250N000011 HC RX IP 250 OP 636: Performed by: INTERNAL MEDICINE

## 2020-11-30 PROCEDURE — 36415 COLL VENOUS BLD VENIPUNCTURE: CPT

## 2020-11-30 PROCEDURE — 250N000009 HC RX 250: Performed by: INTERNAL MEDICINE

## 2020-11-30 RX ORDER — LIDOCAINE HYDROCHLORIDE 10 MG/ML
20 INJECTION, SOLUTION EPIDURAL; INFILTRATION; INTRACAUDAL; PERINEURAL ONCE
Status: COMPLETED | OUTPATIENT
Start: 2020-11-30 | End: 2020-11-30

## 2020-11-30 RX ORDER — HEPARIN SODIUM,PORCINE 10 UNIT/ML
5 VIAL (ML) INTRAVENOUS
Status: CANCELLED | OUTPATIENT
Start: 2020-12-07

## 2020-11-30 RX ORDER — HEPARIN SODIUM (PORCINE) LOCK FLUSH IV SOLN 100 UNIT/ML 100 UNIT/ML
5 SOLUTION INTRAVENOUS
Status: CANCELLED | OUTPATIENT
Start: 2020-12-07

## 2020-11-30 RX ORDER — LIDOCAINE HYDROCHLORIDE 10 MG/ML
20 INJECTION, SOLUTION EPIDURAL; INFILTRATION; INTRACAUDAL; PERINEURAL ONCE
Status: CANCELLED | OUTPATIENT
Start: 2020-12-07 | End: 2020-12-07

## 2020-11-30 RX ORDER — ALBUMIN (HUMAN) 12.5 G/50ML
12.5 SOLUTION INTRAVENOUS
Status: DISCONTINUED | OUTPATIENT
Start: 2020-11-30 | End: 2020-11-30 | Stop reason: HOSPADM

## 2020-11-30 RX ORDER — ALBUMIN (HUMAN) 12.5 G/50ML
12.5 SOLUTION INTRAVENOUS
Status: CANCELLED | OUTPATIENT
Start: 2020-12-07

## 2020-11-30 RX ADMIN — ALBUMIN HUMAN 12.5 G: 0.25 SOLUTION INTRAVENOUS at 14:25

## 2020-11-30 RX ADMIN — ALBUMIN HUMAN 12.5 G: 0.25 SOLUTION INTRAVENOUS at 14:36

## 2020-11-30 RX ADMIN — LIDOCAINE HYDROCHLORIDE 10 ML: 10 INJECTION, SOLUTION EPIDURAL; INFILTRATION; INTRACAUDAL; PERINEURAL at 14:07

## 2020-11-30 RX ADMIN — ALBUMIN HUMAN 12.5 G: 0.25 SOLUTION INTRAVENOUS at 14:51

## 2020-11-30 NOTE — PROGRESS NOTES
Paracentesis Nursing Note  Ten Johnson presents today to [facility] for [facility].    During today's appointment orders from Dr. Leventhal were completed.    Progress Note:  Patient identification verified by name and date of birth.  Assessment completed.  Vitals monitored throughout appointment and recorded in Doc Flowsheets.  See proceduralist note in ultrasound.    Vascular Access: peripheral IV placed today.  Labs: Platelet level drawn per orders.     Date of consent or authorization: 10/20/2020.  Invasive Procedure Safety Checklist was completed and sent for scanning.     Paracentesis performed by Dr. Manny Orozco.    The following labs were communicated to provider performing paracentesis:  Lab Results   Component Value Date     11/30/2020       Total amount of ascites fluid drained: 6.3 liters.  Color of ascites fluid: yellow, clear.  Total amount of albumin given: 37.5  grams.    Patient tolerated procedure well.    Post procedure,denies pain or discomfort post paracentesis.      Discharge Plan:  Discharge instructions were reviewed with patient.  Patient/Representative verbalized understanding and all questions were answered.   Discharged [facility] in stable condition.    Administrations This Visit     albumin human 25 % injection 12.5 g     Admin Date  11/30/2020 Action  New Bag Dose  12.5 g Route  Intravenous Administered By  Gabbi Moody RN           Admin Date  11/30/2020 Action  New Bag Dose  12.5 g Route  Intravenous Administered By  Gabbi Moody RN           Admin Date  11/30/2020 Action  New Bag Dose  12.5 g Route  Intravenous Administered By  Gabbi Moody RN          lidocaine (PF) (XYLOCAINE) 1 % injection 20 mL     Admin Date  11/30/2020 Action  Given by Other Dose  10 mL Route  Subcutaneous Administered By  Gabbi Moody RN              Vital signs:  Temp: 98  F (36.7  C) Temp src: Oral       Resp: 16  "SpO2: 98 % O2 Device: None (Room air)     Weight: 73.1 kg (161 lb 3.2 oz)  Estimated body mass index is 23.81 kg/m  as calculated from the following:    Height as of 9/16/20: 1.753 m (5' 9\").    Weight as of this encounter: 73.1 kg (161 lb 3.2 oz).            "

## 2020-11-30 NOTE — LETTER
11/30/2020         RE: Ten Johnson  [address]  [place] [address]        Dear Colleague,    Thank you for referring your patient, Ten Johnson, to the [facility]. Please see a copy of my visit note below.    Paracentesis Nursing Note  Ten Johnson presents today to [facility].    During today's appointment orders from Dr. Leventhal were completed.    Progress Note:  Patient identification verified by name and date of birth.  Assessment completed.  Vitals monitored throughout appointment and recorded in Doc Flowsheets.  See proceduralist note in ultrasound.    Vascular Access: peripheral IV placed today.  Labs: Platelet level drawn per orders.     Date of consent or authorization: 10/20/2020.  Invasive Procedure Safety Checklist was completed and sent for scanning.     Paracentesis performed by Dr. Manny Orozco.    The following labs were communicated to provider performing paracentesis:  Lab Results   Component Value Date     11/30/2020       Total amount of ascites fluid drained: 6.3 liters.  Color of ascites fluid: yellow, clear.  Total amount of albumin given: 37.5  grams.    Patient tolerated procedure well.    Post procedure,denies pain or discomfort post paracentesis.      Discharge Plan:  Discharge instructions were reviewed with patient.  Patient/Representative verbalized understanding and all questions were answered.   Dis[facility] in stable condition.    Administrations This Visit     albumin human 25 % injection 12.5 g     Admin Date  11/30/2020 Action  New Bag Dose  12.5 g Route  Intravenous Administered By  Gabbi Moody, JAMES           Admin Date  11/30/2020 Action  New Bag Dose  12.5 g Route  Intravenous Administered By  Gabbi Moody, JAMES           Admin Date  11/30/2020 Action  New Bag Dose  12.5 g Route  Intravenous Administered  "By  Gabbi Moody, RN          lidocaine (PF) (XYLOCAINE) 1 % injection 20 mL     Admin Date  11/30/2020 Action  Given by Other Dose  10 mL Route  Subcutaneous Administered By  Gabbi Moody, JAMES              Vital signs:  Temp: 98  F (36.7  C) Temp src: Oral       Resp: 16 SpO2: 98 % O2 Device: None (Room air)     Weight: 73.1 kg (161 lb 3.2 oz)  Estimated body mass index is 23.81 kg/m  as calculated from the following:    Height as of 9/16/20: 1.753 m (5' 9\").    Weight as of this encounter: 73.1 kg (161 lb 3.2 oz).                Again, thank you for allowing me to participate in the care of your patient.        Sincerely,        [facility] Par[facility] Provider    "

## 2020-12-10 ENCOUNTER — OFFICE VISIT (OUTPATIENT)
Dept: INFUSION THERAPY | Facility: CLINIC | Age: 60
End: 2020-12-10
Attending: INTERNAL MEDICINE
Payer: COMMERCIAL

## 2020-12-10 ENCOUNTER — ANCILLARY PROCEDURE (OUTPATIENT)
Dept: ULTRASOUND IMAGING | Facility: CLINIC | Age: 60
End: 2020-12-10
Attending: INTERNAL MEDICINE
Payer: COMMERCIAL

## 2020-12-10 VITALS
TEMPERATURE: 99.1 F | OXYGEN SATURATION: 99 % | BODY MASS INDEX: 21.84 KG/M2 | HEART RATE: 68 BPM | WEIGHT: 147.9 LBS | SYSTOLIC BLOOD PRESSURE: 135 MMHG | DIASTOLIC BLOOD PRESSURE: 77 MMHG

## 2020-12-10 DIAGNOSIS — Z01.812 ENCOUNTER FOR PREPROCEDURE SCREENING LABORATORY TESTING FOR COVID-19: Primary | ICD-10-CM

## 2020-12-10 DIAGNOSIS — Z11.59 ENCOUNTER FOR SCREENING FOR OTHER VIRAL DISEASES: Primary | ICD-10-CM

## 2020-12-10 DIAGNOSIS — K70.31 ASCITES DUE TO ALCOHOLIC CIRRHOSIS (H): ICD-10-CM

## 2020-12-10 DIAGNOSIS — Z11.52 ENCOUNTER FOR PREPROCEDURE SCREENING LABORATORY TESTING FOR COVID-19: Primary | ICD-10-CM

## 2020-12-10 PROCEDURE — 999N000127 HC STATISTIC PERIPHERAL IV START W US GUIDANCE

## 2020-12-10 PROCEDURE — 250N000009 HC RX 250: Performed by: INTERNAL MEDICINE

## 2020-12-10 PROCEDURE — 49083 ABD PARACENTESIS W/IMAGING: CPT | Performed by: RADIOLOGY

## 2020-12-10 PROCEDURE — 49083 ABD PARACENTESIS W/IMAGING: CPT

## 2020-12-10 PROCEDURE — 250N000011 HC RX IP 250 OP 636: Performed by: INTERNAL MEDICINE

## 2020-12-10 PROCEDURE — P9047 ALBUMIN (HUMAN), 25%, 50ML: HCPCS | Performed by: INTERNAL MEDICINE

## 2020-12-10 RX ORDER — ALBUMIN (HUMAN) 12.5 G/50ML
12.5 SOLUTION INTRAVENOUS
Status: CANCELLED | OUTPATIENT
Start: 2020-12-14

## 2020-12-10 RX ORDER — ALBUMIN (HUMAN) 12.5 G/50ML
12.5 SOLUTION INTRAVENOUS
Status: DISCONTINUED | OUTPATIENT
Start: 2020-12-10 | End: 2020-12-10 | Stop reason: HOSPADM

## 2020-12-10 RX ORDER — HEPARIN SODIUM (PORCINE) LOCK FLUSH IV SOLN 100 UNIT/ML 100 UNIT/ML
5 SOLUTION INTRAVENOUS
Status: CANCELLED | OUTPATIENT
Start: 2020-12-14

## 2020-12-10 RX ORDER — HEPARIN SODIUM,PORCINE 10 UNIT/ML
5 VIAL (ML) INTRAVENOUS
Status: CANCELLED | OUTPATIENT
Start: 2020-12-14

## 2020-12-10 RX ORDER — LIDOCAINE HYDROCHLORIDE 10 MG/ML
20 INJECTION, SOLUTION EPIDURAL; INFILTRATION; INTRACAUDAL; PERINEURAL ONCE
Status: COMPLETED | OUTPATIENT
Start: 2020-12-10 | End: 2020-12-10

## 2020-12-10 RX ORDER — LIDOCAINE HYDROCHLORIDE 10 MG/ML
20 INJECTION, SOLUTION EPIDURAL; INFILTRATION; INTRACAUDAL; PERINEURAL ONCE
Status: CANCELLED | OUTPATIENT
Start: 2020-12-14 | End: 2020-12-14

## 2020-12-10 RX ADMIN — ALBUMIN HUMAN 12.5 G: 0.25 SOLUTION INTRAVENOUS at 14:43

## 2020-12-10 RX ADMIN — LIDOCAINE HYDROCHLORIDE 15 ML: 10 INJECTION, SOLUTION EPIDURAL; INFILTRATION; INTRACAUDAL; PERINEURAL at 14:27

## 2020-12-10 ASSESSMENT — PAIN SCALES - GENERAL: PAINLEVEL: SEVERE PAIN (7)

## 2020-12-10 NOTE — LETTER
12/10/2020         RE: Ten Johnson  [address]  [facility] [address]        Dear Colleague,    Thank you for referring your patient, Ten Johnson, to the [facility] ADVANC[facility] TREAT[facility]. Please see a copy of my visit note below.    Paracentesis Nursing Note  Ten Johnson presents today to [facility].    During today's appointment orders from Dr Leventhal were completed.    Progress Note:  Patient identification verified by name and date of birth.  Assessment completed.  Vitals monitored throughout appointment and recorded in Doc Flowsheets.  See proceduralist note in ultrasound.    Vascular Access: [facility] today by [facility].  Labs: were not ordered for this appointment.    Date of consent or authorization: 10/20/20.  Invasive Procedure Safety Checklist was completed and sent for scanning.       The following labs were communicated to provider performing paracentesis:  Lab Results   Component Value Date     11/30/2020       Total amount of ascites fluid drained: 4.5 liters.  Color of ascites fluid: yellow and clear.  Total amount of albumin given: 12.5  grams.    Patient tolerated procedure well.    Post procedure,denies pain or discomfort post paracentesis.      Discharge Plan:  Discharge instructions were reviewed with patient.  Patient/Representative verbalized understanding and all questions were answered.   Discharged from [facility] in stable condition.    Yamila Armstrong RN      Administrations This Visit     albumin human 25 % injection 12.5 g     Admin Date  12/10/2020 Action  New Bag Dose  12.5 g Route  Intravenous Administered By  Yamila Armstrong RN          lidocaine (PF) (XYLOCAINE) 1 % injection 20 mL     Admin Date  12/10/2020 Action  Given by Other Clinician Dose  15 mL Route  Subcutaneous Administered By  Yamila Armstrong RN                BP (!) 146/92 (Patient Position:  Standing)   Pulse 94   Temp 99.1  F (37.3  C) (Oral)   Wt 71.6 kg (157 lb 12.8 oz)   SpO2 99%   BMI 23.30 kg/m            Again, thank you for allowing me to participate in the care of your patient.        Sincerely,        [facility] Par[facility] Provider

## 2020-12-10 NOTE — PROGRESS NOTES
Paracentesis Nursing Note  Ten Johnson presents today to [facility] for [facility].    During today's appointment orders from Dr Leventhal were completed.    Progress Note:  Patient identification verified by name and date of birth.  Assessment completed.  Vitals monitored throughout appointment and recorded in Doc Flowsheets.  See proceduralist note in ultrasound.    Vascular Access: peripheral IV [facility] today by [facility].  Labs: were not ordered for this appointment.    Date of consent or authorization: 10/20/20.  Invasive Procedure Safety Checklist was completed and sent for scanning.       The following labs were communicated to provider performing paracentesis:  Lab Results   Component Value Date     11/30/2020       Total amount of ascites fluid drained: 4.5 liters.  Color of ascites fluid: yellow and clear.  Total amount of albumin given: 12.5  grams.    Patient tolerated procedure well.    Post procedure,denies pain or discomfort post paracentesis.      Discharge Plan:  Discharge instructions were reviewed with patient.  Patient/Representative verbalized understanding and all questions were answered.   Discharged [facility] in stable condition.    Yamila Armstrong RN      Administrations This Visit     albumin human 25 % injection 12.5 g     Admin Date  12/10/2020 Action  New Bag Dose  12.5 g Route  Intravenous Administered By  Yamila Armstrong RN          lidocaine (PF) (XYLOCAINE) 1 % injection 20 mL     Admin Date  12/10/2020 Action  Given by Other Clinician Dose  15 mL Route  Subcutaneous Administered By  Yamila Armstrong RN                BP (!) 146/92 (Patient Position: Standing)   Pulse 94   Temp 99.1  F (37.3  C) (Oral)   Wt 71.6 kg (157 lb 12.8 oz)   SpO2 99%   BMI 23.30 kg/m

## 2020-12-15 DIAGNOSIS — Z11.59 ENCOUNTER FOR SCREENING FOR OTHER VIRAL DISEASES: ICD-10-CM

## 2020-12-15 LAB
SARS-COV-2 RNA SPEC QL NAA+PROBE: NORMAL
SPECIMEN SOURCE: NORMAL

## 2020-12-15 PROCEDURE — U0003 INFECTIOUS AGENT DETECTION BY NUCLEIC ACID (DNA OR RNA); SEVERE ACUTE RESPIRATORY SYNDROME CORONAVIRUS 2 (SARS-COV-2) (CORONAVIRUS DISEASE [COVID-19]), AMPLIFIED PROBE TECHNIQUE, MAKING USE OF HIGH THROUGHPUT TECHNOLOGIES AS DESCRIBED BY CMS-2020-01-R: HCPCS | Performed by: INTERNAL MEDICINE

## 2020-12-17 LAB
LABORATORY COMMENT REPORT: NORMAL
SARS-COV-2 RNA SPEC QL NAA+PROBE: NEGATIVE
SPECIMEN SOURCE: NORMAL

## 2020-12-18 ENCOUNTER — OFFICE VISIT (OUTPATIENT)
Dept: INFUSION THERAPY | Facility: CLINIC | Age: 60
End: 2020-12-18
Attending: INTERNAL MEDICINE
Payer: COMMERCIAL

## 2020-12-18 ENCOUNTER — ANCILLARY PROCEDURE (OUTPATIENT)
Dept: ULTRASOUND IMAGING | Facility: CLINIC | Age: 60
End: 2020-12-18
Attending: INTERNAL MEDICINE
Payer: COMMERCIAL

## 2020-12-18 VITALS
WEIGHT: 159.5 LBS | BODY MASS INDEX: 23.55 KG/M2 | DIASTOLIC BLOOD PRESSURE: 76 MMHG | SYSTOLIC BLOOD PRESSURE: 145 MMHG | HEART RATE: 83 BPM | RESPIRATION RATE: 16 BRPM

## 2020-12-18 DIAGNOSIS — Z01.812 ENCOUNTER FOR PREPROCEDURE SCREENING LABORATORY TESTING FOR COVID-19: Primary | ICD-10-CM

## 2020-12-18 DIAGNOSIS — K70.31 ASCITES DUE TO ALCOHOLIC CIRRHOSIS (H): ICD-10-CM

## 2020-12-18 DIAGNOSIS — Z11.52 ENCOUNTER FOR PREPROCEDURE SCREENING LABORATORY TESTING FOR COVID-19: Primary | ICD-10-CM

## 2020-12-18 PROCEDURE — 250N000011 HC RX IP 250 OP 636: Performed by: INTERNAL MEDICINE

## 2020-12-18 PROCEDURE — 999N000127 HC STATISTIC PERIPHERAL IV START W US GUIDANCE

## 2020-12-18 PROCEDURE — 250N000009 HC RX 250: Performed by: INTERNAL MEDICINE

## 2020-12-18 PROCEDURE — 49083 ABD PARACENTESIS W/IMAGING: CPT | Mod: GC | Performed by: RADIOLOGY

## 2020-12-18 PROCEDURE — 272N000021 US PARACENTESIS

## 2020-12-18 PROCEDURE — P9047 ALBUMIN (HUMAN), 25%, 50ML: HCPCS | Performed by: INTERNAL MEDICINE

## 2020-12-18 RX ORDER — ALBUMIN (HUMAN) 12.5 G/50ML
12.5 SOLUTION INTRAVENOUS
Status: CANCELLED | OUTPATIENT
Start: 2020-12-21

## 2020-12-18 RX ORDER — ALBUMIN (HUMAN) 12.5 G/50ML
12.5 SOLUTION INTRAVENOUS
Status: DISCONTINUED | OUTPATIENT
Start: 2020-12-18 | End: 2020-12-18 | Stop reason: HOSPADM

## 2020-12-18 RX ORDER — HEPARIN SODIUM (PORCINE) LOCK FLUSH IV SOLN 100 UNIT/ML 100 UNIT/ML
5 SOLUTION INTRAVENOUS
Status: CANCELLED | OUTPATIENT
Start: 2020-12-21

## 2020-12-18 RX ORDER — HEPARIN SODIUM,PORCINE 10 UNIT/ML
5 VIAL (ML) INTRAVENOUS
Status: CANCELLED | OUTPATIENT
Start: 2020-12-21

## 2020-12-18 RX ORDER — LIDOCAINE HYDROCHLORIDE 10 MG/ML
20 INJECTION, SOLUTION EPIDURAL; INFILTRATION; INTRACAUDAL; PERINEURAL ONCE
Status: CANCELLED | OUTPATIENT
Start: 2020-12-21 | End: 2020-12-21

## 2020-12-18 RX ORDER — LIDOCAINE HYDROCHLORIDE 10 MG/ML
20 INJECTION, SOLUTION EPIDURAL; INFILTRATION; INTRACAUDAL; PERINEURAL ONCE
Status: COMPLETED | OUTPATIENT
Start: 2020-12-18 | End: 2020-12-18

## 2020-12-18 RX ADMIN — ALBUMIN HUMAN 12.5 G: 0.25 SOLUTION INTRAVENOUS at 14:38

## 2020-12-18 RX ADMIN — LIDOCAINE HYDROCHLORIDE 10 ML: 10 INJECTION, SOLUTION EPIDURAL; INFILTRATION; INTRACAUDAL; PERINEURAL at 14:44

## 2020-12-18 NOTE — LETTER
12/18/2020         RE: Ten Johnson  [address]  [facility] [address]        Dear Colleague,    Thank you for referring your patient, Ten Johnson, to the [facility] ADV[facility] TREAT[facility]. Please see a copy of my visit note below.    Paracentesis Nursing Note  Ten Johnson presents today to [facility].    During today's appointment orders from Dr. Leventhal were completed.    Progress Note:  Patient identification verified by name and date of birth.  Assessment completed.  Vitals monitored throughout appointment and recorded in Doc Flowsheets.  See proceduralist note in ultrasound.    Vascular Access: peripheral IV placed today.  Labs: were not ordered for this appointment.    Date of consent or authorization: 10/20/2020.  Invasive Procedure Safety Checklist was completed and sent for scanning.     Paracentesis performed by Dr. Clark.    The following labs were communicated to provider performing paracentesis:  Lab Results   Component Value Date     11/30/2020       Total amount of ascites fluid drained: 4.3 liters.  Color of ascites fluid: yellow.  Total amount of albumin given: 12.5  grams.    Patient tolerated procedure well.    Post procedure,denies pain or discomfort post paracentesis.      Discharge Plan:  Discharge instructions were reviewed with patient.  Patient/Representative verbalized understanding and all questions were answered.   [facility] in stable condition.    Maia Bernabe RN       Administrations This Visit     albumin human 25 % injection 12.5 g     Admin Date  12/18/2020 Action  New Bag Dose  12.5 g Route  Intravenous Administered By  Maia Bernabe, JAMES          lidocaine (PF) (XYLOCAINE) 1 % injection 20 mL     Admin Date  12/18/2020 Action  Given by Other Dose  10 mL Route  Subcutaneous Administered By  Maia Bernabe RN                BP (!) 136/96 (BP  Location: Right arm)   Pulse 82   Resp 16   Wt 76.5 kg (168 lb 9.6 oz)   BMI 24.90 kg/m            Again, thank you for allowing me to participate in the care of your patient.        Sincerely,        [facility] Paracentesis Provider

## 2020-12-18 NOTE — PROGRESS NOTES
Paracentesis Nursing Note  Ten Johnson presents today to [facility] for [facility].    During today's appointment orders from Dr. Leventhal were completed.    Progress Note:  Patient identification verified by name and date of birth.  Assessment completed.  Vitals monitored throughout appointment and recorded in Doc Flowsheets.  See proceduralist note in ultrasound.    Vascular Access: peripheral IV placed today.  Labs: were not ordered for this appointment.    Date of consent or authorization: 10/20/2020.  Invasive Procedure Safety Checklist was completed and sent for scanning.     Paracentesis performed by Dr. Clark.    The following labs were communicated to provider performing paracentesis:  Lab Results   Component Value Date     11/30/2020       Total amount of ascites fluid drained: 4.3 liters.  Color of ascites fluid: yellow.  Total amount of albumin given: 12.5  grams.    Patient tolerated procedure well.    Post procedure,denies pain or discomfort post paracentesis.      Discharge Plan:  Discharge instructions were reviewed with patient.  Patient/Representative verbalized understanding and all questions were answered.   Discharged [facility] in stable condition.    Maia Bernabe RN       Administrations This Visit     albumin human 25 % injection 12.5 g     Admin Date  12/18/2020 Action  New Bag Dose  12.5 g Route  Intravenous Administered By  Maia Bernabe RN          lidocaine (PF) (XYLOCAINE) 1 % injection 20 mL     Admin Date  12/18/2020 Action  Given by Other Dose  10 mL Route  Subcutaneous Administered By  Maia Bernabe, RN                BP (!) 136/96 (BP Location: Right arm)   Pulse 82   Resp 16   Wt 76.5 kg (168 lb 9.6 oz)   BMI 24.90 kg/m

## 2020-12-28 DIAGNOSIS — Z11.52 ENCOUNTER FOR PREPROCEDURE SCREENING LABORATORY TESTING FOR COVID-19: ICD-10-CM

## 2020-12-28 DIAGNOSIS — Z01.812 ENCOUNTER FOR PREPROCEDURE SCREENING LABORATORY TESTING FOR COVID-19: ICD-10-CM

## 2020-12-28 LAB
SARS-COV-2 RNA SPEC QL NAA+PROBE: NORMAL
SPECIMEN SOURCE: NORMAL

## 2020-12-28 PROCEDURE — U0003 INFECTIOUS AGENT DETECTION BY NUCLEIC ACID (DNA OR RNA); SEVERE ACUTE RESPIRATORY SYNDROME CORONAVIRUS 2 (SARS-COV-2) (CORONAVIRUS DISEASE [COVID-19]), AMPLIFIED PROBE TECHNIQUE, MAKING USE OF HIGH THROUGHPUT TECHNOLOGIES AS DESCRIBED BY CMS-2020-01-R: HCPCS | Performed by: INTERNAL MEDICINE

## 2020-12-31 ENCOUNTER — OFFICE VISIT (OUTPATIENT)
Dept: INFUSION THERAPY | Facility: CLINIC | Age: 60
End: 2020-12-31
Attending: INTERNAL MEDICINE
Payer: COMMERCIAL

## 2020-12-31 ENCOUNTER — ANCILLARY PROCEDURE (OUTPATIENT)
Dept: ULTRASOUND IMAGING | Facility: CLINIC | Age: 60
End: 2020-12-31
Attending: INTERNAL MEDICINE
Payer: COMMERCIAL

## 2020-12-31 VITALS
WEIGHT: 162.4 LBS | DIASTOLIC BLOOD PRESSURE: 85 MMHG | TEMPERATURE: 98.2 F | HEART RATE: 80 BPM | BODY MASS INDEX: 23.98 KG/M2 | SYSTOLIC BLOOD PRESSURE: 180 MMHG | OXYGEN SATURATION: 98 %

## 2020-12-31 DIAGNOSIS — K70.31 ASCITES DUE TO ALCOHOLIC CIRRHOSIS (H): ICD-10-CM

## 2020-12-31 DIAGNOSIS — Z11.52 ENCOUNTER FOR PREPROCEDURE SCREENING LABORATORY TESTING FOR COVID-19: Primary | ICD-10-CM

## 2020-12-31 DIAGNOSIS — Z01.812 ENCOUNTER FOR PREPROCEDURE SCREENING LABORATORY TESTING FOR COVID-19: Primary | ICD-10-CM

## 2020-12-31 LAB — PLATELET # BLD AUTO: 134 10E9/L (ref 150–450)

## 2020-12-31 PROCEDURE — 272N000021 US PARACENTESIS

## 2020-12-31 PROCEDURE — 999N000127 HC STATISTIC PERIPHERAL IV START W US GUIDANCE

## 2020-12-31 PROCEDURE — 85049 AUTOMATED PLATELET COUNT: CPT | Performed by: INTERNAL MEDICINE

## 2020-12-31 PROCEDURE — 36415 COLL VENOUS BLD VENIPUNCTURE: CPT

## 2020-12-31 PROCEDURE — P9047 ALBUMIN (HUMAN), 25%, 50ML: HCPCS | Performed by: INTERNAL MEDICINE

## 2020-12-31 PROCEDURE — U0003 INFECTIOUS AGENT DETECTION BY NUCLEIC ACID (DNA OR RNA); SEVERE ACUTE RESPIRATORY SYNDROME CORONAVIRUS 2 (SARS-COV-2) (CORONAVIRUS DISEASE [COVID-19]), AMPLIFIED PROBE TECHNIQUE, MAKING USE OF HIGH THROUGHPUT TECHNOLOGIES AS DESCRIBED BY CMS-2020-01-R: HCPCS | Performed by: INTERNAL MEDICINE

## 2020-12-31 PROCEDURE — 250N000011 HC RX IP 250 OP 636: Performed by: INTERNAL MEDICINE

## 2020-12-31 PROCEDURE — 49083 ABD PARACENTESIS W/IMAGING: CPT | Mod: GC | Performed by: RADIOLOGY

## 2020-12-31 PROCEDURE — 250N000009 HC RX 250: Performed by: INTERNAL MEDICINE

## 2020-12-31 RX ORDER — LIDOCAINE HYDROCHLORIDE 10 MG/ML
20 INJECTION, SOLUTION EPIDURAL; INFILTRATION; INTRACAUDAL; PERINEURAL ONCE
Status: COMPLETED | OUTPATIENT
Start: 2020-12-31 | End: 2020-12-31

## 2020-12-31 RX ORDER — ALBUMIN (HUMAN) 12.5 G/50ML
12.5 SOLUTION INTRAVENOUS
Status: CANCELLED | OUTPATIENT
Start: 2021-01-07

## 2020-12-31 RX ORDER — ALBUMIN (HUMAN) 12.5 G/50ML
12.5 SOLUTION INTRAVENOUS
Status: DISCONTINUED | OUTPATIENT
Start: 2020-12-31 | End: 2020-12-31 | Stop reason: HOSPADM

## 2020-12-31 RX ORDER — HEPARIN SODIUM (PORCINE) LOCK FLUSH IV SOLN 100 UNIT/ML 100 UNIT/ML
5 SOLUTION INTRAVENOUS
Status: CANCELLED | OUTPATIENT
Start: 2021-01-07

## 2020-12-31 RX ORDER — HEPARIN SODIUM,PORCINE 10 UNIT/ML
5 VIAL (ML) INTRAVENOUS
Status: CANCELLED | OUTPATIENT
Start: 2021-01-07

## 2020-12-31 RX ORDER — LIDOCAINE HYDROCHLORIDE 10 MG/ML
20 INJECTION, SOLUTION EPIDURAL; INFILTRATION; INTRACAUDAL; PERINEURAL ONCE
Status: CANCELLED | OUTPATIENT
Start: 2021-01-07 | End: 2021-01-07

## 2020-12-31 RX ADMIN — ALBUMIN HUMAN 12.5 G: 0.25 SOLUTION INTRAVENOUS at 15:00

## 2020-12-31 RX ADMIN — ALBUMIN HUMAN 12.5 G: 0.25 SOLUTION INTRAVENOUS at 14:47

## 2020-12-31 RX ADMIN — LIDOCAINE HYDROCHLORIDE 10 ML: 10 INJECTION, SOLUTION EPIDURAL; INFILTRATION; INTRACAUDAL; PERINEURAL at 14:15

## 2020-12-31 ASSESSMENT — PAIN SCALES - GENERAL: PAINLEVEL: WORST PAIN (10)

## 2020-12-31 NOTE — LETTER
12/31/2020         RE: Ten Johnson  [address]  [facility] [address]        Dear Colleague,    Thank you for referring your patient, Ten Johnson, to the [facility] ADVANCED TREATMENT [facility]. Please see a copy of my visit note below.    Paracentesis Nursing Note  Ten Johnson presents [facility].    During today's appointment orders from Leventhal, Thomas Michael, MD were completed.    Progress Note:  Patient identification verified by name and date of birth.  Assessment completed.  Vitals monitored throughout appointment and recorded in Doc Flowsheets.  See proceduralist note in ultrasound.    Vascular Access: peripheral IV was placed by vascular access nurse.  Labs: were drawn per orders. COVID test done today for next Paracentesis.    Date of consent or authorization: 10/20/20.  Invasive Procedure Safety Checklist was completed and sent for scanning.     Pt had elevated BP- will take BP meds when he gets home. Patient state that he was in a lot of pain today.    The following labs were communicated to provider performing paracentesis:  Lab Results   Component Value Date     12/31/2020       Total amount of ascites fluid drained: 5.1 liters.  Color of ascites fluid: yellow.  Total amount of albumin given: 25  grams.    Patient tolerated procedure well.    Post procedure,denies pain or discomfort post paracentesis.      Discharge Plan:  Discharge instructions were reviewed with patient.  Patient/Representative verbalized understanding and all questions were answered.   [facility] in stable condition.    Cee Spann RN    Administrations This Visit     albumin human 25 % injection 12.5 g     Admin Date  12/31/2020 Action  New Bag Dose  12.5 g Route  Intravenous Administered By  Cee Spann RN           Admin Date  12/31/2020 Action  New Bag Dose  12.5 g  Route  Intravenous Administered By  Cee Spann RN          lidocaine (PF) (XYLOCAINE) 1 % injection 20 mL     Admin Date  12/31/2020 Action  Given Dose  10 mL Route  Subcutaneous Administered By  Cee Spann RN                BP (!) 144/82   Pulse 69   Temp 98.2  F (36.8  C) (Oral)   Wt 79.7 kg (175 lb 11.2 oz)   SpO2 98%   BMI 25.95 kg/m            Again, thank you for allowing me to participate in the care of your patient.        Sincerely,        [facility] Par[facility] Provider

## 2020-12-31 NOTE — PROGRESS NOTES
Paracentesis Nursing Note  Ten Johnson presents today to [facility].    During today's appointment orders from Leventhal, Thomas Michael, MD were completed.    Progress Note:  Patient identification verified by name and date of birth.  Assessment completed.  Vitals monitored throughout appointment and recorded in Doc Flowsheets.  See proceduralist note in ultrasound.    Vascular Access: peripheral IV was placed by vascular access nurse.  Labs: were drawn per orders. COVID test done today for next Paracentesis.    Date of consent or authorization: 10/20/20.  Invasive Procedure Safety Checklist was completed and sent for scanning.     Pt had elevated BP- will take BP meds when he gets home. Patient state that he was in a lot of pain today.    The following labs were communicated to provider performing paracentesis:  Lab Results   Component Value Date     12/31/2020       Total amount of ascites fluid drained: 5.1 liters.  Color of ascites fluid: yellow.  Total amount of albumin given: 25  grams.    Patient tolerated procedure well.    Post procedure,denies pain or discomfort post paracentesis.      Discharge Plan:  Discharge instructions were reviewed with patient.  Patient/Representative verbalized understanding and all questions were answered.   [facility] in stable condition.    Cee Spann RN    Administrations This Visit     albumin human 25 % injection 12.5 g     Admin Date  12/31/2020 Action  New Bag Dose  12.5 g Route  Intravenous Administered By  Cee Spann RN           Admin Date  12/31/2020 Action  New Bag Dose  12.5 g Route  Intravenous Administered By  Cee Spann RN          lidocaine (PF) (XYLOCAINE) 1 % injection 20 mL     Admin Date  12/31/2020 Action  Given Dose  10 mL Route  Subcutaneous Administered By  Cee Spann RN                BP (!) 144/82   Pulse  69   Temp 98.2  F (36.8  C) (Oral)   Wt 79.7 kg (175 lb 11.2 oz)   SpO2 98%   BMI 25.95 kg/m

## 2021-01-01 ENCOUNTER — APPOINTMENT (OUTPATIENT)
Dept: CT IMAGING | Facility: CLINIC | Age: 61
End: 2021-01-01
Attending: EMERGENCY MEDICINE
Payer: MEDICARE

## 2021-01-01 ENCOUNTER — APPOINTMENT (OUTPATIENT)
Dept: GENERAL RADIOLOGY | Facility: CLINIC | Age: 61
End: 2021-01-01
Attending: EMERGENCY MEDICINE
Payer: MEDICARE

## 2021-01-01 ENCOUNTER — APPOINTMENT (OUTPATIENT)
Dept: ULTRASOUND IMAGING | Facility: CLINIC | Age: 61
End: 2021-01-01
Attending: EMERGENCY MEDICINE
Payer: MEDICARE

## 2021-01-01 ENCOUNTER — HOSPITAL ENCOUNTER (EMERGENCY)
Facility: CLINIC | Age: 61
Discharge: NURSING, REHAB OR LONG-TERM CARE | End: 2021-12-08
Attending: EMERGENCY MEDICINE | Admitting: EMERGENCY MEDICINE
Payer: MEDICARE

## 2021-01-01 ENCOUNTER — HEALTH MAINTENANCE LETTER (OUTPATIENT)
Age: 61
End: 2021-01-01

## 2021-01-01 ENCOUNTER — PATIENT OUTREACH (OUTPATIENT)
Dept: ONCOLOGY | Facility: CLINIC | Age: 61
End: 2021-01-01
Payer: MEDICARE

## 2021-01-01 VITALS
SYSTOLIC BLOOD PRESSURE: 126 MMHG | BODY MASS INDEX: 26.7 KG/M2 | OXYGEN SATURATION: 99 % | HEART RATE: 74 BPM | TEMPERATURE: 97.6 F | DIASTOLIC BLOOD PRESSURE: 72 MMHG | WEIGHT: 180.78 LBS | RESPIRATION RATE: 16 BRPM

## 2021-01-01 DIAGNOSIS — S72.414A CLOSED NONDISPLACED FRACTURE OF CONDYLE OF RIGHT FEMUR, INITIAL ENCOUNTER (H): ICD-10-CM

## 2021-01-01 DIAGNOSIS — S01.81XA FACIAL LACERATION, INITIAL ENCOUNTER: ICD-10-CM
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
## 2021-01-01 DIAGNOSIS — R10.84 ABDOMINAL PAIN, GENERALIZED: ICD-10-CM

## 2021-01-01 DIAGNOSIS — S42.001A CLOSED NONDISPLACED FRACTURE OF RIGHT CLAVICLE, UNSPECIFIED PART OF CLAVICLE, INITIAL ENCOUNTER: ICD-10-CM

## 2021-01-01 DIAGNOSIS — M25.569 KNEE PAIN: Primary | ICD-10-CM

## 2021-01-01 LAB
ALBUMIN SERPL-MCNC: 2.4 G/DL (ref 3.4–5)
ALP SERPL-CCNC: 89 U/L (ref 40–150)
ALT SERPL W P-5'-P-CCNC: 60 U/L (ref 0–70)
ANION GAP SERPL CALCULATED.3IONS-SCNC: 6 MMOL/L (ref 3–14)
AST SERPL W P-5'-P-CCNC: 65 U/L (ref 0–45)
ATRIAL RATE - MUSE: 66 BPM
BASOPHILS # BLD AUTO: 0.1 10E3/UL (ref 0–0.2)
BASOPHILS NFR BLD AUTO: 1 %
BILIRUB SERPL-MCNC: 0.7 MG/DL (ref 0.2–1.3)
BUN SERPL-MCNC: 19 MG/DL (ref 7–30)
CALCIUM SERPL-MCNC: 7.9 MG/DL (ref 8.5–10.1)
CHLORIDE BLD-SCNC: 116 MMOL/L (ref 94–109)
CO2 SERPL-SCNC: 23 MMOL/L (ref 20–32)
CREAT SERPL-MCNC: 0.84 MG/DL (ref 0.66–1.25)
DIASTOLIC BLOOD PRESSURE - MUSE: NORMAL MMHG
EOSINOPHIL # BLD AUTO: 0.1 10E3/UL (ref 0–0.7)
EOSINOPHIL NFR BLD AUTO: 2 %
ERYTHROCYTE [DISTWIDTH] IN BLOOD BY AUTOMATED COUNT: 19.5 % (ref 10–15)
GFR SERPL CREATININE-BSD FRML MDRD: >90 ML/MIN/1.73M2
GLUCOSE BLD-MCNC: 82 MG/DL (ref 70–99)
HCT VFR BLD AUTO: 26.5 % (ref 40–53)
HGB BLD-MCNC: 7.8 G/DL (ref 13.3–17.7)
IMM GRANULOCYTES # BLD: 0 10E3/UL
IMM GRANULOCYTES NFR BLD: 0 %
INTERPRETATION ECG - MUSE: NORMAL
LIPASE SERPL-CCNC: 220 U/L (ref 73–393)
LYMPHOCYTES # BLD AUTO: 1.9 10E3/UL (ref 0.8–5.3)
LYMPHOCYTES NFR BLD AUTO: 30 %
MCH RBC QN AUTO: 24.1 PG (ref 26.5–33)
MCHC RBC AUTO-ENTMCNC: 29.4 G/DL (ref 31.5–36.5)
MCV RBC AUTO: 82 FL (ref 78–100)
MONOCYTES # BLD AUTO: 0.9 10E3/UL (ref 0–1.3)
MONOCYTES NFR BLD AUTO: 14 %
NEUTROPHILS # BLD AUTO: 3.3 10E3/UL (ref 1.6–8.3)
NEUTROPHILS NFR BLD AUTO: 53 %
NRBC # BLD AUTO: 0 10E3/UL
NRBC BLD AUTO-RTO: 0 /100
P AXIS - MUSE: 31 DEGREES
PLATELET # BLD AUTO: 85 10E3/UL (ref 150–450)
POTASSIUM BLD-SCNC: 3.9 MMOL/L (ref 3.4–5.3)
PR INTERVAL - MUSE: 124 MS
PROT SERPL-MCNC: 6.5 G/DL (ref 6.8–8.8)
QRS DURATION - MUSE: 96 MS
QT - MUSE: 442 MS
QTC - MUSE: 463 MS
R AXIS - MUSE: 35 DEGREES
RBC # BLD AUTO: 3.23 10E6/UL (ref 4.4–5.9)
SARS-COV-2 RNA SPEC QL NAA+PROBE: NOT DETECTED
SODIUM SERPL-SCNC: 145 MMOL/L (ref 133–144)
SPECIMEN SOURCE: NORMAL
SYSTOLIC BLOOD PRESSURE - MUSE: NORMAL MMHG
T AXIS - MUSE: -23 DEGREES
TROPONIN I SERPL HS-MCNC: 16 NG/L
VENTRICULAR RATE- MUSE: 66 BPM
WBC # BLD AUTO: 6.3 10E3/UL (ref 4–11)

## 2021-01-01 PROCEDURE — 85025 COMPLETE CBC W/AUTO DIFF WBC: CPT | Performed by: EMERGENCY MEDICINE

## 2021-01-01 PROCEDURE — 90471 IMMUNIZATION ADMIN: CPT | Performed by: EMERGENCY MEDICINE

## 2021-01-01 PROCEDURE — 73700 CT LOWER EXTREMITY W/O DYE: CPT | Mod: RT

## 2021-01-01 PROCEDURE — 71046 X-RAY EXAM CHEST 2 VIEWS: CPT | Mod: 26 | Performed by: STUDENT IN AN ORGANIZED HEALTH CARE EDUCATION/TRAINING PROGRAM

## 2021-01-01 PROCEDURE — 90715 TDAP VACCINE 7 YRS/> IM: CPT | Performed by: EMERGENCY MEDICINE

## 2021-01-01 PROCEDURE — 93010 ELECTROCARDIOGRAM REPORT: CPT | Mod: 59 | Performed by: EMERGENCY MEDICINE

## 2021-01-01 PROCEDURE — 73560 X-RAY EXAM OF KNEE 1 OR 2: CPT | Mod: 26 | Performed by: RADIOLOGY

## 2021-01-01 PROCEDURE — 93971 EXTREMITY STUDY: CPT | Mod: 26 | Performed by: RADIOLOGY

## 2021-01-01 PROCEDURE — 72125 CT NECK SPINE W/O DYE: CPT | Mod: MC

## 2021-01-01 PROCEDURE — 73030 X-RAY EXAM OF SHOULDER: CPT | Mod: 26 | Performed by: RADIOLOGY

## 2021-01-01 PROCEDURE — 12013 RPR F/E/E/N/L/M 2.6-5.0 CM: CPT | Performed by: EMERGENCY MEDICINE

## 2021-01-01 PROCEDURE — 72125 CT NECK SPINE W/O DYE: CPT | Mod: 26 | Performed by: RADIOLOGY

## 2021-01-01 PROCEDURE — 74176 CT ABD & PELVIS W/O CONTRAST: CPT

## 2021-01-01 PROCEDURE — 99285 EMERGENCY DEPT VISIT HI MDM: CPT | Mod: 25 | Performed by: EMERGENCY MEDICINE

## 2021-01-01 PROCEDURE — 74176 CT ABD & PELVIS W/O CONTRAST: CPT | Mod: 26 | Performed by: RADIOLOGY

## 2021-01-01 PROCEDURE — 72072 X-RAY EXAM THORAC SPINE 3VWS: CPT

## 2021-01-01 PROCEDURE — 72100 X-RAY EXAM L-S SPINE 2/3 VWS: CPT | Mod: 26 | Performed by: RADIOLOGY

## 2021-01-01 PROCEDURE — 73030 X-RAY EXAM OF SHOULDER: CPT | Mod: RT

## 2021-01-01 PROCEDURE — 36415 COLL VENOUS BLD VENIPUNCTURE: CPT | Performed by: EMERGENCY MEDICINE

## 2021-01-01 PROCEDURE — 80053 COMPREHEN METABOLIC PANEL: CPT | Performed by: EMERGENCY MEDICINE

## 2021-01-01 PROCEDURE — 72100 X-RAY EXAM L-S SPINE 2/3 VWS: CPT

## 2021-01-01 PROCEDURE — 93005 ELECTROCARDIOGRAM TRACING: CPT | Performed by: EMERGENCY MEDICINE

## 2021-01-01 PROCEDURE — 250N000009 HC RX 250: Performed by: EMERGENCY MEDICINE

## 2021-01-01 PROCEDURE — 93971 EXTREMITY STUDY: CPT | Mod: RT

## 2021-01-01 PROCEDURE — 71046 X-RAY EXAM CHEST 2 VIEWS: CPT

## 2021-01-01 PROCEDURE — 70486 CT MAXILLOFACIAL W/O DYE: CPT | Mod: MC

## 2021-01-01 PROCEDURE — 250N000013 HC RX MED GY IP 250 OP 250 PS 637: Performed by: EMERGENCY MEDICINE

## 2021-01-01 PROCEDURE — 250N000011 HC RX IP 250 OP 636: Performed by: EMERGENCY MEDICINE

## 2021-01-01 PROCEDURE — 84484 ASSAY OF TROPONIN QUANT: CPT | Performed by: EMERGENCY MEDICINE

## 2021-01-01 PROCEDURE — 72072 X-RAY EXAM THORAC SPINE 3VWS: CPT | Mod: 26 | Performed by: STUDENT IN AN ORGANIZED HEALTH CARE EDUCATION/TRAINING PROGRAM

## 2021-01-01 PROCEDURE — 70486 CT MAXILLOFACIAL W/O DYE: CPT | Mod: 26 | Performed by: RADIOLOGY

## 2021-01-01 PROCEDURE — L1832 KO ADJ JNT POS R SUP PRE CST: HCPCS

## 2021-01-01 PROCEDURE — 83690 ASSAY OF LIPASE: CPT | Performed by: EMERGENCY MEDICINE

## 2021-01-01 PROCEDURE — 73700 CT LOWER EXTREMITY W/O DYE: CPT | Mod: 26 | Performed by: RADIOLOGY

## 2021-01-01 PROCEDURE — 73560 X-RAY EXAM OF KNEE 1 OR 2: CPT | Mod: RT

## 2021-01-01 PROCEDURE — 70450 CT HEAD/BRAIN W/O DYE: CPT | Mod: 26 | Performed by: RADIOLOGY

## 2021-01-01 PROCEDURE — 70450 CT HEAD/BRAIN W/O DYE: CPT | Mod: MC

## 2021-01-01 RX ORDER — ACETAMINOPHEN 500 MG
1000 TABLET ORAL ONCE
Status: COMPLETED | OUTPATIENT
Start: 2021-01-01 | End: 2021-01-01

## 2021-01-01 RX ORDER — LIDOCAINE HYDROCHLORIDE AND EPINEPHRINE 10; 10 MG/ML; UG/ML
10 INJECTION, SOLUTION INFILTRATION; PERINEURAL ONCE
Status: COMPLETED | OUTPATIENT
Start: 2021-01-01 | End: 2021-01-01

## 2021-01-01 RX ADMIN — ACETAMINOPHEN 1000 MG: 500 TABLET, FILM COATED ORAL at 10:47

## 2021-01-01 RX ADMIN — CLOSTRIDIUM TETANI TOXOID ANTIGEN (FORMALDEHYDE INACTIVATED), CORYNEBACTERIUM DIPHTHERIAE TOXOID ANTIGEN (FORMALDEHYDE INACTIVATED), BORDETELLA PERTUSSIS TOXOID ANTIGEN (GLUTARALDEHYDE INACTIVATED), BORDETELLA PERTUSSIS FILAMENTOUS HEMAGGLUTININ ANTIGEN (FORMALDEHYDE INACTIVATED), BORDETELLA PERTUSSIS PERTACTIN ANTIGEN, AND BORDETELLA PERTUSSIS FIMBRIAE 2/3 ANTIGEN 0.5 ML: 5; 2; 2.5; 5; 3; 5 INJECTION, SUSPENSION INTRAMUSCULAR at 10:46

## 2021-01-01 RX ADMIN — LIDOCAINE HYDROCHLORIDE AND EPINEPHRINE 10 ML: 10; 10 INJECTION, SOLUTION INFILTRATION; PERINEURAL at 13:00

## 2021-01-01 ASSESSMENT — ENCOUNTER SYMPTOMS
SHORTNESS OF BREATH: 0
ABDOMINAL PAIN: 1
WOUND: 1
VOMITING: 0
CHILLS: 0
FEVER: 0

## 2021-01-12 ENCOUNTER — ANCILLARY PROCEDURE (OUTPATIENT)
Dept: ULTRASOUND IMAGING | Facility: CLINIC | Age: 61
End: 2021-01-12
Attending: INTERNAL MEDICINE
Payer: COMMERCIAL

## 2021-01-12 ENCOUNTER — OFFICE VISIT (OUTPATIENT)
Dept: INFUSION THERAPY | Facility: CLINIC | Age: 61
End: 2021-01-12
Attending: INTERNAL MEDICINE
Payer: COMMERCIAL

## 2021-01-12 VITALS
WEIGHT: 159 LBS | HEART RATE: 65 BPM | TEMPERATURE: 97.2 F | BODY MASS INDEX: 23.48 KG/M2 | OXYGEN SATURATION: 100 % | RESPIRATION RATE: 16 BRPM | SYSTOLIC BLOOD PRESSURE: 157 MMHG | DIASTOLIC BLOOD PRESSURE: 98 MMHG

## 2021-01-12 DIAGNOSIS — Z01.812 ENCOUNTER FOR PREPROCEDURE SCREENING LABORATORY TESTING FOR COVID-19: Primary | ICD-10-CM

## 2021-01-12 DIAGNOSIS — C22.0 HCC (HEPATOCELLULAR CARCINOMA) (H): ICD-10-CM

## 2021-01-12 DIAGNOSIS — Z11.52 ENCOUNTER FOR PREPROCEDURE SCREENING LABORATORY TESTING FOR COVID-19: Primary | ICD-10-CM

## 2021-01-12 DIAGNOSIS — K70.31 ASCITES DUE TO ALCOHOLIC CIRRHOSIS (H): ICD-10-CM

## 2021-01-12 LAB
AFP SERPL-MCNC: 14.5 UG/L (ref 0–8)
ALBUMIN SERPL-MCNC: 3.5 G/DL (ref 3.4–5)
ALP SERPL-CCNC: 77 U/L (ref 40–150)
ALT SERPL W P-5'-P-CCNC: 38 U/L (ref 0–70)
ANION GAP SERPL CALCULATED.3IONS-SCNC: 4 MMOL/L (ref 3–14)
AST SERPL W P-5'-P-CCNC: 40 U/L (ref 0–45)
BASOPHILS # BLD AUTO: 0 10E9/L (ref 0–0.2)
BASOPHILS NFR BLD AUTO: 0.6 %
BILIRUB SERPL-MCNC: 0.6 MG/DL (ref 0.2–1.3)
BUN SERPL-MCNC: 16 MG/DL (ref 7–30)
CALCIUM SERPL-MCNC: 8.7 MG/DL (ref 8.5–10.1)
CHLORIDE SERPL-SCNC: 112 MMOL/L (ref 94–109)
CO2 SERPL-SCNC: 24 MMOL/L (ref 20–32)
CREAT SERPL-MCNC: 0.86 MG/DL (ref 0.66–1.25)
DIFFERENTIAL METHOD BLD: ABNORMAL
EOSINOPHIL # BLD AUTO: 0 10E9/L (ref 0–0.7)
EOSINOPHIL NFR BLD AUTO: 0.4 %
ERYTHROCYTE [DISTWIDTH] IN BLOOD BY AUTOMATED COUNT: 14 % (ref 10–15)
GFR SERPL CREATININE-BSD FRML MDRD: >90 ML/MIN/{1.73_M2}
GLUCOSE SERPL-MCNC: 86 MG/DL (ref 70–99)
HCT VFR BLD AUTO: 36.3 % (ref 40–53)
HGB BLD-MCNC: 12.2 G/DL (ref 13.3–17.7)
IMM GRANULOCYTES # BLD: 0 10E9/L (ref 0–0.4)
IMM GRANULOCYTES NFR BLD: 0.1 %
LABORATORY COMMENT REPORT: NORMAL
LYMPHOCYTES # BLD AUTO: 1.1 10E9/L (ref 0.8–5.3)
LYMPHOCYTES NFR BLD AUTO: 16 %
MCH RBC QN AUTO: 30.7 PG (ref 26.5–33)
MCHC RBC AUTO-ENTMCNC: 33.6 G/DL (ref 31.5–36.5)
MCV RBC AUTO: 91 FL (ref 78–100)
MONOCYTES # BLD AUTO: 0.8 10E9/L (ref 0–1.3)
MONOCYTES NFR BLD AUTO: 11.9 %
NEUTROPHILS # BLD AUTO: 5 10E9/L (ref 1.6–8.3)
NEUTROPHILS NFR BLD AUTO: 71 %
NRBC # BLD AUTO: 0 10*3/UL
NRBC BLD AUTO-RTO: 0 /100
PLATELET # BLD AUTO: 106 10E9/L (ref 150–450)
POTASSIUM SERPL-SCNC: 3.9 MMOL/L (ref 3.4–5.3)
PROT SERPL-MCNC: 6.7 G/DL (ref 6.8–8.8)
RBC # BLD AUTO: 3.98 10E12/L (ref 4.4–5.9)
SARS-COV-2 RNA RESP QL NAA+PROBE: NEGATIVE
SARS-COV-2 RNA RESP QL NAA+PROBE: NORMAL
SODIUM SERPL-SCNC: 141 MMOL/L (ref 133–144)
SPECIMEN SOURCE: NORMAL
SPECIMEN SOURCE: NORMAL
WBC # BLD AUTO: 7.1 10E9/L (ref 4–11)

## 2021-01-12 PROCEDURE — 49083 ABD PARACENTESIS W/IMAGING: CPT

## 2021-01-12 PROCEDURE — U0003 INFECTIOUS AGENT DETECTION BY NUCLEIC ACID (DNA OR RNA); SEVERE ACUTE RESPIRATORY SYNDROME CORONAVIRUS 2 (SARS-COV-2) (CORONAVIRUS DISEASE [COVID-19]), AMPLIFIED PROBE TECHNIQUE, MAKING USE OF HIGH THROUGHPUT TECHNOLOGIES AS DESCRIBED BY CMS-2020-01-R: HCPCS | Performed by: INTERNAL MEDICINE

## 2021-01-12 PROCEDURE — 36415 COLL VENOUS BLD VENIPUNCTURE: CPT

## 2021-01-12 PROCEDURE — 85025 COMPLETE CBC W/AUTO DIFF WBC: CPT | Performed by: INTERNAL MEDICINE

## 2021-01-12 PROCEDURE — 82105 ALPHA-FETOPROTEIN SERUM: CPT | Performed by: INTERNAL MEDICINE

## 2021-01-12 PROCEDURE — P9047 ALBUMIN (HUMAN), 25%, 50ML: HCPCS | Performed by: INTERNAL MEDICINE

## 2021-01-12 PROCEDURE — 80053 COMPREHEN METABOLIC PANEL: CPT | Performed by: INTERNAL MEDICINE

## 2021-01-12 PROCEDURE — 999N000127 HC STATISTIC PERIPHERAL IV START W US GUIDANCE

## 2021-01-12 PROCEDURE — 250N000011 HC RX IP 250 OP 636: Performed by: INTERNAL MEDICINE

## 2021-01-12 PROCEDURE — 250N000009 HC RX 250: Performed by: INTERNAL MEDICINE

## 2021-01-12 PROCEDURE — 49083 ABD PARACENTESIS W/IMAGING: CPT | Mod: GC | Performed by: RADIOLOGY

## 2021-01-12 PROCEDURE — U0005 INFEC AGEN DETEC AMPLI PROBE: HCPCS | Performed by: INTERNAL MEDICINE

## 2021-01-12 RX ORDER — LIDOCAINE HYDROCHLORIDE 10 MG/ML
20 INJECTION, SOLUTION EPIDURAL; INFILTRATION; INTRACAUDAL; PERINEURAL ONCE
Status: COMPLETED | OUTPATIENT
Start: 2021-01-12 | End: 2021-01-12

## 2021-01-12 RX ORDER — LIDOCAINE HYDROCHLORIDE 10 MG/ML
20 INJECTION, SOLUTION EPIDURAL; INFILTRATION; INTRACAUDAL; PERINEURAL ONCE
Status: CANCELLED | OUTPATIENT
Start: 2021-01-19 | End: 2021-01-19

## 2021-01-12 RX ORDER — HEPARIN SODIUM (PORCINE) LOCK FLUSH IV SOLN 100 UNIT/ML 100 UNIT/ML
5 SOLUTION INTRAVENOUS
Status: CANCELLED | OUTPATIENT
Start: 2021-01-19

## 2021-01-12 RX ORDER — ALBUMIN (HUMAN) 12.5 G/50ML
12.5 SOLUTION INTRAVENOUS
Status: DISCONTINUED | OUTPATIENT
Start: 2021-01-12 | End: 2021-01-12 | Stop reason: HOSPADM

## 2021-01-12 RX ORDER — ALBUMIN (HUMAN) 12.5 G/50ML
12.5 SOLUTION INTRAVENOUS
Status: CANCELLED | OUTPATIENT
Start: 2021-01-19

## 2021-01-12 RX ORDER — HEPARIN SODIUM,PORCINE 10 UNIT/ML
5 VIAL (ML) INTRAVENOUS
Status: CANCELLED | OUTPATIENT
Start: 2021-01-19

## 2021-01-12 RX ADMIN — ALBUMIN HUMAN 37.5 G: 0.25 SOLUTION INTRAVENOUS at 14:48

## 2021-01-12 RX ADMIN — LIDOCAINE HYDROCHLORIDE 10 ML: 10 INJECTION, SOLUTION EPIDURAL; INFILTRATION; INTRACAUDAL; PERINEURAL at 14:22

## 2021-01-12 NOTE — LETTER
1/12/2021         RE: Ten Johnson  [address]  [facility] [address]        Dear Colleague,    Thank you for referring your patient, Ten Johnson, to the [facility] ADV[facility] TREATMENT [facility]. Please see a copy of my visit note below.    Paracentesis Nursing Note  Ten Johnson presents today to [facility].    During today's appointment orders from Dr.Leventhal were completed.    Progress Note:  Patient identification verified by name and date of birth.  Assessment completed.  Vitals monitored throughout appointment and recorded in Doc Flowsheets.  See proceduralist note in ultrasound.    Vascular Access: peripheral IV was placed by vascular access nurse.  Labs: were drawn per orders.   Date of consent or authorization: 10/20/2020.  Invasive Procedure Safety Checklist was completed and sent for scanning.   Paracentesis performed by Dr. Chapo Estrella and student.    The following labs were communicated to provider performing paracentesis:  Lab Results   Component Value Date     01/12/2021     Total amount of ascites fluid drained: 6.3 liters.  Color of ascites fluid: yellow.  Total amount of albumin given: 37.5 grams.  Patient tolerated procedure fairly well.  Post procedure,denies pain or discomfort post paracentesis.    Asymptomatic COVID test date: Covid test performed in left NP.  Keke Nash MA    Discharge Plan:  Discharge instructions were reviewed with patient.  Patient/Representative verbalized understanding and all questions were answered.   Dis[facility] in stable condition.    Karina Eubanks RN    Administrations This Visit     albumin human 25 % injection 12.5 g     Admin Date  01/12/2021 Action  New Bag Dose  37.5 g Route  Intravenous Administered By  Karina Eubanks RN          lidocaine (PF) (XYLOCAINE) 1 % injection 20 mL     Admin Date  01/12/2021 Action  Given  Dose  10 mL Route  Subcutaneous Administered By  Karina Eubanks RN                BP (!) 157/98   Pulse 65   Temp 97.2  F (36.2  C) (Oral)   Resp 16   Wt 72.1 kg (159 lb)   SpO2 100%   BMI 23.48 kg/m                Again, thank you for allowing me to participate in the care of your patient.        Sincerely,        [facility] Paracentesis Provider

## 2021-01-12 NOTE — PROGRESS NOTES
Paracentesis Nursing Note  Ten Johnson presents today to [facility].    During today's appointment orders from Dr.Leventhal were completed.    Progress Note:  Patient identification verified by name and date of birth.  Assessment completed.  Vitals monitored throughout appointment and recorded in Doc Flowsheets.  See proceduralist note in ultrasound.    Vascular Access: peripheral IV was placed by vascular access nurse.  Labs: were drawn per orders.   Date of consent or authorization: 10/20/2020.  Invasive Procedure Safety Checklist was completed and sent for scanning.   Paracentesis performed by Dr. Chapo Estrella and student.    The following labs were communicated to provider performing paracentesis:  Lab Results   Component Value Date     01/12/2021     Total amount of ascites fluid drained: 6.3 liters.  Color of ascites fluid: yellow.  Total amount of albumin given: 37.5 grams.  Patient tolerated procedure fairly well.  Post procedure,denies pain or discomfort post paracentesis.    Asymptomatic COVID test date: Covid test performed in left NP.  Keke Nash MA    Discharge Plan:  Discharge instructions were reviewed with patient.  Patient/Representative verbalized understanding and all questions were answered.   [facility] in stable condition.    Karina Eubanks RN    Administrations This Visit     albumin human 25 % injection 12.5 g     Admin Date  01/12/2021 Action  New Bag Dose  37.5 g Route  Intravenous Administered By  Karina Eubanks RN          lidocaine (PF) (XYLOCAINE) 1 % injection 20 mL     Admin Date  01/12/2021 Action  Given Dose  10 mL Route  Subcutaneous Administered By  Karina Eubanks RN                BP (!) 157/98   Pulse 65   Temp 97.2  F (36.2  C) (Oral)   Resp 16   Wt 72.1 kg (159 lb)   SpO2 100%   BMI 23.48 kg/m

## 2021-01-21 ENCOUNTER — ANCILLARY PROCEDURE (OUTPATIENT)
Dept: ULTRASOUND IMAGING | Facility: CLINIC | Age: 61
End: 2021-01-21
Attending: INTERNAL MEDICINE
Payer: COMMERCIAL

## 2021-01-21 ENCOUNTER — OFFICE VISIT (OUTPATIENT)
Dept: INFUSION THERAPY | Facility: CLINIC | Age: 61
End: 2021-01-21
Attending: INTERNAL MEDICINE
Payer: COMMERCIAL

## 2021-01-21 VITALS
DIASTOLIC BLOOD PRESSURE: 90 MMHG | WEIGHT: 157.6 LBS | HEART RATE: 71 BPM | BODY MASS INDEX: 23.27 KG/M2 | TEMPERATURE: 98.4 F | RESPIRATION RATE: 18 BRPM | SYSTOLIC BLOOD PRESSURE: 159 MMHG | OXYGEN SATURATION: 99 %

## 2021-01-21 DIAGNOSIS — K70.31 ASCITES DUE TO ALCOHOLIC CIRRHOSIS (H): ICD-10-CM

## 2021-01-21 DIAGNOSIS — Z01.812 ENCOUNTER FOR PREPROCEDURE SCREENING LABORATORY TESTING FOR COVID-19: Primary | ICD-10-CM

## 2021-01-21 DIAGNOSIS — Z11.52 ENCOUNTER FOR PREPROCEDURE SCREENING LABORATORY TESTING FOR COVID-19: Primary | ICD-10-CM

## 2021-01-21 PROCEDURE — U0005 INFEC AGEN DETEC AMPLI PROBE: HCPCS | Performed by: INTERNAL MEDICINE

## 2021-01-21 PROCEDURE — 49083 ABD PARACENTESIS W/IMAGING: CPT | Mod: GC | Performed by: RADIOLOGY

## 2021-01-21 PROCEDURE — 250N000009 HC RX 250: Performed by: INTERNAL MEDICINE

## 2021-01-21 PROCEDURE — 999N000067 HC STATISTIC FAILED PERIPHERAL IV START

## 2021-01-21 PROCEDURE — 49083 ABD PARACENTESIS W/IMAGING: CPT

## 2021-01-21 PROCEDURE — U0003 INFECTIOUS AGENT DETECTION BY NUCLEIC ACID (DNA OR RNA); SEVERE ACUTE RESPIRATORY SYNDROME CORONAVIRUS 2 (SARS-COV-2) (CORONAVIRUS DISEASE [COVID-19]), AMPLIFIED PROBE TECHNIQUE, MAKING USE OF HIGH THROUGHPUT TECHNOLOGIES AS DESCRIBED BY CMS-2020-01-R: HCPCS | Performed by: INTERNAL MEDICINE

## 2021-01-21 RX ORDER — LIDOCAINE HYDROCHLORIDE 10 MG/ML
20 INJECTION, SOLUTION EPIDURAL; INFILTRATION; INTRACAUDAL; PERINEURAL ONCE
Status: CANCELLED | OUTPATIENT
Start: 2021-01-28 | End: 2021-01-28

## 2021-01-21 RX ORDER — LIDOCAINE HYDROCHLORIDE 10 MG/ML
20 INJECTION, SOLUTION EPIDURAL; INFILTRATION; INTRACAUDAL; PERINEURAL ONCE
Status: COMPLETED | OUTPATIENT
Start: 2021-01-21 | End: 2021-01-21

## 2021-01-21 RX ORDER — ALBUMIN (HUMAN) 12.5 G/50ML
12.5 SOLUTION INTRAVENOUS
Status: DISCONTINUED | OUTPATIENT
Start: 2021-01-21 | End: 2021-01-21 | Stop reason: HOSPADM

## 2021-01-21 RX ORDER — HEPARIN SODIUM (PORCINE) LOCK FLUSH IV SOLN 100 UNIT/ML 100 UNIT/ML
5 SOLUTION INTRAVENOUS
Status: CANCELLED | OUTPATIENT
Start: 2021-01-28

## 2021-01-21 RX ORDER — ALBUMIN (HUMAN) 12.5 G/50ML
12.5 SOLUTION INTRAVENOUS
Status: CANCELLED | OUTPATIENT
Start: 2021-01-28

## 2021-01-21 RX ORDER — HEPARIN SODIUM,PORCINE 10 UNIT/ML
5 VIAL (ML) INTRAVENOUS
Status: CANCELLED | OUTPATIENT
Start: 2021-01-28

## 2021-01-21 RX ADMIN — LIDOCAINE HYDROCHLORIDE 15 ML: 10 INJECTION, SOLUTION EPIDURAL; INFILTRATION; INTRACAUDAL; PERINEURAL at 14:19

## 2021-01-21 NOTE — PROGRESS NOTES
Paracentesis Nursing Note  Ten Johnson presents today to [facility] for [facility].    During today's appointment orders from Dr. Thomas Leventhal were completed.    Progress Note:  Patient identification verified by name and date of birth.  Assessment completed.  Vitals monitored throughout appointment and recorded in Doc Flowsheets.  See proceduralist note in ultrasound.    Vascular Access: peripheral IV was attempted to be placed by vascular access nurse. Her IV was painful to patient. It was removed. Patient did not want another placed unless needed due to drain limit.   Labs: were not ordered for this appointment.    Date of consent or authorization: 1/21/2021.  Invasive Procedure Safety Checklist was completed and sent for scanning.     Paracentesis performed by Samantha Hancock MD assisted by Manny Orozco MD.    The following labs were communicated to provider performing paracentesis:  Lab Results   Component Value Date     01/12/2021       Total amount of ascites fluid drained: 3.95 liters.  Color of ascites fluid: clear, yellow.  Total amount of albumin given: 0 grams. Albumin orders began at 4L    Patient tolerated procedure well.    Post procedure,denies pain or discomfort post paracentesis.    Asymptomatic COVID test date: 1/21 (If next appt is within 2 weeks, COVID test to be done in [facility])      Discharge Plan:  Discharge instructions were reviewed with patient.  Patient/Representative verbalized understanding and all questions were answered.   [facility] in stable condition.    Stefanie De La Torre RN       Administrations This Visit     lidocaine (PF) (XYLOCAINE) 1 % injection 20 mL     Admin Date  01/21/2021 Action  Given Dose  15 mL Route  Subcutaneous Administered By  Stefanie De La Torre RN                /83   Pulse 72   Temp 98.4  F (36.9  C)   Resp 18   SpO2 98%      Patient takes his lisinopril at bedtime

## 2021-01-21 NOTE — LETTER
1/21/2021         RE: Ten Johnson  [address]  [facility] [address]        Dear Colleague,    Thank you for referring your patient, Ten Johnson, to the [facility] ADVANCED TREATMENT [facility]. Please see a copy of my visit note below.    Paracentesis Nursing Note  Ten Johnson presents today to [facility].    During today's appointment orders from Dr. Thomas Leventhal were completed.    Progress Note:  Patient identification verified by name and date of birth.  Assessment completed.  Vitals monitored throughout appointment and recorded in Doc Flowsheets.  See proceduralist note in ultrasound.    Vascular Access: peripheral IV was attempted to be placed by vascular access nurse. Her IV was painful to patient. It was removed. Patient did not want another placed unless needed due to drain limit.   Labs: were not ordered for this appointment.    Date of consent or authorization: 1/21/2021.  Invasive Procedure Safety Checklist was completed and sent for scanning.     Paracentesis performed by Samantha Hancock MD assisted by Manny Orozco MD.    The following labs were communicated to provider performing paracentesis:  Lab Results   Component Value Date     01/12/2021       Total amount of ascites fluid drained: 3.95 liters.  Color of ascites fluid: clear, yellow.  Total amount of albumin given: 0 grams. Albumin orders began at 4L    Patient tolerated procedure well.    Post procedure,denies pain or discomfort post paracentesis.    Asymptomatic COVID test date: 1/21 (If next appt is within 2 weeks, COVID test to be done in [facility])      Discharge Plan:  Discharge instructions were reviewed with patient.  Patient/Representative verbalized understanding and all questions were answered.   Dis[facility] in stable condition.    Stefanie De La Torre RN       Administrations This Visit     lidocaine (PF)  (XYLOCAINE) 1 % injection 20 mL     Admin Date  01/21/2021 Action  Given Dose  15 mL Route  Subcutaneous Administered By  Stefanie De La Torre RN                /83   Pulse 72   Temp 98.4  F (36.9  C)   Resp 18   SpO2 98%      Patient takes his lisinopril at bedtime        Again, thank you for allowing me to participate in the care of your patient.        Sincerely,        [facility] Paracentesis Provider

## 2021-01-21 NOTE — PATIENT INSTRUCTIONS
"Dear Ten Johnson    Thank you for choosing [facility] ([facility]) for your paracentesis.  The following information is a summary of our appointment as well as important reminders.      After Your COVID-19 (Coronavirus) Test  You have been tested for COVID-19 (coronavirus).   If you'll have surgery in the next few days, we'll let you know ahead of time if you have the virus. Please call your surgeon's office with any questions.  For all other patients: Results are usually available in maker[employer] within 2 to 3 days.   If you do not have a maker[employer] account, you'll get a letter in the mail in about 7 to 10 days.   [employer] is often the fastest way to get test results. Please sign up if you do not already have a maker[employer]QR account. See the handout Getting COVID-19 Test Results in makerSQR for help.  What if my test result is positive?  If your test is positive and you have not viewed your result in makerSQR, you'll get a phone call with your result. (A positive test means that you have the virus.)        Follow the tips under \"How do I self-isolate?\" below for 10 days (20 days if you have a weak immune system).       You don't need to be retested for COVID-19 before going back to school or work. As long as you're fever-free and feeling better, you can go back to school, work and other activities after waiting the 10 or 20 days.  What if I have questions after I get my results?  If you have questions about your results, please visit our testing website at [email or web address].   After 7 to 10 days, if you have not gotten your results:          Call [phone] ([phone]) and ask to speak with our COVID-19 results team.         If you're being treated at an infusion center: Call your infusion center directly.  What are the symptoms of COVID-19?  Cough, fever and trouble breathing are the most common signs of COVID-19.  Other symptoms " "can include new headaches, new muscle or body aches, new and unexplained fatigue (feeling very tired), chills, sore throat, congestion (stuffy or runny nose), diarrhea (loose poop), loss of taste or smell, belly pain, and nausea or vomiting (feeling sick to your stomach or throwing up).  You may already have symptoms of COVID-19, or they may show up later.  What should I do if I have symptoms?  If you're having surgery: Call your surgeon's office.  For all other patients: Stay home and away from others (self-isolate) until ...         You've had no fever--and no medicine that reduces fever--for 1 full day (24 hours), AND         Other symptoms have gotten better. For example, your cough or breathing has improved, AND         At least 10 days have passed since your symptoms first started.  How do I self-isolate?         Stay in your own room, even for meals. Use your own bathroom if you can.         Stay away from others in your home. No hugging, kissing or shaking hands. No visitors.         Don't go to work, school or anywhere else.         Clean \"high touch\" surfaces often (doorknobs, counters, handles). Use household cleaning spray or wipes. You'll find a full list of  on the EPA website: www.epa.gov/pesticide-registration/list-n-disinfectants-use-against-sars-cov-2.         Cover your mouth and nose with a mask or other face covering to avoid spreading germs.         Wash your hands and face often. Use soap and water.         Caregivers in these groups are at risk for severe illness due to COVID-19:  ?   People 65 years and older  ?   People who live in a nursing home or long-term care facility  ?   People with chronic disease (lung, heart, cancer, diabetes, kidney, liver, immunologic)  ?   People who have a weakened immune system, including those who:     Are in cancer treatment     Take medicine that weakens the immune system, such as corticosteroids     Had a bone marrow or organ transplant     Have " an immune deficiency     Have poorly controlled HIV or AIDS     Are obese (body mass index of 40 or higher)     Smoke regularly         Caregivers should wear gloves while washing dishes, handling laundry and cleaning bedrooms and bathrooms.         Use caution when washing and drying laundry: Don't shake dirty laundry and use the warmest water setting that you can.         For more tips on managing your health at home, go to www.cdc.gov/coronavirus/2019-ncov/downloads/10Things.pdf.  How can I take care of myself at home?  1.  Get lots of rest. Drink extra fluids (unless a doctor has told you not to).  2.  Take Tylenol (acetaminophen) for fever or pain. If you have liver or kidney problems, ask your family doctor if it's OK to take Tylenol.   Adults can take either:  ?   650 mg (two 325 mg pills) every 4 to 6 hours, or   ?   1,000 mg (two 500 mg pills) every 8 hours as needed.  ?   Note: Don't take more than 3,000 mg in one day. Acetaminophen is found in many medicines (both prescribed and over-the-counter medicines). Read all labels to be sure you don't take too much.  For children, check the Tylenol bottle for the right dose. The dose is based on the child's age or weight.  3.  If you have other health problems (like cancer, heart failure, an organ transplant or severe kidney disease): Call your specialty clinic if you don't feel better in the next 2 days.  4.  Know when to call 911. Emergency warning signs include:  ?   Trouble breathing or shortness of breath  ?   Chest pain or pressure that doesn't go away  ?   Feeling confused like you haven't felt before, or not being able to wake up  ?   Bluish-colored lips or face  5.  If your doctor prescribed a blood thinner medicine: Follow their instructions.  Where can I get more information?          [facility] - About COVID-19: [email or web address]         CDC - If You're Sick: cdc.gov/coronavirus/2019-ncov/about/steps-when-sick.html         CDC -  Ending Home Isolation: www.cdc.gov/coronavirus/2019-ncov/hcp/disposition-in-home-patients.html         CDC - Caring for Someone: www.cdc.gov/coronavirus/2019-ncov/if-you-are-sick/care-for-someone.html         [facility] - Interim Guidance for Hospital Discharge to Home: www.health.[facility].mn.us/diseases/coronavirus/hcp/hospdischarge.pdf         [facility] clinical trials (COVID-19 research studies): [email or web address].[email or web address]-clinical-trials         Below are the COVID-19 hotlines at the [place] Department of Health ([facility]). Interpreters are available.  ?   For health questions: Call 965-175-0216 or 1-316.846.2916 (7 a.m. to 7 p.m.)  ?   For questions about schools and childcare: Call 499-972-9368 or 1-177.897.1881 (7 a.m. to 7 p.m.)     For informational purposes only. Not to replace the advice of your health care provider. Clinically reviewed by Infection Prevention and the [facility] COVID-19 Clinical Team. Copyright   2020 [place] [employer]. All rights reserved. [employer] 419777 - Rev 11/11/20.       Patient Education     Discharge Instructions for Paracentesis   Paracentesis is a procedure to remove extra fluid from your belly (abdomen). This fluid buildup in the abdomen is called ascites. The procedure may have been done to take a sample of the fluid. Or, it may have been done to drain the extra fluid from your abdomen and help make you more comfortable.     Ascites is buildup of excess fluid in the abdomen.   Home care    If you have pain after the procedure, your healthcare provider can prescribe or recommend pain medicines. Take these exactly as directed. If you stopped taking other medicines before the procedure, ask your provider when you can start them again.    Take it easy for 24 hours after the procedure. Don't do any physical activity until your provider says it s OK.    You will have a small bandage over the puncture site. Stitches, surgical staples, adhesive tapes, adhesive  strips, or surgical glue may be used to close the incision. They also help stop bleeding and speed healing. You may take the bandage off in 24 hours.    Check the puncture site for the signs of infection listed below.    Follow-up care  Make a follow-up appointment with your healthcare provider as directed. During your follow-up visit, your provider will check your healing. Let your provider know how you are feeling. You can also discuss the cause of your ascites and if you need any further treatment. If your fluid is infected, you will be sent home on antibiotics. In some cases, the paracentesis may need to be repeated if the fluid returns. Your provider may also prescribe medicines that increase urination (diuretics) to decrease the buildup of fluid.  When to call your healthcare provider  Call your healthcare provider if you have any of the following after the procedure:    A fever of 100.4  F ( 38.0 C) or higher, or as directed by your provider    Chills    Trouble breathing    Pain that doesn't go away even after taking pain medicine    Belly pain not caused by having the skin punctured    Bleeding from the puncture site    More than a small amount of fluid leaking from the puncture site    Swollen belly    Signs of infection at the puncture site. These include increased pain, redness, or swelling, warmth, or bad-smelling drainage.    Blood in your urine    Feeling dizzy or lightheaded, or fainting  [employer]Well last reviewed this educational content on 10/1/2019    6354-1808 [employer]. [address] [place] [address], [place] [address]. All rights reserved. This information is not intended as a substitute for professional medical care. Always follow your healthcare professional's instructions.             We look forward in seeing you on your next appointment here at [facility] ([facility]).  Please don t hesitate to call us at [phone] to reschedule any of your appointments or to  speak with one of the [facility] registered nurses.  It was a pleasure taking care of you today.    Sincerely,    [facility]  [facility]  [address] [place] [address]  [id number]  Phone:  [phone]

## 2021-01-22 LAB
LABORATORY COMMENT REPORT: NORMAL
SARS-COV-2 RNA RESP QL NAA+PROBE: NEGATIVE
SARS-COV-2 RNA RESP QL NAA+PROBE: NORMAL
SPECIMEN SOURCE: NORMAL
SPECIMEN SOURCE: NORMAL

## 2021-01-28 ENCOUNTER — VIRTUAL VISIT (OUTPATIENT)
Dept: ONCOLOGY | Facility: CLINIC | Age: 61
End: 2021-01-28
Attending: INTERNAL MEDICINE
Payer: COMMERCIAL

## 2021-01-28 DIAGNOSIS — C22.0 HCC (HEPATOCELLULAR CARCINOMA) (H): Primary | ICD-10-CM

## 2021-01-28 DIAGNOSIS — K70.31 ALCOHOLIC CIRRHOSIS OF LIVER WITH ASCITES (H): ICD-10-CM

## 2021-01-28 PROCEDURE — 99214 OFFICE O/P EST MOD 30 MIN: CPT | Mod: 95 | Performed by: INTERNAL MEDICINE

## 2021-01-28 PROCEDURE — 999N001193 HC VIDEO/TELEPHONE VISIT; NO CHARGE

## 2021-01-28 NOTE — PROGRESS NOTES
[facility]  CARDIOVASCULAR MEDICINE TELEPHONE VISIT NOTE    Referring Provider: Cuca Celeste   Primary Care Provider: Cuca Celeste     Patient Name: Ten Johnson   MRN: [id number]     PERTINENT CLINICAL HISTORY:   Ten Johnson is a 60 year old male with past medical history of HTN, severe aortic stenosis s/p TAVR 2/18 (26 mm Victor M 3), ETOH cirrhosis, hepatitis C, HCC, GIB d/t Sarahi Hoffman tear, and thrombocytopenia who presents via telephone visit for follow up. Patient last saw Cardiology (Ema Pierson NP) in 2018.     Navi has been feeling well recently, especially from heart standpoint since the TAVR. He has ascites related to cirrhosis and thus is undergoing paracentesis every 7-10 days. He is tired often and has lost a lot of weight which he attributes to HCC. He has lightheadedness and dizziness after large volume paracenteses. Otherwise, he is quite active and uses his bike for transportation during the warmer weather. He put ~2,000 miles on his bike last year. He denies chest pain, SOB, LYN, palpitations, leg swelling, and weight gain.     Cardiac medications include ASA 81 mg, lasix 60 mg.    PAST MEDICAL HISTORY:     Past Medical History:   Diagnosis Date     JENNIFER (acute kidney injury) (H) 4/9/2019     Alcohol use disorder      Alcoholic cirrhosis (H)      Anticoagulant long-term use     plavix     Aortic stenosis, severe 2/21/2018     Ascites      Chronic allergic rhinitis      Chronic anemia      Chronic hepatitis C without hepatic coma (H) 05/10/2016    Untreated as of 2/2018     Cirrhosis (H) 2017    MRI finding     Diastolic dysfunction      Erosive gastropathy      Esophageal varices in alcoholic cirrhosis (H)      H/O upper gastrointestinal hemorrhage 09/2017     Hepatocellular carcinoma (H)      History of blood transfusion      History of drug abuse (H)     intranasal     Hypertension     essential     JANELLE (iron deficiency anemia)      Infected prosthetic knee joint  (H) 3/4/2019     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     Sarahi-Hoffman tear     History     Marijuana abuse      MRSA (methicillin resistant Staphylococcus aureus)      Nonrheumatic aortic valve stenosis 2/20/2018     Olecranon bursitis      Portal hypertension (H)      Right shoulder pain     history of rotator cuff repair     S/p TAVR (transcatheter aortic valve replacement), bioprosthetic      Severe aortic stenosis      Thrombocytopenia (H)         PAST SURGICAL HISTORY:     Past Surgical History:   Procedure Laterality Date     ABLATE LIVER TUMOR N/A 10/22/2019    Procedure: Ablate liver tumor x3;  Surgeon: Gregroio Benoit MD;  Location: [facility]     ARTHROSCOPY SHOULDER ROTATOR CUFF REPAIR  7/31/2012    Procedure: ARTHROSCOPY SHOULDER ROTATOR CUFF REPAIR;  Right Shoulder Arthroscopic Rotator Cuff Repair, BicepsTenodesis,  Subacromial Decompression ;  Surgeon: Joi Castillo MD;  [facility]     ESOPHAGOSCOPY, GASTROSCOPY, DUODENOSCOPY (EGD), COMBINED N/A 10/23/2017    Procedure: COMBINED ESOPHAGOSCOPY, GASTROSCOPY, DUODENOSCOPY (EGD);;  Surgeon: Gentry Salas MD;  Location: [facility]     EXCHANGE POLY COMPONENT ARTHROPLASTY KNEE Right 3/4/2019    Procedure: REVISION RIGHT TOTAL KNEE POLY COMPONENT EXCHANGE;  Surgeon: Olvin Joe MD;  Location: [facility]     FACIAL RECONSTRUCTION SURGERY  1971     HEART CATH FEMORAL CANNULIZATION WITH OPEN STANDBY REPAIR AORTIC VALVE N/A 2/21/2018    Procedure: HEART CATH FEMORAL CANNULIZATION WITH OPEN STANDBY REPAIR AORTIC VALVE;;  Surgeon: Luis Baird MD;  Location: [facility]     IR CHEMO EMBOLIZATION  1/29/2020     IR LIVER BIOPSY PERCUTANEOUS  7/18/2019     IRRIGATION AND DEBRIDEMENT UPPER EXTREMITY, COMBINED  1/3/2012    Procedure:COMBINED IRRIGATION AND DEBRIDEMENT UPPER EXTREMITY; Irrigation & Debridement Left Elbow; Surgeon:CRISTHIAN ZHOU; Location:[facility]     LAPAROSCOPIC BIOPSY LIVER N/A 10/22/2019    Procedure:  intraoperative liver ultrasound, laparoscopic converted to open liver biopsy x 6;  Surgeon: Gregorio Benoit MD;  Location: [facility]     LAPAROSCOPY DIAGNOSTIC (GENERAL) N/A 10/22/2019    Procedure: Diagnostic laparoscopy;  Surgeon: Gregorio Benoit MD;  Location: [facility]     LAPAROTOMY, LYSIS ADHESIONS, COMBINED N/A 10/22/2019    Procedure: Laparotomy, lysis adhesions, combined;  Surgeon: Gregorio Benoit MD;  Location: [facility]     OPTICAL TRACKING SYSTEM ARTHROPLASTY KNEE Right 2/7/2019    Procedure: ARTHROPLASTY KNEE RIGHT;  Surgeon: Olvin Joe MD;  [facility]     REPAIR TENDON TRICEPS UPPER EXTREMITY  11/8/2011    Procedure:REPAIR TENDON TRICEPS UPPER EXTREMITY; Surgeon:CRISTHIAN ZHOU; [facility]     SHOULDER SURGERY  2003    left, injury, torn tendons, hematoma     TRANSCATHETER AORTIC VALVE IMPLANT ANESTHESIA N/A 2/21/2018    Procedure: TRANSCATHETER AORTIC VALVE IMPLANT ANESTHESIA;  Transfemoral (Quiroz) Aortic Valve Implant 26mm MARTHA 3, with Cardiopulmonary Bypass Standby, transthoracic echocardiogram;  Surgeon: GENERIC ANESTHESIA PROVIDER;  Location: [facility]     TRANSPOSITION ULNAR NERVE (ELBOW)  11/8/2011    Procedure:TRANSPOSITION ULNAR NERVE (ELBOW); Final Procedure Done: Left Elbow Lateral Ulnar Collateral Repair And  Left Elbow Triceps Repair          CURRENT MEDICATIONS:     Current Outpatient Medications   Medication Sig Dispense Refill     aspirin 81 MG EC tablet Take 1 tablet (81 mg) by mouth daily 90 tablet 3     fluticasone (FLONASE) 50 MCG/ACT nasal spray Spray 2 sprays into both nostrils daily 48 mL 4     lisinopril (ZESTRIL) 20 MG tablet Take 1 tablet (20 mg) by mouth daily 90 tablet 3     loratadine (CLARITIN) 10 MG tablet Take 1 tablet (10 mg) by mouth daily as needed for allergies 90 tablet 3     diazepam (VALIUM) 5 MG tablet Take one tablet by mouth one hour prior to scan. May repeat dose one time (Patient not taking: Reported on 2/1/2021) 2 tablet 0     furosemide 20 MG PO  tablet Take 3 tablets (60 mg) by mouth daily (Patient not taking: Reported on 2/1/2021) 90 tablet 1     lisinopril (ZESTRIL) 20 MG tablet Take 1 tablet (20 mg) by mouth daily (Patient not taking: Reported on 2/1/2021) 90 tablet 3     naloxone (NARCAN) 4 MG/0.1ML nasal spray Spray 1 spray (4 mg) into one nostril alternating nostrils once as needed for opioid reversal every 2-3 minutes until assistance arrives (Patient not taking: Reported on 2/1/2021) 0.2 mL 1     ondansetron (ZOFRAN) 4 MG tablet Take 1 tablet (4 mg) by mouth every 8 hours as needed for nausea (Patient not taking: Reported on 2/1/2021) 30 tablet 0     spironolactone (ALDACTONE) 50 MG tablet Take 2 tablets (100 mg) by mouth daily (Patient not taking: Reported on 2/1/2021) 60 tablet 3        ALLERGIES:     Allergies   Allergen Reactions     Zolpidem Other (See Comments)     Alcoholic.  Had reaction 3/17/13 while intoxicated which included black out, loss of awareness, paranoia.  Do not prescribe.  Dr. Celeste     Cats Other (See Comments)     rhinitis     Dogs Other (See Comments)     rhinitis     Pollen Extract Other (See Comments)     rhinits.        FAMILY HISTORY:     Family History   Problem Relation Age of Onset     Cancer Mother 62        unknown primary     Alcoholism Paternal Uncle      Unknown/Adopted Father      No Known Problems Brother      Diabetes Maternal Grandmother      Myocardial Infarction Maternal Grandfather      No Known Problems Paternal Grandmother      Unknown/Adopted Paternal Grandfather      Cirrhosis No family hx of         SOCIAL HISTORY:     Social History     Socioeconomic History     Marital status: Single     Spouse name: Not on file     Number of children: 0     Years of education: Not on file     Highest education level: Not on file   Occupational History     Occupation:    Social Needs     Financial resource strain: Not on file     Food insecurity     Worry: Not on file     Inability: Not on file      "Transportation needs     Medical: Not on file     Non-medical: Not on file   Tobacco Use     Smoking status: Never Smoker     Smokeless tobacco: Never Used   Substance and Sexual Activity     Alcohol use: Yes     Comment: drinks a \"beer occasionally\"     Drug use: Yes     Types: Marijuana     Comment: denies     Sexual activity: Not Currently     Partners: Female   Lifestyle     Physical activity     Days per week: Not on file     Minutes per session: Not on file     Stress: Not on file   Relationships     Social connections     Talks on phone: Not on file     Gets together: Not on file     Attends Denominational service: Not on file     Active member of club or organization: Not on file     Attends meetings of clubs or organizations: Not on file     Relationship status: Not on file     Intimate partner violence     Fear of current or ex partner: Not on file     Emotionally abused: Not on file     Physically abused: Not on file     Forced sexual activity: Not on file   Other Topics Concern     Parent/sibling w/ CABG, MI or angioplasty before 65F 55M? Not Asked   Social History Narrative    .  Bicycles a lot.  Excessive alcohol use.  Smokes cigars.  Occasional marijuana use.     Ten  reports current alcohol use. and  reports that he has never smoked. He has never used smokeless tobacco..     REVIEW OF SYSTEMS:   A comprehensive review of systems was performed and negative unless otherwise noted in the HPI above.      PHYSICAL EXAMINATION:   No vital signs were taken for this telephone visit.   There is no height or weight on file to calculate BMI.  Wt Readings from Last 2 Encounters:   01/29/21 70.9 kg (156 lb 4.8 oz)   01/21/21 71.5 kg (157 lb 9.6 oz)     Constitutional: no acute distress, pleasant and cooperative  Respiratory: no audible wheezes   Neurologic: Alert and oriented  Psychiatric: appropriate affect, intact thought and speech     LABORATORY DATA:     LIPID RESULTS:  Recent Labs   Lab " Test 05/09/16  1535   CHOL 146   HDL 64   LDL 61   TRIG 106        LIVER ENZYME RESULTS:  Recent Labs   Lab Test 01/12/21  1500 09/03/20  1333   AST 40 Canceled, Test credited   ALT 38 54       CBC RESULTS:  Recent Labs   Lab Test 01/12/21  1500 12/31/20  1348 09/16/20  1316 09/16/20  1316   WBC 7.1  --   --  9.0   HGB 12.2*  --   --  13.8   HCT 36.3*  --   --  40.2   * 134*   < > 101*    < > = values in this interval not displayed.       BMP RESULTS:  Recent Labs   Lab Test 01/12/21  1500 09/03/20  1333    140   POTASSIUM 3.9 4.5   CHLORIDE 112* 112*   CO2 24 23   ANIONGAP 4 5   GLC 86 74   BUN 16 15   CR 0.86 0.81   JOSEPHINE 8.7 8.5       A1C RESULTS:  No results found for: A1C    INR RESULTS:  Recent Labs   Lab Test 10/29/20  1354 09/10/20  1345   INR 1.13 1.07          PROCEDURES & FURTHER ASSESSMENTS:     ECHO: 2019 - TAVR with 26 mm Quiroz Victor M 3.  The mean gradient is 9 mmHg.Trace aortic insufficiency is present.  Global and regional left ventricular function is hyperkinetic with an EF of  65-70%.  No pericardial effusion is present.  IVC diameter <2.1 cm collapsing >50% with sniff suggests a normal RA pressure  of 3 mmHg.    CARDIAC CATH: 2018 - >> Normal right sided filling pressures.  >> High left sided filling pressures with mean wedge of 20 mmhg  >> Borderline pulmonary artery hypertension with mean of 25 mm Hg  >> Normal cardiac output, 5.2 L/min with index 2.7 L/min/m2  Normal coronary arteries    CABG/Cardiac surgery: 2018 - severe aortic stenosis s/p TAVR 2/18 (26 mm Victor M 3)     CLINICAL IMPRESSION:     Ten Johnson is a 60 year old male with past medical history of HTN, severe aortic stenosis s/p TAVR 2/18 (26 mm Victor M 3), ETOH cirrhosis, hepatitis C, HCC, GIB d/t Sarahi Hoffman tear, and thrombocytopenia who presents via telephone visit for follow up.     PLAN:  Severe aortic stenosis s/p TAVR 2/18 (26 mm Victor M 3)  Last echo was in 2019 which showed normal LV function, trace AI, and  aortic valve MG of 9 mmHg.   -repeat TTE   -continue ASA     Follow-up: in one year     Thank you for allowing me to take part in the care of this very pleasant patient.  Please do not hesitate to call if any further questions or concerns arise.    Zayad Leija DNP APRN CNP  Interventional and Critical Care Cardiology  [phone]    Duration of telephone visit: 15 minutes  Patient location: home  Provider location: home    CC  Patient Care Team:  Cuca Pinon MD as PCP - General (Internal Medicine)  Mili Capps MD as MD (Internal Medicine)  Kieran Ulloa MD as MD (Family Practice)  Emma Mendez as Nurse Coordinator (Cardiology)  Eliazar Singh MD as Resident (Student in organized health care education/training program)  [facility] ([facility] ([facility] ([place]  Murphy Enciso PA-C as Physician Assistant (Physician Assistant)  Gregorio Benoit MD as Surgeon  Cuca Pinon MD as Assigned PCP  Gregorio Benoit MD as Assigned Surgical Provider  Leventhal, Thomas Michael, MD as Assigned Gastroenterology Provider  Pita Nolan MD as Assigned Cancer Care Provider  Chacorta Douglas MD as Assigned Palliative Care Provider  Tomi Arteaga MD as Assigned Heart and Vascular Provider  CUCA PINON

## 2021-01-28 NOTE — PROGRESS NOTES
"Navi is a 60 year old who is being evaluated via a billable telephone visit.      What phone number would you like to be contacted at? [phone]  How would you like to obtain your AVS? Mail a copy     Vitals - Patient Reported  Weight (Patient Reported): 71.2 kg (157 lb)  Height (Patient Reported): 175.3 cm (5' 9\")  BMI (Based on Pt Reported Ht/Wt): 23.18  Pain Score: Worst Pain (10)  Pain Loc: Abdomen      I have reviewed and updated patient's allergy and medication list.    Concerns: PAIN  Refills: NONE      Kadie Jones CMA    Phone call duration: 14 minutes    "
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
## 2021-01-28 NOTE — PATIENT INSTRUCTIONS
Please schedule MRI Abdomen next few days and follow up with NP/PA ( face to face ) after that    Follow with Dr Russell and Dr Leventhal
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
## 2021-01-29 ENCOUNTER — ANCILLARY PROCEDURE (OUTPATIENT)
Dept: ULTRASOUND IMAGING | Facility: CLINIC | Age: 61
End: 2021-01-29
Attending: INTERNAL MEDICINE
Payer: COMMERCIAL

## 2021-01-29 ENCOUNTER — OFFICE VISIT (OUTPATIENT)
Dept: INFUSION THERAPY | Facility: CLINIC | Age: 61
End: 2021-01-29
Attending: INTERNAL MEDICINE
Payer: COMMERCIAL

## 2021-01-29 VITALS
WEIGHT: 156.3 LBS | HEART RATE: 76 BPM | DIASTOLIC BLOOD PRESSURE: 94 MMHG | TEMPERATURE: 98.6 F | BODY MASS INDEX: 23.08 KG/M2 | OXYGEN SATURATION: 100 % | SYSTOLIC BLOOD PRESSURE: 172 MMHG

## 2021-01-29 DIAGNOSIS — Z11.52 ENCOUNTER FOR PREPROCEDURE SCREENING LABORATORY TESTING FOR COVID-19: Primary | ICD-10-CM

## 2021-01-29 DIAGNOSIS — Z01.812 ENCOUNTER FOR PREPROCEDURE SCREENING LABORATORY TESTING FOR COVID-19: Primary | ICD-10-CM

## 2021-01-29 DIAGNOSIS — K70.31 ASCITES DUE TO ALCOHOLIC CIRRHOSIS (H): ICD-10-CM

## 2021-01-29 LAB
SARS-COV-2 RNA RESP QL NAA+PROBE: NORMAL
SPECIMEN SOURCE: NORMAL

## 2021-01-29 PROCEDURE — U0005 INFEC AGEN DETEC AMPLI PROBE: HCPCS | Performed by: INTERNAL MEDICINE

## 2021-01-29 PROCEDURE — 49083 ABD PARACENTESIS W/IMAGING: CPT

## 2021-01-29 PROCEDURE — 999N000127 HC STATISTIC PERIPHERAL IV START W US GUIDANCE

## 2021-01-29 PROCEDURE — U0003 INFECTIOUS AGENT DETECTION BY NUCLEIC ACID (DNA OR RNA); SEVERE ACUTE RESPIRATORY SYNDROME CORONAVIRUS 2 (SARS-COV-2) (CORONAVIRUS DISEASE [COVID-19]), AMPLIFIED PROBE TECHNIQUE, MAKING USE OF HIGH THROUGHPUT TECHNOLOGIES AS DESCRIBED BY CMS-2020-01-R: HCPCS | Performed by: INTERNAL MEDICINE

## 2021-01-29 PROCEDURE — 36415 COLL VENOUS BLD VENIPUNCTURE: CPT

## 2021-01-29 PROCEDURE — 250N000009 HC RX 250: Performed by: INTERNAL MEDICINE

## 2021-01-29 PROCEDURE — 49083 ABD PARACENTESIS W/IMAGING: CPT | Performed by: PHYSICIAN ASSISTANT

## 2021-01-29 RX ORDER — LIDOCAINE HYDROCHLORIDE 10 MG/ML
20 INJECTION, SOLUTION EPIDURAL; INFILTRATION; INTRACAUDAL; PERINEURAL ONCE
Status: CANCELLED | OUTPATIENT
Start: 2021-02-02 | End: 2021-02-02

## 2021-01-29 RX ORDER — LIDOCAINE HYDROCHLORIDE 10 MG/ML
20 INJECTION, SOLUTION EPIDURAL; INFILTRATION; INTRACAUDAL; PERINEURAL ONCE
Status: COMPLETED | OUTPATIENT
Start: 2021-01-29 | End: 2021-01-29

## 2021-01-29 RX ORDER — ALBUMIN (HUMAN) 12.5 G/50ML
12.5 SOLUTION INTRAVENOUS
Status: DISCONTINUED | OUTPATIENT
Start: 2021-01-29 | End: 2021-01-29 | Stop reason: HOSPADM

## 2021-01-29 RX ORDER — ALBUMIN (HUMAN) 12.5 G/50ML
12.5 SOLUTION INTRAVENOUS
Status: CANCELLED | OUTPATIENT
Start: 2021-02-02

## 2021-01-29 RX ORDER — HEPARIN SODIUM (PORCINE) LOCK FLUSH IV SOLN 100 UNIT/ML 100 UNIT/ML
5 SOLUTION INTRAVENOUS
Status: CANCELLED | OUTPATIENT
Start: 2021-02-02

## 2021-01-29 RX ORDER — HEPARIN SODIUM,PORCINE 10 UNIT/ML
5 VIAL (ML) INTRAVENOUS
Status: CANCELLED | OUTPATIENT
Start: 2021-02-02

## 2021-01-29 RX ADMIN — LIDOCAINE HYDROCHLORIDE 10 ML: 10 INJECTION, SOLUTION EPIDURAL; INFILTRATION; INTRACAUDAL; PERINEURAL at 14:11

## 2021-01-29 ASSESSMENT — PAIN SCALES - GENERAL: PAINLEVEL: EXTREME PAIN (8)

## 2021-01-29 NOTE — PROGRESS NOTES
Paracentesis Nursing Note  Ten Johnson presents today to [facility] for a paracentesis.    During today's appointment orders from Dr Leventhal were completed.    Progress Note:  Patient identification verified by name and date of birth.  Assessment completed.  Vitals monitored throughout appointment and recorded in Doc Flowsheets.  See proceduralist note in ultrasound.    Vascular Access: peripheral IV was placed by vascular access nurse.  Labs: were not ordered for this appointment.  Date of consent or authorization: 1/21/21  - 4/21/21.  Invasive Procedure Safety Checklist was completed and sent for scanning.   Paracentesis performed by Luis Sanchez PA-C Radiology.    The following labs were communicated to provider performing paracentesis:  Lab Results   Component Value Date     01/12/2021     Total amount of ascites fluid drained: 1.9 liters.  Color of ascites fluid: yellow.  Total amount of albumin given: 0 grams.    Patient tolerated procedure well.    Patient again states he is in pain all the time, tolerates the procedure but it is also painful.    Post procedure,patient stated he has pain when he comes in from being full of ascites, then pain after the procedure from being empty.  The pain from the needle and catheter placement does not go away per the patient.    Asymptomatic COVID test date: 01/29/2021 (If next appt is within 2 weeks, COVID test to be done in [facility])      Discharge Plan:  Discharge instructions were reviewed with patient.  Patient/Representative verbalized understanding and all questions were answered.   Discharged from [facility] in stable condition.    Karina Eubanks RN      /75 (Patient Position: Standing)   Pulse 68   Temp 98.6  F (37  C) (Oral)   Wt 72.8 kg (160 lb 9.6 oz)   SpO2 100%   BMI 23.72 kg/m

## 2021-01-29 NOTE — LETTER
1/29/2021         RE: Ten Johnson  [address]  [facility] [address]        Dear Colleague,    Thank you for referring your patient, Ten Johnson, to the [facility]. Please see a copy of my visit note below.    Paracentesis Nursing Note  Ten Johnson presents today to [facility] for a paracentesis.    During today's appointment orders from Dr Leventhal were completed.    Progress Note:  Patient identification verified by name and date of birth.  Assessment completed.  Vitals monitored throughout appointment and recorded in Doc Flowsheets.  See proceduralist note in ultrasound.    Vascular Access: peripheral IV was placed by vascular access nurse.  Labs: were not ordered for this appointment.  Date of consent or authorization: 1/21/21  - 4/21/21.  Invasive Procedure Safety Checklist was completed and sent for scanning.   Paracentesis performed by Luis Sanchez PA-C Radiology.    The following labs were communicated to provider performing paracentesis:  Lab Results   Component Value Date     01/12/2021     Total amount of ascites fluid drained: 1.9 liters.  Color of ascites fluid: yellow.  Total amount of albumin given: 0 grams.    Patient tolerated procedure well.    Patient again states he is in pain all the time, tolerates the procedure but it is also painful.    Post procedure,patient stated he has pain when he comes in from being full of ascites, then pain after the procedure from being empty.  The pain from the needle and catheter placement does not go away per the patient.    Asymptomatic COVID test date: 01/29/2021 (If next appt is within 2 weeks, COVID test to be done in [facility])      Discharge Plan:  Discharge instructions were reviewed with patient.  Patient/Representative verbalized understanding and all questions were answered.   Discharged from [facility] in stable  condition.    Karina Eubanks RN      /75 (Patient Position: Standing)   Pulse 68   Temp 98.6  F (37  C) (Oral)   Wt 72.8 kg (160 lb 9.6 oz)   SpO2 100%   BMI 23.72 kg/m            Again, thank you for allowing me to participate in the care of your patient.        Sincerely,        [facility] Provider

## 2021-01-30 LAB
LABORATORY COMMENT REPORT: NORMAL
SARS-COV-2 RNA RESP QL NAA+PROBE: NEGATIVE
SPECIMEN SOURCE: NORMAL

## 2021-02-01 ENCOUNTER — VIRTUAL VISIT (OUTPATIENT)
Dept: CARDIOLOGY | Facility: CLINIC | Age: 61
End: 2021-02-01
Attending: NURSE PRACTITIONER
Payer: COMMERCIAL

## 2021-02-01 DIAGNOSIS — I10 ESSENTIAL HYPERTENSION: ICD-10-CM

## 2021-02-01 DIAGNOSIS — Z95.3 S/P TAVR (TRANSCATHETER AORTIC VALVE REPLACEMENT), BIOPROSTHETIC: Primary | ICD-10-CM

## 2021-02-01 PROCEDURE — 99214 OFFICE O/P EST MOD 30 MIN: CPT | Mod: 95 | Performed by: NURSE PRACTITIONER

## 2021-02-01 NOTE — PATIENT INSTRUCTIONS
Thank you for your visit with me in the Cardiovascular Clinic at the [facility]! I appreciate your time.     Cardiology provider you saw during your visit: Zayda Leija, BENNETT APRN CNP.    1. We will get an echocardiogram (heart ultrasound) to take a look at the valve. You will be contacted in a week or two to schedule this.   2. No medication changes.  3. Follow up with Cardiology in one year.    Questions and [phone].   First press #1 for the University and then press #3 for Medical Questions to reach the Cardiology triage nurse.     On Call Cardiologist for after hours or on weekends: [phone], press option #4 and ask to speak to the on-call Cardiologist.

## 2021-02-01 NOTE — LETTER
2/1/2021      RE: Ten Johnson  [address]  [facility] [address]       Dear Colleague,    Thank you for the opportunity to participate in the care of your patient, Ten Johnson, at the [facility]. Please see a copy of my visit note below.    [facility]  CARDIOVASCULAR MEDICINE TELEPHONE VISIT NOTE    Referring Provider: Cuca Celeste   Primary Care Provider: Cuca Celeste     Patient Name: Ten Johnson   MRN: [id number]     PERTINENT CLINICAL HISTORY:   Ten Johnson is a 60 year old male with past medical history of HTN, severe aortic stenosis s/p TAVR 2/18 (26 mm Victor M 3), ETOH cirrhosis, hepatitis C, HCC, GIB d/t Sarahi Hoffman tear, and thrombocytopenia who presents via telephone visit for follow up. Patient last saw Cardiology (Ema Pierson NP) in 2018.     Navi has been feeling well recently, especially from heart standpoint since the TAVR. He has ascites related to cirrhosis and thus is undergoing paracentesis every 7-10 days. He is tired often and has lost a lot of weight which he attributes to HCC. He has lightheadedness and dizziness after large volume paracenteses. Otherwise, he is quite active and uses his bike for transportation during the warmer weather. He put ~2,000 miles on his bike last year. He denies chest pain, SOB, LYN, palpitations, leg swelling, and weight gain.     Cardiac medications include ASA 81 mg, lasix 60 mg.    PAST MEDICAL HISTORY:     Past Medical History:   Diagnosis Date     JENNIFER (acute kidney injury) (H) 4/9/2019     Alcohol use disorder      Alcoholic cirrhosis (H)      Anticoagulant long-term use     plavix     Aortic stenosis, severe 2/21/2018     Ascites      Chronic allergic rhinitis      Chronic anemia      Chronic hepatitis C without hepatic coma (H) 05/10/2016    Untreated as of 2/2018     Cirrhosis (H) 2017    MRI finding     Diastolic dysfunction      Erosive  gastropathy      Esophageal varices in alcoholic cirrhosis (H)      H/O upper gastrointestinal hemorrhage 09/2017     Hepatocellular carcinoma (H)      History of blood transfusion      History of drug abuse (H)     intranasal     Hypertension     essential     JANELLE (iron deficiency anemia)      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     Sarahi-Hoffman tear     History     Marijuana abuse      MRSA (methicillin resistant Staphylococcus aureus)      Nonrheumatic aortic valve stenosis 2/20/2018     Olecranon bursitis      Portal hypertension (H)      Right shoulder pain     history of rotator cuff repair     S/p TAVR (transcatheter aortic valve replacement), bioprosthetic      Severe aortic stenosis      Thrombocytopenia (H)         PAST SURGICAL HISTORY:     Past Surgical History:   Procedure Laterality Date     ABLATE LIVER TUMOR N/A 10/22/2019    Procedure: Ablate liver tumor x3;  Surgeon: Gregorio Benoit MD;  [facility]     ARTHROSCOPY SHOULDER ROTATOR CUFF REPAIR  7/31/2012    Procedure: ARTHROSCOPY SHOULDER ROTATOR CUFF REPAIR;  Right Shoulder Arthroscopic Rotator Cuff Repair, BicepsTenodesis,  Subacromial Decompression ;  Surgeon: Joi Castillo MD;  [facility]     ESOPHAGOSCOPY, GASTROSCOPY, DUODENOSCOPY (EGD), COMBINED N/A 10/23/2017    Procedure: COMBINED ESOPHAGOSCOPY, GASTROSCOPY, DUODENOSCOPY (EGD);;  Surgeon: Gentry Salas MD;  Location: [facility]     EXCHANGE POLY COMPONENT ARTHROPLASTY KNEE Right 3/4/2019    Procedure: REVISION RIGHT TOTAL KNEE POLY COMPONENT EXCHANGE;  Surgeon: Olvin Joe MD;  Location: [facility]     FACIAL RECONSTRUCTION SURGERY  1971     HEART CATH FEMORAL CANNULIZATION WITH OPEN STANDBY REPAIR AORTIC VALVE N/A 2/21/2018    Procedure: HEART CATH FEMORAL CANNULIZATION WITH OPEN STANDBY REPAIR AORTIC VALVE;;  Surgeon: Luis Baird MD;  Location: [facility]     IR CHEMO EMBOLIZATION  1/29/2020      IR LIVER BIOPSY PERCUTANEOUS  7/18/2019     IRRIGATION AND DEBRIDEMENT UPPER EXTREMITY, COMBINED  1/3/2012    Procedure:COMBINED IRRIGATION AND DEBRIDEMENT UPPER EXTREMITY; Irrigation & Debridement Left Elbow; Surgeon:CRISTHIAN ZHOU; Location:[facility]     LAPAROSCOPIC BIOPSY LIVER N/A 10/22/2019    Procedure: intraoperative liver ultrasound, laparoscopic converted to open liver biopsy x 6;  Surgeon: Gregorio Benoit MD;  [facility]     LAPAROSCOPY DIAGNOSTIC (GENERAL) N/A 10/22/2019    Procedure: Diagnostic laparoscopy;  Surgeon: Gregorio Benoit MD;  [facility]     LAPAROTOMY, LYSIS ADHESIONS, COMBINED N/A 10/22/2019    Procedure: Laparotomy, lysis adhesions, combined;  Surgeon: Gregorio Benoit MD;  [facility]     OPTICAL TRACKING SYSTEM ARTHROPLASTY KNEE Right 2/7/2019    Procedure: ARTHROPLASTY KNEE RIGHT;  Surgeon: Olvin Joe MD;  [facility]     REPAIR TENDON TRICEPS UPPER EXTREMITY  11/8/2011    Procedure:REPAIR TENDON TRICEPS UPPER EXTREMITY; Surgeon:CRISTHIAN ZHOU; [facility]     SHOULDER SURGERY  2003    left, injury, torn tendons, hematoma     TRANSCATHETER AORTIC VALVE IMPLANT ANESTHESIA N/A 2/21/2018    Procedure: TRANSCATHETER AORTIC VALVE IMPLANT ANESTHESIA;  Transfemoral (Quiroz) Aortic Valve Implant 26mm MARTHA 3, with Cardiopulmonary Bypass Standby, transthoracic echocardiogram;  Surgeon: GENERIC ANESTHESIA PROVIDER;  Location: [facility]     TRANSPOSITION ULNAR NERVE (ELBOW)  11/8/2011    Procedure:TRANSPOSITION ULNAR NERVE (ELBOW); Final Procedure Done: Left Elbow Lateral Ulnar Collateral Repair And  Left Elbow Triceps Repair          CURRENT MEDICATIONS:     Current Outpatient Medications   Medication Sig Dispense Refill     aspirin 81 MG EC tablet Take 1 tablet (81 mg) by mouth daily 90 tablet 3     fluticasone (FLONASE) 50 MCG/ACT nasal spray Spray 2 sprays into both nostrils daily 48 mL 4     lisinopril (ZESTRIL) 20 MG tablet Take 1 tablet (20 mg) by mouth  daily 90 tablet 3     loratadine (CLARITIN) 10 MG tablet Take 1 tablet (10 mg) by mouth daily as needed for allergies 90 tablet 3     diazepam (VALIUM) 5 MG tablet Take one tablet by mouth one hour prior to scan. May repeat dose one time (Patient not taking: Reported on 2/1/2021) 2 tablet 0     furosemide 20 MG PO tablet Take 3 tablets (60 mg) by mouth daily (Patient not taking: Reported on 2/1/2021) 90 tablet 1     lisinopril (ZESTRIL) 20 MG tablet Take 1 tablet (20 mg) by mouth daily (Patient not taking: Reported on 2/1/2021) 90 tablet 3     naloxone (NARCAN) 4 MG/0.1ML nasal spray Spray 1 spray (4 mg) into one nostril alternating nostrils once as needed for opioid reversal every 2-3 minutes until assistance arrives (Patient not taking: Reported on 2/1/2021) 0.2 mL 1     ondansetron (ZOFRAN) 4 MG tablet Take 1 tablet (4 mg) by mouth every 8 hours as needed for nausea (Patient not taking: Reported on 2/1/2021) 30 tablet 0     spironolactone (ALDACTONE) 50 MG tablet Take 2 tablets (100 mg) by mouth daily (Patient not taking: Reported on 2/1/2021) 60 tablet 3        ALLERGIES:     Allergies   Allergen Reactions     Zolpidem Other (See Comments)     Alcoholic.  Had reaction 3/17/13 while intoxicated which included black out, loss of awareness, paranoia.  Do not prescribe.  Dr. Celeste     Cats Other (See Comments)     rhinitis     Dogs Other (See Comments)     rhinitis     Pollen Extract Other (See Comments)     rhinits.        FAMILY HISTORY:     Family History   Problem Relation Age of Onset     Cancer Mother 62        unknown primary     Alcoholism Paternal Uncle      Unknown/Adopted Father      No Known Problems Brother      Diabetes Maternal Grandmother      Myocardial Infarction Maternal Grandfather      No Known Problems Paternal Grandmother      Unknown/Adopted Paternal Grandfather      Cirrhosis No family hx of         SOCIAL HISTORY:     Social History     Socioeconomic History     Marital status: Single     " Spouse name: Not on file     Number of children: 0     Years of education: Not on file     Highest education level: Not on file   Occupational History     Occupation:    Social Needs     Financial resource strain: Not on file     Food insecurity     Worry: Not on file     Inability: Not on file     Transportation needs     Medical: Not on file     Non-medical: Not on file   Tobacco Use     Smoking status: Never Smoker     Smokeless tobacco: Never Used   Substance and Sexual Activity     Alcohol use: Yes     Comment: drinks a \"beer occasionally\"     Drug use: Yes     Types: Marijuana     Comment: denies     Sexual activity: Not Currently     Partners: Female   Lifestyle     Physical activity     Days per week: Not on file     Minutes per session: Not on file     Stress: Not on file   Relationships     Social connections     Talks on phone: Not on file     Gets together: Not on file     Attends Bahai service: Not on file     Active member of club or organization: Not on file     Attends meetings of clubs or organizations: Not on file     Relationship status: Not on file     Intimate partner violence     Fear of current or ex partner: Not on file     Emotionally abused: Not on file     Physically abused: Not on file     Forced sexual activity: Not on file   Other Topics Concern     Parent/sibling w/ CABG, MI or angioplasty before 65F 55M? Not Asked   Social History Narrative    .  Bicycles a lot.  Excessive alcohol use.  Smokes cigars.  Occasional marijuana use.     Ten  reports current alcohol use. and  reports that he has never smoked. He has never used smokeless tobacco..     REVIEW OF SYSTEMS:   A comprehensive review of systems was performed and negative unless otherwise noted in the HPI above.      PHYSICAL EXAMINATION:   No vital signs were taken for this telephone visit.   There is no height or weight on file to calculate BMI.  Wt Readings from Last 2 Encounters: "   01/29/21 70.9 kg (156 lb 4.8 oz)   01/21/21 71.5 kg (157 lb 9.6 oz)     Constitutional: no acute distress, pleasant and cooperative  Respiratory: no audible wheezes   Neurologic: Alert and oriented  Psychiatric: appropriate affect, intact thought and speech     LABORATORY DATA:     LIPID RESULTS:  Recent Labs   Lab Test 05/09/16  1535   CHOL 146   HDL 64   LDL 61   TRIG 106        LIVER ENZYME RESULTS:  Recent Labs   Lab Test 01/12/21  1500 09/03/20  1333   AST 40 Canceled, Test credited   ALT 38 54       CBC RESULTS:  Recent Labs   Lab Test 01/12/21  1500 12/31/20  1348 09/16/20  1316 09/16/20  1316   WBC 7.1  --   --  9.0   HGB 12.2*  --   --  13.8   HCT 36.3*  --   --  40.2   * 134*   < > 101*    < > = values in this interval not displayed.       BMP RESULTS:  Recent Labs   Lab Test 01/12/21  1500 09/03/20  1333    140   POTASSIUM 3.9 4.5   CHLORIDE 112* 112*   CO2 24 23   ANIONGAP 4 5   GLC 86 74   BUN 16 15   CR 0.86 0.81   JOSEPHINE 8.7 8.5       A1C RESULTS:  No results found for: A1C    INR RESULTS:  Recent Labs   Lab Test 10/29/20  1354 09/10/20  1345   INR 1.13 1.07          PROCEDURES & FURTHER ASSESSMENTS:     ECHO: 2019 - TAVR with 26 mm Quiroz Victor M 3.  The mean gradient is 9 mmHg.Trace aortic insufficiency is present.  Global and regional left ventricular function is hyperkinetic with an EF of  65-70%.  No pericardial effusion is present.  IVC diameter <2.1 cm collapsing >50% with sniff suggests a normal RA pressure  of 3 mmHg.    CARDIAC CATH: 2018 - >> Normal right sided filling pressures.  >> High left sided filling pressures with mean wedge of 20 mmhg  >> Borderline pulmonary artery hypertension with mean of 25 mm Hg  >> Normal cardiac output, 5.2 L/min with index 2.7 L/min/m2  Normal coronary arteries    CABG/Cardiac surgery: 2018 - severe aortic stenosis s/p TAVR 2/18 (26 mm Victor M 3)     CLINICAL IMPRESSION:     Ten Johnson is a 60 year old male with past medical history of HTN,  severe aortic stenosis s/p TAVR 2/18 (26 mm Victor M 3), ETOH cirrhosis, hepatitis C, HCC, GIB d/t Sarahi Hoffman tear, and thrombocytopenia who presents via telephone visit for follow up.     PLAN:  Severe aortic stenosis s/p TAVR 2/18 (26 mm Victor M 3)  Last echo was in 2019 which showed normal LV function, trace AI, and aortic valve MG of 9 mmHg.   -repeat TTE   -continue ASA     Follow-up: in one year     Thank you for allowing me to take part in the care of this very pleasant patient.  Please do not hesitate to call if any further questions or concerns arise.    Zayda Leija DNP APRN CNP  Interventional and Critical Care Cardiology  [phone]    Duration of telephone visit: 15 minutes  Patient location: home  Provider location: home    CC  Patient Care Team:  Cuca Pinon MD as PCP - General (Internal Medicine)  Mili Capps MD as MD (Internal Medicine)  Kieran Ulloa MD as MD (Family Practice)  Emma Mendez as Nurse Coordinator (Cardiology)  Eliazar Singh MD as Resident (Student in organized health care education/training program)  [facility] ([facility] ([facility] ([place]  Murphy Enciso PA-C as Physician Assistant (Physician Assistant)  Gregorio Benoit MD as Surgeon  Cuca Pinon MD as Assigned PCP  Gregorio Benoit MD as Assigned Surgical Provider  Leventhal, Thomas Michael, MD as Assigned Gastroenterology Provider  Pita Nolan MD as Assigned Cancer Care Provider  Chacorta Douglas MD as Assigned Palliative Care Provider  Tomi Arteaga MD as Assigned Heart and Vascular Provider  CUCA PINON      Please do not hesitate to contact me if you have any questions/concerns.     Sincerely,     Zayda Leija, SYDNIE CNP

## 2021-02-04 ENCOUNTER — VIRTUAL VISIT (OUTPATIENT)
Dept: GASTROENTEROLOGY | Facility: CLINIC | Age: 61
End: 2021-02-04
Attending: INTERNAL MEDICINE
Payer: COMMERCIAL

## 2021-02-04 DIAGNOSIS — B18.2 CHRONIC HEPATITIS C WITH HEPATIC COMA (H): ICD-10-CM

## 2021-02-04 DIAGNOSIS — K70.31 ALCOHOLIC CIRRHOSIS OF LIVER WITH ASCITES (H): Primary | ICD-10-CM

## 2021-02-04 DIAGNOSIS — E44.1 MILD PROTEIN-CALORIE MALNUTRITION (H): ICD-10-CM

## 2021-02-04 DIAGNOSIS — I85.10 SECONDARY ESOPHAGEAL VARICES WITHOUT BLEEDING (H): ICD-10-CM

## 2021-02-04 DIAGNOSIS — C22.0 HCC (HEPATOCELLULAR CARCINOMA) (H): ICD-10-CM

## 2021-02-04 PROCEDURE — 99215 OFFICE O/P EST HI 40 MIN: CPT | Mod: 95 | Performed by: INTERNAL MEDICINE

## 2021-02-04 ASSESSMENT — PAIN SCALES - GENERAL: PAINLEVEL: WORST PAIN (10)
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
## 2021-02-08 DIAGNOSIS — Z11.59 ENCOUNTER FOR SCREENING FOR OTHER VIRAL DISEASES: Primary | ICD-10-CM

## 2021-02-11 DIAGNOSIS — Z11.59 ENCOUNTER FOR SCREENING FOR OTHER VIRAL DISEASES: ICD-10-CM

## 2021-02-11 PROCEDURE — 87635 SARS-COV-2 COVID-19 AMP PRB: CPT | Mod: 90 | Performed by: PATHOLOGY

## 2021-02-15 ENCOUNTER — ANCILLARY PROCEDURE (OUTPATIENT)
Dept: ULTRASOUND IMAGING | Facility: CLINIC | Age: 61
End: 2021-02-15
Attending: INTERNAL MEDICINE
Payer: COMMERCIAL

## 2021-02-15 ENCOUNTER — OFFICE VISIT (OUTPATIENT)
Dept: INFUSION THERAPY | Facility: CLINIC | Age: 61
End: 2021-02-15
Attending: INTERNAL MEDICINE
Payer: COMMERCIAL

## 2021-02-15 VITALS
SYSTOLIC BLOOD PRESSURE: 122 MMHG | OXYGEN SATURATION: 98 % | RESPIRATION RATE: 17 BRPM | HEART RATE: 62 BPM | BODY MASS INDEX: 23.54 KG/M2 | WEIGHT: 159.4 LBS | TEMPERATURE: 98.2 F | DIASTOLIC BLOOD PRESSURE: 71 MMHG

## 2021-02-15 DIAGNOSIS — Z95.3 S/P TAVR (TRANSCATHETER AORTIC VALVE REPLACEMENT), BIOPROSTHETIC: ICD-10-CM

## 2021-02-15 DIAGNOSIS — Z01.812 ENCOUNTER FOR PREPROCEDURE SCREENING LABORATORY TESTING FOR COVID-19: Primary | ICD-10-CM

## 2021-02-15 DIAGNOSIS — Z11.52 ENCOUNTER FOR PREPROCEDURE SCREENING LABORATORY TESTING FOR COVID-19: Primary | ICD-10-CM

## 2021-02-15 DIAGNOSIS — K70.31 ASCITES DUE TO ALCOHOLIC CIRRHOSIS (H): ICD-10-CM

## 2021-02-15 LAB — PLATELET # BLD AUTO: 128 10E9/L (ref 150–450)

## 2021-02-15 PROCEDURE — 49083 ABD PARACENTESIS W/IMAGING: CPT

## 2021-02-15 PROCEDURE — 999N000128 HC STATISTIC PERIPHERAL IV START W/O US GUIDANCE

## 2021-02-15 PROCEDURE — 250N000009 HC RX 250: Performed by: INTERNAL MEDICINE

## 2021-02-15 PROCEDURE — 85049 AUTOMATED PLATELET COUNT: CPT | Performed by: INTERNAL MEDICINE

## 2021-02-15 PROCEDURE — 36415 COLL VENOUS BLD VENIPUNCTURE: CPT

## 2021-02-15 PROCEDURE — 49083 ABD PARACENTESIS W/IMAGING: CPT | Performed by: RADIOLOGY

## 2021-02-15 RX ORDER — HEPARIN SODIUM,PORCINE 10 UNIT/ML
5 VIAL (ML) INTRAVENOUS
Status: CANCELLED | OUTPATIENT
Start: 2021-02-22

## 2021-02-15 RX ORDER — HEPARIN SODIUM,PORCINE 10 UNIT/ML
5 VIAL (ML) INTRAVENOUS
Status: DISCONTINUED | OUTPATIENT
Start: 2021-02-15 | End: 2021-02-15 | Stop reason: HOSPADM

## 2021-02-15 RX ORDER — HEPARIN SODIUM (PORCINE) LOCK FLUSH IV SOLN 100 UNIT/ML 100 UNIT/ML
5 SOLUTION INTRAVENOUS
Status: CANCELLED | OUTPATIENT
Start: 2021-02-22

## 2021-02-15 RX ORDER — ALBUMIN (HUMAN) 12.5 G/50ML
12.5 SOLUTION INTRAVENOUS
Status: CANCELLED | OUTPATIENT
Start: 2021-02-22

## 2021-02-15 RX ORDER — HEPARIN SODIUM (PORCINE) LOCK FLUSH IV SOLN 100 UNIT/ML 100 UNIT/ML
5 SOLUTION INTRAVENOUS
Status: DISCONTINUED | OUTPATIENT
Start: 2021-02-15 | End: 2021-02-15 | Stop reason: HOSPADM

## 2021-02-15 RX ORDER — LIDOCAINE HYDROCHLORIDE 10 MG/ML
20 INJECTION, SOLUTION EPIDURAL; INFILTRATION; INTRACAUDAL; PERINEURAL ONCE
Status: CANCELLED | OUTPATIENT
Start: 2021-02-22 | End: 2021-02-22

## 2021-02-15 RX ORDER — ALBUMIN (HUMAN) 12.5 G/50ML
12.5 SOLUTION INTRAVENOUS
Status: DISCONTINUED | OUTPATIENT
Start: 2021-02-15 | End: 2021-02-15 | Stop reason: HOSPADM

## 2021-02-15 RX ORDER — LIDOCAINE HYDROCHLORIDE 10 MG/ML
20 INJECTION, SOLUTION EPIDURAL; INFILTRATION; INTRACAUDAL; PERINEURAL ONCE
Status: COMPLETED | OUTPATIENT
Start: 2021-02-15 | End: 2021-02-15

## 2021-02-15 RX ADMIN — LIDOCAINE HYDROCHLORIDE 10 ML: 10 INJECTION, SOLUTION EPIDURAL; INFILTRATION; INTRACAUDAL; PERINEURAL at 14:05

## 2021-02-15 NOTE — LETTER
2/15/2021         RE: Ten Johnson  [address]  [facility] [address]        Dear Colleague,    Thank you for referring your patient, Ten Johnson, to the [facility]. Please see a copy of my visit note below.    Paracentesis Nursing Note  Ten Johnson presents today to [facility] for a [facility]acentesis.    During today's appointment orders from Dr. Leventhal were completed.    Progress Note:  Patient identification verified by name and date of birth.  Assessment completed.  Vitals monitored throughout appointment and recorded in Doc Flowsheets.  See proceduralist note in ultrasound.    Vascular Access: peripheral IV was placed by vascular access nurse.  Labs: were not ordered for this appointment.    Date of consent or authorization: 1/21/21  Invasive Procedure Safety Checklist was completed and sent for scanning.     Paracentesis performed by Dr. Orozco.    The following labs were communicated to provider performing paracentesis:  Lab Results   Component Value Date     01/12/2021       Total amount of ascites fluid drained: 2.9 liters.  Color of ascites fluid: yellow.  Total amount of albumin given: 0 grams per therapy plan    Patient tolerated procedure well.    Post procedure,denies pain or discomfort post paracentesis.    Asymptomatic COVID test date: 2/11/21 (If next appt is within 2 weeks, COVID test to be done in [facility])      Discharge Plan:  Discharge instructions were reviewed with patient.  Patient/Representative verbalized understanding and all questions were answered.   Discharged from [facility] in stable condition.    Maia Bernabe RN       Administrations This Visit     lidocaine (PF) (XYLOCAINE) 1 % injection 20 mL     Admin Date  02/15/2021 Action  Given by Other Dose  10 mL Route  Subcutaneous Administered By  Maia Bernabe, JAMES                Temp 98.2  F (36.8  C) (Oral)   Resp  17   Wt 75.3 kg (165 lb 14.4 oz)   SpO2 98%   BMI 24.50 kg/m            Again, thank you for allowing me to participate in the care of your patient.        Sincerely,        [facility] Par[facility] Provider

## 2021-02-15 NOTE — PROGRESS NOTES
Paracentesis Nursing Note  Ten Johnson presents today to [facility] for a paracentesis.    During today's appointment orders from Dr. Leventhal were completed.    Progress Note:  Patient identification verified by name and date of birth.  Assessment completed.  Vitals monitored throughout appointment and recorded in Doc Flowsheets.  See proceduralist note in ultrasound.    Vascular Access: peripheral IV was placed by vascular access nurse.  Labs: were not ordered for this appointment.    Date of consent or authorization: 1/21/21  Invasive Procedure Safety Checklist was completed and sent for scanning.     Paracentesis performed by Dr. Orozco.    The following labs were communicated to provider performing paracentesis:  Lab Results   Component Value Date     01/12/2021       Total amount of ascites fluid drained: 2.9 liters.  Color of ascites fluid: yellow.  Total amount of albumin given: 0 grams per therapy plan    Patient tolerated procedure well.    Post procedure,denies pain or discomfort post paracentesis.    Asymptomatic COVID test date: 2/11/21 (If next appt is within 2 weeks, COVID test to be done in [facility])      Discharge Plan:  Discharge instructions were reviewed with patient.  Patient/Representative verbalized understanding and all questions were answered.   Discharged from [facility] in stable condition.    Maia Bernabe RN       Administrations This Visit     lidocaine (PF) (XYLOCAINE) 1 % injection 20 mL     Admin Date  02/15/2021 Action  Given by Other Dose  10 mL Route  Subcutaneous Administered By  Maia Bernabe, JAMES                Temp 98.2  F (36.8  C) (Oral)   Resp 17   Wt 75.3 kg (165 lb 14.4 oz)   SpO2 98%   BMI 24.50 kg/m

## 2021-02-17 ENCOUNTER — ONCOLOGY VISIT (OUTPATIENT)
Dept: ONCOLOGY | Facility: CLINIC | Age: 61
End: 2021-02-17
Attending: PHYSICIAN ASSISTANT
Payer: COMMERCIAL

## 2021-02-17 ENCOUNTER — ANCILLARY PROCEDURE (OUTPATIENT)
Dept: CARDIOLOGY | Facility: CLINIC | Age: 61
End: 2021-02-17
Attending: NURSE PRACTITIONER
Payer: COMMERCIAL

## 2021-02-17 ENCOUNTER — HOSPITAL ENCOUNTER (OUTPATIENT)
Dept: MRI IMAGING | Facility: CLINIC | Age: 61
End: 2021-02-17
Attending: INTERNAL MEDICINE
Payer: COMMERCIAL

## 2021-02-17 ENCOUNTER — HOSPITAL ENCOUNTER (EMERGENCY)
Facility: CLINIC | Age: 61
Discharge: HOME | End: 2021-02-17
Attending: FAMILY MEDICINE | Admitting: FAMILY MEDICINE
Payer: COMMERCIAL

## 2021-02-17 VITALS — WEIGHT: 161.3 LBS | TEMPERATURE: 96.9 F | OXYGEN SATURATION: 99 % | BODY MASS INDEX: 23.82 KG/M2 | HEART RATE: 63 BPM

## 2021-02-17 VITALS
RESPIRATION RATE: 16 BRPM | HEART RATE: 59 BPM | TEMPERATURE: 97.7 F | SYSTOLIC BLOOD PRESSURE: 147 MMHG | WEIGHT: 155 LBS | DIASTOLIC BLOOD PRESSURE: 88 MMHG | BODY MASS INDEX: 22.96 KG/M2 | HEIGHT: 69 IN | OXYGEN SATURATION: 99 %

## 2021-02-17 DIAGNOSIS — Z95.3 S/P TAVR (TRANSCATHETER AORTIC VALVE REPLACEMENT), BIOPROSTHETIC: ICD-10-CM

## 2021-02-17 DIAGNOSIS — K42.0 STRANGULATED UMBILICAL HERNIA: ICD-10-CM

## 2021-02-17 DIAGNOSIS — C22.0 HCC (HEPATOCELLULAR CARCINOMA) (H): ICD-10-CM

## 2021-02-17 DIAGNOSIS — K42.9 UMBILICAL HERNIA WITHOUT OBSTRUCTION AND WITHOUT GANGRENE: ICD-10-CM

## 2021-02-17 DIAGNOSIS — C22.0 HEPATOCELLULAR CARCINOMA (H): Primary | ICD-10-CM

## 2021-02-17 LAB
ALBUMIN SERPL-MCNC: 3.1 G/DL (ref 3.4–5)
ALP SERPL-CCNC: 94 U/L (ref 40–150)
ALT SERPL W P-5'-P-CCNC: 69 U/L (ref 0–70)
ANION GAP SERPL CALCULATED.3IONS-SCNC: 2 MMOL/L (ref 3–14)
APTT PPP: 27 SEC (ref 22–37)
AST SERPL W P-5'-P-CCNC: 70 U/L (ref 0–45)
BASOPHILS # BLD AUTO: 0.1 10E9/L (ref 0–0.2)
BASOPHILS NFR BLD AUTO: 0.9 %
BILIRUB SERPL-MCNC: 0.6 MG/DL (ref 0.2–1.3)
BUN SERPL-MCNC: 16 MG/DL (ref 7–30)
CALCIUM SERPL-MCNC: 9 MG/DL (ref 8.5–10.1)
CHLORIDE SERPL-SCNC: 110 MMOL/L (ref 94–109)
CO2 SERPL-SCNC: 28 MMOL/L (ref 20–32)
CREAT SERPL-MCNC: 0.8 MG/DL (ref 0.66–1.25)
DIFFERENTIAL METHOD BLD: ABNORMAL
EOSINOPHIL # BLD AUTO: 0 10E9/L (ref 0–0.7)
EOSINOPHIL NFR BLD AUTO: 0.6 %
ERYTHROCYTE [DISTWIDTH] IN BLOOD BY AUTOMATED COUNT: 14.9 % (ref 10–15)
GFR SERPL CREATININE-BSD FRML MDRD: >90 ML/MIN/{1.73_M2}
GLUCOSE SERPL-MCNC: 79 MG/DL (ref 70–99)
HCT VFR BLD AUTO: 40.8 % (ref 40–53)
HGB BLD-MCNC: 13.8 G/DL (ref 13.3–17.7)
IMM GRANULOCYTES # BLD: 0 10E9/L (ref 0–0.4)
IMM GRANULOCYTES NFR BLD: 0.1 %
INR PPP: 1.1 (ref 0.86–1.14)
LACTATE BLD-SCNC: 1.2 MMOL/L (ref 0.7–2)
LYMPHOCYTES # BLD AUTO: 1.5 10E9/L (ref 0.8–5.3)
LYMPHOCYTES NFR BLD AUTO: 21.6 %
MCH RBC QN AUTO: 30.1 PG (ref 26.5–33)
MCHC RBC AUTO-ENTMCNC: 33.8 G/DL (ref 31.5–36.5)
MCV RBC AUTO: 89 FL (ref 78–100)
MONOCYTES # BLD AUTO: 0.7 10E9/L (ref 0–1.3)
MONOCYTES NFR BLD AUTO: 10.8 %
NEUTROPHILS # BLD AUTO: 4.5 10E9/L (ref 1.6–8.3)
NEUTROPHILS NFR BLD AUTO: 66 %
NRBC # BLD AUTO: 0 10*3/UL
NRBC BLD AUTO-RTO: 0 /100
PLATELET # BLD AUTO: 120 10E9/L (ref 150–450)
POTASSIUM SERPL-SCNC: 4.2 MMOL/L (ref 3.4–5.3)
PROT SERPL-MCNC: 6.9 G/DL (ref 6.8–8.8)
RBC # BLD AUTO: 4.59 10E12/L (ref 4.4–5.9)
SODIUM SERPL-SCNC: 141 MMOL/L (ref 133–144)
WBC # BLD AUTO: 6.9 10E9/L (ref 4–11)

## 2021-02-17 PROCEDURE — G0463 HOSPITAL OUTPT CLINIC VISIT: HCPCS

## 2021-02-17 PROCEDURE — 99283 EMERGENCY DEPT VISIT LOW MDM: CPT

## 2021-02-17 PROCEDURE — 74183 MRI ABD W/O CNTR FLWD CNTR: CPT | Mod: 26 | Performed by: RADIOLOGY

## 2021-02-17 PROCEDURE — 99284 EMERGENCY DEPT VISIT MOD MDM: CPT | Performed by: FAMILY MEDICINE

## 2021-02-17 PROCEDURE — 85610 PROTHROMBIN TIME: CPT | Performed by: FAMILY MEDICINE

## 2021-02-17 PROCEDURE — 36415 COLL VENOUS BLD VENIPUNCTURE: CPT

## 2021-02-17 PROCEDURE — 85025 COMPLETE CBC W/AUTO DIFF WBC: CPT | Performed by: FAMILY MEDICINE

## 2021-02-17 PROCEDURE — 93306 TTE W/DOPPLER COMPLETE: CPT | Performed by: INTERNAL MEDICINE

## 2021-02-17 PROCEDURE — 999N000127 HC STATISTIC PERIPHERAL IV START W US GUIDANCE

## 2021-02-17 PROCEDURE — 83605 ASSAY OF LACTIC ACID: CPT | Performed by: FAMILY MEDICINE

## 2021-02-17 PROCEDURE — 99214 OFFICE O/P EST MOD 30 MIN: CPT | Performed by: PHYSICIAN ASSISTANT

## 2021-02-17 PROCEDURE — 74183 MRI ABD W/O CNTR FLWD CNTR: CPT

## 2021-02-17 PROCEDURE — 255N000002 HC RX 255 OP 636: Performed by: RADIOLOGY

## 2021-02-17 PROCEDURE — G0463 HOSPITAL OUTPT CLINIC VISIT: HCPCS | Mod: 25

## 2021-02-17 PROCEDURE — 999N000128 HC STATISTIC PERIPHERAL IV START W/O US GUIDANCE

## 2021-02-17 PROCEDURE — 80053 COMPREHEN METABOLIC PANEL: CPT | Performed by: FAMILY MEDICINE

## 2021-02-17 PROCEDURE — A9585 GADOBUTROL INJECTION: HCPCS | Performed by: RADIOLOGY

## 2021-02-17 PROCEDURE — 85730 THROMBOPLASTIN TIME PARTIAL: CPT | Performed by: FAMILY MEDICINE

## 2021-02-17 PROCEDURE — 99203 OFFICE O/P NEW LOW 30 MIN: CPT | Performed by: SURGERY

## 2021-02-17 RX ORDER — GADOBUTROL 604.72 MG/ML
7.5 INJECTION INTRAVENOUS ONCE
Status: COMPLETED | OUTPATIENT
Start: 2021-02-17 | End: 2021-02-17

## 2021-02-17 RX ORDER — DIAZEPAM 5 MG
TABLET ORAL PRN
COMMUNITY
Start: 2021-02-15 | End: 2022-01-01

## 2021-02-17 RX ORDER — LIDOCAINE 40 MG/G
CREAM TOPICAL
Status: DISCONTINUED | OUTPATIENT
Start: 2021-02-17 | End: 2021-02-17 | Stop reason: HOSPADM

## 2021-02-17 RX ADMIN — GADOBUTROL 7.5 ML: 604.72 INJECTION INTRAVENOUS at 13:09

## 2021-02-17 ASSESSMENT — MIFFLIN-ST. JEOR: SCORE: 1493.46

## 2021-02-17 ASSESSMENT — ENCOUNTER SYMPTOMS
FEVER: 0
VOMITING: 0
NAUSEA: 0
ABDOMINAL PAIN: 1

## 2021-02-17 ASSESSMENT — PAIN SCALES - GENERAL: PAINLEVEL: WORST PAIN (10)
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
## 2021-02-17 NOTE — NURSING NOTE
"Oncology Rooming Note    February 17, 2021 2:29 PM   Ten Johnson is a 60 year old male who presents for:    Chief Complaint   Patient presents with     Oncology Clinic Visit     hepatocellular carcinoma     Initial Vitals: Pulse 63   Temp 96.9  F (36.1  C) (Tympanic)   Wt 73.2 kg (161 lb 4.8 oz)   SpO2 99%   BMI 23.82 kg/m   Estimated body mass index is 23.82 kg/m  as calculated from the following:    Height as of 9/16/20: 1.753 m (5' 9\").    Weight as of this encounter: 73.2 kg (161 lb 4.8 oz). Body surface area is 1.89 meters squared.  Worst Pain (10) Comment: Data Unavailable   No LMP for male patient.  Allergies reviewed: Yes  Medications reviewed: Yes    Medications: Medication refills not needed today.  Pharmacy name entered into [employer]: [facility] [place], [place] - [phone]    Clinical concerns: extreme abdominal pain since paracentesis.       Lindsey Villatoro CMA            "

## 2021-02-17 NOTE — ED TRIAGE NOTES
Pt arrives ambulatory from clinic d/t abdominal pain from known hernia. Pt had paracentesis on Monday and has had hernia pain since. Area has become hard and unable to push back in as previously able. Hypertensive in triage, other VSS, denies CP/SOB

## 2021-02-17 NOTE — ED PROVIDER NOTES
"    [place] EMERGENCY DEPARTMENT ([facility])  February 17, 2021  ED 17 4:25 PM   History     Chief Complaint   Patient presents with     Abdominal Pain     The history is provided by the patient and medical records.     Ten Johnson is a 60 year old male with complex past medical history including hepatitis C, hepatocellular carcinoma status post diagnostic laparoscopy, exploratory laparotomy with biopsies and intraoperative microwave ablation of 3 lesions on 10/22/2019 complicated by multiple hernias and chronic abdominal pain.  Patient states that he underwent paracentesis 2 days ago with removal of 3 L of fluid.  Ever since that procedure he has noticed increasing firmness and pain to his umbilical hernia. Normally this hernia goes in and out and reduces on its own but seem to have gotten stuck. At 8-9pm last night he had increased pain, was unable to sleep last night due to worsening abdominal pain that continued to worsen today He was seen in clinic for a \"doctor day\" for outpatient labs, clinic visits and imaging. He had abdominal MRI, labs and echocardiogram and physical exam. He was noted to have  purplish discoloration over his umbilical hernia, which was tender and not reducible. This was felt to be a strangled umbilical hernia and he was sent to the Emergency Department for evaluation. He continues to have marked pain from his hernia with radiation to groin, \"It's like a vice  on the nuts.\" Patient states that hernia has been protruding since Monday afternoon. He denies fever, nausea, vomiting.     PAST MEDICAL HISTORY:   Past Medical History:   Diagnosis Date     JENNIFER (acute kidney injury) (H) 4/9/2019     Alcohol use disorder      Alcoholic cirrhosis (H)      Anticoagulant long-term use     plavix     Aortic stenosis, severe 2/21/2018     Ascites      Chronic allergic rhinitis      Chronic anemia      Chronic hepatitis C without hepatic coma (H) 05/10/2016    Untreated as of 2/2018     " Cirrhosis (H) 2017    MRI finding     Diastolic dysfunction      Erosive gastropathy      Esophageal varices in alcoholic cirrhosis (H)      H/O upper gastrointestinal hemorrhage 09/2017     Hepatocellular carcinoma (H)      History of blood transfusion      History of drug abuse (H)     intranasal     Hypertension     essential     JANELLE (iron deficiency anemia)      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     Sarahi-Hoffman tear     History     Marijuana abuse      MRSA (methicillin resistant Staphylococcus aureus)      Nonrheumatic aortic valve stenosis 2/20/2018     Olecranon bursitis      Portal hypertension (H)      Right shoulder pain     history of rotator cuff repair     S/p TAVR (transcatheter aortic valve replacement), bioprosthetic      Severe aortic stenosis      Thrombocytopenia (H)        PAST SURGICAL HISTORY:   Past Surgical History:   Procedure Laterality Date     ABLATE LIVER TUMOR N/A 10/22/2019    Procedure: Ablate liver tumor x3;  Surgeon: Gregorio Benoit MD;  Location: [facility]     ARTHROSCOPY SHOULDER ROTATOR CUFF REPAIR  7/31/2012    Procedure: ARTHROSCOPY SHOULDER ROTATOR CUFF REPAIR;  Right Shoulder Arthroscopic Rotator Cuff Repair, BicepsTenodesis,  Subacromial Decompression ;  Surgeon: Joi Castillo MD;  [facility]     ESOPHAGOSCOPY, GASTROSCOPY, DUODENOSCOPY (EGD), COMBINED N/A 10/23/2017    Procedure: COMBINED ESOPHAGOSCOPY, GASTROSCOPY, DUODENOSCOPY (EGD);;  Surgeon: Gentry Salas MD;  Location: [facility] GI     EXCHANGE POLY COMPONENT ARTHROPLASTY KNEE Right 3/4/2019    Procedure: REVISION RIGHT TOTAL KNEE POLY COMPONENT EXCHANGE;  Surgeon: Olvin Joe MD;  Location: [facility] OR     FACIAL RECONSTRUCTION SURGERY  1971     HEART CATH FEMORAL CANNULIZATION WITH OPEN STANDBY REPAIR AORTIC VALVE N/A 2/21/2018    Procedure: HEART CATH FEMORAL CANNULIZATION WITH OPEN STANDBY REPAIR AORTIC VALVE;;  Surgeon: Luis Baird  MD Devin;  Location: [facility] OR     IR CHEMO EMBOLIZATION  1/29/2020     IR LIVER BIOPSY PERCUTANEOUS  7/18/2019     IRRIGATION AND DEBRIDEMENT UPPER EXTREMITY, COMBINED  1/3/2012    Procedure:COMBINED IRRIGATION AND DEBRIDEMENT UPPER EXTREMITY; Irrigation & Debridement Left Elbow; Surgeon:CRISTHIAN ZHOU; Location:[facility] OR     LAPAROSCOPIC BIOPSY LIVER N/A 10/22/2019    Procedure: intraoperative liver ultrasound, laparoscopic converted to open liver biopsy x 6;  Surgeon: Gregorio Benoit MD;  [facility]     LAPAROSCOPY DIAGNOSTIC (GENERAL) N/A 10/22/2019    Procedure: Diagnostic laparoscopy;  Surgeon: Gregorio Benoit MD;  [facility]     LAPAROTOMY, LYSIS ADHESIONS, COMBINED N/A 10/22/2019    Procedure: Laparotomy, lysis adhesions, combined;  Surgeon: Gregorio Benoit MD;  [facility]     OPTICAL TRACKING SYSTEM ARTHROPLASTY KNEE Right 2/7/2019    Procedure: ARTHROPLASTY KNEE RIGHT;  Surgeon: Olvin Joe MD;  [facility]     REPAIR TENDON TRICEPS UPPER EXTREMITY  11/8/2011    Procedure:REPAIR TENDON TRICEPS UPPER EXTREMITY; Surgeon:CRISTHIAN ZHOU; [facility]     SHOULDER SURGERY  2003    left, injury, torn tendons, hematoma     TRANSCATHETER AORTIC VALVE IMPLANT ANESTHESIA N/A 2/21/2018    Procedure: TRANSCATHETER AORTIC VALVE IMPLANT ANESTHESIA;  Transfemoral (Quiroz) Aortic Valve Implant 26mm MARTHA 3, with Cardiopulmonary Bypass Standby, transthoracic echocardiogram;  Surgeon: GENERIC ANESTHESIA PROVIDER;  Location: [facility]     TRANSPOSITION ULNAR NERVE (ELBOW)  11/8/2011    Procedure:TRANSPOSITION ULNAR NERVE (ELBOW); Final Procedure Done: Left Elbow Lateral Ulnar Collateral Repair And  Left Elbow Triceps Repair         Past medical history, past surgical history, medications, and allergies were reviewed with the patient. Additional pertinent items: None    FAMILY HISTORY:   Family History   Problem Relation Age of Onset     Cancer Mother 62        unknown primary     Alcoholism  "Paternal Uncle      Unknown/Adopted Father      No Known Problems Brother      Diabetes Maternal Grandmother      Myocardial Infarction Maternal Grandfather      No Known Problems Paternal Grandmother      Unknown/Adopted Paternal Grandfather      Cirrhosis No family hx of        SOCIAL HISTORY:   Social History     Tobacco Use     Smoking status: Never Smoker     Smokeless tobacco: Never Used   Substance Use Topics     Alcohol use: Yes     Comment: drinks a \"beer occasionally\"     Social history was reviewed with the patient. Additional pertinent items: None      Discharge Medication List as of 2/17/2021  6:11 PM      START taking these medications    Details   aspirin (ASPIRIN LOW DOSE) 81 MG EC tablet Take 1 tablet (81 mg) by mouth daily, Disp-90 tablet, R-3, E-Prescribe         CONTINUE these medications which have NOT CHANGED    Details   diazepam (VALIUM) 5 MG tablet Historical      fluticasone (FLONASE) 50 MCG/ACT nasal spray Spray 2 sprays into both nostrils daily, Disp-48 mL,R-4, E-Prescribe      lisinopril (ZESTRIL) 20 MG tablet Take 1 tablet (20 mg) by mouth daily, Disp-90 tablet,R-3, E-Prescribe      loratadine (CLARITIN) 10 MG tablet Take 1 tablet (10 mg) by mouth daily as needed for allergies, Disp-90 tablet, R-3, E-Prescribe      naloxone (NARCAN) 4 MG/0.1ML nasal spray Spray 1 spray (4 mg) into one nostril alternating nostrils once as needed for opioid reversal every 2-3 minutes until assistance arrives, Disp-0.2 mL, R-1, E-Prescribe                Allergies   Allergen Reactions     Zolpidem Other (See Comments)     Alcoholic.  Had reaction 3/17/13 while intoxicated which included black out, loss of awareness, paranoia.  Do not prescribe.  Dr. Celeste     Cats Other (See Comments)     rhinitis     Dogs Other (See Comments)     rhinitis     Pollen Extract Other (See Comments)     rhinits.        Review of Systems   Constitutional: Positive for activity change. Negative for appetite change and fever. " "  HENT: Negative for trouble swallowing and voice change.    Eyes: Negative for visual disturbance.   Respiratory: Negative for cough and shortness of breath.    Cardiovascular: Negative for chest pain and leg swelling.   Gastrointestinal: Positive for abdominal pain. Negative for blood in stool, constipation, diarrhea, nausea and vomiting.        Pain associated with umbilical hernia.   Genitourinary: Negative for dysuria.   Musculoskeletal: Negative for back pain and myalgias.   Skin: Negative for pallor and rash.   Allergic/Immunologic: Negative for immunocompromised state.   Neurological: Negative for syncope and weakness.   Hematological: Does not bruise/bleed easily.   Psychiatric/Behavioral: Positive for decreased concentration and dysphoric mood.   All other systems reviewed and are negative.    A complete review of systems was performed with pertinent positives and negatives noted in the HPI, and all other systems negative.       Physical Exam   BP: (!) 171/93  Pulse: 72  Temp: 97.7  F (36.5  C)  Resp: 16  Height: 175.3 cm (5' 9\")  Weight: 70.3 kg (155 lb)  SpO2: 100 %      Physical Exam  Vitals signs and nursing note reviewed.   Constitutional:       General: He is in acute distress.      Appearance: He is well-developed. He is not toxic-appearing or diaphoretic.      Comments: General patient is alert and oriented.  Patient is uncomfortable but nontoxic.   HENT:      Head: Normocephalic and atraumatic.      Nose: Nose normal.      Mouth/Throat:      Mouth: Mucous membranes are moist.   Eyes:      General: No scleral icterus.     Extraocular Movements: Extraocular movements intact.      Conjunctiva/sclera: Conjunctivae normal.      Pupils: Pupils are equal, round, and reactive to light.   Neck:      Musculoskeletal: Normal range of motion. No neck rigidity.   Cardiovascular:      Rate and Rhythm: Normal rate and regular rhythm.   Pulmonary:      Effort: No respiratory distress.   Abdominal:      General: " There is distension.      Palpations: Abdomen is soft.      Tenderness: There is abdominal tenderness.      Hernia: A hernia is present.          Comments: Patient is abdomen is not markedly tense.  It is soft except for patient has approximate racquetball size of umbilical hernia somewhat tender there is no redness to the skin.   Musculoskeletal:         General: No tenderness.   Skin:     General: Skin is warm and dry.      Capillary Refill: Capillary refill takes less than 2 seconds.      Coloration: Skin is not pale.      Findings: No bruising, erythema or rash.   Neurological:      General: No focal deficit present.      Mental Status: He is alert and oriented to person, place, and time. Mental status is at baseline.   Psychiatric:      Comments: Patient uncomfortable because of the hernia         ED Course     Patient valuated here in the ER.  Initially attempted to reduce this but then consulted surgery who did evaluate patient in the ER.  Patient had labs drawn including CBC chemistries and lactic acid within normal limits.  Sodium 141 potassium 4.2.  INR 1.1  White count 6.9 hemoglobin 13.8.  AST is 70 total bilirubin 0.6.  ALT is 69 alk phos is 94.  Lactic acid 1.2.    Patient seen by general surgery consult here in the ER right away.  They were able to reduce the hernia without complication.  At this point patient is feeling better comfortably discharge and wanted to go home was able to tolerate orally as noted.  Refer to surgery's note also.  Labs have been drawn and reviewed.  Patient's lactic acid 1.2.  White count 6.9 hemoglobin 13.8.  Sodium 141 potassium 4.2.  Anion gap is 2.  8.8 total bilirubin 0.6 ALT 69 AST 70 and alk phos is 94.    Patient able to tolerate orally discussed with surgery they felt as long as this was appropriate patient to be discharged home and can make a follow-up in the next few weeks in the surgery clinic.  Return if any worsening symptoms patient reexamination reveals return  of umbilical hernia but easily reducible without pain at this point.  Patient is comfort this plan was given abdominal Ace wrap at this point for a binder and will follow up with surgery return to concerns at this point       Procedures                           No results found. However, due to the size of the patient record, not all encounters were searched. Please check Results Review for a complete set of results.  Medications - No data to display          Assessments & Plan (with Medical Decision Making)  60-year-old male history of underlying liver disease who has had umbilical hernia out for the last 3 days.  No nausea vomiting or obstructive symptoms of abdomen is benign except for tenderness over the umbilical hernia.  I initially tried to reduce this but was unsuccessful called surgery who reduced this down here felt as last patient tolerated p.o. he could go home and follow-up as an outpatient.  Patient is able to eat here is comfortably discharge did reexamine abdomen with some recurrence of the umbilical hernia but easily reducible.  Per recommendation surgery patient to be discharged and follow-up.         I have reviewed the nursing notes.    I have reviewed the findings, diagnosis, plan and need for follow up with the patient.    Discharge Medication List as of 2/17/2021  6:11 PM      START taking these medications    Details   aspirin (ASPIRIN LOW DOSE) 81 MG EC tablet Take 1 tablet (81 mg) by mouth daily, Disp-90 tablet, R-3, E-Prescribe             Final diagnoses:   Umbilical hernia without obstruction and without gangrene     IRadha, am serving as a trained medical scribe to document services personally performed by Declan Little MD based on the provider's statements to me on February 17, 2021.  This document has been checked and approved by the attending provider.    I, Declan Little MD, was physically present and have reviewed and verified the accuracy of this note  documented by Radha Hernandez medical scribe.     2/17/2021   [facility] EMERGENCY DEPARTMENT    This note was created at least in part by the use of dragon voice dictation system. Inadvertent typographical errors may still exist.  Declan Little MD.    Patient evaluated in the emergency department during the COVID-19 pandemic period. Careful attention to patients safety was addressed throughout the evaluation. Evaluation and treatment management was initiated with disposition made efficiently and appropriate as possible to minimize any risk of potential exposure to patient during this evaluation.       Declan Little MD  02/18/21 0355       Declan Little MD  02/27/21 0710

## 2021-02-17 NOTE — TELEPHONE ENCOUNTER
ASPIRIN LOW DOSE 81MG [facility]   Last Written Prescription Date:  11/11/2020  Last Fill Quantity: 90,   # refills: 3  Last Office Visit : 2/1/2021  Future Office visit:  2/17/2021  90 Tabs     3 Refills sent to pharm 2/17/2021      Angella Barney RN  Central Triage Red Flags/Med Refills
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
## 2021-02-17 NOTE — CONSULTS
"[facility] Surgery Consultation    Ten Johnson MRN# [id number]   Age: 60 year old YOB: 1960     Date of Admission:  2/17/2021    Date of Consult:   2/17/21    Reason for consult: Abdominal hernia        Requesting service: General Surgery; requesting provider: Declan Little MD                   Assessment and Plan:   Assessment:   Ten Johnson is a 60-year old male with a PMHx significant for HCV, EtOH use disorder, biopsy-proven cirrhosis, biopsy-proven HCC, portal hypertension with ascites requiring paracentesis every 7-10 days and severe aortic stenosis s/p TAVR, presenting with incarcerated umbilical hernia. The hernia was able to be reduced in the ED at bedside.       Plan:   - Hernia reduced in the ED  - Plan for outpatient hernia repair. Will have patient follow-up with Dr. Hawley in clinic. With possible TIPS planned would optimize patient for hernia repair and decreased recurrence rates.   - If patient can tolerate PO, he can likely go home today with follow-up.   - Patient given direct instruction to return to ED if hernia becomes irreducible, he develops abdominal pain, nausea/vomiting, fevers/chills, etc.     Patient discussed with chief resident, Dr. Sanchez, and staff, Dr. Hawley.    Mely De La Torre DO PGY-2  General Surgery Resident             Chief Complaint:   Irreducible umbilical hernia.         History of Present Illness:   Ten Johnson is a 60-year old male with a PMHx significant for HCV, EtOH use disorder, biopsy-proven cirrhosis, biopsy-proven HCC, portal hypertension with ascites requiring paracentesis every 7-10 days and severe aortic stenosis s/p TAVR, presenting with abdominal hernia. He reports severe pain at the umbilical hernia site since his paracentesis on Monday; states it is \"hard and painful.\" He denies nausea and vomiting. Generally eats very little due to ascites. He is unsure when his last bowel movement was or passed gas. He " has chronic abdominal pain and pain at the sites of his paracentesis. He is s/p laparoscopic converted to open surgery 10/19 with Dr. Benoit for liver masses and reports that since then he has developed two abdominal hernias, subxiphoid and umbilical hernias.           Past Medical History:     Past Medical History:   Diagnosis Date     JENNIFER (acute kidney injury) (H) 4/9/2019     Alcohol use disorder      Alcoholic cirrhosis (H)      Anticoagulant long-term use     plavix     Aortic stenosis, severe 2/21/2018     Ascites      Chronic allergic rhinitis      Chronic anemia      Chronic hepatitis C without hepatic coma (H) 05/10/2016    Untreated as of 2/2018     Cirrhosis (H) 2017    MRI finding     Diastolic dysfunction      Erosive gastropathy      Esophageal varices in alcoholic cirrhosis (H)      H/O upper gastrointestinal hemorrhage 09/2017     Hepatocellular carcinoma (H)      History of blood transfusion      History of drug abuse (H)     intranasal     Hypertension     essential     JANELLE (iron deficiency anemia)      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     Sarahi-Hoffman tear     History     Marijuana abuse      MRSA (methicillin resistant Staphylococcus aureus)      Nonrheumatic aortic valve stenosis 2/20/2018     Olecranon bursitis      Portal hypertension (H)      Right shoulder pain     history of rotator cuff repair     S/p TAVR (transcatheter aortic valve replacement), bioprosthetic      Severe aortic stenosis      Thrombocytopenia (H)           Past Surgical History:     Past Surgical History:   Procedure Laterality Date     ABLATE LIVER TUMOR N/A 10/22/2019    Procedure: Ablate liver tumor x3;  Surgeon: Gregorio Benoit MD;  [facility]     ARTHROSCOPY SHOULDER ROTATOR CUFF REPAIR  7/31/2012    Procedure: ARTHROSCOPY SHOULDER ROTATOR CUFF REPAIR;  Right Shoulder Arthroscopic Rotator Cuff Repair, BicepsTenodesis,  Subacromial Decompression ;   Surgeon: Joi Castillo MD;  [facility]     ESOPHAGOSCOPY, GASTROSCOPY, DUODENOSCOPY (EGD), COMBINED N/A 10/23/2017    Procedure: COMBINED ESOPHAGOSCOPY, GASTROSCOPY, DUODENOSCOPY (EGD);;  Surgeon: Gentry Salas MD;  Location: [facility] GI     EXCHANGE POLY COMPONENT ARTHROPLASTY KNEE Right 3/4/2019    Procedure: REVISION RIGHT TOTAL KNEE POLY COMPONENT EXCHANGE;  Surgeon: Olvin Joe MD;  Location: [facility] OR     FACIAL RECONSTRUCTION SURGERY  1971     HEART CATH FEMORAL CANNULIZATION WITH OPEN STANDBY REPAIR AORTIC VALVE N/A 2/21/2018    Procedure: HEART CATH FEMORAL CANNULIZATION WITH OPEN STANDBY REPAIR AORTIC VALVE;;  Surgeon: Luis Baird MD;  Location: [facility] OR     IR CHEMO EMBOLIZATION  1/29/2020     IR LIVER BIOPSY PERCUTANEOUS  7/18/2019     IRRIGATION AND DEBRIDEMENT UPPER EXTREMITY, COMBINED  1/3/2012    Procedure:COMBINED IRRIGATION AND DEBRIDEMENT UPPER EXTREMITY; Irrigation & Debridement Left Elbow; Surgeon:CRISTHIAN ZHOU; Location:[facility]     LAPAROSCOPIC BIOPSY LIVER N/A 10/22/2019    Procedure: intraoperative liver ultrasound, laparoscopic converted to open liver biopsy x 6;  Surgeon: Gregorio Benoit MD;  [facility]     LAPAROSCOPY DIAGNOSTIC (GENERAL) N/A 10/22/2019    Procedure: Diagnostic laparoscopy;  Surgeon: Gregorio Benoit MD;  [facility]     LAPAROTOMY, LYSIS ADHESIONS, COMBINED N/A 10/22/2019    Procedure: Laparotomy, lysis adhesions, combined;  Surgeon: Gregorio Benoit MD;  [facility]     OPTICAL TRACKING SYSTEM ARTHROPLASTY KNEE Right 2/7/2019    Procedure: ARTHROPLASTY KNEE RIGHT;  Surgeon: Olvin Joe MD;  [facility]     REPAIR TENDON TRICEPS UPPER EXTREMITY  11/8/2011    Procedure:REPAIR TENDON TRICEPS UPPER EXTREMITY; Surgeon:CRISTHIAN ZHOU; [facility]     SHOULDER SURGERY  2003    left, injury, torn tendons, hematoma     TRANSCATHETER AORTIC VALVE IMPLANT ANESTHESIA N/A 2/21/2018    Procedure: TRANSCATHETER  "AORTIC VALVE IMPLANT ANESTHESIA;  Transfemoral (Quiroz) Aortic Valve Implant 26mm MARTHA 3, with Cardiopulmonary Bypass Standby, transthoracic echocardiogram;  Surgeon: GENERIC ANESTHESIA PROVIDER;  Location: [facility]     TRANSPOSITION ULNAR NERVE (ELBOW)  11/8/2011    Procedure:TRANSPOSITION ULNAR NERVE (ELBOW); Final Procedure Done: Left Elbow Lateral Ulnar Collateral Repair And  Left Elbow Triceps Repair            Social History:     Social History     Tobacco Use     Smoking status: Never Smoker     Smokeless tobacco: Never Used   Substance Use Topics     Alcohol use: Yes     Comment: drinks a \"beer occasionally\"          Family History:     Family History   Problem Relation Age of Onset     Cancer Mother 62        unknown primary     Alcoholism Paternal Uncle      Unknown/Adopted Father      No Known Problems Brother      Diabetes Maternal Grandmother      Myocardial Infarction Maternal Grandfather      No Known Problems Paternal Grandmother      Unknown/Adopted Paternal Grandfather      Cirrhosis No family hx of           Allergies:     Allergies   Allergen Reactions     Zolpidem Other (See Comments)     Alcoholic.  Had reaction 3/17/13 while intoxicated which included black out, loss of awareness, paranoia.  Do not prescribe.  Dr. Celeste     Cats Other (See Comments)     rhinitis     Dogs Other (See Comments)     rhinitis     Pollen Extract Other (See Comments)     rhinits.            Medications:     Current Facility-Administered Medications   Medication     lidocaine (LMX4) cream     lidocaine 1 % 0.1-1 mL     sodium chloride (PF) 0.9% PF flush 3 mL     sodium chloride (PF) 0.9% PF flush 3 mL     Current Outpatient Medications   Medication Sig     aspirin (ASPIRIN LOW DOSE) 81 MG EC tablet Take 1 tablet (81 mg) by mouth daily     diazepam (VALIUM) 5 MG tablet      fluticasone (FLONASE) 50 MCG/ACT nasal spray Spray 2 sprays into both nostrils daily     lisinopril (ZESTRIL) 20 MG tablet Take 1 tablet (20 mg) " by mouth daily     loratadine (CLARITIN) 10 MG tablet Take 1 tablet (10 mg) by mouth daily as needed for allergies (Patient not taking: Reported on 2/4/2021)     naloxone (NARCAN) 4 MG/0.1ML nasal spray Spray 1 spray (4 mg) into one nostril alternating nostrils once as needed for opioid reversal every 2-3 minutes until assistance arrives (Patient not taking: Reported on 2/4/2021)          Review of Systems:     See HPI above for pertinent findings.          Physical Exam:   All vitals have been reviewed  Temp:  [96.9  F (36.1  C)-97.7  F (36.5  C)] 97.7  F (36.5  C)  Pulse:  [59-72] 59  Resp:  [16] 16  BP: (147-171)/(88-93) 147/88  SpO2:  [98 %-100 %] 98 %  No intake or output data in the 24 hours ending 02/17/21 1725    Physical Exam:   Gen: Laying in bed, no acute distress, looks comfortable  Pulm: Non-labored breathing on room air, no tachypnea  CV: RRR, no tachycardia, strong radial pulse  Abd: Prior Subcostal incision over upper abdomen with soft and easily reducible hernia at medial aspect, soft, non-distended, sub-xyphoid hernia, umbilical hernia with small palpable fascial defect, significantly tender to palpation, overlying skin is healthy. No other palpable masses.   Extremities: warm, well perfused, pedal pulses palpable          Data:   All laboratory data reviewed    Results:  BMP  Recent Labs   Lab 02/17/21  1651      POTASSIUM 4.2   CHLORIDE 110*   CO2 PENDING   BUN PENDING   CR PENDING   GLC PENDING     CBC  Recent Labs   Lab 02/17/21  1651 02/15/21  1350   WBC 6.9  --    HGB 13.8  --    * 128*     LFT  Recent Labs   Lab 02/17/21  1651   AST 70*   ALT 69   ALKPHOS 94   BILITOTAL 0.6   ALBUMIN 3.1*   INR 1.10     Recent Labs   Lab 02/17/21  1651   GLC 79     Imaging:  MRI ABDOMEN 2/17/2021  IMPRESSION:    Moderately limited exam secondary to motion artifact and large  abdominal ascites:  1. Cirrhosis with evidence of ortal hypertension including  splenomegaly and moderate abdominal  ascites.  2. Post-treatment changes of TACE and microwave ablation in the liver  segments 6/7 without residual/recurrent viable tumor. LI-RADS  TR-non-Viable.   3. Scattered foci of arterial enhancement without washout,  pseudocapsule, or restricted diffusion on DWI, measuring up to 1.7 cm  in segment 4A, indeterminate for HCC. LIRADS 3.  4. Based on this exam only, the patient is within Mendez criteria.  5. Cholelithiasis without evidence of cholecystitis. Partially  visualized bowel-containing umbilical hernia.      Dolores Montano, MS4     Resident/Fellow Attestation   I, Mely De La Torre, was present with the medical/ZOFIA student who participated in the service and in the documentation of the note.  I have verified the history and personally performed the physical exam and medical decision making.  I agree with the assessment and plan of care as documented in the note.        Mely De La Torre MD  PGY2  Date of Service (when I saw the patient): 02/17/21

## 2021-02-18 ASSESSMENT — ENCOUNTER SYMPTOMS
CONSTIPATION: 0
DECREASED CONCENTRATION: 1
BACK PAIN: 0
WEAKNESS: 0
BLOOD IN STOOL: 0
MYALGIAS: 0
DIARRHEA: 0
DYSPHORIC MOOD: 1
DYSURIA: 0
SHORTNESS OF BREATH: 0
APPETITE CHANGE: 0
VOICE CHANGE: 0
ACTIVITY CHANGE: 1
COUGH: 0
BRUISES/BLEEDS EASILY: 0
TROUBLE SWALLOWING: 0

## 2021-02-18 NOTE — DISCHARGE INSTRUCTIONS
Home.  You were seen by surgery who reduced the hernia.  Home.  Lay flat and push hernia in.  Use binder to help.  Return if recurrent symptoms.  Follow up with general surgery at [facility].    Please make an appointment to follow up with [facility](phone: [phone]) as soon as possible.

## 2021-02-19 DIAGNOSIS — C22.0 HCC (HEPATOCELLULAR CARCINOMA) (H): Primary | ICD-10-CM

## 2021-02-19 DIAGNOSIS — R11.0 NAUSEA: ICD-10-CM

## 2021-02-19 RX ORDER — ONDANSETRON 4 MG/1
4 TABLET, FILM COATED ORAL EVERY 8 HOURS PRN
Qty: 20 TABLET | Refills: 11 | Status: SHIPPED | OUTPATIENT
Start: 2021-02-19

## 2021-02-19 NOTE — TELEPHONE ENCOUNTER
REFERRAL INFORMATION:    Referring Provider:  Dr. Declan Little    Referring Clinic:  [facility]    Reason for Visit/Diagnosis: Umbilical Hernia       FUTURE VISIT INFORMATION:    Appointment Date: 2/26/2021    Appointment Time: 11:30 AM     NOTES RECORD STATUS  DETAILS   OFFICE NOTE from Referring Provider Internal 2/17/2021 ED    OFFICE NOTE from Other Specialists Internal 2/17/2021 Office visit with Sally Villasenor PA-C ([facility])      HOSPITAL DISCHARGE SUMMARY/ ED VISITS  Internal 2/17/2021 ([facility])    OPERATIVE REPORT N/A    ENDOSCOPY (EGD)  Internal 10/23/17   PERTINENT LABS Internal/ Care Everywhere    PATHOLOGY REPORTS (RELATED) N/A    IMAGING (CT, MRI, US, XR)  Internal MR Abdomen: 2/17/2021, 9/21/2020  CT Chest Abdomen Pelvis: 9/21/2020  CT Abdomen Pelvis: 11/14/19  US Abdomen: 2/13/15

## 2021-02-22 ENCOUNTER — OFFICE VISIT (OUTPATIENT)
Dept: INFUSION THERAPY | Facility: CLINIC | Age: 61
End: 2021-02-22
Attending: INTERNAL MEDICINE
Payer: COMMERCIAL

## 2021-02-22 ENCOUNTER — ANCILLARY PROCEDURE (OUTPATIENT)
Dept: ULTRASOUND IMAGING | Facility: CLINIC | Age: 61
End: 2021-02-22
Attending: INTERNAL MEDICINE
Payer: COMMERCIAL

## 2021-02-22 VITALS
RESPIRATION RATE: 16 BRPM | WEIGHT: 153.6 LBS | DIASTOLIC BLOOD PRESSURE: 80 MMHG | HEART RATE: 75 BPM | SYSTOLIC BLOOD PRESSURE: 130 MMHG | BODY MASS INDEX: 22.68 KG/M2 | TEMPERATURE: 98.3 F

## 2021-02-22 DIAGNOSIS — Z01.812 ENCOUNTER FOR PREPROCEDURE SCREENING LABORATORY TESTING FOR COVID-19: Primary | ICD-10-CM

## 2021-02-22 DIAGNOSIS — Z11.52 ENCOUNTER FOR PREPROCEDURE SCREENING LABORATORY TESTING FOR COVID-19: Primary | ICD-10-CM

## 2021-02-22 DIAGNOSIS — K70.31 ASCITES DUE TO ALCOHOLIC CIRRHOSIS (H): ICD-10-CM

## 2021-02-22 PROCEDURE — 49083 ABD PARACENTESIS W/IMAGING: CPT

## 2021-02-22 PROCEDURE — 87635 SARS-COV-2 COVID-19 AMP PRB: CPT | Performed by: INTERNAL MEDICINE

## 2021-02-22 PROCEDURE — 49083 ABD PARACENTESIS W/IMAGING: CPT | Performed by: RADIOLOGY

## 2021-02-22 PROCEDURE — 250N000009 HC RX 250: Performed by: INTERNAL MEDICINE

## 2021-02-22 RX ORDER — ALBUMIN (HUMAN) 12.5 G/50ML
12.5 SOLUTION INTRAVENOUS
Status: CANCELLED | OUTPATIENT
Start: 2021-03-01

## 2021-02-22 RX ORDER — LIDOCAINE HYDROCHLORIDE 10 MG/ML
20 INJECTION, SOLUTION EPIDURAL; INFILTRATION; INTRACAUDAL; PERINEURAL ONCE
Status: COMPLETED | OUTPATIENT
Start: 2021-02-22 | End: 2021-02-22

## 2021-02-22 RX ORDER — LIDOCAINE HYDROCHLORIDE 10 MG/ML
20 INJECTION, SOLUTION EPIDURAL; INFILTRATION; INTRACAUDAL; PERINEURAL ONCE
Status: CANCELLED | OUTPATIENT
Start: 2021-03-01 | End: 2021-03-01

## 2021-02-22 RX ORDER — HEPARIN SODIUM,PORCINE 10 UNIT/ML
5 VIAL (ML) INTRAVENOUS
Status: CANCELLED | OUTPATIENT
Start: 2021-03-01

## 2021-02-22 RX ORDER — HEPARIN SODIUM (PORCINE) LOCK FLUSH IV SOLN 100 UNIT/ML 100 UNIT/ML
5 SOLUTION INTRAVENOUS
Status: CANCELLED | OUTPATIENT
Start: 2021-03-01

## 2021-02-22 RX ORDER — ALBUMIN (HUMAN) 12.5 G/50ML
12.5 SOLUTION INTRAVENOUS
Status: DISCONTINUED | OUTPATIENT
Start: 2021-02-22 | End: 2021-02-22 | Stop reason: HOSPADM

## 2021-02-22 RX ADMIN — LIDOCAINE HYDROCHLORIDE 20 ML: 10 INJECTION, SOLUTION EPIDURAL; INFILTRATION; INTRACAUDAL; PERINEURAL at 14:15

## 2021-02-22 NOTE — PROGRESS NOTES
Paracentesis Nursing Note  Ten Johnson presents today to [facility] for a [facility]acentesis.    During today's appointment orders from Dr. Leventhal were completed.    Progress Note:  Patient identification verified by name and date of birth.  Assessment completed.  Vitals monitored throughout appointment and recorded in Doc Flowsheets.  See proceduralist note in ultrasound.    Vascular Access: none  Labs: were not ordered for this appointment.    Date of consent or authorization: 1/21/21.  Invasive Procedure Safety Checklist was completed and sent for scanning.     Paracentesis performed by Dr. Paulson.    The following labs were communicated to provider performing paracentesis:  Lab Results   Component Value Date     02/17/2021       Total amount of ascites fluid drained: 2.1 liters.  Color of ascites fluid: yellow.  Total amount of albumin given: 0 grams per therapy plan order.    Patient tolerated procedure well.    Post procedure,denies pain or discomfort post paracentesis.    Asymptomatic COVID test date: 2/22/21 (If next appt is within 2 weeks, COVID test to be done in [facility])      Discharge Plan:  Discharge instructions were reviewed with patient.  Patient/Representative verbalized understanding and all questions were answered.   Discharged from [facility] in stable condition.    Maia Bernabe RN     Administrations This Visit     lidocaine (PF) (XYLOCAINE) 1 % injection 20 mL     Admin Date  02/22/2021 Action  Given Dose  20 mL Route  Subcutaneous Administered By  Maia Bernabe RN              /74   Pulse 69   Temp 98.3  F (36.8  C) (Oral)   Resp 16

## 2021-02-22 NOTE — LETTER
2/22/2021         RE: Ten Johnson  [address]  [place] [address]        Dear Colleague,    Thank you for referring your patient, Ten Johnson, to the [facility]. Please see a copy of my visit note below.    Paracentesis Nursing Note  Ten Johnson presents today to [facility] for a paracentesis.    During today's appointment orders from Dr. Leventhal were completed.    Progress Note:  Patient identification verified by name and date of birth.  Assessment completed.  Vitals monitored throughout appointment and recorded in Doc Flowsheets.  See proceduralist note in ultrasound.    Vascular Access: none  Labs: were not ordered for this appointment.    Date of consent or authorization: 1/21/21.  Invasive Procedure Safety Checklist was completed and sent for scanning.     Paracentesis performed by Dr. Paulson.    The following labs were communicated to provider performing paracentesis:  Lab Results   Component Value Date     02/17/2021       Total amount of ascites fluid drained: 2.1 liters.  Color of ascites fluid: yellow.  Total amount of albumin given: 0 grams per therapy plan order.    Patient tolerated procedure well.    Post procedure,denies pain or discomfort post paracentesis.    Asymptomatic COVID test date: 2/22/21 (If next appt is within 2 weeks, COVID test to be done in [facility])      Discharge Plan:  Discharge instructions were reviewed with patient.  Patient/Representative verbalized understanding and all questions were answered.   Discharged from [facility] in stable condition.    Maia Bernabe RN     Administrations This Visit     lidocaine (PF) (XYLOCAINE) 1 % injection 20 mL     Admin Date  02/22/2021 Action  Given Dose  20 mL Route  Subcutaneous Administered By  Maia Bernabe RN              /74   Pulse 69   Temp 98.3  F (36.8  C) (Oral)   Resp 16           Again,  thank you for allowing me to participate in the care of your patient.        Sincerely,        [facility] Provider

## 2021-02-23 DIAGNOSIS — G89.29 OTHER CHRONIC PAIN: ICD-10-CM

## 2021-02-23 DIAGNOSIS — K70.31 ALCOHOLIC CIRRHOSIS OF LIVER WITH ASCITES (H): Primary | ICD-10-CM

## 2021-02-23 DIAGNOSIS — C22.0 HEPATOCELLULAR CARCINOMA (H): ICD-10-CM

## 2021-02-25 ENCOUNTER — PATIENT OUTREACH (OUTPATIENT)
Dept: SURGERY | Facility: CLINIC | Age: 61
End: 2021-02-25

## 2021-02-25 NOTE — PROGRESS NOTES
Patient Telephone Reminder Call    Date of call:  02/25/21  Phone numbers:  Cell number on file:    Telephone Information:   Mobile [phone]       Reached patient/confirmed appointment:  No - left message:   on voicemail  Appointment with:   Dr. Josué Meyer  Reason for visit:  Hernia

## 2021-02-26 ENCOUNTER — PRE VISIT (OUTPATIENT)
Dept: SURGERY | Facility: CLINIC | Age: 61
End: 2021-02-26

## 2021-02-26 ENCOUNTER — OFFICE VISIT (OUTPATIENT)
Dept: SURGERY | Facility: CLINIC | Age: 61
End: 2021-02-26
Attending: FAMILY MEDICINE
Payer: COMMERCIAL

## 2021-02-26 VITALS
HEIGHT: 69 IN | OXYGEN SATURATION: 99 % | WEIGHT: 156.7 LBS | DIASTOLIC BLOOD PRESSURE: 84 MMHG | HEART RATE: 63 BPM | BODY MASS INDEX: 23.21 KG/M2 | SYSTOLIC BLOOD PRESSURE: 128 MMHG

## 2021-02-26 DIAGNOSIS — K42.9 UMBILICAL HERNIA WITHOUT OBSTRUCTION AND WITHOUT GANGRENE: ICD-10-CM

## 2021-02-26 PROCEDURE — 99203 OFFICE O/P NEW LOW 30 MIN: CPT | Performed by: SURGERY

## 2021-02-26 ASSESSMENT — PAIN SCALES - GENERAL: PAINLEVEL: WORST PAIN (10)

## 2021-02-26 ASSESSMENT — MIFFLIN-ST. JEOR: SCORE: 1501.17

## 2021-02-26 NOTE — NURSING NOTE
"Chief Complaint   Patient presents with     Consult     Appt for umbilical hernia.       Vitals:    02/26/21 1113   BP: 128/84   BP Location: Left arm   Patient Position: Sitting   Cuff Size: Adult Regular   Pulse: 63   SpO2: 99%   Weight: 71.1 kg (156 lb 11.2 oz)   Height: 1.753 m (5' 9\")       Body mass index is 23.14 kg/m .                            ILA IBARRA, EMT    "

## 2021-02-26 NOTE — PATIENT INSTRUCTIONS
You met with Dr. Josué Meyer.      Today's visit instructions:    Return to the Surgery Clinic to see Dr. Meyer after you have undergone TIPS procedure.     If you have questions please contact Belinda RAMIREZ during regular clinic hours, Monday through Friday 7:30 AM - 4:00 PM, or you can contact us via [facility] at anytime.       If you have urgent needs after-hours, weekends, or holidays please call the hospital at [phone] and ask to speak with our on-call General Surgery Team.    Appointment [phone], option #1   Nurse Advice (Belinda): [phone]   Surgery Scheduler (Agata): [phone]  Fax: [phone]

## 2021-02-26 NOTE — LETTER
2/26/2021       RE: Ten Johnson  [address]  [place] [address]     Dear Colleague,    Thank you for referring your patient, Ten Johnson, to the [facility] GENERAL SURGERY [facility]IN[facility]. Please see a copy of my visit note below.    General Surgery Clinic Note - New Patient Visit    NAME: Ten Johnson,  60 year old male    PCP: Cuca Celeste  MRN:   [id number]      #: [phone]  Date: Feb 26, 2021    Chief Complaint:     History of Present Illness: Ten Johnson is a 60 year old male who presents to the clinic with  a PMHx significant for HCV, EtOH use disorder, biopsy-proven cirrhosis, biopsy-proven HCC, portal hypertension with ascites requiring paracentesis every 7-10 days and severe aortic stenosis s/p TAVR, presenting with recent history incarcerated umbilical hernia. The hernia was able to be reduced in the ED at bedside and he denies any further issues. He denies any obstructive symptoms, no skin changes or drainage, has regular bowel movements, denies fever/chills and reports recent weight gain.    Past Medical History: History in Epic reviewed with the patient:  Past Medical History:   Diagnosis Date     JENNIFER (acute kidney injury) (H) 4/9/2019     Alcohol use disorder      Alcoholic cirrhosis (H)      Anticoagulant long-term use     plavix     Aortic stenosis, severe 2/21/2018     Ascites      Chronic allergic rhinitis      Chronic anemia      Chronic hepatitis C without hepatic coma (H) 05/10/2016    Untreated as of 2/2018     Cirrhosis (H) 2017    MRI finding     Diastolic dysfunction      Erosive gastropathy      Esophageal varices in alcoholic cirrhosis (H)      H/O upper gastrointestinal hemorrhage 09/2017     Hepatocellular carcinoma (H)      History of blood transfusion      History of drug abuse (H)     intranasal     Hypertension     essential     JANELLE (iron deficiency anemia)      Infected prosthetic  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     Sarahi-Hoffman tear     History     Marijuana abuse      MRSA (methicillin resistant Staphylococcus aureus)      Nonrheumatic aortic valve stenosis 2/20/2018     Olecranon bursitis      Portal hypertension (H)      Right shoulder pain     history of rotator cuff repair     S/p TAVR (transcatheter aortic valve replacement), bioprosthetic      Severe aortic stenosis      Thrombocytopenia (H)        Past Surgical History: History in Epic reviewed with the patient:  Past Surgical History:   Procedure Laterality Date     ABLATE LIVER TUMOR N/A 10/22/2019    Procedure: Ablate liver tumor x3;  Surgeon: Gregorio Benoit MD;  [facility]     ARTHROSCOPY SHOULDER ROTATOR CUFF REPAIR  7/31/2012    Procedure: ARTHROSCOPY SHOULDER ROTATOR CUFF REPAIR;  Right Shoulder Arthroscopic Rotator Cuff Repair, BicepsTenodesis,  Subacromial Decompression ;  Surgeon: Joi Castillo MD;  [facility]     ESOPHAGOSCOPY, GASTROSCOPY, DUODENOSCOPY (EGD), COMBINED N/A 10/23/2017    Procedure: COMBINED ESOPHAGOSCOPY, GASTROSCOPY, DUODENOSCOPY (EGD);;  Surgeon: Gentry Salas MD;  [facility]     EXCHANGE POLY COMPONENT ARTHROPLASTY KNEE Right 3/4/2019    Procedure: REVISION RIGHT TOTAL KNEE POLY COMPONENT EXCHANGE;  Surgeon: Olvin Joe MD;  Location: [facility] OR     FACIAL RECONSTRUCTION SURGERY  1971     HEART CATH FEMORAL CANNULIZATION WITH OPEN STANDBY REPAIR AORTIC VALVE N/A 2/21/2018    Procedure: HEART CATH FEMORAL CANNULIZATION WITH OPEN STANDBY REPAIR AORTIC VALVE;;  Surgeon: Luis Baird MD;  [facility]     IR CHEMO EMBOLIZATION  1/29/2020     IR LIVER BIOPSY PERCUTANEOUS  7/18/2019     IRRIGATION AND DEBRIDEMENT UPPER EXTREMITY, COMBINED  1/3/2012    Procedure:COMBINED IRRIGATION AND DEBRIDEMENT UPPER EXTREMITY; Irrigation & Debridement Left Elbow; Surgeon:CRISTHIAN ZHOU; Location:[facility]     LAPAROSCOPIC  BIOPSY LIVER N/A 10/22/2019    Procedure: intraoperative liver ultrasound, laparoscopic converted to open liver biopsy x 6;  Surgeon: Gregorio Benoit MD;  [facility]     LAPAROSCOPY DIAGNOSTIC (GENERAL) N/A 10/22/2019    Procedure: Diagnostic laparoscopy;  Surgeon: Gregorio Benoit MD;  [facility]     LAPAROTOMY, LYSIS ADHESIONS, COMBINED N/A 10/22/2019    Procedure: Laparotomy, lysis adhesions, combined;  Surgeon: Gregorio Benoit MD;  [facility]     OPTICAL TRACKING SYSTEM ARTHROPLASTY KNEE Right 2/7/2019    Procedure: ARTHROPLASTY KNEE RIGHT;  Surgeon: Olvin Joe MD;  [facility]     REPAIR TENDON TRICEPS UPPER EXTREMITY  11/8/2011    Procedure:REPAIR TENDON TRICEPS UPPER EXTREMITY; Surgeon:CRISTHIAN ZHOU; [facility]     SHOULDER SURGERY  2003    left, injury, torn tendons, hematoma     TRANSCATHETER AORTIC VALVE IMPLANT ANESTHESIA N/A 2/21/2018    Procedure: TRANSCATHETER AORTIC VALVE IMPLANT ANESTHESIA;  Transfemoral (Quiroz) Aortic Valve Implant 26mm MARTHA 3, with Cardiopulmonary Bypass Standby, transthoracic echocardiogram;  Surgeon: GENERIC ANESTHESIA PROVIDER;  Location: [facility]     TRANSPOSITION ULNAR NERVE (ELBOW)  11/8/2011    Procedure:TRANSPOSITION ULNAR NERVE (ELBOW); Final Procedure Done: Left Elbow Lateral Ulnar Collateral Repair And  Left Elbow Triceps Repair         Family History: History in Epic reviewed with the patient:  Family History   Problem Relation Age of Onset     Cancer Mother 62        unknown primary     Alcoholism Paternal Uncle      Unknown/Adopted Father      No Known Problems Brother      Diabetes Maternal Grandmother      Myocardial Infarction Maternal Grandfather      No Known Problems Paternal Grandmother      Unknown/Adopted Paternal Grandfather      Cirrhosis No family hx of        Social History: History in Epic reviewed with the patient:  Social History     Socioeconomic History     Marital status: Single     Spouse name: Not on file      "Number of children: 0     Years of education: Not on file     Highest education level: Not on file   Occupational History     Occupation:    Social Needs     Financial resource strain: Not on file     Food insecurity     Worry: Not on file     Inability: Not on file     Transportation needs     Medical: Not on file     Non-medical: Not on file   Tobacco Use     Smoking status: Never Smoker     Smokeless tobacco: Never Used   Substance and Sexual Activity     Alcohol use: Yes     Comment: drinks a \"beer occasionally\"     Drug use: Yes     Types: Marijuana     Comment: denies     Sexual activity: Not Currently     Partners: Female   Lifestyle     Physical activity     Days per week: Not on file     Minutes per session: Not on file     Stress: Not on file   Relationships     Social connections     Talks on phone: Not on file     Gets together: Not on file     Attends L[employer]-day service: Not on file     Active member of club or organization: Not on file     Attends meetings of clubs or organizations: Not on file     Relationship status: Not on file     Intimate partner violence     Fear of current or ex partner: Not on file     Emotionally abused: Not on file     Physically abused: Not on file     Forced sexual activity: Not on file   Other Topics Concern     Parent/sibling w/ CABG, MI or angioplasty before 65F 55M? Not Asked   Social History Narrative    .  Bicycles a lot.  Excessive alcohol use.  Smokes cigars.  Occasional marijuana use.       Allergies:     Allergies   Allergen Reactions     Zolpidem Other (See Comments)     Alcoholic.  Had reaction 3/17/13 while intoxicated which included black out, loss of awareness, paranoia.  Do not prescribe.  Dr. Celeste     Cats Other (See Comments)     rhinitis     Dogs Other (See Comments)     rhinitis     Pollen Extract Other (See Comments)     rhinits.       Outpatient Medications:  Outpatient Encounter Medications as of 2/26/2021 " "  Medication Sig Dispense Refill     aspirin (ASPIRIN LOW DOSE) 81 MG EC tablet Take 1 tablet (81 mg) by mouth daily 90 tablet 3     diazepam (VALIUM) 5 MG tablet        fluticasone (FLONASE) 50 MCG/ACT nasal spray Spray 2 sprays into both nostrils daily 48 mL 4     lisinopril (ZESTRIL) 20 MG tablet Take 1 tablet (20 mg) by mouth daily 90 tablet 3     loratadine (CLARITIN) 10 MG tablet Take 1 tablet (10 mg) by mouth daily as needed for allergies 90 tablet 3     naloxone (NARCAN) 4 MG/0.1ML nasal spray Spray 1 spray (4 mg) into one nostril alternating nostrils once as needed for opioid reversal every 2-3 minutes until assistance arrives 0.2 mL 1     ondansetron (ZOFRAN) 4 MG tablet Take 1 tablet (4 mg) by mouth every 8 hours as needed for nausea 20 tablet 11     No facility-administered encounter medications on file as of 2/26/2021.          ROS: 10 systems reviewed and all are negative except as above.    EXAM:  /84 (BP Location: Left arm, Patient Position: Sitting, Cuff Size: Adult Regular)   Pulse 63   Ht 1.753 m (5' 9\")   Wt 71.1 kg (156 lb 11.2 oz)   SpO2 99%   BMI 23.14 kg/m    Psych: Normal affect  Neuro: No gross focal deficits noted  Head:Normocephalic, atraumatic  Eyes: Non icteric  Neck:supple  Heart: Regular rate and rhythm  Lungs: non-labored, quiet respiration  Abdomen: soft, NT/ND, small <1 cm defect umbilicus without overlying skin changes, <1 cm defect along medial aspect of Kocher incision scar, no bowel or fat in hernia sac in either  Extremities: No obvious deformities    Imaging Data (I have personally reviewed the following reports):  Recent Results (from the past 744 hour(s))   US Paracentesis    Narrative    DIAGNOSIS: Encounter for preprocedure screening laboratory testing for  COVID-19; Encounter for preprocedure screening laboratory testing for  COVID-19; Ascites due to alcoholic cirrhosis (H)    PROCEDURE: US PARACENTESIS    Impression    IMPRESSION: Completed ultrasound-guided " therapeutic paracentesis. A  total of 1900 mL clear yellow fluid aspirated from the left abdomen.  No immediate complication.      ----------    CLINICAL HISTORY: Recurrent ascites due to alcoholic cirrhosis.  Patient presents for therapeutic paracentesis.    PERFORMED BY: Philippe Sanchez PA-C    CONSENT: Written informed consent was obtained and is documented in  the patient record.    MEDICATIONS: 1% lidocaine for local anesthesia. Intravenous albumin  was available (see administration record).      DESCRIPTION: Ascites was identified on limited abdominal ultrasound  exam and picture is documented in the patient's record. The left  abdomen was prepped and draped in the usual sterile fashion. Color  ultrasound was used to assess the subcutaneous tissues. No vessels  were identified in the region of planned puncture. Local anesthesia  was achieved. Under ultrasound guidance, a 5-[place] pigtail centesis  needle/catheter was advanced into the peritoneal space and needle was  removed. The catheter was connected to drainage and ascites was  aspirated. Catheter was removed on completion of drainage and sterile  dressing was applied.    COMPLICATIONS: No immediate concerns, the patient remained stable  throughout the procedure and tolerated it well.    ESTIMATED BLOOD LOSS: Minimal    SPECIMENS: None    MECHELLE SANCHEZ PA-C   US Paracentesis    Narrative    PROCEDURES 2/15/2021 2:57 PM  1. Ultrasound guided paracentesis    CLINICAL HISTORY: ascites; Encounter for preprocedure screening  laboratory testing for COVID-19; Encounter for preprocedure screening  laboratory testing for COVID-19; Ascites due to alcoholic cirrhosis  (H).     COMPARISONS: 1/29/2021    REFERRING PROVIDER: THOMAS MICHAEL LEVENTHAL    STAFF RADIOLOGIST: MD STEPHANIE Hein, DENISE ANDERSON MD, attest that I was present in the procedure room for  the entire procedure.    MEDICATIONS: No intravenous sedation administered. The patient  remained stable throughout the  procedure.    PROCEDURE: The patient understood the limitations, alternatives, and  risks of the procedure and requested the procedure be performed. Both  written and verbal consent were obtained.    The left lower quadrant was prepped and draped in the usual sterile  fashion. 1% lidocaine without epinephrine was used for local  anesthesia.    Under ultrasound guidance, a 5 [place] [employer] centesis  pigtail catheter needle was advanced into the left lower quadrant  ascites. Ultrasound image documenting catheter needle placement was  saved in the patient's record.    Catheter advanced over the needle. Needle removed. Ultrasound image  documenting catheter placement was saved in the patient's record.    Catheter to vacuum suction. 2900 mL aspirated. Catheter removed.  Sterile dressing applied. No immediate complication.      Impression    IMPRESSION: Ultrasound guided paracentesis. 2900 mL aspirated.    DENISE ANDERSON MD   MR Abdomen w/o & w Contrast    Narrative    MRI ABDOMEN 2/17/2021    CLINICAL HISTORY: HCC (hepatocellular carcinoma) (H)    TECHNIQUE: Images were acquired with and without intravenous contrast  through the abdomen. The following MR images were acquired: TrueFISP,  multiplanar T2 weighted, axial T1 in/out of phase, diffusion-weighted.  Multiplanar T1-weighted images with fat saturation were before  contrast administration and at multiple time points following the  administration of intravenous contrast. Contrast dose: 7.5mL Gadavist    FINDINGS:    Comparison study: MRI 9/21/2020    Moderate limited examination secondary to diffuse ascites and motion  artifact.    Liver: Cirrhotic configuration of the liver parenchyma with nodular  contours, irregular surface, hypertrophy of the left and caudate  lobes, lacy T2 signal with reticular delayed pattern of  contrast  enhancement due to fibrosis as well as innumerable regenerative  nodules. No significant hepatic steatosis or iron  deposition.    Stable post treatment changes of TACE in the right liver lobe. Several  scattered hepatic cysts, unchanged.     Lesion 1: Posttreatment changes of microwave ablation in the hepatic  segments 7 without residual/recurrent tumor (series 16 image 51).  LI-RADS TR, Non-Viable.     Lesion 2: Posttreatment changes of microwave ablation in the hepatic  segments 6 without residual/recurrent tumor (series 16 image 44).  LI-RADS TR, Non-Viable.     Scattered foci of arterial enhancement without washout, pseudocapsule,  or restricted diffusion on DWI, largest measuring 1.7 cm in segment 4A  (series 11 images 45). These were not as well seen on previous exam  though there may be due to contrast timing differences. Indeterminate  for HCC. LIRADS 3.    Gallbladder: Cholelithiasis without evidence of cholecystitis.    Spleen: Mild splenomegaly measuring up to 12.3 cm in sagittal  dimension. No focal lesion.    Kidneys:  No kidney stone, cysts or masses. No pelvocaliectasis.      Adrenal glands: Normal.     Pancreas:  Normal appearance and signal characteristics of the  pancreatic parenchyma. No focal masses. Pancreatic duct normal in  caliber. No side branch ductal dilatation.     Bowel: Unremarkable, with no evidence of bowel dilatation or abnormal  wall enhancement. Colonic diverticulosis without evidence of  diverticulitis.     Lymph nodes: No abdominal lymphadenopathy by size criteria.    Blood vessels: Major blood vessels are patent with no evidence of clot  or obstruction.        Lung bases: No abnormal T2 hyperintensity in the lung bases.    Bones and soft tissues: No evidence of acute bony abnormality or  abnormal soft tissue enhancement.       Mesentery and abdominal wall: Mildly heterogeneous secondary to  abdominal ascites. Partially visualized bowel-containing umbilical  hernia.    Ascites: Moderate abdominal ascites.      Impression    IMPRESSION:    Moderately limited exam secondary to motion artifact  and large  abdominal ascites:  1. Cirrhosis with evidence of ortal hypertension including  splenomegaly and moderate abdominal ascites.  2. Post-treatment changes of TACE and microwave ablation in the liver  segments 6/7 without residual/recurrent viable tumor. LI-RADS  TR-non-Viable.   3. Scattered foci of arterial enhancement without washout,  pseudocapsule, or restricted diffusion on DWI, measuring up to 1.7 cm  in segment 4A, indeterminate for HCC. LIRADS 3.  4. Based on this exam only, the patient is within Mendez criteria.  5. Cholelithiasis without evidence of cholecystitis. Partially  visualized bowel-containing umbilical hernia.    OPTN/LIRADS definitions.  LIRADS v2018.    LIRADS 1:  Definitely benign.  LIRADS 2:  Probably benign.  LIRADS 3:  Indeterminate for HCC.  LIRADS 4:  Probably HCC.  LIRADS M:  Probably malignant.  Not specific for HCC.  LIRADS 5TR- nonviable:  Previously treated HCC without residual  malignancy identified  LIRADS 5TR- viable:  Previously treated HCC with findings indicating  residual viable malignancy  LIRADS 5TR- equivocal:  Previously treated HCC with findings that may  be treatment changes or viable malignancy    OPTN 5a:  Diagnostic for HCC.  < 2 cm.  OPTN 5b:  Diagnostic for HCC.  > Or = 2 cm and < 5 cm.  OPTN 5x:  Diagnostic for HCC.  > Or = 5 cm.    Mendez criteria for liver transplantation:    1. Presence of no HCCs greater than 5 cm. One HCC measuring 3-5 cm is  allowed if no other HCCs are present.  2. Maximum of 3 HCCs measuring 3 cm or less.  3. No vascular invasion.  4. No extrahepatic metastases.    I have personally reviewed the examination and initial interpretation  and I agree with the findings.    VIBHA VILLEGAS MD   Echocardiogram Complete    Narrative    [id number]  [id number]  [id number]  [id number]^JOSE ANGEL^DAIANA^KARLEE           [facility]  Echocardiography Laboratory  [address] [place] [id number]     Name: PHYLLIS CORREA  J  MRN: [id number]  : 1960  Study Date: 2021 03:08 PM  Age: 60 yrs  Gender: Male  Patient Location: [facility]  Reason For Study: S/p TAVR (transcatheter aortic valve replacement),  bioprosthetic  History: S/p TAVR with a 26 mm Quiroz [employer] Victor M 3 bioprosthesis  Ordering Physician: DAIANA WALTER  Referring Physician: DAIANA WALTER  Performed By: Krysta Jones RDCS     BSA: 1.9 m2  Height: 69 in  Weight: 161 lb  HR: 60  BP: 138/89 mmHg  _____________________________________________________________________________  __        Procedure  Echocardiogram with two-dimensional, color and spectral Doppler performed.  _____________________________________________________________________________  __        Interpretation Summary  S/P TAVR with 26 mm Quiroz Victor M 3 valve. Trace to mild paravalvular AI.  Mean aortic gradient 7 mmHg.  No significant changes noted.  _____________________________________________________________________________  __        Left Ventricle  Global and regional left ventricular function is hyperkinetic with an EF of  65-70%. Left ventricular size is normal. Borderline concentric wall thickening  consistent with left ventricular hypertrophy is present. Diastolic function  not assessed due to significant mitral annular calcification. No regional wall  motion abnormalities are seen.     Right Ventricle  Right ventricular function, chamber size, wall motion, and thickness are  normal.     Atria  The right atria appears normal. Mild to moderate left atrial enlargement is  present. The atrial septum is intact as assessed by agitated dextrose bubble  study .     Mitral Valve  Moderate to severe mitral annular calcification is present. Trace to mild  mitral insufficiency is present.        Aortic Valve  S/P TAVR with 26 mm Quiroz Victor M 3 valve. Trace to mild paravalvular AI.  Mean aortic gradient 7 mmHg.     Tricuspid Valve  The tricuspid valve is normal. Trace tricuspid  insufficiency is present. The  right ventricular systolic pressure is approximated at 25.5 mmHg plus the  right atrial pressure.     Pulmonic Valve  The pulmonic valve is normal.     Vessels  The pulmonary artery and bifurcation cannot be assessed. The inferior vena  cava is normal. Ascending aorta 4 cm.     Pericardium  No pericardial effusion is present.        Compared to Previous Study  No significant changes noted.  _____________________________________________________________________________  __  MMode/2D Measurements & Calculations  IVSd: 1.3 cm     LVIDd: 4.0 cm  LVIDs: 1.8 cm  LVPWd: 1.1 cm  FS: 54.3 %  LV mass(C)d: 170.7 grams  LV mass(C)dI: 90.6 grams/m2  Ao root diam: 3.4 cm  asc Aorta Diam: 4.0 cm  LVOT diam: 2.0 cm  LVOT area: 3.2 cm2  LA Volume (BP): 76.3 ml  LA Volume Index (BP): 40.6 ml/m2  RWT: 0.56           Doppler Measurements & Calculations  Ao V2 max: 209.0 cm/sec  Ao max P.0 mmHg  Ao V2 mean: 122.5 cm/sec  Ao mean P.0 mmHg  Ao V2 VTI: 42.7 cm  LESLYE(I,D): 2.1 cm2  LESLYE(V,D): 1.6 cm2  LV V1 max P.5 mmHg  LV V1 max: 106.2 cm/sec  LV V1 VTI: 27.8 cm  SV(LVOT): 88.3 ml  SI(LVOT): 46.9 ml/m2  PA acc time: 0.11 sec  TR max raza: 252.5 cm/sec  TR max P.5 mmHg  AV Raza Ratio (DI): 0.51  LESLYE Index (cm2/m2): 1.1     _____________________________________________________________________________  __           Report approved by: Zacarias Gonzalez 2021 04:07 PM      US Paracentesis    Narrative    Procedure 2021:  Ultrasound guided paracentesis.    History: Recurrent ascites    Comparison: 2/15/2021    Operators: CARMELA Babin, Dr. Winston Paulson performed the  entirety of this procedure without assistance.    Medications:   No intravenous sedation administered, local analgesia only. Patient  placed on continuous cardiopulmonary monitoring.     Albumin: None    Findings/procedure:     Prior to the procedure, both verbal and written informed  consent  obtained from the patient. Limited pre-procedure scan demonstrated  adequate peritoneal fluid for removal.     Left lower quadrant prepped and draped in the usual sterile fashion.  Buffered 1% Lidocaine used for local anesthesia. Under ultrasound  guidance, [employer] centesis needle with needle stylette advanced into  the peritoneal fluid. Image of the needle in the fluid stored.    Catheter advanced off the stylette and connected to vacuum drainage.  2100 mL ascites aspirated. Catheter removed. Sterile dressing applied.  No immediate complication.      Impression    Impression:   Uncomplicated ultrasound guided paracentesis. 2100 mL ascites  aspirated.    TRAE NDIAYE       A/P: Ten Johnson is a 60 year old male with a PMHx significant for HCV, EtOH use disorder, biopsy-proven cirrhosis (MELD 7), biopsy-proven HCC, portal hypertension with ascites requiring paracentesis every 7-10 days and severe aortic stenosis s/p TAVR, presenting with incarcerated umbilical hernia. The hernia was able to be reduced in the ED at bedside.    -pt is being evaluated for TIPS  -with MELD 7 and albumin >3, plan to evaluate for elective umbilical hernia repair once he undergoes TIPS procedure as he is reasonable candidate      Josué Meyer MD

## 2021-02-26 NOTE — PROGRESS NOTES
General Surgery Clinic Note - New Patient Visit    NAME: Ten Johnson,  60 year old male    PCP: Cuca Celeste  MRN:   [id number]      #: [phone]  Date: Feb 26, 2021    Chief Complaint:     History of Present Illness: Ten Johnson is a 60 year old male who presents to the clinic with  a PMHx significant for HCV, EtOH use disorder, biopsy-proven cirrhosis, biopsy-proven HCC, portal hypertension with ascites requiring paracentesis every 7-10 days and severe aortic stenosis s/p TAVR, presenting with recent history incarcerated umbilical hernia. The hernia was able to be reduced in the ED at bedside and he denies any further issues. He denies any obstructive symptoms, no skin changes or drainage, has regular bowel movements, denies fever/chills and reports recent weight gain.    Past Medical History: History in Epic reviewed with the patient:  Past Medical History:   Diagnosis Date     JENNIFER (acute kidney injury) (H) 4/9/2019     Alcohol use disorder      Alcoholic cirrhosis (H)      Anticoagulant long-term use     plavix     Aortic stenosis, severe 2/21/2018     Ascites      Chronic allergic rhinitis      Chronic anemia      Chronic hepatitis C without hepatic coma (H) 05/10/2016    Untreated as of 2/2018     Cirrhosis (H) 2017    MRI finding     Diastolic dysfunction      Erosive gastropathy      Esophageal varices in alcoholic cirrhosis (H)      H/O upper gastrointestinal hemorrhage 09/2017     Hepatocellular carcinoma (H)      History of blood transfusion      History of drug abuse (H)     intranasal     Hypertension     essential     JANELLE (iron deficiency anemia)      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     Sarahi-Hoffman tear     History     Marijuana abuse      MRSA (methicillin resistant Staphylococcus aureus)      Nonrheumatic aortic valve stenosis 2/20/2018     Olecranon bursitis      Portal hypertension (H)      Right shoulder pain      history of rotator cuff repair     S/p TAVR (transcatheter aortic valve replacement), bioprosthetic      Severe aortic stenosis      Thrombocytopenia (H)        Past Surgical History: History in Epic reviewed with the patient:  Past Surgical History:   Procedure Laterality Date     ABLATE LIVER TUMOR N/A 10/22/2019    Procedure: Ablate liver tumor x3;  Surgeon: Gregorio Benoit MD;  [facility]     ARTHROSCOPY SHOULDER ROTATOR CUFF REPAIR  7/31/2012    Procedure: ARTHROSCOPY SHOULDER ROTATOR CUFF REPAIR;  Right Shoulder Arthroscopic Rotator Cuff Repair, BicepsTenodesis,  Subacromial Decompression ;  Surgeon: Joi Castillo MD;  [facility]     ESOPHAGOSCOPY, GASTROSCOPY, DUODENOSCOPY (EGD), COMBINED N/A 10/23/2017    Procedure: COMBINED ESOPHAGOSCOPY, GASTROSCOPY, DUODENOSCOPY (EGD);;  Surgeon: Gentry Salas MD;  Location: [facility]     EXCHANGE POLY COMPONENT ARTHROPLASTY KNEE Right 3/4/2019    Procedure: REVISION RIGHT TOTAL KNEE POLY COMPONENT EXCHANGE;  Surgeon: Olvin Joe MD;  [facility]     FACIAL RECONSTRUCTION SURGERY  1971     HEART CATH FEMORAL CANNULIZATION WITH OPEN STANDBY REPAIR AORTIC VALVE N/A 2/21/2018    Procedure: HEART CATH FEMORAL CANNULIZATION WITH OPEN STANDBY REPAIR AORTIC VALVE;;  Surgeon: Luis Baird MD;  Location: [facility]     IR CHEMO EMBOLIZATION  1/29/2020     IR LIVER BIOPSY PERCUTANEOUS  7/18/2019     IRRIGATION AND DEBRIDEMENT UPPER EXTREMITY, COMBINED  1/3/2012    Procedure:COMBINED IRRIGATION AND DEBRIDEMENT UPPER EXTREMITY; Irrigation & Debridement Left Elbow; Surgeon:CRISTHIAN ZHOU; Location:[facility]     LAPAROSCOPIC BIOPSY LIVER N/A 10/22/2019    Procedure: intraoperative liver ultrasound, laparoscopic converted to open liver biopsy x 6;  Surgeon: Gregorio Benoit MD;  [facility]     LAPAROSCOPY DIAGNOSTIC (GENERAL) N/A 10/22/2019    Procedure: Diagnostic laparoscopy;  Surgeon: Gregorio Benoit MD;  [facility]      LAPAROTOMY, LYSIS ADHESIONS, COMBINED N/A 10/22/2019    Procedure: Laparotomy, lysis adhesions, combined;  Surgeon: Gregorio Benoit MD;  [facility]     OPTICAL TRACKING SYSTEM ARTHROPLASTY KNEE Right 2/7/2019    Procedure: ARTHROPLASTY KNEE RIGHT;  Surgeon: Olvin Joe MD;  [facility]     REPAIR TENDON TRICEPS UPPER EXTREMITY  11/8/2011    Procedure:REPAIR TENDON TRICEPS UPPER EXTREMITY; Surgeon:CRISTHIAN ZHOU; [facility]     SHOULDER SURGERY  2003    left, injury, torn tendons, hematoma     TRANSCATHETER AORTIC VALVE IMPLANT ANESTHESIA N/A 2/21/2018    Procedure: TRANSCATHETER AORTIC VALVE IMPLANT ANESTHESIA;  Transfemoral (Quiroz) Aortic Valve Implant 26mm MARTHA 3, with Cardiopulmonary Bypass Standby, transthoracic echocardiogram;  Surgeon: GENERIC ANESTHESIA PROVIDER;  Location: [facility]     TRANSPOSITION ULNAR NERVE (ELBOW)  11/8/2011    Procedure:TRANSPOSITION ULNAR NERVE (ELBOW); Final Procedure Done: Left Elbow Lateral Ulnar Collateral Repair And  Left Elbow Triceps Repair         Family History: History in Epic reviewed with the patient:  Family History   Problem Relation Age of Onset     Cancer Mother 62        unknown primary     Alcoholism Paternal Uncle      Unknown/Adopted Father      No Known Problems Brother      Diabetes Maternal Grandmother      Myocardial Infarction Maternal Grandfather      No Known Problems Paternal Grandmother      Unknown/Adopted Paternal Grandfather      Cirrhosis No family hx of        Social History: History in Epic reviewed with the patient:  Social History     Socioeconomic History     Marital status: Single     Spouse name: Not on file     Number of children: 0     Years of education: Not on file     Highest education level: Not on file   Occupational History     Occupation:    Social Needs     Financial resource strain: Not on file     Food insecurity     Worry: Not on file     Inability: Not on file     Transportation needs      "Medical: Not on file     Non-medical: Not on file   Tobacco Use     Smoking status: Never Smoker     Smokeless tobacco: Never Used   Substance and Sexual Activity     Alcohol use: Yes     Comment: drinks a \"beer occasionally\"     Drug use: Yes     Types: Marijuana     Comment: denies     Sexual activity: Not Currently     Partners: Female   Lifestyle     Physical activity     Days per week: Not on file     Minutes per session: Not on file     Stress: Not on file   Relationships     Social connections     Talks on phone: Not on file     Gets together: Not on file     Attends [employer] service: Not on file     Active member of club or organization: Not on file     Attends meetings of clubs or organizations: Not on file     Relationship status: Not on file     Intimate partner violence     Fear of current or ex partner: Not on file     Emotionally abused: Not on file     Physically abused: Not on file     Forced sexual activity: Not on file   Other Topics Concern     Parent/sibling w/ CABG, MI or angioplasty before 65F 55M? Not Asked   Social History Narrative    .  Bicycles a lot.  Excessive alcohol use.  Smokes cigars.  Occasional marijuana use.       Allergies:     Allergies   Allergen Reactions     Zolpidem Other (See Comments)     Alcoholic.  Had reaction 3/17/13 while intoxicated which included black out, loss of awareness, paranoia.  Do not prescribe.  Dr. Celeste     Cats Other (See Comments)     rhinitis     Dogs Other (See Comments)     rhinitis     Pollen Extract Other (See Comments)     rhinits.       Outpatient Medications:  Outpatient Encounter Medications as of 2/26/2021   Medication Sig Dispense Refill     aspirin (ASPIRIN LOW DOSE) 81 MG EC tablet Take 1 tablet (81 mg) by mouth daily 90 tablet 3     diazepam (VALIUM) 5 MG tablet        fluticasone (FLONASE) 50 MCG/ACT nasal spray Spray 2 sprays into both nostrils daily 48 mL 4     lisinopril (ZESTRIL) 20 MG tablet Take 1 tablet " "(20 mg) by mouth daily 90 tablet 3     loratadine (CLARITIN) 10 MG tablet Take 1 tablet (10 mg) by mouth daily as needed for allergies 90 tablet 3     naloxone (NARCAN) 4 MG/0.1ML nasal spray Spray 1 spray (4 mg) into one nostril alternating nostrils once as needed for opioid reversal every 2-3 minutes until assistance arrives 0.2 mL 1     ondansetron (ZOFRAN) 4 MG tablet Take 1 tablet (4 mg) by mouth every 8 hours as needed for nausea 20 tablet 11     No facility-administered encounter medications on file as of 2/26/2021.          ROS: 10 systems reviewed and all are negative except as above.    EXAM:  /84 (BP Location: Left arm, Patient Position: Sitting, Cuff Size: Adult Regular)   Pulse 63   Ht 1.753 m (5' 9\")   Wt 71.1 kg (156 lb 11.2 oz)   SpO2 99%   BMI 23.14 kg/m    Psych: Normal affect  Neuro: No gross focal deficits noted  Head:Normocephalic, atraumatic  Eyes: Non icteric  Neck:supple  Heart: Regular rate and rhythm  Lungs: non-labored, quiet respiration  Abdomen: soft, NT/ND, small <1 cm defect umbilicus without overlying skin changes, <1 cm defect along medial aspect of Kocher incision scar, no bowel or fat in hernia sac in either  Extremities: No obvious deformities    Imaging Data (I have personally reviewed the following reports):  Recent Results (from the past 744 hour(s))   US Paracentesis    Narrative    DIAGNOSIS: Encounter for preprocedure screening laboratory testing for  COVID-19; Encounter for preprocedure screening laboratory testing for  COVID-19; Ascites due to alcoholic cirrhosis (H)    PROCEDURE: US PARACENTESIS    Impression    IMPRESSION: Completed ultrasound-guided therapeutic paracentesis. A  total of 1900 mL clear yellow fluid aspirated from the left abdomen.  No immediate complication.      ----------    CLINICAL HISTORY: Recurrent ascites due to alcoholic cirrhosis.  Patient presents for therapeutic paracentesis.    PERFORMED BY: Philippe Sanchez PA-C    CONSENT: Written " informed consent was obtained and is documented in  the patient record.    MEDICATIONS: 1% lidocaine for local anesthesia. Intravenous albumin  was available (see administration record).      DESCRIPTION: Ascites was identified on limited abdominal ultrasound  exam and picture is documented in the patient's record. The left  abdomen was prepped and draped in the usual sterile fashion. Color  ultrasound was used to assess the subcutaneous tissues. No vessels  were identified in the region of planned puncture. Local anesthesia  was achieved. Under ultrasound guidance, a 5-Portuguese pigtail centesis  needle/catheter was advanced into the peritoneal space and needle was  removed. The catheter was connected to drainage and ascites was  aspirated. Catheter was removed on completion of drainage and sterile  dressing was applied.    COMPLICATIONS: No immediate concerns, the patient remained stable  throughout the procedure and tolerated it well.    ESTIMATED BLOOD LOSS: Minimal    SPECIMENS: None    MECHELLE DOWD PA-C   US Paracentesis    Narrative    PROCEDURES 2/15/2021 2:57 PM  1. Ultrasound guided paracentesis    CLINICAL HISTORY: ascites; Encounter for preprocedure screening  laboratory testing for COVID-19; Encounter for preprocedure screening  laboratory testing for COVID-19; Ascites due to alcoholic cirrhosis  (H).     COMPARISONS: 1/29/2021    REFERRING PROVIDER: THOMAS MICHAEL LEVENTHAL    STAFF RADIOLOGIST: MD STEPHANIE Hein, DENISE ANDERSON MD, attest that I was present in the procedure room for  the entire procedure.    MEDICATIONS: No intravenous sedation administered. The patient  remained stable throughout the procedure.    PROCEDURE: The patient understood the limitations, alternatives, and  risks of the procedure and requested the procedure be performed. Both  written and verbal consent were obtained.    The left lower quadrant was prepped and draped in the usual sterile  fashion. 1% lidocaine without epinephrine  was used for local  anesthesia.    Under ultrasound guidance, a 5 Arabic [place] centesis  pigtail catheter needle was advanced into the left lower quadrant  ascites. Ultrasound image documenting catheter needle placement was  saved in the patient's record.    Catheter advanced over the needle. Needle removed. Ultrasound image  documenting catheter placement was saved in the patient's record.    Catheter to vacuum suction. 2900 mL aspirated. Catheter removed.  Sterile dressing applied. No immediate complication.      Impression    IMPRESSION: Ultrasound guided paracentesis. 2900 mL aspirated.    DENISE ANDERSON MD   MR Abdomen w/o & w Contrast    Narrative    MRI ABDOMEN 2/17/2021    CLINICAL HISTORY: HCC (hepatocellular carcinoma) (H)    TECHNIQUE: Images were acquired with and without intravenous contrast  through the abdomen. The following MR images were acquired: TrueFISP,  multiplanar T2 weighted, axial T1 in/out of phase, diffusion-weighted.  Multiplanar T1-weighted images with fat saturation were before  contrast administration and at multiple time points following the  administration of intravenous contrast. Contrast dose: 7.5mL Gadavist    FINDINGS:    Comparison study: MRI 9/21/2020    Moderate limited examination secondary to diffuse ascites and motion  artifact.    Liver: Cirrhotic configuration of the liver parenchyma with nodular  contours, irregular surface, hypertrophy of the left and caudate  lobes, lacy T2 signal with reticular delayed pattern of  contrast  enhancement due to fibrosis as well as innumerable regenerative  nodules. No significant hepatic steatosis or iron deposition.    Stable post treatment changes of TACE in the right liver lobe. Several  scattered hepatic cysts, unchanged.     Lesion 1: Posttreatment changes of microwave ablation in the hepatic  segments 7 without residual/recurrent tumor (series 16 image 51).  LI-RADS TR, Non-Viable.     Lesion 2: Posttreatment changes of  microwave ablation in the hepatic  segments 6 without residual/recurrent tumor (series 16 image 44).  LI-RADS TR, Non-Viable.     Scattered foci of arterial enhancement without washout, pseudocapsule,  or restricted diffusion on DWI, largest measuring 1.7 cm in segment 4A  (series 11 images 45). These were not as well seen on previous exam  though there may be due to contrast timing differences. Indeterminate  for HCC. LIRADS 3.    Gallbladder: Cholelithiasis without evidence of cholecystitis.    Spleen: Mild splenomegaly measuring up to 12.3 cm in sagittal  dimension. No focal lesion.    Kidneys:  No kidney stone, cysts or masses. No pelvocaliectasis.      Adrenal glands: Normal.     Pancreas:  Normal appearance and signal characteristics of the  pancreatic parenchyma. No focal masses. Pancreatic duct normal in  caliber. No side branch ductal dilatation.     Bowel: Unremarkable, with no evidence of bowel dilatation or abnormal  wall enhancement. Colonic diverticulosis without evidence of  diverticulitis.     Lymph nodes: No abdominal lymphadenopathy by size criteria.    Blood vessels: Major blood vessels are patent with no evidence of clot  or obstruction.        Lung bases: No abnormal T2 hyperintensity in the lung bases.    Bones and soft tissues: No evidence of acute bony abnormality or  abnormal soft tissue enhancement.       Mesentery and abdominal wall: Mildly heterogeneous secondary to  abdominal ascites. Partially visualized bowel-containing umbilical  hernia.    Ascites: Moderate abdominal ascites.      Impression    IMPRESSION:    Moderately limited exam secondary to motion artifact and large  abdominal ascites:  1. Cirrhosis with evidence of ortal hypertension including  splenomegaly and moderate abdominal ascites.  2. Post-treatment changes of TACE and microwave ablation in the liver  segments 6/7 without residual/recurrent viable tumor. LI-RADS  TR-non-Viable.   3. Scattered foci of arterial  enhancement without washout,  pseudocapsule, or restricted diffusion on DWI, measuring up to 1.7 cm  in segment 4A, indeterminate for HCC. LIRADS 3.  4. Based on this exam only, the patient is within [place] criteria.  5. Cholelithiasis without evidence of cholecystitis. Partially  visualized bowel-containing umbilical hernia.    OPTN/LIRADS definitions.  LIRADS v2018.    LIRADS 1:  Definitely benign.  LIRADS 2:  Probably benign.  LIRADS 3:  Indeterminate for HCC.  LIRADS 4:  Probably HCC.  LIRADS M:  Probably malignant.  Not specific for HCC.  LIRADS 5TR- nonviable:  Previously treated HCC without residual  malignancy identified  LIRADS 5TR- viable:  Previously treated HCC with findings indicating  residual viable malignancy  LIRADS 5TR- equivocal:  Previously treated HCC with findings that may  be treatment changes or viable malignancy    OPTN 5a:  Diagnostic for HCC.  < 2 cm.  OPTN 5b:  Diagnostic for HCC.  > Or = 2 cm and < 5 cm.  OPTN 5x:  Diagnostic for HCC.  > Or = 5 cm.    [place] criteria for liver transplantation:    1. Presence of no HCCs greater than 5 cm. One HCC measuring 3-5 cm is  allowed if no other HCCs are present.  2. Maximum of 3 HCCs measuring 3 cm or less.  3. No vascular invasion.  4. No extrahepatic metastases.    I have personally reviewed the examination and initial interpretation  and I agree with the findings.    VIBHA VILLEGAS MD   Echocardiogram Complete    Narrative    [id number]  [id number]  [id number]  [id number]^JOSE ANGEL^DAIANA^KARLEE           [facility]  Echocardiography Laboratory  [address] [place] [id number]     Name: PHYLLIS CORREA  MRN: [id number]  : 1960  Study Date: 2021 03:08 PM  Age: 60 yrs  Gender: Male  Patient Location: [facility]  Reason For Study: S/p TAVR (transcatheter aortic valve replacement),  bioprosthetic  History: S/p TAVR with a 26 mm Quiroz [employer] Victor M 3 bioprosthesis  Ordering Physician: JOSE ANGEL  DAIANA  Referring Physician: DAIANA WALTER  Performed By: Krysta Jones RDCS     BSA: 1.9 m2  Height: 69 in  Weight: 161 lb  HR: 60  BP: 138/89 mmHg  _____________________________________________________________________________  __        Procedure  Echocardiogram with two-dimensional, color and spectral Doppler performed.  _____________________________________________________________________________  __        Interpretation Summary  S/P TAVR with 26 mm Quiroz Victor M 3 valve. Trace to mild paravalvular AI.  Mean aortic gradient 7 mmHg.  No significant changes noted.  _____________________________________________________________________________  __        Left Ventricle  Global and regional left ventricular function is hyperkinetic with an EF of  65-70%. Left ventricular size is normal. Borderline concentric wall thickening  consistent with left ventricular hypertrophy is present. Diastolic function  not assessed due to significant mitral annular calcification. No regional wall  motion abnormalities are seen.     Right Ventricle  Right ventricular function, chamber size, wall motion, and thickness are  normal.     Atria  The right atria appears normal. Mild to moderate left atrial enlargement is  present. The atrial septum is intact as assessed by agitated dextrose bubble  study .     Mitral Valve  Moderate to severe mitral annular calcification is present. Trace to mild  mitral insufficiency is present.        Aortic Valve  S/P TAVR with 26 mm Quiroz Victor M 3 valve. Trace to mild paravalvular AI.  Mean aortic gradient 7 mmHg.     Tricuspid Valve  The tricuspid valve is normal. Trace tricuspid insufficiency is present. The  right ventricular systolic pressure is approximated at 25.5 mmHg plus the  right atrial pressure.     Pulmonic Valve  The pulmonic valve is normal.     Vessels  The pulmonary artery and bifurcation cannot be assessed. The inferior vena  cava is normal. Ascending aorta 4 cm.      Pericardium  No pericardial effusion is present.        Compared to Previous Study  No significant changes noted.  _____________________________________________________________________________  __  MMode/2D Measurements & Calculations  IVSd: 1.3 cm     LVIDd: 4.0 cm  LVIDs: 1.8 cm  LVPWd: 1.1 cm  FS: 54.3 %  LV mass(C)d: 170.7 grams  LV mass(C)dI: 90.6 grams/m2  Ao root diam: 3.4 cm  asc Aorta Diam: 4.0 cm  LVOT diam: 2.0 cm  LVOT area: 3.2 cm2  LA Volume (BP): 76.3 ml  LA Volume Index (BP): 40.6 ml/m2  RWT: 0.56           Doppler Measurements & Calculations  Ao V2 max: 209.0 cm/sec  Ao max P.0 mmHg  Ao V2 mean: 122.5 cm/sec  Ao mean P.0 mmHg  Ao V2 VTI: 42.7 cm  LESLYE(I,D): 2.1 cm2  LESLYE(V,D): 1.6 cm2  LV V1 max P.5 mmHg  LV V1 max: 106.2 cm/sec  LV V1 VTI: 27.8 cm  SV(LVOT): 88.3 ml  SI(LVOT): 46.9 ml/m2  PA acc time: 0.11 sec  TR max raza: 252.5 cm/sec  TR max P.5 mmHg  AV Raza Ratio (DI): 0.51  LESLYE Index (cm2/m2): 1.1     _____________________________________________________________________________  __           Report approved by: Zacarias Gonzalez 2021 04:07 PM      US Paracentesis    Narrative    Procedure 2021:  Ultrasound guided paracentesis.    History: Recurrent ascites    Comparison: 2/15/2021    Operators: Khurram Paulson M.D. I, Dr. Winston Paulson performed the  entirety of this procedure without assistance.    Medications:   No intravenous sedation administered, local analgesia only. Patient  placed on continuous cardiopulmonary monitoring.     Albumin: None    Findings/procedure:     Prior to the procedure, both verbal and written informed consent  obtained from the patient. Limited pre-procedure scan demonstrated  adequate peritoneal fluid for removal.     Left lower quadrant prepped and draped in the usual sterile fashion.  Buffered 1% Lidocaine used for local anesthesia. Under ultrasound  guidance, 5F Optrace centesis needle with needle stylette advanced  into  the peritoneal fluid. Image of the needle in the fluid stored.    Catheter advanced off the stylette and connected to vacuum drainage.  2100 mL ascites aspirated. Catheter removed. Sterile dressing applied.  No immediate complication.      Impression    Impression:   Uncomplicated ultrasound guided paracentesis. 2100 mL ascites  aspirated.    TRAE NDIAYE       A/P: Ten Johnson is a 60 year old male with a PMHx significant for HCV, EtOH use disorder, biopsy-proven cirrhosis (MELD 7), biopsy-proven HCC, portal hypertension with ascites requiring paracentesis every 7-10 days and severe aortic stenosis s/p TAVR, presenting with incarcerated umbilical hernia. The hernia was able to be reduced in the ED at bedside.    -pt is being evaluated for TIPS  -with MELD 7 and albumin >3, plan to evaluate for elective umbilical hernia repair once he undergoes TIPS procedure as he is reasonable candidate      Josué Meyer MD

## 2021-03-04 ENCOUNTER — OFFICE VISIT (OUTPATIENT)
Dept: INFUSION THERAPY | Facility: CLINIC | Age: 61
End: 2021-03-04
Attending: INTERNAL MEDICINE
Payer: COMMERCIAL

## 2021-03-04 ENCOUNTER — MYC MEDICAL ADVICE (OUTPATIENT)
Dept: INTERNAL MEDICINE | Facility: CLINIC | Age: 61
End: 2021-03-04

## 2021-03-04 ENCOUNTER — ANCILLARY PROCEDURE (OUTPATIENT)
Dept: ULTRASOUND IMAGING | Facility: CLINIC | Age: 61
End: 2021-03-04
Attending: INTERNAL MEDICINE
Payer: COMMERCIAL

## 2021-03-04 VITALS
HEART RATE: 71 BPM | DIASTOLIC BLOOD PRESSURE: 91 MMHG | WEIGHT: 154.7 LBS | BODY MASS INDEX: 22.85 KG/M2 | SYSTOLIC BLOOD PRESSURE: 138 MMHG | OXYGEN SATURATION: 96 % | RESPIRATION RATE: 18 BRPM | TEMPERATURE: 98.5 F

## 2021-03-04 DIAGNOSIS — K70.31 ALCOHOLIC CIRRHOSIS OF LIVER WITH ASCITES (H): ICD-10-CM

## 2021-03-04 DIAGNOSIS — Z11.52 ENCOUNTER FOR PREPROCEDURE SCREENING LABORATORY TESTING FOR COVID-19: ICD-10-CM

## 2021-03-04 DIAGNOSIS — Z11.59 ENCOUNTER FOR SCREENING FOR OTHER VIRAL DISEASES: Primary | ICD-10-CM

## 2021-03-04 DIAGNOSIS — K70.31 ASCITES DUE TO ALCOHOLIC CIRRHOSIS (H): ICD-10-CM

## 2021-03-04 DIAGNOSIS — Z01.812 ENCOUNTER FOR PREPROCEDURE SCREENING LABORATORY TESTING FOR COVID-19: ICD-10-CM

## 2021-03-04 PROCEDURE — U0003 INFECTIOUS AGENT DETECTION BY NUCLEIC ACID (DNA OR RNA); SEVERE ACUTE RESPIRATORY SYNDROME CORONAVIRUS 2 (SARS-COV-2) (CORONAVIRUS DISEASE [COVID-19]), AMPLIFIED PROBE TECHNIQUE, MAKING USE OF HIGH THROUGHPUT TECHNOLOGIES AS DESCRIBED BY CMS-2020-01-R: HCPCS | Performed by: INTERNAL MEDICINE

## 2021-03-04 PROCEDURE — 49083 ABD PARACENTESIS W/IMAGING: CPT | Performed by: RADIOLOGY

## 2021-03-04 PROCEDURE — 49083 ABD PARACENTESIS W/IMAGING: CPT

## 2021-03-04 PROCEDURE — 250N000009 HC RX 250: Performed by: INTERNAL MEDICINE

## 2021-03-04 PROCEDURE — U0005 INFEC AGEN DETEC AMPLI PROBE: HCPCS | Performed by: INTERNAL MEDICINE

## 2021-03-04 RX ORDER — ALBUMIN (HUMAN) 12.5 G/50ML
12.5 SOLUTION INTRAVENOUS
Status: CANCELLED | OUTPATIENT
Start: 2021-03-08

## 2021-03-04 RX ORDER — HEPARIN SODIUM (PORCINE) LOCK FLUSH IV SOLN 100 UNIT/ML 100 UNIT/ML
5 SOLUTION INTRAVENOUS
Status: CANCELLED | OUTPATIENT
Start: 2021-03-08

## 2021-03-04 RX ORDER — LIDOCAINE HYDROCHLORIDE 10 MG/ML
20 INJECTION, SOLUTION EPIDURAL; INFILTRATION; INTRACAUDAL; PERINEURAL ONCE
Status: COMPLETED | OUTPATIENT
Start: 2021-03-04 | End: 2021-03-04

## 2021-03-04 RX ORDER — HEPARIN SODIUM,PORCINE 10 UNIT/ML
5 VIAL (ML) INTRAVENOUS
Status: CANCELLED | OUTPATIENT
Start: 2021-03-08

## 2021-03-04 RX ORDER — LIDOCAINE HYDROCHLORIDE 10 MG/ML
20 INJECTION, SOLUTION EPIDURAL; INFILTRATION; INTRACAUDAL; PERINEURAL ONCE
Status: CANCELLED | OUTPATIENT
Start: 2021-03-08 | End: 2021-03-08

## 2021-03-04 RX ADMIN — LIDOCAINE HYDROCHLORIDE 15 ML: 10 INJECTION, SOLUTION EPIDURAL; INFILTRATION; INTRACAUDAL; PERINEURAL at 14:34

## 2021-03-04 NOTE — PATIENT INSTRUCTIONS
Dear Ten Johnson    Thank you for choosing [facility] ([facility]) for your paracentesis.  The following information is a summary of our appointment as well as important reminders.      Patient Education     Discharge Instructions for Paracentesis  Paracentesis is a procedure to remove extra fluid from your belly (abdomen). This fluid buildup in the abdomen is called ascites. The procedure may have been done to take a sample of the fluid. Or, it may have been done to drain the extra fluid from your abdomen and help make you more comfortable.      Ascites is buildup of excess fluid in the abdomen.   Home care    If you have pain after the procedure, your healthcare provider can prescribe or recommend pain medicines. Take these exactly as directed. If you stopped taking other medicines before the procedure, ask your provider when you can start them again.    Take it easy for 24 hours after the procedure. Don't do any physical activity until your provider says it s OK.    You will have a small bandage over the puncture site. Stitches, surgical staples, adhesive tapes, adhesive strips, or surgical glue may be used to close the incision. They also help stop bleeding and speed healing. You may take the bandage off in 24 hours.    Check the puncture site for the signs of infection listed below.    Follow-up care  Make a follow-up appointment with your healthcare provider as directed. During your follow-up visit, your provider will check your healing. Let your provider know how you are feeling. You can also discuss the cause of your ascites and if you need any further treatment. If your fluid is infected, you will be sent home on antibiotics. In some cases, the paracentesis may need to be repeated if the fluid returns. Your provider may also prescribe medicines that increase urination (diuretics) to decrease the buildup of fluid.   When to call your healthcare  provider  Call your healthcare provider if you have any of the following after the procedure:    A fever of 100.4  F ( 38.0 C) or higher, or as directed by your provider    Chills    Trouble breathing    Pain that doesn't go away even after taking pain medicine    Belly pain not caused by having the skin punctured    Bleeding from the puncture site    More than a small amount of fluid leaking from the puncture site    Swollen belly    Signs of infection at the puncture site. These include increased pain, redness, or swelling, warmth, or bad-smelling drainage.    Blood in your urine    Feeling dizzy or lightheaded, or fainting  [employer] last reviewed this educational content on 10/1/2019    2879-6385 [employer]. All rights reserved. This information is not intended as a substitute for professional medical care. Always follow your healthcare professional's instructions.             We look forward in seeing you on your next appointment here at [facility] ([facility]).  Please don t hesitate to call us at [phone] to reschedule any of your appointments or to speak with one of the [facility] registered nurses.  It was a pleasure taking care of you today.    Sincerely,    [facility]  [facility]  [address] [place] [address]  [address]  Phone:  [phone]

## 2021-03-04 NOTE — PROGRESS NOTES
Paracentesis Nursing Note  Ten Johnson presents today to [facility] for a [facility]acentesis.    During today's appointment orders from Dr.Leventhal were completed.    Progress Note:  Patient identification verified by name and date of birth.  Assessment completed.  Vitals monitored throughout appointment and recorded in Doc Flowsheets.  See proceduralist note in ultrasound.    Vascular Access: None  Labs: were not ordered for this appointment.    Date of consent or authorization: 1/21/21.  Invasive Procedure Safety Checklist was completed and sent for scanning.     Paracentesis performed by Dr.Jason Orozco.    The following labs were communicated to provider performing paracentesis:  Lab Results   Component Value Date     02/17/2021       Total amount of ascites fluid drained: 2.1 liters.  Color of ascites fluid: straw colored.  Total amount of albumin given: none    Patient tolerated procedure well.    Post procedure,denies pain or discomfort post paracentesis.    Asymptomatic COVID test date: 3/4/21 (If next appt is within 2 weeks, COVID test to be done in [facility])      Discharge Plan:  Discharge instructions were reviewed with patient.  Patient/Representative verbalized understanding and all questions were answered.   Discharged from [facility] in stable condition.    Rubi Byrne RN        Temp 98.5  F (36.9  C) (Oral)   Resp 18   Wt 72.8 kg (160 lb 9.6 oz)   SpO2 96%   BMI 23.72 kg/m

## 2021-03-04 NOTE — LETTER
3/4/2021         RE: Ten Johnson  [address]  [facility] [address]        Dear Colleague,    Thank you for referring your patient, Ten Johnson, to the [facility] ADVANCED TREATMENT [facility]. Please see a copy of my visit note below.    Paracentesis Nursing Note  Ten Johnson presents today to [facility] for a [facility]acentesis.    During today's appointment orders from Dr.Leventhal were completed.    Progress Note:  Patient identification verified by name and date of birth.  Assessment completed.  Vitals monitored throughout appointment and recorded in Doc Flowsheets.  See proceduralist note in ultrasound.    Vascular Access: None  Labs: were not ordered for this appointment.    Date of consent or authorization: 1/21/21.  Invasive Procedure Safety Checklist was completed and sent for scanning.     Paracentesis performed by Dr.Jason Orozco.    The following labs were communicated to provider performing paracentesis:  Lab Results   Component Value Date     02/17/2021       Total amount of ascites fluid drained: 2.1 liters.  Color of ascites fluid: straw colored.  Total amount of albumin given: none    Patient tolerated procedure well.    Post procedure,denies pain or discomfort post paracentesis.    Asymptomatic COVID test date: 3/4/21 (If next appt is within 2 weeks, [facility])      Discharge Plan:  Discharge instructions were reviewed with patient.  Patient/Representative verbalized understanding and all questions were answered.   Discharged from [facility] in stable condition.    Rubi Byrne RN        Temp 98.5  F (36.9  C) (Oral)   Resp 18   Wt 72.8 kg (160 lb 9.6 oz)   SpO2 96%   BMI 23.72 kg/m

## 2021-03-08 DIAGNOSIS — Z91.09 ENVIRONMENTAL ALLERGIES: ICD-10-CM

## 2021-03-09 DIAGNOSIS — Z91.09 ENVIRONMENTAL ALLERGIES: Primary | ICD-10-CM

## 2021-03-09 RX ORDER — LORATADINE 10 MG/1
10 TABLET ORAL DAILY PRN
Qty: 90 TABLET | OUTPATIENT
Start: 2021-03-09

## 2021-03-09 NOTE — CONFIDENTIAL NOTE
Spoke to pt and he prefer to have communication with my chart. He should talk to his GI doctor about this (Dr. Leventhal). Dr. OATES would start the process, if indicated.  SMITH Huizar RN 11:20 AM on 3/9/2021.

## 2021-03-09 NOTE — TELEPHONE ENCOUNTER
loratadine (CLARITIN) 10 MG tablet  Last Written Prescription Date:  1/30/2020  Last Fill Quantity: 90,   # refills: 3  Last Office Visit : 12/9/2019  Future Office visit:  None    Routing refill request to provider for review/approval because:  Last ordered by [facility].    Please send new updated order with Providers signature for Pt care.       Angella Barney RN  Central Triage Red Flags/Med Refills

## 2021-03-11 DIAGNOSIS — K70.31 ALCOHOLIC CIRRHOSIS OF LIVER WITH ASCITES (H): Primary | ICD-10-CM

## 2021-03-11 NOTE — TELEPHONE ENCOUNTER
DIAGNOSIS: TIPS consult   DATE RECEIVED: 3.16.21   NOTES STATUS DETAILS   OFFICE NOTE from referring provider Internal 3.9.21, 2.4.21  Dr. Thomas Leventhal  [facility]   OFFICE NOTE from other specialist Internal 2.17.21  Sally Villasenor PA-C  [facility] On[facility]   OPERATIVE REPORT na    MEDICATION LIST Internal    PERTINENT LABS Internal    CTA (CT ANGIOGRAPHY) na    CT Internal 9.21.20  CT Chest/Abd/Pelvis   MRI Internal 2.17.21, 9.21.20, 5.26.20  MR Abd   ULTRASOUND na

## 2021-03-12 ENCOUNTER — ANCILLARY PROCEDURE (OUTPATIENT)
Dept: ULTRASOUND IMAGING | Facility: CLINIC | Age: 61
End: 2021-03-12
Attending: INTERNAL MEDICINE
Payer: COMMERCIAL

## 2021-03-12 ENCOUNTER — OFFICE VISIT (OUTPATIENT)
Dept: INFUSION THERAPY | Facility: CLINIC | Age: 61
End: 2021-03-12
Attending: INTERNAL MEDICINE
Payer: COMMERCIAL

## 2021-03-12 ENCOUNTER — OFFICE VISIT (OUTPATIENT)
Dept: ANESTHESIOLOGY | Facility: CLINIC | Age: 61
End: 2021-03-12
Payer: COMMERCIAL

## 2021-03-12 VITALS
BODY MASS INDEX: 23.7 KG/M2 | SYSTOLIC BLOOD PRESSURE: 157 MMHG | RESPIRATION RATE: 18 BRPM | DIASTOLIC BLOOD PRESSURE: 88 MMHG | OXYGEN SATURATION: 100 % | WEIGHT: 160.5 LBS | TEMPERATURE: 98.3 F | HEART RATE: 89 BPM

## 2021-03-12 VITALS
WEIGHT: 154 LBS | DIASTOLIC BLOOD PRESSURE: 84 MMHG | HEIGHT: 69 IN | SYSTOLIC BLOOD PRESSURE: 142 MMHG | BODY MASS INDEX: 22.81 KG/M2 | HEART RATE: 65 BPM | RESPIRATION RATE: 16 BRPM

## 2021-03-12 DIAGNOSIS — K70.31 ASCITES DUE TO ALCOHOLIC CIRRHOSIS (H): ICD-10-CM

## 2021-03-12 DIAGNOSIS — Z01.812 ENCOUNTER FOR PREPROCEDURE SCREENING LABORATORY TESTING FOR COVID-19: Primary | ICD-10-CM

## 2021-03-12 DIAGNOSIS — Z11.52 ENCOUNTER FOR PREPROCEDURE SCREENING LABORATORY TESTING FOR COVID-19: Primary | ICD-10-CM

## 2021-03-12 DIAGNOSIS — F11.10 OPIOID ABUSE (H): Primary | ICD-10-CM

## 2021-03-12 PROCEDURE — 99202 OFFICE O/P NEW SF 15 MIN: CPT | Performed by: ANESTHESIOLOGY

## 2021-03-12 PROCEDURE — U0005 INFEC AGEN DETEC AMPLI PROBE: HCPCS | Performed by: INTERNAL MEDICINE

## 2021-03-12 PROCEDURE — 49083 ABD PARACENTESIS W/IMAGING: CPT | Performed by: RADIOLOGY

## 2021-03-12 PROCEDURE — U0003 INFECTIOUS AGENT DETECTION BY NUCLEIC ACID (DNA OR RNA); SEVERE ACUTE RESPIRATORY SYNDROME CORONAVIRUS 2 (SARS-COV-2) (CORONAVIRUS DISEASE [COVID-19]), AMPLIFIED PROBE TECHNIQUE, MAKING USE OF HIGH THROUGHPUT TECHNOLOGIES AS DESCRIBED BY CMS-2020-01-R: HCPCS | Performed by: INTERNAL MEDICINE

## 2021-03-12 PROCEDURE — 49083 ABD PARACENTESIS W/IMAGING: CPT

## 2021-03-12 PROCEDURE — 250N000009 HC RX 250: Performed by: INTERNAL MEDICINE

## 2021-03-12 RX ORDER — HEPARIN SODIUM (PORCINE) LOCK FLUSH IV SOLN 100 UNIT/ML 100 UNIT/ML
5 SOLUTION INTRAVENOUS
Status: CANCELLED | OUTPATIENT
Start: 2021-03-15

## 2021-03-12 RX ORDER — HEPARIN SODIUM,PORCINE 10 UNIT/ML
5 VIAL (ML) INTRAVENOUS
Status: CANCELLED | OUTPATIENT
Start: 2021-03-15

## 2021-03-12 RX ORDER — LIDOCAINE HYDROCHLORIDE 10 MG/ML
20 INJECTION, SOLUTION EPIDURAL; INFILTRATION; INTRACAUDAL; PERINEURAL ONCE
Status: CANCELLED | OUTPATIENT
Start: 2021-03-15 | End: 2021-03-15

## 2021-03-12 RX ORDER — ALBUMIN (HUMAN) 12.5 G/50ML
12.5 SOLUTION INTRAVENOUS
Status: DISCONTINUED | OUTPATIENT
Start: 2021-03-12 | End: 2021-03-12 | Stop reason: HOSPADM

## 2021-03-12 RX ORDER — ALBUMIN (HUMAN) 12.5 G/50ML
12.5 SOLUTION INTRAVENOUS
Status: CANCELLED | OUTPATIENT
Start: 2021-03-15

## 2021-03-12 RX ORDER — LIDOCAINE HYDROCHLORIDE 10 MG/ML
20 INJECTION, SOLUTION EPIDURAL; INFILTRATION; INTRACAUDAL; PERINEURAL ONCE
Status: COMPLETED | OUTPATIENT
Start: 2021-03-12 | End: 2021-03-12

## 2021-03-12 RX ADMIN — LIDOCAINE HYDROCHLORIDE 10 ML: 10 INJECTION, SOLUTION EPIDURAL; INFILTRATION; INTRACAUDAL; PERINEURAL at 14:12

## 2021-03-12 ASSESSMENT — PAIN SCALES - GENERAL: PAINLEVEL: WORST PAIN (10)

## 2021-03-12 ASSESSMENT — MIFFLIN-ST. JEOR: SCORE: 1488.92

## 2021-03-12 NOTE — PROGRESS NOTES
"                      [facility] Consultation    Date of visit: 3/12/2021    Reason for consultation:    Ten Johnson is a 60 year old male who is seen in consultation today at the request of his provider, Pita Nolan MD.    Primary Care Provider is Cuca Celeste.  Pain medications are being prescribed by PCP.    Please see the [facility] health questionnaire which the patient completed and reviewed with me in detail.    Chief Complaint:    Chief Complaint   Patient presents with     Pain Management     New consult       Pain history:  Ten Johnson is a 60 year old male who first started having problems with pain \"everywhere\" throughout his body as well as pain in the abdomen. Describes significant medical problems including TAVR, hardware infection from right knee replacement. Liver lesions with subsequent lesion burning resulting in a large open abdominal surgeon.     The worst of his pain is from waking up in the middle of the night with muscle cramping.     When asked what can be most helpful, he states a couple oxycodone per day is all he needs. He states that he only smokes marijuana and drinks alcohol.   After explaining to him that it would be inappropriate for me to prescribe opioid medications for him, he stated, \"then what the $%^& can you do for me?!?\" He was visibly frustrated that I refused to prescribe opioids and left the clinic immediately.          Assessment:  1. Opioid Use Disorder  2. Alcoholic Liver Cirrhosis  3. Marijuana Use    Ten Johnson is a 60 year old male who presents with the complaints of chronic pain although is non-specific about where his pain is the worst in order to offer potential focused therapies. Mr Johnson did not appear interested in any therapies other than opioids and left without further discussion after being informed that I will not prescribe him opioids.     Plan:  Follow up as needed to discuss non-opioid management " options.     Poly Mcrae MD    Pain Medicine  Department of Anesthesiology  [facility]

## 2021-03-12 NOTE — LETTER
"3/12/2021       RE: Ten Johnson  [address]  [place] [facility] [address]     Dear Colleague,    Thank you for referring your patient, Ten Johnson, to the [facility]NAG[facility] [place] [facility]. Please see a copy of my visit note below.                          [facility] Consultation    Date of visit: 3/12/2021    Reason for consultation:    Ten Johnson is a 60 year old male who is seen in consultation today at the request of his provider, Pita Nolan MD.    Primary Care Provider is Cuca Celeste.  Pain medications are being prescribed by PCP.    Please see the [facility] [place] health questionnaire which the patient completed and reviewed with me in detail.    Chief Complaint:    Chief Complaint   Patient presents with     Pain Management     New consult       Pain history:  Ten Johnson is a 60 year old male who first started having problems with pain \"everywhere\" throughout his body as well as pain in the abdomen. Describes significant medical problems including TAVR, hardware infection from right knee replacement. Liver lesions with subsequent lesion burning resulting in a large open abdominal surgeon.     The worst of his pain is from waking up in the middle of the night with muscle cramping.     When asked what can be most helpful, he states a couple oxycodone per day is all he needs. He states that he only smokes marijuana and drinks alcohol.   After explaining to him that it would be inappropriate for me to prescribe opioid medications for him, he stated, \"then what the $%^& can you do for me?!?\" He was visibly frustrated that I refused to prescribe opioids and left the clinic immediately.          Assessment:  1. Opioid Use Disorder  2. Alcoholic Liver Cirrhosis  3. Marijuana Use    Ten Johnson is a 60 year old male who presents with the complaints of chronic pain " although is non-specific about where his pain is the worst in order to offer potential focused therapies. Mr Elizabeth did not appear interested in any therapies other than opioids and left without further discussion after being informed that I will not prescribe him opioids.     Plan:  Follow up as needed to discuss non-opioid management options.     Poly Mcrae MD    Pain Medicine  Department of Anesthesiology  [facility]      Pt swore at provider during visit. Pt left visit early because they wanted Oxycodone.   AVS not needed per provider.     Citlali Fowler LPN

## 2021-03-12 NOTE — PROGRESS NOTES
Pt swore at provider during visit. Pt left visit early because they wanted Oxycodone.   AVS not needed per provider.     Citlali Fowler LPN

## 2021-03-12 NOTE — PROGRESS NOTES
Paracentesis Nursing Note  Ten Johnson presents today to [facility] for a [facility]acentesis.    During today's appointment orders from Dr. Leventhal were completed.    Progress Note:  Patient identification verified by name and date of birth.  Assessment completed.  Vitals monitored throughout appointment and recorded in Doc Flowsheets.  See proceduralist note in ultrasound.    Vascular Access: patient declined  Labs: were not ordered for this appointment.    Date of consent or authorization: 1/21/21.  Invasive Procedure Safety Checklist was completed and sent for scanning.     Paracentesis performed by Dr. Orozco.    The following labs were communicated to provider performing paracentesis:  Lab Results   Component Value Date     02/17/2021       Total amount of ascites fluid drained: 2.7 liters.  Color of ascites fluid: yellow.  Total amount of albumin given: 0 grams.    Patient tolerated procedure well.    Post procedure,denies pain or discomfort post paracentesis.    Asymptomatic COVID test date: today 3/12/21 (If next appt is within 2 weeks, COVID test to be done in [facility])    Discharge Plan:  Discharge instructions were reviewed with patient.  Patient/Representative verbalized understanding and all questions were answered.   Discharged from [facility] in stable condition.    Maia Bernabe RN       Administrations This Visit     lidocaine (PF) (XYLOCAINE) 1 % injection 20 mL     Admin Date  03/12/2021 Action  Given by Other Dose  10 mL Route  Subcutaneous Administered By  Maia Bernabe, RN                BP (!) 146/96   Pulse 84   Temp 98.3  F (36.8  C) (Oral)   Resp 18   SpO2 100%

## 2021-03-12 NOTE — LETTER
3/12/2021         RE: Ten Johnson  [address]  [place] [address]        Dear Colleague,    Thank you for referring your patient, Ten Johnson, to the [facility] ADVANCED TREATMENT [facility]. Please see a copy of my visit note below.    Paracentesis Nursing Note  Ten Johnson presents today to [facility] for a paracentesis.    During today's appointment orders from Dr. Leventhal were completed.    Progress Note:  Patient identification verified by name and date of birth.  Assessment completed.  Vitals monitored throughout appointment and recorded in Doc Flowsheets.  See proceduralist note in ultrasound.    Vascular Access: patient declined  Labs: were not ordered for this appointment.    Date of consent or authorization: 1/21/21.  Invasive Procedure Safety Checklist was completed and sent for scanning.     Paracentesis performed by Dr. Orozco.    The following labs were communicated to provider performing paracentesis:  Lab Results   Component Value Date     02/17/2021       Total amount of ascites fluid drained: 2.7 liters.  Color of ascites fluid: yellow.  Total amount of albumin given: 0 grams.    Patient tolerated procedure well.    Post procedure,denies pain or discomfort post paracentesis.    Asymptomatic COVID test date: today 3/12/21 (If next appt is within 2 weeks, COVID test to be done in [facility])    Discharge Plan:  Discharge instructions were reviewed with patient.  Patient/Representative verbalized understanding and all questions were answered.   Discharged from [facility] in stable condition.    Maia Bernabe RN       Administrations This Visit     lidocaine (PF) (XYLOCAINE) 1 % injection 20 mL     Admin Date  03/12/2021 Action  Given by Other Dose  10 mL Route  Subcutaneous Administered By  Maia Bernabe, RN                BP (!) 146/96   Pulse 84   Temp 98.3  F (36.8  C) (Oral)   Resp 18   SpO2 100%            Again, thank you for allowing me to participate in the care of your patient.        Sincerely,        Specialty Infusion Paracentesis Provider

## 2021-03-16 ENCOUNTER — PRE VISIT (OUTPATIENT)
Dept: VASCULAR SURGERY | Facility: CLINIC | Age: 61
End: 2021-03-16

## 2021-03-16 ENCOUNTER — VIRTUAL VISIT (OUTPATIENT)
Dept: VASCULAR SURGERY | Facility: CLINIC | Age: 61
End: 2021-03-16
Payer: COMMERCIAL

## 2021-03-16 ENCOUNTER — TELEPHONE (OUTPATIENT)
Dept: VASCULAR SURGERY | Facility: CLINIC | Age: 61
End: 2021-03-16

## 2021-03-16 DIAGNOSIS — K70.31 ALCOHOLIC CIRRHOSIS OF LIVER WITH ASCITES (H): ICD-10-CM

## 2021-03-16 DIAGNOSIS — K70.31 ALCOHOLIC CIRRHOSIS OF LIVER WITH ASCITES (H): Primary | ICD-10-CM

## 2021-03-16 PROCEDURE — 99443 PR PHYSICIAN TELEPHONE EVALUATION 21-30 MIN: CPT | Mod: 95 | Performed by: RADIOLOGY

## 2021-03-16 ASSESSMENT — PAIN SCALES - GENERAL: PAINLEVEL: EXTREME PAIN (9)

## 2021-03-16 NOTE — NURSING NOTE
Vascular Rooming Note     Ten Johnson's goals for this visit include:   Chief Complaint   Patient presents with     Consult     Navi, is participating in a virtual visit today for a consult regarding TIPS procedure, not  feeling well,  no concerns at this time, as reported by patient.     Agata Ayala LPN

## 2021-03-16 NOTE — LETTER
3/16/2021       RE: Ten Johnson  [address]  [place] [address]     Dear Colleague,    Thank you for referring your patient, Ten Johnson, to the [facility] [place] at [facility]. Please see a copy of my visit note below.    Navi is a 60 year old who is being evaluated via a billable telephone visit.      What phone number would you like to be contacted at? [phone]  How would you like to obtain your AVS? Hamilton Mckeon   Navi is a 60 year old who presents for the following health issues: New request for TIPS    HPI:      Navi is a pleasant 60 year-old patient who has a long medical history including an HCC treated last year with cTACE. He is referred by Dr. Leventhal for consideration for TIPS.  He did really well after his procedure last year and seems to be very knowledgeable about the TIPS procedure. He has refractory ascites with a need for paracentesis in an almost a weekly manner.  I have discussed his options and he seems to be very decided to proceed. I have looked at his labs and past imaging. His MElD score is 7 based on the lab results in February this year.   His MRI shows good response to chemoembolization with no new HCC. There is a Lirad 3 lesion that is being followed.    Current Outpatient Medications   Medication     aspirin (ASPIRIN LOW DOSE) 81 MG EC tablet     fluticasone (FLONASE) 50 MCG/ACT nasal spray     lisinopril (ZESTRIL) 20 MG tablet     ondansetron (ZOFRAN) 4 MG tablet     diazepam (VALIUM) 5 MG tablet     loratadine (CLARITIN) 5 MG chewable tablet     naloxone (NARCAN) 4 MG/0.1ML nasal spray     No current facility-administered medications for this visit.      Past Medical History:   Diagnosis Date     JENNIFER (acute kidney injury) (H) 4/9/2019     Alcohol use disorder      Alcoholic cirrhosis (H)      Anticoagulant long-term use     plavix     Aortic stenosis, severe 2/21/2018     Ascites       Chronic allergic rhinitis      Chronic anemia      Chronic hepatitis C without hepatic coma (H) 05/10/2016    Untreated as of 2/2018     Cirrhosis (H) 2017    MRI finding     Diastolic dysfunction      Erosive gastropathy      Esophageal varices in alcoholic cirrhosis (H)      H/O upper gastrointestinal hemorrhage 09/2017     Hepatocellular carcinoma (H)      History of blood transfusion      History of drug abuse (H)     intranasal     Hypertension     essential     JANELLE (iron deficiency anemia)      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     Sarahi-Hoffman tear     History     Marijuana abuse      MRSA (methicillin resistant Staphylococcus aureus)      Nonrheumatic aortic valve stenosis 2/20/2018     Olecranon bursitis      Portal hypertension (H)      Right shoulder pain     history of rotator cuff repair     S/p TAVR (transcatheter aortic valve replacement), bioprosthetic      Severe aortic stenosis      Thrombocytopenia (H)        Past Surgical History:   Procedure Laterality Date     ABLATE LIVER TUMOR N/A 10/22/2019    Procedure: Ablate liver tumor x3;  Surgeon: Gregorio Benoit MD;  Location: [facility] OR     ARTHROSCOPY SHOULDER ROTATOR CUFF REPAIR  7/31/2012    Procedure: ARTHROSCOPY SHOULDER ROTATOR CUFF REPAIR;  Right Shoulder Arthroscopic Rotator Cuff Repair, BicepsTenodesis,  Subacromial Decompression ;  Surgeon: Joi Castillo MD;  [facility]: [facility]     ESOPHAGOSCOPY, GASTROSCOPY, DUODENOSCOPY (EGD), COMBINED N/A 10/23/2017    Procedure: COMBINED ESOPHAGOSCOPY, GASTROSCOPY, DUODENOSCOPY (EGD);;  Surgeon: Gentry Salas MD;  Location: [facility] GI     EXCHANGE POLY COMPONENT ARTHROPLASTY KNEE Right 3/4/2019    Procedure: REVISION RIGHT TOTAL KNEE POLY COMPONENT EXCHANGE;  Surgeon: Olvin Joe MD;  Location: [facility] OR     FACIAL RECONSTRUCTION SURGERY  1971     HEART CATH FEMORAL CANNULIZATION WITH OPEN STANDBY REPAIR AORTIC VALVE N/A 2/21/2018     Procedure: HEART CATH FEMORAL CANNULIZATION WITH OPEN STANDBY REPAIR AORTIC VALVE;;  Surgeon: Luis Baird MD;  Location: [facility] OR     IR CHEMO EMBOLIZATION  1/29/2020     IR LIVER BIOPSY PERCUTANEOUS  7/18/2019     IRRIGATION AND DEBRIDEMENT UPPER EXTREMITY, COMBINED  1/3/2012    Procedure:COMBINED IRRIGATION AND DEBRIDEMENT UPPER EXTREMITY; Irrigation & Debridement Left Elbow; Surgeon:CRISTHIAN ZHOU; Location:[facility] OR     LAPAROSCOPIC BIOPSY LIVER N/A 10/22/2019    Procedure: intraoperative liver ultrasound, laparoscopic converted to open liver biopsy x 6;  Surgeon: Gregorio Benoit MD;  [facility]: [facility]     LAPAROSCOPY DIAGNOSTIC (GENERAL) N/A 10/22/2019    Procedure: Diagnostic laparoscopy;  Surgeon: Gregorio Benoit MD;  [facility]: [facility]     LAPAROTOMY, LYSIS ADHESIONS, COMBINED N/A 10/22/2019    Procedure: Laparotomy, lysis adhesions, combined;  Surgeon: Gregorio Benoit MD;  Location: [facility]     OPTICAL TRACKING SYSTEM ARTHROPLASTY KNEE Right 2/7/2019    Procedure: ARTHROPLASTY KNEE RIGHT;  Surgeon: Olvin Joe MD;  [facility]     REPAIR TENDON TRICEPS UPPER EXTREMITY  11/8/2011    Procedure:REPAIR TENDON TRICEPS UPPER EXTREMITY; Surgeon:CRISTHIAN ZHOU; Location[facility]     SHOULDER SURGERY  2003    left, injury, torn tendons, hematoma     TRANSCATHETER AORTIC VALVE IMPLANT ANESTHESIA N/A 2/21/2018    Procedure: TRANSCATHETER AORTIC VALVE IMPLANT ANESTHESIA;  Transfemoral (Quiroz) Aortic Valve Implant 26mm MARTHA 3, with Cardiopulmonary Bypass Standby, transthoracic echocardiogram;  Surgeon: GENERIC ANESTHESIA PROVIDER;  Location: [facility] OR     TRANSPOSITION ULNAR NERVE (ELBOW)  11/8/2011    Procedure:TRANSPOSITION ULNAR NERVE (ELBOW); Final Procedure Done: Left Elbow Lateral Ulnar Collateral Repair And  Left Elbow Triceps Repair         Family History   Problem Relation Age of Onset     Cancer Mother 62        unknown primary     Alcoholism Paternal Uncle      Unknown/Adopted Father   "    No Known Problems Brother      Diabetes Maternal Grandmother      Myocardial Infarction Maternal Grandfather      No Known Problems Paternal Grandmother      Unknown/Adopted Paternal Grandfather      Cirrhosis No family hx of        Social History     Tobacco Use     Smoking status: Never Smoker     Smokeless tobacco: Never Used   Substance Use Topics     Alcohol use: Yes     Comment: drinks a \"beer occasionally\"     Impression and plan:  The patient seems to be a good candidate for TIPS. I need new labs and a CT (triphasic) of his liver and portal veins. Based on those, we could proceed with the procedure.      Phone call duration: 10 minutes  Total time: 20 min      Again, thank you for allowing me to participate in the care of your patient.      Sincerely,    Tomi Arteaga MD      "

## 2021-03-16 NOTE — PATIENT INSTRUCTIONS
We will call you with the appointment if your TIPS PLACEMENT.    On the day of the appointment, you will check  in 2 hours early to your scheduled procedure.     Please check into the 3rd floor, Pre-Op , located  at [facility], located at [address] [place] [address].     *please note that you will be given General Anesthesia sedation for this procedure.       Reminders:    -You will need to have a preop with your primary care doctor for an H&P, 30 days prior to your procedure.     -You will also need covid testing 4 days prior to your procedure. More information on this below.     -Nothing to eat or drink after midnight the night before.     -Please take your morning medications as indicated with enough water to swallow.     -Please also note that you will be going home, so therefore make sure that you have a .     *We ask that you continue to take your medications and attend Paracentesis appointments as scheduled. We do not want you to discontinue your medications, especially if it is related to your liver disease.     Once the TIPS is placed, it will take a few weeks for it to function properly, so therefore attending Paracentesis appointments is important as you will notice that over time there will be less fluid removed.     **Should you experience any of the symptoms below please let us know.     1. Confusion- Hepatic encephalopathy. This is a dangerous symptom that can happen after the TIPS procedure. Please go immediately to the Emergency room    2. Swelling of lower legs- please notify us as soon as possible. This can happen afterwards as well. Please make sure to connect with your Hepatologist/Liver doctor for any diuretics.     3. Fever and abd pain-please call me as this may indicate an infection or so.      We will call you 2-3 days after you go home.     Follow up: We will see you at 1, 3 ,6, 12 months after TIPS placement for proper follow up with an  Ultrasound to evaluate if the TIPS is still patent. Otherwise your Hepatologist will follow up with you.       Should you have any further questions, please call us anytime. It has been a pleasure to see you today.      **Please note that due to Covid-19:    1. One visitor is allowed for adult inpatients, outpatients, and Emergency patients.   2. One visit is allowed at clinic appointments for adults patients, 2 visitors are allowed for children under 18.  3. Adult surgical patients can have 1 visitor before surgery.  4. Two consistent visitors are allowed for pediatric patients.     **Hospital visitation hours for adult inpatients are 8 a.m.-8:30 p.m.      Otherwise, Please drop off the patient at the front of the hospital in which they will go to their appropriate check in location.     *IT IS MANDATORY THAT ALL PATIENTS AND ACCOMPANYING FAMILY MEMBERS THAT FALL INTO THE CATEGORY ABOVE, WEAR A MASK WHEN ENTERING THE HOSPITAL AND/OR CLINICS.*    A team Nurse will contact the family member once the patient is ready for discharge.     *My advice is to have the patient give the Nurse a contact information and to make sure that she/he contacts family members for updates.    *During Covid 19-  to reach your family member after they are dropped off for an Interventional Radiology appt: Please call the Pre-Procedure location 2A with any questions  at this number: [phone]    *it is now expected that ALL patients will have a Covid 19 testing 4 days before their procedure. There is a Covid 19 Team that will be contacting you with more information when it gets closer to your procedure.     **If you do not hear from this team within a week before your scheduled procedure please call their scheduling line at [phone].    *If you decide to have Covid testing outside of the [facility] system please make sure that it is completed 4 days prior to procedure and that all results are faxed directly to:    Attn:  Interventional Radiology  Fax: [phone].      Priscila BEARD RN, BSN  Interventional Radiology Nurse Coordinator   Phone: [phone]

## 2021-03-16 NOTE — TELEPHONE ENCOUNTER
Called pt and informed him that we have him scheduled for his TIPS procedure with Dr. Arteaga    Left a .     Informed him that this is for Thurs 4/15. He is to check into 3rd floor at 630am for an 830am start time    Location details provided.     Reminded him that he is to make sure to get a preop, and to have covid testing 4 days prior to    He is to also have a  bring him home as well.    I will send a letter and mychart msg.   He is to call me back with any other questions.     Priscila BEARD RN, BSN  Interventional Radiology/Vascular  Nurse Coordinator   Phone: [phone]  Fax: [phone]

## 2021-03-16 NOTE — PROGRESS NOTES
Navi is a 60 year old who is being evaluated via a billable telephone visit.      What phone number would you like to be contacted at? [phone]  How would you like to obtain your AVS? Hamilton Mckeon   Navi is a 60 year old who presents for the following health issues: New request for TIPS    HPI:      Navi is a pleasant 60 year-old patient who has a long medical history including an HCC treated last year with cTACE. He is referred by Dr. Leventhal for consideration for TIPS.  He did really well after his procedure last year and seems to be very knowledgeable about the TIPS procedure. He has refractory ascites with a need for paracentesis in an almost a weekly manner.  I have discussed his options and he seems to be very decided to proceed. I have looked at his labs and past imaging. His MElD score is 7 based on the lab results in February this year.   His MRI shows good response to chemoembolization with no new HCC. There is a Lirad 3 lesion that is being followed.    Current Outpatient Medications   Medication     aspirin (ASPIRIN LOW DOSE) 81 MG EC tablet     fluticasone (FLONASE) 50 MCG/ACT nasal spray     lisinopril (ZESTRIL) 20 MG tablet     ondansetron (ZOFRAN) 4 MG tablet     diazepam (VALIUM) 5 MG tablet     loratadine (CLARITIN) 5 MG chewable tablet     naloxone (NARCAN) 4 MG/0.1ML nasal spray     No current facility-administered medications for this visit.      Past Medical History:   Diagnosis Date     JENNIFER (acute kidney injury) (H) 4/9/2019     Alcohol use disorder      Alcoholic cirrhosis (H)      Anticoagulant long-term use     plavix     Aortic stenosis, severe 2/21/2018     Ascites      Chronic allergic rhinitis      Chronic anemia      Chronic hepatitis C without hepatic coma (H) 05/10/2016    Untreated as of 2/2018     Cirrhosis (H) 2017    MRI finding     Diastolic dysfunction      Erosive gastropathy      Esophageal varices in alcoholic cirrhosis (H)      H/O upper gastrointestinal  hemorrhage 09/2017     Hepatocellular carcinoma (H)      History of blood transfusion      History of drug abuse (H)     intranasal     Hypertension     essential     JANELLE (iron deficiency anemia)      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     Sarahi-Hoffman tear     History     Marijuana abuse      MRSA (methicillin resistant Staphylococcus aureus)      Nonrheumatic aortic valve stenosis 2/20/2018     Olecranon bursitis      Portal hypertension (H)      Right shoulder pain     history of rotator cuff repair     S/p TAVR (transcatheter aortic valve replacement), bioprosthetic      Severe aortic stenosis      Thrombocytopenia (H)        Past Surgical History:   Procedure Laterality Date     ABLATE LIVER TUMOR N/A 10/22/2019    Procedure: Ablate liver tumor x3;  Surgeon: Gregorio Benoit MD;  [facility]     ARTHROSCOPY SHOULDER ROTATOR CUFF REPAIR  7/31/2012    Procedure: ARTHROSCOPY SHOULDER ROTATOR CUFF REPAIR;  Right Shoulder Arthroscopic Rotator Cuff Repair, BicepsTenodesis,  Subacromial Decompression ;  Surgeon: Joi Castillo MD;  [facility]     ESOPHAGOSCOPY, GASTROSCOPY, DUODENOSCOPY (EGD), COMBINED N/A 10/23/2017    Procedure: COMBINED ESOPHAGOSCOPY, GASTROSCOPY, DUODENOSCOPY (EGD);;  Surgeon: Gentry Salas MD;  [facility]: [facility]     EXCHANGE POLY COMPONENT ARTHROPLASTY KNEE Right 3/4/2019    Procedure: REVISION RIGHT TOTAL KNEE POLY COMPONENT EXCHANGE;  Surgeon: Olvin Joe MD;  Location: [facility]     FACIAL RECONSTRUCTION SURGERY  1971     HEART CATH FEMORAL CANNULIZATION WITH OPEN STANDBY REPAIR AORTIC VALVE N/A 2/21/2018    Procedure: HEART CATH FEMORAL CANNULIZATION WITH OPEN STANDBY REPAIR AORTIC VALVE;;  Surgeon: Luis Baird MD;  [facility]: [facility]     IR CHEMO EMBOLIZATION  1/29/2020     IR LIVER BIOPSY PERCUTANEOUS  7/18/2019     IRRIGATION AND DEBRIDEMENT UPPER EXTREMITY, COMBINED  1/3/2012     Procedure:COMBINED IRRIGATION AND DEBRIDEMENT UPPER EXTREMITY; Irrigation & Debridement Left Elbow; Surgeon:CRISTHIAN ZHOU; Location:[facility]     LAPAROSCOPIC BIOPSY LIVER N/A 10/22/2019    Procedure: intraoperative liver ultrasound, laparoscopic converted to open liver biopsy x 6;  Surgeon: Gregorio Benoit MD;  [facility]     LAPAROSCOPY DIAGNOSTIC (GENERAL) N/A 10/22/2019    Procedure: Diagnostic laparoscopy;  Surgeon: Gregorio Benoit MD;  [facility]     LAPAROTOMY, LYSIS ADHESIONS, COMBINED N/A 10/22/2019    Procedure: Laparotomy, lysis adhesions, combined;  Surgeon: Gregorio Benoit MD;  [facility]     OPTICAL TRACKING SYSTEM ARTHROPLASTY KNEE Right 2/7/2019    Procedure: ARTHROPLASTY KNEE RIGHT;  Surgeon: Olvin Joe MD;  [facility]     REPAIR TENDON TRICEPS UPPER EXTREMITY  11/8/2011    Procedure:REPAIR TENDON TRICEPS UPPER EXTREMITY; Surgeon:CRISTHIAN ZHOU; [facility]     SHOULDER SURGERY  2003    left, injury, torn tendons, hematoma     TRANSCATHETER AORTIC VALVE IMPLANT ANESTHESIA N/A 2/21/2018    Procedure: TRANSCATHETER AORTIC VALVE IMPLANT ANESTHESIA;  Transfemoral (Quiroz) Aortic Valve Implant 26mm MARTHA 3, with Cardiopulmonary Bypass Standby, transthoracic echocardiogram;  Surgeon: GENERIC ANESTHESIA PROVIDER;  Location: [facility]     TRANSPOSITION ULNAR NERVE (ELBOW)  11/8/2011    Procedure:TRANSPOSITION ULNAR NERVE (ELBOW); Final Procedure Done: Left Elbow Lateral Ulnar Collateral Repair And  Left Elbow Triceps Repair         Family History   Problem Relation Age of Onset     Cancer Mother 62        unknown primary     Alcoholism Paternal Uncle      Unknown/Adopted Father      No Known Problems Brother      Diabetes Maternal Grandmother      Myocardial Infarction Maternal Grandfather      No Known Problems Paternal Grandmother      Unknown/Adopted Paternal Grandfather      Cirrhosis No family hx of        Social History     Tobacco Use     Smoking status: Never  "Smoker     Smokeless tobacco: Never Used   Substance Use Topics     Alcohol use: Yes     Comment: drinks a \"beer occasionally\"     Impression and plan:  The patient seems to be a good candidate for TIPS. I need new labs and a CT (triphasic) of his liver and portal veins. Based on those, we could proceed with the procedure.      Phone call duration: 10 minutes  Total time: 20 min  "

## 2021-03-22 NOTE — TELEPHONE ENCOUNTER
FUTURE VISIT INFORMATION      SURGERY INFORMATION:    Date: 4.15.21    [facility]    Surgeon:  Chandler    Anesthesia Type:  General    Procedure: ANESTHESIA OUT OF OR Paracentesis, Transjugular intrahepatic portosystemic shunt procedure @0830    Consult:  3.16.21    RECORDS REQUESTED FROM:       Primary Care Provider: Roula    Most recent EKG+ Tracin20    Most recent ECHO: 21

## 2021-03-24 ENCOUNTER — TELEPHONE (OUTPATIENT)
Dept: VASCULAR SURGERY | Facility: CLINIC | Age: 61
End: 2021-03-24

## 2021-03-24 NOTE — TELEPHONE ENCOUNTER
Called pt  And informed pt that we had a slight time change for his TIPS procedure for 4/15.     He is to check in at 6am now instead of 630am.     Pt is very pleased with this check in time and will attend.     Priscila BEARD RN, BSN  Interventional Radiology/Vascular  Nurse Coordinator   Phone: [phone]  Fax: [phone]

## 2021-03-26 ENCOUNTER — MYC MEDICAL ADVICE (OUTPATIENT)
Dept: VASCULAR SURGERY | Facility: CLINIC | Age: 61
End: 2021-03-26

## 2021-03-28 DIAGNOSIS — Z11.59 ENCOUNTER FOR SCREENING FOR OTHER VIRAL DISEASES: ICD-10-CM

## 2021-04-01 DIAGNOSIS — Z11.59 ENCOUNTER FOR SCREENING FOR OTHER VIRAL DISEASES: ICD-10-CM

## 2021-04-01 LAB
SARS-COV-2 RNA RESP QL NAA+PROBE: NORMAL
SPECIMEN SOURCE: NORMAL

## 2021-04-01 PROCEDURE — U0005 INFEC AGEN DETEC AMPLI PROBE: HCPCS | Mod: 90 | Performed by: PATHOLOGY

## 2021-04-01 PROCEDURE — U0003 INFECTIOUS AGENT DETECTION BY NUCLEIC ACID (DNA OR RNA); SEVERE ACUTE RESPIRATORY SYNDROME CORONAVIRUS 2 (SARS-COV-2) (CORONAVIRUS DISEASE [COVID-19]), AMPLIFIED PROBE TECHNIQUE, MAKING USE OF HIGH THROUGHPUT TECHNOLOGIES AS DESCRIBED BY CMS-2020-01-R: HCPCS | Mod: 90 | Performed by: PATHOLOGY

## 2021-04-01 PROCEDURE — 99000 SPECIMEN HANDLING OFFICE-LAB: CPT | Performed by: PATHOLOGY

## 2021-04-04 DIAGNOSIS — F41.9 ANXIETY: Primary | ICD-10-CM

## 2021-04-04 RX ORDER — DIAZEPAM 5 MG
TABLET ORAL
Qty: 2 TABLET | Refills: 0 | Status: CANCELLED | OUTPATIENT
Start: 2021-04-04

## 2021-04-05 ENCOUNTER — ANCILLARY PROCEDURE (OUTPATIENT)
Dept: ULTRASOUND IMAGING | Facility: CLINIC | Age: 61
End: 2021-04-05
Attending: INTERNAL MEDICINE
Payer: COMMERCIAL

## 2021-04-05 ENCOUNTER — ANCILLARY PROCEDURE (OUTPATIENT)
Dept: CT IMAGING | Facility: CLINIC | Age: 61
End: 2021-04-05
Attending: RADIOLOGY
Payer: COMMERCIAL

## 2021-04-05 ENCOUNTER — OFFICE VISIT (OUTPATIENT)
Dept: INFUSION THERAPY | Facility: CLINIC | Age: 61
End: 2021-04-05
Attending: INTERNAL MEDICINE
Payer: COMMERCIAL

## 2021-04-05 VITALS
BODY MASS INDEX: 25.24 KG/M2 | HEART RATE: 60 BPM | DIASTOLIC BLOOD PRESSURE: 78 MMHG | WEIGHT: 170.9 LBS | RESPIRATION RATE: 16 BRPM | SYSTOLIC BLOOD PRESSURE: 117 MMHG | OXYGEN SATURATION: 96 % | TEMPERATURE: 98.4 F

## 2021-04-05 DIAGNOSIS — K70.31 ALCOHOLIC CIRRHOSIS OF LIVER WITH ASCITES (H): ICD-10-CM

## 2021-04-05 DIAGNOSIS — B18.2 CHRONIC HEPATITIS C WITH HEPATIC COMA (H): ICD-10-CM

## 2021-04-05 DIAGNOSIS — K70.31 ASCITES DUE TO ALCOHOLIC CIRRHOSIS (H): ICD-10-CM

## 2021-04-05 DIAGNOSIS — Z01.812 ENCOUNTER FOR PREPROCEDURE SCREENING LABORATORY TESTING FOR COVID-19: Primary | ICD-10-CM

## 2021-04-05 DIAGNOSIS — Z11.52 ENCOUNTER FOR PREPROCEDURE SCREENING LABORATORY TESTING FOR COVID-19: Primary | ICD-10-CM

## 2021-04-05 LAB
AFP SERPL-MCNC: 21.2 UG/L (ref 0–8)
ALBUMIN SERPL-MCNC: 3 G/DL (ref 3.4–5)
ALP SERPL-CCNC: 85 U/L (ref 40–150)
ALT SERPL W P-5'-P-CCNC: 45 U/L (ref 0–70)
ANION GAP SERPL CALCULATED.3IONS-SCNC: 5 MMOL/L (ref 3–14)
AST SERPL W P-5'-P-CCNC: 45 U/L (ref 0–45)
BILIRUB DIRECT SERPL-MCNC: 0.2 MG/DL (ref 0–0.2)
BILIRUB SERPL-MCNC: 0.4 MG/DL (ref 0.2–1.3)
BUN SERPL-MCNC: 15 MG/DL (ref 7–30)
CALCIUM SERPL-MCNC: 8.6 MG/DL (ref 8.5–10.1)
CHLORIDE SERPL-SCNC: 110 MMOL/L (ref 94–109)
CO2 SERPL-SCNC: 26 MMOL/L (ref 20–32)
CREAT SERPL-MCNC: 1.03 MG/DL (ref 0.66–1.25)
ERYTHROCYTE [DISTWIDTH] IN BLOOD BY AUTOMATED COUNT: 15.9 % (ref 10–15)
GFR SERPL CREATININE-BSD FRML MDRD: 77 ML/MIN/{1.73_M2}
GLUCOSE SERPL-MCNC: 84 MG/DL (ref 70–99)
HBV CORE AB SERPL QL IA: NONREACTIVE
HBV SURFACE AG SERPL QL IA: NONREACTIVE
HCT VFR BLD AUTO: 38.5 % (ref 40–53)
HGB BLD-MCNC: 12.9 G/DL (ref 13.3–17.7)
INR PPP: 1.32 (ref 0.86–1.14)
MCH RBC QN AUTO: 29.9 PG (ref 26.5–33)
MCHC RBC AUTO-ENTMCNC: 33.5 G/DL (ref 31.5–36.5)
MCV RBC AUTO: 89 FL (ref 78–100)
PLATELET # BLD AUTO: 104 10E9/L (ref 150–450)
POTASSIUM SERPL-SCNC: 4.4 MMOL/L (ref 3.4–5.3)
PROT SERPL-MCNC: 7 G/DL (ref 6.8–8.8)
RBC # BLD AUTO: 4.31 10E12/L (ref 4.4–5.9)
SODIUM SERPL-SCNC: 141 MMOL/L (ref 133–144)
WBC # BLD AUTO: 5.9 10E9/L (ref 4–11)

## 2021-04-05 PROCEDURE — 82105 ALPHA-FETOPROTEIN SERUM: CPT | Performed by: RADIOLOGY

## 2021-04-05 PROCEDURE — 80053 COMPREHEN METABOLIC PANEL: CPT | Performed by: RADIOLOGY

## 2021-04-05 PROCEDURE — 87340 HEPATITIS B SURFACE AG IA: CPT | Performed by: RADIOLOGY

## 2021-04-05 PROCEDURE — 49083 ABD PARACENTESIS W/IMAGING: CPT

## 2021-04-05 PROCEDURE — 36415 COLL VENOUS BLD VENIPUNCTURE: CPT

## 2021-04-05 PROCEDURE — 86704 HEP B CORE ANTIBODY TOTAL: CPT | Performed by: RADIOLOGY

## 2021-04-05 PROCEDURE — 49083 ABD PARACENTESIS W/IMAGING: CPT | Performed by: RADIOLOGY

## 2021-04-05 PROCEDURE — 87522 HEPATITIS C REVRS TRNSCRPJ: CPT | Performed by: RADIOLOGY

## 2021-04-05 PROCEDURE — 80048 BASIC METABOLIC PNL TOTAL CA: CPT | Performed by: RADIOLOGY

## 2021-04-05 PROCEDURE — 250N000009 HC RX 250: Performed by: INTERNAL MEDICINE

## 2021-04-05 PROCEDURE — 250N000011 HC RX IP 250 OP 636: Performed by: INTERNAL MEDICINE

## 2021-04-05 PROCEDURE — 272N000706 US PARACENTESIS

## 2021-04-05 PROCEDURE — P9047 ALBUMIN (HUMAN), 25%, 50ML: HCPCS | Performed by: INTERNAL MEDICINE

## 2021-04-05 PROCEDURE — 999N000127 HC STATISTIC PERIPHERAL IV START W US GUIDANCE

## 2021-04-05 PROCEDURE — 85027 COMPLETE CBC AUTOMATED: CPT | Performed by: RADIOLOGY

## 2021-04-05 PROCEDURE — 85610 PROTHROMBIN TIME: CPT | Performed by: RADIOLOGY

## 2021-04-05 PROCEDURE — 82248 BILIRUBIN DIRECT: CPT | Performed by: RADIOLOGY

## 2021-04-05 PROCEDURE — 74160 CT ABDOMEN W/CONTRAST: CPT | Performed by: RADIOLOGY

## 2021-04-05 RX ORDER — LIDOCAINE HYDROCHLORIDE 10 MG/ML
20 INJECTION, SOLUTION EPIDURAL; INFILTRATION; INTRACAUDAL; PERINEURAL ONCE
Status: COMPLETED | OUTPATIENT
Start: 2021-04-05 | End: 2021-04-05

## 2021-04-05 RX ORDER — ALBUMIN (HUMAN) 12.5 G/50ML
12.5 SOLUTION INTRAVENOUS
Status: CANCELLED | OUTPATIENT
Start: 2021-04-12

## 2021-04-05 RX ORDER — IOPAMIDOL 755 MG/ML
104 INJECTION, SOLUTION INTRAVASCULAR ONCE
Status: COMPLETED | OUTPATIENT
Start: 2021-04-05 | End: 2021-04-05

## 2021-04-05 RX ORDER — HEPARIN SODIUM,PORCINE 10 UNIT/ML
5 VIAL (ML) INTRAVENOUS
Status: CANCELLED | OUTPATIENT
Start: 2021-04-12

## 2021-04-05 RX ORDER — HEPARIN SODIUM (PORCINE) LOCK FLUSH IV SOLN 100 UNIT/ML 100 UNIT/ML
5 SOLUTION INTRAVENOUS
Status: CANCELLED | OUTPATIENT
Start: 2021-04-12

## 2021-04-05 RX ORDER — LIDOCAINE HYDROCHLORIDE 10 MG/ML
20 INJECTION, SOLUTION EPIDURAL; INFILTRATION; INTRACAUDAL; PERINEURAL ONCE
Status: CANCELLED | OUTPATIENT
Start: 2021-04-12 | End: 2021-04-12

## 2021-04-05 RX ORDER — ALBUMIN (HUMAN) 12.5 G/50ML
12.5 SOLUTION INTRAVENOUS
Status: DISCONTINUED | OUTPATIENT
Start: 2021-04-05 | End: 2021-04-05 | Stop reason: HOSPADM

## 2021-04-05 RX ADMIN — LIDOCAINE HYDROCHLORIDE 10 ML: 10 INJECTION, SOLUTION EPIDURAL; INFILTRATION; INTRACAUDAL; PERINEURAL at 14:04

## 2021-04-05 RX ADMIN — IOPAMIDOL 104 ML: 755 INJECTION, SOLUTION INTRAVASCULAR at 15:30

## 2021-04-05 RX ADMIN — ALBUMIN HUMAN 12.5 G: 0.25 SOLUTION INTRAVENOUS at 14:35

## 2021-04-05 NOTE — LETTER
4/5/2021         RE: Ten Johnson  [address]  [facility] [address]        Dear Colleague,    Thank you for referring your patient, Ten Johnson, to the [facility]. Please see a copy of my visit note below.    Paracentesis Nursing Note  Ten Johnson presents today to [facility].    During today's appointment orders from Dr. Leventhal were completed.    Progress Note:  Patient identification verified by name and date of birth.  Assessment completed.  Vitals monitored throughout appointment and recorded in Doc Flowsheets.  See proceduralist note in ultrasound.    Vascular Access: peripheral IV placed today by vascular access RN. IV saline locked for CT appt.  Labs: labs drawn per orders. Patient requested orders by Golzarian and Leventhal be drawn.     Date of consent or authorization: 1/21/21.  Invasive Procedure Safety Checklist was completed and sent for scanning.     Paracentesis performed by Dr. CHRISTIAN Paulson.    The following labs were communicated to provider performing paracentesis:  Lab Results   Component Value Date     02/17/2021       Total amount of ascites fluid drained: 4.3 liters.  Color of ascites fluid: light yellow, clear.  Total amount of albumin given: 12.5  grams.    Patient tolerated procedure well.    Post procedure,denies pain or discomfort post paracentesis.    Asymptomatic COVID test date: 4/1- getting another this week prior to procedure (If next appt is within 2 weeks, COVID test to be done in [facility])      Discharge Plan:  Discharge instructions were reviewed with patient.  Patient/Representative verbalized understanding and all questions were answered.   Discharged from [facility] in stable condition.    Administrations This Visit     albumin human 25 % injection 12.5 g     Admin Date  04/05/2021 Action  New Bag Dose  12.5 g Route  Intravenous  "Administered By  Gabbi Moody, JAMES          lidocaine (PF) (XYLOCAINE) 1 % injection 20 mL     Admin Date  04/05/2021 Action  Given by Other Dose  10 mL Route  Subcutaneous Administered By  Gabbi Moody, JAMES              Vital signs:  Temp: 98.4  F (36.9  C) Temp src: Oral         SpO2: 96 %       Weight: 77.5 kg (170 lb 14.4 oz)  Estimated body mass index is 25.24 kg/m  as calculated from the following:    Height as of 3/12/21: 1.753 m (5' 9\").    Weight as of this encounter: 77.5 kg (170 lb 14.4 oz).                Again, thank you for allowing me to participate in the care of your patient.        Sincerely,        [facility] Paracentesis Provider    "

## 2021-04-05 NOTE — PROGRESS NOTES
Paracentesis Nursing Note  Ten Johnson presents today to [facility] for a [facility]acentesis.    During today's appointment orders from Dr. Leventhal were completed.    Progress Note:  Patient identification verified by name and date of birth.  Assessment completed.  Vitals monitored throughout appointment and recorded in Doc Flowsheets.  See proceduralist note in ultrasound.    Vascular Access: peripheral IV placed today by vascular access RN. IV saline locked for CT appt.  Labs: labs drawn per orders. Patient requested orders by Golzarian and Leventhal be drawn.     Date of consent or authorization: 1/21/21.  Invasive Procedure Safety Checklist was completed and sent for scanning.     Paracentesis performed by Dr. CHRISTIAN Paulson.    The following labs were communicated to provider performing paracentesis:  Lab Results   Component Value Date     02/17/2021       Total amount of ascites fluid drained: 4.3 liters.  Color of ascites fluid: light yellow, clear.  Total amount of albumin given: 12.5  grams.    Patient tolerated procedure well.    Post procedure,denies pain or discomfort post paracentesis.    Asymptomatic COVID test date: 4/1- getting another this week prior to procedure (If next appt is within 2 weeks, COVID test to be done in [facility])      Discharge Plan:  Discharge instructions were reviewed with patient.  Patient/Representative verbalized understanding and all questions were answered.   Discharged from [facility] in stable condition.    Administrations This Visit     albumin human 25 % injection 12.5 g     Admin Date  04/05/2021 Action  New Bag Dose  12.5 g Route  Intravenous Administered By  Gabbi Moody, JAMES          lidocaine (PF) (XYLOCAINE) 1 % injection 20 mL     Admin Date  04/05/2021 Action  Given by Other Dose  10 mL Route  Subcutaneous Administered By  Gabbi Moody RN              Vital signs:  Temp: 98.4  F (36.9  C) Temp src:  "Oral         SpO2: 96 %       Weight: 77.5 kg (170 lb 14.4 oz)  Estimated body mass index is 25.24 kg/m  as calculated from the following:    Height as of 3/12/21: 1.753 m (5' 9\").    Weight as of this encounter: 77.5 kg (170 lb 14.4 oz).            "

## 2021-04-06 LAB
HCV RNA SERPL NAA+PROBE-ACNC: ABNORMAL [IU]/ML
HCV RNA SERPL NAA+PROBE-LOG IU: 6 LOG IU/ML

## 2021-04-08 ENCOUNTER — ANESTHESIA EVENT (OUTPATIENT)
Dept: SURGERY | Facility: CLINIC | Age: 61
End: 2021-04-08
Payer: COMMERCIAL

## 2021-04-08 ENCOUNTER — OFFICE VISIT (OUTPATIENT)
Dept: SURGERY | Facility: CLINIC | Age: 61
End: 2021-04-08
Payer: COMMERCIAL

## 2021-04-08 ENCOUNTER — PRE VISIT (OUTPATIENT)
Dept: SURGERY | Facility: CLINIC | Age: 61
End: 2021-04-08

## 2021-04-08 VITALS
RESPIRATION RATE: 14 BRPM | BODY MASS INDEX: 23.85 KG/M2 | WEIGHT: 161 LBS | DIASTOLIC BLOOD PRESSURE: 90 MMHG | TEMPERATURE: 97.7 F | SYSTOLIC BLOOD PRESSURE: 138 MMHG | OXYGEN SATURATION: 98 % | HEART RATE: 92 BPM | HEIGHT: 69 IN

## 2021-04-08 DIAGNOSIS — Z01.818 PREOP EXAMINATION: Primary | ICD-10-CM

## 2021-04-08 PROCEDURE — 99204 OFFICE O/P NEW MOD 45 MIN: CPT | Performed by: PHYSICIAN ASSISTANT

## 2021-04-08 ASSESSMENT — MIFFLIN-ST. JEOR: SCORE: 1520.67

## 2021-04-08 ASSESSMENT — LIFESTYLE VARIABLES: TOBACCO_USE: 0

## 2021-04-08 ASSESSMENT — PAIN SCALES - GENERAL: PAINLEVEL: EXTREME PAIN (8)

## 2021-04-08 NOTE — H&P
Pre-Operative H & P     CC:  Preoperative exam to assess for increased cardiopulmonary risk while undergoing surgery and anesthesia.    Date of Encounter: 4/8/2021  Primary Care Physician:  Cuca Celeste  Reason for Visit: Alcoholic cirrhosis of liver with ascites (H)        LAYLA Johnson is a 59 y/o male who presents for pre-operative H&P in preparation for Paracentesis, Transjugular intrahepatic portosystemic shunt procedure  with Tomi Arteaga MD on 4/15/21 at [facility] for treatment of Alcoholic cirrhosis of liver with ascites (H).  Patient is being evaluated for comorbid conditions of hypertension, s/p TAVR, allergic rhinitis, thrombocytopenia, anemia, portal hypertension, esophageal varices, chronic hepatitis C, alcoholic cirrhosis, ascites, MRSA, GERD and hepatocellular carcinoma.      Mr. Johnson has a long medical history including an HCC that was treated last year with cTACE. He underwent an open liver ablation of three lesions on 10/22/2019 and a Chemo Embolizationon on 1/29/2020. He did really well after his procedure last year and seems to be very knowledgeable about the TIPS procedure. He has refractory ascites with a need for paracentesis in an almost a weekly manner (most recently on 4/5/21) with 2-3L drained. His MELD score is 7 based on the lab results in February this year. His MRI shows good response to chemoembolization with no new HCC. There is a Lirad 3 lesion that is being followed. He now presents for the above procedure.      PMH is also significant for s/p right knee arthroplasty, S/P B/L rotator cuff repair/recontruction with residual decreased ROM, S/P left elbow surgery, complicated by MRSA.       History was obtained from patient & chart review.     Past Medical History  Past Medical History:   Diagnosis Date     JENNIFER (acute kidney injury) (H) 4/9/2019     Alcohol use disorder      Alcoholic cirrhosis (H)      Anticoagulant  long-term use     plavix     Aortic stenosis, severe 2/21/2018     Ascites      Chronic allergic rhinitis      Chronic anemia      Chronic hepatitis C without hepatic coma (H) 05/10/2016    Untreated as of 2/2018     Cirrhosis (H) 2017    MRI finding     Diastolic dysfunction      Erosive gastropathy      Esophageal varices in alcoholic cirrhosis (H)      H/O upper gastrointestinal hemorrhage 09/2017     Hepatocellular carcinoma (H)      History of blood transfusion      History of drug abuse (H)     intranasal     Hypertension     essential     JANELLE (iron deficiency anemia)      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     Sarahi-Hoffman tear     History     Marijuana abuse      MRSA (methicillin resistant Staphylococcus aureus)      Nonrheumatic aortic valve stenosis 2/20/2018     Olecranon bursitis      Portal hypertension (H)      Right shoulder pain     history of rotator cuff repair     S/p TAVR (transcatheter aortic valve replacement), bioprosthetic      Severe aortic stenosis      Thrombocytopenia (H)        Past Surgical History  Past Surgical History:   Procedure Laterality Date     ABLATE LIVER TUMOR N/A 10/22/2019    Procedure: Ablate liver tumor x3;  Surgeon: Gregorio Benoit MD;  Location: [facility]     ARTHROSCOPY SHOULDER ROTATOR CUFF REPAIR  7/31/2012    Procedure: ARTHROSCOPY SHOULDER ROTATOR CUFF REPAIR;  Right Shoulder Arthroscopic Rotator Cuff Repair, BicepsTenodesis,  Subacromial Decompression ;  Surgeon: Joi Castillo MD;  [facility]     ESOPHAGOSCOPY, GASTROSCOPY, DUODENOSCOPY (EGD), COMBINED N/A 10/23/2017    Procedure: COMBINED ESOPHAGOSCOPY, GASTROSCOPY, DUODENOSCOPY (EGD);;  Surgeon: Gentry Salas MD;  Location: [facility] GI     EXCHANGE POLY COMPONENT ARTHROPLASTY KNEE Right 3/4/2019    Procedure: REVISION RIGHT TOTAL KNEE POLY COMPONENT EXCHANGE;  Surgeon: Olvin Joe MD;  Location: [facility] OR     FACIAL RECONSTRUCTION  SURGERY  1971     HEART CATH FEMORAL CANNULIZATION WITH OPEN STANDBY REPAIR AORTIC VALVE N/A 2/21/2018    Procedure: HEART CATH FEMORAL CANNULIZATION WITH OPEN STANDBY REPAIR AORTIC VALVE;;  Surgeon: Luis Baird MD;  Location: [facility]     IR CHEMO EMBOLIZATION  1/29/2020     IR LIVER BIOPSY PERCUTANEOUS  7/18/2019     IRRIGATION AND DEBRIDEMENT UPPER EXTREMITY, COMBINED  1/3/2012    Procedure:COMBINED IRRIGATION AND DEBRIDEMENT UPPER EXTREMITY; Irrigation & Debridement Left Elbow; Surgeon:CRISTHIAN ZHOU; Location:[facility]     LAPAROSCOPIC BIOPSY LIVER N/A 10/22/2019    Procedure: intraoperative liver ultrasound, laparoscopic converted to open liver biopsy x 6;  Surgeon: Gregorio Benoit MD;  Location: [facility]     LAPAROSCOPY DIAGNOSTIC (GENERAL) N/A 10/22/2019    Procedure: Diagnostic laparoscopy;  Surgeon: Gregorio Benoit MD;  [facility]: [facility]     LAPAROTOMY, LYSIS ADHESIONS, COMBINED N/A 10/22/2019    Procedure: Laparotomy, lysis adhesions, combined;  Surgeon: Gregorio Benoit MD;  [facility]: [facility]     OPTICAL TRACKING SYSTEM ARTHROPLASTY KNEE Right 2/7/2019    Procedure: ARTHROPLASTY KNEE RIGHT;  Surgeon: Olvin Joe MD;  [facility]     REPAIR TENDON TRICEPS UPPER EXTREMITY  11/8/2011    Procedure:REPAIR TENDON TRICEPS UPPER EXTREMITY; Surgeon:CRISTHIAN ZHOU; [facility]     SHOULDER SURGERY  2003    left, injury, torn tendons, hematoma     TRANSCATHETER AORTIC VALVE IMPLANT ANESTHESIA N/A 2/21/2018    Procedure: TRANSCATHETER AORTIC VALVE IMPLANT ANESTHESIA;  Transfemoral (Quiroz) Aortic Valve Implant 26mm MARTHA 3, with Cardiopulmonary Bypass Standby, transthoracic echocardiogram;  Surgeon: GENERIC ANESTHESIA PROVIDER;  Location: [facility]     TRANSPOSITION ULNAR NERVE (ELBOW)  11/8/2011    Procedure:TRANSPOSITION ULNAR NERVE (ELBOW); Final Procedure Done: Left Elbow Lateral Ulnar Collateral Repair And  Left Elbow Triceps Repair         Hx of Blood transfusions/reactions: hx remote  transfusion as a teen; denies reactions     Hx of abnormal bleeding or anti-platelet use: on ASA 81 mg    Menstrual history: No LMP for male patient.:      Steroid use in the last year: denies    Personal or FH with difficulty with Anesthesia:  denies    Prior to Admission Medications  Current Outpatient Medications   Medication Sig Dispense Refill     diazepam (VALIUM) 5 MG tablet as needed        fluticasone (FLONASE) 50 MCG/ACT nasal spray Spray 2 sprays into both nostrils daily (Patient taking differently: Spray 2 sprays into both nostrils as needed ) 48 mL 4     lisinopril (ZESTRIL) 20 MG tablet Take 1 tablet (20 mg) by mouth daily (Patient taking differently: Take 20 mg by mouth every evening ) 90 tablet 3     loratadine (CLARITIN) 5 MG chewable tablet Take 1 tablet (5 mg) by mouth daily (Patient taking differently: Take 5 mg by mouth every morning ) 90 tablet 1     ondansetron (ZOFRAN) 4 MG tablet Take 1 tablet (4 mg) by mouth every 8 hours as needed for nausea 20 tablet 11     aspirin (ASPIRIN LOW DOSE) 81 MG EC tablet Take 1 tablet (81 mg) by mouth daily (Patient taking differently: Take 81 mg by mouth daily ) 90 tablet 3     naloxone (NARCAN) 4 MG/0.1ML nasal spray Spray 1 spray (4 mg) into one nostril alternating nostrils once as needed for opioid reversal every 2-3 minutes until assistance arrives 0.2 mL 1       Allergies  Allergies   Allergen Reactions     Zolpidem Other (See Comments)     Alcoholic.  Had reaction 3/17/13 while intoxicated which included black out, loss of awareness, paranoia.  Do not prescribe.  Dr. Celeste     Cats Other (See Comments)     rhinitis     Dogs Other (See Comments)     rhinitis     Pollen Extract Other (See Comments)     rhinits.       Social History  Social History     Socioeconomic History     Marital status: Single     Spouse name: Not on file     Number of children: 0     Years of education: Not on file     Highest education level: Not on file   Occupational History  "    Occupation:    Social Needs     Financial resource strain: Not on file     Food insecurity     Worry: Not on file     Inability: Not on file     Transportation needs     Medical: Not on file     Non-medical: Not on file   Tobacco Use     Smoking status: Never Smoker     Smokeless tobacco: Never Used   Substance and Sexual Activity     Alcohol use: Yes     Comment: drinks a \"beer occasionally\"     Drug use: Yes     Types: Marijuana     Comment: denies     Sexual activity: Not Currently     Partners: Female   Lifestyle     Physical activity     Days per week: Not on file     Minutes per session: Not on file     Stress: Not on file   Relationships     Social connections     Talks on phone: Not on file     Gets together: Not on file     Attends [employer] service: Not on file     Active member of club or organization: Not on file     Attends meetings of clubs or organizations: Not on file     Relationship status: Not on file     Intimate partner violence     Fear of current or ex partner: Not on file     Emotionally abused: Not on file     Physically abused: Not on file     Forced sexual activity: Not on file   Other Topics Concern     Parent/sibling w/ CABG, MI or angioplasty before 65F 55M? Not Asked   Social History Narrative    .  Bicycles a lot.  Excessive alcohol use.  Smokes cigars.  Occasional marijuana use.       Family History  Family History   Problem Relation Age of Onset     Cancer Mother 62        unknown primary     Alcoholism Paternal Uncle      Unknown/Adopted Father      No Known Problems Brother      Diabetes Maternal Grandmother      Myocardial Infarction Maternal Grandfather      No Known Problems Paternal Grandmother      Unknown/Adopted Paternal Grandfather      Cirrhosis No family hx of      Anesthesia Reaction No family hx of      Deep Vein Thrombosis (DVT) No family hx of            ROS/MED HX  The complete review of systems is negative other than noted in " the HPI or here.  Patient denies recent illness, fever and respiratory infection during past month.  Pt denies steroid use during past year.    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 Has significant environmental allergies to dust, grass, pollen. Uses flonase daily.    (+) allergic rhinitis,  (-) tobacco use and sleep apnea   Neurologic: Comment: Pt endorses hx of seizures ~ 30 yrs ago secondary to med reaction that has since been pulled off market    (+) no peripheral neuropathy  (-) no CVA   Cardiovascular:     (+) hypertension-----valvular problems/murmurs type: AS s/p TAVR 2018. Previous cardiac testing   Echo: Date: 2/17/21 Results:  Interpretation Summary   S/P TAVR with 26 mm Quiroz Victor M 3 valve. Trace to mild paravalvular AI.   Mean aortic gradient 7 mmHg.   No significant changes noted.      Left Ventricle   Global and regional left ventricular function is hyperkinetic with an EF of   65-70%. Left ventricular size is normal. Borderline concentric wall thickening   consistent with left ventricular hypertrophy is present. Diastolic function   not assessed due to significant mitral annular calcification. No regional wall   motion abnormalities are seen.      Right Ventricle   Right ventricular function, chamber size, wall motion, and thickness are   normal.      Atria   The right atria appears normal. Mild to moderate left atrial enlargement is   present. The atrial septum is intact as assessed by agitated dextrose bubble   study .      Mitral Valve   Moderate to severe mitral annular calcification is present. Trace to mild   mitral insufficiency is present.       Aortic Valve   S/P TAVR with 26 mm Quiroz Victor M 3 valve. Trace to mild paravalvular AI.   Mean aortic gradient 7 mmHg.       Tricuspid Valve   The tricuspid valve is normal. Trace tricuspid insufficiency is present. The   right ventricular systolic pressure is approximated at 25.5 mmHg  "plus the   right atrial pressure.       Pulmonic Valve   The pulmonic valve is normal.       Vessels   The pulmonary artery and bifurcation cannot be assessed. The inferior vena   cava is normal. Ascending aorta 4 cm.       Pericardium   No pericardial effusion is present.       Compared to Previous Study   No significant changes noted.     Stress Test:  Date: Results:    ECG Reviewed:  Date: 9/16/20 Results:  Sinus bradycardia   Otherwise normal ECG   Ventricular rate 56 bpm     Cath:  Date: Results:      METS/Exercise Tolerance: >4 METS Comment: Rides bicycle 100 miles per week. Daily activity varies with level of ascites. Uses stairs daily.   Hematologic:     (+) anemia, history of blood transfusion, no previous transfusion reaction,  (-) history of blood clots   Musculoskeletal: Comment: S/P right knee arthroplasty    S/P B/L rotator cuff repair/recontruction. Decreased ROM on right.    S/P left elbow surgery, complicated by MRSA.      GI/Hepatic: Comment: ETOH cirrhosis    Paracentesis Q 7-10 days, most recently 4/5/21. Drains between 2-3 L on average.    MELD 7    HCC  Hx Hep C, s/p treatment    Hx Sarahi Hoffman tear      (+) hepatitis type C, liver disease,     Renal/Genitourinary: Comment: History of JENNIFER during treatment for post knee arthroplasty infection. Resolved.        Endo:  - neg endo ROS  (-) Type II DM   Psychiatric/Substance Use: Comment: Has a single ETOH drink Q 4-5 weeks.    (+) alcohol abuse     Infectious Disease: Comment: Hx scarlet fever      (+) MRSA,     Malignancy: Comment: Liver cancer, Hep C  (+) Malignancy, History of GI.GI CA Active status post.        Other:  - neg other ROS                 Temp: 97.7  F (36.5  C) Temp src: Oral BP: (!) 138/90 Pulse: 92   Resp: 14 SpO2: 98 %         161 lbs 0 oz  5' 9\"[Pt stated[   Body mass index is 23.78 kg/m .       Physical Exam  Constitutional: Awake, alert, cooperative, no apparent distress, and appears stated age.  Eyes: Pupils equal, round " and reactive to light, extra ocular muscles intact, sclera clear, conjunctiva normal.  HENT: Normocephalic, oral pharynx with moist mucus membranes, poor dentition, numerous cracked/chipped teeth. No goiter appreciated. No removable dental hardware.  Respiratory: Clear to auscultation bilaterally, no crackles or wheezing. Mildly diminished inspiratory/expiratory cycle. No SOB when supine.  Cardiovascular: Regular rate and rhythm, normal S1 and S2, and 2/6 murmur noted.  Carotids +2, no bruits. No edema. Palpable pulses to radial, DP and PT arteries.   GI: Normal bowel sounds, soft, non-distended, non-tender, no masses palpated. Umbilical & incisional hernias. Surgical scars well healed.  Lymph/Hematologic: No cervical lymphadenopathy and no supraclavicular lymphadenopathy.  Genitourinary:  deferred  Skin: Warm and dry.  No rashes.   Musculoskeletal: full ROM of neck. There is no redness, warmth, or swelling of the joints. Gross motor strength is normal.    Neurologic: Awake, alert, oriented to name, place and time. Cranial nerves II-XII are grossly intact. Gait is normal. Ambulates from chair to exam table, seats self, lies supine and sits back up w/o assistance.  Neuropsychiatric: Calm, cooperative. Bright affect. Pleasant. Answers questions appropriately, follows commands w/o difficulty.        PRIOR LABS/DIAGNOSTIC STUDIES:    All labs and imaging personally reviewed      CT ABDOMEN W CONTRAST, 4/5/2021   IMPRESSION:     1. Cirrhosis and evidence of portal hypertension.   2. No residual viable tumor in the treated segment 6/7 lesions. No new   lesions    3. Scattered subcapsular arterially enhancing foci are most likely   arterioportal shunts, although delayed phase imaging is not available   to definitively evaluate washout.   4. Based on this exam only, the patient is within  Mendez criteria.   5. Moderate ascites.   6. 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                  ECHOCARDIOGRAM 2/17/21   Interpretation Summary   S/P TAVR with 26 mm Quiroz Victor M 3 valve. Trace to mild paravalvular AI.   Mean aortic gradient 7 mmHg.   No significant changes noted.       Left Ventricle   Global and regional left ventricular function is hyperkinetic with an EF of   65-70%. Left ventricular size is normal. Borderline concentric wall thickening   consistent with left ventricular hypertrophy is present. Diastolic function   not assessed due to significant mitral annular calcification. No regional wall   motion abnormalities are seen.       Right Ventricle   Right ventricular function, chamber size, wall motion, and thickness are   normal.       Atria   The right atria appears normal. Mild to moderate left atrial enlargement is   present. The atrial septum is intact as assessed by agitated dextrose bubble   study .       Mitral Valve   Moderate to severe mitral annular calcification is present. Trace to mild   mitral insufficiency is present.       Aortic Valve   S/P TAVR with 26 mm Quiroz Victor M 3 valve. Trace to mild paravalvular AI.   Mean aortic gradient 7 mmHg.       Tricuspid Valve   The tricuspid valve is normal. Trace tricuspid insufficiency is present. The   right ventricular systolic pressure is approximated at 25.5 mmHg plus the   right atrial pressure.       Pulmonic Valve   The pulmonic valve is normal.       Vessels   The pulmonary artery and bifurcation cannot be assessed. The inferior vena   cava is normal. Ascending aorta 4 cm.       Pericardium   No pericardial effusion is present.       Compared to Previous Study   No significant changes noted.            EKG 9/16/20   Sinus bradycardia   Otherwise normal ECG   Ventricular rate 56 bpm       CBC:   Lab Results   Component Value Date    WBC 5.9 04/05/2021    WBC 6.9 02/17/2021    HGB 12.9 (L) 04/05/2021    HGB 13.8 02/17/2021    HCT 38.5 (L) 04/05/2021    HCT 40.8 02/17/2021     (L) 04/05/2021     (L) 02/17/2021      BMP:   Lab Results   Component Value Date     04/05/2021     02/17/2021    POTASSIUM 4.4 04/05/2021    POTASSIUM 4.2 02/17/2021    CHLORIDE 110 (H) 04/05/2021    CHLORIDE 110 (H) 02/17/2021    CO2 26 04/05/2021    CO2 28 02/17/2021    BUN 15 04/05/2021    BUN 16 02/17/2021    CR 1.03 04/05/2021    CR 0.80 02/17/2021    GLC 84 04/05/2021    GLC 79 02/17/2021     COAGS:   Lab Results   Component Value Date    PTT 27 02/17/2021    INR 1.32 (H) 04/05/2021     POC:   Lab Results   Component Value Date     (H) 01/29/2020     HEPATIC:   Lab Results   Component Value Date    ALBUMIN 3.0 (L) 04/05/2021    PROTTOTAL 7.0 04/05/2021    ALT 45 04/05/2021    AST 45 04/05/2021    ALKPHOS 85 04/05/2021    BILITOTAL 0.4 04/05/2021    MORENITA 29 10/22/2017     OTHER:   Lab Results   Component Value Date    PH 7.43 02/21/2018    LACT 1.2 02/17/2021    JOSEPHINE 8.6 04/05/2021    PHOS 3.6 01/30/2020    MAG 1.9 01/30/2020    LIPASE 467 (H) 11/03/2019    CRP <2.9 06/05/2019    SED 10 06/05/2019     Labs signed/held for DOS by Jes Jones NP:    BMP, CBC, hep panel, INR, T&S    COVID19 testing scheduled on 4/13/21      ASSESSMENT and PLAN  eTn Johnson is a 60 year old male scheduled to undergo Paracentesis, Transjugular intrahepatic portosystemic shunt procedure  with Tomi Arteaga MD on 4/15/21 at [facility] for treatment of Alcoholic cirrhosis of liver with ascites (H).     Pt has had prior anesthetic.     No history of anesthetic complications     He has the following specific operative considerations:   # VIC 3/8 = intermediate risk  # VTE risk: 3%  # Risk of PONV score = 2.  If > 2, anti-emetic intervention recommended.  # Anesthesia considerations:  Refer to PAC assessment in anesthesia records      CARDIAC: METS >4, Reportedly rides bicycle 100 miles per week. Daily activity varies with level of ascites. Uses stairs daily.      # RCRI : No serious cardiac  risks.  Intermediate risk surgery. 0.4% risk of major adverse cardiac event.     #  Severe aortic stenosis, s/p TAVR 2018     #  Echo 2/17/21:  EF 65-70%, LVH, normal function     #  EKG 9/16/20: sinus arina, otherwise normal, 56 bpm       #  HTN, will hold lisinopril DOS    PULMONARY:     # Allergic rhinitis,uses daily Flonase    # Never smoked    # Denies asthma or inhaler use    GI:     # ETOH cirrhosis w/ ascites. Paracentesis Q 7-10 days, most recently 4/5/21. Drains between 2-3 L on average.    # Pt reports having a single ETOH drink Q 4-5 weeks currently.    # MELD 7    # HCC.  Hx Hep C, s/p treatment    # Hx Sarahi Hoffman tear    ENDO: BMI 23    # No DM    HEME:     # On ASA 81 mg, last dose 4/5/21    # Difficult IV stick    # Thrombocytopenia, platelets 104 on 4/5/21    # Hgb 12.9 on 4/5/21    ORTHO:     # full ROM of neck,     # S/P right knee arthroplasty    # S/P B/L rotator cuff repair/recontruction. Decreased ROM on right.    # S/P left elbow surgery, complicated by MRSA.         Patient is optimized and is acceptable candidate for the proposed procedure. No further diagnostic evaluation is needed.    Final plan per anesthesiologist on day of surgery.     Arrival time, NPO, shower and medication instructions provided by nursing staff today.  Preparing For Your Surgery handout given.      Olivia Romero PA-C  Preoperative Assessment Center  [facility]  Phone: [phone]  Fax: [phone]

## 2021-04-08 NOTE — PATIENT INSTRUCTIONS
Preparing for Your Surgery      Name:  Ten Johnson   MRN:  [id number]   :  1960   Today's Date:  2021       Arriving for surgery:  Surgery date:  04/15/2021  Arrival time:  6:00 am    Restrictions due to COVID 19:  One consistent visitor per patient is allowed.  The visitor will be allowed in the pre-op area.  Visitors are asked to leave the building during the surgery.  No ill visitors.  All visitors must wear face mask.    [employer] parking is available for anyone with mobility limitations or disabilities.  ([place]  24 hours/ 7 days a week; [facility] [place], [place]  7 am- 3:30 pm, Mon- Fri)    Please come to:     [facility] [address]  [address] [place]  [address]       -    Please proceed to [address] on the 3rd floor. [phone]?     - ?If you are in need of directions, wheelchair or escort please stop at the Information Desk in the lobby.     What can I eat or drink?  -  You may eat and drink normally for up to 8 hours before your surgery. (Until midnight)  -  You may have clear liquids until 2 hours before surgery. (Until 6 am arrival time)    Examples of clear liquids:  Water  Clear broth  Juices (apple, white grape, white cranberry  and cider) without pulp  Noncarbonated, powder based beverages  (lemonade and Kuldip-Aid)  Sodas (Sprite, 7-Up, ginger ale and seltzer)  Coffee or tea (without milk or cream)  Gatorade    -  No Alcohol for at least 24 hours before surgery     Which medicines can I take?    Hold Aspirin for 7 days before surgery.     Hold Multivitamins for 7 days before surgery.  Hold Supplements for 7 days before surgery.  Hold Ibuprofen (Advil, Motrin) for 1 day before surgery--unless otherwise directed by surgeon.  Hold Naproxen (Aleve) for 4 days before surgery.      -  PLEASE TAKE these medications the day of surgery:  Diazepam if needed  Fluticasone (flonase) if needed  Loratadine  Ondansetron if needed       How do I prepare  myself?  - Please take 2 showers before surgery using Scrubcare or Hibiclens soap.    Use this soap only from the neck to your toes.     Leave the soap on your skin for one minute--then rinse thoroughly.      You may use your own shampoo and conditioner; no other hair products.   - Please remove all jewelry and body piercings.  - No lotions, deodorants or fragrance.  - No makeup or fingernail polish.   - Bring your ID and insurance card.    - All patients are required to have a Covid-19 test within 4 days of surgery/procedure.      -Patients will be contacted by the [facility] scheduling team within 1 week of surgery to make an appointment.      - Patients may call the Scheduling team at [phone] if they have not been scheduled within 4 days of  surgery.      ALL PATIENTS GOING HOME THE SAME DAY OF SURGERY ARE REQUIRED TO HAVE A RESPONSIBLE ADULT TO DRIVE AND BE IN ATTENDANCE WITH THEM FOR 24 HOURS FOLLOWING SURGERY.    IF THE RESPONSIBLE ADULT IS REQUIRED FOR POST OP TEACHING THE POST OP RN WILL ASK THEM TO COME BACK TO THE RECOVERY AREA.    Questions or Concerns:    - For any questions regarding the day of surgery or your hospital stay, please contact the Pre Admission Nursing Office at [phone].       - If you have health changes between today and your surgery please call your surgeon.       For questions after surgery please call your surgeons office.

## 2021-04-08 NOTE — ANESTHESIA PREPROCEDURE EVALUATION
Anesthesia Pre-Procedure Evaluation    Patient: Ten Johnson   MRN: [id number] : 1960        Preoperative Diagnosis: Alcoholic cirrhosis of liver with ascites (H) [K70.31]   Procedure : Procedure(s):  ANESTHESIA OUT OF OR Paracentesis, Transjugular intrahepatic portosystemic shunt procedure @0800     Past Medical History:   Diagnosis Date     JENNIFER (acute kidney injury) (H) 2019     Alcohol use disorder      Alcoholic cirrhosis (H)      Anticoagulant long-term use     plavix     Aortic stenosis, severe 2018     Ascites      Chronic allergic rhinitis      Chronic anemia      Chronic hepatitis C without hepatic coma (H) 05/10/2016    Untreated as of 2018     Cirrhosis (H) 2017    MRI finding     Diastolic dysfunction      Erosive gastropathy      Esophageal varices in alcoholic cirrhosis (H)      H/O upper gastrointestinal hemorrhage 2017     Hepatocellular carcinoma (H)      History of blood transfusion      History of drug abuse (H)     intranasal     Hypertension     essential     JANELLE (iron deficiency anemia)      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     Sarahi-Hoffman tear     History     Marijuana abuse      MRSA (methicillin resistant Staphylococcus aureus)      Nonrheumatic aortic valve stenosis 2018     Olecranon bursitis      Portal hypertension (H)      Right shoulder pain     history of rotator cuff repair     S/p TAVR (transcatheter aortic valve replacement), bioprosthetic      Severe aortic stenosis      Thrombocytopenia (H)       Past Surgical History:   Procedure Laterality Date     ABLATE LIVER TUMOR N/A 10/22/2019    Procedure: Ablate liver tumor x3;  Surgeon: Gregorio Benoit MD;  [facility]: [facility]     ARTHROSCOPY SHOULDER ROTATOR CUFF REPAIR  2012    Procedure: ARTHROSCOPY SHOULDER ROTATOR CUFF REPAIR;  Right Shoulder Arthroscopic Rotator Cuff Repair, BicepsTenodesis,  Subacromial Decompression ;  Surgeon:  Joi Castillo MD;  [facility]     ESOPHAGOSCOPY, GASTROSCOPY, DUODENOSCOPY (EGD), COMBINED N/A 10/23/2017    Procedure: COMBINED ESOPHAGOSCOPY, GASTROSCOPY, DUODENOSCOPY (EGD);;  Surgeon: Gentry Salas MD;  Location: [facility] GI     EXCHANGE POLY COMPONENT ARTHROPLASTY KNEE Right 3/4/2019    Procedure: REVISION RIGHT TOTAL KNEE POLY COMPONENT EXCHANGE;  Surgeon: Olvin Joe MD;  Location: [facility] OR     FACIAL RECONSTRUCTION SURGERY  1971     HEART CATH FEMORAL CANNULIZATION WITH OPEN STANDBY REPAIR AORTIC VALVE N/A 2/21/2018    Procedure: HEART CATH FEMORAL CANNULIZATION WITH OPEN STANDBY REPAIR AORTIC VALVE;;  Surgeon: Luis Baird MD;  [facility]     IR CHEMO EMBOLIZATION  1/29/2020     IR LIVER BIOPSY PERCUTANEOUS  7/18/2019     IRRIGATION AND DEBRIDEMENT UPPER EXTREMITY, COMBINED  1/3/2012    Procedure:COMBINED IRRIGATION AND DEBRIDEMENT UPPER EXTREMITY; Irrigation & Debridement Left Elbow; Surgeon:CRISTHIAN ZHOU; Location:[facility] OR     LAPAROSCOPIC BIOPSY LIVER N/A 10/22/2019    Procedure: intraoperative liver ultrasound, laparoscopic converted to open liver biopsy x 6;  Surgeon: Gregorio Benoit MD;  [facility]     LAPAROSCOPY DIAGNOSTIC (GENERAL) N/A 10/22/2019    Procedure: Diagnostic laparoscopy;  Surgeon: Gregorio Benoit MD;  [facility]     LAPAROTOMY, LYSIS ADHESIONS, COMBINED N/A 10/22/2019    Procedure: Laparotomy, lysis adhesions, combined;  Surgeon: Gregorio Benoit MD;  [facility]     OPTICAL TRACKING SYSTEM ARTHROPLASTY KNEE Right 2/7/2019    Procedure: ARTHROPLASTY KNEE RIGHT;  Surgeon: Olvin Joe MD;  [facility]     REPAIR TENDON TRICEPS UPPER EXTREMITY  11/8/2011    Procedure:REPAIR TENDON TRICEPS UPPER EXTREMITY; Surgeon:CRISTHIAN ZHOU; [facility]     SHOULDER SURGERY  2003    left, injury, torn tendons, hematoma     TRANSCATHETER AORTIC VALVE IMPLANT ANESTHESIA N/A 2/21/2018    Procedure: TRANSCATHETER AORTIC VALVE  "IMPLANT ANESTHESIA;  Transfemoral (Quiroz) Aortic Valve Implant 26mm VICTOR M 3, with Cardiopulmonary Bypass Standby, transthoracic echocardiogram;  Surgeon: GENERIC ANESTHESIA PROVIDER;  Location: [facility]     TRANSPOSITION ULNAR NERVE (ELBOW)  11/8/2011    Procedure:TRANSPOSITION ULNAR NERVE (ELBOW); Final Procedure Done: Left Elbow Lateral Ulnar Collateral Repair And  Left Elbow Triceps Repair        Allergies   Allergen Reactions     Zolpidem Other (See Comments)     Alcoholic.  Had reaction 3/17/13 while intoxicated which included black out, loss of awareness, paranoia.  Do not prescribe.  Dr. Celeste     Cats Other (See Comments)     rhinitis     Dogs Other (See Comments)     rhinitis     Pollen Extract Other (See Comments)     rhinits.      Social History     Tobacco Use     Smoking status: Never Smoker     Smokeless tobacco: Never Used   Substance Use Topics     Alcohol use: Yes     Comment: drinks a \"beer occasionally\"      Wt Readings from Last 1 Encounters:   04/08/21 73 kg (161 lb)        Anesthesia Evaluation   Pt has had prior anesthetic.     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 Has significant environmental allergies to dust, grass, pollen. Uses flonase daily.    (+) allergic rhinitis,  (-) tobacco use and sleep apnea   Neurologic: Comment: Pt endorses hx of seizures ~ 30 yrs ago secondary to med reaction that has since been pulled off market    (+) no peripheral neuropathy  (-) no CVA   Cardiovascular:     (+) hypertension-----valvular problems/murmurs type: AS s/p TAVR 2018. Previous cardiac testing   Echo: Date: 2/17/21 Results:  Interpretation Summary   S/P TAVR with 26 mm Quiroz Victor M 3 valve. Trace to mild paravalvular AI.   Mean aortic gradient 7 mmHg.   No significant changes noted.      Left Ventricle   Global and regional left ventricular function is hyperkinetic with an EF " of   65-70%. Left ventricular size is normal. Borderline concentric wall thickening   consistent with left ventricular hypertrophy is present. Diastolic function   not assessed due to significant mitral annular calcification. No regional wall   motion abnormalities are seen.      Right Ventricle   Right ventricular function, chamber size, wall motion, and thickness are   normal.      Atria   The right atria appears normal. Mild to moderate left atrial enlargement is   present. The atrial septum is intact as assessed by agitated dextrose bubble   study .      Mitral Valve   Moderate to severe mitral annular calcification is present. Trace to mild   mitral insufficiency is present.       Aortic Valve   S/P TAVR with 26 mm Quiroz Victor M 3 valve. Trace to mild paravalvular AI.   Mean aortic gradient 7 mmHg.       Tricuspid Valve   The tricuspid valve is normal. Trace tricuspid insufficiency is present. The   right ventricular systolic pressure is approximated at 25.5 mmHg plus the   right atrial pressure.       Pulmonic Valve   The pulmonic valve is normal.       Vessels   The pulmonary artery and bifurcation cannot be assessed. The inferior vena   cava is normal. Ascending aorta 4 cm.       Pericardium   No pericardial effusion is present.       Compared to Previous Study   No significant changes noted.     Stress Test:  Date: Results:    ECG Reviewed:  Date: 9/16/20 Results:  Sinus bradycardia   Otherwise normal ECG   Ventricular rate 56 bpm     Cath:  Date: Results:      METS/Exercise Tolerance: >4 METS Comment: Rides bicycle 100 miles per week. Daily activity varies with level of ascites. Uses stairs daily.   Hematologic:     (+) anemia, history of blood transfusion, no previous transfusion reaction,  (-) history of blood clots   Musculoskeletal: Comment: S/P right knee arthroplasty    S/P B/L rotator cuff repair/recontruction. Decreased ROM on right.    S/P left elbow surgery, complicated by MRSA.      GI/Hepatic:  Comment: ETOH cirrhosis    Paracentesis Q 7-10 days, most recently 4/5/21. Drains between 2-3 L on average.    MELD 7    HCC  Hx Hep C, s/p treatment    Hx Sarahi Hoffman tear      (+) hepatitis type C, liver disease,     Renal/Genitourinary: Comment: History of JENNIFER during treatment for post knee arthroplasty infection. Resolved.        Endo:  - neg endo ROS  (-) Type II DM   Psychiatric/Substance Use: Comment: Has a single ETOH drink Q 4-5 weeks.    (+) alcohol abuse     Infectious Disease: Comment: Hx scarlet fever      (+) MRSA,     Malignancy: Comment: Liver cancer, Hep C  (+) Malignancy, History of GI.GI CA Active status post.        Other:  - neg other ROS          Physical Exam    Airway      Comment: Beard    Mallampati: III   TM distance: > 3 FB   Neck ROM: full   Mouth opening: > 3 cm    Respiratory Devices and Support         Dental     Comment: Multiple chipped/cracked teeth. No removable hardware.        Cardiovascular          Rhythm and rate: regular and normal   (+) murmur       Pulmonary       Comment: Mildly diminished inspiratory/expiratory cycle    breath sounds clear to auscultation           OUTSIDE LABS:  CBC:   Lab Results   Component Value Date    WBC 5.9 04/05/2021    WBC 6.9 02/17/2021    HGB 12.9 (L) 04/05/2021    HGB 13.8 02/17/2021    HCT 38.5 (L) 04/05/2021    HCT 40.8 02/17/2021     (L) 04/05/2021     (L) 02/17/2021     BMP:   Lab Results   Component Value Date     04/05/2021     02/17/2021    POTASSIUM 4.4 04/05/2021    POTASSIUM 4.2 02/17/2021    CHLORIDE 110 (H) 04/05/2021    CHLORIDE 110 (H) 02/17/2021    CO2 26 04/05/2021    CO2 28 02/17/2021    BUN 15 04/05/2021    BUN 16 02/17/2021    CR 1.03 04/05/2021    CR 0.80 02/17/2021    GLC 84 04/05/2021    GLC 79 02/17/2021     COAGS:   Lab Results   Component Value Date    PTT 27 02/17/2021    INR 1.32 (H) 04/05/2021     POC:   Lab Results   Component Value Date     (H) 01/29/2020     HEPATIC:   Lab  Results   Component Value Date    ALBUMIN 3.0 (L) 04/05/2021    PROTTOTAL 7.0 04/05/2021    ALT 45 04/05/2021    AST 45 04/05/2021    ALKPHOS 85 04/05/2021    BILITOTAL 0.4 04/05/2021    MORENITA 29 10/22/2017     OTHER:   Lab Results   Component Value Date    PH 7.43 02/21/2018    LACT 1.2 02/17/2021    JOSEPHINE 8.6 04/05/2021    PHOS 3.6 01/30/2020    MAG 1.9 01/30/2020    LIPASE 467 (H) 11/03/2019    CRP <2.9 06/05/2019    SED 10 06/05/2019       Anesthesia Plan    ASA Status:  3   NPO Status:  NPO Appropriate    Anesthesia Type: General.     - Airway: ETT   Induction: Intravenous.   Maintenance: Balanced.        Consents    Anesthesia Plan(s) and associated risks, benefits, and realistic alternatives discussed. Questions answered and patient/representative(s) expressed understanding.     - Discussed with:  Patient         Postoperative Care    Pain management: IV analgesics.   PONV prophylaxis: Ondansetron (or other 5HT-3), Dexamethasone or Solumedrol     Comments:              PAC Discussion and Assessment    ASA Classification: 3  Case is suitable for: [place]  Anesthetic techniques and relevant risks discussed: GA  Invasive monitoring and risk discussed: No    Possibility and Risk of blood transfusion discussed: No            PAC Resident/NP Anesthesia Assessment: Ten Johnson is a 60 year old male scheduled to undergo Paracentesis, Transjugular intrahepatic portosystemic shunt procedure  with Tomi Arteaga MD on 4/15/21 at [facility] for treatment of Alcoholic cirrhosis of liver with ascites (H).     Pt has had prior anesthetic.     No history of anesthetic complications     He has the following specific operative considerations:   # VIC 3/8 = intermediate risk  # VTE risk: 3%  # Risk of PONV score = 2.  If > 2, anti-emetic intervention recommended.  # Anesthesia considerations:  Refer to PAC assessment in anesthesia records      CARDIAC: METS >4, Reportedly rides  bicycle 100 miles per week. Daily activity varies with level of ascites. Uses stairs daily.      # RCRI : No serious cardiac risks.  Intermediate risk surgery. 0.4% risk of major adverse cardiac event.     #  Severe aortic stenosis, s/p TAVR 2018     #  Echo 2/17/21:  EF 65-70%, LVH, normal function     #  EKG 9/16/20: sinus arina, otherwise normal, 56 bpm       #  HTN, will hold lisinopril DOS    PULMONARY:     # Allergic rhinitis,uses daily Flonase    # Never smoked    # Denies asthma or inhaler use    GI:     # ETOH cirrhosis w/ ascites. Paracentesis Q 7-10 days, most recently 4/5/21. Drains between 2-3 L on average.    # Pt reports having a single ETOH drink Q 4-5 weeks currently.    # MELD 7    # HCC.  Hx Hep C, s/p treatment    # Hx Sarahi Hoffman tear    ENDO: BMI 23    # No DM    HEME:     # On ASA 81 mg, last dose 4/5/21    # Difficult IV stick    # Thrombocytopenia, platelets 104 on 4/5/21    # Hgb 12.9 on 4/5/21    ORTHO:     # full ROM of neck,     # S/P right knee arthroplasty    # S/P B/L rotator cuff repair/recontruction. Decreased ROM on right.    # S/P left elbow surgery, complicated by MRSA.         Patient is optimized and is acceptable candidate for the proposed procedure. No further diagnostic evaluation is needed.    Final plan per anesthesiologist on day of surgery.     Reviewed and Signed by PAC Mid-Level Provider/Resident  Mid-Level Provider/Resident: Olivia Romero PA-C  Date: 4/8/21  Time: 2052                               Olivia Romero PA-C

## 2021-04-12 ENCOUNTER — TELEPHONE (OUTPATIENT)
Dept: GASTROENTEROLOGY | Facility: CLINIC | Age: 61
End: 2021-04-12

## 2021-04-12 DIAGNOSIS — Z11.59 ENCOUNTER FOR SCREENING FOR OTHER VIRAL DISEASES: Primary | ICD-10-CM

## 2021-04-13 ENCOUNTER — OFFICE VISIT (OUTPATIENT)
Dept: INFUSION THERAPY | Facility: CLINIC | Age: 61
End: 2021-04-13
Attending: INTERNAL MEDICINE
Payer: COMMERCIAL

## 2021-04-13 ENCOUNTER — ANCILLARY PROCEDURE (OUTPATIENT)
Dept: ULTRASOUND IMAGING | Facility: CLINIC | Age: 61
End: 2021-04-13
Attending: INTERNAL MEDICINE
Payer: COMMERCIAL

## 2021-04-13 ENCOUNTER — TELEPHONE (OUTPATIENT)
Dept: VASCULAR SURGERY | Facility: CLINIC | Age: 61
End: 2021-04-13

## 2021-04-13 VITALS
WEIGHT: 166.2 LBS | TEMPERATURE: 97.3 F | DIASTOLIC BLOOD PRESSURE: 76 MMHG | HEART RATE: 63 BPM | RESPIRATION RATE: 18 BRPM | OXYGEN SATURATION: 99 % | SYSTOLIC BLOOD PRESSURE: 129 MMHG | BODY MASS INDEX: 24.54 KG/M2

## 2021-04-13 DIAGNOSIS — Z11.59 ENCOUNTER FOR SCREENING FOR OTHER VIRAL DISEASES: ICD-10-CM

## 2021-04-13 DIAGNOSIS — Z11.52 ENCOUNTER FOR PREPROCEDURE SCREENING LABORATORY TESTING FOR COVID-19: Primary | ICD-10-CM

## 2021-04-13 DIAGNOSIS — K70.31 ASCITES DUE TO ALCOHOLIC CIRRHOSIS (H): ICD-10-CM

## 2021-04-13 DIAGNOSIS — Z01.812 ENCOUNTER FOR PREPROCEDURE SCREENING LABORATORY TESTING FOR COVID-19: Primary | ICD-10-CM

## 2021-04-13 LAB
SARS-COV-2 RNA RESP QL NAA+PROBE: NORMAL
SPECIMEN SOURCE: NORMAL

## 2021-04-13 PROCEDURE — 49083 ABD PARACENTESIS W/IMAGING: CPT | Performed by: RADIOLOGY

## 2021-04-13 PROCEDURE — 272N000706 US PARACENTESIS

## 2021-04-13 PROCEDURE — 49083 ABD PARACENTESIS W/IMAGING: CPT

## 2021-04-13 PROCEDURE — U0003 INFECTIOUS AGENT DETECTION BY NUCLEIC ACID (DNA OR RNA); SEVERE ACUTE RESPIRATORY SYNDROME CORONAVIRUS 2 (SARS-COV-2) (CORONAVIRUS DISEASE [COVID-19]), AMPLIFIED PROBE TECHNIQUE, MAKING USE OF HIGH THROUGHPUT TECHNOLOGIES AS DESCRIBED BY CMS-2020-01-R: HCPCS | Performed by: RADIOLOGY

## 2021-04-13 PROCEDURE — U0005 INFEC AGEN DETEC AMPLI PROBE: HCPCS | Performed by: RADIOLOGY

## 2021-04-13 RX ORDER — HEPARIN SODIUM,PORCINE 10 UNIT/ML
5 VIAL (ML) INTRAVENOUS
Status: CANCELLED | OUTPATIENT
Start: 2021-04-19

## 2021-04-13 RX ORDER — LIDOCAINE HYDROCHLORIDE 10 MG/ML
20 INJECTION, SOLUTION EPIDURAL; INFILTRATION; INTRACAUDAL; PERINEURAL ONCE
Status: DISCONTINUED | OUTPATIENT
Start: 2021-04-13 | End: 2021-04-13 | Stop reason: HOSPADM

## 2021-04-13 RX ORDER — ALBUMIN (HUMAN) 12.5 G/50ML
12.5 SOLUTION INTRAVENOUS
Status: CANCELLED | OUTPATIENT
Start: 2021-04-19

## 2021-04-13 RX ORDER — LIDOCAINE HYDROCHLORIDE 10 MG/ML
20 INJECTION, SOLUTION EPIDURAL; INFILTRATION; INTRACAUDAL; PERINEURAL ONCE
Status: CANCELLED | OUTPATIENT
Start: 2021-04-19 | End: 2021-04-19

## 2021-04-13 RX ORDER — ALBUMIN (HUMAN) 12.5 G/50ML
12.5 SOLUTION INTRAVENOUS
Status: DISCONTINUED | OUTPATIENT
Start: 2021-04-13 | End: 2021-04-13 | Stop reason: HOSPADM

## 2021-04-13 RX ORDER — HEPARIN SODIUM (PORCINE) LOCK FLUSH IV SOLN 100 UNIT/ML 100 UNIT/ML
5 SOLUTION INTRAVENOUS
Status: CANCELLED | OUTPATIENT
Start: 2021-04-19

## 2021-04-13 NOTE — PATIENT INSTRUCTIONS
Dear Ten Johnson    Thank you for choosing [facility] ([facility]) for your paracentesis.  The following information is a summary of our appointment as well as important reminders.      Patient Education     Discharge Instructions for Paracentesis  Paracentesis is a procedure to remove extra fluid from your belly (abdomen). This fluid buildup in the abdomen is called ascites. The procedure may have been done to take a sample of the fluid. Or, it may have been done to drain the extra fluid from your abdomen and help make you more comfortable.      Ascites is buildup of excess fluid in the abdomen.   Home care    If you have pain after the procedure, your healthcare provider can prescribe or recommend pain medicines. Take these exactly as directed. If you stopped taking other medicines before the procedure, ask your provider when you can start them again.    Take it easy for 24 hours after the procedure. Don't do any physical activity until your provider says it s OK.    You will have a small bandage over the puncture site. Stitches, surgical staples, adhesive tapes, adhesive strips, or surgical glue may be used to close the incision. They also help stop bleeding and speed healing. You may take the bandage off in 24 hours.    Check the puncture site for the signs of infection listed below.    Follow-up care  Make a follow-up appointment with your healthcare provider as directed. During your follow-up visit, your provider will check your healing. Let your provider know how you are feeling. You can also discuss the cause of your ascites and if you need any further treatment. If your fluid is infected, you will be sent home on antibiotics. In some cases, the paracentesis may need to be repeated if the fluid returns. Your provider may also prescribe medicines that increase urination (diuretics) to decrease the buildup of fluid.   When to call your healthcare  provider  Call your healthcare provider if you have any of the following after the procedure:    A fever of 100.4  F ( 38.0 C) or higher, or as directed by your provider    Chills    Trouble breathing    Pain that doesn't go away even after taking pain medicine    Belly pain not caused by having the skin punctured    Bleeding from the puncture site    More than a small amount of fluid leaking from the puncture site    Swollen belly    Signs of infection at the puncture site. These include increased pain, redness, or swelling, warmth, or bad-smelling drainage.    Blood in your urine    Feeling dizzy or lightheaded, or fainting  [employer] last reviewed this educational content on 10/1/2019    0600-2426 [employer]. All rights reserved. This information is not intended as a substitute for professional medical care. Always follow your healthcare professional's instructions.             We look forward in seeing you on your next appointment here at [facility] ([facility]).  Please don t hesitate to call us at [phone] to reschedule any of your appointments or to speak with one of the [facility] registered nurses.  It was a pleasure taking care of you today.    Sincerely,    [facility]  [facility]  [address] [place] [address]  [address]  Phone:  [phone]

## 2021-04-13 NOTE — LETTER
4/13/2021         RE: Ten Johnson  [address]  [place] [address]        Dear Colleague,    Thank you for referring your patient, Ten Johnson, to the [facility]. Please see a copy of my visit note below.    Paracentesis Nursing Note  Ten Johnson presents today to [facility] for a [facility]acentesis.    During today's appointment orders from Dr. Mays were completed.    Progress Note:  Patient identification verified by name and date of birth.  Assessment completed.  Vitals monitored throughout appointment and recorded in Doc Flowsheets.  See proceduralist note in ultrasound.    Vascular Access: none  Labs: were not ordered for this appointment.    Date of consent or authorization: 1/21/21.  Invasive Procedure Safety Checklist was completed and sent for scanning.     Paracentesis performed by Dr. Paulson.    The following labs were communicated to provider performing paracentesis:  Lab Results   Component Value Date     04/05/2021       Total amount of ascites fluid drained: 2 liters.  Color of ascites fluid: straw colored.  Total amount of albumin given: 0 grams.    Patient tolerated procedure well.    Post procedure,denies pain or discomfort post paracentesis.    Asymptomatic COVID test date: completed today (If next appt is within 2 weeks, COVID test to be done in [facility])      Discharge Plan:  Discharge instructions were reviewed with patient.  Patient/Representative verbalized understanding and all questions were answered.   Discharged from [facility] in stable condition.    Olimpia Adan RN           Temp 97.3  F (36.3  C) (Oral)   Resp 18   Wt 77.9 kg (171 lb 12.8 oz)   SpO2 99%   BMI 25.37 kg/m          Again, thank you for allowing me to participate in the care of your patient.        Sincerely,        [facility] Paracentesis Provider

## 2021-04-13 NOTE — TELEPHONE ENCOUNTER
"Pt calling to inform us that he has noone to go home to after his TIPS procedure.     He states that he just wanted to let everyone know that he will need to be admitted after the procedure.     He informs me that he's had complication before and that he has  Friend who is  Cardiologist who showed him a video of how a TIPS procedure is done in which \"they slice my  Neck open and shove a tube down my vein\".     I informed him that this is false and that it is  A little cut enough for a micro-wire to pass into.     He states that he is being told that he can take a medi-cab home but he doesn't have any one to stay with him at home also.     He is adamant on being admitted after procedure and that he had received no communication regarding how long the procedure is or when it will start.     I informed him that this is false in which I did call him several times,  sent him a letter and a [employer]t message about the TIPS procedure.    He states that he doesn't read his mail.     I apologized and informed him that I will have to check with Dr. Arteaga.     Also informed him that tips is considered outpatient due to insurance will not cover any overnight stay unless necessary.    He states that he will just come and tell us that he is going home and it doesn't matter where he goes.     I informed him again that we talked about having a ride home in which he states that he just wanted to let us know that he will need to be admitted due to previous complication with cardiac issues.     *Dr. Arteaga notified.   Priscila BEARD RN, BSN  Interventional Radiology/Vascular  Nurse Coordinator   Phone: [phone]  Fax: [phone]          "

## 2021-04-13 NOTE — PROGRESS NOTES
Paracentesis Nursing Note  Ten Johnson presents today to [facility] for a paracentesis.    During today's appointment orders from Dr. Mays were completed.    Progress Note:  Patient identification verified by name and date of birth.  Assessment completed.  Vitals monitored throughout appointment and recorded in Doc Flowsheets.  See proceduralist note in ultrasound.    Vascular Access: none  Labs: were not ordered for this appointment.    Date of consent or authorization: 1/21/21.  Invasive Procedure Safety Checklist was completed and sent for scanning.     Paracentesis performed by Dr. Paulson.    The following labs were communicated to provider performing paracentesis:  Lab Results   Component Value Date     04/05/2021       Total amount of ascites fluid drained: 2 liters.  Color of ascites fluid: straw colored.  Total amount of albumin given: 0 grams.    Patient tolerated procedure well.    Post procedure,denies pain or discomfort post paracentesis.    Asymptomatic COVID test date: completed today (If next appt is within 2 weeks, COVID test to be done in [facility])      Discharge Plan:  Discharge instructions were reviewed with patient.  Patient/Representative verbalized understanding and all questions were answered.   Discharged from [facility] in stable condition.    Olimpia Adan RN           Temp 97.3  F (36.3  C) (Oral)   Resp 18   Wt 77.9 kg (171 lb 12.8 oz)   SpO2 99%   BMI 25.37 kg/m

## 2021-04-14 ENCOUNTER — NURSE TRIAGE (OUTPATIENT)
Dept: NURSING | Facility: CLINIC | Age: 61
End: 2021-04-14

## 2021-04-15 ENCOUNTER — ANESTHESIA (OUTPATIENT)
Dept: SURGERY | Facility: CLINIC | Age: 61
End: 2021-04-15
Payer: COMMERCIAL

## 2021-04-15 ENCOUNTER — HOSPITAL ENCOUNTER (OUTPATIENT)
Facility: CLINIC | Age: 61
Discharge: HOME | End: 2021-04-15
Attending: ANESTHESIOLOGY | Admitting: RADIOLOGY
Payer: COMMERCIAL

## 2021-04-15 ENCOUNTER — APPOINTMENT (OUTPATIENT)
Dept: INTERVENTIONAL RADIOLOGY/VASCULAR | Facility: CLINIC | Age: 61
End: 2021-04-15
Attending: RADIOLOGY
Payer: COMMERCIAL

## 2021-04-15 VITALS
RESPIRATION RATE: 16 BRPM | HEIGHT: 69 IN | HEART RATE: 77 BPM | BODY MASS INDEX: 24.62 KG/M2 | TEMPERATURE: 97.8 F | OXYGEN SATURATION: 99 % | DIASTOLIC BLOOD PRESSURE: 95 MMHG | WEIGHT: 166.23 LBS | SYSTOLIC BLOOD PRESSURE: 125 MMHG

## 2021-04-15 DIAGNOSIS — K70.31 ALCOHOLIC CIRRHOSIS OF LIVER WITH ASCITES (H): Primary | ICD-10-CM

## 2021-04-15 DIAGNOSIS — C22.0 HCC (HEPATOCELLULAR CARCINOMA) (H): Primary | ICD-10-CM

## 2021-04-15 DIAGNOSIS — Z95.828 S/P TIPS (TRANSJUGULAR INTRAHEPATIC PORTOSYSTEMIC SHUNT): Primary | ICD-10-CM

## 2021-04-15 DIAGNOSIS — K70.31 ALCOHOLIC CIRRHOSIS OF LIVER WITH ASCITES (H): ICD-10-CM

## 2021-04-15 DIAGNOSIS — K70.31 ASCITES DUE TO ALCOHOLIC CIRRHOSIS (H): ICD-10-CM

## 2021-04-15 LAB — GLUCOSE BLDC GLUCOMTR-MCNC: 106 MG/DL (ref 70–99)

## 2021-04-15 PROCEDURE — 250N000011 HC RX IP 250 OP 636: Performed by: NURSE PRACTITIONER

## 2021-04-15 PROCEDURE — 250N000011 HC RX IP 250 OP 636: Performed by: NURSE ANESTHETIST, CERTIFIED REGISTERED

## 2021-04-15 PROCEDURE — 250N000025 HC SEVOFLURANE, PER MIN

## 2021-04-15 PROCEDURE — 258N000003 HC RX IP 258 OP 636: Performed by: ANESTHESIOLOGY

## 2021-04-15 PROCEDURE — 370N000017 HC ANESTHESIA TECHNICAL FEE, PER MIN

## 2021-04-15 PROCEDURE — 250N000009 HC RX 250: Performed by: NURSE ANESTHETIST, CERTIFIED REGISTERED

## 2021-04-15 PROCEDURE — 272N000500 HC NEEDLE CR2

## 2021-04-15 PROCEDURE — 710N000012 HC RECOVERY PHASE 2, PER MINUTE

## 2021-04-15 PROCEDURE — 710N000010 HC RECOVERY PHASE 1, LEVEL 2, PER MIN

## 2021-04-15 PROCEDURE — 272N000504 HC NEEDLE CR4

## 2021-04-15 PROCEDURE — C1769 GUIDE WIRE: HCPCS

## 2021-04-15 PROCEDURE — 37182 INSERT HEPATIC SHUNT (TIPS): CPT | Mod: GC | Performed by: RADIOLOGY

## 2021-04-15 PROCEDURE — 250N000011 HC RX IP 250 OP 636: Performed by: ANESTHESIOLOGY

## 2021-04-15 PROCEDURE — 37182 INSERT HEPATIC SHUNT (TIPS): CPT | Performed by: RADIOLOGY

## 2021-04-15 PROCEDURE — 49083 ABD PARACENTESIS W/IMAGING: CPT

## 2021-04-15 PROCEDURE — 999N000141 HC STATISTIC PRE-PROCEDURE NURSING ASSESSMENT

## 2021-04-15 PROCEDURE — C1725 CATH, TRANSLUMIN NON-LASER: HCPCS

## 2021-04-15 PROCEDURE — C1874 STENT, COATED/COV W/DEL SYS: HCPCS

## 2021-04-15 PROCEDURE — C1887 CATHETER, GUIDING: HCPCS

## 2021-04-15 PROCEDURE — 272N000302 HC DEVICE INFLATION CR5

## 2021-04-15 PROCEDURE — 49083 ABD PARACENTESIS W/IMAGING: CPT | Mod: GC | Performed by: RADIOLOGY

## 2021-04-15 PROCEDURE — 258N000003 HC RX IP 258 OP 636: Performed by: NURSE ANESTHETIST, CERTIFIED REGISTERED

## 2021-04-15 PROCEDURE — 272N000209 HC BALLOON (NON-PTA) CR6

## 2021-04-15 PROCEDURE — 250N000013 HC RX MED GY IP 250 OP 250 PS 637: Performed by: ANESTHESIOLOGY

## 2021-04-15 PROCEDURE — 82962 GLUCOSE BLOOD TEST: CPT | Mod: GZ

## 2021-04-15 PROCEDURE — 255N000002 HC RX 255 OP 636: Performed by: ANESTHESIOLOGY

## 2021-04-15 PROCEDURE — 37182 INSERT HEPATIC SHUNT (TIPS): CPT

## 2021-04-15 RX ORDER — PROPOFOL 10 MG/ML
INJECTION, EMULSION INTRAVENOUS PRN
Status: DISCONTINUED | OUTPATIENT
Start: 2021-04-15 | End: 2021-04-15

## 2021-04-15 RX ORDER — AMPICILLIN AND SULBACTAM 2; 1 G/1; G/1
3 INJECTION, POWDER, FOR SOLUTION INTRAMUSCULAR; INTRAVENOUS EVERY 6 HOURS
Status: DISCONTINUED | OUTPATIENT
Start: 2021-04-15 | End: 2021-04-15 | Stop reason: HOSPADM

## 2021-04-15 RX ORDER — OXYCODONE HYDROCHLORIDE 5 MG/1
5 TABLET ORAL EVERY 4 HOURS PRN
Status: DISCONTINUED | OUTPATIENT
Start: 2021-04-15 | End: 2021-04-15 | Stop reason: HOSPADM

## 2021-04-15 RX ORDER — SODIUM CHLORIDE, SODIUM LACTATE, POTASSIUM CHLORIDE, CALCIUM CHLORIDE 600; 310; 30; 20 MG/100ML; MG/100ML; MG/100ML; MG/100ML
INJECTION, SOLUTION INTRAVENOUS CONTINUOUS
Status: DISCONTINUED | OUTPATIENT
Start: 2021-04-15 | End: 2021-04-15 | Stop reason: HOSPADM

## 2021-04-15 RX ORDER — SODIUM CHLORIDE 9 MG/ML
INJECTION, SOLUTION INTRAVENOUS CONTINUOUS PRN
Status: DISCONTINUED | OUTPATIENT
Start: 2021-04-15 | End: 2021-04-15

## 2021-04-15 RX ORDER — NALOXONE HYDROCHLORIDE 0.4 MG/ML
0.4 INJECTION, SOLUTION INTRAMUSCULAR; INTRAVENOUS; SUBCUTANEOUS
Status: DISCONTINUED | OUTPATIENT
Start: 2021-04-15 | End: 2021-04-15 | Stop reason: HOSPADM

## 2021-04-15 RX ORDER — NALOXONE HYDROCHLORIDE 0.4 MG/ML
0.2 INJECTION, SOLUTION INTRAMUSCULAR; INTRAVENOUS; SUBCUTANEOUS
Status: DISCONTINUED | OUTPATIENT
Start: 2021-04-15 | End: 2021-04-15 | Stop reason: HOSPADM

## 2021-04-15 RX ORDER — IODIXANOL 320 MG/ML
100 INJECTION, SOLUTION INTRAVASCULAR ONCE
Status: COMPLETED | OUTPATIENT
Start: 2021-04-15 | End: 2021-04-15

## 2021-04-15 RX ORDER — OXYCODONE HYDROCHLORIDE 5 MG/1
5 TABLET ORAL EVERY 6 HOURS PRN
Qty: 15 TABLET | Refills: 0 | Status: SHIPPED | OUTPATIENT
Start: 2021-04-15 | End: 2021-04-19

## 2021-04-15 RX ORDER — FENTANYL CITRATE 50 UG/ML
25-50 INJECTION, SOLUTION INTRAMUSCULAR; INTRAVENOUS
Status: DISCONTINUED | OUTPATIENT
Start: 2021-04-15 | End: 2021-04-15 | Stop reason: HOSPADM

## 2021-04-15 RX ORDER — LIDOCAINE 40 MG/G
CREAM TOPICAL
Status: DISCONTINUED | OUTPATIENT
Start: 2021-04-15 | End: 2021-04-15 | Stop reason: HOSPADM

## 2021-04-15 RX ORDER — EPHEDRINE SULFATE 50 MG/ML
INJECTION, SOLUTION INTRAMUSCULAR; INTRAVENOUS; SUBCUTANEOUS PRN
Status: DISCONTINUED | OUTPATIENT
Start: 2021-04-15 | End: 2021-04-15

## 2021-04-15 RX ORDER — LIDOCAINE HYDROCHLORIDE 20 MG/ML
INJECTION, SOLUTION INFILTRATION; PERINEURAL PRN
Status: DISCONTINUED | OUTPATIENT
Start: 2021-04-15 | End: 2021-04-15

## 2021-04-15 RX ORDER — DEXAMETHASONE SODIUM PHOSPHATE 4 MG/ML
INJECTION, SOLUTION INTRA-ARTICULAR; INTRALESIONAL; INTRAMUSCULAR; INTRAVENOUS; SOFT TISSUE PRN
Status: DISCONTINUED | OUTPATIENT
Start: 2021-04-15 | End: 2021-04-15

## 2021-04-15 RX ORDER — ONDANSETRON 2 MG/ML
INJECTION INTRAMUSCULAR; INTRAVENOUS PRN
Status: DISCONTINUED | OUTPATIENT
Start: 2021-04-15 | End: 2021-04-15

## 2021-04-15 RX ORDER — ONDANSETRON 2 MG/ML
4 INJECTION INTRAMUSCULAR; INTRAVENOUS EVERY 30 MIN PRN
Status: DISCONTINUED | OUTPATIENT
Start: 2021-04-15 | End: 2021-04-15 | Stop reason: HOSPADM

## 2021-04-15 RX ORDER — FENTANYL CITRATE 50 UG/ML
INJECTION, SOLUTION INTRAMUSCULAR; INTRAVENOUS PRN
Status: DISCONTINUED | OUTPATIENT
Start: 2021-04-15 | End: 2021-04-15

## 2021-04-15 RX ORDER — MEPERIDINE HYDROCHLORIDE 25 MG/ML
12.5 INJECTION INTRAMUSCULAR; INTRAVENOUS; SUBCUTANEOUS
Status: DISCONTINUED | OUTPATIENT
Start: 2021-04-15 | End: 2021-04-15 | Stop reason: HOSPADM

## 2021-04-15 RX ORDER — ONDANSETRON 4 MG/1
4 TABLET, ORALLY DISINTEGRATING ORAL EVERY 30 MIN PRN
Status: DISCONTINUED | OUTPATIENT
Start: 2021-04-15 | End: 2021-04-15 | Stop reason: HOSPADM

## 2021-04-15 RX ORDER — HYDROMORPHONE HYDROCHLORIDE 1 MG/ML
.3-.5 INJECTION, SOLUTION INTRAMUSCULAR; INTRAVENOUS; SUBCUTANEOUS EVERY 10 MIN PRN
Status: DISCONTINUED | OUTPATIENT
Start: 2021-04-15 | End: 2021-04-15 | Stop reason: HOSPADM

## 2021-04-15 RX ADMIN — HYDROMORPHONE HYDROCHLORIDE 0.5 MG: 1 INJECTION, SOLUTION INTRAMUSCULAR; INTRAVENOUS; SUBCUTANEOUS at 10:45

## 2021-04-15 RX ADMIN — FENTANYL CITRATE 50 MCG: 50 INJECTION, SOLUTION INTRAMUSCULAR; INTRAVENOUS at 10:09

## 2021-04-15 RX ADMIN — HYDROMORPHONE HYDROCHLORIDE 0.3 MG: 1 INJECTION, SOLUTION INTRAMUSCULAR; INTRAVENOUS; SUBCUTANEOUS at 11:29

## 2021-04-15 RX ADMIN — PHENYLEPHRINE HYDROCHLORIDE 100 MCG: 10 INJECTION INTRAVENOUS at 08:41

## 2021-04-15 RX ADMIN — SODIUM CHLORIDE: 9 INJECTION, SOLUTION INTRAVENOUS at 08:12

## 2021-04-15 RX ADMIN — DEXAMETHASONE SODIUM PHOSPHATE 8 MG: 4 INJECTION, SOLUTION INTRA-ARTICULAR; INTRALESIONAL; INTRAMUSCULAR; INTRAVENOUS; SOFT TISSUE at 08:52

## 2021-04-15 RX ADMIN — SUGAMMADEX 200 MG: 100 INJECTION, SOLUTION INTRAVENOUS at 10:02

## 2021-04-15 RX ADMIN — ROCURONIUM BROMIDE 50 MG: 10 INJECTION INTRAVENOUS at 08:56

## 2021-04-15 RX ADMIN — PHENYLEPHRINE HYDROCHLORIDE 100 MCG: 10 INJECTION INTRAVENOUS at 08:58

## 2021-04-15 RX ADMIN — ROCURONIUM BROMIDE 20 MG: 10 INJECTION INTRAVENOUS at 09:40

## 2021-04-15 RX ADMIN — IODIXANOL 50 ML: 320 INJECTION, SOLUTION INTRAVASCULAR at 10:11

## 2021-04-15 RX ADMIN — FENTANYL CITRATE 50 MCG: 50 INJECTION, SOLUTION INTRAMUSCULAR; INTRAVENOUS at 10:38

## 2021-04-15 RX ADMIN — FENTANYL CITRATE 50 MCG: 50 INJECTION, SOLUTION INTRAMUSCULAR; INTRAVENOUS at 10:31

## 2021-04-15 RX ADMIN — ONDANSETRON 4 MG: 2 INJECTION INTRAMUSCULAR; INTRAVENOUS at 09:52

## 2021-04-15 RX ADMIN — PHENYLEPHRINE HYDROCHLORIDE 100 MCG: 10 INJECTION INTRAVENOUS at 08:50

## 2021-04-15 RX ADMIN — PHENYLEPHRINE HYDROCHLORIDE 200 MCG: 10 INJECTION INTRAVENOUS at 09:07

## 2021-04-15 RX ADMIN — MIDAZOLAM 2 MG: 1 INJECTION INTRAMUSCULAR; INTRAVENOUS at 08:28

## 2021-04-15 RX ADMIN — ROCURONIUM BROMIDE 50 MG: 10 INJECTION INTRAVENOUS at 08:37

## 2021-04-15 RX ADMIN — Medication 10 MG: at 08:45

## 2021-04-15 RX ADMIN — LIDOCAINE HYDROCHLORIDE 100 MG: 20 INJECTION, SOLUTION INFILTRATION; PERINEURAL at 08:36

## 2021-04-15 RX ADMIN — ONDANSETRON 4 MG: 2 INJECTION INTRAMUSCULAR; INTRAVENOUS at 10:30

## 2021-04-15 RX ADMIN — PHENYLEPHRINE HYDROCHLORIDE 1 MCG/KG/MIN: 10 INJECTION INTRAVENOUS at 09:02

## 2021-04-15 RX ADMIN — OXYCODONE HYDROCHLORIDE 5 MG: 5 TABLET ORAL at 12:16

## 2021-04-15 RX ADMIN — FENTANYL CITRATE 100 MCG: 50 INJECTION, SOLUTION INTRAMUSCULAR; INTRAVENOUS at 08:30

## 2021-04-15 RX ADMIN — SODIUM CHLORIDE, POTASSIUM CHLORIDE, SODIUM LACTATE AND CALCIUM CHLORIDE: 600; 310; 30; 20 INJECTION, SOLUTION INTRAVENOUS at 08:45

## 2021-04-15 RX ADMIN — PROPOFOL 150 MG: 10 INJECTION, EMULSION INTRAVENOUS at 08:36

## 2021-04-15 RX ADMIN — PROCHLORPERAZINE EDISYLATE 5 MG: 5 INJECTION INTRAMUSCULAR; INTRAVENOUS at 13:11

## 2021-04-15 RX ADMIN — PHENYLEPHRINE HYDROCHLORIDE 100 MCG: 10 INJECTION INTRAVENOUS at 08:38

## 2021-04-15 RX ADMIN — FENTANYL CITRATE 50 MCG: 50 INJECTION, SOLUTION INTRAMUSCULAR; INTRAVENOUS at 09:44

## 2021-04-15 RX ADMIN — PROCHLORPERAZINE EDISYLATE 5 MG: 5 INJECTION INTRAMUSCULAR; INTRAVENOUS at 12:56

## 2021-04-15 RX ADMIN — Medication 10 MG: at 09:11

## 2021-04-15 RX ADMIN — PROPOFOL 50 MG: 10 INJECTION, EMULSION INTRAVENOUS at 08:41

## 2021-04-15 RX ADMIN — AMPICILLIN SODIUM AND SULBACTAM SODIUM 3 G: 2; 1 INJECTION, POWDER, FOR SOLUTION INTRAMUSCULAR; INTRAVENOUS at 08:53

## 2021-04-15 ASSESSMENT — MIFFLIN-ST. JEOR: SCORE: 1544.38

## 2021-04-15 NOTE — ANESTHESIA CARE TRANSFER NOTE
Patient: Ten Johnson    Procedure(s):  ANESTHESIA OUT OF OR Paracentesis, Transjugular intrahepatic portosystemic shunt procedure @0800    Diagnosis: Alcoholic cirrhosis of liver with ascites (H) [K70.31]  Diagnosis Additional Information: No value filed.    Anesthesia Type:   No value filed.     Note:    Oropharynx: spontaneously breathing  Level of Consciousness: awake  Oxygen Supplementation: face mask    Independent Airway: airway patency satisfactory and stable  Dentition: dentition unchanged  Vital Signs Stable: post-procedure vital signs reviewed and stable  Report to RN Given: handoff report given  Patient transferred to: PACU    Handoff Report: Identifed the Patient, Identified the Reponsible Provider, Reviewed the pertinent medical history, Discussed the surgical course, Reviewed Intra-OP anesthesia mangement and issues during anesthesia, Set expectations for post-procedure period and Allowed opportunity for questions and acknowledgement of understanding      Vitals: (Last set prior to Anesthesia Care Transfer)  CRNA VITALS  4/15/2021 0939 - 4/15/2021 1018      4/15/2021             NIBP:  145/89    Pulse:  82    NIBP Mean:  103    Ht Rate:  81    SpO2:  100 %    Resp Rate (observed):  (!) 1        Electronically Signed By: Charles Patrick Schlatter, APRN CRNA  April 15, 2021  10:18 AM

## 2021-04-15 NOTE — ANESTHESIA PROCEDURE NOTES
Airway       Patient location during procedure: OR  Staff -        Anesthesiologist:  Ludin Berman MD       Performed By: with residents       Procedure performed by resident/CRNA in presence of a teaching physician.    Consent for Airway        Urgency: elective  Indications and Patient Condition       Indications for airway management: talia-procedural        Final Airway Details       Final airway type: endotracheal airway       Successful airway: ETT - single  Endotracheal Airway Details        ETT size (mm): 7.5       Successful intubation technique: video laryngoscopy (teaching with medical student)       VL Blade Size: MAC 3       Grade View of Cords: 1       Adjucts: stylet       Position: Left       Bite block used: None    Post intubation assessment        Placement verified by: capnometry, equal breath sounds and chest rise        Number of attempts at approach: 1       Number of other approaches attempted: 0       Secured with: plastic tape       Ease of procedure: easy       Dentition: Intact    Medication(s) Administered   Medication Administration Time: 4/15/2021 8:39 AM

## 2021-04-15 NOTE — PROVIDER NOTIFICATION
Dr. Berman notified that pt does not have a ride home or anyone to stay with him the next 24 hours. Awaiting call back from friend Harley Bucio for confirmation.

## 2021-04-15 NOTE — PROGRESS NOTES
Patient Name: Ten Johnson  Medical Record Number: [id number]  Today's Date: 4/15/2021    Procedure: Transvenous intrahepatic portosystemic shunt revision, possible paracentesis  Proceduralist: Dr. Arteaga, Dr. RAINE Arana.   Anesthesia providers:  C.P. Schlatter, Dr. Berman.    Patient in room: 08  Procedure Start: 0905  Procedure end: ***  Sedation medications administered:   Patient out of room:    Report given to: 2.A. R.N.    Other Notes: Pt arrived to IR room 1 from  preop. Consent reviewed. Pt denies any questions or concerns regarding procedure. General anesthesia and endotracheal intubation per providers.     Pt positioned  and monitored per protocol. Pt tolerated procedure without any noted complications. Pt transferred back to PACU with anesthesia staff.

## 2021-04-15 NOTE — TELEPHONE ENCOUNTER
Patient calling for information regarding procedure tomorrow am 4/15 at 8 am(per [facility]). Patient is wanting to know when he should be there, lost his paperwork.  Gave main number to Erin

## 2021-04-15 NOTE — PROCEDURES
[facility]    Procedure: IR Procedure Note    Date/Time: 4/15/2021 10:04 AM  Performed by: Eladio Arana MD  Authorized by: Eladio Arana MD     UNIVERSAL PROTOCOL   Site Marked: NA  Prior Images Obtained and Reviewed:  Yes  Required items: Required blood products, implants, devices and special equipment available    Patient identity confirmed:  Verbally with patient, arm band, provided demographic data and hospital-assigned identification number  Patient was reevaluated immediately before administering moderate or deep sedation or anesthesia  Confirmation Checklist:  Patient's identity using two indicators, relevant allergies, procedure was appropriate and matched the consent or emergent situation and correct equipment/implants were available  Time out: Immediately prior to the procedure a time out was called    Universal Protocol: the Joint Commission Universal Protocol was followed    Preparation: Patient was prepped and draped in usual sterile fashion           ANESTHESIA    Anesthesia: Local infiltration  Local Anesthetic:  Lidocaine 1% without epinephrine      SEDATION    Patient Sedated: No    See dictated procedure note for full details.  Findings: 8 cm viatorr placed from right hepatic to right portal vein  Plasty to 10 mm  PSG 7 mm Hg    Specimens: none    Complications: None    Condition: Stable    Plan: 1 hr bedrest  discharge home    PROCEDURE   Patient Tolerance:  Patient tolerated the procedure well with no immediate complications    Length of time physician/provider present for 1:1 monitoring during sedation: 0

## 2021-04-15 NOTE — DISCHARGE INSTRUCTIONS
1. No heavy lifting for 2 days (no greater than 10 pounds)    2. Resume usual diet    3. Can shower tomorrow    4. Dressing can come off at time of next shower    5. Pain killers as needed    6. Interventional Radiology will call you and arrange your follow up          [facility]  Same-Day Surgery   Adult Discharge Orders & Instructions     For 24 hours after surgery    1. Get plenty of rest.  A responsible adult must stay with you for at least 24 hours after you leave the hospital.   2. Do not drive or use heavy equipment.  If you have weakness or tingling, don't drive or use heavy equipment until this feeling goes away.  3. Do not drink alcohol.  4. Avoid strenuous or risky activities.  Ask for help when climbing stairs.   5. You may feel lightheaded.  IF so, sit for a few minutes before standing.  Have someone help you get up.   6. If you have nausea (feel sick to your stomach): Drink only clear liquids such as apple juice, ginger ale, broth or 7-Up.  Rest may also help.  Be sure to drink enough fluids.  Move to a regular diet as you feel able.  7. You may have a slight fever. Call the doctor if your fever is over 100 F (37.7 C) (taken under the tongue) or lasts longer than 24 hours.  8. You may have a dry mouth, a sore throat, muscle aches or trouble sleeping.  These should go away after 24 hours.  9. Do not make important or legal decisions.   Call your doctor for any of the followin.  Signs of infection (fever, growing tenderness at the surgery site, a large amount of drainage or bleeding, severe pain, foul-smelling drainage, redness, swelling).    2. It has been over 8 to 10 hours since surgery and you are still not able to urinate (pass water).    3.  Headache for over 24 hours.  To contact a doctor call:      [phone] and ask for the resident on call for interventional radiology (answered 24 hours a day)      Emergency Department:[facility]:  [phone]       (TTY for hearing impaired: [phone])

## 2021-04-15 NOTE — PROVIDER NOTIFICATION
IR fellow paged regarding ride. No indication for pt to stay overnight. Will talk to Dr. Arteaga and will make final determination.

## 2021-04-15 NOTE — ANESTHESIA POSTPROCEDURE EVALUATION
Patient: Ten Johnson    Procedure(s):  ANESTHESIA OUT OF OR Paracentesis, Transjugular intrahepatic portosystemic shunt procedure @0800    Diagnosis:Alcoholic cirrhosis of liver with ascites (H) [K70.31]  Diagnosis Additional Information: No value filed.    Anesthesia Type:  General    Note:  Disposition: Outpatient   Postop Pain Control: Uneventful            Sign Out: Well controlled pain   PONV: No   Neuro/Psych: Uneventful            Sign Out: Acceptable/Baseline neuro status   Airway/Respiratory: Uneventful            Sign Out: Acceptable/Baseline resp. status   CV/Hemodynamics: Uneventful            Sign Out: Acceptable CV status   Other NRE:    DID A NON-ROUTINE EVENT OCCUR?          Last vitals:  Vitals:    04/15/21 1130 04/15/21 1200 04/15/21 1253   BP: 124/76 102/65 (!) 125/95   Pulse: 74 72 77   Resp:  18 16   Temp:  37  C (98.6  F) 36.6  C (97.8  F)   SpO2: 100% 98% 99%       Last vitals prior to Anesthesia Care Transfer:  CRNA VITALS  4/15/2021 0939 - 4/15/2021 1039      4/15/2021             NIBP:  145/89    Pulse:  82    NIBP Mean:  103    Ht Rate:  81    SpO2:  100 %    Resp Rate (observed):  (!) 1          Electronically Signed By: Ludin Berman MD  April 15, 2021  2:48 PM

## 2021-04-15 NOTE — OR NURSING
Dr. Arana paged to pass on patient request for pain medications. See MAR one dose of pain med given in phase II.     Dr. Arana at the bedside to assess patient prior to discharge.

## 2021-04-16 ENCOUNTER — TELEPHONE (OUTPATIENT)
Dept: VASCULAR SURGERY | Facility: CLINIC | Age: 61
End: 2021-04-16

## 2021-04-16 NOTE — TELEPHONE ENCOUNTER
Called and left a msg for pt fup on tips procedure.     Inquired as to how he is doing.     Informed him that we have him scheduled for his one month fup which also includes fup on his HCC as well.     Asked that he return my call with any other questions    Priscila BEARD RN, BSN  Interventional Radiology/Vascular  Nurse Coordinator   Phone: [phone]  Fax: [phone]

## 2021-05-12 ENCOUNTER — TELEPHONE (OUTPATIENT)
Dept: VASCULAR SURGERY | Facility: CLINIC | Age: 61
End: 2021-05-12

## 2021-05-12 NOTE — TELEPHONE ENCOUNTER
Called pt to jessika on his imaging MRI in which I inquired if he is going to make the appt for this.   He states that 'things have calmed down' since the tips procedure.     He states that he's having swelling feet and it's also allergy season in which he states that he really can't leave.     He states that he's needing valium prior to the MRI so that he can lay still    He also states that he's gaining weight    He states that he'd like the appts rescheduled in which I informed him that he is to call and make the imaging appts so that he will know if he can make the appts.    He states that he will    Priscila BEARD RN, BSN  Interventional Radiology/Vascular  Nurse Coordinator   Phone: [phone]  Fax: [phone]

## 2021-06-22 ENCOUNTER — ANESTHESIA (OUTPATIENT)
Dept: SURGERY | Facility: CLINIC | Age: 61
End: 2021-06-22
Payer: MEDICAID

## 2021-06-22 ENCOUNTER — HOSPITAL ENCOUNTER (INPATIENT)
Facility: CLINIC | Age: 61
LOS: 3 days | Discharge: HOME | End: 2021-06-25
Attending: EMERGENCY MEDICINE | Admitting: SURGERY
Payer: MEDICAID

## 2021-06-22 ENCOUNTER — ANESTHESIA EVENT (OUTPATIENT)
Dept: SURGERY | Facility: CLINIC | Age: 61
End: 2021-06-22
Payer: MEDICAID

## 2021-06-22 ENCOUNTER — APPOINTMENT (OUTPATIENT)
Dept: CT IMAGING | Facility: CLINIC | Age: 61
End: 2021-06-22
Attending: EMERGENCY MEDICINE
Payer: MEDICAID

## 2021-06-22 DIAGNOSIS — K42.0 STRANGULATED UMBILICAL HERNIA: ICD-10-CM

## 2021-06-22 DIAGNOSIS — Z11.52 ENCOUNTER FOR SCREENING LABORATORY TESTING FOR SEVERE ACUTE RESPIRATORY SYNDROME CORONAVIRUS 2 (SARS-COV-2): ICD-10-CM

## 2021-06-22 DIAGNOSIS — K56.609 SBO (SMALL BOWEL OBSTRUCTION) (H): ICD-10-CM

## 2021-06-22 DIAGNOSIS — K42.0 INCARCERATED UMBILICAL HERNIA: ICD-10-CM

## 2021-06-22 LAB
ABO + RH BLD: NORMAL
ABO + RH BLD: NORMAL
ALBUMIN SERPL-MCNC: 3.2 G/DL (ref 3.4–5)
ALBUMIN UR-MCNC: NEGATIVE MG/DL
ALP SERPL-CCNC: 114 U/L (ref 40–150)
ALT SERPL W P-5'-P-CCNC: 74 U/L (ref 0–70)
ANION GAP SERPL CALCULATED.3IONS-SCNC: 9 MMOL/L (ref 3–14)
APPEARANCE UR: CLEAR
APTT PPP: 30 SEC (ref 22–37)
AST SERPL W P-5'-P-CCNC: 106 U/L (ref 0–45)
BASOPHILS # BLD AUTO: 0.1 10E9/L (ref 0–0.2)
BASOPHILS NFR BLD AUTO: 0.7 %
BILIRUB SERPL-MCNC: 1.2 MG/DL (ref 0.2–1.3)
BILIRUB UR QL STRIP: NEGATIVE
BLD GP AB SCN SERPL QL: NORMAL
BLOOD BANK CMNT PATIENT-IMP: NORMAL
BUN SERPL-MCNC: 16 MG/DL (ref 7–30)
CALCIUM SERPL-MCNC: 8.8 MG/DL (ref 8.5–10.1)
CHLORIDE SERPL-SCNC: 109 MMOL/L (ref 94–109)
CO2 SERPL-SCNC: 23 MMOL/L (ref 20–32)
COLOR UR AUTO: NORMAL
CREAT SERPL-MCNC: 0.89 MG/DL (ref 0.66–1.25)
DIFFERENTIAL METHOD BLD: ABNORMAL
EOSINOPHIL # BLD AUTO: 0 10E9/L (ref 0–0.7)
EOSINOPHIL NFR BLD AUTO: 0 %
ERYTHROCYTE [DISTWIDTH] IN BLOOD BY AUTOMATED COUNT: 18.8 % (ref 10–15)
GFR SERPL CREATININE-BSD FRML MDRD: >90 ML/MIN/{1.73_M2}
GLUCOSE SERPL-MCNC: 105 MG/DL (ref 70–99)
GLUCOSE UR STRIP-MCNC: NEGATIVE MG/DL
HCT VFR BLD AUTO: 34.3 % (ref 40–53)
HGB BLD-MCNC: 10.9 G/DL (ref 13.3–17.7)
HGB UR QL STRIP: NEGATIVE
IMM GRANULOCYTES # BLD: 0 10E9/L (ref 0–0.4)
IMM GRANULOCYTES NFR BLD: 0.4 %
INR PPP: 1.23 (ref 0.86–1.14)
KETONES UR STRIP-MCNC: NEGATIVE MG/DL
LABORATORY COMMENT REPORT: NORMAL
LACTATE BLD-SCNC: 2.4 MMOL/L (ref 0.7–2)
LEUKOCYTE ESTERASE UR QL STRIP: NEGATIVE
LIPASE SERPL-CCNC: 355 U/L (ref 73–393)
LYMPHOCYTES # BLD AUTO: 0.8 10E9/L (ref 0.8–5.3)
LYMPHOCYTES NFR BLD AUTO: 9.1 %
MCH RBC QN AUTO: 26.5 PG (ref 26.5–33)
MCHC RBC AUTO-ENTMCNC: 31.8 G/DL (ref 31.5–36.5)
MCV RBC AUTO: 84 FL (ref 78–100)
MONOCYTES # BLD AUTO: 0.5 10E9/L (ref 0–1.3)
MONOCYTES NFR BLD AUTO: 6.4 %
NEUTROPHILS # BLD AUTO: 6.9 10E9/L (ref 1.6–8.3)
NEUTROPHILS NFR BLD AUTO: 83.4 %
NITRATE UR QL: NEGATIVE
NRBC # BLD AUTO: 0 10*3/UL
NRBC BLD AUTO-RTO: 0 /100
PH UR STRIP: 6 PH (ref 5–7)
PLATELET # BLD AUTO: 115 10E9/L (ref 150–450)
POTASSIUM SERPL-SCNC: 3.7 MMOL/L (ref 3.4–5.3)
PROT SERPL-MCNC: 7.6 G/DL (ref 6.8–8.8)
RADIOLOGIST FLAGS: ABNORMAL
RBC # BLD AUTO: 4.11 10E12/L (ref 4.4–5.9)
SARS-COV-2 RNA RESP QL NAA+PROBE: NEGATIVE
SODIUM SERPL-SCNC: 140 MMOL/L (ref 133–144)
SOURCE: NORMAL
SP GR UR STRIP: 1.01 (ref 1–1.03)
SPECIMEN EXP DATE BLD: NORMAL
SPECIMEN SOURCE: NORMAL
UROBILINOGEN UR STRIP-MCNC: NORMAL MG/DL (ref 0–2)
WBC # BLD AUTO: 8.3 10E9/L (ref 4–11)

## 2021-06-22 PROCEDURE — P9041 ALBUMIN (HUMAN),5%, 50ML: HCPCS | Performed by: STUDENT IN AN ORGANIZED HEALTH CARE EDUCATION/TRAINING PROGRAM

## 2021-06-22 PROCEDURE — U0005 INFEC AGEN DETEC AMPLI PROBE: HCPCS | Performed by: EMERGENCY MEDICINE

## 2021-06-22 PROCEDURE — 250N000009 HC RX 250: Performed by: STUDENT IN AN ORGANIZED HEALTH CARE EDUCATION/TRAINING PROGRAM

## 2021-06-22 PROCEDURE — 86901 BLOOD TYPING SEROLOGIC RH(D): CPT | Performed by: EMERGENCY MEDICINE

## 2021-06-22 PROCEDURE — 74177 CT ABD & PELVIS W/CONTRAST: CPT | Mod: 26 | Performed by: RADIOLOGY

## 2021-06-22 PROCEDURE — 88307 TISSUE EXAM BY PATHOLOGIST: CPT | Mod: 26 | Performed by: PATHOLOGY

## 2021-06-22 PROCEDURE — 250N000011 HC RX IP 250 OP 636: Performed by: EMERGENCY MEDICINE

## 2021-06-22 PROCEDURE — 80053 COMPREHEN METABOLIC PANEL: CPT | Performed by: EMERGENCY MEDICINE

## 2021-06-22 PROCEDURE — 370N000017 HC ANESTHESIA TECHNICAL FEE, PER MIN: Performed by: SURGERY

## 2021-06-22 PROCEDURE — 83690 ASSAY OF LIPASE: CPT | Performed by: EMERGENCY MEDICINE

## 2021-06-22 PROCEDURE — 250N000011 HC RX IP 250 OP 636: Performed by: RADIOLOGY

## 2021-06-22 PROCEDURE — 86900 BLOOD TYPING SEROLOGIC ABO: CPT | Performed by: EMERGENCY MEDICINE

## 2021-06-22 PROCEDURE — 81001 URINALYSIS AUTO W/SCOPE: CPT | Performed by: STUDENT IN AN ORGANIZED HEALTH CARE EDUCATION/TRAINING PROGRAM

## 2021-06-22 PROCEDURE — 99285 EMERGENCY DEPT VISIT HI MDM: CPT | Performed by: EMERGENCY MEDICINE

## 2021-06-22 PROCEDURE — 250N000013 HC RX MED GY IP 250 OP 250 PS 637: Performed by: STUDENT IN AN ORGANIZED HEALTH CARE EDUCATION/TRAINING PROGRAM

## 2021-06-22 PROCEDURE — 250N000025 HC SEVOFLURANE, PER MIN: Performed by: SURGERY

## 2021-06-22 PROCEDURE — 272N000001 HC OR GENERAL SUPPLY STERILE: Performed by: SURGERY

## 2021-06-22 PROCEDURE — 85025 COMPLETE CBC W/AUTO DIFF WBC: CPT | Performed by: EMERGENCY MEDICINE

## 2021-06-22 PROCEDURE — 258N000003 HC RX IP 258 OP 636: Performed by: ANESTHESIOLOGY

## 2021-06-22 PROCEDURE — 96374 THER/PROPH/DIAG INJ IV PUSH: CPT | Mod: 59 | Performed by: EMERGENCY MEDICINE

## 2021-06-22 PROCEDURE — 88307 TISSUE EXAM BY PATHOLOGIST: CPT | Mod: TC | Performed by: SURGERY

## 2021-06-22 PROCEDURE — 85730 THROMBOPLASTIN TIME PARTIAL: CPT | Performed by: EMERGENCY MEDICINE

## 2021-06-22 PROCEDURE — 0WQF0ZZ REPAIR ABDOMINAL WALL, OPEN APPROACH: ICD-10-PCS | Performed by: SURGERY

## 2021-06-22 PROCEDURE — 258N000003 HC RX IP 258 OP 636: Performed by: NURSE ANESTHETIST, CERTIFIED REGISTERED

## 2021-06-22 PROCEDURE — 99285 EMERGENCY DEPT VISIT HI MDM: CPT | Mod: 25 | Performed by: EMERGENCY MEDICINE

## 2021-06-22 PROCEDURE — 0DT80ZZ RESECTION OF SMALL INTESTINE, OPEN APPROACH: ICD-10-PCS | Performed by: SURGERY

## 2021-06-22 PROCEDURE — 999N000127 HC STATISTIC PERIPHERAL IV START W US GUIDANCE

## 2021-06-22 PROCEDURE — 120N000003 HC R&B IMCU UMMC

## 2021-06-22 PROCEDURE — 258N000003 HC RX IP 258 OP 636: Performed by: STUDENT IN AN ORGANIZED HEALTH CARE EDUCATION/TRAINING PROGRAM

## 2021-06-22 PROCEDURE — 86850 RBC ANTIBODY SCREEN: CPT | Performed by: EMERGENCY MEDICINE

## 2021-06-22 PROCEDURE — 74177 CT ABD & PELVIS W/CONTRAST: CPT

## 2021-06-22 PROCEDURE — 710N000010 HC RECOVERY PHASE 1, LEVEL 2, PER MIN: Performed by: SURGERY

## 2021-06-22 PROCEDURE — 360N000076 HC SURGERY LEVEL 3, PER MIN: Performed by: SURGERY

## 2021-06-22 PROCEDURE — 85610 PROTHROMBIN TIME: CPT | Performed by: EMERGENCY MEDICINE

## 2021-06-22 PROCEDURE — 250N000009 HC RX 250: Performed by: NURSE ANESTHETIST, CERTIFIED REGISTERED

## 2021-06-22 PROCEDURE — U0003 INFECTIOUS AGENT DETECTION BY NUCLEIC ACID (DNA OR RNA); SEVERE ACUTE RESPIRATORY SYNDROME CORONAVIRUS 2 (SARS-COV-2) (CORONAVIRUS DISEASE [COVID-19]), AMPLIFIED PROBE TECHNIQUE, MAKING USE OF HIGH THROUGHPUT TECHNOLOGIES AS DESCRIBED BY CMS-2020-01-R: HCPCS | Performed by: EMERGENCY MEDICINE

## 2021-06-22 PROCEDURE — C9803 HOPD COVID-19 SPEC COLLECT: HCPCS | Performed by: EMERGENCY MEDICINE

## 2021-06-22 PROCEDURE — 250N000011 HC RX IP 250 OP 636: Performed by: STUDENT IN AN ORGANIZED HEALTH CARE EDUCATION/TRAINING PROGRAM

## 2021-06-22 PROCEDURE — 250N000011 HC RX IP 250 OP 636: Performed by: NURSE ANESTHETIST, CERTIFIED REGISTERED

## 2021-06-22 PROCEDURE — 96375 TX/PRO/DX INJ NEW DRUG ADDON: CPT | Performed by: EMERGENCY MEDICINE

## 2021-06-22 PROCEDURE — 83605 ASSAY OF LACTIC ACID: CPT | Performed by: EMERGENCY MEDICINE

## 2021-06-22 RX ORDER — DEXAMETHASONE SODIUM PHOSPHATE 4 MG/ML
INJECTION, SOLUTION INTRA-ARTICULAR; INTRALESIONAL; INTRAMUSCULAR; INTRAVENOUS; SOFT TISSUE PRN
Status: DISCONTINUED | OUTPATIENT
Start: 2021-06-22 | End: 2021-06-22

## 2021-06-22 RX ORDER — EPHEDRINE SULFATE 50 MG/ML
INJECTION, SOLUTION INTRAMUSCULAR; INTRAVENOUS; SUBCUTANEOUS PRN
Status: DISCONTINUED | OUTPATIENT
Start: 2021-06-22 | End: 2021-06-22

## 2021-06-22 RX ORDER — ACETAMINOPHEN 325 MG/1
975 TABLET ORAL ONCE
Status: CANCELLED | OUTPATIENT
Start: 2021-06-22 | End: 2021-06-22

## 2021-06-22 RX ORDER — IOPAMIDOL 755 MG/ML
101 INJECTION, SOLUTION INTRAVASCULAR ONCE
Status: COMPLETED | OUTPATIENT
Start: 2021-06-22 | End: 2021-06-22

## 2021-06-22 RX ORDER — ALBUMIN HUMAN 5 %
INTRAVENOUS SOLUTION INTRAVENOUS PRN
Status: DISCONTINUED | OUTPATIENT
Start: 2021-06-22 | End: 2021-06-22

## 2021-06-22 RX ORDER — NALOXONE HYDROCHLORIDE 0.4 MG/ML
0.4 INJECTION, SOLUTION INTRAMUSCULAR; INTRAVENOUS; SUBCUTANEOUS
Status: CANCELLED | OUTPATIENT
Start: 2021-06-22

## 2021-06-22 RX ORDER — NALOXONE HYDROCHLORIDE 0.4 MG/ML
0.4 INJECTION, SOLUTION INTRAMUSCULAR; INTRAVENOUS; SUBCUTANEOUS
Status: DISCONTINUED | OUTPATIENT
Start: 2021-06-22 | End: 2021-06-25 | Stop reason: HOSPADM

## 2021-06-22 RX ORDER — PROPOFOL 10 MG/ML
INJECTION, EMULSION INTRAVENOUS PRN
Status: DISCONTINUED | OUTPATIENT
Start: 2021-06-22 | End: 2021-06-22

## 2021-06-22 RX ORDER — FENTANYL CITRATE 50 UG/ML
25-50 INJECTION, SOLUTION INTRAMUSCULAR; INTRAVENOUS EVERY 5 MIN PRN
Status: DISCONTINUED | OUTPATIENT
Start: 2021-06-22 | End: 2021-06-22 | Stop reason: CLARIF

## 2021-06-22 RX ORDER — LIDOCAINE 40 MG/G
CREAM TOPICAL
Status: DISCONTINUED | OUTPATIENT
Start: 2021-06-22 | End: 2021-06-23

## 2021-06-22 RX ORDER — FENTANYL CITRATE 50 UG/ML
25-50 INJECTION, SOLUTION INTRAMUSCULAR; INTRAVENOUS
Status: DISCONTINUED | OUTPATIENT
Start: 2021-06-22 | End: 2021-06-22

## 2021-06-22 RX ORDER — ONDANSETRON 2 MG/ML
4 INJECTION INTRAMUSCULAR; INTRAVENOUS EVERY 6 HOURS PRN
Status: DISCONTINUED | OUTPATIENT
Start: 2021-06-22 | End: 2021-06-25 | Stop reason: HOSPADM

## 2021-06-22 RX ORDER — CEFAZOLIN SODIUM 1 G/3ML
INJECTION, POWDER, FOR SOLUTION INTRAMUSCULAR; INTRAVENOUS PRN
Status: DISCONTINUED | OUTPATIENT
Start: 2021-06-22 | End: 2021-06-22

## 2021-06-22 RX ORDER — OXYCODONE HYDROCHLORIDE 5 MG/1
5-10 TABLET ORAL EVERY 4 HOURS PRN
Status: DISCONTINUED | OUTPATIENT
Start: 2021-06-22 | End: 2021-06-25

## 2021-06-22 RX ORDER — CEFAZOLIN SODIUM 2 G/100ML
2 INJECTION, SOLUTION INTRAVENOUS
Status: CANCELLED | OUTPATIENT
Start: 2021-06-22

## 2021-06-22 RX ORDER — CEFAZOLIN SODIUM 2 G/100ML
2 INJECTION, SOLUTION INTRAVENOUS SEE ADMIN INSTRUCTIONS
Status: CANCELLED | OUTPATIENT
Start: 2021-06-22

## 2021-06-22 RX ORDER — ONDANSETRON 4 MG/1
4 TABLET, ORALLY DISINTEGRATING ORAL EVERY 30 MIN PRN
Status: DISCONTINUED | OUTPATIENT
Start: 2021-06-22 | End: 2021-06-22

## 2021-06-22 RX ORDER — ONDANSETRON 4 MG/1
4 TABLET, ORALLY DISINTEGRATING ORAL EVERY 6 HOURS PRN
Status: DISCONTINUED | OUTPATIENT
Start: 2021-06-22 | End: 2021-06-25 | Stop reason: HOSPADM

## 2021-06-22 RX ORDER — HYDROMORPHONE HYDROCHLORIDE 1 MG/ML
.3-.5 INJECTION, SOLUTION INTRAMUSCULAR; INTRAVENOUS; SUBCUTANEOUS EVERY 5 MIN PRN
Status: DISCONTINUED | OUTPATIENT
Start: 2021-06-22 | End: 2021-06-22

## 2021-06-22 RX ORDER — NALOXONE HYDROCHLORIDE 0.4 MG/ML
0.2 INJECTION, SOLUTION INTRAMUSCULAR; INTRAVENOUS; SUBCUTANEOUS
Status: CANCELLED | OUTPATIENT
Start: 2021-06-22

## 2021-06-22 RX ORDER — ACETAMINOPHEN 325 MG/1
975 TABLET ORAL 3 TIMES DAILY
Status: DISCONTINUED | OUTPATIENT
Start: 2021-06-23 | End: 2021-06-25 | Stop reason: HOSPADM

## 2021-06-22 RX ORDER — MEPERIDINE HYDROCHLORIDE 25 MG/ML
12.5 INJECTION INTRAMUSCULAR; INTRAVENOUS; SUBCUTANEOUS
Status: DISCONTINUED | OUTPATIENT
Start: 2021-06-22 | End: 2021-06-23 | Stop reason: CLARIF

## 2021-06-22 RX ORDER — KETAMINE HYDROCHLORIDE 10 MG/ML
INJECTION INTRAMUSCULAR; INTRAVENOUS PRN
Status: DISCONTINUED | OUTPATIENT
Start: 2021-06-22 | End: 2021-06-22

## 2021-06-22 RX ORDER — SODIUM CHLORIDE, SODIUM LACTATE, POTASSIUM CHLORIDE, CALCIUM CHLORIDE 600; 310; 30; 20 MG/100ML; MG/100ML; MG/100ML; MG/100ML
1000 INJECTION, SOLUTION INTRAVENOUS CONTINUOUS
Status: DISCONTINUED | OUTPATIENT
Start: 2021-06-22 | End: 2021-06-22

## 2021-06-22 RX ORDER — SODIUM CHLORIDE, SODIUM LACTATE, POTASSIUM CHLORIDE, CALCIUM CHLORIDE 600; 310; 30; 20 MG/100ML; MG/100ML; MG/100ML; MG/100ML
INJECTION, SOLUTION INTRAVENOUS CONTINUOUS
Status: DISCONTINUED | OUTPATIENT
Start: 2021-06-22 | End: 2021-06-22

## 2021-06-22 RX ORDER — SODIUM CHLORIDE, SODIUM LACTATE, POTASSIUM CHLORIDE, CALCIUM CHLORIDE 600; 310; 30; 20 MG/100ML; MG/100ML; MG/100ML; MG/100ML
INJECTION, SOLUTION INTRAVENOUS CONTINUOUS PRN
Status: DISCONTINUED | OUTPATIENT
Start: 2021-06-22 | End: 2021-06-22

## 2021-06-22 RX ORDER — FLUTICASONE PROPIONATE 50 MCG
2 SPRAY, SUSPENSION (ML) NASAL DAILY PRN
Status: DISCONTINUED | OUTPATIENT
Start: 2021-06-22 | End: 2021-06-25 | Stop reason: HOSPADM

## 2021-06-22 RX ORDER — ONDANSETRON 4 MG/1
4 TABLET, ORALLY DISINTEGRATING ORAL EVERY 30 MIN PRN
Status: DISCONTINUED | OUTPATIENT
Start: 2021-06-22 | End: 2021-06-22 | Stop reason: CLARIF

## 2021-06-22 RX ORDER — FENTANYL CITRATE 50 UG/ML
INJECTION, SOLUTION INTRAMUSCULAR; INTRAVENOUS PRN
Status: DISCONTINUED | OUTPATIENT
Start: 2021-06-22 | End: 2021-06-22

## 2021-06-22 RX ORDER — SODIUM CHLORIDE, SODIUM LACTATE, POTASSIUM CHLORIDE, CALCIUM CHLORIDE 600; 310; 30; 20 MG/100ML; MG/100ML; MG/100ML; MG/100ML
INJECTION, SOLUTION INTRAVENOUS CONTINUOUS
Status: DISCONTINUED | OUTPATIENT
Start: 2021-06-22 | End: 2021-06-24

## 2021-06-22 RX ORDER — METOPROLOL TARTRATE 1 MG/ML
1-2 INJECTION, SOLUTION INTRAVENOUS EVERY 5 MIN PRN
Status: DISCONTINUED | OUTPATIENT
Start: 2021-06-22 | End: 2021-06-23

## 2021-06-22 RX ORDER — HYDROMORPHONE HYDROCHLORIDE 1 MG/ML
.3-.5 INJECTION, SOLUTION INTRAMUSCULAR; INTRAVENOUS; SUBCUTANEOUS EVERY 10 MIN PRN
Status: DISCONTINUED | OUTPATIENT
Start: 2021-06-22 | End: 2021-06-22 | Stop reason: CLARIF

## 2021-06-22 RX ORDER — FENTANYL CITRATE 50 UG/ML
25-50 INJECTION, SOLUTION INTRAMUSCULAR; INTRAVENOUS
Status: DISCONTINUED | OUTPATIENT
Start: 2021-06-22 | End: 2021-06-23

## 2021-06-22 RX ORDER — NALOXONE HYDROCHLORIDE 0.4 MG/ML
0.2 INJECTION, SOLUTION INTRAMUSCULAR; INTRAVENOUS; SUBCUTANEOUS
Status: DISCONTINUED | OUTPATIENT
Start: 2021-06-22 | End: 2021-06-25 | Stop reason: HOSPADM

## 2021-06-22 RX ORDER — ONDANSETRON 2 MG/ML
4 INJECTION INTRAMUSCULAR; INTRAVENOUS EVERY 30 MIN PRN
Status: DISCONTINUED | OUTPATIENT
Start: 2021-06-22 | End: 2021-06-22 | Stop reason: CLARIF

## 2021-06-22 RX ORDER — CEFOXITIN 2 G/1
2 INJECTION, POWDER, FOR SOLUTION INTRAVENOUS EVERY 6 HOURS
Status: COMPLETED | OUTPATIENT
Start: 2021-06-23 | End: 2021-06-23

## 2021-06-22 RX ORDER — ONDANSETRON 2 MG/ML
4 INJECTION INTRAMUSCULAR; INTRAVENOUS EVERY 30 MIN PRN
Status: DISCONTINUED | OUTPATIENT
Start: 2021-06-22 | End: 2021-06-22

## 2021-06-22 RX ORDER — LIDOCAINE HYDROCHLORIDE 20 MG/ML
INJECTION, SOLUTION INFILTRATION; PERINEURAL PRN
Status: DISCONTINUED | OUTPATIENT
Start: 2021-06-22 | End: 2021-06-22

## 2021-06-22 RX ADMIN — PHENYLEPHRINE HYDROCHLORIDE 100 MCG: 10 INJECTION INTRAVENOUS at 20:40

## 2021-06-22 RX ADMIN — PROPOFOL 30 MG: 10 INJECTION, EMULSION INTRAVENOUS at 19:29

## 2021-06-22 RX ADMIN — DEXAMETHASONE SODIUM PHOSPHATE 6 MG: 4 INJECTION, SOLUTION INTRA-ARTICULAR; INTRALESIONAL; INTRAMUSCULAR; INTRAVENOUS; SOFT TISSUE at 20:15

## 2021-06-22 RX ADMIN — PROPOFOL 100 MG: 10 INJECTION, EMULSION INTRAVENOUS at 19:28

## 2021-06-22 RX ADMIN — FENTANYL CITRATE 50 MCG: 50 INJECTION, SOLUTION INTRAMUSCULAR; INTRAVENOUS at 19:56

## 2021-06-22 RX ADMIN — HYDROMORPHONE HYDROCHLORIDE 0.5 MG: 1 INJECTION, SOLUTION INTRAMUSCULAR; INTRAVENOUS; SUBCUTANEOUS at 20:13

## 2021-06-22 RX ADMIN — Medication 30 MG: at 19:28

## 2021-06-22 RX ADMIN — LIDOCAINE HYDROCHLORIDE 100 MG: 20 INJECTION, SOLUTION INFILTRATION; PERINEURAL at 19:28

## 2021-06-22 RX ADMIN — ROCURONIUM BROMIDE 70 MG: 10 INJECTION INTRAVENOUS at 19:29

## 2021-06-22 RX ADMIN — FENTANYL CITRATE 100 MCG: 50 INJECTION, SOLUTION INTRAMUSCULAR; INTRAVENOUS at 19:21

## 2021-06-22 RX ADMIN — Medication 5 MG: at 20:33

## 2021-06-22 RX ADMIN — HYDROMORPHONE HYDROCHLORIDE 1 MG: 1 INJECTION, SOLUTION INTRAMUSCULAR; INTRAVENOUS; SUBCUTANEOUS at 17:02

## 2021-06-22 RX ADMIN — CEFAZOLIN 2 G: 1 INJECTION, POWDER, FOR SOLUTION INTRAMUSCULAR; INTRAVENOUS at 19:48

## 2021-06-22 RX ADMIN — Medication 10 MG: at 20:00

## 2021-06-22 RX ADMIN — PHENYLEPHRINE HYDROCHLORIDE 200 MCG: 10 INJECTION INTRAVENOUS at 21:02

## 2021-06-22 RX ADMIN — SODIUM CHLORIDE, POTASSIUM CHLORIDE, SODIUM LACTATE AND CALCIUM CHLORIDE: 600; 310; 30; 20 INJECTION, SOLUTION INTRAVENOUS at 22:29

## 2021-06-22 RX ADMIN — SUGAMMADEX 150 MG: 100 INJECTION, SOLUTION INTRAVENOUS at 21:08

## 2021-06-22 RX ADMIN — SODIUM CHLORIDE, POTASSIUM CHLORIDE, SODIUM LACTATE AND CALCIUM CHLORIDE: 600; 310; 30; 20 INJECTION, SOLUTION INTRAVENOUS at 19:20

## 2021-06-22 RX ADMIN — MIDAZOLAM 1 MG: 1 INJECTION INTRAMUSCULAR; INTRAVENOUS at 19:21

## 2021-06-22 RX ADMIN — PHENYLEPHRINE HYDROCHLORIDE 100 MCG: 10 INJECTION INTRAVENOUS at 20:56

## 2021-06-22 RX ADMIN — Medication 5 MG: at 20:41

## 2021-06-22 RX ADMIN — ALBUMIN (HUMAN) 250 ML: 12.5 SOLUTION INTRAVENOUS at 20:42

## 2021-06-22 RX ADMIN — PHENYLEPHRINE HYDROCHLORIDE 100 MCG: 10 INJECTION INTRAVENOUS at 20:41

## 2021-06-22 RX ADMIN — PHENYLEPHRINE HYDROCHLORIDE 100 MCG: 10 INJECTION INTRAVENOUS at 20:37

## 2021-06-22 RX ADMIN — ONDANSETRON 4 MG: 2 INJECTION INTRAMUSCULAR; INTRAVENOUS at 17:03

## 2021-06-22 RX ADMIN — PHENYLEPHRINE HYDROCHLORIDE 200 MCG: 10 INJECTION INTRAVENOUS at 21:11

## 2021-06-22 RX ADMIN — Medication 5 MG: at 20:56

## 2021-06-22 RX ADMIN — FENTANYL CITRATE 100 MCG: 50 INJECTION, SOLUTION INTRAMUSCULAR; INTRAVENOUS at 20:46

## 2021-06-22 RX ADMIN — OXYCODONE HYDROCHLORIDE 10 MG: 5 TABLET ORAL at 23:38

## 2021-06-22 RX ADMIN — IOPAMIDOL 101 ML: 755 INJECTION, SOLUTION INTRAVENOUS at 17:53

## 2021-06-22 RX ADMIN — SODIUM CHLORIDE, POTASSIUM CHLORIDE, SODIUM LACTATE AND CALCIUM CHLORIDE: 600; 310; 30; 20 INJECTION, SOLUTION INTRAVENOUS at 23:41

## 2021-06-22 ASSESSMENT — ACTIVITIES OF DAILY LIVING (ADL)
TOILETING_ISSUES: NO
DIFFICULTY_COMMUNICATING: NO
DRESSING/BATHING: BATHING DIFFICULTY, ASSISTANCE 1 PERSON
DIFFICULTY_EATING/SWALLOWING: YES
EATING/SWALLOWING: SWALLOWING LIQUIDS
DRESSING/BATHING_DIFFICULTY: YES
EQUIPMENT_CURRENTLY_USED_AT_HOME: CANE, STRAIGHT;SHOWER CHAIR

## 2021-06-22 ASSESSMENT — LIFESTYLE VARIABLES: TOBACCO_USE: 0

## 2021-06-22 NOTE — LETTER
Transition Communication Hand-off for Care Transitions to Next Level of Care Provider    Name: Ten Johnson  : 1960  MRN #: [id number]  Primary Care Provider: Cuca Celeste     Primary Clinic: [address] [place] [address]  [facility] [address]     Reason for Hospitalization:  Incarcerated umbilical hernia [K42.0]  Strangulated umbilical hernia [K42.0]  SBO (small bowel obstruction) (H) [K56.609]  Admit Date/Time: 2021  4:22 PM  Discharge Date: 2021  Payor Source: Payor[place] / Plan: [place] / Product Type: Medicaid /     Reason for Communication Hand-off Referral: Other continuity of care    Discharge Plan: Discharged to home with plan for OP f/u       Discharge Needs Assessment:  Needs      Most Recent Value   Equipment Currently Used at Home  cane, straight, shower chair   # of Referrals Placed by CTS  External Care Coordination          Follow-up plan:    Future Appointments   Date Time Provider Department Center   2021  9:00 AM Harley Snell MD [facility]       Katherine Persaud, RNCC  Phone: [phone]  Pager: [phone]  To contact the weekend RNCC, Page: [phone]    AVS/Discharge Summary is the source of truth; this is a helpful guide for improved communication of patient story

## 2021-06-22 NOTE — ED PROVIDER NOTES
History     Chief Complaint   Patient presents with     Abdominal Pain     HPI  Ten Johnson is a 61 year old male with a past medical history of hernia, alcoholic cirrhosis of the liver with ascites, status post TAVR, diastolic dysfunction, hepatocellular carcinoma, hypertension who presents to the emergency department with a chief complaint of abdominal pain.  The patient states that he has a known hernia that he has noted has been increasing in size since this morning.  He has not been able to reduce it today.  His abdominal pain worsens, with severe abdominal pain starting about 3 hours prior to arrival.  The patient also notes that he has had significant nausea and vomiting that only resolved after medications were given by EMS in route.  His last bowel movement was around 8 AM this morning.  It was normal, nonbloody.  No associated fevers or chills.     I have reviewed the Medications, Allergies, Past Medical and Surgical History, and Social History in the [employer] system.    Past Medical History:   Diagnosis Date     JENNIFER (acute kidney injury) (H) 4/9/2019     Alcohol use disorder      Alcoholic cirrhosis (H)      Anticoagulant long-term use     plavix     Aortic stenosis, severe 2/21/2018     Ascites      Chronic allergic rhinitis      Chronic anemia      Chronic hepatitis C without hepatic coma (H) 05/10/2016    Untreated as of 2/2018     Cirrhosis (H) 2017    MRI finding     Diastolic dysfunction      Erosive gastropathy      Esophageal varices in alcoholic cirrhosis (H)      H/O upper gastrointestinal hemorrhage 09/2017     Hepatocellular carcinoma (H)      History of blood transfusion      History of drug abuse (H)     intranasal     Hypertension     essential     JANELLE (iron deficiency anemia)      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     Sarahi-Hoffman tear     History     Marijuana abuse      MRSA (methicillin resistant Staphylococcus aureus)       Nonrheumatic aortic valve stenosis 2/20/2018     Olecranon bursitis      Portal hypertension (H)      Right shoulder pain     history of rotator cuff repair     S/p TAVR (transcatheter aortic valve replacement), bioprosthetic      Severe aortic stenosis      Thrombocytopenia (H)      Past Surgical History:   Procedure Laterality Date     ABLATE LIVER TUMOR N/A 10/22/2019    Procedure: Ablate liver tumor x3;  Surgeon: Gregorio Benoit MD;  Location: [facility]     ARTHROSCOPY SHOULDER ROTATOR CUFF REPAIR  7/31/2012    Procedure: ARTHROSCOPY SHOULDER ROTATOR CUFF REPAIR;  Right Shoulder Arthroscopic Rotator Cuff Repair, BicepsTenodesis,  Subacromial Decompression ;  Surgeon: Joi Castillo MD;  [facility]     ESOPHAGOSCOPY, GASTROSCOPY, DUODENOSCOPY (EGD), COMBINED N/A 10/23/2017    Procedure: COMBINED ESOPHAGOSCOPY, GASTROSCOPY, DUODENOSCOPY (EGD);;  Surgeon: Gentry Salas MD;  Location: [facility]     EXCHANGE POLY COMPONENT ARTHROPLASTY KNEE Right 3/4/2019    Procedure: REVISION RIGHT TOTAL KNEE POLY COMPONENT EXCHANGE;  Surgeon: Olvin Joe MD;  Location: [facility]     FACIAL RECONSTRUCTION SURGERY  1971     HEART CATH FEMORAL CANNULIZATION WITH OPEN STANDBY REPAIR AORTIC VALVE N/A 2/21/2018    Procedure: HEART CATH FEMORAL CANNULIZATION WITH OPEN STANDBY REPAIR AORTIC VALVE;;  Surgeon: Luis Baird MD;  Location: [facility]     IR CHEMO EMBOLIZATION  1/29/2020     IR LIVER BIOPSY PERCUTANEOUS  7/18/2019     IR PARACENTESIS  4/15/2021     IR TRANSVEN INTRAHEPATIC PORTOSYST SHUNT  4/15/2021     IRRIGATION AND DEBRIDEMENT UPPER EXTREMITY, COMBINED  1/3/2012    Procedure:COMBINED IRRIGATION AND DEBRIDEMENT UPPER EXTREMITY; Irrigation & Debridement Left Elbow; Surgeon:CRISTHIAN ZHOU; Location:[facility]     LAPAROSCOPIC BIOPSY LIVER N/A 10/22/2019    Procedure: intraoperative liver ultrasound, laparoscopic converted to open liver biopsy x 6;  Surgeon: Gregorio Benoit MD;  [facility]      LAPAROSCOPY DIAGNOSTIC (GENERAL) N/A 10/22/2019    Procedure: Diagnostic laparoscopy;  Surgeon: Gregorio Benoit MD;  [facility]     LAPAROTOMY, LYSIS ADHESIONS, COMBINED N/A 10/22/2019    Procedure: Laparotomy, lysis adhesions, combined;  Surgeon: Gregorio Benoit MD;  [facility]     OPTICAL TRACKING SYSTEM ARTHROPLASTY KNEE Right 2/7/2019    Procedure: ARTHROPLASTY KNEE RIGHT;  Surgeon: Olvin Joe MD;  [facility]     REPAIR TENDON TRICEPS UPPER EXTREMITY  11/8/2011    Procedure:REPAIR TENDON TRICEPS UPPER EXTREMITY; Surgeon:CRISTHIAN ZHOU; [facility]     SHOULDER SURGERY  2003    left, injury, torn tendons, hematoma     TRANSCATHETER AORTIC VALVE IMPLANT ANESTHESIA N/A 2/21/2018    Procedure: TRANSCATHETER AORTIC VALVE IMPLANT ANESTHESIA;  Transfemoral (Quiroz) Aortic Valve Implant 26mm MARTHA 3, with Cardiopulmonary Bypass Standby, transthoracic echocardiogram;  Surgeon: GENERIC ANESTHESIA PROVIDER;  Location: [facility]     TRANSPOSITION ULNAR NERVE (ELBOW)  11/8/2011    Procedure:TRANSPOSITION ULNAR NERVE (ELBOW); Final Procedure Done: Left Elbow Lateral Ulnar Collateral Repair And  Left Elbow Triceps Repair       No current facility-administered medications for this encounter.      Current Outpatient Medications   Medication     aspirin (ASPIRIN LOW DOSE) 81 MG EC tablet     diazepam (VALIUM) 5 MG tablet     fluticasone (FLONASE) 50 MCG/ACT nasal spray     lisinopril (ZESTRIL) 20 MG tablet     loratadine (CLARITIN) 5 MG chewable tablet     naloxone (NARCAN) 4 MG/0.1ML nasal spray     ondansetron (ZOFRAN) 4 MG tablet     Allergies   Allergen Reactions     Zolpidem Other (See Comments)     Alcoholic.  Had reaction 3/17/13 while intoxicated which included black out, loss of awareness, paranoia.  Do not prescribe.  Dr. Celeste     Cats Other (See Comments)     rhinitis     Dogs Other (See Comments)     rhinitis     Pollen Extract Other (See Comments)     rhinits.     Past medical  "history, past surgical history, medications, and allergies were reviewed with the patient. Additional pertinent items: None    Social History     Socioeconomic History     Marital status: Single     Spouse name: Not on file     Number of children: 0     Years of education: Not on file     Highest education level: Not on file   Occupational History     Occupation:    Social Needs     Financial resource strain: Not on file     Food insecurity     Worry: Not on file     Inability: Not on file     Transportation needs     Medical: Not on file     Non-medical: Not on file   Tobacco Use     Smoking status: Never Smoker     Smokeless tobacco: Never Used   Substance and Sexual Activity     Alcohol use: Yes     Comment: drinks a \"beer occasionally\"     Drug use: Yes     Types: Marijuana     Comment: denies     Sexual activity: Not Currently     Partners: Female   Lifestyle     Physical activity     Days per week: Not on file     Minutes per session: Not on file     Stress: Not on file   Relationships     Social connections     Talks on phone: Not on file     Gets together: Not on file     Attends [employer] service: Not on file     Active member of club or organization: Not on file     Attends meetings of clubs or organizations: Not on file     Relationship status: Not on file     Intimate partner violence     Fear of current or ex partner: Not on file     Emotionally abused: Not on file     Physically abused: Not on file     Forced sexual activity: Not on file   Other Topics Concern     Parent/sibling w/ CABG, MI or angioplasty before 65F 55M? Not Asked   Social History Narrative    .  Bicycles a lot.  Excessive alcohol use.  Smokes cigars.  Occasional marijuana use.     Social history was reviewed with the patient. Additional pertinent items: None    Review of Systems  General: No fevers or chills  Skin: No rash or diaphoresis  Eyes: No eye redness or discharge  Ears/Nose/Throat: No " rhinorrhea or nasal congestion  Respiratory: No cough or SOB  Cardiovascular: No chest pain or palpitations  Gastrointestinal: See HPI  Genitourinary: No urinary frequency, hematuria, or dysuria  Musculoskeletal: No arthralgias or myalgias  Neurologic: No numbness or weakness  Psychiatric: No depression or SI  Hematologic/Lymphatic/Immunologic: No leg swelling, no easy bruising/bleeding  Endocrine: No polyuria/polydypsia    A complete review of systems was performed with pertinent positives and negatives noted in the HPI, and all other systems negative.    Physical Exam   BP: (!) 162/84  Pulse: 93  Temp: 98.5  F (36.9  C)  Resp: 18  SpO2: 99 %      General: Well nourished, well developed, NAD  HEENT: EOMI, anicteric. NCAT, MMM  Neck: no jugular venous distension, supple, nl ROM  Cardiac: Regular rate and rhythm. No murmurs, rubs, or gallops. Normal S1, S2.  Intact peripheral pulses  Pulm: CTAB, no stridor, wheezes, rales, rhonchi  Abd: Mildly distended and diffusely tender to palpation, umbilical hernia is present which is nonreducible and exquisitely tender to palpation, overlying area appears dusky in color.  No other masses palpated.    Skin: Warm and dry to the touch.  No rash  Extremities: No LE edema, no cyanosis, w/w/p  Neuro: A&Ox3, no gross focal deficits    ED Course        Procedures                           Labs Ordered and Resulted from Time of ED Arrival Up to the Time of Departure from the ED   CBC WITH PLATELETS DIFFERENTIAL - Abnormal; Notable for the following components:       Result Value    RBC Count 4.11 (*)     Hemoglobin 10.9 (*)     Hematocrit 34.3 (*)     RDW 18.8 (*)     Platelet Count 115 (*)     All other components within normal limits   COMPREHENSIVE METABOLIC PANEL - Abnormal; Notable for the following components:    Glucose 105 (*)     Albumin 3.2 (*)     ALT 74 (*)      (*)     All other components within normal limits   LACTIC ACID WHOLE BLOOD - Abnormal; Notable for the  following components:    Lactic Acid 2.4 (*)     All other components within normal limits   INR - Abnormal; Notable for the following components:    INR 1.23 (*)     All other components within normal limits   LIPASE   PARTIAL THROMBOPLASTIN TIME   SARS-COV-2 (COVID-19) VIRUS RT-PCR   UA MACROSCOPIC WITH REFLEX TO MICRO AND CULTURE   PERIPHERAL IV CATHETER   ICE TO AFFECTED AREA   ABO/RH TYPE AND SCREEN            Results for orders placed or performed during the hospital encounter of 06/22/21 (from the past 24 hour(s))   CBC with platelets differential   Result Value Ref Range    WBC 8.3 4.0 - 11.0 10e9/L    RBC Count 4.11 (L) 4.4 - 5.9 10e12/L    Hemoglobin 10.9 (L) 13.3 - 17.7 g/dL    Hematocrit 34.3 (L) 40.0 - 53.0 %    MCV 84 78 - 100 fl    MCH 26.5 26.5 - 33.0 pg    MCHC 31.8 31.5 - 36.5 g/dL    RDW 18.8 (H) 10.0 - 15.0 %    Platelet Count 115 (L) 150 - 450 10e9/L    Diff Method Automated Method     % Neutrophils 83.4 %    % Lymphocytes 9.1 %    % Monocytes 6.4 %    % Eosinophils 0.0 %    % Basophils 0.7 %    % Immature Granulocytes 0.4 %    Nucleated RBCs 0 0 /100    Absolute Neutrophil 6.9 1.6 - 8.3 10e9/L    Absolute Lymphocytes 0.8 0.8 - 5.3 10e9/L    Absolute Monocytes 0.5 0.0 - 1.3 10e9/L    Absolute Eosinophils 0.0 0.0 - 0.7 10e9/L    Absolute Basophils 0.1 0.0 - 0.2 10e9/L    Abs Immature Granulocytes 0.0 0 - 0.4 10e9/L    Absolute Nucleated RBC 0.0    Lipase   Result Value Ref Range    Lipase 355 73 - 393 U/L   Comprehensive metabolic panel   Result Value Ref Range    Sodium 140 133 - 144 mmol/L    Potassium 3.7 3.4 - 5.3 mmol/L    Chloride 109 94 - 109 mmol/L    Carbon Dioxide 23 20 - 32 mmol/L    Anion Gap 9 3 - 14 mmol/L    Glucose 105 (H) 70 - 99 mg/dL    Urea Nitrogen 16 7 - 30 mg/dL    Creatinine 0.89 0.66 - 1.25 mg/dL    GFR Estimate >90 >60 mL/min/[1.73_m2]    GFR Estimate If Black >90 >60 mL/min/[1.73_m2]    Calcium 8.8 8.5 - 10.1 mg/dL    Bilirubin Total 1.2 0.2 - 1.3 mg/dL    Albumin 3.2  (L) 3.4 - 5.0 g/dL    Protein Total 7.6 6.8 - 8.8 g/dL    Alkaline Phosphatase 114 40 - 150 U/L    ALT 74 (H) 0 - 70 U/L     (H) 0 - 45 U/L   Lactic acid whole blood   Result Value Ref Range    Lactic Acid 2.4 (H) 0.7 - 2.0 mmol/L   INR   Result Value Ref Range    INR 1.23 (H) 0.86 - 1.14   Partial thromboplastin time   Result Value Ref Range    PTT 30 22 - 37 sec   ABO/Rh type and screen   Result Value Ref Range    ABO O     RH(D) Pos     Antibody Screen Neg     Test Valid Only At          [facility]    Specimen Expires 06/25/2021    Asymptomatic SARS-CoV-2 COVID-19 Virus (Coronavirus) by PCR    Specimen: Nasopharyngeal   Result Value Ref Range    SARS-CoV-2 Virus Specimen Source Nasopharyngeal     SARS-CoV-2 PCR Result NEGATIVE     SARS-CoV-2 PCR Comment       Testing was performed using the [employer] Xpress SARS-CoV-2 Assay on the Cepheid Gene-Xpert   Instrument Systems. Additional information about this Emergency Use Authorization (EUA)   assay can be found via the Lab Guide.     CT Abdomen Pelvis w Contrast   Result Value Ref Range    Radiologist flags Small bowel obstruction (Urgent)     Narrative    EXAMINATION: CT ABDOMEN PELVIS W CONTRAST, 6/22/2021 6:07 PM    TECHNIQUE:  Helical CT images from the lung bases through the  symphysis pubis were obtained with contrast.  Coronal reformatted  images were generated at a workstation for further assessment.    COMPARISON: 4/5/2021    HISTORY: Bowel obstruction suspected    FINDINGS:    Abdomen and pelvis:   Liver: Cirrhotic morphology of the liver. Stable posttreatment changes  in segment 6 and 7. Redemonstration of a few scattered enhancing foci  for example adjacent to the left portal vein (series 5, image 86).  Other previously seen enhancing foci are not conspicuous on this  portal venous phase only study. Portal veins appear patent. TIPS in  place and appears patent.  Gallbladder: Cholelithiasis.  Spleen: Normal  size with resolution of previously noted clinically.  Pancreas: No suspicious pancreatic lesions. The pancreatic duct is not  dilated.  Adrenal glands: No adrenal nodules.  Kidneys: No kidney masses. No hydronephrosis or obstructing renal  stones.  Bladder / Pelvic organs: Unremarkable.  Bowel: There is herniation of small bowel into umbilical hernia with  interval development of distention of the herniated bowel as well as  the immediate proximal segment with fecalization of intraluminal  contents (series 5, image 235). There is significant edema associated  with the herniated bowel. Distal to the herniation of bowel is  decompressed. Proximal small bowel is also decompressed.  Lymph nodes: No retroperitoneal, mesenteric, or pelvic  lymphadenopathy.  Fluid: Peritoneal ascites.  Vessels: No infrarenal aortic aneurysm.     Lung bases: No consolidation or pleural effusion. Bibasilar  atelectasis. Prosthetic mitral valve.    Bones and soft tissues: No suspicious osseous lesions. Degenerative  changes of lumbar spine. Large bilateral hydroceles.      Impression    IMPRESSION:   1.  Umbilical herniation of small bowel with proximal  distention/fecalization of contents and relative distal decompression  compatible with small bowel obstruction. There is edema associated  with the herniated bowel which can be seen with  strangulation/congestion related changes.   2.  Cirrhotic morphology of liver with stable posttreatment changes of  segment 6 and 7. Redemonstration of scattered arterial enhancing foci  likely hemangiomas.  3.  Peritoneal ascites likely related to portal hypertension.  Resolution of the preceding noted splenomegaly.  4.  Cholelithiasis without cholecystitis.    [Urgent Result: Small bowel obstruction]    Finding was identified on 6/22/2021 6:17 PM.     Dr. Alonzo was contacted by Dr. Medina at 6/22/2021 6:52 PM and  verbalized understanding of the urgent finding.     I have personally reviewed the  examination and initial interpretation  and I agree with the findings.    VIBHA VILLEGAS MD     *Note: Due to a large number of results and/or encounters for the requested time period, some results have not been displayed. A complete set of results can be found in Results Review.       Labs, vital signs, and imaging studies were reviewed by me.    Medications   ondansetron (ZOFRAN) injection 4 mg ( Intravenous Auto Hold 6/22/21 1920)   lactated ringers infusion ( Intravenous New Bag 6/22/21 2229)   fentaNYL (PF) (SUBLIMAZE) injection 25-50 mcg (has no administration in time range)   ondansetron (ZOFRAN-ODT) ODT tab 4 mg (has no administration in time range)     Or   ondansetron (ZOFRAN) injection 4 mg (has no administration in time range)   prochlorperazine (COMPAZINE) injection 10 mg (has no administration in time range)   HYDROmorphone (PF) (DILAUDID) injection 0.3-0.5 mg (has no administration in time range)   metoprolol (LOPRESSOR) injection 1-2 mg (has no administration in time range)   lactated ringers infusion (has no administration in time range)   lactated ringers infusion (has no administration in time range)   ondansetron (ZOFRAN-ODT) ODT tab 4 mg (has no administration in time range)     Or   ondansetron (ZOFRAN) injection 4 mg (has no administration in time range)   prochlorperazine (COMPAZINE) injection 10 mg (has no administration in time range)   meperidine (DEMEROL) injection 12.5 mg (has no administration in time range)   naloxone (NARCAN) injection 0.2 mg (has no administration in time range)   naloxone (NARCAN) injection 0.4 mg (has no administration in time range)   naloxone (NARCAN) injection 0.2 mg (has no administration in time range)   naloxone (NARCAN) injection 0.4 mg (has no administration in time range)   fentaNYL (PF) (SUBLIMAZE) injection 25-50 mcg (has no administration in time range)   HYDROmorphone (PF) (DILAUDID) injection 0.3-0.5 mg (has no administration in time range)    HYDROmorphone (DILAUDID) injection 1 mg (1 mg Intravenous Given 6/22/21 1702)   sodium chloride (PF) 0.9% PF flush 75 mL (75 mLs Intravenous Given 6/22/21 1753)   iopamidol (ISOVUE-370) solution 101 mL (101 mLs Intravenous Given 6/22/21 1753)       Assessments & Plan (with Medical Decision Making)   Ten Johnson is a 61 year old male who presents with abdominal pain.  Differential diagnosis includes incarcerated/strangulated umbilical hernia, small bowel obstruction, gastritis/gastroenteritis, abdominal pain related to patient's liver failure/ascites, spontaneous bacterial peritonitis, urinary tract infection, diverticulitis, constipation.  Patient's exam is consistent with small bowel obstruction secondary to incarcerated/strangulated umbilical hernia.  Labs and CT scan were ordered to further evaluate the patient.  An ice pack was placed over patient's hernia in hopes that this would reduce swelling enough to potentially reduce the hernia, although this is not likely to be successful given duration of time that hernia has been nonreducible.  Patient given medications for pain and nausea relief in the emergency department.     I discussed the patient with general surgery, they will come evaluate the patient in the emergency department.    Laboratory work-up is remarkable for lactate 2.4, INR 1.23, ALT 74, , hemoglobin 10.9, platelet count 115, normal white blood cell count.    CT scan results were discussed with radiologist, CT scan shows umbilical herniation of the small bowel with proximal distention/fecalization of contents and distal decompression compatible with small bowel obstruction.  Additionally, there is edema associated with the area of herniated bowel concerning for strangulation/congestion related changes.  This is consistent with patient's exam.    I have reviewed the nursing notes.    I have reviewed the findings, diagnosis, plan and need for follow up with the patient.    Patient  discussed with general surgery, they will take the patient to the OR directly from the emergency department after which, patient is to be admitted to their service for further management. Plan was discussed with patient who understands and agrees with plan.     Current Discharge Medication List          Final diagnoses:   Incarcerated umbilical hernia   SBO (small bowel obstruction) (H)   Strangulated umbilical hernia     Cee Alonzo MD  6/22/2021   [facility] EMERGENCY DEPARTMENT     Cee Alonzo MD  06/22/21 [id number]

## 2021-06-22 NOTE — ED NOTES
Attempted line without success. Patent reports needing vascular access for a line normally. Appears to have limited coping in regards to IV, started yelling out in pain before staff attempted line, patient then holing breathing and clenching hand/ arm/ body when attempting to place line. Then asking about pain medications.

## 2021-06-22 NOTE — ED TRIAGE NOTES
Arrived via EMS [facility]. From home, with abdominal pain that started aprox. 3 hours ago.  Patient reports new hernia around umbilical area that has, per report is growing. 4mg of PO Zofran given. Reports pain without relief.     VSS en route. .     HX of hernias.

## 2021-06-22 NOTE — CONSULTS
Emergency General Surgery Consultation Note  [facility]    Ten Johnson MRN# [id number]   Age: 61 year old YOB: 1960     Date of Admission:  6/22/2021    Reason for consult: Incarcerated umbilical hernia       Requesting physician: ED               Assessment:   61 year old male with PMH of ETOH cirrhosis, chronic hepatitis C, hepatocellular carcinoma s/p liver tumor ablation and TIPS (MELD 9), aortic stenosis s/p TAVR, who presents with known umbilical hernia who presents with fat containing incarcerated umbilical hernia. Lactate 2.4. WBC wnl. Given duration of symptoms and exam w/ overlying skin changes and unable to reduce hernia, would recommend operative intervention. Patient agreed to proceed after discussion of the risks and benefits of surgery.          Recommendations:   - Plan for OR tonight for open umbilical hernia repair  - Consented and marked  - Admit to surgery post op  - Covid pending  - Preop EKG ordered    Discussed with chief, Dr. Alan, and staff, Dr. Channing Ireland MD  EGS PGY2          History of Present Illness:   CC: abdominal pain     History is obtained from the patient    Ten Johnson is a 61 year old male with PMH of ETOH cirrhosis, chronic hepatitis C, hepatocellular carcinoma s/p liver tumor ablation and TIPS, aortic stenosis s/p TAVR, who presents with known umbilical hernia who presents with abdominal pain, tenderness, nausea and vomiting starting this morning. He woke up and felt severe pain in his umbilical hernia. Normally reducible with laying but has been unable to reduce since waking up. Has developed severe nausea and emesis as a result of the pain. Does not feel bloated. Had a BM this AM, without melena or hematochezia. No hemetemesis, fevers, chills, cp, sob. Recent ECHO 2/17/21 w/ EF 65-70%.          Past Medical History:     Past Medical History:   Diagnosis Date     JENNIFER (acute kidney injury) (H) 4/9/2019     Alcohol use disorder       Alcoholic cirrhosis (H)      Anticoagulant long-term use     plavix     Aortic stenosis, severe 2/21/2018     Ascites      Chronic allergic rhinitis      Chronic anemia      Chronic hepatitis C without hepatic coma (H) 05/10/2016    Untreated as of 2/2018     Cirrhosis (H) 2017    MRI finding     Diastolic dysfunction      Erosive gastropathy      Esophageal varices in alcoholic cirrhosis (H)      H/O upper gastrointestinal hemorrhage 09/2017     Hepatocellular carcinoma (H)      History of blood transfusion      History of drug abuse (H)     intranasal     Hypertension     essential     JANELLE (iron deficiency anemia)      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     Sarahi-Hoffman tear     History     Marijuana abuse      MRSA (methicillin resistant Staphylococcus aureus)      Nonrheumatic aortic valve stenosis 2/20/2018     Olecranon bursitis      Portal hypertension (H)      Right shoulder pain     history of rotator cuff repair     S/p TAVR (transcatheter aortic valve replacement), bioprosthetic      Severe aortic stenosis      Thrombocytopenia (H)              Past Surgical History:     Past Surgical History:   Procedure Laterality Date     ABLATE LIVER TUMOR N/A 10/22/2019    Procedure: Ablate liver tumor x3;  Surgeon: Gregorio Benoit MD;  [facility]     ARTHROSCOPY SHOULDER ROTATOR CUFF REPAIR  7/31/2012    Procedure: ARTHROSCOPY SHOULDER ROTATOR CUFF REPAIR;  Right Shoulder Arthroscopic Rotator Cuff Repair, BicepsTenodesis,  Subacromial Decompression ;  Surgeon: Joi Castillo MD;  [facility]     ESOPHAGOSCOPY, GASTROSCOPY, DUODENOSCOPY (EGD), COMBINED N/A 10/23/2017    Procedure: COMBINED ESOPHAGOSCOPY, GASTROSCOPY, DUODENOSCOPY (EGD);;  Surgeon: Gentry Salas MD;  [facility]     EXCHANGE POLY COMPONENT ARTHROPLASTY KNEE Right 3/4/2019    Procedure: REVISION RIGHT TOTAL KNEE POLY COMPONENT EXCHANGE;  Surgeon: Heath  Olvin Aguayo MD;  [facility]     FACIAL RECONSTRUCTION SURGERY  1971     HEART CATH FEMORAL CANNULIZATION WITH OPEN STANDBY REPAIR AORTIC VALVE N/A 2/21/2018    Procedure: HEART CATH FEMORAL CANNULIZATION WITH OPEN STANDBY REPAIR AORTIC VALVE;;  Surgeon: Luis Baird MD;  [facility]     IR CHEMO EMBOLIZATION  1/29/2020     IR LIVER BIOPSY PERCUTANEOUS  7/18/2019     IR PARACENTESIS  4/15/2021     IR TRANSVEN INTRAHEPATIC PORTOSYST SHUNT  4/15/2021     IRRIGATION AND DEBRIDEMENT UPPER EXTREMITY, COMBINED  1/3/2012    Procedure:COMBINED IRRIGATION AND DEBRIDEMENT UPPER EXTREMITY; Irrigation & Debridement Left Elbow; Surgeon:CRISTHIAN ZHOU; [facility]     LAPAROSCOPIC BIOPSY LIVER N/A 10/22/2019    Procedure: intraoperative liver ultrasound, laparoscopic converted to open liver biopsy x 6;  Surgeon: Gregorio Benoit MD;  [facility]     LAPAROSCOPY DIAGNOSTIC (GENERAL) N/A 10/22/2019    Procedure: Diagnostic laparoscopy;  Surgeon: Gregorio Benoit MD;  [facility]     LAPAROTOMY, LYSIS ADHESIONS, COMBINED N/A 10/22/2019    Procedure: Laparotomy, lysis adhesions, combined;  Surgeon: Gregorio Benoit MD;  [facility]     OPTICAL TRACKING SYSTEM ARTHROPLASTY KNEE Right 2/7/2019    Procedure: ARTHROPLASTY KNEE RIGHT;  Surgeon: Olvin Joe MD;  [facility]     REPAIR TENDON TRICEPS UPPER EXTREMITY  11/8/2011    Procedure:REPAIR TENDON TRICEPS UPPER EXTREMITY; Surgeon:CRISTHIAN ZHOU; [facility]     SHOULDER SURGERY  2003    left, injury, torn tendons, hematoma     TRANSCATHETER AORTIC VALVE IMPLANT ANESTHESIA N/A 2/21/2018    Procedure: TRANSCATHETER AORTIC VALVE IMPLANT ANESTHESIA;  Transfemoral (Quiroz) Aortic Valve Implant 26mm MARTHA 3, with Cardiopulmonary Bypass Standby, transthoracic echocardiogram;  Surgeon: GENERIC ANESTHESIA PROVIDER;  [facility]     TRANSPOSITION ULNAR NERVE (ELBOW)  11/8/2011    Procedure:TRANSPOSITION ULNAR NERVE (ELBOW);  "Final Procedure Done: Left Elbow Lateral Ulnar Collateral Repair And  Left Elbow Triceps Repair               Social History:     Social History     Socioeconomic History     Marital status: Single     Spouse name: Not on file     Number of children: 0     Years of education: Not on file     Highest education level: Not on file   Occupational History     Occupation:    Social Needs     Financial resource strain: Not on file     Food insecurity     Worry: Not on file     Inability: Not on file     Transportation needs     Medical: Not on file     Non-medical: Not on file   Tobacco Use     Smoking status: Never Smoker     Smokeless tobacco: Never Used   Substance and Sexual Activity     Alcohol use: Yes     Comment: drinks a \"beer occasionally\"     Drug use: Yes     Types: Marijuana     Comment: denies     Sexual activity: Not Currently     Partners: Female   Lifestyle     Physical activity     Days per week: Not on file     Minutes per session: Not on file     Stress: Not on file   Relationships     Social connections     Talks on phone: Not on file     Gets together: Not on file     Attends Yazdanism service: Not on file     Active member of club or organization: Not on file     Attends meetings of clubs or organizations: Not on file     Relationship status: Not on file     Intimate partner violence     Fear of current or ex partner: Not on file     Emotionally abused: Not on file     Physically abused: Not on file     Forced sexual activity: Not on file   Other Topics Concern     Parent/sibling w/ CABG, MI or angioplasty before 65F 55M? Not Asked   Social History Narrative    .  Bicycles a lot.  Excessive alcohol use.  Smokes cigars.  Occasional marijuana use.             Family History:     Family History   Problem Relation Age of Onset     Cancer Mother 62        unknown primary     Alcoholism Paternal Uncle      Unknown/Adopted Father      No Known Problems Brother      Diabetes " Maternal Grandmother      Myocardial Infarction Maternal Grandfather      No Known Problems Paternal Grandmother      Unknown/Adopted Paternal Grandfather      Cirrhosis No family hx of      Anesthesia Reaction No family hx of      Deep Vein Thrombosis (DVT) No family hx of              Allergies:      Allergies   Allergen Reactions     Zolpidem Other (See Comments)     Alcoholic.  Had reaction 3/17/13 while intoxicated which included black out, loss of awareness, paranoia.  Do not prescribe.  Dr. Celeste     Cats Other (See Comments)     rhinitis     Dogs Other (See Comments)     rhinitis     Pollen Extract Other (See Comments)     rhinits.             Medications:   No current facility-administered medications on file prior to encounter.   aspirin (ASPIRIN LOW DOSE) 81 MG EC tablet, Take 1 tablet (81 mg) by mouth daily (Patient taking differently: Take 81 mg by mouth daily )  diazepam (VALIUM) 5 MG tablet, as needed   fluticasone (FLONASE) 50 MCG/ACT nasal spray, Spray 2 sprays into both nostrils daily (Patient taking differently: Spray 2 sprays into both nostrils as needed )  lisinopril (ZESTRIL) 20 MG tablet, Take 1 tablet (20 mg) by mouth daily (Patient taking differently: Take 20 mg by mouth every evening )  loratadine (CLARITIN) 5 MG chewable tablet, Take 1 tablet (5 mg) by mouth daily (Patient taking differently: Take 5 mg by mouth every morning )  naloxone (NARCAN) 4 MG/0.1ML nasal spray, Spray 1 spray (4 mg) into one nostril alternating nostrils once as needed for opioid reversal every 2-3 minutes until assistance arrives  ondansetron (ZOFRAN) 4 MG tablet, Take 1 tablet (4 mg) by mouth every 8 hours as needed for nausea              Review of Systems:      All other review of systems negative, except for what is mentioned above        Physical Exam:   BP (!) 157/81   Pulse 64   Temp 98.5  F (36.9  C) (Oral)   Resp 18   SpO2 100%   General: Alert, interactive, NAD  Resp: NLB on RA  Cardiac:  RRR  Abdomen: Soft, nontender, nondistended. Umbilical hernia is exquisitely tender with overlying ecchymotic skin changes. Overlying skin is tense. Not reducible. No inguinal hernias. RUQ transverse scar well healed.   Extremities: No LE edema or obvious joint abnormalities  Skin: Warm and dry, no jaundice or rash  Neuro: A&Ox3, CN 2-12 intact, FAROOQ                  Data:   All laboratory data reviewed  All imaging studies reviewed by me.     RESIDENT PRELIMINARY INTERPRETATION  IMPRESSION:   1.  Umbilical herniation of small bowel with proximal  distention/fecalization of contents and relative distal decompression  compatible with small bowel obstruction. There is edema associated  with the herniated bowel which can be seen with  strangulation/congestion related changes.  2.  Cirrhotic morphology of liver with stable posttreatment changes of  segment 6 and 7. Redemonstration of scattered arterial enhancing foci  likely hemangiomas.  3.  Peritoneal ascites likely related to portal hypertension.  Resolution of the preceding noted splenomegaly.  4.  Cholelithiasis without cholecystitis.    Carlie Ireland MD  EGS PGY2

## 2021-06-23 LAB
ANION GAP SERPL CALCULATED.3IONS-SCNC: 8 MMOL/L (ref 3–14)
BUN SERPL-MCNC: 16 MG/DL (ref 7–30)
CALCIUM SERPL-MCNC: 8.3 MG/DL (ref 8.5–10.1)
CHLORIDE SERPL-SCNC: 108 MMOL/L (ref 94–109)
CO2 SERPL-SCNC: 21 MMOL/L (ref 20–32)
CREAT SERPL-MCNC: 0.84 MG/DL (ref 0.66–1.25)
ERYTHROCYTE [DISTWIDTH] IN BLOOD BY AUTOMATED COUNT: 18.7 % (ref 10–15)
GFR SERPL CREATININE-BSD FRML MDRD: >90 ML/MIN/{1.73_M2}
GLUCOSE SERPL-MCNC: 127 MG/DL (ref 70–99)
HCT VFR BLD AUTO: 31.3 % (ref 40–53)
HGB BLD-MCNC: 9.8 G/DL (ref 13.3–17.7)
MAGNESIUM SERPL-MCNC: 1.9 MG/DL (ref 1.6–2.3)
MCH RBC QN AUTO: 26 PG (ref 26.5–33)
MCHC RBC AUTO-ENTMCNC: 31.3 G/DL (ref 31.5–36.5)
MCV RBC AUTO: 83 FL (ref 78–100)
PHOSPHATE SERPL-MCNC: 4.1 MG/DL (ref 2.5–4.5)
PLATELET # BLD AUTO: 105 10E9/L (ref 150–450)
POTASSIUM SERPL-SCNC: 4.2 MMOL/L (ref 3.4–5.3)
RBC # BLD AUTO: 3.77 10E12/L (ref 4.4–5.9)
SODIUM SERPL-SCNC: 137 MMOL/L (ref 133–144)
WBC # BLD AUTO: 17.6 10E9/L (ref 4–11)

## 2021-06-23 PROCEDURE — 120N000002 HC R&B MED SURG/OB UMMC

## 2021-06-23 PROCEDURE — 36415 COLL VENOUS BLD VENIPUNCTURE: CPT | Performed by: STUDENT IN AN ORGANIZED HEALTH CARE EDUCATION/TRAINING PROGRAM

## 2021-06-23 PROCEDURE — 258N000003 HC RX IP 258 OP 636: Performed by: SURGERY

## 2021-06-23 PROCEDURE — 83735 ASSAY OF MAGNESIUM: CPT | Performed by: STUDENT IN AN ORGANIZED HEALTH CARE EDUCATION/TRAINING PROGRAM

## 2021-06-23 PROCEDURE — 250N000011 HC RX IP 250 OP 636: Performed by: STUDENT IN AN ORGANIZED HEALTH CARE EDUCATION/TRAINING PROGRAM

## 2021-06-23 PROCEDURE — 250N000013 HC RX MED GY IP 250 OP 250 PS 637: Performed by: STUDENT IN AN ORGANIZED HEALTH CARE EDUCATION/TRAINING PROGRAM

## 2021-06-23 PROCEDURE — 85027 COMPLETE CBC AUTOMATED: CPT | Performed by: STUDENT IN AN ORGANIZED HEALTH CARE EDUCATION/TRAINING PROGRAM

## 2021-06-23 PROCEDURE — 80048 BASIC METABOLIC PNL TOTAL CA: CPT | Performed by: STUDENT IN AN ORGANIZED HEALTH CARE EDUCATION/TRAINING PROGRAM

## 2021-06-23 PROCEDURE — 84100 ASSAY OF PHOSPHORUS: CPT | Performed by: STUDENT IN AN ORGANIZED HEALTH CARE EDUCATION/TRAINING PROGRAM

## 2021-06-23 RX ORDER — ASPIRIN 81 MG/1
81 TABLET ORAL DAILY
Status: DISCONTINUED | OUTPATIENT
Start: 2021-06-23 | End: 2021-06-25 | Stop reason: HOSPADM

## 2021-06-23 RX ORDER — MAGNESIUM SULFATE HEPTAHYDRATE 40 MG/ML
2 INJECTION, SOLUTION INTRAVENOUS ONCE
Status: COMPLETED | OUTPATIENT
Start: 2021-06-23 | End: 2021-06-23

## 2021-06-23 RX ADMIN — ONDANSETRON 4 MG: 2 INJECTION INTRAMUSCULAR; INTRAVENOUS at 20:06

## 2021-06-23 RX ADMIN — CEFOXITIN SODIUM 2 G: 2 POWDER, FOR SOLUTION INTRAVENOUS at 11:51

## 2021-06-23 RX ADMIN — SODIUM CHLORIDE, POTASSIUM CHLORIDE, SODIUM LACTATE AND CALCIUM CHLORIDE: 600; 310; 30; 20 INJECTION, SOLUTION INTRAVENOUS at 16:00

## 2021-06-23 RX ADMIN — CEFOXITIN SODIUM 2 G: 2 POWDER, FOR SOLUTION INTRAVENOUS at 05:28

## 2021-06-23 RX ADMIN — ASPIRIN 81 MG: 81 TABLET, COATED ORAL at 10:39

## 2021-06-23 RX ADMIN — SODIUM CHLORIDE, POTASSIUM CHLORIDE, SODIUM LACTATE AND CALCIUM CHLORIDE: 600; 310; 30; 20 INJECTION, SOLUTION INTRAVENOUS at 10:44

## 2021-06-23 RX ADMIN — MAGNESIUM SULFATE HEPTAHYDRATE 2 G: 40 INJECTION, SOLUTION INTRAVENOUS at 10:39

## 2021-06-23 RX ADMIN — CEFOXITIN SODIUM 2 G: 2 POWDER, FOR SOLUTION INTRAVENOUS at 17:48

## 2021-06-23 RX ADMIN — ONDANSETRON 4 MG: 4 TABLET, ORALLY DISINTEGRATING ORAL at 08:57

## 2021-06-23 RX ADMIN — CEFOXITIN SODIUM 2 G: 2 POWDER, FOR SOLUTION INTRAVENOUS at 00:17

## 2021-06-23 ASSESSMENT — ACTIVITIES OF DAILY LIVING (ADL)
ADLS_ACUITY_SCORE: 14
ADLS_ACUITY_SCORE: 14

## 2021-06-23 NOTE — ANESTHESIA PREPROCEDURE EVALUATION
Anesthesia Pre-Procedure Evaluation    Patient: Ten Johnson   MRN: [id number] : 1960        Preoperative Diagnosis: Incarcerated hernia [K46.0]   Procedure : Procedure(s):  HERNIORRHAPHY, VENTRAL, OPEN     Past Medical History:   Diagnosis Date     JENNIFER (acute kidney injury) (H) 2019     Alcohol use disorder      Alcoholic cirrhosis (H)      Anticoagulant long-term use     plavix     Aortic stenosis, severe 2018     Ascites      Chronic allergic rhinitis      Chronic anemia      Chronic hepatitis C without hepatic coma (H) 05/10/2016    Untreated as of 2018     Cirrhosis (H)     MRI finding     Diastolic dysfunction      Erosive gastropathy      Esophageal varices in alcoholic cirrhosis (H)      H/O upper gastrointestinal hemorrhage 2017     Hepatocellular carcinoma (H)      History of blood transfusion      History of drug abuse (H)     intranasal     Hypertension     essential     JANELLE (iron deficiency anemia)      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     Sarahi-Hoffman tear     History     Marijuana abuse      MRSA (methicillin resistant Staphylococcus aureus)      Nonrheumatic aortic valve stenosis 2018     Olecranon bursitis      Portal hypertension (H)      Right shoulder pain     history of rotator cuff repair     S/p TAVR (transcatheter aortic valve replacement), bioprosthetic      Severe aortic stenosis      Thrombocytopenia (H)       Past Surgical History:   Procedure Laterality Date     ABLATE LIVER TUMOR N/A 10/22/2019    Procedure: Ablate liver tumor x3;  Surgeon: Gregorio Benoit MD;  [facility]     ARTHROSCOPY SHOULDER ROTATOR CUFF REPAIR  2012    Procedure: ARTHROSCOPY SHOULDER ROTATOR CUFF REPAIR;  Right Shoulder Arthroscopic Rotator Cuff Repair, BicepsTenodesis,  Subacromial Decompression ;  Surgeon: Joi Castillo MD;  [facility]     ESOPHAGOSCOPY, GASTROSCOPY, DUODENOSCOPY (EGD),  COMBINED N/A 10/23/2017    Procedure: COMBINED ESOPHAGOSCOPY, GASTROSCOPY, DUODENOSCOPY (EGD);;  Surgeon: Gentry Salas MD;  [facility]     EXCHANGE POLY COMPONENT ARTHROPLASTY KNEE Right 3/4/2019    Procedure: REVISION RIGHT TOTAL KNEE POLY COMPONENT EXCHANGE;  Surgeon: Olvin Joe MD;  [facility]     FACIAL RECONSTRUCTION SURGERY  1971     HEART CATH FEMORAL CANNULIZATION WITH OPEN STANDBY REPAIR AORTIC VALVE N/A 2/21/2018    Procedure: HEART CATH FEMORAL CANNULIZATION WITH OPEN STANDBY REPAIR AORTIC VALVE;;  Surgeon: Luis Baird MD;  [facility]     IR CHEMO EMBOLIZATION  1/29/2020     IR LIVER BIOPSY PERCUTANEOUS  7/18/2019     IR PARACENTESIS  4/15/2021     IR TRANSVEN INTRAHEPATIC PORTOSYST SHUNT  4/15/2021     IRRIGATION AND DEBRIDEMENT UPPER EXTREMITY, COMBINED  1/3/2012    Procedure:COMBINED IRRIGATION AND DEBRIDEMENT UPPER EXTREMITY; Irrigation & Debridement Left Elbow; Surgeon:CRISTHIAN ZHOU; [facility]     LAPAROSCOPIC BIOPSY LIVER N/A 10/22/2019    Procedure: intraoperative liver ultrasound, laparoscopic converted to open liver biopsy x 6;  Surgeon: Gregorio Benoit MD;  [facility]     LAPAROSCOPY DIAGNOSTIC (GENERAL) N/A 10/22/2019    Procedure: Diagnostic laparoscopy;  Surgeon: Gregorio Benoit MD;  [facility]     LAPAROTOMY, LYSIS ADHESIONS, COMBINED N/A 10/22/2019    Procedure: Laparotomy, lysis adhesions, combined;  Surgeon: Gregorio Benoit MD;  [facility]     OPTICAL TRACKING SYSTEM ARTHROPLASTY KNEE Right 2/7/2019    Procedure: ARTHROPLASTY KNEE RIGHT;  Surgeon: Olvin Joe MD;  [facility]     REPAIR TENDON TRICEPS UPPER EXTREMITY  11/8/2011    Procedure:REPAIR TENDON TRICEPS UPPER EXTREMITY; Surgeon:CRISTHIAN ZHOU; [facility]     SHOULDER SURGERY  2003    left, injury, torn tendons, hematoma     TRANSCATHETER AORTIC VALVE IMPLANT ANESTHESIA N/A 2/21/2018    Procedure: TRANSCATHETER AORTIC VALVE IMPLANT  "ANESTHESIA;  Transfemoral (Quiroz) Aortic Valve Implant 26mm VICTOR M 3, with Cardiopulmonary Bypass Standby, transthoracic echocardiogram;  Surgeon: GENERIC ANESTHESIA PROVIDER;  [facility]     TRANSPOSITION ULNAR NERVE (ELBOW)  11/8/2011    Procedure:TRANSPOSITION ULNAR NERVE (ELBOW); Final Procedure Done: Left Elbow Lateral Ulnar Collateral Repair And  Left Elbow Triceps Repair        Allergies   Allergen Reactions     Zolpidem Other (See Comments)     Alcoholic.  Had reaction 3/17/13 while intoxicated which included black out, loss of awareness, paranoia.  Do not prescribe.  Dr. Celeste     Cats Other (See Comments)     rhinitis     Dogs Other (See Comments)     rhinitis     Pollen Extract Other (See Comments)     rhinits.      Social History     Tobacco Use     Smoking status: Never Smoker     Smokeless tobacco: Never Used   Substance Use Topics     Alcohol use: Yes     Comment: drinks a \"beer occasionally\"      Wt Readings from Last 1 Encounters:   04/15/21 75.4 kg (166 lb 3.6 oz)        Anesthesia Evaluation   Pt has had prior anesthetic. Type: General and MAC.    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 Has significant environmental allergies to dust, grass, pollen. Uses flonase daily.    (+) allergic rhinitis,  (-) tobacco use and sleep apnea   Neurologic: Comment: Pt endorses hx of seizures ~ 30 yrs ago secondary to med reaction that has since been pulled off market    (+) no peripheral neuropathy  (-) no CVA   Cardiovascular:     (+) hypertension-----valvular problems/murmurs type: AS s/p TAVR 2018. Previous cardiac testing   Echo: Date: 2/17/21 Results:  Interpretation Summary   S/P TAVR with 26 mm Quiroz Victor M 3 valve. Trace to mild paravalvular AI.   Mean aortic gradient 7 mmHg.   No significant changes noted.      Left Ventricle   Global and regional left ventricular function is " hyperkinetic with an EF of   65-70%. Left ventricular size is normal. Borderline concentric wall thickening   consistent with left ventricular hypertrophy is present. Diastolic function   not assessed due to significant mitral annular calcification. No regional wall   motion abnormalities are seen.      Right Ventricle   Right ventricular function, chamber size, wall motion, and thickness are   normal.      Atria   The right atria appears normal. Mild to moderate left atrial enlargement is   present. The atrial septum is intact as assessed by agitated dextrose bubble   study .      Mitral Valve   Moderate to severe mitral annular calcification is present. Trace to mild   mitral insufficiency is present.       Aortic Valve   S/P TAVR with 26 mm Quiroz Victor M 3 valve. Trace to mild paravalvular AI.   Mean aortic gradient 7 mmHg.       Tricuspid Valve   The tricuspid valve is normal. Trace tricuspid insufficiency is present. The   right ventricular systolic pressure is approximated at 25.5 mmHg plus the   right atrial pressure.       Pulmonic Valve   The pulmonic valve is normal.       Vessels   The pulmonary artery and bifurcation cannot be assessed. The inferior vena   cava is normal. Ascending aorta 4 cm.       Pericardium   No pericardial effusion is present.       Compared to Previous Study   No significant changes noted.     Stress Test:  Date: Results:    ECG Reviewed:  Date: 9/16/20 Results:  Sinus bradycardia   Otherwise normal ECG   Ventricular rate 56 bpm     Cath:  Date: Results:      METS/Exercise Tolerance: >4 METS Comment: Rides bicycle 100 miles per week. Daily activity varies with level of ascites. Uses stairs daily.   Hematologic:     (+) anemia, history of blood transfusion, no previous transfusion reaction,  (-) history of blood clots   Musculoskeletal: Comment: S/P right knee arthroplasty    S/P B/L rotator cuff repair/recontruction. Decreased ROM on right.    S/P left elbow surgery, complicated  by MRSA.  (+) arthritis,     GI/Hepatic: Comment: ETOH cirrhosis    Paracentesis Q 7-10 days, most recently 4/5/21. Drains between 2-3 L on average.    MELD 7    HCC  Hx Hep C, s/p treatment    Hx Sarahi Hoffman tear      (+) hepatitis type C, liver disease,     Renal/Genitourinary: Comment: History of JENNIFER during treatment for post knee arthroplasty infection. Resolved.      (+) renal disease, type: ARF,     Endo:  - neg endo ROS  (-) Type II DM   Psychiatric/Substance Use: Comment: Has a single ETOH drink Q 4-5 weeks.    (+) alcohol abuse     Infectious Disease: Comment: Hx scarlet fever      (+) MRSA,     Malignancy: Comment: Liver cancer, Hep C  (+) Malignancy, History of GI.GI CA Active status post.        Other:  - neg other ROS          Physical Exam    Airway      Comment: Beard    Mallampati: III   TM distance: > 3 FB   Neck ROM: full   Mouth opening: > 3 cm    Respiratory Devices and Support         Dental     Comment: Multiple chipped/cracked teeth. No removable hardware.        Cardiovascular          Rhythm and rate: regular and normal   (+) murmur       Pulmonary       Comment: Mildly diminished inspiratory/expiratory cycle    breath sounds clear to auscultation           OUTSIDE LABS:  CBC:   Lab Results   Component Value Date    WBC 8.3 06/22/2021    WBC 5.9 04/05/2021    HGB 10.9 (L) 06/22/2021    HGB 12.9 (L) 04/05/2021    HCT 34.3 (L) 06/22/2021    HCT 38.5 (L) 04/05/2021     (L) 06/22/2021     (L) 04/05/2021     BMP:   Lab Results   Component Value Date     06/22/2021     04/05/2021    POTASSIUM 3.7 06/22/2021    POTASSIUM 4.4 04/05/2021    CHLORIDE 109 06/22/2021    CHLORIDE 110 (H) 04/05/2021    CO2 23 06/22/2021    CO2 26 04/05/2021    BUN 16 06/22/2021    BUN 15 04/05/2021    CR 0.89 06/22/2021    CR 1.03 04/05/2021     (H) 06/22/2021    GLC 84 04/05/2021     COAGS:   Lab Results   Component Value Date    PTT 30 06/22/2021    INR 1.23 (H) 06/22/2021     POC:    Lab Results   Component Value Date     (H) 04/15/2021     HEPATIC:   Lab Results   Component Value Date    ALBUMIN 3.2 (L) 06/22/2021    PROTTOTAL 7.6 06/22/2021    ALT 74 (H) 06/22/2021     (H) 06/22/2021    ALKPHOS 114 06/22/2021    BILITOTAL 1.2 06/22/2021    MORENITA 29 10/22/2017     OTHER:   Lab Results   Component Value Date    PH 7.43 02/21/2018    LACT 2.4 (H) 06/22/2021    JOSEPHINE 8.8 06/22/2021    PHOS 3.6 01/30/2020    MAG 1.9 01/30/2020    LIPASE 355 06/22/2021    CRP <2.9 06/05/2019    SED 10 06/05/2019       Anesthesia Plan    ASA Status:  4, emergent       Anesthesia Type: General.     - Airway: ETT   Induction: Intravenous, Propofol.   Maintenance: Balanced.   Techniques and Equipment:     - Airway: Video-Laryngoscope     - Lines/Monitors: 2nd IV, Arterial Line     Consents    Anesthesia Plan(s) and associated risks, benefits, and realistic alternatives discussed. Questions answered and patient/representative(s) expressed understanding.     - Discussed with:  Patient      - Extended Intubation/Ventilatory Support Discussed: No.      - Patient is DNR/DNI Status: No    Use of blood products discussed: No .     Postoperative Care    Pain management: IV analgesics, Multi-modal analgesia.   PONV prophylaxis: Ondansetron (or other 5HT-3), Dexamethasone or Solumedrol     Comments:                Jeremy Mojica MD

## 2021-06-23 NOTE — PROGRESS NOTES
"Pt brought to 6D 519 s/p open ventral hernia repair, able to walk from stretcher to bed, c/o pain \"11/10\", 2L NC, no resp distressed noted, able to use urinal, urine collected & sent to lab, dual skin assessment completed by this writer and JAMES Sweeney - small scab to right lower leg, chronic discoloration to lower legs, +3 edema to lower legs noted, scrotal edema noted, midline incision with bandage CDI, shadowing outline, medicated for pain, pending antibotic from pharmacy, pt instructed to use call light for assistance oob.  Voiced understanding. Pt reports he has a PCA at home to help with his care.    "

## 2021-06-23 NOTE — ANESTHESIA PROCEDURE NOTES
Airway       Patient location during procedure: OR  Staff -        Anesthesiologist:  Jeremy Mojica MD       Resident/Fellow: Yamila Jameson MD       Performed By: resident  Consent for Airway        Urgency: emergent  Indications and Patient Condition       Indications for airway management: talia-procedural       Induction type:RSI (cricoid pressure applied)       Mask difficulty assessment: 0 - not attempted    Final Airway Details       Final airway type: endotracheal airway       Successful airway: ETT - single  Endotracheal Airway Details        ETT size (mm): 8.0       Successful intubation technique: video laryngoscopy       VL Blade Size: MAC 4       Grade View of Cords: 2       Adjucts: stylet       Position: Right       Measured from: lips       Secured at (cm): 22    Post intubation assessment        Placement verified by: capnometry and chest rise        Number of attempts at approach: 1       Number of other approaches attempted: 0       Secured with: pink tape       Ease of procedure: easy       Dentition: Intact and Unchanged    Medication(s) Administered   Medication Administration Time: 6/22/2021 7:30 PM

## 2021-06-23 NOTE — PHARMACY-ADMISSION MEDICATION HISTORY
Admission Medication History Completed by Pharmacy    See [facility] Admission Navigator for allergy information, preferred outpatient pharmacy, prior to admission medications and immunization status.     Medication History Sources:     Patient, SureScripts    Changes made to PTA medication list (reason):    Added: None    Deleted: Naloxone nasal spray    Changed: Diazepam to PRN for MRI    Additional Information:    None    Prior to Admission medications    Medication Sig Last Dose Taking? Auth Provider   aspirin (ASPIRIN LOW DOSE) 81 MG EC tablet Take 1 tablet (81 mg) by mouth daily  Yes Ema Pierson NP   fluticasone (FLONASE) 50 MCG/ACT nasal spray Spray 2 sprays into both nostrils daily  Patient taking differently: Spray 2 sprays into both nostrils as needed   Yes Cuca Celeste MD   lisinopril (ZESTRIL) 20 MG tablet Take 1 tablet (20 mg) by mouth daily  Patient taking differently: Take 20 mg by mouth every evening   Yes Jessica Sotelo NP   loratadine (CLARITIN) 5 MG chewable tablet Take 1 tablet (5 mg) by mouth daily  Patient taking differently: Take 5 mg by mouth every morning   Yes Cuca Celeste MD   diazepam (VALIUM) 5 MG tablet as needed (Prior to MRI)  More than a month at Unknown time  Reported, Patient   ondansetron (ZOFRAN) 4 MG tablet Take 1 tablet (4 mg) by mouth every 8 hours as needed for nausea More than a month at Unknown time  Leventhal, Thomas Michael, MD       Date completed: 06/23/21    Medication history completed by: Annie Vicente [facility]

## 2021-06-23 NOTE — ANESTHESIA POSTPROCEDURE EVALUATION
Patient: Ten Johnson    Procedure(s):  HERNIORRHAPHY, VENTRAL, OPEN, small bowel resection    Diagnosis:Incarcerated hernia [K46.0]  Diagnosis Additional Information: No value filed.    Anesthesia Type:  General    Note:  Disposition: Admission   Postop Pain Control: Uneventful            Sign Out: Well controlled pain   PONV: No   Neuro/Psych: Uneventful            Sign Out: Acceptable/Baseline neuro status   Airway/Respiratory: Uneventful            Sign Out: Acceptable/Baseline resp. status   CV/Hemodynamics: Uneventful            Sign Out: Acceptable CV status; No obvious hypovolemia; No obvious fluid overload   Other NRE: NONE   DID A NON-ROUTINE EVENT OCCUR? No           Last vitals:  Vitals:    06/22/21 2130 06/22/21 2145 06/22/21 2200   BP: (!) 144/72 139/75 (!) 144/75   Pulse: 92 98 101   Resp: 16 16 16   Temp:      SpO2: 100% 98% 99%       Last vitals prior to Anesthesia Care Transfer:  CRNA VITALS  6/22/2021 2051 - 6/22/2021 2151 6/22/2021             Pulse:  99    ART BP:  141/53    ART Mean:  86    SpO2:  98 %    Resp Rate (observed):  (!) 1          Electronically Signed By: Jeremy Mojica MD  June 22, 2021  10:15 PM

## 2021-06-23 NOTE — PROGRESS NOTES
"Post Op Check    06/23/2021    Ten Johnson is a 61 year old male with h/o incarcerated ventral hernia now POD#0 s/p open ventral hernia repair with small bowel resection.    Pt reports \"I'm doing pretty good.\" Pain adequately controlled. Denies SOB, chest pain, or dizziness. Some throat pain from NGT. No BM. Not post-op void yet.    /65   Pulse 105   Temp 97.3  F (36.3  C) (Oral)   Resp 16   SpO2 97%     Gen: A&O x3, NAD, NG tube in place  Chest: breathing non-labored on minimal supplemental O2  Abdomen: soft, non-distended, appropriately tender, small amount of dried serosang drainage on dressing  Extremities: warm and well perfused    A/P: No acute post-op issues. Continue plan of care per primary team. Please call with any questions.    Zeb Stuart MD  Surgery resident  [phone]  "

## 2021-06-23 NOTE — PROGRESS NOTES
Assume care of patient at 11am. Pt is alert and oriented, denies pain at this moment. No acute distress noted. Pending transfer to medical unit.

## 2021-06-23 NOTE — PROGRESS NOTES
Transferred to: Other Unit 7B at (1500)  Belongings: Cell phone and  sent with patient  Mosher removed? No: n/a  Central line removed? NA  Chart and medications sent with patient yes  Family notified: No: as per pt request

## 2021-06-23 NOTE — OP NOTE
[facility]     Operative Note    Pre-operative diagnosis: Incarcerated hernia [K46.0]  Post-operative diagnosis Incarcerated hernia, necrotic small bowel    Procedure: Procedure(s):  HERNIORRHAPHY, VENTRAL, OPEN, small bowel resection  Surgeon: Surgeon(s) and Role:     * Harley Snell MD - Primary     * Micki Victoria MD - Resident - Assisting  Anesthesia: General   Estimated blood loss: 40ml  Drains: None  Specimens:   ID Type Source Tests Collected by Time Destination   A : small bowel  Tissue Other SURGICAL PATHOLOGY EXAM Harley Snell MD 6/22/2021  8:14 PM      Findings:   40ml.  Complications: None.  Implants: * No implants in log *      Description of procedure:      Ten Johnson was brought to the OR and placed supine on the operating table.  He was sedated and intubated and an NG tube was placed, no waters used.  The abdomen was prepped and draped in sterile fashion and a time out was performed.     A midline incision was sharply made along the umbilicus.  Dissected down to fascia with cautery.  The hernia sac was entered with drainage of clear ascites.  An 8cm piece of small intestine was within the hernia sac.  The fascial defect was ~1.5cm in size.  This was enlarged superiorly and the bowel was freed.  It was inspected.  A portion of this appeared viable with erythematous changes, but a 2-3cm length was black/deep purple and necrotic.    The incarcerated portion of bowel was resected, the mesentery serially taken between clamps and ligated, and sent to pathology.  A side-to-side, functional end to end anastomosis was made with a linear RADHA stapler and the common enterotomy closed in layers with running vicryl and interrupted silk sutures.  It was returned to the abdomen.  We then changed our gloves prior to closure.    The hernia sack was excised.  Fascia was closed with interrupted 1 PDS suture in interrupted fashion.  The site was  irrigated.    Hemostasis was obtained and additional local anesthetic injected.  The incision was closed in layers with 3.0 vicryl and staples. Dressings were applied.  He was extubated without issue.  Ten Johnson was then brought to the recovery area in stable condition.    I performed the procedure.

## 2021-06-23 NOTE — ANESTHESIA CARE TRANSFER NOTE
Patient: Ten Johnson    Procedure(s):  HERNIORRHAPHY, VENTRAL, OPEN, small bowel resection    Diagnosis: Incarcerated hernia [K46.0]  Diagnosis Additional Information: No value filed.    Anesthesia Type:   General     Note:    Oropharynx: oropharynx clear of all foreign objects  Level of Consciousness: drowsy  Oxygen Supplementation: face mask  Level of Supplemental Oxygen (L/min / FiO2): 6  Independent Airway: airway patency satisfactory and stable  Dentition: dentition unchanged  Vital Signs Stable: post-procedure vital signs reviewed and stable  Report to RN Given: handoff report given  Patient transferred to: PACU  Comments: VSS. Breathing spontaneously at a regular rate with adequate tidal volumes and maintaining O2 sats on 6L. Denies nausea, endorses moderate pain. No apparent complications from anesthesia.     Yamila Jameson MD [facility] [place] pager [phone]  Anesthesia CA-2    Handoff Report: Identifed the Patient, Identified the Reponsible Provider, Reviewed the pertinent medical history, Discussed the surgical course, Reviewed Intra-OP anesthesia mangement and issues during anesthesia, Set expectations for post-procedure period and Allowed opportunity for questions and acknowledgement of understanding      Vitals: (Last set prior to Anesthesia Care Transfer)  CRNA VITALS  6/22/2021 2051 - 6/22/2021 2136      6/22/2021             Pulse:  99    ART BP:  141/53    ART Mean:  86    SpO2:  98 %    Resp Rate (observed):  (!) 1        Electronically Signed By: Yamila Jameson MD  June 22, 2021  9:36 PM

## 2021-06-23 NOTE — PROGRESS NOTES
Surgery Progress Note  06/23/2021       Subjective:  - Feeling much better than prior to surgery  - Pain is well controlled  - Not passing gas, no BM  - Denies recent alcohol use, says he has not had alcohol in a while as he is planning to start treatment for hepatitis C soon  - No tremors      Objective:  Temp:  [96.9  F (36.1  C)-98.5  F (36.9  C)] 98.3  F (36.8  C)  Pulse:  [] 96  Resp:  [12-18] 12  BP: (126-163)/(65-90) 145/78  MAP:  [75 mmHg-111 mmHg] 75 mmHg  Arterial Line BP: (138-184)/(44-73) 138/44  SpO2:  [95 %-100 %] 98 %    I/O last 3 completed shifts:  In: 1900 [I.V.:1900]  Out: 915 [Urine:775; Emesis/NG output:100; Blood:40]      Gen: Awake, alert, NAD  Resp: NLB on RA  Abd: soft, nondistended, appropriately tender. NG with non-bilious output   Incision: c/d/I  Ext: WWP     Labs:  Recent Labs   Lab 06/23/21  0637 06/22/21  1652   WBC 17.6* 8.3   HGB 9.8* 10.9*   * 115*       Recent Labs   Lab 06/23/21  0637 06/22/21  1652    140   POTASSIUM 4.2 3.7   CHLORIDE 108 109   CO2 21 23   BUN 16 16   CR 0.84 0.89   * 105*   JOSEPHINE 8.3* 8.8   MAG 1.9  --    PHOS 4.1  --        Imaging:  EXAMINATION: CT ABDOMEN PELVIS W CONTRAST, 6/22/2021 6:07 PM    IMPRESSION:   1.  Umbilical herniation of small bowel with proximal  distention/fecalization of contents and relative distal decompression  compatible with small bowel obstruction. There is edema associated  with the herniated bowel which can be seen with  strangulation/congestion related changes.   2.  Cirrhotic morphology of liver with stable posttreatment changes of  segment 6 and 7. Redemonstration of scattered arterial enhancing foci  likely hemangiomas.  3.  Peritoneal ascites likely related to portal hypertension.  Resolution of the preceding noted splenomegaly.  4.  Cholelithiasis without cholecystitis.    Assessment/Plan:   61 year old male with PMH of ETOH cirrhosis, chronic hepatitis C, hepatocellular carcinoma s/p liver tumor  ablation and TIPS (MELD 9), aortic stenosis s/p TAVR, known umbilical hernia who presented 6/22 with incarcerated umbilical hernia. He underwent an open ventral hernia repair with small bowel resection (~8cm ischemic bowel resected), now POD#1. NGT placed with NPO and IVF. Recovering well this morning. No flatus or BM. On cefoxitin periop. WBC rise today appropriate post-operatively.     - NGT removed today   - Start clear liquid diet, continue mIVF until adequate PO intake   - Continue cefoxitin 24 hrs.   - Continue pain management with scheduled Tylenol, PRN oxycodone  - Restart PTA aspirin, hold PTA lisinopril   - trans CBC   - Encourage incentive spirometry   - Up, ambulating as much as possible      Seen, examined, and discussed with chief resident, who will discuss with staff.  - - - - - - - - - - - - - - - - - -  Vero Lock, MS4    I was present with the medical student who participated in the service and in the documentation of the note. I have verified the history and personally performed the physical exam and medical decision making. I edited as needed and agree with the assessment and plan of care as documented in the note.    Sarah Castillo MD  General Surgery PGY1  Pager [phone]

## 2021-06-23 NOTE — BRIEF OP NOTE
[facility]    Brief Operative Note    Pre-operative diagnosis: Incarcerated hernia [K46.0]  Post-operative diagnosis Same as pre-operative diagnosis    Procedure: Procedure(s):  HERNIORRHAPHY, VENTRAL, OPEN, small bowel resection  Surgeon: Surgeon(s) and Role:     * Harley Snell MD - Primary     * Micki Victoria MD - Resident - Assisting  Anesthesia: General   Estimated blood loss: 40 ml  Drains: None  Specimens:   ID Type Source Tests Collected by Time Destination   A : small bowel  Tissue Other SURGICAL PATHOLOGY EXAM Harley Snell MD 6/22/2021  8:14 PM      Findings:   Incarcerated umbilical hernia with ~8 cm ischemic bowel, resected. Mild ascites.  Complications: None.  Implants: * No implants in log *    Micki Alan MD  General Surgery PGY-6

## 2021-06-23 NOTE — PLAN OF CARE
Patient transferred from . [facility]. BP slightly elevated, controlled w/ oral med. LR @ 50 hr. Midline incision primapore w/ old dried blood. Abdominal binder in place. Tolerating clear liquids. Urinal @ bedside.

## 2021-06-23 NOTE — ANESTHESIA PROCEDURE NOTES
Arterial Line Procedure Note  Pre-Procedure   Staff -        Anesthesiologist:  Jeremy Mojica MD       Resident/Fellow: Yamila Jameson MD       Performed By: resident       Location: OR       Pre-Anesthestic Checklist: patient identified, IV checked, risks and benefits discussed, informed consent, monitors and equipment checked, pre-op evaluation and at physician/surgeon's request  Timeout:       Correct Patient: Yes        Correct Procedure: Yes   Procedure   Procedure: arterial line       Laterality: left       Insertion Site: radial.  Sterile Prep        Standard elements of sterile barrier followed       Skin prep: Chloraprep  Insertion/Injection        Technique: ultrasound guided and Seldinger Technique        1. Ultrasound was used to evaluate the access site.       2. Artery evaluated via ultrasound for patency/adequacy.       3. Using real-time ultrasound the needle/catheter was observed entering the artery/vein.       Catheter Type/Size: 2.5 Fr, 2.5 cm  Narrative        Tegaderm dressing used.       Complications: None apparent,        Arterial waveform: Yes        IBP within 10% of NIBP: Yes

## 2021-06-24 LAB
ERYTHROCYTE [DISTWIDTH] IN BLOOD BY AUTOMATED COUNT: 19.1 % (ref 10–15)
HCT VFR BLD AUTO: 27.3 % (ref 40–53)
HGB BLD-MCNC: 8.5 G/DL (ref 13.3–17.7)
MCH RBC QN AUTO: 26.3 PG (ref 26.5–33)
MCHC RBC AUTO-ENTMCNC: 31.1 G/DL (ref 31.5–36.5)
MCV RBC AUTO: 85 FL (ref 78–100)
PLATELET # BLD AUTO: 93 10E9/L (ref 150–450)
RBC # BLD AUTO: 3.23 10E12/L (ref 4.4–5.9)
WBC # BLD AUTO: 13 10E9/L (ref 4–11)

## 2021-06-24 PROCEDURE — 120N000002 HC R&B MED SURG/OB UMMC

## 2021-06-24 PROCEDURE — 250N000013 HC RX MED GY IP 250 OP 250 PS 637: Performed by: STUDENT IN AN ORGANIZED HEALTH CARE EDUCATION/TRAINING PROGRAM

## 2021-06-24 PROCEDURE — 36415 COLL VENOUS BLD VENIPUNCTURE: CPT | Performed by: STUDENT IN AN ORGANIZED HEALTH CARE EDUCATION/TRAINING PROGRAM

## 2021-06-24 PROCEDURE — 85027 COMPLETE CBC AUTOMATED: CPT | Performed by: STUDENT IN AN ORGANIZED HEALTH CARE EDUCATION/TRAINING PROGRAM

## 2021-06-24 PROCEDURE — 250N000011 HC RX IP 250 OP 636: Performed by: STUDENT IN AN ORGANIZED HEALTH CARE EDUCATION/TRAINING PROGRAM

## 2021-06-24 RX ORDER — LISINOPRIL 20 MG/1
20 TABLET ORAL DAILY
Status: DISCONTINUED | OUTPATIENT
Start: 2021-06-24 | End: 2021-06-25 | Stop reason: HOSPADM

## 2021-06-24 RX ORDER — OXYCODONE HYDROCHLORIDE 5 MG/1
5-10 TABLET ORAL EVERY 6 HOURS PRN
Qty: 15 TABLET | Refills: 0 | Status: SHIPPED | OUTPATIENT
Start: 2021-06-24 | End: 2022-01-01

## 2021-06-24 RX ORDER — OXYCODONE HYDROCHLORIDE 5 MG/1
5-10 TABLET ORAL EVERY 6 HOURS PRN
Qty: 15 TABLET | Refills: 0 | Status: SHIPPED | OUTPATIENT
Start: 2021-06-24 | End: 2021-06-24

## 2021-06-24 RX ORDER — ACETAMINOPHEN 325 MG/1
975 TABLET ORAL 3 TIMES DAILY
Qty: 50 TABLET | Refills: 0 | Status: SHIPPED | OUTPATIENT
Start: 2021-06-24 | End: 2022-01-01

## 2021-06-24 RX ADMIN — OXYCODONE HYDROCHLORIDE 10 MG: 5 TABLET ORAL at 09:39

## 2021-06-24 RX ADMIN — OXYCODONE HYDROCHLORIDE 10 MG: 5 TABLET ORAL at 14:12

## 2021-06-24 RX ADMIN — LISINOPRIL 20 MG: 20 TABLET ORAL at 08:17

## 2021-06-24 RX ADMIN — OXYCODONE HYDROCHLORIDE 5 MG: 5 TABLET ORAL at 05:07

## 2021-06-24 RX ADMIN — LORATADINE 5 MG: 5 SOLUTION ORAL at 18:39

## 2021-06-24 RX ADMIN — FLUTICASONE PROPIONATE 2 SPRAY: 50 SPRAY, METERED NASAL at 18:40

## 2021-06-24 RX ADMIN — OXYCODONE HYDROCHLORIDE 10 MG: 5 TABLET ORAL at 23:39

## 2021-06-24 RX ADMIN — OXYCODONE HYDROCHLORIDE 10 MG: 5 TABLET ORAL at 18:43

## 2021-06-24 RX ADMIN — ASPIRIN 81 MG: 81 TABLET, COATED ORAL at 08:17

## 2021-06-24 RX ADMIN — ONDANSETRON 4 MG: 2 INJECTION INTRAMUSCULAR; INTRAVENOUS at 05:07

## 2021-06-24 ASSESSMENT — ACTIVITIES OF DAILY LIVING (ADL)
ADLS_ACUITY_SCORE: 14
DEPENDENT_IADLS:: INDEPENDENT
ADLS_ACUITY_SCORE: 12

## 2021-06-24 NOTE — PROVIDER NOTIFICATION
"Provider notified \"7B 21 SERGIO Johnson Pt is requesting to start Claritin tonight for congestion. justin jeronimo RN [phone]\"    Sarita Snell RN on 6/24/2021 at 4:20 PM    "

## 2021-06-24 NOTE — PLAN OF CARE
BP (!) 161/85 (BP Location: Left arm)   Pulse 74   Temp 99  F (37.2  C) (Oral)   Resp 18   Wt 82.1 kg (181 lb 1.6 oz)   SpO2 96%   BMI 26.74 kg/m       POD # Open Ventral hernia repair    Neuro: AOx4, calm and cooperative  Cardiac: Denies chest pain, WDL  Respiratory: Denies SOB, WDL  GI/: +BS/flatus, voiding via urinal   Diet/Appetite: Clear liquid  Skin: Midline incision with dried drainage. abd binder in place  LDA: RPIV infusing LR @ 50 mL/hr  Activity: NOOB  Pain: chronic abd pain managed with PRN Oxycodone, refused schedule Tylenol   Plan: Continue POC

## 2021-06-24 NOTE — PLAN OF CARE
Vitals: Temp: 97.9  F (36.6  C) Temp src: Oral BP: 122/70 Pulse: 87   Resp: 18 SpO2: 96 % O2 Device: None (Room air)       Time: 1530-1930  Reason for admission: Incarcerated umbilical hernia, POD#2 s/p open ventral hernia repair with small bowel resection  Activity:  Up ad tierra  Pain:  Pt reports incisional pain well controlled with PO oxycodone  Neuro: A&O x4, pleasant.   Cardiac: WDL  Respiratory:  WDL, denies SOB  GI/: +BS, voiding adequately.  Last BM 6/22/21  Diet: Advanced to regular, ate 100% of mashed potatoes, denies N/V  Lines:  PIV SL.   Incisions/Drains/Skin:  Midline incision stapled covered with dressing c/d/i no drainage.   Lab:  WBC 13.0  Electrolyte Replacements: NA    New changes this shift:  Pt wants to stay the night. Possible discharge in AM pending tolerating diet. Claritin and nasal spray given for congestion.

## 2021-06-24 NOTE — PLAN OF CARE
Vital signs:  /80 (BP Location: Left arm)   Pulse 83   Temp 98.6  F (37  C) (Oral)   Resp 18   Wt 82.1 kg (181 lb 1.6 oz)   SpO2 98%   BMI 26.74 kg/m      Shift:9245-3015       Activity: independent  Neuros: A&O x4  Cardiac:WDL  Respiratory: LS: clear throughout, denies SOB, room air   GI/: +BS, LBM 6/22, +gas  Diet: Fulls  Skin: abdominal incision covered and dressing CDI, PIV  Lines:PIV  Labs: Na 137, K 4.2 Mg 1.9 Phos 4.1 WBC 17.6 Hgb 9.8   Pain/nausea: abdominal pain controlled with oxycodone, int nausea-refused antiemetic   Plan:Cont POC, see how pt tolerates diet advancement

## 2021-06-24 NOTE — CONSULTS
"Care Management Initial Consult    General Information  Assessment completed with: Patient,    Type of CM/SW Visit: Initial Assessment    Primary Care Provider verified and updated as needed: Yes   Readmission within the last 30 days:        Reason for Consult: discharge planning  Advance Care Planning: Advance Care Planning Reviewed: no concerns identified          Communication Assessment  Patient's communication style: spoken language (English or Bilingual)    Hearing Difficulty or Deaf: no   Wear Glasses or Blind: no    Cognitive  Cognitive/Neuro/Behavioral: WDL  Level of Consciousness: alert  Arousal Level: opens eyes spontaneously  Orientation: oriented x 4  Mood/Behavior: calm, cooperative  Best Language: 0 - No aphasia  Speech: clear, spontaneous    Living Environment:   People in home: alone     Current living Arrangements: apartment      Able to return to prior arrangements: yes       Family/Social Support:  Care provided by: self  Provides care for: no one  Marital Status: Single             Description of Support System:           Current Resources:   Patient receiving home care services: No     Community Resources: PCA  Equipment currently used at home: cane, straight, shower chair  Supplies currently used at home:      Employment/Financial:  Employment Status: disabled        Financial Concerns: No concerns identified           Lifestyle & Psychosocial Needs:        Socioeconomic History     Marital status: Single     Spouse name: Not on file     Number of children: 0     Years of education: Not on file     Highest education level: Not on file   Occupational History     Occupation:      Tobacco Use     Smoking status: Never Smoker     Smokeless tobacco: Never Used   Substance and Sexual Activity     Alcohol use: Yes     Comment: drinks a \"beer occasionally\"     Drug use: Yes     Types: Marijuana     Comment: denies     Sexual activity: Not Currently     Partners: Female       Functional " Status:  Prior to admission patient needed assistance:   Dependent ADLs:: Ambulation-cane  Dependent IADLs:: Independent       Mental Health Status:  Mental Health Status: No Current Concerns       Chemical Dependency Status:  Chemical Dependency Status: No Current Concerns             Values/Beliefs:  Spiritual, Cultural Beliefs, Temple Practices, Values that affect care: no                Additional Information:  Patient is a 61 year old male with history of ETOH cirrhosis, chronic hepatitis C, hepatocellular carcinoma s/p liver tumor ablation and TIPS, aortic stenosis s/p TAVR, admitted with incarcerated umbilical hernia now s/p open ventral hernia repair with small bowel resection.  Met with patient at bedside to complete care management assessment for high risk for readmission.  Patient lives in [facility] alone.  He reports he has a PCA, but this is new and he is not sure what they do for him.  He reports he is able manage most things independently including medication management, cooking, and cleaning.  He has a bus pass through his health plan that he uses to get to and from medical appointments.  Patient had no concerns about returning to home.  RNCC will continue to follow and assist with discharge planning as needed.           ESTER Alexander  Phone: [phone]  Pager: [phone]  To contact the weekend RNCC, Page: [phone]

## 2021-06-24 NOTE — PROGRESS NOTES
Nutrition Services Admission Screen (MST):     Chart reviewed, nutrition screen upon admission positive for weight loss of 34 lbs or more. However, no weight loss per chart review.     Wt Readings from Last 10 Encounters:   06/23/21 82.1 kg (181 lb 1.6 oz)   04/15/21 75.4 kg (166 lb 3.6 oz)   04/13/21 75.4 kg (166 lb 3.2 oz)   04/08/21 73 kg (161 lb)   04/05/21 77.5 kg (170 lb 14.4 oz)   03/12/21 72.8 kg (160 lb 8 oz)   03/12/21 69.9 kg (154 lb)   03/04/21 70.2 kg (154 lb 11.2 oz)   02/26/21 71.1 kg (156 lb 11.2 oz)   02/22/21 69.7 kg (153 lb 9.6 oz)     RD assessment available upon consult request, otherwise will follow via rounds/LOS.    Sharmila Cobian, RD, LD  [address]B RD pager [phone]

## 2021-06-24 NOTE — PROGRESS NOTES
Surgery Progress Note  06/24/2021       Subjective:  - had an episode of pain overnight, resolved with oxycodone, much better this morning  - burping but also passing flatus multiple times   - no nausea   - No tremors   - no fevers      Objective:  Temp:  [97.6  F (36.4  C)-99  F (37.2  C)] 98.6  F (37  C)  Pulse:  [74-84] 83  Resp:  [14-18] 18  BP: (138-161)/(73-85) 138/80  SpO2:  [96 %-98 %] 98 %    I/O last 3 completed shifts:  In: 390 [P.O.:240; I.V.:150]  Out: 3050 [Urine:2950; Emesis/NG output:100]      Gen: Awake, alert, NAD  Resp: NLB on RA  Abd: soft, nondistended, appropriately tender.  Incision: c/d/I with staples. Dressing removed  Ext: WWP     Labs:  Recent Labs   Lab 06/23/21  0637 06/22/21  1652   WBC 17.6* 8.3   HGB 9.8* 10.9*   * 115*       Recent Labs   Lab 06/23/21  0637 06/22/21  1652    140   POTASSIUM 4.2 3.7   CHLORIDE 108 109   CO2 21 23   BUN 16 16   CR 0.84 0.89   * 105*   JOSEPHINE 8.3* 8.8   MAG 1.9  --    PHOS 4.1  --        Imaging:  EXAMINATION: CT ABDOMEN PELVIS W CONTRAST, 6/22/2021 6:07 PM    IMPRESSION:   1.  Umbilical herniation of small bowel with proximal  distention/fecalization of contents and relative distal decompression  compatible with small bowel obstruction. There is edema associated  with the herniated bowel which can be seen with  strangulation/congestion related changes.   2.  Cirrhotic morphology of liver with stable posttreatment changes of  segment 6 and 7. Redemonstration of scattered arterial enhancing foci  likely hemangiomas.  3.  Peritoneal ascites likely related to portal hypertension.  Resolution of the preceding noted splenomegaly.  4.  Cholelithiasis without cholecystitis.    Assessment/Plan:   61 year old male with PMH of ETOH cirrhosis, chronic hepatitis C, hepatocellular carcinoma s/p liver tumor ablation and TIPS (MELD 9), aortic stenosis s/p TAVR, known umbilical hernia who presented 6/22 with incarcerated umbilical hernia. He underwent  an open ventral hernia repair with small bowel resection (~8cm ischemic bowel resected), now POD#2. With ROBf    - advance to regular diet  - discontinue IVF  - follow up CBC   - Continue pain management with scheduled Tylenol, PRN oxycodone  - Restart PTA aspirin, PTA lisinopril   - Encourage incentive spirometry   - Up, ambulating as much as possible   - discharge possibly later today if tolerating regular diet      Seen, examined, and discussed with chief resident, who will discuss with staff.  - - - - - - - - - - - - - - - - - -    Sarah Castillo MD  General Surgery PGY1  Pager [phone]

## 2021-06-25 VITALS
DIASTOLIC BLOOD PRESSURE: 75 MMHG | TEMPERATURE: 97.9 F | WEIGHT: 181.1 LBS | RESPIRATION RATE: 18 BRPM | HEART RATE: 81 BPM | OXYGEN SATURATION: 97 % | BODY MASS INDEX: 26.74 KG/M2 | SYSTOLIC BLOOD PRESSURE: 142 MMHG

## 2021-06-25 LAB — COPATH REPORT: NORMAL

## 2021-06-25 PROCEDURE — 250N000013 HC RX MED GY IP 250 OP 250 PS 637: Performed by: STUDENT IN AN ORGANIZED HEALTH CARE EDUCATION/TRAINING PROGRAM

## 2021-06-25 PROCEDURE — 99239 HOSP IP/OBS DSCHRG MGMT >30: CPT | Performed by: SURGERY

## 2021-06-25 RX ORDER — OXYCODONE HYDROCHLORIDE 5 MG/1
5 TABLET ORAL EVERY 4 HOURS PRN
Status: DISCONTINUED | OUTPATIENT
Start: 2021-06-25 | End: 2021-06-25 | Stop reason: HOSPADM

## 2021-06-25 RX ADMIN — OXYCODONE HYDROCHLORIDE 10 MG: 5 TABLET ORAL at 05:15

## 2021-06-25 RX ADMIN — ASPIRIN 81 MG: 81 TABLET, COATED ORAL at 10:05

## 2021-06-25 RX ADMIN — LISINOPRIL 20 MG: 20 TABLET ORAL at 10:05

## 2021-06-25 ASSESSMENT — ACTIVITIES OF DAILY LIVING (ADL)
ADLS_ACUITY_SCORE: 12

## 2021-06-25 NOTE — PLAN OF CARE
/77 (BP Location: Left arm)   Pulse 87   Temp 97.1  F (36.2  C) (Oral)   Resp 18   Wt 82.1 kg (181 lb 1.6 oz)   SpO2 98%   BMI 26.74 kg/m      Time: 5599-5290  Reason for admission: postop day #2     Neuro: AOx4, calm and cooperative  Cardiac: Denies chest pain, WDL  Respiratory: Denies SOB, WDL  GI/: +BS/flatus, voiding via urinal   Diet/Appetite: tolerating regular diet  Skin: Midline incision with dried drainage. abd binder in place  LDA: lost of PIV during shift. patient refusing.  Activity: independent  Pain: chronic abd pain managed with PRN Oxycodone, refused schedule Tylenol   Plan: Patient appeasr to rest between cares.Continue POC

## 2021-06-25 NOTE — DISCHARGE SUMMARY
[facility]  Discharge Summary  General Surgery     Ten Johnson MRN# [id number]   YOB: 1960 Age: 61 year old     Date of Admission:  6/22/2021  Date of Discharge::  6/25/2021  Admitting Physician:  Harley Snell MD  Discharge Physician:  Dr. Hudson Rojas  Primary Care Physician:        Cuca Celeste          Admission Diagnoses:   Incarcerated umbilical hernia [K42.0]  Strangulated umbilical hernia [K42.0]  SBO (small bowel obstruction) (H) [K56.609]          Discharge Diagnosis:   Incarcerated umbilical hernia [K42.0]  Strangulated umbilical hernia [K42.0]  SBO (small bowel obstruction) (H) [K56.609]         Procedures:   Procedure(s):  HERNIORRHAPHY, VENTRAL, OPEN, small bowel resection          Consultations:   VASCULAR ACCESS CARE ADULT IP CONSULT  CARE MANAGEMENT / SOCIAL WORK IP CONSULT          Brief History of Illness:   61 year old male with PMH of ETOH cirrhosis, chronic hepatitis C, hepatocellular carcinoma s/p liver tumor ablation and TIPS (MELD 9), aortic stenosis s/p TAVR, who presented 6/22 with known umbilical hernia who presents with fat containing incarcerated umbilical hernia. Lactate 2.4. WBC wnl. Given duration of symptoms and exam w/ overlying skin changes and unable to reduce hernia, urgent operative intervention was recommended. Patient agreed to proceed after discussion of the risks and benefits of surgery.            Hospital Course:   The patient underwent open umbilical hernia repair with 8 cm of ischemic small bowel resected on 6/22, which he tolerated well without immediate complications. He was transferred to the PACU and then floor for routine postoperative care. The remainder of his postoperative course was unremarkable. He had return of bowel function on POD#2 and his diet was slowly advanced. On the day of discharge, he was tolerating a regular diet, his pain was well controlled with oral pain medications, he was voiding  spontaneously, and ambulating independently. Patient with follow up with Dr. Snell in clinic in 1 week. Will need staple removal in clinic. Final path pending.            Imaging Studies:     Results for orders placed or performed during the hospital encounter of 06/22/21   CT Abdomen Pelvis w Contrast     Value    Radiologist flags Small bowel obstruction (Urgent)    Narrative    EXAMINATION: CT ABDOMEN PELVIS W CONTRAST, 6/22/2021 6:07 PM    TECHNIQUE:  Helical CT images from the lung bases through the  symphysis pubis were obtained with contrast.  Coronal reformatted  images were generated at a workstation for further assessment.    COMPARISON: 4/5/2021    HISTORY: Bowel obstruction suspected    FINDINGS:    Abdomen and pelvis:   Liver: Cirrhotic morphology of the liver. Stable posttreatment changes  in segment 6 and 7. Redemonstration of a few scattered enhancing foci  for example adjacent to the left portal vein (series 5, image 86).  Other previously seen enhancing foci are not conspicuous on this  portal venous phase only study. Portal veins appear patent. TIPS in  place and appears patent.  Gallbladder: Cholelithiasis.  Spleen: Normal size with resolution of previously noted clinically.  Pancreas: No suspicious pancreatic lesions. The pancreatic duct is not  dilated.  Adrenal glands: No adrenal nodules.  Kidneys: No kidney masses. No hydronephrosis or obstructing renal  stones.  Bladder / Pelvic organs: Unremarkable.  Bowel: There is herniation of small bowel into umbilical hernia with  interval development of distention of the herniated bowel as well as  the immediate proximal segment with fecalization of intraluminal  contents (series 5, image 235). There is significant edema associated  with the herniated bowel. Distal to the herniation of bowel is  decompressed. Proximal small bowel is also decompressed.  Lymph nodes: No retroperitoneal, mesenteric, or pelvic  lymphadenopathy.  Fluid: Peritoneal  ascites.  Vessels: No infrarenal aortic aneurysm.     Lung bases: No consolidation or pleural effusion. Bibasilar  atelectasis. Prosthetic mitral valve.    Bones and soft tissues: No suspicious osseous lesions. Degenerative  changes of lumbar spine. Large bilateral hydroceles.      Impression    IMPRESSION:   1.  Umbilical herniation of small bowel with proximal  distention/fecalization of contents and relative distal decompression  compatible with small bowel obstruction. There is edema associated  with the herniated bowel which can be seen with  strangulation/congestion related changes.   2.  Cirrhotic morphology of liver with stable posttreatment changes of  segment 6 and 7. Redemonstration of scattered arterial enhancing foci  likely hemangiomas.  3.  Peritoneal ascites likely related to portal hypertension.  Resolution of the preceding noted splenomegaly.  4.  Cholelithiasis without cholecystitis.    [Urgent Result: Small bowel obstruction]    Finding was identified on 6/22/2021 6:17 PM.     Dr. Alonzo was contacted by Dr. Medina at 6/22/2021 6:52 PM and  verbalized understanding of the urgent finding.     I have personally reviewed the examination and initial interpretation  and I agree with the findings.    VIBHA VILLEGAS MD     *Note: Due to a large number of results and/or encounters for the requested time period, some results have not been displayed. A complete set of results can be found in Results Review.              Medications Prior to Admission:     Medications Prior to Admission   Medication Sig Dispense Refill Last Dose     aspirin (ASPIRIN LOW DOSE) 81 MG EC tablet Take 1 tablet (81 mg) by mouth daily 90 tablet 3      fluticasone (FLONASE) 50 MCG/ACT nasal spray Spray 2 sprays into both nostrils daily (Patient taking differently: Spray 2 sprays into both nostrils as needed ) 48 mL 4      lisinopril (ZESTRIL) 20 MG tablet Take 1 tablet (20 mg) by mouth daily (Patient taking differently:  Take 20 mg by mouth every evening ) 90 tablet 3      loratadine (CLARITIN) 5 MG chewable tablet Take 1 tablet (5 mg) by mouth daily (Patient taking differently: Take 5 mg by mouth every morning ) 90 tablet 1      diazepam (VALIUM) 5 MG tablet as needed (Prior to MRI)    More than a month at Unknown time     ondansetron (ZOFRAN) 4 MG tablet Take 1 tablet (4 mg) by mouth every 8 hours as needed for nausea 20 tablet 11 More than a month at Unknown time              Discharge Medications:     Current Discharge Medication List      START taking these medications    Details   acetaminophen (TYLENOL) 325 MG tablet Take 3 tablets (975 mg) by mouth 3 times daily  Qty: 50 tablet, Refills: 0    Associated Diagnoses: Incarcerated umbilical hernia      oxyCODONE (ROXICODONE) 5 MG tablet Take 1-2 tablets (5-10 mg) by mouth every 6 hours as needed for moderate to severe pain  Qty: 15 tablet, Refills: 0    Associated Diagnoses: Incarcerated umbilical hernia         CONTINUE these medications which have NOT CHANGED    Details   aspirin (ASPIRIN LOW DOSE) 81 MG EC tablet Take 1 tablet (81 mg) by mouth daily  Qty: 90 tablet, Refills: 3    Associated Diagnoses: S/p TAVR (transcatheter aortic valve replacement), bioprosthetic      fluticasone (FLONASE) 50 MCG/ACT nasal spray Spray 2 sprays into both nostrils daily  Qty: 48 mL, Refills: 4    Associated Diagnoses: Environmental allergies      lisinopril (ZESTRIL) 20 MG tablet Take 1 tablet (20 mg) by mouth daily  Qty: 90 tablet, Refills: 3    Associated Diagnoses: Hypertension, unspecified type      loratadine (CLARITIN) 5 MG chewable tablet Take 1 tablet (5 mg) by mouth daily  Qty: 90 tablet, Refills: 1    Comments: Patient has cirrhosis and one-half the usual dose is advised.  Dr. Celeste  Associated Diagnoses: Environmental allergies      diazepam (VALIUM) 5 MG tablet as needed (Prior to MRI)       ondansetron (ZOFRAN) 4 MG tablet Take 1 tablet (4 mg) by mouth every 8 hours as needed  for nausea  Qty: 20 tablet, Refills: 11    Associated Diagnoses: HCC (hepatocellular carcinoma) (H); Nausea                     Medications Discontinued or Adjusted During This Hospitalization:   New narcotics initiated: oxycodone           Antibiotics Prescribed at Discharge:   None prescribed           Day of Discharge Physical Exam:   Temp:  [97.1  F (36.2  C)-97.9  F (36.6  C)] 97.1  F (36.2  C)  Pulse:  [87] 87  Resp:  [18] 18  BP: (122-129)/(70-77) 129/77  SpO2:  [96 %-98 %] 98 %    General: awake, alert, no acute distress, laying comfortably in bed   CV: warm, well perfused   Pulm: breathing comfortably on room air   Abdomen: soft, non-distended, appropriately tender, no rebound or guarding;  Incision c/d/i w/ staples   Extremities: no edema, moving all extremities spontaneously and without apparent deficit            Final Pathology Result:   Pending           Discharge Instructions and Follow-Up:     Discharge Procedure Orders   Reason for your hospital stay   Order Comments: You were hospitalized after having surgery for your incarcerated umbilical hernia and small bowel resection.     Adult [facility] Follow-up and recommended labs and tests   Order Comments: Follow up with primary care provider, Cuca Celeste, within 30 days for hospital follow- up.  No follow up labs or test are needed.    Follow up with Dr. Snell (surgeon) , at (location with clinic name or city) [facility], within 1-2 weeks  to evaluate after surgery. No follow up labs or test are needed.    Appointments on [place] and/or [facility] (with [facility] or [facility] provider or service). Call [phone] if you haven't heard regarding these appointments within 7 days of discharge.     Activity   Order Comments: Your activity upon discharge: activity as tolerated and no driving for today and no lifting, driving, or strenuous exercise for 4 weeks.     Order Specific Question Answer Comments   Is discharge order? Yes      When to contact  your care team   Order Comments: Call your primary doctor if you have any of the following: temperature greater than 101.5,  increased shortness of breath, increased drainage, increased swelling or increased pain.     Wound care and dressings   Order Comments: Instructions to care for your wound at home: as directed. You have a skin glue over your incision, this will fall off in 1-2 weeks on its own. Ok to shower. No bathing or submerging under water for 2 weeks.     Discharge Instructions   Order Comments: Discharge Instructions  Activity  - No lifting, pushing, pulling more than 15-20 pounds for 3-4 weeks  - Do not drive until you can press the brake pedal quickly and fully without pain  - Do not operate a motor vehicle while taking narcotic pain medications    Incisions  - The type of wound closure used for your incision:  Dermabond    Drain Care  - You do not have a drain.  Specific care/instructions:  Not Applicable    Medications  - Do not take any additional Tylenol (acetaminophen) while using a narcotic pain medication which includes acetaminophen  - Do not take more than 4,000mg of acetaminophen in any 24 hour period, as this can cause liver damage  - Take stool softeners such as Senna or Miralax while you are using narcotics, but stop if you develop diarrhea  - Wean yourself off of narcotic pain medications    Follow-Up:  - Call your primary care provider to touch base regarding your recent admission.    - Follow up with your surgeon, Dr. Snell in clinic in 1-2 weeks  - Call or return sooner than your regularly scheduled visit if you develop any of the following: fever >101.5, uncontrolled pain, uncontrolled nausea or vomiting, as well as increased redness, swelling, or drainage from your wound.   -  A nurse from the General Surgery Clinic will contact you 24 hours, or next business day, after your discharge from the hospital.  If you have questions or to schedule a follow up appointment please call the  General Surgery Clinic at [phone].  Call [phone] and ask to speak with the Surgery resident on call if you are having troubles in the evenings, at night, or on the weekend.  -  If you are receiving home care please inform your home care nurse of our contact number.     Diet   Order Comments: Follow this diet upon discharge: Regular     Order Specific Question Answer Comments   Is discharge order? Yes               Home Health Care:     Not needed           Discharge Disposition:     Discharged to home      Condition at discharge: Stable    Patient was seen, examined, and discussed on day of discharge with chief resident Dr. Paul, who discussed with staff surgeon.    Carlie Ireland MD  General Surgery

## 2021-06-25 NOTE — DISCHARGE SUMMARY
VSS  Neuro: WDL  Pain: denies  CMS: intact  Cardiac: regular rhythm   Resp: WDL on RA  GI/: +flatus, denies N/V, voiding adequately   Wound: incision WDL  Access: NONE  Activity: Up indep  Nutrition: reg  Plan: discharge today    Pt discharged at 1015 on 6/25/21 from unit 7B at [facility] after an incarcerated hernia repair. All discharge instructions were given, medications were received from the discharge pharmacy, and all belongings remained with the pt.

## 2021-06-28 ENCOUNTER — PATIENT OUTREACH (OUTPATIENT)
Dept: SURGERY | Facility: CLINIC | Age: 61
End: 2021-06-28

## 2021-06-28 NOTE — PROGRESS NOTES
Ten Johnson is a patient of Dr. Harley Snell that underwent Open ventral hernia repair and small bowel resection approximately 6 days ago (6/22). He was hospitalized until 6/25.  Attempted to contact patient via telephone for a status update and review post op teaching.  LM on VM to call office.  Await return call.      Of note:  Pathology:  Pending  Wound:  Staples  Follow-up: 7/7 at 9 am with Dr. Snell for staple removal  Restrictions:  - No lifting, pushing, pulling more than 15-20 pounds for 3-4 weeks  New medications:  Tylenol, Oxycodone  Equipment/Supplies:  None

## 2021-06-28 NOTE — LETTER
"9/10/2020     RE: Ten Johnson  [address]  [place] [place] [address]    Dear Colleague,    Thank you for referring your patient, Ten Johnson, to the [facility]. Please see a copy of my visit note below.    Paracentesis Nursing Note  Ten Johnson [facility] for [facility].    During today's appointment orders from Dr. Thomas Leventhal were completed.    Progress Note:  Patient identification verified by name and date of birth.  Assessment completed.  Vitals monitored throughout appointment and recorded in Doc Flowsheets.  See proceduralist note in ultrasound.    Patient was feeling well today but told RN the past two days he felt the sickest he's ever been. RN asked for more information. Patient explained he had a severe headache, hallucinations, was \"spewing liquid from both ends of his body\" (vomitting and diarrhea). He was unsure if he had a fever as he did not check his temperature but did describe chills and shakes. He said it had been going around his neighborhood and even asked if people thought something could have been \"spiked\".   Patient placed on droppet precautions   Dr. Leventhal pagefabiola who was out of town. His RN called back. Maggie Byrnes, Covid test ordered and PRN asites labs collected. Patient given number to call to schedule COVID test before next appointment.     Vascular Access: peripheral IV was placed by vascular access nurse.  Labs: were drawn per orders.     Date of consent or authorization: 7/23/2020.  Invasive Procedure Safety Checklist was completed and sent for scanning.     Paracentesis performed by Dr. Capo Cuenca supervised by Dr. BETZY Arvizu.    The following labs were communicated to provider performing paracentesis:  Lab Results   Component Value Date    PLT 97 09/03/2020       Total amount of ascites fluid drained: 4.8 liters.  Color of ascites fluid: clear, yellow.  Total " Quality 226: Preventive Care And Screening: Tobacco Use: Screening And Cessation Intervention: Patient screened for tobacco use and is an ex/non-smoker amount of albumin given: 12.5 grams.    Patient tolerated procedure well.    Post procedure,denies pain or discomfort post paracentesis.      Discharge Plan:  Discharge instructions were reviewed with patient.  Patient/Representative verbalized understanding and all questions were answered.   [facility] in stable condition.    Stefanie De La Torre RN       Administrations This Visit     albumin human 25 % injection 12.5 g     Admin Date  09/10/2020 Action  New Bag Dose  12.5 g Route  Intravenous Administered By  Stefanie De La Torre RN          lidocaine (PF) (XYLOCAINE) 1 % injection 20 mL     Admin Date  09/10/2020 Action  Given by Other Clinician Dose  20 mL Route  Subcutaneous Administered By  Stefanie De La Torre RN                /88   Pulse 72   Temp 97.9  F (36.6  C) (Oral)   Resp 16       Again, thank you for allowing me to participate in the care of your patient.        Sincerely,        [facility] Paracentesis Provider     Quality 110: Preventive Care And Screening: Influenza Immunization: Influenza Immunization Administered during Influenza season Detail Level: Detailed Quality 111:Pneumonia Vaccination Status For Older Adults: Pneumococcal Vaccination Administered Quality 431: Preventive Care And Screening: Unhealthy Alcohol Use - Screening: Patient screened for unhealthy alcohol use using a single question and scores less than 2 times per year

## 2021-06-29 ENCOUNTER — PATIENT OUTREACH (OUTPATIENT)
Dept: SURGERY | Facility: CLINIC | Age: 61
End: 2021-06-29

## 2021-06-29 NOTE — PROGRESS NOTES
RN Post-Op/Post-Discharge Care Coordination Note    Spoke with Patient.    Support  Patient able to care for self independently     Health Status  Nausea/Vomiting: Patient denies nausea/vomiting.  Eating/drinking: Patient is able to eat and drink without any complaints.  Bowel habits: Patient reports having a normal bowel movement.  Drains (CHAD): N/A  Fevers/chills: Patient denies any fever or chills.  Incisions: Patient denies any signs and symptoms of infection..  Wound closure:  Staples  Pain: Improving.  He is not taking any analgesics during the day.  Minimal Oxycodone at HS.    Activity/Restrictions  No lifting in excess of 15-20 pounds for 3-4 weeks    Equipment  None    Forms/Letters  No    All of his questions were answered including reviewing restrictions and wound care.  He will call this office if he has any further questions and/or concerns.      Post op appointment confirmed 7/7.    Whom and When to Call  Patient acknowledges understanding of how to manage any medication changes and   when to seek medical care.     Patient advised that if after hour medical concerns arise to please call [phone] and choose option 4 to speak to the physician on call.

## 2021-07-06 ENCOUNTER — PATIENT OUTREACH (OUTPATIENT)
Dept: SURGERY | Facility: CLINIC | Age: 61
End: 2021-07-06

## 2021-07-06 NOTE — PROGRESS NOTES
Patient Telephone Reminder Call    Date of call:  07/06/21  Phone numbers:  Home number on file [phone] (home)    Reached patient/confirmed appointment:  Yes  Appointment with:   Dr. Harley Snell  Reason for visit:  Post op, remove staples

## 2021-07-07 ENCOUNTER — PATIENT OUTREACH (OUTPATIENT)
Dept: SURGERY | Facility: CLINIC | Age: 61
End: 2021-07-07

## 2021-07-07 NOTE — PROGRESS NOTES
Ten Johnson is a patient of  that underwent emergent abdominal surgery on 6/22.  The patient was scheduled for a post op appointment today for a wound check and staple removal which the patient cancelled.  Navi states that he is unable to move around or get dressed due to excessive swelling in both of his legs, feet, and hands.  He states that he has experienced edema in the past, worse before TIPS procedure and he required frequent paracentesis.      Navi reports that he is afebrile and his wound is C/D/I without signs or symptoms of infection.  The staples remain intact.  He reports nausea but no emesis and tolerates some PO, bowels are moving.  He denies SOB or respiratory distress.      Recommended that the patient go to the Emergency Room for an evaluation.  He is in agreement but states that he does not have transportation today and will consider going tomorrow.  Strongly encouraged an evaluation today.

## 2021-07-08 ENCOUNTER — PATIENT OUTREACH (OUTPATIENT)
Dept: SURGERY | Facility: CLINIC | Age: 61
End: 2021-07-08

## 2021-07-08 NOTE — PROGRESS NOTES
Patient calling in to report that he has not yet gone to the Emergency Room as directed yesterday because he has been throwing up all night and can't get himself there. He is wondering if his staples can be removed in the ER because appointments are hard for him to make due to the fact that he cannot drive and relies on public transport. Advised that it is possible that an ER physician would be willing to remove the staples but cannot be sure of that. Patient reports that his feet are very swollen. Advised patient that based on his reported symptoms, he should call 911 and get into the ER as soon as possible. He states that he will think about it. Instructed patient to call the General Surgery Clinic back if he is unable to get his staples removed in the ER as they are due to come out and need to be removed as soon as possible. He verbalizes understanding.

## 2021-07-13 ENCOUNTER — PATIENT OUTREACH (OUTPATIENT)
Dept: SURGERY | Facility: CLINIC | Age: 61
End: 2021-07-13

## 2021-07-13 NOTE — PROGRESS NOTES
"Patient underwent abdominal surgery 6/22 with Dr. Snell.  He was scheduled for a post op appointment for staple removal on 7/7 which he cancelled.  The patient did not go to the ED as recommended for worsening edema.      Spoke with the patient and he did not visit the ED as recommended.  He states that the skin staples remain in place (he states that he \"removed a couple\").  By report, he states that he was \"peeing every 5 minutes on Saturday and lost 18 pounds and the swelling came back yesterday\".  Encouraged patient to seek care regarding ongoing edema.      Post op appointment arranged with Dr. Hilario on 7/16 at 12 PM for staple removal.  "

## 2021-07-15 ENCOUNTER — PATIENT OUTREACH (OUTPATIENT)
Dept: SURGERY | Facility: CLINIC | Age: 61
End: 2021-07-15

## 2021-07-15 NOTE — PROGRESS NOTES
Patient Telephone Reminder Call    Date of call:  07/15/21  Phone numbers:  Home number on file [phone] (home)    Reached patient/confirmed appointment:  No - left message:   on voicemail  Appointment with:   Dr. Capo Hilario  Reason for visit:  Staple removal, DOS 6/22

## 2021-07-16 ENCOUNTER — OFFICE VISIT (OUTPATIENT)
Dept: SURGERY | Facility: CLINIC | Age: 61
End: 2021-07-16
Payer: MEDICARE

## 2021-07-16 VITALS
HEIGHT: 69 IN | BODY MASS INDEX: 25.92 KG/M2 | OXYGEN SATURATION: 96 % | SYSTOLIC BLOOD PRESSURE: 140 MMHG | WEIGHT: 175 LBS | HEART RATE: 93 BPM | DIASTOLIC BLOOD PRESSURE: 78 MMHG

## 2021-07-16 DIAGNOSIS — I89.0 LYMPHEDEMA OF BOTH LOWER EXTREMITIES: Primary | ICD-10-CM

## 2021-07-16 PROCEDURE — 99024 POSTOP FOLLOW-UP VISIT: CPT | Performed by: SURGERY

## 2021-07-16 ASSESSMENT — PAIN SCALES - GENERAL: PAINLEVEL: EXTREME PAIN (9)

## 2021-07-16 ASSESSMENT — MIFFLIN-ST. JEOR: SCORE: 1579.17

## 2021-07-16 NOTE — LETTER
7/16/2021       RE: Ten Johnson  [address]  [facility] [address]     Dear Colleague,    Thank you for referring your patient, Ten Johnson, to the [facility] GENERAL SURGERY CLINIC [facility]. Please see a copy of my visit note below.    HISTORY OF PRESENT ILLNESS:  Navi Johnson is a 61-year-old male who presents following emergency umbilical hernia repair and associated emercgency bowel resection that developed shortly after his TIPSS procedure. He no longer has ascites collecting and he no longer uses paracentesis.     ROS: negative ten point ROS.    PAST MEDICAL HISTORY:  Past Medical History:   Diagnosis Date     JENNIFER (acute kidney injury) (H) 4/9/2019     Alcohol use disorder      Alcoholic cirrhosis (H)      Anticoagulant long-term use     plavix     Aortic stenosis, severe 2/21/2018     Ascites      Chronic allergic rhinitis      Chronic anemia      Chronic hepatitis C without hepatic coma (H) 05/10/2016    Untreated as of 2/2018     Cirrhosis (H) 2017    MRI finding     Diastolic dysfunction      Erosive gastropathy      Esophageal varices in alcoholic cirrhosis (H)      H/O upper gastrointestinal hemorrhage 09/2017     Hepatocellular carcinoma (H)      History of blood transfusion      History of drug abuse (H)     intranasal     Hypertension     essential     JANELLE (iron deficiency anemia)      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     Sarahi-Hoffman tear     History     Marijuana abuse      MRSA (methicillin resistant Staphylococcus aureus)      Nonrheumatic aortic valve stenosis 2/20/2018     Olecranon bursitis      Portal hypertension (H)      Right shoulder pain     history of rotator cuff repair     S/p TAVR (transcatheter aortic valve replacement), bioprosthetic      Severe aortic stenosis      Thrombocytopenia (H)      PAST SURGICAL HISTORY:  Past Surgical History:    Procedure Laterality Date     ABLATE LIVER TUMOR N/A 10/22/2019    Procedure: Ablate liver tumor x3;  Surgeon: Gregorio Benoit MD;  [facility]     ARTHROSCOPY SHOULDER ROTATOR CUFF REPAIR  7/31/2012    Procedure: ARTHROSCOPY SHOULDER ROTATOR CUFF REPAIR;  Right Shoulder Arthroscopic Rotator Cuff Repair, BicepsTenodesis,  Subacromial Decompression ;  Surgeon: Joi Castillo MD;  [facility]     ESOPHAGOSCOPY, GASTROSCOPY, DUODENOSCOPY (EGD), COMBINED N/A 10/23/2017    Procedure: COMBINED ESOPHAGOSCOPY, GASTROSCOPY, DUODENOSCOPY (EGD);;  Surgeon: Gentry Salas MD;  [facility]     EXCHANGE POLY COMPONENT ARTHROPLASTY KNEE Right 3/4/2019    Procedure: REVISION RIGHT TOTAL KNEE POLY COMPONENT EXCHANGE;  Surgeon: Olvin Joe MD;  [facility]     FACIAL RECONSTRUCTION SURGERY  1971     HEART CATH FEMORAL CANNULIZATION WITH OPEN STANDBY REPAIR AORTIC VALVE N/A 2/21/2018    Procedure: HEART CATH FEMORAL CANNULIZATION WITH OPEN STANDBY REPAIR AORTIC VALVE;;  Surgeon: Luis Baird MD;  [facility]     HERNIORRHAPHY VENTRAL N/A 6/22/2021    Procedure: HERNIORRHAPHY, VENTRAL, OPEN, small bowel resection;  Surgeon: Harley Snell MD;  [facility]     IR CHEMO EMBOLIZATION  1/29/2020     IR LIVER BIOPSY PERCUTANEOUS  7/18/2019     IR PARACENTESIS  4/15/2021     IR TRANSVEN INTRAHEPATIC PORTOSYST SHUNT  4/15/2021     IRRIGATION AND DEBRIDEMENT UPPER EXTREMITY, COMBINED  1/3/2012    Procedure:COMBINED IRRIGATION AND DEBRIDEMENT UPPER EXTREMITY; Irrigation & Debridement Left Elbow; Surgeon:CRISTHIAN ZHOU; [facility]     LAPAROSCOPIC BIOPSY LIVER N/A 10/22/2019    Procedure: intraoperative liver ultrasound, laparoscopic converted to open liver biopsy x 6;  Surgeon: Gregorio Benoit MD;  [facility]     LAPAROSCOPY DIAGNOSTIC (GENERAL) N/A 10/22/2019    Procedure: Diagnostic laparoscopy;  Surgeon: Gregorio Benoit MD;  [facility]     LAPAROTOMY,  LYSIS ADHESIONS, COMBINED N/A 10/22/2019    Procedure: Laparotomy, lysis adhesions, combined;  Surgeon: Gregorio Benoit MD;  [facility]     OPTICAL TRACKING SYSTEM ARTHROPLASTY KNEE Right 2/7/2019    Procedure: ARTHROPLASTY KNEE RIGHT;  Surgeon: Olvin Joe MD;  [facility]     REPAIR TENDON TRICEPS UPPER EXTREMITY  11/8/2011    Procedure:REPAIR TENDON TRICEPS UPPER EXTREMITY; Surgeon:CRISTHIAN ZHOU; [facility]     SHOULDER SURGERY  2003    left, injury, torn tendons, hematoma     TRANSCATHETER AORTIC VALVE IMPLANT ANESTHESIA N/A 2/21/2018    Procedure: TRANSCATHETER AORTIC VALVE IMPLANT ANESTHESIA;  Transfemoral (Quiroz) Aortic Valve Implant 26mm MARTHA 3, with Cardiopulmonary Bypass Standby, transthoracic echocardiogram;  Surgeon: GENERIC ANESTHESIA PROVIDER;  [facility]     TRANSPOSITION ULNAR NERVE (ELBOW)  11/8/2011    Procedure:TRANSPOSITION ULNAR NERVE (ELBOW); Final Procedure Done: Left Elbow Lateral Ulnar Collateral Repair And  Left Elbow Triceps Repair       MEDICATIONS:  Current Outpatient Medications   Medication     fluticasone (FLONASE) 50 MCG/ACT nasal spray     lisinopril (ZESTRIL) 20 MG tablet     loratadine (CLARITIN) 5 MG chewable tablet     acetaminophen (TYLENOL) 325 MG tablet     aspirin (ASPIRIN LOW DOSE) 81 MG EC tablet     diazepam (VALIUM) 5 MG tablet     ondansetron (ZOFRAN) 4 MG tablet     oxyCODONE (ROXICODONE) 5 MG tablet     No current facility-administered medications for this visit.     ALLERGIES:  Allergies   Allergen Reactions     Zolpidem Other (See Comments)     Alcoholic.  Had reaction 3/17/13 while intoxicated which included black out, loss of awareness, paranoia.  Do not prescribe.  Dr. Celeste     Cats Other (See Comments)     rhinitis     Dogs Other (See Comments)     rhinitis     Pollen Extract Other (See Comments)     rhinits.     SOCIAL HISTORY:  Social History     Socioeconomic History     Marital status: Single     Spouse name: None  "    Number of children: 0     Years of education: None     Highest education level: None   Occupational History     Occupation:    Tobacco Use     Smoking status: Never Smoker     Smokeless tobacco: Never Used   Substance and Sexual Activity     Alcohol use: Yes     Comment: drinks a \"beer occasionally\"     Drug use: Yes     Types: Marijuana     Comment: denies     Sexual activity: Not Currently     Partners: Female   Other Topics Concern     Parent/sibling w/ CABG, MI or angioplasty before 65F 55M? Not Asked   Social History Narrative    .  Bicycles a lot.  Excessive alcohol use.  Smokes cigars.  Occasional marijuana use.     Social Determinants of Health     Financial Resource Strain:      Difficulty of Paying Living Expenses:    Food Insecurity:      Worried About Running Out of Food in the Last Year:      Ran Out of Food in the Last Year:    Transportation Needs:      Lack of Transportation (Medical):      Lack of Transportation (Non-Medical):    Physical Activity:      Days of Exercise per Week:      Minutes of Exercise per Session:    Stress:      Feeling of Stress :    Social Connections:      Frequency of Communication with Friends and Family:      Frequency of Social Gatherings with Friends and Family:      Attends Restorationism Services:      Active Member of Clubs or Organizations:      Attends Club or Organization Meetings:      Marital Status:    Intimate Partner Violence:      Fear of Current or Ex-Partner:      Emotionally Abused:      Physically Abused:      Sexually Abused:      FAMILY HISTORY:  Family History   Problem Relation Age of Onset     Cancer Mother 62        unknown primary     Alcoholism Paternal Uncle      Unknown/Adopted Father      No Known Problems Brother      Diabetes Maternal Grandmother      Myocardial Infarction Maternal Grandfather      No Known Problems Paternal Grandmother      Unknown/Adopted Paternal Grandfather      Cirrhosis No family hx of  " "    Anesthesia Reaction No family hx of      Deep Vein Thrombosis (DVT) No family hx of      PHYSICAL EXAM:  Vital Signs: BP (!) 140/78 (BP Location: Left arm, Patient Position: Sitting, Cuff Size: Adult Regular)   Pulse 93   Ht 1.753 m (5' 9\")   Wt 79.4 kg (175 lb)   SpO2 96%   BMI 25.84 kg/m    GEN: The patient appears completely non-toxic; he moves easily to the examination table.    ABD: His abdomen is soft and nontender.  The incision is clean, dry and intact; there is no erythema or drainage at the wound.  The surgical skin clips (staples) were removed without difficulty.    EXT: all ext are warm and well perfused.      IMPRESSION AND PLAN:   The patient has had an unremarkable postoperative course following the bowel resection and he appears to have an excellent umbilical hernia repair. The patient has ongoing improvement in his energy and activity on a regular basis. The abdominal wound repair looked absolutely perfect.  The patient will continue to avoid heavy lifting.  The patient will follow with his primary surgeon should he identify new problems or a late postoperative complication.      Again, thank you for allowing me to participate in the care of your patient.      Sincerely,  Capo Hilario MD  "

## 2021-07-16 NOTE — NURSING NOTE
"Chief Complaint   Patient presents with     Surgical Followup     Post op visit - Staple removal.        Vitals:    07/16/21 1238   BP: (!) 140/78   BP Location: Left arm   Patient Position: Sitting   Cuff Size: Adult Regular   Pulse: 93   SpO2: 96%   Weight: 79.4 kg (175 lb)   Height: 1.753 m (5' 9\")       Body mass index is 25.84 kg/m .    Dusty Lin, RN, BSN      "

## 2021-07-16 NOTE — PROGRESS NOTES
HISTORY OF PRESENT ILLNESS:  Navi Johnson is a 61-year-old male who presents following emergency umbilical hernia repair and associated emercgency bowel resection that developed shortly after his TIPSS procedure. He no longer has ascites collecting and he no longer uses paracentesis.     ROS: negative ten point ROS.    PAST MEDICAL HISTORY:  Past Medical History:   Diagnosis Date     JENNIFER (acute kidney injury) (H) 4/9/2019     Alcohol use disorder      Alcoholic cirrhosis (H)      Anticoagulant long-term use     plavix     Aortic stenosis, severe 2/21/2018     Ascites      Chronic allergic rhinitis      Chronic anemia      Chronic hepatitis C without hepatic coma (H) 05/10/2016    Untreated as of 2/2018     Cirrhosis (H) 2017    MRI finding     Diastolic dysfunction      Erosive gastropathy      Esophageal varices in alcoholic cirrhosis (H)      H/O upper gastrointestinal hemorrhage 09/2017     Hepatocellular carcinoma (H)      History of blood transfusion      History of drug abuse (H)     intranasal     Hypertension     essential     JANELLE (iron deficiency anemia)      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     Sarahi-Hoffman tear     History     Marijuana abuse      MRSA (methicillin resistant Staphylococcus aureus)      Nonrheumatic aortic valve stenosis 2/20/2018     Olecranon bursitis      Portal hypertension (H)      Right shoulder pain     history of rotator cuff repair     S/p TAVR (transcatheter aortic valve replacement), bioprosthetic      Severe aortic stenosis      Thrombocytopenia (H)         PAST SURGICAL HISTORY:  Past Surgical History:   Procedure Laterality Date     ABLATE LIVER TUMOR N/A 10/22/2019    Procedure: Ablate liver tumor x3;  Surgeon: Gregorio Benoit MD;  [facility]     ARTHROSCOPY SHOULDER ROTATOR CUFF REPAIR  7/31/2012    Procedure: ARTHROSCOPY SHOULDER ROTATOR CUFF REPAIR;  Right Shoulder Arthroscopic Rotator Cuff Repair,  BicepsTenodesis,  Subacromial Decompression ;  Surgeon: Joi Castillo MD;  [facility]     ESOPHAGOSCOPY, GASTROSCOPY, DUODENOSCOPY (EGD), COMBINED N/A 10/23/2017    Procedure: COMBINED ESOPHAGOSCOPY, GASTROSCOPY, DUODENOSCOPY (EGD);;  Surgeon: Gentry Salas MD;  [facility]     EXCHANGE POLY COMPONENT ARTHROPLASTY KNEE Right 3/4/2019    Procedure: REVISION RIGHT TOTAL KNEE POLY COMPONENT EXCHANGE;  Surgeon: Olvin Joe MD;  [facility]     FACIAL RECONSTRUCTION SURGERY  1971     HEART CATH FEMORAL CANNULIZATION WITH OPEN STANDBY REPAIR AORTIC VALVE N/A 2/21/2018    Procedure: HEART CATH FEMORAL CANNULIZATION WITH OPEN STANDBY REPAIR AORTIC VALVE;;  Surgeon: Luis Baird MD;  [facility]     HERNIORRHAPHY VENTRAL N/A 6/22/2021    Procedure: HERNIORRHAPHY, VENTRAL, OPEN, small bowel resection;  Surgeon: Harley Snell MD;  [facility]     IR CHEMO EMBOLIZATION  1/29/2020     IR LIVER BIOPSY PERCUTANEOUS  7/18/2019     IR PARACENTESIS  4/15/2021     IR TRANSVEN INTRAHEPATIC PORTOSYST SHUNT  4/15/2021     IRRIGATION AND DEBRIDEMENT UPPER EXTREMITY, COMBINED  1/3/2012    Procedure:COMBINED IRRIGATION AND DEBRIDEMENT UPPER EXTREMITY; Irrigation & Debridement Left Elbow; Surgeon:CRISTHIAN ZHOU; [facility]     LAPAROSCOPIC BIOPSY LIVER N/A 10/22/2019    Procedure: intraoperative liver ultrasound, laparoscopic converted to open liver biopsy x 6;  Surgeon: Gregorio Benoit MD;  [facility]     LAPAROSCOPY DIAGNOSTIC (GENERAL) N/A 10/22/2019    Procedure: Diagnostic laparoscopy;  Surgeon: Gregorio Benoit MD;  [facility]     LAPAROTOMY, LYSIS ADHESIONS, COMBINED N/A 10/22/2019    Procedure: Laparotomy, lysis adhesions, combined;  Surgeon: Gregorio Benoit MD;  [facility]     OPTICAL TRACKING SYSTEM ARTHROPLASTY KNEE Right 2/7/2019    Procedure: ARTHROPLASTY KNEE RIGHT;  Surgeon: Olvin Joe MD;  [facility]     REPAIR TENDON  "TRICEPS UPPER EXTREMITY  11/8/2011    Procedure:REPAIR TENDON TRICEPS UPPER EXTREMITY; Surgeon:CRISTHIAN ZHOU; [facility]     SHOULDER SURGERY  2003    left, injury, torn tendons, hematoma     TRANSCATHETER AORTIC VALVE IMPLANT ANESTHESIA N/A 2/21/2018    Procedure: TRANSCATHETER AORTIC VALVE IMPLANT ANESTHESIA;  Transfemoral (Quiroz) Aortic Valve Implant 26mm MARTHA 3, with Cardiopulmonary Bypass Standby, transthoracic echocardiogram;  Surgeon: GENERIC ANESTHESIA PROVIDER;  [facility]     TRANSPOSITION ULNAR NERVE (ELBOW)  11/8/2011    Procedure:TRANSPOSITION ULNAR NERVE (ELBOW); Final Procedure Done: Left Elbow Lateral Ulnar Collateral Repair And  Left Elbow Triceps Repair          MEDICATIONS:  Current Outpatient Medications   Medication     fluticasone (FLONASE) 50 MCG/ACT nasal spray     lisinopril (ZESTRIL) 20 MG tablet     loratadine (CLARITIN) 5 MG chewable tablet     acetaminophen (TYLENOL) 325 MG tablet     aspirin (ASPIRIN LOW DOSE) 81 MG EC tablet     diazepam (VALIUM) 5 MG tablet     ondansetron (ZOFRAN) 4 MG tablet     oxyCODONE (ROXICODONE) 5 MG tablet     No current facility-administered medications for this visit.        ALLERGIES:  Allergies   Allergen Reactions     Zolpidem Other (See Comments)     Alcoholic.  Had reaction 3/17/13 while intoxicated which included black out, loss of awareness, paranoia.  Do not prescribe.  Dr. Celeste     Cats Other (See Comments)     rhinitis     Dogs Other (See Comments)     rhinitis     Pollen Extract Other (See Comments)     rhinits.        SOCIAL HISTORY:  Social History     Socioeconomic History     Marital status: Single     Spouse name: None     Number of children: 0     Years of education: None     Highest education level: None   Occupational History     Occupation:    Tobacco Use     Smoking status: Never Smoker     Smokeless tobacco: Never Used   Substance and Sexual Activity     Alcohol use: Yes     Comment: drinks a \"beer " "occasionally\"     Drug use: Yes     Types: Marijuana     Comment: denies     Sexual activity: Not Currently     Partners: Female   Other Topics Concern     Parent/sibling w/ CABG, MI or angioplasty before 65F 55M? Not Asked   Social History Narrative    .  Bicycles a lot.  Excessive alcohol use.  Smokes cigars.  Occasional marijuana use.     Social Determinants of Health     Financial Resource Strain:      Difficulty of Paying Living Expenses:    Food Insecurity:      Worried About Running Out of Food in the Last Year:      Ran Out of Food in the Last Year:    Transportation Needs:      Lack of Transportation (Medical):      Lack of Transportation (Non-Medical):    Physical Activity:      Days of Exercise per Week:      Minutes of Exercise per Session:    Stress:      Feeling of Stress :    Social Connections:      Frequency of Communication with Friends and Family:      Frequency of Social Gatherings with Friends and Family:      Attends Protestant Services:      Active Member of Clubs or Organizations:      Attends Club or Organization Meetings:      Marital Status:    Intimate Partner Violence:      Fear of Current or Ex-Partner:      Emotionally Abused:      Physically Abused:      Sexually Abused:        FAMILY HISTORY:  Family History   Problem Relation Age of Onset     Cancer Mother 62        unknown primary     Alcoholism Paternal Uncle      Unknown/Adopted Father      No Known Problems Brother      Diabetes Maternal Grandmother      Myocardial Infarction Maternal Grandfather      No Known Problems Paternal Grandmother      Unknown/Adopted Paternal Grandfather      Cirrhosis No family hx of      Anesthesia Reaction No family hx of      Deep Vein Thrombosis (DVT) No family hx of         PHYSICAL EXAM:  Vital Signs: BP (!) 140/78 (BP Location: Left arm, Patient Position: Sitting, Cuff Size: Adult Regular)   Pulse 93   Ht 1.753 m (5' 9\")   Wt 79.4 kg (175 lb)   SpO2 96%   BMI 25.84 " kg/m    GEN: The patient appears completely non-toxic; he moves easily to the examination table.    ABD: His abdomen is soft and nontender.  The incision is clean, dry and intact; there is no erythema or drainage at the wound.  The surgical skin clips (staples) were removed without difficulty.    EXT: all ext are warm and well perfused.      IMPRESSION AND PLAN:   The patient has had an unremarkable postoperative course following the bowel resection and he appears to have an excellent umbilical hernia repair. The patient has ongoing improvement in his energy and activity on a regular basis. The abdominal wound repair looked absolutely perfect.  The patient will continue to avoid heavy lifting.  The patient will follow with his primary surgeon should he identify new problems or a late postoperative complication.

## 2021-07-16 NOTE — PATIENT INSTRUCTIONS
You met with Dr. Capo Hilario.      Today's visit instructions:    Return to the Surgery Clinic on an as needed basis.     A referral was placed for you to meet with the Lymphedema Therapists.  They will call you to arrange this appointment.    If you have questions please contact Belinda RN, Vanessa RN, or Erick RN during regular clinic hours, Monday through Friday 7:30 AM - 4:00 PM, or you can contact us via [employer] at anytime.       If you have urgent needs after-hours, weekends, or holidays please call the hospital at [phone] and ask to speak with our on-call General Surgery Team.    Appointment [phone], option #1   Nurse Advice (Vanessa Trevino, or Erick): [phone]   Surgery Scheduler (Luly): [phone]  Fax: [phone]

## 2021-07-26 ENCOUNTER — NURSE TRIAGE (OUTPATIENT)
Dept: NURSING | Facility: CLINIC | Age: 61
End: 2021-07-26

## 2021-07-26 NOTE — TELEPHONE ENCOUNTER
[facility]: Nurse Triage Note  SITUATION/BACKGROUND                                                      Ten Johnson is a 61 year old male who calls to schedule a follow-up appointment with Dr. Leventhal  Allergies:   Allergies   Allergen Reactions     Zolpidem Other (See Comments)     Alcoholic.  Had reaction 3/17/13 while intoxicated which included black out, loss of awareness, paranoia.  Do not prescribe.  Dr. Celeste     Cats Other (See Comments)     rhinitis     Dogs Other (See Comments)     rhinitis     Pollen Extract Other (See Comments)     rhinits.       ASSESSMENT      61 y.o. man with alcoholic cirrhosis, hepatocellular carcinoma s/p prior ablations, portal hypertension s/p TIPS placement 4/15/21, aortic stenosis s/p TAVR, hepatitis C, hypertension, diastolic dysfunction, and recent incarcerated umbilical hernia s/p open repair and small bowel resection 6/22/21 who was admitted on 7/20/2021 to [facility] after presenting to [facility] ED for evaluation of abdominal pain.  He states he was hospitalized from Monday to Saturday   He was instructed to call and make a follow-up with Dr. Leventhal    He continues to have abdominal pain  He rates the pain 7-9.  Is taking oxycodone 5 mg a couple times a day  He states he has a few pills left.  He states that pain moves around his abdomen  It is not in one area.  He states he is having no issues with urinating or BM's  He is nauseated but has been eating a normal diet with the help of Zofran.      Will forward information to hepatology team to call back and schedule an appt with Dr. Leventhal as directed by Curtiss on discharge instructions         RECOMMENDATION/PLAN                                                      RECOMMENDED DISPOSITION:  See above  Will comply with recommendation: Yes    If further questions/concerns or if symptoms do not improve, worsen or new symptoms develop, call your PCP or [phone] to talk with the  Resident on call, as soon as possible.      Telephone Triage Protocols for Nurses, Fifth Edition, Rubi Marroquin, RN, RN

## 2021-08-03 ENCOUNTER — TELEPHONE (OUTPATIENT)
Dept: INTERNAL MEDICINE | Facility: CLINIC | Age: 61
End: 2021-08-03

## 2021-08-03 ENCOUNTER — PATIENT OUTREACH (OUTPATIENT)
Dept: SURGERY | Facility: CLINIC | Age: 61
End: 2021-08-03

## 2021-08-03 NOTE — TELEPHONE ENCOUNTER
RECORDS RECEIVED FROM: [facility]   Appt Date: 08.12.2021   NOTES STATUS DETAILS   OFFICE NOTE from referring provider N/A    OFFICE NOTES from other specialists Internal  03.16.2021 Tomi Arteaga MD   DISCHARGE SUMMARY from hospital Care Everywhere 07.20.2021 [facility]    MEDICATION LIST Internal / CE    LIVER BIOSPY (IF APPLICABLE)      PATHOLOGY REPORTS  N/A    IMAGING     ENDOSCOPY (IF AVAILABLE) Internal 10.23.2017   COLONOSCOPY (IF AVAILABLE) N/A    ULTRASOUND LIVER Internal 04.13.2021, 03.12.2021, 03.04.2021, 02.22.2021,  Ultrasound guided paracentesis   CT OF ABDOMEN N/A    MRI OF LIVER Internal 02.17.2021 MRI ABDOMEN    FIBROSCAN, US ELASTOGRAPHY, FIBROSIS SCAN, MR ELASTOGRAPHY N/A    LABS     HEPATIC PANEL (LIVER PANEL) Care Everywhere 07.23.2021   BASIC METABOLIC PANEL Care Everywhere 07.23.2021   COMPLETE METABOLIC PANEL Care Everywhere 07.23.2021   COMPLETE BLOOD COUNT (CBC) Care Everywhere 07.23.2021   INTERNATIONAL NORMALIZED RATIO (INR) Care Everywhere 07.21.2021   HEPATITIS C ANTIBODY N/A    HEPATITIS C VIRAL LOAD/PCR N/A    HEPATITIS C GENOTYPE N/A    HEPATITIS B SURFACE ANTIGEN Internal   04.05.2021   HEPATITIS B SURFACE ANTIBODY N/A    HEPATITIS B DNA QUANT LEVEL N/A    HEPATITIS B CORE ANTIBODY Internal 04.05.2021

## 2021-08-03 NOTE — TELEPHONE ENCOUNTER
[facility] Call Center    Phone Message    May a detailed message be left on voicemail: yes     Reason for Call: Symptoms or Concerns     If patient has red-flag symptoms, warm transfer to triage line    Current symptom or concern: pain from bowel restriction surgery    Symptoms have been present for:  2 week(s)    Has patient previously been seen for this? No      Are there any new or worsening symptoms? Yes: Patient calling stating he is having a lot of pain from his most recent procedure, Patient states he spoke with the surgeons office and they transferred him to the primary care clinic to discuss this with his primary doctor. Please call to discuss thank you.       Action Taken: Message routed to:  [facility] ([facility]    Travel Screening: Not Applicable

## 2021-08-03 NOTE — TELEPHONE ENCOUNTER
Patient last seen in [facility] on 12/09/2019 by Dr. Celeste.  Patient needs an in-person appointment for post surgery pain.  Dr. Celeste is booked out.  Advise any providers.      Robinson Lin CMA ([facility]) at 3:23 PM on 8/3/2021

## 2021-08-03 NOTE — PROGRESS NOTES
"Patient calling the General Surgery Clinic stating that he was recently discharged from [facility].  He states he is being treated for an \"infection\".  He is requesting medication for sleep- he states that he is miserable.      Patient was under the care of Dr. Harley Snell in June for an incarcerated hernia, which he is now recovered from.  He states that he is calling General Surgery as he is able to \"talk to someone.\"  Suggested that the patient contact his PCP for ongoing post hospital care and discussion regarding insomnia.  Patient was reluctant, \"it's hard to get in there.\"  Patient given Primary Care number and transferred to the Call Center.  He will ask to speak with a nurse.  "

## 2021-08-04 NOTE — TELEPHONE ENCOUNTER
Call and left message to schedule in person visit with any provider for post-surgical pain.     Patient last seen in [facility] on 12/09/2019 by Dr. Celeste.  Patient needs an in-person appointment for post surgery pain.  Dr. Celeste is booked out.  Advise any providers.

## 2021-08-12 ENCOUNTER — VIRTUAL VISIT (OUTPATIENT)
Dept: GASTROENTEROLOGY | Facility: CLINIC | Age: 61
End: 2021-08-12
Attending: INTERNAL MEDICINE
Payer: MEDICARE

## 2021-08-12 ENCOUNTER — OFFICE VISIT (OUTPATIENT)
Dept: INTERNAL MEDICINE | Facility: CLINIC | Age: 61
End: 2021-08-12
Payer: MEDICARE

## 2021-08-12 ENCOUNTER — TELEPHONE (OUTPATIENT)
Dept: GASTROENTEROLOGY | Facility: CLINIC | Age: 61
End: 2021-08-12

## 2021-08-12 ENCOUNTER — PRE VISIT (OUTPATIENT)
Dept: GASTROENTEROLOGY | Facility: CLINIC | Age: 61
End: 2021-08-12

## 2021-08-12 VITALS
SYSTOLIC BLOOD PRESSURE: 119 MMHG | OXYGEN SATURATION: 100 % | DIASTOLIC BLOOD PRESSURE: 69 MMHG | HEIGHT: 69 IN | HEART RATE: 67 BPM | BODY MASS INDEX: 26.66 KG/M2 | WEIGHT: 180 LBS

## 2021-08-12 VITALS
WEIGHT: 180 LBS | SYSTOLIC BLOOD PRESSURE: 108 MMHG | HEIGHT: 69 IN | TEMPERATURE: 97.4 F | BODY MASS INDEX: 26.66 KG/M2 | DIASTOLIC BLOOD PRESSURE: 62 MMHG | HEART RATE: 73 BPM

## 2021-08-12 DIAGNOSIS — E44.0 MODERATE PROTEIN-CALORIE MALNUTRITION (H): ICD-10-CM

## 2021-08-12 DIAGNOSIS — I85.10 SECONDARY ESOPHAGEAL VARICES WITHOUT BLEEDING (H): ICD-10-CM

## 2021-08-12 DIAGNOSIS — R10.84 ABDOMINAL PAIN, GENERALIZED: Primary | ICD-10-CM

## 2021-08-12 DIAGNOSIS — Z95.828 S/P TIPS (TRANSJUGULAR INTRAHEPATIC PORTOSYSTEMIC SHUNT): ICD-10-CM

## 2021-08-12 DIAGNOSIS — K74.60 CHRONIC HEPATITIS C VIRUS INFECTION WITH CIRRHOSIS (H): Primary | ICD-10-CM

## 2021-08-12 DIAGNOSIS — C22.0 HCC (HEPATOCELLULAR CARCINOMA) (H): ICD-10-CM

## 2021-08-12 DIAGNOSIS — B18.2 CHRONIC HEPATITIS C VIRUS INFECTION WITH CIRRHOSIS (H): Primary | ICD-10-CM

## 2021-08-12 PROBLEM — R16.0 LIVER MASS: Status: RESOLVED | Noted: 2019-10-04 | Resolved: 2021-08-12

## 2021-08-12 PROCEDURE — G0463 HOSPITAL OUTPT CLINIC VISIT: HCPCS

## 2021-08-12 PROCEDURE — 99214 OFFICE O/P EST MOD 30 MIN: CPT | Mod: GC

## 2021-08-12 PROCEDURE — 99215 OFFICE O/P EST HI 40 MIN: CPT | Mod: 24 | Performed by: INTERNAL MEDICINE

## 2021-08-12 RX ORDER — SULFAMETHOXAZOLE/TRIMETHOPRIM 800-160 MG
1 TABLET ORAL DAILY
COMMUNITY
Start: 2021-07-24

## 2021-08-12 RX ORDER — FUROSEMIDE 20 MG
20 TABLET ORAL DAILY
Qty: 30 TABLET | Refills: 11 | Status: SHIPPED | OUTPATIENT
Start: 2021-08-12 | End: 2022-01-01

## 2021-08-12 ASSESSMENT — PAIN SCALES - GENERAL
PAINLEVEL: EXTREME PAIN (8)
PAINLEVEL: WORST PAIN (10)

## 2021-08-12 ASSESSMENT — MIFFLIN-ST. JEOR
SCORE: 1601.85
SCORE: 1601.85

## 2021-08-12 NOTE — LETTER
8/12/2021         RE: Ten Johnson  [address] [place] [address]        Date of Service: August 12, 2021     Subjective:            Ten Johnson is a 61 year old male presenting for evaluation of liver disease    History of Present Illness   Ten Johnson is a 61 year old male with past medical history of history chronic hepatitis C, alcohol use disorder (active) with biopsy-proven cirrhosis complicated by biopsy-proven hepatocellular carcinoma who presents in follow up     Since last seen he has had a very complex medical history.  He underwent a TIPS placement on April 15, 2021 with a resultant gradient of 7.  After this he did not have any significant issues with ascites and was feeling quite well.  Potentially felt worsening of lower extremity edema several weeks after this procedure and was admitted again at the [facility] on June 22 with concerns of incarcerated umbilical hernia.  He was taken to the operating room where he was found to have an incarcerated hernia with an area of ischemic small bowel that required resection.  He was recovering from that procedure and had outpatient follow-up with the surgeon, but unfortunately was admitted to [facility] on July 20 with acute abdominal pain in the setting of ascites.  He was given a diagnosis of presumed SBP, but no paracentesis was performed based on minimal fluid.  There were no overt biliary issues at that time, but review of his liver test did demonstrate an elevated bilirubin ALT and AST.  Since discharge from the hospital he has been on Bactrim, which he admits causes him significant photosensitivity.  He notes that he does still have vague abdominal pains of unclear etiology.  He has lower extremity edema, and he does believe this is worse than prior to his TIPS placement, but reports it has been worse in the past several weeks than it is on today's clinic visit    He admits that he does have issues with pain and  discomfort, and he is meeting with his primary care provider today to have further discussions regarding management of chronic pain as well as potential medical management of insomnia    Past Medical History:  Past Medical History:   Diagnosis Date     JENNIFER (acute kidney injury) (H) 4/9/2019     Alcohol use disorder      Alcoholic cirrhosis (H)      Anticoagulant long-term use     plavix     Aortic stenosis, severe 2/21/2018     Ascites      Chronic allergic rhinitis      Chronic anemia      Chronic hepatitis C without hepatic coma (H) 05/10/2016    Untreated as of 2/2018     Cirrhosis (H) 2017    MRI finding     Diastolic dysfunction      Erosive gastropathy      Esophageal varices in alcoholic cirrhosis (H)      H/O upper gastrointestinal hemorrhage 09/2017     Hepatocellular carcinoma (H)      History of blood transfusion      History of drug abuse (H)     intranasal     Hypertension     essential     JANELLE (iron deficiency anemia)      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     Sarahi-Hoffman tear     History     Marijuana abuse      MRSA (methicillin resistant Staphylococcus aureus)      Nonrheumatic aortic valve stenosis 2/20/2018     Olecranon bursitis      Portal hypertension (H)      Right shoulder pain     history of rotator cuff repair     S/p TAVR (transcatheter aortic valve replacement), bioprosthetic      Severe aortic stenosis      Thrombocytopenia (H)        Surgical History:  Past Surgical History:   Procedure Laterality Date     ABLATE LIVER TUMOR N/A 10/22/2019    Procedure: Ablate liver tumor x3;  Surgeon: Gregorio Benoit MD;  Location: [facility]     ARTHROSCOPY SHOULDER ROTATOR CUFF REPAIR  7/31/2012    Procedure: ARTHROSCOPY SHOULDER ROTATOR CUFF REPAIR;  Right Shoulder Arthroscopic Rotator Cuff Repair, BicepsTenodesis,  Subacromial Decompression ;  Surgeon: Joi Castillo MD;  [facility]     ESOPHAGOSCOPY, GASTROSCOPY, DUODENOSCOPY  (EGD), COMBINED N/A 10/23/2017    Procedure: COMBINED ESOPHAGOSCOPY, GASTROSCOPY, DUODENOSCOPY (EGD);;  Surgeon: Gentry Salas MD;  Location: [facility] GI     EXCHANGE POLY COMPONENT ARTHROPLASTY KNEE Right 3/4/2019    Procedure: REVISION RIGHT TOTAL KNEE POLY COMPONENT EXCHANGE;  Surgeon: Olvin Joe MD;  Location: [facility] OR     FACIAL RECONSTRUCTION SURGERY  1971     HEART CATH FEMORAL CANNULIZATION WITH OPEN STANDBY REPAIR AORTIC VALVE N/A 2/21/2018    Procedure: HEART CATH FEMORAL CANNULIZATION WITH OPEN STANDBY REPAIR AORTIC VALVE;;  Surgeon: Luis Baird MD;  [facility]     HERNIORRHAPHY VENTRAL N/A 6/22/2021    Procedure: HERNIORRHAPHY, VENTRAL, OPEN, small bowel resection;  Surgeon: Harley Snell MD;  Location: [facility]     IR CHEMO EMBOLIZATION  1/29/2020     IR LIVER BIOPSY PERCUTANEOUS  7/18/2019     IR PARACENTESIS  4/15/2021     IR TRANSVEN INTRAHEPATIC PORTOSYST SHUNT  4/15/2021     IRRIGATION AND DEBRIDEMENT UPPER EXTREMITY, COMBINED  1/3/2012    Procedure:COMBINED IRRIGATION AND DEBRIDEMENT UPPER EXTREMITY; Irrigation & Debridement Left Elbow; Surgeon:CRISTHIAN ZHOU; [facility]     LAPAROSCOPIC BIOPSY LIVER N/A 10/22/2019    Procedure: intraoperative liver ultrasound, laparoscopic converted to open liver biopsy x 6;  Surgeon: Gregorio Benoit MD;  [facility]     LAPAROSCOPY DIAGNOSTIC (GENERAL) N/A 10/22/2019    Procedure: Diagnostic laparoscopy;  Surgeon: Gregorio Benoit MD;  [facility]     LAPAROTOMY, LYSIS ADHESIONS, COMBINED N/A 10/22/2019    Procedure: Laparotomy, lysis adhesions, combined;  Surgeon: Gregorio Benoit MD;  [facility]     OPTICAL TRACKING SYSTEM ARTHROPLASTY KNEE Right 2/7/2019    Procedure: ARTHROPLASTY KNEE RIGHT;  Surgeon: Olvin Joe MD;  [facility]     REPAIR TENDON TRICEPS UPPER EXTREMITY  11/8/2011    Procedure:REPAIR TENDON TRICEPS UPPER EXTREMITY; Surgeon:CRISTHIAN ZHOU; [facility]     SHOULDER  "SURGERY  2003    left, injury, torn tendons, hematoma     TRANSCATHETER AORTIC VALVE IMPLANT ANESTHESIA N/A 2/21/2018    Procedure: TRANSCATHETER AORTIC VALVE IMPLANT ANESTHESIA;  Transfemoral (Quiroz) Aortic Valve Implant 26mm MARTHA 3, with Cardiopulmonary Bypass Standby, transthoracic echocardiogram;  Surgeon: GENERIC ANESTHESIA PROVIDER;  Location: [facility]     TRANSPOSITION ULNAR NERVE (ELBOW)  11/8/2011    Procedure:TRANSPOSITION ULNAR NERVE (ELBOW); Final Procedure Done: Left Elbow Lateral Ulnar Collateral Repair And  Left Elbow Triceps Repair         Social History:  Social History     Tobacco Use     Smoking status: Never Smoker     Smokeless tobacco: Never Used   Substance Use Topics     Alcohol use: Yes     Comment: drinks a \"beer occasionally\"     Drug use: Yes     Types: Marijuana       Family History:  Family History   Problem Relation Age of Onset     Cancer Mother 62        unknown primary     Alcoholism Paternal Uncle      Unknown/Adopted Father      No Known Problems Brother      Diabetes Maternal Grandmother      Myocardial Infarction Maternal Grandfather      No Known Problems Paternal Grandmother      Unknown/Adopted Paternal Grandfather      Cirrhosis No family hx of      Anesthesia Reaction No family hx of      Deep Vein Thrombosis (DVT) No family hx of        Medications:  Current Outpatient Medications   Medication     fluticasone (FLONASE) 50 MCG/ACT nasal spray     furosemide (LASIX) 20 MG tablet     lisinopril (ZESTRIL) 20 MG tablet     loratadine (CLARITIN) 5 MG chewable tablet     MELATONIN PO     sulfamethoxazole-trimethoprim (BACTRIM DS) 800-160 MG tablet     acetaminophen (TYLENOL) 325 MG tablet     aspirin (ASPIRIN LOW DOSE) 81 MG EC tablet     diazepam (VALIUM) 5 MG tablet     ondansetron (ZOFRAN) 4 MG tablet     oxyCODONE (ROXICODONE) 5 MG tablet     No current facility-administered medications for this visit.       Review of Systems  A complete 10 point review of systems was asked " "and answered in the negative unless specifically commented upon in the HPI    Objective:         Vitals:    08/12/21 1138   BP: 108/62   Pulse: 73   Temp: 97.4  F (36.3  C)   TempSrc: Oral   Weight: 81.6 kg (180 lb)   Height: 1.753 m (5' 9\")     Body mass index is 26.58 kg/m .     Gen: NAD  HEENT: vitiligo  CV: RR  Lungs: Normal respiratory excursion   Abd: Oblique scar RUQ - no overt hernia on exam.  Vertical scar near umbilicus: no hernia, non-tender. No overt shifting dullness  Ext: 1+ LE edema to mid calf  Skin: vitiligo    Labs and Diagnostic tests:  reviewed    Imaging:  CT Abd 4/5/2021  Liver: Cirrhotic morphology. Posttreatment changes in segments 6 and 7  without evidence of residual viable tumor. Scattered wedge-shaped  subcapsular foci most likely representing arterial portal shunts noted  on images 59, 67, 103, 108 of series 5. No new enhancing foci  suspicious for HCC. Chronic occlusion of the segment 6 posterior  segmental branch of right portal vein. Otherwise the right, main and  left portal veins are patent. The IVC and hepatic veins are patent.  Gastroesophageal and mesenteric portosystemic collaterals are noted.  There is moderate ascites.     Remainder of abdomen and pelvis: Cholelithiasis without abnormal wall  thickening. The pancreas, adrenals and kidneys are normal. Spleen is  slightly enlarged, measuring 13 cm craniocaudally. Mild  atherosclerotic calcifications of the aorta. The celiac trunk and SMA  are patent. Conventional hepatic artery anatomy.  Small and large bowel loops are normal in caliber.     Lung bases:  No suspicious nodules, consolidation or pleural effusion.  Mitral annular calcification is noted.     Bones and soft tissues: No suspicious osseous lesion. Umbilical  hernia.                                                                      IMPRESSION:    1. Cirrhosis and evidence of portal hypertension.  2. No residual viable tumor in the treated segment 6/7 lesions. No " new  lesions   3. Scattered subcapsular arterially enhancing foci are most likely  arterioportal shunts, although delayed phase imaging is not available  to definitively evaluate washout.  4. Based on this exam only, the patient is within  Mendez criteria.  5. Moderate ascites.  6. Chronic occlusion of the posterior segment 6 branch of the right  portal vein.     Procedures:  Liver biopsy: 7/18/2019  SPECIMEN(S):   A: Shallow right liver lobe   B: Deep right liver lobe     FINAL DIAGNOSIS:   A. Liver, Shallow Right Lobe, Core Needle Biopsy:   Cirrhosis, Laennec fibrosis stage 4B; a neoplasm is not identified  (See   Comment)     B. Liver, Deep Right, Lobe, Core Needle Biopsy:   Hepatocellular carcinoma, moderately differentiated    EGD: 10/2017  Findings:        Small (< 5 mm) varices were found in the lower third of the esophagus.        A small non-bleeding Sarahi-Hoffman tear with stigmata of recent bleeding        was found.        Mild portal hypertensive gastropathy was found in the stomach.        Multiple dispersed, non-bleeding erosions were found in the gastric        antrum. There were no stigmata of recent bleeding.        The examined duodenum was normal.     Assessment and Plan:    Cirrhosis:    - Secondary to chronic hepatitis C +/- alcohol  - He continues to drink alcohol intermittently (rare)  - Not yet had his hepatitis C treated  - Decompensated with ascites  - Discussed the need for absolute sobriety to maximize life span    HCV:  - Genotype 1a, treatment naive  - As he has had long period cancer free; will order screening labs so as to treat ASAP    Hepatocellular Cancer:   - Biopsy proven  - Treated with TACE and surgical MWA  - last AFP 14.5  - Will plan on MRI or CT soon    Ascites:  - S/p TIPS with improvement  - Unclear if residual ascites is related to resolving portal hypertensive ascites or related to surgical intervention  - Continue to follow a sodium restricted (<2g sodium diet)      Hepatic Encephalopathy:  - no acute issues    Esophageal Varices:   - last EGD 2017 with small varices  - s/p TIPS 4/2021    Nutrition:  As with most patients with chronic liver disease, there is a significant degree of protein malnutrition.  Dicussed need to change dietary habits to improve overall protein balance.  Discussed the importance of eating between 1.2-1.5g/kg/day lean protein like eggs, fish, chicken, nuts, and legumes, in addition to a diet rich in fresh fruits and vegetables.  Continue to follow a sodium restricted (<2g sodium diet) and discussed the need to minimize the intake of carbohydrates and sugars, to avoid obesity.   - Strongly encourage protein supplements 2-3 times daily (Boost, Ensure, Brooks Instant Milk, etc.) to meet protein and caloric intake.  - Recommend a bedtime snack with protein and complex carbohydrate to minimize risk of muscle catabolism overnight    Follow Up:  3 months     Thank you very much for the opportunity to participate in the care of this patient.  If you have any further questions, please don't hesitate to contact our office.    Thomas M. Leventhal, M.D.   of Medicine  Advanced & Transplant Hepatology  [facility]              Thomas M. Leventhal, MD

## 2021-08-12 NOTE — TELEPHONE ENCOUNTER
Dr. Leventhal agreed to change visit to virtual.  Appt changed to virtual by clinic ALBERT.    Vidya HOWARD LPN  Hepatology Clinic

## 2021-08-12 NOTE — PROGRESS NOTES
PRIMARY CARE CENTER       SUBJECTIVE:  Ten Johnson is a 61 year old male with a PMHx of ETOH cirrhosis, chronic hepatitis C, hepatocellular carcinoma s/p liver tumor ablation (10/22/19) and TIPS (MELD 9) 4/15/21, aortic stenosis s/p TAVR, fat containing incarcerated umbilical hernia s/p ischemic small bowel resection 6/22/21, who comes in for continued post-surgical abdominal pain.     Earlier this summer, he underwent a TIPS procedure on 4/15/2021, with improvement in his ascites afterwards.  Prior to that he was having therapeutic paracenteses every 1 to 2 weeks.  However since then, he has had worsening lower extremity edema.  In the interim, he was admitted on June 22 for a fat-containing incarcerated umbilical hernia, that led to 8 cm of ischemic small bowel resected.  On 7/20/2021, he was admitted to [facility] for acute abdominal pain with moderate ascites.  They were unable to pull any fluid but treated as presumed SBP with 5 days of ceftriaxone.  He has been on DS Bactrim daily since, which causes him photosensitivity after sitting in the sun for more than 15 minutes.    Today, he is presenting for worsening lower extremity edema with pain, as well as abdominal pain.  His lower extremity edema has worsened since the TIPS procedure earlier this year, but he states that it was occurring since 2019.  It worsens whenever he is standing up or sitting, leading to swelling even in his arms and hands.  And only resolves after he lays horizontal for a while.  He has previously tried diuretics but was reportedly intolerant with hypotension.  Today, he states he stopped taking it because he did not like going to the bathroom so often.  Dr. Leventhal re-prescribed lasix today, which he states he'll take.  He previously bought compression stockings but did not like the way it felt.    His abdominal pain also has been present for several years, but is worse since his recent surgeries this summer.  He  "states that it migrates around his abdomen and feels like it is burning.  It is worse later in the day and also with the swelling.  States this pain was well controlled after receiving Dilaudid, fentanyl, oxycodone.  He states he does not take any OTC medications as instructed.  Because of pain in his abdomen and with the swelling, he states he does not sleep more than 6 to 8 hours every week.    No fevers, chills, nausea, vomiting, constipation, diarrhea.  Continues to take Bactrim, but is considering stopping due to the sun sensitivity this weekend.    Medications and allergies reviewed by me today.     ROS:   Constitutional, neuro, ENT, endocrine, pulmonary, cardiac, gastrointestinal, genitourinary, musculoskeletal, integument and psychiatric systems are negative, except as otherwise noted.    OBJECTIVE:    /69 (BP Location: Right arm, Patient Position: Sitting, Cuff Size: Adult Regular)   Pulse 67   Ht 1.753 m (5' 9\")   Wt 81.6 kg (180 lb)   SpO2 100%   BMI 26.58 kg/m     Wt Readings from Last 1 Encounters:   08/12/21 81.6 kg (180 lb)       GENERAL APPEARANCE: healthy, alert and no distress. Sitting comfortably in his chair     EYES: EOMI,  PERRL     HENT: normal cephalic/atraumatic     NECK: no adenopathy, no asymmetry, masses, or scars. JVD appreciated mid-neck      RESP: lungs clear to auscultation - no rales, rhonchi or wheezes     CV: regular rates and rhythm, normal S1 S2, no S3 or S4 . Systolic murmur     ABDOMEN: Soft, distended, slightly tender to palpation in the RUQ     MS: extremities normal- no gross deformities noted, no evidence of inflammation in joints, FROM in all extremities. Pitting edema 2+ in lower extremities bilaterally up to mid calf.      SKIN: light discoloration along the knuckles bilaterally     NEURO: Normal strength and tone, sensory exam grossly normal, mentation intact and speech normal     ASSESSMENT/PLAN:    Ten was seen today for hospital f/u and abdominal pain, " "swelling in extremities.     Diagnoses and all orders for this visit:    Lower extremity edema, bilateral  Abdominal pain, generalized  Ascites  - His abdominal pain is likely visceral pain 2/2 to his extensive recent abdominal surgeries  - His abdomen is distended today, but soft and nontender (besides to deep palpation in one focal spot in the right upper quadrant).  Does not need ultrasound today. Follow up with us or Dr. Leventhal if his abdomen because more distended  - He is seeing lymphedema clinic on 8/24 for his lower extremity edema. In the meantime, advised taking the lasix as prescribed by Dr. Leventhal  - Continue conservative measures, such as compression stockings as tolerated. Discussed that he can tolerate some Tylenol for pain, up to 2g total.   - Discussed pain management options.  We counseled him about possible toxicity with chronic opiate usage in cirrhotic patients.  At that time, he walked out of the room stating that he will just go to the \"streets and self medicate\"  - Offered pain management referral.  Patient states he has been there in the past and refuses to go back    Josué Cardoso MD  Aug 12, 2021    Pt was seen and plan of care discussed with Dr. Noel.     "

## 2021-08-12 NOTE — TELEPHONE ENCOUNTER
[facility] Call Center    Phone Message    May a detailed message be left on voicemail: yes     Reason for Call: Other: Patient calling in regards to appointment today 8/12 at 11:30 with Dr. Leventhal. States that his ride never showed up and wondering if appointment could be switched to virtual. Writer spoke with priority line who was going to reach out to patient to discuss as patient hung up while writer was speaking to priority line.     Please advise     Action Taken: Other:  HEPATOLOGY    Travel Screening: Not Applicable

## 2021-08-12 NOTE — NURSING NOTE
Chief Complaint   Patient presents with     Recheck Medication     his legs arms and hands will swell up majorly with any movement, does want some pain medication to help manage     Hospital F/U     been feeling some extreme pain, spent 6 days at a hospital, removed a bowel restriction, they thought he may have an infection       Kirti Vogt, EMT at 12:47 PM on 8/12/2021

## 2021-08-12 NOTE — PROGRESS NOTES
Date of Service: August 12, 2021     Subjective:            Ten Johnson is a 61 year old male presenting for evaluation of liver disease    History of Present Illness   Ten Johnson is a 61 year old male with past medical history of history chronic hepatitis C, alcohol use disorder (active) with biopsy-proven cirrhosis complicated by biopsy-proven hepatocellular carcinoma who presents in follow up     Since last seen he has had a very complex medical history.  He underwent a TIPS placement on April 15, 2021 with a resultant gradient of 7.  After this he did not have any significant issues with ascites and was feeling quite well.  Potentially felt worsening of lower extremity edema several weeks after this procedure and was admitted again at the [facility] on June 22 with concerns of incarcerated umbilical hernia.  He was taken to the operating room where he was found to have an incarcerated hernia with an area of ischemic small bowel that required resection.  He was recovering from that procedure and had outpatient follow-up with the surgeon, but unfortunately was admitted to [facility] on July 20 with acute abdominal pain in the setting of ascites.  He was given a diagnosis of presumed SBP, but no paracentesis was performed based on minimal fluid.  There were no overt biliary issues at that time, but review of his liver test did demonstrate an elevated bilirubin ALT and AST.  Since discharge from the hospital he has been on Bactrim, which he admits causes him significant photosensitivity.  He notes that he does still have vague abdominal pains of unclear etiology.  He has lower extremity edema, and he does believe this is worse than prior to his TIPS placement, but reports it has been worse in the past several weeks than it is on today's clinic visit    He admits that he does have issues with pain and discomfort, and he is meeting with his primary care provider today to have further  discussions regarding management of chronic pain as well as potential medical management of insomnia    Past Medical History:  Past Medical History:   Diagnosis Date     JENNIFER (acute kidney injury) (H) 4/9/2019     Alcohol use disorder      Alcoholic cirrhosis (H)      Anticoagulant long-term use     plavix     Aortic stenosis, severe 2/21/2018     Ascites      Chronic allergic rhinitis      Chronic anemia      Chronic hepatitis C without hepatic coma (H) 05/10/2016    Untreated as of 2/2018     Cirrhosis (H) 2017    MRI finding     Diastolic dysfunction      Erosive gastropathy      Esophageal varices in alcoholic cirrhosis (H)      H/O upper gastrointestinal hemorrhage 09/2017     Hepatocellular carcinoma (H)      History of blood transfusion      History of drug abuse (H)     intranasal     Hypertension     essential     JANELLE (iron deficiency anemia)      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     Sarahi-Hoffman tear     History     Marijuana abuse      MRSA (methicillin resistant Staphylococcus aureus)      Nonrheumatic aortic valve stenosis 2/20/2018     Olecranon bursitis      Portal hypertension (H)      Right shoulder pain     history of rotator cuff repair     S/p TAVR (transcatheter aortic valve replacement), bioprosthetic      Severe aortic stenosis      Thrombocytopenia (H)        Surgical History:  Past Surgical History:   Procedure Laterality Date     ABLATE LIVER TUMOR N/A 10/22/2019    Procedure: Ablate liver tumor x3;  Surgeon: Gregorio Benoit MD;  Location: [facility]     ARTHROSCOPY SHOULDER ROTATOR CUFF REPAIR  7/31/2012    Procedure: ARTHROSCOPY SHOULDER ROTATOR CUFF REPAIR;  Right Shoulder Arthroscopic Rotator Cuff Repair, BicepsTenodesis,  Subacromial Decompression ;  Surgeon: Joi Castillo MD;  [facility]     ESOPHAGOSCOPY, GASTROSCOPY, DUODENOSCOPY (EGD), COMBINED N/A 10/23/2017    Procedure: COMBINED ESOPHAGOSCOPY, GASTROSCOPY,  DUODENOSCOPY (EGD);;  Surgeon: Gentry Salas MD;  Location: [facility] GI     EXCHANGE POLY COMPONENT ARTHROPLASTY KNEE Right 3/4/2019    Procedure: REVISION RIGHT TOTAL KNEE POLY COMPONENT EXCHANGE;  Surgeon: Olvin Joe MD;  Location: [facility] OR     FACIAL RECONSTRUCTION SURGERY  1971     HEART CATH FEMORAL CANNULIZATION WITH OPEN STANDBY REPAIR AORTIC VALVE N/A 2/21/2018    Procedure: HEART CATH FEMORAL CANNULIZATION WITH OPEN STANDBY REPAIR AORTIC VALVE;;  Surgeon: Luis Baird MD;  [facility]: [facility]     HERNIORRHAPHY VENTRAL N/A 6/22/2021    Procedure: HERNIORRHAPHY, VENTRAL, OPEN, small bowel resection;  Surgeon: Harley Snell MD;  Location: [facility]     IR CHEMO EMBOLIZATION  1/29/2020     IR LIVER BIOPSY PERCUTANEOUS  7/18/2019     IR PARACENTESIS  4/15/2021     IR TRANSVEN INTRAHEPATIC PORTOSYST SHUNT  4/15/2021     IRRIGATION AND DEBRIDEMENT UPPER EXTREMITY, COMBINED  1/3/2012    Procedure:COMBINED IRRIGATION AND DEBRIDEMENT UPPER EXTREMITY; Irrigation & Debridement Left Elbow; Surgeon:CRISTHIAN ZHOU; Location[facility]     LAPAROSCOPIC BIOPSY LIVER N/A 10/22/2019    Procedure: intraoperative liver ultrasound, laparoscopic converted to open liver biopsy x 6;  Surgeon: Gregorio Benoit MD;  [facility]     LAPAROSCOPY DIAGNOSTIC (GENERAL) N/A 10/22/2019    Procedure: Diagnostic laparoscopy;  Surgeon: Gregorio Benoit MD;  [facility]     LAPAROTOMY, LYSIS ADHESIONS, COMBINED N/A 10/22/2019    Procedure: Laparotomy, lysis adhesions, combined;  Surgeon: Gregorio Benoit MD;  [facility]     OPTICAL TRACKING SYSTEM ARTHROPLASTY KNEE Right 2/7/2019    Procedure: ARTHROPLASTY KNEE RIGHT;  Surgeon: Olvin Joe MD;  [facility]     REPAIR TENDON TRICEPS UPPER EXTREMITY  11/8/2011    Procedure:REPAIR TENDON TRICEPS UPPER EXTREMITY; Surgeon:CRISTHIAN ZHOU; Location[facility]     SHOULDER SURGERY  2003    left, injury, torn tendons, hematoma     TRANSCATHETER AORTIC VALVE  "IMPLANT ANESTHESIA N/A 2/21/2018    Procedure: TRANSCATHETER AORTIC VALVE IMPLANT ANESTHESIA;  Transfemoral (Quiroz) Aortic Valve Implant 26mm MARTHA 3, with Cardiopulmonary Bypass Standby, transthoracic echocardiogram;  Surgeon: GENERIC ANESTHESIA PROVIDER;  Location: [facility]     TRANSPOSITION ULNAR NERVE (ELBOW)  11/8/2011    Procedure:TRANSPOSITION ULNAR NERVE (ELBOW); Final Procedure Done: Left Elbow Lateral Ulnar Collateral Repair And  Left Elbow Triceps Repair         Social History:  Social History     Tobacco Use     Smoking status: Never Smoker     Smokeless tobacco: Never Used   Substance Use Topics     Alcohol use: Yes     Comment: drinks a \"beer occasionally\"     Drug use: Yes     Types: Marijuana       Family History:  Family History   Problem Relation Age of Onset     Cancer Mother 62        unknown primary     Alcoholism Paternal Uncle      Unknown/Adopted Father      No Known Problems Brother      Diabetes Maternal Grandmother      Myocardial Infarction Maternal Grandfather      No Known Problems Paternal Grandmother      Unknown/Adopted Paternal Grandfather      Cirrhosis No family hx of      Anesthesia Reaction No family hx of      Deep Vein Thrombosis (DVT) No family hx of        Medications:  Current Outpatient Medications   Medication     fluticasone (FLONASE) 50 MCG/ACT nasal spray     furosemide (LASIX) 20 MG tablet     lisinopril (ZESTRIL) 20 MG tablet     loratadine (CLARITIN) 5 MG chewable tablet     MELATONIN PO     sulfamethoxazole-trimethoprim (BACTRIM DS) 800-160 MG tablet     acetaminophen (TYLENOL) 325 MG tablet     aspirin (ASPIRIN LOW DOSE) 81 MG EC tablet     diazepam (VALIUM) 5 MG tablet     ondansetron (ZOFRAN) 4 MG tablet     oxyCODONE (ROXICODONE) 5 MG tablet     No current facility-administered medications for this visit.       Review of Systems  A complete 10 point review of systems was asked and answered in the negative unless specifically commented upon in the " "HPI    Objective:         Vitals:    08/12/21 1138   BP: 108/62   Pulse: 73   Temp: 97.4  F (36.3  C)   TempSrc: Oral   Weight: 81.6 kg (180 lb)   Height: 1.753 m (5' 9\")     Body mass index is 26.58 kg/m .     Gen: NAD  HEENT: vitiligo  CV: RR  Lungs: Normal respiratory excursion   Abd: Oblique scar RUQ - no overt hernia on exam.  Vertical scar near umbilicus: no hernia, non-tender. No overt shifting dullness  Ext: 1+ LE edema to mid calf  Skin: vitiligo    Labs and Diagnostic tests:  reviewed    Imaging:  CT Abd 4/5/2021  Liver: Cirrhotic morphology. Posttreatment changes in segments 6 and 7  without evidence of residual viable tumor. Scattered wedge-shaped  subcapsular foci most likely representing arterial portal shunts noted  on images 59, 67, 103, 108 of series 5. No new enhancing foci  suspicious for HCC. Chronic occlusion of the segment 6 posterior  segmental branch of right portal vein. Otherwise the right, main and  left portal veins are patent. The IVC and hepatic veins are patent.  Gastroesophageal and mesenteric portosystemic collaterals are noted.  There is moderate ascites.     Remainder of abdomen and pelvis: Cholelithiasis without abnormal wall  thickening. The pancreas, adrenals and kidneys are normal. Spleen is  slightly enlarged, measuring 13 cm craniocaudally. Mild  atherosclerotic calcifications of the aorta. The celiac trunk and SMA  are patent. Conventional hepatic artery anatomy.  Small and large bowel loops are normal in caliber.     Lung bases:  No suspicious nodules, consolidation or pleural effusion.  Mitral annular calcification is noted.     Bones and soft tissues: No suspicious osseous lesion. Umbilical  hernia.                                                                      IMPRESSION:    1. Cirrhosis and evidence of portal hypertension.  2. No residual viable tumor in the treated segment 6/7 lesions. No new  lesions   3. Scattered subcapsular arterially enhancing foci are most " likely  arterioportal shunts, although delayed phase imaging is not available  to definitively evaluate washout.  4. Based on this exam only, the patient is within  Mendez criteria.  5. Moderate ascites.  6. Chronic occlusion of the posterior segment 6 branch of the right  portal vein.     Procedures:  Liver biopsy: 7/18/2019  SPECIMEN(S):   A: Shallow right liver lobe   B: Deep right liver lobe     FINAL DIAGNOSIS:   A. Liver, Shallow Right Lobe, Core Needle Biopsy:   Cirrhosis, Laennec fibrosis stage 4B; a neoplasm is not identified  (See   Comment)     B. Liver, Deep Right, Lobe, Core Needle Biopsy:   Hepatocellular carcinoma, moderately differentiated    EGD: 10/2017  Findings:        Small (< 5 mm) varices were found in the lower third of the esophagus.        A small non-bleeding Sarahi-Hoffman tear with stigmata of recent bleeding        was found.        Mild portal hypertensive gastropathy was found in the stomach.        Multiple dispersed, non-bleeding erosions were found in the gastric        antrum. There were no stigmata of recent bleeding.        The examined duodenum was normal.     Assessment and Plan:    Cirrhosis:    - Secondary to chronic hepatitis C +/- alcohol  - He continues to drink alcohol intermittently (rare)  - Not yet had his hepatitis C treated  - Decompensated with ascites  - Discussed the need for absolute sobriety to maximize life span    HCV:  - Genotype 1a, treatment naive  - As he has had long period cancer free; will order screening labs so as to treat ASAP    Hepatocellular Cancer:   - Biopsy proven  - Treated with TACE and surgical MWA  - last AFP 14.5  - Will plan on MRI or CT soon    Ascites:  - S/p TIPS with improvement  - Unclear if residual ascites is related to resolving portal hypertensive ascites or related to surgical intervention  - Continue to follow a sodium restricted (<2g sodium diet)     Hepatic Encephalopathy:  - no acute issues    Esophageal Varices:   - last EGD  2017 with small varices  - s/p TIPS 4/2021    Nutrition:  As with most patients with chronic liver disease, there is a significant degree of protein malnutrition.  Dicussed need to change dietary habits to improve overall protein balance.  Discussed the importance of eating between 1.2-1.5g/kg/day lean protein like eggs, fish, chicken, nuts, and legumes, in addition to a diet rich in fresh fruits and vegetables.  Continue to follow a sodium restricted (<2g sodium diet) and discussed the need to minimize the intake of carbohydrates and sugars, to avoid obesity.   - Strongly encourage protein supplements 2-3 times daily (Boost, Ensure, [place] Instant Milk, etc.) to meet protein and caloric intake.  - Recommend a bedtime snack with protein and complex carbohydrate to minimize risk of muscle catabolism overnight    Follow Up:  3 months     Thank you very much for the opportunity to participate in the care of this patient.  If you have any further questions, please don't hesitate to contact our office.    Thomas M. Leventhal, M.D.   of Medicine  Advanced & Transplant Hepatology  [facility]

## 2021-08-12 NOTE — PATIENT INSTRUCTIONS
I ordered furosemide 20mg daily (a diuretic/water pill)    Please get labs today    - After the labs result, I will be in touch with you about curing your hepatitis C

## 2021-12-08 NOTE — PLAN OF CARE
Brief Orthopedic Surgery Note    Orthopedics was asked to review radiographs and provide treatment recommendations.     Briefly, Navi is a 61 year old male with a past medical history significant for alcohol use disorder, hepatitis C, hypertension, HCC c/b cirrhosis and ascites (s/p TIPS) who presented the emergency department after a fall.  Patient has dementia and resides in a memory care unit.  He has frequent falls and wears a helmet because of this.  He sustained a laceration to his face as well as an abrasion to the right knee.  He complained of right arm pain and right knee pain and thus x-rays were obtained.  Right knee x-ray shows a possible nondisplaced medial femoral condyle periprosthetic fracture.  X-ray of the right shoulder shows possible acute on chronic clavicle fracture as well as degenerative changes and superior migration of the humeral head indicating possible rotator cuff tear.  History obtained via chart review and discussion with emergency department staff.  Of note this patient was not evaluated by this provider.    Assessment:   60 y/o with the followin. Right knee effusion and subcutaneous edema 2/2 fall. Status post right total knee arthroplasty by Dr. Joe in 2019.    2. Right shoulder pain status post fall with possible acute on chronic distal clavicle fracture.    Recommendations:   -CT scan of the right knee to further evaluate possible nondisplaced periprosthetic femur fracture  - Hinged knee brace locked in extension to the RLE during ambulation. MUST ambulate with assistive device. This should be worn at all times except for hygiene.  Hinged knee brace can be unlocked when at rest. Patient can weight bear as tolerated to the right lower extremity WITH assistive device.  Acknowledged that this may be difficult due to his mental status.  -Follow-up in 2 weeks with Dr. Abel to reevaluate possible periprosthetic fracture with repeat XRs.  Message sent to our schedulers,  who will contact the patient to arrange this.  -For his right shoulder pain and possible nondisplaced distal clavicle fracture, sling to the right arm.  NWB to the RUE. Follow-up in 1 to 2 weeks with nonoperative sports med.  Again message sent to schedulers will who will help arrange this.    Please page Orthopedic Surgery if further assistance is needed.     SOFIA HDZ PA-C  12/8/2021 3:44 PM  Orthopaedic Surgery     Thank you for allowing me to participate in this patient's care. Please page me directly any questions/concerns.   Securely message with the Vocera Web Console (learn more here)  Text page via [employer] Paging/Directory    If there is no response, if it is a weekend, or if it is during evening hours, please page the orthopaedic surgery resident on call via [employer] Paging/Directory

## 2021-12-08 NOTE — ED TRIAGE NOTES
BIBA from memory care unit of [facility] after having a fall which is baseline for patient having frequent falls and wears helmet because of it.  Patient did have his helmet but sustained laceration to the nose.

## 2021-12-08 NOTE — ED NOTES
Bed: ED16  Expected date:   Expected time:   Means of arrival:   Comments:  [facility] [place] Facial laceration

## 2021-12-08 NOTE — ED PROVIDER NOTES
[facility] ([facility])  12/08/21  History     Chief Complaint   Patient presents with     Fall     Facial Laceration     The history is provided by the patient and medical records.     Ten Johnson is a 61 year old male with a past medical history significant for alcohol use disorder, hepatitis C, hypertension, HCC c/b cirrhosis and ascites (s/p TIPS) who presents to the Emergency Department for evaluation of a facial laceration after a fall this morning.  History is somewhat limited at this time due to patients baseline cognition. Patient reports he was walking around in his residence when he had a fall.  He resides at a memory care unit.  EMS reports that he has frequent falls at baseline and wears a helmet because of it.  He did have his helmet on when he fell today.  He reports he was using his walker and does have a history of frequent falls. Patient does have a laceration over the left eyebrow, and another overlying the bridge of the nose. Patient also endorses right arm pain that has been presents since the fall. He does endorse some ongoing swelling in the lower extremities, however, has had some worsening swelling in the RLE. Additionally, he endorses new RUQ abdominal pain that has been present since earlier today. Patient otherwise denies any chest pain or shortness of breath.     Past Medical History  Past Medical History:   Diagnosis Date     JENNIFER (acute kidney injury) (H) 4/9/2019     Alcohol use disorder      Alcoholic cirrhosis (H)      Anticoagulant long-term use     plavix     Aortic stenosis, severe 2/21/2018     Ascites      Chronic allergic rhinitis      Chronic anemia      Chronic hepatitis C without hepatic coma (H) 05/10/2016    Untreated as of 2/2018     Cirrhosis (H) 2017    MRI finding     Diastolic dysfunction      Erosive gastropathy      Esophageal varices in alcoholic cirrhosis (H)      H/O upper gastrointestinal hemorrhage 09/2017     Hepatocellular  carcinoma (H)      History of blood transfusion      History of drug abuse (H)     intranasal     Hypertension     essential     JANELLE (iron deficiency anemia)      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     Sarahi-Hoffman tear     History     Marijuana abuse      MRSA (methicillin resistant Staphylococcus aureus)      Nonrheumatic aortic valve stenosis 2/20/2018     Olecranon bursitis      Portal hypertension (H)      Right shoulder pain     history of rotator cuff repair     S/p TAVR (transcatheter aortic valve replacement), bioprosthetic      Severe aortic stenosis      Thrombocytopenia (H)      Past Surgical History:   Procedure Laterality Date     ABLATE LIVER TUMOR N/A 10/22/2019    Procedure: Ablate liver tumor x3;  Surgeon: Gregorio Benoit MD;  Location: [facility]     ARTHROSCOPY SHOULDER ROTATOR CUFF REPAIR  7/31/2012    Procedure: ARTHROSCOPY SHOULDER ROTATOR CUFF REPAIR;  Right Shoulder Arthroscopic Rotator Cuff Repair, BicepsTenodesis,  Subacromial Decompression ;  Surgeon: Joi Castillo MD;  [facility]     ESOPHAGOSCOPY, GASTROSCOPY, DUODENOSCOPY (EGD), COMBINED N/A 10/23/2017    Procedure: COMBINED ESOPHAGOSCOPY, GASTROSCOPY, DUODENOSCOPY (EGD);;  Surgeon: Gentry Salas MD;  Location: [facility]     EXCHANGE POLY COMPONENT ARTHROPLASTY KNEE Right 3/4/2019    Procedure: REVISION RIGHT TOTAL KNEE POLY COMPONENT EXCHANGE;  Surgeon: Olvin Joe MD;  Location: [facility]     FACIAL RECONSTRUCTION SURGERY  1971     HEART CATH FEMORAL CANNULIZATION WITH OPEN STANDBY REPAIR AORTIC VALVE N/A 2/21/2018    Procedure: HEART CATH FEMORAL CANNULIZATION WITH OPEN STANDBY REPAIR AORTIC VALVE;;  Surgeon: Luis Baird MD;  Location: [facility]     HERNIORRHAPHY VENTRAL N/A 6/22/2021    Procedure: HERNIORRHAPHY, VENTRAL, OPEN, small bowel resection;  Surgeon: Harley Snell MD;  Location: [facility]     IR CHEMO EMBOLIZATION  1/29/2020     IR  LIVER BIOPSY PERCUTANEOUS  7/18/2019     IR PARACENTESIS  4/15/2021     IR TRANSVEN INTRAHEPATIC PORTOSYST SHUNT  4/15/2021     IRRIGATION AND DEBRIDEMENT UPPER EXTREMITY, COMBINED  1/3/2012    Procedure:COMBINED IRRIGATION AND DEBRIDEMENT UPPER EXTREMITY; Irrigation & Debridement Left Elbow; Surgeon:CRISTHIAN ZHOU; [facility]     LAPAROSCOPIC BIOPSY LIVER N/A 10/22/2019    Procedure: intraoperative liver ultrasound, laparoscopic converted to open liver biopsy x 6;  Surgeon: Gregorio Benoit MD;  [facility]     LAPAROSCOPY DIAGNOSTIC (GENERAL) N/A 10/22/2019    Procedure: Diagnostic laparoscopy;  Surgeon: Gregorio Benoit MD;  [facility]     LAPAROTOMY, LYSIS ADHESIONS, COMBINED N/A 10/22/2019    Procedure: Laparotomy, lysis adhesions, combined;  Surgeon: Gregorio Benoit MD;  [facility]     OPTICAL TRACKING SYSTEM ARTHROPLASTY KNEE Right 2/7/2019    Procedure: ARTHROPLASTY KNEE RIGHT;  Surgeon: Olvin Joe MD;  [facility]     REPAIR TENDON TRICEPS UPPER EXTREMITY  11/8/2011    Procedure:REPAIR TENDON TRICEPS UPPER EXTREMITY; Surgeon:CRISTHIAN ZHOU; [facility]     SHOULDER SURGERY  2003    left, injury, torn tendons, hematoma     TRANSCATHETER AORTIC VALVE IMPLANT ANESTHESIA N/A 2/21/2018    Procedure: TRANSCATHETER AORTIC VALVE IMPLANT ANESTHESIA;  Transfemoral (Quiroz) Aortic Valve Implant 26mm MARTHA 3, with Cardiopulmonary Bypass Standby, transthoracic echocardiogram;  Surgeon: GENERIC ANESTHESIA PROVIDER;  Location: [facility]     TRANSPOSITION ULNAR NERVE (ELBOW)  11/8/2011    Procedure:TRANSPOSITION ULNAR NERVE (ELBOW); Final Procedure Done: Left Elbow Lateral Ulnar Collateral Repair And  Left Elbow Triceps Repair       acetaminophen (TYLENOL) 325 MG tablet  aspirin (ASPIRIN LOW DOSE) 81 MG EC tablet  diazepam (VALIUM) 5 MG tablet  fluticasone (FLONASE) 50 MCG/ACT nasal spray  furosemide (LASIX) 20 MG tablet  lisinopril (ZESTRIL) 20 MG tablet  loratadine (CLARITIN) 5  "MG chewable tablet  MELATONIN PO  ondansetron (ZOFRAN) 4 MG tablet  oxyCODONE (ROXICODONE) 5 MG tablet  sulfamethoxazole-trimethoprim (BACTRIM DS) 800-160 MG tablet      Allergies   Allergen Reactions     Zolpidem Other (See Comments)     Alcoholic.  Had reaction 3/17/13 while intoxicated which included black out, loss of awareness, paranoia.  Do not prescribe.  Dr. Celeste     Cats Other (See Comments)     rhinitis     Dogs Other (See Comments)     rhinitis     Pollen Extract Other (See Comments)     rhinits.     Family History  Family History   Problem Relation Age of Onset     Cancer Mother 62        unknown primary     Alcoholism Paternal Uncle      Unknown/Adopted Father      No Known Problems Brother      Diabetes Maternal Grandmother      Myocardial Infarction Maternal Grandfather      No Known Problems Paternal Grandmother      Unknown/Adopted Paternal Grandfather      Cirrhosis No family hx of      Anesthesia Reaction No family hx of      Deep Vein Thrombosis (DVT) No family hx of      Social History   Social History     Tobacco Use     Smoking status: Never Smoker     Smokeless tobacco: Never Used   Substance Use Topics     Alcohol use: Yes     Comment: drinks a \"beer occasionally\"     Drug use: Yes     Types: Marijuana      Past medical history, past surgical history, medications, allergies, family history, and social history were reviewed with the patient. No additional pertinent items.     I have reviewed the Medications, Allergies, Past Medical and Surgical History, and Social History in the Epic system.    Review of Systems   Constitutional: Negative for chills and fever.   Respiratory: Negative for shortness of breath.    Cardiovascular: Positive for leg swelling (R>L). Negative for chest pain.   Gastrointestinal: Positive for abdominal pain (RLQ). Negative for vomiting.   Musculoskeletal:        R-arm pain   Skin: Positive for wound (Laceration superior to bridge of nose and L-eyebrow).   All other " systems reviewed and are negative.      Physical Exam   BP: 122/69  Pulse: 70  Temp: 97.6  F (36.4  C)  Resp: 16  Weight: 82 kg (180 lb 12.4 oz)  SpO2: 99 %    GEN:  Alert, well developed, no acute distress  HEENT:  PERRL, EOMI, Mucous membranes are moist.  Laceration overlying the nasal bone with a second laceration through the medial aspect of the left eyebrow.  Bleeding is well controlled.  Cardio:  RRR, no murmur, radial pulses equal bilaterally  PULM:  Lungs clear, good air movement, no wheezes, rales   Abd:  Soft, normal bowel sounds, no focal tenderness, well-healed right upper quadrant scar  Back exam:  No CVA tenderness, patient seems to have diffuse tenderness along his T-spine and L-spine.  No ecchymosis or step-offs on the back, no crepitance.  Musculoskeletal: Patient has some decreased range of motion, especially at both elbows, this seems to be chronic with some muscle contractures.  Passive range of motion at the shoulders is normal and at the wrists, also normal passive range of motion normal range of motion.  The right knee has an abrasion over the anterior aspect and a well-healed surgical scar, normal range of motion at the right knee, however, there is diffuse tenderness in the right calf.  There is some swelling in both lower extremities with right-sided calf tenderness.   Neuro:  Alert, not oriented, Follows commands, moving all extremities to command.  Normal cranial nerve exam, sensation is intact throughout all 4 extremities and on the face, normal  strength, normal bilateral upper extremity strength.  Normal strength with dorsiflexion and plantarflexion of the toes and feet.  Patient is able to lift each leg and is able to bear weight.  I suspect this is his baseline strength.  Skin:  Warm, dry     Physical Exam  Vitals and nursing note reviewed.         ED Course     At 9:15 AM the patient was seen and examined by Janeth Naqvi MD in Room ED16.       Procedures         EKG  Interpretation:      Interpreted by Janeth Naqvi MD  Time reviewed: 09:42  Symptoms at time of EKG: fall   Rhythm: normal sinus with sinus arrhythmia  Rate: normal  Axis: normal  Ectopy: none  Conduction: Prolonged QT, QTC is 463  ST Segments/ T Waves: There are nonspecific T wave/ST segment abnormalities  Q Waves: none  Comparison to prior: Compared to EKG from September 16, 2020, there is some ST downsloping in V6 flattening of the T wave in V3- V6, flattening of the T waves in I and II.    Clinical Impression: Nonspecific T wave changes, prolonged QT    Labs are reviewed by me and results are shown below.  X-rays of the chest, L-spine, T-spine were done, reviewed by me and results are shown below.  CTs of the head, facial bones and C-spine were also done, results are shown below.  Right lower extremity ultrasound was done as well.  Results are shown below.  Due to possible acute on chronic fracture of the distal clavicle seen on chest x-ray, right shoulder x-ray was done as well as right knee.  Results of these were discussed with radiology and orthopedics and are shown below.    There is a an acute on chronic distal clavicle fracture.  This will be treated with a sling and orthopedic follow-up.  There is also a subtle medial femoral epicondyle fracture in the right leg.  Orthopedics recommends hinged knee brace locked in extension and nonweightbearing status as well as follow-up with orthopedics in 2 weeks.  Orthotics was consulted and the knee brace was placed prior to discharge back to his nursing home.  Patient was also given a sling prior to discharge.    Regarding the abdominal pain, patient did not have any focal tenderness, however, continued to report pain, so CT scan of the abdomen pelvis was done.  Results are shown below.  CT is showing moderate ascites, however, when I did a bedside ultrasound, there was no notable fluid pocket to do paracentesis.  No fluid in the right upper quadrant,  left upper quadrant or around the bladder.    Patient was given Tylenol for pain.  Was given a tetanus booster.    [facility] Procedure Note        Laceration Repair:    Performed by: Janeth Naqvi MD  Authorized by: Janeth Naqvi MD  Consent given by: Patient who states understanding of the procedure being performed after discussing the risks, benefits and alternatives.    Preparation: Patient was prepped and draped in usual sterile fashion.  Irrigation solution: saline    Body area:face  Laceration length: 1.5 & 2.75cm  Contamination: The wound is not contaminated.  Foreign bodies:none  Tendon involvement: none  Anesthesia: Local  Local anesthetic: Lidocaine     1%, with epinephrine  Anesthetic total: 4ml    Debridement: none  Skin closure: Closed with 3 & 7 (10 total) x 5.0 Ethilon  Technique: interrupted  Approximation: close  Approximation difficulty: simple    Patient tolerance: Patient tolerated the procedure well with no immediate complications.        Results for orders placed or performed during the hospital encounter of 12/08/21 (from the past 24 hour(s))   EKG 12-lead, tracing only   Result Value Ref Range    Systolic Blood Pressure  mmHg    Diastolic Blood Pressure  mmHg    Ventricular Rate 66 BPM    Atrial Rate 66 BPM    KS Interval 124 ms    QRS Duration 96 ms     ms    QTc 463 ms    P Axis 31 degrees    R AXIS 35 degrees    T Axis -23 degrees    Interpretation ECG       Sinus rhythm with sinus arrhythmia  Nonspecific T wave abnormality  Prolonged QT  Abnormal ECG  Unconfirmed report - interpretation of this ECG is computer generated - see medical record for final interpretation  Confirmed by - EMERGENCY ROOM, PHYSICIAN ([id number]),  NANETTE GOLD ([id number]) on 12/8/2021 2:00:45 PM     Thoracic spine XR, 3 views    Narrative    Exam: XR THORACIC SPINE 3 VIEWS, 12/8/2021 10:17 AM    Indication: fall    Comparison: CT thoracic spine 2/5/2010    Findings:   AP and  lateral views of thoracic spine. 12 rib bearing thoracic  vertebral bodies are identified. No acute fracture or subluxation.  Multilevel degenerative changes of the thoracic spine with multilevel  mild disc space narrowing and Schmorl's node deformities. Advanced  multilevel cervical spondylosis most prominent at C5-6 and C6-7 with  disc space narrowing, anterior osteophytic spurring and facet  arthropathy. Aortic valve prosthesis. TIPS catheter projecting over  the right upper quadrant.      Impression    Impression:   1. No acute osseous abnormality.  2. Multilevel degenerative changes of the thoracic spine.  3. Advanced multilevel cervical spondylosis most prominent at C5-7.    GWENDOLYN ADLER MD         SYSTEM ID:  [id number]   XR Lumbar Spine 2/3 Views    Narrative    3 views lumbar spine radiographs 12/8/2021 10:29 AM    History: fall    Comparison: CT abdomen and pelvis 6/22/2021    Findings:    AP, lateral including lumbosacral spot film lateral view views of the  lumbar spine were obtained.    5 lumbar type vertebral bodies are assumed for the purpose of this  dictation.    There is no acute osseous abnormality.      There is multilevel degenerative changes of the lumbar spine, most  pronounced at L5-S1 with severe disc space loss with associated disc  vacuum phenomenon. Lower lumbar spine predominant facet arthropathies.  Unchanged anterior angulated contour at the proximal intercoccygeal  segment. Bulky bridging osteophyte at the right aspect of T12-L1.    TIPS. Posterior subcutaneous tissue calcifications, may be related to  injection granuloma, similar to prior. Vascular calcifications.      Impression    Impression:  1.  No acute osseous abnormality.  2.  Multilevel degenerative changes most pronounced at L5-S1.    VIPIN PALACIOSAHASHI         SYSTEM ID:  [id number]   XR Chest 2 Views    Narrative    EXAM: XR CHEST 2 VW  12/8/2021 10:19 AM      HISTORY: fall    COMPARISON: 9/21/2020, 6/22/2021    FINDINGS: AP  and lateral radiographs of the chest. The trachea is  midline. Heart size is within normal limits. Postprocedural changes of  aortic valve replacement. Diffuse interstitial opacities. Trace fluid  in the right major fissure. No pneumothorax. Upper abdomen is  unremarkable. Calcific densities overlying the right shoulder with  irregularity of the right humeral head. Similar findings were  partially visualized on CT 9/21/2020.      Impression    IMPRESSION:   1. Diffuse interstitial prominence suggesting interstitial edema  versus atypical infection.  2. Foreshortened appearance of the right humerus with calcific  densities, suggesting combination of posttraumatic and degenerative  changes including heterotopic ossification or calcific tendinitis.  Similar findings were partially visualized on CT 9/21/2020 favoring  these are chronic. Consider dedicated right shoulder radiographs if  there is acute pain in this location in this patient with a history of  fall.    DARYL WILLIAM MD         SYSTEM ID:  [id number]   US Lower Extremity Venous Duplex Right    Narrative    RIGHT LOWER EXTREMITY DUPLEX VENOUS ULTRASOUND 12/8/2021 10:52 AM    CLINICAL HISTORY: right leg pain.     COMPARISONS: None available.    REFERRING PROVIDER: KRZYSZTOF PERAZA    TECHNIQUE: Grayscale, color Doppler, Doppler waveform ultrasound  evaluation was performed through the right common femoral, femoral,  popliteal, and posterior tibial veins.     Left common femoral vein was evaluated for symmetry.    FINDINGS: Left common femoral vein is patent, fully compressible, and  demonstrates normal phasic Doppler waveform.    Right common femoral, femoral, popliteal, and posterior tibial veins  are fully compressible, patent, and demonstrate normal phasic Doppler  waveforms.      Impression    IMPRESSION: No deep venous thrombosis demonstrated in the RIGHT leg.    DENISE ANDERSON MD         SYSTEM ID:  [id number]   CBC with platelets differential     Narrative    The following orders were created for panel order CBC with platelets differential.  Procedure                               Abnormality         Status                     ---------                               -----------         ------                     CBC with platelets and d...[[id number]]  Abnormal            Final result                 Please view results for these tests on the individual orders.   Comprehensive metabolic panel   Result Value Ref Range    Sodium 145 (H) 133 - 144 mmol/L    Potassium 3.9 3.4 - 5.3 mmol/L    Chloride 116 (H) 94 - 109 mmol/L    Carbon Dioxide (CO2) 23 20 - 32 mmol/L    Anion Gap 6 3 - 14 mmol/L    Urea Nitrogen 19 7 - 30 mg/dL    Creatinine 0.84 0.66 - 1.25 mg/dL    Calcium 7.9 (L) 8.5 - 10.1 mg/dL    Glucose 82 70 - 99 mg/dL    Alkaline Phosphatase 89 40 - 150 U/L    AST 65 (H) 0 - 45 U/L    ALT 60 0 - 70 U/L    Protein Total 6.5 (L) 6.8 - 8.8 g/dL    Albumin 2.4 (L) 3.4 - 5.0 g/dL    Bilirubin Total 0.7 0.2 - 1.3 mg/dL    GFR Estimate >90 >60 mL/min/1.73m2   Lipase   Result Value Ref Range    Lipase 220 73 - 393 U/L   Troponin I   Result Value Ref Range    Troponin I High Sensitivity 16 <79 ng/L   CBC with platelets and differential   Result Value Ref Range    WBC Count 6.3 4.0 - 11.0 10e3/uL    RBC Count 3.23 (L) 4.40 - 5.90 10e6/uL    Hemoglobin 7.8 (L) 13.3 - 17.7 g/dL    Hematocrit 26.5 (L) 40.0 - 53.0 %    MCV 82 78 - 100 fL    MCH 24.1 (L) 26.5 - 33.0 pg    MCHC 29.4 (L) 31.5 - 36.5 g/dL    RDW 19.5 (H) 10.0 - 15.0 %    Platelet Count 85 (L) 150 - 450 10e3/uL    % Neutrophils 53 %    % Lymphocytes 30 %    % Monocytes 14 %    % Eosinophils 2 %    % Basophils 1 %    % Immature Granulocytes 0 %    NRBCs per 100 WBC 0 <1 /100    Absolute Neutrophils 3.3 1.6 - 8.3 10e3/uL    Absolute Lymphocytes 1.9 0.8 - 5.3 10e3/uL    Absolute Monocytes 0.9 0.0 - 1.3 10e3/uL    Absolute Eosinophils 0.1 0.0 - 0.7 10e3/uL    Absolute Basophils 0.1 0.0 - 0.2 10e3/uL    Absolute  Immature Granulocytes 0.0 <=0.4 10e3/uL    Absolute NRBCs 0.0 10e3/uL   CT Head w/o Contrast    Narrative    CT HEAD W/O CONTRAST 12/8/2021 11:19 AM    History: Trauma     Comparison: Head CT 9/16/2020    Technique: Using multidetector thin collimation helical acquisition  technique, axial, coronal and sagittal CT images from the skull base  to the vertex were obtained without intravenous contrast.   (topogram) image(s) also obtained and reviewed.    Findings: There is no intracranial hemorrhage, mass effect, or midline  shift. Gray/white matter differentiation in both cerebral hemispheres  is preserved. Ventricles are proportionate to the cerebral sulci. The  basal cisterns are clear.    The bony calvaria and the bones of the skull base are normal. The  visualized portions of the paranasal sinuses and mastoid air cells are  clear.      Impression    Impression:  No acute intracranial pathology.     SHAYLA WOOD MD         SYSTEM ID:  [id number]   Cervical spine CT w/o contrast    Narrative    CT CERVICAL SPINE W/O CONTRAST 12/8/2021 11:28 AM    Provided History: fall    Comparison: CT cervical spine 9/12/2018    Technique: Using multidetector thin collimation helical acquisition  technique, axial, coronal and sagittal CT images through the cervical  spine were obtained without intravenous contrast.     Findings:  Grade 1 anterolisthesis C3 on C4, C4 and C5 and minimal retrolisthesis  of C5 on C6. Reversal of the normal cervical lordosis. No acute  fracture or subluxation. No prevertebral edema. There is multilevel  mild-moderate disc height narrowing and severe disc narrowing at  C5-C6.. The findings on a level by level basis are as follows:    C2-3:  No significant spinal canal or neural foraminal stenosis.    C3-4:  Posterior endplate spurring and bilateral uncinate hypertrophy.  Facet arthropathy bilaterally. Mild spinal canal stenosis. Mild left  neural foraminal stenosis. No significant right neural  foraminal  stenosis.    C4-5:  No spinal canal or neural foraminal stenosis.    C5-6:  Posterior endplate spurring and bilateral uncinate hypertrophy.  Mild spinal canal stenosis. Moderate left neural foraminal stenosis.  No significant right neural foraminal stenosis.     C6-7:  Posterior endplate spurring and bilateral uncinate hypertrophy.  Mild spinal canal stenosis. Moderate bilateral neural foraminal  stenosis.    C7-T1:  No spinal canal or neural foraminal stenosis.     No abnormality of the paraspinous soft tissues.      Impression    Impression:   1. No acute fracture or traumatic subluxation.  2. Multilevel cervical spondylosis most pronounced from C5-C7.    I have personally reviewed the examination and initial interpretation  and I agree with the findings.    KARLEY SANDHU MD         SYSTEM ID:  [id number]   CT Facial Bones without Contrast    Narrative    CT FACIAL BONES WITHOUT CONTRAST 12/8/2021 11:44 AM    History:  Fall    Comparison:  Same date CT head, CT cervical spine      Technique: Using thin collimation multidetector helical acquisition  technique, axial and coronal thin section CT images were reconstructed  through the facial bones. Images were reviewed in bone and soft tissue  windows.    Findings:  Small soft tissue defect with surrounding soft tissue edema  overlying superior nasal bones. Chronic appearing fracture deformities  of the nasal bones are unchanged from 9/16/2020. There is no evidence  of acute fracture of the facial bones. The cribriform plate appears  intact. Alignment of the facial bones appears normal.     There is no hematoma, soft tissue mass or gas visualized within the  orbits. The visualized portions of the paranasal sinuses demonstrate  minimal diffuse mucosal thickening.       Impression    Impression:   Soft tissue laceration overlying the superior nasal bones. No evidence  of acute fracture.     I have personally reviewed the examination and initial  interpretation  and I agree with the findings.    KARLEY SANDHU MD         SYSTEM ID:  [id number]   CT Abdomen Pelvis w/o Contrast    Narrative    EXAMINATION: CT ABDOMEN PELVIS W/O CONTRAST, 12/8/2021 2:39 PM    INDICATION: Abdominal pain, dementia    COMPARISON STUDY: CT of the abdomen and pelvis dated 6/22/2021    TECHNIQUE: CT scan of the abdomen and pelvis was performed on  multidetector CT scanner using volumetric acquisition technique and  images were reconstructed in multiple planes with variable thickness  and reviewed on dedicated workstations.     CONTRAST: No contrast    CT scan radiation dose is optimized to minimum requisite dose using  automated dose modulation techniques.    FINDINGS: Images are degraded by motion and streak artifact.    Lower thorax: No consolidative pulmonary opacities. No pleural  effusions.    Liver: TIPS catheter in place. No focal hepatic mass. Cirrhotic  configuration of the liver.    Biliary System: Status post cholecystectomy. No extrahepatic biliary  dilatation.    Pancreas: No mass or pancreatic ductal dilation.    Adrenal glands: No mass or nodules    Spleen: Normal.    Kidneys: No suspicious mass, obstructing calculus or hydronephrosis.    Gastrointestinal tract :Normal appendix. Normal caliber small bowel.    Mesentery/peritoneum/retroperitoneum: Moderate volume ascites. No free  air.    Lymph nodes: No significant lymphadenopathy.    Vasculature: Scattered atherosclerotic calcification of abdominal  aorta with no aneurysmal dilation.    Pelvis: Urinary bladder is normal.  Prostate and seminal vesicles are  within normal limits.  Bilateral hydroceles. Bilateral hydroceles.    Osseous structures: No aggressive or acute osseous lesion.      Soft tissues: Within normal limits.      Impression    IMPRESSION:   1. No acute intraabdominal process.  2. Cirrhotic configuration of the liver with TIPS catheter in place.  Moderate volume ascites.    I have personally reviewed  the examination and initial interpretation  and I agree with the findings.    RASHARD VALDEZ MD         SYSTEM ID:  [id number]   XR Knee Right 1/2 Views    Narrative    2 views right knee radiographs 12/8/2021 3:03 PM    History: fall    Comparison: 4/22/2019, 2/27/2019    Findings:    AP and crosstable lateral views of the right knee were obtained.     Since comparison radiograph from 2019, curvilinear lucency at the  medial femoral epicondyle, possible nondisplaced periprosthetic  fracture may be present.    . Small-to-moderate joint effusion, similar to prior.    Postsurgical changes of total knee arthroplasty. Mild mineralization  around the knee. Patellar enthesopathy.    Cement extrusion lateral proximal tibia. Vascular calcifications. Mild  subcutaneous soft tissue strandings.      Impression    Impression: Since comparison radiograph from 2019, curvilinear lucency  at the medial femoral epicondyle, possible nondisplaced periprosthetic  fracture may be present. Follow up radiographs in 7-14 days may be  helpful.    Findings were discussed with Dr. Naqvi by me at 3:15 PM    VIPIN NICE         SYSTEM ID:  [id number]   XR Shoulder Right G/E 3 Views    Narrative    4 views right shoulder radiographs 12/8/2021 3:06 PM    History: fall    Comparison: 7/21/2012    Findings:    AP, Grashey, transscapular Y, axillary  views of the right shoulder  were obtained.     Since 2012, new bony contour deformity of the lateral posterior aspect  of the humeral head, which appears relatively chronic. Also new  superior migration of humeral head with loss of acromiohumeral  distance, indicating underlying complete rotator cuff tear. Moderate  glenohumeral joint degenerative change. Moderate acromioclavicular  joint degenerative change.    Bony contour deformity of distal clavicle, which appears to have  increased since comparison, possible refracturing of the previous  distal clavicle fracture cannot be excluded in this  area.    The visualized right lung is clear.      Impression    IMPRESSION:  1. Bony contour deformity of distal clavicle, which appears to have  increased since comparison, possible refracturing of the previous  distal clavicle fracture cannot be excluded in this area.  2. Since 2012, new bony contour deformity of the lateral posterior  aspect of the humeral head, which appears relatively chronic.   3. Superior migration of humeral head with loss of acromiohumeral  distance, indicating underlying complete rotator cuff tear.     Findings were discussed with Dr. Naqvi by me at 3:15 PM    VIPIN EIDASHI         SYSTEM ID:  [id number]   CT Knee Right w/o Contrast    Narrative    Exam: CT of the right knee dated 12/8/2021.    COMPARISON: Radiographs from the same day.    CLINICAL HISTORY: Evaluate prosthesis.    TECHNIQUE: Axial CT images of the right knee were obtained; sagittal  and coronal reconstructions were acquired; reviewed under bone and  soft tissue windows.    Total DLP: 510.    FINDINGS:    Post surgical changes of a right total knee arthroplasty. The surgical  hardware appears intact. No evidence of a fracture. No abnormal  surrounding lucency about the prosthesis.    Vascular calcifications are noted. Marked subcutaneous soft tissue  edema. Small to moderate sized joint effusion.      Impression    IMPRESSION:  1. Post surgical changes of a right total knee arthroplasty. The  surgical hardware appears intact. No evidence of fracture.  2. Small to moderate size right knee joint effusion.  3. Extensive subcutaneous soft tissue edema.    SCARLET MAJANO MD         SYSTEM ID:  [id number]     *Note: Due to a large number of results and/or encounters for the requested time period, some results have not been displayed. A complete set of results can be found in Results Review.     Medications   Tdap (tetanus-diphtheria-acell pertussis) (ADACEL) injection 0.5 mL (0.5 mLs Intramuscular Given 12/8/21 1046)    acetaminophen (TYLENOL) tablet 1,000 mg (1,000 mg Oral Given 12/8/21 1047)   lidocaine 1% with EPINEPHrine 1:100,000 injection 10 mL (10 mLs Intradermal Given 12/8/21 1300)          Assessments & Plan (with Medical Decision Making)   Patient presents after a fall at his [facility] care facility.  Patient is known to have frequent falls and wears a helmet due to this.  He had an obvious facial lacerations overlying the nasal bone and the medial left eyebrow.  These were repaired with 5-0 nylon, 10 sutures were placed total.  Patient will need these removed in 3 to 5 days.  Since patient's falls are frequent and not new, I do not think he needs further ED work-up for the actual falls.  He denies any associated chest pain or shortness of breath, palpitations.  Since there is a new right medial femoral epicondyle fracture, patient will need to wear the hinged knee brace in extension, use a wheelchair, be nonweightbearing until follow-up with orthopedics in 2 weeks.  He will also wear a sling for the distal right clavicle fracture.  Patient will need antibiotic ointment applied to his facial wounds twice a day and cleaning of the wounds once a day.  He will need follow-up with orthopedics in 2 weeks in addition to follow-up with his primary care physician for the facial lacerations in 3 to 5 days for wound check and suture removal.  Patient should also follow-up with his primary care provider orthopedics regarding the right distal clavicle fracture.  Our nurses called patient's nursing home to confirm that they can accommodate the patient in nonweightbearing status with a wheelchair.  Of note, patient did not provide a urine sample for us, instead, he voided into the garbage can in the room.    I have reviewed the nursing notes.    I have reviewed the findings, diagnosis, plan and need for follow up with the patient.    New Prescriptions    No medications on file       Final diagnoses:   Fall, initial encounter   Closed  nondisplaced fracture of right clavicle, unspecified part of clavicle, initial encounter   Closed nondisplaced fracture of condyle of right femur, initial encounter (H)   Abdominal pain, generalized   Facial laceration, initial encounter       I, Tevin Ramos am serving as a trained medical scribe to document services personally performed by Janeth Naqvi MD, based on the provider's statements to me.      I, Janeth Naqvi MD, was physically present and have reviewed and verified the accuracy of this note documented by Tevin Ramos.     Janeth Naqvi MD  12/8/2021   [facility] EMERGENCY DEPARTMENT     Janeth Naqvi MD  12/08/21 2014

## 2021-12-08 NOTE — ED NOTES
Nursing home informed of patient NWB status and they are aware he is to be non-weight bearing to affected leg with a brace

## 2021-12-08 NOTE — DISCHARGE INSTRUCTIONS
Please use a wheelchair at all times and keep the right leg in the brace and extended while walking, but ok to unlock when at rest.  Can weight bear on right leg only with a walker.  You were advised to follow-up with orthopedics in 2 weeks.  They will call you to make this appointment.  If you do not hear from them by Friday of this week, please call orthopedics to make an appointment with Dr. Abel.  Please call orthopedics or return the emergency department if you have any numbness or weakness in the right leg or foot.  You should also wear the arm sling for your clavicle fracture of the right distal clavicle.  You can follow-up orthopedics or your primary care doctor regarding this right clavicle fracture.    Since you will be in a wheelchair for the next couple of weeks at least, falls will be less likely, however, if you do any ambulating, please continue to wear your helmet and get help when ambulating.    Please put antibiotic ointment on your facial wound twice daily.    Please make an appointment to follow up with Your Primary Care Provider in 5 days for wound check and suture removal (in 3-5 days) and Orthopedics Clinic (phone: [phone]) in 10-14 days for the femoral medial epicondyle fracture.    Keep the facial wound clean.  It is okay to wash with plain tap water.  Do not use any peroxide or other disinfectants on the wound.  Please have the wound evaluated if you have any increased redness, thick drainage, if you have fever, increased pain over the wound area or any other concerns.

## 2021-12-29 NOTE — PROGRESS NOTES
RN CARE COORDINATION    Outgoing Call:  Attempted to call Navi re: scheduling follow-up with Medical Oncology. Last seen in clinic on 2/17/2021. He has cancelled subsequent scans and scheduled follow-up with Dr. Nolan. No answer at cell phone number. Will send Lama Labt message and attempt to call again to discuss.                        YUNG Love, RN  RN Care Coordinator  [facility]

## 2021-12-29 NOTE — LETTER
Ten Johnson  [address]  [facility] [address]    : 1960  MRN: [id number]      2022        Dear Ten,    Our records indicate that you are due for a visit with Dr. Piat Nolan at [facility]. We value you as a patient and seek to provide you with excellent quality of care. Please call our clinic at [phone] to schedule an appointment.     We look forward to hearing from you.       Sincerely,       [facility]

## 2022-01-01 ENCOUNTER — APPOINTMENT (OUTPATIENT)
Dept: GENERAL RADIOLOGY | Facility: CLINIC | Age: 62
End: 2022-01-01
Attending: EMERGENCY MEDICINE
Payer: MEDICARE

## 2022-01-01 ENCOUNTER — APPOINTMENT (OUTPATIENT)
Dept: ULTRASOUND IMAGING | Facility: CLINIC | Age: 62
End: 2022-01-01
Attending: EMERGENCY MEDICINE
Payer: MEDICARE

## 2022-01-01 ENCOUNTER — APPOINTMENT (OUTPATIENT)
Dept: PHYSICAL THERAPY | Facility: CLINIC | Age: 62
End: 2022-01-01
Attending: NURSE PRACTITIONER
Payer: MEDICARE

## 2022-01-01 ENCOUNTER — APPOINTMENT (OUTPATIENT)
Dept: GENERAL RADIOLOGY | Facility: CLINIC | Age: 62
DRG: 442 | End: 2022-01-01
Attending: EMERGENCY MEDICINE
Payer: MEDICARE

## 2022-01-01 ENCOUNTER — APPOINTMENT (OUTPATIENT)
Dept: CT IMAGING | Facility: CLINIC | Age: 62
End: 2022-01-01
Attending: EMERGENCY MEDICINE
Payer: MEDICARE

## 2022-01-01 ENCOUNTER — APPOINTMENT (OUTPATIENT)
Dept: CT IMAGING | Facility: CLINIC | Age: 62
DRG: 442 | End: 2022-01-01
Attending: EMERGENCY MEDICINE
Payer: MEDICARE

## 2022-01-01 ENCOUNTER — PATIENT OUTREACH (OUTPATIENT)
Dept: CARE COORDINATION | Facility: CLINIC | Age: 62
End: 2022-01-01

## 2022-01-01 ENCOUNTER — HOSPITAL ENCOUNTER (OUTPATIENT)
Facility: CLINIC | Age: 62
Setting detail: OBSERVATION
Discharge: NURSING, REHAB OR LONG-TERM CARE | End: 2022-07-12
Attending: EMERGENCY MEDICINE | Admitting: EMERGENCY MEDICINE
Payer: MEDICARE

## 2022-01-01 ENCOUNTER — PATIENT OUTREACH (OUTPATIENT)
Dept: CARE COORDINATION | Facility: CLINIC | Age: 62
End: 2022-01-01
Payer: COMMERCIAL

## 2022-01-01 ENCOUNTER — MEDICAL CORRESPONDENCE (OUTPATIENT)
Dept: HEALTH INFORMATION MANAGEMENT | Facility: CLINIC | Age: 62
End: 2022-01-01

## 2022-01-01 ENCOUNTER — HOSPITAL ENCOUNTER (EMERGENCY)
Facility: CLINIC | Age: 62
Discharge: HOME | End: 2022-05-10
Attending: EMERGENCY MEDICINE | Admitting: EMERGENCY MEDICINE
Payer: MEDICARE

## 2022-01-01 ENCOUNTER — HOSPITAL ENCOUNTER (EMERGENCY)
Facility: CLINIC | Age: 62
Discharge: HOME | End: 2022-05-27
Attending: EMERGENCY MEDICINE | Admitting: EMERGENCY MEDICINE
Payer: MEDICARE

## 2022-01-01 ENCOUNTER — HOSPITAL ENCOUNTER (INPATIENT)
Facility: CLINIC | Age: 62
LOS: 1 days | Discharge: NURSING, REHAB OR LONG-TERM CARE | DRG: 442 | End: 2022-05-16
Attending: EMERGENCY MEDICINE | Admitting: INTERNAL MEDICINE
Payer: MEDICARE

## 2022-01-01 ENCOUNTER — DOCUMENTATION ONLY (OUTPATIENT)
Dept: OTHER | Facility: CLINIC | Age: 62
End: 2022-01-01

## 2022-01-01 VITALS
SYSTOLIC BLOOD PRESSURE: 122 MMHG | DIASTOLIC BLOOD PRESSURE: 56 MMHG | HEART RATE: 93 BPM | HEIGHT: 69 IN | OXYGEN SATURATION: 98 % | BODY MASS INDEX: 28.29 KG/M2 | WEIGHT: 191 LBS | RESPIRATION RATE: 18 BRPM | TEMPERATURE: 98.2 F

## 2022-01-01 VITALS
SYSTOLIC BLOOD PRESSURE: 139 MMHG | OXYGEN SATURATION: 100 % | WEIGHT: 191 LBS | BODY MASS INDEX: 29.04 KG/M2 | DIASTOLIC BLOOD PRESSURE: 71 MMHG | RESPIRATION RATE: 18 BRPM | TEMPERATURE: 98.1 F | HEART RATE: 102 BPM

## 2022-01-01 VITALS
WEIGHT: 191 LBS | HEART RATE: 72 BPM | OXYGEN SATURATION: 100 % | RESPIRATION RATE: 16 BRPM | HEIGHT: 68 IN | DIASTOLIC BLOOD PRESSURE: 63 MMHG | SYSTOLIC BLOOD PRESSURE: 115 MMHG | TEMPERATURE: 98.1 F | BODY MASS INDEX: 28.95 KG/M2

## 2022-01-01 VITALS
TEMPERATURE: 97.9 F | RESPIRATION RATE: 16 BRPM | OXYGEN SATURATION: 99 % | BODY MASS INDEX: 29.13 KG/M2 | DIASTOLIC BLOOD PRESSURE: 74 MMHG | SYSTOLIC BLOOD PRESSURE: 134 MMHG | WEIGHT: 191.58 LBS | HEART RATE: 84 BPM

## 2022-01-01 DIAGNOSIS — S09.90XA CLOSED HEAD INJURY, INITIAL ENCOUNTER: ICD-10-CM

## 2022-01-01 DIAGNOSIS — Z71.89 OTHER SPECIFIED COUNSELING: ICD-10-CM

## 2022-01-01 DIAGNOSIS — R10.84 GENERALIZED ABDOMINAL PAIN: ICD-10-CM

## 2022-01-01 DIAGNOSIS — K76.82 HEPATIC ENCEPHALOPATHY (H): ICD-10-CM

## 2022-01-01 DIAGNOSIS — M79.662 PAIN OF LEFT LOWER LEG: ICD-10-CM
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
## 2022-01-01 DIAGNOSIS — R29.6 REPEATED FALLS: ICD-10-CM

## 2022-01-01 DIAGNOSIS — S70.01XA CONTUSION OF RIGHT HIP, INITIAL ENCOUNTER: ICD-10-CM

## 2022-01-01 DIAGNOSIS — F20.9 SCHIZOPHRENIA, UNSPECIFIED TYPE (H): ICD-10-CM
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
## 2022-01-01 DIAGNOSIS — R51.9 NONINTRACTABLE HEADACHE, UNSPECIFIED CHRONICITY PATTERN, UNSPECIFIED HEADACHE TYPE: ICD-10-CM

## 2022-01-01 DIAGNOSIS — M79.671 BILATERAL FOOT PAIN: ICD-10-CM

## 2022-01-01 DIAGNOSIS — S01.81XA FACIAL LACERATION, INITIAL ENCOUNTER: ICD-10-CM

## 2022-01-01 DIAGNOSIS — S01.01XA LACERATION OF SCALP, INITIAL ENCOUNTER: ICD-10-CM

## 2022-01-01 DIAGNOSIS — Z20.822 CONTACT WITH AND (SUSPECTED) EXPOSURE TO COVID-19: ICD-10-CM

## 2022-01-01 DIAGNOSIS — R53.1 WEAKNESS: ICD-10-CM

## 2022-01-01 DIAGNOSIS — K70.30 ALCOHOLIC CIRRHOSIS, UNSPECIFIED WHETHER ASCITES PRESENT (H): ICD-10-CM

## 2022-01-01 DIAGNOSIS — M79.672 BILATERAL FOOT PAIN: ICD-10-CM

## 2022-01-01 DIAGNOSIS — Z96.651 S/P TOTAL KNEE ARTHROPLASTY, RIGHT: Primary | ICD-10-CM

## 2022-01-01 DIAGNOSIS — R41.82 ALTERED MENTAL STATUS, UNSPECIFIED ALTERED MENTAL STATUS TYPE: ICD-10-CM

## 2022-01-01 DIAGNOSIS — M54.50 LOW BACK PAIN, UNSPECIFIED BACK PAIN LATERALITY, UNSPECIFIED CHRONICITY, UNSPECIFIED WHETHER SCIATICA PRESENT: ICD-10-CM
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
## 2022-01-01 DIAGNOSIS — Z91.81 PERSONAL HISTORY OF FALL: ICD-10-CM

## 2022-01-01 DIAGNOSIS — G47.00 INSOMNIA, UNSPECIFIED TYPE: ICD-10-CM

## 2022-01-01 LAB
ALBUMIN SERPL BCG-MCNC: 3 G/DL (ref 3.5–5.2)
ALBUMIN SERPL-MCNC: 2.8 G/DL (ref 3.4–5)
ALBUMIN SERPL-MCNC: 2.8 G/DL (ref 3.4–5)
ALBUMIN UR-MCNC: NEGATIVE MG/DL
ALBUMIN UR-MCNC: NEGATIVE MG/DL
ALP SERPL-CCNC: 70 U/L (ref 40–150)
ALP SERPL-CCNC: 82 U/L (ref 40–129)
ALP SERPL-CCNC: 91 U/L (ref 40–150)
ALT SERPL W P-5'-P-CCNC: 29 U/L (ref 10–50)
ALT SERPL W P-5'-P-CCNC: 46 U/L (ref 0–70)
ALT SERPL W P-5'-P-CCNC: 48 U/L (ref 0–70)
AMMONIA PLAS-SCNC: 106 UMOL/L (ref 10–50)
ANION GAP SERPL CALCULATED.3IONS-SCNC: 4 MMOL/L (ref 3–14)
ANION GAP SERPL CALCULATED.3IONS-SCNC: 9 MMOL/L (ref 3–14)
ANION GAP SERPL CALCULATED.3IONS-SCNC: 9 MMOL/L (ref 7–15)
APPEARANCE UR: CLEAR
APPEARANCE UR: CLEAR
AST SERPL W P-5'-P-CCNC: 52 U/L (ref 10–50)
AST SERPL W P-5'-P-CCNC: 60 U/L (ref 0–45)
AST SERPL W P-5'-P-CCNC: 64 U/L (ref 0–45)
BASOPHILS # BLD AUTO: 0.1 10E3/UL (ref 0–0.2)
BASOPHILS NFR BLD AUTO: 1 %
BASOPHILS NFR BLD AUTO: 1 %
BASOPHILS NFR BLD AUTO: 2 %
BILIRUB SERPL-MCNC: 0.6 MG/DL
BILIRUB SERPL-MCNC: 0.8 MG/DL (ref 0.2–1.3)
BILIRUB SERPL-MCNC: 0.9 MG/DL (ref 0.2–1.3)
BILIRUB UR QL STRIP: NEGATIVE
BILIRUB UR QL STRIP: NEGATIVE
BUN SERPL-MCNC: 16 MG/DL (ref 7–30)
BUN SERPL-MCNC: 20 MG/DL (ref 7–30)
BUN SERPL-MCNC: 20.8 MG/DL (ref 8–23)
CALCIUM SERPL-MCNC: 8.4 MG/DL (ref 8.5–10.1)
CALCIUM SERPL-MCNC: 8.5 MG/DL (ref 8.5–10.1)
CALCIUM SERPL-MCNC: 8.5 MG/DL (ref 8.8–10.2)
CHLORIDE BLD-SCNC: 109 MMOL/L (ref 94–109)
CHLORIDE BLD-SCNC: 110 MMOL/L (ref 94–109)
CHLORIDE SERPL-SCNC: 110 MMOL/L (ref 98–107)
CO2 SERPL-SCNC: 22 MMOL/L (ref 20–32)
CO2 SERPL-SCNC: 28 MMOL/L (ref 20–32)
COLOR UR AUTO: ABNORMAL
COLOR UR AUTO: NORMAL
CREAT SERPL-MCNC: 0.67 MG/DL (ref 0.66–1.25)
CREAT SERPL-MCNC: 0.91 MG/DL (ref 0.66–1.25)
CREAT SERPL-MCNC: 1.06 MG/DL (ref 0.67–1.17)
DEPRECATED HCO3 PLAS-SCNC: 23 MMOL/L (ref 22–29)
EOSINOPHIL # BLD AUTO: 0.2 10E3/UL (ref 0–0.7)
EOSINOPHIL NFR BLD AUTO: 3 %
EOSINOPHIL NFR BLD AUTO: 3 %
EOSINOPHIL NFR BLD AUTO: 4 %
ERYTHROCYTE [DISTWIDTH] IN BLOOD BY AUTOMATED COUNT: 20.8 % (ref 10–15)
ERYTHROCYTE [DISTWIDTH] IN BLOOD BY AUTOMATED COUNT: 21.7 % (ref 10–15)
ERYTHROCYTE [DISTWIDTH] IN BLOOD BY AUTOMATED COUNT: 22.2 % (ref 10–15)
GFR SERPL CREATININE-BSD FRML MDRD: 78 ML/MIN/1.73M2
GFR SERPL CREATININE-BSD FRML MDRD: >90 ML/MIN/1.73M2
GFR SERPL CREATININE-BSD FRML MDRD: >90 ML/MIN/1.73M2
GLUCOSE BLD-MCNC: 128 MG/DL (ref 70–99)
GLUCOSE BLD-MCNC: 82 MG/DL (ref 70–99)
GLUCOSE SERPL-MCNC: 98 MG/DL (ref 70–99)
GLUCOSE UR STRIP-MCNC: NEGATIVE MG/DL
GLUCOSE UR STRIP-MCNC: NEGATIVE MG/DL
HCT VFR BLD AUTO: 24.8 % (ref 40–53)
HCT VFR BLD AUTO: 25.5 % (ref 40–53)
HCT VFR BLD AUTO: 27.9 % (ref 40–53)
HGB BLD-MCNC: 7.2 G/DL (ref 13.3–17.7)
HGB BLD-MCNC: 7.4 G/DL (ref 13.3–17.7)
HGB BLD-MCNC: 8 G/DL (ref 13.3–17.7)
HGB UR QL STRIP: NEGATIVE
HGB UR QL STRIP: NEGATIVE
HOLD SPECIMEN: NORMAL
IMM GRANULOCYTES # BLD: 0 10E3/UL
IMM GRANULOCYTES NFR BLD: 0 %
INR PPP: 1.36 (ref 0.85–1.15)
INR PPP: 1.36 (ref 0.85–1.15)
KETONES UR STRIP-MCNC: NEGATIVE MG/DL
KETONES UR STRIP-MCNC: NEGATIVE MG/DL
LEUKOCYTE ESTERASE UR QL STRIP: NEGATIVE
LEUKOCYTE ESTERASE UR QL STRIP: NEGATIVE
LIPASE SERPL-CCNC: 237 U/L (ref 73–393)
LYMPHOCYTES # BLD AUTO: 1.3 10E3/UL (ref 0.8–5.3)
LYMPHOCYTES # BLD AUTO: 1.6 10E3/UL (ref 0.8–5.3)
LYMPHOCYTES # BLD AUTO: 1.6 10E3/UL (ref 0.8–5.3)
LYMPHOCYTES NFR BLD AUTO: 19 %
LYMPHOCYTES NFR BLD AUTO: 22 %
LYMPHOCYTES NFR BLD AUTO: 26 %
MCH RBC QN AUTO: 22.1 PG (ref 26.5–33)
MCH RBC QN AUTO: 22.1 PG (ref 26.5–33)
MCH RBC QN AUTO: 23.3 PG (ref 26.5–33)
MCHC RBC AUTO-ENTMCNC: 28.7 G/DL (ref 31.5–36.5)
MCHC RBC AUTO-ENTMCNC: 29 G/DL (ref 31.5–36.5)
MCHC RBC AUTO-ENTMCNC: 29 G/DL (ref 31.5–36.5)
MCV RBC AUTO: 76 FL (ref 78–100)
MCV RBC AUTO: 76 FL (ref 78–100)
MCV RBC AUTO: 81 FL (ref 78–100)
MONOCYTES # BLD AUTO: 0.9 10E3/UL (ref 0–1.3)
MONOCYTES # BLD AUTO: 1.2 10E3/UL (ref 0–1.3)
MONOCYTES # BLD AUTO: 1.5 10E3/UL (ref 0–1.3)
MONOCYTES NFR BLD AUTO: 14 %
MONOCYTES NFR BLD AUTO: 18 %
MONOCYTES NFR BLD AUTO: 20 %
NEUTROPHILS # BLD AUTO: 3.3 10E3/UL (ref 1.6–8.3)
NEUTROPHILS # BLD AUTO: 3.9 10E3/UL (ref 1.6–8.3)
NEUTROPHILS # BLD AUTO: 4 10E3/UL (ref 1.6–8.3)
NEUTROPHILS NFR BLD AUTO: 54 %
NEUTROPHILS NFR BLD AUTO: 54 %
NEUTROPHILS NFR BLD AUTO: 59 %
NITRATE UR QL: NEGATIVE
NITRATE UR QL: NEGATIVE
NRBC # BLD AUTO: 0 10E3/UL
NRBC BLD AUTO-RTO: 0 /100
PH UR STRIP: 6.5 [PH] (ref 5–7)
PH UR STRIP: 7 [PH] (ref 5–7)
PLATELET # BLD AUTO: 169 10E3/UL (ref 150–450)
PLATELET # BLD AUTO: 170 10E3/UL (ref 150–450)
PLATELET # BLD AUTO: 188 10E3/UL (ref 150–450)
POTASSIUM BLD-SCNC: 3.7 MMOL/L (ref 3.4–5.3)
POTASSIUM BLD-SCNC: 3.9 MMOL/L (ref 3.4–5.3)
POTASSIUM SERPL-SCNC: 3.9 MMOL/L (ref 3.4–5.3)
PROCALCITONIN SERPL-MCNC: <0.05 NG/ML
PROT SERPL-MCNC: 6.1 G/DL (ref 6.4–8.3)
PROT SERPL-MCNC: 6.6 G/DL (ref 6.8–8.8)
PROT SERPL-MCNC: 7.1 G/DL (ref 6.8–8.8)
RBC # BLD AUTO: 3.26 10E6/UL (ref 4.4–5.9)
RBC # BLD AUTO: 3.35 10E6/UL (ref 4.4–5.9)
RBC # BLD AUTO: 3.43 10E6/UL (ref 4.4–5.9)
RBC URINE: 0 /HPF
RBC URINE: 0 /HPF
SARS-COV-2 RNA RESP QL NAA+PROBE: NEGATIVE
SARS-COV-2 RNA RESP QL NAA+PROBE: NEGATIVE
SODIUM SERPL-SCNC: 141 MMOL/L (ref 133–144)
SODIUM SERPL-SCNC: 141 MMOL/L (ref 133–144)
SODIUM SERPL-SCNC: 142 MMOL/L (ref 136–145)
SP GR UR STRIP: 1.01 (ref 1–1.03)
SP GR UR STRIP: 1.01 (ref 1–1.03)
UROBILINOGEN UR STRIP-MCNC: 3 MG/DL
UROBILINOGEN UR STRIP-MCNC: NORMAL MG/DL
WBC # BLD AUTO: 6 10E3/UL (ref 4–11)
WBC # BLD AUTO: 6.7 10E3/UL (ref 4–11)
WBC # BLD AUTO: 7.3 10E3/UL (ref 4–11)
WBC URINE: <1 /HPF
WBC URINE: <1 /HPF

## 2022-01-01 PROCEDURE — 73590 X-RAY EXAM OF LOWER LEG: CPT | Mod: 26 | Performed by: RADIOLOGY

## 2022-01-01 PROCEDURE — 73610 X-RAY EXAM OF ANKLE: CPT | Mod: 50

## 2022-01-01 PROCEDURE — 93971 EXTREMITY STUDY: CPT | Mod: LT

## 2022-01-01 PROCEDURE — 36415 COLL VENOUS BLD VENIPUNCTURE: CPT | Performed by: EMERGENCY MEDICINE

## 2022-01-01 PROCEDURE — 85610 PROTHROMBIN TIME: CPT | Performed by: EMERGENCY MEDICINE

## 2022-01-01 PROCEDURE — G0378 HOSPITAL OBSERVATION PER HR: HCPCS

## 2022-01-01 PROCEDURE — 73610 X-RAY EXAM OF ANKLE: CPT | Mod: 26 | Performed by: RADIOLOGY

## 2022-01-01 PROCEDURE — 250N000013 HC RX MED GY IP 250 OP 250 PS 637: Performed by: NURSE PRACTITIONER

## 2022-01-01 PROCEDURE — G1004 CDSM NDSC: HCPCS | Performed by: RADIOLOGY

## 2022-01-01 PROCEDURE — 73630 X-RAY EXAM OF FOOT: CPT | Mod: 50

## 2022-01-01 PROCEDURE — 250N000013 HC RX MED GY IP 250 OP 250 PS 637: Performed by: EMERGENCY MEDICINE

## 2022-01-01 PROCEDURE — 85025 COMPLETE CBC W/AUTO DIFF WBC: CPT | Performed by: EMERGENCY MEDICINE

## 2022-01-01 PROCEDURE — 120N000002 HC R&B MED SURG/OB UMMC

## 2022-01-01 PROCEDURE — 93975 VASCULAR STUDY: CPT | Mod: 26 | Performed by: RADIOLOGY

## 2022-01-01 PROCEDURE — C9803 HOPD COVID-19 SPEC COLLECT: HCPCS | Performed by: EMERGENCY MEDICINE

## 2022-01-01 PROCEDURE — 70450 CT HEAD/BRAIN W/O DYE: CPT | Mod: 26 | Performed by: RADIOLOGY

## 2022-01-01 PROCEDURE — G1004 CDSM NDSC: HCPCS

## 2022-01-01 PROCEDURE — 73630 X-RAY EXAM OF FOOT: CPT | Mod: 26 | Performed by: RADIOLOGY

## 2022-01-01 PROCEDURE — 80053 COMPREHEN METABOLIC PANEL: CPT | Performed by: EMERGENCY MEDICINE

## 2022-01-01 PROCEDURE — 93975 VASCULAR STUDY: CPT

## 2022-01-01 PROCEDURE — 73590 X-RAY EXAM OF LOWER LEG: CPT | Mod: LT

## 2022-01-01 PROCEDURE — 82140 ASSAY OF AMMONIA: CPT | Performed by: EMERGENCY MEDICINE

## 2022-01-01 PROCEDURE — 73560 X-RAY EXAM OF KNEE 1 OR 2: CPT | Mod: 26 | Performed by: RADIOLOGY

## 2022-01-01 PROCEDURE — G1010 CDSM STANSON: HCPCS | Performed by: RADIOLOGY

## 2022-01-01 PROCEDURE — 73502 X-RAY EXAM HIP UNI 2-3 VIEWS: CPT

## 2022-01-01 PROCEDURE — 72125 CT NECK SPINE W/O DYE: CPT | Mod: 26 | Performed by: RADIOLOGY

## 2022-01-01 PROCEDURE — 81003 URINALYSIS AUTO W/O SCOPE: CPT | Performed by: PHYSICIAN ASSISTANT

## 2022-01-01 PROCEDURE — 83690 ASSAY OF LIPASE: CPT | Performed by: EMERGENCY MEDICINE

## 2022-01-01 PROCEDURE — 73090 X-RAY EXAM OF FOREARM: CPT | Mod: RT

## 2022-01-01 PROCEDURE — 99285 EMERGENCY DEPT VISIT HI MDM: CPT | Performed by: EMERGENCY MEDICINE

## 2022-01-01 PROCEDURE — 72170 X-RAY EXAM OF PELVIS: CPT | Mod: 26 | Performed by: RADIOLOGY

## 2022-01-01 PROCEDURE — U0003 INFECTIOUS AGENT DETECTION BY NUCLEIC ACID (DNA OR RNA); SEVERE ACUTE RESPIRATORY SYNDROME CORONAVIRUS 2 (SARS-COV-2) (CORONAVIRUS DISEASE [COVID-19]), AMPLIFIED PROBE TECHNIQUE, MAKING USE OF HIGH THROUGHPUT TECHNOLOGIES AS DESCRIBED BY CMS-2020-01-R: HCPCS | Performed by: EMERGENCY MEDICINE

## 2022-01-01 PROCEDURE — 99285 EMERGENCY DEPT VISIT HI MDM: CPT | Mod: 25 | Performed by: EMERGENCY MEDICINE

## 2022-01-01 PROCEDURE — 99207 PR APP CREDIT; MD BILLING SHARED VISIT: CPT | Performed by: INTERNAL MEDICINE

## 2022-01-01 PROCEDURE — 73610 X-RAY EXAM OF ANKLE: CPT | Mod: LT

## 2022-01-01 PROCEDURE — 97116 GAIT TRAINING THERAPY: CPT | Mod: GP

## 2022-01-01 PROCEDURE — 73090 X-RAY EXAM OF FOREARM: CPT | Mod: 26 | Performed by: RADIOLOGY

## 2022-01-01 PROCEDURE — 250N000013 HC RX MED GY IP 250 OP 250 PS 637: Performed by: PHYSICIAN ASSISTANT

## 2022-01-01 PROCEDURE — U0005 INFEC AGEN DETEC AMPLI PROBE: HCPCS | Performed by: EMERGENCY MEDICINE

## 2022-01-01 PROCEDURE — 93971 EXTREMITY STUDY: CPT | Mod: 26 | Performed by: RADIOLOGY

## 2022-01-01 PROCEDURE — G1010 CDSM STANSON: HCPCS

## 2022-01-01 PROCEDURE — 99222 1ST HOSP IP/OBS MODERATE 55: CPT | Mod: AI | Performed by: PHYSICIAN ASSISTANT

## 2022-01-01 PROCEDURE — 250N000011 HC RX IP 250 OP 636: Performed by: EMERGENCY MEDICINE

## 2022-01-01 PROCEDURE — 99284 EMERGENCY DEPT VISIT MOD MDM: CPT | Performed by: EMERGENCY MEDICINE

## 2022-01-01 PROCEDURE — 73560 X-RAY EXAM OF KNEE 1 OR 2: CPT | Mod: RT

## 2022-01-01 PROCEDURE — 72170 X-RAY EXAM OF PELVIS: CPT

## 2022-01-01 PROCEDURE — 97161 PT EVAL LOW COMPLEX 20 MIN: CPT | Mod: GP

## 2022-01-01 PROCEDURE — 81003 URINALYSIS AUTO W/O SCOPE: CPT | Performed by: EMERGENCY MEDICINE

## 2022-01-01 PROCEDURE — 99220 PR INITIAL OBSERVATION CARE,LEVEL III: CPT | Performed by: NURSE PRACTITIONER

## 2022-01-01 PROCEDURE — 97530 THERAPEUTIC ACTIVITIES: CPT | Mod: GP

## 2022-01-01 PROCEDURE — 73630 X-RAY EXAM OF FOOT: CPT | Mod: LT

## 2022-01-01 PROCEDURE — 73502 X-RAY EXAM HIP UNI 2-3 VIEWS: CPT | Mod: 26 | Performed by: RADIOLOGY

## 2022-01-01 PROCEDURE — 84145 PROCALCITONIN (PCT): CPT | Performed by: PHYSICIAN ASSISTANT

## 2022-01-01 PROCEDURE — 70486 CT MAXILLOFACIAL W/O DYE: CPT | Mod: 26 | Performed by: RADIOLOGY

## 2022-01-01 PROCEDURE — 96374 THER/PROPH/DIAG INJ IV PUSH: CPT | Performed by: EMERGENCY MEDICINE

## 2022-01-01 RX ORDER — TRAZODONE HYDROCHLORIDE 50 MG/1
50 TABLET, FILM COATED ORAL AT BEDTIME
COMMUNITY

## 2022-01-01 RX ORDER — AMOXICILLIN 250 MG
2 CAPSULE ORAL 2 TIMES DAILY PRN
Status: DISCONTINUED | OUTPATIENT
Start: 2022-01-01 | End: 2022-01-01 | Stop reason: HOSPADM

## 2022-01-01 RX ORDER — TRAMADOL HYDROCHLORIDE 50 MG/1
50 TABLET ORAL 3 TIMES DAILY
Status: DISCONTINUED | OUTPATIENT
Start: 2022-01-01 | End: 2022-01-01 | Stop reason: HOSPADM

## 2022-01-01 RX ORDER — ACETAMINOPHEN 325 MG/1
650 TABLET ORAL 3 TIMES DAILY PRN
Status: DISCONTINUED | OUTPATIENT
Start: 2022-01-01 | End: 2022-01-01 | Stop reason: HOSPADM

## 2022-01-01 RX ORDER — BISACODYL 10 MG
10 SUPPOSITORY, RECTAL RECTAL DAILY PRN
COMMUNITY
Start: 2021-01-01

## 2022-01-01 RX ORDER — FLUTICASONE PROPIONATE 50 MCG
1 SPRAY, SUSPENSION (ML) NASAL DAILY
COMMUNITY
End: 2022-01-01

## 2022-01-01 RX ORDER — LACTULOSE 10 G/15ML
20-40 SOLUTION ORAL 3 TIMES DAILY
Status: DISCONTINUED | OUTPATIENT
Start: 2022-01-01 | End: 2022-01-01 | Stop reason: HOSPADM

## 2022-01-01 RX ORDER — HALOPERIDOL 1 MG/1
1 TABLET ORAL 2 TIMES DAILY
COMMUNITY
Start: 2021-01-01

## 2022-01-01 RX ORDER — TRAZODONE HYDROCHLORIDE 50 MG/1
50 TABLET, FILM COATED ORAL AT BEDTIME
Status: DISCONTINUED | OUTPATIENT
Start: 2022-01-01 | End: 2022-01-01 | Stop reason: HOSPADM

## 2022-01-01 RX ORDER — FAMOTIDINE 20 MG/1
20 TABLET, FILM COATED ORAL 2 TIMES DAILY
Status: DISCONTINUED | OUTPATIENT
Start: 2022-01-01 | End: 2022-01-01 | Stop reason: HOSPADM

## 2022-01-01 RX ORDER — HYDROMORPHONE HYDROCHLORIDE 1 MG/ML
0.5 INJECTION, SOLUTION INTRAMUSCULAR; INTRAVENOUS; SUBCUTANEOUS ONCE
Status: COMPLETED | OUTPATIENT
Start: 2022-01-01 | End: 2022-01-01

## 2022-01-01 RX ORDER — ONDANSETRON 2 MG/ML
4 INJECTION INTRAMUSCULAR; INTRAVENOUS EVERY 6 HOURS PRN
Status: DISCONTINUED | OUTPATIENT
Start: 2022-01-01 | End: 2022-01-01 | Stop reason: HOSPADM

## 2022-01-01 RX ORDER — TRAMADOL HYDROCHLORIDE 50 MG/1
50 TABLET ORAL ONCE
Status: COMPLETED | OUTPATIENT
Start: 2022-01-01 | End: 2022-01-01

## 2022-01-01 RX ORDER — ALBUTEROL SULFATE 90 UG/1
1-2 AEROSOL, METERED RESPIRATORY (INHALATION) EVERY 4 HOURS PRN
Status: DISCONTINUED | OUTPATIENT
Start: 2022-01-01 | End: 2022-01-01 | Stop reason: HOSPADM

## 2022-01-01 RX ORDER — ONDANSETRON 4 MG/1
4 TABLET, ORALLY DISINTEGRATING ORAL EVERY 6 HOURS PRN
Status: DISCONTINUED | OUTPATIENT
Start: 2022-01-01 | End: 2022-01-01 | Stop reason: HOSPADM

## 2022-01-01 RX ORDER — LIDOCAINE 40 MG/G
CREAM TOPICAL
Status: DISCONTINUED | OUTPATIENT
Start: 2022-01-01 | End: 2022-01-01 | Stop reason: HOSPADM

## 2022-01-01 RX ORDER — FOLIC ACID 1 MG/1
1 TABLET ORAL DAILY
Status: DISCONTINUED | OUTPATIENT
Start: 2022-01-01 | End: 2022-01-01 | Stop reason: HOSPADM

## 2022-01-01 RX ORDER — ALBUTEROL SULFATE 90 UG/1
1-2 AEROSOL, METERED RESPIRATORY (INHALATION) EVERY 4 HOURS PRN
COMMUNITY
Start: 2021-01-01

## 2022-01-01 RX ORDER — FOLIC ACID 1 MG/1
1 TABLET ORAL DAILY
COMMUNITY
Start: 2021-01-01

## 2022-01-01 RX ORDER — GUAR GUM
1 PACKET (EA) ORAL 3 TIMES DAILY
COMMUNITY
Start: 2021-01-01

## 2022-01-01 RX ORDER — GABAPENTIN 100 MG/1
100 CAPSULE ORAL 2 TIMES DAILY
Qty: 60 CAPSULE | Refills: 0 | Status: SHIPPED | OUTPATIENT
Start: 2022-01-01 | End: 2022-01-01

## 2022-01-01 RX ORDER — ACETAMINOPHEN 325 MG/1
650 TABLET ORAL 3 TIMES DAILY PRN
COMMUNITY
Start: 2021-01-01

## 2022-01-01 RX ORDER — FLUTICASONE PROPIONATE 50 MCG
2 SPRAY, SUSPENSION (ML) NASAL DAILY
Status: DISCONTINUED | OUTPATIENT
Start: 2022-01-01 | End: 2022-01-01 | Stop reason: HOSPADM

## 2022-01-01 RX ORDER — LORATADINE 10 MG/1
1 TABLET ORAL DAILY PRN
COMMUNITY
End: 2022-01-01

## 2022-01-01 RX ORDER — ACETAMINOPHEN 500 MG
500 TABLET ORAL EVERY 6 HOURS PRN
Status: DISCONTINUED | OUTPATIENT
Start: 2022-01-01 | End: 2022-01-01

## 2022-01-01 RX ORDER — SULFAMETHOXAZOLE/TRIMETHOPRIM 800-160 MG
1 TABLET ORAL DAILY
Status: DISCONTINUED | OUTPATIENT
Start: 2022-01-01 | End: 2022-01-01 | Stop reason: HOSPADM

## 2022-01-01 RX ORDER — ACETAMINOPHEN 325 MG/1
325 TABLET ORAL 3 TIMES DAILY PRN
Status: DISCONTINUED | OUTPATIENT
Start: 2022-01-01 | End: 2022-01-01 | Stop reason: HOSPADM

## 2022-01-01 RX ORDER — LACTULOSE 10 G/15ML
20 SOLUTION ORAL ONCE
Status: COMPLETED | OUTPATIENT
Start: 2022-01-01 | End: 2022-01-01

## 2022-01-01 RX ORDER — LANOLIN ALCOHOL/MO/W.PET/CERES
100 CREAM (GRAM) TOPICAL DAILY
COMMUNITY
Start: 2021-01-01

## 2022-01-01 RX ORDER — TRAMADOL HYDROCHLORIDE 50 MG/1
50 TABLET ORAL 3 TIMES DAILY
COMMUNITY
Start: 2022-01-01 | End: 2022-01-01

## 2022-01-01 RX ORDER — LIDOCAINE 40 MG/G
CREAM TOPICAL 2 TIMES DAILY PRN
Status: DISCONTINUED | OUTPATIENT
Start: 2022-01-01 | End: 2022-01-01 | Stop reason: HOSPADM

## 2022-01-01 RX ORDER — HALOPERIDOL 1 MG/1
1 TABLET ORAL 2 TIMES DAILY
Status: DISCONTINUED | OUTPATIENT
Start: 2022-01-01 | End: 2022-01-01 | Stop reason: HOSPADM

## 2022-01-01 RX ORDER — FUROSEMIDE 40 MG
40 TABLET ORAL DAILY
COMMUNITY
Start: 2022-01-01

## 2022-01-01 RX ORDER — FUROSEMIDE 20 MG
40 TABLET ORAL DAILY
Status: DISCONTINUED | OUTPATIENT
Start: 2022-01-01 | End: 2022-01-01 | Stop reason: HOSPADM

## 2022-01-01 RX ORDER — OLANZAPINE 5 MG/1
5 TABLET, ORALLY DISINTEGRATING ORAL 2 TIMES DAILY PRN
Status: DISCONTINUED | OUTPATIENT
Start: 2022-01-01 | End: 2022-01-01 | Stop reason: HOSPADM

## 2022-01-01 RX ORDER — MULTIPLE VITAMINS W/ MINERALS TAB 9MG-400MCG
1 TAB ORAL DAILY
Status: DISCONTINUED | OUTPATIENT
Start: 2022-01-01 | End: 2022-01-01 | Stop reason: HOSPADM

## 2022-01-01 RX ORDER — TRAMADOL HYDROCHLORIDE 50 MG/1
50 TABLET ORAL 3 TIMES DAILY
Qty: 12 TABLET | Refills: 0 | Status: SHIPPED | OUTPATIENT
Start: 2022-01-01 | End: 2022-01-01

## 2022-01-01 RX ORDER — FAMOTIDINE 20 MG/1
20 TABLET, FILM COATED ORAL 2 TIMES DAILY
COMMUNITY
Start: 2021-01-01

## 2022-01-01 RX ORDER — TIZANIDINE 2 MG/1
2 TABLET ORAL EVERY 8 HOURS PRN
Status: DISCONTINUED | OUTPATIENT
Start: 2022-01-01 | End: 2022-01-01 | Stop reason: HOSPADM

## 2022-01-01 RX ORDER — AMOXICILLIN 250 MG
1 CAPSULE ORAL 2 TIMES DAILY PRN
Status: DISCONTINUED | OUTPATIENT
Start: 2022-01-01 | End: 2022-01-01 | Stop reason: HOSPADM

## 2022-01-01 RX ORDER — GUAR GUM
1 PACKET (EA) ORAL 3 TIMES DAILY
Status: DISCONTINUED | OUTPATIENT
Start: 2022-01-01 | End: 2022-01-01 | Stop reason: HOSPADM

## 2022-01-01 RX ORDER — GABAPENTIN 100 MG/1
100 CAPSULE ORAL 2 TIMES DAILY
COMMUNITY
End: 2022-01-01

## 2022-01-01 RX ORDER — GABAPENTIN 100 MG/1
100 CAPSULE ORAL 2 TIMES DAILY
Status: DISCONTINUED | OUTPATIENT
Start: 2022-01-01 | End: 2022-01-01 | Stop reason: HOSPADM

## 2022-01-01 RX ORDER — OXYCODONE HYDROCHLORIDE 5 MG/1
5 TABLET ORAL ONCE
Status: COMPLETED | OUTPATIENT
Start: 2022-01-01 | End: 2022-01-01

## 2022-01-01 RX ORDER — ONDANSETRON 4 MG/1
4 TABLET, FILM COATED ORAL EVERY 8 HOURS PRN
Status: DISCONTINUED | OUTPATIENT
Start: 2022-01-01 | End: 2022-01-01 | Stop reason: HOSPADM

## 2022-01-01 RX ORDER — LACTULOSE 10 G/15ML
90 SOLUTION ORAL 3 TIMES DAILY
COMMUNITY
Start: 2022-01-01

## 2022-01-01 RX ORDER — LANOLIN ALCOHOL/MO/W.PET/CERES
3 CREAM (GRAM) TOPICAL
Status: DISCONTINUED | OUTPATIENT
Start: 2022-01-01 | End: 2022-01-01 | Stop reason: HOSPADM

## 2022-01-01 RX ORDER — TRAMADOL HYDROCHLORIDE 50 MG/1
50 TABLET ORAL 3 TIMES DAILY
Qty: 12 TABLET | Refills: 0 | Status: SHIPPED | DISCHARGE
Start: 2022-01-01

## 2022-01-01 RX ORDER — GABAPENTIN 100 MG/1
100 CAPSULE ORAL 2 TIMES DAILY
Qty: 60 CAPSULE | Refills: 0 | DISCHARGE
Start: 2022-01-01

## 2022-01-01 RX ORDER — TIZANIDINE 2 MG/1
2 TABLET ORAL EVERY 8 HOURS PRN
COMMUNITY
Start: 2022-01-01

## 2022-01-01 RX ORDER — MULTIVIT-MIN/FA/LYCOPEN/LUTEIN .4-300-25
1 TABLET ORAL DAILY
COMMUNITY
Start: 2021-01-01

## 2022-01-01 RX ADMIN — LORATADINE 5 MG: 5 SOLUTION ORAL at 08:34

## 2022-01-01 RX ADMIN — OXYCODONE HYDROCHLORIDE 5 MG: 5 TABLET ORAL at 03:20

## 2022-01-01 RX ADMIN — FAMOTIDINE 20 MG: 20 TABLET ORAL at 10:46

## 2022-01-01 RX ADMIN — OXYCODONE HYDROCHLORIDE 5 MG: 5 TABLET ORAL at 02:05

## 2022-01-01 RX ADMIN — TRAMADOL HYDROCHLORIDE 50 MG: 50 TABLET, COATED ORAL at 04:04

## 2022-01-01 RX ADMIN — HYDROMORPHONE HYDROCHLORIDE 0.5 MG: 1 INJECTION, SOLUTION INTRAMUSCULAR; INTRAVENOUS; SUBCUTANEOUS at 04:12

## 2022-01-01 RX ADMIN — HALOPERIDOL 1 MG: 1 TABLET ORAL at 07:58

## 2022-01-01 RX ADMIN — LORATADINE 5 MG: 5 SOLUTION ORAL at 11:50

## 2022-01-01 RX ADMIN — FUROSEMIDE 40 MG: 20 TABLET ORAL at 10:45

## 2022-01-01 RX ADMIN — THIAMINE HCL TAB 100 MG 100 MG: 100 TAB at 07:58

## 2022-01-01 RX ADMIN — OLANZAPINE 5 MG: 5 TABLET, ORALLY DISINTEGRATING ORAL at 13:17

## 2022-01-01 RX ADMIN — LACTULOSE 20 G: 20 SOLUTION ORAL at 10:45

## 2022-01-01 RX ADMIN — FLUTICASONE PROPIONATE 2 SPRAY: 50 SPRAY, METERED NASAL at 11:51

## 2022-01-01 RX ADMIN — SULFAMETHOXAZOLE AND TRIMETHOPRIM 1 TABLET: 800; 160 TABLET ORAL at 07:58

## 2022-01-01 RX ADMIN — RIFAXIMIN 550 MG: 550 TABLET ORAL at 11:51

## 2022-01-01 RX ADMIN — FAMOTIDINE 20 MG: 20 TABLET ORAL at 07:58

## 2022-01-01 RX ADMIN — GABAPENTIN 100 MG: 100 CAPSULE ORAL at 08:34

## 2022-01-01 RX ADMIN — FUROSEMIDE 40 MG: 20 TABLET ORAL at 07:58

## 2022-01-01 RX ADMIN — THIAMINE HCL TAB 100 MG 100 MG: 100 TAB at 10:46

## 2022-01-01 RX ADMIN — FOLIC ACID 1 MG: 1 TABLET ORAL at 10:46

## 2022-01-01 RX ADMIN — RIFAXIMIN 550 MG: 550 TABLET ORAL at 08:34

## 2022-01-01 RX ADMIN — FOLIC ACID 1 MG: 1 TABLET ORAL at 07:58

## 2022-01-01 RX ADMIN — Medication 1 TABLET: at 10:46

## 2022-01-01 RX ADMIN — TRAMADOL HYDROCHLORIDE 50 MG: 50 TABLET, COATED ORAL at 07:58

## 2022-01-01 RX ADMIN — HALOPERIDOL 1 MG: 1 TABLET ORAL at 11:50

## 2022-01-01 RX ADMIN — SULFAMETHOXAZOLE AND TRIMETHOPRIM 1 TABLET: 800; 160 TABLET ORAL at 10:45

## 2022-01-01 ASSESSMENT — ENCOUNTER SYMPTOMS
WEAKNESS: 0
DIFFICULTY URINATING: 0
CHEST TIGHTNESS: 0
SHORTNESS OF BREATH: 0
DIARRHEA: 0
COLOR CHANGE: 0
NUMBNESS: 1
PALPITATIONS: 0
FREQUENCY: 0
FEVER: 1
RHINORRHEA: 0
CONFUSION: 0
NAUSEA: 0
DIZZINESS: 0
CONFUSION: 0
VOMITING: 0
DYSURIA: 0
ABDOMINAL PAIN: 1
BACK PAIN: 0
SHORTNESS OF BREATH: 0
WEAKNESS: 0
NAUSEA: 0
ABDOMINAL DISTENTION: 0
NECK PAIN: 0
SORE THROAT: 0
MYALGIAS: 0
ARTHRALGIAS: 0
DYSURIA: 0
COUGH: 0
VOMITING: 0
EYE PAIN: 0
BRUISES/BLEEDS EASILY: 1
CONSTIPATION: 0
BACK PAIN: 0
HEADACHES: 0
CHILLS: 0
HEADACHES: 1
COUGH: 0
ABDOMINAL PAIN: 0
FEVER: 0
FATIGUE: 0

## 2022-01-01 ASSESSMENT — ACTIVITIES OF DAILY LIVING (ADL)
ADLS_ACUITY_SCORE: 37

## 2022-01-01 ASSESSMENT — VISUAL ACUITY: OD: 20/30

## 2022-01-04 NOTE — PROGRESS NOTES
RN CARE COORDINATION    Outgoing Call:   Attempted to contact Navi to schedule follow-up with Medical Oncology. No answer. VCharge message from 12/29 is unread. Will send letter via mail to encourage scheduling follow-up.                           YUNG Love, RN  RN Care Coordinator  [facility]

## 2022-04-15 NOTE — PROGRESS NOTES
Paracentesis Nursing Note  Ten Johnson presents today to [facility] for a paracentesis.    During today's appointment orders from Dr. Leventhal were completed.    Progress Note:  Patient identification verified by name and date of birth.  Assessment completed.  Vitals monitored throughout appointment and recorded in Doc Flowsheets.  See proceduralist note in ultrasound.    Vascular Access: PIV placed today at prior appt  Labs: were not ordered for this appointment.    Date of consent or authorization: 10/10/19.  Invasive Procedure Safety Checklist was completed and sent for scanning.     Paracentesis performed by Jason Britt PA-C Radiology.    The following labs were communicated to provider performing paracentesis:  Lab Results   Component Value Date     11/06/2019       Total amount of ascites fluid drained: 3.4 liters.  Color of ascites fluid: yellow.  Total amount of albumin given: 37.5  grams.    Patient tolerated procedure well.    Post procedure,denies pain or discomfort post paracentesis.    Received order from Murphy Enciso PA-C that it was okay for patient to receive additional paracentesis today. Inbasket sent asking to change paracentesis frequency to PRN.    Discharge Plan:  Discharge instructions were reviewed with patient.  Patient/Representative verbalized understanding and all questions were answered.   Discharged from [facility] in stable condition.    Vy Holt RN       Administrations This Visit     albumin human 25 % injection 12.5 g     Admin Date  11/21/2019 Action  New Bag Dose  12.5 g Route  Intravenous Administered By  Vy Holt RN           Admin Date  11/21/2019 Action  New Bag Dose  25 g Route  Intravenous Administered By  Vy Holt RN          lidocaine (PF) (XYLOCAINE) 1 % injection 20 mL     Admin Date  11/21/2019 Action  Given by Other Dose  10 mL Route  Subcutaneous Administered By  Jonathon  JAMES Mcclellan                  Temp 97.3  F (36.3  C) (Oral)   Wt 75.5 kg (166 lb 6.4 oz)   SpO2 99%   BMI 24.57 kg/m       (0) Normal

## 2022-05-10 NOTE — ED NOTES
"     Emergency Department Patient Sign-out       Brief HPI:  This is a 61 year old male signed out to me by Dr. Caal .  See initial ED Provider note for details of the presentation.            Significant Events prior to my assuming care: Patient with hx of etoh cirrhosis. Has hx of dementia and HE. Has foot/ankle pain. Exam reassuring. Pain for months. No recent trauma. XR's pending.      Exam:   Patient Vitals for the past 24 hrs:   BP Temp Temp src Pulse Resp SpO2 Height Weight   05/09/22 2227 -- -- -- -- -- -- 1.753 m (5' 9\") 86.6 kg (191 lb)   05/09/22 2223 122/56 98.2  F (36.8  C) Oral 93 18 98 % -- --           ED RESULTS:   Results for orders placed or performed during the hospital encounter of 05/10/22 (from the past 24 hour(s))   CBC with platelets differential     Status: Abnormal    Collection Time: 05/10/22  2:01 AM    Narrative    The following orders were created for panel order CBC with platelets differential.  Procedure                               Abnormality         Status                     ---------                               -----------         ------                     CBC with platelets and d...[[id number]]  Abnormal            Final result                 Please view results for these tests on the individual orders.   Comprehensive metabolic panel     Status: Abnormal    Collection Time: 05/10/22  2:01 AM   Result Value Ref Range    Sodium 141 133 - 144 mmol/L    Potassium 3.7 3.4 - 5.3 mmol/L    Chloride 109 94 - 109 mmol/L    Carbon Dioxide (CO2) 28 20 - 32 mmol/L    Anion Gap 4 3 - 14 mmol/L    Urea Nitrogen 16 7 - 30 mg/dL    Creatinine 0.91 0.66 - 1.25 mg/dL    Calcium 8.4 (L) 8.5 - 10.1 mg/dL    Glucose 128 (H) 70 - 99 mg/dL    Alkaline Phosphatase 91 40 - 150 U/L    AST 60 (H) 0 - 45 U/L    ALT 48 0 - 70 U/L    Protein Total 7.1 6.8 - 8.8 g/dL    Albumin 2.8 (L) 3.4 - 5.0 g/dL    Bilirubin Total 0.8 0.2 - 1.3 mg/dL    GFR Estimate >90 >60 mL/min/1.73m2   Lipase     Status: Normal "    Collection Time: 05/10/22  2:01 AM   Result Value Ref Range    Lipase 237 73 - 393 U/L   CBC with platelets and differential     Status: Abnormal    Collection Time: 05/10/22  2:01 AM   Result Value Ref Range    WBC Count 6.0 4.0 - 11.0 10e3/uL    RBC Count 3.26 (L) 4.40 - 5.90 10e6/uL    Hemoglobin 7.2 (L) 13.3 - 17.7 g/dL    Hematocrit 24.8 (L) 40.0 - 53.0 %    MCV 76 (L) 78 - 100 fL    MCH 22.1 (L) 26.5 - 33.0 pg    MCHC 29.0 (L) 31.5 - 36.5 g/dL    RDW 22.2 (H) 10.0 - 15.0 %    Platelet Count 188 150 - 450 10e3/uL    % Neutrophils 54 %    % Lymphocytes 26 %    % Monocytes 14 %    % Eosinophils 4 %    % Basophils 2 %    % Immature Granulocytes 0 %    NRBCs per 100 WBC 0 <1 /100    Absolute Neutrophils 3.3 1.6 - 8.3 10e3/uL    Absolute Lymphocytes 1.6 0.8 - 5.3 10e3/uL    Absolute Monocytes 0.9 0.0 - 1.3 10e3/uL    Absolute Eosinophils 0.2 0.0 - 0.7 10e3/uL    Absolute Basophils 0.1 0.0 - 0.2 10e3/uL    Absolute Immature Granulocytes 0.0 <=0.4 10e3/uL    Absolute NRBCs 0.0 10e3/uL   UA with Microscopic reflex to Culture     Status: Abnormal    Collection Time: 05/10/22  2:02 AM    Specimen: Urine, Midstream   Result Value Ref Range    Color Urine Light Yellow Colorless, Straw, Light Yellow, Yellow    Appearance Urine Clear Clear    Glucose Urine Negative Negative mg/dL    Bilirubin Urine Negative Negative    Ketones Urine Negative Negative mg/dL    Specific Gravity Urine 1.015 1.003 - 1.035    Blood Urine Negative Negative    pH Urine 6.5 5.0 - 7.0    Protein Albumin Urine Negative Negative mg/dL    Urobilinogen Urine 3.0 (A) Normal, 2.0 mg/dL    Nitrite Urine Negative Negative    Leukocyte Esterase Urine Negative Negative    RBC Urine 0 <=2 /HPF    WBC Urine <1 <=5 /HPF    Narrative    Urine Culture not indicated       ED MEDICATIONS:   Medications   oxyCODONE (ROXICODONE) tablet 5 mg (5 mg Oral Given 5/10/22 0205)         Impression:  No diagnosis found.    Plan:    The patient has a patent TIPS ultrasound.   His exam of the abdomen is completely benign he has no complaints at this point.  Labs are reassuring.  He is amenable discharge.  He appears well nontoxic and vitals are stable.  He will be discharged back to his home      MD Barak Dang Matthew Joseph, MD  05/10/22 0351

## 2022-05-10 NOTE — ED PROVIDER NOTES
"ED Provider Note  [facility]      History     Chief Complaint   Patient presents with     Leg Pain     Abdominal Pain     Nausea & Vomiting     HPI  Ten Johnson is a 61 year old male with a past medical history significant for hepatic encephalopathy, alcoholic cirrhosis of the liver with ascites, chronic anemia, hypertension, hepatocellular carcinoma, transcatheter aortic valve implant who presents to the ED via EMS for evaluation of right leg and foot pain.  Patient states that he has \"cracked foot arches\" in both feet.  He reports bilateral foot pain for the past month, right worse than left, with pain in the Achilles area.  He states that he tore both of his Achilles tendons a few weeks ago.  He states that MRI could not be completed because he could not hold still.  He also endorses right leg and knee pain as well as numbness and tingling in the area.  He reports a history of right knee replacement and subsequent MRSA infection.  He states that he is able to walk, though \"barely\".  He also endorses liver pain that has been exacerbated over the past 3 to 4 weeks.  He notes a history of tumor removal from his liver.  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  He states that they bisected his liver, burned out 3 tumors, and went on chemotherapy for the final liver tumor last spring.  He also notes that he has hepatitis C.  He endorses kidney issues stating that he had a procedure in July or August of last year in which they went in through his jugular vein.  He states at that time he was having weekly paracentesis, which he is no longer having.  He notes that he has not seen a doctor or talked to his surgery teams since last August.  He reports staying at a long-term care center stating that they do not let him go anywhere.  He states that tonight he bought a phone and came here.  He endorses flulike symptoms which include fever.  He also " "reports \"bumps\" on his abdomen.  He adds that he has had an artificial heart valve placed 3 years ago.  He denies taking any pain medications and reports that he does not use any pain medications at home.    Per chart review patient had a nursing home visit on 4/5/2022 for follow-up of right knee injury and bilateral foot pain.  At this time patient did not complain of any knee pain and was ambulating independently without a knee brace.  Patient reported his bilateral foot pain is feeling somewhat relieved but there is still tenderness at the Achilles area of the left foot.    3/30/22 Xray left and right foot 3 views:   No acute fractures or dislocations are noted. Moderate degenerative changes are present. Vascular calcifications are present and surrounding soft tissues are normal.   Impression: No acute abnormalities    3/30/22 Right knee 3 views:  Right total knee replacement is identified. The orthopedic device is in anatomical position. No joint effusions are present. The surrounding soft tissues are normal.  Impression : post surgical changes.      Past Medical History  Past Medical History:   Diagnosis Date     JENNIFER (acute kidney injury) (H) 4/9/2019     Alcohol use disorder      Alcoholic cirrhosis (H)      Anticoagulant long-term use     plavix     Aortic stenosis, severe 2/21/2018     Ascites      Chronic allergic rhinitis      Chronic anemia      Chronic hepatitis C without hepatic coma (H) 05/10/2016    Untreated as of 2/2018     Cirrhosis (H) 2017    MRI finding     Diastolic dysfunction      Erosive gastropathy      Esophageal varices in alcoholic cirrhosis (H)      H/O upper gastrointestinal hemorrhage 09/2017     Hepatocellular carcinoma (H)      History of blood transfusion      History of drug abuse (H)     intranasal     Hypertension     essential     JANELLE (iron deficiency anemia)      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     Sarahi-Hoffman " tear     History     Marijuana abuse      MRSA (methicillin resistant Staphylococcus aureus)      Nonrheumatic aortic valve stenosis 2/20/2018     Olecranon bursitis      Portal hypertension (H)      Right shoulder pain     history of rotator cuff repair     S/p TAVR (transcatheter aortic valve replacement), bioprosthetic      Severe aortic stenosis      Thrombocytopenia (H)      Past Surgical History:   Procedure Laterality Date     ABLATE LIVER TUMOR N/A 10/22/2019    Procedure: Ablate liver tumor x3;  Surgeon: Gregorio Benoit MD;  Location: [facility]     ARTHROSCOPY SHOULDER ROTATOR CUFF REPAIR  7/31/2012    Procedure: ARTHROSCOPY SHOULDER ROTATOR CUFF REPAIR;  Right Shoulder Arthroscopic Rotator Cuff Repair, BicepsTenodesis,  Subacromial Decompression ;  Surgeon: Joi Castillo MD;  [facility]     ESOPHAGOSCOPY, GASTROSCOPY, DUODENOSCOPY (EGD), COMBINED N/A 10/23/2017    Procedure: COMBINED ESOPHAGOSCOPY, GASTROSCOPY, DUODENOSCOPY (EGD);;  Surgeon: Gentry Salas MD;  Location: [facility]     EXCHANGE POLY COMPONENT ARTHROPLASTY KNEE Right 3/4/2019    Procedure: REVISION RIGHT TOTAL KNEE POLY COMPONENT EXCHANGE;  Surgeon: Olvin Joe MD;  [facility]: [facility]     FACIAL RECONSTRUCTION SURGERY  1971     HEART CATH FEMORAL CANNULIZATION WITH OPEN STANDBY REPAIR AORTIC VALVE N/A 2/21/2018    Procedure: HEART CATH FEMORAL CANNULIZATION WITH OPEN STANDBY REPAIR AORTIC VALVE;;  Surgeon: Luis Baird MD;  [facility]     HERNIORRHAPHY VENTRAL N/A 6/22/2021    Procedure: HERNIORRHAPHY, VENTRAL, OPEN, small bowel resection;  Surgeon: Harley Snell MD;  [facility]     IR CHEMO EMBOLIZATION  1/29/2020     IR LIVER BIOPSY PERCUTANEOUS  7/18/2019     IR PARACENTESIS  4/15/2021     IR TRANSVEN INTRAHEPATIC PORTOSYST SHUNT  4/15/2021     IRRIGATION AND DEBRIDEMENT UPPER EXTREMITY, COMBINED  1/3/2012    Procedure:COMBINED IRRIGATION AND DEBRIDEMENT UPPER EXTREMITY; Irrigation &  Debridement Left Elbow; Surgeon:CRISTHIAN ZHOU; [facility]     LAPAROSCOPIC BIOPSY LIVER N/A 10/22/2019    Procedure: intraoperative liver ultrasound, laparoscopic converted to open liver biopsy x 6;  Surgeon: Gregorio Benoit MD;  [facility]     LAPAROSCOPY DIAGNOSTIC (GENERAL) N/A 10/22/2019    Procedure: Diagnostic laparoscopy;  Surgeon: Gregorio Benoit MD;  [facility]     LAPAROTOMY, LYSIS ADHESIONS, COMBINED N/A 10/22/2019    Procedure: Laparotomy, lysis adhesions, combined;  Surgeon: Gregorio Benoit MD;  [facility]     OPTICAL TRACKING SYSTEM ARTHROPLASTY KNEE Right 2/7/2019    Procedure: ARTHROPLASTY KNEE RIGHT;  Surgeon: Olvin Joe MD;  [facility]     REPAIR TENDON TRICEPS UPPER EXTREMITY  11/8/2011    Procedure:REPAIR TENDON TRICEPS UPPER EXTREMITY; Surgeon:CRISTHIAN ZHOU; [facility]     SHOULDER SURGERY  2003    left, injury, torn tendons, hematoma     TRANSCATHETER AORTIC VALVE IMPLANT ANESTHESIA N/A 2/21/2018    Procedure: TRANSCATHETER AORTIC VALVE IMPLANT ANESTHESIA;  Transfemoral (Quiroz) Aortic Valve Implant 26mm MARTHA 3, with Cardiopulmonary Bypass Standby, transthoracic echocardiogram;  Surgeon: GENERIC ANESTHESIA PROVIDER;  Location: [facility]     TRANSPOSITION ULNAR NERVE (ELBOW)  11/8/2011    Procedure:TRANSPOSITION ULNAR NERVE (ELBOW); Final Procedure Done: Left Elbow Lateral Ulnar Collateral Repair And  Left Elbow Triceps Repair       acetaminophen (TYLENOL) 325 MG tablet  aspirin (ASPIRIN LOW DOSE) 81 MG EC tablet  diazepam (VALIUM) 5 MG tablet  fluticasone (FLONASE) 50 MCG/ACT nasal spray  furosemide (LASIX) 20 MG tablet  lisinopril (ZESTRIL) 20 MG tablet  loratadine (CLARITIN) 5 MG chewable tablet  MELATONIN PO  ondansetron (ZOFRAN) 4 MG tablet  oxyCODONE (ROXICODONE) 5 MG tablet  sulfamethoxazole-trimethoprim (BACTRIM DS) 800-160 MG tablet      Allergies   Allergen Reactions     Zolpidem Other (See Comments)     Alcoholic.  Had reaction  "3/17/13 while intoxicated which included black out, loss of awareness, paranoia.  Do not prescribe.  Dr. Celeste     Cats Other (See Comments)     rhinitis     Dogs Other (See Comments)     rhinitis     Pollen Extract Other (See Comments)     rhinits.     Family History  Family History   Problem Relation Age of Onset     Cancer Mother 62        unknown primary     Alcoholism Paternal Uncle      Unknown/Adopted Father      No Known Problems Brother      Diabetes Maternal Grandmother      Myocardial Infarction Maternal Grandfather      No Known Problems Paternal Grandmother      Unknown/Adopted Paternal Grandfather      Cirrhosis No family hx of      Anesthesia Reaction No family hx of      Deep Vein Thrombosis (DVT) No family hx of      Social History   Social History     Tobacco Use     Smoking status: Never Smoker     Smokeless tobacco: Never Used   Substance Use Topics     Alcohol use: Yes     Comment: drinks a \"beer occasionally\"     Drug use: Yes     Types: Marijuana      Past medical history, past surgical history, medications, allergies, family history, and social history were reviewed with the patient. No additional pertinent items.       Review of Systems   Constitutional: Positive for fever. Negative for chills and fatigue.   HENT: Negative for congestion and sore throat.    Eyes: Negative for pain and visual disturbance.   Respiratory: Negative for cough, chest tightness and shortness of breath.    Cardiovascular: Negative for chest pain and palpitations.   Gastrointestinal: Positive for abdominal pain (\"liver\" pain). Negative for abdominal distention, constipation, diarrhea, nausea and vomiting.   Genitourinary: Negative for difficulty urinating, dysuria, frequency and urgency.   Musculoskeletal: Negative for arthralgias, back pain, myalgias and neck pain.        (Positive for bilateral foot pain, right worse than left)  (Positive for bilateral Achilles pain)   Skin: Negative for color change and rash. " "  Neurological: Positive for numbness (and tingling of right knee). Negative for dizziness, weakness and headaches.   Psychiatric/Behavioral: Negative for confusion.     A complete review of systems was performed with pertinent positives and negatives noted in the HPI, and all other systems negative.    Physical Exam   BP: 122/56  Pulse: 93  Temp: 98.2  F (36.8  C)  Resp: 18  Height: 175.3 cm (5' 9\")  Weight: 86.6 kg (191 lb)  SpO2: 98 %  Physical Exam  Vitals and nursing note reviewed.   Constitutional:       General: He is not in acute distress.     Appearance: Normal appearance. He is not ill-appearing or toxic-appearing.   HENT:      Head: Normocephalic and atraumatic.      Nose: Nose normal.      Mouth/Throat:      Mouth: Mucous membranes are moist.   Eyes:      Pupils: Pupils are equal, round, and reactive to light.   Cardiovascular:      Rate and Rhythm: Normal rate.      Pulses: Normal pulses.      Heart sounds: Normal heart sounds.   Pulmonary:      Effort: Pulmonary effort is normal. No respiratory distress.      Breath sounds: Normal breath sounds.   Abdominal:      General: Abdomen is flat. There is no distension.      Palpations: Abdomen is soft.      Comments: Surgical scars noted, mild post surgical hernia in midline, no tenderness, easily reducible   Musculoskeletal:         General: No swelling or deformity. Normal range of motion.      Cervical back: Normal range of motion. No rigidity.   Skin:     General: Skin is warm.      Capillary Refill: Capillary refill takes less than 2 seconds.   Neurological:      Mental Status: He is alert and oriented to person, place, and time.   Psychiatric:         Mood and Affect: Mood normal.         ED Course     12:57 AM  The patient was seen and examined by Ajay Caal DO in Room ED16.     Procedures       The medical record was reviewed and interpreted.              Results for orders placed or performed during the hospital encounter of 05/10/22   XR Ankle " Port Bilateral G/E 3 Views     Status: None    Narrative    EXAM: XR ANKLE PORT BILATERAL G/E 3 VW  LOCATION: [facility]  DATE/TIME: 5/10/2022 2:17 AM    INDICATION: bilateral ankle pain, right greater than left  COMPARISON: None.      Impression    IMPRESSION: There is significant vascular calcifications seen. There is some slight hypertrophic changes and accessory ossicles identified, but no acute bony findings are seen such as a fracture or dislocation. Normal variant interosseous ligament   calcification greatest on the right.   XR Knee Port Right 1/2 Views     Status: None    Narrative    EXAM: XR KNEE PORT RIGHT 1/2 VIEWS  LOCATION: [facility]  DATE/TIME: 5/10/2022 2:17 AM    INDICATION: R knee pain  COMPARISON: 12/08/2021      Impression    IMPRESSION: Stable right knee with again seen postoperative changes right TKA with no fracture or dislocation identified.   US TIPSS Doppler     Status: None    Narrative    EXAMINATION: US TIPSS DOPPLER, 5/10/2022 3:36 AM     COMPARISON: None.    HISTORY: Right upper quadrant pain, TIPS    TECHNIQUE:  Grey-scale, color Doppler and spectral flow analysis.    Findings: Limited exam due to patient cooperation.    Liver: Cirrhotic liver morphology with a nodular surface contour. The  main portal vein is patent with antegrade flow measuring 2.0 cm in  diameter.    Visualized portions of the pancreas are unremarkable. The IVC is  normal in caliber.    DOPPLER:     There is flow antegrade the liver in the main portal vein:  38 cm/sec in the main portal vein  Retrograde at 8 cm/sec in the right portal vein  Mixed antegrade and retrograde flow  at 7 cm/sec in the left portal  vein    The hepatic artery is patent:  131 cm/sec peak systolic flow  27 cm/sec minimum diastolic flow   0.79 resistive index     The stent velocities are  47 cm/sec at the portal vein  106 cm/sec in mid stent  79  cm/sec in the hepatic vein distal shunt end.     The splenic vein is patent and flow is towards the liver.     The left and middle hepatic veins are patent with flow towards the  IVC. The right hepatic vein was not visualized.      Impression    Impression:   1. Limited exam due to patient cooperation.  2. Patent TIPS. Please see above for additional findings and  recommendations.  3. Cirrhotic liver morphology.    I have personally reviewed the examination and initial interpretation  and I agree with the findings.    NEIDA LAUREN MD         SYSTEM ID:  [id number]   Comprehensive metabolic panel     Status: Abnormal   Result Value Ref Range    Sodium 141 133 - 144 mmol/L    Potassium 3.7 3.4 - 5.3 mmol/L    Chloride 109 94 - 109 mmol/L    Carbon Dioxide (CO2) 28 20 - 32 mmol/L    Anion Gap 4 3 - 14 mmol/L    Urea Nitrogen 16 7 - 30 mg/dL    Creatinine 0.91 0.66 - 1.25 mg/dL    Calcium 8.4 (L) 8.5 - 10.1 mg/dL    Glucose 128 (H) 70 - 99 mg/dL    Alkaline Phosphatase 91 40 - 150 U/L    AST 60 (H) 0 - 45 U/L    ALT 48 0 - 70 U/L    Protein Total 7.1 6.8 - 8.8 g/dL    Albumin 2.8 (L) 3.4 - 5.0 g/dL    Bilirubin Total 0.8 0.2 - 1.3 mg/dL    GFR Estimate >90 >60 mL/min/1.73m2   Lipase     Status: Normal   Result Value Ref Range    Lipase 237 73 - 393 U/L   UA with Microscopic reflex to Culture     Status: Abnormal    Specimen: Urine, Midstream   Result Value Ref Range    Color Urine Light Yellow Colorless, Straw, Light Yellow, Yellow    Appearance Urine Clear Clear    Glucose Urine Negative Negative mg/dL    Bilirubin Urine Negative Negative    Ketones Urine Negative Negative mg/dL    Specific Gravity Urine 1.015 1.003 - 1.035    Blood Urine Negative Negative    pH Urine 6.5 5.0 - 7.0    Protein Albumin Urine Negative Negative mg/dL    Urobilinogen Urine 3.0 (A) Normal, 2.0 mg/dL    Nitrite Urine Negative Negative    Leukocyte Esterase Urine Negative Negative    RBC Urine 0 <=2 /HPF    WBC Urine <1 <=5 /HPF    Narrative     Urine Culture not indicated   CBC with platelets and differential     Status: Abnormal   Result Value Ref Range    WBC Count 6.0 4.0 - 11.0 10e3/uL    RBC Count 3.26 (L) 4.40 - 5.90 10e6/uL    Hemoglobin 7.2 (L) 13.3 - 17.7 g/dL    Hematocrit 24.8 (L) 40.0 - 53.0 %    MCV 76 (L) 78 - 100 fL    MCH 22.1 (L) 26.5 - 33.0 pg    MCHC 29.0 (L) 31.5 - 36.5 g/dL    RDW 22.2 (H) 10.0 - 15.0 %    Platelet Count 188 150 - 450 10e3/uL    % Neutrophils 54 %    % Lymphocytes 26 %    % Monocytes 14 %    % Eosinophils 4 %    % Basophils 2 %    % Immature Granulocytes 0 %    NRBCs per 100 WBC 0 <1 /100    Absolute Neutrophils 3.3 1.6 - 8.3 10e3/uL    Absolute Lymphocytes 1.6 0.8 - 5.3 10e3/uL    Absolute Monocytes 0.9 0.0 - 1.3 10e3/uL    Absolute Eosinophils 0.2 0.0 - 0.7 10e3/uL    Absolute Basophils 0.1 0.0 - 0.2 10e3/uL    Absolute Immature Granulocytes 0.0 <=0.4 10e3/uL    Absolute NRBCs 0.0 10e3/uL   CBC with platelets differential     Status: Abnormal    Narrative    The following orders were created for panel order CBC with platelets differential.  Procedure                               Abnormality         Status                     ---------                               -----------         ------                     CBC with platelets and d...[[id number]]  Abnormal            Final result                 Please view results for these tests on the individual orders.     Medications   oxyCODONE (ROXICODONE) tablet 5 mg (5 mg Oral Given 5/10/22 0205)        Assessments & Plan (with Medical Decision Making)   Patient presents to the ED for evaluation of multiple complaints.  He states he has pain in his bilateral feet, primarily the ankle area.  He feels that his Achilles tendons are injured.  He has had x-rays of these in March and states this is the same pain.  He is ambulatory.  Was having pain in his abdomen which he states is chronic for him but is worsening recently.      On arrival, patient is normal vital signs.   Overall appears well and nontoxic.  His abdomen is soft and nondistended.  There is an easily reducible postsurgical hernia which is unlikely the source of his discomfort.  Has some tenderness of the ankles bilaterally, feet are nontender.  Neurovascularly intact.  Negative Sampson's test.  Very low likelihood of Achilles tendon rupture    Plan for x-rays of the bilateral ankles, basic labs will be CBC, CMP, lipase, urinalysis.  Also get ultrasound of his right upper quadrant including TIPPS Doppler.    Patient requested pain medication, was given 5 mg oxycodone.  Will sign out to overnight provider plan to follow-up on x-rays, ultrasound, labs, disposition accordingly.        I have reviewed the nursing notes. I have reviewed the findings, diagnosis, plan and need for follow up with the patient.    Discharge Medication List as of 5/10/2022  4:17 AM          Final diagnoses:   None   IManas am serving as a trained medical scribe to document services personally performed by Ajay Caal DO, based on the provider's statements to me.     I, Ajay Caal DO, was physically present and have reviewed and verified the accuracy of this note documented by Manas Menard.      --  Ajay Caal DO  [facility] EMERGENCY DEPARTMENT  5/9/2022     Ajay Caal DO  05/10/22 1908

## 2022-05-10 NOTE — DISCHARGE INSTRUCTIONS
Please make an appointment to follow up with Your Primary Care Provider in 2-3 days if you have any concerns.    Return to the ED if you have any further concerns

## 2022-05-10 NOTE — ED TRIAGE NOTES
"    HILARY from [place] with c/o several weeks of ankle pain, leg pain, \"liver pain\", and generalized pain over his entire body. He complains that the staff ignore his pain and do not care for him, and they refused to call the ambulance for him. Denies SOB, cough, runny nose.   "

## 2022-05-11 NOTE — PROGRESS NOTES
Clinic Care Coordination Contact  [facility]/Voicemail       Clinical Data: Care Coordinator Outreach  Outreach attempted x 1.  Left message on patient's voicemail with call back information and requested return call.  Plan: Care Coordinator will try to reach patient again in 1-2 business days.    Ester Muniz, [facility]  [phone]  [facility]

## 2022-05-12 NOTE — PROGRESS NOTES
Clinic Care Coordination Contact  [facility]/Voicemail       Clinical Data: Care Coordinator Outreach  Outreach attempted x 2.  No answer, unable to lvm    Plan: Care Coordinator will do no further outreaches at this time.        ARIANA Workman  [phone]  [facility]

## 2022-05-16 PROBLEM — R41.82 ALTERED MENTAL STATUS, UNSPECIFIED ALTERED MENTAL STATUS TYPE: Status: ACTIVE | Noted: 2022-01-01
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
## 2022-05-16 PROBLEM — R53.1 WEAKNESS: Status: ACTIVE | Noted: 2022-01-01

## 2022-05-16 PROBLEM — K76.82 HEPATIC ENCEPHALOPATHY (H): Status: ACTIVE | Noted: 2022-01-01

## 2022-05-16 NOTE — CONSULTS
Care Management Discharge Note    Discharge Date: 05/16/2022     Discharge Disposition: LTC    [facility]  P: [phone] - [facility] ([facility])  P: [phone] - RN Report  F: [phone]    Discharge Services:  (Defer to SNF)    Discharge DME:  (Defer to SNF)    Discharge Transportation: agency    Private pay costs discussed: Not applicable    PAS Confirmation Code:  (Returning to SNF)  Patient/family educated on Medicare website which has current facility and service quality ratings:  (N/A)    Education Provided on the Discharge Plan:    Persons Notified of Discharge Plans: Pt, SNF, Jayce (Brother), Bedside RN  Patient/Family in Agreement with the Plan: yes    Handoff Referral Completed: No    Additional Information:  Chart reviewed. Case discussed with bedside RN and medical provider. Pt is medically cleared for discharge and can return today.    Writer went over the [facility] discharge form over the phone w/ pt's brother Jayce (P: [phone]). Jayce is in agreement with pt's discharge today back to SNF and gave verbal consent to the [facility].    Writer scheduled a stretcher p/u via [facility] (P: [phone]) d/t pt's confusion and RN report that pt may try to stand up in transition if in a w/c. PCS form placed on pt's chart.     Referrals have been made to the following facilities and their status is as follows:    [facility]  P: [phone] - [facility] ([place])  P: [phone] - RN Report  F: [phone]  - Writer left VM with SNF admissions.   - Writer spoke with [facility], SNF admissions. SNF will look into if pt has ACP documentation on file with them. Pt has a confirmed MA bed hold.   - Writer spoke with [facility]. Pt can return today and does not have a specific return time.  - Writer faxed orders.      ________________    SANA Magallon, [facility]  ED/Observation   [facility]  Phone: [phone]  Pager: [phone]  Fax: [phone]    On-call pager,  [phone], 4:00pm to midnight

## 2022-05-16 NOTE — ED PROVIDER NOTES
ED Provider Note  [facility]      History     Chief Complaint   Patient presents with     Fall     HPI  Ten Johnson is a 61 year old male with a past medical history significant for hepatic encephalopathy, alcoholic cirrhosis of the liver with ascites, chronic anemia, hypertension, hepatocellular carcinoma, transcatheter aortic valve implant who presents to the ED via EMS for evaluation of facial pain and foot pain status post a fall.  Patient states that he got up to go to the bathroom tonight was walking with his walker when he fell.  Patient states that he fell flat square on his face and is having facial pain, headache, nose pain.  Patient also complains of bilateral feet pain.  Patient denies any weakness, lightheadedness, fever, chills, chest pain, shortness of breath, dizziness, cough, abdominal pain, nausea, vomiting, diarrhea.  No other complaints.  Patient takes 81 mg aspirin daily.  No other chronic anticoagulation.    Past Medical History  Past Medical History:   Diagnosis Date     JENNIFER (acute kidney injury) (H) 4/9/2019     Alcohol use disorder      Alcoholic cirrhosis (H)      Anticoagulant long-term use     plavix     Aortic stenosis, severe 2/21/2018     Ascites      Chronic allergic rhinitis      Chronic anemia      Chronic hepatitis C without hepatic coma (H) 05/10/2016    Untreated as of 2/2018     Cirrhosis (H) 2017    MRI finding     Diastolic dysfunction      Erosive gastropathy      Esophageal varices in alcoholic cirrhosis (H)      H/O upper gastrointestinal hemorrhage 09/2017     Hepatocellular carcinoma (H)      History of blood transfusion      History of drug abuse (H)     intranasal     Hypertension     essential     JANELLE (iron deficiency anemia)      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     Sarahi-Hoffman tear     History     Marijuana abuse      MRSA (methicillin resistant Staphylococcus aureus)       Nonrheumatic aortic valve stenosis 2/20/2018     Olecranon bursitis      Portal hypertension (H)      Right shoulder pain     history of rotator cuff repair     S/p TAVR (transcatheter aortic valve replacement), bioprosthetic      Severe aortic stenosis      Thrombocytopenia (H)      Past Surgical History:   Procedure Laterality Date     ABLATE LIVER TUMOR N/A 10/22/2019    Procedure: Ablate liver tumor x3;  Surgeon: Gregorio Benoit MD;  Location: [facility]     ARTHROSCOPY SHOULDER ROTATOR CUFF REPAIR  7/31/2012    Procedure: ARTHROSCOPY SHOULDER ROTATOR CUFF REPAIR;  Right Shoulder Arthroscopic Rotator Cuff Repair, BicepsTenodesis,  Subacromial Decompression ;  Surgeon: Joi Castillo MD;  [facility]     ESOPHAGOSCOPY, GASTROSCOPY, DUODENOSCOPY (EGD), COMBINED N/A 10/23/2017    Procedure: COMBINED ESOPHAGOSCOPY, GASTROSCOPY, DUODENOSCOPY (EGD);;  Surgeon: Gentry Salas MD;  Location: [facility]     EXCHANGE POLY COMPONENT ARTHROPLASTY KNEE Right 3/4/2019    Procedure: REVISION RIGHT TOTAL KNEE POLY COMPONENT EXCHANGE;  Surgeon: Olvin Joe MD;  Location: [facility]     FACIAL RECONSTRUCTION SURGERY  1971     HEART CATH FEMORAL CANNULIZATION WITH OPEN STANDBY REPAIR AORTIC VALVE N/A 2/21/2018    Procedure: HEART CATH FEMORAL CANNULIZATION WITH OPEN STANDBY REPAIR AORTIC VALVE;;  Surgeon: Luis Baird MD;  Location: [facility]     HERNIORRHAPHY VENTRAL N/A 6/22/2021    Procedure: HERNIORRHAPHY, VENTRAL, OPEN, small bowel resection;  Surgeon: Harley Snell MD;  Location: [facility]     IR CHEMO EMBOLIZATION  1/29/2020     IR LIVER BIOPSY PERCUTANEOUS  7/18/2019     IR PARACENTESIS  4/15/2021     IR TRANSVEN INTRAHEPATIC PORTOSYST SHUNT  4/15/2021     IRRIGATION AND DEBRIDEMENT UPPER EXTREMITY, COMBINED  1/3/2012    Procedure:COMBINED IRRIGATION AND DEBRIDEMENT UPPER EXTREMITY; Irrigation & Debridement Left Elbow; Surgeon:CRISTHIAN ZHOU; [facility]     LAPAROSCOPIC BIOPSY LIVER  N/A 10/22/2019    Procedure: intraoperative liver ultrasound, laparoscopic converted to open liver biopsy x 6;  Surgeon: Gregorio Benoit MD;  [facility]     LAPAROSCOPY DIAGNOSTIC (GENERAL) N/A 10/22/2019    Procedure: Diagnostic laparoscopy;  Surgeon: Gregorio Benoit MD;  [facility]     LAPAROTOMY, LYSIS ADHESIONS, COMBINED N/A 10/22/2019    Procedure: Laparotomy, lysis adhesions, combined;  Surgeon: Gregorio Benoit MD;  [facility]     OPTICAL TRACKING SYSTEM ARTHROPLASTY KNEE Right 2/7/2019    Procedure: ARTHROPLASTY KNEE RIGHT;  Surgeon: Olvin Joe MD;  [facility]     REPAIR TENDON TRICEPS UPPER EXTREMITY  11/8/2011    Procedure:REPAIR TENDON TRICEPS UPPER EXTREMITY; Surgeon:CRISTHIAN ZHOU; [facility]     SHOULDER SURGERY  2003    left, injury, torn tendons, hematoma     TRANSCATHETER AORTIC VALVE IMPLANT ANESTHESIA N/A 2/21/2018    Procedure: TRANSCATHETER AORTIC VALVE IMPLANT ANESTHESIA;  Transfemoral (Quiroz) Aortic Valve Implant 26mm MARTHA 3, with Cardiopulmonary Bypass Standby, transthoracic echocardiogram;  Surgeon: GENERIC ANESTHESIA PROVIDER;  Location: [facility]     TRANSPOSITION ULNAR NERVE (ELBOW)  11/8/2011    Procedure:TRANSPOSITION ULNAR NERVE (ELBOW); Final Procedure Done: Left Elbow Lateral Ulnar Collateral Repair And  Left Elbow Triceps Repair       acetaminophen (TYLENOL) 325 MG tablet  aspirin (ASPIRIN LOW DOSE) 81 MG EC tablet  diazepam (VALIUM) 5 MG tablet  fluticasone (FLONASE) 50 MCG/ACT nasal spray  furosemide (LASIX) 20 MG tablet  lisinopril (ZESTRIL) 20 MG tablet  loratadine (CLARITIN) 5 MG chewable tablet  MELATONIN PO  ondansetron (ZOFRAN) 4 MG tablet  oxyCODONE (ROXICODONE) 5 MG tablet  sulfamethoxazole-trimethoprim (BACTRIM DS) 800-160 MG tablet      Allergies   Allergen Reactions     Zolpidem Other (See Comments)     Alcoholic.  Had reaction 3/17/13 while intoxicated which included black out, loss of awareness, paranoia.  Do not prescribe.    "Roula     Cats Other (See Comments)     rhinitis     Dogs Other (See Comments)     rhinitis     Pollen Extract Other (See Comments)     rhinits.     Family History  Family History   Problem Relation Age of Onset     Cancer Mother 62        unknown primary     Alcoholism Paternal Uncle      Unknown/Adopted Father      No Known Problems Brother      Diabetes Maternal Grandmother      Myocardial Infarction Maternal Grandfather      No Known Problems Paternal Grandmother      Unknown/Adopted Paternal Grandfather      Cirrhosis No family hx of      Anesthesia Reaction No family hx of      Deep Vein Thrombosis (DVT) No family hx of      Social History   Social History     Tobacco Use     Smoking status: Never Smoker     Smokeless tobacco: Never Used   Substance Use Topics     Alcohol use: Yes     Comment: drinks a \"beer occasionally\"     Drug use: Yes     Types: Marijuana      Past medical history, past surgical history, medications, allergies, family history, and social history were reviewed with the patient. No additional pertinent items.       Review of Systems   Constitutional: Negative for fever.   HENT: Negative for rhinorrhea.         Facial pain, nasal pain   Eyes: Negative for visual disturbance.   Respiratory: Negative for cough and shortness of breath.    Cardiovascular: Negative for chest pain and leg swelling.   Gastrointestinal: Negative for abdominal pain, nausea and vomiting.   Endocrine: Negative for polyuria.   Genitourinary: Negative for dysuria.   Musculoskeletal: Negative for back pain.        Bilateral foot pain   Skin: Negative for rash.   Allergic/Immunologic: Negative for immunocompromised state.   Neurological: Positive for headaches. Negative for weakness.   Hematological: Bruises/bleeds easily.   Psychiatric/Behavioral: Negative for confusion.     A complete review of systems was performed with pertinent positives and negatives noted in the HPI, and all other systems negative.    Physical " "Exam   BP: (!) 148/73  Pulse: 94  Temp: 98.1  F (36.7  C)  Resp: 16  Height: 172.7 cm (5' 8\")  Weight: 86.6 kg (191 lb)  SpO2: 90 %  Physical Exam  General: Afebrile, mild distress 2/2 to pain  HEENT: Normocephalic, PERRL, conjunctivae normal, +TTP nasal bridge, superficial abrasion nasal bridge with no active bleeding, blood in b/l nares. MMM  Neck: non-tender, supple, no midline tenderness to palpation, full range of motion  Cardio: regular rate. regular rhythm   Resp: Normal work of breathing, no respiratory distress, lungs clear bilaterally, no wheezing, rhonchi, rales  Chest/Back: no visual signs of trauma, no CVA tenderness   Abdomen: soft, non distension, no tenderness, no peritoneal signs   Neuro: alert and fully oriented. CN II-XII  intact. Normal strength and sensation in all extremities.   MSK: + Tenderness palpation to both feet bilaterally, +1 pitting edema bilaterally   Integumentary/Skin: no rash visualized, normal color  Psych: normal affect, normal behavior    ED Course      Procedures       Results for orders placed or performed during the hospital encounter of 05/16/22   CT Head w/o Contrast     Status: None    Narrative    EXAM: CT HEAD W/O CONTRAST, CT FACIAL BONES WITHOUT CONTRAST, CT CERVICAL SPINE W/O CONTRAST  LOCATION: [facility]  DATE/TIME: 5/16/2022 5:11 AM    INDICATION: Traumatic injury. Fall. Facial pain, nose pain, headache.  COMPARISON: 12/08/2021.  TECHNIQUE:   1) Routine CT Head without IV contrast. Multiplanar reformats. Dose reduction techniques were used.  2) Routine CT Facial Bones without IV contrast. Multiplanar reformats. Dose reduction techniques were used.  3) Routine CT Cervical Spine without IV contrast. Multiplanar reformats. Dose reduction techniques were used.    FINDINGS:  HEAD CT:   INTRACRANIAL CONTENTS: No intracranial hemorrhage, extraaxial collection, or mass effect.  No CT evidence of acute infarct. Normal " parenchymal density for age. The ventricles and sulci are normal for age.     OSSEOUS STRUCTURES/SOFT TISSUES: No large scalp hematoma. No skull fracture.     FACIAL BONE CT:  OSSEOUS STRUCTURES/SOFT TISSUES:  Chronic bilateral nasal bone deformities. No acute displaced fracture. Dental structures are partially obscured by artifact from dental restorations.     ORBITAL CONTENTS: No acute abnormality.    SINUSES: Mild mucosal thickening and fluid in the right maxillary sinus. Trace ethmoid mucosal thickening.    CERVICAL SPINE CT:   VERTEBRA: Reversal of the cervical spine curvature. Stepwise degenerative anterolisthesis of C2 on C3, C3 on C4, and C4 on C5. No acute displaced fracture.     CANAL/FORAMINA: Multilevel degenerative changes with at least mild canal stenosis throughout the cervical spine. Bilateral neural foraminal narrowing is greatest at C3-C4, C5-C6, and C6-C7.    PARASPINAL: No acute extraspinal abnormality. Scattered atherosclerotic calcification. Visualized lung fields are clear.      Impression    IMPRESSION:  HEAD CT:  1.  No acute intracranial process.    FACIAL BONE CT:  1.  No acute displaced facial fracture.    CERVICAL SPINE CT:  1.  No acute displaced fracture of the cervical spine.  2.  Degenerative changes, as described.             CT Cervical Spine w/o Contrast     Status: None    Narrative    EXAM: CT HEAD W/O CONTRAST, CT FACIAL BONES WITHOUT CONTRAST, CT CERVICAL SPINE W/O CONTRAST  LOCATION: [facility]  DATE/TIME: 5/16/2022 5:11 AM    INDICATION: Traumatic injury. Fall. Facial pain, nose pain, headache.  COMPARISON: 12/08/2021.  TECHNIQUE:   1) Routine CT Head without IV contrast. Multiplanar reformats. Dose reduction techniques were used.  2) Routine CT Facial Bones without IV contrast. Multiplanar reformats. Dose reduction techniques were used.  3) Routine CT Cervical Spine without IV contrast. Multiplanar reformats. Dose reduction  techniques were used.    FINDINGS:  HEAD CT:   INTRACRANIAL CONTENTS: No intracranial hemorrhage, extraaxial collection, or mass effect.  No CT evidence of acute infarct. Normal parenchymal density for age. The ventricles and sulci are normal for age.     OSSEOUS STRUCTURES/SOFT TISSUES: No large scalp hematoma. No skull fracture.     FACIAL BONE CT:  OSSEOUS STRUCTURES/SOFT TISSUES:  Chronic bilateral nasal bone deformities. No acute displaced fracture. Dental structures are partially obscured by artifact from dental restorations.     ORBITAL CONTENTS: No acute abnormality.    SINUSES: Mild mucosal thickening and fluid in the right maxillary sinus. Trace ethmoid mucosal thickening.    CERVICAL SPINE CT:   VERTEBRA: Reversal of the cervical spine curvature. Stepwise degenerative anterolisthesis of C2 on C3, C3 on C4, and C4 on C5. No acute displaced fracture.     CANAL/FORAMINA: Multilevel degenerative changes with at least mild canal stenosis throughout the cervical spine. Bilateral neural foraminal narrowing is greatest at C3-C4, C5-C6, and C6-C7.    PARASPINAL: No acute extraspinal abnormality. Scattered atherosclerotic calcification. Visualized lung fields are clear.      Impression    IMPRESSION:  HEAD CT:  1.  No acute intracranial process.    FACIAL BONE CT:  1.  No acute displaced facial fracture.    CERVICAL SPINE CT:  1.  No acute displaced fracture of the cervical spine.  2.  Degenerative changes, as described.             CT Facial Bones without Contrast     Status: None    Narrative    EXAM: CT HEAD W/O CONTRAST, CT FACIAL BONES WITHOUT CONTRAST, CT CERVICAL SPINE W/O CONTRAST  LOCATION: [facility]  DATE/TIME: 5/16/2022 5:11 AM    INDICATION: Traumatic injury. Fall. Facial pain, nose pain, headache.  COMPARISON: 12/08/2021.  TECHNIQUE:   1) Routine CT Head without IV contrast. Multiplanar reformats. Dose reduction techniques were used.  2) Routine CT Facial  Bones without IV contrast. Multiplanar reformats. Dose reduction techniques were used.  3) Routine CT Cervical Spine without IV contrast. Multiplanar reformats. Dose reduction techniques were used.    FINDINGS:  HEAD CT:   INTRACRANIAL CONTENTS: No intracranial hemorrhage, extraaxial collection, or mass effect.  No CT evidence of acute infarct. Normal parenchymal density for age. The ventricles and sulci are normal for age.     OSSEOUS STRUCTURES/SOFT TISSUES: No large scalp hematoma. No skull fracture.     FACIAL BONE CT:  OSSEOUS STRUCTURES/SOFT TISSUES:  Chronic bilateral nasal bone deformities. No acute displaced fracture. Dental structures are partially obscured by artifact from dental restorations.     ORBITAL CONTENTS: No acute abnormality.    SINUSES: Mild mucosal thickening and fluid in the right maxillary sinus. Trace ethmoid mucosal thickening.    CERVICAL SPINE CT:   VERTEBRA: Reversal of the cervical spine curvature. Stepwise degenerative anterolisthesis of C2 on C3, C3 on C4, and C4 on C5. No acute displaced fracture.     CANAL/FORAMINA: Multilevel degenerative changes with at least mild canal stenosis throughout the cervical spine. Bilateral neural foraminal narrowing is greatest at C3-C4, C5-C6, and C6-C7.    PARASPINAL: No acute extraspinal abnormality. Scattered atherosclerotic calcification. Visualized lung fields are clear.      Impression    IMPRESSION:  HEAD CT:  1.  No acute intracranial process.    FACIAL BONE CT:  1.  No acute displaced facial fracture.    CERVICAL SPINE CT:  1.  No acute displaced fracture of the cervical spine.  2.  Degenerative changes, as described.             XR Foot Port Bilateral 3 Views     Status: None    Narrative    EXAM: XR FOOT PORT BILATERAL 3 VW  LOCATION: [facility]  DATE/TIME: 5/16/2022 4:10 AM    INDICATION: fall, left greater than right pain  COMPARISON: None.      Impression    IMPRESSION:   LEFT FOOT:  Atherosclerotic vascular calcifications. Arthritic change at the first metatarsophalangeal joint. No displaced fracture.    RIGHT FOOT: Arthritic change at the first metatarsophalangeal joint. Atherosclerotic vascular calcifications. Calcification along the distal syndesmotic ligament. No acute, displaced fracture.   XR Forearm Port Right 2 Views     Status: None    Narrative    EXAM: XR FOREARM PORT RIGHT 2 VIEWS  LOCATION: [facility]  DATE/TIME: 5/16/2022 4:36 AM    INDICATION: fall; tech note: pt is very lethargic and uncooperative with the exam, had to hold him for every exposure and made several attempts to get a lateral but it just wasn't possible at this time  COMPARISON: None.      Impression    IMPRESSION: Mild arthritic change at the wrist. No displaced radius or ulna fracture. Compromised evaluation of the elbow due to patient positioning. No evidence of dislocation.   INR     Status: Abnormal   Result Value Ref Range    INR 1.36 (H) 0.85 - 1.15   Comprehensive metabolic panel     Status: Abnormal   Result Value Ref Range    Sodium 141 133 - 144 mmol/L    Potassium 3.9 3.4 - 5.3 mmol/L    Chloride 110 (H) 94 - 109 mmol/L    Carbon Dioxide (CO2) 22 20 - 32 mmol/L    Anion Gap 9 3 - 14 mmol/L    Urea Nitrogen 20 7 - 30 mg/dL    Creatinine 0.67 0.66 - 1.25 mg/dL    Calcium 8.5 8.5 - 10.1 mg/dL    Glucose 82 70 - 99 mg/dL    Alkaline Phosphatase 70 40 - 150 U/L    AST 64 (H) 0 - 45 U/L    ALT 46 0 - 70 U/L    Protein Total 6.6 (L) 6.8 - 8.8 g/dL    Albumin 2.8 (L) 3.4 - 5.0 g/dL    Bilirubin Total 0.9 0.2 - 1.3 mg/dL    GFR Estimate >90 >60 mL/min/1.73m2   Ammonia (on ice)     Status: Abnormal   Result Value Ref Range    Ammonia 106 (HH) 10 - 50 umol/L   CBC with platelets and differential     Status: Abnormal   Result Value Ref Range    WBC Count 6.7 4.0 - 11.0 10e3/uL    RBC Count 3.35 (L) 4.40 - 5.90 10e6/uL    Hemoglobin 7.4 (L) 13.3 - 17.7 g/dL    Hematocrit  25.5 (L) 40.0 - 53.0 %    MCV 76 (L) 78 - 100 fL    MCH 22.1 (L) 26.5 - 33.0 pg    MCHC 29.0 (L) 31.5 - 36.5 g/dL    RDW 21.7 (H) 10.0 - 15.0 %    Platelet Count 169 150 - 450 10e3/uL    % Neutrophils 59 %    % Lymphocytes 19 %    % Monocytes 18 %    % Eosinophils 3 %    % Basophils 1 %    % Immature Granulocytes 0 %    NRBCs per 100 WBC 0 <1 /100    Absolute Neutrophils 3.9 1.6 - 8.3 10e3/uL    Absolute Lymphocytes 1.3 0.8 - 5.3 10e3/uL    Absolute Monocytes 1.2 0.0 - 1.3 10e3/uL    Absolute Eosinophils 0.2 0.0 - 0.7 10e3/uL    Absolute Basophils 0.1 0.0 - 0.2 10e3/uL    Absolute Immature Granulocytes 0.0 <=0.4 10e3/uL    Absolute NRBCs 0.0 10e3/uL   CBC with platelets differential     Status: Abnormal    Narrative    The following orders were created for panel order CBC with platelets differential.  Procedure                               Abnormality         Status                     ---------                               -----------         ------                     CBC with platelets and d...[[id number]]  Abnormal            Final result                 Please view results for these tests on the individual orders.     Medications   lactulose (CHRONULAC) solution 20 g (has no administration in time range)   HYDROmorphone (PF) (DILAUDID) injection 0.5 mg (0.5 mg Intravenous Given 5/16/22 0412)        Assessments & Plan (with Medical Decision Making)   Ten Johnson is a 61 year old male with a past medical history significant for hepatic encephalopathy, alcoholic cirrhosis of the liver with ascites, chronic anemia, hypertension, hepatocellular carcinoma, transcatheter aortic valve implant who presents to the ED via EMS for evaluation of facial pain and foot pain status post a fall.  Upon arrival patient is well-appearing, afebrile, mild distress secondary to pain.  Patient hemodynamically stable and vital signs within normal limits.  Patient here with close head injury, tenderness to palpation over nasal bridge  along with blood in bilateral nares, superficial abrasion to his nose.  We will plan on CT of his head, cervical spine, facial bones along with comprehensive labs, and x-rays of bilateral feet.  IV Dilaudid for pain.    I reviewed comprehensive labs which are remarkable for white cell count of 6.7, hemoglobin 7.4, no acute metabolic or electrolyte abnormality, AST elevated 64, ammonia elevated at 106, INR 1.36.  Patient does have a history of hepatic encephalopathy along with cirrhosis however do not see that the patient takes lactulose.  Will give patient dose of lactulose in the emergency department.  I reviewed the CT imaging which is unremarkable with no acute intracranial process, no acute displaced facial fracture, no acute displaced fracture of the cervical spine.  I reviewed x-rays of his forearm and foot bilaterally which demonstrates no acute fracture or dislocation.    No obvious traumatic injury, patient here with fall, evaded ammonia, concerning for hepatic encephalopathy.  At this time will plan on admission to internal medicine for further evaluation and treatment.  Patient understands agrees to plan.  Discussed with hospitalist.    I have reviewed the nursing notes. I have reviewed the findings, diagnosis, plan and need for follow up with the patient.    New Prescriptions    No medications on file       Final diagnoses:   Altered mental status, unspecified altered mental status type   Weakness   Hepatic encephalopathy (H)   Fall, initial encounter       --  Sherri Hammond MD  [facility] EMERGENCY DEPARTMENT  5/16/2022     Sherri Hammond MD  05/16/22 0638

## 2022-05-16 NOTE — ED TRIAGE NOTES
Mechanical fall while walking with walker this morning. Injury to nose, R arm and L foot. Takes 81mg ASA daily, no other blood thinner. 50mcg fentanyl given en route. 22G IV L hand.

## 2022-05-16 NOTE — H&P
"[facility]    History and Physical - Hospitalist Service, GOLD TEAM 1       Date of Admission:  2022    Assessment & Plan      Ten Johnson is a 61 year old male with a history of decompensated cirrhosis s/p TIPS, HCC, chronic encephalopathy, aortic stenosis s/p TAVR, and schizophrenia, who was admitted with encephalopathy and recent mechanical fall.     Goals of care: Per LTC facility provider notes, there have been recent conversations with the patient's brother (Rob Johnson) regarding goals of care. Per most recent LTC note on :  \"Brother Rob was updated about low hemoglobin and status, goals of care were reviewed. The goal remains comfort focused care with no hospitalization, no blood transfusions. Okay to treat simple infection such as UTI with po antibiotics, no feeding tube. May consider paracentesis for comfort reasons only. Minimal laboratory as needed for appropriate treatment.\"    - At this time patient is disoriented and lacks decision making capacity, possible this may wax/wane.   - LTC notes report DNR/DNI code status, goals of comfort-focused treatments only, and presence of an advanced directive - formal documentation of this is not available to me at this time.    - Discussed with patient's brother Rob, who confirmed patient's CODE STATUS of DNR/DNI.    - Discussed with SW, appreciate assistance in obtaining documents    Toxic metabolic encephalopathy: Chronically encephalopathic. LTC facility notes describe chronic HE, poor insight, chronic bilateral asterixis, multiple falls, recent intrusiveness and agitation, impulsivity, orientation to self and surroundings but not date/time. He resides on a secured unit at [facility]. Recently had a commitment that . Brother Rob is alternative decision maker. Currently he seems to at his baseline - is impulsive, intermittently agitated, oriented to self and place only.    - " Discussed with RN at LTC and patient's brother, who also felt that the patient seems to be at his baseline without any acute worsening   - Lactulose as below   - Delirium precautions    Mechanical fall: Patient fell while ambulating to bathroom overnight. Presented with facial and bilateral foot pain. CT head, facial bones, and c-spine all negative for fracture. XR bilateral feet and forearms both negative for fracture. Per LTC notes patient has a history of gait instability, uses a walker. High fall risk.    Decompensated cirrhosis s/p TIPS (4/2021)  HCC s/p TACE, surgical MWA  Cirrhosis 2/2 alcohol, hepatitis C. Complicated by HE, ascites, EV, and HCC. Follows with Dr. Leventhal. Last EGD 4/2017 with small EV. MELD 10.    - Lactulose TID. Goal 3-4 loose BMs daily.   - Rifaximin BID   - Bactrim SBP ppx    - Famotidine BID   - Furosemide 40 mg once daily   - 2g Na diet, 2L fluid restriction    Schizophrenia: Per LTC notes there was concern for some medication-related Parkinsonism.    - Continue PTA Haldol 1 mg BID    Chronic pain: Primarily related to knee arthritis, R rotator cuff problems, ETOH neuropathy, chronic abdominal pain.    - Tylenol PRN   - Hold PTA Tramadol, gabapentin for now while awaiting further discussion with LTC/family regarding patient's mentation       Diet:   Low sodium diet, 2L  DVT Prophylaxis: Pneumatic Compression Devices  Mosher Catheter: Not present  Central Lines: None  Cardiac Monitoring: None  Code Status:   DNR/DNI. Confirmed with next-of-kin, Rob Johnson.     Clinically Significant Risk Factors Present on Admission             # Hypoalbuminemia: Albumin = 2.8 g/dL (Ref range: 3.4 - 5.0 g/dL) on admission, will monitor as appropriate   # Coagulation Defect: INR = 1.36 (Ref range: 0.85 - 1.15) and/or PTT = N/A on admission, will monitor for bleeding  # Platelet Defect: home medication list includes an antiplatelet medication   # Overweight: Estimated body mass index is 29.04 kg/m  as  "calculated from the following:    Height as of this encounter: 1.727 m (5' 8\").    Weight as of this encounter: 86.6 kg (191 lb).      Disposition Plan   Expected Discharge: He is medically ready to discharge today.    Anticipated discharge location:  Awaiting care coordination huddle  Delays:            The patient's care was discussed with the Attending Physician, Dr. Huang, Bedside Nurse, Care Coordinator/, Patient and Patient's Family.    Helena Rogel PA-C  Hospitalist Service, [facility] TEAM [address] [place] [address]  Securely message with the Vocera Web Console (learn more here)  Text page via [facility] Paging/Directory   Please see signed in provider for up to date coverage information      ______________________________________________________________________    Chief Complaint   Facial pain    History is obtained from the patient, chart review    History of Present Illness   Ten Johnson is a 61 year old male with a history of decompensated cirrhosis s/p TIPS, HCC, chronic encephalopathy, aortic stenosis s/p TAVR, and schizophrenia, who was admitted with encephalopathy and recent mechanical fall. He was ambulating with a walker to the bathroom when he fell forward onto his face. He presented to the ED for evaluation. CT of the head, facial bones, and c-spine were all negative for fracture. No bleed noted on head CT. XR of bilateral forearms and feet also negative for fracture. Routine labs were obtained and ammonia level was found to be 106. The patient was admitted with concern for possible acute hepatic encephalopathy.     LTC chart notes reviewed at length. Per LTC notes that patient's baseline consists of chronic HE, poor insight, chronic bilateral asterixis, multiple falls, recent intrusiveness and agitation, impulsivity, orientation to self and surroundings but not date/time. NP exams frequently report bilateral asterixis. I discussed with both the " patient's brother and RN at [facility], both of whom confirmed that the patient's current behaviors and mentation are normal for him.     Brother reports that the patient's goal is to generally not be hospitalized, as far as any additional medical interventions go he recommends that we follow whatever is listed on the patient's advanced directive form. Brother does not have legal guardianship at this time.     Review of Systems    The 10 point Review of Systems is negative other than noted in the HPI or here.     Past Medical History    I have reviewed this patient's medical history and updated it with pertinent information if needed.   Past Medical History:   Diagnosis Date     JENNIFER (acute kidney injury) (H) 4/9/2019     Alcohol use disorder      Alcoholic cirrhosis (H)      Anticoagulant long-term use     plavix     Aortic stenosis, severe 2/21/2018     Ascites      Chronic allergic rhinitis      Chronic anemia      Chronic hepatitis C without hepatic coma (H) 05/10/2016    Untreated as of 2/2018     Cirrhosis (H) 2017    MRI finding     Diastolic dysfunction      Erosive gastropathy      Esophageal varices in alcoholic cirrhosis (H)      H/O upper gastrointestinal hemorrhage 09/2017     Hepatocellular carcinoma (H)      History of blood transfusion      History of drug abuse (H)     intranasal     Hypertension     essential     JANELLE (iron deficiency anemia)      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  Past Surgical History:   Procedure Laterality Date     ABLATE LIVER TUMOR N/A 10/22/2019    Procedure: Ablate liver tumor x3;  Surgeon: Gregorio Benoit MD;  Location: [facility] OR     ARTHROSCOPY SHOULDER ROTATOR CUFF REPAIR  7/31/2012    Procedure: ARTHROSCOPY SHOULDER ROTATOR CUFF REPAIR;  Right Shoulder Arthroscopic Rotator Cuff Repair, BicepsTenodesis,  Subacromial Decompression ;  Surgeon: Joi Castillo MD;  [facility]     ESOPHAGOSCOPY, GASTROSCOPY, DUODENOSCOPY (EGD), COMBINED N/A 10/23/2017    Procedure: COMBINED ESOPHAGOSCOPY, GASTROSCOPY, DUODENOSCOPY (EGD);;  Surgeon: Gentry Salas MD;  Location: [facility] GI     EXCHANGE POLY COMPONENT ARTHROPLASTY KNEE Right 3/4/2019    Procedure: REVISION RIGHT TOTAL KNEE POLY COMPONENT EXCHANGE;  Surgeon: Olvin Joe MD;  Location: [facility] OR     FACIAL RECONSTRUCTION SURGERY  1971     HEART CATH FEMORAL CANNULIZATION WITH OPEN STANDBY REPAIR AORTIC VALVE N/A 2/21/2018    Procedure: HEART CATH FEMORAL CANNULIZATION WITH OPEN STANDBY REPAIR AORTIC VALVE;;  Surgeon: Luis Baird MD;  [facility]     HERNIORRHAPHY VENTRAL N/A 6/22/2021    Procedure: HERNIORRHAPHY, VENTRAL, OPEN, small bowel resection;  Surgeon: Harley Snell MD;  [facility]: [facility]     IR CHEMO EMBOLIZATION  1/29/2020     IR LIVER BIOPSY PERCUTANEOUS  7/18/2019     IR PARACENTESIS  4/15/2021     IR TRANSVEN INTRAHEPATIC PORTOSYST SHUNT  4/15/2021     IRRIGATION AND DEBRIDEMENT UPPER EXTREMITY, COMBINED  1/3/2012    Procedure:COMBINED IRRIGATION AND DEBRIDEMENT UPPER EXTREMITY; Irrigation & Debridement Left Elbow; Surgeon:CRISTHIAN ZHOU; Location[facility]     LAPAROSCOPIC BIOPSY LIVER N/A 10/22/2019    Procedure: intraoperative liver ultrasound, laparoscopic converted to open liver biopsy x 6;  Surgeon: Gregorio Benoit MD;  [facility]     LAPAROSCOPY DIAGNOSTIC (GENERAL) N/A 10/22/2019    Procedure:  "Diagnostic laparoscopy;  Surgeon: Gregorio Benoit MD;  [facility]     LAPAROTOMY, LYSIS ADHESIONS, COMBINED N/A 10/22/2019    Procedure: Laparotomy, lysis adhesions, combined;  Surgeon: Gregorio Benoit MD;  [facility]     OPTICAL TRACKING SYSTEM ARTHROPLASTY KNEE Right 2/7/2019    Procedure: ARTHROPLASTY KNEE RIGHT;  Surgeon: Olvin Joe MD;  [facility]     REPAIR TENDON TRICEPS UPPER EXTREMITY  11/8/2011    Procedure:REPAIR TENDON TRICEPS UPPER EXTREMITY; Surgeon:CRISTHIAN ZHOU; [facility]     SHOULDER SURGERY  2003    left, injury, torn tendons, hematoma     TRANSCATHETER AORTIC VALVE IMPLANT ANESTHESIA N/A 2/21/2018    Procedure: TRANSCATHETER AORTIC VALVE IMPLANT ANESTHESIA;  Transfemoral (Quiroz) Aortic Valve Implant 26mm MARTHA 3, with Cardiopulmonary Bypass Standby, transthoracic echocardiogram;  Surgeon: GENERIC ANESTHESIA PROVIDER;  Location: [facility]     TRANSPOSITION ULNAR NERVE (ELBOW)  11/8/2011    Procedure:TRANSPOSITION ULNAR NERVE (ELBOW); Final Procedure Done: Left Elbow Lateral Ulnar Collateral Repair And  Left Elbow Triceps Repair         Social History   I have reviewed this patient's social history and updated it with pertinent information if needed.  Social History     Tobacco Use     Smoking status: Never Smoker     Smokeless tobacco: Never Used   Substance Use Topics     Alcohol use: Yes     Comment: drinks a \"beer occasionally\"     Drug use: Yes     Types: Marijuana       Family History   I have reviewed this patient's family history and updated it with pertinent information if needed.  Family History   Problem Relation Age of Onset     Cancer Mother 62        unknown primary     Alcoholism Paternal Uncle      Unknown/Adopted Father      No Known Problems Brother      Diabetes Maternal Grandmother      Myocardial Infarction Maternal Grandfather      No Known Problems Paternal Grandmother      Unknown/Adopted Paternal Grandfather      Cirrhosis No family hx of  "     Anesthesia Reaction No family hx of      Deep Vein Thrombosis (DVT) No family hx of        Prior to Admission Medications   Prior to Admission Medications   Prescriptions Last Dose Informant Patient Reported? Taking?   MELATONIN PO   Yes No   Sig: Take 1 tablet by mouth nightly as needed   acetaminophen (TYLENOL) 325 MG tablet   No No   Sig: Take 3 tablets (975 mg) by mouth 3 times daily   Patient not taking: Reported on 8/12/2021   aspirin (ASPIRIN LOW DOSE) 81 MG EC tablet  Self No No   Sig: Take 1 tablet (81 mg) by mouth daily   Patient not taking: Reported on 8/12/2021   diazepam (VALIUM) 5 MG tablet  Self Yes No   Sig: as needed (Prior to MRI)    Patient not taking: Reported on 7/16/2021   fluticasone (FLONASE) 50 MCG/ACT nasal spray  Self No No   Sig: Spray 2 sprays into both nostrils daily   furosemide (LASIX) 20 MG tablet   No No   Sig: Take 1 tablet (20 mg) by mouth daily   lisinopril (ZESTRIL) 20 MG tablet  Self No No   Sig: Take 1 tablet (20 mg) by mouth daily   loratadine (CLARITIN) 5 MG chewable tablet  Self No No   Sig: Take 1 tablet (5 mg) by mouth daily   ondansetron (ZOFRAN) 4 MG tablet  Self No No   Sig: Take 1 tablet (4 mg) by mouth every 8 hours as needed for nausea   Patient not taking: Reported on 7/16/2021   oxyCODONE (ROXICODONE) 5 MG tablet   No No   Sig: Take 1-2 tablets (5-10 mg) by mouth every 6 hours as needed for moderate to severe pain   sulfamethoxazole-trimethoprim (BACTRIM DS) 800-160 MG tablet   Yes No   Sig: Take 1 tablet by mouth daily      Facility-Administered Medications: None     Allergies   Allergies   Allergen Reactions     Zolpidem Other (See Comments)     Alcoholic.  Had reaction 3/17/13 while intoxicated which included black out, loss of awareness, paranoia.  Do not prescribe.  Dr. Celeste     Cats Other (See Comments)     rhinitis     Dogs Other (See Comments)     rhinitis     Pollen Extract Other (See Comments)     rhinits.       Physical Exam   Vital Signs: Temp:  98.1  F (36.7  C) Temp src: Oral BP: 115/63 Pulse: 72   Resp: 16 SpO2: 100 % O2 Device: None (Room air)    Weight: 191 lbs 0 oz    Constitutional: Awake and alert, kneeling on bed, gripping onto bed rails.  Eyes: Sclera clear, anicteric   Respiratory: Breathing non-labored. CTAB. Good air entry.   Cardiovascular:  RRR, normal S1/S2. No rubs or murmurs.   GI: Soft, non-tender, non-distended. Normoactive bowel sounds.   Skin:  Good color. No jaundice. No visible rashes, lesions, or bruising of concern.   Neurologic: Alert and oriented to self and place only, disoriented to day/month/year. Unable to assess for asterixis d/t patient positioning.       Data   Data reviewed today: I reviewed all medications, new labs and imaging results over the last 24 hours.     ROUTINE IP LABS (Last four results)  Recent Labs   Lab 05/16/22  0437 05/10/22  0201    141   POTASSIUM 3.9 3.7   CHLORIDE 110* 109   CO2 22 28   ANIONGAP 9 4   GLC 82 128*   BUN 20 16   CR 0.67 0.91   JOSEPHINE 8.5 8.4*   PROTTOTAL 6.6* 7.1   ALBUMIN 2.8* 2.8*   BILITOTAL 0.9 0.8   ALKPHOS 70 91   AST 64* 60*   ALT 46 48     Recent Labs   Lab 05/16/22 0437 05/10/22  0201   WBC 6.7 6.0   RBC 3.35* 3.26*   HGB 7.4* 7.2*   HCT 25.5* 24.8*   MCV 76* 76*   MCH 22.1* 22.1*   MCHC 29.0* 29.0*   RDW 21.7* 22.2*    188     Recent Labs   Lab 05/16/22  0437   INR 1.36*

## 2022-05-16 NOTE — LETTER
[facility] EMERGENCY DEPARTMENT  [address]  [place] [address]  [phone]    FACSIMILE TRANSMITTAL SHEET         From:  SANA Magallon, [facility] ED/Observation  [facility]  Phone: [phone]  Pager: [phone]    Confidentiality Notice: The document(s) accompanying this fax may contain protected health information under state and federal law and is legally privileged. The information is only for the use of the intended recipient named above. The recipient or person responsible for delivering this information is prohibited by law from disclosing this information without proper authorization to any other party, unless required to do so by law or regulation. If you are not the intended recipient, you are hereby notified that any review, dissemination, distribution, or copying of this message is strictly prohibited. If you have received this communication in error, please notify us immediately by telephone to arrange for the return or destruction of the faxed document. Thank you

## 2022-05-16 NOTE — DISCHARGE SUMMARY
[facility]  Hospitalist Discharge Summary      Date of Admission:  5/16/2022  Date of Discharge:  5/16/2022  Discharging Provider: Helena Rogel PA-C  Discharge Service: Hospitalist Service, GOLD TEAM 1    Discharge Diagnoses   Mechanical fall  Chronic encephalopathy  Decompensated cirrhosis s/p TIPS (4/2021)  HCC s/p TACE, surgical MWA  Schizophrenia  Chronic pain    Follow-ups Needed After Discharge    - Follow-up with nursing home provider within 1 week     Discharge Disposition   Discharged to long-term care facility  Condition at discharge: Stable    Hospital Course   Ten Johnson is a 61 year old male with a history of decompensated cirrhosis s/p TIPS, HCC, chronic encephalopathy, aortic stenosis s/p TAVR, and schizophrenia, who presented following a mechanical fall. CT of the head, facial bones, and c-spine were all negative for fracture. No bleed noted on head CT. XR of bilateral forearms and feet also negative for fracture. Routine labs were obtained and ammonia level was found to be 106. The patient was admitted with concern for possible acute hepatic encephalopathy.     The patient's behaviors during his hospitalization were consistent with his baseline mentation (impulsivity, agitation, poor insight, orientation to self and situation only). I discussed with both the patient's brother and RN at LTC, both of whom confirmed that the patient's current behaviors and mentation are normal for him. Brother reports that the patient's goal is to generally not be hospitalized, which is consistent with LTC provider notes. His ammonia level does not seem to be representative of any acute worsening of his chronic encephalopathy. He exhibited no focal signs or symptoms concerning for an acute infectious process. Procalcitonin negative, WBC wnl, urinalysis entirely normal.     At this time he is at his baseline and is medically stable to be discharged back to his  long-term care facility. He received his home medications, including lactulose, and no adjustments were made to his home medication regimen.         Consultations This Hospital Stay   CARE MANAGEMENT / SOCIAL WORK IP CONSULT    Code Status   No CPR- Do NOT Intubate    Time Spent on this Encounter   IHelena PA-C, personally saw the patient today and spent greater than 30 minutes discharging this patient.       Helena Rogel PA-C  [facility] EMERGENCY DEPARTMENT  [address] [address]  Phone: [phone]  ______________________________________________________________________    Physical Exam   Vital Signs: Temp: 98.1  F (36.7  C) Temp src: Oral BP: 115/63 Pulse: 72   Resp: 16 SpO2: 100 % O2 Device: None (Room air)    Weight: 191 lbs 0 oz  Constitutional: Awake and alert, kneeling on bed, gripping onto bed rails.  Eyes: Sclera clear, anicteric   Respiratory: Breathing non-labored. CTAB. Good air entry.   Cardiovascular:  RRR, normal S1/S2. No rubs or murmurs.   GI: Soft, non-tender, non-distended. Normoactive bowel sounds.   Skin:  Good color. No jaundice. No visible rashes, lesions, or bruising of concern.   Neurologic: Alert and oriented to self and place only, disoriented to day/month/year. Unable to assess for asterixis d/t patient positioning.     Primary Care Physician   Cuca Celeste    Discharge Orders      General info for SNF    Length of Stay Estimate: Long Term Care  Condition at Discharge: Stable  Level of care:board and care  Rehabilitation Potential: Fair  Admission H&P remains valid and up-to-date: Yes  Recent Chemotherapy: N/A  Use Nursing Home Standing Orders: Yes     Mantoux instructions    Give two-step Mantoux (PPD) Per Facility Policy Yes     Follow Up and recommended labs and tests    Follow-up with medical provider at your nursing home.     Daily weights    Call Provider for weight gain of more than 2 pounds per day or 5 pounds per week.      Activity - Up with nursing assistance     Reason for your hospital stay    Dear Ten Johnson,    You were hospitalized at [facility] for evaluation after a fall. Imaging was obtained of your head, neck, facial bones, feet, and arms - all were negative for any fractures. You are ready to be discharged back to your care facility.  If you develop fever, shortness of breath, light headedness, chest pain, uncontrollable nausea/vomiting, severe headaches, worsening confusion, or otherwise worsen please seek medical attention.    We are suggesting the following medication changes:   - No medication changes. Please continue your previous medication regimen.     Your hospital unit at the time of discharge is the Emergency Department so if you have any questions please call the hospital at [phone] and ask to talk to a nurse in the Emergency Department.    Take care!    Helena Rogel PA-C   Hospitalist Service     No CPR- Do NOT Intubate     Contact Isolation     Fall precautions     Diet    Follow this diet upon discharge: Low sodium diet, 2.2 liter fluid restriction       Significant Results and Procedures   ROUTINE IP LABS (Last four results)  Recent Labs   Lab 05/16/22 0437 05/10/22  0201    141   POTASSIUM 3.9 3.7   CHLORIDE 110* 109   CO2 22 28   ANIONGAP 9 4   GLC 82 128*   BUN 20 16   CR 0.67 0.91   JOSEPHINE 8.5 8.4*   PROTTOTAL 6.6* 7.1   ALBUMIN 2.8* 2.8*   BILITOTAL 0.9 0.8   ALKPHOS 70 91   AST 64* 60*   ALT 46 48     Recent Labs   Lab 05/16/22 0437 05/10/22  0201   WBC 6.7 6.0   RBC 3.35* 3.26*   HGB 7.4* 7.2*   HCT 25.5* 24.8*   MCV 76* 76*   MCH 22.1* 22.1*   MCHC 29.0* 29.0*   RDW 21.7* 22.2*    188     Recent Labs   Lab 05/16/22 0437   INR 1.36*            Discharge Medications   Current Discharge Medication List      CONTINUE these medications which have CHANGED    Details   gabapentin (NEURONTIN) 100 MG capsule Take 1 capsule (100 mg) by mouth 2 times  daily  Qty: 60 capsule, Refills: 0    Associated Diagnoses: S/P total knee arthroplasty, right      traMADol (ULTRAM) 50 MG tablet Take 1 tablet (50 mg) by mouth 3 times daily  Qty: 12 tablet, Refills: 0    Associated Diagnoses: S/P total knee arthroplasty, right         CONTINUE these medications which have NOT CHANGED    Details   acetaminophen (TYLENOL) 325 MG tablet Take 650 mg by mouth 3 times daily as needed      albuterol (PROAIR HFA/PROVENTIL HFA/VENTOLIN HFA) 108 (90 Base) MCG/ACT inhaler Inhale 1-2 puffs into the lungs every 4 hours as needed      bisacodyl (DULCOLAX) 10 MG suppository Place 10 mg rectally daily as needed      famotidine (PEPCID) 20 MG tablet Take 20 mg by mouth 2 times daily      folic acid (FOLVITE) 1 MG tablet Take 1 mg by mouth daily      Guar Gum (FIBER MODULAR, NUTRISOURCE FIBER,) packet Take 1 packet by mouth 3 times daily      haloperidol (HALDOL) 1 MG tablet Take 1 mg by mouth 2 times daily      Multiple Vitamins-Minerals (CEROVITE SENIOR) TABS Take 1 tablet by mouth daily      rifaximin (XIFAXAN) 550 MG TABS tablet Take 550 mg by mouth 2 times daily      thiamine (B-1) 100 MG tablet Take 100 mg by mouth daily      aspirin (ASA) 81 MG EC tablet Take 81 mg by mouth every morning      fluticasone (FLONASE) 50 MCG/ACT nasal spray Spray 2 sprays into both nostrils daily  Qty: 48 mL, Refills: 4    Associated Diagnoses: Environmental allergies      furosemide (LASIX) 40 MG tablet Take 40 mg by mouth daily      lactulose (CHRONULAC) 10 GM/15ML solution Take 90 mLs by mouth 3 times daily      loratadine (CLARITIN) 5 MG chewable tablet Take 1 tablet (5 mg) by mouth daily  Qty: 90 tablet, Refills: 1    Comments: Patient has cirrhosis and one-half the usual dose is advised.  Dr. Celeste  Associated Diagnoses: Environmental allergies      MELATONIN PO Take 1 tablet by mouth nightly as needed      ondansetron (ZOFRAN) 4 MG tablet Take 1 tablet (4 mg) by mouth every 8 hours as needed for  nausea  Qty: 20 tablet, Refills: 11    Associated Diagnoses: HCC (hepatocellular carcinoma) (H); Nausea      sulfamethoxazole-trimethoprim (BACTRIM DS) 800-160 MG tablet Take 1 tablet by mouth daily      tiZANidine (ZANAFLEX) 2 MG tablet Take 2 mg by mouth every 8 hours as needed for muscle spasms      traZODone (DESYREL) 50 MG tablet Take 50 mg by mouth At Bedtime         STOP taking these medications       lisinopril (ZESTRIL) 20 MG tablet Comments:   Reason for Stopping:         loratadine (CLARITIN) 10 MG tablet Comments:   Reason for Stopping:         oxyCODONE (ROXICODONE) 5 MG tablet Comments:   Reason for Stopping:             Allergies   Allergies   Allergen Reactions     Zolpidem Other (See Comments)     Alcoholic.  Had reaction 3/17/13 while intoxicated which included black out, loss of awareness, paranoia.  Do not prescribe.  Dr. Celeste     Cats Other (See Comments)     rhinitis     Dogs Other (See Comments)     rhinitis     Pollen Extract Other (See Comments)     rhinits.

## 2022-05-19 NOTE — UTILIZATION REVIEW
Admission Status; Secondary Review Determination    No action to be taken. Please contact me on my Email : [email or web address] if you have any questions.    As part of the [facility] Utilization review plan, a self-audit is done on Medicare inpatient admission with less than 2 midnights stay. The 2014 IPPS Final Rule allows outpatient billing in the event that a hospital determines that an inpatient admission was not medically necessary under utilization review process.     (x) Outpatient status would be Appropriate- Short Stay- Post discharge review.    RATIONALE FOR DETERMINATION  Patient was admitted for hepatic encephalopathy, which was determined to be at baseline     Patient was admitted and discharge after one night stay. Record was sent by  for a PA review. Based on the  severity of illness, intensity of service provided, expected LOS and risk for adverse outcome make the care appropriate for further outpatient/observation; however, doesn't meet criteria for hospital inpatient admission.       The information on this document is developed by the utilization review team in order for the business office to ensure compliance.  This only denotes the appropriateness of proper admission status and does not reflect the quality of care rendered.       The definitions of Inpatient Status and Observation Status used in making the determination above are those provided in the CMS Coverage Manual, Chapter 1 and Chapter 6, section 70.4.     Please cont me if you want to discuss further about this admission episode.      Kirti Santana MD, FACP, JENNIFER  Medical Director - Utilization management  Staff Hospitalist  [facility]    Pager: [phone]

## 2022-05-27 NOTE — ED TRIAGE NOTES
Pt BIBA after a fall, pt states he fell between the wall, pt also reports he fell yesterday and has 10/10 right hip pain, was not seen for that, VSS en route.  Pt reporting headache, neck pain, Collar in place on arrival.

## 2022-05-27 NOTE — ED PROVIDER NOTES
History     Chief Complaint   Patient presents with     Fall     HPI  Ten Johnson is a 61 year old male with PMH notable for cirrhosis secondary to EtOH, hepatocellular carcinoma, aortic valve replacement who presents to the ED with injuries from fall.  Patient reports that shortly before arrival he tried to jump into a bed but slid off the edge.  He struck his head on the wall next to it.  He was then found by nursing home staff.  Patient endorses head pain and right hip pain.  Pain is sharp and nonradiating, constant.  He denies other areas of pain.  He denies loss of consciousness with the fall.    Past Medical History  Past Medical History:   Diagnosis Date     JENNIFER (acute kidney injury) (H) 4/9/2019     Alcohol use disorder      Alcoholic cirrhosis (H)      Anticoagulant long-term use     plavix     Aortic stenosis, severe 2/21/2018     Ascites      Chronic allergic rhinitis      Chronic anemia      Chronic hepatitis C without hepatic coma (H) 05/10/2016    Untreated as of 2/2018     Cirrhosis (H) 2017    MRI finding     Diastolic dysfunction      Erosive gastropathy      Esophageal varices in alcoholic cirrhosis (H)      H/O upper gastrointestinal hemorrhage 09/2017     Hepatocellular carcinoma (H)      History of blood transfusion      History of drug abuse (H)     intranasal     Hypertension     essential     JANELLE (iron deficiency anemia)      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     Sarahi-Hoffman tear     History     Marijuana abuse      MRSA (methicillin resistant Staphylococcus aureus)      Nonrheumatic aortic valve stenosis 2/20/2018     Olecranon bursitis      Portal hypertension (H)      Right shoulder pain     history of rotator cuff repair     S/p TAVR (transcatheter aortic valve replacement), bioprosthetic      Severe aortic stenosis      Thrombocytopenia (H)      Past Surgical History:   Procedure Laterality Date     ABLATE LIVER TUMOR  N/A 10/22/2019    Procedure: Ablate liver tumor x3;  Surgeon: Gregorio Benoit MD;  Location: [facility]     ARTHROSCOPY SHOULDER ROTATOR CUFF REPAIR  7/31/2012    Procedure: ARTHROSCOPY SHOULDER ROTATOR CUFF REPAIR;  Right Shoulder Arthroscopic Rotator Cuff Repair, BicepsTenodesis,  Subacromial Decompression ;  Surgeon: Joi Castillo MD;  [facility]     ESOPHAGOSCOPY, GASTROSCOPY, DUODENOSCOPY (EGD), COMBINED N/A 10/23/2017    Procedure: COMBINED ESOPHAGOSCOPY, GASTROSCOPY, DUODENOSCOPY (EGD);;  Surgeon: Gentry Salas MD;  Location: [facility] GI     EXCHANGE POLY COMPONENT ARTHROPLASTY KNEE Right 3/4/2019    Procedure: REVISION RIGHT TOTAL KNEE POLY COMPONENT EXCHANGE;  Surgeon: Olvin Joe MD;  Location: [facility] OR     FACIAL RECONSTRUCTION SURGERY  1971     HEART CATH FEMORAL CANNULIZATION WITH OPEN STANDBY REPAIR AORTIC VALVE N/A 2/21/2018    Procedure: HEART CATH FEMORAL CANNULIZATION WITH OPEN STANDBY REPAIR AORTIC VALVE;;  Surgeon: Luis Baird MD;  [facility]: [facility]     HERNIORRHAPHY VENTRAL N/A 6/22/2021    Procedure: HERNIORRHAPHY, VENTRAL, OPEN, small bowel resection;  Surgeon: Harley Snell MD;  [facility]: [facility]     IR CHEMO EMBOLIZATION  1/29/2020     IR LIVER BIOPSY PERCUTANEOUS  7/18/2019     IR PARACENTESIS  4/15/2021     IR TRANSVEN INTRAHEPATIC PORTOSYST SHUNT  4/15/2021     IRRIGATION AND DEBRIDEMENT UPPER EXTREMITY, COMBINED  1/3/2012    Procedure:COMBINED IRRIGATION AND DEBRIDEMENT UPPER EXTREMITY; Irrigation & Debridement Left Elbow; Surgeon:CRISTHIAN ZHOU; [facility]     LAPAROSCOPIC BIOPSY LIVER N/A 10/22/2019    Procedure: intraoperative liver ultrasound, laparoscopic converted to open liver biopsy x 6;  Surgeon: Gregorio Benoit MD;  [facility]     LAPAROSCOPY DIAGNOSTIC (GENERAL) N/A 10/22/2019    Procedure: Diagnostic laparoscopy;  Surgeon: Gregorio Benoti MD;  [facility]     LAPAROTOMY, LYSIS ADHESIONS, COMBINED N/A 10/22/2019     Procedure: Laparotomy, lysis adhesions, combined;  Surgeon: Gregorio Benoit MD;  [facility]     OPTICAL TRACKING SYSTEM ARTHROPLASTY KNEE Right 2/7/2019    Procedure: ARTHROPLASTY KNEE RIGHT;  Surgeon: Olvin Joe MD;  [facility]     REPAIR TENDON TRICEPS UPPER EXTREMITY  11/8/2011    Procedure:REPAIR TENDON TRICEPS UPPER EXTREMITY; Surgeon:CRISTHIAN ZHOU; [facility]     SHOULDER SURGERY  2003    left, injury, torn tendons, hematoma     TRANSCATHETER AORTIC VALVE IMPLANT ANESTHESIA N/A 2/21/2018    Procedure: TRANSCATHETER AORTIC VALVE IMPLANT ANESTHESIA;  Transfemoral (Quiroz) Aortic Valve Implant 26mm MARTHA 3, with Cardiopulmonary Bypass Standby, transthoracic echocardiogram;  Surgeon: GENERIC ANESTHESIA PROVIDER;  Location: [facility]     TRANSPOSITION ULNAR NERVE (ELBOW)  11/8/2011    Procedure:TRANSPOSITION ULNAR NERVE (ELBOW); Final Procedure Done: Left Elbow Lateral Ulnar Collateral Repair And  Left Elbow Triceps Repair       acetaminophen (TYLENOL) 325 MG tablet  albuterol (PROAIR HFA/PROVENTIL HFA/VENTOLIN HFA) 108 (90 Base) MCG/ACT inhaler  aspirin (ASA) 81 MG EC tablet  bisacodyl (DULCOLAX) 10 MG suppository  famotidine (PEPCID) 20 MG tablet  fluticasone (FLONASE) 50 MCG/ACT nasal spray  folic acid (FOLVITE) 1 MG tablet  furosemide (LASIX) 40 MG tablet  gabapentin (NEURONTIN) 100 MG capsule  Guar Gum (FIBER MODULAR, NUTRISOURCE FIBER,) packet  haloperidol (HALDOL) 1 MG tablet  lactulose (CHRONULAC) 10 GM/15ML solution  loratadine (CLARITIN) 5 MG chewable tablet  MELATONIN PO  Multiple Vitamins-Minerals (CEROVITE SENIOR) TABS  ondansetron (ZOFRAN) 4 MG tablet  rifaximin (XIFAXAN) 550 MG TABS tablet  sulfamethoxazole-trimethoprim (BACTRIM DS) 800-160 MG tablet  thiamine (B-1) 100 MG tablet  tiZANidine (ZANAFLEX) 2 MG tablet  traMADol (ULTRAM) 50 MG tablet  traZODone (DESYREL) 50 MG tablet      Allergies   Allergen Reactions     Zolpidem Other (See Comments)     Alcoholic.  Had  "reaction 3/17/13 while intoxicated which included black out, loss of awareness, paranoia.  Do not prescribe.  Dr. Celeste     Cats Other (See Comments)     rhinitis     Dogs Other (See Comments)     rhinitis     Pollen Extract Other (See Comments)     rhinits.     Social History   Social History     Tobacco Use     Smoking status: Never Smoker     Smokeless tobacco: Never Used   Substance Use Topics     Alcohol use: Yes     Comment: drinks a \"beer occasionally\"     Drug use: Yes     Types: Marijuana      Past medical history and social history were reviewed with the patient. Additional pertinent items: None     Review of Systems  A complete review of systems was performed with pertinent positives and negatives noted in the HPI, and all other systems negative.    Physical Exam   BP: 139/71  Pulse: 102  Temp: 98.1  F (36.7  C)  Resp: 18  Weight: 86.6 kg (191 lb)  SpO2: 100 %    Physical Exam  General: Somewhat uncomfortable appearing, c-collar in place, head laceration.  Appears stated age.   HENT: MMM, no oropharyngeal lesions. Scalp with 3 cm superficial laceration, non-gaping.   Eyes: PERRL, normal sclerae  Neck: non-tender, supple. C-collar in place  Cardio: borderline tachycardic rate. Regular rhythm. Extremities well perfused  Resp: Normal work of breathing, normal respiratory rate.  Chest/Back: no visual signs of trauma, no CVA tenderness, no midline tenderness, no pain with AP and lateral chest wall compression  Abdomen: no tenderness, non-distended, no rebound, no guarding  Neuro: alert and fully oriented. CN II-XII grossly intact. Grossly normal strength and sensation in all extremities.   MSK: no deformities. Tenderness over right hip and pain with R hip ROM. No other areas of significant pain with palpation over all areas of extremities. No other areas of pain with ROM of extremity joints.   Integumentary/Skin: no rash visualized, normal color  Psych: normal affect, normal behavior    ED Course    "   Procedures          Labs Ordered and Resulted from Time of ED Arrival to Time of ED Departure - No data to display  CT Head w/o Contrast   Final Result   IMPRESSION:   1.  No acute intracranial process.      Cervical spine CT w/o contrast   Final Result   IMPRESSION:   1.  No evidence of an acute displaced fracture of the cervical spine.                      XR Pelvis w Hip Right 1 View   Final Result   IMPRESSION: No obvious acute bony findings such as fracture or dislocation identified. Mild scattered degenerative changes seen most marked in the lower lumbar spine. Scattered vascular calcifications.             Assessments & Plan (with Medical Decision Making)   Patient presenting with head and R hip injury due to fall. Vitals in the ED with initial borderline tachycardia, otherwise unremarkable. Nursing notes reviewed. Initial differential diagnosis includes but not limited to head injury, intracranial hemorrhage, C-spine injury, hip fracture, contusions.     CT head without evidence of acute traumatic pathology.  CT C-spine without evidence of cervical spine injury.  Pelvis and right hip x-ray without evidence of fracture.  Patient was able to ambulate without difficulty in the ED.  Scalp laceration was superficial and closure is not indicated.    In the ED, the patient's symptoms were managed with oxycodone, with improvement in symptoms upon reassessment.     After counseling on the diagnosis, work-up, and treatment plan, the patient was discharged to his nursing home. The patient was advised to follow-up with primary care in a few days. The patient was advised to return to the ED if worsening symptoms, or if there are any urgent/life-threatening concerns.     Final diagnoses:   Closed head injury, initial encounter   Laceration of scalp, initial encounter   Contusion of right hip, initial encounter   Fall from bed, initial encounter     New Prescriptions    No medications on file       --  Nikita Nina,  MD   Emergency Medicine   [facility] EMERGENCY DEPARTMENT  5/27/2022     Nikita Nina MD  05/27/22 0657

## 2022-05-27 NOTE — DISCHARGE INSTRUCTIONS
Instructions from your doctor today:  Emergency Department testing is focused on the potential causes of your symptoms that are the most dangerous possibilities, and cannot cover every possibility. Based on the evaluation, it was deemed sufficiently safe to discharge and continue management through the clinics. Thus, follow-up is very important to assess for improvement/worsening, potential further testing, and potential treatment adjustments. If you were given opioid pain medications or other medications that can make you drowsy while in the ED, you should not drive for at least several hours and not until you feel completely back to normal.     Please make an appointment to follow up with:  - Your Primary Care Provider in 2-4 days  - If you do not have a primary care provider, you can be seen in follow-up and establish care by calling any of the clinics below:     - Primary [facility] Center (phone: [phone])     - Primary Care / [facility] (phone: [phone])   - Have your clinic provider review the results from today's visit with you again, including any potential follow-up or additional testing that may be needed based on the results. Occasionally, incidental findings are found on later review by radiologists that may need follow-up.     Return to the Emergency Department immediately if you have worsening symptoms, or any other urgent or potentially life-threatening concerns.

## 2022-07-12 PROBLEM — M79.662 PAIN OF LEFT LOWER LEG: Status: ACTIVE | Noted: 2022-01-01

## 2022-07-12 PROBLEM — S01.81XA FACIAL LACERATION, INITIAL ENCOUNTER: Status: ACTIVE | Noted: 2022-01-01

## 2022-07-12 NOTE — DISCHARGE SUMMARY
[facility]  Hospitalist Discharge Summary      Date of Admission:  7/12/2022  Date of Discharge:  7/12/2022  Discharging Provider: SYDNIE Kniney CNP  Discharge Service: Hospitalist Service    Discharge Diagnoses   Fall with small right head laceration  Left foot pain.     Follow-ups Needed After Discharge   Follow-up Appointments     Follow Up and recommended labs and tests      Follow up with Nursing home physician.  No follow up labs or test are   needed.         {Additional follow-up instructions/to-do's for PCP    : Hospital follow up     Unresulted Labs Ordered in the Past 30 Days of this Admission     No orders found from 6/12/2022 to 7/13/2022.      These results will be followed up by PCP    Discharge Disposition   Discharged to home  Condition at discharge: Stable    Hospital Course      Ten Johnson is a 62 year old male admitted on 7/12/2022. He presents with past medical history including s/p total right knee replacement w/ hx infection (2019), alcoholic cirrhosis of liver with ascites And esophageal varices, hepatic encephalopathy, Chronic ascites, HCC s/p TACE and microwave ablation, thrombocytopenia, portal hypertension, Erosive gastropathy, Sarahi-Hoffman tear, JENNIFER, s/p TAVR (2018), Schizophrenia, presents to the ED after a fall tonight.      ##Frequent Falls  ## Small Right forehead laceration   ##LeftLeg pain, weakness  62 year old male who presents from a long-term care [facility] memory care unit after a fall. Patient falls frequently.  Per chart review, patient has been complaining about ankle and lower extremity pain for months.  He was evaluated by [facility] orthopedics in April 2022, noted he is to be weightbearing as tolerated.  MRI of the ankle was considered but does not appear to have been done. Patient has had general deterioration in condition since May.  Overall plan of care, as well as POLST were reviewed with family, noting  continuation of comfort focused care, Palliative approach. He is Do Not Resuscitate. No hospitalization. This is nicely laid out in nursing home visit note dated 5/25/2022 by Dr. Khalil. And in POLST documents now on patient's chart.    In the ED tonight, VSS.  Patient cries out frequently for help.  States his left leg hurts. Labs show normal electrolytes, chloride 110, creatinine 1.06.  Calcium slightly low, 8.5.  Albumin 3.0, protein total 6.1.  AST slightly elevated at 52 otherwise LFTs unremarkable.  Hemoglobin 8, baseline 7-8.  INR 1.36.  XR ankle left: No acute fracture.  Multiple vascular calcifications.  XR foot left: No acute fracture.  Multiple vascular calcifications.  XR pelvis no acute fracture or dislocation.  Mild osteoarthritis in hips.  XR tibia and fibula: No acute fracture.  Multiple vascular calcifications.  CT head: No acute intracranial process.  Ultrasound lower extremity venous duplex left: No evidence for DVT.  Patient continues to cry out in pain, States he cannot walk or bear weight on and left lower extremity.  Medication: 50 mg tramadol x1.  Plan: [facility] to ED observation for physical therapy evaluation Prior to return to his facility. Patient was seen by PT who reported patient was at baseline. Patient was able to ambulate without increased pain.PT/OT ordered for NH.   - Fall precautions  - Wound care to keep wound CDI.   - Continue PTA Tylenol as needed, gabapentin, Tizanidine as needed, tramadol as needed  - Follow up with PCP for hospital follow up      ##Hepatic encephalopathy:   -- Continue prior to admission Xifaxan twice daily, Lasix, lactulose  - Continue prior to admission Bactrim for prophylaxis of spontaneous bacterial peritonitis.     ##Alcoholic cirrhosis: Continue prior to admission thiamine, folic acid     ##Schizophrenia: Continue prior to admission haloperidol twice daily     ##Insomnia: Continue prior to admission trazodone 50 mg at bedtime with additional dose      Consultations This Hospital Stay   CARE MANAGEMENT / SOCIAL WORK IP CONSULT  PHYSICAL THERAPY ADULT IP CONSULT  MEDICATION HISTORY IP PHARMACY CONSULT  PHYSICAL THERAPY ADULT IP CONSULT  OCCUPATIONAL THERAPY ADULT IP CONSULT    Code Status   No CPR- Do NOT Intubate    Time Spent on this Encounter   I, SYDNIE Kinney CNP, personally saw the patient today and spent less than or equal to 30 minutes discharging this patient.       SYDNIE Kinney CNP  [facility] EMERGENCY DEPARTMENT  [address]  [place] [address]  Phone: [phone]  ______________________________________________________________________    Physical Exam   Vital Signs: Temp: 97.9  F (36.6  C) Temp src: Oral BP: 134/79 Pulse: 75   Resp: 18 SpO2: 100 % O2 Device: None (Room air)    Weight: 191 lbs 9.28 oz  Constitutional: awake, alert, cooperative, no apparent distress, and appears stated age  Eyes: Lids and lashes normal, pupils equal, round and reactive to light, extra ocular muscles intact, sclera clear, conjunctiva normal  ENT: Normocephalic, Laceration 2 cm in length, with dried blood at lateral aspect of right eyebrow.  oral pharynx with dry mucous membranes.  Respiratory: No increased work of breathing, no crackles or wheezing  Cardiovascular: Regular rate and rhythm  Abdomen: Normal bowel sounds, soft, non-distended, non-tender  Skin: normal skin color, texture, turgor and no redness, warmth, or swelling  Musculoskeletal: There is no redness, warmth, or swelling of the joints.  Full range of motion noted.  Motor strength is 5 out of 5 all extremities bilaterally.  Tone is normal.  Neurologic: Awake, alert, oriented to name, place and time.  Cranial nerves II-XII are grossly intact.  Motor is 5 out of 5 bilaterally.   Psychiatric: Behavior is agitated.  Flat affect.          Primary Care Physician   Cuca Celeste    Discharge Orders      General info for SNF    Length of Stay Estimate: Long Term Care  Condition  at Discharge: Stable  Level of care:board and care  Rehabilitation Potential: Good  Admission H&P remains valid and up-to-date: Yes  Recent Chemotherapy: N/A  Use Nursing Home Standing Orders: Yes     Mantoux instructions    Give two-step Mantoux (PPD) Per Facility Policy Yes     Follow Up and recommended labs and tests    Follow up with Nursing home physician.  No follow up labs or test are needed.     Reason for your hospital stay    Fall with small right head laceration     Activity - Up with assistive device     Activity - Up with nursing assistance     Wound care (specify)    Site:   Right forehead laceration   Instructions:  Keep wound clean dry and intact. Allow steri-strip to fall off naturally     Physical Therapy Adult Consult    Evaluate and treat as clinically indicated.    Reason:  Frequent falls     Occupational Therapy Adult Consult    Evaluate and treat as clinically indicated.    Reason:  Frequent falls     Fall precautions     Diet    Follow this diet upon discharge: Regular       Significant Results and Procedures   Results for orders placed or performed during the hospital encounter of 07/12/22   Head CT w/o contrast    Narrative    EXAM: CT HEAD W/O CONTRAST  LOCATION: [facility]  DATE/TIME: 7/12/2022 2:15 AM    INDICATION: Blunt head injury  COMPARISON: None.  TECHNIQUE: Routine CT Head without IV contrast. Multiplanar reformats. Dose reduction techniques were used.    FINDINGS:  Streak artifact limits aspects of exam.    INTRACRANIAL CONTENTS: No intracranial hemorrhage, extraaxial collection, or mass effect.  No CT evidence of acute infarct. Normal parenchymal attenuation. Normal ventricles and sulci.     VISUALIZED ORBITS/SINUSES/MASTOIDS: No intraorbital abnormality. No paranasal sinus mucosal disease. No middle ear or mastoid effusion.    BONES/SOFT TISSUES: No acute abnormality.      Impression    IMPRESSION:  1.  No acute intracranial  process.   XR Pelvis 1/2 Views    Narrative    EXAM: XR PELVIS 1/2 VW  LOCATION: [facility]  DATE/TIME: 7/12/2022 2:10 AM    INDICATION: Pain after trauma.  COMPARISON: None.      Impression    IMPRESSION: No acute fracture or dislocation. Mild osteoarthritis in the hip joints.   XR Tibia & Fibula Left 2 Views    Narrative    EXAM: XR ANKLE LEFT G/E 3 VIEWS, XR TIBIA and FIBULA LT 2 VW, XR FOOT LEFT G/E 3 VIEWS  LOCATION: [facility]  DATE/TIME: 7/12/2022 1:50 AM    INDICATION: Pain after trauma.  COMPARISON: None.      Impression    IMPRESSION: No acute fracture or dislocation. Multiple vascular calcifications.   XR Ankle Left G/E 3 Views    Narrative    EXAM: XR ANKLE LEFT G/E 3 VIEWS, XR TIBIA and FIBULA LT 2 VW, XR FOOT LEFT G/E 3 VIEWS  LOCATION: [facility]  DATE/TIME: 7/12/2022 1:50 AM    INDICATION: Pain after trauma.  COMPARISON: None.      Impression    IMPRESSION: No acute fracture or dislocation. Multiple vascular calcifications.   Foot XR, G/E 3 views, left    Narrative    EXAM: XR ANKLE LEFT G/E 3 VIEWS, XR TIBIA and FIBULA LT 2 VW, XR FOOT LEFT G/E 3 VIEWS  LOCATION: [facility]  DATE/TIME: 7/12/2022 1:50 AM    INDICATION: Pain after trauma.  COMPARISON: None.      Impression    IMPRESSION: No acute fracture or dislocation. Multiple vascular calcifications.   US Lower Extremity Venous Duplex Left    Narrative    EXAMINATION: DOPPLER VENOUS ULTRASOUND OF THE LEFT LOWER EXTREMITY,  7/12/2022 4:21 AM     COMPARISON: None.    HISTORY: pain - eval dvt    TECHNIQUE:  Gray-scale evaluation with compression, spectral flow, and  color Doppler assessment of the deep venous system of the left leg  from groin to knee, and then at the ankle.    FINDINGS:  In the left lower extremity, the common femoral, femoral, popliteal  and posterior  tibial veins demonstrate normal compressibility and  blood flow.      Impression    IMPRESSION:  1.  No evidence left lower extremity deep venous thrombosis.    I have personally reviewed the examination and initial interpretation  and I agree with the findings.    LYRIC MOISE MD         SYSTEM ID:  [id number]     *Note: Due to a large number of results and/or encounters for the requested time period, some results have not been displayed. A complete set of results can be found in Results Review.       Discharge Medications   Current Discharge Medication List      CONTINUE these medications which have NOT CHANGED    Details   acetaminophen (TYLENOL) 325 MG tablet Take 650 mg by mouth 3 times daily as needed      albuterol (PROAIR HFA/PROVENTIL HFA/VENTOLIN HFA) 108 (90 Base) MCG/ACT inhaler Inhale 1-2 puffs into the lungs every 4 hours as needed      aspirin (ASA) 81 MG EC tablet Take 81 mg by mouth every morning      bisacodyl (DULCOLAX) 10 MG suppository Place 10 mg rectally daily as needed      famotidine (PEPCID) 20 MG tablet Take 20 mg by mouth 2 times daily      fluticasone (FLONASE) 50 MCG/ACT nasal spray Spray 2 sprays into both nostrils daily  Qty: 48 mL, Refills: 4    Associated Diagnoses: Environmental allergies      folic acid (FOLVITE) 1 MG tablet Take 1 mg by mouth daily      furosemide (LASIX) 40 MG tablet Take 40 mg by mouth daily      gabapentin (NEURONTIN) 100 MG capsule Take 1 capsule (100 mg) by mouth 2 times daily  Qty: 60 capsule, Refills: 0    Associated Diagnoses: S/P total knee arthroplasty, right      Guar Gum (FIBER MODULAR, NUTRISOURCE FIBER,) packet Take 1 packet by mouth 3 times daily      haloperidol (HALDOL) 1 MG tablet Take 1 mg by mouth 2 times daily      lactulose (CHRONULAC) 10 GM/15ML solution Take 90 mLs by mouth 3 times daily      loratadine (CLARITIN) 5 MG chewable tablet Take 1 tablet (5 mg) by mouth daily  Qty: 90 tablet, Refills: 1    Comments: Patient has cirrhosis and  one-half the usual dose is advised.  Dr. Celeste  Associated Diagnoses: Environmental allergies      MELATONIN PO Take 1 tablet by mouth nightly as needed      Multiple Vitamins-Minerals (CEROVITE SENIOR) TABS Take 1 tablet by mouth daily      ondansetron (ZOFRAN) 4 MG tablet Take 1 tablet (4 mg) by mouth every 8 hours as needed for nausea  Qty: 20 tablet, Refills: 11    Associated Diagnoses: HCC (hepatocellular carcinoma) (H); Nausea      rifaximin (XIFAXAN) 550 MG TABS tablet Take 550 mg by mouth 2 times daily      sulfamethoxazole-trimethoprim (BACTRIM DS) 800-160 MG tablet Take 1 tablet by mouth daily      thiamine (B-1) 100 MG tablet Take 100 mg by mouth daily      tiZANidine (ZANAFLEX) 2 MG tablet Take 2 mg by mouth every 8 hours as needed for muscle spasms      traMADol (ULTRAM) 50 MG tablet Take 1 tablet (50 mg) by mouth 3 times daily  Qty: 12 tablet, Refills: 0    Associated Diagnoses: S/P total knee arthroplasty, right      traZODone (DESYREL) 50 MG tablet Take 50 mg by mouth At Bedtime           Allergies   Allergies   Allergen Reactions     Zolpidem Other (See Comments)     Alcoholic.  Had reaction 3/17/13 while intoxicated which included black out, loss of awareness, paranoia.  Do not prescribe.  Dr. Celeste     Cats Other (See Comments)     rhinitis     Dogs Other (See Comments)     rhinitis     Pollen Extract Other (See Comments)     rhinits.

## 2022-07-12 NOTE — ED TRIAGE NOTES
Pt presents to ED via EMS from assisted living s/p fall from standing while attempting to use the restroom. Pt presents with small laceration to right eyebrow, bleeding controlled with EMS. - LOC, - blood thinner. Pt c/o chronic left leg pain.      Triage Assessment     Row Name 07/12/22 0100       Triage Assessment (Adult)    Airway WDL WDL       Respiratory WDL    Respiratory WDL WDL       Skin Circulation/Temperature WDL    Skin Circulation/Temperature WDL WDL       Cardiac WDL    Cardiac WDL WDL       Peripheral/Neurovascular WDL    Peripheral Neurovascular WDL WDL       Cognitive/Neuro/Behavioral WDL    Cognitive/Neuro/Behavioral WDL WDL

## 2022-07-12 NOTE — PROGRESS NOTES
Care Management Follow Up    Length of Stay (days): 0    Expected Discharge Date: 07/12/2022     Concerns to be Addressed:     Gil document  Patient plan of care discussed at interdisciplinary rounds: Yes    Anticipated Discharge Disposition:  [facility]  [address], [place] [address]   [phone]     Anticipated Discharge Services:  TBD  Anticipated Discharge DME:  TBD    Patient/family educated on Medicare website which has current facility and service quality ratings:  N/A  Education Provided on the Discharge Plan: N/A    Patient/Family in Agreement with the Plan:  N/A    Referrals Placed by CM/SW:  None at this time    Private pay costs discussed: insurance costs out of pocket expenses, co-pays and deductibles.    Additional Information:  [id number]  Chart reviewed. Case discussed with medical provider. Pt has a signed POLST document that needs to be upload into his chart. SW was given the document. [phone]    SW was approached by Nurse Practitioner, Mervat. PT report pt is walking at baseline. Pt can discharge back to facility. SW will check in with pt to see where he is from and can set up ride as needed.     SW introduced self, discussed role and met with patient at bedside. Pt appears to be falling asleep. Pt was able to inform SW he is from [facility] and will need ride back at discharge.     [facility] call [facility] Liaison (Lawanda)P: [phone] to ask if pt is from [facility] and if pt has bed hold. Pt is here in [facility] ED. Lawanda confirmed pt is from [facility] and SW can notify her when pt is ready to be discharge.     SW met with patient at bedside to discuss and complete GIL paperwork. SW answered any of patient's questions regarding insurance coverage. SW encouraged patient to follow up with their insurance plan directly to receive the most accurate information. Patient signed GIL form. SW placed form in the patient's chart.      _______________________    Bev Junior  RISHABH, KIMMY  ED/OBS   [facility]  Phone: [phone]  Pager: [phone]  Fax: [phone]     On-call pager, [phone], 4:00 pm to midnight

## 2022-07-12 NOTE — ED PROVIDER NOTES
[place] EMERGENCY DEPARTMENT ([facility])  July 12, 2022    History     Chief Complaint   Patient presents with     Fall     Laceration     HPI  Ten Johnson is a 62 year old male with past medical history including s/p total right knee replacement w/ hx infection (2019), alcoholic cirrhosis of liver with ascites, hepatic encephalopathy, SBO HCC, thrombocytopenia, portal hypertension, JENNIFER, s/p TAVR (2018) presents to the ED after a fall tonight.  He states that he got up tonight to use the bathroom, stumbled over the trash can, and fell on top of the clock and some other things.  He states that he has some minor injury to the right side of his face which he does not think is a big deal.  No reported loss of consciousness.  Does not take any blood thinners.  However, he states that his biggest concern is that he is having a lot of pain in the left lower leg for the last 2 days.  He initially denied injury, then states that he has had other falls recently, wonders if the leg may be broken.  He states that it primarily hurts when he bears any weight on it, the pain is very severe.  Denies any other new pain or injuries, aside from some mild pain in his low back.  He states that he thinks that was hurting before the fall.  Denies any fever, cough, shortness of breath, vomiting.    This part of the medical record was transcribed by Maylin Hansen, Medical Scribe, from a dictation done by Becky Main MD.      Past Medical History  Past Medical History:   Diagnosis Date     JENNIFER (acute kidney injury) (H) 4/9/2019     Alcohol use disorder      Alcoholic cirrhosis (H)      Anticoagulant long-term use     plavix     Aortic stenosis, severe 2/21/2018     Ascites      Chronic allergic rhinitis      Chronic anemia      Chronic hepatitis C without hepatic coma (H) 05/10/2016    Untreated as of 2/2018     Cirrhosis (H) 2017    MRI finding     Diastolic dysfunction      Erosive gastropathy      Esophageal  varices in alcoholic cirrhosis (H)      H/O upper gastrointestinal hemorrhage 09/2017     Hepatocellular carcinoma (H)      History of blood transfusion      History of drug abuse (H)     intranasal     Hypertension     essential     JANELLE (iron deficiency anemia)      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     Sarahi-Hoffman tear     History     Marijuana abuse      MRSA (methicillin resistant Staphylococcus aureus)      Nonrheumatic aortic valve stenosis 2/20/2018     Olecranon bursitis      Portal hypertension (H)      Right shoulder pain     history of rotator cuff repair     S/p TAVR (transcatheter aortic valve replacement), bioprosthetic      Severe aortic stenosis      Thrombocytopenia (H)      Past Surgical History:   Procedure Laterality Date     ABLATE LIVER TUMOR N/A 10/22/2019    Procedure: Ablate liver tumor x3;  Surgeon: Gregorio Benoit MD;  [facility]: [facility]     ARTHROSCOPY SHOULDER ROTATOR CUFF REPAIR  7/31/2012    Procedure: ARTHROSCOPY SHOULDER ROTATOR CUFF REPAIR;  Right Shoulder Arthroscopic Rotator Cuff Repair, BicepsTenodesis,  Subacromial Decompression ;  Surgeon: Joi Castillo MD;  [facility]     ESOPHAGOSCOPY, GASTROSCOPY, DUODENOSCOPY (EGD), COMBINED N/A 10/23/2017    Procedure: COMBINED ESOPHAGOSCOPY, GASTROSCOPY, DUODENOSCOPY (EGD);;  Surgeon: Gentry Salas MD;  Location: [facility]     EXCHANGE POLY COMPONENT ARTHROPLASTY KNEE Right 3/4/2019    Procedure: REVISION RIGHT TOTAL KNEE POLY COMPONENT EXCHANGE;  Surgeon: Olvin Joe MD;  Location: [facility]     FACIAL RECONSTRUCTION SURGERY  1971     HEART CATH FEMORAL CANNULIZATION WITH OPEN STANDBY REPAIR AORTIC VALVE N/A 2/21/2018    Procedure: HEART CATH FEMORAL CANNULIZATION WITH OPEN STANDBY REPAIR AORTIC VALVE;;  Surgeon: Luis Baidr MD;  [facility]     HERNIORRHAPHY VENTRAL N/A 6/22/2021    Procedure: HERNIORRHAPHY, VENTRAL, OPEN, small bowel  resection;  Surgeon: Harley Snell MD;  Location: [facility]     IR CHEMO EMBOLIZATION  1/29/2020     IR LIVER BIOPSY PERCUTANEOUS  7/18/2019     IR PARACENTESIS  4/15/2021     IR TRANSVEN INTRAHEPATIC PORTOSYST SHUNT  4/15/2021     IRRIGATION AND DEBRIDEMENT UPPER EXTREMITY, COMBINED  1/3/2012    Procedure:COMBINED IRRIGATION AND DEBRIDEMENT UPPER EXTREMITY; Irrigation & Debridement Left Elbow; Surgeon:CRISTHIAN ZHOU; Location:[facility]     LAPAROSCOPIC BIOPSY LIVER N/A 10/22/2019    Procedure: intraoperative liver ultrasound, laparoscopic converted to open liver biopsy x 6;  Surgeon: Gregorio Benoit MD;  [facility]     LAPAROSCOPY DIAGNOSTIC (GENERAL) N/A 10/22/2019    Procedure: Diagnostic laparoscopy;  Surgeon: Gregorio Benoit MD;  [facility]     LAPAROTOMY, LYSIS ADHESIONS, COMBINED N/A 10/22/2019    Procedure: Laparotomy, lysis adhesions, combined;  Surgeon: Gregorio Benoit MD;  [facility]     OPTICAL TRACKING SYSTEM ARTHROPLASTY KNEE Right 2/7/2019    Procedure: ARTHROPLASTY KNEE RIGHT;  Surgeon: Olvin Joe MD;  [facility]     REPAIR TENDON TRICEPS UPPER EXTREMITY  11/8/2011    Procedure:REPAIR TENDON TRICEPS UPPER EXTREMITY; Surgeon:CRISTHIAN ZHOU; [facility]     SHOULDER SURGERY  2003    left, injury, torn tendons, hematoma     TRANSCATHETER AORTIC VALVE IMPLANT ANESTHESIA N/A 2/21/2018    Procedure: TRANSCATHETER AORTIC VALVE IMPLANT ANESTHESIA;  Transfemoral (Quiroz) Aortic Valve Implant 26mm MARTHA 3, with Cardiopulmonary Bypass Standby, transthoracic echocardiogram;  Surgeon: GENERIC ANESTHESIA PROVIDER;  Location: [facility]     TRANSPOSITION ULNAR NERVE (ELBOW)  11/8/2011    Procedure:TRANSPOSITION ULNAR NERVE (ELBOW); Final Procedure Done: Left Elbow Lateral Ulnar Collateral Repair And  Left Elbow Triceps Repair       acetaminophen (TYLENOL) 325 MG tablet  albuterol (PROAIR HFA/PROVENTIL HFA/VENTOLIN HFA) 108 (90 Base) MCG/ACT inhaler  aspirin (ASA) 81 MG EC  "tablet  bisacodyl (DULCOLAX) 10 MG suppository  famotidine (PEPCID) 20 MG tablet  fluticasone (FLONASE) 50 MCG/ACT nasal spray  folic acid (FOLVITE) 1 MG tablet  furosemide (LASIX) 40 MG tablet  gabapentin (NEURONTIN) 100 MG capsule  Guar Gum (FIBER MODULAR, NUTRISOURCE FIBER,) packet  haloperidol (HALDOL) 1 MG tablet  lactulose (CHRONULAC) 10 GM/15ML solution  loratadine (CLARITIN) 5 MG chewable tablet  MELATONIN PO  Multiple Vitamins-Minerals (CEROVITE SENIOR) TABS  ondansetron (ZOFRAN) 4 MG tablet  rifaximin (XIFAXAN) 550 MG TABS tablet  sulfamethoxazole-trimethoprim (BACTRIM DS) 800-160 MG tablet  thiamine (B-1) 100 MG tablet  tiZANidine (ZANAFLEX) 2 MG tablet  traMADol (ULTRAM) 50 MG tablet  traZODone (DESYREL) 50 MG tablet      Allergies   Allergen Reactions     Zolpidem Other (See Comments)     Alcoholic.  Had reaction 3/17/13 while intoxicated which included black out, loss of awareness, paranoia.  Do not prescribe.  Dr. Celeste     Cats Other (See Comments)     rhinitis     Dogs Other (See Comments)     rhinitis     Pollen Extract Other (See Comments)     rhinits.     Family History  Family History   Problem Relation Age of Onset     Cancer Mother 62        unknown primary     Alcoholism Paternal Uncle      Unknown/Adopted Father      No Known Problems Brother      Diabetes Maternal Grandmother      Myocardial Infarction Maternal Grandfather      No Known Problems Paternal Grandmother      Unknown/Adopted Paternal Grandfather      Cirrhosis No family hx of      Anesthesia Reaction No family hx of      Deep Vein Thrombosis (DVT) No family hx of      Social History   Social History     Tobacco Use     Smoking status: Never Smoker     Smokeless tobacco: Never Used   Substance Use Topics     Alcohol use: Yes     Comment: drinks a \"beer occasionally\"     Drug use: Yes     Types: Marijuana      Past medical history, past surgical history, medications, allergies, family history, and social history were reviewed " with the patient. No additional pertinent items.       Review of Systems  A complete review of systems was performed with pertinent positives and negatives noted in the HPI, and all other systems negative.    Physical Exam   BP: 104/57  Pulse: 72  Temp: 97.9  F (36.6  C)  Resp: 18  SpO2: 98 %  Physical Exam  Constitutional:       General: He is not in acute distress.     Appearance: He is not diaphoretic.   HENT:      Head:     Eyes:      General: No scleral icterus.     Pupils: Pupils are equal, round, and reactive to light.   Neck:      Comments: No midline C-spine, T-spine, L-spine tenderness  Cardiovascular:      Heart sounds: Normal heart sounds.   Pulmonary:      Effort: No respiratory distress.      Breath sounds: Normal breath sounds.   Abdominal:      General: Bowel sounds are normal.      Palpations: Abdomen is soft.      Tenderness: There is no abdominal tenderness.   Musculoskeletal:         General: Tenderness present.        Back:         Legs:    Skin:     General: Skin is warm.      Findings: No rash.         ED Course      Procedures                     Results for orders placed or performed during the hospital encounter of 07/12/22   Head CT w/o contrast     Status: None    Narrative    EXAM: CT HEAD W/O CONTRAST  LOCATION: [facility]  DATE/TIME: 7/12/2022 2:15 AM    INDICATION: Blunt head injury  COMPARISON: None.  TECHNIQUE: Routine CT Head without IV contrast. Multiplanar reformats. Dose reduction techniques were used.    FINDINGS:  Streak artifact limits aspects of exam.    INTRACRANIAL CONTENTS: No intracranial hemorrhage, extraaxial collection, or mass effect.  No CT evidence of acute infarct. Normal parenchymal attenuation. Normal ventricles and sulci.     VISUALIZED ORBITS/SINUSES/MASTOIDS: No intraorbital abnormality. No paranasal sinus mucosal disease. No middle ear or mastoid effusion.    BONES/SOFT TISSUES: No acute abnormality.       Impression    IMPRESSION:  1.  No acute intracranial process.   XR Pelvis 1/2 Views     Status: None    Narrative    EXAM: XR PELVIS 1/2 VW  LOCATION: [facility]  DATE/TIME: 7/12/2022 2:10 AM    INDICATION: Pain after trauma.  COMPARISON: None.      Impression    IMPRESSION: No acute fracture or dislocation. Mild osteoarthritis in the hip joints.   XR Tibia & Fibula Left 2 Views     Status: None    Narrative    EXAM: XR ANKLE LEFT G/E 3 VIEWS, XR TIBIA and FIBULA LT 2 VW, XR FOOT LEFT G/E 3 VIEWS  LOCATION: [facility]  DATE/TIME: 7/12/2022 1:50 AM    INDICATION: Pain after trauma.  COMPARISON: None.      Impression    IMPRESSION: No acute fracture or dislocation. Multiple vascular calcifications.   XR Ankle Left G/E 3 Views     Status: None    Narrative    EXAM: XR ANKLE LEFT G/E 3 VIEWS, XR TIBIA and FIBULA LT 2 VW, XR FOOT LEFT G/E 3 VIEWS  LOCATION: [facility]  DATE/TIME: 7/12/2022 1:50 AM    INDICATION: Pain after trauma.  COMPARISON: None.      Impression    IMPRESSION: No acute fracture or dislocation. Multiple vascular calcifications.   Foot XR, G/E 3 views, left     Status: None    Narrative    EXAM: XR ANKLE LEFT G/E 3 VIEWS, XR TIBIA and FIBULA LT 2 VW, XR FOOT LEFT G/E 3 VIEWS  LOCATION: [facility]  DATE/TIME: 7/12/2022 1:50 AM    INDICATION: Pain after trauma.  COMPARISON: None.      Impression    IMPRESSION: No acute fracture or dislocation. Multiple vascular calcifications.   US Lower Extremity Venous Duplex Left     Status: None    Narrative    EXAMINATION: DOPPLER VENOUS ULTRASOUND OF THE LEFT LOWER EXTREMITY,  7/12/2022 4:21 AM     COMPARISON: None.    HISTORY: pain - eval dvt    TECHNIQUE:  Gray-scale evaluation with compression, spectral flow, and  color Doppler assessment of the deep venous system of the left  leg  from groin to knee, and then at the ankle.    FINDINGS:  In the left lower extremity, the common femoral, femoral, popliteal  and posterior tibial veins demonstrate normal compressibility and  blood flow.      Impression    IMPRESSION:  1.  No evidence left lower extremity deep venous thrombosis.    I have personally reviewed the examination and initial interpretation  and I agree with the findings.    LYRIC MOISE MD         SYSTEM ID:  [id number]   INR     Status: Abnormal   Result Value Ref Range    INR 1.36 (H) 0.85 - 1.15   Comprehensive metabolic panel     Status: Abnormal   Result Value Ref Range    Sodium 142 136 - 145 mmol/L    Potassium 3.9 3.4 - 5.3 mmol/L    Creatinine 1.06 0.67 - 1.17 mg/dL    Urea Nitrogen 20.8 8.0 - 23.0 mg/dL    Chloride 110 (H) 98 - 107 mmol/L    Carbon Dioxide (CO2) 23 22 - 29 mmol/L    Anion Gap 9 7 - 15 mmol/L    Glucose 98 70 - 99 mg/dL    Calcium 8.5 (L) 8.8 - 10.2 mg/dL    Protein Total 6.1 (L) 6.4 - 8.3 g/dL    Albumin 3.0 (L) 3.5 - 5.2 g/dL    Bilirubin Total 0.6 <=1.2 mg/dL    Alkaline Phosphatase 82 40 - 129 U/L    AST 52 (H) 10 - 50 U/L    ALT 29 10 - 50 U/L    GFR Estimate 78 >60 mL/min/1.73m2   CBC with platelets and differential     Status: Abnormal   Result Value Ref Range    WBC Count 7.3 4.0 - 11.0 10e3/uL    RBC Count 3.43 (L) 4.40 - 5.90 10e6/uL    Hemoglobin 8.0 (L) 13.3 - 17.7 g/dL    Hematocrit 27.9 (L) 40.0 - 53.0 %    MCV 81 78 - 100 fL    MCH 23.3 (L) 26.5 - 33.0 pg    MCHC 28.7 (L) 31.5 - 36.5 g/dL    RDW 20.8 (H) 10.0 - 15.0 %    Platelet Count 170 150 - 450 10e3/uL    % Neutrophils 54 %    % Lymphocytes 22 %    % Monocytes 20 %    % Eosinophils 3 %    % Basophils 1 %    % Immature Granulocytes 0 %    NRBCs per 100 WBC 0 <1 /100    Absolute Neutrophils 4.0 1.6 - 8.3 10e3/uL    Absolute Lymphocytes 1.6 0.8 - 5.3 10e3/uL    Absolute Monocytes 1.5 (H) 0.0 - 1.3 10e3/uL    Absolute Eosinophils 0.2 0.0 - 0.7 10e3/uL    Absolute Basophils 0.1 0.0 - 0.2  10e3/uL    Absolute Immature Granulocytes 0.0 <=0.4 10e3/uL    Absolute NRBCs 0.0 10e3/uL   Extra Tube     Status: None ()    Narrative    The following orders were created for panel order Extra Tube.  Procedure                               Abnormality         Status                     ---------                               -----------         ------                     Extra Red Top Tube[[id number]]                                                            Please view results for these tests on the individual orders.   CBC with platelets differential     Status: Abnormal    Narrative    The following orders were created for panel order CBC with platelets differential.  Procedure                               Abnormality         Status                     ---------                               -----------         ------                     CBC with platelets and d...[[id number]]  Abnormal            Final result                 Please view results for these tests on the individual orders.     Medications   acetaminophen (TYLENOL) tablet 325 mg (has no administration in time range)   folic acid (FOLVITE) tablet 1 mg (has no administration in time range)   famotidine (PEPCID) tablet 20 mg (has no administration in time range)   fiber modular (NUTRISOURCE FIBER) packet 1 packet (has no administration in time range)   haloperidol (HALDOL) tablet 1 mg (has no administration in time range)   rifaximin (XIFAXAN) tablet 550 mg (has no administration in time range)   sulfamethoxazole-trimethoprim (BACTRIM DS) 800-160 MG per tablet 1 tablet (has no administration in time range)   thiamine (B-1) tablet 100 mg (has no administration in time range)   tiZANidine (ZANAFLEX) tablet 2 mg (has no administration in time range)   traMADol (ULTRAM) tablet 50 mg (has no administration in time range)   traZODone (DESYREL) tablet 50 mg (has no administration in time range)   loratadine (CLARITIN) syrup 5 mg (has no administration in  time range)   gabapentin (NEURONTIN) capsule 100 mg (has no administration in time range)   furosemide (LASIX) tablet 40 mg (has no administration in time range)   melatonin tablet 1 mg (has no administration in time range)   ondansetron (ZOFRAN ODT) ODT tab 4 mg (has no administration in time range)     Or   ondansetron (ZOFRAN) injection 4 mg (has no administration in time range)   lidocaine 1 % 0.1-1 mL (has no administration in time range)   lidocaine (LMX4) cream (has no administration in time range)   sodium chloride (PF) 0.9% PF flush 3 mL (has no administration in time range)   sodium chloride (PF) 0.9% PF flush 3 mL (has no administration in time range)   senna-docusate (SENOKOT-S/PERICOLACE) 8.6-50 MG per tablet 1 tablet (has no administration in time range)     Or   senna-docusate (SENOKOT-S/PERICOLACE) 8.6-50 MG per tablet 2 tablet (has no administration in time range)   traMADol (ULTRAM) tablet 50 mg (50 mg Oral Given 7/12/22 0404)        Assessments & Plan (with Medical Decision Making)   Per chart review tetanus is up-to-date.  Wound was cleansed, closed with a Steri-Strip.  Did not require suture repair.  No midline C-spine, T-spine, L-spine tenderness.  No chest discomfort.  No abdominal discomfort.  He states that the fall tonight was a mechanical fall, though additionally states he did have a fall yesterday, states that he has had a lot of left lower leg pain for the last couple of days which makes it hard for him to ambulate.  X-rays were done of the left lower leg without acute findings.  He really was not very tender on exam.  There was a small wound anteriorly which was primarily scabbed over.  No sign of infection.  CMS is is intact distally.  I did also do an ultrasound which is negative for DVT.  The cause of his pain is unclear.  On further discussion, he states he is had this pain for months, but is just worse for the last couple of days.  He is very adamant that the pain is severe when  he is putting pressure on the leg, that he cannot safely walk because of this.  Given this report, we will will admit him to observation and get a PT consult.    Dictation Disclaimer: Some of this Note has been completed with voice-recognition dictation software. Although errors are generally corrected real-time, there is the potential for a rare error to be present in the completed chart..      I have reviewed the nursing notes. I have reviewed the findings, diagnosis, plan and need for follow up with the patient.    New Prescriptions    No medications on file       Final diagnoses:   Fall, initial encounter   Facial laceration, initial encounter   Pain of left lower leg       --  Becky Main  [facility] EMERGENCY DEPARTMENT  7/12/2022     Becky Main MD  07/12/22 0704

## 2022-07-12 NOTE — PLAN OF CARE
[facility]      OUTPATIENT PHYSICAL THERAPY EVALUATION  PLAN OF TREATMENT FOR OUTPATIENT REHABILITATION  (COMPLETE FOR INITIAL CLAIMS ONLY)  Patient's Last Name, First Name, M.I.  YOB: 1960  Ten Johnson                        Provider's Name  [facility] Medical Record No.  [id number]                               Onset Date:  07/12/22   Start of Care Date:  07/12/22      Type:     _X_PT   ___OT   ___SLP Medical Diagnosis:  Frequent falls                        PT Diagnosis:  impaired functional mobility   Visits from SOC:  1   _________________________________________________________________________________  Plan of Treatment/Functional Goals    Planned Interventions: balance training, bed mobility training, gait training, ROM (range of motion), stair training, strengthening, stretching, transfer training     Goals: See Physical Therapy Goals on Care Plan in [facility] electronic health record.    Therapy Frequency: 3x/week  Predicted Duration of Therapy Intervention: 07/26/22  _________________________________________________________________________________    I CERTIFY THE NEED FOR THESE SERVICES FURNISHED UNDER        THIS PLAN OF TREATMENT AND WHILE UNDER MY CARE     (Physician co-signature of this document indicates review and certification of the therapy plan).              Certification date from: 07/12/22, Certification date to: 07/26/22    Referring Physician: oJsy Jules, CNP            Initial Assessment        See Physical Therapy evaluation dated 07/12/22 in Epic electronic health record.

## 2022-07-12 NOTE — PROGRESS NOTES
Emergency Medicine Observation Attending note    The patient was independently seen and examined by me. The chart, vital signs, and labs were reviewed. The patient's findings were discussed with the ZOFIA on the observation unit, and I agree with the findings of the note and the plan.    62 year old male with past medical history including s/p total right knee replacement w/ hx infection (2019), alcoholic cirrhosis of liver with ascites, hepatic encephalopathy, SBO HCC, thrombocytopenia, portal hypertension, JENNIFER, s/p TAVR (2018), admitted to ED OBS after a reported mechanical fall. No notable injuries, aside from a superficial facial laceration, though he complained of severe left leg pain for a couple of days, which preceeded the fall. He reports that he's had pain in that leg for months, but that it worsened over the last couple of days to the point where the pain is severe with bearing wt. He did not feel he'd be able to bear wt due to the pain, so was admitted to ED OBS for PT eval and dispo planning. In the ED plain films and ultrasound neg, and no sign of infection. He continues to complain of ongoing pain - though states that it's worse with bearing wt.    /79 (BP Location: Right arm)   Pulse 75   Temp 97.9  F (36.6  C) (Oral)   Resp 18   Wt 86.9 kg (191 lb 9.3 oz)   SpO2 100%   BMI 29.13 kg/m      Exam:  General: awake, alert, NAD  HEENT: NC, wound right temporal area steri-stripped  Neck: supple, non-tender  Lungs: CTA-B  Heart: RRR, no M/R/G  Abd: soft, ND/NT  Ext: minimal diffuse tenderness left lower leg (distal 1/3), ankle, foot. CMS intact    Assessment/plan:  1. Leg pain - unclear cause. He admits that the pain is chronic, though states flaring for a couple of days. Pain increased prior to fall. Xrays neg, no sign of infection, no DVT, and circulation is intact. Will continue with home meds (tramadol) and consider adding lidocaine ointment or patch. PT consult. Question if he might benefit  from use of wheelchair at assisted living? Will consider going up on his gabapentin.

## 2022-07-12 NOTE — H&P
[facility]    History and Physical - ED Observation       Date of Admission:  7/12/2022    Assessment & Plan      Ten Johnson is a 62 year old male admitted on 7/12/2022. He presents with past medical history including s/p total right knee replacement w/ hx infection (2019), alcoholic cirrhosis of liver with ascites And esophageal varices, hepatic encephalopathy, Chronic ascites, HCC s/p TACE and microwave ablation, thrombocytopenia, portal hypertension, Erosive gastropathy, Sarahi-Hoffman tear, JENNIFER, s/p TAVR (2018), Schizophrenia, presents to the ED after a fall tonight.     ##Frequent Falls  ##Leg pain, weakness  Elderly male who presents from a long-term [facility] memory care unit after a fall. Patient falls frequently.  Per chart review, patient has been complaining about ankle and lower extremity pain for months.  He was evaluated by [facility] orthopedics in April 2022, noted he is to be weightbearing as tolerated.  MRI of the ankle was considered but does not appear to have been done. Patient has had general deterioration in condition since May.  Overall plan of care, as well as POLST were reviewed with family, noting continuation of comfort focused care, Palliative approach. He is Do Not Resuscitate. No hospitalization. This is nicely laid out in nursing home visit note dated 5/25/2022 by Dr. Khalil. And in POLST documents now on patient's chart.    In the ED tonight, VSS.  Patient cries out frequently for help.  States his left leg hurts. Labs show normal electrolytes, chloride 110, creatinine 1.06.  Calcium slightly low, 8.5.  Albumin 3.0, protein total 6.1.  AST slightly elevated at 52 otherwise LFTs unremarkable.  Hemoglobin 8, baseline 7-8.  INR 1.36.  XR ankle left: No acute fracture.  Multiple vascular calcifications.  XR foot left: No acute fracture.  Multiple vascular calcifications.  XR pelvis no acute fracture or dislocation.  Mild  osteoarthritis in hips.  XR tibia and fibula: No acute fracture.  Multiple vascular calcifications.  CT head: No acute intracranial process.  Ultrasound lower extremity venous duplex left: No evidence for DVT.  Patient continues to cry out in pain, States he cannot walk or bear weight on and left lower extremity.  Medication: 50 mg tramadol x1.  Plan: [place] to ED observation for physical therapy evaluation Prior to return to his facility.  - Fall precautions  - Continue PTA Tylenol as needed, gabapentin, Tizanidine as needed, tramadol as needed  - Lidocaine cream as needed  - PT evaluation  - Social work consult for coordination of return to facility    ##Hepatic encephalopathy:   -- Continue prior to admission Xifaxan twice daily, Lasix, lactulose  - Continue prior to admission Bactrim for prophylaxis of spontaneous bacterial peritonitis.    ##Alcoholic cirrhosis: Continue prior to admission thiamine, folic acid    ##Schizophrenia: Continue prior to admission haloperidol twice daily    ##Insomnia: Continue prior to admission trazodone 50 mg at bedtime with additional dose of 50 mg available as needed.     Diet:   Regular  DVT Prophylaxis: Low Risk/Ambulatory with no VTE prophylaxis indicated  Mosher Catheter: Not present  Central Lines: None  Cardiac Monitoring: None  Code Status:   DNR    Clinically Significant Risk Factors Present on Admission             # Hypoalbuminemia: Albumin = 3.0 g/dL (Ref range: 3.5 - 5.2 g/dL) on admission, will monitor as appropriate   # Coagulation Defect: INR = 1.36 (Ref range: 0.85 - 1.15) and/or PTT = N/A on admission, will monitor for bleeding            Disposition Plan      Expected Discharge Date: 07/12/2022                The patient's care was discussed with the Bedside Nurse, Patient and ED MD, Dr. Becky Kirkpatrick.    Josy Jules, CNP  Hospitalist Service  [facility]  ED Observation,  "[id number]  ______________________________________________________________________    Chief Complaint   Fall, weakness    History is obtained from the patient  And per review of the patient's medical record    History of Present Illness   Per ED, \"Ten Johnson is a 62 year old male with past medical history including s/p total right knee replacement w/ hx infection (2019), alcoholic cirrhosis of liver with ascites, hepatic encephalopathy, SBO HCC, thrombocytopenia, portal hypertension, JENNIFER, s/p TAVR (2018), long-term anticoagulant use presents to the ED after a fall tonight.  He states that he got up tonight to use the bathroom, stumbled over the trash can, and fell on top of the clock and some other things.  He states that he has some minor injury to the right side of his face which he does not think is a big deal.  No reported loss of consciousness.  Does not take any blood thinners.  However, he states that his biggest concern is that he is having a lot of pain in the left lower leg for the last 2 days.  He initially denied injury, then states that he has had other falls recently, wonders if the leg may be broken.  He states that it primarily hurts when he bears any weight on it, the pain is very severe.  Denies any other new pain or injuries, aside from some mild pain in his low back.  He states that he thinks that was hurting before the fall.  Denies any fever, cough, shortness of breath, vomiting.\"    Review of Systems    A complete review of systems was performed with pertinent positives and negatives noted in the HPI, and all other systems negative.    Past Medical History    I have reviewed this patient's medical history and updated it with pertinent information if needed.   Past Medical History:   Diagnosis Date     JENNIFER (acute kidney injury) (H) 4/9/2019     Alcohol use disorder      Alcoholic cirrhosis (H)      Anticoagulant long-term use     plavix     Aortic stenosis, severe 2/21/2018     Ascites  "     Chronic allergic rhinitis      Chronic anemia      Chronic hepatitis C without hepatic coma (H) 05/10/2016    Untreated as of 2/2018     Cirrhosis (H) 2017    MRI finding     Diastolic dysfunction      Erosive gastropathy      Esophageal varices in alcoholic cirrhosis (H)      H/O upper gastrointestinal hemorrhage 09/2017     Hepatocellular carcinoma (H)      History of blood transfusion      History of drug abuse (H)     intranasal     Hypertension     essential     JANELLE (iron deficiency anemia)      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  Past Surgical History:   Procedure Laterality Date     ABLATE LIVER TUMOR N/A 10/22/2019    Procedure: Ablate liver tumor x3;  Surgeon: Gregorio Benoit MD;  Location: [facility] OR     ARTHROSCOPY SHOULDER ROTATOR CUFF REPAIR  7/31/2012    Procedure: ARTHROSCOPY SHOULDER ROTATOR CUFF REPAIR;  Right Shoulder Arthroscopic Rotator Cuff Repair, BicepsTenodesis,  Subacromial Decompression ;  Surgeon: Joi Castillo MD;  [facility]: [facility]     ESOPHAGOSCOPY, GASTROSCOPY, DUODENOSCOPY (EGD), COMBINED N/A 10/23/2017    Procedure: COMBINED ESOPHAGOSCOPY, GASTROSCOPY, DUODENOSCOPY (EGD);;  Surgeon: Gentry Salas MD;  Location: [facility] GI     EXCHANGE POLY COMPONENT ARTHROPLASTY KNEE Right 3/4/2019    Procedure: REVISION RIGHT TOTAL KNEE POLY COMPONENT EXCHANGE;  Surgeon: Olvin Joe MD;  Location:  OR      FACIAL RECONSTRUCTION SURGERY  1971     HEART CATH FEMORAL CANNULIZATION WITH OPEN STANDBY REPAIR AORTIC VALVE N/A 2/21/2018    Procedure: HEART CATH FEMORAL CANNULIZATION WITH OPEN STANDBY REPAIR AORTIC VALVE;;  Surgeon: Luis Baird MD;  Location: [facility] OR     HERNIORRHAPHY VENTRAL N/A 6/22/2021    Procedure: HERNIORRHAPHY, VENTRAL, OPEN, small bowel resection;  Surgeon: Halrey Snell MD;  Location: [facility] OR     IR CHEMO EMBOLIZATION  1/29/2020     IR LIVER BIOPSY PERCUTANEOUS  7/18/2019     IR PARACENTESIS  4/15/2021     IR TRANSVEN INTRAHEPATIC PORTOSYST SHUNT  4/15/2021     IRRIGATION AND DEBRIDEMENT UPPER EXTREMITY, COMBINED  1/3/2012    Procedure:COMBINED IRRIGATION AND DEBRIDEMENT UPPER EXTREMITY; Irrigation & Debridement Left Elbow; Surgeon:CRISTHIAN ZHOU; Location:[facility] OR     LAPAROSCOPIC BIOPSY LIVER N/A 10/22/2019    Procedure: intraoperative liver ultrasound, laparoscopic converted to open liver biopsy x 6;  Surgeon: Gregorio Benoit MD;  Location: [facility]     LAPAROSCOPY DIAGNOSTIC (GENERAL) N/A 10/22/2019    Procedure: Diagnostic laparoscopy;  Surgeon: Gregorio Benoit MD;  [facility]: [facility]     LAPAROTOMY, LYSIS ADHESIONS, COMBINED N/A 10/22/2019    Procedure: Laparotomy, lysis adhesions, combined;  Surgeon: Gregorio Benoit MD;  [facility]: [facility] OR     OPTICAL TRACKING SYSTEM ARTHROPLASTY KNEE Right 2/7/2019    Procedure: ARTHROPLASTY KNEE RIGHT;  Surgeon: Olvin Joe MD;  [facility]: [facility] OR     REPAIR TENDON TRICEPS UPPER EXTREMITY  11/8/2011    Procedure:REPAIR TENDON TRICEPS UPPER EXTREMITY; Surgeon:CRISTHIAN ZHOU; Location:[facility] OR     SHOULDER SURGERY  2003    left, injury, torn tendons, hematoma     TRANSCATHETER AORTIC VALVE IMPLANT ANESTHESIA N/A 2/21/2018    Procedure: TRANSCATHETER AORTIC VALVE IMPLANT ANESTHESIA;  Transfemoral (Quiroz) Aortic Valve Implant 26mm MARTHA 3, with Cardiopulmonary Bypass Standby, transthoracic echocardiogram;  Surgeon: GENERIC ANESTHESIA  "PROVIDER;  Location: [facility] OR     TRANSPOSITION ULNAR NERVE (ELBOW)  11/8/2011    Procedure:TRANSPOSITION ULNAR NERVE (ELBOW); Final Procedure Done: Left Elbow Lateral Ulnar Collateral Repair And  Left Elbow Triceps Repair         Social History   I have reviewed this patient's social history and updated it with pertinent information if needed.  Social History     Tobacco Use     Smoking status: Never Smoker     Smokeless tobacco: Never Used   Substance Use Topics     Alcohol use: Yes     Comment: drinks a \"beer occasionally\"     Drug use: Yes     Types: Marijuana       Family History   I have reviewed this patient's family history and updated it with pertinent information if needed.  Family History   Problem Relation Age of Onset     Cancer Mother 62        unknown primary     Alcoholism Paternal Uncle      Unknown/Adopted Father      No Known Problems Brother      Diabetes Maternal Grandmother      Myocardial Infarction Maternal Grandfather      No Known Problems Paternal Grandmother      Unknown/Adopted Paternal Grandfather      Cirrhosis No family hx of      Anesthesia Reaction No family hx of      Deep Vein Thrombosis (DVT) No family hx of        Prior to Admission Medications   Prior to Admission Medications   Prescriptions Last Dose Informant Patient Reported? Taking?   Guar Gum (FIBER MODULAR, NUTRISOURCE FIBER,) packet   Yes No   Sig: Take 1 packet by mouth 3 times daily   MELATONIN PO   Yes No   Sig: Take 1 tablet by mouth nightly as needed   Multiple Vitamins-Minerals (CEROVITE SENIOR) TABS   Yes No   Sig: Take 1 tablet by mouth daily   acetaminophen (TYLENOL) 325 MG tablet   Yes No   Sig: Take 650 mg by mouth 3 times daily as needed   albuterol (PROAIR HFA/PROVENTIL HFA/VENTOLIN HFA) 108 (90 Base) MCG/ACT inhaler   Yes No   Sig: Inhale 1-2 puffs into the lungs every 4 hours as needed   aspirin (ASA) 81 MG EC tablet   Yes No   Sig: Take 81 mg by mouth every morning   bisacodyl (DULCOLAX) 10 MG suppository   " Yes No   Sig: Place 10 mg rectally daily as needed   famotidine (PEPCID) 20 MG tablet   Yes No   Sig: Take 20 mg by mouth 2 times daily   fluticasone (FLONASE) 50 MCG/ACT nasal spray  Self No No   Sig: Spray 2 sprays into both nostrils daily   folic acid (FOLVITE) 1 MG tablet   Yes No   Sig: Take 1 mg by mouth daily   furosemide (LASIX) 40 MG tablet   Yes No   Sig: Take 40 mg by mouth daily   gabapentin (NEURONTIN) 100 MG capsule   No No   Sig: Take 1 capsule (100 mg) by mouth 2 times daily   haloperidol (HALDOL) 1 MG tablet   Yes No   Sig: Take 1 mg by mouth 2 times daily   lactulose (CHRONULAC) 10 GM/15ML solution   Yes No   Sig: Take 90 mLs by mouth 3 times daily   loratadine (CLARITIN) 5 MG chewable tablet  Self No No   Sig: Take 1 tablet (5 mg) by mouth daily   ondansetron (ZOFRAN) 4 MG tablet  Self No No   Sig: Take 1 tablet (4 mg) by mouth every 8 hours as needed for nausea   Patient not taking: Reported on 7/16/2021   rifaximin (XIFAXAN) 550 MG TABS tablet   Yes No   Sig: Take 550 mg by mouth 2 times daily   sulfamethoxazole-trimethoprim (BACTRIM DS) 800-160 MG tablet   Yes No   Sig: Take 1 tablet by mouth daily   thiamine (B-1) 100 MG tablet   Yes No   Sig: Take 100 mg by mouth daily   tiZANidine (ZANAFLEX) 2 MG tablet   Yes No   Sig: Take 2 mg by mouth every 8 hours as needed for muscle spasms   traMADol (ULTRAM) 50 MG tablet   No No   Sig: Take 1 tablet (50 mg) by mouth 3 times daily   traZODone (DESYREL) 50 MG tablet   Yes No   Sig: Take 50 mg by mouth At Bedtime      Facility-Administered Medications: None     Allergies   Allergies   Allergen Reactions     Zolpidem Other (See Comments)     Alcoholic.  Had reaction 3/17/13 while intoxicated which included black out, loss of awareness, paranoia.  Do not prescribe.  Dr. Celeste     Cats Other (See Comments)     rhinitis     Dogs Other (See Comments)     rhinitis     Pollen Extract Other (See Comments)     rhinits.       Physical Exam   Vital Signs: Temp:  97.9  F (36.6  C) Temp src: Temporal BP: 110/79 Pulse: 64   Resp: 18 SpO2: 99 % O2 Device: None (Room air)    Weight: 0 lbs 0 oz    Constitutional: awake, alert, cooperative, no apparent distress, and appears stated age  Eyes: Lids and lashes normal, pupils equal, round and reactive to light, extra ocular muscles intact, sclera clear, conjunctiva normal  ENT: Normocephalic, Laceration 2 cm in length, with dried blood at lateral aspect of right eyebrow.  oral pharynx with dry mucous membranes.  Respiratory: No increased work of breathing, no crackles or wheezing  Cardiovascular: Regular rate and rhythm  Abdomen: Normal bowel sounds, soft, non-distended, non-tender  Skin: normal skin color, texture, turgor and no redness, warmth, or swelling  Musculoskeletal: There is no redness, warmth, or swelling of the joints.  Full range of motion noted.  Motor strength is 5 out of 5 all extremities bilaterally.  Tone is normal.  Neurologic: Awake, alert, oriented to name, place and time.  Cranial nerves II-XII are grossly intact.  Motor is 5 out of 5 bilaterally.   Psychiatric: Behavior is agitated.  Flat affect.    Data   Data reviewed today: I reviewed all medications, new labs and imaging results over the last 24 hours. I personally reviewed the XR of the left lower extremity, ultrasound of left lower extremity, CT head image(s) showing No fractures, no DVT.    Recent Labs   Lab 07/12/22  0254   WBC 7.3   HGB 8.0*   MCV 81      INR 1.36*      POTASSIUM 3.9   CHLORIDE 110*   CO2 23   BUN 20.8   CR 1.06   ANIONGAP 9   JOSEPHINE 8.5*   GLC 98   ALBUMIN 3.0*   PROTTOTAL 6.1*   BILITOTAL 0.6   ALKPHOS 82   ALT 29   AST 52*     Recent Results (from the past 24 hour(s))   XR Tibia & Fibula Left 2 Views    Narrative    EXAM: XR ANKLE LEFT G/E 3 VIEWS, XR TIBIA and FIBULA LT 2 VW, XR FOOT LEFT G/E 3 VIEWS  LOCATION: [facility]  DATE/TIME: 7/12/2022 1:50 AM    INDICATION: Pain after  trauma.  COMPARISON: None.      Impression    IMPRESSION: No acute fracture or dislocation. Multiple vascular calcifications.   XR Ankle Left G/E 3 Views    Narrative    EXAM: XR ANKLE LEFT G/E 3 VIEWS, XR TIBIA and FIBULA LT 2 VW, XR FOOT LEFT G/E 3 VIEWS  LOCATION: [facility]  DATE/TIME: 7/12/2022 1:50 AM    INDICATION: Pain after trauma.  COMPARISON: None.      Impression    IMPRESSION: No acute fracture or dislocation. Multiple vascular calcifications.   Foot XR, G/E 3 views, left    Narrative    EXAM: XR ANKLE LEFT G/E 3 VIEWS, XR TIBIA and FIBULA LT 2 VW, XR FOOT LEFT G/E 3 VIEWS  LOCATION: [facility]  DATE/TIME: 7/12/2022 1:50 AM    INDICATION: Pain after trauma.  COMPARISON: None.      Impression    IMPRESSION: No acute fracture or dislocation. Multiple vascular calcifications.   XR Pelvis 1/2 Views    Narrative    EXAM: XR PELVIS 1/2 VW  LOCATION: [facility]  DATE/TIME: 7/12/2022 2:10 AM    INDICATION: Pain after trauma.  COMPARISON: None.      Impression    IMPRESSION: No acute fracture or dislocation. Mild osteoarthritis in the hip joints.   Head CT w/o contrast    Narrative    EXAM: CT HEAD W/O CONTRAST  LOCATION: [facility]  DATE/TIME: 7/12/2022 2:15 AM    INDICATION: Blunt head injury  COMPARISON: None.  TECHNIQUE: Routine CT Head without IV contrast. Multiplanar reformats. Dose reduction techniques were used.    FINDINGS:  Streak artifact limits aspects of exam.    INTRACRANIAL CONTENTS: No intracranial hemorrhage, extraaxial collection, or mass effect.  No CT evidence of acute infarct. Normal parenchymal attenuation. Normal ventricles and sulci.     VISUALIZED ORBITS/SINUSES/MASTOIDS: No intraorbital abnormality. No paranasal sinus mucosal disease. No middle ear or mastoid effusion.    BONES/SOFT TISSUES: No acute  abnormality.      Impression    IMPRESSION:  1.  No acute intracranial process.   US Lower Extremity Venous Duplex Left    Narrative    EXAMINATION: DOPPLER VENOUS ULTRASOUND OF THE LEFT LOWER EXTREMITY,  7/12/2022 4:21 AM     COMPARISON: None.    HISTORY: pain - eval dvt    TECHNIQUE:  Gray-scale evaluation with compression, spectral flow, and  color Doppler assessment of the deep venous system of the left leg  from groin to knee, and then at the ankle.    FINDINGS:  In the left lower extremity, the common femoral, femoral, popliteal  and posterior tibial veins demonstrate normal compressibility and  blood flow.      Impression    IMPRESSION:  1.  No evidence left lower extremity deep venous thrombosis.    I have personally reviewed the examination and initial interpretation  and I agree with the findings.    LYRIC MOISE MD         SYSTEM ID:  [id number]

## 2022-07-12 NOTE — PROGRESS NOTES
"ED PT Eval    Patient greeted supine in bed stating that his L ankle has had increased pain after falling in the bathroom a couple of days ago. Upon evaluation patient with some tenderness to palpation over L achilles tendon insertion. Sampson Test inconclusive due to baseline LE edema. Patient still with active plantarflexion strength and without pain reproduction with passive dorsiflexion (appropriate firm end feel) making achilles rupture less likely. Despite discomfort, patient able to mobilize sufficiently and rates pain as an \"11\" stating that is was \"30\" yesterday. Patient also states that he has been encouraged to keep he L LE elevated but that this sometimes makes it worse and that he prefers to alternate between dependent and elevated position which may be related to chronic vascular calcifications observed on XR.    Of note, patient with somewhat parkinsonian mobility with poor initiation of tasks and freezing in narrow spaces. Suspect this may be related to medications but may be beneficial to investigate further on OP basis.     Naldo Hansen, PT, DPT  Pager #[phone]       07/12/22 0900   Quick Adds   Quick Adds Certification   Type of Visit Initial PT Evaluation   Living Environment   People in Home facility resident   Current Living Arrangements residential facility   Home Accessibility wheelchair accessible   Transportation Anticipated agency   Living Environment Comments Patient recently moved into residential facility where he has 24 hour staff support but patient does state that he does not like the staff and would rather live somewhere else.   Self-Care   Usual Activity Tolerance moderate   Current Activity Tolerance fair   Equipment Currently Used at Home walker, rolling   Fall history within last six months yes   Number of times patient has fallen within last six months 1  (although EMR indicates that patient may fall more frequently than this)   Activity/Exercise/Self-Care Comment Patient " "uses FWW for ambulation at baseline and somestimes WC which is available at facility. Patient reports that staff assist with all cooking/cleaning as well as ADLs as needed.   General Information   Onset of Illness/Injury or Date of Surgery 07/12/22   Referring Physician Josy Jules CNP   Patient/Family Therapy Goals Statement (PT) For improved pain management   Pertinent History of Current Problem (include personal factors and/or comorbidities that impact the POC) Per EMR \"62 year old male with past medical history including s/p total right knee replacement w/ hx infection (2019), alcoholic cirrhosis of liver with ascites, hepatic encephalopathy, SBO HCC, thrombocytopenia, portal hypertension, JENNIFER, s/p TAVR (2018), admitted to ED OBS after a reported mechanical fall. No notable injuries, aside from a superficial facial laceration, though he complained of severe left leg pain for a couple of days, which preceeded the fall.\"   Existing Precautions/Restrictions fall   Weight-Bearing Status - LLE weight-bearing as tolerated;full weight-bearing   Weight-Bearing Status - RLE weight-bearing as tolerated;full weight-bearing   General Observations Direct verbal  instruction by NP that patient is safe to mobilize   Cognition   Affect/Mental Status (Cognition) WFL   Cognitive Status Comments questionable historian   Posture    Posture Kyphosis   Range of Motion (ROM)   Range of Motion ROM is WFL   ROM Comment full PROM at L ankle without pain, slight  pain with dorsiflexion overpressure   Strength (Manual Muscle Testing)   Strength (Manual Muscle Testing) strength is WFL   Strength Comments pain with weightbearing   Bed Mobility   Bed Mobility supine-sit;sit-supine   Supine-Sit [place] (Bed Mobility) modified independence   Sit-Supine [place] (Bed Mobility) modified independence   Comment, (Bed Mobility) increased time to coordinate transfer   Transfers   Transfers sit-stand transfer   Sit-Stand Transfer "   Sit-Stand [place] (Transfers) modified independence   Assistive Device (Sit-Stand Transfers) walker, front-wheeled   Gait/Stairs (Locomotion)   [place] Level (Gait) modified independence   Assistive Device (Gait) walker, front-wheeled   Comment, (Gait/Stairs) slow  gait, decreased L foot clearance, difficulty turning   Balance   Balance Comments IND sitting, mod I stance   Sensory Examination   Sensory Perception Comments LE SILT   Coordination   Coordination Comments grossly intact   Muscle Tone   Muscle Tone Comments increased tone   Clinical Impression   Criteria for Skilled Therapeutic Intervention Yes, treatment indicated   PT Diagnosis (PT) impaired functional mobility   Influenced by the following impairments pain, poor initiation, decreased activity tolerance   Functional limitations due to impairments bed mobility, transfers, gait, stairs   Clinical Presentation (PT Evaluation Complexity) Stable/Uncomplicated   Clinical Presentation Rationale clinical judgement   Clinical Decision Making (Complexity) low complexity   Planned Therapy Interventions (PT) balance training;bed mobility training;gait training;ROM (range of motion);stair training;strengthening;stretching;transfer training   Risk & Benefits of therapy have been explained evaluation/treatment results reviewed;care plan/treatment goals reviewed;risks/benefits reviewed;current/potential barriers reviewed;participants voiced agreement with care plan;participants included;patient   PT Discharge Planning   PT Discharge Recommendation (DC Rec) home with assist;home with home care physical therapy   PT Rationale for DC Rec Patient with continued L LE pain at this time but reports that  it  is greatly improved since arriving to hospital. Patient appropriate for d/c to facility (home) with continued staff support and    PT to further progress mobility. Patient may benefit from discussion with CC regarding housing options due to frustrations  with staff at current facility.   PT Brief overview of current status SBA for amulation with FWW   Therapy Certification   Start of care date 07/12/22   Certification date from 07/12/22   Certification date to 07/26/22   Medical Diagnosis Frequent falls   Total Evaluation Time   Total Evaluation Time (Minutes) 15   Physical Therapy Goals   PT Frequency 3x/week   PT Predicted Duration/Target Date for Goal Attainment 07/26/22   PT Goals Bed Mobility;Transfers;Gait;Stairs   PT: Bed Mobility Independent;Supine to/from sit   PT: Transfers Modified independent;Assistive device;Sit to/from stand;Goal Met   PT: Gait Modified independent;Greater than 200 feet;Assistive device   PT: Stairs Modified independent;Assistive device;3 stairs

## 2022-07-13 NOTE — PLAN OF CARE
Physical Therapy Discharge Summary    Reason for therapy discharge:    Discharged to home with home therapy.    Progress towards therapy goal(s). See goals on Care Plan in [facility] electronic health record for goal details.  Goals partially met.  Barriers to achieving goals:   discharge from facility.    Therapy recommendation(s):    Continued therapy is recommended.  Rationale/Recommendations:  Pt will benefit from further PT to continue to improve safety and independence with functional mobility.

## 2022-07-13 NOTE — PROGRESS NOTES
Clinic Care Coordination Contact  [facility]/Voicemail       Clinical Data: Care Coordinator Outreach  Outreach attempted x 1.  Left message on patient's voicemail with call back information and requested return call.  Plan:Care Coordinator will try to reach patient again in 1-2 business days.    Ester Muniz, [facility]  [phone]  [facility]

## 2022-07-14 NOTE — PROGRESS NOTES
Clinic Care Coordination Contact  [facility]/Voicemail       Clinical Data: Care Coordinator Outreach  Outreach attempted x 2.  Left message on patient's voicemail with call back information and requested return call.  Plan:  Care Coordinator will do no further outreaches at this time.    Shirlene KELLY Community Health Worker  Clinic Care Coordination  [facility]  Phone: [phone]

## 2022-10-10 NOTE — TELEPHONE ENCOUNTER
Called and spoke to Corinne, she stated she believes the pleurex may be clogged again, patient has an appointment this afternoon Ten is scheduled for right total knee replacement with Dr Joe on 2/7/19.  His MRSA nose swab was positive on 1/15/19 and pt was given instructions regarding Bactroban nasal ointment and washes.  Pt was swabbed again on 1/22/19 which came back negative for MRSA.  Pt will return to clinic next week for repeat MRSA swab to be sent to lab, either from pt's PCP clinic appt or with ortho nurse if PCP is unable.  We are hoping to get three negative swabs prior to pt's TKA surgery.  Ksenia Pugh RN

## 2022-11-15 NOTE — PROGRESS NOTES
"[facility]  HEPATOLOGY PROGRESS NOTE  Ten Johnson [id number]       CC: Aceyal    SUBJECTIVE:  No acute events overnight. Overall, feeling much improved since paracentesis. Continues to have abdominal and right flank pain with movement, but is manageable and is planning to decrease his dosing of pain medication today. Taking PO with no issues. Last BM yesterday. No nausea, vomiting, hematochezia or melena.      ROS:  A 10-point review of systems was negative.    OBJECTIVE:  VS: /80 (BP Location: Right arm)   Pulse 92   Temp 98  F (36.7  C) (Oral)   Resp 18   Ht 1.753 m (5' 9\")   Wt 77.1 kg (169 lb 15.6 oz)   SpO2 95%   BMI 25.10 kg/m     General: In no acute distress, mild facial muscle wasting  Neuro: AOx3, no asterixis  Lymph: No cervical lymphadenopathy  HEENT:  No scleral icterus, No oral lesions  CV: S1/S2 without murmurs. Valve click at 2nd intercostal space RSB. Skin warm and dry  Lungs: Good airflow bilaterally. No wheezes rhonchi, or crackles.  Respirations even and nonlabored on room air  Abd: Distended, semi-firm. Tympanic to percussion in UQ and dull in LQ. No obvious fluid wave. Transverse incision with ecchymosis and edema. No drainage.   Extrem: trace peripehral edema BLE  Skin: No jaundice  Psych: Intermittently agitation     MEDICATIONS:  Current Facility-Administered Medications   Medication     linezolid (ZYVOX) tablet 600 mg     lisinopril (PRINIVIL/ZESTRIL) tablet 20 mg     magic mouthwash suspension (diphenhydramine, lidocaine, aluminum-magnesium & simethicone)     naloxone (NARCAN) injection 0.1-0.4 mg     ondansetron (ZOFRAN-ODT) ODT tab 4 mg    Or     ondansetron (ZOFRAN) injection 4 mg     oxyCODONE (ROXICODONE) tablet 5 mg     piperacillin-tazobactam (ZOSYN) 3.375 g vial to attach to  mL bag     polyethylene glycol (MIRALAX/GLYCOLAX) Packet 17 g     prochlorperazine (COMPAZINE) injection 10 mg    Or     prochlorperazine (COMPAZINE) tablet 10 mg    Or     " prochlorperazine (COMPAZINE) Suppository 25 mg     senna-docusate (SENOKOT-S/PERICOLACE) 8.6-50 MG per tablet 1 tablet    Or     senna-docusate (SENOKOT-S/PERICOLACE) 8.6-50 MG per tablet 2 tablet       REVIEW OF LABORATORY, PATHOLOGY AND IMAGING RESULTS:  BMP  Recent Labs   Lab 11/05/19  0724 11/04/19  0720 11/03/19  1057    133 133   POTASSIUM 4.1 4.1 4.1   CHLORIDE 104 104 101   JOSEPHINE 8.0* 8.1* 8.3*   CO2 22 23 25   BUN 16 18 22   CR 1.16 1.16 1.06   GLC 76 84 94     CBC  Recent Labs   Lab 11/05/19  0724 11/04/19  0720 11/03/19  1057   WBC 14.7* 12.8* 17.1*   RBC 4.06* 4.11* 4.10*   HGB 13.8 14.2 14.2   HCT 39.7* 39.9* 38.8*   MCV 98 97 95   MCH 34.0* 34.5* 34.6*   MCHC 34.8 35.6 36.6*   RDW 13.9 13.8 13.3   * 123* 138*     INRNo lab results found in last 7 days.  LFTs  Recent Labs   Lab 11/05/19 0724 11/04/19  1640 11/03/19  1057   ALKPHOS 88  --  116   AST 72*  --  84*   ALT 73*  --  100*   BILITOTAL 1.6*  --  1.4*   PROTTOTAL 5.4*  --  6.0*   ALBUMIN 2.3* 2.7* 2.6*      PANC  Recent Labs   Lab 11/03/19  1057   LIPASE 467*      MELD-Na score: 11 at 10/27/2019  7:22 AM  MELD score: 11 at 10/27/2019  7:22 AM  Calculated from:  Serum Creatinine: 0.88 mg/dL (Rounded to 1 mg/dL) at 10/27/2019  7:22 AM  Serum Sodium: 135 mmol/L at 10/27/2019  7:22 AM  Total Bilirubin: 2.7 mg/dL at 10/27/2019  7:22 AM  INR(ratio): 1.12 at 10/25/2019  7:52 AM  Age: 59 years      Imaging Results:  Abdominal/pelvis CT (11/4):   1. New large amount of ascites in the abdomen  Although no hematocrit level is demonstrated and fluid measures simple, a bleed cannot be ruled out.  2. New hypoattenuating lesions in the right lobe of liver presumably secondary to ablation during surgery. These extend to the capsule, can not rule out extension beyond capsule.   3. Atelectasis or infection left lung base.    Chest Xray (11/4):  1. Patchy bibasilar opacities, likely atelectasis versus consolidation.  2. Slight improvement in air  distended bowel loops.    Last endoscopy (10/2017):  No active bleedings was seen.  Small (< 5 mm) esophageal varices. Sarahi-Hoffman tear.  Mild Portal hypertensive gastropathy. Non-bleeding erosive gastropathy. Normal examined duodenum.     IMPRESSION:   is a 59-year-old with a history of biopsy-proven HCC status post ablation with open laparotomy 10/22, HCV cirrhosis, continued alcohol use, esophageal varices and new onset ascites.    RECOMMENDATIONS:  1. Acites:  New since recent abdominal surgery. Paracentesis (11/4) with 2.6L off. Fluid analysis from paracentesis without evidence of SBP (PMNs = 95). SAAG 1.4 indicates portal hypertension. Creatinine slightly increased today, did not receive albumin after paracentesis. May benefit from albumin and gentle diuresis.   - Administer 50g 25% albumin today  - Start furosemide 20 mg PO daily and spironolactone 50 mg PO daily  - Will need to repeat electrolytes in one week as OP  - May need recurrent paracenteses, will discuss and write standing order if needed  - Follow up with GI clinic as OP- [place] 1/2 1130      2. Decompensated cirrhosis  3. HCC s/p ablation 10/22/19  4. HCV, treatment naive: Has not been treated for HCV in the past in the setting of HCC. Continues to drink actively and understands ramifications of this. Transaminases and bilirubin continue to improve since ablation. MELD today 11.   - Due for repeat MRI per Dr Benoit  - Recommend continued wean of oxycodone.       5. Immunizations  Needs pnuemovax prior to discharge.    Patient was discussed with attending physician, Dr. Lock.      Next follow up appointment: [place] 1/2/20 at 1130    Thank you for the opportunity to be involved with  Ten Johnson care. Please call with any questions or concerns.    Regi Pike, APRN, CNP  Inpatient Hepatology [facility]  [phone]     right upper arm

## 2023-02-03 NOTE — BRIEF OP NOTE
[facility], [place]    Brief Operative Note    Pre-operative diagnosis: HCC (hepatocellular carcinoma) (H) [C22.0]  Liver mass [R16.0]  Post-operative diagnosis Same as pre-operative diagnosis    Procedure: Procedure(s):  Diagnostic laparoscopy  intraoperative liver ultrasound, laparoscopic converted to open liver biopsy x 6  Ablate liver tumor x3  Laparotomy, lysis adhesions, combined  Surgeon: Surgeon(s) and Role:     * Gregorio Benoit MD - Primary     * Declan Hernandez MD - Resident - Assisting     * Loni South MD - Resident - Assisting  Anesthesia: Combined General with Block   Estimated blood loss: 100 ml  Drains: None  Specimens:   ID Type Source Tests Collected by Time Destination   A : Liver Biopsy Segment 7 #1 Biopsy Liver SURGICAL PATHOLOGY EXAM Gregorio Benoit MD 10/22/2019 10:47 AM    B : Liver Biopsy segment 7 #2 Biopsy Liver SURGICAL PATHOLOGY EXAM Gregorio Benoit MD 10/22/2019 10:55 AM    C : Liver Biopsy segment 7 #3 Biopsy Liver SURGICAL PATHOLOGY EXAM Gregorio Benoit MD 10/22/2019 11:25 AM    D : Liver Biopsy segment 7 #4 Biopsy Liver SURGICAL PATHOLOGY EXAM Gregorio Benoit MD 10/22/2019 11:49 AM    E : Liver Biopsy Segment 7 #2 Biopsy Liver SURGICAL PATHOLOGY EXAM Gregorio Benoit MD 10/22/2019 11:58 AM    F : Original Segment 7 Biopsy Liver SURGICAL PATHOLOGY EXAM Gregorio Benoit MD 10/22/2019 12:04 PM    G : Segment 6/7 Medial Lesion Biopsy Liver SURGICAL PATHOLOGY EXAM Gregorio Benoit MD 10/22/2019 12:21 PM    H : Segment 6/7 Lateral Lesion Biopsy Liver SURGICAL PATHOLOGY EXAM Gregorio Benoit MD 10/22/2019 12:24 PM    I : Segment 7 #1 Lateral Biopsy Liver SURGICAL PATHOLOGY EXAM Gregorio Benoit MD 10/22/2019 12:25 PM    J : Segment 7 Lateral #2 Biopsy Liver SURGICAL PATHOLOGY EXAM Gregorio Benoit MD 10/22/2019 12:31 PM      Findings:   Two lesions in segment 6/7 and one in segment 7.  Complications: None.  Implants: * No implants in log *      Loni South MD  PGY-3,  General Surgery  [phone]     Face Mask

## 2023-06-03 NOTE — TELEPHONE ENCOUNTER
[facility] Call Center    Phone Message    May a detailed message be left on voicemail: yes    Reason for Call: Other: Pt had surgery on his Rt Knee on 2/7/2019 and had stitches removed on 2/21/2019. Pt stated that he fell on his knees on 2/22/2019 and experienced some profuse bleeding. The bleeding was tended to by the paramedics and since then, the bleeding has stopped. Pt now has clear weeping fluids from the surgical site and some pain from the fall. Pt was wondering if he needs to be seen sooner by Dr. Joe. Please call back to further discuss symptoms and scheduling options for Pt      Action Taken: Message routed to:  [facility] Ortho Clinic   No

## 2024-10-09 NOTE — MR AVS SNAPSHOT
After Visit Summary   10/11/2018    Ten Johnson    MRN: [id number]           Patient Information     Date Of Birth          1960        Visit Information        Provider Department      10/11/2018 7:40 AM Cuca Celeste MD [facility]        Today's Diagnoses     Alcoholic cirrhosis of liver without ascites (H)    -  1    Need for prophylactic vaccination and inoculation against influenza        Chronic pain of right knee        Chronic hepatitis C without hepatic coma (H)        Benign essential hypertension          Care Instructions    Primary Care Center Medication Refill Request Information:  * Please contact your pharmacy regarding ANY request for medication refills.  ** [facility] Prescription Fax = [phone]  * Please allow 3 business days for routine medication refills.  * Please allow 5 business days for controlled substance medication refills.     Primary Care Center Test Result notification information:  *You will be notified with in 7-10 days of your appointment day regarding the results of your test.  If you are on MyChart you will be notified as soon as the provider has reviewed the results and signed off on them.    Primary Care Center: [phone]       U Ortho [phone] (4th Floor [facility])  Sports Medicine [phone] (5th Floor [facility])  Gastroenterology [phone] (4th Floor [facility])             Follow-ups after your visit        Additional Services     GASTROENTEROLOGY ADULT REFERRAL       Preferred Location: [facility] [phone]      Please be aware that coverage of these services is subject to the terms and limitations of your health insurance plan.  Call member services at your health plan with any benefit or coverage questions.  Any procedures must be performed at a [place] facility OR coordinated by your clinic's referral office.    Please bring the following with you to your appointment:    (1) Any X-Rays, CTs or MRIs  which have been performed.  Contact the facility where they were done to arrange for  prior to your scheduled appointment.    (2) List of current medications   (3) This referral request   (4) Any documents/labs given to you for this referral            ORTHOPEDICS ADULT REFERRAL       Your provider has referred you to: [facility]: Sports Medicine [facility] [phone]   [email or web address]    Please be aware that coverage of these services is subject to the terms and limitations of your health insurance plan.  Call member services at your health plan with any benefit or coverage questions.      Please bring the following to your appointment:    >>   Any x-rays, CTs or MRIs which have been performed.  Contact the facility where they were done to arrange for  prior to your scheduled appointment.    >>   List of current medications   >>   This referral request   >>   Any documents/labs given to you for this referral            ORTHOPEDICS ADULT REFERRAL       Your provider has referred you to: [facility] Orthopaedic [facility] [phone]   [email or web address]      Please be aware that coverage of these services is subject to the terms and limitations of your health insurance plan.  Call member services at your health plan with any benefit or coverage questions.      Please bring the following to your appointment:    >>   Any x-rays, CTs or MRIs which have been performed.  Contact the facility where they were done to arrange for  prior to your scheduled appointment.    >>   List of current medications   >>   This referral request   >>   Any documents/labs given to you for this referral                  Follow-up notes from your care team     Return in about 2 weeks (around 10/25/2018) for BP Recheck.      Your next 10 appointments already scheduled     Oct 23, 2018  7:45 PM CDT   (Arrive by 7:30 PM)    KNEE  RIGHT W/O CONTRAST with [id number]   [facility] MRI ([facility])    [address]  1st Floor  [facility] [address] [phone]           How do I prepare for my exam? (Food and drink instructions) **If you will be receiving sedation or general anesthesia, please see special notes below.**  How do I prepare for my exam? (Other instructions) Take your medicines as usual, unless your doctor tells you not to. Please remove any body piercings and hair extensions before you arrive. Follow your doctor s orders. If you do not, we may have to postpone your exam. You may or may not receive IV contrast for this exam pending the discretion of the Radiologist.  You do not need to do anything special to prepare. **If you will be receiving sedation or general anesthesia, please see special notes below.**  What should I wear:  The MRI machine uses a strong magnet. Please wear clothes without metal (snaps, zippers). A sweatsuit works well, or we may give you a hospital gown.  How long does the exam take: Most tests take 30 to 60 minutes.  HOWEVER, IF YOUR DOCTOR PRESCRIBES ANESTHESIA please plan on spending four to five hours in the recovery room.  What should I bring: Bring a list of your current medicines to your exam (including vitamins, minerals and over-the-counter drugs). Also bring the results of similar scans you may have had.  Do I need a : **If you will be receiving sedation or general anesthesia, please see special notes below.**  What should I do after the exam? No Restrictions, You may resume normal activities.  What is this test: MRI (magnetic resonance imaging) uses a strong magnet and radio waves to look inside the body. An MRA (magnetic resonance angiogram) does the same thing, but it lets us look at your blood vessels. A computer turns the radio waves into pictures showing cross sections of the body, much like slices of bread. This helps us see any problems more  clearly.  Who should I call with questions: Please call the Imaging Department at your exam site with any questions. Directions, parking instructions, and other information is available on our website, [email or web address].  How do I prepare if I m having sedation or anesthesia? **IMPORTANT** THE INSTRUCTIONS BELOW ARE ONLY FOR THOSE PATIENTS WHO HAVE BEEN TOLD THEY WILL RECEIVE SEDATION OR GENERAL ANESTHESIA DURING THEIR MRI PROCEDURE:  IF YOU WILL RECEIVE SEDATION (take medicine to help you relax during your exam): You must get the medicine from your doctor before you arrive. Bring the medicine to the exam. Do not take it at home. Arrive one hour early. Bring someone who can take you home after the test. Your medicine will make you sleepy. After the exam, you may not drive, take a bus or take a taxi by yourself. No eating 8 hours before your exam. You may have clear liquids up until 4 hours before your exam. (Clear liquids include water, clear tea, black coffee and fruit juice without pulp.)  IF YOU WILL RECEIVE ANESTHESIA (be asleep for your exam): Arrive 1 1/2 hours early. Bring someone who can take you home after the test. You may not drive, take a bus or take a taxi by yourself. No eating 8 hours before your exam. You may have clear liquids up until 4 hours before your exam. (Clear liquids include water, clear tea, black coffee and fruit juice without pulp.) You will spend four to five hours in the recovery room.            Nov 05, 2018  9:00 AM CST   (Arrive by 8:45 AM)   RETURN KNEE with Olvin Joe MD   [facility] ([facility])    [address] [place] [address]  4th Floor  [employer] [address] [phone]            Feb 22, 2019  9:45 AM CST   Lab with  LAB   [facility] ([facility])    [address] [place] [address]  1st [facility] [id number] [phone]            Feb 22, 2019 10:00 AM CST   Ech Complete with [address]  Echo ([facility])    [address]  3rd Floor  [facility] [id number] [phone]           1.  Please bring or wear a comfortable two-piece outfit. 2.  You may eat, drink and take your normal medicines. 3.  For any questions that cannot be answered, please contact the ordering physician 4.  Please do not wear perfumes or scented lotions on the day of your exam.            2019 11:00 AM CST   (Arrive by 10:45 AM)   Return Visit with Ema Pierson NP   [facility] ([facility])    [address]  [address]  [facility] [id number] [phone]              Future tests that were ordered for you today     Open Future Orders        Priority Expected Expires Ordered    MR Knee Right w/o Contrast Routine  10/11/2019 10/11/2018            Who to contact     Please call your clinic at [phone] to:    Ask questions about your health    Make or cancel appointments    Discuss your medicines    Learn about your test results    Speak to your doctor            Additional Information About Your Visit        [employer] Information     [employer] is an electronic gateway that provides easy, online access to your medical records. With [employer], you can request a clinic appointment, read your test results, renew a prescription or communicate with your care team.     To sign up for [employer] visit the website at [email or web address]   You will be asked to enter the access code listed below, as well as some personal information. Please follow the directions to create your username and password.     Your access code is: [email or web address]  Expires: 2018  1:40 PM     Your access code will  in 90 days. If you need help or a new code, please contact your [facility] or call [phone] for assistance.        Care EveryWhere ID     This is your Care EveryWhere ID. This could be used by other organizations to  access your [facility] medical records  [id number]        Your Vitals Were     Pulse Respirations BMI (Body Mass Index)             82 20 26.55 kg/m2          Blood Pressure from Last 3 Encounters:   10/11/18 (!) 161/94   09/28/18 142/86   09/12/18 (!) 139/104    Weight from Last 3 Encounters:   10/11/18 81.2 kg (179 lb)   10/11/18 81.2 kg (179 lb)   10/11/18 81.6 kg (179 lb 12.8 oz)              We Performed the Following     FLU VACCINE, INCREASED ANTIGEN, PRESV FREE, AGE 65+ [[address]]     GASTROENTEROLOGY ADULT REFERRAL     ORTHOPEDICS ADULT REFERRAL     ORTHOPEDICS ADULT REFERRAL          Today's Medication Changes          These changes are accurate as of 10/11/18 11:59 PM.  If you have any questions, ask your nurse or doctor.               Start taking these medicines.        Dose/Directions    diclofenac 1 % Gel topical gel   Commonly known as:  VOLTAREN   Started by:  Tom Dickerson MD        Apply 4 grams to knees four times daily using enclosed dosing card.   Quantity:  100 g   Refills:  1            Where to get your medicines      These medications were sent to [facility] [place], [place] - [address]  [address], [place] [id number]    Hours:  TRANSPLANT PHONE NUMBER [phone] Phone:  [phone]     diclofenac 1 % Gel topical gel                Primary Care Provider Office Phone # Fax #    Cuca Celeste [phone]       [address] [place] [address] [address]  [facility] [address]        Equal Access to Services     MANE HERRERA AH: Hadii adrienne ku hadasho Soomaali, waaxda luqadaha, qaybta kaalmada adeegyada, waxay brenda thompson. So [facility] [phone].    ATENCIÓN: Si habla español, tiene a ha disposición servicios gratuitos de asistencia lingüística. Llame al [phone].    We comply with applicable federal civil rights laws and [place] laws. We do not discriminate on the basis of race, color, national  origin, age, disability, sex, sexual orientation, or gender identity.            Thank you!     Thank you for choosing [facility]  for your care. Our goal is always to provide you with excellent care. Hearing back from our patients is one way we can continue to improve our services. Please take a few minutes to complete the written survey that you may receive in the mail after your visit with us. Thank you!             Your Updated Medication List - Protect others around you: Learn how to safely use, store and throw away your medicines at www.disposemymeds.org.          This list is accurate as of 10/11/18 11:59 PM.  Always use your most recent med list.                   Brand Name Dispense Instructions for use Diagnosis    aspirin 81 MG EC tablet     90 tablet    Take 1 tablet (81 mg) by mouth daily    S/P TAVR (transcatheter aortic valve replacement)       clopidogrel 75 MG tablet    PLAVIX    30 tablet    Take 1 tablet (75 mg) by mouth daily    S/P TAVR (transcatheter aortic valve replacement)       diclofenac 1 % Gel topical gel    VOLTAREN    100 g    Apply 4 grams to knees four times daily using enclosed dosing card.        ferrous sulfate 325 (65 Fe) MG tablet    IRON     Take 1 tablet (325 mg) by mouth At Bedtime    Anemia, unspecified type       fluticasone 50 MCG/ACT spray    FLONASE    16 g    Spray 2 sprays into both nostrils daily    Cough, Post-nasal drip       lisinopril 20 MG tablet    PRINIVIL/ZESTRIL    90 tablet    Take 1 tablet (20 mg) by mouth At Bedtime    Essential hypertension       loratadine 10 MG tablet    CLARITIN    90 tablet    Take 1 tablet (10 mg) by mouth daily    Post-nasal drip, Cough       MULTIVITAMINS PO      Take 1 tablet by mouth At Bedtime        pantoprazole 40 MG EC tablet    PROTONIX    180 tablet    Take 1 tablet (40 mg) by mouth 2 times daily (before meals)    Erosive gastropathy          0

## 2024-10-18 NOTE — PROGRESS NOTES
"ID Clinic Follow-up Visit Note:    Assessment:  1. R TKA 2/2019 c/b fall and wound dehiscence s/p I/D, resection prepatellar bursa, poly-exchange and synovectomy. Cultures revealed Ox-R CONS and E coli. Doing well on vanco + ceftriaxone  2. Cirrhosis, ETOH and HCV, hx erosive gastropathy and MW tear.  3. Aortic valve replacement    Plan:  -continue antibiotics IV until 4/15  -on 4/16, begin bactrim 1DS Po BID.   -refrain from excessive use of substances that could impair his balance and lead to future falls which would further jeopardize his hardware  -f/u with me in 2-3 months    It is a pleasure to participate in Ten's care. Visit length 25 min, >50% clnical counseling/care coordination.    Johanna Hare MD   of Medicine, Division of Infectious Diseases  [facility] [phone]      HPI:  57 y/o man with PMH cirrhosis (ETOH and HCV), Aortic stenosis s/p AV replacement, prior L olecranon infection due to MRSA, and OA s/p R TKA in 2/2019. Post-operatively he fell and presented to  ED on 3/4, where active oozing was observed prompting trip to the OR where cultures were performed that revealed CONS (Ox-R) and E coli. He underwent liner exchange with retention of components. He was placed initially on vanco+ pip/tazo and ultimately transitioned to vanco + ertapenem->ceftriaxone. He was discharged from acute care on 3/9 and from TCU on 3/19.    He complains of discomfort in his buttock area. Knee is largely non-painful, save for sudden initiation of weight-bearing (eg to void after waking). No systemic infection symptoms. Resides at home presently. Re: ETOH use, reports he has had \"maybe 20 beers since being in the hospital in March.\"      Past Medical History:   Diagnosis Date     Alcohol use disorder      Alcoholic cirrhosis (H)      Anticoagulant long-term use     plavix     Ascites      Chronic allergic rhinitis      Chronic anemia      Chronic hepatitis C without hepatic coma (H) 05/10/2016    " Untreated as of 2/2018     Diastolic dysfunction      Erosive gastropathy      Esophageal varices in alcoholic cirrhosis (H)      H/O upper gastrointestinal hemorrhage 09/2017     History of blood transfusion      History of drug abuse     intranasal     Hypertension     essential     JANELLE (iron deficiency anemia)      Sarahi-Hoffman tear     History     Marijuana abuse      MRSA (methicillin resistant Staphylococcus aureus)      Olecranon bursitis      Portal hypertension (H)      Right shoulder pain     history of rotator cuff repair     S/p TAVR (transcatheter aortic valve replacement), bioprosthetic      Severe aortic stenosis      Thrombocytopenia (H)      Past Surgical History:   Procedure Laterality Date     ARTHROSCOPY SHOULDER ROTATOR CUFF REPAIR  7/31/2012    Procedure: ARTHROSCOPY SHOULDER ROTATOR CUFF REPAIR;  Right Shoulder Arthroscopic Rotator Cuff Repair, BicepsTenodesis,  Subacromial Decompression ;  Surgeon: Joi Castillo MD;  Location: [facility]     ESOPHAGOSCOPY, GASTROSCOPY, DUODENOSCOPY (EGD), COMBINED N/A 10/23/2017    Procedure: COMBINED ESOPHAGOSCOPY, GASTROSCOPY, DUODENOSCOPY (EGD);;  Surgeon: Gentry Salas MD;  [facility]     EXCHANGE POLY COMPONENT ARTHROPLASTY KNEE Right 3/4/2019    Procedure: REVISION RIGHT TOTAL KNEE POLY COMPONENT EXCHANGE;  Surgeon: Olvin Joe MD;  Location: [facility] OR     FACIAL RECONSTRUCTION SURGERY  1971     HEART CATH FEMORAL CANNULIZATION WITH OPEN STANDBY REPAIR AORTIC VALVE N/A 2/21/2018    Procedure: HEART CATH FEMORAL CANNULIZATION WITH OPEN STANDBY REPAIR AORTIC VALVE;;  Surgeon: Luis Baird MD;  [facility]     IRRIGATION AND DEBRIDEMENT UPPER EXTREMITY, COMBINED  1/3/2012    Procedure:COMBINED IRRIGATION AND DEBRIDEMENT UPPER EXTREMITY; Irrigation & Debridement Left Elbow; Surgeon:CRISTHIAN ZHOU; Location:[facility] OR     OPTICAL TRACKING SYSTEM ARTHROPLASTY KNEE Right 2/7/2019    Procedure: ARTHROPLASTY KNEE RIGHT;   Surgeon: Olvin Joe MD;  Location: [facility]     REPAIR TENDON TRICEPS UPPER EXTREMITY  11/8/2011    Procedure:REPAIR TENDON TRICEPS UPPER EXTREMITY; Surgeon:CRISTHIAN ZHOU; Location:[facility] OR     SHOULDER SURGERY  2003    left, injury, torn tendons, hematoma     TRANSCATHETER AORTIC VALVE IMPLANT ANESTHESIA N/A 2/21/2018    Procedure: TRANSCATHETER AORTIC VALVE IMPLANT ANESTHESIA;  Transfemoral (Quiroz) Aortic Valve Implant 26mm MARTHA 3, with Cardiopulmonary Bypass Standby, transthoracic echocardiogram;  Surgeon: GENERIC ANESTHESIA PROVIDER;  Location[facility]     TRANSPOSITION ULNAR NERVE (ELBOW)  11/8/2011    Procedure:TRANSPOSITION ULNAR NERVE (ELBOW); Final Procedure Done: Left Elbow Lateral Ulnar Collateral Repair And  Left Elbow Triceps Repair                Stable

## (undated) DEVICE — IMM KNEE 20" 0814-2660

## (undated) DEVICE — DRAIN HEMOVAC RESERVOIR KIT 10FR 1/8" MED 00-2550-002-10

## (undated) DEVICE — INTRO GLIDESHEATH SLENDER 6FRX45CM RM-ES6F10HAU

## (undated) DEVICE — SU SILK 2-0 TIE 12X30" A305H

## (undated) DEVICE — LINEN TOWEL PACK X5 5464

## (undated) DEVICE — SU SILK 3-0 TIE 12X30" A304H

## (undated) DEVICE — SU VICRYL 0 UR-6 27" J603H

## (undated) DEVICE — CATH TRAY FOLEY SURESTEP 16FR W/URNE MTR STLK LATEX A303316A

## (undated) DEVICE — CLIP HORIZON LG ORANGE 004200

## (undated) DEVICE — SU SILK 0 TIE 6X30" A306H

## (undated) DEVICE — PEN MARKING SKIN W/LABELS 31145918

## (undated) DEVICE — LIGHT HANDLE X1 31140133

## (undated) DEVICE — Device

## (undated) DEVICE — SPONGE LAP 18X18" X8435

## (undated) DEVICE — DRSG PRIMAPORE 03 1/8X6" 66000318

## (undated) DEVICE — LIGHT HANDLE X2

## (undated) DEVICE — NDL 22GA 1.5"

## (undated) DEVICE — SU PDS II 2-0 CT-1 27" Z339H

## (undated) DEVICE — SU DERMABOND ADVANCED .7ML DNX12

## (undated) DEVICE — CONNECTOR STOPCOCK 3-WAY HIGH PRESSURE (NAMIC) H965700550091

## (undated) DEVICE — BOWL 32OZ STERILE 01232

## (undated) DEVICE — LINEN BACK PACK 5440

## (undated) DEVICE — CAST PADDING 6" STERILE 9046S

## (undated) DEVICE — SYR 10ML LL W/O NDL 302995

## (undated) DEVICE — CLOSURE DEVICE 6FR VASC PROGLIDE MEDICATED SUTURE 12673-03

## (undated) DEVICE — LINEN TOWEL PACK X6 WHITE 5487

## (undated) DEVICE — SUCTION IRR SYSTEM W/O TIP INTERPULSE HANDPIECE 0210-100-000

## (undated) DEVICE — GLOVE PROTEXIS MICRO 7.0  2D73PM70

## (undated) DEVICE — DRAPE IOBAN INCISE 23X17" 6650EZ

## (undated) DEVICE — WIRE GUIDE 0.04"X300CM GRANDSLAM J TP AG141302J

## (undated) DEVICE — SU SILK 4-0 TIE 12X30" A303H

## (undated) DEVICE — GLOVE PROTEXIS BLUE W/NEU-THERA 8.0  2D73EB80

## (undated) DEVICE — ANTIFOG SOLUTION W/FOAM PAD CF-1001

## (undated) DEVICE — SU PDS II 1 TP-1 48" Z880G

## (undated) DEVICE — RAD CLOSURE ANGIOSEAL 8FR  610131

## (undated) DEVICE — DRAPE STERI FLUOROSCOPE 35X43"1012 LATEX FREE

## (undated) DEVICE — DRSG TEGADERM 4X4 3/4" 1626W

## (undated) DEVICE — BATTERY STRYKER NAVIGATION SYSTEM 6000-006-000

## (undated) DEVICE — DRSG KERLIX FLUFFS X5

## (undated) DEVICE — CLIP APPLIER ENDO ROTATING 10MM MED/LG ER320

## (undated) DEVICE — WIRE GUIDE 0.035"X150CM EMERALD J TIP 502521

## (undated) DEVICE — SOL NACL 0.9% IRRIG 1000ML BOTTLE 2F7124

## (undated) DEVICE — ENDO TROCAR SLEEVE KII Z-THREADED 05X100MM CTS02

## (undated) DEVICE — SOL ADH LIQUID BENZOIN SWAB 0.6ML C1544

## (undated) DEVICE — DRSG STERI STRIP 1X5" R1548

## (undated) DEVICE — CATH ANGIO INFINITI PIGTAIL 145 6 SH 6FRX110CM  534-652S

## (undated) DEVICE — CATH EP PACEL 5FRX110CM 1MM RIGHT HEART CURVE 401763

## (undated) DEVICE — ESU GROUND PAD ADULT W/CORD E7507

## (undated) DEVICE — STRAP UNIVERSAL POSITIONING 2-PIECE 4X47X76" 91-287

## (undated) DEVICE — DRAPE CV SPLIT II 147X106" 9158

## (undated) DEVICE — SU MONOCRYL 4-0 PS-2 18" UND Y496G

## (undated) DEVICE — DRAPE SPECIAL PROCEDURE ANGIO-SHIELD D2224

## (undated) DEVICE — DEFIB PADPRO CONMED ADULT/CHILD 2001Z-C

## (undated) DEVICE — HOOD FLYTE W/PEELAWAY 408-800-100

## (undated) DEVICE — SU STRATAFIX PDS PLUS 1 CT-1 18" SXPP1A404

## (undated) DEVICE — DRSG DRAIN 4X4" 7086

## (undated) DEVICE — GOWN XLG DISP 9545

## (undated) DEVICE — FASTENER CATH BALLOON CLAMPX2 STATLOCK 0684-00-493

## (undated) DEVICE — SOL WATER IRRIG 1000ML BOTTLE 2F7114

## (undated) DEVICE — DRSG MEPILEX BORDER SACRUM 6.3X7.9" 282055

## (undated) DEVICE — DRSG PRIMAPORE 02X3" 7133

## (undated) DEVICE — CATH ANGIO SUPERTORQUE AL1 6FRX100CM 532-645

## (undated) DEVICE — SU PDS II 1 CTX 36" Z371T

## (undated) DEVICE — SU VICRYL 0 TIE 54" J608H

## (undated) DEVICE — BONE CLEANING TIP INTERPULSE  0210-010-000

## (undated) DEVICE — NDL INSUFFLATION 13GA 120MM C2201

## (undated) DEVICE — SU MONOCRYL 3-0 PS-1 27" Y936H

## (undated) DEVICE — SPECIMEN CONTAINER 5OZ STERILE 2600SA

## (undated) DEVICE — PREP CHLORAPREP 26ML TINTED ORANGE  260815

## (undated) DEVICE — WIPES FOLEY CARE SURESTEP PROVON DFC100

## (undated) DEVICE — SU VICRYL 3-0 SH 27" UND J416H

## (undated) DEVICE — SYR 10ML FINGER CONTROL W/O NDL 309695

## (undated) DEVICE — GLOVE PROTEXIS W/NEU-THERA 8.0  2D73TE80

## (undated) DEVICE — STRAP KNEE/BODY 31143004

## (undated) DEVICE — SURGICEL HEMOSTAT 4X8" 1952

## (undated) DEVICE — SU SILK 3-0 SH CR 8X18" C013D

## (undated) DEVICE — TOURNIQUET CUFF 30" REPRO BLUE 60-7070-105

## (undated) DEVICE — ADH SKIN CLOSURE PREMIERPRO EXOFIN 1.0ML 3470

## (undated) DEVICE — LINEN TOWEL PACK X30 5481

## (undated) DEVICE — KIT CULTURE TRANSPORT SYS A.C.T. II DUAL ANEROBE R124022

## (undated) DEVICE — SUCTION IRR STRYKERFLOW II W/TIP 250-070-520

## (undated) DEVICE — GUIDEWIRE TAVR/TAVI SAFARI 0.035"X300CM H74939272S0

## (undated) DEVICE — WIRE GUIDE 0.035"X260CM SAFE-T-J EXCHANGE TSCF-35-260-3-BH

## (undated) DEVICE — STPL LINEAR CUT 75MM TLC75

## (undated) DEVICE — ESU ENDO SCISSORS 5MM CVD 5DCS

## (undated) DEVICE — ESU GROUND PAD UNIVERSAL W/O CORD

## (undated) DEVICE — GLOVE PROTEXIS POWDER FREE SMT 7.5  2D72PT75X

## (undated) DEVICE — CARD NAVIGATION SOFTWARE STRK KNEE C4C 6003-640-999

## (undated) DEVICE — DRSG ABDOMINAL 07 1/2X8" 7197D

## (undated) DEVICE — STPL RELOAD LINEAR CUT 75MM TCR75

## (undated) DEVICE — SU PDS II 0 CT-1 27" Z340H

## (undated) DEVICE — BLADE KNIFE SURG 15 371115

## (undated) DEVICE — WIRE GUIDE LUNDERQUIST 0.035"X260CM DBL CVD

## (undated) DEVICE — CLIP HORIZON MED BLUE 002200

## (undated) DEVICE — COVER ULTRASOUND PROBE W/GEL FLEXI-FEEL 6"X58" LF  25-FF658

## (undated) DEVICE — WIRE GUIDE 0.035"X150CM EMERALD STR 502542

## (undated) DEVICE — BASIN SET MAJOR

## (undated) DEVICE — SYR 50ML LL W/O NDL 309653

## (undated) DEVICE — PROTECTOR ARM ONE-STEP TRENDELENBURG 40418

## (undated) DEVICE — CLIP HORIZON SM RED WIDE SLOT 001201

## (undated) DEVICE — TAPE MEDIPORE 4"X2YD 2864

## (undated) DEVICE — ESU GROUND PAD ADULT REM W/15' CORD E7507DB

## (undated) DEVICE — TUBING SUCTION 10'X3/16" N510

## (undated) DEVICE — GLOVE PROTEXIS BLUE W/NEU-THERA 7.5  2D73EB75

## (undated) DEVICE — SU VICRYL 3-0 SH 27" J316H

## (undated) DEVICE — DRSG TEGADERM 8X12" 1629

## (undated) DEVICE — SOL NACL 0.9% INJ 1000ML BAG 2B1324X

## (undated) DEVICE — GLOVE PROTEXIS W/NEU-THERA 7.5  2D73TE75

## (undated) DEVICE — BLADE CLIPPER SGL USE 9680

## (undated) DEVICE — SYSTEM LOADING AND DELIVERY FOR EDWARDS VALVE 26MM 9600LDS26

## (undated) DEVICE — PREP DURAPREP 26ML APL 8630

## (undated) DEVICE — CATH SYSTEM SENTINEL CEREBRAL PROTECTION 6FR CMS15-10C-US

## (undated) DEVICE — ENDO SCOPE WARMER SEAL  C3101

## (undated) DEVICE — INFLATION DEVICE ATRION QL2530

## (undated) DEVICE — KIT HAND CONTROL ANGIOTOUCH ACIST 65CM AT-P65

## (undated) DEVICE — BONE CEMENT MIXEVAC III HI VAC KIT  0206-015-000

## (undated) DEVICE — SU VICRYL 2-0 CT-2 27" UND J269H

## (undated) DEVICE — BLADE SAW SAGITTAL STRK 19.5X90X1.27MM 2108-109-000S11

## (undated) DEVICE — SOL ISOPROPYL RUBBING ALCOHOL USP 70% 4OZ HDX-20 I0020

## (undated) DEVICE — ESU HARMONIC ACE LAPAROSCOPIC SHEARS 5MMX23CM HAR23

## (undated) DEVICE — DRSG TELFA 3X8" 1238

## (undated) DEVICE — NDL BIOPSY TEMNO 18GAX20CM ACT18/20

## (undated) DEVICE — SET INTRODUCER SHEATH 14FR 914ES

## (undated) DEVICE — ENDO TROCAR FIRST ENTRY KII FIOS Z-THRD 12X100MM CTF73

## (undated) DEVICE — SOL NACL 0.9% 10ML VIAL 0409-4888-02

## (undated) DEVICE — SOL NACL 0.9% INJ 1000ML BAG 07983-09

## (undated) DEVICE — SPONGE RAY-TEC 4X8" 7318

## (undated) DEVICE — KIT MANIFORD ACIST B200

## (undated) DEVICE — GOWN IMPERVIOUS BREATHABLE SMART XLG 89045

## (undated) DEVICE — LINEN GOWN XLG 5407

## (undated) DEVICE — PACK HEART LEFT CUSTOM

## (undated) DEVICE — TUBING INSUFFLATION W/FILTER CPC TO LUER 620-030-301

## (undated) DEVICE — INTRO SHEATH 7FRX25CM PINNACLE RSS706

## (undated) DEVICE — SUCTION MANIFOLD DORNOCH ULTRA CART UL-CL500

## (undated) DEVICE — GEL ULTRASOUND AQUASONIC 20GM 01-01

## (undated) DEVICE — BASIN EMESIS STERILE  SSK9005A

## (undated) DEVICE — ESU HARMONIC ACE LAPAROSCOPIC SHEARS 5MMX36CM CVD HAR36

## (undated) DEVICE — ENDO TROCAR BLUNT TIP KII BALLOON 12X100MM C0R47

## (undated) DEVICE — INTRO SHEATH 6FRX10CM PINNACLE RSS602

## (undated) DEVICE — SLEEVE TR BAND RADIAL COMPRESSION DEVICE 24CM TRB24-REG

## (undated) DEVICE — ENDO TROCAR FIRST ENTRY KII FIOS Z-THRD 05X100MM CTF03

## (undated) DEVICE — ANTIFOG SOLUTION W/FOAM PAD 31142527

## (undated) DEVICE — SOL NACL 0.9% IRRIG 3000ML BAG 2B7477

## (undated) DEVICE — COVER NEOPROBE SOFTFLEX 5X96" W/BANDS 20-PC596

## (undated) DEVICE — STPL SKIN 35W ROTATING HEAD PRW35

## (undated) DEVICE — ESU HANDPIECE ABC BEND-A-BEAM 6" 134006

## (undated) DEVICE — ENDO TROCAR SLEEVE KII Z-THREADED 12X100MM CTS22

## (undated) DEVICE — SU MONOCRYL 4-0 PS-2 27" UND Y426H

## (undated) DEVICE — NDL BX 18GAX25CM MC1825

## (undated) DEVICE — INTRO SHEATH 4FRX25CM PINNACLE MARKER RSB403

## (undated) DEVICE — INTRO SHEATH MICRO PLATINUM TIP 4FRX40CM 7274

## (undated) DEVICE — CATH CHOLANGIOGRAM 7.5FR TAUT PLASTIC TIP 20018-010

## (undated) DEVICE — NDL BX TRU CUT 14GA 4.5" 2N2702X

## (undated) DEVICE — DRAPE MAYO STAND 23X54 8337

## (undated) DEVICE — DRAPE SHEET REV FOLD 3/4 9349

## (undated) DEVICE — SUCTION MANIFOLD NEPTUNE 2 SYS 4 PORT 0702-020-000

## (undated) DEVICE — RAD KNIFE HANDLE W/11 BLADE DISPOSABLE 371611

## (undated) DEVICE — TUBING PRESSURE 30"

## (undated) DEVICE — PREP CHLORHEXIDINE 4% 4OZ (HIBICLENS) 57504

## (undated) DEVICE — PIN FIXATION STRK EX-PIN ORTHOLOCK 3X110MM 6007-103-110

## (undated) RX ORDER — ALBUMIN (HUMAN) 12.5 G/50ML
SOLUTION INTRAVENOUS
Status: DISPENSED
Start: 2019-11-25

## (undated) RX ORDER — LIDOCAINE HYDROCHLORIDE 10 MG/ML
INJECTION, SOLUTION EPIDURAL; INFILTRATION; INTRACAUDAL; PERINEURAL
Status: DISPENSED
Start: 2020-12-31

## (undated) RX ORDER — LIDOCAINE HYDROCHLORIDE 10 MG/ML
INJECTION, SOLUTION EPIDURAL; INFILTRATION; INTRACAUDAL; PERINEURAL
Status: DISPENSED
Start: 2019-11-21

## (undated) RX ORDER — LIDOCAINE HYDROCHLORIDE 10 MG/ML
INJECTION, SOLUTION EPIDURAL; INFILTRATION; INTRACAUDAL; PERINEURAL
Status: DISPENSED
Start: 2020-03-23

## (undated) RX ORDER — FENTANYL CITRATE 50 UG/ML
INJECTION, SOLUTION INTRAMUSCULAR; INTRAVENOUS
Status: DISPENSED
Start: 2019-03-04

## (undated) RX ORDER — EPHEDRINE SULFATE 50 MG/ML
INJECTION, SOLUTION INTRAMUSCULAR; INTRAVENOUS; SUBCUTANEOUS
Status: DISPENSED
Start: 2018-02-21

## (undated) RX ORDER — LIDOCAINE HYDROCHLORIDE 20 MG/ML
INJECTION, SOLUTION EPIDURAL; INFILTRATION; INTRACAUDAL; PERINEURAL
Status: DISPENSED
Start: 2021-06-22

## (undated) RX ORDER — FENTANYL CITRATE 50 UG/ML
INJECTION, SOLUTION INTRAMUSCULAR; INTRAVENOUS
Status: DISPENSED
Start: 2019-02-07

## (undated) RX ORDER — CEFAZOLIN SODIUM 1 G/3ML
INJECTION, POWDER, FOR SOLUTION INTRAMUSCULAR; INTRAVENOUS
Status: DISPENSED
Start: 2019-10-22

## (undated) RX ORDER — DEXAMETHASONE SODIUM PHOSPHATE 4 MG/ML
INJECTION, SOLUTION INTRA-ARTICULAR; INTRALESIONAL; INTRAMUSCULAR; INTRAVENOUS; SOFT TISSUE
Status: DISPENSED
Start: 2019-02-07

## (undated) RX ORDER — LIDOCAINE HYDROCHLORIDE 10 MG/ML
INJECTION, SOLUTION EPIDURAL; INFILTRATION; INTRACAUDAL; PERINEURAL
Status: DISPENSED
Start: 2019-12-05

## (undated) RX ORDER — PROPOFOL 10 MG/ML
INJECTION, EMULSION INTRAVENOUS
Status: DISPENSED
Start: 2019-02-07

## (undated) RX ORDER — ACETAMINOPHEN 325 MG/1
TABLET ORAL
Status: DISPENSED
Start: 2019-03-04

## (undated) RX ORDER — ALBUMIN (HUMAN) 12.5 G/50ML
SOLUTION INTRAVENOUS
Status: DISPENSED
Start: 2019-11-19

## (undated) RX ORDER — ALBUMIN (HUMAN) 12.5 G/50ML
SOLUTION INTRAVENOUS
Status: DISPENSED
Start: 2021-01-12

## (undated) RX ORDER — LIDOCAINE HYDROCHLORIDE 10 MG/ML
INJECTION, SOLUTION EPIDURAL; INFILTRATION; INTRACAUDAL; PERINEURAL
Status: DISPENSED
Start: 2021-01-29

## (undated) RX ORDER — LIDOCAINE HYDROCHLORIDE 10 MG/ML
INJECTION, SOLUTION EPIDURAL; INFILTRATION; INTRACAUDAL; PERINEURAL
Status: DISPENSED
Start: 2020-09-10

## (undated) RX ORDER — ACETAMINOPHEN 80 MG/1
TABLET, CHEWABLE ORAL
Status: DISPENSED
Start: 2018-02-21

## (undated) RX ORDER — KETOROLAC TROMETHAMINE 30 MG/ML
INJECTION, SOLUTION INTRAMUSCULAR; INTRAVENOUS
Status: DISPENSED
Start: 2019-03-04

## (undated) RX ORDER — ONDANSETRON 2 MG/ML
INJECTION INTRAMUSCULAR; INTRAVENOUS
Status: DISPENSED
Start: 2021-06-22

## (undated) RX ORDER — ALBUMIN, HUMAN INJ 5% 5 %
SOLUTION INTRAVENOUS
Status: DISPENSED
Start: 2018-02-21

## (undated) RX ORDER — ONDANSETRON 2 MG/ML
INJECTION INTRAMUSCULAR; INTRAVENOUS
Status: DISPENSED
Start: 2019-02-07

## (undated) RX ORDER — ALBUMIN (HUMAN) 12.5 G/50ML
SOLUTION INTRAVENOUS
Status: DISPENSED
Start: 2020-02-10

## (undated) RX ORDER — ALBUMIN (HUMAN) 12.5 G/50ML
SOLUTION INTRAVENOUS
Status: DISPENSED
Start: 2020-01-27

## (undated) RX ORDER — LIDOCAINE HYDROCHLORIDE 10 MG/ML
INJECTION, SOLUTION EPIDURAL; INFILTRATION; INTRACAUDAL; PERINEURAL
Status: DISPENSED
Start: 2021-01-12

## (undated) RX ORDER — LIDOCAINE HYDROCHLORIDE 10 MG/ML
INJECTION, SOLUTION EPIDURAL; INFILTRATION; INTRACAUDAL; PERINEURAL
Status: DISPENSED
Start: 2020-09-24

## (undated) RX ORDER — LIDOCAINE HYDROCHLORIDE 10 MG/ML
INJECTION, SOLUTION EPIDURAL; INFILTRATION; INTRACAUDAL; PERINEURAL
Status: DISPENSED
Start: 2020-10-20

## (undated) RX ORDER — LIDOCAINE HYDROCHLORIDE 10 MG/ML
INJECTION, SOLUTION EPIDURAL; INFILTRATION; INTRACAUDAL; PERINEURAL
Status: DISPENSED
Start: 2021-03-04

## (undated) RX ORDER — GLYCOPYRROLATE 0.2 MG/ML
INJECTION, SOLUTION INTRAMUSCULAR; INTRAVENOUS
Status: DISPENSED
Start: 2020-01-29

## (undated) RX ORDER — PHENYLEPHRINE HCL IN 0.9% NACL 1 MG/10 ML
SYRINGE (ML) INTRAVENOUS
Status: DISPENSED
Start: 2019-10-22

## (undated) RX ORDER — LIDOCAINE HYDROCHLORIDE 20 MG/ML
INJECTION, SOLUTION EPIDURAL; INFILTRATION; INTRACAUDAL; PERINEURAL
Status: DISPENSED
Start: 2021-04-15

## (undated) RX ORDER — ALBUMIN (HUMAN) 12.5 G/50ML
SOLUTION INTRAVENOUS
Status: DISPENSED
Start: 2020-02-27

## (undated) RX ORDER — ALBUMIN (HUMAN) 12.5 G/50ML
SOLUTION INTRAVENOUS
Status: DISPENSED
Start: 2020-12-31

## (undated) RX ORDER — ALBUMIN (HUMAN) 12.5 G/50ML
SOLUTION INTRAVENOUS
Status: DISPENSED
Start: 2019-11-21

## (undated) RX ORDER — LIDOCAINE HYDROCHLORIDE 10 MG/ML
INJECTION, SOLUTION EPIDURAL; INFILTRATION; INTRACAUDAL; PERINEURAL
Status: DISPENSED
Start: 2020-02-20

## (undated) RX ORDER — ALBUMIN (HUMAN) 12.5 G/50ML
SOLUTION INTRAVENOUS
Status: DISPENSED
Start: 2020-11-09

## (undated) RX ORDER — LIDOCAINE HYDROCHLORIDE 20 MG/ML
INJECTION, SOLUTION EPIDURAL; INFILTRATION; INTRACAUDAL; PERINEURAL
Status: DISPENSED
Start: 2020-01-29

## (undated) RX ORDER — FENTANYL CITRATE 50 UG/ML
INJECTION, SOLUTION INTRAMUSCULAR; INTRAVENOUS
Status: DISPENSED
Start: 2020-01-29

## (undated) RX ORDER — CEFAZOLIN SODIUM 2 G/100ML
INJECTION, SOLUTION INTRAVENOUS
Status: DISPENSED
Start: 2018-02-21

## (undated) RX ORDER — NICARDIPINE HYDROCHLORIDE 2.5 MG/ML
INJECTION INTRAVENOUS
Status: DISPENSED
Start: 2018-02-02

## (undated) RX ORDER — ALBUMIN (HUMAN) 12.5 G/50ML
SOLUTION INTRAVENOUS
Status: DISPENSED
Start: 2020-09-10

## (undated) RX ORDER — HYDROMORPHONE HYDROCHLORIDE 1 MG/ML
INJECTION, SOLUTION INTRAMUSCULAR; INTRAVENOUS; SUBCUTANEOUS
Status: DISPENSED
Start: 2019-10-22

## (undated) RX ORDER — AMPICILLIN AND SULBACTAM 2; 1 G/1; G/1
INJECTION, POWDER, FOR SOLUTION INTRAMUSCULAR; INTRAVENOUS
Status: DISPENSED
Start: 2021-04-15

## (undated) RX ORDER — LIDOCAINE HYDROCHLORIDE 10 MG/ML
INJECTION, SOLUTION EPIDURAL; INFILTRATION; INTRACAUDAL; PERINEURAL
Status: DISPENSED
Start: 2020-01-17

## (undated) RX ORDER — LIDOCAINE HYDROCHLORIDE 20 MG/ML
INJECTION, SOLUTION EPIDURAL; INFILTRATION; INTRACAUDAL; PERINEURAL
Status: DISPENSED
Start: 2019-02-07

## (undated) RX ORDER — ALBUMIN (HUMAN) 12.5 G/50ML
SOLUTION INTRAVENOUS
Status: DISPENSED
Start: 2020-04-13

## (undated) RX ORDER — PROPOFOL 10 MG/ML
INJECTION, EMULSION INTRAVENOUS
Status: DISPENSED
Start: 2019-10-22

## (undated) RX ORDER — SODIUM CHLORIDE, SODIUM LACTATE, POTASSIUM CHLORIDE, CALCIUM CHLORIDE 600; 310; 30; 20 MG/100ML; MG/100ML; MG/100ML; MG/100ML
INJECTION, SOLUTION INTRAVENOUS
Status: DISPENSED
Start: 2019-10-22

## (undated) RX ORDER — ALBUMIN (HUMAN) 12.5 G/50ML
SOLUTION INTRAVENOUS
Status: DISPENSED
Start: 2021-02-22

## (undated) RX ORDER — SCOLOPAMINE TRANSDERMAL SYSTEM 1 MG/1
PATCH, EXTENDED RELEASE TRANSDERMAL
Status: DISPENSED
Start: 2020-01-29

## (undated) RX ORDER — ALBUMIN (HUMAN) 12.5 G/50ML
SOLUTION INTRAVENOUS
Status: DISPENSED
Start: 2020-09-17

## (undated) RX ORDER — LIDOCAINE HYDROCHLORIDE 10 MG/ML
INJECTION, SOLUTION EPIDURAL; INFILTRATION; INTRACAUDAL; PERINEURAL
Status: DISPENSED
Start: 2021-02-22

## (undated) RX ORDER — ONDANSETRON 2 MG/ML
INJECTION INTRAMUSCULAR; INTRAVENOUS
Status: DISPENSED
Start: 2019-10-22

## (undated) RX ORDER — PHENYLEPHRINE HCL IN 0.9% NACL 1 MG/10 ML
SYRINGE (ML) INTRAVENOUS
Status: DISPENSED
Start: 2019-02-07

## (undated) RX ORDER — PROPOFOL 10 MG/ML
INJECTION, EMULSION INTRAVENOUS
Status: DISPENSED
Start: 2018-02-21

## (undated) RX ORDER — FENTANYL CITRATE 50 UG/ML
INJECTION, SOLUTION INTRAMUSCULAR; INTRAVENOUS
Status: DISPENSED
Start: 2021-04-15

## (undated) RX ORDER — ALBUMIN (HUMAN) 12.5 G/50ML
SOLUTION INTRAVENOUS
Status: DISPENSED
Start: 2020-02-20

## (undated) RX ORDER — CELECOXIB 200 MG/1
CAPSULE ORAL
Status: DISPENSED
Start: 2019-02-07

## (undated) RX ORDER — HYDROMORPHONE HYDROCHLORIDE 1 MG/ML
INJECTION, SOLUTION INTRAMUSCULAR; INTRAVENOUS; SUBCUTANEOUS
Status: DISPENSED
Start: 2021-04-15

## (undated) RX ORDER — SODIUM CHLORIDE, SODIUM LACTATE, POTASSIUM CHLORIDE, CALCIUM CHLORIDE 600; 310; 30; 20 MG/100ML; MG/100ML; MG/100ML; MG/100ML
INJECTION, SOLUTION INTRAVENOUS
Status: DISPENSED
Start: 2019-03-04

## (undated) RX ORDER — LIDOCAINE HYDROCHLORIDE 10 MG/ML
INJECTION, SOLUTION EPIDURAL; INFILTRATION; INTRACAUDAL; PERINEURAL
Status: DISPENSED
Start: 2020-08-25

## (undated) RX ORDER — ALBUMIN (HUMAN) 12.5 G/50ML
SOLUTION INTRAVENOUS
Status: DISPENSED
Start: 2021-02-15

## (undated) RX ORDER — GLYCOPYRROLATE 0.2 MG/ML
INJECTION, SOLUTION INTRAMUSCULAR; INTRAVENOUS
Status: DISPENSED
Start: 2021-06-22

## (undated) RX ORDER — KETAMINE HCL IN 0.9 % NACL 50 MG/5 ML
SYRINGE (ML) INTRAVENOUS
Status: DISPENSED
Start: 2019-03-04

## (undated) RX ORDER — ALBUMIN (HUMAN) 12.5 G/50ML
SOLUTION INTRAVENOUS
Status: DISPENSED
Start: 2019-12-12

## (undated) RX ORDER — LIDOCAINE HYDROCHLORIDE 10 MG/ML
INJECTION, SOLUTION EPIDURAL; INFILTRATION; INTRACAUDAL; PERINEURAL
Status: DISPENSED
Start: 2020-09-17

## (undated) RX ORDER — HYDRALAZINE HYDROCHLORIDE 20 MG/ML
INJECTION INTRAMUSCULAR; INTRAVENOUS
Status: DISPENSED
Start: 2019-10-22

## (undated) RX ORDER — ALBUMIN (HUMAN) 12.5 G/50ML
SOLUTION INTRAVENOUS
Status: DISPENSED
Start: 2020-05-18

## (undated) RX ORDER — ONDANSETRON 2 MG/ML
INJECTION INTRAMUSCULAR; INTRAVENOUS
Status: DISPENSED
Start: 2020-01-29

## (undated) RX ORDER — ALBUMIN (HUMAN) 12.5 G/50ML
SOLUTION INTRAVENOUS
Status: DISPENSED
Start: 2021-04-05

## (undated) RX ORDER — HEPARIN SODIUM 1000 [USP'U]/ML
INJECTION, SOLUTION INTRAVENOUS; SUBCUTANEOUS
Status: DISPENSED
Start: 2018-02-21

## (undated) RX ORDER — LIDOCAINE HYDROCHLORIDE 10 MG/ML
INJECTION, SOLUTION EPIDURAL; INFILTRATION; INTRACAUDAL; PERINEURAL
Status: DISPENSED
Start: 2020-11-09

## (undated) RX ORDER — FENTANYL CITRATE 50 UG/ML
INJECTION, SOLUTION INTRAMUSCULAR; INTRAVENOUS
Status: DISPENSED
Start: 2019-07-18

## (undated) RX ORDER — LIDOCAINE HYDROCHLORIDE 10 MG/ML
INJECTION, SOLUTION EPIDURAL; INFILTRATION; INTRACAUDAL; PERINEURAL
Status: DISPENSED
Start: 2020-07-16

## (undated) RX ORDER — LIDOCAINE HYDROCHLORIDE 10 MG/ML
INJECTION, SOLUTION EPIDURAL; INFILTRATION; INTRACAUDAL; PERINEURAL
Status: DISPENSED
Start: 2020-03-12

## (undated) RX ORDER — ALBUMIN (HUMAN) 12.5 G/50ML
SOLUTION INTRAVENOUS
Status: DISPENSED
Start: 2020-09-03

## (undated) RX ORDER — LIDOCAINE HYDROCHLORIDE 10 MG/ML
INJECTION, SOLUTION EPIDURAL; INFILTRATION; INTRACAUDAL; PERINEURAL
Status: DISPENSED
Start: 2021-01-21

## (undated) RX ORDER — ALBUMIN (HUMAN) 12.5 G/50ML
SOLUTION INTRAVENOUS
Status: DISPENSED
Start: 2020-10-29

## (undated) RX ORDER — LIDOCAINE HYDROCHLORIDE 10 MG/ML
INJECTION, SOLUTION EPIDURAL; INFILTRATION; INTRACAUDAL; PERINEURAL
Status: DISPENSED
Start: 2019-11-19

## (undated) RX ORDER — HYDROMORPHONE HYDROCHLORIDE 1 MG/ML
INJECTION, SOLUTION INTRAMUSCULAR; INTRAVENOUS; SUBCUTANEOUS
Status: DISPENSED
Start: 2020-01-29

## (undated) RX ORDER — FENTANYL CITRATE-0.9 % NACL/PF 10 MCG/ML
PLASTIC BAG, INJECTION (ML) INTRAVENOUS
Status: DISPENSED
Start: 2021-06-22

## (undated) RX ORDER — FENTANYL CITRATE 50 UG/ML
INJECTION, SOLUTION INTRAMUSCULAR; INTRAVENOUS
Status: DISPENSED
Start: 2019-10-22

## (undated) RX ORDER — NITROGLYCERIN 5 MG/ML
VIAL (ML) INTRAVENOUS
Status: DISPENSED
Start: 2020-01-29

## (undated) RX ORDER — LIDOCAINE HYDROCHLORIDE 10 MG/ML
INJECTION, SOLUTION EPIDURAL; INFILTRATION; INTRACAUDAL; PERINEURAL
Status: DISPENSED
Start: 2019-11-13

## (undated) RX ORDER — FENTANYL CITRATE 50 UG/ML
INJECTION, SOLUTION INTRAMUSCULAR; INTRAVENOUS
Status: DISPENSED
Start: 2018-02-02

## (undated) RX ORDER — ALBUMIN (HUMAN) 12.5 G/50ML
SOLUTION INTRAVENOUS
Status: DISPENSED
Start: 2020-08-25

## (undated) RX ORDER — LIDOCAINE HYDROCHLORIDE 10 MG/ML
INJECTION, SOLUTION EPIDURAL; INFILTRATION; INTRACAUDAL; PERINEURAL
Status: DISPENSED
Start: 2021-02-15

## (undated) RX ORDER — SODIUM CHLORIDE 9 MG/ML
INJECTION, SOLUTION INTRAVENOUS
Status: DISPENSED
Start: 2019-07-18

## (undated) RX ORDER — ALBUMIN (HUMAN) 12.5 G/50ML
SOLUTION INTRAVENOUS
Status: DISPENSED
Start: 2020-12-10

## (undated) RX ORDER — LABETALOL 20 MG/4 ML (5 MG/ML) INTRAVENOUS SYRINGE
Status: DISPENSED
Start: 2019-03-04

## (undated) RX ORDER — PROPOFOL 10 MG/ML
INJECTION, EMULSION INTRAVENOUS
Status: DISPENSED
Start: 2021-04-15

## (undated) RX ORDER — ALBUMIN (HUMAN) 12.5 G/50ML
SOLUTION INTRAVENOUS
Status: DISPENSED
Start: 2021-01-29

## (undated) RX ORDER — ALBUMIN (HUMAN) 12.5 G/50ML
SOLUTION INTRAVENOUS
Status: DISPENSED
Start: 2020-09-24

## (undated) RX ORDER — LIDOCAINE HYDROCHLORIDE 10 MG/ML
INJECTION, SOLUTION EPIDURAL; INFILTRATION; INTRACAUDAL; PERINEURAL
Status: DISPENSED
Start: 2020-11-30

## (undated) RX ORDER — CELECOXIB 200 MG/1
CAPSULE ORAL
Status: DISPENSED
Start: 2019-03-04

## (undated) RX ORDER — ALBUMIN (HUMAN) 12.5 G/50ML
SOLUTION INTRAVENOUS
Status: DISPENSED
Start: 2020-03-23

## (undated) RX ORDER — ASPIRIN 81 MG/1
TABLET, CHEWABLE ORAL
Status: DISPENSED
Start: 2018-02-02

## (undated) RX ORDER — LIDOCAINE HYDROCHLORIDE 10 MG/ML
INJECTION, SOLUTION EPIDURAL; INFILTRATION; INTRACAUDAL; PERINEURAL
Status: DISPENSED
Start: 2020-01-27

## (undated) RX ORDER — LIDOCAINE HYDROCHLORIDE 10 MG/ML
INJECTION, SOLUTION EPIDURAL; INFILTRATION; INTRACAUDAL; PERINEURAL
Status: DISPENSED
Start: 2020-09-03

## (undated) RX ORDER — PROPOFOL 10 MG/ML
INJECTION, EMULSION INTRAVENOUS
Status: DISPENSED
Start: 2021-06-22

## (undated) RX ORDER — ALBUMIN (HUMAN) 12.5 G/50ML
SOLUTION INTRAVENOUS
Status: DISPENSED
Start: 2019-12-05

## (undated) RX ORDER — DEXAMETHASONE SODIUM PHOSPHATE 4 MG/ML
INJECTION, SOLUTION INTRA-ARTICULAR; INTRALESIONAL; INTRAMUSCULAR; INTRAVENOUS; SOFT TISSUE
Status: DISPENSED
Start: 2019-10-22

## (undated) RX ORDER — PHENYLEPHRINE HCL IN 0.9% NACL 1 MG/10 ML
SYRINGE (ML) INTRAVENOUS
Status: DISPENSED
Start: 2018-02-21

## (undated) RX ORDER — LIDOCAINE HYDROCHLORIDE 10 MG/ML
INJECTION, SOLUTION EPIDURAL; INFILTRATION; INTRACAUDAL; PERINEURAL
Status: DISPENSED
Start: 2021-04-13

## (undated) RX ORDER — ALBUMIN (HUMAN) 12.5 G/50ML
SOLUTION INTRAVENOUS
Status: DISPENSED
Start: 2020-01-06

## (undated) RX ORDER — LIDOCAINE HYDROCHLORIDE 10 MG/ML
INJECTION, SOLUTION EPIDURAL; INFILTRATION; INTRACAUDAL; PERINEURAL
Status: DISPENSED
Start: 2019-07-18

## (undated) RX ORDER — OXYCODONE HYDROCHLORIDE 5 MG/1
TABLET ORAL
Status: DISPENSED
Start: 2021-04-15

## (undated) RX ORDER — HYDROXYZINE HYDROCHLORIDE 25 MG/1
TABLET, FILM COATED ORAL
Status: DISPENSED
Start: 2019-03-04

## (undated) RX ORDER — LIDOCAINE HYDROCHLORIDE 20 MG/ML
INJECTION, SOLUTION EPIDURAL; INFILTRATION; INTRACAUDAL; PERINEURAL
Status: DISPENSED
Start: 2019-10-22

## (undated) RX ORDER — ALBUMIN (HUMAN) 12.5 G/50ML
SOLUTION INTRAVENOUS
Status: DISPENSED
Start: 2021-01-21

## (undated) RX ORDER — LIDOCAINE HYDROCHLORIDE 10 MG/ML
INJECTION, SOLUTION EPIDURAL; INFILTRATION; INTRACAUDAL; PERINEURAL
Status: DISPENSED
Start: 2019-11-25

## (undated) RX ORDER — PHENYLEPHRINE HCL IN 0.9% NACL 1 MG/10 ML
SYRINGE (ML) INTRAVENOUS
Status: DISPENSED
Start: 2020-01-29

## (undated) RX ORDER — DEXAMETHASONE SODIUM PHOSPHATE 4 MG/ML
INJECTION, SOLUTION INTRA-ARTICULAR; INTRALESIONAL; INTRAMUSCULAR; INTRAVENOUS; SOFT TISSUE
Status: DISPENSED
Start: 2021-06-22

## (undated) RX ORDER — ALBUMIN (HUMAN) 12.5 G/50ML
SOLUTION INTRAVENOUS
Status: DISPENSED
Start: 2019-11-13

## (undated) RX ORDER — KETOROLAC TROMETHAMINE 30 MG/ML
INJECTION, SOLUTION INTRAMUSCULAR; INTRAVENOUS
Status: DISPENSED
Start: 2019-02-07

## (undated) RX ORDER — ALBUMIN (HUMAN) 12.5 G/50ML
SOLUTION INTRAVENOUS
Status: DISPENSED
Start: 2020-07-16

## (undated) RX ORDER — CEFAZOLIN SODIUM 1 G/3ML
INJECTION, POWDER, FOR SOLUTION INTRAMUSCULAR; INTRAVENOUS
Status: DISPENSED
Start: 2021-06-22

## (undated) RX ORDER — HYDROMORPHONE HYDROCHLORIDE 1 MG/ML
INJECTION, SOLUTION INTRAMUSCULAR; INTRAVENOUS; SUBCUTANEOUS
Status: DISPENSED
Start: 2019-03-04

## (undated) RX ORDER — LIDOCAINE HYDROCHLORIDE 10 MG/ML
INJECTION, SOLUTION EPIDURAL; INFILTRATION; INTRACAUDAL; PERINEURAL
Status: DISPENSED
Start: 2020-12-10

## (undated) RX ORDER — LIDOCAINE HYDROCHLORIDE 10 MG/ML
INJECTION, SOLUTION EPIDURAL; INFILTRATION; INTRACAUDAL; PERINEURAL
Status: DISPENSED
Start: 2020-10-29

## (undated) RX ORDER — LIDOCAINE HYDROCHLORIDE 10 MG/ML
INJECTION, SOLUTION EPIDURAL; INFILTRATION; INTRACAUDAL; PERINEURAL
Status: DISPENSED
Start: 2020-07-30

## (undated) RX ORDER — CEFAZOLIN SODIUM 2 G/100ML
INJECTION, SOLUTION INTRAVENOUS
Status: DISPENSED
Start: 2019-10-22

## (undated) RX ORDER — LIDOCAINE HYDROCHLORIDE 10 MG/ML
INJECTION, SOLUTION EPIDURAL; INFILTRATION; INTRACAUDAL; PERINEURAL
Status: DISPENSED
Start: 2019-12-12

## (undated) RX ORDER — ALBUMIN (HUMAN) 12.5 G/50ML
SOLUTION INTRAVENOUS
Status: DISPENSED
Start: 2020-11-30

## (undated) RX ORDER — SODIUM CHLORIDE 9 MG/ML
INJECTION, SOLUTION INTRAVENOUS
Status: DISPENSED
Start: 2018-02-02

## (undated) RX ORDER — FENTANYL CITRATE 50 UG/ML
INJECTION, SOLUTION INTRAMUSCULAR; INTRAVENOUS
Status: DISPENSED
Start: 2021-06-22

## (undated) RX ORDER — DEXAMETHASONE SODIUM PHOSPHATE 4 MG/ML
INJECTION, SOLUTION INTRA-ARTICULAR; INTRALESIONAL; INTRAMUSCULAR; INTRAVENOUS; SOFT TISSUE
Status: DISPENSED
Start: 2020-01-29

## (undated) RX ORDER — EPHEDRINE SULFATE 50 MG/ML
INJECTION, SOLUTION INTRAMUSCULAR; INTRAVENOUS; SUBCUTANEOUS
Status: DISPENSED
Start: 2021-06-22

## (undated) RX ORDER — LIDOCAINE HYDROCHLORIDE 10 MG/ML
INJECTION, SOLUTION EPIDURAL; INFILTRATION; INTRACAUDAL; PERINEURAL
Status: DISPENSED
Start: 2020-01-06

## (undated) RX ORDER — TRIAMCINOLONE ACETONIDE 40 MG/ML
INJECTION, SUSPENSION INTRA-ARTICULAR; INTRAMUSCULAR
Status: DISPENSED
Start: 2018-10-11

## (undated) RX ORDER — HEPARIN SODIUM 1000 [USP'U]/ML
INJECTION, SOLUTION INTRAVENOUS; SUBCUTANEOUS
Status: DISPENSED
Start: 2018-02-02

## (undated) RX ORDER — LIDOCAINE HYDROCHLORIDE 10 MG/ML
INJECTION, SOLUTION EPIDURAL; INFILTRATION; INTRACAUDAL; PERINEURAL
Status: DISPENSED
Start: 2020-08-11

## (undated) RX ORDER — ALBUMIN (HUMAN) 12.5 G/50ML
SOLUTION INTRAVENOUS
Status: DISPENSED
Start: 2020-03-12

## (undated) RX ORDER — GABAPENTIN 300 MG/1
CAPSULE ORAL
Status: DISPENSED
Start: 2019-02-07

## (undated) RX ORDER — LIDOCAINE HYDROCHLORIDE 10 MG/ML
INJECTION, SOLUTION EPIDURAL; INFILTRATION; INTRACAUDAL; PERINEURAL
Status: DISPENSED
Start: 2020-02-27

## (undated) RX ORDER — LIDOCAINE HYDROCHLORIDE 10 MG/ML
INJECTION, SOLUTION EPIDURAL; INFILTRATION; INTRACAUDAL; PERINEURAL
Status: DISPENSED
Start: 2020-02-10

## (undated) RX ORDER — LIDOCAINE HYDROCHLORIDE 10 MG/ML
INJECTION, SOLUTION EPIDURAL; INFILTRATION; INTRACAUDAL; PERINEURAL
Status: DISPENSED
Start: 2021-06-22

## (undated) RX ORDER — SODIUM CHLORIDE, SODIUM LACTATE, POTASSIUM CHLORIDE, CALCIUM CHLORIDE 600; 310; 30; 20 MG/100ML; MG/100ML; MG/100ML; MG/100ML
INJECTION, SOLUTION INTRAVENOUS
Status: DISPENSED
Start: 2021-06-22

## (undated) RX ORDER — NITROGLYCERIN 5 MG/ML
VIAL (ML) INTRAVENOUS
Status: DISPENSED
Start: 2018-02-02

## (undated) RX ORDER — LIDOCAINE HYDROCHLORIDE 10 MG/ML
INJECTION, SOLUTION EPIDURAL; INFILTRATION; INTRACAUDAL; PERINEURAL
Status: DISPENSED
Start: 2020-01-29

## (undated) RX ORDER — LIDOCAINE HYDROCHLORIDE 10 MG/ML
INJECTION, SOLUTION EPIDURAL; INFILTRATION; INTRACAUDAL; PERINEURAL
Status: DISPENSED
Start: 2021-04-05

## (undated) RX ORDER — BUPIVACAINE HYDROCHLORIDE AND EPINEPHRINE 5; 5 MG/ML; UG/ML
INJECTION, SOLUTION PERINEURAL
Status: DISPENSED
Start: 2019-03-04

## (undated) RX ORDER — ALBUMIN (HUMAN) 12.5 G/50ML
SOLUTION INTRAVENOUS
Status: DISPENSED
Start: 2020-12-18

## (undated) RX ORDER — LIDOCAINE HYDROCHLORIDE 10 MG/ML
INJECTION, SOLUTION EPIDURAL; INFILTRATION; INTRACAUDAL; PERINEURAL
Status: DISPENSED
Start: 2020-12-18

## (undated) RX ORDER — LABETALOL 20 MG/4 ML (5 MG/ML) INTRAVENOUS SYRINGE
Status: DISPENSED
Start: 2019-10-22

## (undated) RX ORDER — LIDOCAINE HYDROCHLORIDE 10 MG/ML
INJECTION, SOLUTION EPIDURAL; INFILTRATION; INTRACAUDAL; PERINEURAL
Status: DISPENSED
Start: 2021-03-12

## (undated) RX ORDER — HEPARIN SODIUM 5000 [USP'U]/.5ML
INJECTION, SOLUTION INTRAVENOUS; SUBCUTANEOUS
Status: DISPENSED
Start: 2020-01-29

## (undated) RX ORDER — EPHEDRINE SULFATE 50 MG/ML
INJECTION, SOLUTION INTRAMUSCULAR; INTRAVENOUS; SUBCUTANEOUS
Status: DISPENSED
Start: 2019-10-22

## (undated) RX ORDER — ONDANSETRON 2 MG/ML
INJECTION INTRAMUSCULAR; INTRAVENOUS
Status: DISPENSED
Start: 2021-04-15

## (undated) RX ORDER — ALBUMIN (HUMAN) 12.5 G/50ML
SOLUTION INTRAVENOUS
Status: DISPENSED
Start: 2020-01-17

## (undated) RX ORDER — ALBUMIN (HUMAN) 12.5 G/50ML
SOLUTION INTRAVENOUS
Status: DISPENSED
Start: 2020-10-09

## (undated) RX ORDER — ALBUMIN (HUMAN) 12.5 G/50ML
SOLUTION INTRAVENOUS
Status: DISPENSED
Start: 2020-06-25

## (undated) RX ORDER — GABAPENTIN 300 MG/1
CAPSULE ORAL
Status: DISPENSED
Start: 2019-03-04

## (undated) RX ORDER — PROPOFOL 10 MG/ML
INJECTION, EMULSION INTRAVENOUS
Status: DISPENSED
Start: 2020-01-29

## (undated) RX ORDER — PROTAMINE SULFATE 10 MG/ML
INJECTION, SOLUTION INTRAVENOUS
Status: DISPENSED
Start: 2018-02-21

## (undated) RX ORDER — ALBUMIN (HUMAN) 12.5 G/50ML
SOLUTION INTRAVENOUS
Status: DISPENSED
Start: 2020-08-11

## (undated) RX ORDER — ALBUMIN (HUMAN) 12.5 G/50ML
SOLUTION INTRAVENOUS
Status: DISPENSED
Start: 2020-10-20

## (undated) RX ORDER — OXYCODONE HYDROCHLORIDE 5 MG/1
TABLET ORAL
Status: DISPENSED
Start: 2019-03-04

## (undated) RX ORDER — LIDOCAINE HYDROCHLORIDE 10 MG/ML
INJECTION, SOLUTION INFILTRATION; PERINEURAL
Status: DISPENSED
Start: 2018-10-11

## (undated) RX ORDER — FENTANYL CITRATE 50 UG/ML
INJECTION, SOLUTION INTRAMUSCULAR; INTRAVENOUS
Status: DISPENSED
Start: 2018-02-21

## (undated) RX ORDER — FUROSEMIDE 10 MG/ML
INJECTION INTRAMUSCULAR; INTRAVENOUS
Status: DISPENSED
Start: 2018-02-21

## (undated) RX ORDER — HYDROMORPHONE HYDROCHLORIDE 1 MG/ML
INJECTION, SOLUTION INTRAMUSCULAR; INTRAVENOUS; SUBCUTANEOUS
Status: DISPENSED
Start: 2021-06-22